# Patient Record
Sex: FEMALE | Race: WHITE | NOT HISPANIC OR LATINO
[De-identification: names, ages, dates, MRNs, and addresses within clinical notes are randomized per-mention and may not be internally consistent; named-entity substitution may affect disease eponyms.]

---

## 2016-04-22 RX ORDER — AMLODIPINE BESYLATE 2.5 MG/1
1 TABLET ORAL
Qty: 0 | Refills: 0 | COMMUNITY
Start: 2016-04-22

## 2017-05-11 ENCOUNTER — APPOINTMENT (OUTPATIENT)
Dept: MAMMOGRAPHY | Facility: IMAGING CENTER | Age: 82
End: 2017-05-11

## 2017-05-11 ENCOUNTER — APPOINTMENT (OUTPATIENT)
Dept: ULTRASOUND IMAGING | Facility: IMAGING CENTER | Age: 82
End: 2017-05-11

## 2017-05-11 ENCOUNTER — OUTPATIENT (OUTPATIENT)
Dept: OUTPATIENT SERVICES | Facility: HOSPITAL | Age: 82
LOS: 1 days | End: 2017-05-11
Payer: MEDICARE

## 2017-05-11 DIAGNOSIS — Z96.7 PRESENCE OF OTHER BONE AND TENDON IMPLANTS: Chronic | ICD-10-CM

## 2017-05-11 DIAGNOSIS — O00.1 TUBAL PREGNANCY: Chronic | ICD-10-CM

## 2017-05-11 DIAGNOSIS — Z00.8 ENCOUNTER FOR OTHER GENERAL EXAMINATION: ICD-10-CM

## 2017-05-11 PROCEDURE — G0279: CPT

## 2017-05-11 PROCEDURE — 77066 DX MAMMO INCL CAD BI: CPT

## 2017-05-11 PROCEDURE — 76641 ULTRASOUND BREAST COMPLETE: CPT

## 2018-02-20 ENCOUNTER — APPOINTMENT (OUTPATIENT)
Dept: GERIATRICS | Facility: ASSISTED LIVING FACILITY | Age: 83
End: 2018-02-20
Payer: MEDICARE

## 2018-02-20 DIAGNOSIS — Z87.81 PERSONAL HISTORY OF (HEALED) TRAUMATIC FRACTURE: ICD-10-CM

## 2018-02-22 VITALS
SYSTOLIC BLOOD PRESSURE: 130 MMHG | OXYGEN SATURATION: 97 % | DIASTOLIC BLOOD PRESSURE: 60 MMHG | HEART RATE: 68 BPM | RESPIRATION RATE: 15 BRPM

## 2018-02-22 PROBLEM — Z87.81 HISTORY OF FRACTURE OF RIGHT HIP: Status: RESOLVED | Noted: 2018-02-22 | Resolved: 2018-02-22

## 2018-03-27 ENCOUNTER — APPOINTMENT (OUTPATIENT)
Dept: GERIATRICS | Facility: ASSISTED LIVING FACILITY | Age: 83
End: 2018-03-27
Payer: MEDICARE

## 2018-04-12 LAB
APPEARANCE: CLEAR
BACTERIA UR CULT: NORMAL
BACTERIA: ABNORMAL
BILIRUBIN URINE: NEGATIVE
BLOOD URINE: ABNORMAL
COLOR: ABNORMAL
GLUCOSE QUALITATIVE U: NEGATIVE MG/DL
HYALINE CASTS: 0 /LPF
KETONES URINE: NEGATIVE
LEUKOCYTE ESTERASE URINE: ABNORMAL
MICROSCOPIC-UA: NORMAL
NITRITE URINE: NEGATIVE
PH URINE: 5
PROTEIN URINE: ABNORMAL MG/DL
RED BLOOD CELLS URINE: 10 /HPF
SPECIFIC GRAVITY URINE: 1.02
SQUAMOUS EPITHELIAL CELLS: 10 /HPF
UROBILINOGEN URINE: NEGATIVE MG/DL
WHITE BLOOD CELLS URINE: 20 /HPF

## 2018-05-20 ENCOUNTER — INPATIENT (INPATIENT)
Facility: HOSPITAL | Age: 83
LOS: 4 days | Discharge: NURSING, REHAB OR LONG-TERM CARE | DRG: 689 | End: 2018-05-25
Attending: INTERNAL MEDICINE | Admitting: INTERNAL MEDICINE
Payer: MEDICARE

## 2018-05-20 VITALS
OXYGEN SATURATION: 95 % | RESPIRATION RATE: 20 BRPM | DIASTOLIC BLOOD PRESSURE: 81 MMHG | WEIGHT: 149.91 LBS | SYSTOLIC BLOOD PRESSURE: 130 MMHG | HEART RATE: 98 BPM | TEMPERATURE: 99 F

## 2018-05-20 DIAGNOSIS — I10 ESSENTIAL (PRIMARY) HYPERTENSION: ICD-10-CM

## 2018-05-20 DIAGNOSIS — R53.1 WEAKNESS: ICD-10-CM

## 2018-05-20 DIAGNOSIS — R35.0 FREQUENCY OF MICTURITION: ICD-10-CM

## 2018-05-20 DIAGNOSIS — O00.1 TUBAL PREGNANCY: Chronic | ICD-10-CM

## 2018-05-20 DIAGNOSIS — M48.00 SPINAL STENOSIS, SITE UNSPECIFIED: ICD-10-CM

## 2018-05-20 DIAGNOSIS — Z96.7 PRESENCE OF OTHER BONE AND TENDON IMPLANTS: Chronic | ICD-10-CM

## 2018-05-20 DIAGNOSIS — N39.0 URINARY TRACT INFECTION, SITE NOT SPECIFIED: ICD-10-CM

## 2018-05-20 DIAGNOSIS — Z98.890 OTHER SPECIFIED POSTPROCEDURAL STATES: ICD-10-CM

## 2018-05-20 LAB
ALBUMIN SERPL ELPH-MCNC: 3.9 G/DL — SIGNIFICANT CHANGE UP (ref 3.3–5)
ALP SERPL-CCNC: 127 U/L — HIGH (ref 40–120)
ALT FLD-CCNC: 58 U/L — HIGH (ref 10–45)
ANION GAP SERPL CALC-SCNC: 12 MMOL/L — SIGNIFICANT CHANGE UP (ref 5–17)
APPEARANCE UR: ABNORMAL
AST SERPL-CCNC: 44 U/L — HIGH (ref 10–40)
BASOPHILS # BLD AUTO: 0 K/UL — SIGNIFICANT CHANGE UP (ref 0–0.2)
BASOPHILS NFR BLD AUTO: 0.3 % — SIGNIFICANT CHANGE UP (ref 0–2)
BILIRUB SERPL-MCNC: 0.5 MG/DL — SIGNIFICANT CHANGE UP (ref 0.2–1.2)
BILIRUB UR-MCNC: NEGATIVE — SIGNIFICANT CHANGE UP
BUN SERPL-MCNC: 23 MG/DL — SIGNIFICANT CHANGE UP (ref 7–23)
CALCIUM SERPL-MCNC: 9 MG/DL — SIGNIFICANT CHANGE UP (ref 8.4–10.5)
CHLORIDE SERPL-SCNC: 99 MMOL/L — SIGNIFICANT CHANGE UP (ref 96–108)
CO2 SERPL-SCNC: 26 MMOL/L — SIGNIFICANT CHANGE UP (ref 22–31)
COLOR SPEC: YELLOW — SIGNIFICANT CHANGE UP
CREAT SERPL-MCNC: 1.29 MG/DL — SIGNIFICANT CHANGE UP (ref 0.5–1.3)
DIFF PNL FLD: ABNORMAL
EOSINOPHIL # BLD AUTO: 0.1 K/UL — SIGNIFICANT CHANGE UP (ref 0–0.5)
EOSINOPHIL NFR BLD AUTO: 0.7 % — SIGNIFICANT CHANGE UP (ref 0–6)
GLUCOSE SERPL-MCNC: 102 MG/DL — HIGH (ref 70–99)
GLUCOSE UR QL: NEGATIVE — SIGNIFICANT CHANGE UP
HCT VFR BLD CALC: 40.5 % — SIGNIFICANT CHANGE UP (ref 34.5–45)
HGB BLD-MCNC: 13.7 G/DL — SIGNIFICANT CHANGE UP (ref 11.5–15.5)
KETONES UR-MCNC: NEGATIVE — SIGNIFICANT CHANGE UP
LEUKOCYTE ESTERASE UR-ACNC: ABNORMAL
LYMPHOCYTES # BLD AUTO: 1.5 K/UL — SIGNIFICANT CHANGE UP (ref 1–3.3)
LYMPHOCYTES # BLD AUTO: 14.4 % — SIGNIFICANT CHANGE UP (ref 13–44)
MAGNESIUM SERPL-MCNC: 2.2 MG/DL — SIGNIFICANT CHANGE UP (ref 1.6–2.6)
MCHC RBC-ENTMCNC: 32.3 PG — SIGNIFICANT CHANGE UP (ref 27–34)
MCHC RBC-ENTMCNC: 33.9 GM/DL — SIGNIFICANT CHANGE UP (ref 32–36)
MCV RBC AUTO: 95.3 FL — SIGNIFICANT CHANGE UP (ref 80–100)
MONOCYTES # BLD AUTO: 1.5 K/UL — HIGH (ref 0–0.9)
MONOCYTES NFR BLD AUTO: 14.5 % — HIGH (ref 2–14)
NEUTROPHILS # BLD AUTO: 7.2 K/UL — SIGNIFICANT CHANGE UP (ref 1.8–7.4)
NEUTROPHILS NFR BLD AUTO: 70.1 % — SIGNIFICANT CHANGE UP (ref 43–77)
NITRITE UR-MCNC: POSITIVE
PH UR: 7 — SIGNIFICANT CHANGE UP (ref 5–8)
PHOSPHATE SERPL-MCNC: 3 MG/DL — SIGNIFICANT CHANGE UP (ref 2.5–4.5)
PLATELET # BLD AUTO: 149 K/UL — LOW (ref 150–400)
POTASSIUM SERPL-MCNC: 5.1 MMOL/L — SIGNIFICANT CHANGE UP (ref 3.5–5.3)
POTASSIUM SERPL-SCNC: 5.1 MMOL/L — SIGNIFICANT CHANGE UP (ref 3.5–5.3)
PROT SERPL-MCNC: 8.1 G/DL — SIGNIFICANT CHANGE UP (ref 6–8.3)
PROT UR-MCNC: 100 MG/DL
RBC # BLD: 4.26 M/UL — SIGNIFICANT CHANGE UP (ref 3.8–5.2)
RBC # FLD: 12.2 % — SIGNIFICANT CHANGE UP (ref 10.3–14.5)
RBC CASTS # UR COMP ASSIST: ABNORMAL /HPF (ref 0–2)
SODIUM SERPL-SCNC: 137 MMOL/L — SIGNIFICANT CHANGE UP (ref 135–145)
SP GR SPEC: 1.01 — SIGNIFICANT CHANGE UP (ref 1.01–1.02)
UROBILINOGEN FLD QL: NEGATIVE — SIGNIFICANT CHANGE UP
WBC # BLD: 10.2 K/UL — SIGNIFICANT CHANGE UP (ref 3.8–10.5)
WBC # FLD AUTO: 10.2 K/UL — SIGNIFICANT CHANGE UP (ref 3.8–10.5)
WBC UR QL: >50 /HPF (ref 0–5)

## 2018-05-20 PROCEDURE — 99285 EMERGENCY DEPT VISIT HI MDM: CPT | Mod: GC

## 2018-05-20 RX ORDER — SODIUM CHLORIDE 9 MG/ML
500 INJECTION INTRAMUSCULAR; INTRAVENOUS; SUBCUTANEOUS ONCE
Qty: 0 | Refills: 0 | Status: COMPLETED | OUTPATIENT
Start: 2018-05-20 | End: 2018-05-20

## 2018-05-20 RX ORDER — AMLODIPINE BESYLATE 2.5 MG/1
5 TABLET ORAL DAILY
Qty: 0 | Refills: 0 | Status: DISCONTINUED | OUTPATIENT
Start: 2018-05-20 | End: 2018-05-25

## 2018-05-20 RX ORDER — CALCIUM CARBONATE 500(1250)
1 TABLET ORAL DAILY
Qty: 0 | Refills: 0 | Status: DISCONTINUED | OUTPATIENT
Start: 2018-05-20 | End: 2018-05-25

## 2018-05-20 RX ORDER — LEVOTHYROXINE SODIUM 125 MCG
88 TABLET ORAL DAILY
Qty: 0 | Refills: 0 | Status: DISCONTINUED | OUTPATIENT
Start: 2018-05-20 | End: 2018-05-25

## 2018-05-20 RX ORDER — CHOLECALCIFEROL (VITAMIN D3) 125 MCG
1000 CAPSULE ORAL
Qty: 0 | Refills: 0 | Status: DISCONTINUED | OUTPATIENT
Start: 2018-05-20 | End: 2018-05-25

## 2018-05-20 RX ORDER — ACETAMINOPHEN 500 MG
500 TABLET ORAL EVERY 6 HOURS
Qty: 0 | Refills: 0 | Status: DISCONTINUED | OUTPATIENT
Start: 2018-05-20 | End: 2018-05-25

## 2018-05-20 RX ORDER — CEFTRIAXONE 500 MG/1
1 INJECTION, POWDER, FOR SOLUTION INTRAMUSCULAR; INTRAVENOUS ONCE
Qty: 0 | Refills: 0 | Status: DISCONTINUED | OUTPATIENT
Start: 2018-05-20 | End: 2018-05-20

## 2018-05-20 RX ORDER — OXYBUTYNIN CHLORIDE 5 MG
5 TABLET ORAL
Qty: 0 | Refills: 0 | Status: DISCONTINUED | OUTPATIENT
Start: 2018-05-20 | End: 2018-05-25

## 2018-05-20 RX ORDER — ATENOLOL 25 MG/1
25 TABLET ORAL DAILY
Qty: 0 | Refills: 0 | Status: DISCONTINUED | OUTPATIENT
Start: 2018-05-20 | End: 2018-05-25

## 2018-05-20 RX ORDER — DULOXETINE HYDROCHLORIDE 30 MG/1
20 CAPSULE, DELAYED RELEASE ORAL DAILY
Qty: 0 | Refills: 0 | Status: DISCONTINUED | OUTPATIENT
Start: 2018-05-20 | End: 2018-05-25

## 2018-05-20 RX ORDER — NYSTATIN CREAM 100000 [USP'U]/G
1 CREAM TOPICAL
Qty: 0 | Refills: 0 | Status: DISCONTINUED | OUTPATIENT
Start: 2018-05-20 | End: 2018-05-25

## 2018-05-20 RX ORDER — POLYETHYLENE GLYCOL 3350 17 G/17G
17 POWDER, FOR SOLUTION ORAL DAILY
Qty: 0 | Refills: 0 | Status: DISCONTINUED | OUTPATIENT
Start: 2018-05-20 | End: 2018-05-25

## 2018-05-20 RX ORDER — SENNA PLUS 8.6 MG/1
2 TABLET ORAL AT BEDTIME
Qty: 0 | Refills: 0 | Status: DISCONTINUED | OUTPATIENT
Start: 2018-05-20 | End: 2018-05-25

## 2018-05-20 RX ORDER — CEFTRIAXONE 500 MG/1
1 INJECTION, POWDER, FOR SOLUTION INTRAMUSCULAR; INTRAVENOUS ONCE
Qty: 0 | Refills: 0 | Status: COMPLETED | OUTPATIENT
Start: 2018-05-20 | End: 2018-05-20

## 2018-05-20 RX ORDER — FERROUS SULFATE 325(65) MG
325 TABLET ORAL THREE TIMES A DAY
Qty: 0 | Refills: 0 | Status: DISCONTINUED | OUTPATIENT
Start: 2018-05-20 | End: 2018-05-25

## 2018-05-20 RX ORDER — AMLODIPINE BESYLATE 2.5 MG/1
10 TABLET ORAL ONCE
Qty: 0 | Refills: 0 | Status: COMPLETED | OUTPATIENT
Start: 2018-05-20 | End: 2018-05-20

## 2018-05-20 RX ORDER — DOCUSATE SODIUM 100 MG
100 CAPSULE ORAL THREE TIMES A DAY
Qty: 0 | Refills: 0 | Status: DISCONTINUED | OUTPATIENT
Start: 2018-05-20 | End: 2018-05-25

## 2018-05-20 RX ORDER — ATENOLOL 25 MG/1
25 TABLET ORAL ONCE
Qty: 0 | Refills: 0 | Status: COMPLETED | OUTPATIENT
Start: 2018-05-20 | End: 2018-05-20

## 2018-05-20 RX ADMIN — SODIUM CHLORIDE 500 MILLILITER(S): 9 INJECTION INTRAMUSCULAR; INTRAVENOUS; SUBCUTANEOUS at 14:51

## 2018-05-20 RX ADMIN — ATENOLOL 25 MILLIGRAM(S): 25 TABLET ORAL at 16:42

## 2018-05-20 RX ADMIN — AMLODIPINE BESYLATE 10 MILLIGRAM(S): 2.5 TABLET ORAL at 21:05

## 2018-05-20 RX ADMIN — CEFTRIAXONE 100 GRAM(S): 500 INJECTION, POWDER, FOR SOLUTION INTRAMUSCULAR; INTRAVENOUS at 16:17

## 2018-05-20 NOTE — H&P ADULT - NSHPREVIEWOFSYSTEMS_GEN_ALL_CORE
REVIEW OF SYSTEM:  CONSTITUTIONAL: No fever, No change in weight, No fatigue  HEAD: No headache, No dizziness, No recent trauma  EYES: No eye pain, No visual disturbances, No discharge  ENT:  No difficulty hearing, No tinnitus, No vertigo, No sinus pain, No throat pain  NECK: No pain, No stiffness  BREASTS: No pain, No masses, No nipple discharge  RESPIRATORY: No cough, No wheezing, No chills, No hemoptysis, No shortness of breath at rest or exertional shortness of breath  CARDIOVASCULAR: No chest pain, No palpitations, No dizziness, No CHF, No arrhythmia, No cardiomegaly, No leg swelling  GASTROINTESTINAL: No abdominal, No epigastric pain. No nausea, No vomiting, No hematemesis, No diarrhea, No constipation. No melena, No hematochezia. No GERD  GENITOURINARY: No dysuria, No frequency, No hematuria, No incontinence, No nocturia, No hesitancy,  SKIN: No itching, No burning, No rashes, No lesions   LYMPH NODES: No history of enlarged glands  ENDOCRINE: No heat or cold intolerance, No hair loss. No osteoporosis, No thyroid disease  MUSCULOSKELETAL: No joint pain or swelling, No muscle, back, or extremity pain  PSYCHIATRIC: No depression, No anxiety, No mood swings, No difficulty sleeping  HEME/LYMPH: No easy bruising, No anticoagulants, No bleeding disorder, No bleeding gums  ALLERGY AND IMMUNOLOGIC: No hives, No eczema  NEUROLOGICAL: No memory loss, No loss of strength, No numbness, No tremors REVIEW OF SYSTEM:  CONSTITUTIONAL: No fever, No change in weight, No fatigue  HEAD: No headache, No dizziness, No recent trauma  EYES: No eye pain, No visual disturbances, No discharge  ENT:  No difficulty hearing, No tinnitus, No vertigo, No sinus pain, No throat pain  NECK: No pain, No stiffness  BREASTS: No pain, No masses, No nipple discharge Left breast surgery  RESPIRATORY: No cough, No wheezing, No chills, No hemoptysis, No shortness of breath at rest or exertional shortness of breath  CARDIOVASCULAR: No chest pain, No palpitations, No dizziness, No CHF, No arrhythmia, No cardiomegaly, No leg swelling  GASTROINTESTINAL: No abdominal, No epigastric pain. No nausea, No vomiting, No hematemesis, No diarrhea, No constipation. No melena, No hematochezia. No GERD  GENITOURINARY: (+) dysuria, (+) frequency, No hematuria, No incontinence, No nocturia, No hesitancy,  SKIN: No itching, No burning, No rashes, No lesions   LYMPH NODES: No history of enlarged glands  ENDOCRINE: No heat or cold intolerance, No hair loss. No osteoporosis, No thyroid disease  MUSCULOSKELETAL: No joint pain or swelling, No muscle, back, or extremity pain  PSYCHIATRIC: No depression, No anxiety, No mood swings, No difficulty sleeping  HEME/LYMPH: No easy bruising, No anticoagulants, No bleeding disorder, No bleeding gums  ALLERGY AND IMMUNOLOGIC: No hives, No eczema  NEUROLOGICAL: No memory loss, No loss of strength, No numbness, No tremors (+) difficulty walking with frequent falls and ataxia

## 2018-05-20 NOTE — ED ADULT NURSE REASSESSMENT NOTE - NS ED NURSE REASSESS COMMENT FT1
Pt. sitting up in stretcher, no acute distress. Breathing unlabored on RA. Pt. states "I feel okay." Pt. admitted to medicine, awaiting bed. Plan of care explained to patient.
Pt. reports weakness with ambulation/standing,  unable to obtain orthostatic blood pressure and heart rate, MD aware.
straight catheterization done under sterile technique with 2 RNs present, pt. tolerated well, moderate amount of yellow cloudy urine drained. UA/UC sent as ordered

## 2018-05-20 NOTE — ED PROVIDER NOTE - CARE PLAN
Principal Discharge DX:	Spinal stenosis Principal Discharge DX:	Urinary tract infection  Secondary Diagnosis:	Weakness

## 2018-05-20 NOTE — ED PROVIDER NOTE - OBJECTIVE STATEMENT
89F Hx htn, spinal stenosis and urinary incontinence c/o falls x 2 two days ago, noted as knee buckling sensation and fell onto her knees, able to ambulate with assistance, no visual disturbance, headache, LOC, AC, or numbness/tingling, cough/fevers/ dysuria. Endorses overall weakness 89F Hx htn, spinal stenosis and urinary incontinence c/o falls x 2 two days ago, noted as knee buckling sensation and fell onto her knees, able to ambulate with assistance, no visual disturbance, headache, LOC, AC, or numbness/tingling, cough/fevers/ dysuria, cp/sob. Endorses overall weakness and instability with ambulation. Notes she must independently go for dinner/lunch and move around in her current nursing facility.

## 2018-05-20 NOTE — ED ADULT NURSE NOTE - PMH
Bilateral Cataracts    HTN (hypertension)    Spinal stenosis    Status Post Breast Lumpectomy    Urinary Frequency

## 2018-05-20 NOTE — ED ADULT NURSE NOTE - OBJECTIVE STATEMENT
89 yo F presents to ED A+Ox3 accompanied by her daughter from triage in a wheelchair c/o burning with urination and two falls this past week. As per daughter, pt. lives in the [facility] assisted living facility, had two slip and falls yesterday. Pt. reports falling once two days ago and again yesterday, denies hitting head, denies LOC. Pt. states she was assisted after falls by staff, ambulates at baseline with a rolling walker. Pt. reports urinary frequency "for a while" that worsens at night, reports having botox for bladder spasms one month ago, states she had no improvement since the botox. Pt. denies any other obvious injuries from the falls. Lungs CTA, breathing unlabored on RA. Skin warm pink and dry. Abdomen soft, nondistended, nontender. Moves all extremities. Denies fever, chills, abdominal pain, N/V. Afebile in ED at this time. Daughter at bedside. Comfort and safety measures in place.

## 2018-05-20 NOTE — H&P ADULT - NSHPPHYSICALEXAM_GEN_ALL_CORE
PHYSICAL EXAM:    GENERAL: NAD, well nourished and conversant  HEAD:  Atraumatic  EYES: EOM, PERRLA, conjunctiva pink and sclera white  ENT: No tonsillar erythema, exudates, or enlargement, moist mucous membranes, good dentition, no lesions  NECK: Supple, No JVD, normal thyroid, carotids with normal upstrokes and no bruits  CHEST/LUNG: Clear to auscultation bilaterally, No rales, rhonchi, wheezing, or rubs  HEART: Regular rate and rhythm, No murmurs, rubs, or gallops  ABDOMEN: Soft, nondistended, no masses, guarding, tenderness or rebound, bowel sounds present  EXTREMITIES:  2+ Peripheral Pulses, No clubbing, cyanosis, or edema. No arthritis of shoulders, elbows, hands, hips, knees, ankles, or feet. No DJD C spine, T spine, or L/S spine  LYMPH: No lymphadenopathy noted  SKIN: No rashes or lesions  NERVOUS SYSTEM:  Alert & Oriented X3, normal cognitive function. Motor Strength 5/5 right upper and right lower.  5/5 left upper and left lower extremities, DTRs 2+ intact and symmetric PHYSICAL EXAM:    GENERAL: NAD, well nourished and conversant  HEAD:  Atraumatic  EYES: EOM, PERRLA, conjunctiva pink and sclera white  ENT: No tonsillar erythema, exudates, or enlargement, moist mucous membranes, good dentition, no lesions  NECK: Supple, No JVD, normal thyroid, carotids with normal upstrokes and no bruits  Surgery left breast  CHEST/LUNG: Clear to auscultation bilaterally, No rales, rhonchi, wheezing, or rubs  HEART: Regular rate and rhythm, No murmurs, rubs, or gallops  ABDOMEN: Soft, nondistended, no masses, guarding, tenderness or rebound, bowel sounds present  EXTREMITIES:  2+ Peripheral Pulses, No clubbing, cyanosis, or edema. No arthritis of shoulders, elbows, hands, hips, knees, ankles, or feet. No DJD C spine, T spine, or L/S spine  LYMPH: No lymphadenopathy noted  SKIN: No rashes or lesions  NERVOUS SYSTEM:  Alert & Oriented X3, normal cognitive function. Motor Strength 5/5 right upper and right lower.  5/5 left upper and left lower extremities, DTRs 2+ intact and symmetric. Ataxia and frequent falls

## 2018-05-20 NOTE — ED PROVIDER NOTE - PROGRESS NOTE DETAILS
Brenden PGY1: spoken to Dr Jose Shah PCP - due to frequent falls and endorsement of weakness, will admit pt for IV abx until more stable for d/c home [place] PGY1: Per major zapien, can admit to sylvia birmingham Brenden PGY1: spoken to Dr Jose Shah PCP - due to frequent falls and endorsement of weakness, will admit pt for IV abx until more stable for d/c home, patient unable to stand from bed to urinate due to feeling of instability, is not in an advanced care facility, needs to get lunch on her own and walk on her own Wilcox PGY1: Called Dr major zapien, can admit to Dr sylvia birmingham. Brenden PGY1: spoken to Dr Jose Shah PCP - due to frequent falls and endorsement of weakness, will admit pt for IV abx until more stable for d/c home, patient unable to stand from bed to urinate due to feeling of instability, is not in an advanced care facility, needs to get lunch on her own and walk on her own, does not feel safe returning Brenden PGY1: Called Dr Shah, can admit to Dr sylvia birmingham, his team notified, spoken with Dr Louis

## 2018-05-20 NOTE — H&P ADULT - NSHPLABSRESULTS_GEN_ALL_CORE
CBC Full  -  ( 20 May 2018 15:09 )  WBC Count : 10.2 K/uL  Hemoglobin : 13.7 g/dL  Hematocrit : 40.5 %  Platelet Count - Automated : 149 K/uL  Mean Cell Volume : 95.3 fl  Mean Cell Hemoglobin : 32.3 pg  Mean Cell Hemoglobin Concentration : 33.9 gm/dL  Auto Neutrophil # : 7.2 K/uL  Auto Lymphocyte # : 1.5 K/uL  Auto Monocyte # : 1.5 K/uL  Auto Eosinophil # : 0.1 K/uL  Auto Basophil # : 0.0 K/uL  Auto Neutrophil % : 70.1 %  Auto Lymphocyte % : 14.4 %  Auto Monocyte % : 14.5 %  Auto Eosinophil % : 0.7 %  Auto Basophil % : 0.3 %        137  |  99  |  23  ----------------------------<  102<H>  5.1   |  26  |  1.29    Ca    9.0      20 May 2018 15:09  Phos  3.0     05-20  Mg     2.2     -20    TPro  8.1  /  Alb  3.9  /  TBili  0.5  /  DBili  x   /  AST  44<H>  /  ALT  58<H>  /  AlkPhos  127<H>  05-20    LIVER FUNCTIONS - ( 20 May 2018 15:09 )  Alb: 3.9 g/dL / Pro: 8.1 g/dL / ALK PHOS: 127 U/L / ALT: 58 U/L / AST: 44 U/L / GGT: x             Urinalysis Basic - ( 20 May 2018 14:46 )    Color: Yellow / Appearance: Turbid / S.010 / pH: x  Gluc: x / Ketone: Negative  / Bili: Negative / Urobili: Negative   Blood: x / Protein: 100 mg/dL / Nitrite: Positive   Leuk Esterase: Large / RBC: 10-25 /HPF / WBC >50 /HPF   Sq Epi: x / Non Sq Epi: x / Bacteria: x

## 2018-05-20 NOTE — H&P ADULT - HISTORY OF PRESENT ILLNESS
89F Hx htn, spinal stenosis and urinary incontinence c/o falls x 2 two days ago, noted as knee buckling sensation and fell onto her knees, able to ambulate with assistance, no visual disturbance, headache, LOC, AC, or numbness/tingling, cough/fevers/ dysuria, cp/sob. Endorses overall weakness and instability with ambulation. Notes she must independently go for dinner/lunch and move around in her current nursing facility

## 2018-05-21 LAB
ALBUMIN SERPL ELPH-MCNC: 3.5 G/DL — SIGNIFICANT CHANGE UP (ref 3.3–5)
ALP SERPL-CCNC: 108 U/L — SIGNIFICANT CHANGE UP (ref 40–120)
ALT FLD-CCNC: 40 U/L — SIGNIFICANT CHANGE UP (ref 10–45)
ANION GAP SERPL CALC-SCNC: 11 MMOL/L — SIGNIFICANT CHANGE UP (ref 5–17)
AST SERPL-CCNC: 25 U/L — SIGNIFICANT CHANGE UP (ref 10–40)
BASOPHILS # BLD AUTO: 0.03 K/UL — SIGNIFICANT CHANGE UP (ref 0–0.2)
BASOPHILS NFR BLD AUTO: 0.4 % — SIGNIFICANT CHANGE UP (ref 0–2)
BILIRUB SERPL-MCNC: 0.2 MG/DL — SIGNIFICANT CHANGE UP (ref 0.2–1.2)
BUN SERPL-MCNC: 25 MG/DL — HIGH (ref 7–23)
CALCIUM SERPL-MCNC: 8.6 MG/DL — SIGNIFICANT CHANGE UP (ref 8.4–10.5)
CHLORIDE SERPL-SCNC: 102 MMOL/L — SIGNIFICANT CHANGE UP (ref 96–108)
CO2 SERPL-SCNC: 28 MMOL/L — SIGNIFICANT CHANGE UP (ref 22–31)
CREAT SERPL-MCNC: 1.34 MG/DL — HIGH (ref 0.5–1.3)
EOSINOPHIL # BLD AUTO: 0.2 K/UL — SIGNIFICANT CHANGE UP (ref 0–0.5)
EOSINOPHIL NFR BLD AUTO: 2.6 % — SIGNIFICANT CHANGE UP (ref 0–6)
GLUCOSE SERPL-MCNC: 115 MG/DL — HIGH (ref 70–99)
HCT VFR BLD CALC: 36.6 % — SIGNIFICANT CHANGE UP (ref 34.5–45)
HGB BLD-MCNC: 12.1 G/DL — SIGNIFICANT CHANGE UP (ref 11.5–15.5)
IMM GRANULOCYTES NFR BLD AUTO: 0.4 % — SIGNIFICANT CHANGE UP (ref 0–1.5)
LYMPHOCYTES # BLD AUTO: 1.51 K/UL — SIGNIFICANT CHANGE UP (ref 1–3.3)
LYMPHOCYTES # BLD AUTO: 19.7 % — SIGNIFICANT CHANGE UP (ref 13–44)
MCHC RBC-ENTMCNC: 30.8 PG — SIGNIFICANT CHANGE UP (ref 27–34)
MCHC RBC-ENTMCNC: 33.1 GM/DL — SIGNIFICANT CHANGE UP (ref 32–36)
MCV RBC AUTO: 93.1 FL — SIGNIFICANT CHANGE UP (ref 80–100)
MONOCYTES # BLD AUTO: 1.39 K/UL — HIGH (ref 0–0.9)
MONOCYTES NFR BLD AUTO: 18.1 % — HIGH (ref 2–14)
NEUTROPHILS # BLD AUTO: 4.52 K/UL — SIGNIFICANT CHANGE UP (ref 1.8–7.4)
NEUTROPHILS NFR BLD AUTO: 58.8 % — SIGNIFICANT CHANGE UP (ref 43–77)
PLATELET # BLD AUTO: 163 K/UL — SIGNIFICANT CHANGE UP (ref 150–400)
POTASSIUM SERPL-MCNC: 3.9 MMOL/L — SIGNIFICANT CHANGE UP (ref 3.5–5.3)
POTASSIUM SERPL-SCNC: 3.9 MMOL/L — SIGNIFICANT CHANGE UP (ref 3.5–5.3)
PROT SERPL-MCNC: 7.2 G/DL — SIGNIFICANT CHANGE UP (ref 6–8.3)
RBC # BLD: 3.93 M/UL — SIGNIFICANT CHANGE UP (ref 3.8–5.2)
RBC # FLD: 14.4 % — SIGNIFICANT CHANGE UP (ref 10.3–14.5)
SODIUM SERPL-SCNC: 141 MMOL/L — SIGNIFICANT CHANGE UP (ref 135–145)
WBC # BLD: 7.68 K/UL — SIGNIFICANT CHANGE UP (ref 3.8–10.5)
WBC # FLD AUTO: 7.68 K/UL — SIGNIFICANT CHANGE UP (ref 3.8–10.5)

## 2018-05-21 RX ORDER — CEFTRIAXONE 500 MG/1
1 INJECTION, POWDER, FOR SOLUTION INTRAMUSCULAR; INTRAVENOUS EVERY 24 HOURS
Qty: 0 | Refills: 0 | Status: DISCONTINUED | OUTPATIENT
Start: 2018-05-21 | End: 2018-05-22

## 2018-05-21 RX ORDER — HEPARIN SODIUM 5000 [USP'U]/ML
5000 INJECTION INTRAVENOUS; SUBCUTANEOUS EVERY 8 HOURS
Qty: 0 | Refills: 0 | Status: DISCONTINUED | OUTPATIENT
Start: 2018-05-21 | End: 2018-05-25

## 2018-05-21 RX ADMIN — Medication 100 MILLIGRAM(S): at 21:04

## 2018-05-21 RX ADMIN — HEPARIN SODIUM 5000 UNIT(S): 5000 INJECTION INTRAVENOUS; SUBCUTANEOUS at 13:46

## 2018-05-21 RX ADMIN — ATENOLOL 25 MILLIGRAM(S): 25 TABLET ORAL at 06:16

## 2018-05-21 RX ADMIN — Medication 325 MILLIGRAM(S): at 21:04

## 2018-05-21 RX ADMIN — Medication 88 MICROGRAM(S): at 06:16

## 2018-05-21 RX ADMIN — Medication 1000 UNIT(S): at 17:40

## 2018-05-21 RX ADMIN — SENNA PLUS 2 TABLET(S): 8.6 TABLET ORAL at 21:04

## 2018-05-21 RX ADMIN — AMLODIPINE BESYLATE 5 MILLIGRAM(S): 2.5 TABLET ORAL at 06:16

## 2018-05-21 RX ADMIN — DULOXETINE HYDROCHLORIDE 20 MILLIGRAM(S): 30 CAPSULE, DELAYED RELEASE ORAL at 13:44

## 2018-05-21 RX ADMIN — Medication 1 TABLET(S): at 13:45

## 2018-05-21 RX ADMIN — Medication 100 MILLIGRAM(S): at 13:45

## 2018-05-21 RX ADMIN — Medication 5 MILLIGRAM(S): at 06:17

## 2018-05-21 RX ADMIN — Medication 325 MILLIGRAM(S): at 13:45

## 2018-05-21 RX ADMIN — Medication 100 MILLIGRAM(S): at 06:16

## 2018-05-21 RX ADMIN — NYSTATIN CREAM 1 APPLICATION(S): 100000 CREAM TOPICAL at 06:26

## 2018-05-21 RX ADMIN — HEPARIN SODIUM 5000 UNIT(S): 5000 INJECTION INTRAVENOUS; SUBCUTANEOUS at 06:16

## 2018-05-21 RX ADMIN — Medication 5 MILLIGRAM(S): at 17:40

## 2018-05-21 RX ADMIN — CEFTRIAXONE 100 GRAM(S): 500 INJECTION, POWDER, FOR SOLUTION INTRAMUSCULAR; INTRAVENOUS at 06:26

## 2018-05-21 RX ADMIN — NYSTATIN CREAM 1 APPLICATION(S): 100000 CREAM TOPICAL at 17:41

## 2018-05-21 RX ADMIN — Medication 325 MILLIGRAM(S): at 06:16

## 2018-05-21 RX ADMIN — Medication 1000 UNIT(S): at 06:16

## 2018-05-21 RX ADMIN — HEPARIN SODIUM 5000 UNIT(S): 5000 INJECTION INTRAVENOUS; SUBCUTANEOUS at 21:04

## 2018-05-21 NOTE — PHYSICAL THERAPY INITIAL EVALUATION ADULT - DISCHARGE DISPOSITION, PT EVAL
home w/ assist/Subacute rehab; pt prefers home. If pt is to be D/C'd home will require assist with all functional morbility and Home PT./home w/ home PT/rehabilitation facility

## 2018-05-21 NOTE — PHYSICAL THERAPY INITIAL EVALUATION ADULT - TRANSFER TRAINING, PT EVAL
GOAL: Pt will perform ALL transfers independently, w/use of appropriate assistive device as needed, in 4 weeks.

## 2018-05-21 NOTE — PHYSICAL THERAPY INITIAL EVALUATION ADULT - PERTINENT HX OF CURRENT PROBLEM, REHAB EVAL
89F Hx htn, spinal stenosis and urinary incontinence c/o falls x 2 noted as knee buckling sensation and fell onto her knees, able to ambulate with assistance. Pt endorses overall weakness and instability with ambulation. Pt found to have UTI, given IV Cefriaxone. Hospital course c/b hypertension, given amlodipine

## 2018-05-21 NOTE — PHYSICAL THERAPY INITIAL EVALUATION ADULT - ADDITIONAL COMMENTS
Pt lives in the [facility] assisted living facility. Ambulates independently with use of rollator. Pt reports she was independent with all ADL's prior.

## 2018-05-21 NOTE — PHYSICAL THERAPY INITIAL EVALUATION ADULT - TRANSFER SAFETY CONCERNS NOTED: SIT/STAND, REHAB EVAL
decreased weight-shifting ability/losing balance/decreased balance during turns/decreased step length

## 2018-05-21 NOTE — PROGRESS NOTE ADULT - SUBJECTIVE AND OBJECTIVE BOX
Patient is a 89y old  Female who presents with a chief complaint of falling , weakness and uti (20 May 2018 22:23)        MEDICATIONS  (STANDING):  amLODIPine   Tablet 5 milliGRAM(s) Oral daily  ATENolol  Tablet 25 milliGRAM(s) Oral daily  calcium carbonate 1250 mG (OsCal) 1 Tablet(s) Oral daily  cefTRIAXone   IVPB 1 Gram(s) IV Intermittent every 24 hours  cholecalciferol 1000 Unit(s) Oral two times a day  docusate sodium 100 milliGRAM(s) Oral three times a day  DULoxetine 20 milliGRAM(s) Oral daily  ferrous    sulfate 325 milliGRAM(s) Oral three times a day  heparin  Injectable 5000 Unit(s) SubCutaneous every 8 hours  levothyroxine 88 MICROGram(s) Oral daily  nystatin Cream 1 Application(s) Topical two times a day  oxybutynin 5 milliGRAM(s) Oral two times a day  senna 2 Tablet(s) Oral at bedtime    MEDICATIONS  (PRN):  acetaminophen   Tablet. 500 milliGRAM(s) Oral every 6 hours PRN Mild Pain  polyethylene glycol 3350 17 Gram(s) Oral daily PRN Constipation          VITALS:   T(C): 36.3 (18 @ 08:01), Max: 37.2 (18 @ 12:39)  HR: 68 (18 @ 08:01) (67 - 98)  BP: 160/66 (18 @ 08:01) (119/60 - 194/94)  RR: 18 (18 @ 08:01) (16 - 20)  SpO2: 96% (18 @ 08:01) (95% - 100%)  Wt(kg): --        LABS:        CBC Full  -  ( 20 May 2018 15:09 )  WBC Count : 10.2 K/uL  Hemoglobin : 13.7 g/dL  Hematocrit : 40.5 %  Platelet Count - Automated : 149 K/uL  Mean Cell Volume : 95.3 fl  Mean Cell Hemoglobin : 32.3 pg  Mean Cell Hemoglobin Concentration : 33.9 gm/dL  Auto Neutrophil # : 7.2 K/uL  Auto Lymphocyte # : 1.5 K/uL  Auto Monocyte # : 1.5 K/uL  Auto Eosinophil # : 0.1 K/uL  Auto Basophil # : 0.0 K/uL  Auto Neutrophil % : 70.1 %  Auto Lymphocyte % : 14.4 %  Auto Monocyte % : 14.5 %  Auto Eosinophil % : 0.7 %  Auto Basophil % : 0.3 %        141  |  102  |  25<H>  ----------------------------<  115<H>  3.9   |  28  |  1.34<H>    Ca    8.6      21 May 2018 08:25  Phos  3.0     05-20  Mg     2.2     05-20    TPro  7.2  /  Alb  3.5  /  TBili  0.2  /  DBili  x   /  AST  25  /  ALT  40  /  AlkPhos  108  -    LIVER FUNCTIONS - ( 21 May 2018 08:25 )  Alb: 3.5 g/dL / Pro: 7.2 g/dL / ALK PHOS: 108 U/L / ALT: 40 U/L / AST: 25 U/L / GGT: x             Urinalysis Basic - ( 20 May 2018 14:46 )    Color: Yellow / Appearance: Turbid / S.010 / pH: x  Gluc: x / Ketone: Negative  / Bili: Negative / Urobili: Negative   Blood: x / Protein: 100 mg/dL / Nitrite: Positive   Leuk Esterase: Large / RBC: 10-25 /HPF / WBC >50 /HPF   Sq Epi: x / Non Sq Epi: x / Bacteria: x      CAPILLARY BLOOD GLUCOSE          RADIOLOGY & ADDITIONAL TESTS:

## 2018-05-21 NOTE — PHYSICAL THERAPY INITIAL EVALUATION ADULT - STRENGTHENING, PT EVAL
GOAL: Pt will improve bilateral LE strength to 4/5, for increased limb stability, to improve gait and facilitate stair negotiation in 4 weeks.

## 2018-05-22 LAB
-  AMIKACIN: SIGNIFICANT CHANGE UP
-  AMOXICILLIN/CLAVULANIC ACID: SIGNIFICANT CHANGE UP
-  AMPICILLIN/SULBACTAM: SIGNIFICANT CHANGE UP
-  AMPICILLIN: SIGNIFICANT CHANGE UP
-  AZTREONAM: SIGNIFICANT CHANGE UP
-  CEFAZOLIN: SIGNIFICANT CHANGE UP
-  CEFEPIME: SIGNIFICANT CHANGE UP
-  CEFOXITIN: SIGNIFICANT CHANGE UP
-  CEFTRIAXONE: SIGNIFICANT CHANGE UP
-  CIPROFLOXACIN: SIGNIFICANT CHANGE UP
-  ERTAPENEM: SIGNIFICANT CHANGE UP
-  GENTAMICIN: SIGNIFICANT CHANGE UP
-  IMIPENEM: SIGNIFICANT CHANGE UP
-  LEVOFLOXACIN: SIGNIFICANT CHANGE UP
-  MEROPENEM: SIGNIFICANT CHANGE UP
-  NITROFURANTOIN: SIGNIFICANT CHANGE UP
-  PIPERACILLIN/TAZOBACTAM: SIGNIFICANT CHANGE UP
-  TIGECYCLINE: SIGNIFICANT CHANGE UP
-  TOBRAMYCIN: SIGNIFICANT CHANGE UP
-  TRIMETHOPRIM/SULFAMETHOXAZOLE: SIGNIFICANT CHANGE UP
ANION GAP SERPL CALC-SCNC: 14 MMOL/L — SIGNIFICANT CHANGE UP (ref 5–17)
BUN SERPL-MCNC: 35 MG/DL — HIGH (ref 7–23)
CALCIUM SERPL-MCNC: 8.7 MG/DL — SIGNIFICANT CHANGE UP (ref 8.4–10.5)
CHLORIDE SERPL-SCNC: 101 MMOL/L — SIGNIFICANT CHANGE UP (ref 96–108)
CO2 SERPL-SCNC: 23 MMOL/L — SIGNIFICANT CHANGE UP (ref 22–31)
CREAT SERPL-MCNC: 1.27 MG/DL — SIGNIFICANT CHANGE UP (ref 0.5–1.3)
CULTURE RESULTS: SIGNIFICANT CHANGE UP
GLUCOSE SERPL-MCNC: 109 MG/DL — HIGH (ref 70–99)
HCT VFR BLD CALC: 34.7 % — SIGNIFICANT CHANGE UP (ref 34.5–45)
HGB BLD-MCNC: 11.8 G/DL — SIGNIFICANT CHANGE UP (ref 11.5–15.5)
MCHC RBC-ENTMCNC: 32.4 PG — SIGNIFICANT CHANGE UP (ref 27–34)
MCHC RBC-ENTMCNC: 34 GM/DL — SIGNIFICANT CHANGE UP (ref 32–36)
MCV RBC AUTO: 95.1 FL — SIGNIFICANT CHANGE UP (ref 80–100)
METHOD TYPE: SIGNIFICANT CHANGE UP
ORGANISM # SPEC MICROSCOPIC CNT: SIGNIFICANT CHANGE UP
ORGANISM # SPEC MICROSCOPIC CNT: SIGNIFICANT CHANGE UP
PLATELET # BLD AUTO: 164 K/UL — SIGNIFICANT CHANGE UP (ref 150–400)
POTASSIUM SERPL-MCNC: 4 MMOL/L — SIGNIFICANT CHANGE UP (ref 3.5–5.3)
POTASSIUM SERPL-SCNC: 4 MMOL/L — SIGNIFICANT CHANGE UP (ref 3.5–5.3)
RBC # BLD: 3.64 M/UL — LOW (ref 3.8–5.2)
RBC # FLD: 11.9 % — SIGNIFICANT CHANGE UP (ref 10.3–14.5)
SODIUM SERPL-SCNC: 138 MMOL/L — SIGNIFICANT CHANGE UP (ref 135–145)
SPECIMEN SOURCE: SIGNIFICANT CHANGE UP
WBC # BLD: 7 K/UL — SIGNIFICANT CHANGE UP (ref 3.8–10.5)
WBC # FLD AUTO: 7 K/UL — SIGNIFICANT CHANGE UP (ref 3.8–10.5)

## 2018-05-22 RX ORDER — ERTAPENEM SODIUM 1 G/1
1000 INJECTION, POWDER, LYOPHILIZED, FOR SOLUTION INTRAMUSCULAR; INTRAVENOUS ONCE
Qty: 0 | Refills: 0 | Status: COMPLETED | OUTPATIENT
Start: 2018-05-22 | End: 2018-05-22

## 2018-05-22 RX ORDER — ERTAPENEM SODIUM 1 G/1
1000 INJECTION, POWDER, LYOPHILIZED, FOR SOLUTION INTRAMUSCULAR; INTRAVENOUS EVERY 24 HOURS
Qty: 0 | Refills: 0 | Status: DISCONTINUED | OUTPATIENT
Start: 2018-05-23 | End: 2018-05-25

## 2018-05-22 RX ORDER — ERTAPENEM SODIUM 1 G/1
INJECTION, POWDER, LYOPHILIZED, FOR SOLUTION INTRAMUSCULAR; INTRAVENOUS
Qty: 0 | Refills: 0 | Status: DISCONTINUED | OUTPATIENT
Start: 2018-05-22 | End: 2018-05-25

## 2018-05-22 RX ADMIN — NYSTATIN CREAM 1 APPLICATION(S): 100000 CREAM TOPICAL at 17:47

## 2018-05-22 RX ADMIN — Medication 88 MICROGRAM(S): at 05:42

## 2018-05-22 RX ADMIN — Medication 5 MILLIGRAM(S): at 05:42

## 2018-05-22 RX ADMIN — HEPARIN SODIUM 5000 UNIT(S): 5000 INJECTION INTRAVENOUS; SUBCUTANEOUS at 13:28

## 2018-05-22 RX ADMIN — Medication 325 MILLIGRAM(S): at 05:42

## 2018-05-22 RX ADMIN — CEFTRIAXONE 100 GRAM(S): 500 INJECTION, POWDER, FOR SOLUTION INTRAMUSCULAR; INTRAVENOUS at 05:40

## 2018-05-22 RX ADMIN — Medication 1000 UNIT(S): at 17:47

## 2018-05-22 RX ADMIN — DULOXETINE HYDROCHLORIDE 20 MILLIGRAM(S): 30 CAPSULE, DELAYED RELEASE ORAL at 13:28

## 2018-05-22 RX ADMIN — Medication 100 MILLIGRAM(S): at 13:28

## 2018-05-22 RX ADMIN — Medication 1000 UNIT(S): at 05:42

## 2018-05-22 RX ADMIN — HEPARIN SODIUM 5000 UNIT(S): 5000 INJECTION INTRAVENOUS; SUBCUTANEOUS at 21:13

## 2018-05-22 RX ADMIN — Medication 100 MILLIGRAM(S): at 05:42

## 2018-05-22 RX ADMIN — ERTAPENEM SODIUM 120 MILLIGRAM(S): 1 INJECTION, POWDER, LYOPHILIZED, FOR SOLUTION INTRAMUSCULAR; INTRAVENOUS at 17:46

## 2018-05-22 RX ADMIN — NYSTATIN CREAM 1 APPLICATION(S): 100000 CREAM TOPICAL at 08:55

## 2018-05-22 RX ADMIN — Medication 325 MILLIGRAM(S): at 21:13

## 2018-05-22 RX ADMIN — Medication 1 TABLET(S): at 13:28

## 2018-05-22 RX ADMIN — HEPARIN SODIUM 5000 UNIT(S): 5000 INJECTION INTRAVENOUS; SUBCUTANEOUS at 05:42

## 2018-05-22 RX ADMIN — ATENOLOL 25 MILLIGRAM(S): 25 TABLET ORAL at 05:42

## 2018-05-22 RX ADMIN — Medication 5 MILLIGRAM(S): at 17:47

## 2018-05-22 RX ADMIN — AMLODIPINE BESYLATE 5 MILLIGRAM(S): 2.5 TABLET ORAL at 05:42

## 2018-05-22 RX ADMIN — Medication 325 MILLIGRAM(S): at 13:28

## 2018-05-22 NOTE — PROGRESS NOTE ADULT - SUBJECTIVE AND OBJECTIVE BOX
Patient is a 89y old  Female who presents with a chief complaint of falling , weakness and uti (20 May 2018 22:23)        MEDICATIONS  (STANDING):  amLODIPine   Tablet 5 milliGRAM(s) Oral daily  ATENolol  Tablet 25 milliGRAM(s) Oral daily  calcium carbonate 1250 mG (OsCal) 1 Tablet(s) Oral daily  cefTRIAXone   IVPB 1 Gram(s) IV Intermittent every 24 hours  cholecalciferol 1000 Unit(s) Oral two times a day  docusate sodium 100 milliGRAM(s) Oral three times a day  DULoxetine 20 milliGRAM(s) Oral daily  ferrous    sulfate 325 milliGRAM(s) Oral three times a day  heparin  Injectable 5000 Unit(s) SubCutaneous every 8 hours  levothyroxine 88 MICROGram(s) Oral daily  nystatin Cream 1 Application(s) Topical two times a day  oxybutynin 5 milliGRAM(s) Oral two times a day  senna 2 Tablet(s) Oral at bedtime    MEDICATIONS  (PRN):  acetaminophen   Tablet. 500 milliGRAM(s) Oral every 6 hours PRN Mild Pain  polyethylene glycol 3350 17 Gram(s) Oral daily PRN Constipation          VITALS:   T(C): 36.3 (18 @ 08:01), Max: 37.2 (18 @ 12:39)  HR: 68 (18 @ 08:01) (67 - 98)  BP: 160/66 (18 @ 08:01) (119/60 - 194/94)  RR: 18 (18 @ 08:01) (16 - 20)  SpO2: 96% (18 @ 08:01) (95% - 100%)  Wt(kg): --        LABS:        CBC Full  -  ( 20 May 2018 15:09 )  WBC Count : 10.2 K/uL  Hemoglobin : 13.7 g/dL  Hematocrit : 40.5 %  Platelet Count - Automated : 149 K/uL  Mean Cell Volume : 95.3 fl  Mean Cell Hemoglobin : 32.3 pg  Mean Cell Hemoglobin Concentration : 33.9 gm/dL  Auto Neutrophil # : 7.2 K/uL  Auto Lymphocyte # : 1.5 K/uL  Auto Monocyte # : 1.5 K/uL  Auto Eosinophil # : 0.1 K/uL  Auto Basophil # : 0.0 K/uL  Auto Neutrophil % : 70.1 %  Auto Lymphocyte % : 14.4 %  Auto Monocyte % : 14.5 %  Auto Eosinophil % : 0.7 %  Auto Basophil % : 0.3 %        141  |  102  |  25<H>  ----------------------------<  115<H>  3.9   |  28  |  1.34<H>    Ca    8.6      21 May 2018 08:25  Phos  3.0     05-20  Mg     2.2     05-20    TPro  7.2  /  Alb  3.5  /  TBili  0.2  /  DBili  x   /  AST  25  /  ALT  40  /  AlkPhos  108  -    LIVER FUNCTIONS - ( 21 May 2018 08:25 )  Alb: 3.5 g/dL / Pro: 7.2 g/dL / ALK PHOS: 108 U/L / ALT: 40 U/L / AST: 25 U/L / GGT: x             Urinalysis Basic - ( 20 May 2018 14:46 )    Color: Yellow / Appearance: Turbid / S.010 / pH: x  Gluc: x / Ketone: Negative  / Bili: Negative / Urobili: Negative   Blood: x / Protein: 100 mg/dL / Nitrite: Positive   Leuk Esterase: Large / RBC: 10-25 /HPF / WBC >50 /HPF   Sq Epi: x / Non Sq Epi: x / Bacteria: x      CAPILLARY BLOOD GLUCOSE          RADIOLOGY & ADDITIONAL TESTS: Patient is a 89y old  Female who presents with a chief complaint of falling , weakness and uti (20 May 2018 22:23)  She has a highly resistant strain of E. Coli and sensitivities have determined that IV Ertapenem is the treatment of choice.  As  a result of treatment, she is more oriented and alert. Leg strength is returning and she is able to stand and take several steps. Much of her leg weakness has resolved. He still has 2/5 left arm and chest strength. metabolic encephlopaty has resolved.    Vital Signs Last 24 Hrs  T(C): 36.2 (23 May 2018 00:10), Max: 37.1 (22 May 2018 08:44)  T(F): 97.2 (23 May 2018 00:10), Max: 98.7 (22 May 2018 08:44)  HR: 58 (23 May 2018 00:10) (58 - 72)  BP: 156/79 (23 May 2018 00:10) (113/65 - 165/70)  BP(mean): --  RR: 18 (23 May 2018 00:10) (18 - 18)  SpO2: 97% (23 May 2018 00:10) (94% - 97%)q           CBC Full  -  ( 22 May 2018 08:36 )  WBC Count : 7.0 K/uL  Hemoglobin : 11.8 g/dL  Hematocrit : 34.7 %  Platelet Count - Automated : 164 K/uL  Mean Cell Volume : 95.1 fl  Mean Cell Hemoglobin : 32.4 pg  Mean Cell Hemoglobin Concentration : 34.0 gm/dL  Auto Neutrophil # : x  Auto Lymphocyte # : x  Auto Monocyte # : x  Auto Eosinophil # : x  Auto Basophil # : x  Auto Neutrophil % : x  Auto Lymphocyte % : x  Auto Monocyte % : x  Auto Eosinophil % : x  Auto Basophil % : x        138  |  101  |  35<H>  ----------------------------<  109<H>  4.0   |  23  |  1.27    Ca    8.7      22 May 2018 08:35    TPro  7.2  /  Alb  3.5  /  TBili  0.2  /  DBili  x   /  AST  25  /  ALT  40  /  AlkPhos  108  05-21    LIVER FUNCTIONS - ( 21 May 2018 08:25 )  Alb: 3.5 g/dL / Pro: 7.2 g/dL / ALK PHOS: 108 U/L / ALT: 40 U/L / AST: 25 U/L / GGT: x                       LABS:        CBC Full  -  ( 20 May 2018 15:09 )  WBC Count : 10.2 K/uL  Hemoglobin : 13.7 g/dL  Hematocrit : 40.5 %  Platelet Count - Automated : 149 K/uL  Mean Cell Volume : 95.3 fl  Mean Cell Hemoglobin : 32.3 pg  Mean Cell Hemoglobin Concentration : 33.9 gm/dL  Auto Neutrophil # : 7.2 K/uL  Auto Lymphocyte # : 1.5 K/uL  Auto Monocyte # : 1.5 K/uL  Auto Eosinophil # : 0.1 K/uL  Auto Basophil # : 0.0 K/uL  Auto Neutrophil % : 70.1 %  Auto Lymphocyte % : 14.4 %  Auto Monocyte % : 14.5 %  Auto Eosinophil % : 0.7 %  Auto Basophil % : 0.3 %        141  |  102  |  25<H>  ----------------------------<  115<H>  3.9   |  28  |  1.34<H>    Ca    8.6      21 May 2018 08:25  Phos  3.0     05-20  Mg     2.2     05-20    TPro  7.2  /  Alb  3.5  /  TBili  0.2  /  DBili  x   /  AST  25  /  ALT  40  /  AlkPhos  108  05-21    LIVER FUNCTIONS - ( 21 May 2018 08:25 )  Alb: 3.5 g/dL / Pro: 7.2 g/dL / ALK PHOS: 108 U/L / ALT: 40 U/L / AST: 25 U/L / GGT: x             Urinalysis Basic - ( 20 May 2018 14:46 )    Color: Yellow / Appearance: Turbid / S.010 / pH: x  Gluc: x / Ketone: Negative  / Bili: Negative / Urobili: Negative   Blood: x / Protein: 100 mg/dL / Nitrite: Positive   Leuk Esterase: Large / RBC: 10-25 /HPF / WBC >50 /HPF   Sq Epi: x / Non Sq Epi: x / Bacteria: x      CAPILLARY BLOOD GLUCOSE          RADIOLOGY & ADDITIONAL TESTS:

## 2018-05-23 ENCOUNTER — TRANSCRIPTION ENCOUNTER (OUTPATIENT)
Age: 83
End: 2018-05-23

## 2018-05-23 LAB
ANION GAP SERPL CALC-SCNC: 13 MMOL/L — SIGNIFICANT CHANGE UP (ref 5–17)
BUN SERPL-MCNC: 36 MG/DL — HIGH (ref 7–23)
CALCIUM SERPL-MCNC: 8.7 MG/DL — SIGNIFICANT CHANGE UP (ref 8.4–10.5)
CHLORIDE SERPL-SCNC: 103 MMOL/L — SIGNIFICANT CHANGE UP (ref 96–108)
CK MB BLD-MCNC: 2.8 % — SIGNIFICANT CHANGE UP (ref 0–3.5)
CK MB CFR SERPL CALC: 3.6 NG/ML — SIGNIFICANT CHANGE UP (ref 0–3.8)
CK SERPL-CCNC: 127 U/L — SIGNIFICANT CHANGE UP (ref 25–170)
CO2 SERPL-SCNC: 25 MMOL/L — SIGNIFICANT CHANGE UP (ref 22–31)
CREAT SERPL-MCNC: 1.23 MG/DL — SIGNIFICANT CHANGE UP (ref 0.5–1.3)
GLUCOSE SERPL-MCNC: 95 MG/DL — SIGNIFICANT CHANGE UP (ref 70–99)
HCT VFR BLD CALC: 35.8 % — SIGNIFICANT CHANGE UP (ref 34.5–45)
HGB BLD-MCNC: 11.5 G/DL — SIGNIFICANT CHANGE UP (ref 11.5–15.5)
MCHC RBC-ENTMCNC: 30.1 PG — SIGNIFICANT CHANGE UP (ref 27–34)
MCHC RBC-ENTMCNC: 32.1 GM/DL — SIGNIFICANT CHANGE UP (ref 32–36)
MCV RBC AUTO: 93.7 FL — SIGNIFICANT CHANGE UP (ref 80–100)
PLATELET # BLD AUTO: 176 K/UL — SIGNIFICANT CHANGE UP (ref 150–400)
POTASSIUM SERPL-MCNC: 4 MMOL/L — SIGNIFICANT CHANGE UP (ref 3.5–5.3)
POTASSIUM SERPL-SCNC: 4 MMOL/L — SIGNIFICANT CHANGE UP (ref 3.5–5.3)
RBC # BLD: 3.82 M/UL — SIGNIFICANT CHANGE UP (ref 3.8–5.2)
RBC # FLD: 13.8 % — SIGNIFICANT CHANGE UP (ref 10.3–14.5)
SODIUM SERPL-SCNC: 141 MMOL/L — SIGNIFICANT CHANGE UP (ref 135–145)
TROPONIN T SERPL-MCNC: <0.01 NG/ML — SIGNIFICANT CHANGE UP (ref 0–0.06)
WBC # BLD: 7.77 K/UL — SIGNIFICANT CHANGE UP (ref 3.8–10.5)
WBC # FLD AUTO: 7.77 K/UL — SIGNIFICANT CHANGE UP (ref 3.8–10.5)

## 2018-05-23 PROCEDURE — 99222 1ST HOSP IP/OBS MODERATE 55: CPT

## 2018-05-23 PROCEDURE — 93010 ELECTROCARDIOGRAM REPORT: CPT

## 2018-05-23 RX ADMIN — HEPARIN SODIUM 5000 UNIT(S): 5000 INJECTION INTRAVENOUS; SUBCUTANEOUS at 06:32

## 2018-05-23 RX ADMIN — Medication 1000 UNIT(S): at 17:17

## 2018-05-23 RX ADMIN — HEPARIN SODIUM 5000 UNIT(S): 5000 INJECTION INTRAVENOUS; SUBCUTANEOUS at 21:12

## 2018-05-23 RX ADMIN — HEPARIN SODIUM 5000 UNIT(S): 5000 INJECTION INTRAVENOUS; SUBCUTANEOUS at 13:27

## 2018-05-23 RX ADMIN — Medication 325 MILLIGRAM(S): at 21:12

## 2018-05-23 RX ADMIN — AMLODIPINE BESYLATE 5 MILLIGRAM(S): 2.5 TABLET ORAL at 06:32

## 2018-05-23 RX ADMIN — DULOXETINE HYDROCHLORIDE 20 MILLIGRAM(S): 30 CAPSULE, DELAYED RELEASE ORAL at 12:28

## 2018-05-23 RX ADMIN — Medication 325 MILLIGRAM(S): at 06:32

## 2018-05-23 RX ADMIN — Medication 100 MILLIGRAM(S): at 06:32

## 2018-05-23 RX ADMIN — Medication 5 MILLIGRAM(S): at 06:33

## 2018-05-23 RX ADMIN — ERTAPENEM SODIUM 120 MILLIGRAM(S): 1 INJECTION, POWDER, LYOPHILIZED, FOR SOLUTION INTRAMUSCULAR; INTRAVENOUS at 17:21

## 2018-05-23 RX ADMIN — Medication 100 MILLIGRAM(S): at 21:12

## 2018-05-23 RX ADMIN — NYSTATIN CREAM 1 APPLICATION(S): 100000 CREAM TOPICAL at 06:33

## 2018-05-23 RX ADMIN — Medication 5 MILLIGRAM(S): at 17:17

## 2018-05-23 RX ADMIN — ATENOLOL 25 MILLIGRAM(S): 25 TABLET ORAL at 06:32

## 2018-05-23 RX ADMIN — Medication 1 TABLET(S): at 12:27

## 2018-05-23 RX ADMIN — Medication 1000 UNIT(S): at 06:32

## 2018-05-23 RX ADMIN — NYSTATIN CREAM 1 APPLICATION(S): 100000 CREAM TOPICAL at 17:17

## 2018-05-23 RX ADMIN — Medication 325 MILLIGRAM(S): at 13:26

## 2018-05-23 RX ADMIN — Medication 88 MICROGRAM(S): at 06:32

## 2018-05-23 NOTE — DISCHARGE NOTE ADULT - PATIENT PORTAL LINK FT
You can access the [facility] Patient Portal, offered by [facility], by registering with the following website: [email or web address]

## 2018-05-23 NOTE — PROGRESS NOTE ADULT - SUBJECTIVE AND OBJECTIVE BOX
Patient is a 89y old  Female who presents with a chief complaint of falling ,  Patient found to have increased weakness due to a Urinary tract infection. Patient seen foay felling better. Patient states she had a shorth instance of chest pain. SKG and 1 set of CE where unremarkable.    MEDICATIONS  (STANDING):  amLODIPine   Tablet 5 milliGRAM(s) Oral daily  ATENolol  Tablet 25 milliGRAM(s) Oral daily  calcium carbonate 1250 mG (OsCal) 1 Tablet(s) Oral daily  cholecalciferol 1000 Unit(s) Oral two times a day  docusate sodium 100 milliGRAM(s) Oral three times a day  DULoxetine 20 milliGRAM(s) Oral daily  ertapenem  IVPB      ertapenem  IVPB 1000 milliGRAM(s) IV Intermittent every 24 hours  ferrous    sulfate 325 milliGRAM(s) Oral three times a day  heparin  Injectable 5000 Unit(s) SubCutaneous every 8 hours  levothyroxine 88 MICROGram(s) Oral daily  nystatin Cream 1 Application(s) Topical two times a day  oxybutynin 5 milliGRAM(s) Oral two times a day  senna 2 Tablet(s) Oral at bedtime    MEDICATIONS  (PRN):  acetaminophen   Tablet. 500 milliGRAM(s) Oral every 6 hours PRN Mild Pain  polyethylene glycol 3350 17 Gram(s) Oral daily PRN Constipation          VITALS:   T(C): 36.8 (05-23-18 @ 15:06), Max: 36.8 (05-23-18 @ 15:06)  HR: 55 (05-23-18 @ 15:06) (51 - 67)  BP: 118/71 (05-23-18 @ 15:06) (116/61 - 156/79)  RR: 18 (05-23-18 @ 15:06) (18 - 18)  SpO2: 97% (05-23-18 @ 15:06) (94% - 97%)  Wt(kg): --        LABS:    CARDIAC MARKERS ( 23 May 2018 12:49 )  x     / <0.01 ng/mL / 127 U/L / x     / 3.6 ng/mL      CBC Full  -  ( 23 May 2018 08:20 )  WBC Count : 7.77 K/uL  Hemoglobin : 11.5 g/dL  Hematocrit : 35.8 %  Platelet Count - Automated : 176 K/uL  Mean Cell Volume : 93.7 fl  Mean Cell Hemoglobin : 30.1 pg  Mean Cell Hemoglobin Concentration : 32.1 gm/dL  Auto Neutrophil # : x  Auto Lymphocyte # : x  Auto Monocyte # : x  Auto Eosinophil # : x  Auto Basophil # : x  Auto Neutrophil % : x  Auto Lymphocyte % : x  Auto Monocyte % : x  Auto Eosinophil % : x  Auto Basophil % : x    05-23    141  |  103  |  36<H>  ----------------------------<  95  4.0   |  25  |  1.23    Ca    8.7      23 May 2018 07:11            CAPILLARY BLOOD GLUCOSE          RADIOLOGY & ADDITIONAL TESTS:

## 2018-05-23 NOTE — DISCHARGE NOTE ADULT - CARE PROVIDERS DIRECT ADDRESSES
[email or web address]

## 2018-05-23 NOTE — DISCHARGE NOTE ADULT - CARE PROVIDER_API CALL
Betty Singh), Internal Medicine  [address] [place] [address]  Phone: [phone]  Fax: [phone] Betty Singh), Internal Medicine  [address] [facility][[address]]  [address]  [place] [address]  Phone: [phone]  Fax: [phone]    Jose Shah), Gastroenterology; Internal Medicine  [address] [facility] [address]  [place] [address]  Phone: [phone]  Fax: [phone]    Brittany Romo), Urology  [address] [place] [id number]  Phone: [phone]  Fax: [phone]

## 2018-05-23 NOTE — CONSULT NOTE ADULT - ASSESSMENT
89 year old female with history of HTN, spinal stenosis, and urinary incontinence presenting with mechanical falls x 2 that occurred two days ago    Prior to admission with dysuria  Urine culture with ESBL E. coli  Initially on ceftriaxone, now on ertapenem  Currently endorsing chest pressure   Afebrile, sitting in chair, nontoxic  WBC WNL    Recommend:  -Continue ertapenem to complete a 3-day course tomorrow 5/24/2018.  -Chest pressure - would check EKG, cardiac enzymes, and CXR.  -If workup negative and for discharge today, can transition to PO fosfomycin 3 grams x 1, otherwise, can complete IV tomorrow.

## 2018-05-23 NOTE — DISCHARGE NOTE ADULT - PLAN OF CARE
Completed abx course - remain free of symptoms HOME CARE INSTRUCTIONS  You have completed antibiotic course in hospital.   Take them exactly as your caregiver instructs you. Finish the medication even if you feel better after you have only taken some of the medication.  Drink enough water and fluids to keep your urine clear or pale yellow.  Avoid caffeine, tea, and carbonated beverages. They tend to irritate your bladder.  Empty your bladder often. Avoid holding urine for long periods of time.  Empty your bladder before and after sexual intercourse.  After a bowel movement, women should cleanse from front to back. Use each tissue only once.  SEEK MEDICAL CARE IF:  You have back pain.  You develop a fever.  Your symptoms do not begin to resolve within 3 days.  SEEK IMMEDIATE MEDICAL CARE IF:  You have severe back pain or lower abdominal pain.  You develop chills.  You have nausea or vomiting.  You have continued burning or discomfort with urination. Continue pain management   Physical therapy at Assisted living Low salt diet  Activity as tolerated.  Take all medication as prescribed.  Follow up with your medical doctor for routine blood pressure monitoring at your next visit.  Notify your doctor if you have any of the following symptoms:   Dizziness, Lightheadedness, Blurry vision, Headache, Chest pain, Shortness of breath fall due to weakness and infection, now treated.  Causes of fall may be due to illness, change in the medicines you take, unsafe or unfamiliar setting and conditions that affect eyesight, hearing, muscle strength, or balance.                                                            Certain medicines used for sleep, anxiety, or depression can cause falls. Adding new medicines, or changing doses of some medicines, can also affect your risk of falling. Your doctor might switch you to a different medicine.                                        Prevent falls by make your home safer – To avoid falling at home, get rid of things that might make you trip or slip. This might include furniture, electrical cords, clutter, and loose rugs. Keep your home well lit to avoid falls or accidents. Avoid storing things in high places so you don't have to reach or climb.                             Wear sturdy shoes that fit well – Wearing shoes with high heels or slippery soles, or shoes that are too loose, can lead to falls.                                                                                                                       Take vitamin D pills which might lower the risk of falls in older people. This is because vitamin D helps make bones and muscles stronger.                                                                                                                   Use a cane, walker, and other safety devices as these devices help you avoid falling, too. These include grab bars or a sturdy seat for the shower, non-slip bath mats, and hand rails or treads for the stairs (to prevent slipping). If you worry that you could fall, there are also alarm buttons that let you call for help if you fall and can't get up.   It is important to tell your doctor about any times you have fallen or almost fallen.  Seek immediate help for pain or injury after a fall.

## 2018-05-23 NOTE — DISCHARGE NOTE ADULT - CARE PLAN
Principal Discharge DX:	Urinary tract infection  Goal:	Completed abx course - remain free of symptoms  Assessment and plan of treatment:	HOME CARE INSTRUCTIONS  You have completed antibiotic course in hospital.   Take them exactly as your caregiver instructs you. Finish the medication even if you feel better after you have only taken some of the medication.  Drink enough water and fluids to keep your urine clear or pale yellow.  Avoid caffeine, tea, and carbonated beverages. They tend to irritate your bladder.  Empty your bladder often. Avoid holding urine for long periods of time.  Empty your bladder before and after sexual intercourse.  After a bowel movement, women should cleanse from front to back. Use each tissue only once.  SEEK MEDICAL CARE IF:  You have back pain.  You develop a fever.  Your symptoms do not begin to resolve within 3 days.  SEEK IMMEDIATE MEDICAL CARE IF:  You have severe back pain or lower abdominal pain.  You develop chills.  You have nausea or vomiting.  You have continued burning or discomfort with urination.  Secondary Diagnosis:	Spinal stenosis  Assessment and plan of treatment:	Continue pain management   Physical therapy at Assisted living  Secondary Diagnosis:	Essential hypertension  Assessment and plan of treatment:	Low salt diet  Activity as tolerated.  Take all medication as prescribed.  Follow up with your medical doctor for routine blood pressure monitoring at your next visit.  Notify your doctor if you have any of the following symptoms:   Dizziness, Lightheadedness, Blurry vision, Headache, Chest pain, Shortness of breath Principal Discharge DX:	Urinary tract infection  Goal:	Completed abx course - remain free of symptoms  Assessment and plan of treatment:	HOME CARE INSTRUCTIONS  You have completed antibiotic course in hospital.   Take them exactly as your caregiver instructs you. Finish the medication even if you feel better after you have only taken some of the medication.  Drink enough water and fluids to keep your urine clear or pale yellow.  Avoid caffeine, tea, and carbonated beverages. They tend to irritate your bladder.  Empty your bladder often. Avoid holding urine for long periods of time.  Empty your bladder before and after sexual intercourse.  After a bowel movement, women should cleanse from front to back. Use each tissue only once.  SEEK MEDICAL CARE IF:  You have back pain.  You develop a fever.  Your symptoms do not begin to resolve within 3 days.  SEEK IMMEDIATE MEDICAL CARE IF:  You have severe back pain or lower abdominal pain.  You develop chills.  You have nausea or vomiting.  You have continued burning or discomfort with urination.  Secondary Diagnosis:	Spinal stenosis  Assessment and plan of treatment:	Continue pain management   Physical therapy at Assisted living  Secondary Diagnosis:	Essential hypertension  Assessment and plan of treatment:	Low salt diet  Activity as tolerated.  Take all medication as prescribed.  Follow up with your medical doctor for routine blood pressure monitoring at your next visit.  Notify your doctor if you have any of the following symptoms:   Dizziness, Lightheadedness, Blurry vision, Headache, Chest pain, Shortness of breath  Secondary Diagnosis:	Fall, sequela  Assessment and plan of treatment:	fall due to weakness and infection, now treated.  Causes of fall may be due to illness, change in the medicines you take, unsafe or unfamiliar setting and conditions that affect eyesight, hearing, muscle strength, or balance.                                                            Certain medicines used for sleep, anxiety, or depression can cause falls. Adding new medicines, or changing doses of some medicines, can also affect your risk of falling. Your doctor might switch you to a different medicine.                                        Prevent falls by make your home safer – To avoid falling at home, get rid of things that might make you trip or slip. This might include furniture, electrical cords, clutter, and loose rugs. Keep your home well lit to avoid falls or accidents. Avoid storing things in high places so you don't have to reach or climb.                             Wear sturdy shoes that fit well – Wearing shoes with high heels or slippery soles, or shoes that are too loose, can lead to falls.                                                                                                                       Take vitamin D pills which might lower the risk of falls in older people. This is because vitamin D helps make bones and muscles stronger.                                                                                                                   Use a cane, walker, and other safety devices as these devices help you avoid falling, too. These include grab bars or a sturdy seat for the shower, non-slip bath mats, and hand rails or treads for the stairs (to prevent slipping). If you worry that you could fall, there are also alarm buttons that let you call for help if you fall and can't get up.   It is important to tell your doctor about any times you have fallen or almost fallen.  Seek immediate help for pain or injury after a fall.

## 2018-05-23 NOTE — DISCHARGE NOTE ADULT - HOSPITAL COURSE
89yr old female with PMH of HTN, spinal stenosis and urinary incontinence c/o falls x 2 two days ago, noted as knee buckling sensation and fell onto her knees, able to ambulate with assistance, general complaints of overall weakness and instability with ambulation.  Admitted for UTI - UC grew ESBL sp Invanz x 3 days. Seen and followed by ID, abx course completed and stable for D/C back to assisted living with PT. To follow up with PMD within 1-2 weeks of discharge. 89yr old female with PMH of HTN, spinal stenosis and urinary incontinence c/o falls x 2 two days ago, noted as knee buckling sensation and fell onto her knees, able to ambulate with assistance, general complaints of overall weakness and instability with ambulation.  Admitted for UTI - UC grew ESBL sp Invanz x 3 days, and dose of fosfomycin prior to leaving.  Seen and followed by ID, abx course completed and stable for D/C back to assisted living with PT. To follow up with PMD within 1-2 weeks of discharge.

## 2018-05-23 NOTE — DISCHARGE NOTE ADULT - MEDICATION SUMMARY - MEDICATIONS TO STOP TAKING
I will STOP taking the medications listed below when I get home from the hospital:    Keflex 250 mg oral capsule  -- 1 cap(s) by mouth 4 times a day x 4 more days

## 2018-05-23 NOTE — CONSULT NOTE ADULT - SUBJECTIVE AND OBJECTIVE BOX
HPI:  89 year old female with history of HTN, spinal stenosis, and urinary incontinence presenting with falls x 2 that occurred two days ago, noted as knee buckling sensation and fell onto her knees, able to ambulate with walker, no visual disturbance, headache, LOC, AC, or numbness/tingling, cough, fevers, CP/SOB. Endorses overall weakness and instability with ambulation. Notes she must independently go for dinner/lunch and move around in her current nursing facility. She recently went for a botox injection into her bladder for urinary incontinence, but states that it did not work and remains with urinary incontinence needing diapers. She stated prior to admission, she was having dysuria. Urine culture with ESBL E. coli. Initially on ceftriaxone, now on ertapenem. Currently endorsing chest pressure that just started during interview.    PAST MEDICAL & SURGICAL HISTORY:  Spinal stenosis  HTN (hypertension)  Bilateral Cataracts  Status Post Breast Lumpectomy  Urinary Frequency  S/P ORIF (open reduction internal fixation) fracture  Ectopic pregnancy, tubal    Allergies    sulfa topicals (Unknown)    Intolerances    morphine (Drowsiness; Faint; Hypotension)      ANTIMICROBIALS:  ertapenem  IVPB    ertapenem  IVPB 1000 every 24 hours      OTHER MEDS:  acetaminophen   Tablet. 500 milliGRAM(s) Oral every 6 hours PRN  amLODIPine   Tablet 5 milliGRAM(s) Oral daily  ATENolol  Tablet 25 milliGRAM(s) Oral daily  calcium carbonate 1250 mG (OsCal) 1 Tablet(s) Oral daily  cholecalciferol 1000 Unit(s) Oral two times a day  docusate sodium 100 milliGRAM(s) Oral three times a day  DULoxetine 20 milliGRAM(s) Oral daily  ferrous    sulfate 325 milliGRAM(s) Oral three times a day  heparin  Injectable 5000 Unit(s) SubCutaneous every 8 hours  levothyroxine 88 MICROGram(s) Oral daily  nystatin Cream 1 Application(s) Topical two times a day  oxybutynin 5 milliGRAM(s) Oral two times a day  polyethylene glycol 3350 17 Gram(s) Oral daily PRN  senna 2 Tablet(s) Oral at bedtime    SOCIAL HISTORY: Smoked as a teenager. From [email or web address].    FAMILY HISTORY:  Family history of acute myocardial infarction (Father and Mother)    Drug Dosing Weight  Height (cm): 165.1 (22 May 2018 08:58)  Weight (kg): 68 (20 May 2018 12:39)  BMI (kg/m2): 24.9 (22 May 2018 08:58)  BSA (m2): 1.75 (22 May 2018 08:58)    PE:    Vital Signs Last 24 Hrs  T(C): 36.6 (23 May 2018 11:58), Max: 36.7 (22 May 2018 16:30)  T(F): 97.8 (23 May 2018 11:58), Max: 98 (22 May 2018 16:30)  HR: 67 (23 May 2018 11:58) (51 - 72)  BP: 138/80 (23 May 2018 11:58) (116/61 - 165/70)  BP(mean): --  RR: 18 (23 May 2018 11:58) (18 - 18)  SpO2: 96% (23 May 2018 11:58) (94% - 97%)    Gen: AOx3, NAD, non-toxic, pleasant  CV: S1+S2 normal, no murmurs, nontachycardic  Resp: Crackles to bases bilaterally  Abd: Soft, nontender, +BS  Ext: No LE edema, no wounds  : No Blum  IV/Skin: No thrombophlebitis  Msk: No low back pain, no arthralgias, no joint swelling  Neuro: No sensory deficits, no motor deficits    LABS:                          11.5   7.77  )-----------( 176      ( 23 May 2018 08:20 )             35.8       05-23    141  |  103  |  36<H>  ----------------------------<  95  4.0   |  25  |  1.23    Ca    8.7      23 May 2018 07:11    MICROBIOLOGY:  v  .Urine Clean Catch (Midstream)  05-20-18   >100,000 CFU/ml Escherichia coli ESBL  --  Escherichia coli ESBL    RADIOLOGY:    No new images.

## 2018-05-23 NOTE — DISCHARGE NOTE ADULT - MEDICATION SUMMARY - MEDICATIONS TO TAKE
I will START or STAY ON the medications listed below when I get home from the hospital:    acetaminophen 325 mg oral tablet  -- 1 tab(s) by mouth every 6 hours, As Needed -Mild Pain  -- Indication: For Spinal stenosis    traMADol 50 mg oral tablet  -- 1 tab(s) by mouth every 4 hours, As Needed -Severe Pain  -- Indication: For Spinal stenosis    calcium carbonate 1250 mg (500 mg elemental calcium) oral tablet  -- 1 tab(s) by mouth once a day  -- Indication: For Spinal stenosis    dabigatran 150 mg oral capsule  -- 1 cap(s) by mouth every 12 hours  -- Indication: For Anticoagulate     DULoxetine  -- 20 milligram(s) by mouth once a day  -- Indication: For Antidepressant     atenolol 25 mg oral tablet  -- 1 tab(s) by mouth once a day  -- Indication: For HTN (hypertension)    amLODIPine 10 mg oral tablet  -- 1 tab(s) by mouth once a day  -- Indication: For HTN (hypertension)    nystatin 100,000 units/g topical cream  -- 1 application on skin 2 times a day right groin  -- Indication: For Rash     ferrous sulfate 325 mg (65 mg elemental iron) oral tablet  -- 1 tab(s) by mouth 3 times a day (with meals)  -- Indication: For Supplement     polyethylene glycol 3350 oral powder for reconstitution  -- 17 gram(s) by mouth once a day, As Needed  -- Indication: For Constipation     Colace 100 mg oral capsule  -- 3 cap(s) by mouth once a day (at bedtime)  -- Indication: For Constipation     senna 8.6 mg oral tablet  -- 2 tab(s) by mouth once a day (at bedtime)  -- Indication: For Constipation     levothyroxine 88 mcg (0.088 mg) oral tablet  -- 1 tab(s) by mouth once a day  -- Indication: For Hypothyroidism     Toviaz 4 mg oral tablet, extended release  -- 1 tab(s) by mouth once a day  -- Indication: For Antispasmodic     cholecalciferol oral tablet  -- 2000 unit(s) by mouth once a day  -- Indication: For Supplement I will START or STAY ON the medications listed below when I get home from the hospital:    Physical therapy   -- Indication: For ambulation    acetaminophen 325 mg oral tablet  -- 1 tab(s) by mouth every 6 hours, As Needed -Mild Pain  -- Indication: For Spinal stenosis    traMADol 50 mg oral tablet  -- 1 tab(s) by mouth every 4 hours, As Needed -Severe Pain  -- Indication: For Spinal stenosis    calcium carbonate 1250 mg (500 mg elemental calcium) oral tablet  -- 1 tab(s) by mouth once a day  -- Indication: For Spinal stenosis    DULoxetine  -- 20 milligram(s) by mouth once a day  -- Indication: For Antidepressant     atenolol 25 mg oral tablet  -- 1 tab(s) by mouth once a day  -- Indication: For HTN (hypertension)    amLODIPine 5 mg oral tablet  -- orally 2 times a day  -- Indication: For Essential hypertension    nystatin 100,000 units/g topical cream  -- 1 application on skin 2 times a day right groin  -- Indication: For Rash     ferrous sulfate 325 mg (65 mg elemental iron) oral tablet  -- 1 tab(s) by mouth 3 times a day (with meals)  -- Indication: For Supplement     polyethylene glycol 3350 oral powder for reconstitution  -- 17 gram(s) by mouth once a day, As Needed  -- Indication: For Constipation     Colace 100 mg oral capsule  -- 3 cap(s) by mouth once a day (at bedtime)  -- Indication: For Constipation     senna 8.6 mg oral tablet  -- 2 tab(s) by mouth once a day (at bedtime)  -- Indication: For Constipation     levothyroxine 88 mcg (0.088 mg) oral tablet  -- 1 tab(s) by mouth once a day  -- Indication: For Hypothyroidism     Toviaz 4 mg oral tablet, extended release  -- 1 tab(s) by mouth once a day  -- Indication: For Antispasmodic     cholecalciferol oral tablet  -- 2000 unit(s) by mouth once a day  -- Indication: For Supplement

## 2018-05-24 PROCEDURE — 99232 SBSQ HOSP IP/OBS MODERATE 35: CPT

## 2018-05-24 RX ADMIN — ERTAPENEM SODIUM 120 MILLIGRAM(S): 1 INJECTION, POWDER, LYOPHILIZED, FOR SOLUTION INTRAMUSCULAR; INTRAVENOUS at 17:06

## 2018-05-24 RX ADMIN — Medication 325 MILLIGRAM(S): at 06:40

## 2018-05-24 RX ADMIN — AMLODIPINE BESYLATE 5 MILLIGRAM(S): 2.5 TABLET ORAL at 06:40

## 2018-05-24 RX ADMIN — Medication 88 MICROGRAM(S): at 06:40

## 2018-05-24 RX ADMIN — NYSTATIN CREAM 1 APPLICATION(S): 100000 CREAM TOPICAL at 06:41

## 2018-05-24 RX ADMIN — Medication 1000 UNIT(S): at 17:06

## 2018-05-24 RX ADMIN — DULOXETINE HYDROCHLORIDE 20 MILLIGRAM(S): 30 CAPSULE, DELAYED RELEASE ORAL at 11:59

## 2018-05-24 RX ADMIN — HEPARIN SODIUM 5000 UNIT(S): 5000 INJECTION INTRAVENOUS; SUBCUTANEOUS at 13:08

## 2018-05-24 RX ADMIN — Medication 100 MILLIGRAM(S): at 06:40

## 2018-05-24 RX ADMIN — ATENOLOL 25 MILLIGRAM(S): 25 TABLET ORAL at 06:40

## 2018-05-24 RX ADMIN — Medication 1 TABLET(S): at 11:59

## 2018-05-24 RX ADMIN — Medication 1000 UNIT(S): at 06:40

## 2018-05-24 RX ADMIN — HEPARIN SODIUM 5000 UNIT(S): 5000 INJECTION INTRAVENOUS; SUBCUTANEOUS at 21:36

## 2018-05-24 RX ADMIN — NYSTATIN CREAM 1 APPLICATION(S): 100000 CREAM TOPICAL at 17:06

## 2018-05-24 RX ADMIN — Medication 5 MILLIGRAM(S): at 06:40

## 2018-05-24 RX ADMIN — Medication 325 MILLIGRAM(S): at 13:08

## 2018-05-24 RX ADMIN — Medication 100 MILLIGRAM(S): at 13:08

## 2018-05-24 RX ADMIN — HEPARIN SODIUM 5000 UNIT(S): 5000 INJECTION INTRAVENOUS; SUBCUTANEOUS at 06:40

## 2018-05-24 RX ADMIN — Medication 325 MILLIGRAM(S): at 21:36

## 2018-05-24 RX ADMIN — Medication 5 MILLIGRAM(S): at 17:06

## 2018-05-24 NOTE — PROGRESS NOTE ADULT - SUBJECTIVE AND OBJECTIVE BOX
CC: Patient is a 89y old  Female who presents with a chief complaint of falling , weakness and uti (23 May 2018 12:58)    Interval History/ROS: Patient states having dysuria this morning. Chest pressure resolved. Denies fever, chills.    Allergies  sulfa topicals (Unknown)    ANTIMICROBIALS:  ertapenem  IVPB    ertapenem  IVPB 1000 every 24 hours    PE:    Vital Signs Last 24 Hrs  T(C): 36.5 (24 May 2018 08:32), Max: 36.8 (23 May 2018 15:06)  T(F): 97.7 (24 May 2018 08:32), Max: 98.2 (23 May 2018 15:06)  HR: 67 (24 May 2018 08:32) (55 - 73)  BP: 149/73 (24 May 2018 08:32) (118/71 - 187/68)  BP(mean): --  RR: 18 (24 May 2018 08:32) (18 - 18)  SpO2: 96% (24 May 2018 08:32) (96% - 97%)    Gen: AOx3, NAD, non-toxic, pleasant  CV: S1+S2 normal, no murmurs, nontachycardic  Resp: Crackles to bases bilaterally  Abd: Soft, nontender, +BS  Ext: No LE edema, no wounds  : No Blum  IV/Skin: No thrombophlebitis  Msk: No low back pain, no arthralgias, no joint swelling  Neuro: No sensory deficits, no motor deficits      LABS:                          11.5   7.77  )-----------( 176      ( 23 May 2018 08:20 )             35.8       05-23    141  |  103  |  36<H>  ----------------------------<  95  4.0   |  25  |  1.23    Ca    8.7      23 May 2018 07:11    MICROBIOLOGY:  v  .Urine Clean Catch (Midstream)  05-20-18   >100,000 CFU/ml Escherichia coli ESBL  --  Escherichia coli ESBL    RADIOLOGY:    No new images.

## 2018-05-24 NOTE — PROGRESS NOTE ADULT - SUBJECTIVE AND OBJECTIVE BOX
Patient is a 89y old  Female who presents with a chief complaint of falling ,  Patient found to have increased weakness due to a Urinary tract infection. Patient seen foay felling better. Patient states she had a shorth instance of chest pain. Patient feeling well today    MEDICATIONS  (STANDING):  amLODIPine   Tablet 5 milliGRAM(s) Oral daily  ATENolol  Tablet 25 milliGRAM(s) Oral daily  calcium carbonate 1250 mG (OsCal) 1 Tablet(s) Oral daily  cholecalciferol 1000 Unit(s) Oral two times a day  docusate sodium 100 milliGRAM(s) Oral three times a day  DULoxetine 20 milliGRAM(s) Oral daily  ertapenem  IVPB      ertapenem  IVPB 1000 milliGRAM(s) IV Intermittent every 24 hours  ferrous    sulfate 325 milliGRAM(s) Oral three times a day  heparin  Injectable 5000 Unit(s) SubCutaneous every 8 hours  levothyroxine 88 MICROGram(s) Oral daily  nystatin Cream 1 Application(s) Topical two times a day  oxybutynin 5 milliGRAM(s) Oral two times a day  senna 2 Tablet(s) Oral at bedtime    MEDICATIONS  (PRN):  acetaminophen   Tablet. 500 milliGRAM(s) Oral every 6 hours PRN Mild Pain  polyethylene glycol 3350 17 Gram(s) Oral daily PRN Constipation          VITALS:   T(C): 36.3 (05-24-18 @ 15:30), Max: 36.7 (05-23-18 @ 23:45)  HR: 52 (05-24-18 @ 15:30) (52 - 73)  BP: 152/82 (05-24-18 @ 15:30) (119/80 - 187/68)  RR: 18 (05-24-18 @ 15:30) (18 - 18)  SpO2: 98% (05-24-18 @ 15:30) (96% - 98%)  Wt(kg): --      Physical exam  General: WN/WD NAD  Neurology: A&Ox3, nonfocal, YBARRA x 4  Eyes: PERRLA/ EOMI, Gross vision intact  ENT/Neck: Neck supple, trachea midline, No JVD, Gross hearing intact  Respiratory: CTA B/L, No wheezing, rales, rhonchi  CV: RRR, S1S2, no murmurs, rubs or gallops  Abdominal: Soft, NT, ND +BS,   Extremities: No edema, + peripheral pulses  Skin: No Rashes, Hematoma, Ecchymosis      LABS:    CARDIAC MARKERS ( 23 May 2018 12:49 )  x     / <0.01 ng/mL / 127 U/L / x     / 3.6 ng/mL      CBC Full  -  ( 23 May 2018 08:20 )  WBC Count : 7.77 K/uL  Hemoglobin : 11.5 g/dL  Hematocrit : 35.8 %  Platelet Count - Automated : 176 K/uL  Mean Cell Volume : 93.7 fl  Mean Cell Hemoglobin : 30.1 pg  Mean Cell Hemoglobin Concentration : 32.1 gm/dL  Auto Neutrophil # : x  Auto Lymphocyte # : x  Auto Monocyte # : x  Auto Eosinophil # : x  Auto Basophil # : x  Auto Neutrophil % : x  Auto Lymphocyte % : x  Auto Monocyte % : x  Auto Eosinophil % : x  Auto Basophil % : x    05-23    141  |  103  |  36<H>  ----------------------------<  95  4.0   |  25  |  1.23    Ca    8.7      23 May 2018 07:11            :

## 2018-05-25 VITALS
RESPIRATION RATE: 18 BRPM | SYSTOLIC BLOOD PRESSURE: 123 MMHG | HEART RATE: 58 BPM | OXYGEN SATURATION: 97 % | TEMPERATURE: 98 F | DIASTOLIC BLOOD PRESSURE: 75 MMHG

## 2018-05-25 LAB
ANION GAP SERPL CALC-SCNC: 14 MMOL/L — SIGNIFICANT CHANGE UP (ref 5–17)
BUN SERPL-MCNC: 33 MG/DL — HIGH (ref 7–23)
CALCIUM SERPL-MCNC: 9.3 MG/DL — SIGNIFICANT CHANGE UP (ref 8.4–10.5)
CHLORIDE SERPL-SCNC: 101 MMOL/L — SIGNIFICANT CHANGE UP (ref 96–108)
CO2 SERPL-SCNC: 26 MMOL/L — SIGNIFICANT CHANGE UP (ref 22–31)
CREAT SERPL-MCNC: 1.2 MG/DL — SIGNIFICANT CHANGE UP (ref 0.5–1.3)
GLUCOSE SERPL-MCNC: 97 MG/DL — SIGNIFICANT CHANGE UP (ref 70–99)
HCT VFR BLD CALC: 39.5 % — SIGNIFICANT CHANGE UP (ref 34.5–45)
HGB BLD-MCNC: 13 G/DL — SIGNIFICANT CHANGE UP (ref 11.5–15.5)
MCHC RBC-ENTMCNC: 30.9 PG — SIGNIFICANT CHANGE UP (ref 27–34)
MCHC RBC-ENTMCNC: 32.9 GM/DL — SIGNIFICANT CHANGE UP (ref 32–36)
MCV RBC AUTO: 93.8 FL — SIGNIFICANT CHANGE UP (ref 80–100)
PLATELET # BLD AUTO: 220 K/UL — SIGNIFICANT CHANGE UP (ref 150–400)
POTASSIUM SERPL-MCNC: 3.9 MMOL/L — SIGNIFICANT CHANGE UP (ref 3.5–5.3)
POTASSIUM SERPL-SCNC: 3.9 MMOL/L — SIGNIFICANT CHANGE UP (ref 3.5–5.3)
RBC # BLD: 4.21 M/UL — SIGNIFICANT CHANGE UP (ref 3.8–5.2)
RBC # FLD: 13.8 % — SIGNIFICANT CHANGE UP (ref 10.3–14.5)
SODIUM SERPL-SCNC: 141 MMOL/L — SIGNIFICANT CHANGE UP (ref 135–145)
WBC # BLD: 9.47 K/UL — SIGNIFICANT CHANGE UP (ref 3.8–10.5)
WBC # FLD AUTO: 9.47 K/UL — SIGNIFICANT CHANGE UP (ref 3.8–10.5)

## 2018-05-25 PROCEDURE — 84100 ASSAY OF PHOSPHORUS: CPT

## 2018-05-25 PROCEDURE — 83735 ASSAY OF MAGNESIUM: CPT

## 2018-05-25 PROCEDURE — 97530 THERAPEUTIC ACTIVITIES: CPT

## 2018-05-25 PROCEDURE — 80048 BASIC METABOLIC PNL TOTAL CA: CPT

## 2018-05-25 PROCEDURE — 99232 SBSQ HOSP IP/OBS MODERATE 35: CPT

## 2018-05-25 PROCEDURE — 81001 URINALYSIS AUTO W/SCOPE: CPT

## 2018-05-25 PROCEDURE — 51701 INSERT BLADDER CATHETER: CPT

## 2018-05-25 PROCEDURE — 84484 ASSAY OF TROPONIN QUANT: CPT

## 2018-05-25 PROCEDURE — 87186 SC STD MICRODIL/AGAR DIL: CPT

## 2018-05-25 PROCEDURE — 82550 ASSAY OF CK (CPK): CPT

## 2018-05-25 PROCEDURE — 97161 PT EVAL LOW COMPLEX 20 MIN: CPT

## 2018-05-25 PROCEDURE — 87086 URINE CULTURE/COLONY COUNT: CPT

## 2018-05-25 PROCEDURE — 99285 EMERGENCY DEPT VISIT HI MDM: CPT | Mod: 25

## 2018-05-25 PROCEDURE — 80053 COMPREHEN METABOLIC PANEL: CPT

## 2018-05-25 PROCEDURE — 93005 ELECTROCARDIOGRAM TRACING: CPT

## 2018-05-25 PROCEDURE — 82553 CREATINE MB FRACTION: CPT

## 2018-05-25 PROCEDURE — 97116 GAIT TRAINING THERAPY: CPT

## 2018-05-25 PROCEDURE — 85027 COMPLETE CBC AUTOMATED: CPT

## 2018-05-25 PROCEDURE — 96374 THER/PROPH/DIAG INJ IV PUSH: CPT | Mod: XU

## 2018-05-25 RX ORDER — FOSFOMYCIN TROMETHAMINE 3 G/1
3 POWDER ORAL ONCE
Qty: 0 | Refills: 0 | Status: COMPLETED | OUTPATIENT
Start: 2018-05-25 | End: 2018-05-25

## 2018-05-25 RX ORDER — AMLODIPINE BESYLATE 2.5 MG/1
5 TABLET ORAL DAILY
Qty: 0 | Refills: 0 | Status: DISCONTINUED | OUTPATIENT
Start: 2018-05-25 | End: 2018-05-25

## 2018-05-25 RX ORDER — ACETAMINOPHEN 500 MG
650 TABLET ORAL ONCE
Qty: 0 | Refills: 0 | Status: COMPLETED | OUTPATIENT
Start: 2018-05-25 | End: 2018-05-25

## 2018-05-25 RX ADMIN — Medication 88 MICROGRAM(S): at 05:37

## 2018-05-25 RX ADMIN — Medication 650 MILLIGRAM(S): at 00:45

## 2018-05-25 RX ADMIN — HEPARIN SODIUM 5000 UNIT(S): 5000 INJECTION INTRAVENOUS; SUBCUTANEOUS at 05:37

## 2018-05-25 RX ADMIN — NYSTATIN CREAM 1 APPLICATION(S): 100000 CREAM TOPICAL at 05:38

## 2018-05-25 RX ADMIN — DULOXETINE HYDROCHLORIDE 20 MILLIGRAM(S): 30 CAPSULE, DELAYED RELEASE ORAL at 12:47

## 2018-05-25 RX ADMIN — AMLODIPINE BESYLATE 5 MILLIGRAM(S): 2.5 TABLET ORAL at 01:29

## 2018-05-25 RX ADMIN — FOSFOMYCIN TROMETHAMINE 3 GRAM(S): 3 POWDER ORAL at 13:16

## 2018-05-25 RX ADMIN — Medication 5 MILLIGRAM(S): at 05:38

## 2018-05-25 RX ADMIN — Medication 1 TABLET(S): at 12:47

## 2018-05-25 RX ADMIN — Medication 1000 UNIT(S): at 05:38

## 2018-05-25 RX ADMIN — ATENOLOL 25 MILLIGRAM(S): 25 TABLET ORAL at 05:37

## 2018-05-25 RX ADMIN — Medication 325 MILLIGRAM(S): at 05:37

## 2018-05-25 RX ADMIN — AMLODIPINE BESYLATE 5 MILLIGRAM(S): 2.5 TABLET ORAL at 12:49

## 2018-05-25 RX ADMIN — HEPARIN SODIUM 5000 UNIT(S): 5000 INJECTION INTRAVENOUS; SUBCUTANEOUS at 13:18

## 2018-05-25 RX ADMIN — Medication 325 MILLIGRAM(S): at 13:18

## 2018-05-25 RX ADMIN — Medication 650 MILLIGRAM(S): at 01:45

## 2018-05-25 NOTE — PROGRESS NOTE ADULT - ASSESSMENT
89 yo woman present with weakness and a fall at home. Has a hx of urinary frequency recently treated with botox injections with minimal improvement in symptoms. Patient found to have a Urinary tract infection which may be the cause of her weakness. seen now OOB to chair resting comfortably. denies and Dysuria
89 year old female with history of HTN, spinal stenosis, and urinary incontinence presenting with mechanical falls x 2 that occurred two days ago    Prior to admission with dysuria  Urine culture with ESBL E. coli  Initially on ceftriaxone, now on ertapenem  Chest pressure resolved  Afebrile, nontoxic.  WBC WNL  Dysuria resolved  Elevated BP overnight    Recommend:  -Continue ertapenem while inpatient.  -If discharged today, can give oral fosfomycin 3 grams PO x 1.  -Patient states she will follow-up with her physician at [facility].
89 year old female with history of HTN, spinal stenosis, and urinary incontinence presenting with mechanical falls x 2 that occurred two days ago    Prior to admission with dysuria  Urine culture with ESBL E. coli  Initially on ceftriaxone, now on ertapenem  Chest pressure resolved  Afebrile, nontoxic.  WBC WNL  Remains with dysuria    Recommend:  -Would continue ertapenem given remains with dysuria.  -if symptoms improve can transition to oral fosfomycin.
89 yo woman present with weakness and a fall at home. Has a hx of urinary frequency recently treated with botox injections with minimal improvement in symptoms. Patient found to have a Urinary tract infection which may be the cause of her weakness. seen now OOB to chair resting comfortably. denies and Dysuria

## 2018-05-25 NOTE — PROGRESS NOTE ADULT - PROBLEM SELECTOR PLAN 4
Added Amlodipine  will continue to follow BP and adjust meds as needed

## 2018-05-25 NOTE — PROGRESS NOTE ADULT - PROBLEM SELECTOR PLAN 1
After culture done start IV Ceftriaxone  oxybutynin restarted on admission  Bt has seen Dr Eldridge in the past for uriology
Patient found to have resistant bacteria on culture and switched to ertapenem.  Patient seen by ID . will continue current abx for another day and start to work on DC planning
Patient found to have resistant bacteria on culture and switched to ertapenem.  Patient seen by ID . will continue current abx for another day and start to work on DC planning for tomorrow back to assisted living
Patient found to have resistant bacteria on culture and switched to ertapenem.  Patient seen by ID . will continue current abx for another day and start to work on DC planning for tomorrow back to assisted living
After culture done start IV Ceftriaxone  oxybutynin restarted on admission  Bt has seen Dr Eldridge in the past for uriology

## 2018-05-25 NOTE — PROGRESS NOTE ADULT - PROBLEM SELECTOR PLAN 2
Physical therapy and balance and weight control
Physical therapy and balance and weight control  Patient ambulating with rolater  to continue physical therapy at home
Physical therapy and balance and weight control  Patient ambulating with rolater  to continue physical therapy at home
Physical therapy and balance and weight control  Patient ambulating with vladimir
Physical therapy and balance and weight control

## 2018-05-25 NOTE — PROGRESS NOTE ADULT - SUBJECTIVE AND OBJECTIVE BOX
Patient is a 89y old  Female who presents with a chief complaint of falling , weakness and uti (23 May 2018 12:58)      HPI:  Patient with elevated blood pressure  Has had multiple falling episodes and has now been admitted with UTI and  difficulty walking.  Patient is now walking with assistant to the bathroom  Her uti has been treated with IV and oral antibioitcs and has completed the treatments  MEDICATIONS  (STANDING):  amLODIPine   Tablet 5 milliGRAM(s) Oral daily  amLODIPine   Tablet 5 milliGRAM(s) Oral daily  ATENolol  Tablet 25 milliGRAM(s) Oral daily  calcium carbonate 1250 mG (OsCal) 1 Tablet(s) Oral daily  cholecalciferol 1000 Unit(s) Oral two times a day  docusate sodium 100 milliGRAM(s) Oral three times a day  DULoxetine 20 milliGRAM(s) Oral daily  ertapenem  IVPB      ertapenem  IVPB 1000 milliGRAM(s) IV Intermittent every 24 hours  ferrous    sulfate 325 milliGRAM(s) Oral three times a day  heparin  Injectable 5000 Unit(s) SubCutaneous every 8 hours  levothyroxine 88 MICROGram(s) Oral daily  nystatin Cream 1 Application(s) Topical two times a day  oxybutynin 5 milliGRAM(s) Oral two times a day  senna 2 Tablet(s) Oral at bedtime    MEDICATIONS  (PRN):  acetaminophen   Tablet. 500 milliGRAM(s) Oral every 6 hours PRN Mild Pain  polyethylene glycol 3350 17 Gram(s) Oral daily PRN Constipation      Allergies    sulfa topicals (Unknown)    Intolerances    morphine (Drowsiness; Faint; Hypotension)        VITALS:   T(C): 36.8 (05-25-18 @ 10:14), Max: 36.8 (05-25-18 @ 10:14)  HR: 56 (05-25-18 @ 10:14) (52 - 56)  BP: 138/66 (05-25-18 @ 10:14) (135/76 - 215/80)  RR: 18 (05-25-18 @ 10:14) (17 - 18)  SpO2: 96% (05-25-18 @ 10:14) (94% - 98%)  Wt(kg): --    05-24 @ 07:01  -  05-25 @ 07:00  --------------------------------------------------------  IN: 410 mL / OUT: 1 mL / NET: 409 mL    05-25 @ 07:01  -  05-25 @ 13:54  --------------------------------------------------------  IN: 400 mL / OUT: 0 mL / NET: 400 mL        PHYSICAL EXAM:  GENERAL: NAD, well nourished and conversant  HEAD:  Atraumatic  EYES: EOM, PERRLA, conjunctiva pink and sclera white  ENT: No tonsillar erythema, exudates, or enlargement, moist mucous membranes, good dentition, no lesions  NECK: Supple, No JVD, normal thyroid, carotids with normal upstrokes and no bruits  CHEST/LUNG: Clear to auscultation bilaterally, No rales, rhonchi, wheezing, or rubs  HEART: Regular rate and rhythm, No murmurs, rubs, or gallops  ABDOMEN: Soft, nondistended, no masses, guarding, tenderness or rebound, bowel sounds present  EXTREMITIES:  2+ Peripheral Pulses, No clubbing, cyanosis, or edema. No arthritis of shoulders, elbows, hands, hips, knees, ankles, or feet. No DJD C spine, T spine, or L/S spine  LYMPH: No lymphadenopathy noted  SKIN: No rashes or lesions  NERVOUS SYSTEM:  Alert & Oriented X3, normal cognitive function. Motor Strength 5/5 right upper and right lower.  5/5 left upper and left lower extremities, DTRs 2+ intact and symmetric    LABS:                          13.0   9.47  )-----------( 220      ( 25 May 2018 07:48 )             39.5     05-25    141  |  101  |  33<H>  ----------------------------<  97  3.9   |  26  |  1.20  05-23    141  |  103  |  36<H>  ----------------------------<  95  4.0   |  25  |  1.23    Ca    9.3      25 May 2018 07:10  Ca    8.7      23 May 2018 07:11      CAPILLARY BLOOD GLUCOSE          RADIOLOGY & ADDITIONAL TESTS:      Consultant(s):    Care Discussed with Consultants/Other Providers [ ] YES  [ ] NO

## 2018-05-25 NOTE — PROGRESS NOTE ADULT - SUBJECTIVE AND OBJECTIVE BOX
CC: Patient is a 89y old  Female who presents with a chief complaint of falling , weakness and uti (23 May 2018 12:58)    ID following for UTI    Interval History/ROS: Overnight, patient with elevated BP. States no longer with dysuria, however, endorses last night remained with a constant burning that was not associated with urination. Denies fever, chills.    Allergies  sulfa topicals (Unknown)    ANTIMICROBIALS:  ertapenem  IVPB    ertapenem  IVPB 1000 every 24 hours    PE:    Vital Signs Last 24 Hrs  T(C): 36.8 (25 May 2018 10:14), Max: 36.8 (25 May 2018 10:14)  T(F): 98.3 (25 May 2018 10:14), Max: 98.3 (25 May 2018 10:14)  HR: 56 (25 May 2018 10:14) (52 - 56)  BP: 138/66 (25 May 2018 10:14) (135/76 - 215/80)  BP(mean): --  RR: 18 (25 May 2018 10:14) (17 - 18)  SpO2: 96% (25 May 2018 10:14) (94% - 98%)    Gen: AOx3, NAD, non-toxic, pleasant  CV: S1+S2 normal, no murmurs  Resp: Crackles to bases bilaterally  Abd: Soft, nontender, +BS  Ext: No LE edema, no wounds  : No Blum  IV/Skin: No thrombophlebitis  Msk: No low back pain, no arthralgias, no joint swelling  Neuro: No sensory deficits, no motor deficits      LABS:                          13.0   9.47  )-----------( 220      ( 25 May 2018 07:48 )             39.5       05-25    141  |  101  |  33<H>  ----------------------------<  97  3.9   |  26  |  1.20    Ca    9.3      25 May 2018 07:10    MICROBIOLOGY:  v  .Urine Clean Catch (Midstream)  05-20-18   >100,000 CFU/ml Escherichia coli ESBL  --  Escherichia coli ESBL    RADIOLOGY:    No new images.

## 2018-05-25 NOTE — CHART NOTE - NSCHARTNOTEFT_GEN_A_CORE
Medicine NP Episodic Note     HPI: 89 y/o F w/ PMHx of HTN, spinal stenosis, and urinary incontinence p/w mechanical falls x 2 that occurred two days ago.  Has a hx of urinary frequency recently treated with Botox injections with minimal improvement in symptoms.  Pt. found to have a UTI which may be the cause of her weakness.     Event Summary:  Called by RN to report B/P 215/80.  Pt. seen and examined at bedside, A+Ox3, in NAD.  Endorses c/o 7/10 burning in perineal area, requesting cream to affected area.  Denies c/o HA, dizziness, lightheadedness.  Repeat manual B/P 190/110.  On chart review, pt. has labile SBP ranging from 110's to 190's.    > Tylenol 650 mg PO x 1  > Good perineal care  > Continue Nystatin cream to affected area  > Repeat B/P 180/110  > Will avoid aggressive B/P medication approach   > Advised nurse to give am dose of Norvasc 5 mg PO; B/P normalized to 135/76 post med   > Monitor vital signs  > Comfort measures    Anne-Marie Majano, P-BC  [phone]
Patient is a 89y old  Female who presents with a chief complaint of falling , weakness and uti. Now complaining of midsternal chest pressure. Patient seen and examined at bedside, patient is sitting out of bed to chart reading and states pain started suddenly. Denies headache, numbness, tingling pain, abdominal discomfort or nausea.     HPI:  89F Hx htn, spinal stenosis and urinary incontinence c/o falls x 2 two days ago, noted as knee buckling sensation and fell onto her knees, able to ambulate with assistance, no visual disturbance, headache, LOC, AC, or numbness/tingling, cough/fevers/ dysuria, cp/sob. Endorses overall weakness and instability with ambulation. Notes she must independently go for dinner/lunch and move around in her current nursing facility (20 May 2018 22:23)    Vital Signs Last 24 Hrs  T(C): 36.6 (23 May 2018 11:58), Max: 36.7 (22 May 2018 16:30)  T(F): 97.8 (23 May 2018 11:58), Max: 98 (22 May 2018 16:30)  HR: 67 (23 May 2018 11:58) (51 - 72)  BP: 138/80 (23 May 2018 11:58) (116/61 - 165/70)  BP(mean): --  RR: 18 (23 May 2018 11:58) (18 - 18)  SpO2: 96% (23 May 2018 11:58) (94% - 97%)                        11.5   7.77  )-----------( 176      ( 23 May 2018 08:20 )             35.8     05-23    141  |  103  |  36<H>  ----------------------------<  95  4.0   |  25  |  1.23    Ca    8.7      23 May 2018 07:11          General: WN/WD NAD  Neurology: A&Ox3, nonfocal, YBARRA x 4  Head:  Normocephalic, atraumatic  Respiratory: CTA B/L  CV: RRR, S1S2, no murmur  Abdominal: Soft, NT, ND no palpable mass  MSK: No edema, + peripheral pulses, FROM all 4 extremity    A/P Patient is a 89y old  Female who presents with a chief complaint of falling , weakness and uti. Now complaining of midsternal chest pressure. Patient seen and examined at bedside, patient is sitting out of bed to chart reading and states pain started suddenly, will r/o cardiac event vs. musculoskeletal discomfort.    - Stat EKG  - Stat CE   - Tylenol 650mg PO x 1   - Will continue to monitor.   - Will discuss with Dr. Spears

## 2018-05-25 NOTE — PROGRESS NOTE ADULT - ATTENDING COMMENTS
Rivera Saenz MD  Pager [phone]  After 5pm/weekends call [phone]
Rivera Saenz MD  Pager [phone]  After 5pm/weekends call [phone]
Beginning to ambulate and doing well. No medical complications post-op to date and to proceed with physical therapy, as tolerated. Continues pulmonary toilet to lessen atelectasis, leg exercises as taught to lessen the risk of DVT and supervised pain medications for post-op pain control. DC planning to assisted living tomorrow
Beginning to ambulate and doing well. No medical complications post-op to date and to proceed with physical therapy, as tolerated. Continues pulmonary toilet to lessen atelectasis, leg exercises as taught to lessen the risk of DVT and supervised pain medications for post-op pain control. DC planning to assisted living tomorrow
Beginning to ambulate and doing well. No medical complications post-op to date and to proceed with physical therapy, as tolerated. Continues pulmonary toilet to lessen atelectasis, leg exercises as taught to lessen the risk of DVT and supervised pain medications for post-op pain control. I anticipate transfer to rehabilitation
Beginning to ambulate and doing well. No medical complications post-op to date and to proceed with physical therapy, as tolerated. Continues pulmonary toilet to lessen atelectasis, leg exercises as taught to lessen the risk of DVT and supervised pain medications for post-op pain control. I anticipate transfer to rehabilitation to follow when cleared by orthopedics.

## 2018-05-25 NOTE — PROGRESS NOTE ADULT - PROBLEM SELECTOR PLAN 6
Patient with a long hx of urgency and frequency  May benefit from scheduled toileting

## 2018-05-25 NOTE — PROGRESS NOTE ADULT - PROBLEM SELECTOR PROBLEM 1
Urinary tract infection

## 2018-05-25 NOTE — PROGRESS NOTE ADULT - PROBLEM SELECTOR PROBLEM 5
Status Post Breast Lumpectomy

## 2018-05-28 VITALS — OXYGEN SATURATION: 96 % | HEART RATE: 55 BPM

## 2018-06-05 ENCOUNTER — APPOINTMENT (OUTPATIENT)
Dept: GERIATRICS | Facility: ASSISTED LIVING FACILITY | Age: 83
End: 2018-06-05

## 2018-07-02 ENCOUNTER — APPOINTMENT (OUTPATIENT)
Dept: ULTRASOUND IMAGING | Facility: IMAGING CENTER | Age: 83
End: 2018-07-02
Payer: MEDICARE

## 2018-07-02 ENCOUNTER — OUTPATIENT (OUTPATIENT)
Dept: OUTPATIENT SERVICES | Facility: HOSPITAL | Age: 83
LOS: 1 days | End: 2018-07-02
Payer: MEDICARE

## 2018-07-02 ENCOUNTER — APPOINTMENT (OUTPATIENT)
Dept: MAMMOGRAPHY | Facility: IMAGING CENTER | Age: 83
End: 2018-07-02
Payer: MEDICARE

## 2018-07-02 DIAGNOSIS — Z96.7 PRESENCE OF OTHER BONE AND TENDON IMPLANTS: Chronic | ICD-10-CM

## 2018-07-02 DIAGNOSIS — O00.1 TUBAL PREGNANCY: Chronic | ICD-10-CM

## 2018-07-02 DIAGNOSIS — Z00.8 ENCOUNTER FOR OTHER GENERAL EXAMINATION: ICD-10-CM

## 2018-07-02 PROCEDURE — 77063 BREAST TOMOSYNTHESIS BI: CPT

## 2018-07-02 PROCEDURE — 77063 BREAST TOMOSYNTHESIS BI: CPT | Mod: 26

## 2018-07-02 PROCEDURE — 76641 ULTRASOUND BREAST COMPLETE: CPT | Mod: 26,50

## 2018-07-02 PROCEDURE — 77067 SCR MAMMO BI INCL CAD: CPT | Mod: 26

## 2018-07-02 PROCEDURE — 77067 SCR MAMMO BI INCL CAD: CPT

## 2018-07-02 PROCEDURE — 76641 ULTRASOUND BREAST COMPLETE: CPT

## 2018-08-07 ENCOUNTER — TRANSCRIPTION ENCOUNTER (OUTPATIENT)
Age: 83
End: 2018-08-07

## 2018-08-07 ENCOUNTER — APPOINTMENT (OUTPATIENT)
Dept: GERIATRICS | Facility: ASSISTED LIVING FACILITY | Age: 83
End: 2018-08-07
Payer: MEDICARE

## 2018-08-07 VITALS
TEMPERATURE: 97.5 F | RESPIRATION RATE: 14 BRPM | SYSTOLIC BLOOD PRESSURE: 150 MMHG | OXYGEN SATURATION: 96 % | HEART RATE: 67 BPM | DIASTOLIC BLOOD PRESSURE: 60 MMHG

## 2018-08-17 LAB
M TB IFN-G BLD-IMP: NEGATIVE
QUANTIFERON TB PLUS MITOGEN MINUS NIL: >10 IU/ML
QUANTIFERON TB PLUS NIL: 0.05 IU/ML
QUANTIFERON TB PLUS TB1 MINUS NIL: -0.01 IU/ML
QUANTIFERON TB PLUS TB2 MINUS NIL: -0.01 IU/ML

## 2018-08-28 ENCOUNTER — APPOINTMENT (OUTPATIENT)
Dept: GERIATRICS | Facility: ASSISTED LIVING FACILITY | Age: 83
End: 2018-08-28
Payer: MEDICARE

## 2018-08-28 VITALS
DIASTOLIC BLOOD PRESSURE: 60 MMHG | RESPIRATION RATE: 14 BRPM | OXYGEN SATURATION: 98 % | HEART RATE: 70 BPM | TEMPERATURE: 98 F | SYSTOLIC BLOOD PRESSURE: 140 MMHG

## 2018-08-28 DIAGNOSIS — D50.8 OTHER IRON DEFICIENCY ANEMIAS: ICD-10-CM

## 2018-08-28 DIAGNOSIS — L97.919 NON-PRESSURE CHRONIC ULCER OF UNSPECIFIED PART OF RIGHT LOWER LEG WITH UNSPECIFIED SEVERITY: ICD-10-CM

## 2018-09-05 ENCOUNTER — LABORATORY RESULT (OUTPATIENT)
Age: 83
End: 2018-09-05

## 2018-09-11 ENCOUNTER — APPOINTMENT (OUTPATIENT)
Dept: GERIATRICS | Facility: ASSISTED LIVING FACILITY | Age: 83
End: 2018-09-11
Payer: MEDICARE

## 2018-09-11 VITALS
SYSTOLIC BLOOD PRESSURE: 140 MMHG | HEART RATE: 72 BPM | TEMPERATURE: 98.2 F | RESPIRATION RATE: 14 BRPM | DIASTOLIC BLOOD PRESSURE: 70 MMHG | OXYGEN SATURATION: 99 %

## 2018-09-11 DIAGNOSIS — Z11.1 ENCOUNTER FOR SCREENING FOR RESPIRATORY TUBERCULOSIS: ICD-10-CM

## 2018-09-11 DIAGNOSIS — S91.001D UNSPECIFIED OPEN WOUND, RIGHT ANKLE, SUBSEQUENT ENCOUNTER: ICD-10-CM

## 2018-09-11 DIAGNOSIS — H01.009 UNSPECIFIED BLEPHARITIS UNSPECIFIED EYE, UNSPECIFIED EYELID: ICD-10-CM

## 2018-09-11 DIAGNOSIS — Z13.1 ENCOUNTER FOR SCREENING FOR DIABETES MELLITUS: ICD-10-CM

## 2018-09-11 DIAGNOSIS — M25.471 EFFUSION, RIGHT ANKLE: ICD-10-CM

## 2018-10-10 ENCOUNTER — RX RENEWAL (OUTPATIENT)
Age: 83
End: 2018-10-10

## 2018-10-24 ENCOUNTER — APPOINTMENT (OUTPATIENT)
Dept: ORTHOPEDIC SURGERY | Facility: CLINIC | Age: 83
End: 2018-10-24
Payer: MEDICARE

## 2018-10-24 VITALS
HEIGHT: 64 IN | HEART RATE: 69 BPM | BODY MASS INDEX: 25.61 KG/M2 | SYSTOLIC BLOOD PRESSURE: 179 MMHG | WEIGHT: 150 LBS | DIASTOLIC BLOOD PRESSURE: 91 MMHG

## 2018-10-24 PROCEDURE — 73522 X-RAY EXAM HIPS BI 3-4 VIEWS: CPT

## 2018-10-24 PROCEDURE — 20610 DRAIN/INJ JOINT/BURSA W/O US: CPT | Mod: 50

## 2018-10-24 PROCEDURE — 99204 OFFICE O/P NEW MOD 45 MIN: CPT | Mod: 25

## 2018-10-24 PROCEDURE — 73564 X-RAY EXAM KNEE 4 OR MORE: CPT | Mod: 50

## 2019-02-11 ENCOUNTER — APPOINTMENT (OUTPATIENT)
Dept: PAIN MANAGEMENT | Facility: CLINIC | Age: 84
End: 2019-02-11
Payer: MEDICARE

## 2019-02-11 VITALS
DIASTOLIC BLOOD PRESSURE: 85 MMHG | BODY MASS INDEX: 27.31 KG/M2 | WEIGHT: 160 LBS | SYSTOLIC BLOOD PRESSURE: 161 MMHG | HEIGHT: 64 IN | HEART RATE: 68 BPM

## 2019-02-11 PROCEDURE — 99204 OFFICE O/P NEW MOD 45 MIN: CPT

## 2019-02-17 NOTE — PHYSICAL EXAM
[General Appearance - Alert] : alert [General Appearance - In No Acute Distress] : in no acute distress [Oriented To Time, Place, And Person] : oriented to person, place, and time [Affect] : the affect was normal [Mood] : the mood was normal [Person] : oriented to person [Place] : oriented to place [Time] : oriented to time [Span Intact] : the attention span was normal [Concentration Intact] : normal concentrating ability [Visual Intact] : visual attention was ~T not ~L decreased [Fluency] : fluency intact [Comprehension] : comprehension intact [Reading] : reading intact [Current Events] : adequate knowledge of current events [Past History] : adequate knowledge of personal past history [Vocabulary] : adequate range of vocabulary [Cranial Nerves Optic (II)] : visual acuity intact bilaterally,  visual fields full to confrontation, pupils equal round and reactive to light [Cranial Nerves Oculomotor (III)] : extraocular motion intact [Cranial Nerves Trigeminal (V)] : facial sensation intact symmetrically [Cranial Nerves Facial (VII)] : face symmetrical [Cranial Nerves Vestibulocochlear (VIII)] : hearing was intact bilaterally [Motor Strength] : muscle strength was normal in all four extremities [Involuntary Movements] : no involuntary movements were seen [Sclera] : the sclera and conjunctiva were normal [PERRL With Normal Accommodation] : pupils were equal in size, round, reactive to light, with normal accommodation [Extraocular Movements] : extraocular movements were intact [Neck Appearance] : the appearance of the neck was normal [] : no rash [Motor Strength Upper Extremities Bilaterally] : strength was normal in both upper extremities [Motor Strength Lower Extremities Bilaterally] : strength was normal in both lower extremities [FreeTextEntry8] : walks with rollator

## 2019-02-17 NOTE — HISTORY OF PRESENT ILLNESS
[FreeTextEntry1] : 89 y/o female patient presents today for an initial evaluation. Today she states that she currently experiences bilateral knee pain. She was seeing Dr. Burton who referred her here today. The pain in her knees started 10 years ago. It causes her to have trouble walking and therefore must use a rollator to ambulate. She also reports fatigue when walking. Additionally she is s/p a right hip replacement that occurred 2 years ago following a fall. She states that she has a history of falls and is now living in a local senior care home. In an attempt to relieve her knee pain she does daily exercises. Epidurals and medications such as Tylenol have not provided any relief. She notes having worn a cardiac monitor at one point to monitor a fibrillation.

## 2019-02-17 NOTE — END OF VISIT
[>50% of Time Spent on Counseling for ____] : Greater than 50% of the encounter time was spent on counseling for [unfilled] [Time Spent: ___ minutes] : I have spent [unfilled] minutes of face to face time with the patient [FreeTextEntry3] : This note was authored by Florentino Yanes working as a medical scribe for Dr. Jamar Aldana. The note was reviewed, edited, and revised by Dr. Jamar Aldana who is in agreement with the exam findings, and treatment plan. (2/11/19)

## 2019-02-17 NOTE — ASSESSMENT
[FreeTextEntry1] : 89 y/o female patient with chronic bilateral knee pain presents today for an initial evaluation. Her examination today is nonfocal. Following her evaluation today it is my opinion that she is a good candidate to begin MM. Due to her cardiac history it is advised that no THC be provided at any time, only CBD can be used. She denies a history of psychosis. \par \par During her visit today: \par 1) A lengthy discussion was held regarding the risks, benefits and use of MM. A letter of recommendation for CBD ONLY will be provided at this time. \par 2) It is advised that the Tylenol she takes daily will not provide any significant relief from the knee pain. \par 3) She will follow up with Viviane after beginning the MM. \par \par She can be contacted at: [email or web address]

## 2019-05-14 ENCOUNTER — APPOINTMENT (OUTPATIENT)
Dept: PAIN MANAGEMENT | Facility: CLINIC | Age: 84
End: 2019-05-14
Payer: MEDICARE

## 2019-05-14 VITALS
DIASTOLIC BLOOD PRESSURE: 84 MMHG | SYSTOLIC BLOOD PRESSURE: 176 MMHG | HEART RATE: 69 BPM | WEIGHT: 120 LBS | HEIGHT: 64 IN | BODY MASS INDEX: 20.49 KG/M2

## 2019-05-14 PROCEDURE — 99214 OFFICE O/P EST MOD 30 MIN: CPT

## 2019-05-24 NOTE — HISTORY OF PRESENT ILLNESS
[FreeTextEntry1] : Patient reports no relief from CBD. No AE's. No new medical problems. Pain interferes with mobility.

## 2019-05-24 NOTE — ASSESSMENT
[FreeTextEntry1] : 89 y/o female patient with chronic bilateral knee pain presents today for an initial evaluation. Her examination today is nonfocal. Following her evaluation today it is my opinion that she is a good candidate to begin MM. Due to her cardiac history it is advised that no THC be provided at any time, only CBD can be used. She denies a history of psychosis. \par \par During her visit today: \par 1) Will initiate Voltaren topical cream; if not better will consider Celebrex 100-200 mgs qd\par 2- If not better by #1 consider hydrocodone.

## 2019-05-31 ENCOUNTER — APPOINTMENT (OUTPATIENT)
Dept: PAIN MANAGEMENT | Facility: CLINIC | Age: 84
End: 2019-05-31
Payer: MEDICARE

## 2019-05-31 VITALS
SYSTOLIC BLOOD PRESSURE: 165 MMHG | HEIGHT: 64 IN | WEIGHT: 120 LBS | DIASTOLIC BLOOD PRESSURE: 74 MMHG | BODY MASS INDEX: 20.49 KG/M2 | HEART RATE: 65 BPM

## 2019-05-31 PROCEDURE — 99214 OFFICE O/P EST MOD 30 MIN: CPT

## 2019-05-31 RX ORDER — DICLOFENAC SODIUM 10 MG/G
1 GEL TOPICAL DAILY
Qty: 1 | Refills: 3 | Status: DISCONTINUED | COMMUNITY
Start: 2019-05-14 | End: 2019-05-31

## 2019-05-31 RX ORDER — DULOXETINE HYDROCHLORIDE 20 MG/1
20 CAPSULE, DELAYED RELEASE PELLETS ORAL
Qty: 30 | Refills: 6 | Status: DISCONTINUED | COMMUNITY
Start: 2018-07-07 | End: 2019-05-31

## 2019-06-03 NOTE — HISTORY OF PRESENT ILLNESS
[FreeTextEntry1] : The patient returned today for a follow up ov.  She is under our care for treatment of her chronic bilateral knee pain (10+ years).  She is also s/p a right hip replacement that occurred 2 years ago following a fall. She has been seen by Dr. Burton who referred her to pain management.  es started 10 years ago. She complains of pain and fatigue when trying to walk and therefore uses a rolling walker.  She states that she has a history of falls.  She lives in a local senior care home. \par \par She tried CBD oil without relief.  Last ov she was prescribed voltaren gel, which also provided now relief.  She is considering trying the CBD oil again to see if there is a formula that will help.  She does daily home exercises. In the past she also had epidurals and various other medications without relief.  She continues to use tylenol as needed with some relief. \par \par No other medical complaints.

## 2019-06-03 NOTE — ASSESSMENT
[FreeTextEntry1] : Reviewed Dr. Aldana's treatment plan from his last ov.  Will trial her on celebrex 100 mgs 1 po qd.  She was advised that she may also continue to use tylenol prn.  \par \par She will retry medical marijuanna.  \par \par She will RTO in 1 months time for a re-evaluation, or sooner if clinically indicated. According to Dr. Aldana's treatment plan we may need to consider hydrocodone if celebrex is ineffective.  \par \par Dr. Jones on site.  Billed incident to the service.

## 2019-06-03 NOTE — PHYSICAL EXAM
[General Appearance - In No Acute Distress] : in no acute distress [Oriented To Time, Place, And Person] : oriented to person, place, and time [Affect] : the affect was normal [General Appearance - Alert] : alert [Mood] : the mood was normal [Time] : oriented to time [Place] : oriented to place [Person] : oriented to person [Concentration Intact] : normal concentrating ability [Visual Intact] : visual attention was ~T not ~L decreased [Span Intact] : the attention span was normal [Reading] : reading intact [Comprehension] : comprehension intact [Fluency] : fluency intact [Past History] : adequate knowledge of personal past history [Current Events] : adequate knowledge of current events [Vocabulary] : adequate range of vocabulary [Cranial Nerves Optic (II)] : visual acuity intact bilaterally,  visual fields full to confrontation, pupils equal round and reactive to light [Cranial Nerves Oculomotor (III)] : extraocular motion intact [Cranial Nerves Trigeminal (V)] : facial sensation intact symmetrically [Cranial Nerves Facial (VII)] : face symmetrical [Cranial Nerves Vestibulocochlear (VIII)] : hearing was intact bilaterally [Involuntary Movements] : no involuntary movements were seen [Motor Strength] : muscle strength was normal in all four extremities [Sclera] : the sclera and conjunctiva were normal [Neck Appearance] : the appearance of the neck was normal [PERRL With Normal Accommodation] : pupils were equal in size, round, reactive to light, with normal accommodation [Extraocular Movements] : extraocular movements were intact [] : no rash [Motor Strength Upper Extremities Bilaterally] : strength was normal in both upper extremities [Motor Strength Lower Extremities Bilaterally] : strength was normal in both lower extremities [FreeTextEntry8] : walks with rollator

## 2019-06-17 ENCOUNTER — INPATIENT (INPATIENT)
Facility: HOSPITAL | Age: 84
LOS: 4 days | Discharge: NURSING, REHAB OR LONG-TERM CARE | DRG: 871 | End: 2019-06-22
Attending: INTERNAL MEDICINE | Admitting: HOSPITALIST
Payer: MEDICARE

## 2019-06-17 VITALS
HEART RATE: 114 BPM | RESPIRATION RATE: 21 BRPM | DIASTOLIC BLOOD PRESSURE: 97 MMHG | SYSTOLIC BLOOD PRESSURE: 196 MMHG | TEMPERATURE: 100 F | OXYGEN SATURATION: 94 %

## 2019-06-17 DIAGNOSIS — N39.0 URINARY TRACT INFECTION, SITE NOT SPECIFIED: ICD-10-CM

## 2019-06-17 DIAGNOSIS — I21.4 NON-ST ELEVATION (NSTEMI) MYOCARDIAL INFARCTION: ICD-10-CM

## 2019-06-17 DIAGNOSIS — E03.9 HYPOTHYROIDISM, UNSPECIFIED: ICD-10-CM

## 2019-06-17 DIAGNOSIS — Z96.7 PRESENCE OF OTHER BONE AND TENDON IMPLANTS: Chronic | ICD-10-CM

## 2019-06-17 DIAGNOSIS — A41.9 SEPSIS, UNSPECIFIED ORGANISM: ICD-10-CM

## 2019-06-17 DIAGNOSIS — I10 ESSENTIAL (PRIMARY) HYPERTENSION: ICD-10-CM

## 2019-06-17 DIAGNOSIS — R55 SYNCOPE AND COLLAPSE: ICD-10-CM

## 2019-06-17 DIAGNOSIS — I47.1 SUPRAVENTRICULAR TACHYCARDIA: ICD-10-CM

## 2019-06-17 DIAGNOSIS — Z29.9 ENCOUNTER FOR PROPHYLACTIC MEASURES, UNSPECIFIED: ICD-10-CM

## 2019-06-17 DIAGNOSIS — M62.82 RHABDOMYOLYSIS: ICD-10-CM

## 2019-06-17 DIAGNOSIS — O00.1 TUBAL PREGNANCY: Chronic | ICD-10-CM

## 2019-06-17 LAB
ALBUMIN SERPL ELPH-MCNC: 4.3 G/DL — SIGNIFICANT CHANGE UP (ref 3.3–5)
ALP SERPL-CCNC: 152 U/L — HIGH (ref 40–120)
ALT FLD-CCNC: 59 U/L — HIGH (ref 10–45)
ANION GAP SERPL CALC-SCNC: 19 MMOL/L — HIGH (ref 5–17)
APPEARANCE UR: ABNORMAL
APTT BLD: 28.7 SEC — SIGNIFICANT CHANGE UP (ref 27.5–36.3)
AST SERPL-CCNC: 50 U/L — HIGH (ref 10–40)
BASOPHILS # BLD AUTO: 0 K/UL — SIGNIFICANT CHANGE UP (ref 0–0.2)
BILIRUB SERPL-MCNC: 1.4 MG/DL — HIGH (ref 0.2–1.2)
BILIRUB UR-MCNC: NEGATIVE — SIGNIFICANT CHANGE UP
BLD GP AB SCN SERPL QL: NEGATIVE — SIGNIFICANT CHANGE UP
BUN SERPL-MCNC: 37 MG/DL — HIGH (ref 7–23)
CALCIUM SERPL-MCNC: 9.6 MG/DL — SIGNIFICANT CHANGE UP (ref 8.4–10.5)
CHLORIDE SERPL-SCNC: 96 MMOL/L — SIGNIFICANT CHANGE UP (ref 96–108)
CK SERPL-CCNC: 1555 U/L — HIGH (ref 25–170)
CK SERPL-CCNC: 2084 U/L — HIGH (ref 25–170)
CO2 SERPL-SCNC: 24 MMOL/L — SIGNIFICANT CHANGE UP (ref 22–31)
COLOR SPEC: ABNORMAL
CREAT SERPL-MCNC: 1.4 MG/DL — HIGH (ref 0.5–1.3)
DIFF PNL FLD: ABNORMAL
EOSINOPHIL # BLD AUTO: 0 K/UL — SIGNIFICANT CHANGE UP (ref 0–0.5)
GAS PNL BLDV: SIGNIFICANT CHANGE UP
GAS PNL BLDV: SIGNIFICANT CHANGE UP
GLUCOSE SERPL-MCNC: 188 MG/DL — HIGH (ref 70–99)
GLUCOSE UR QL: ABNORMAL
GRAM STN FLD: SIGNIFICANT CHANGE UP
HCT VFR BLD CALC: 40.8 % — SIGNIFICANT CHANGE UP (ref 34.5–45)
HGB BLD-MCNC: 13.6 G/DL — SIGNIFICANT CHANGE UP (ref 11.5–15.5)
INR BLD: 1.11 RATIO — SIGNIFICANT CHANGE UP (ref 0.88–1.16)
KETONES UR-MCNC: ABNORMAL
LEUKOCYTE ESTERASE UR-ACNC: ABNORMAL
LYMPHOCYTES # BLD AUTO: 1.2 K/UL — SIGNIFICANT CHANGE UP (ref 1–3.3)
LYMPHOCYTES # BLD AUTO: 5 % — LOW (ref 13–44)
MCHC RBC-ENTMCNC: 31.9 PG — SIGNIFICANT CHANGE UP (ref 27–34)
MCHC RBC-ENTMCNC: 33.3 GM/DL — SIGNIFICANT CHANGE UP (ref 32–36)
MCV RBC AUTO: 95.7 FL — SIGNIFICANT CHANGE UP (ref 80–100)
MONOCYTES # BLD AUTO: 2 K/UL — HIGH (ref 0–0.9)
MONOCYTES NFR BLD AUTO: 9 % — SIGNIFICANT CHANGE UP (ref 2–14)
NEUTROPHILS # BLD AUTO: 23.4 K/UL — HIGH (ref 1.8–7.4)
NEUTROPHILS NFR BLD AUTO: 71 % — SIGNIFICANT CHANGE UP (ref 43–77)
NITRITE UR-MCNC: POSITIVE
PH UR: 6 — SIGNIFICANT CHANGE UP (ref 5–8)
PLATELET # BLD AUTO: 148 K/UL — LOW (ref 150–400)
POTASSIUM SERPL-MCNC: 4.1 MMOL/L — SIGNIFICANT CHANGE UP (ref 3.5–5.3)
POTASSIUM SERPL-SCNC: 4.1 MMOL/L — SIGNIFICANT CHANGE UP (ref 3.5–5.3)
PROT SERPL-MCNC: 8.4 G/DL — HIGH (ref 6–8.3)
PROT UR-MCNC: ABNORMAL
PROTHROM AB SERPL-ACNC: 12.8 SEC — SIGNIFICANT CHANGE UP (ref 10–12.9)
RAPID RVP RESULT: SIGNIFICANT CHANGE UP
RBC # BLD: 4.26 M/UL — SIGNIFICANT CHANGE UP (ref 3.8–5.2)
RBC # FLD: 12.8 % — SIGNIFICANT CHANGE UP (ref 10.3–14.5)
RH IG SCN BLD-IMP: POSITIVE — SIGNIFICANT CHANGE UP
SODIUM SERPL-SCNC: 139 MMOL/L — SIGNIFICANT CHANGE UP (ref 135–145)
SP GR SPEC: 1.01 — SIGNIFICANT CHANGE UP (ref 1.01–1.02)
SPECIMEN SOURCE: SIGNIFICANT CHANGE UP
TROPONIN T, HIGH SENSITIVITY RESULT: 80 NG/L — HIGH (ref 0–51)
TROPONIN T, HIGH SENSITIVITY RESULT: 84 NG/L — HIGH (ref 0–51)
UROBILINOGEN FLD QL: NEGATIVE — SIGNIFICANT CHANGE UP
WBC # BLD: 26.5 K/UL — HIGH (ref 3.8–10.5)
WBC # FLD AUTO: 26.5 K/UL — HIGH (ref 3.8–10.5)

## 2019-06-17 PROCEDURE — 99291 CRITICAL CARE FIRST HOUR: CPT | Mod: GC

## 2019-06-17 PROCEDURE — 73060 X-RAY EXAM OF HUMERUS: CPT | Mod: 26,RT

## 2019-06-17 PROCEDURE — 70486 CT MAXILLOFACIAL W/O DYE: CPT | Mod: 26

## 2019-06-17 PROCEDURE — 72125 CT NECK SPINE W/O DYE: CPT | Mod: 26

## 2019-06-17 PROCEDURE — 99223 1ST HOSP IP/OBS HIGH 75: CPT | Mod: AI,GC

## 2019-06-17 PROCEDURE — 71045 X-RAY EXAM CHEST 1 VIEW: CPT | Mod: 26

## 2019-06-17 PROCEDURE — 72170 X-RAY EXAM OF PELVIS: CPT | Mod: 26

## 2019-06-17 PROCEDURE — 71260 CT THORAX DX C+: CPT | Mod: 26

## 2019-06-17 PROCEDURE — 74177 CT ABD & PELVIS W/CONTRAST: CPT | Mod: 26

## 2019-06-17 PROCEDURE — 70450 CT HEAD/BRAIN W/O DYE: CPT | Mod: 26

## 2019-06-17 PROCEDURE — 73030 X-RAY EXAM OF SHOULDER: CPT | Mod: 26,RT

## 2019-06-17 PROCEDURE — 76377 3D RENDER W/INTRP POSTPROCES: CPT | Mod: 26

## 2019-06-17 RX ORDER — MEROPENEM 1 G/30ML
INJECTION INTRAVENOUS
Refills: 0 | Status: DISCONTINUED | OUTPATIENT
Start: 2019-06-17 | End: 2019-06-19

## 2019-06-17 RX ORDER — MEROPENEM 1 G/30ML
500 INJECTION INTRAVENOUS EVERY 12 HOURS
Refills: 0 | Status: DISCONTINUED | OUTPATIENT
Start: 2019-06-18 | End: 2019-06-19

## 2019-06-17 RX ORDER — FENTANYL CITRATE 50 UG/ML
25 INJECTION INTRAVENOUS ONCE
Refills: 0 | Status: DISCONTINUED | OUTPATIENT
Start: 2019-06-17 | End: 2019-06-17

## 2019-06-17 RX ORDER — MEROPENEM 1 G/30ML
INJECTION INTRAVENOUS
Refills: 0 | Status: DISCONTINUED | OUTPATIENT
Start: 2019-06-17 | End: 2019-06-17

## 2019-06-17 RX ORDER — SODIUM CHLORIDE 9 MG/ML
1000 INJECTION INTRAMUSCULAR; INTRAVENOUS; SUBCUTANEOUS ONCE
Refills: 0 | Status: COMPLETED | OUTPATIENT
Start: 2019-06-17 | End: 2019-06-17

## 2019-06-17 RX ORDER — SODIUM CHLORIDE 9 MG/ML
1000 INJECTION, SOLUTION INTRAVENOUS ONCE
Refills: 0 | Status: COMPLETED | OUTPATIENT
Start: 2019-06-17 | End: 2019-06-17

## 2019-06-17 RX ORDER — LABETALOL HCL 100 MG
5 TABLET ORAL ONCE
Refills: 0 | Status: COMPLETED | OUTPATIENT
Start: 2019-06-17 | End: 2019-06-17

## 2019-06-17 RX ORDER — CEFTRIAXONE 500 MG/1
1 INJECTION, POWDER, FOR SOLUTION INTRAMUSCULAR; INTRAVENOUS ONCE
Refills: 0 | Status: COMPLETED | OUTPATIENT
Start: 2019-06-17 | End: 2019-06-17

## 2019-06-17 RX ORDER — ACETAMINOPHEN 500 MG
975 TABLET ORAL ONCE
Refills: 0 | Status: DISCONTINUED | OUTPATIENT
Start: 2019-06-17 | End: 2019-06-17

## 2019-06-17 RX ORDER — MEROPENEM 1 G/30ML
500 INJECTION INTRAVENOUS ONCE
Refills: 0 | Status: COMPLETED | OUTPATIENT
Start: 2019-06-17 | End: 2019-06-17

## 2019-06-17 RX ORDER — HEPARIN SODIUM 5000 [USP'U]/ML
5000 INJECTION INTRAVENOUS; SUBCUTANEOUS EVERY 8 HOURS
Refills: 0 | Status: DISCONTINUED | OUTPATIENT
Start: 2019-06-17 | End: 2019-06-22

## 2019-06-17 RX ORDER — ACETAMINOPHEN 500 MG
650 TABLET ORAL ONCE
Refills: 0 | Status: COMPLETED | OUTPATIENT
Start: 2019-06-17 | End: 2019-06-17

## 2019-06-17 RX ORDER — LABETALOL HCL 100 MG
10 TABLET ORAL ONCE
Refills: 0 | Status: COMPLETED | OUTPATIENT
Start: 2019-06-17 | End: 2019-06-17

## 2019-06-17 RX ORDER — TETANUS TOXOID, REDUCED DIPHTHERIA TOXOID AND ACELLULAR PERTUSSIS VACCINE, ADSORBED 5; 2.5; 8; 8; 2.5 [IU]/.5ML; [IU]/.5ML; UG/.5ML; UG/.5ML; UG/.5ML
0.5 SUSPENSION INTRAMUSCULAR ONCE
Refills: 0 | Status: COMPLETED | OUTPATIENT
Start: 2019-06-17 | End: 2019-06-17

## 2019-06-17 RX ORDER — SODIUM CHLORIDE 9 MG/ML
1000 INJECTION INTRAMUSCULAR; INTRAVENOUS; SUBCUTANEOUS
Refills: 0 | Status: DISCONTINUED | OUTPATIENT
Start: 2019-06-17 | End: 2019-06-18

## 2019-06-17 RX ORDER — LEVOTHYROXINE SODIUM 125 MCG
112 TABLET ORAL DAILY
Refills: 0 | Status: DISCONTINUED | OUTPATIENT
Start: 2019-06-17 | End: 2019-06-22

## 2019-06-17 RX ADMIN — FENTANYL CITRATE 25 MICROGRAM(S): 50 INJECTION INTRAVENOUS at 13:10

## 2019-06-17 RX ADMIN — Medication 5 MILLIGRAM(S): at 13:15

## 2019-06-17 RX ADMIN — SODIUM CHLORIDE 2000 MILLILITER(S): 9 INJECTION, SOLUTION INTRAVENOUS at 13:12

## 2019-06-17 RX ADMIN — SODIUM CHLORIDE 1000 MILLILITER(S): 9 INJECTION INTRAMUSCULAR; INTRAVENOUS; SUBCUTANEOUS at 17:00

## 2019-06-17 RX ADMIN — Medication 650 MILLIGRAM(S): at 12:00

## 2019-06-17 RX ADMIN — SODIUM CHLORIDE 1000 MILLILITER(S): 9 INJECTION INTRAMUSCULAR; INTRAVENOUS; SUBCUTANEOUS at 08:57

## 2019-06-17 RX ADMIN — SODIUM CHLORIDE 75 MILLILITER(S): 9 INJECTION INTRAMUSCULAR; INTRAVENOUS; SUBCUTANEOUS at 23:06

## 2019-06-17 RX ADMIN — TETANUS TOXOID, REDUCED DIPHTHERIA TOXOID AND ACELLULAR PERTUSSIS VACCINE, ADSORBED 0.5 MILLILITER(S): 5; 2.5; 8; 8; 2.5 SUSPENSION INTRAMUSCULAR at 08:56

## 2019-06-17 RX ADMIN — MEROPENEM 100 MILLIGRAM(S): 1 INJECTION INTRAVENOUS at 23:05

## 2019-06-17 RX ADMIN — FENTANYL CITRATE 25 MICROGRAM(S): 50 INJECTION INTRAVENOUS at 13:30

## 2019-06-17 RX ADMIN — HEPARIN SODIUM 5000 UNIT(S): 5000 INJECTION INTRAVENOUS; SUBCUTANEOUS at 23:06

## 2019-06-17 RX ADMIN — Medication 650 MILLIGRAM(S): at 17:00

## 2019-06-17 RX ADMIN — SODIUM CHLORIDE 1000 MILLILITER(S): 9 INJECTION INTRAMUSCULAR; INTRAVENOUS; SUBCUTANEOUS at 10:00

## 2019-06-17 RX ADMIN — CEFTRIAXONE 100 GRAM(S): 500 INJECTION, POWDER, FOR SOLUTION INTRAMUSCULAR; INTRAVENOUS at 10:58

## 2019-06-17 RX ADMIN — Medication 10 MILLIGRAM(S): at 11:49

## 2019-06-17 RX ADMIN — SODIUM CHLORIDE 1000 MILLILITER(S): 9 INJECTION INTRAMUSCULAR; INTRAVENOUS; SUBCUTANEOUS at 10:59

## 2019-06-17 RX ADMIN — Medication 650 MILLIGRAM(S): at 10:58

## 2019-06-17 NOTE — CONSULT NOTE ADULT - ASSESSMENT
89 y/o F w/ a PMH significant for HTN, spinal stenosis, and urinary incontinence who presented to the ED from her nursing facility s/p unwitnessed fall. Found to be in rhabdo, w/ ANNA, and UA c/f UTI.     #Rhabdo  - would c/w standing IVF to clear rhabdo  - could also be contributing to leukocytosis (reactive) and elevated transaminases  - will also improve ANNA and can trend transaminases     #UTI  - f/u urine cx  - agree w/ ceftriaxone at this point    #GOC  - would clarify GOC w/ family given advanced age, multiple medical comorbidities    Patient does not require MICU level care at this time. Thank you for allowing us to participate in the care of this patient. Please re-consult as needed.    Samantha Mckenzie MD  PGY-2, Internal Medicine  [place]: [phone]  [facility]: [id number] 89 y/o F w/ a PMH significant for HTN, spinal stenosis, and urinary incontinence who presented to the ED from her nursing facility s/p unwitnessed fall. Found to be in rhabdo, w/ ANNA, and UA c/f UTI.     #Rhabdo  - would c/w standing IVF to clear rhabdo  - could also be contributing to leukocytosis (reactive) and elevated transaminases  - will also improve ANNA and can trend transaminases     #UTI  - f/u urine cx  - would broaden to meropenem given h/o ESBL    #GOC  - would clarify GOC w/ family given advanced age, multiple medical comorbidities    Patient does not require MICU level care at this time. Thank you for allowing us to participate in the care of this patient. Please re-consult as needed.    Samantha Mckenzie MD  PGY-2, Internal Medicine  [place]: [phone]  [facility]: [id number]

## 2019-06-17 NOTE — H&P ADULT - HISTORY OF PRESENT ILLNESS
History limited due to patient being poor historian    Pt is a 89yo female with PMH HTN, Hypothyroidism, hx of ESBL UTI (May 2018) presenting from [facility] after being found down for several hours (as long as 8 hours). Patient does not recall falling or events leading up to the fall.     En route to hospital patient noted to have SVT lasting approx 25 seconds. Patient given Labetalol 10mg x1, continued to have additional episode so was given additional 5mg x1.    In ED vitals T 100.7 /88, , O2 98% on 4L NC. Patient received 3L IVF boluses in ED, CTX x1. Patient had CT Head History limited due to patient being poor historian    Pt is a 89yo female with PMH HTN, Hypothyroidism, hx of ESBL UTI (May 2018) presenting from [facility] after being found down for several hours (as long as 8 hours). Patient does not recall falling or events leading up to the fall. Patient denies LOC, dizziness. Patient denies headache, n/v/d. She denies URI symptoms. Patient denies dysuria.     In ED vitals T 100.7 /88, , O2 98% on 4L NC. Patient received 3L IVF boluses in ED, CTX x1. Patient had CT Head negative for acute hemorrhage, mass effect or displaced calvarial fracture. Of note, patient with two episodes of SVT lasting approx 25 seconds. Patient given Labetalol 10mg x1, continued to have additional episode so was given additional 5mg x1.

## 2019-06-17 NOTE — H&P ADULT - ATTENDING COMMENTS
severe sepsis 2/2 uti/pyleo  agree with meropenem, follow up bcx, ucx  IVF  trend lactate  type 2 MI

## 2019-06-17 NOTE — ED PROVIDER NOTE - PROGRESS NOTE DETAILS
Dr Hwang Note: Pt seen by MICU not accepted, Dr Jeffry Shah, PMD called for admission   Pt receiving fluid for rhabdo, Tylenol given for fever, abx already given, pt stable Dr Hwang Note: PMD admits to hospitalist, paging hospitalist  Pt has been stable, did have 20 sec runs of SVT, after 2nd dose labetalol has not had any further episodes

## 2019-06-17 NOTE — H&P ADULT - PROBLEM SELECTOR PLAN 1
- Unknown etiology at this time, however in setting of UTI. Will rule out cardiac and neurological etiologies  - Will need TTE   - Orthostatics once stable   - CTH negative for acute hemorrhage, mass effect or displaced calvarial fracture. Right frontal extracalvarial soft tissue swelling and hematoma. - Unknown etiology at this time, however likely in setting of UTI. Will need to rule out cardiac vs metabolic vs neurological etiologies   - Will need TTE, orthostatic vital signs once stable  - EKG without evidence of ST elevations; Troponins elevated, however peaked; likely 2/2 Type 2 NSTEMI in setting of recent fall and demand  - CTH negative for acute hemorrhage, mass effect or displaced calvarial fracture. Right frontal extracalvarial soft tissue swelling and hematoma.  Maxillofacial bone CT: No acute fracture. Bilateral maxillary sinus air-fluid levels. Correlate clinically for the presence of acute sinusitis.  - Cervical spine CT: No acute fracture traumatic subluxation. Multilevel degenerative changes. -Likely in setting of sepsis 2/2 UTI.   - EKG without evidence of ST elevations; Troponins elevated, however peaked; likely 2/2 Type 2 NSTEMI in setting of recent fall and demand  - CTH negative for acute hemorrhage, mass effect or displaced calvarial fracture. Right frontal extracalvarial soft tissue swelling and hematoma.  Maxillofacial bone CT: No acute fracture. Bilateral maxillary sinus air-fluid levels. Correlate clinically for the presence of acute sinusitis.  - Cervical spine CT: No acute fracture traumatic subluxation. Multilevel degenerative changes.

## 2019-06-17 NOTE — CONSULT NOTE ADULT - SUBJECTIVE AND OBJECTIVE BOX
CHIEF COMPLAINT: fall    HPI: This is a 89 y/o F w/ a PMH significant for HTN, spinal stenosis, and urinary incontinence who presented to the ED from her nursing facility s/p unwitnessed fall. She was reportedly last seen normal yesterday evening prior to bedtime. She was found down on the ground this AM and does not recall falling. She endorses mild right shoulder pain and HA, but otherwise denies any n/v, CP, dyspnea. Otherwise, she says she has been doing well at her nursing home w/o issues.    PAST MEDICAL & SURGICAL HISTORY:  Spinal stenosis  HTN (hypertension)  Bilateral Cataracts  Status Post Breast Lumpectomy  Urinary Frequency  S/P ORIF (open reduction internal fixation) fracture  Ectopic pregnancy, tubal      FAMILY HISTORY:  Family history of acute myocardial infarction (Father)      SOCIAL HISTORY:  Smoking: [ ] Never Smoked [ ] Former Smoker (__ packs x ___ years) [ ] Current Smoker  (__ packs x ___ years)  Substance Use: [ ] Never Used [ ] Used ____  EtOH Use:  Marital Status: [ ] Single [ ]  [ ]  [ ]   Sexual History:   Occupation:  Recent Travel:  Country of Birth:  Advance Directives:    Allergies    sulfa topicals (Unknown)    Intolerances    morphine (Drowsiness; Faint; Hypotension)      HOME MEDICATIONS:  Home Medications:  acetaminophen 325 mg oral tablet: 1 tab(s) orally every 6 hours, As Needed -Mild Pain (2016 10:42)  amLODIPine 5 mg oral tablet: orally 2 times a day (25 May 2018 15:56)  atenolol 25 mg oral tablet: 1 tab(s) orally once a day (2016 10:42)  calcium carbonate 1250 mg (500 mg elemental calcium) oral tablet: 1 tab(s) orally once a day (2016 10:42)  cholecalciferol oral tablet: 2000 unit(s) orally once a day (2016 15:37)  Colace 100 mg oral capsule: 3 cap(s) orally once a day (at bedtime) (2016 10:42)  DULoxetine: 20 milligram(s) orally once a day (2016 10:42)  ferrous sulfate 325 mg (65 mg elemental iron) oral tablet: 1 tab(s) orally 3 times a day (with meals) (2016 15:37)  levothyroxine 88 mcg (0.088 mg) oral tablet: 1 tab(s) orally once a day (2016 15:37)  nystatin 100,000 units/g topical cream: 1 application topically 2 times a day right groin (2016 10:42)  polyethylene glycol 3350 oral powder for reconstitution: 17 gram(s) orally once a day, As Needed (2016 10:42)  senna 8.6 mg oral tablet: 2 tab(s) orally once a day (at bedtime) (2016 10:42)  Toviaz 4 mg oral tablet, extended release: 1 tab(s) orally once a day (2016 10:42)  traMADol 50 mg oral tablet: 1 tab(s) orally every 4 hours, As Needed -Severe Pain (2016 10:42)      REVIEW OF SYSTEMS:  Constitutional: [ ] negative [ ] fevers [ ] chills [ ] weight loss [ ] weight gain  HEENT: [ ] negative [ ] dry eyes [ ] eye irritation [ ] postnasal drip [ ] nasal congestion  CV: [ ] negative  [ ] chest pain [ ] orthopnea [ ] palpitations [ ] murmur  Resp: [ ] negative [ ] cough [ ] shortness of breath [ ] dyspnea [ ] wheezing [ ] sputum [ ] hemoptysis  GI: [ ] negative [ ] nausea [ ] vomiting [ ] diarrhea [ ] constipation [ ] abd pain [ ] dysphagia   : [ ] negative [ ] dysuria [ ] nocturia [ ] hematuria [ ] increased urinary frequency  Musculoskeletal: [ ] negative [ ] back pain [ ] myalgias [ ] arthralgias [ ] fracture  Skin: [ ] negative [ ] rash [ ] itch  Neurological: [ ] negative [ ] headache [ ] dizziness [ ] syncope [ ] weakness [ ] numbness  Psychiatric: [ ] negative [ ] anxiety [ ] depression  Endocrine: [ ] negative [ ] diabetes [ ] thyroid problem  Hematologic/Lymphatic: [ ] negative [ ] anemia [ ] bleeding problem  Allergic/Immunologic: [ ] negative [ ] itchy eyes [ ] nasal discharge [ ] hives [ ] angioedema  [ ] All other systems negative  [ ] Unable to assess ROS because ________    OBJECTIVE:  ICU Vital Signs Last 24 Hrs  T(C): 37.7 (2019 12:21), Max: 37.8 (2019 08:36)  T(F): 99.9 (2019 12:21), Max: 100 (2019 08:36)  HR: 108 (2019 12:21) (108 - 129)  BP: 188/98 (2019 12:21) (188/98 - 226/118)  BP(mean): --  ABP: --  ABP(mean): --  RR: 26 (2019 12:21) (21 - 26)  SpO2: 96% (2019 12:21) (94% - 96%)        CAPILLARY BLOOD GLUCOSE          PHYSICAL EXAM:  General:   HEENT:   Lymph Nodes:  Neck:   Respiratory:   Cardiovascular:   Abdomen:   Extremities:   Skin:   Neurological:  Psychiatry:    LINES:     HOSPITAL MEDICATIONS:  Standing Meds:      PRN Meds:      LABS:                        13.6   26.5  )-----------( 148      ( 2019 08:58 )             40.8     Hgb Trend: 13.6<--      139  |  96  |  37<H>  ----------------------------<  188<H>  4.1   |  24  |  1.40<H>    Ca    9.6      2019 08:58    TPro  8.4<H>  /  Alb  4.3  /  TBili  1.4<H>  /  DBili  x   /  AST  50<H>  /  ALT  59<H>  /  AlkPhos  152<H>      Creatinine Trend: 1.40<--  PT/INR - ( 2019 08:58 )   PT: 12.8 sec;   INR: 1.11 ratio         PTT - ( 2019 08:58 )  PTT:28.7 sec  Urinalysis Basic - ( 2019 08:58 )    Color: Light Orange / Appearance: Turbid / S.015 / pH: x  Gluc: x / Ketone: Small  / Bili: Negative / Urobili: Negative   Blood: x / Protein: 100 mg/dL / Nitrite: Positive   Leuk Esterase: Large / RBC: 16 /hpf / WBC 1489 /HPF   Sq Epi: x / Non Sq Epi: 1 / Bacteria: Many        Venous Blood Gas:   @ 08:58  7.34/49/22/26/30  VBG Lactate: 4.8      MICROBIOLOGY:       RADIOLOGY:  [ ] Reviewed and interpreted by me    EKG: CHIEF COMPLAINT: fall    HPI: This is a 89 y/o F w/ a PMH significant for HTN, spinal stenosis, and urinary incontinence who presented to the ED from her nursing facility s/p unwitnessed fall. She was reportedly last seen normal yesterday evening prior to bedtime. She was found down on the ground this AM and does not recall falling. She endorses mild right shoulder pain and HA, but otherwise denies any n/v, CP, dyspnea. Otherwise, she says she has been doing well at her nursing home w/o issues.    PAST MEDICAL & SURGICAL HISTORY:  Spinal stenosis  HTN (hypertension)  Bilateral Cataracts  Status Post Breast Lumpectomy  Urinary Frequency  S/P ORIF (open reduction internal fixation) fracture  Ectopic pregnancy, tubal      FAMILY HISTORY:  Family history of acute myocardial infarction (Father)      SOCIAL HISTORY:  Smoking: [ ] Never Smoked [ ] Former Smoker (__ packs x ___ years) [ ] Current Smoker  (__ packs x ___ years)  Substance Use: [ ] Never Used [ ] Used ____  EtOH Use:  Marital Status: [ ] Single [ ]  [ ]  [ ]   Sexual History:   Occupation:  Recent Travel:  Country of Birth:  Advance Directives:    Allergies    sulfa topicals (Unknown)    Intolerances    morphine (Drowsiness; Faint; Hypotension)      HOME MEDICATIONS:  Home Medications:  acetaminophen 325 mg oral tablet: 1 tab(s) orally every 6 hours, As Needed -Mild Pain (2016 10:42)  amLODIPine 5 mg oral tablet: orally 2 times a day (25 May 2018 15:56)  atenolol 25 mg oral tablet: 1 tab(s) orally once a day (2016 10:42)  calcium carbonate 1250 mg (500 mg elemental calcium) oral tablet: 1 tab(s) orally once a day (2016 10:42)  cholecalciferol oral tablet: 2000 unit(s) orally once a day (2016 15:37)  Colace 100 mg oral capsule: 3 cap(s) orally once a day (at bedtime) (2016 10:42)  DULoxetine: 20 milligram(s) orally once a day (2016 10:42)  ferrous sulfate 325 mg (65 mg elemental iron) oral tablet: 1 tab(s) orally 3 times a day (with meals) (2016 15:37)  levothyroxine 88 mcg (0.088 mg) oral tablet: 1 tab(s) orally once a day (2016 15:37)  nystatin 100,000 units/g topical cream: 1 application topically 2 times a day right groin (2016 10:42)  polyethylene glycol 3350 oral powder for reconstitution: 17 gram(s) orally once a day, As Needed (2016 10:42)  senna 8.6 mg oral tablet: 2 tab(s) orally once a day (at bedtime) (2016 10:42)  Toviaz 4 mg oral tablet, extended release: 1 tab(s) orally once a day (2016 10:42)  traMADol 50 mg oral tablet: 1 tab(s) orally every 4 hours, As Needed -Severe Pain (2016 10:42)      REVIEW OF SYSTEMS:  Constitutional: [ ] negative [ ] fevers [ ] chills [ ] weight loss [ ] weight gain  HEENT: [ ] negative [ ] dry eyes [ ] eye irritation [ ] postnasal drip [ ] nasal congestion  CV: [ ] negative  [ ] chest pain [ ] orthopnea [ ] palpitations [ ] murmur  Resp: [ ] negative [ ] cough [ ] shortness of breath [ ] dyspnea [ ] wheezing [ ] sputum [ ] hemoptysis  GI: [ ] negative [ ] nausea [ ] vomiting [ ] diarrhea [ ] constipation [ ] abd pain [ ] dysphagia   : [ ] negative [ ] dysuria [ ] nocturia [ ] hematuria [ ] increased urinary frequency  Musculoskeletal: [ ] negative [ ] back pain [ ] myalgias [ ] arthralgias [ ] fracture  Skin: [ ] negative [ ] rash [ ] itch  Neurological: [ ] negative [ ] headache [ ] dizziness [ ] syncope [ ] weakness [ ] numbness  Psychiatric: [ ] negative [ ] anxiety [ ] depression  Endocrine: [ ] negative [ ] diabetes [ ] thyroid problem  Hematologic/Lymphatic: [ ] negative [ ] anemia [ ] bleeding problem  Allergic/Immunologic: [ ] negative [ ] itchy eyes [ ] nasal discharge [ ] hives [ ] angioedema  [ ] All other systems negative  [ ] Unable to assess ROS because ________    OBJECTIVE:  ICU Vital Signs Last 24 Hrs  T(C): 37.7 (2019 12:21), Max: 37.8 (2019 08:36)  T(F): 99.9 (2019 12:21), Max: 100 (2019 08:36)  HR: 108 (2019 12:21) (108 - 129)  BP: 188/98 (2019 12:21) (188/98 - 226/118)  BP(mean): --  ABP: --  ABP(mean): --  RR: 26 (2019 12:21) (21 - 26)  SpO2: 96% (2019 12:21) (94% - 96%)        CAPILLARY BLOOD GLUCOSE          PHYSICAL EXAM:  General: no acute distress, lying in bed comfortably  HEENT: has right orbital ecchymosis w/ dried blood and small hematoma on right scalp  Neck: no LAD  Respiratory: CTA b/l no resp distress  Cardiovascular:   Abdomen:   Extremities:   Skin:   Neurological:  Psychiatry:    LINES:     HOSPITAL MEDICATIONS:  Standing Meds:      PRN Meds:      LABS:                        13.6   26.5  )-----------( 148      ( 2019 08:58 )             40.8     Hgb Trend: 13.6<--      139  |  96  |  37<H>  ----------------------------<  188<H>  4.1   |  24  |  1.40<H>    Ca    9.6      2019 08:58    TPro  8.4<H>  /  Alb  4.3  /  TBili  1.4<H>  /  DBili  x   /  AST  50<H>  /  ALT  59<H>  /  AlkPhos  152<H>      Creatinine Trend: 1.40<--  PT/INR - ( 2019 08:58 )   PT: 12.8 sec;   INR: 1.11 ratio         PTT - ( 2019 08:58 )  PTT:28.7 sec  Urinalysis Basic - ( 2019 08:58 )    Color: Light Orange / Appearance: Turbid / S.015 / pH: x  Gluc: x / Ketone: Small  / Bili: Negative / Urobili: Negative   Blood: x / Protein: 100 mg/dL / Nitrite: Positive   Leuk Esterase: Large / RBC: 16 /hpf / WBC 1489 /HPF   Sq Epi: x / Non Sq Epi: 1 / Bacteria: Many        Venous Blood Gas:   @ 08:58  7.34/49/22/26/30  VBG Lactate: 4.8      MICROBIOLOGY:       RADIOLOGY:  [ ] Reviewed and interpreted by me    EKG: CHIEF COMPLAINT: fall    HPI: This is a 89 y/o F w/ a PMH significant for HTN, spinal stenosis, and urinary incontinence who presented to the ED from her nursing facility s/p unwitnessed fall. She was reportedly last seen normal yesterday evening prior to bedtime. She was found down on the ground this AM and does not recall falling. She endorses mild right shoulder pain and HA, but otherwise denies any n/v, CP, dyspnea. Otherwise, she says she has been doing well at her nursing home w/o issues.    PAST MEDICAL & SURGICAL HISTORY:  Spinal stenosis  HTN (hypertension)  Bilateral Cataracts  Status Post Breast Lumpectomy  Urinary Frequency  S/P ORIF (open reduction internal fixation) fracture  Ectopic pregnancy, tubal      FAMILY HISTORY:  Family history of acute myocardial infarction (Father)      SOCIAL HISTORY:  Smoking: [ ] Never Smoked [ ] Former Smoker (__ packs x ___ years) [ ] Current Smoker  (__ packs x ___ years)  Substance Use: [ ] Never Used [ ] Used ____  EtOH Use:  Marital Status: [ ] Single [ ]  [ ]  [ ]   Sexual History:   Occupation:  Recent Travel:  Country of Birth:  Advance Directives:    Allergies    sulfa topicals (Unknown)    Intolerances    morphine (Drowsiness; Faint; Hypotension)      HOME MEDICATIONS:  Home Medications:  acetaminophen 325 mg oral tablet: 1 tab(s) orally every 6 hours, As Needed -Mild Pain (2016 10:42)  amLODIPine 5 mg oral tablet: orally 2 times a day (25 May 2018 15:56)  atenolol 25 mg oral tablet: 1 tab(s) orally once a day (2016 10:42)  calcium carbonate 1250 mg (500 mg elemental calcium) oral tablet: 1 tab(s) orally once a day (2016 10:42)  cholecalciferol oral tablet: 2000 unit(s) orally once a day (2016 15:37)  Colace 100 mg oral capsule: 3 cap(s) orally once a day (at bedtime) (2016 10:42)  DULoxetine: 20 milligram(s) orally once a day (2016 10:42)  ferrous sulfate 325 mg (65 mg elemental iron) oral tablet: 1 tab(s) orally 3 times a day (with meals) (2016 15:37)  levothyroxine 88 mcg (0.088 mg) oral tablet: 1 tab(s) orally once a day (2016 15:37)  nystatin 100,000 units/g topical cream: 1 application topically 2 times a day right groin (2016 10:42)  polyethylene glycol 3350 oral powder for reconstitution: 17 gram(s) orally once a day, As Needed (2016 10:42)  senna 8.6 mg oral tablet: 2 tab(s) orally once a day (at bedtime) (2016 10:42)  Toviaz 4 mg oral tablet, extended release: 1 tab(s) orally once a day (2016 10:42)  traMADol 50 mg oral tablet: 1 tab(s) orally every 4 hours, As Needed -Severe Pain (2016 10:42)      REVIEW OF SYSTEMS:  Constitutional: [ ] negative [ ] fevers [ ] chills [ ] weight loss [ ] weight gain  HEENT: [ ] negative [ ] dry eyes [ ] eye irritation [ ] postnasal drip [ ] nasal congestion  CV: [ ] negative  [ ] chest pain [ ] orthopnea [ ] palpitations [ ] murmur  Resp: [ ] negative [ ] cough [ ] shortness of breath [ ] dyspnea [ ] wheezing [ ] sputum [ ] hemoptysis  GI: [ ] negative [ ] nausea [ ] vomiting [ ] diarrhea [ ] constipation [ ] abd pain [ ] dysphagia   : [ ] negative [ ] dysuria [ ] nocturia [ ] hematuria [ ] increased urinary frequency  Musculoskeletal: [ ] negative [ ] back pain [ ] myalgias [ ] arthralgias [ ] fracture  Skin: [ ] negative [ ] rash [ ] itch  Neurological: [ ] negative [ ] headache [ ] dizziness [ ] syncope [ ] weakness [ ] numbness  Psychiatric: [ ] negative [ ] anxiety [ ] depression  Endocrine: [ ] negative [ ] diabetes [ ] thyroid problem  Hematologic/Lymphatic: [ ] negative [ ] anemia [ ] bleeding problem  Allergic/Immunologic: [ ] negative [ ] itchy eyes [ ] nasal discharge [ ] hives [ ] angioedema  [ ] All other systems negative  [ ] Unable to assess ROS because ________    OBJECTIVE:  ICU Vital Signs Last 24 Hrs  T(C): 37.7 (2019 12:21), Max: 37.8 (2019 08:36)  T(F): 99.9 (2019 12:21), Max: 100 (2019 08:36)  HR: 108 (2019 12:21) (108 - 129)  BP: 188/98 (2019 12:21) (188/98 - 226/118)  BP(mean): --  ABP: --  ABP(mean): --  RR: 26 (2019 12:21) (21 - 26)  SpO2: 96% (2019 12:21) (94% - 96%)        CAPILLARY BLOOD GLUCOSE          PHYSICAL EXAM:  General: no acute distress, lying in bed comfortably  HEENT: has right orbital ecchymosis w/ dried blood and small hematoma on right scalp  Neck: no LAD  Respiratory: CTA b/l no resp distress  Cardiovascular: tachycardic, but no murmurs, +S1, S2  Abdomen: soft, nontender, nondistended  Extremities: 2+ peripheral pulses, no edema  Skin: ecchymoses as above  Neurological: nonfocal, CN 2-12 intact    LINES:     HOSPITAL MEDICATIONS:  Standing Meds:      PRN Meds:      LABS:                        13.6   26.5  )-----------( 148      ( 2019 08:58 )             40.8     Hgb Trend: 13.6<--      139  |  96  |  37<H>  ----------------------------<  188<H>  4.1   |  24  |  1.40<H>    Ca    9.6      2019 08:58    TPro  8.4<H>  /  Alb  4.3  /  TBili  1.4<H>  /  DBili  x   /  AST  50<H>  /  ALT  59<H>  /  AlkPhos  152<H>      Creatinine Trend: 1.40<--  PT/INR - ( 2019 08:58 )   PT: 12.8 sec;   INR: 1.11 ratio         PTT - ( 2019 08:58 )  PTT:28.7 sec  Urinalysis Basic - ( 2019 08:58 )    Color: Light Orange / Appearance: Turbid / S.015 / pH: x  Gluc: x / Ketone: Small  / Bili: Negative / Urobili: Negative   Blood: x / Protein: 100 mg/dL / Nitrite: Positive   Leuk Esterase: Large / RBC: 16 /hpf / WBC 1489 /HPF   Sq Epi: x / Non Sq Epi: 1 / Bacteria: Many        Venous Blood Gas:   @ 08:58  7.34/49/22/26/30  VBG Lactate: 4.8      MICROBIOLOGY:       RADIOLOGY:  [ ] Reviewed and interpreted by me    EKG:

## 2019-06-17 NOTE — H&P ADULT - NSHPLABSRESULTS_GEN_ALL_CORE
.  LABS:                         13.6   26.5  )-----------( 148      ( 2019 08:58 )             40.8         136  |  104  |  36<H>  ----------------------------<  152<H>  5.2   |  17<L>  |  1.16    Ca    8.5      2019 12:51    TPro  8.4<H>  /  Alb  4.3  /  TBili  1.4<H>  /  DBili  x   /  AST  50<H>  /  ALT  59<H>  /  AlkPhos  152<H>      PT/INR - ( 2019 08:58 )   PT: 12.8 sec;   INR: 1.11 ratio         PTT - ( 2019 08:58 )  PTT:28.7 sec  Urinalysis Basic - ( 2019 08:58 )    Color: Light Orange / Appearance: Turbid / S.015 / pH: x  Gluc: x / Ketone: Small  / Bili: Negative / Urobili: Negative   Blood: x / Protein: 100 mg/dL / Nitrite: Positive   Leuk Esterase: Large / RBC: 16 /hpf / WBC 1489 /HPF   Sq Epi: x / Non Sq Epi: 1 / Bacteria: Many            RADIOLOGY, EKG & ADDITIONAL TESTS: Reviewed. LABS:                         13.6   26.5  )-----------( 148      ( 2019 08:58 )             40.8         136  |  104  |  36<H>  ----------------------------<  152<H>  5.2   |  17<L>  |  1.16    Ca    8.5      2019 12:51    TPro  8.4<H>  /  Alb  4.3  /  TBili  1.4<H>  /  DBili  x   /  AST  50<H>  /  ALT  59<H>  /  AlkPhos  152<H>      PT/INR - ( 2019 08:58 )   PT: 12.8 sec;   INR: 1.11 ratio         PTT - ( 2019 08:58 )  PTT:28.7 sec  Urinalysis Basic - ( 2019 08:58 )    Color: Light Orange / Appearance: Turbid / S.015 / pH: x  Gluc: x / Ketone: Small  / Bili: Negative / Urobili: Negative   Blood: x / Protein: 100 mg/dL / Nitrite: Positive   Leuk Esterase: Large / RBC: 16 /hpf / WBC 1489 /HPF   Sq Epi: x / Non Sq Epi: 1 / Bacteria: Many        RADIOLOGY, EKG & ADDITIONAL TESTS: Reviewed.    FINDINGS:     Brain CT:    No acute hemorrhage, mass effect or hydrocephalus is identified.   Age-appropriate involutional changes and moderate to severe microvascular   ischemic changes are stable in appearance.    No displaced calvarial fracture is identified.    There is mild right frontal extracalvarial soft tissue swelling.    Bilateral maxillary sinus air-fluid levels are present.    The tympanomastoid cavities are free of acute disease.        Maxillofacial CT:    There are bilateral maxillary sinus air-fluid levels with mild associated   mucosal thickening of the left maxillary antrum. Mild bilateral ethmoid   and frontal sinus mucosal thickening is present.    No acute fracture is identified. The orbital rims are intact.    An area of sclerosis is noted along the left sphenoid wing which is   unchanged compared with the prior examination.    Bilateral lens enucleation is noted.    No retro-orbital hematoma is identified.    The bony nasal septum intact.    There is right frontal extracalvarial soft tissue swelling and hematoma.      Cervical spine CT:    The normal cervical lordosis is maintained.    No acute fracture or traumatic subluxation is identified. Multilevel   degenerative changes with mild osteophyte formation, uncovertebral joint   hypertrophy, facet arthropathy and disc space narrowing is unchanged   compared with the prior study.    The prevertebral soft tissues are within normal limits.    Impression:    CT:    No acute hemorrhage, mass effect or displaced calvarial fracture. Right   frontal extracalvarial soft tissue swelling and hematoma.    Maxillofacial bone CT: No acute fracture. Bilateral maxillary sinus   air-fluid levels. Correlate clinically for the presence of acute   sinusitis.    Cervical spine CT: No acute fracture traumatic subluxation. Multilevel   degenerative changes.    .

## 2019-06-17 NOTE — ED PROVIDER NOTE - PHYSICAL EXAMINATION
Resident:   Gen: no acute distress  Head: NC, AT  ENT: PERRL, MMM, no uvular deviation, no tonsilar erythema  Neck: c-colalr in place  Chest: CTAB, no retractions, rate normal, appears to breathe comfortably  Heart: tachy regular S1S2, No peripheral edema, bilateral pulses in arms and legs  Abd: Soft non-tender, no rebound or guarding  Back: No spinal deformity, no midline tenderness to palpation   Ext: Moving all 4 extremities without obvious impairment to ROM, no obvious weakness  Neuro: fluid speech, no focal deficits, normal sensation  Psych: No anxiety, depression or pressured speech noted  Skin: ecchymosis around eyes, dried blood to right face, erythema and swelling right hip

## 2019-06-17 NOTE — ED PROVIDER NOTE - ATTENDING CONTRIBUTION TO CARE
Dr Hwang Note: 89yo F w pmhx HTN on ASA, BIBEMS from [facility], found on ground, unknown time frame, found this morning, last seen last night  Pt can not recall what occurred, awake and alert now, c/o Right shoulder pain     Pt denies any cp, sob, focal weakness, numbness or tingling     Gen: Awake, alert, dried blood on head    HEENT: dried blood on head,   no scleral icterus  EOMI, ecchymosis right orbital region  Neck: no midline tenderness, supple, in cspine collar   Lungs: Air entry good, clear to auscultation and percussion   CVS: +tachy  ABD: +BS in all 4 quadrants, soft, non tender,  non distended, non palpable liver and spleen and no other masses. no hernias.  Back: no midline tenderness   Extremities: No deformities, +erythema right hip, no calf tenderness  Neuro: Awake, alert, no focal deficits     Found on ground  Concern for traumatic injury- CT head, Cspine, CT chest/adbomen/pelvis, cxr, pelvis xray, RUE XR  Unknown downtime and cause of fall- ekg, cm, labs, trop, ck   UA, UCx  Plan  for admission       Pt in ANNA, +UTI, abx, and fluid ordered for rhabdo, pt also w elevated LFTS, leukocytosis, multi organ abnormalcies   MICU consulted

## 2019-06-17 NOTE — H&P ADULT - ASSESSMENT
89y F PMH of HTN, hypothyroidism, presenting from [facility] after being found down for several hours admitted for sepsis and syncope workup 89y F PMH of HTN, hypothyroidism, presenting from [facility] after being found down for several hours admitted for sepsis 2/2 UTI  and syncope workup

## 2019-06-17 NOTE — H&P ADULT - PROBLEM SELECTOR PLAN 5
c/w Levothyroxine Two episodes of SVT  s/p Labetalol 10mg x1 then Labetalol 5mg x1  - Currently HR 80s-90s  - Continue to monitor Resolved  Two episodes of SVT in ED s/p Labetalol 10mg x1 then Labetalol 5mg x1  - Currently HR 80s-90s  - Continue to monitor

## 2019-06-17 NOTE — ED ADULT NURSE NOTE - OBJECTIVE STATEMENT
89 y/o female PMH HTN presents to ED reporting R hip pain via EMS. EMS reports pt is living at the [facility] and was found this AM on the ground, unknown when pt fell, unwitnessed. Pt reports LOC and denies blood thinner usage. On exam, AOx2, reoriented to date/time. Lung sounds CTA, NAD, O2 sat 94% RA. Abdomen soft, non-tender, non-distended, normoactive bowel sounds in all 4 quadrants. Erythema noted to R hip, small skin tear, dried blood on bilateral hands, skin tear to L hand, hematoma and dried blood to R top of forehead. Equal weakness in all 4 extremities, PERRL 3mm. CM in place ST . Pt denies SOB, n/v/d, fever/chills at this time. Seen and evaluated by MD. Awaiting imaging at this time.

## 2019-06-17 NOTE — H&P ADULT - PROBLEM SELECTOR PLAN 4
- Hold home BP Meds in setting of hypotension and sepsis Likely Type 2 NSTEMI   - high sensitivity troponin 84 --> 80  - EKG without ST elevations  - No chest pain at present, unlikely ACS

## 2019-06-17 NOTE — ED ADULT NURSE NOTE - NSIMPLEMENTINTERV_GEN_ALL_ED
Implemented All Fall with Harm Risk Interventions:  [place] to call system. Call bell, personal items and telephone within reach. Instruct patient to call for assistance. Room bathroom lighting operational. Non-slip footwear when patient is off stretcher. Physically safe environment: no spills, clutter or unnecessary equipment. Stretcher in lowest position, wheels locked, appropriate side rails in place. Provide visual cue, wrist band, yellow gown, etc. Monitor gait and stability. Monitor for mental status changes and reorient to person, place, and time. Review medications for side effects contributing to fall risk. Reinforce activity limits and safety measures with patient and family. Provide visual clues: red socks.

## 2019-06-17 NOTE — H&P ADULT - NSICDXPASTSURGICALHX_GEN_ALL_CORE_FT
PAST SURGICAL HISTORY:  Ectopic pregnancy, tubal     S/P ORIF (open reduction internal fixation) fracture

## 2019-06-17 NOTE — H&P ADULT - NSHPPHYSICALEXAM_GEN_ALL_CORE
Vital Signs Last 24 Hrs  T(C): 37.2 (17 Jun 2019 18:04), Max: 38.2 (17 Jun 2019 13:24)  T(F): 98.9 (17 Jun 2019 18:04), Max: 100.7 (17 Jun 2019 13:24)  HR: 88 (17 Jun 2019 18:04) (88 - 129)  BP: 100/50 (17 Jun 2019 18:04) (100/50 - 226/118)  BP(mean): --  RR: 20 (17 Jun 2019 18:04) (17 - 26)  SpO2: 98% (17 Jun 2019 18:04) (94% - 100%)    GENERAL: NAD, elderly woman,   HEAD:  Right parietal scalp with wound, dried blood, no active bleeding; Right orbit +ecchymosis; Normocephalic  EYES: EOMI, PERRLA, conjunctiva and sclera clear  Mouth: MM dry, no lesions  NECK: Supple, no appreciable masses  Lung: normal work of breathing, cta b/l  Chest: S1&S2+, rrr, no m/r/g appreciated  ABDOMEN: bs+, soft, nt, nd, no appreciable masses  : No aviles catheter, no CVA tenderness  EXTREMITIES:  radial pulse present b/l, PT present b/l, 1+ pitting edema present b/l  Neuro: A&Ox2 (knows month, name, and hospital), no focal deficits; moves all four extremities  SKIN: warm and dry, no visible rashes

## 2019-06-17 NOTE — ED PROVIDER NOTE - OBJECTIVE STATEMENT
AV: 89y F PMH BiBEMS from the [facility] after being found down. Per EMS report, patient was last seen yesterday prior to bedtime. Found this morning on the ground. Patient does not recall falling. Complains of right shoulder pain. Tetanus status unknown.

## 2019-06-17 NOTE — H&P ADULT - PROBLEM SELECTOR PLAN 2
- Meeting sepsis criteria on admission; Tachycardic, fever, urinary source Likely 2/2 to UTI s/p 2L IVF boluses in ED  - Hx of ESBL UTI In May 2018  - RVP negative  - f/u Bcx and Ucx  - Cover with Meropenem for now until urine cx with sensitivities results - Meeting sepsis criteria on admission; Tachycardic, fever, leukocytosis, urinary source Likely 2/2 to UTI s/p 3L IVF boluses in ED  - Lactate 4.8 --> 3.8  - Hx of ESBL UTI In May 2018  - RVP negative  - Cover with Meropenem for now until urine cx with sensitivities results  - f/u Bcx and Ucx  - Trend CBC daily  - Trend lactate - Meeting severe sepsis criteria on admission; Tachycardic, fever, leukocytosis, urinary source, elevated troponins and lactate.  Likely 2/2 to UTI s/p 3L IVF boluses in ED  - Lactate 4.8 --> 3.8  - Hx of ESBL UTI In May 2018  - RVP negative  - Cover with Meropenem for now until urine cx with sensitivities results  - f/u Bcx and Ucx  - Trend CBC daily  - Trend lactate q4 until below 2.0

## 2019-06-17 NOTE — H&P ADULT - PROBLEM SELECTOR PLAN 8
- In setting of being found down for several hours (as much as 8 hours)  - CK >2000   - Trend CK   - c/w IVF - In setting of being found down for several hours (as much as 8 hours)  - CK >2000   - Trend CK daily until downtrending  - c/w IVF 75cc NS for 12 hours

## 2019-06-17 NOTE — ED ADULT NURSE REASSESSMENT NOTE - NS ED NURSE REASSESS COMMENT FT1
Pt admitted to medicine, telemetry diagnosis of rhabdomyolysis and UTI, awaiting bed placement at this time. HR NSR HR 89.

## 2019-06-17 NOTE — ED ADULT NURSE REASSESSMENT NOTE - NS ED NURSE REASSESS COMMENT FT1
Pt hypertensive 200s/100s, tachycardic and tachypneic, placed on 2L O2 for comfort and O2 sat on RA 92%. MD Hwang aware.

## 2019-06-17 NOTE — H&P ADULT - NSICDXPASTMEDICALHX_GEN_ALL_CORE_FT
PAST MEDICAL HISTORY:  Bilateral Cataracts     HTN (hypertension)     Spinal stenosis     Status Post Breast Lumpectomy     Urinary Frequency

## 2019-06-17 NOTE — ED ADULT NURSE REASSESSMENT NOTE - NS ED NURSE REASSESS COMMENT FT1
Pt was sustaining SVT, medicated per MD, now in ST . Axillary temp of 100.7, MD Hwang aware, no intervention at this time, continue to monitor.

## 2019-06-17 NOTE — H&P ADULT - NSHPREVIEWOFSYSTEMS_GEN_ALL_CORE
REVIEW OF SYSTEM:    Constitutional: No fever, chills, fatigue  Neuro: No headache, numbness, weakness  Resp: No cough, wheezing, shortness of breath  CVS: No chest pain, palpitations, leg swelling  GI: No abdominal pain, nausea, vomiting, diarrhea   : No dysuria, frequency, incontinence  Skin: No itching, burning, rashes, or lesions   Msk: No joint pain or swelling  Psych: No depression, anxiety, mood swings

## 2019-06-17 NOTE — CHART NOTE - NSCHARTNOTEFT_GEN_A_CORE
Patient seen and evaluated @   Chief Complaint: found down    HPI/Reason for assessment:    Time of Evaluation 17:00  89y/o F w/ pmhx HTN BIBEMS for being found down. Patient was found to be in sepsis 2/2 to presumed UTI, +U/A. Patient s/p 3L IVF w/ improvement in BPs and lactate.    Medications:   acetaminophen  Suppository .. 650 milliGRAM(s) Rectal once  sodium chloride 0.9% Bolus 1000 milliLiter(s) IV Bolus once      Antibiotics:   s/p ceftriaxone    Allergies:  morphine (Drowsiness; Faint; Hypotension)  sulfa topicals (Unknown)        Review of Systems:  denies pain, CP, SOB    PHYSICAL EXAM:   Appearance: weak, has dried blood on her face   Eyes: [X] PERRL [ ] EOMI  HENT: [X] Normal oral [place] [ ]NC/AT  Cardiovascular: [x] S1 [X] S2 [ ] RRR [ ] No m/r/g [ ]No edema [ ] JVP  Procedural Access Site: [ ] No hematoma [ ] Non-tender to palpation [ ] 2+ pulse [ ] No bruit [ ] No Ecchymosis  Respiratory: [X] Clear to auscultation bilaterally  Gastrointestinal: [X] Soft [ ] Non-tender [ ] Non-distended [ ] BS+  Musculoskeletal: [ ] No clubbing [ ] No joint deformity   Neurologic: [X] Non-focal  Lymphatic: [ ] No lymphadenopathy  Psychiatry: [X] AAOx3 [ ] Mood & affect appropriate  Skin: [ ] No rashes [ ] No ecchymoses [ ] No cyanosis    OBJECTIVE EVIDENCE:  T(C): 36.7 (06-17-19 @ 15:46), Max: 38.2 (06-17-19 @ 13:24)  HR: 90 (06-17-19 @ 17:18) (90 - 129)  BP: 122/68 (06-17-19 @ 17:18) (122/68 - 226/118)  RR: 21 (06-17-19 @ 17:18) (17 - 26)  SpO2: 100% (06-17-19 @ 17:18) (94% - 100%)  Wt(kg): --  Drug Dosing Weight  Height (cm): 165.1 (22 May 2018 08:58)  Weight (kg): 68 (20 May 2018 12:39)  BMI (kg/m2): 24.9 (22 May 2018 08:58)  BSA (m2): 1.75 (22 May 2018 08:58)  Daily     Daily     Labs:                        13.6   26.5  )-----------( 148      ( 17 Jun 2019 08:58 )             40.8     06-17    136  |  104  |  36<H>  ----------------------------<  152<H>  5.2   |  17<L>  |  1.16    Ca    8.5      17 Jun 2019 12:51    TPro  8.4<H>  /  Alb  4.3  /  TBili  1.4<H>  /  DBili  x   /  AST  50<H>  /  ALT  59<H>  /  AlkPhos  152<H>  06-17    Blood Gas Venous - Lactate: 3.8 mmoL/L <H> (06-17 @ 12:49)  Blood Gas Venous - Lactate: 4.8 mmoL/L <HH> (06-17 @ 08:58)    PT/INR - ( 17 Jun 2019 08:58 )   PT: 12.8 sec;   INR: 1.11 ratio         PTT - ( 17 Jun 2019 08:58 )  PTT:28.7 sec        Microbiology:    DOES THIS PERSON MEET CRITERIA FOR SEPSIS? [X]YES    [ ] NO                      If YES continue below    WERE BLOOD CULTURES DRAWN BEFORE ANTIBIOTICS?  [X]YES     [ ] NO       TIME ORDERED:    WERE ANTIBIOTICS ORDRED? [x]YES     [ ]NO                                                         TIME ORDERED:    WAS LACTATE ORDERED STAT [X] YES      [ ]NO                                                      TIME ORDERED:        if lactate >2, please order repeat STAT lactate within 4 hours                     TIME ORDERED:    WAS 30CC/KG OF FLUID ADMINISTERED? []YES    []NO                                               if not, please clearly document clinical rationale here: weight not in system    PLAN:   -Please change abx as patient has hx of ESBL UTI.  -obtain repeat lactate in 4 hours    IF PATIENT MEETS FOLLOWING CRITERIA, PLEASE RE-ASSESS WITHIN 4 HOURS in RE-ASSESSMENT NOTE FOR SEVERE SEPSIS/SEPTIC SHOCK  •	New or increased oxygen requirement  •	Creatinine >2mg/dL  •	Bilirubin>2mg/dL  •	Platelet <100,00/mm3  •	INR >1.5, PTT>60  •	Lactate >2

## 2019-06-17 NOTE — H&P ADULT - PROBLEM SELECTOR PLAN 6
- In setting of beign found down for several hours (as much as 8 hours)  - CK >2000   - Trend CK daily  - c/w IVF - Initially hypertensive on presentation, now hypotensive s/p Labetalol for SVT  -  Hold home BP Meds in setting of hypotension and sepsis  - Med rec needed - Initially hypertensive on presentation, now hypotensive s/p Labetalol for SVT  -  Hold home BP Meds in setting of hypotension and sepsis

## 2019-06-17 NOTE — H&P ADULT - PROBLEM SELECTOR PLAN 3
-Plan as above -Findings consistent with cystitis and pyelonephritis on CT ab/pelvis  - Plan as above

## 2019-06-17 NOTE — CONSULT NOTE ADULT - ATTENDING COMMENTS
Pt is a 89 yo WF with h/o HTN, spinal stenosis and urinary incontinence who presented to the ER from her nursing facility s/p unwitnessed fall. Trauma work up negative but pt found to have ANNA with mild rhabdo and UTI    ID: Agree with Ceftriaxone for UTI  CVS: Cont antiHTN med  FEN/Renal: Gentle hydration abd follow BUN/Cr/CK  Social: Would discuss EOL care/code status with family

## 2019-06-18 DIAGNOSIS — N17.9 ACUTE KIDNEY FAILURE, UNSPECIFIED: ICD-10-CM

## 2019-06-18 LAB
ALBUMIN SERPL ELPH-MCNC: 2.9 G/DL — LOW (ref 3.3–5)
ALP SERPL-CCNC: 122 U/L — HIGH (ref 40–120)
ALT FLD-CCNC: 41 U/L — SIGNIFICANT CHANGE UP (ref 10–45)
ANION GAP SERPL CALC-SCNC: 13 MMOL/L — SIGNIFICANT CHANGE UP (ref 5–17)
AST SERPL-CCNC: 42 U/L — HIGH (ref 10–40)
BASOPHILS # BLD AUTO: 0.12 K/UL — SIGNIFICANT CHANGE UP (ref 0–0.2)
BASOPHILS NFR BLD AUTO: 0.7 % — SIGNIFICANT CHANGE UP (ref 0–2)
BILIRUB SERPL-MCNC: 0.6 MG/DL — SIGNIFICANT CHANGE UP (ref 0.2–1.2)
BUN SERPL-MCNC: 42 MG/DL — HIGH (ref 7–23)
CALCIUM SERPL-MCNC: 8.5 MG/DL — SIGNIFICANT CHANGE UP (ref 8.4–10.5)
CHLORIDE SERPL-SCNC: 105 MMOL/L — SIGNIFICANT CHANGE UP (ref 96–108)
CK SERPL-CCNC: 1241 U/L — HIGH (ref 25–170)
CO2 SERPL-SCNC: 21 MMOL/L — LOW (ref 22–31)
CREAT SERPL-MCNC: 1.66 MG/DL — HIGH (ref 0.5–1.3)
E COLI DNA BLD POS QL NAA+NON-PROBE: SIGNIFICANT CHANGE UP
EOSINOPHIL # BLD AUTO: 0.03 K/UL — SIGNIFICANT CHANGE UP (ref 0–0.5)
EOSINOPHIL NFR BLD AUTO: 0.2 % — SIGNIFICANT CHANGE UP (ref 0–6)
GLUCOSE SERPL-MCNC: 111 MG/DL — HIGH (ref 70–99)
GRAM STN FLD: SIGNIFICANT CHANGE UP
HCT VFR BLD CALC: 35.1 % — SIGNIFICANT CHANGE UP (ref 34.5–45)
HGB BLD-MCNC: 11.4 G/DL — LOW (ref 11.5–15.5)
IMM GRANULOCYTES NFR BLD AUTO: 0.9 % — SIGNIFICANT CHANGE UP (ref 0–1.5)
LACTATE SERPL-SCNC: 1.4 MMOL/L — SIGNIFICANT CHANGE UP (ref 0.7–2)
LYMPHOCYTES # BLD AUTO: 1.17 K/UL — SIGNIFICANT CHANGE UP (ref 1–3.3)
LYMPHOCYTES # BLD AUTO: 6.8 % — LOW (ref 13–44)
MAGNESIUM SERPL-MCNC: 2.1 MG/DL — SIGNIFICANT CHANGE UP (ref 1.6–2.6)
MCHC RBC-ENTMCNC: 31.1 PG — SIGNIFICANT CHANGE UP (ref 27–34)
MCHC RBC-ENTMCNC: 32.5 GM/DL — SIGNIFICANT CHANGE UP (ref 32–36)
MCV RBC AUTO: 95.6 FL — SIGNIFICANT CHANGE UP (ref 80–100)
METHOD TYPE: SIGNIFICANT CHANGE UP
MONOCYTES # BLD AUTO: 1.72 K/UL — HIGH (ref 0–0.9)
MONOCYTES NFR BLD AUTO: 10 % — SIGNIFICANT CHANGE UP (ref 2–14)
NEUTROPHILS # BLD AUTO: 14.07 K/UL — HIGH (ref 1.8–7.4)
NEUTROPHILS NFR BLD AUTO: 81.4 % — HIGH (ref 43–77)
PHOSPHATE SERPL-MCNC: 3.6 MG/DL — SIGNIFICANT CHANGE UP (ref 2.5–4.5)
PLATELET # BLD AUTO: 85 K/UL — LOW (ref 150–400)
POTASSIUM SERPL-MCNC: 4.4 MMOL/L — SIGNIFICANT CHANGE UP (ref 3.5–5.3)
POTASSIUM SERPL-SCNC: 4.4 MMOL/L — SIGNIFICANT CHANGE UP (ref 3.5–5.3)
PROT SERPL-MCNC: 6.7 G/DL — SIGNIFICANT CHANGE UP (ref 6–8.3)
RBC # BLD: 3.67 M/UL — LOW (ref 3.8–5.2)
RBC # FLD: 14.7 % — HIGH (ref 10.3–14.5)
SODIUM SERPL-SCNC: 139 MMOL/L — SIGNIFICANT CHANGE UP (ref 135–145)
SPECIMEN SOURCE: SIGNIFICANT CHANGE UP
WBC # BLD: 17.26 K/UL — HIGH (ref 3.8–10.5)
WBC # FLD AUTO: 17.26 K/UL — HIGH (ref 3.8–10.5)

## 2019-06-18 PROCEDURE — 99233 SBSQ HOSP IP/OBS HIGH 50: CPT | Mod: GC

## 2019-06-18 RX ORDER — ATENOLOL 25 MG/1
25 TABLET ORAL DAILY
Refills: 0 | Status: DISCONTINUED | OUTPATIENT
Start: 2019-06-18 | End: 2019-06-19

## 2019-06-18 RX ORDER — ACETAMINOPHEN 500 MG
650 TABLET ORAL EVERY 6 HOURS
Refills: 0 | Status: DISCONTINUED | OUTPATIENT
Start: 2019-06-18 | End: 2019-06-22

## 2019-06-18 RX ORDER — ACETAMINOPHEN 500 MG
1000 TABLET ORAL ONCE
Refills: 0 | Status: COMPLETED | OUTPATIENT
Start: 2019-06-18 | End: 2019-06-18

## 2019-06-18 RX ADMIN — Medication 1000 MILLIGRAM(S): at 13:40

## 2019-06-18 RX ADMIN — HEPARIN SODIUM 5000 UNIT(S): 5000 INJECTION INTRAVENOUS; SUBCUTANEOUS at 21:34

## 2019-06-18 RX ADMIN — Medication 112 MICROGRAM(S): at 06:19

## 2019-06-18 RX ADMIN — Medication 400 MILLIGRAM(S): at 12:41

## 2019-06-18 RX ADMIN — MEROPENEM 100 MILLIGRAM(S): 1 INJECTION INTRAVENOUS at 17:52

## 2019-06-18 RX ADMIN — HEPARIN SODIUM 5000 UNIT(S): 5000 INJECTION INTRAVENOUS; SUBCUTANEOUS at 14:10

## 2019-06-18 RX ADMIN — HEPARIN SODIUM 5000 UNIT(S): 5000 INJECTION INTRAVENOUS; SUBCUTANEOUS at 06:19

## 2019-06-18 RX ADMIN — ATENOLOL 25 MILLIGRAM(S): 25 TABLET ORAL at 14:08

## 2019-06-18 RX ADMIN — MEROPENEM 100 MILLIGRAM(S): 1 INJECTION INTRAVENOUS at 06:18

## 2019-06-18 NOTE — PROVIDER CONTACT NOTE (MEDICATION) - REASON
Patient unable to take Atenolol and Tylenol crushed PO Double O-Z Plasty Text: The defect edges were debeveled with a #15 scalpel blade.  Given the location of the defect, shape of the defect and the proximity to free margins a Double O-Z plasty (double transposition flap) was deemed most appropriate.  Using a sterile surgical marker, the appropriate transposition flaps were drawn incorporating the defect and placing the expected incisions within the relaxed skin tension lines where possible. The area thus outlined was incised deep to adipose tissue with a #15 scalpel blade.  The skin margins were undermined to an appropriate distance in all directions utilizing iris scissors.  Hemostasis was achieved with electrocautery.  The flaps were then transposed into place, one clockwise and the other counterclockwise, and anchored with interrupted buried subcutaneous sutures.

## 2019-06-18 NOTE — PROVIDER CONTACT NOTE (OTHER) - ASSESSMENT
V/S: temp 102.5 oral  /84 RR 18 and SPO2 98% on RA Patient is A&O x 2 lethargic. V/S: temp 102.5 oral  /84 RR 18 and SPO2 98% on RA

## 2019-06-18 NOTE — PROGRESS NOTE ADULT - ASSESSMENT
89y F PMH of HTN, hypothyroidism, presenting from [facility] after being found down for several hours admitted for sepsis 2/2 UTI with gram negative rods in urine cx and gram negative kj bacteremia, on Meropenem (day 2)

## 2019-06-18 NOTE — PROGRESS NOTE ADULT - PROBLEM SELECTOR PLAN 7
- Initially hypertensive on presentation, then hypotensive s/p Labetalol for SVT  -  Hold home BP Meds in setting of hypotension and sepsis  - BP trending up

## 2019-06-18 NOTE — PROGRESS NOTE ADULT - PROBLEM SELECTOR PLAN 9
- In setting of being found down for several hours (as much as 8 hours)  - CK >2000 on presentation, now trending down  - Trend CK daily one additional time  - c/w IVF 75cc NS for 12 hours

## 2019-06-18 NOTE — PROVIDER CONTACT NOTE (CRITICAL VALUE NOTIFICATION) - TEST AND RESULT REPORTED:
Blood culture collected on 6/17/19 850 AM showed positive growth in Aerobic bottle gram negative rods

## 2019-06-18 NOTE — PROVIDER CONTACT NOTE (CRITICAL VALUE NOTIFICATION) - ASSESSMENT
Patient in no distress. VSS. Afebrile. On IV ABT and IVF as ordered.
Patient in no distress. VSS. Afebrile. IV ABT Meropenem in progress

## 2019-06-18 NOTE — PROGRESS NOTE ADULT - PROBLEM SELECTOR PLAN 4
May be secondary to hypoperfusion in setting of sepsis vs. obstructive; Scr 1.6 this AM, up from 1.16  - obtain bladder scan  - urine lytes  - c/w IVF for now May be secondary to hypoperfusion in setting of sepsis vs. obstructive; Scr 1.6 this AM, up from 1.16  - obtain bladder scan  - urine lytes  - stop IVF for now

## 2019-06-18 NOTE — PROGRESS NOTE ADULT - PROBLEM SELECTOR PLAN 2
- No longer meeting severe sepsis criteria; Resolved tachycardia, afebrile, improved leukocytosis; lactate WNL.  Likely 2/2 to UT and bacteremia s/p 3L IVF boluses and NS at 75cc/hr   - Lactate 4.8 --> 3.8 --> 1.4  - Hx of ESBL UTI In May 2018  - RVP negative  - C/w Meropenem for now until urine cx with sensitivities results  - Bcx 6/17 - gram neg rods  - Ucx 6/17 - gram neg rods  - Trend CBC daily - No longer meeting severe sepsis criteria; Resolved tachycardia, afebrile, improved leukocytosis; lactate WNL.  Likely 2/2 to UTI and bacteremia s/p 3L IVF boluses and NS at 75cc/hr   - Lactate 4.8 --> 3.8 --> 1.4  - Hx of ESBL UTI In May 2018  - RVP negative  - C/w Meropenem for now until urine cx with sensitivities results  - Bcx 6/17 - gram neg rods  - Ucx 6/17 - gram neg rods  - Trend CBC daily  - Stop IVF  - f/u bcx and ucx sensitivities

## 2019-06-18 NOTE — PROGRESS NOTE ADULT - PROBLEM SELECTOR PLAN 6
Episode of SVT  lasting approx 1.4 seconds overnight  - Currently holding home atenolol in setting of sepsis  - Currently HR 80s-90s  - Continue to monitor Episode of SVT  lasting approx 1.4 seconds overnight  - restart atenolol 25mg QD  - Currently HR 80s-90s  - Continue to monitor  - cards following

## 2019-06-18 NOTE — PROGRESS NOTE ADULT - PROBLEM SELECTOR PLAN 5
Likely Type 2 NSTEMI   - high sensitivity troponin 84 --> 80  - EKG without ST elevations  - No chest pain at present, unlikely ACS  - continue to monitor

## 2019-06-18 NOTE — CONSULT NOTE ADULT - SUBJECTIVE AND OBJECTIVE BOX
Patient is a 89y old  Female who presents with a chief complaint of I fell (2019 07:43)      HPI:  Pt is a 89yo female with PMH HTN, Hypothyroidism, hx of ESBL UTI (May 2018) presenting from [facility] after being found down for several hours (as long as 8 hours). Patient does not recall falling or events leading up to the fall. Patient denies LOC, dizziness. Patient denies headache, n/v/d. She denies URI symptoms. Patient denies dysuria.     In ED vitals T 100.7 /88, , O2 98% on 4L NC. Patient received 3L IVF boluses in ED, CTX x1. Patient had CT Head negative for acute hemorrhage, mass effect or displaced calvarial fracture. Of note, patient with two episodes of SVT lasting approx 25 seconds. Patient given Labetalol 10mg x1, continued to have additional episode so was given additional 5mg x1.  BP currently 165/  PAST MEDICAL & SURGICAL HISTORY:  Spinal stenosis  HTN (hypertension)  Bilateral Cataracts  Status Post Breast Lumpectomy  Urinary Frequency  S/P ORIF (open reduction internal fixation) fracture  Ectopic pregnancy, tubal      Allergies    sulfa topicals (Unknown)    Intolerances    morphine (Drowsiness; Faint; Hypotension)    Home Medications:   * Patient Currently Takes Medications as of 2018 07:33 documented in Structured Notes  · 	Physical therapy :   · 	levothyroxine 88 mcg (0.088 mg) oral tablet: 1 tab(s) orally once a day  · 	cholecalciferol oral tablet: 2000 unit(s) orally once a day  · 	traMADol 50 mg oral tablet: 1 tab(s) orally every 4 hours, As Needed -Severe Pain  · 	nystatin 100,000 units/g topical cream: 1 application topically 2 times a day right groin  · 	polyethylene glycol 3350 oral powder for reconstitution: 17 gram(s) orally once a day, As Needed  · 	acetaminophen 325 mg oral tablet: 1 tab(s) orally every 6 hours, As Needed -Mild Pain  · 	atenolol 25 mg oral tablet: 1 tab(s) orally once a day  · 	calcium carbonate 1250 mg (500 mg elemental calcium) oral tablet: 1 tab(s) orally once a day  · 	DULoxetine: 20 milligram(s) orally once a day  · 	Colace 100 mg oral capsule: 3 cap(s) orally once a day (at bedtime)  · 	senna 8.6 mg oral tablet: 2 tab(s) orally once a day (at bedtime)  · 	ferrous sulfate 325 mg (65 mg elemental iron) oral tablet: 1 tab(s) orally 3 times a day (with meals)  · 	Toviaz 4 mg oral tablet, extended release: 1 tab(s) orally once a day  · 	amLODIPine 5 mg oral tablet: orally 2 times a day    .    MEDICATIONS  (STANDING):  heparin  Injectable 5000 Unit(s) SubCutaneous every 8 hours  levothyroxine 112 MICROGram(s) Oral daily  meropenem  IVPB      meropenem  IVPB 500 milliGRAM(s) IV Intermittent every 12 hours  sodium chloride 0.9%. 1000 milliLiter(s) (75 mL/Hr) IV Continuous <Continuous>    MEDICATIONS  (PRN):      Social History:  Social History (marital status, living situation, occupation, tobacco use, alcohol and drug use, and sexual history): No alcohol, no cigarettes, no drug use	    Tobacco Screening:  · Core Measure Site	No	  · Has the patient used tobacco in the past 30 days?	No	    FAMILY HISTORY:  Family history of acute myocardial infarction    Review of Systems:  Review of Systems: REVIEW OF SYSTEM:   Constitutional: No fever, chills, fatigue  Neuro: No headache, numbness, weakness  Resp: No cough, wheezing, shortness of breath  CVS: No chest pain, palpitations, leg swelling  GI: No abdominal pain, nausea, vomiting, diarrhea   : No dysuria, frequency, incontinence  Skin: No itching, burning, rashes, or lesions   Msk: No joint pain or swelling Psych: No depression, anxiety, mood swings	          Vital Signs Last 24 Hrs  T(C): 36.7 (2019 08:53), Max: 38.2 (2019 13:24)  T(F): 98 (2019 08:53), Max: 100.7 (2019 13:24)  HR: 102 (2019 08:53) (88 - 129)  BP: 165/83 (2019 08:53) (100/50 - 226/118)  BP(mean): --  RR: 18 (2019 08:53) (17 - 26)  SpO2: 99% (2019 08:53) (94% - 100%)  Daily Height in cm: 165.1 (2019 08:53)    Daily   I&O's Detail    2019 07:01  -  2019 07:00  --------------------------------------------------------  IN:  Total IN: 0 mL    OUT:    Voided: 200 mL  Total OUT: 200 mL    Total NET: -200 mL          Physical Exam  Head R periorbital hematoma  Neck without JVD  Lungs clear  cor s1s2 without m,r,g  Abd soft  ext 1+ edema warm    LABS  PT/INR - ( 2019 08:58 )   PT: 12.8 sec;   INR: 1.11 ratio         PTT - ( 2019 08:58 )  PTT:28.7 sec  Urinalysis Basic - ( 2019 08:58 )    Color: Light Orange / Appearance: Turbid / S.015 / pH: x  Gluc: x / Ketone: Small  / Bili: Negative / Urobili: Negative   Blood: x / Protein: 100 mg/dL / Nitrite: Positive   Leuk Esterase: Large / RBC: 16 /hpf / WBC 1489 /HPF   Sq Epi: x / Non Sq Epi: 1 / Bacteria: Many      CARDIAC MARKERS ( 2019 06:04 )  x     / x     / 1241 U/L / x     / x      CARDIAC MARKERS ( 2019 12:51 )  x     / x     / 2084 U/L / x     / x      CARDIAC MARKERS ( 2019 08:58 )  x     / x     / 1555 U/L / x     / x          CBC Full  -  ( 2019 08:58 )  WBC Count : 26.5 K/uL  RBC Count : 4.26 M/uL  Hemoglobin : 13.6 g/dL  Hematocrit : 40.8 %  Platelet Count - Automated : 148 K/uL  Mean Cell Volume : 95.7 fl  Mean Cell Hemoglobin : 31.9 pg  Mean Cell Hemoglobin Concentration : 33.3 gm/dL  Auto Neutrophil # : 23.4 K/uL  Auto Lymphocyte # : 1.2 K/uL  Auto Monocyte # : 2.0 K/uL  Auto Eosinophil # : 0.0 K/uL  Auto Basophil # : 0.0 K/uL  Auto Neutrophil % : 71.0 %  Auto Lymphocyte % : 5.0 %  Auto Monocyte % : 9.0 %  Auto Eosinophil % : x  Auto Basophil % : x    06-18    139  |  105  |  42<H>  ----------------------------<  111<H>  4.4   |  21<L>  |  1.66<H>    Ca    8.5      2019 06:04  Phos  3.6       Mg     2.1         TPro  6.7  /  Alb  2.9<L>  /  TBili  0.6  /  DBili  x   /  AST  42<H>  /  ALT  41  /  AlkPhos  122<H>        ECG:  < from: 12 Lead ECG (19 @ 12:42) >  Ventricular Rate 119 BPM    Atrial Rate 119 BPM    P-R Interval 148 ms    QRS Duration 108 ms    Q-T Interval 350 ms    QTC Calculation(Bezet) 492 ms    P Axis 107 degrees    R Axis -38 degrees    T Axis 73 degrees    Diagnosis Line SINUS TACHYCARDIA  LEFT AXIS DEVIATION  LEFT VENTRICULAR HYPERTROPHY WITH REPOLARIZATION ABNORMALITY          Assessment and Plan  Pt is a 89yo female with PMH HTN, Hypothyroidism, hx of ESBL UTI (May 2018) presenting from [facility] after being found down for several hours (as long as 8 hours). Patient does not recall falling or events leading up to the fall. Patient denies LOC, dizziness cp or sob.  Unclear if syncope or mechanical fall. SVT in ED rx'd with labetelol leading to hypotension.  All antihypertensives held.  Troponins c/w 	Type 2 infarct (supply demand mismatch)  As patient now hypertensive recommend restarting atenolol 25mg daily  TTE

## 2019-06-18 NOTE — PROVIDER CONTACT NOTE (MEDICATION) - BACKGROUND
Patient is A&O x 2; lethargic. States she is having difficulty swallowing Patient admitted on 6/17/19 for rhabdomyolysis, sepsis and UTI.

## 2019-06-18 NOTE — PROGRESS NOTE ADULT - PROBLEM SELECTOR PLAN 1
-Likely in setting of sepsis 2/2 UTI and bacteremia  - EKG without evidence of ST elevations; Troponins elevated, however peaked; likely 2/2 Type 2 NSTEMI in setting of recent fall and demand  - CTH negative for acute hemorrhage, mass effect or displaced calvarial fracture. Right frontal extracalvarial soft tissue swelling and hematoma.  Maxillofacial bone CT: No acute fracture. Bilateral maxillary sinus air-fluid levels.  - Cervical spine CT: No acute fracture traumatic subluxation. Multilevel degenerative changes.

## 2019-06-19 ENCOUNTER — TRANSCRIPTION ENCOUNTER (OUTPATIENT)
Age: 84
End: 2019-06-19

## 2019-06-19 LAB
-  AMIKACIN: SIGNIFICANT CHANGE UP
-  AMIKACIN: SIGNIFICANT CHANGE UP
-  AMPICILLIN/SULBACTAM: SIGNIFICANT CHANGE UP
-  AMPICILLIN/SULBACTAM: SIGNIFICANT CHANGE UP
-  AMPICILLIN: SIGNIFICANT CHANGE UP
-  AMPICILLIN: SIGNIFICANT CHANGE UP
-  AZTREONAM: SIGNIFICANT CHANGE UP
-  AZTREONAM: SIGNIFICANT CHANGE UP
-  CEFAZOLIN: SIGNIFICANT CHANGE UP
-  CEFEPIME: SIGNIFICANT CHANGE UP
-  CEFEPIME: SIGNIFICANT CHANGE UP
-  CEFOXITIN: SIGNIFICANT CHANGE UP
-  CEFOXITIN: SIGNIFICANT CHANGE UP
-  CEFTRIAXONE: SIGNIFICANT CHANGE UP
-  CEFTRIAXONE: SIGNIFICANT CHANGE UP
-  CIPROFLOXACIN: SIGNIFICANT CHANGE UP
-  CIPROFLOXACIN: SIGNIFICANT CHANGE UP
-  ERTAPENEM: SIGNIFICANT CHANGE UP
-  ERTAPENEM: SIGNIFICANT CHANGE UP
-  GENTAMICIN: SIGNIFICANT CHANGE UP
-  GENTAMICIN: SIGNIFICANT CHANGE UP
-  IMIPENEM: SIGNIFICANT CHANGE UP
-  IMIPENEM: SIGNIFICANT CHANGE UP
-  LEVOFLOXACIN: SIGNIFICANT CHANGE UP
-  LEVOFLOXACIN: SIGNIFICANT CHANGE UP
-  MEROPENEM: SIGNIFICANT CHANGE UP
-  MEROPENEM: SIGNIFICANT CHANGE UP
-  NITROFURANTOIN: SIGNIFICANT CHANGE UP
-  PIPERACILLIN/TAZOBACTAM: SIGNIFICANT CHANGE UP
-  PIPERACILLIN/TAZOBACTAM: SIGNIFICANT CHANGE UP
-  TIGECYCLINE: SIGNIFICANT CHANGE UP
-  TOBRAMYCIN: SIGNIFICANT CHANGE UP
-  TOBRAMYCIN: SIGNIFICANT CHANGE UP
-  TRIMETHOPRIM/SULFAMETHOXAZOLE: SIGNIFICANT CHANGE UP
-  TRIMETHOPRIM/SULFAMETHOXAZOLE: SIGNIFICANT CHANGE UP
ALBUMIN SERPL ELPH-MCNC: 2.8 G/DL — LOW (ref 3.3–5)
ALP SERPL-CCNC: 127 U/L — HIGH (ref 40–120)
ALT FLD-CCNC: 34 U/L — SIGNIFICANT CHANGE UP (ref 10–45)
ANION GAP SERPL CALC-SCNC: 15 MMOL/L — SIGNIFICANT CHANGE UP (ref 5–17)
AST SERPL-CCNC: 24 U/L — SIGNIFICANT CHANGE UP (ref 10–40)
BILIRUB SERPL-MCNC: 0.4 MG/DL — SIGNIFICANT CHANGE UP (ref 0.2–1.2)
BUN SERPL-MCNC: 50 MG/DL — HIGH (ref 7–23)
CALCIUM SERPL-MCNC: 8.6 MG/DL — SIGNIFICANT CHANGE UP (ref 8.4–10.5)
CHLORIDE SERPL-SCNC: 106 MMOL/L — SIGNIFICANT CHANGE UP (ref 96–108)
CO2 SERPL-SCNC: 20 MMOL/L — LOW (ref 22–31)
CREAT SERPL-MCNC: 1.62 MG/DL — HIGH (ref 0.5–1.3)
CULTURE RESULTS: SIGNIFICANT CHANGE UP
GLUCOSE SERPL-MCNC: 114 MG/DL — HIGH (ref 70–99)
HCT VFR BLD CALC: 31.1 % — LOW (ref 34.5–45)
HGB BLD-MCNC: 10.1 G/DL — LOW (ref 11.5–15.5)
MAGNESIUM SERPL-MCNC: 2.3 MG/DL — SIGNIFICANT CHANGE UP (ref 1.6–2.6)
MCHC RBC-ENTMCNC: 30.6 PG — SIGNIFICANT CHANGE UP (ref 27–34)
MCHC RBC-ENTMCNC: 32.5 GM/DL — SIGNIFICANT CHANGE UP (ref 32–36)
MCV RBC AUTO: 94.2 FL — SIGNIFICANT CHANGE UP (ref 80–100)
METHOD TYPE: SIGNIFICANT CHANGE UP
METHOD TYPE: SIGNIFICANT CHANGE UP
ORGANISM # SPEC MICROSCOPIC CNT: SIGNIFICANT CHANGE UP
PHOSPHATE SERPL-MCNC: 3.2 MG/DL — SIGNIFICANT CHANGE UP (ref 2.5–4.5)
PLATELET # BLD AUTO: 89 K/UL — LOW (ref 150–400)
POTASSIUM SERPL-MCNC: 3.6 MMOL/L — SIGNIFICANT CHANGE UP (ref 3.5–5.3)
POTASSIUM SERPL-SCNC: 3.6 MMOL/L — SIGNIFICANT CHANGE UP (ref 3.5–5.3)
PROT SERPL-MCNC: 6.5 G/DL — SIGNIFICANT CHANGE UP (ref 6–8.3)
RBC # BLD: 3.3 M/UL — LOW (ref 3.8–5.2)
RBC # FLD: 15 % — HIGH (ref 10.3–14.5)
SODIUM SERPL-SCNC: 141 MMOL/L — SIGNIFICANT CHANGE UP (ref 135–145)
SPECIMEN SOURCE: SIGNIFICANT CHANGE UP
WBC # BLD: 13.24 K/UL — HIGH (ref 3.8–10.5)
WBC # FLD AUTO: 13.24 K/UL — HIGH (ref 3.8–10.5)

## 2019-06-19 PROCEDURE — 99233 SBSQ HOSP IP/OBS HIGH 50: CPT | Mod: GC

## 2019-06-19 PROCEDURE — 99222 1ST HOSP IP/OBS MODERATE 55: CPT

## 2019-06-19 RX ORDER — CEFTRIAXONE 500 MG/1
1000 INJECTION, POWDER, FOR SOLUTION INTRAMUSCULAR; INTRAVENOUS ONCE
Refills: 0 | Status: COMPLETED | OUTPATIENT
Start: 2019-06-19 | End: 2019-06-19

## 2019-06-19 RX ORDER — ATENOLOL 25 MG/1
25 TABLET ORAL ONCE
Refills: 0 | Status: COMPLETED | OUTPATIENT
Start: 2019-06-19 | End: 2019-06-19

## 2019-06-19 RX ORDER — CEFTRIAXONE 500 MG/1
INJECTION, POWDER, FOR SOLUTION INTRAMUSCULAR; INTRAVENOUS
Refills: 0 | Status: COMPLETED | OUTPATIENT
Start: 2019-06-19 | End: 2019-06-21

## 2019-06-19 RX ORDER — ATENOLOL 25 MG/1
50 TABLET ORAL DAILY
Refills: 0 | Status: DISCONTINUED | OUTPATIENT
Start: 2019-06-20 | End: 2019-06-22

## 2019-06-19 RX ORDER — LABETALOL HCL 100 MG
10 TABLET ORAL ONCE
Refills: 0 | Status: COMPLETED | OUTPATIENT
Start: 2019-06-19 | End: 2019-06-19

## 2019-06-19 RX ORDER — CEFTRIAXONE 500 MG/1
1000 INJECTION, POWDER, FOR SOLUTION INTRAMUSCULAR; INTRAVENOUS EVERY 24 HOURS
Refills: 0 | Status: COMPLETED | OUTPATIENT
Start: 2019-06-20 | End: 2019-06-21

## 2019-06-19 RX ADMIN — HEPARIN SODIUM 5000 UNIT(S): 5000 INJECTION INTRAVENOUS; SUBCUTANEOUS at 05:46

## 2019-06-19 RX ADMIN — MEROPENEM 100 MILLIGRAM(S): 1 INJECTION INTRAVENOUS at 05:46

## 2019-06-19 RX ADMIN — HEPARIN SODIUM 5000 UNIT(S): 5000 INJECTION INTRAVENOUS; SUBCUTANEOUS at 21:51

## 2019-06-19 RX ADMIN — Medication 112 MICROGRAM(S): at 05:46

## 2019-06-19 RX ADMIN — ATENOLOL 25 MILLIGRAM(S): 25 TABLET ORAL at 05:46

## 2019-06-19 RX ADMIN — CEFTRIAXONE 100 MILLIGRAM(S): 500 INJECTION, POWDER, FOR SOLUTION INTRAMUSCULAR; INTRAVENOUS at 14:57

## 2019-06-19 RX ADMIN — HEPARIN SODIUM 5000 UNIT(S): 5000 INJECTION INTRAVENOUS; SUBCUTANEOUS at 15:01

## 2019-06-19 RX ADMIN — Medication 10 MILLIGRAM(S): at 03:03

## 2019-06-19 RX ADMIN — ATENOLOL 25 MILLIGRAM(S): 25 TABLET ORAL at 15:01

## 2019-06-19 NOTE — PROGRESS NOTE ADULT - PROBLEM SELECTOR PLAN 7
- Initially hypertensive on presentation, then hypotensive s/p Labetalol 10mg overnight for SBP >180  -  resumed atenolol 25mg qd  - BP trending up

## 2019-06-19 NOTE — PHYSICAL THERAPY INITIAL EVALUATION ADULT - GENERAL OBSERVATIONS, REHAB EVAL
Pt cirilo 45 min eval fair. Pt tired but agreed to session. Pt rec'd in bed, on room air, tele monitoring, primafit, NAD. Spoke with MD Gonzalez regarding multiple compression fx, MD to speak with attending, WBAT at this time, activity orders updated.

## 2019-06-19 NOTE — PROGRESS NOTE ADULT - PROBLEM SELECTOR PLAN 6
Episode of SVT  24 hours ago lasting approx 1.4 seconds overnight  PATs HR 140s overnight lasting 4.1 seconds  - c/w atenolol 25mg QD  - Currently HR 80s-90s  - Continue to monitor on tele  - cards following

## 2019-06-19 NOTE — PROVIDER CONTACT NOTE (OTHER) - REASON
Burst of PSVT  lasted 1.4 seconds
Patient had PAT for 3.2 seconds
Patient had PSVT 200 for 24 seconds.
Pt blood pressure was 184/85
Pt had PAT up to the 140s for 4.1 secs
Patient has temp 102.5 (oral); /84; PSVT 190 for 44 seconds; MEWS 7

## 2019-06-19 NOTE — PROGRESS NOTE ADULT - PROBLEM SELECTOR PLAN 9
- In setting of being found down for several hours (as much as 8 hours)  - CK >2000 on presentation, now trending down  - Trend CK daily one additional time

## 2019-06-19 NOTE — PROVIDER CONTACT NOTE (OTHER) - RECOMMENDATIONS
MD contacted and aware.
PT notified provider and will continue to monitor
MD contacted and aware.
Provider notified and will continue to monitor

## 2019-06-19 NOTE — PHYSICAL THERAPY INITIAL EVALUATION ADULT - MANUAL MUSCLE TESTING RESULTS, REHAB EVAL
grossly assessed due to/R shoulder 3-/5, R knee flexion 3-/5, b/l hips 3-/5, rest of BUE and BLE grossly 3/5

## 2019-06-19 NOTE — PHYSICAL THERAPY INITIAL EVALUATION ADULT - ADDITIONAL COMMENTS
As per care coordinator note and confirmed with pt, pt lives in [facility] with elevator access. Pt was ambulatory with a rollator and was independent with ADLs and functional mobility prior to admission.

## 2019-06-19 NOTE — PHYSICAL THERAPY INITIAL EVALUATION ADULT - PERTINENT HX OF CURRENT PROBLEM, REHAB EVAL
89 y/o F presenting from [facility] s/p fall after being found down. Pt a/w sepsis 2/2 UTI and bacteremia. Xray R shoulder: no fx. CT Head: R frontal extracalvarial soft tissue swelling and hematoma. CT A/P: Findings c/w cystitis and pyelonephritis. 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 Dextroconvex curvature of the lumbar spine. Mild compression fractures of T5, T6 and T8 and moderate compression fractures of T7 and L2 with T7 worsened and L2 new since 12/5/2009.

## 2019-06-19 NOTE — DISCHARGE NOTE PROVIDER - NSDCCPCAREPLAN_GEN_ALL_CORE_FT
PRINCIPAL DISCHARGE DIAGNOSIS  Diagnosis: E coli bacteremia  Assessment and Plan of Treatment: You were treated in the hospital for an infection in your blood. You were treated with IV antibiotics and then changed to oral antibiotics which you will continue to take. Please follow up with your primary care doctor in 1-2 weeks.      SECONDARY DISCHARGE DIAGNOSES  Diagnosis: UTI (urinary tract infection)  Assessment and Plan of Treatment: You were treated in the hospital for an infection in your urine. You were treated with IV antibiotics and then changed to oral antibiotics which you will continue to take. Please follow up with your primary care doctor in 1-2 weeks.    Diagnosis: SVT (supraventricular tachycardia)  Assessment and Plan of Treatment: During your hospitalization you had an abnormally fast heart rate. It was decided to increase you Atenolol medication in order to help with blood pressure and your fast heart rate. Please follow up with your primay care doctor in 1-2 weeks. PRINCIPAL DISCHARGE DIAGNOSIS  Diagnosis: E coli bacteremia  Assessment and Plan of Treatment: You were treated in the hospital for an infection in your blood. You were treated with IV antibiotics and then changed to oral antibiotics which you will continue to take until July 2, 2019. Please follow up with your primary care doctor in 1-2 weeks.      SECONDARY DISCHARGE DIAGNOSES  Diagnosis: UTI (urinary tract infection)  Assessment and Plan of Treatment: You were treated in the hospital for an infection in your urine. You were treated with IV antibiotics and then changed to oral antibiotics which you will continue to take until July 2, 2019. Please follow up with your primary care doctor in 1-2 weeks.    Diagnosis: SVT (supraventricular tachycardia)  Assessment and Plan of Treatment: During your hospitalization you had an abnormally fast heart rate. It was decided to increase your Atenolol medication in order to help with blood pressure and your fast heart rate. Please continue to take your new dose of Atenolol as prescribed. Please follow up with your prim care doctor in 1-2 weeks. PRINCIPAL DISCHARGE DIAGNOSIS  Diagnosis: E coli bacteremia  Assessment and Plan of Treatment: You were treated in the hospital for an infection in your blood. You were treated with IV antibiotics and your symptoms improved. You were switched oral antibiotics (Ciprofloxacin) prior to discharge which you will continue to take until July 2, 2019. Please take this antibiotic as prescribed. Please follow up with your primary care doctor in 1-2 weeks.      SECONDARY DISCHARGE DIAGNOSES  Diagnosis: Sepsis  Assessment and Plan of Treatment: You were found to have sepsis when you presented to the hospital which is a potentially life-threatening condition causes by the body's response to an infection. This infection was likely due to pyelonephritis which is a type of urinary tract infection that travels to your kidney and this resulted in bacterial infection (E. coli) in your blood. You were given IV fluids and started on IV antibiotics and your sepsis resolved. You were switched oral antibiotics prior to discharge which you will continue to take until July 2, 2019. Please follow up with your primary care doctor in 1-2 weeks.    Diagnosis: Pyelonephritis  Assessment and Plan of Treatment: During your hospitalization, you were found to have pyelonephritis which is a type of urinary tract infection that travels to your kidney. You were treated with IV antibiotics and your symptoms improved. You were switched oral antibiotics prior to discharge which you will continue to take until July 2, 2019. Please follow up with your primary care doctor in 1-2 weeks.    Diagnosis: SVT (supraventricular tachycardia)  Assessment and Plan of Treatment: During your hospitalization you had an abnormally fast heart rate. It was decided to increase your Atenolol medication in order to help with blood pressure and your fast heart rate. Please continue to take your new dose of Atenolol as prescribed. Please follow up with your primay care doctor in 1-2 weeks.    Diagnosis: UTI (urinary tract infection)  Assessment and Plan of Treatment: You were treated in the hospital for an infection in your urine. You were treated with IV antibiotics and then changed to oral antibiotics which you will continue to take until July 2, 2019. Please follow up with your primary care doctor in 1-2 weeks.

## 2019-06-19 NOTE — PROGRESS NOTE ADULT - ASSESSMENT
89y F PMH of HTN, hypothyroidism, presenting from [facility] after being found down for several hours admitted for sepsis 2/2 UTI with E. Coli in urine cx and blood, on Meropenem (day 3)

## 2019-06-19 NOTE — PROVIDER CONTACT NOTE (OTHER) - BACKGROUND
PT admitted with Rhabdomyolysis   Hx - of HTN, hypothyroidism and had fell for several hours before being found
Patient admitted on 6/17/19 for rhabdomyolysis, sepsis and UTI.
Pt admitted from fall at home   DX: Rhabdomyosis   Gram Negative Rods found preliminary
Patient admitted on 6/7/18 for rhabdomyolysis, sepsis. UTI. S/p fall at [facility].

## 2019-06-19 NOTE — PROVIDER CONTACT NOTE (OTHER) - NAME OF MD/NP/PA/DO NOTIFIED:
Dr. Rodriguez (team 1)
Grace Monroy MD
Grace Monroy, Provider
Team 1 Grace Monroy
Team 1 Grace Monroy
Dr. Rodriguez

## 2019-06-19 NOTE — PROGRESS NOTE ADULT - PROBLEM SELECTOR PLAN 4
May be secondary to hypoperfusion in setting of sepsis vs. obstructive; Scr stable at 1.6 up from 1.16 two days prior  - urine lytes

## 2019-06-19 NOTE — DISCHARGE NOTE PROVIDER - HOSPITAL COURSE
Pt is a 89yo female with PMH HTN, Hypothyroidism, hx of ESBL UTI (May 2018) presenting from [facility] after being found down for several hours (as long as 8 hours). Patient does not recall falling or events leading up to the fall. Patient denies LOC, dizziness. Patient denies headache, n/v/d. She denies URI symptoms. Patient denies dysuria.         In ED vitals T 100.7 /88, , O2 98% on 4L NC. Patient received 3L IVF boluses in ED, CTX x1. Patient had CT Head negative for acute hemorrhage, mass effect or displaced calvarial fracture. Of note, patient with two episodes of SVT lasting approx 25 seconds. Patient given Labetalol 10mg x1, continued to have additional episode so was given additional 5mg x1.         Patient admitted to medicine for severe sepsis (tachycardic, fever, hypotensive, urinary source) likely 2/2 UTI and ANNA likely 2/2 hypoperfusion in setting of sepsis. Patient was started on IVF given hypotension and severe sepsis. Given patient with hx of ESBL, patient was started on Meropenem (6/17). Ucx grew gram negative rods which later speciated E. Coli. Bcx cultures grew gram negative rods which were also E. Coli. Meropenem was continued until sensitivities resulted. Patient BP stabilized the following day and became hypertensive in addition to more episodes of SVT lasting up to 40 seconds. Decision was made to restart patient on home Atenolol 25mg qd. The following day, patient had fever on 102.5,  given tylenol and bcx were resent. Patient continued to be hypertensive SBP >180, given one time dose of Labetolol 10mg which improved BP, however continued to have PATs HR 140s lasting several seconds. Decision was made to increase Atenolol to twice daily, Bcx sensitivities revealed pansensitive e. coli, patient was changed to __________________ for _________ days.         Repeat blood cultures from 6/19 showed ________________.         ANNA eventually resolved.         Upon discharge patient medically stable for discharge to ________________ with outpatient followup with ________________. Pt is a 89yo female with PMH HTN, Hypothyroidism, hx of ESBL UTI (May 2018) presenting from [facility] after being found down for several hours (as long as 8 hours). Patient does not recall falling or events leading up to the fall. Patient denies LOC, dizziness. Patient denies headache, n/v/d. She denies URI symptoms. Patient denies dysuria.         In ED vitals T 100.7 /88, , O2 98% on 4L NC. Patient received 3L IVF boluses in ED, CTX x1. Patient had CT Head negative for acute hemorrhage, mass effect or displaced calvarial fracture. Of note, patient with two episodes of SVT lasting approx 25 seconds. Patient given Labetalol 10mg x1, continued to have additional episode so was given additional 5mg x1.         Patient admitted to medicine for severe sepsis (tachycardic, fever, hypotensive, urinary source) likely 2/2 UTI and ANNA likely 2/2 hypoperfusion in setting of sepsis. Patient was started on IVF given hypotension and severe sepsis. Given patient with hx of ESBL, patient was started on Meropenem (6/17). Ucx grew gram negative rods which later speciated E. Coli. Bcx cultures grew gram negative rods which were also E. Coli. Meropenem was continued until sensitivities resulted. Patient BP stabilized the following day and became hypertensive in addition to more episodes of SVT lasting up to 40 seconds. Decision was made to restart patient on home Atenolol 25mg qd. The following day, patient had fever on 102.5,  given tylenol and bcx were resent. Patient continued to be hypertensive SBP >180, given one time dose of Labetolol 10mg which improved BP, however continued to have PATs HR 140s lasting several seconds. Decision was made to increase Atenolol to 50mg QD, Bcx sensitivities revealed pansensitive e. coli, patient was changed to ceftriaxone with plan to transition to Cipro for 14 days total.         Repeat blood cultures from 6/19 were negative. ANNA improved.         Upon discharge patient medically stable for discharge to ________________ with outpatient followup with ________________. Pt is a 89yo female with PMH HTN, Hypothyroidism, hx of ESBL UTI (May 2018) presenting from [facility] after being found down for several hours (as long as 8 hours). Patient does not recall falling or events leading up to the fall. Patient denies LOC, dizziness. Patient denies headache, n/v/d. She denies URI symptoms. Patient denies dysuria.         In ED vitals T 100.7 /88, , O2 98% on 4L NC. Patient received 3L IVF boluses in ED, CTX x1. Patient had CT Head negative for acute hemorrhage, mass effect or displaced calvarial fracture. Of note, patient with two episodes of SVT lasting approx 25 seconds. Patient given Labetalol 10mg x1, continued to have additional episode so was given additional 5mg x1.         Patient admitted to medicine for severe sepsis (tachycardic, fever, hypotensive, urinary source) likely 2/2 UTI and ANNA likely 2/2 hypoperfusion in setting of sepsis. Patient was started on IVF given hypotension and severe sepsis. Given patient with hx of ESBL, patient was started on Meropenem (6/17). Ucx grew gram negative rods which later speciated E. Coli. Bcx cultures grew gram negative rods which were also E. Coli. Meropenem was continued until sensitivities resulted. Patient BP stabilized the following day and became hypertensive in addition to more episodes of SVT lasting up to 40 seconds. Decision was made to restart patient on home Atenolol 25mg qd. The following day, patient had fever on 102.5,  given tylenol and bcx were resent. Patient continued to be hypertensive SBP >180, given one time dose of Labetolol 10mg which improved BP, however continued to have PATs HR 140s lasting several seconds. Decision was made to increase Atenolol to 50mg QD, Bcx sensitivities revealed pansensitive e. coli, patient was changed to ceftriaxone with plan to transition to Cipro for 14 days total to end on July 2, 2019 (14 days from last negative cx) to treat for pyelonephritis.        Repeat blood cultures from 6/19 were negative. ANNA resolved. PT saw patient who recommended subacute rehab.         Upon discharge patient medically stable for discharge to [facility]. Pt is a 89yo female with PMH HTN, Hypothyroidism, hx of ESBL UTI (May 2018) presenting from [facility] after being found down for several hours (as long as 8 hours). Patient does not recall falling or events leading up to the fall. Patient denies LOC, dizziness. Patient denies headache, n/v/d. She denies URI symptoms. Patient denies dysuria.         In ED vitals T 100.7 /88, , O2 98% on 4L NC. Patient received 3L IVF boluses in ED, CTX x1. Patient had CT Head negative for acute hemorrhage, mass effect or displaced calvarial fracture. Of note, patient with two episodes of SVT lasting approx 25 seconds. Patient given Labetalol 10mg x1, continued to have additional episode so was given additional 5mg x1.         Patient admitted to medicine for severe sepsis (tachycardic, fever, hypotensive, urinary source) likely 2/2 UTI and ANNA likely 2/2 hypoperfusion in setting of sepsis. Patient was started on IVF given hypotension and severe sepsis. Given patient with hx of ESBL, patient was started on Meropenem (6/17). Ucx grew gram negative rods which later speciated E. Coli. Bcx cultures grew gram negative rods which were also E. Coli. Meropenem was continued until sensitivities resulted. Patient BP stabilized the following day and became hypertensive in addition to more episodes of SVT lasting up to 40 seconds. Decision was made to restart patient on home Atenolol 25mg qd. The following day, patient had fever on 102.5,  given tylenol and bcx were resent. Patient continued to be hypertensive SBP >180, given one time dose of Labetolol 10mg which improved BP, however continued to have PATs HR 140s lasting several seconds. Decision was made to increase Atenolol to 50mg QD. SVT and PATs resolved. Bcx sensitivities revealed pansensitive e. coli, patient was changed to ceftriaxone with plan to transition to Cipro for 14 days total to end on July 2, 2019 (14 days from last negative cx) to treat for pyelonephritis. Repeat bcx from 6/19 were negative for growth.        Repeat blood cultures from 6/19 were negative. ANNA resolved. PT saw patient who recommended subacute rehab.         Upon discharge patient medically stable for discharge to [facility]. Pt is a 89yo female with PMH HTN, Hypothyroidism, hx of ESBL UTI (May 2018) presenting from [facility] after being found down for several hours (as long as 8 hours). Patient does not recall falling or events leading up to the fall. Patient denies LOC, dizziness. Patient denies headache, n/v/d. She denies URI symptoms. Patient denies dysuria.         In ED vitals T 100.7 /88, , O2 98% on 4L NC. Patient received 3L IVF boluses in ED, CTX x1. Patient had CT Head negative for acute hemorrhage, mass effect or displaced calvarial fracture. Of note, patient with two episodes of SVT lasting approx 25 seconds. Patient given Labetalol 10mg x1, continued to have additional episode so was given additional 5mg x1.         Patient admitted to medicine for severe sepsis (tachycardic, fever, hypotensive, urinary source) likely 2/2 UTI and ANNA likely 2/2 hypoperfusion in setting of sepsis. Patient was started on IVF given hypotension and severe sepsis. Given patient with hx of ESBL, patient was started on Meropenem (6/17). Ucx grew gram negative rods which later speciated E. Coli. Bcx cultures grew gram negative rods which were also E. Coli. Meropenem was continued until sensitivities resulted. Patient BP stabilized the following day and became hypertensive in addition to more episodes of SVT lasting up to 40 seconds. Decision was made to restart patient on home Atenolol 25mg qd. The following day, patient had fever of 102.5F and so she was given Tylenol and blood cultures were resent. Patient continued to be hypertensive SBP >180, given one time dose of Labetolol 10mg which improved BP, however continued to have PATs HR 140s lasting several seconds. Tachycardia suspected to be sepsis-induced in the setting of initially holding patient's Atenolol on presentation. Decision was made to increase Atenolol to 50mg QD. SVT and PATs resolved. Bcx sensitivities revealed pansensitive E. coli, patient was changed to Ceftriaxone with plan to transition to Cipro for 14 days total to end on July 2, 2019 (14 days from last negative cx) to treat for pyelonephritis. Repeat bcx from 6/19 were negative for growth. Patient's ANNA resolved during her hospitalization w/ IVF and treatment of her underlying infection. Patient was evaluated by PT who recommended [facility]. Overall, patient is improved and is medically stable for discharge to [facility]. Pt is a 89yo female with PMH HTN, Hypothyroidism, hx of ESBL UTI (May 2018) presenting from [facility] after being found down for several hours (as long as 8 hours). Patient does not recall falling or events leading up to the fall. Patient denies LOC, dizziness. Patient denies headache, n/v/d. She denies URI symptoms. Patient denies dysuria.         In ED vitals T 100.7 /88, , O2 98% on 4L NC. Patient received 3L IVF boluses in ED, CTX x1. Patient had CT Head negative for acute hemorrhage, mass effect or displaced calvarial fracture. Of note, patient with two episodes of SVT lasting approx 25 seconds. Patient given Labetalol 10mg x1, continued to have additional episode so was given additional 5mg x1.         Patient admitted to medicine for severe sepsis (tachycardic, fever, hypotensive, urinary source) likely 2/2 UTI and ANNA likely 2/2 hypoperfusion in setting of sepsis. Patient was started on IVF given hypotension and severe sepsis. Given patient with hx of ESBL, patient was started on Meropenem (6/17). Ucx grew gram negative rods which later speciated E. Coli. Bcx cultures grew gram negative rods which were also E. Coli. Meropenem was continued until sensitivities resulted. Patient BP stabilized the following day and became hypertensive in addition to more episodes of SVT lasting up to 40 seconds. Decision was made to restart patient on home Atenolol 25mg qd. The following day, patient had fever of 102.5F and so she was given Tylenol and blood cultures were resent. Patient continued to be hypertensive SBP >180, given one time dose of Labetolol 10mg which improved BP, however continued to have PATs HR 140s lasting several seconds. Tachycardia suspected to be sepsis-induced in the setting of initially holding patient's Atenolol on presentation. Decision was made to increase Atenolol to 50mg QD. SVT and PATs resolved. Bcx sensitivities revealed pansensitive E. coli, patient was changed to Ceftriaxone with plan to transition to Cipro for 14 days total to end on July 2, 2019 (14 days from last negative cx) to treat for pyelonephritis. Repeat bcx from 6/19 were negative for growth. Patient's ANNA resolved during her hospitalization w/ IVF and treatment of her underlying infection. Patient was evaluated by PT who recommended [facility]. Overall, patient is improved and is medically stable for discharge to [facility]. Pt is a 89yo female with PMH HTN, Hypothyroidism, hx of ESBL UTI (May 2018) presenting from [facility] after being found down for several hours (as long as 8 hours). Patient does not recall falling or events leading up to the fall. Patient denies LOC, dizziness. Patient denies headache, n/v/d. She denies URI symptoms. Patient denies dysuria.         In ED vitals T 100.7 /88, , O2 98% on 4L NC. Patient received 3L IVF boluses in ED, CTX x1. Patient had CT Head negative for acute hemorrhage, mass effect or displaced calvarial fracture. Of note, patient with two episodes of SVT lasting approx 25 seconds. Patient given Labetalol 10mg x1, continued to have additional episode so was given additional 5mg x1.         Patient admitted to medicine for severe sepsis (tachycardic, fever, hypotensive, urinary source) 2/2 E. coli bacteremia likely 2/2 pyelonephritis (noted on CT A+P) and ANNA likely 2/2 hypoperfusion in setting of sepsis. Patient was started on IVF given hypotension and severe sepsis. Given patient with hx of ESBL, patient was started on Meropenem (6/17). Ucx grew gram negative rods which later speciated E. Coli. Bcx cultures grew gram negative rods which were also E. Coli. Meropenem was continued until sensitivities resulted. Patient BP stabilized the following day and became hypertensive in addition to more episodes of SVT lasting up to 40 seconds. Decision was made to restart patient on home Atenolol 25mg qd. The following day, patient had fever of 102.5F and so she was given Tylenol and blood cultures were resent. Patient continued to be hypertensive SBP >180, given one time dose of Labetolol 10mg which improved BP, however continued to have PATs HR 140s lasting several seconds. Tachycardia suspected to be sepsis-induced in the setting of initially holding patient's Atenolol on presentation. Decision was made to increase Atenolol to 50mg QD. SVT and PATs resolved. Bcx sensitivities revealed pansensitive E. coli, patient was changed to Ceftriaxone with plan to transition to Cipro for 14 days total to end on July 2, 2019 (14 days from last negative cx) to treat for pyelonephritis. Repeat bcx from 6/19 were negative for growth. Patient's ANNA resolved during her hospitalization w/ IVF and treatment of her underlying infection. Patient was evaluated by PT who recommended [facility]. Overall, patient is improved and is medically stable for discharge to [facility].

## 2019-06-19 NOTE — PHYSICAL THERAPY INITIAL EVALUATION ADULT - ACTIVE RANGE OF MOTION EXAMINATION, REHAB EVAL
L shoulder scaption approximately 80 degrees, R shoulder scaption approximately 30 degrees, R knee 0 to approximately 20 degrees, increased to approximately 30 degrees after PROM

## 2019-06-19 NOTE — DISCHARGE NOTE PROVIDER - CARE PROVIDER_API CALL
Charito Crawley)  Medicine  Geriatric Medicine  [address] [place] [address], [facility] [address]  [place] [address]  Phone: [phone]  Fax: [phone]  Follow Up Time:

## 2019-06-19 NOTE — PROGRESS NOTE ADULT - PROBLEM SELECTOR PLAN 2
- No longer meeting severe sepsis criteria this AM; Resolved tachycardia, afebrile, improved leukocytosis; lactate WNL.  However had fever 102.5 yesterday afternoon - given tylenol. Recultured this AM. Likely 2/2 to UTI and bacteremia s/p 3L IVF boluses and NS at 75cc/hr   - Lactate 4.8 --> 3.8 --> 1.4  - Hx of ESBL UTI In May 2018  - RVP negative  - C/w Meropenem (6/17-) for now until urine cx with sensitivities results  - Bcx 6/17 - E. coli  - Ucx 6/17 - E. coli  - Trend CBC daily  - f/u bcx and ucx sensitivities

## 2019-06-19 NOTE — PROVIDER CONTACT NOTE (OTHER) - SITUATION
Pt had PAT up to the 140s for 4.1 secs
Patient had PSVT 200 for 24 seconds.
Pt blood pressure was 184/85
Patient has temp 102.5 (oral); /84 for 44 seconds, MEWS score of 7

## 2019-06-19 NOTE — PROVIDER CONTACT NOTE (OTHER) - ACTION/TREATMENT ORDERED:
MD contacted and aware.
No  new orders. Tele monitoring continues.
No new orders. Tele monitoring continues.
NP stated to continue to monitor
MD contacted and aware. No new orders. Will continue to monitor.
Provider order 10mg of labatolol IVP and continue to monitor

## 2019-06-19 NOTE — PROGRESS NOTE ADULT - PROBLEM SELECTOR PLAN 1
-Likely in setting of sepsis 2/2 UTI and bacteremia  - CTH negative for acute hemorrhage, mass effect or displaced calvarial fracture. Right frontal extracalvarial soft tissue swelling and hematoma. Maxillofacial bone CT: No acute fracture. Bilateral maxillary sinus air-fluid levels. Cervical spine CT: No acute fracture traumatic subluxation. Multilevel degenerative changes.

## 2019-06-20 LAB
ANION GAP SERPL CALC-SCNC: 13 MMOL/L — SIGNIFICANT CHANGE UP (ref 5–17)
BUN SERPL-MCNC: 48 MG/DL — HIGH (ref 7–23)
CALCIUM SERPL-MCNC: 8.3 MG/DL — LOW (ref 8.4–10.5)
CHLORIDE SERPL-SCNC: 105 MMOL/L — SIGNIFICANT CHANGE UP (ref 96–108)
CO2 SERPL-SCNC: 21 MMOL/L — LOW (ref 22–31)
CREAT SERPL-MCNC: 1.37 MG/DL — HIGH (ref 0.5–1.3)
GLUCOSE SERPL-MCNC: 115 MG/DL — HIGH (ref 70–99)
HCT VFR BLD CALC: 32.1 % — LOW (ref 34.5–45)
HGB BLD-MCNC: 10.5 G/DL — LOW (ref 11.5–15.5)
MAGNESIUM SERPL-MCNC: 2.1 MG/DL — SIGNIFICANT CHANGE UP (ref 1.6–2.6)
MCHC RBC-ENTMCNC: 30.5 PG — SIGNIFICANT CHANGE UP (ref 27–34)
MCHC RBC-ENTMCNC: 32.7 GM/DL — SIGNIFICANT CHANGE UP (ref 32–36)
MCV RBC AUTO: 93.3 FL — SIGNIFICANT CHANGE UP (ref 80–100)
PHOSPHATE SERPL-MCNC: 2.7 MG/DL — SIGNIFICANT CHANGE UP (ref 2.5–4.5)
PLATELET # BLD AUTO: 96 K/UL — LOW (ref 150–400)
POTASSIUM SERPL-MCNC: 3.4 MMOL/L — LOW (ref 3.5–5.3)
POTASSIUM SERPL-SCNC: 3.4 MMOL/L — LOW (ref 3.5–5.3)
RBC # BLD: 3.44 M/UL — LOW (ref 3.8–5.2)
RBC # FLD: 14.8 % — HIGH (ref 10.3–14.5)
SODIUM SERPL-SCNC: 139 MMOL/L — SIGNIFICANT CHANGE UP (ref 135–145)
WBC # BLD: 13.47 K/UL — HIGH (ref 3.8–10.5)
WBC # FLD AUTO: 13.47 K/UL — HIGH (ref 3.8–10.5)

## 2019-06-20 PROCEDURE — 99233 SBSQ HOSP IP/OBS HIGH 50: CPT | Mod: GC

## 2019-06-20 RX ORDER — POTASSIUM CHLORIDE 20 MEQ
20 PACKET (EA) ORAL EVERY 4 HOURS
Refills: 0 | Status: COMPLETED | OUTPATIENT
Start: 2019-06-20 | End: 2019-06-20

## 2019-06-20 RX ADMIN — Medication 112 MICROGRAM(S): at 06:34

## 2019-06-20 RX ADMIN — HEPARIN SODIUM 5000 UNIT(S): 5000 INJECTION INTRAVENOUS; SUBCUTANEOUS at 06:34

## 2019-06-20 RX ADMIN — HEPARIN SODIUM 5000 UNIT(S): 5000 INJECTION INTRAVENOUS; SUBCUTANEOUS at 15:43

## 2019-06-20 RX ADMIN — Medication 20 MILLIEQUIVALENT(S): at 15:43

## 2019-06-20 RX ADMIN — CEFTRIAXONE 100 MILLIGRAM(S): 500 INJECTION, POWDER, FOR SOLUTION INTRAMUSCULAR; INTRAVENOUS at 15:43

## 2019-06-20 RX ADMIN — ATENOLOL 50 MILLIGRAM(S): 25 TABLET ORAL at 06:34

## 2019-06-20 RX ADMIN — Medication 20 MILLIEQUIVALENT(S): at 11:42

## 2019-06-20 RX ADMIN — HEPARIN SODIUM 5000 UNIT(S): 5000 INJECTION INTRAVENOUS; SUBCUTANEOUS at 22:07

## 2019-06-20 NOTE — PROGRESS NOTE ADULT - PROBLEM SELECTOR PLAN 6
Episode of SVT  48 hours ago lasting approx 1.4 seconds   PATs HR 140s yesterday lasting 4.1 seconds  - increased Atenolol to 50mg QD yesterday, will continue  - Continue to monitor on tele  - cards following

## 2019-06-20 NOTE — PROGRESS NOTE ADULT - PROBLEM SELECTOR PLAN 2
- No longer meeting severe sepsis criteria; Afebrile >48hrs, resolved tachycardia  Repeat Bcx 6/19 - pending   - Bcx 6/17 - pansensitive E. coli  - Ucx 6/17 - pansensitive E. coli  - s/p Meropenem (6/17-6/19)  - C/w CTX (6/19-) and can change to Cipro on dc  - Trend CBC daily -Findings consistent with cystitis and pyelonephritis on CT ab/pelvis  - Plan as above    Thrombocytopenia  - Platelets 90s this AM, stable  - likely in setting of sepsis  - continue to monitor  - CBC daily

## 2019-06-20 NOTE — PROGRESS NOTE ADULT - ASSESSMENT
89y F PMH of HTN, hypothyroidism, presenting from [facility] after being found down for several hours admitted for sepsis 2/2 UTI with E. Coli in urine cx and blood, on Meropenem (day 4); clinically improving

## 2019-06-20 NOTE — PROGRESS NOTE ADULT - PROBLEM SELECTOR PLAN 3
-Findings consistent with cystitis and pyelonephritis on CT ab/pelvis  - Plan as above Likely 2/2 hypoperfusion in setting of sepsis   Scr downtrending

## 2019-06-20 NOTE — PROGRESS NOTE ADULT - PROBLEM SELECTOR PLAN 1
-Resolved; in setting of sepsis 2/2 UTI and bacteremia  - CTH negative for acute hemorrhage, mass effect; CT face negative for fractures; CT neck negative - No longer meeting severe sepsis criteria; Afebrile >48hrs, resolved tachycardia  Repeat Bcx 6/19 - NGTD at 24 hours  - Bcx 6/17 - pansensitive E. coli  - Ucx 6/17 - pansensitive E. coli  - s/p Meropenem (6/17-6/19)  - C/w CTX (6/19-) and can change to Cipro on dc  - Trend CBC daily

## 2019-06-20 NOTE — PROGRESS NOTE ADULT - PROBLEM SELECTOR PLAN 10
DVT: Hep SC q8  Diet: Mechanical soft with thin liquids DVT: Hep SC q8  Diet: Mechanical soft with thin liquids  Dispo: OG

## 2019-06-20 NOTE — PROGRESS NOTE ADULT - PROBLEM SELECTOR PLAN 4
Likely 2/2 hypoperfusion in setting of sepsis   Scr downtrending -Resolved; in setting of sepsis 2/2 UTI and bacteremia  - CTH negative for acute hemorrhage, mass effect; CT face negative for fractures; CT neck negative

## 2019-06-21 LAB
ANION GAP SERPL CALC-SCNC: 13 MMOL/L — SIGNIFICANT CHANGE UP (ref 5–17)
BUN SERPL-MCNC: 42 MG/DL — HIGH (ref 7–23)
CALCIUM SERPL-MCNC: 8.4 MG/DL — SIGNIFICANT CHANGE UP (ref 8.4–10.5)
CHLORIDE SERPL-SCNC: 103 MMOL/L — SIGNIFICANT CHANGE UP (ref 96–108)
CO2 SERPL-SCNC: 22 MMOL/L — SIGNIFICANT CHANGE UP (ref 22–31)
CREAT SERPL-MCNC: 1.24 MG/DL — SIGNIFICANT CHANGE UP (ref 0.5–1.3)
GLUCOSE SERPL-MCNC: 104 MG/DL — HIGH (ref 70–99)
HCT VFR BLD CALC: 32.8 % — LOW (ref 34.5–45)
HGB BLD-MCNC: 10.7 G/DL — LOW (ref 11.5–15.5)
MAGNESIUM SERPL-MCNC: 2.1 MG/DL — SIGNIFICANT CHANGE UP (ref 1.6–2.6)
MCHC RBC-ENTMCNC: 30.1 PG — SIGNIFICANT CHANGE UP (ref 27–34)
MCHC RBC-ENTMCNC: 32.6 GM/DL — SIGNIFICANT CHANGE UP (ref 32–36)
MCV RBC AUTO: 92.1 FL — SIGNIFICANT CHANGE UP (ref 80–100)
PHOSPHATE SERPL-MCNC: 2.6 MG/DL — SIGNIFICANT CHANGE UP (ref 2.5–4.5)
PLATELET # BLD AUTO: 114 K/UL — LOW (ref 150–400)
POTASSIUM SERPL-MCNC: 4.2 MMOL/L — SIGNIFICANT CHANGE UP (ref 3.5–5.3)
POTASSIUM SERPL-SCNC: 4.2 MMOL/L — SIGNIFICANT CHANGE UP (ref 3.5–5.3)
RBC # BLD: 3.56 M/UL — LOW (ref 3.8–5.2)
RBC # FLD: 14.6 % — HIGH (ref 10.3–14.5)
SODIUM SERPL-SCNC: 138 MMOL/L — SIGNIFICANT CHANGE UP (ref 135–145)
WBC # BLD: 11.79 K/UL — HIGH (ref 3.8–10.5)
WBC # FLD AUTO: 11.79 K/UL — HIGH (ref 3.8–10.5)

## 2019-06-21 PROCEDURE — 99233 SBSQ HOSP IP/OBS HIGH 50: CPT | Mod: GC

## 2019-06-21 RX ORDER — CIPROFLOXACIN LACTATE 400MG/40ML
1 VIAL (ML) INTRAVENOUS
Qty: 0 | Refills: 0 | DISCHARGE
Start: 2019-06-21 | End: 2019-07-02

## 2019-06-21 RX ORDER — ATENOLOL 25 MG/1
1 TABLET ORAL
Qty: 0 | Refills: 0 | DISCHARGE
Start: 2019-06-21

## 2019-06-21 RX ORDER — CIPROFLOXACIN LACTATE 400MG/40ML
250 VIAL (ML) INTRAVENOUS
Refills: 0 | Status: DISCONTINUED | OUTPATIENT
Start: 2019-06-22 | End: 2019-06-22

## 2019-06-21 RX ORDER — CIPROFLOXACIN LACTATE 400MG/40ML
1 VIAL (ML) INTRAVENOUS
Qty: 0 | Refills: 0 | DISCHARGE
Start: 2019-06-21

## 2019-06-21 RX ORDER — ATENOLOL 25 MG/1
1 TABLET ORAL
Qty: 0 | Refills: 0 | DISCHARGE

## 2019-06-21 RX ADMIN — ATENOLOL 50 MILLIGRAM(S): 25 TABLET ORAL at 05:12

## 2019-06-21 RX ADMIN — HEPARIN SODIUM 5000 UNIT(S): 5000 INJECTION INTRAVENOUS; SUBCUTANEOUS at 21:04

## 2019-06-21 RX ADMIN — Medication 112 MICROGRAM(S): at 05:11

## 2019-06-21 RX ADMIN — HEPARIN SODIUM 5000 UNIT(S): 5000 INJECTION INTRAVENOUS; SUBCUTANEOUS at 05:11

## 2019-06-21 RX ADMIN — HEPARIN SODIUM 5000 UNIT(S): 5000 INJECTION INTRAVENOUS; SUBCUTANEOUS at 14:19

## 2019-06-21 RX ADMIN — CEFTRIAXONE 100 MILLIGRAM(S): 500 INJECTION, POWDER, FOR SOLUTION INTRAMUSCULAR; INTRAVENOUS at 14:19

## 2019-06-21 NOTE — PROGRESS NOTE ADULT - PROBLEM SELECTOR PLAN 2
-Findings consistent with cystitis and pyelonephritis on CT ab/pelvis  - Plan as above    Thrombocytopenia  - stable  - likely in setting of sepsis  - continue to monitor  - CBC daily

## 2019-06-21 NOTE — PROGRESS NOTE ADULT - PROBLEM SELECTOR PLAN 4
-Resolved; in setting of sepsis 2/2 UTI and bacteremia  - CTH negative for acute hemorrhage, mass effect; CT face negative for fractures; CT neck negative

## 2019-06-21 NOTE — PROGRESS NOTE ADULT - PROBLEM SELECTOR PLAN 1
- Resolved  Repeat Bcx 6/19 - NGTD   - Bcx 6/17 - pansensitive E. coli  - Ucx 6/17 - pansensitive E. coli  - s/p Meropenem (6/17-6/19)  - C/w CTX (6/19-) and can change to Cipro on dc (14 days total from last negative cx)  - Trend CBC daily

## 2019-06-21 NOTE — PROGRESS NOTE ADULT - ASSESSMENT
89y F PMH of HTN, hypothyroidism, presenting from [facility] after being found down for several hours admitted for sepsis 2/2 UTI with E. Coli in urine cx and blood, on CTX (day 3); clinically improving 89y F PMH of HTN, hypothyroidism, presenting from [facility] after being found down for several hours admitted for sepsis 2/2 UTI with E. Coli bacteremia

## 2019-06-22 ENCOUNTER — TRANSCRIPTION ENCOUNTER (OUTPATIENT)
Age: 84
End: 2019-06-22

## 2019-06-22 VITALS — SYSTOLIC BLOOD PRESSURE: 154 MMHG | DIASTOLIC BLOOD PRESSURE: 91 MMHG

## 2019-06-22 PROCEDURE — 87486 CHLMYD PNEUM DNA AMP PROBE: CPT

## 2019-06-22 PROCEDURE — 85027 COMPLETE CBC AUTOMATED: CPT

## 2019-06-22 PROCEDURE — 71260 CT THORAX DX C+: CPT

## 2019-06-22 PROCEDURE — 99239 HOSP IP/OBS DSCHRG MGMT >30: CPT

## 2019-06-22 PROCEDURE — 96361 HYDRATE IV INFUSION ADD-ON: CPT

## 2019-06-22 PROCEDURE — 86850 RBC ANTIBODY SCREEN: CPT

## 2019-06-22 PROCEDURE — 70450 CT HEAD/BRAIN W/O DYE: CPT

## 2019-06-22 PROCEDURE — 82550 ASSAY OF CK (CPK): CPT

## 2019-06-22 PROCEDURE — 74177 CT ABD & PELVIS W/CONTRAST: CPT

## 2019-06-22 PROCEDURE — 84132 ASSAY OF SERUM POTASSIUM: CPT

## 2019-06-22 PROCEDURE — 83735 ASSAY OF MAGNESIUM: CPT

## 2019-06-22 PROCEDURE — 76377 3D RENDER W/INTRP POSTPROCES: CPT

## 2019-06-22 PROCEDURE — 87040 BLOOD CULTURE FOR BACTERIA: CPT

## 2019-06-22 PROCEDURE — 90471 IMMUNIZATION ADMIN: CPT

## 2019-06-22 PROCEDURE — 85610 PROTHROMBIN TIME: CPT

## 2019-06-22 PROCEDURE — 72125 CT NECK SPINE W/O DYE: CPT

## 2019-06-22 PROCEDURE — 86900 BLOOD TYPING SEROLOGIC ABO: CPT

## 2019-06-22 PROCEDURE — 87798 DETECT AGENT NOS DNA AMP: CPT

## 2019-06-22 PROCEDURE — 90715 TDAP VACCINE 7 YRS/> IM: CPT

## 2019-06-22 PROCEDURE — 87186 SC STD MICRODIL/AGAR DIL: CPT

## 2019-06-22 PROCEDURE — 87581 M.PNEUMON DNA AMP PROBE: CPT

## 2019-06-22 PROCEDURE — 82962 GLUCOSE BLOOD TEST: CPT

## 2019-06-22 PROCEDURE — 85014 HEMATOCRIT: CPT

## 2019-06-22 PROCEDURE — 73060 X-RAY EXAM OF HUMERUS: CPT

## 2019-06-22 PROCEDURE — 81001 URINALYSIS AUTO W/SCOPE: CPT

## 2019-06-22 PROCEDURE — 83605 ASSAY OF LACTIC ACID: CPT

## 2019-06-22 PROCEDURE — 84100 ASSAY OF PHOSPHORUS: CPT

## 2019-06-22 PROCEDURE — 87150 DNA/RNA AMPLIFIED PROBE: CPT

## 2019-06-22 PROCEDURE — 84295 ASSAY OF SERUM SODIUM: CPT

## 2019-06-22 PROCEDURE — 96375 TX/PRO/DX INJ NEW DRUG ADDON: CPT

## 2019-06-22 PROCEDURE — 82330 ASSAY OF CALCIUM: CPT

## 2019-06-22 PROCEDURE — 99291 CRITICAL CARE FIRST HOUR: CPT | Mod: 25

## 2019-06-22 PROCEDURE — 73030 X-RAY EXAM OF SHOULDER: CPT

## 2019-06-22 PROCEDURE — 84484 ASSAY OF TROPONIN QUANT: CPT

## 2019-06-22 PROCEDURE — 87086 URINE CULTURE/COLONY COUNT: CPT

## 2019-06-22 PROCEDURE — 86901 BLOOD TYPING SEROLOGIC RH(D): CPT

## 2019-06-22 PROCEDURE — 80053 COMPREHEN METABOLIC PANEL: CPT

## 2019-06-22 PROCEDURE — 82803 BLOOD GASES ANY COMBINATION: CPT

## 2019-06-22 PROCEDURE — 85730 THROMBOPLASTIN TIME PARTIAL: CPT

## 2019-06-22 PROCEDURE — 93005 ELECTROCARDIOGRAM TRACING: CPT

## 2019-06-22 PROCEDURE — 72170 X-RAY EXAM OF PELVIS: CPT

## 2019-06-22 PROCEDURE — 71045 X-RAY EXAM CHEST 1 VIEW: CPT

## 2019-06-22 PROCEDURE — 80048 BASIC METABOLIC PNL TOTAL CA: CPT

## 2019-06-22 PROCEDURE — 82435 ASSAY OF BLOOD CHLORIDE: CPT

## 2019-06-22 PROCEDURE — 96374 THER/PROPH/DIAG INJ IV PUSH: CPT | Mod: XU

## 2019-06-22 PROCEDURE — 96376 TX/PRO/DX INJ SAME DRUG ADON: CPT

## 2019-06-22 PROCEDURE — 70486 CT MAXILLOFACIAL W/O DYE: CPT

## 2019-06-22 PROCEDURE — 87633 RESP VIRUS 12-25 TARGETS: CPT

## 2019-06-22 PROCEDURE — 82947 ASSAY GLUCOSE BLOOD QUANT: CPT

## 2019-06-22 PROCEDURE — 97162 PT EVAL MOD COMPLEX 30 MIN: CPT

## 2019-06-22 RX ADMIN — Medication 250 MILLIGRAM(S): at 05:03

## 2019-06-22 RX ADMIN — ATENOLOL 50 MILLIGRAM(S): 25 TABLET ORAL at 05:03

## 2019-06-22 RX ADMIN — HEPARIN SODIUM 5000 UNIT(S): 5000 INJECTION INTRAVENOUS; SUBCUTANEOUS at 05:03

## 2019-06-22 RX ADMIN — Medication 112 MICROGRAM(S): at 05:03

## 2019-06-22 NOTE — PROGRESS NOTE ADULT - SUBJECTIVE AND OBJECTIVE BOX
Patient is a 89y old  Female who presents with a chief complaint of I fell (19 Jun 2019 11:21)      SUBJECTIVE / OVERNIGHT EVENTS:  Patient seen and examined at bedside. No acute events overnight. Patient reports feeling better. -pt denies fevers, chills, palpitations, chest pain, SOB, abd pain, n/v/d/c     Other Review of Systems Negative.    MEDICATIONS  (STANDING):  ATENolol  Tablet 50 milliGRAM(s) Oral daily  cefTRIAXone   IVPB 1000 milliGRAM(s) IV Intermittent every 24 hours  cefTRIAXone   IVPB      heparin  Injectable 5000 Unit(s) SubCutaneous every 8 hours  levothyroxine 112 MICROGram(s) Oral daily    MEDICATIONS  (PRN):  acetaminophen   Tablet .. 650 milliGRAM(s) Oral every 6 hours PRN Temp greater or equal to 38C (100.4F)      OBJECTIVE:    Vital Signs Last 24 Hrs  T(C): 36.7 (20 Jun 2019 02:37), Max: 36.8 (19 Jun 2019 20:39)  T(F): 98.1 (20 Jun 2019 02:37), Max: 98.2 (19 Jun 2019 20:39)  HR: 75 (20 Jun 2019 06:32) (75 - 80)  BP: 158/68 (20 Jun 2019 06:32) (154/85 - 160/79)  BP(mean): 100 (19 Jun 2019 15:11) (100 - 100)  RR: 18 (20 Jun 2019 06:32) (17 - 18)  SpO2: 94% (20 Jun 2019 06:32) (92% - 94%)    CAPILLARY BLOOD GLUCOSE      POCT Blood Glucose.: 136 mg/dL (19 Jun 2019 11:49)    I&O's Summary    19 Jun 2019 07:01  -  20 Jun 2019 07:00  --------------------------------------------------------  IN: 1140 mL / OUT: 800 mL / NET: 340 mL        PHYSICAL EXAM:  	GENERAL: NAD, elderly woman,   	HEAD:  Right parietal scalp with wound, c/d/i Right orbit +ecchymosis; Normocephalic  	EYES: EOMI, conjunctiva and sclera clear  	Mouth: MM dry, no lesions  	NECK: Supple, no appreciable masses  	Lung: normal work of breathing, cta b/l  	Chest: S1&S2+, rrr, no m/r/g appreciated  	ABDOMEN: bs+, soft, nt, nd, no appreciable masses  	: No aviles catheter  	EXTREMITIES:  radial pulse present b/l, PT present b/l, 1+ pitting edema present b/l  	Neuro: A&Ox2 (knows month, name, and hospital), no focal deficits; moves all four extremities    SKIN: warm and dry, no visible rashes  LABS:                        10.1   13.24 )-----------( 89       ( 19 Jun 2019 09:48 )             31.1     Auto Eosinophil # x     / Auto Eosinophil % x     / Auto Neutrophil # x     / Auto Neutrophil % x     / BANDS % x                            11.4   17.26 )-----------( 85       ( 18 Jun 2019 10:23 )             35.1     Auto Eosinophil # 0.03  / Auto Eosinophil % 0.2   / Auto Neutrophil # 14.07 / Auto Neutrophil % 81.4  / BANDS % x        06-20    139  |  105  |  48<H>  ----------------------------<  115<H>  3.4<L>   |  21<L>  |  1.37<H>  06-19    141  |  106  |  50<H>  ----------------------------<  114<H>  3.6   |  20<L>  |  1.62<H>    Ca    8.3<L>      20 Jun 2019 06:13  Mg     2.1     06-20  Phos  2.7     06-20  TPro  6.5  /  Alb  2.8<L>  /  TBili  0.4  /  DBili  x   /  AST  24  /  ALT  34  /  AlkPhos  127<H>  06-19            Lactate, Blood: 1.4 mmol/L (06-18 @ 01:13)      Care Discussed with Consultants/Other Providers: Cardio    RADIOLOGY & ADDITIONAL TESTS:  (Imaging Personally Reviewed)
Patient is a 89y old  Female who presents with a chief complaint of I fell (20 Jun 2019 07:33)      SUBJECTIVE / OVERNIGHT EVENTS:  Patient seen and examined at bedside. No acute events  overnight. -pt denies fevers, chills, palpitations, chest pain, SOB, abd pain, n/v/d/c     Other Review of Systems Negative.    MEDICATIONS  (STANDING):  ATENolol  Tablet 50 milliGRAM(s) Oral daily  cefTRIAXone   IVPB 1000 milliGRAM(s) IV Intermittent every 24 hours  cefTRIAXone   IVPB      heparin  Injectable 5000 Unit(s) SubCutaneous every 8 hours  levothyroxine 112 MICROGram(s) Oral daily    MEDICATIONS  (PRN):  acetaminophen   Tablet .. 650 milliGRAM(s) Oral every 6 hours PRN Temp greater or equal to 38C (100.4F)      OBJECTIVE:    Vital Signs Last 24 Hrs  T(C): 37.1 (21 Jun 2019 04:03), Max: 37.6 (21 Jun 2019 00:05)  T(F): 98.7 (21 Jun 2019 04:03), Max: 99.6 (21 Jun 2019 00:05)  HR: 76 (21 Jun 2019 04:03) (73 - 79)  BP: 168/74 (21 Jun 2019 04:15) (150/84 - 177/91)  BP(mean): --  RR: 18 (21 Jun 2019 04:03) (17 - 18)  SpO2: 94% (21 Jun 2019 04:03) (94% - 95%)    CAPILLARY BLOOD GLUCOSE        I&O's Summary    20 Jun 2019 07:01  -  21 Jun 2019 07:00  --------------------------------------------------------  IN: 1290 mL / OUT: 1250 mL / NET: 40 mL        PHYSICAL EXAM:  	GENERAL: NAD, elderly woman,   	HEAD:  Right parietal scalp with wound, dried blood, no active bleeding; Right orbit +ecchymosis; Normocephalic  	EYES: conjunctiva and sclera clear  	Mouth: MMM, no lesions  	NECK: Supple, no appreciable masses  	Lung: normal work of breathing, cta b/l  	Chest: S1&S2+, rrr, no m/r/g appreciated  	ABDOMEN: bs+, soft, nt, nd, no appreciable masses  	: No aviles catheter  	Neuro: Answers questions appropriately; moves all four extremities    SKIN: warm and dry, no visible rashes  LABS:                        10.5   13.47 )-----------( 96       ( 20 Jun 2019 09:17 )             32.1     Auto Eosinophil # x     / Auto Eosinophil % x     / Auto Neutrophil # x     / Auto Neutrophil % x     / BANDS % x                            10.1   13.24 )-----------( 89       ( 19 Jun 2019 09:48 )             31.1     Auto Eosinophil # x     / Auto Eosinophil % x     / Auto Neutrophil # x     / Auto Neutrophil % x     / BANDS % x        06-21    138  |  103  |  42<H>  ----------------------------<  104<H>  4.2   |  22  |  1.24  06-20    139  |  105  |  48<H>  ----------------------------<  115<H>  3.4<L>   |  21<L>  |  1.37<H>    Ca    8.4      21 Jun 2019 06:40  Mg     2.1     06-21  Phos  2.6     06-21            Lactate, Blood: 1.4 mmol/L (06-18 @ 01:13)        ABG:     VBG:       Care Discussed with Consultants/Other Providers:    RADIOLOGY & ADDITIONAL TESTS:  (Imaging Personally Reviewed)
Patient is a 89y old  Female who presents with a chief complaint of I fell (2019 10:07)      SUBJECTIVE / OVERNIGHT EVENTS:  Patient seen and examined at bedside. Overnight patient hypertensive SBP 180s, given Labetalol 10mg x1. Patient reports feeling better this AM. PAtient with PATS HR 140s lasting 4.1 seconds.     Other Review of Systems Negative.    MEDICATIONS  (STANDING):  ATENolol  Tablet 25 milliGRAM(s) Oral daily  heparin  Injectable 5000 Unit(s) SubCutaneous every 8 hours  levothyroxine 112 MICROGram(s) Oral daily  meropenem  IVPB      meropenem  IVPB 500 milliGRAM(s) IV Intermittent every 12 hours    MEDICATIONS  (PRN):  acetaminophen   Tablet .. 650 milliGRAM(s) Oral every 6 hours PRN Temp greater or equal to 38C (100.4F)      OBJECTIVE:    Vital Signs Last 24 Hrs  T(C): 37.4 (2019 04:00), Max: 39.2 (2019 11:34)  T(F): 99.4 (2019 04:00), Max: 102.5 (2019 11:34)  HR: 81 (2019 05:42) (79 - 106)  BP: 156/78 (2019 05:42) (130/82 - 192/84)  BP(mean): 101 (2019 20:00) (101 - 101)  RR: 18 (2019 04:00) (18 - 18)  SpO2: 98% (2019 04:00) (97% - 100%)    CAPILLARY BLOOD GLUCOSE        I&O's Summary    2019 07:01  -  2019 07:00  --------------------------------------------------------  IN: 510 mL / OUT: 300 mL / NET: 210 mL        PHYSICAL EXAM:  	GENERAL: NAD, elderly woman,   	HEAD:  Right parietal scalp with wound, dried blood, no active bleeding; Right orbit +ecchymosis; Normocephalic  	EYES: EOMI, conjunctiva and sclera clear  	Mouth: MM dry, no lesions  	NECK: Supple, no appreciable masses  	Lung: normal work of breathing, cta b/l  	Chest: S1&S2+, rrr, no m/r/g appreciated  	ABDOMEN: bs+, soft, nt, nd, no appreciable masses  	: No aviles catheter  	EXTREMITIES:  radial pulse present b/l, PT present b/l, 1+ pitting edema present b/l  	Neuro: A&Ox2 (knows month, name, and hospital), no focal deficits; moves all four extremities    SKIN: warm and dry, no visible rashes    LABS:                        11.4   17.26 )-----------( 85       ( 2019 10:23 )             35.1     Auto Eosinophil # 0.03  / Auto Eosinophil % 0.2   / Auto Neutrophil # 14.07 / Auto Neutrophil % 81.4  / BANDS % x                            13.6   26.5  )-----------( 148      ( 2019 08:58 )             40.8     Auto Eosinophil # 0.0   / Auto Eosinophil % x     / Auto Neutrophil # 23.4  / Auto Neutrophil % 71.0  / BANDS % 15           141  |  106  |  50<H>  ----------------------------<  114<H>  3.6   |  20<L>  |  1.62<H>  18    139  |  105  |  42<H>  ----------------------------<  111<H>  4.4   |  21<L>  |  1.66<H>  06    136  |  104  |  36<H>  ----------------------------<  152<H>  5.2   |  17<L>  |  1.16    Ca    8.6      2019 06:36  Mg     2.3       Phos  3.2       TPro  6.5  /  Alb  2.8<L>  /  TBili  0.4  /  DBili  x   /  AST  24  /  ALT  34  /  AlkPhos  127<H>    TPro  6.7  /  Alb  2.9<L>  /  TBili  0.6  /  DBili  x   /  AST  42<H>  /  ALT  41  /  AlkPhos  122<H>  06-18  TPro  8.4<H>  /  Alb  4.3  /  TBili  1.4<H>  /  DBili  x   /  AST  50<H>  /  ALT  59<H>  /  AlkPhos  152<H>  06-17    PT/INR - ( 2019 08:58 )   PT: 12.8 sec;   INR: 1.11 ratio         PTT - ( 2019 08:58 )  PTT:28.7 sec  CARDIAC MARKERS ( 2019 06:04 )  x     / x     / 1241 U/L / x     / x      CARDIAC MARKERS ( 2019 12:51 )  x     / x     / 2084 U/L / x     / x      CARDIAC MARKERS ( 2019 08:58 )  x     / x     / 1555 U/L / x     / x          Urinalysis Basic - ( 2019 08:58 )    Color: Light Orange / Appearance: Turbid / S.015 / pH: x  Gluc: x / Ketone: Small  / Bili: Negative / Urobili: Negative   Blood: x / Protein: 100 mg/dL / Nitrite: Positive   Leuk Esterase: Large / RBC: 16 /hpf / WBC 1489 /HPF   Sq Epi: x / Non Sq Epi: 1 / Bacteria: Many      Lactate, Blood: 1.4 mmol/L ( @ 01:13)          RADIOLOGY & ADDITIONAL TESTS:  (Imaging Personally Reviewed)
Patient is a 89y old  Female who presents with a chief complaint of I fell (2019 17:24)      SUBJECTIVE / OVERNIGHT EVENTS:  Patient seen and examined at bedside.    Other Review of Systems Negative. Patient with burst of PSVT  lasted 1.4 seconds and PAT for 3.2 seconds overnight. -pt denies fevers, chills, palpitations, chest pain, SOB, abd pain, n/v/d/c     MEDICATIONS  (STANDING):  heparin  Injectable 5000 Unit(s) SubCutaneous every 8 hours  levothyroxine 112 MICROGram(s) Oral daily  meropenem  IVPB      meropenem  IVPB 500 milliGRAM(s) IV Intermittent every 12 hours  sodium chloride 0.9%. 1000 milliLiter(s) (75 mL/Hr) IV Continuous <Continuous>    MEDICATIONS  (PRN):      OBJECTIVE:    Vital Signs Last 24 Hrs  T(C): 37.2 (2019 04:23), Max: 38.2 (2019 13:24)  T(F): 98.9 (2019 04:23), Max: 100.7 (2019 13:24)  HR: 92 (2019 04:23) (88 - 129)  BP: 139/81 (2019 04:23) (100/50 - 226/118)  BP(mean): --  RR: 18 (2019 04:23) (17 - 26)  SpO2: 98% (2019 04:23) (94% - 100%)    CAPILLARY BLOOD GLUCOSE        I&O's Summary    2019 07:01  -  2019 07:00  --------------------------------------------------------  IN: 0 mL / OUT: 200 mL / NET: -200 mL        PHYSICAL EXAM:  	GENERAL: NAD, elderly woman,   	HEAD:  Right parietal scalp with wound, dried blood, no active bleeding; Right orbit +ecchymosis; Normocephalic  	EYES: EOMI, PERRLA, conjunctiva and sclera clear  	Mouth: MM dry, no lesions  	NECK: Supple, no appreciable masses  	Lung: normal work of breathing, cta b/l  	Chest: S1&S2+, rrr, no m/r/g appreciated  	ABDOMEN: bs+, soft, nt, nd, no appreciable masses  	: No aviles catheter, no CVA tenderness  	EXTREMITIES:  radial pulse present b/l, PT present b/l, 1+ pitting edema present b/l  	Neuro: A&Ox2 (knows month, name, and hospital), no focal deficits; moves all four extremities    SKIN: warm and dry, no visible rashes    LABS:                        13.6   26.5  )-----------( 148      ( 2019 08:58 )             40.8     Auto Eosinophil # 0.0   / Auto Eosinophil % x     / Auto Neutrophil # 23.4  / Auto Neutrophil % 71.0  / BANDS % 15           139  |  105  |  42<H>  ----------------------------<  111<H>  4.4   |  21<L>  |  1.66<H>      136  |  104  |  36<H>  ----------------------------<  152<H>  5.2   |  17<L>  |  1.16      139  |  96  |  37<H>  ----------------------------<  188<H>  4.1   |  24  |  1.40<H>    Ca    8.5      2019 06:04  Mg     2.1       Phos  3.6       TPro  6.7  /  Alb  2.9<L>  /  TBili  0.6  /  DBili  x   /  AST  42<H>  /  ALT  41  /  AlkPhos  122<H>    TPro  8.4<H>  /  Alb  4.3  /  TBili  1.4<H>  /  DBili  x   /  AST  50<H>  /  ALT  59<H>  /  AlkPhos  152<H>      PT/INR - ( 2019 08:58 )   PT: 12.8 sec;   INR: 1.11 ratio         PTT - ( 2019 08:58 )  PTT:28.7 sec  CARDIAC MARKERS ( 2019 06:04 )  x     / x     / 1241 U/L / x     / x      CARDIAC MARKERS ( 2019 12:51 )  x     / x     / 2084 U/L / x     / x      CARDIAC MARKERS ( 2019 08:58 )  x     / x     / 1555 U/L / x     / x          Urinalysis Basic - ( 2019 08:58 )    Color: Light Orange / Appearance: Turbid / S.015 / pH: x  Gluc: x / Ketone: Small  / Bili: Negative / Urobili: Negative   Blood: x / Protein: 100 mg/dL / Nitrite: Positive   Leuk Esterase: Large / RBC: 16 /hpf / WBC 1489 /HPF   Sq Epi: x / Non Sq Epi: 1 / Bacteria: Many      Lactate, Blood: 1.4 mmol/L ( @ 01:13)        ABG:     VBG: ( 12:49 ) - VBG - pH: 7.35  | pCO2: 44    | pO2: 30    | Lactate: 3.8    ( 08:58 ) - VBG - pH: 7.34  | pCO2: 49    | pO2: 22    | Lactate: 4.8          Care Discussed with Consultants/Other Providers:    RADIOLOGY & ADDITIONAL TESTS:  (Imaging Personally Reviewed)
Patient is a 89y old  Female who presents with a chief complaint of I fell (21 Jun 2019 07:33)      SUBJECTIVE / OVERNIGHT EVENTS:  Patient seen and examined at bedside. No acute events overnight. Patient feels better, ready to go to rehab then [facility].     Other Review of Systems Negative.    MEDICATIONS  (STANDING):  ATENolol  Tablet 50 milliGRAM(s) Oral daily  ciprofloxacin     Tablet 250 milliGRAM(s) Oral two times a day  heparin  Injectable 5000 Unit(s) SubCutaneous every 8 hours  levothyroxine 112 MICROGram(s) Oral daily    MEDICATIONS  (PRN):  acetaminophen   Tablet .. 650 milliGRAM(s) Oral every 6 hours PRN Temp greater or equal to 38C (100.4F)      OBJECTIVE:    Vital Signs Last 24 Hrs  T(C): 37.5 (22 Jun 2019 04:00), Max: 37.6 (21 Jun 2019 20:11)  T(F): 99.5 (22 Jun 2019 04:00), Max: 99.6 (21 Jun 2019 20:11)  HR: 77 (22 Jun 2019 04:00) (72 - 77)  BP: 154/91 (22 Jun 2019 04:20) (117/76 - 182/84)  BP(mean): --  RR: 18 (22 Jun 2019 04:00) (17 - 18)  SpO2: 95% (22 Jun 2019 04:00) (93% - 96%)    CAPILLARY BLOOD GLUCOSE        I&O's Summary    21 Jun 2019 07:01  -  22 Jun 2019 07:00  --------------------------------------------------------  IN: 1030 mL / OUT: 1100 mL / NET: -70 mL        PHYSICAL EXAM:  	GENERAL: NAD, elderly woman,   	HEAD:  Right parietal scalp with wound healing, c/d/i Right orbit +ecchymosis improved; Normocephalic  	EYES: EOMI, conjunctiva and sclera clear  	Mouth: MM dry, no lesions  	NECK: Supple, no appreciable masses  	Lung: normal work of breathing, cta b/l  	Chest: S1&S2+, rrr, no m/r/g appreciated  	ABDOMEN: bs+, soft, nt, nd, no appreciable masses  	:female external catheter  	EXTREMITIES:  radial pulse present b/l, PT present b/l, 1+ pitting edema present b/l  	Neuro: A&Ox2 (knows month, name, and hospital), no focal deficits; moves all four extremities    SKIN: warm and dry, no visible rashes    LABS:                        10.7   11.79 )-----------( 114      ( 21 Jun 2019 09:08 )             32.8     Auto Eosinophil # x     / Auto Eosinophil % x     / Auto Neutrophil # x     / Auto Neutrophil % x     / BANDS % x                            10.5   13.47 )-----------( 96       ( 20 Jun 2019 09:17 )             32.1     Auto Eosinophil # x     / Auto Eosinophil % x     / Auto Neutrophil # x     / Auto Neutrophil % x     / BANDS % x        06-21    138  |  103  |  42<H>  ----------------------------<  104<H>  4.2   |  22  |  1.24    Ca    8.4      21 Jun 2019 06:40  Mg     2.1     06-21  Phos  2.6     06-21            Lactate, Blood: 1.4 mmol/L (06-18 @ 01:13)          RADIOLOGY & ADDITIONAL TESTS:  (Imaging Personally Reviewed)

## 2019-06-22 NOTE — PROGRESS NOTE ADULT - PROBLEM SELECTOR PLAN 1
- Resolved  Repeat Bcx 6/19 - NGTD   - Bcx 6/17 - pansensitive E. coli  - Ucx 6/17 - pansensitive E. coli  - s/p Meropenem (6/17-6/19)  - s/p CTX (6/19-6/22) and will change to Cipro today to be completed on 7/2/2019

## 2019-06-22 NOTE — PROGRESS NOTE ADULT - ATTENDING COMMENTS
Seen, examined the patient, spoke to house staff the plan of care  - resting in bed, afebrile, not septic now, no acute SOB, chest pain. VSS    reviewed labs, imaging  - d/c to Rehab on oral Cipro for UTI/left pyelo and E. Coli bacteremia for 2 weeks total  - c/w all other meds  - DNR, family is aware  ** Discharge time- 42 min
pansensitive e coli  switch to ceftriaxone IV. oral on d/c  pt eval
severe sepsis 2/2 uti with e coli bacteremia  sepsis resolved  can d.c on oral abx for 2 week course from date of last neg bcx  d/c planning to [facility]
e coli bacteremia 2/2 to UTI/pyelonephritis c/w meropenem  repeat BCX in am  acute hypoxemic respiratory failure likely due to atelectasis/ 88% RA cxr no infiltrate  restart home meds  sepsis resolving  advance care planning, as per pt wishes full code
d/c planning  repeat bcx ngtd

## 2019-06-22 NOTE — PROGRESS NOTE ADULT - PROBLEM SELECTOR PLAN 2
-Findings consistent with cystitis and pyelonephritis on CT ab/pelvis  - Plan as above    Thrombocytopenia  - stable  - likely in setting of sepsis

## 2019-06-22 NOTE — PROGRESS NOTE ADULT - ASSESSMENT
89y F PMH of HTN, hypothyroidism, presenting from [facility] after being found down for several hours admitted for sepsis 2/2 UTI with E. Coli bacteremia, repeat Bcx negative s/p CTX, clinically improved ready to discharge to rehab today

## 2019-06-24 LAB
CULTURE RESULTS: SIGNIFICANT CHANGE UP
CULTURE RESULTS: SIGNIFICANT CHANGE UP
SPECIMEN SOURCE: SIGNIFICANT CHANGE UP
SPECIMEN SOURCE: SIGNIFICANT CHANGE UP

## 2019-07-15 ENCOUNTER — APPOINTMENT (OUTPATIENT)
Dept: PAIN MANAGEMENT | Facility: CLINIC | Age: 84
End: 2019-07-15

## 2019-07-29 ENCOUNTER — INPATIENT (INPATIENT)
Facility: HOSPITAL | Age: 84
LOS: 3 days | Discharge: HOME | DRG: 872 | End: 2019-08-02
Attending: HOSPITALIST | Admitting: STUDENT IN AN ORGANIZED HEALTH CARE EDUCATION/TRAINING PROGRAM
Payer: MEDICARE

## 2019-07-29 VITALS
SYSTOLIC BLOOD PRESSURE: 204 MMHG | WEIGHT: 160.06 LBS | DIASTOLIC BLOOD PRESSURE: 102 MMHG | RESPIRATION RATE: 16 BRPM | OXYGEN SATURATION: 97 % | HEIGHT: 64 IN | TEMPERATURE: 99 F | HEART RATE: 84 BPM

## 2019-07-29 DIAGNOSIS — Z96.7 PRESENCE OF OTHER BONE AND TENDON IMPLANTS: Chronic | ICD-10-CM

## 2019-07-29 DIAGNOSIS — Z29.9 ENCOUNTER FOR PROPHYLACTIC MEASURES, UNSPECIFIED: ICD-10-CM

## 2019-07-29 DIAGNOSIS — I16.1 HYPERTENSIVE EMERGENCY: ICD-10-CM

## 2019-07-29 DIAGNOSIS — A41.9 SEPSIS, UNSPECIFIED ORGANISM: ICD-10-CM

## 2019-07-29 DIAGNOSIS — N39.0 URINARY TRACT INFECTION, SITE NOT SPECIFIED: ICD-10-CM

## 2019-07-29 DIAGNOSIS — O00.1 TUBAL PREGNANCY: Chronic | ICD-10-CM

## 2019-07-29 DIAGNOSIS — E03.9 HYPOTHYROIDISM, UNSPECIFIED: ICD-10-CM

## 2019-07-29 DIAGNOSIS — I10 ESSENTIAL (PRIMARY) HYPERTENSION: ICD-10-CM

## 2019-07-29 DIAGNOSIS — R55 SYNCOPE AND COLLAPSE: ICD-10-CM

## 2019-07-29 LAB
ALBUMIN SERPL ELPH-MCNC: 3.9 G/DL — SIGNIFICANT CHANGE UP (ref 3.3–5)
ALP SERPL-CCNC: 315 U/L — HIGH (ref 40–120)
ALT FLD-CCNC: 99 U/L — HIGH (ref 10–45)
ANION GAP SERPL CALC-SCNC: 17 MMOL/L — SIGNIFICANT CHANGE UP (ref 5–17)
APPEARANCE UR: CLEAR — SIGNIFICANT CHANGE UP
APTT BLD: 30 SEC — SIGNIFICANT CHANGE UP (ref 27.5–36.3)
AST SERPL-CCNC: 96 U/L — HIGH (ref 10–40)
BACTERIA # UR AUTO: NEGATIVE — SIGNIFICANT CHANGE UP
BASE EXCESS BLDV CALC-SCNC: 0.1 MMOL/L — SIGNIFICANT CHANGE UP (ref -2–2)
BASOPHILS # BLD AUTO: 0 K/UL — SIGNIFICANT CHANGE UP (ref 0–0.2)
BASOPHILS NFR BLD AUTO: 0.2 % — SIGNIFICANT CHANGE UP (ref 0–2)
BILIRUB SERPL-MCNC: 0.5 MG/DL — SIGNIFICANT CHANGE UP (ref 0.2–1.2)
BILIRUB UR-MCNC: NEGATIVE — SIGNIFICANT CHANGE UP
BUN SERPL-MCNC: 37 MG/DL — HIGH (ref 7–23)
CA-I SERPL-SCNC: 1.1 MMOL/L — LOW (ref 1.12–1.3)
CALCIUM SERPL-MCNC: 9.6 MG/DL — SIGNIFICANT CHANGE UP (ref 8.4–10.5)
CHLORIDE BLDV-SCNC: 104 MMOL/L — SIGNIFICANT CHANGE UP (ref 96–108)
CHLORIDE SERPL-SCNC: 101 MMOL/L — SIGNIFICANT CHANGE UP (ref 96–108)
CO2 BLDV-SCNC: 28 MMOL/L — SIGNIFICANT CHANGE UP (ref 22–30)
CO2 SERPL-SCNC: 22 MMOL/L — SIGNIFICANT CHANGE UP (ref 22–31)
COLOR SPEC: SIGNIFICANT CHANGE UP
CREAT SERPL-MCNC: 0.95 MG/DL — SIGNIFICANT CHANGE UP (ref 0.5–1.3)
DIFF PNL FLD: ABNORMAL
EOSINOPHIL # BLD AUTO: 0.1 K/UL — SIGNIFICANT CHANGE UP (ref 0–0.5)
EOSINOPHIL NFR BLD AUTO: 0.7 % — SIGNIFICANT CHANGE UP (ref 0–6)
EPI CELLS # UR: 4 /HPF — SIGNIFICANT CHANGE UP
GAS PNL BLDV: 131 MMOL/L — LOW (ref 135–145)
GAS PNL BLDV: SIGNIFICANT CHANGE UP
GAS PNL BLDV: SIGNIFICANT CHANGE UP
GLUCOSE BLDV-MCNC: 127 MG/DL — HIGH (ref 70–99)
GLUCOSE SERPL-MCNC: 129 MG/DL — HIGH (ref 70–99)
GLUCOSE UR QL: NEGATIVE — SIGNIFICANT CHANGE UP
HCO3 BLDV-SCNC: 27 MMOL/L — SIGNIFICANT CHANGE UP (ref 21–29)
HCT VFR BLD CALC: 36.7 % — SIGNIFICANT CHANGE UP (ref 34.5–45)
HCT VFR BLDA CALC: 36 % — LOW (ref 39–50)
HGB BLD CALC-MCNC: 11.6 G/DL — SIGNIFICANT CHANGE UP (ref 11.5–15.5)
HGB BLD-MCNC: 11.8 G/DL — SIGNIFICANT CHANGE UP (ref 11.5–15.5)
HYALINE CASTS # UR AUTO: 1 /LPF — SIGNIFICANT CHANGE UP (ref 0–2)
INR BLD: 1.08 RATIO — SIGNIFICANT CHANGE UP (ref 0.88–1.16)
KETONES UR-MCNC: NEGATIVE — SIGNIFICANT CHANGE UP
LACTATE BLDV-MCNC: 2.1 MMOL/L — HIGH (ref 0.7–2)
LEUKOCYTE ESTERASE UR-ACNC: ABNORMAL
LYMPHOCYTES # BLD AUTO: 1.4 K/UL — SIGNIFICANT CHANGE UP (ref 1–3.3)
LYMPHOCYTES # BLD AUTO: 9.9 % — LOW (ref 13–44)
MCHC RBC-ENTMCNC: 30.7 PG — SIGNIFICANT CHANGE UP (ref 27–34)
MCHC RBC-ENTMCNC: 32 GM/DL — SIGNIFICANT CHANGE UP (ref 32–36)
MCV RBC AUTO: 95.8 FL — SIGNIFICANT CHANGE UP (ref 80–100)
MONOCYTES # BLD AUTO: 1.5 K/UL — HIGH (ref 0–0.9)
MONOCYTES NFR BLD AUTO: 10.5 % — SIGNIFICANT CHANGE UP (ref 2–14)
NEUTROPHILS # BLD AUTO: 11.2 K/UL — HIGH (ref 1.8–7.4)
NEUTROPHILS NFR BLD AUTO: 78.7 % — HIGH (ref 43–77)
NITRITE UR-MCNC: NEGATIVE — SIGNIFICANT CHANGE UP
PCO2 BLDV: 55 MMHG — HIGH (ref 35–50)
PH BLDV: 7.31 — LOW (ref 7.35–7.45)
PH UR: 6 — SIGNIFICANT CHANGE UP (ref 5–8)
PLATELET # BLD AUTO: 198 K/UL — SIGNIFICANT CHANGE UP (ref 150–400)
PO2 BLDV: 30 MMHG — SIGNIFICANT CHANGE UP (ref 25–45)
POTASSIUM BLDV-SCNC: 9.1 MMOL/L — CRITICAL HIGH (ref 3.5–5.3)
POTASSIUM SERPL-MCNC: 4.5 MMOL/L — SIGNIFICANT CHANGE UP (ref 3.5–5.3)
POTASSIUM SERPL-SCNC: 4.5 MMOL/L — SIGNIFICANT CHANGE UP (ref 3.5–5.3)
PROT SERPL-MCNC: 8.1 G/DL — SIGNIFICANT CHANGE UP (ref 6–8.3)
PROT UR-MCNC: ABNORMAL
PROTHROM AB SERPL-ACNC: 12.3 SEC — SIGNIFICANT CHANGE UP (ref 10–12.9)
RBC # BLD: 3.83 M/UL — SIGNIFICANT CHANGE UP (ref 3.8–5.2)
RBC # FLD: 13.3 % — SIGNIFICANT CHANGE UP (ref 10.3–14.5)
RBC CASTS # UR COMP ASSIST: 2 /HPF — SIGNIFICANT CHANGE UP (ref 0–4)
SAO2 % BLDV: 49 % — LOW (ref 67–88)
SODIUM SERPL-SCNC: 140 MMOL/L — SIGNIFICANT CHANGE UP (ref 135–145)
SP GR SPEC: 1.01 — SIGNIFICANT CHANGE UP (ref 1.01–1.02)
UROBILINOGEN FLD QL: NEGATIVE — SIGNIFICANT CHANGE UP
WBC # BLD: 14.2 K/UL — HIGH (ref 3.8–10.5)
WBC # FLD AUTO: 14.2 K/UL — HIGH (ref 3.8–10.5)
WBC UR QL: 38 /HPF — HIGH (ref 0–5)

## 2019-07-29 PROCEDURE — 99285 EMERGENCY DEPT VISIT HI MDM: CPT

## 2019-07-29 PROCEDURE — 71045 X-RAY EXAM CHEST 1 VIEW: CPT | Mod: 26

## 2019-07-29 PROCEDURE — 70450 CT HEAD/BRAIN W/O DYE: CPT | Mod: 26

## 2019-07-29 RX ORDER — ACETAMINOPHEN 500 MG
650 TABLET ORAL ONCE
Refills: 0 | Status: COMPLETED | OUTPATIENT
Start: 2019-07-29 | End: 2019-07-29

## 2019-07-29 RX ORDER — CEFTRIAXONE 500 MG/1
1000 INJECTION, POWDER, FOR SOLUTION INTRAMUSCULAR; INTRAVENOUS ONCE
Refills: 0 | Status: COMPLETED | OUTPATIENT
Start: 2019-07-29 | End: 2019-07-29

## 2019-07-29 RX ORDER — SODIUM CHLORIDE 9 MG/ML
1000 INJECTION, SOLUTION INTRAVENOUS ONCE
Refills: 0 | Status: COMPLETED | OUTPATIENT
Start: 2019-07-29 | End: 2019-07-29

## 2019-07-29 RX ORDER — ERTAPENEM SODIUM 1 G/1
1000 INJECTION, POWDER, LYOPHILIZED, FOR SOLUTION INTRAMUSCULAR; INTRAVENOUS EVERY 24 HOURS
Refills: 0 | Status: COMPLETED | OUTPATIENT
Start: 2019-07-29 | End: 2019-07-29

## 2019-07-29 RX ORDER — METOPROLOL TARTRATE 50 MG
5 TABLET ORAL ONCE
Refills: 0 | Status: DISCONTINUED | OUTPATIENT
Start: 2019-07-29 | End: 2019-07-29

## 2019-07-29 RX ADMIN — SODIUM CHLORIDE 1000 MILLILITER(S): 9 INJECTION, SOLUTION INTRAVENOUS at 20:37

## 2019-07-29 RX ADMIN — CEFTRIAXONE 100 MILLIGRAM(S): 500 INJECTION, POWDER, FOR SOLUTION INTRAMUSCULAR; INTRAVENOUS at 15:35

## 2019-07-29 RX ADMIN — Medication 650 MILLIGRAM(S): at 15:36

## 2019-07-29 RX ADMIN — ERTAPENEM SODIUM 120 MILLIGRAM(S): 1 INJECTION, POWDER, LYOPHILIZED, FOR SOLUTION INTRAMUSCULAR; INTRAVENOUS at 20:38

## 2019-07-29 RX ADMIN — CEFTRIAXONE 1000 MILLIGRAM(S): 500 INJECTION, POWDER, FOR SOLUTION INTRAMUSCULAR; INTRAVENOUS at 16:05

## 2019-07-29 RX ADMIN — Medication 650 MILLIGRAM(S): at 21:32

## 2019-07-29 NOTE — ED PROVIDER NOTE - NS ED ROS FT
Constitutional: No fever or chills  CV: No chest pain or lower extremity edema  Resp: No SOB no cough  GI: No abd pain. No nausea or vomiting. No diarrhea. No constipation.   : No dysuria, hematuria.   MSK: No musculoskeletal pain  Skin: No rash  Neuro: No headache. No numbness or tingling. + weakness.  +syncope

## 2019-07-29 NOTE — H&P ADULT - HISTORY OF PRESENT ILLNESS
89 y/o female with PMH HTN, hypothyroidism presenting with a complaint of passing out. The pt reports that she passed out today, but does not recall how she fell or if she hit her head. The pt had a prior episode of syncope about a month ago in the setting of ESBL sepsis, during which time she was treated with ertapenem, then transitioned to ceftriaxone and d/c on cipro. The pt reports an episode on non-bloody prior to presentation with associated abdominal pain that has since resolved. The pt reports that she has had decreased appetite recently and has eaten less foot. She reports being thirsty. The pt has had increased urinary frequency for years and wears a diaper, but denies dysuria/fevers/chills/SOB/CP/palpitations/dizziness/lightheadedness/diarrhea/constipation/blurry vision/headaches or other issues. 89 y/o female with PMH HTN, hypothyroidism presenting with a complaint of passing out. The pt reports that she passed out today, but does not recall how she fell or if she hit her head. The pt had a prior episode of syncope about a month ago in the setting of ESBL sepsis, during which time she was treated with ertapenem, then transitioned to ceftriaxone and d/c on cipro. The pt reports an episode on non-bloody emesis prior to presentation with associated abdominal pain that has since resolved. The pt reports that she has had decreased appetite recently and has eaten less food. She reports being thirsty recently. The pt has had increased urinary frequency for years and wears a diaper, but denies dysuria/fevers/chills/SOB/CP/palpitations/dizziness/lightheadedness/diarrhea/constipation/blurry vision/headaches or other issues.

## 2019-07-29 NOTE — ED ADULT NURSE NOTE - CHPI ED NUR SYMPTOMS NEG
no diaphoresis/no nausea/no fever/no shortness of breath/no vomiting/no back pain/no chest pain/no chills/no dizziness/no congestion

## 2019-07-29 NOTE — H&P ADULT - PROBLEM SELECTOR PLAN 2
Pt w/ syncope and fall  Likely in the setting of sepsis/dehydration  CT head negative for hemorrhage  Check orthostatics Pt w/ syncope and fall  Likely in the setting of sepsis/dehydration  CT head negative for hemorrhage Pt w/ syncope and fall  Likely in the setting of sepsis/dehydration; will start on genete fluids  CT head negative for hemorrhage Pt w/ syncope and fall  Likely in the setting of sepsis/dehydration; will start on genete fluids  CT head negative for hemorrhage  check orthostatics Pt w/ syncope and fall  Likely in the setting of sepsis/dehydration; will start on gentle fluids  CT head negative for hemorrhage  check orthostatics

## 2019-07-29 NOTE — H&P ADULT - PROBLEM SELECTOR PLAN 5
DVT ppx: lovenox subq  Diet: DASH/TLC  PT consult    Cain Fernandez  PGY 2  Pager [phone]/ [facility] [phone]

## 2019-07-29 NOTE — ED PROVIDER NOTE - PHYSICAL EXAMINATION
GEN: Tired Appearing, patient would be responsive when directed but appeared tried, dry membranes   HEENT: NC/AT, Symm Facies. EOMI  CV:  +S1S2, RRR w/o m/g/r  RESP: CTAB w/o w/r/r  ABD: Soft, nt/nd, +BS.   EXT/MSK: No lower extremity edema or calf tenderness.   Neuro: Grossly intact, AOX2 with normal speech, Sensation intact. Patient able to move all extremities, UE>LE

## 2019-07-29 NOTE — H&P ADULT - NSHPSOCIALHISTORY_GEN_ALL_CORE
No drinking, smoking or illicit drug use; previously tried cannabis pills for pain that was not resolved. Pt lives in a nursing home and walks with a walker or uses a wheelchair to ambulate.

## 2019-07-29 NOTE — H&P ADULT - NSHPLABSRESULTS_GEN_ALL_CORE
11.8   14.2  )-----------( 198      ( 2019 13:47 )             36.7           140  |  101  |  37<H>  ----------------------------<  129<H>  4.5   |  22  |  0.95    Ca    9.6      2019 13:47    TPro  8.1  /  Alb  3.9  /  TBili  0.5  /  DBili  x   /  AST  96<H>  /  ALT  99<H>  /  AlkPhos  315<H>                Urinalysis Basic - ( 2019 14:23 )    Color: Light Yellow / Appearance: Clear / S.014 / pH: x  Gluc: x / Ketone: Negative  / Bili: Negative / Urobili: Negative   Blood: x / Protein: Trace / Nitrite: Negative   Leuk Esterase: Large / RBC: 2 /hpf / WBC 38 /HPF   Sq Epi: x / Non Sq Epi: 4 /hpf / Bacteria: Negative        PT/INR - ( 2019 13:47 )   PT: 12.3 sec;   INR: 1.08 ratio         PTT - ( 2019 13:47 )  PTT:30.0 sec    Lactate Trend            CAPILLARY BLOOD GLUCOSE      POCT Blood Glucose.: 136 mg/dL (2019 13:32)        Culture Results:   10,000 - 49,000 CFU/mL Escherichia coli  <10,000 CFU/ml Normal Urogenital kiara present ( @ 18:06)  Culture Results:   >=3 organisms. Probable collection contamination. ( @ 01:36)  Culture Results:   >=3 organisms. Probable collection contamination. ( @ 02:58) 11.8   14.2  )-----------( 198      ( 2019 13:47 )             36.7           140  |  101  |  37<H>  ----------------------------<  129<H>  4.5   |  22  |  0.95    Ca    9.6      2019 13:47    TPro  8.1  /  Alb  3.9  /  TBili  0.5  /  DBili  x   /  AST  96<H>  /  ALT  99<H>  /  AlkPhos  315<H>                Urinalysis Basic - ( 2019 14:23 )    Color: Light Yellow / Appearance: Clear / S.014 / pH: x  Gluc: x / Ketone: Negative  / Bili: Negative / Urobili: Negative   Blood: x / Protein: Trace / Nitrite: Negative   Leuk Esterase: Large / RBC: 2 /hpf / WBC 38 /HPF   Sq Epi: x / Non Sq Epi: 4 /hpf / Bacteria: Negative        PT/INR - ( 2019 13:47 )   PT: 12.3 sec;   INR: 1.08 ratio         PTT - ( 2019 13:47 )  PTT:30.0 sec    Lactate Trend            CAPILLARY BLOOD GLUCOSE      POCT Blood Glucose.: 136 mg/dL (2019 13:32)        Culture Results:   10,000 - 49,000 CFU/mL Escherichia coli  <10,000 CFU/ml Normal Urogenital kiara present ( @ 18:06)  Culture Results:   >=3 organisms. Probable collection contamination. ( @ 01:36)  Culture Results:   >=3 organisms. Probable collection contamination. ( @ 02:58)    < from: CT Head No Cont (19 @ 14:15) >    No acute hemorrhage, mass effect or extra-axial collections.    < end of copied text >

## 2019-07-29 NOTE — CHART NOTE - NSCHARTNOTEFT_GEN_A_CORE
Mitali Talbert PGY-3  MAR  Dept. Internal Medicine    TO BE COMPLETED WITHIN 6 HOURS OF INITIAL ASSESSMENT:    For use in patients that have 2 sepsis criteria and new organ dysfunction   •	New or increased oxygen requirement  •	Creatinine >2mg/dL  •	Bilirubin>2mg/dL  •	Platelet <100,00/mm3  •	INR >1.5, PTT>60  •	Lactate >2    If patient persistent hypotension (SBP<90) or any lactate >4 then provider evaluation (Physician/PA/NP) within 30 minutes of bolus completion is required.    Vital Signs Last 24 Hrs  T(C): 37 (29 Jul 2019 15:27), Max: 38.2 (29 Jul 2019 14:36)  T(F): 98.6 (29 Jul 2019 15:27), Max: 100.7 (29 Jul 2019 14:36)  HR: 78 (29 Jul 2019 15:27) (78 - 84)  BP: 155/85 (29 Jul 2019 15:27) (155/85 - 204/102)  BP(mean): --  RR: 16 (29 Jul 2019 15:27) (16 - 18)  SpO2: 100% (29 Jul 2019 15:27) (97% - 100%)  		  LUNGS:  [  ]Clear bilaterally [  ] Wheeze [  ] Rhonchi [  ] Rales [  ] Crackles; Other:  HEART: [  ]RRR [  ] No murmur[  ]  Normal S1S2[  ] Tachycardia;  Other:  CAPILLARY REFULL:  	Fingers: [  ] less than 2 seconds [  ] more than 2 seconds                                           Toes: [  ]  less than 2 seconds [  ] more than 2 seconds   PERIPHERAL PULSES:  Radial: [  ] Palpable  [  ]  non-palpable                                         Dorsalis Pedis: [  ] Palpable  [  ] non-palpable                                         Posterior Tibial: [  ] Palpable  [  ] non-palpable                                          Other:  SKIN:   [  ]  Diaphoretic  [  ]  mottling  [  ]  Cold extremities  [  ]  Warm [  ]  Dry                      Other:    BEDSIDE ULTRASOUND FINDINGS (IF APPLICABLE):    Labs:  29 Jul 2019 13:47    140    |  101    |  37     ----------------------------<  129    4.5     |  22     |  0.95     Ca    9.6        29 Jul 2019 13:47    TPro  8.1    /  Alb  3.9    /  TBili  0.5    /  DBili  x      /  AST  96     /  ALT  99     /  AlkPhos  315    29 Jul 2019 13:47                          11.8   14.2  )-----------( 198      ( 29 Jul 2019 13:47 )             36.7     PT/INR - ( 29 Jul 2019 13:47 )   PT: 12.3 sec;   INR: 1.08 ratio         PTT - ( 29 Jul 2019 13:47 )  PTT:30.0 sec  Lactate:    Plan (orders must be placed in EMR):     [  ]  Check Repeat Lactate   [  ]  No change in current plan  [  ]  Start Vasopressors:  [  ]  Repeat Fluid Bolus:  [  ] other:    Care Discussed with Consultants/Other Providers [ ] YES  [ ] NO Mitali Talbert PGY-3  MAR  Dept. Internal Medicine    TO BE COMPLETED WITHIN 6 HOURS OF INITIAL ASSESSMENT:    For use in patients that have 2 sepsis criteria and new organ dysfunction   •	New or increased oxygen requirement  •	Creatinine >2mg/dL  •	Bilirubin>2mg/dL  •	Platelet <100,00/mm3  •	INR >1.5, PTT>60  •	Lactate >2    If patient persistent hypotension (SBP<90) or any lactate >4 then provider evaluation (Physician/PA/NP) within 30 minutes of bolus completion is required.    Vital Signs Last 24 Hrs  T(C): 37 (29 Jul 2019 15:27), Max: 38.2 (29 Jul 2019 14:36)  T(F): 98.6 (29 Jul 2019 15:27), Max: 100.7 (29 Jul 2019 14:36)  HR: 78 (29 Jul 2019 15:27) (78 - 84)  BP: 155/85 (29 Jul 2019 15:27) (155/85 - 204/102)  BP(mean): --  RR: 16 (29 Jul 2019 15:27) (16 - 18)  SpO2: 100% (29 Jul 2019 15:27) (97% - 100%)  		  LUNGS:  [  ]Clear bilaterally [  ] Wheeze [  ] Rhonchi [  ] Rales [  ] Crackles; Other:  HEART: [  ]RRR [  ] No murmur[  ]  Normal S1S2[  ] Tachycardia;  Other:  CAPILLARY REFULL:  	Fingers: [  ] less than 2 seconds [  ] more than 2 seconds                                           Toes: [  ]  less than 2 seconds [  ] more than 2 seconds   PERIPHERAL PULSES:  Radial: [  ] Palpable  [  ]  non-palpable                                         Dorsalis Pedis: [  ] Palpable  [  ] non-palpable                                         Posterior Tibial: [  ] Palpable  [  ] non-palpable                                          Other:  SKIN:   [  ]  Diaphoretic  [  ]  mottling  [  ]  Cold extremities  [  ]  Warm [  ]  Dry                      Other:    BEDSIDE ULTRASOUND FINDINGS (IF APPLICABLE):    Labs:  29 Jul 2019 13:47    140    |  101    |  37     ----------------------------<  129    4.5     |  22     |  0.95     Ca    9.6        29 Jul 2019 13:47    TPro  8.1    /  Alb  3.9    /  TBili  0.5    /  DBili  x      /  AST  96     /  ALT  99     /  AlkPhos  315    29 Jul 2019 13:47                          11.8   14.2  )-----------( 198      ( 29 Jul 2019 13:47 )             36.7     PT/INR - ( 29 Jul 2019 13:47 )   PT: 12.3 sec;   INR: 1.08 ratio         PTT - ( 29 Jul 2019 13:47 )  PTT:30.0 sec  Lactate: 2.1    Plan (orders must be placed in EMR):     [ x ]  Check Repeat Lactate   [  ]  No change in current plan  [  ]  Start Vasopressors:  [  ]  Repeat Fluid Bolus:  [ x ] other: Dose Ertapenem as pt has hx ESBL E coli UTI (5/2018). Will give 1L LR for sepsis, will not give full 30cc/kg due to mildly elevated lactate 2.1, VSS, and HTN    Care Discussed with Consultants/Other Providers [ ] YES  [ ] NO Mitali Talbert PGY-3  MAR  Dept. Internal Medicine    TO BE COMPLETED WITHIN 6 HOURS OF INITIAL ASSESSMENT:    For use in patients that have 2 sepsis criteria and new organ dysfunction   •	New or increased oxygen requirement  •	Creatinine >2mg/dL  •	Bilirubin>2mg/dL  •	Platelet <100,00/mm3  •	INR >1.5, PTT>60  •	Lactate >2    If patient persistent hypotension (SBP<90) or any lactate >4 then provider evaluation (Physician/PA/NP) within 30 minutes of bolus completion is required.    Vital Signs Last 24 Hrs  T(C): 37 (29 Jul 2019 15:27), Max: 38.2 (29 Jul 2019 14:36)  T(F): 98.6 (29 Jul 2019 15:27), Max: 100.7 (29 Jul 2019 14:36)  HR: 78 (29 Jul 2019 15:27) (78 - 84)  BP: 155/85 (29 Jul 2019 15:27) (155/85 - 204/102)  BP(mean): --  RR: 16 (29 Jul 2019 15:27) (16 - 18)  SpO2: 100% (29 Jul 2019 15:27) (97% - 100%)  		  LUNGS:  [ x ]Clear bilaterally [  ] Wheeze [  ] Rhonchi [  ] Rales [  ] Crackles; Other: on NC  HEART: [x  ]RRR [  ] No murmur[  ]  Normal S1S2[  ] Tachycardia;  Other:  CAPILLARY REFILL:  	Fingers: [ x ] less than 2 seconds [  ] more than 2 seconds                                           Toes: [ x ]  less than 2 seconds [  ] more than 2 seconds   PERIPHERAL PULSES:  Radial: [  x] Palpable  [  ]  non-palpable                                         Dorsalis Pedis: [x  ] Palpable  [  ] non-palpable                                         Posterior Tibial: [  ] Palpable  [  ] non-palpable                                          Other:  SKIN:   [  ]  Diaphoretic  [  ]  mottling  [  ]  Cold extremities  [x  ]  Warm [ x ]  Dry                      Other:    BEDSIDE ULTRASOUND FINDINGS (IF APPLICABLE):    Labs:  29 Jul 2019 13:47    140    |  101    |  37     ----------------------------<  129    4.5     |  22     |  0.95     Ca    9.6        29 Jul 2019 13:47    TPro  8.1    /  Alb  3.9    /  TBili  0.5    /  DBili  x      /  AST  96     /  ALT  99     /  AlkPhos  315    29 Jul 2019 13:47                          11.8   14.2  )-----------( 198      ( 29 Jul 2019 13:47 )             36.7     PT/INR - ( 29 Jul 2019 13:47 )   PT: 12.3 sec;   INR: 1.08 ratio         PTT - ( 29 Jul 2019 13:47 )  PTT:30.0 sec  Lactate: 2.1    Plan (orders must be placed in EMR):     [ x ]  Check Repeat Lactate   [  ]  No change in current planr  [  ]  Start Vasopressors:  [  ]  Repeat Fluid Bolus:  [ x ] other: Dose Ertapenem as pt has hx ESBL E coli UTI (5/2018). Will give 1L LR for sepsis, will not give full 30cc/kg due to mildly elevated lactate 2.1, VSS, and HTN    Care Discussed with Consultants/Other Providers [ ] YES  [ ] NO

## 2019-07-29 NOTE — H&P ADULT - ATTENDING COMMENTS
Patient seen and examined at bedside with resident. Below are my findings and assessment:     89F with PMHx of HTN, hypothyroidism, prior UTI (c/b bacteremia) just one month prior, and age-related macular degeneration presents from assisted living facility for syncope. Patient states that she lost consciousness for 10 seconds. She denies any prodromal symptoms: no nausea, vomiting, lightheadedness, palpitations or SOB. She regained consciousness quickly and denies confusion, or bowel/bladder incontinence. No seizure like activity mentioned nor any transient focal deficits to make me concerned for TIA. Patient does endorse constipation for several days and generalized unwellness and fatigue. She is not able to localize any infectious symptoms.    VS: 100.7, 98.2, 136/65, 72, 17, 100%  Exam documented in resident note    Labs reviewed: has neutrophilic pre-dominant leukocytosis, BUN/Cr ratio >20  LA 2.1 --> 1    UA: 38 WBC, no bacteria  LFTs: 96/99, AP: 315    EKG: reviewed by me, no heart block, NSR  CXR reviewed by me, no consolidation    Assessment/Plan:  89F with PMHx of HTN, hypothyroidism, prior UTI (c/b bacteremia) just one month prior, and age-related macular degeneration presents from assisted living facility for syncope. No prodromal symptoms, palpitations, post-syncope confusion, or neurological deficits mentioned. She does endorse decrease PO intake and looks hypovolemic on exam. DDx: seizure, stroke/TIA, relative orthostatic hypotension, cardiac arrythmia, or infection. At this point infection causing orthostatic hypotension is likely the leading candidate. UA has pyuria, but no bacteria.    #Syncope  -Monitor on telemetry for 24-48 hrs, but highly doubt it will catch an arrythmia  -Do not think it is cardiac related, defer work up such as TTE  -Perform Orthostasis  -Gentle hydration with isotonic fluid at 1 cc/kg/hr    #Leukocytosis  -Provide Ceftriaxone for possible UTI (UA sterile, patient has polyuria, but no dysuria)  -Has a history of ESBL one year prior, recent ECOLI is pan-sensitive  -Follow up BCx and UCx    #Hypothyroidism  -Continue with Synthroid  -TSH    #Transaminitis  -Trend LFTs, defer GGT and RUQ sono Patient seen and examined at bedside with resident. Below are my findings and assessment:     89F with PMHx of HTN, hypothyroidism, prior UTI (c/b bacteremia) just one month prior, and age-related macular degeneration presents from assisted living facility for syncope. Patient states that she lost consciousness for 10 seconds. She denies any prodromal symptoms: no nausea, vomiting, lightheadedness, palpitations or SOB. She regained consciousness quickly and denies confusion, or bowel/bladder incontinence. No seizure like activity mentioned nor any transient focal deficits to make me concerned for TIA. Patient does endorse constipation for several days and generalized unwellness and fatigue. She is not able to localize any infectious symptoms.    VS: 100.7, 98.2, 136/65, 72, 17, 100%  Exam documented in resident note    Labs reviewed: has neutrophilic pre-dominant leukocytosis, BUN/Cr ratio >20  LA 2.1 --> 1    UA: 38 WBC, no bacteria  LFTs: 96/99, AP: 315    EKG: reviewed by me, no heart block, NSR  CXR reviewed by me, no consolidation    Assessment/Plan:  89F with PMHx of HTN, hypothyroidism, prior UTI (c/b bacteremia) just one month prior, and age-related macular degeneration presents from assisted living facility for syncope. No prodromal symptoms, palpitations, post-syncope confusion, or neurological deficits mentioned. She does endorse decrease PO intake and looks hypovolemic on exam. DDx: seizure, stroke/TIA, relative orthostatic hypotension, cardiac arrythmia, or infection. At this point infection causing orthostatic hypotension is likely the leading candidate. UA has pyuria, but no bacteria.    #Syncope  -Monitor on telemetry for 24-48 hrs, but highly doubt it will catch an arrythmia  -Do not think it is cardiac related, defer work up such as TTE  -Perform Orthostasis  -Gentle hydration with isotonic fluid at 1 cc/kg/hr    #Leukocytosis  -Provide Ceftriaxone for possible UTI (UA sterile, patient has polyuria, but no dysuria)  -Has a history of ESBL one year prior, recent ECOLI is pan-sensitive  -Follow up BCx and UCx    #Hypothyroidism  -Continue with Synthroid  -TSH    #Transaminitis  -Trend LFTs, defer GGT and RUQ sono    #HTN - continue home medications, patient hypertensive    #Macular Degeneration - continue with home eye drops

## 2019-07-29 NOTE — ED ADULT NURSE NOTE - NSIMPLEMENTINTERV_GEN_ALL_ED
Implemented All Fall with Harm Risk Interventions:  [place] to call system. Call bell, personal items and telephone within reach. Instruct patient to call for assistance. Room bathroom lighting operational. Non-slip footwear when patient is off stretcher. Physically safe environment: no spills, clutter or unnecessary equipment. Stretcher in lowest position, wheels locked, appropriate side rails in place. Provide visual cue, wrist band, yellow gown, etc. Monitor gait and stability. Monitor for mental status changes and reorient to person, place, and time. Review medications for side effects contributing to fall risk. Reinforce activity limits and safety measures with patient and family. Provide visual clues: red socks.

## 2019-07-29 NOTE — ED ADULT NURSE NOTE - OBJECTIVE STATEMENT
Pt. is a 89 yo F who [facility]. Pt in [facility] for rehab s/p fall. Per EMS, pt was being transferred from a w/c back to her bed when she syncopized.  Pt did not fall, transfer to bed was completed, pt found to be hypertensive, 's/100's. Pt was given her AM dose of Atenolol 50mg po, 2nd dose given post syncopal episode. On arrival in ED, pt A/o to person, place denies pain, no CP/palpitations/sob, no dizziness/lightheadedness, no abd pain n/v/d. Pt recalls incident, states she just "passed out". Pt is DNR, daughter Ariana at bedside.

## 2019-07-29 NOTE — ED PROVIDER NOTE - CLINICAL SUMMARY MEDICAL DECISION MAKING FREE TEXT BOX
Anu: 89 year old female with urinary frequency bib ems s/p syncope this am. moving from bed  to chair nd passed out for a few seconds on stretcher. patient felt weaker this am. will get labs, cxr, ekg, cardiac enzymes, ua, r/o infection, admit.

## 2019-07-29 NOTE — ED PROVIDER NOTE - OBJECTIVE STATEMENT
89 y/o female with PMH HTN, [facility] episode this morning. Per EMS patient was being move from bed to chair and passed out, unresponsive. Patient was able to be aroused a few seconds later, per EMS. Per patient, she has not been feeling herself and felt weaker this morning.   Denies chest pain, SOB, headache, abdominal pain, leg pain, headache. 89 y/o female with PMH HTN, urinary [facility] this morning. Per EMS patient was being move from bed to chair and passed out, unresponsive. Per patient, did not remember the incident. Patient was able to be aroused a few seconds later, per EMS. Per patient, she has not been feeling herself and felt weaker this morning.   Denies chest pain, SOB, headache, abdominal pain, leg pain, headache.

## 2019-07-29 NOTE — H&P ADULT - PROBLEM SELECTOR PLAN 4
Pt unsure of what medications she takes, but conveys that she takes medications fo her thyroid; call pharmacy in AM Pt unsure of what medications she takes, but conveys that she takes medications for her thyroid; call pharmacy in AM Pt unsure of what medications she takes, but conveys that she takes medications for her thyroid; call pharmacy in AM  f/u TSH

## 2019-07-29 NOTE — H&P ADULT - NSICDXPASTMEDICALHX_GEN_ALL_CORE_FT
PAST MEDICAL HISTORY:  Bilateral Cataracts     HTN (hypertension)     Hypothyroidism     Non-ST elevation MI (NSTEMI)     Spinal stenosis     Status Post Breast Lumpectomy     Urinary Frequency

## 2019-07-29 NOTE — ED ADULT NURSE REASSESSMENT NOTE - NS ED NURSE REASSESS COMMENT FT1
IV fluid infusing very slowly as iv is positional. Antibiotics complete at this time. LR infusion placed on IV pump to insure completion of infusion. Will draw/send repeat lactate after the LR is finished (per MD order).

## 2019-07-29 NOTE — ED PROVIDER NOTE - PMH
Bilateral Cataracts    HTN (hypertension)    Hypothyroidism    Non-ST elevation MI (NSTEMI)    Spinal stenosis    Status Post Breast Lumpectomy    Urinary Frequency

## 2019-07-29 NOTE — H&P ADULT - PROBLEM SELECTOR PLAN 1
Pt w/ leukcoytosis/fevers w/ eveidence of end organ damage(transaminitis) Pt w/ leukocytosis/fevers w/ evidence of end organ damage(transaminitis) in the setting of positive UA meeting criteria for severe sepsis  Pt w/ recent hx of ESBL UTI in June treated with ertapenem, CTX and cipro on d/c  c/w ertapenem pending UCX/BCX/sensitivities  trend lactate Pt w/ leukocytosis/fevers w/ evidence of end organ damage(transaminitis) in the setting of positive UA meeting criteria for severe sepsis  Pt w/ recent hx of urosepsis in June treated with ertapenem, CTX and cipro on d/c  c/w ceftriaxone pending UCX/BCX/sensitivities  trend lactate

## 2019-07-29 NOTE — H&P ADULT - ASSESSMENT
89 y/o female with PMH HTN admitted for urosepsis complicated by syncope 89 y/o female with PMH HTN, hypothyroidism admitted for urosepsis complicated by syncope 89 y/o female with PMH HTN, hypothyroidism admitted for severe sepsis complicated by syncope

## 2019-07-29 NOTE — H&P ADULT - PROBLEM SELECTOR PLAN 3
Pt w/ syncope and fall  Likely in the setting of sepsis/dehydration  CT head negative for hemorrhage  Check orthostatics Pt w/ hypertensive urgency in the ED w/ /102 s/p 5mg IVP lopressor  restart home dose of atenolol 50mg daily, titrate BP regimen as needed

## 2019-07-29 NOTE — H&P ADULT - NSHPREVIEWOFSYSTEMS_GEN_ALL_CORE
REVIEW OF SYSTEMS:    CONSTITUTIONAL: No weakness, fevers or chills  EYES/ENT: No visual changes;  No vertigo or throat pain   NECK: No pain or stiffness  RESPIRATORY: No cough, wheezing, hemoptysis; No shortness of breath  CARDIOVASCULAR: No chest pain or palpitations  GASTROINTESTINAL: No abdominal or epigastric pain. No nausea, vomiting, or hematemesis; No diarrhea or constipation. No melena or hematochezia.  GENITOURINARY: No dysuria, frequency or hematuria  NEUROLOGICAL: No numbness or weakness  SKIN: No itching, burning, rashes, or lesions   All other review of systems is negative unless indicated above. REVIEW OF SYSTEMS:    CONSTITUTIONAL: No weakness, fevers or chills  EYES/ENT: No visual changes;  No vertigo or throat pain   NECK: No pain or stiffness  RESPIRATORY: No cough, wheezing, hemoptysis; No shortness of breath  CARDIOVASCULAR: No chest pain or palpitations  GASTROINTESTINAL: No abdominal or epigastric pain. No nausea, vomiting, or hematemesis; No diarrhea or constipation. No melena or hematochezia.  GENITOURINARY: No dysuria, frequency or hematuria  NEUROLOGICAL: No numbness or weakness  SKIN: No itching, burning, rashes, or lesions   Psych: denies depression or suicide ideation  MSK: no arthralgias or joint swelling     All other review of systems is negative unless indicated above.

## 2019-07-30 DIAGNOSIS — I16.0 HYPERTENSIVE URGENCY: ICD-10-CM

## 2019-07-30 LAB
-  COAGULASE NEGATIVE STAPHYLOCOCCUS: SIGNIFICANT CHANGE UP
ALBUMIN SERPL ELPH-MCNC: 3.3 G/DL — SIGNIFICANT CHANGE UP (ref 3.3–5)
ALP SERPL-CCNC: 277 U/L — HIGH (ref 40–120)
ALT FLD-CCNC: 98 U/L — HIGH (ref 10–45)
ANION GAP SERPL CALC-SCNC: 15 MMOL/L — SIGNIFICANT CHANGE UP (ref 5–17)
AST SERPL-CCNC: 69 U/L — HIGH (ref 10–40)
BILIRUB SERPL-MCNC: 0.6 MG/DL — SIGNIFICANT CHANGE UP (ref 0.2–1.2)
BUN SERPL-MCNC: 27 MG/DL — HIGH (ref 7–23)
CALCIUM SERPL-MCNC: 9 MG/DL — SIGNIFICANT CHANGE UP (ref 8.4–10.5)
CHLORIDE SERPL-SCNC: 98 MMOL/L — SIGNIFICANT CHANGE UP (ref 96–108)
CO2 SERPL-SCNC: 25 MMOL/L — SIGNIFICANT CHANGE UP (ref 22–31)
CREAT SERPL-MCNC: 0.94 MG/DL — SIGNIFICANT CHANGE UP (ref 0.5–1.3)
CULTURE RESULTS: NO GROWTH — SIGNIFICANT CHANGE UP
GGT SERPL-CCNC: 162 U/L — HIGH (ref 8–40)
GLUCOSE SERPL-MCNC: 113 MG/DL — HIGH (ref 70–99)
GRAM STN FLD: SIGNIFICANT CHANGE UP
HCT VFR BLD CALC: 31.4 % — LOW (ref 34.5–45)
HGB BLD-MCNC: 10.3 G/DL — LOW (ref 11.5–15.5)
LACTATE SERPL-SCNC: 1 MMOL/L — SIGNIFICANT CHANGE UP (ref 0.7–2)
MCHC RBC-ENTMCNC: 31.6 PG — SIGNIFICANT CHANGE UP (ref 27–34)
MCHC RBC-ENTMCNC: 32.9 GM/DL — SIGNIFICANT CHANGE UP (ref 32–36)
MCV RBC AUTO: 96.2 FL — SIGNIFICANT CHANGE UP (ref 80–100)
METHOD TYPE: SIGNIFICANT CHANGE UP
PLATELET # BLD AUTO: 177 K/UL — SIGNIFICANT CHANGE UP (ref 150–400)
POTASSIUM SERPL-MCNC: 4.1 MMOL/L — SIGNIFICANT CHANGE UP (ref 3.5–5.3)
POTASSIUM SERPL-SCNC: 4.1 MMOL/L — SIGNIFICANT CHANGE UP (ref 3.5–5.3)
PROT SERPL-MCNC: 7.2 G/DL — SIGNIFICANT CHANGE UP (ref 6–8.3)
RBC # BLD: 3.27 M/UL — LOW (ref 3.8–5.2)
RBC # FLD: 13.1 % — SIGNIFICANT CHANGE UP (ref 10.3–14.5)
SODIUM SERPL-SCNC: 138 MMOL/L — SIGNIFICANT CHANGE UP (ref 135–145)
SPECIMEN SOURCE: SIGNIFICANT CHANGE UP
SPECIMEN SOURCE: SIGNIFICANT CHANGE UP
TSH SERPL-MCNC: 0.38 UIU/ML — SIGNIFICANT CHANGE UP (ref 0.27–4.2)
WBC # BLD: 11.2 K/UL — HIGH (ref 3.8–10.5)
WBC # FLD AUTO: 11.2 K/UL — HIGH (ref 3.8–10.5)

## 2019-07-30 PROCEDURE — 12345: CPT | Mod: NC

## 2019-07-30 PROCEDURE — 99223 1ST HOSP IP/OBS HIGH 75: CPT | Mod: GC

## 2019-07-30 RX ORDER — ATENOLOL 25 MG/1
50 TABLET ORAL DAILY
Refills: 0 | Status: DISCONTINUED | OUTPATIENT
Start: 2019-07-30 | End: 2019-08-02

## 2019-07-30 RX ORDER — SODIUM CHLORIDE 9 MG/ML
1000 INJECTION INTRAMUSCULAR; INTRAVENOUS; SUBCUTANEOUS
Refills: 0 | Status: DISCONTINUED | OUTPATIENT
Start: 2019-07-30 | End: 2019-07-31

## 2019-07-30 RX ORDER — SENNA PLUS 8.6 MG/1
1 TABLET ORAL DAILY
Refills: 0 | Status: DISCONTINUED | OUTPATIENT
Start: 2019-07-30 | End: 2019-08-02

## 2019-07-30 RX ORDER — SODIUM CHLORIDE 9 MG/ML
1000 INJECTION INTRAMUSCULAR; INTRAVENOUS; SUBCUTANEOUS
Refills: 0 | Status: DISCONTINUED | OUTPATIENT
Start: 2019-07-30 | End: 2019-07-30

## 2019-07-30 RX ORDER — LEVOTHYROXINE SODIUM 125 MCG
112 TABLET ORAL DAILY
Refills: 0 | Status: DISCONTINUED | OUTPATIENT
Start: 2019-07-30 | End: 2019-08-02

## 2019-07-30 RX ORDER — ENOXAPARIN SODIUM 100 MG/ML
40 INJECTION SUBCUTANEOUS DAILY
Refills: 0 | Status: DISCONTINUED | OUTPATIENT
Start: 2019-07-30 | End: 2019-08-02

## 2019-07-30 RX ORDER — DOCUSATE SODIUM 100 MG
100 CAPSULE ORAL DAILY
Refills: 0 | Status: DISCONTINUED | OUTPATIENT
Start: 2019-07-30 | End: 2019-08-02

## 2019-07-30 RX ORDER — CEFTRIAXONE 500 MG/1
1000 INJECTION, POWDER, FOR SOLUTION INTRAMUSCULAR; INTRAVENOUS EVERY 24 HOURS
Refills: 0 | Status: DISCONTINUED | OUTPATIENT
Start: 2019-07-30 | End: 2019-08-02

## 2019-07-30 RX ORDER — VANCOMYCIN HCL 1 G
1000 VIAL (EA) INTRAVENOUS ONCE
Refills: 0 | Status: COMPLETED | OUTPATIENT
Start: 2019-07-30 | End: 2019-07-30

## 2019-07-30 RX ADMIN — Medication 250 MILLIGRAM(S): at 22:34

## 2019-07-30 RX ADMIN — ATENOLOL 50 MILLIGRAM(S): 25 TABLET ORAL at 11:15

## 2019-07-30 RX ADMIN — Medication 100 MILLIGRAM(S): at 11:15

## 2019-07-30 RX ADMIN — ENOXAPARIN SODIUM 40 MILLIGRAM(S): 100 INJECTION SUBCUTANEOUS at 11:15

## 2019-07-30 RX ADMIN — CEFTRIAXONE 100 MILLIGRAM(S): 500 INJECTION, POWDER, FOR SOLUTION INTRAMUSCULAR; INTRAVENOUS at 14:49

## 2019-07-30 RX ADMIN — SENNA PLUS 1 TABLET(S): 8.6 TABLET ORAL at 11:15

## 2019-07-30 NOTE — PROGRESS NOTE ADULT - SUBJECTIVE AND OBJECTIVE BOX
Stevie Vázquez MD  Division of Hospital Medicine  Pager [phone]  If no response or off hours page: [phone]  ---------------------------------------------------------    SOLIS TAYLOR  89y  Female      Patient is a 89y old  Female who presents with a chief complaint of Syncope (2019 22:46)      INTERVAL HPI/OVERNIGHT EVENTS:        REVIEW OF SYSTEMS: 14 point ROS negative unless listed above    T(C): 36.6 (19 @ 08:21), Max: 38.2 (19 @ 14:36)  HR: 90 (19 @ 11:13) (72 - 90)  BP: 103/62 (19 @ 11:13) (103/62 - 195/91)  RR: 18 (19 @ 08:21) (16 - 18)  SpO2: 96% (19 @ 08:21) (94% - 100%)  Wt(kg): --Vital Signs Last 24 Hrs  T(C): 36.6 (2019 08:21), Max: 38.2 (2019 14:36)  T(F): 97.8 (2019 08:21), Max: 100.7 (2019 14:36)  HR: 90 (2019 11:13) (72 - 90)  BP: 103/62 (2019 11:13) (103/62 - 195/91)  BP(mean): --  RR: 18 (2019 08:21) (16 - 18)  SpO2: 96% (2019 08:21) (94% - 100%)    PHYSICAL EXAM:  GENERAL: NAD, well-groomed, well-developed  HEAD:  Atraumatic, Normocephalic  EYES: EOMI, PERRLA, conjunctiva and sclera clear  ENMT: No tonsillar erythema, exudates; Moist mucous membranes. No lesions  NECK: Supple, No JVD  CHEST/LUNG: Clear to auscultation bilaterally; No rales, rhonchi, wheezing, or rubs  HEART: Regular rate and rhythm; No murmurs, rubs, or gallops  ABDOMEN: Soft, Nontender, Nondistended; Bowel sounds present.    EXTREMITIES:  2+ Peripheral Pulses, No clubbing, cyanosis, or edema  LYMPH: No lymphadenopathy noted  SKIN: No rashes or lesions  PSYCH: Alert & Oriented x3  NERVOUS SYSTEM: Motor Strength 5/5 B/L upper and lower extremities.  Sensory intact    Consultant(s) Notes Reviewed:  [x ] YES  [ ] NO  Care Discussed with Consultants/Other Providers [ x] YES  [ ] NO    LABS:                        10.3   11.2  )-----------( 177      ( 2019 05:36 )             31.4     07    138  |  98  |  27<H>  ----------------------------<  113<H>  4.1   |  25  |  0.94    Ca    9.0      2019 05:36    TPro  7.2  /  Alb  3.3  /  TBili  0.6  /  DBili  x   /  AST  69<H>  /  ALT  98<H>  /  AlkPhos  277<H>  07    PT/INR - ( 2019 13:47 )   PT: 12.3 sec;   INR: 1.08 ratio         PTT - ( 2019 13:47 )  PTT:30.0 sec  Urinalysis Basic - ( 2019 14:23 )    Color: Light Yellow / Appearance: Clear / S.014 / pH: x  Gluc: x / Ketone: Negative  / Bili: Negative / Urobili: Negative   Blood: x / Protein: Trace / Nitrite: Negative   Leuk Esterase: Large / RBC: 2 /hpf / WBC 38 /HPF   Sq Epi: x / Non Sq Epi: 4 /hpf / Bacteria: Negative      CAPILLARY BLOOD GLUCOSE      POCT Blood Glucose.: 136 mg/dL (2019 13:32)        Urinalysis Basic - ( 2019 14:23 )    Color: Light Yellow / Appearance: Clear / S.014 / pH: x  Gluc: x / Ketone: Negative  / Bili: Negative / Urobili: Negative   Blood: x / Protein: Trace / Nitrite: Negative   Leuk Esterase: Large / RBC: 2 /hpf / WBC 38 /HPF   Sq Epi: x / Non Sq Epi: 4 /hpf / Bacteria: Negative        RADIOLOGY & ADDITIONAL TESTS:    Imaging Personally Reviewed:  [ ] YES  [ ] NO Stevie Vázquez MD  Division of Hospital Medicine  Pager [phone]  If no response or off hours page: [phone]  ---------------------------------------------------------    SOLIS TAYLOR  89y  Female      Patient is a 89y old  Female who presents with a chief complaint of Syncope (2019 22:46)      INTERVAL HPI/OVERNIGHT EVENTS:  Seen at bedside. Complaining of some stomach discomfort. Feels as though she needs to have a bowel movement but was unable to do so this am. No palpitations, chest pain, SOB      REVIEW OF SYSTEMS: 14 point ROS negative unless listed above    T(C): 36.6 (19 @ 08:21), Max: 38.2 (19 @ 14:36)  HR: 90 (19 @ 11:13) (72 - 90)  BP: 103/62 (19 @ 11:13) (103/62 - 195/91)  RR: 18 (19 @ 08:21) (16 - 18)  SpO2: 96% (19 @ 08:21) (94% - 100%)  Wt(kg): --Vital Signs Last 24 Hrs  T(C): 36.6 (2019 08:21), Max: 38.2 (2019 14:36)  T(F): 97.8 (2019 08:21), Max: 100.7 (2019 14:36)  HR: 90 (2019 11:13) (72 - 90)  BP: 103/62 (2019 11:13) (103/62 - 195/91)  BP(mean): --  RR: 18 (2019 08:21) (16 - 18)  SpO2: 96% (2019 08:21) (94% - 100%)    PHYSICAL EXAM:  GENERAL: NAD, well-groomed, well-developed  HEAD:  Atraumatic, Normocephalic  NECK: Supple, No JVD  CHEST/LUNG: Clear to auscultation bilaterally; No rales, rhonchi, wheezing, or rubs  HEART: Regular rate and rhythm; No murmurs, rubs, or gallops  ABDOMEN: Soft, Nontender, Nondistended; Bowel sounds present.    EXTREMITIES:  2+ Peripheral Pulses, No clubbing, cyanosis, or edema  PSYCH: Alert & Oriented x3    Consultant(s) Notes Reviewed:  [x ] YES  [ ] NO  Care Discussed with Consultants/Other Providers [ x] YES  [ ] NO    LABS:                        10.3   11.2  )-----------( 177      ( 2019 05:36 )             31.4     07    138  |  98  |  27<H>  ----------------------------<  113<H>  4.1   |  25  |  0.94    Ca    9.0      2019 05:36    TPro  7.2  /  Alb  3.3  /  TBili  0.6  /  DBili  x   /  AST  69<H>  /  ALT  98<H>  /  AlkPhos  277<H>  30    PT/INR - ( 2019 13:47 )   PT: 12.3 sec;   INR: 1.08 ratio         PTT - ( 2019 13:47 )  PTT:30.0 sec  Urinalysis Basic - ( 2019 14:23 )    Color: Light Yellow / Appearance: Clear / S.014 / pH: x  Gluc: x / Ketone: Negative  / Bili: Negative / Urobili: Negative   Blood: x / Protein: Trace / Nitrite: Negative   Leuk Esterase: Large / RBC: 2 /hpf / WBC 38 /HPF   Sq Epi: x / Non Sq Epi: 4 /hpf / Bacteria: Negative      CAPILLARY BLOOD GLUCOSE      POCT Blood Glucose.: 136 mg/dL (2019 13:32)        Urinalysis Basic - ( 2019 14:23 )    Color: Light Yellow / Appearance: Clear / S.014 / pH: x  Gluc: x / Ketone: Negative  / Bili: Negative / Urobili: Negative   Blood: x / Protein: Trace / Nitrite: Negative   Leuk Esterase: Large / RBC: 2 /hpf / WBC 38 /HPF   Sq Epi: x / Non Sq Epi: 4 /hpf / Bacteria: Negative        RADIOLOGY & ADDITIONAL TESTS:    Imaging Personally Reviewed:  [x ] YES  [ ] NO

## 2019-07-31 DIAGNOSIS — R74.0 NONSPECIFIC ELEVATION OF LEVELS OF TRANSAMINASE AND LACTIC ACID DEHYDROGENASE [LDH]: ICD-10-CM

## 2019-07-31 DIAGNOSIS — N39.0 URINARY TRACT INFECTION, SITE NOT SPECIFIED: ICD-10-CM

## 2019-07-31 LAB
ANION GAP SERPL CALC-SCNC: 12 MMOL/L — SIGNIFICANT CHANGE UP (ref 5–17)
BUN SERPL-MCNC: 26 MG/DL — HIGH (ref 7–23)
CALCIUM SERPL-MCNC: 8.9 MG/DL — SIGNIFICANT CHANGE UP (ref 8.4–10.5)
CHLORIDE SERPL-SCNC: 99 MMOL/L — SIGNIFICANT CHANGE UP (ref 96–108)
CO2 SERPL-SCNC: 24 MMOL/L — SIGNIFICANT CHANGE UP (ref 22–31)
CREAT SERPL-MCNC: 0.99 MG/DL — SIGNIFICANT CHANGE UP (ref 0.5–1.3)
CULTURE RESULTS: SIGNIFICANT CHANGE UP
CULTURE RESULTS: SIGNIFICANT CHANGE UP
GLUCOSE SERPL-MCNC: 103 MG/DL — HIGH (ref 70–99)
GRAM STN FLD: SIGNIFICANT CHANGE UP
HCT VFR BLD CALC: 29.4 % — LOW (ref 34.5–45)
HGB BLD-MCNC: 9.3 G/DL — LOW (ref 11.5–15.5)
MCHC RBC-ENTMCNC: 30.3 PG — SIGNIFICANT CHANGE UP (ref 27–34)
MCHC RBC-ENTMCNC: 31.6 GM/DL — LOW (ref 32–36)
MCV RBC AUTO: 95.8 FL — SIGNIFICANT CHANGE UP (ref 80–100)
ORGANISM # SPEC MICROSCOPIC CNT: SIGNIFICANT CHANGE UP
PLATELET # BLD AUTO: 189 K/UL — SIGNIFICANT CHANGE UP (ref 150–400)
POTASSIUM SERPL-MCNC: 3.6 MMOL/L — SIGNIFICANT CHANGE UP (ref 3.5–5.3)
POTASSIUM SERPL-SCNC: 3.6 MMOL/L — SIGNIFICANT CHANGE UP (ref 3.5–5.3)
RBC # BLD: 3.07 M/UL — LOW (ref 3.8–5.2)
RBC # FLD: 14.3 % — SIGNIFICANT CHANGE UP (ref 10.3–14.5)
SODIUM SERPL-SCNC: 135 MMOL/L — SIGNIFICANT CHANGE UP (ref 135–145)
SPECIMEN SOURCE: SIGNIFICANT CHANGE UP
WBC # BLD: 8.67 K/UL — SIGNIFICANT CHANGE UP (ref 3.8–10.5)
WBC # FLD AUTO: 8.67 K/UL — SIGNIFICANT CHANGE UP (ref 3.8–10.5)

## 2019-07-31 PROCEDURE — 99233 SBSQ HOSP IP/OBS HIGH 50: CPT

## 2019-07-31 RX ADMIN — Medication 112 MICROGRAM(S): at 05:30

## 2019-07-31 RX ADMIN — Medication 100 MILLIGRAM(S): at 11:14

## 2019-07-31 RX ADMIN — CEFTRIAXONE 100 MILLIGRAM(S): 500 INJECTION, POWDER, FOR SOLUTION INTRAMUSCULAR; INTRAVENOUS at 14:13

## 2019-07-31 RX ADMIN — ENOXAPARIN SODIUM 40 MILLIGRAM(S): 100 INJECTION SUBCUTANEOUS at 11:14

## 2019-07-31 RX ADMIN — SENNA PLUS 1 TABLET(S): 8.6 TABLET ORAL at 11:14

## 2019-07-31 RX ADMIN — ATENOLOL 50 MILLIGRAM(S): 25 TABLET ORAL at 05:30

## 2019-07-31 NOTE — CONSULT NOTE ADULT - NEGATIVE GASTROINTESTINAL SYMPTOMS
no constipation/no change in bowel habits/no melena/no jaundice/no diarrhea/no hematochezia/no steatorrhea

## 2019-07-31 NOTE — PROGRESS NOTE ADULT - SUBJECTIVE AND OBJECTIVE BOX
Patient is a 89y old  Female who presents with a chief complaint of Syncope (2019 07:12)      SUBJECTIVE / OVERNIGHT EVENTS:  no acute events overnight, vss, afebrile  pt reports that she feels better this morning  she is concerned about recurrent UTI    ROS:  14 point ROS negative in detail except stated as above    MEDICATIONS  (STANDING):  ATENolol  Tablet 50 milliGRAM(s) Oral daily  cefTRIAXone   IVPB 1000 milliGRAM(s) IV Intermittent every 24 hours  docusate sodium 100 milliGRAM(s) Oral daily  enoxaparin Injectable 40 milliGRAM(s) SubCutaneous daily  levothyroxine 112 MICROGram(s) Oral daily  senna 1 Tablet(s) Oral daily  sodium chloride 0.9%. 1000 milliLiter(s) (75 mL/Hr) IV Continuous <Continuous>    MEDICATIONS  (PRN):      CAPILLARY BLOOD GLUCOSE        I&O's Summary    2019 07:  -  2019 07:00  --------------------------------------------------------  IN: 0 mL / OUT: 750 mL / NET: -750 mL    2019 07:01  -  2019 12:09  --------------------------------------------------------  IN: 240 mL / OUT: 300 mL / NET: -60 mL        PHYSICAL EXAM:  Vital Signs Last 24 Hrs  T(C): 37.1 (2019 11:29), Max: 37.2 (2019 20:21)  T(F): 98.8 (2019 11:29), Max: 99 (2019 20:21)  HR: 74 (2019 11:29) (74 - 84)  BP: 170/94 (2019 11:29) (119/62 - 170/94)  BP(mean): --  RR: 18 (2019 11:29) (18 - 18)  SpO2: 97% (2019 11:29) (93% - 97%)    GENERAL: NAD, well-developed  HEAD:  Atraumatic, Normocephalic  EYES: EOMI, PERRLA, conjunctiva and sclera clear  NECK: Supple, No JVD  CHEST/LUNG: Clear to auscultation bilaterally; No wheeze  HEART: Regular rate and rhythm; No murmurs, rubs, or gallops  ABDOMEN: Soft, Nontender, Nondistended; Bowel sounds present  EXTREMITIES:  2+ Peripheral Pulses, No clubbing, cyanosis, or edema  NEUROLOGY: AAOx3; non-focal  SKIN: No rashes or lesions    LABS:  personally reviewed                        9.3    8.67  )-----------( 189      ( 2019 08:03 )             29.4         135  |  99  |  26<H>  ----------------------------<  103<H>  3.6   |  24  |  0.99    Ca    8.9      2019 06:37    TPro  6.9  /  Alb  3.2<L>  /  TBili  0.2  /  DBili  <0.1  /  AST  37  /  ALT  70<H>  /  AlkPhos  242<H>      PT/INR - ( 2019 13:47 )   PT: 12.3 sec;   INR: 1.08 ratio         PTT - ( 2019 13:47 )  PTT:30.0 sec      Urinalysis Basic - ( 2019 14:23 )    Color: Light Yellow / Appearance: Clear / S.014 / pH: x  Gluc: x / Ketone: Negative  / Bili: Negative / Urobili: Negative   Blood: x / Protein: Trace / Nitrite: Negative   Leuk Esterase: Large / RBC: 2 /hpf / WBC 38 /HPF   Sq Epi: x / Non Sq Epi: 4 /hpf / Bacteria: Negative        RADIOLOGY & ADDITIONAL TESTS:    Imaging Personally Reviewed:  -tele: NSR 60-80s, brief episode of pAT to 130s x 2 secs    Consultant(s) Notes Reviewed:  GI    Care Discussed with Consultants/Other Providers: Dr. Shah

## 2019-07-31 NOTE — PHYSICAL THERAPY INITIAL EVALUATION ADULT - ADDITIONAL COMMENTS
contd from above: then transitioned to ceftriaxone and d/c on cipro. The pt reports an episode on non-bloody emesis prior to presentation with associated abdominal pain that has since resolved. The pt reports that she has had decreased appetite recently and has eaten less food. She reports being thirsty recently. CT head: (-)    social history: pt lives in [facility], ambulates with rollator,independent with amb and ADLs prior to rehab; pt previously at [facility]

## 2019-07-31 NOTE — PHYSICAL THERAPY INITIAL EVALUATION ADULT - CRITERIA FOR SKILLED THERAPEUTIC INTERVENTIONS
functional limitations in following categories/therapy frequency/anticipated equipment needs at discharge/predicted duration of therapy intervention/impairments found/anticipated discharge recommendation

## 2019-07-31 NOTE — PROGRESS NOTE ADULT - PROBLEM SELECTOR PLAN 4
Pt w/ leukocytosis/fevers w/ evidence of end organ damage(transaminitis) in the setting of positive UA meeting criteria for severe sepsis. Leukocytosis improving  H/O ESBL in past, most recently with pan sensitive E coli  c/w ceftriaxone pending UCX/BCX/sensitivities

## 2019-07-31 NOTE — CHART NOTE - NSCHARTNOTEFT_GEN_A_CORE
89 y/o female with PMH HTN, hypothyroidism admitted for Syncope and Urosepsis now with elevated BPs to 180s. Patient received Atenolol 50mg this AM which is her home dose. Patient asymptomatic denies pain, HA, blurred vision or anxiety, sitting comfortably in the chair.     Vital Signs Last 24 Hrs  T(C): 37.1 (31 Jul 2019 11:29), Max: 37.2 (30 Jul 2019 20:21)  T(F): 98.8 (31 Jul 2019 11:29), Max: 99 (30 Jul 2019 20:21)  HR: 74 (31 Jul 2019 17:00) (74 - 81)  BP: 189/84 (31 Jul 2019 17:00) (168/84 - 189/84)  RR: 18 (31 Jul 2019 11:29) (18 - 18)  SpO2: 97% (31 Jul 2019 11:29) (94% - 97%)                          9.3    8.67  )-----------( 189      ( 31 Jul 2019 08:03 )             29.4   07-31    135  |  99  |  26<H>  ----------------------------<  103<H>  3.6   |  24  |  0.99    Ca    8.9      31 Jul 2019 06:37    TPro  6.9  /  Alb  3.2<L>  /  TBili  0.2  /  DBili  <0.1  /  AST  37  /  ALT  70<H>  /  AlkPhos  242<H>  07-31    GENERAL: NAD, well-developed  HEAD:  Atraumatic, Normocephalic  EYES: EOMI, PERRLA, conjunctiva and sclera clear  NECK: Supple, No JVD  CHEST/LUNG: Clear to auscultation bilaterally; No wheeze  HEART: Regular rate and rhythm; No murmurs, rubs, or gallops  ABDOMEN: Soft, Nontender, Nondistended; Bowel sounds present  EXTREMITIES:  2+ Peripheral Pulses, No clubbing, cyanosis, or edema  NEUROLOGY: AAOx3; non-focal  SKIN: No rashes or lesions    89 y/o female with PMH HTN, hypothyroidism admitted for Syncope and Urosepsis now with elevated BPs to 180s.  - Continue Atenolol 50mg daily   - Continue to monitor and observe vitals   - Discussed with Dr. Zamudio as patient is asymtomatic wants to hold off on further medications and re-elevate whether to change regimen in AM

## 2019-07-31 NOTE — PHYSICAL THERAPY INITIAL EVALUATION ADULT - ACTIVE RANGE OF MOTION EXAMINATION, REHAB EVAL
right shoulder flex 100 degrees/bilateral upper extremity Active ROM was WFL (within functional limits)/bilateral  lower extremity Active ROM was WFL (within functional limits)

## 2019-07-31 NOTE — PROVIDER CONTACT NOTE (CRITICAL VALUE NOTIFICATION) - SITUATION
Bcx from 7/19 pre-licea ammended result for growth in aerobic bottle coag negative staphylococcus single set isolate possible contaminant. Contact microbiology if indicated.

## 2019-07-31 NOTE — PROVIDER CONTACT NOTE (CRITICAL VALUE NOTIFICATION) - ACTION/TREATMENT ORDERED:
PA notified- Vancomycin to be ordered as well as repeat Blood cultures for AM. Will continue to monitor.
Provider aware and acknowledged, will review ID recc and order repeat bcx for AM draw. Will continue to monitor.

## 2019-07-31 NOTE — PHYSICAL THERAPY INITIAL EVALUATION ADULT - PERTINENT HX OF CURRENT PROBLEM, REHAB EVAL
89 y/o female with PMH HTN, hypothyroidism presenting with a complaint of passing out. The pt reports that she passed out today, but does not recall how she fell or if she hit her head. The pt had a prior episode of syncope about a month ago in the setting of ESBL sepsis, during which time she was treated with ertapenem, contd below:

## 2019-08-01 LAB
ANION GAP SERPL CALC-SCNC: 13 MMOL/L — SIGNIFICANT CHANGE UP (ref 5–17)
BUN SERPL-MCNC: 29 MG/DL — HIGH (ref 7–23)
CALCIUM SERPL-MCNC: 8.7 MG/DL — SIGNIFICANT CHANGE UP (ref 8.4–10.5)
CHLORIDE SERPL-SCNC: 102 MMOL/L — SIGNIFICANT CHANGE UP (ref 96–108)
CO2 SERPL-SCNC: 24 MMOL/L — SIGNIFICANT CHANGE UP (ref 22–31)
CREAT SERPL-MCNC: 0.99 MG/DL — SIGNIFICANT CHANGE UP (ref 0.5–1.3)
CULTURE RESULTS: SIGNIFICANT CHANGE UP
GLUCOSE SERPL-MCNC: 95 MG/DL — SIGNIFICANT CHANGE UP (ref 70–99)
HCT VFR BLD CALC: 31.8 % — LOW (ref 34.5–45)
HGB BLD-MCNC: 10.1 G/DL — LOW (ref 11.5–15.5)
MCHC RBC-ENTMCNC: 30.7 PG — SIGNIFICANT CHANGE UP (ref 27–34)
MCHC RBC-ENTMCNC: 31.8 GM/DL — LOW (ref 32–36)
MCV RBC AUTO: 96.7 FL — SIGNIFICANT CHANGE UP (ref 80–100)
PLATELET # BLD AUTO: 208 K/UL — SIGNIFICANT CHANGE UP (ref 150–400)
POTASSIUM SERPL-MCNC: 3.8 MMOL/L — SIGNIFICANT CHANGE UP (ref 3.5–5.3)
POTASSIUM SERPL-SCNC: 3.8 MMOL/L — SIGNIFICANT CHANGE UP (ref 3.5–5.3)
RBC # BLD: 3.29 M/UL — LOW (ref 3.8–5.2)
RBC # FLD: 14.2 % — SIGNIFICANT CHANGE UP (ref 10.3–14.5)
SODIUM SERPL-SCNC: 139 MMOL/L — SIGNIFICANT CHANGE UP (ref 135–145)
WBC # BLD: 8.62 K/UL — SIGNIFICANT CHANGE UP (ref 3.8–10.5)
WBC # FLD AUTO: 8.62 K/UL — SIGNIFICANT CHANGE UP (ref 3.8–10.5)

## 2019-08-01 PROCEDURE — 99233 SBSQ HOSP IP/OBS HIGH 50: CPT

## 2019-08-01 RX ORDER — POLYETHYLENE GLYCOL 3350 17 G/17G
17 POWDER, FOR SOLUTION ORAL ONCE
Refills: 0 | Status: COMPLETED | OUTPATIENT
Start: 2019-08-01 | End: 2019-08-01

## 2019-08-01 RX ORDER — AMLODIPINE BESYLATE 2.5 MG/1
5 TABLET ORAL DAILY
Refills: 0 | Status: DISCONTINUED | OUTPATIENT
Start: 2019-08-01 | End: 2019-08-02

## 2019-08-01 RX ADMIN — Medication 100 MILLIGRAM(S): at 11:20

## 2019-08-01 RX ADMIN — ENOXAPARIN SODIUM 40 MILLIGRAM(S): 100 INJECTION SUBCUTANEOUS at 11:20

## 2019-08-01 RX ADMIN — Medication 112 MICROGRAM(S): at 06:16

## 2019-08-01 RX ADMIN — CEFTRIAXONE 100 MILLIGRAM(S): 500 INJECTION, POWDER, FOR SOLUTION INTRAMUSCULAR; INTRAVENOUS at 13:58

## 2019-08-01 RX ADMIN — POLYETHYLENE GLYCOL 3350 17 GRAM(S): 17 POWDER, FOR SOLUTION ORAL at 16:42

## 2019-08-01 RX ADMIN — ATENOLOL 50 MILLIGRAM(S): 25 TABLET ORAL at 06:16

## 2019-08-01 RX ADMIN — AMLODIPINE BESYLATE 5 MILLIGRAM(S): 2.5 TABLET ORAL at 10:00

## 2019-08-01 NOTE — PROGRESS NOTE ADULT - SUBJECTIVE AND OBJECTIVE BOX
INTERVAL HPI/OVERNIGHT EVENTS:  no nausea, vomiting or abdominal pain  tolerating po  negative bowel movement on senna and colace  lft's improving      Vital Signs Last 24 Hrs  T(C): 36.6 (01 Aug 2019 04:14), Max: 37.1 (31 Jul 2019 11:29)  T(F): 97.9 (01 Aug 2019 04:14), Max: 98.8 (31 Jul 2019 11:29)  HR: 74 (01 Aug 2019 06:09) (71 - 79)  BP: 188/78 (01 Aug 2019 06:09) (152/72 - 189/84)  BP(mean): --  RR: 18 (01 Aug 2019 04:14) (18 - 18)  SpO2: 96% (01 Aug 2019 04:14) (95% - 97%)        LABS:                        9.3    8.67  )-----------( 189      ( 31 Jul 2019 08:03 )             29.4     08-01    139  |  102  |  29<H>  ----------------------------<  95  3.8   |  24  |  0.99    Ca    8.7      01 Aug 2019 05:29    TPro  6.9  /  Alb  3.2<L>  /  TBili  0.2  /  DBili  <0.1  /  AST  37  /  ALT  70<H>  /  AlkPhos  242<H>  07-31        I&O's Summary    31 Jul 2019 07:01  -  01 Aug 2019 07:00  --------------------------------------------------------  IN: 630 mL / OUT: 1450 mL / NET: -820 mL        MEDICATIONS  (STANDING):  ATENolol  Tablet 50 milliGRAM(s) Oral daily  cefTRIAXone   IVPB 1000 milliGRAM(s) IV Intermittent every 24 hours  docusate sodium 100 milliGRAM(s) Oral daily  enoxaparin Injectable 40 milliGRAM(s) SubCutaneous daily  levothyroxine 112 MICROGram(s) Oral daily  senna 1 Tablet(s) Oral daily    MEDICATIONS  (PRN):        RADIOLOGY & ADDITIONAL TESTS:                [facility]

## 2019-08-01 NOTE — PROGRESS NOTE ADULT - ATTENDING COMMENTS
Deb Zamudio MD  Division of Hospital Medicine  Pager: [phone]  Office: [phone]
Deb Zamudio MD  Division of Hospital Medicine  Pager: [phone]  Office: [phone]

## 2019-08-01 NOTE — PROGRESS NOTE ADULT - SUBJECTIVE AND OBJECTIVE BOX
Patient is a 89y old  Female who presents with a chief complaint of Syncope (01 Aug 2019 10:59)      SUBJECTIVE / OVERNIGHT EVENTS:  no acute events overnight  BPs higher overnight and this morning  pt has no complaints - denies chest pain, headache, blurry vision, sob    ROS:  14 point ROS negative in detail except stated as above    MEDICATIONS  (STANDING):  amLODIPine   Tablet 5 milliGRAM(s) Oral daily  ATENolol  Tablet 50 milliGRAM(s) Oral daily  cefTRIAXone   IVPB 1000 milliGRAM(s) IV Intermittent every 24 hours  docusate sodium 100 milliGRAM(s) Oral daily  enoxaparin Injectable 40 milliGRAM(s) SubCutaneous daily  levothyroxine 112 MICROGram(s) Oral daily  senna 1 Tablet(s) Oral daily    MEDICATIONS  (PRN):      CAPILLARY BLOOD GLUCOSE        I&O's Summary    31 Jul 2019 07:01  -  01 Aug 2019 07:00  --------------------------------------------------------  IN: 630 mL / OUT: 1450 mL / NET: -820 mL        PHYSICAL EXAM:  Vital Signs Last 24 Hrs  T(C): 36.6 (01 Aug 2019 04:14), Max: 36.9 (31 Jul 2019 20:14)  T(F): 97.9 (01 Aug 2019 04:14), Max: 98.4 (31 Jul 2019 20:14)  HR: 64 (01 Aug 2019 09:14) (64 - 79)  BP: 185/93 (01 Aug 2019 09:14) (152/72 - 189/84)  BP(mean): --  RR: 18 (01 Aug 2019 04:14) (18 - 18)  SpO2: 96% (01 Aug 2019 04:14) (95% - 96%)    GENERAL: NAD, well-developed  HEAD:  Atraumatic, Normocephalic  EYES: EOMI, PERRLA, conjunctiva and sclera clear  NECK: Supple, No JVD  CHEST/LUNG: Clear to auscultation bilaterally; No wheeze  HEART: Regular rate and rhythm; No murmurs, rubs, or gallops  ABDOMEN: Soft, Nontender, Nondistended; Bowel sounds present  EXTREMITIES:  2+ Peripheral Pulses, No clubbing, cyanosis, or edema  NEUROLOGY: AAOx3; non-focal  SKIN: No rashes or lesions    LABS:  personally reviewed                        10.1   8.62  )-----------( 208      ( 01 Aug 2019 08:37 )             31.8     08-01    139  |  102  |  29<H>  ----------------------------<  95  3.8   |  24  |  0.99    Ca    8.7      01 Aug 2019 05:29    TPro  6.9  /  Alb  3.2<L>  /  TBili  0.2  /  DBili  <0.1  /  AST  37  /  ALT  70<H>  /  AlkPhos  242<H>  07-31              RADIOLOGY & ADDITIONAL TESTS:    Imaging Personally Reviewed:  -tele: NSR 60-80s    Consultant(s) Notes Reviewed:  GI, urology    Care Discussed with Consultants/Other Providers: Dr. Shah, Dr. Goldberg

## 2019-08-02 ENCOUNTER — TRANSCRIPTION ENCOUNTER (OUTPATIENT)
Age: 84
End: 2019-08-02

## 2019-08-02 VITALS
DIASTOLIC BLOOD PRESSURE: 82 MMHG | OXYGEN SATURATION: 97 % | HEART RATE: 73 BPM | SYSTOLIC BLOOD PRESSURE: 149 MMHG | TEMPERATURE: 98 F | RESPIRATION RATE: 17 BRPM

## 2019-08-02 DIAGNOSIS — K59.00 CONSTIPATION, UNSPECIFIED: ICD-10-CM

## 2019-08-02 PROBLEM — E03.9 HYPOTHYROIDISM, UNSPECIFIED: Chronic | Status: ACTIVE | Noted: 2019-07-29

## 2019-08-02 PROBLEM — I21.4 NON-ST ELEVATION (NSTEMI) MYOCARDIAL INFARCTION: Chronic | Status: ACTIVE | Noted: 2019-07-29

## 2019-08-02 LAB
ANION GAP SERPL CALC-SCNC: 18 MMOL/L — HIGH (ref 5–17)
BUN SERPL-MCNC: 30 MG/DL — HIGH (ref 7–23)
CALCIUM SERPL-MCNC: 8.7 MG/DL — SIGNIFICANT CHANGE UP (ref 8.4–10.5)
CHLORIDE SERPL-SCNC: 102 MMOL/L — SIGNIFICANT CHANGE UP (ref 96–108)
CO2 SERPL-SCNC: 16 MMOL/L — LOW (ref 22–31)
CREAT SERPL-MCNC: 0.94 MG/DL — SIGNIFICANT CHANGE UP (ref 0.5–1.3)
GLUCOSE SERPL-MCNC: 98 MG/DL — SIGNIFICANT CHANGE UP (ref 70–99)
MAGNESIUM SERPL-MCNC: 2 MG/DL — SIGNIFICANT CHANGE UP (ref 1.6–2.6)
PHOSPHATE SERPL-MCNC: 3.7 MG/DL — SIGNIFICANT CHANGE UP (ref 2.5–4.5)
POTASSIUM SERPL-MCNC: 4 MMOL/L — SIGNIFICANT CHANGE UP (ref 3.5–5.3)
POTASSIUM SERPL-SCNC: 4 MMOL/L — SIGNIFICANT CHANGE UP (ref 3.5–5.3)
SODIUM SERPL-SCNC: 136 MMOL/L — SIGNIFICANT CHANGE UP (ref 135–145)

## 2019-08-02 PROCEDURE — 82977 ASSAY OF GGT: CPT

## 2019-08-02 PROCEDURE — 93975 VASCULAR STUDY: CPT | Mod: 26

## 2019-08-02 PROCEDURE — 85027 COMPLETE CBC AUTOMATED: CPT

## 2019-08-02 PROCEDURE — 82435 ASSAY OF BLOOD CHLORIDE: CPT

## 2019-08-02 PROCEDURE — 83735 ASSAY OF MAGNESIUM: CPT

## 2019-08-02 PROCEDURE — 85014 HEMATOCRIT: CPT

## 2019-08-02 PROCEDURE — 82947 ASSAY GLUCOSE BLOOD QUANT: CPT

## 2019-08-02 PROCEDURE — 96365 THER/PROPH/DIAG IV INF INIT: CPT

## 2019-08-02 PROCEDURE — 84443 ASSAY THYROID STIM HORMONE: CPT

## 2019-08-02 PROCEDURE — 99239 HOSP IP/OBS DSCHRG MGMT >30: CPT

## 2019-08-02 PROCEDURE — 82330 ASSAY OF CALCIUM: CPT

## 2019-08-02 PROCEDURE — 85730 THROMBOPLASTIN TIME PARTIAL: CPT

## 2019-08-02 PROCEDURE — 84132 ASSAY OF SERUM POTASSIUM: CPT

## 2019-08-02 PROCEDURE — 97116 GAIT TRAINING THERAPY: CPT

## 2019-08-02 PROCEDURE — 87150 DNA/RNA AMPLIFIED PROBE: CPT

## 2019-08-02 PROCEDURE — 71045 X-RAY EXAM CHEST 1 VIEW: CPT

## 2019-08-02 PROCEDURE — 82962 GLUCOSE BLOOD TEST: CPT

## 2019-08-02 PROCEDURE — 85610 PROTHROMBIN TIME: CPT

## 2019-08-02 PROCEDURE — 93975 VASCULAR STUDY: CPT

## 2019-08-02 PROCEDURE — 93005 ELECTROCARDIOGRAM TRACING: CPT

## 2019-08-02 PROCEDURE — 99285 EMERGENCY DEPT VISIT HI MDM: CPT | Mod: 25

## 2019-08-02 PROCEDURE — 82803 BLOOD GASES ANY COMBINATION: CPT

## 2019-08-02 PROCEDURE — 84100 ASSAY OF PHOSPHORUS: CPT

## 2019-08-02 PROCEDURE — 81001 URINALYSIS AUTO W/SCOPE: CPT

## 2019-08-02 PROCEDURE — 83605 ASSAY OF LACTIC ACID: CPT

## 2019-08-02 PROCEDURE — 70450 CT HEAD/BRAIN W/O DYE: CPT

## 2019-08-02 PROCEDURE — 80076 HEPATIC FUNCTION PANEL: CPT

## 2019-08-02 PROCEDURE — 87086 URINE CULTURE/COLONY COUNT: CPT

## 2019-08-02 PROCEDURE — 80053 COMPREHEN METABOLIC PANEL: CPT

## 2019-08-02 PROCEDURE — 99222 1ST HOSP IP/OBS MODERATE 55: CPT

## 2019-08-02 PROCEDURE — 97110 THERAPEUTIC EXERCISES: CPT

## 2019-08-02 PROCEDURE — 87040 BLOOD CULTURE FOR BACTERIA: CPT

## 2019-08-02 PROCEDURE — 97161 PT EVAL LOW COMPLEX 20 MIN: CPT

## 2019-08-02 PROCEDURE — 84295 ASSAY OF SERUM SODIUM: CPT

## 2019-08-02 PROCEDURE — 80048 BASIC METABOLIC PNL TOTAL CA: CPT

## 2019-08-02 RX ORDER — CEFPODOXIME PROXETIL 100 MG
1 TABLET ORAL
Qty: 0 | Refills: 0 | DISCHARGE

## 2019-08-02 RX ORDER — CEFPODOXIME PROXETIL 100 MG
200 TABLET ORAL EVERY 12 HOURS
Refills: 0 | Status: DISCONTINUED | OUTPATIENT
Start: 2019-08-02 | End: 2019-08-02

## 2019-08-02 RX ORDER — ACETAMINOPHEN 500 MG
2 TABLET ORAL
Qty: 0 | Refills: 0 | DISCHARGE

## 2019-08-02 RX ORDER — CEFPODOXIME PROXETIL 100 MG
2 TABLET ORAL
Qty: 0 | Refills: 0 | DISCHARGE

## 2019-08-02 RX ORDER — POLYETHYLENE GLYCOL 3350 17 G/17G
17 POWDER, FOR SOLUTION ORAL DAILY
Refills: 0 | Status: DISCONTINUED | OUTPATIENT
Start: 2019-08-02 | End: 2019-08-02

## 2019-08-02 RX ORDER — LACTOBACILLUS ACIDOPHILUS 100MM CELL
1 CAPSULE ORAL
Qty: 60 | Refills: 0
Start: 2019-08-02 | End: 2019-08-31

## 2019-08-02 RX ORDER — AMLODIPINE BESYLATE 2.5 MG/1
2 TABLET ORAL
Qty: 0 | Refills: 0 | DISCHARGE
Start: 2019-08-02

## 2019-08-02 RX ORDER — ASPIRIN/CALCIUM CARB/MAGNESIUM 324 MG
1 TABLET ORAL
Qty: 0 | Refills: 0 | DISCHARGE

## 2019-08-02 RX ORDER — DOCUSATE SODIUM 100 MG
1 CAPSULE ORAL
Qty: 0 | Refills: 0 | DISCHARGE
Start: 2019-08-02

## 2019-08-02 RX ORDER — DOCUSATE SODIUM 100 MG
100 CAPSULE ORAL THREE TIMES A DAY
Refills: 0 | Status: DISCONTINUED | OUTPATIENT
Start: 2019-08-02 | End: 2019-08-02

## 2019-08-02 RX ORDER — POLYETHYLENE GLYCOL 3350 17 G/17G
17 POWDER, FOR SOLUTION ORAL
Qty: 0 | Refills: 0 | DISCHARGE
Start: 2019-08-02

## 2019-08-02 RX ORDER — AMLODIPINE BESYLATE 2.5 MG/1
1 TABLET ORAL
Qty: 0 | Refills: 0 | DISCHARGE
Start: 2019-08-02

## 2019-08-02 RX ORDER — SENNA PLUS 8.6 MG/1
1 TABLET ORAL
Qty: 0 | Refills: 0 | DISCHARGE
Start: 2019-08-02

## 2019-08-02 RX ADMIN — AMLODIPINE BESYLATE 5 MILLIGRAM(S): 2.5 TABLET ORAL at 05:11

## 2019-08-02 RX ADMIN — ENOXAPARIN SODIUM 40 MILLIGRAM(S): 100 INJECTION SUBCUTANEOUS at 12:08

## 2019-08-02 RX ADMIN — Medication 112 MICROGRAM(S): at 05:11

## 2019-08-02 RX ADMIN — Medication 100 MILLIGRAM(S): at 12:08

## 2019-08-02 RX ADMIN — ATENOLOL 50 MILLIGRAM(S): 25 TABLET ORAL at 05:11

## 2019-08-02 NOTE — PROVIDER CONTACT NOTE (OTHER) - ACTION/TREATMENT ORDERED:
Cont to tele monitor
will look into medications and order if needed
Provider aware and acknowledged. Administered AM medication as ordered, no further interventions needed at this time. Will continue to monitor.
Provider aware and acknowledged. Will order labs to be drawn, no further interventions. Will continue to monitor.

## 2019-08-02 NOTE — PROVIDER CONTACT NOTE (OTHER) - ASSESSMENT
Patient in chair chatting with daughter, denies any pain, discomfort or SOB
Patient is A&Ox4, asymptomatic. No complaints at this time. NSR 60-70s on telemetry.
Patient noted to be sleeping at time of event. She is A&Ox4, VSS. No complaints.

## 2019-08-02 NOTE — PROGRESS NOTE ADULT - SUBJECTIVE AND OBJECTIVE BOX
Patient is a 89y old  Female who presents with a chief complaint of Syncope (02 Aug 2019 12:53)      SUBJECTIVE / OVERNIGHT EVENTS: No overnight events. Feels well, denies hcp, sob, lightheadedness, headache. No dysuria, urgency, back pain, abdominal pain. Reports constipation     MEDICATIONS  (STANDING):  amLODIPine   Tablet 5 milliGRAM(s) Oral daily  ATENolol  Tablet 50 milliGRAM(s) Oral daily  cefpodoxime 200 milliGRAM(s) Oral every 12 hours  docusate sodium 100 milliGRAM(s) Oral three times a day  enoxaparin Injectable 40 milliGRAM(s) SubCutaneous daily  levothyroxine 112 MICROGram(s) Oral daily  polyethylene glycol 3350 17 Gram(s) Oral daily  senna 1 Tablet(s) Oral daily    MEDICATIONS  (PRN):      Vital Signs Last 24 Hrs  T(C): 36.5 (02 Aug 2019 12:15), Max: 36.8 (02 Aug 2019 04:25)  T(F): 97.7 (02 Aug 2019 12:15), Max: 98.2 (02 Aug 2019 04:25)  HR: 73 (02 Aug 2019 12:15) (72 - 78)  BP: 149/82 (02 Aug 2019 12:15) (122/72 - 174/92)  BP(mean): --  RR: 17 (02 Aug 2019 12:15) (16 - 18)  SpO2: 97% (02 Aug 2019 12:15) (95% - 98%)  CAPILLARY BLOOD GLUCOSE        I&O's Summary    01 Aug 2019 07:01  -  02 Aug 2019 07:00  --------------------------------------------------------  IN: 0 mL / OUT: 1100 mL / NET: -1100 mL        PHYSICAL EXAM:  GENERAL: NAD, well-developed sitting in chair  HEAD:  Atraumatic, Normocephalic  NECK: Supple, No JVD  CHEST/LUNG: Clear to auscultation bilaterally; No wheeze  HEART: Regular rate and rhythm;   ABDOMEN: Soft, Nontender, Nondistended; Bowel sounds present  EXTREMITIES:  2+ Peripheral Pulses, No clubbing, cyanosis, or edema  PSYCH: AAOx3  NEUROLOGY: non-focal      LABS:                        10.1   8.62  )-----------( 208      ( 01 Aug 2019 08:37 )             31.8     08-02    136  |  102  |  30<H>  ----------------------------<  98  4.0   |  16<L>  |  0.94    Ca    8.7      02 Aug 2019 06:25  Phos  3.7     08-02  Mg     2.0     08-02                Culture - Blood (collected 01 Aug 2019 10:11)  Source: .Blood  Preliminary Report (02 Aug 2019 11:01):    No growth to date.    Culture - Blood (collected 01 Aug 2019 10:11)  Source: .Blood  Preliminary Report (02 Aug 2019 11:01):    No growth to date.    Culture - Blood (collected 31 Jul 2019 09:50)  Source: .Blood  Preliminary Report (01 Aug 2019 10:01):    No growth to date.    Culture - Blood (collected 31 Jul 2019 09:50)  Source: .Blood  Preliminary Report (01 Aug 2019 10:01):    No growth to date.          RADIOLOGY & ADDITIONAL TESTS:    Imaging Personally Reviewed:    Consultant(s) Notes Reviewed:  all    Care Discussed with Consultants/Other Providers: Patient is a 89y old  Female who presents with a chief complaint of Syncope (02 Aug 2019 12:53)      SUBJECTIVE / OVERNIGHT EVENTS: No overnight events. Feels well, denies hcp, sob, lightheadedness, headache. No dysuria, urgency, back pain, abdominal pain. Reports constipation    tele reviewed: sinus 60-80     MEDICATIONS  (STANDING):  amLODIPine   Tablet 5 milliGRAM(s) Oral daily  ATENolol  Tablet 50 milliGRAM(s) Oral daily  cefpodoxime 200 milliGRAM(s) Oral every 12 hours  docusate sodium 100 milliGRAM(s) Oral three times a day  enoxaparin Injectable 40 milliGRAM(s) SubCutaneous daily  levothyroxine 112 MICROGram(s) Oral daily  polyethylene glycol 3350 17 Gram(s) Oral daily  senna 1 Tablet(s) Oral daily    MEDICATIONS  (PRN):      Vital Signs Last 24 Hrs  T(C): 36.5 (02 Aug 2019 12:15), Max: 36.8 (02 Aug 2019 04:25)  T(F): 97.7 (02 Aug 2019 12:15), Max: 98.2 (02 Aug 2019 04:25)  HR: 73 (02 Aug 2019 12:15) (72 - 78)  BP: 149/82 (02 Aug 2019 12:15) (122/72 - 174/92)  BP(mean): --  RR: 17 (02 Aug 2019 12:15) (16 - 18)  SpO2: 97% (02 Aug 2019 12:15) (95% - 98%)  CAPILLARY BLOOD GLUCOSE        I&O's Summary    01 Aug 2019 07:01  -  02 Aug 2019 07:00  --------------------------------------------------------  IN: 0 mL / OUT: 1100 mL / NET: -1100 mL        PHYSICAL EXAM:  GENERAL: NAD, well-developed sitting in chair  HEAD:  Atraumatic, Normocephalic  NECK: Supple, No JVD  CHEST/LUNG: Clear to auscultation bilaterally; No wheeze  HEART: Regular rate and rhythm;   ABDOMEN: Soft, Nontender, Nondistended; Bowel sounds present  EXTREMITIES:  2+ Peripheral Pulses, No clubbing, cyanosis, or edema  PSYCH: AAOx3  NEUROLOGY: non-focal      LABS:                        10.1   8.62  )-----------( 208      ( 01 Aug 2019 08:37 )             31.8     08-02    136  |  102  |  30<H>  ----------------------------<  98  4.0   |  16<L>  |  0.94    Ca    8.7      02 Aug 2019 06:25  Phos  3.7     08-02  Mg     2.0     08-02                Culture - Blood (collected 01 Aug 2019 10:11)  Source: .Blood  Preliminary Report (02 Aug 2019 11:01):    No growth to date.    Culture - Blood (collected 01 Aug 2019 10:11)  Source: .Blood  Preliminary Report (02 Aug 2019 11:01):    No growth to date.    Culture - Blood (collected 31 Jul 2019 09:50)  Source: .Blood  Preliminary Report (01 Aug 2019 10:01):    No growth to date.    Culture - Blood (collected 31 Jul 2019 09:50)  Source: .Blood  Preliminary Report (01 Aug 2019 10:01):    No growth to date.          RADIOLOGY & ADDITIONAL TESTS:    Imaging Personally Reviewed:    Consultant(s) Notes Reviewed:  all    Care Discussed with Consultants/Other Providers:

## 2019-08-02 NOTE — PROGRESS NOTE ADULT - PROBLEM SELECTOR PLAN 2
Mild transaminitis on admission, GGT mildly elevated as well.   -now trending down. likely acute transaminitis in setting of active infection  -hold off on RUQ sono
resolved due to UTI, afebrile, no leukocytosis  -treatment as above
Pt w/ leukocytosis/fevers w/ evidence of end organ damage(transaminitis) in the setting of positive UA meeting criteria for severe sepsis. Leukocytosis improving  H/O ESBL in past, most recently with pan sensitive E coli  c/w ceftriaxone pending UCX/BCX/sensitivities
Mild transaminitis on admission, GGT mildly elevated as well.   -now trending down. likely acute transaminitis in setting of active infection  -hold off on RUQ sono

## 2019-08-02 NOTE — PROGRESS NOTE ADULT - PROBLEM SELECTOR PLAN 1
Recurrent UTIs with hx of ESBL.   -care discussed with Dr. Shah. Given the multiple episodes, recommend urology eval
Recurrent UTIs with hx of ESBL.   -current urine cx with ecoli sensitive to cephalosporins  -s/p ertapenem 7/29, c/w ceftriaxone Day 5 of antibiotics  -discussed with ID, Dr Ernandez, switch to cefpodoxime 200mg bid x 3 more days to complete 7 day course, no role for suppressive antibiotics especially given ESBL in past, start probiotic instead  -renal US negative  -appreciate urology recs
Pt w/ syncope and fall  Likely in the setting of sepsis/dehydration; c/w gentle IVF hydration  CT head negative for hemorrhage  orthostatics pending  Will defer echo
Recurrent UTIs with hx of ESBL.   -continue ceftriaxone day 2/3  -care discussed with Dr. Shah. Given the multiple episodes, recommend urology eval  -appreciate urology recs. Recommends repeat renal US and suppressive abx therapy. Outpatient follow up

## 2019-08-02 NOTE — PROGRESS NOTE ADULT - ASSESSMENT
89 y/o female with PMH HTN, hypothyroidism admitted for severe sepsis due to UTI complicated by syncope
89 y/o female with PMH HTN, hypothyroidism admitted for severe sepsis complicated by syncope
serial lft's  sono abdomen- may be done as outpatient  consider trial of miralax for constipation  abx as per med
89 y/o female with PMH HTN, hypothyroidism admitted for severe sepsis complicated by syncope
89 y/o female with PMH HTN, hypothyroidism admitted for severe sepsis complicated by syncope
serial cbc and cmp  iron studies  stool for FIT  sono abdomen- may be done as outpatient  miralax for constipation  abx as per med/urology

## 2019-08-02 NOTE — DISCHARGE NOTE NURSING/CASE MANAGEMENT/SOCIAL WORK - NSDCDPATPORTLINK_GEN_ALL_CORE
You can access the [facility], offered by [facility], by registering with the following website: [email or web address]

## 2019-08-02 NOTE — PROGRESS NOTE ADULT - PROBLEM SELECTOR PLAN 8
DVT ppx: lovenox subq  Diet: DASH/TLC  PT rec [facility]  Dispo: discharge to [facility] today  spent 40 min on discharge process time

## 2019-08-02 NOTE — PROGRESS NOTE ADULT - PROBLEM SELECTOR PLAN 7
DVT ppx: lovenox subq  Diet: DASH/TLC  PT consult
DVT ppx: lovenox subq  Diet: DASH/TLC  PT consult
bowel regimen  GI recs appreciated

## 2019-08-02 NOTE — DISCHARGE NOTE PROVIDER - PROVIDER TOKENS
PROVIDER:[TOKEN:[[id number]]],PROVIDER:[TOKEN:[[id number]]] PROVIDER:[TOKEN:[[id number]]],PROVIDER:[TOKEN:[[id number]]],PROVIDER:[TOKEN:[[id number]]]

## 2019-08-02 NOTE — CONSULT NOTE ADULT - ASSESSMENT
89 y/o female with PMH HTN, hypothyroidism admitted 7/29/19 with syncope. She has chronic urinary frequency and several urinary tract infections with +ESBL Ecoli 5/18; bacteremic pyelo with susceptible Ecoli 6/19 and currently with susceptible E coli isolate  She is symptomatically improved    Antibiotics  Ertapenem 7/29  Vanco 7/30  Ceftriaxone 7/29 -->    suggest  Discontinue Ceftriaxone  Complete 7 day course antibiotics with po Cefpodoxime 200 mg po twice daily through 8/4    due to previous resistant bacteria, I do not favor chronic suppressive antibiotics, some patients do well with probiotic/prebiotic approach: Kefir 1 cup twice daily suggested - patient given my contact info  please reconsult if needed    discussed with primary MD
left pyelonephritis 1 month ago now with positive ua but negative urine culture.  will continue with observation and outpatient gu fu and management as outpatient     repeat renal us  suppression abx therapy for 1 month  ? ampicillin/ kefex
serial cmp  consider sono abdomen  ? elevated related to current infection  doubt ceftriaxone etiology

## 2019-08-02 NOTE — PROGRESS NOTE ADULT - PROBLEM SELECTOR PLAN 3
Pt w/ syncope and fall, likely in the setting of sepsis/dehydration;   -c/w gentle IVF hydration  -CT head negative for hemorrhage  -orthostatics positive, continue IVF as above  -Will defer echo
Mild transaminitis on admission, GGT mildly elevated as well.   -trended down, likely due to acute infection/sepsis  -hold off on RUQ sono, no abd pain
Pt w/ hypertensive urgency in the ED w/ /102 s/p 5mg IVP lopressor  BP improved.   c/w atenolol 50mg daily, titrate BP regimen as needed
Pt w/ syncope and fall, likely in the setting of sepsis/dehydration;   -c/w gentle IVF hydration  -CT head negative for hemorrhage  -orthostatics positive, continue IVF as above  -Will defer echo

## 2019-08-02 NOTE — PROGRESS NOTE ADULT - PROBLEM SELECTOR PLAN 4
syncope and fall, likely in the setting of sepsis/dehydration and orthostatic hypotension  -orthostatic resolved  -CT head negative for hemorrhage  -given recurrent falls/syncope, age, will d/c ASA given R>B  -Will defer echo syncope and fall, likely in the setting of sepsis/dehydration and orthostatic hypotension  -orthostatic resolved  -CT head negative for hemorrhage  -given recurrent falls/syncope, age, will d/c ASA given R>B  -Will defer echo as no significant murmur on exam or major cardiac history  -no tele events

## 2019-08-02 NOTE — PROVIDER CONTACT NOTE (OTHER) - BACKGROUND
Admit Dx: UTI. Hx: HTN, spinal stenosis, NSTEMI. Atenolol 50mg and Norvasc 5mg PO administered as ordered in the AM.

## 2019-08-02 NOTE — CONSULT NOTE ADULT - SUBJECTIVE AND OBJECTIVE BOX
HPI  Patient is a pleasant 89 yo woman recently seen by Dr Riley for monitoring of irritative vodiing symptoms last seen 1 month ago with no signs of infection.  two days later she was admitted with sepsis an change in mental status.  She now returns with recurrent irritative voiding complaints and dysuria with recurrent ut.  She has a PMH HTN, hypothyroidism presenting with a complaint of passing out. The pt reports that she passed out on day o fadmittion, but does not recall how she fell or if she hit her head. The pt had a prior episode of syncope about a month ago in the setting of ESBL sepsis, during which time she was treated with ertapenem, then transitioned to ceftriaxone and d/c on cipro. The pt reports an episode on non-bloody emesis prior to presentation with associated abdominal pain that has since resolved. The pt reports that she has had decreased appetite recently and has eaten less food. She reports being thirsty recently. The pt has had increased urinary frequency for years and wears a diaper, but denies dysuria/fevers/chills/SOB/CP/palpitations/dizziness/lightheadedness/diarrhea/constipation/blurry vision/headaches or other issues.       PAST MEDICAL & SURGICAL HISTORY:  Non-ST elevation MI (NSTEMI)  Hypothyroidism  Spinal stenosis  HTN (hypertension)  Bilateral Cataracts  Status Post Breast Lumpectomy  Urinary Frequency  S/P ORIF (open reduction internal fixation) fracture  Ectopic pregnancy, tubal      MEDICATIONS  (STANDING):  amLODIPine   Tablet 5 milliGRAM(s) Oral daily  ATENolol  Tablet 50 milliGRAM(s) Oral daily  cefTRIAXone   IVPB 1000 milliGRAM(s) IV Intermittent every 24 hours  docusate sodium 100 milliGRAM(s) Oral daily  enoxaparin Injectable 40 milliGRAM(s) SubCutaneous daily  levothyroxine 112 MICROGram(s) Oral daily  senna 1 Tablet(s) Oral daily    FAMILY HISTORY:  Family history of acute myocardial infarction    Allergies    sulfa topicals (Unknown)    Intolerances    morphine (Drowsiness; Faint; Hypotension)      SOCIAL HISTORY:  [facility] resident  no tobacco etoh    REVIEW OF SYSTEMS: hx per pt  heent neg neck neg skin neg  resp neg cv syncope gi neg  gu per hpi msk neg neuro neg  all neg endo neg psych neg    Physical Exam  Vital signs  T(C): 36.6 (08-01-19 @ 04:14), Max: 37.1 (07-31-19 @ 11:29)  HR: 64 (08-01-19 @ 09:14)  BP: 185/93 (08-01-19 @ 09:14)  SpO2: 96% (08-01-19 @ 04:14)  Wt(kg): --    Output  Gen:  elderly woman in nad heent ncat  neck symm supple  cor reg chest clear abd soft nd nt no cvat  gu no mass dc ext no c/c/e  neuor non focal psych a and o x3     LABS:      08-01 @ 08:37    WBC 8.62  / Hct 31.8  / SCr --       08-01 @ 05:29    WBC --    / Hct --    / SCr 0.99     08-01    139  |  102  |  29<H>  ----------------------------<  95  3.8   |  24  |  0.99    Ca    8.7      01 Aug 2019 05:29    TPro  6.9  /  Alb  3.2<L>  /  TBili  0.2  /  DBili  <0.1  /  AST  37  /  ALT  70<H>  /  AlkPhos  242<H>  07-31          Urine Cx:  ng  Blood Cx: ng    RADIOLOGY:  1 month ago  with left pyelo
Patient is a 89y old  Female who presents with a chief complaint of Syncope (02 Aug 2019 09:38)    HPI:  89 y/o female with PMH HTN, hypothyroidism admitted 7/29/19 with a complaint of passing out. The pt reports that she passed out today, but does not recall how she fell or if she hit her head. The pt had a prior episode of syncope about a month ago in the setting of ESBL sepsis, during which time she was treated with ertapenem, then transitioned to ceftriaxone and d/c on cipro. The pt reports an episode on non-bloody emesis prior to presentation with associated abdominal pain that has since resolved. The pt reports that she has had decreased appetite recently and has eaten less food. She reports being thirsty recently. The pt has had increased urinary frequency for years and wears a diaper, but denies dysuria/fevers/chills/SOB/CP/palpitations/dizziness/lightheadedness/diarrhea/constipation/blurry vision/headaches or other issues. (29 Jul 2019 22:46)    At present, patient feels much improved.  She has long standing urinary complaints - mainly urinary frequency - and states she has had two urinary tract infections.  She has found the external urinary device comfortable.  She is walking better than earlier this hospitalization and anticipates a short stay in rehab      PAST MEDICAL & SURGICAL HISTORY:  Non-ST elevation MI (NSTEMI)  Hypothyroidism  Spinal stenosis  HTN (hypertension)  Bilateral Cataracts  Status Post Breast Lumpectomy  Urinary Frequency  S/P ORIF (open reduction internal fixation) fracture  Ectopic pregnancy, tubal      Social history: , independent in senior living center    FAMILY HISTORY:  Family history of acute myocardial infarction      REVIEW OF SYSTEMS:  CONSTITUTIONAL: No weakness, fevers or chills  EYES/ENT: No visual changes;  No vertigo or throat pain   NECK: No pain or stiffness  RESPIRATORY: No cough, wheezing, hemoptysis; No shortness of breath  CARDIOVASCULAR: No chest pain or palpitations  GASTROINTESTINAL: No abdominal or epigastric pain. No nausea, vomiting, or hematemesis; No diarrhea or constipation. No melena or hematochezia.  GENITOURINARY: No dysuria,  hematuria  - chronic frequency  NEUROLOGICAL: No numbness or weakness  SKIN: No itching, burning, rashes, or lesions   All other review of systems is negative unless indicated above    Allergies  sulfa topicals (Unknown)    Intolerances  morphine (Drowsiness; Faint; Hypotension)      Antimicrobials:  cefTRIAXone   IVPB 1000 milliGRAM(s) IV Intermittent every 24 hours    Vital Signs Last 24 Hrs  T(C): 36.7 (02 Aug 2019 05:52), Max: 36.8 (02 Aug 2019 04:25)  T(F): 98.1 (02 Aug 2019 05:52), Max: 98.2 (02 Aug 2019 04:25)  HR: 72 (02 Aug 2019 05:52) (72 - 78)  BP: 174/92 (02 Aug 2019 05:52) (122/72 - 174/92)  BP(mean): --  RR: 18 (02 Aug 2019 05:52) (16 - 18)  SpO2: 98% (02 Aug 2019 05:52) (95% - 98%)    PHYSICAL EXAM:  General: WN/WD NAD, Non-toxic  Neurology: A&Ox3, nonfocal  Respiratory: Clear to auscultation bilaterally  dorsal kyphosis  CV: RRR, S1S2, no murmurs, rubs or gallops  Abdominal: Soft, Non-tender, non-distended, no flank tenderness  Extremities: No edema, + peripheral pulses  Line Sites: Clear  Skin: No rash                        10.1   8.62  )-----------( 208      ( 01 Aug 2019 08:37 )             31.8   WBC Count: 8.62 (08-01 @ 08:37)  WBC Count: 8.67 (07-31 @ 08:03)  WBC Count: 11.2 (07-30 @ 05:36)  WBC Count: 14.2 (07-29 @ 13:47)      08-02    136  |  102  |  30<H>  ----------------------------<  98  4.0   |  16<L>  |  0.94    Ca    8.7      02 Aug 2019 06:25  Phos  3.7     08-02  Mg     2.0     08-02      MICROBIOLOGY:  .Blood  08-01-19   No growth to date.  --  --      .Blood  07-31-19   No growth to date.  --  --      .Blood  07-29-19   Growth in aerobic and anaerobic bottles: Coag Negative Staphylococcus  Single set isolate, possible contaminant.     .Urine  07-29-19   No growth  --  --      .Urine CATHETERIZED  07-24-19   10,000 - 49,000 CFU/mL Escherichia coli  <10,000 CFU/ml Normal Urogenital kiara present  --  Escherichia coli  Culture - Urine (07.24.19 @ 18:06)    -  Amikacin: S <=8    -  Tobramycin: I 8    -  Trimethoprim/Sulfamethoxazole: R >2/38    -  Ertapenem: S <=0.5    -  Gentamicin: R >8    -  Imipenem: S <=1    -  Levofloxacin: R >4    -  Meropenem: S <=1    -  Nitrofurantoin: S <=32 Should not be used to treat pyelonephritis    -  Piperacillin/Tazobactam: S <=8    -  Tigecycline: S <=1    -  Cefepime: S <=2    -  Cefoxitin: S <=4    -  Ceftriaxone: S <=1 Enterobacter, Citrobacter, and Serratia may develop resistance during prolonged therapy    -  Ciprofloxacin: R >2    -  Cefazolin: I 4 (MIC_CL_COM_ENTERIC_CEFAZU) For uncomplicated UTI with K. pneumoniae, E. coli, or P. mirablis: CARY <=16 is sensitive and CARY >=32 is resistant. This also predicts results for oral agents cefaclor, cefdinir, cefpodoxime, cefprozil, cefuroxime axetil, cephalexin and locarbef for uncomplicated UTI. Note that some isolates may be susceptible to these agents while testing resistant to cefazolin.    -  Aztreonam: S <=4    -  Ampicillin: R >16 These ampicillin results predict results for amoxicillin    -  Ampicillin/Sulbactam: I 16/8 Enterobacter, Citrobacter, and Serratia may develop resistance during prolonged therapy (3-4 days)    -  Amoxicillin/Clavulanic Acid: S <=8/4      .Urine CL CATCH MDSTRM  07-20-19   >=3 organisms. Probable collection contamination.  --  --      .Urine CL CATCH MDSTRM  07-17-19   >=3 organisms. Probable collection contamination.  --  --    Culture - Blood (06.17.19 @ 15:17)    -  Trimethoprim/Sulfamethoxazole: S <=0.5/9.5    -  Tobramycin: S <=2    -  Escherichia coli: Detec    -  Amikacin: S <=8    Gram Stain:   Growth in aerobic bottle: Gram Negative Rods  Growth in anaerobic bottle: Gram Negative Rods    -  Ampicillin: S <=2 These ampicillin results predict results for amoxicillin    -  Aztreonam: S <=4    -  Cefazolin: S <=2    -  Ciprofloxacin: S <=0.5    -  Ceftriaxone: S <=1 Enterobacter, Citrobacter, and Serratia may develop resistance during prolonged therapy    -  Cefoxitin: S <=4    -  Cefepime: S <=2    -  Piperacillin/Tazobactam: S <=8    -  Meropenem: S <=1    -  Levofloxacin: S <=1    -  Imipenem: S <=1    -  Gentamicin: S <=1    -  Ampicillin/Sulbactam: S <=4/2    -  Ertapenem: S <=0.5    Culture - Urine (05.20.18 @ 17:13)    -  Trimethoprim/Sulfamethoxazole: R >2/38    -  Tobramycin: R >8    -  Levofloxacin: I 4    -  Ampicillin/Sulbactam: R 8/4    -  Piperacillin/Tazobactam: R <=8    -  Nitrofurantoin: S <=32 Should not be used to treat pyelonephritis    -  Meropenem: S <=1    -  Gentamicin: S <=1    -  Imipenem: S <=1    -  Ertapenem: S <=0.5    -  Ciprofloxacin: R >2    -  Ceftriaxone: R >32 Enterobacter, Citrobacter, and Serratia may develop resistance during prolonged therapy    -  Cefoxitin: S <=4    -  Cefepime: R >16    -  Cefazolin: R >16 This predicts results for oral agents cefaclor, cefdinir, cefpodoxime, cefprozil, cefuroxime axetil, cephalexin and locarbef for uncomplicated UTI. Note that some isolates may be susceptible to these agents while testing resistant to cefazolin.    -  Aztreonam: R >16    -  Ampicillin: R >16 These ampicillin results predict results for amoxicillin    -  Amoxicillin/Clavulanic Acid: S <=8/4    -  Amikacin: S <=8    -  Tigecycline: S <=1    Specimen Source: .Urine Clean Catch (Midstream)    Culture Results:   >100,000 CFU/ml Escherichia coli ESBL    Radiology:  < from: US Duplex Kidneys (08.02.19 @ 10:05) >  Right kidney:  10.8 cm. No renal mass, hydronephrosis or calculi.     Left kidney:  9.2 cm. No renal mass, hydronephrosis or calculi. Mild   central fullness.    Urinary bladder: Within normal limits.    Color and spectral Doppler reveals normal, symmetric blood flow   throughout both kidneys.    Peak aortic velocity is 59 cm/sec.      Renal Veins: Patent.    RIGHT  Renal Artery:   Peak systolic velocity is 136 cm/sec origin, 127 cm/sec proximal, 156   cm/sec mid, 70 cm/sec distal and 55 cm/sec hilum.   Upper Segmental Artery:  RI = 0.74  Middle Segmental Artery: RI = 0.80  Lower Segmental Artery: RI = 0.78  Prompt upstroke.     LEFT  Renal Artery:  Peak systolic velocity is 69 cm/sec origin, 92 cm/sec proximal, 66 cm/sec   mid, 66 cm/sec distal and 76 cm/sec hilum.   Upper Segmental Artery:  RI = 0.77  Middle Segmental Artery: RI = 0.81  Lower Segmental Artery: RI = 0.87  Prompt upstroke.    Miscellaneous: Left pleural effusion.    IMPRESSION:     No evidence of a significant renal artery stenosis.    Left pleural effusion.    < end of copied text >        Kavon Ernandez MD; Division of Infectious Disease; Pager: [phone]; nights and weekends: [phone]
Patient is a 89y old  Female who presents with a chief complaint of Syncope       HPI:  89 y/o female, well known to our practice, with PMH HTN, hypothyroidism presenting with a complaint of passing out. The pt reports that she passed out today, but does not recall how she fell or if she hit her head. The pt had a prior episode of syncope about a month ago in the setting of ESBL sepsis, during which time she was treated with ertapenem, then transitioned to ceftriaxone and d/c on cipro. The pt reports an episode on non-bloody emesis prior to presentation with associated abdominal pain that has since resolved. The pt reports that she has had decreased appetite recently and has eaten less food. She reports being thirsty recently. The pt has had increased urinary frequency for years, recurrent UTI's anfd followed by dr. Elrdidge and received botox injection but denies dysuria/fevers/chills/SOB/CP/palpitations/dizziness/lightheadedness/diarrhea/constipation/blurry vision/headaches or other issues. hospital course with clinical response to abx and with new ly documented elevated LFT's.      PAST MEDICAL HISTORY:  Non-ST elevation MI (NSTEMI)  Hypothyroidism  Spinal stenosis  HTN (hypertension)  Bilateral Cataracts    PAST SURGICAL HX:  Status Post Breast Lumpectomy  s/p cataract repair  S/P ORIF (open reduction internal fixation) fracture  Ectopic pregnancy, tubal      Allergies  morphine (Drowsiness; Faint; Hypotension)  sulfa topicals (Unknown)      MEDICATIONS  (STANDING):  ATENolol  Tablet 50 milliGRAM(s) Oral daily  cefTRIAXone   IVPB 1000 milliGRAM(s) IV Intermittent every 24 hours  docusate sodium 100 milliGRAM(s) Oral daily  enoxaparin Injectable 40 milliGRAM(s) SubCutaneous daily  levothyroxine 112 MICROGram(s) Oral daily  senna 1 Tablet(s) Oral daily  sodium chloride 0.9%. 1000 milliLiter(s) (75 mL/Hr) IV Continuous <Continuous>    MEDICATIONS  (PRN):      social history   nonsmoker  alcohol- without    FAMILY HISTORY:  Family history of acute myocardial infarction              Vital Signs Last 24 Hrs  T(C): 36.7 (2019 04:22), Max: 37.2 (2019 20:21)  T(F): 98 (2019 04:22), Max: 99 (2019 20:21)  HR: 77 (2019 04:22) (77 - 90)  BP: 168/84 (2019 04:22) (103/62 - 169/83)  BP(mean): --  RR: 18 (2019 04:22) (18 - 18)  SpO2: 96% (2019 04:22) (93% - 96%)        LABS:                        10.3   11.2  )-----------( 177      ( 2019 05:36 )             31.4     07    135  |  99  |  26<H>  ----------------------------<  103<H>  3.6   |  24  |  0.99    Ca    8.9      2019 06:37    TPro  7.2  /  Alb  3.3  /  TBili  0.6  /  DBili  x   /  AST  69<H>  /  ALT  98<H>  /  AlkPhos  277<H>  0730    PT/INR - ( 2019 13:47 )   PT: 12.3 sec;   INR: 1.08 ratio         PTT - ( 2019 13:47 )  PTT:30.0 sec  Urinalysis Basic - ( 2019 14:23 )    Color: Light Yellow / Appearance: Clear / S.014 / pH: x  Gluc: x / Ketone: Negative  / Bili: Negative / Urobili: Negative   Blood: x / Protein: Trace / Nitrite: Negative   Leuk Esterase: Large / RBC: 2 /hpf / WBC 38 /HPF   Sq Epi: x / Non Sq Epi: 4 /hpf / Bacteria: Negative      I&O's Summary    2019 07:01  -  2019 07:00  --------------------------------------------------------  IN: 0 mL / OUT: 750 mL / NET: -750 mL      RADIOLOGY & ADDITIONAL STUDIES:        gareth

## 2019-08-02 NOTE — DISCHARGE NOTE PROVIDER - HOSPITAL COURSE
89 y/o female with PMH HTN, hypothyroidism admitted for severe sepsis complicated by syncope          Problem/Plan - 1:    ·  Problem: Recurrent UTI.  Plan: Recurrent UTIs with hx of ESBL.     -continue ceftriaxone day 2/3    -care discussed with Dr. Shah. Given the multiple episodes, recommend urology eval    -appreciate urology recs. Recommends repeat renal US and suppressive abx therapy. Outpatient follow up.          Problem/Plan - 2:    ·  Problem: Transaminitis.  Plan: Mild transaminitis on admission, GGT mildly elevated as well.     -now trending down. likely acute transaminitis in setting of active infection    -hold off on RUQ sono.          Problem/Plan - 3:    ·  Problem: Syncope.  Plan: Pt w/ syncope and fall, likely in the setting of sepsis/dehydration;     -c/w gentle IVF hydration    -CT head negative for hemorrhage    -orthostatics positive, continue IVF as above    -Will defer echo.          Problem/Plan - 4:    ·  Problem: Severe sepsis.  Plan: Pt w/ leukocytosis/fevers w/ evidence of end organ damage(transaminitis) in the setting of positive UA meeting criteria for severe sepsis. Leukocytosis improving    H/O ESBL in past, most recently with pan sensitive E coli    c/w ceftriaxone pending UCX/BCX/sensitivities.          Problem/Plan - 5:    ·  Problem: Hypertensive urgency.  Plan: Pt w/ hypertensive urgency in the ED w/ /102 s/p 5mg IVP lopressor    BP improved.     -c/w atenolol 50mg daily    -add amlodipine for BP control.          Problem/Plan - 6:    Problem: Hypothyroidism. Plan: c/w synthroid 112mcg.         Problem/Plan - 7:    ·  Problem: Need for prophylactic measure.  Plan: DVT ppx: lovenox subq    Diet: DASH/TLC    PT consult. 89 y/o female with PMH HTN, hypothyroidism admitted for severe sepsis complicated by syncope          Problem/Plan - 1:    ·  Problem: Recurrent UTI.  Plan: Recurrent UTIs with hx of ESBL.     -continue ceftriaxone day 2/3    -care discussed with Dr. Shah. Given the multiple episodes, recommend urology eval    -appreciate urology recs. Recommends repeat renal US and suppressive abx therapy. Outpatient follow up.          Problem/Plan - 2:    ·  Problem: Transaminitis.  Plan: Mild transaminitis on admission, GGT mildly elevated as well.     -now trending down. likely acute transaminitis in setting of active infection    -hold off on RUQ sono.          Problem/Plan - 3:    ·  Problem: Syncope.  Plan: Pt w/ syncope and fall, likely in the setting of sepsis/dehydration;     -c/w gentle IVF hydration    -CT head negative for hemorrhage    -orthostatics positive, continue IVF as above    -Will defer echo.          Problem/Plan - 4:    ·  Problem: Severe sepsis.  Plan: Pt w/ leukocytosis/fevers w/ evidence of end organ damage(transaminitis) in the setting of positive UA meeting criteria for severe sepsis. Leukocytosis improving    H/O ESBL in past, most recently with pan sensitive E coli    c/w ceftriaxone pending UCX/BCX/sensitivities. Seen by ID who recommended cefpodoxime 100mg BID x 2 more days.          Problem/Plan - 5:    ·  Problem: Hypertensive urgency.  Plan: Pt w/ hypertensive urgency in the ED w/ /102 s/p 5mg IVP lopressor    BP improved.     -c/w atenolol 50mg daily    -add amlodipine for BP control.          Problem/Plan - 6:    Problem: Hypothyroidism. Plan: c/w synthroid 112mcg.         Problem/Plan - 7:    ·  Problem: Need for prophylactic measure.  Plan: DVT ppx: lovenox subq    Diet: DASH/TLC    Pt discharged to rehab in stable condition ·  Problem: Recurrent UTI.  Plan: Recurrent UTIs with hx of ESBL.     -current urine cx with ecoli sensitive to cephalosporins    -s/p ertapenem 7/29, c/w ceftriaxone Day 5 of antibiotics    -discussed with ID, Dr Ernandez, switch to cefpodoxime 200mg bid x 3 more days to complete 7 day course, no role for suppressive antibiotics especially given ESBL in past, start probiotic instead    -renal US negative    -appreciate urology recs.          Problem/Plan - 2:    ·  Problem: Severe sepsis.  Plan: resolved due to UTI, afebrile, no leukocytosis    -treatment as above.          Problem/Plan - 3:    ·  Problem: Transaminitis.  Plan: Mild transaminitis on admission, GGT mildly elevated as well.     -trended down, likely due to acute infection/sepsis    -hold off on RUQ sono, no abd pain.          Problem/Plan - 4:    ·  Problem: Syncope.  Plan: syncope and fall, likely in the setting of sepsis/dehydration and orthostatic hypotension    -orthostatic resolved    -CT head negative for hemorrhage    -given recurrent falls/syncope, age, will d/c ASA given R>B    -Will defer echo.          Problem/Plan - 5:    ·  Problem: Hypertensive urgency.  Plan: labile but acceptable ranges given age    -c/w atenolol 50mg daily    -c/w amlodipine 5mg daily.          Problem/Plan - 6:    Problem: Hypothyroidism. Plan: c/w synthroid 112mcg.         Problem/Plan - 7:    ·  Problem: Constipation.  Plan: bowel regimen    GI recs appreciated.          Problem/Plan - 8:    ·  Problem: Need for prophylactic measure.  Plan: DVT ppx: lovenox subq    Diet: DASH/TLC    PT rec [facility]    Dispo: discharge to [facility] today    spent 40 min on discharge process time.

## 2019-08-02 NOTE — PROGRESS NOTE ADULT - SUBJECTIVE AND OBJECTIVE BOX
INTERVAL HPI/OVERNIGHT EVENTS:  no nausea, vomiting or abdominal pain  negative bowel movement x ? 4 days   tolerating po      Vital Signs Last 24 Hrs  T(C): 36.7 (02 Aug 2019 05:52), Max: 36.8 (02 Aug 2019 04:25)  T(F): 98.1 (02 Aug 2019 05:52), Max: 98.2 (02 Aug 2019 04:25)  HR: 72 (02 Aug 2019 05:52) (64 - 78)  BP: 174/92 (02 Aug 2019 05:52) (122/72 - 185/93)  BP(mean): 113 (01 Aug 2019 12:01) (113 - 113)  RR: 18 (02 Aug 2019 05:52) (16 - 18)  SpO2: 98% (02 Aug 2019 05:52) (95% - 98%)        LABS:                        10.1   8.62  )-----------( 208      ( 01 Aug 2019 08:37 )             31.8     08-02    136  |  102  |  30<H>  ----------------------------<  98  4.0   |  16<L>  |  0.94    Ca    8.7      02 Aug 2019 06:25  Phos  3.7     08-02  Mg     2.0     08-02          I&O's Summary    01 Aug 2019 07:01  -  02 Aug 2019 07:00  --------------------------------------------------------  IN: 0 mL / OUT: 1100 mL / NET: -1100 mL        MEDICATIONS  (STANDING):  amLODIPine   Tablet 5 milliGRAM(s) Oral daily  ATENolol  Tablet 50 milliGRAM(s) Oral daily  cefTRIAXone   IVPB 1000 milliGRAM(s) IV Intermittent every 24 hours  docusate sodium 100 milliGRAM(s) Oral daily  enoxaparin Injectable 40 milliGRAM(s) SubCutaneous daily  levothyroxine 112 MICROGram(s) Oral daily  senna 1 Tablet(s) Oral daily    MEDICATIONS  (PRN):        RADIOLOGY & ADDITIONAL TESTS:                [facility]

## 2019-08-02 NOTE — PROGRESS NOTE ADULT - GASTROINTESTINAL DETAILS
no distention/soft/no organomegaly/nontender/no rebound tenderness/no rigidity/no guarding/bowel sounds normal
no distention/no masses palpable/bowel sounds normal/nontender/soft/no rebound tenderness/no rigidity/no guarding/no organomegaly

## 2019-08-02 NOTE — DISCHARGE NOTE PROVIDER - CARE PROVIDERS DIRECT ADDRESSES
,DirectAddress_Unknown,[email or web address] ,DirectAddress_Unknown,[email or web address]

## 2019-08-02 NOTE — PROGRESS NOTE ADULT - PROBLEM SELECTOR PLAN 5
Pt w/ hypertensive urgency in the ED w/ /102 s/p 5mg IVP lopressor  BP improved.   -c/w atenolol 50mg daily  -add amlodipine for BP control
Pt w/ hypertensive urgency in the ED w/ /102 s/p 5mg IVP lopressor  BP improved.   c/w atenolol 50mg daily, titrate BP regimen as needed
labile but acceptable ranges given age  -c/w atenolol 50mg daily  -c/w amlodipine 5mg daily
DVT ppx: lovenox subq  Diet: DASH/TLC  PT consult

## 2019-08-02 NOTE — DISCHARGE NOTE PROVIDER - NSDCCPCAREPLAN_GEN_ALL_CORE_FT
PRINCIPAL DISCHARGE DIAGNOSIS  Diagnosis: Syncope  Assessment and Plan of Treatment: HOME CARE INSTRUCTIONS  Have someone stay with you until you feel stable.  Do not drive, operate machinery, or play sports until your caregiver says it is okay.  Keep all follow-up appointments as directed by your caregiver.   Lie down right away if you start feeling like you might faint. Breathe deeply and steadily. Wait until all the symptoms have passed.Drink enough fluids to keep your urine clear or pale yellow.  If you are taking blood pressure or heart medicine, get up slowly, taking several minutes to sit and then stand. This can reduce dizziness.  SEEK IMMEDIATE MEDICAL CARE IF:  You have a severe headache.  You have unusual pain in the chest, abdomen, or back.  You are bleeding from the mouth or rectum, or you have black or tarry stool.  You have an irregular or very fast heartbeat.  You have pain with breathing.  You have repeated fainting or seizure-like jerking during an episode.  You faint when sitting or lying down.  You have confusion.  You have difficulty walking.  You have severe weakness.  You have vision problems.        SECONDARY DISCHARGE DIAGNOSES  Diagnosis: Orthostatic hypotension  Assessment and Plan of Treatment: Resolved. Follow up with PMD for management    Diagnosis: Constipation  Assessment and Plan of Treatment: Continue miralax, senna and colace. Follow up with Dr. Shah.    Diagnosis: Recurrent UTI  Assessment and Plan of Treatment: HOME CARE INSTRUCTIONS  f you were prescribed antibiotics, take them exactly as your caregiver instructs you. Finish the medication even if you feel better after you have only taken some of the medication.  Drink enough water and fluids to keep your urine clear or pale yellow.  Avoid caffeine, tea, and carbonated beverages. They tend to irritate your bladder.  Empty your bladder often. Avoid holding urine for long periods of time.  Empty your bladder before and after sexual intercourse.  After a bowel movement, women should cleanse from front to back. Use each tissue only once.  SEEK MEDICAL CARE IF:  You have back pain.  You develop a fever.  Your symptoms do not begin to resolve within 3 days.  SEEK IMMEDIATE MEDICAL CARE IF:  You have severe back pain or lower abdominal pain.  You develop chills.  You have nausea or vomiting.  You have continued burning or discomfort with urination.      Diagnosis: Hypertensive urgency  Assessment and Plan of Treatment: Follow up with your medical doctor to establish long term blood pressure treatment goals.      Diagnosis: Hypothyroidism  Assessment and Plan of Treatment: Follow up with PMD for management    Diagnosis: Transaminitis  Assessment and Plan of Treatment: Follow up with PMD for management and repeat liver labs PRINCIPAL DISCHARGE DIAGNOSIS  Diagnosis: Syncope  Assessment and Plan of Treatment: HOME CARE INSTRUCTIONS  Have someone stay with you until you feel stable.  Do not drive, operate machinery, or play sports until your caregiver says it is okay.  Keep all follow-up appointments as directed by your caregiver.   Lie down right away if you start feeling like you might faint. Breathe deeply and steadily. Wait until all the symptoms have passed.Drink enough fluids to keep your urine clear or pale yellow.  If you are taking blood pressure or heart medicine, get up slowly, taking several minutes to sit and then stand. This can reduce dizziness.  SEEK IMMEDIATE MEDICAL CARE IF:  You have a severe headache.  You have unusual pain in the chest, abdomen, or back.  You are bleeding from the mouth or rectum, or you have black or tarry stool.  You have an irregular or very fast heartbeat.  You have pain with breathing.  You have repeated fainting or seizure-like jerking during an episode.  You faint when sitting or lying down.  You have confusion.  You have difficulty walking.  You have severe weakness.  You have vision problems.        SECONDARY DISCHARGE DIAGNOSES  Diagnosis: Orthostatic hypotension  Assessment and Plan of Treatment: Resolved. Follow up with PMD for management    Diagnosis: Constipation  Assessment and Plan of Treatment: Continue miralax, senna and colace. Follow up with Dr. Shah.    Diagnosis: Recurrent UTI  Assessment and Plan of Treatment: HOME CARE INSTRUCTIONS  f you were prescribed antibiotics, take them exactly as your caregiver instructs you. Finish the medication even if you feel better after you have only taken some of the medication.  Drink enough water and fluids to keep your urine clear or pale yellow.  Avoid caffeine, tea, and carbonated beverages. They tend to irritate your bladder.  Empty your bladder often. Avoid holding urine for long periods of time.  Empty your bladder before and after sexual intercourse.  After a bowel movement, women should cleanse from front to back. Use each tissue only once.  SEEK MEDICAL CARE IF:  You have back pain.  You develop a fever.  Your symptoms do not begin to resolve within 3 days.  SEEK IMMEDIATE MEDICAL CARE IF:  You have severe back pain or lower abdominal pain.  You develop chills.  You have nausea or vomiting.  You have continued burning or discomfort with urination.  Continue cefopdixime 100mg for 2 more days       Diagnosis: Hypertensive urgency  Assessment and Plan of Treatment: Follow up with your medical doctor to establish long term blood pressure treatment goals.      Diagnosis: Hypothyroidism  Assessment and Plan of Treatment: Follow up with PMD for management    Diagnosis: Transaminitis  Assessment and Plan of Treatment: Follow up with PMD for management and repeat liver labs PRINCIPAL DISCHARGE DIAGNOSIS  Diagnosis: Syncope  Assessment and Plan of Treatment: HOME CARE INSTRUCTIONS  Have someone stay with you until you feel stable.  Do not drive, operate machinery, or play sports until your caregiver says it is okay.  Keep all follow-up appointments as directed by your caregiver.   Lie down right away if you start feeling like you might faint. Breathe deeply and steadily. Wait until all the symptoms have passed.Drink enough fluids to keep your urine clear or pale yellow.  If you are taking blood pressure or heart medicine, get up slowly, taking several minutes to sit and then stand. This can reduce dizziness.  SEEK IMMEDIATE MEDICAL CARE IF:  You have a severe headache.  You have unusual pain in the chest, abdomen, or back.  You are bleeding from the mouth or rectum, or you have black or tarry stool.  You have an irregular or very fast heartbeat.  You have pain with breathing.  You have repeated fainting or seizure-like jerking during an episode.  You faint when sitting or lying down.  You have confusion.  You have difficulty walking.  You have severe weakness.  You have vision problems.  Continue off aspirin as high risk for falling.         SECONDARY DISCHARGE DIAGNOSES  Diagnosis: Orthostatic hypotension  Assessment and Plan of Treatment: Resolved. Follow up with PMD for management    Diagnosis: Constipation  Assessment and Plan of Treatment: Continue miralax, senna and colace. Follow up with Dr. Shah.    Diagnosis: Recurrent UTI  Assessment and Plan of Treatment: HOME CARE INSTRUCTIONS  f you were prescribed antibiotics, take them exactly as your caregiver instructs you. Finish the medication even if you feel better after you have only taken some of the medication.  Drink enough water and fluids to keep your urine clear or pale yellow.  Avoid caffeine, tea, and carbonated beverages. They tend to irritate your bladder.  Empty your bladder often. Avoid holding urine for long periods of time.  Empty your bladder before and after sexual intercourse.  After a bowel movement, women should cleanse from front to back. Use each tissue only once.  SEEK MEDICAL CARE IF:  You have back pain.  You develop a fever.  Your symptoms do not begin to resolve within 3 days.  SEEK IMMEDIATE MEDICAL CARE IF:  You have severe back pain or lower abdominal pain.  You develop chills.  You have nausea or vomiting.  You have continued burning or discomfort with urination.  Continue cefopdixime 200mg for 2 more days       Diagnosis: Hypertensive urgency  Assessment and Plan of Treatment: Follow up with your medical doctor to establish long term blood pressure treatment goals.      Diagnosis: Hypothyroidism  Assessment and Plan of Treatment: Follow up with PMD for management    Diagnosis: Transaminitis  Assessment and Plan of Treatment: Follow up with PMD for management and repeat liver labs PRINCIPAL DISCHARGE DIAGNOSIS  Diagnosis: Syncope  Assessment and Plan of Treatment: HOME CARE INSTRUCTIONS  Have someone stay with you until you feel stable.  Do not drive, operate machinery, or play sports until your caregiver says it is okay.  Keep all follow-up appointments as directed by your caregiver.   Lie down right away if you start feeling like you might faint. Breathe deeply and steadily. Wait until all the symptoms have passed.Drink enough fluids to keep your urine clear or pale yellow.  If you are taking blood pressure or heart medicine, get up slowly, taking several minutes to sit and then stand. This can reduce dizziness.  SEEK IMMEDIATE MEDICAL CARE IF:  You have a severe headache.  You have unusual pain in the chest, abdomen, or back.  You are bleeding from the mouth or rectum, or you have black or tarry stool.  You have an irregular or very fast heartbeat.  You have pain with breathing.  You have repeated fainting or seizure-like jerking during an episode.  You faint when sitting or lying down.  You have confusion.  You have difficulty walking.  You have severe weakness.  You have vision problems.  Continue off aspirin as high risk for falling.         SECONDARY DISCHARGE DIAGNOSES  Diagnosis: Orthostatic hypotension  Assessment and Plan of Treatment: Resolved. Follow up with PMD for management    Diagnosis: Constipation  Assessment and Plan of Treatment: Continue miralax, senna and colace. Follow up with Dr. Shah.    Diagnosis: Hypothyroidism  Assessment and Plan of Treatment: Follow up with PMD for management    Diagnosis: Hypertensive urgency  Assessment and Plan of Treatment: Follow up with your medical doctor to establish long term blood pressure treatment goals.      Diagnosis: Recurrent UTI  Assessment and Plan of Treatment: HOME CARE INSTRUCTIONS  f you were prescribed antibiotics, take them exactly as your caregiver instructs you. Finish the medication even if you feel better after you have only taken some of the medication.  Drink enough water and fluids to keep your urine clear or pale yellow.  Avoid caffeine, tea, and carbonated beverages. They tend to irritate your bladder.  Empty your bladder often. Avoid holding urine for long periods of time.  Empty your bladder before and after sexual intercourse.  After a bowel movement, women should cleanse from front to back. Use each tissue only once.  SEEK MEDICAL CARE IF:  You have back pain.  You develop a fever.  Your symptoms do not begin to resolve within 3 days.  SEEK IMMEDIATE MEDICAL CARE IF:  You have severe back pain or lower abdominal pain.  You develop chills.  You have nausea or vomiting.  You have continued burning or discomfort with urination.  Continue cefopdixime 200mg for 3 more days through 8/4/19      Diagnosis: Transaminitis  Assessment and Plan of Treatment: Follow up with PMD for management and repeat liver labs

## 2019-08-02 NOTE — DISCHARGE NOTE PROVIDER - CARE PROVIDER_API CALL
Jose Shah)  Gastroenterology; Internal Medicine  [address] [facility], [address]  [place] [address]  Phone: [phone]  Fax: [phone]  Follow Up Time:     Goldberg, Gary D (MD)  Urology  [address] [place] [address] [address]  [place] [address]  Phone: [phone]  Fax: [phone]  Follow Up Time: Jose Shah)  Gastroenterology; Internal Medicine  [address] [facility], [address]  [place] [address]  Phone: [phone]  Fax: [phone]  Follow Up Time:     Goldberg, Gary D (MD)  Urology  [address] [facility], [place] [address]  Phone: [phone]  Fax: [phone]  Follow Up Time:     Farzad Miller)  Cardiovascular Disease; Internal Medicine  [address] [facility], [address] [place] [address]  Phone: [phone]  Fax: [phone]  Follow Up Time:

## 2019-08-03 LAB
CULTURE RESULTS: SIGNIFICANT CHANGE UP
SPECIMEN SOURCE: SIGNIFICANT CHANGE UP

## 2019-08-27 ENCOUNTER — FORM ENCOUNTER (OUTPATIENT)
Age: 84
End: 2019-08-27

## 2019-08-27 ENCOUNTER — OTHER (OUTPATIENT)
Age: 84
End: 2019-08-27

## 2019-08-27 ENCOUNTER — APPOINTMENT (OUTPATIENT)
Dept: GERIATRICS | Facility: ASSISTED LIVING FACILITY | Age: 84
End: 2019-08-27
Payer: MEDICARE

## 2019-08-27 ENCOUNTER — APPOINTMENT (OUTPATIENT)
Dept: GERIATRICS | Facility: ASSISTED LIVING FACILITY | Age: 84
End: 2019-08-27

## 2019-08-27 VITALS — DIASTOLIC BLOOD PRESSURE: 80 MMHG | SYSTOLIC BLOOD PRESSURE: 130 MMHG

## 2019-08-28 ENCOUNTER — OUTPATIENT (OUTPATIENT)
Dept: OUTPATIENT SERVICES | Facility: HOSPITAL | Age: 84
LOS: 1 days | End: 2019-08-28

## 2019-08-28 ENCOUNTER — APPOINTMENT (OUTPATIENT)
Dept: RADIOLOGY | Facility: CLINIC | Age: 84
End: 2019-08-28
Payer: MEDICARE

## 2019-08-28 DIAGNOSIS — J90 PLEURAL EFFUSION, NOT ELSEWHERE CLASSIFIED: ICD-10-CM

## 2019-08-28 DIAGNOSIS — Z96.7 PRESENCE OF OTHER BONE AND TENDON IMPLANTS: Chronic | ICD-10-CM

## 2019-08-28 DIAGNOSIS — O00.1 TUBAL PREGNANCY: Chronic | ICD-10-CM

## 2019-08-28 PROCEDURE — 71046 X-RAY EXAM CHEST 2 VIEWS: CPT | Mod: 26

## 2019-08-29 ENCOUNTER — INPATIENT (INPATIENT)
Facility: HOSPITAL | Age: 84
LOS: 9 days | Discharge: HOME | DRG: 291 | End: 2019-09-08
Attending: STUDENT IN AN ORGANIZED HEALTH CARE EDUCATION/TRAINING PROGRAM | Admitting: HOSPITALIST
Payer: MEDICARE

## 2019-08-29 VITALS
RESPIRATION RATE: 20 BRPM | WEIGHT: 160.06 LBS | HEART RATE: 84 BPM | HEIGHT: 64 IN | TEMPERATURE: 98 F | DIASTOLIC BLOOD PRESSURE: 75 MMHG | OXYGEN SATURATION: 100 % | SYSTOLIC BLOOD PRESSURE: 171 MMHG

## 2019-08-29 DIAGNOSIS — I50.9 HEART FAILURE, UNSPECIFIED: ICD-10-CM

## 2019-08-29 DIAGNOSIS — Z96.7 PRESENCE OF OTHER BONE AND TENDON IMPLANTS: Chronic | ICD-10-CM

## 2019-08-29 DIAGNOSIS — O00.1 TUBAL PREGNANCY: Chronic | ICD-10-CM

## 2019-08-29 LAB
ALBUMIN SERPL ELPH-MCNC: 4.2 G/DL — SIGNIFICANT CHANGE UP (ref 3.3–5)
ALP SERPL-CCNC: 243 U/L — HIGH (ref 40–120)
ALT FLD-CCNC: 32 U/L — SIGNIFICANT CHANGE UP (ref 10–45)
ANION GAP SERPL CALC-SCNC: 15 MMOL/L — SIGNIFICANT CHANGE UP (ref 5–17)
AST SERPL-CCNC: 19 U/L — SIGNIFICANT CHANGE UP (ref 10–40)
BILIRUB SERPL-MCNC: 0.2 MG/DL — SIGNIFICANT CHANGE UP (ref 0.2–1.2)
BUN SERPL-MCNC: 30 MG/DL — HIGH (ref 7–23)
CALCIUM SERPL-MCNC: 9.7 MG/DL — SIGNIFICANT CHANGE UP (ref 8.4–10.5)
CHLORIDE SERPL-SCNC: 95 MMOL/L — LOW (ref 96–108)
CO2 SERPL-SCNC: 25 MMOL/L — SIGNIFICANT CHANGE UP (ref 22–31)
CREAT SERPL-MCNC: 1.15 MG/DL — SIGNIFICANT CHANGE UP (ref 0.5–1.3)
GLUCOSE SERPL-MCNC: 119 MG/DL — HIGH (ref 70–99)
HCT VFR BLD CALC: 32.5 % — LOW (ref 34.5–45)
HGB BLD-MCNC: 10.9 G/DL — LOW (ref 11.5–15.5)
MCHC RBC-ENTMCNC: 32.3 PG — SIGNIFICANT CHANGE UP (ref 27–34)
MCHC RBC-ENTMCNC: 33.4 GM/DL — SIGNIFICANT CHANGE UP (ref 32–36)
MCV RBC AUTO: 96.8 FL — SIGNIFICANT CHANGE UP (ref 80–100)
NT-PROBNP SERPL-SCNC: 3052 PG/ML — HIGH (ref 0–300)
PLATELET # BLD AUTO: 369 K/UL — SIGNIFICANT CHANGE UP (ref 150–400)
POTASSIUM SERPL-MCNC: 4.3 MMOL/L — SIGNIFICANT CHANGE UP (ref 3.5–5.3)
POTASSIUM SERPL-SCNC: 4.3 MMOL/L — SIGNIFICANT CHANGE UP (ref 3.5–5.3)
PROT SERPL-MCNC: 8.5 G/DL — HIGH (ref 6–8.3)
RBC # BLD: 3.36 M/UL — LOW (ref 3.8–5.2)
RBC # FLD: 13.2 % — SIGNIFICANT CHANGE UP (ref 10.3–14.5)
SODIUM SERPL-SCNC: 135 MMOL/L — SIGNIFICANT CHANGE UP (ref 135–145)
TROPONIN T, HIGH SENSITIVITY RESULT: 23 NG/L — SIGNIFICANT CHANGE UP (ref 0–51)
TROPONIN T, HIGH SENSITIVITY RESULT: 28 NG/L — SIGNIFICANT CHANGE UP (ref 0–51)
WBC # BLD: 11.2 K/UL — HIGH (ref 3.8–10.5)
WBC # FLD AUTO: 11.2 K/UL — HIGH (ref 3.8–10.5)

## 2019-08-29 PROCEDURE — 71275 CT ANGIOGRAPHY CHEST: CPT | Mod: 26

## 2019-08-29 PROCEDURE — 93010 ELECTROCARDIOGRAM REPORT: CPT

## 2019-08-29 PROCEDURE — 99284 EMERGENCY DEPT VISIT MOD MDM: CPT

## 2019-08-29 RX ORDER — ONDANSETRON 8 MG/1
4 TABLET, FILM COATED ORAL ONCE
Refills: 0 | Status: COMPLETED | OUTPATIENT
Start: 2019-08-29 | End: 2019-08-29

## 2019-08-29 RX ORDER — FUROSEMIDE 40 MG
40 TABLET ORAL ONCE
Refills: 0 | Status: COMPLETED | OUTPATIENT
Start: 2019-08-29 | End: 2019-08-29

## 2019-08-29 RX ORDER — NITROGLYCERIN 6.5 MG
0.4 CAPSULE, EXTENDED RELEASE ORAL ONCE
Refills: 0 | Status: COMPLETED | OUTPATIENT
Start: 2019-08-29 | End: 2019-08-29

## 2019-08-29 RX ADMIN — Medication 0.4 MILLIGRAM(S): at 20:19

## 2019-08-29 RX ADMIN — ONDANSETRON 4 MILLIGRAM(S): 8 TABLET, FILM COATED ORAL at 19:54

## 2019-08-29 RX ADMIN — Medication 40 MILLIGRAM(S): at 22:08

## 2019-08-29 NOTE — ED PROVIDER NOTE - OBJECTIVE STATEMENT
Pt is a 89yo female with PMH HTN, Hypothyroidism, hx of ESBL UTI (May 2018) presenting from [facility] complaining of nonproductive cough, orthopnea shortness of breath x2 days. Patient states she had multiple admissions since July due to UTI. PMD Amish Barba at the [facility] was concerned and obtained an xray which showed fluid on the left side. Patient and patients daughter have noticed that the patient has been retaining fluid has pedal edema and hasn't been urinating as she normally does. Was started on furosemide 20mg yesterday. Pt is a 89yo female with PMH HTN, Hypothyroidism, hx of ESBL UTI (May 2018) presenting from [facility] complaining of nonproductive cough, orthopnea shortness of breath x2 days. Patient states she had multiple admissions since July due to UTI. PMD Amish Barba at the [facility] was concerned and obtained an xray which showed fluid on the left side. Patient and patients daughter have noticed that the patient has been retaining fluid has pedal edema and hasn't been urinating as she normally does. Was started on furosemide 20mg yesterday. Denies fever, chills, Chest pain, abdominal pain.

## 2019-08-29 NOTE — ED ADULT NURSE REASSESSMENT NOTE - NS ED NURSE REASSESS COMMENT FT1
Pt's HR noted to go into 30's and 40's on the monitor after coughing excessively for a few minutes. MD Haji at bedside, HR back into 60's at this time, other vitals stable. awaiting plan of care from MD. Pt in no apparent distress.

## 2019-08-29 NOTE — ED ADULT NURSE REASSESSMENT NOTE - NS ED NURSE REASSESS COMMENT FT1
Report received from HAYLEE Hatfield. Pt a&oX4 resting comfortably in bed in no apparent distress. Pt on 2L NC with SpO2 of 100%. Non-productive cough noted with wheezing to bilateral lung fields. Pt reporting SOB and nausea at this time, PA Arache aware and patient medicated for nausea per PA orders. Pt placed on cardiac monitor. awaiting CT angio. Pt aware of need for urine sample. safety maintained

## 2019-08-29 NOTE — ED ADULT NURSE NOTE - OBJECTIVE STATEMENT
89 yr old female to ED via wheelchair c/o sob x few days. Pt recently had admission for UTI D/C then began to have SOB with nonprod cough. Pedal +2 edema. SOB increased with exertion And talking. Feels better when sitting up than lying down Denies chest pain . H/O HTN, Hypothyroidism Spinal stenosis 89 yr old female to ED via wheelchair c/o sob x few days. Pt recently had admission for UTI D/C then began to have SOB with nonprod cough. Pedal +2 edema. SOB increased with exertion And talking. Feels better when sitting up than lying down Denies chest pain . H/O HTN, Hypothyroidism Spinal stenosis Denies fever or chills had CXR showed "fluid on the lung." "Not pneumonia" per pt.

## 2019-08-29 NOTE — ED ADULT NURSE NOTE - PRO INTERPRETER NEED 2
Health Maintenance Due   Topic Date Due   • Shingles Vaccine (1 of 2) 11/20/1984   • DTaP/Tdap/Td Vaccine (1 - Tdap) 09/23/2005       Patient is due for topics as listed above but declines Immunization(s) Dtap/Tdap/Td and Shingles at this time due to insurance coverage.           English

## 2019-08-29 NOTE — ED PROVIDER NOTE - CLINICAL SUMMARY MEDICAL DECISION MAKING FREE TEXT BOX
chest pain/ sob/ tachypnea - differential includes PE, new CHF, pneumonia. Plan for ekg, labs including trop and BNP, CTA to r/o PE. Work up revealed proBNP > 3000, patient given SL nitro with improvement in symptoms, lasix 40 mg IV given. Patient has bilateral pleural effusions, no PE. Admit for likely CHF.

## 2019-08-29 NOTE — ED PROVIDER NOTE - PROGRESS NOTE DETAILS
Riassa PATTON: Patient had episode of bradycardia to 30's after coughing fit. 's. HR in 80's. Patient appears tachypneic. BNP is > 3000. SL nitro, lasix ordered. Raissa PATTON: CT reviewed and there are bilateral pleural effusions. Patient reassessed and states she feels much better after SL nitro. Pending CT report.

## 2019-08-29 NOTE — ED ADULT NURSE NOTE - NSIMPLEMENTINTERV_GEN_ALL_ED
Implemented All Fall with Harm Risk Interventions:  [place] to call system. Call bell, personal items and telephone within reach. Instruct patient to call for assistance. Room bathroom lighting operational. Non-slip footwear when patient is off stretcher. Physically safe environment: no spills, clutter or unnecessary equipment. Stretcher in lowest position, wheels locked, appropriate side rails in place. Provide visual cue, wrist band, yellow gown, etc. Monitor gait and stability. Monitor for mental status changes and reorient to person, place, and time. Review medications for side effects contributing to fall risk. Reinforce activity limits and safety measures with patient and family. Provide visual clues: red socks.

## 2019-08-29 NOTE — ED PROVIDER NOTE - ATTENDING CONTRIBUTION TO CARE
Patient is a 89 yo F with history of HTN, hypothyroidism, hx of ESBL UTI here from [facility] for evaluation of progressive shortness of breath x 1 week. Patient reports increased lower extremity swelling and shortness of breath initially with exertion but now at rest. Patient saw her pcp on 8/27, had a CXR which showed a left sided pleural effusion. Denies a prior history of heart failure. Her doctor, Dr. Singh started her on lasix 20 mg daily. Patient denies fevers, chills, sore throat. No nausea, vomiting or diarrhea. Denies urinary symptoms. Patient reports 2 hospitalizations this past summer for ESBL UTI's followed by rehab each time. She states she does have some pain with deep breathing on the left side.     VS noted  Gen. tachypneic, winded when speaking  HEENT: EOMI, mmm  Lungs: crackles at bases  CVS: RRR   Abd; Soft non tender, non distended   Ext: bilateral lower extremity pitting edema  Skin: no rash  Neuro AAOx3 non focal clear speech  a/p: chest pain/ sob/ tachypnea - differential includes PE, new CHF, pneumonia. Plan for ekg, labs including trop and BNP, CTA to r/o PE.   - Raissa PATTON

## 2019-08-30 DIAGNOSIS — I10 ESSENTIAL (PRIMARY) HYPERTENSION: ICD-10-CM

## 2019-08-30 DIAGNOSIS — N18.3 CHRONIC KIDNEY DISEASE, STAGE 3 (MODERATE): ICD-10-CM

## 2019-08-30 DIAGNOSIS — Z98.49 CATARACT EXTRACTION STATUS, UNSPECIFIED EYE: Chronic | ICD-10-CM

## 2019-08-30 DIAGNOSIS — Z98.890 OTHER SPECIFIED POSTPROCEDURAL STATES: Chronic | ICD-10-CM

## 2019-08-30 DIAGNOSIS — N39.0 URINARY TRACT INFECTION, SITE NOT SPECIFIED: ICD-10-CM

## 2019-08-30 DIAGNOSIS — E03.9 HYPOTHYROIDISM, UNSPECIFIED: ICD-10-CM

## 2019-08-30 DIAGNOSIS — R06.00 DYSPNEA, UNSPECIFIED: ICD-10-CM

## 2019-08-30 DIAGNOSIS — Z29.9 ENCOUNTER FOR PROPHYLACTIC MEASURES, UNSPECIFIED: ICD-10-CM

## 2019-08-30 DIAGNOSIS — J90 PLEURAL EFFUSION, NOT ELSEWHERE CLASSIFIED: ICD-10-CM

## 2019-08-30 DIAGNOSIS — I50.9 HEART FAILURE, UNSPECIFIED: ICD-10-CM

## 2019-08-30 LAB
ALBUMIN SERPL ELPH-MCNC: 3.5 G/DL — SIGNIFICANT CHANGE UP (ref 3.3–5)
ALP SERPL-CCNC: 203 U/L — HIGH (ref 40–120)
ALT FLD-CCNC: 25 U/L — SIGNIFICANT CHANGE UP (ref 10–45)
ANION GAP SERPL CALC-SCNC: 14 MMOL/L — SIGNIFICANT CHANGE UP (ref 5–17)
APPEARANCE UR: CLEAR — SIGNIFICANT CHANGE UP
AST SERPL-CCNC: 14 U/L — SIGNIFICANT CHANGE UP (ref 10–40)
BACTERIA # UR AUTO: NEGATIVE — SIGNIFICANT CHANGE UP
BILIRUB SERPL-MCNC: 0.2 MG/DL — SIGNIFICANT CHANGE UP (ref 0.2–1.2)
BILIRUB UR-MCNC: NEGATIVE — SIGNIFICANT CHANGE UP
BUN SERPL-MCNC: 28 MG/DL — HIGH (ref 7–23)
CALCIUM SERPL-MCNC: 9.1 MG/DL — SIGNIFICANT CHANGE UP (ref 8.4–10.5)
CHLORIDE SERPL-SCNC: 98 MMOL/L — SIGNIFICANT CHANGE UP (ref 96–108)
CO2 SERPL-SCNC: 27 MMOL/L — SIGNIFICANT CHANGE UP (ref 22–31)
COLOR SPEC: COLORLESS — SIGNIFICANT CHANGE UP
CREAT SERPL-MCNC: 1.24 MG/DL — SIGNIFICANT CHANGE UP (ref 0.5–1.3)
DIFF PNL FLD: NEGATIVE — SIGNIFICANT CHANGE UP
EPI CELLS # UR: 7 /HPF — HIGH
GLUCOSE SERPL-MCNC: 103 MG/DL — HIGH (ref 70–99)
GLUCOSE UR QL: NEGATIVE — SIGNIFICANT CHANGE UP
HCT VFR BLD CALC: 30.3 % — LOW (ref 34.5–45)
HGB BLD-MCNC: 9.4 G/DL — LOW (ref 11.5–15.5)
HYALINE CASTS # UR AUTO: 0 /LPF — SIGNIFICANT CHANGE UP (ref 0–2)
KETONES UR-MCNC: NEGATIVE — SIGNIFICANT CHANGE UP
LEUKOCYTE ESTERASE UR-ACNC: ABNORMAL
MAGNESIUM SERPL-MCNC: 2 MG/DL — SIGNIFICANT CHANGE UP (ref 1.6–2.6)
MCHC RBC-ENTMCNC: 30.1 PG — SIGNIFICANT CHANGE UP (ref 27–34)
MCHC RBC-ENTMCNC: 31 GM/DL — LOW (ref 32–36)
MCV RBC AUTO: 97.1 FL — SIGNIFICANT CHANGE UP (ref 80–100)
NITRITE UR-MCNC: NEGATIVE — SIGNIFICANT CHANGE UP
PH UR: 6.5 — SIGNIFICANT CHANGE UP (ref 5–8)
PHOSPHATE SERPL-MCNC: 3.9 MG/DL — SIGNIFICANT CHANGE UP (ref 2.5–4.5)
PLATELET # BLD AUTO: 336 K/UL — SIGNIFICANT CHANGE UP (ref 150–400)
POTASSIUM SERPL-MCNC: 4.1 MMOL/L — SIGNIFICANT CHANGE UP (ref 3.5–5.3)
POTASSIUM SERPL-SCNC: 4.1 MMOL/L — SIGNIFICANT CHANGE UP (ref 3.5–5.3)
PROT SERPL-MCNC: 7.4 G/DL — SIGNIFICANT CHANGE UP (ref 6–8.3)
PROT UR-MCNC: NEGATIVE — SIGNIFICANT CHANGE UP
RAPID RVP RESULT: SIGNIFICANT CHANGE UP
RBC # BLD: 3.12 M/UL — LOW (ref 3.8–5.2)
RBC # FLD: 14.6 % — HIGH (ref 10.3–14.5)
RBC CASTS # UR COMP ASSIST: 5 /HPF — HIGH (ref 0–4)
SODIUM SERPL-SCNC: 139 MMOL/L — SIGNIFICANT CHANGE UP (ref 135–145)
SP GR SPEC: 1.02 — SIGNIFICANT CHANGE UP (ref 1.01–1.02)
TSH SERPL-MCNC: 3.24 UIU/ML — SIGNIFICANT CHANGE UP (ref 0.27–4.2)
UROBILINOGEN FLD QL: NEGATIVE — SIGNIFICANT CHANGE UP
WBC # BLD: 8.06 K/UL — SIGNIFICANT CHANGE UP (ref 3.8–10.5)
WBC # FLD AUTO: 8.06 K/UL — SIGNIFICANT CHANGE UP (ref 3.8–10.5)
WBC UR QL: 2 /HPF — SIGNIFICANT CHANGE UP (ref 0–5)

## 2019-08-30 PROCEDURE — 99223 1ST HOSP IP/OBS HIGH 75: CPT

## 2019-08-30 PROCEDURE — 93010 ELECTROCARDIOGRAM REPORT: CPT

## 2019-08-30 PROCEDURE — 99223 1ST HOSP IP/OBS HIGH 75: CPT | Mod: GC

## 2019-08-30 PROCEDURE — 93306 TTE W/DOPPLER COMPLETE: CPT | Mod: 26

## 2019-08-30 RX ORDER — MULTIVIT-MIN/FERROUS GLUCONATE 9 MG/15 ML
1 LIQUID (ML) ORAL DAILY
Refills: 0 | Status: DISCONTINUED | OUTPATIENT
Start: 2019-08-30 | End: 2019-09-08

## 2019-08-30 RX ORDER — AMLODIPINE BESYLATE 2.5 MG/1
10 TABLET ORAL AT BEDTIME
Refills: 0 | Status: DISCONTINUED | OUTPATIENT
Start: 2019-08-30 | End: 2019-08-31

## 2019-08-30 RX ORDER — DICLOFENAC SODIUM 30 MG/G
1 GEL TOPICAL
Qty: 0 | Refills: 0 | DISCHARGE

## 2019-08-30 RX ORDER — HEPARIN SODIUM 5000 [USP'U]/ML
5000 INJECTION INTRAVENOUS; SUBCUTANEOUS EVERY 8 HOURS
Refills: 0 | Status: DISCONTINUED | OUTPATIENT
Start: 2019-08-30 | End: 2019-09-02

## 2019-08-30 RX ORDER — FUROSEMIDE 40 MG
40 TABLET ORAL DAILY
Refills: 0 | Status: DISCONTINUED | OUTPATIENT
Start: 2019-08-30 | End: 2019-09-01

## 2019-08-30 RX ORDER — CEFPODOXIME PROXETIL 100 MG
2 TABLET ORAL
Qty: 0 | Refills: 0 | DISCHARGE

## 2019-08-30 RX ORDER — LEVOTHYROXINE SODIUM 125 MCG
112 TABLET ORAL DAILY
Refills: 0 | Status: DISCONTINUED | OUTPATIENT
Start: 2019-08-30 | End: 2019-09-08

## 2019-08-30 RX ORDER — SENNA PLUS 8.6 MG/1
1 TABLET ORAL DAILY
Refills: 0 | Status: DISCONTINUED | OUTPATIENT
Start: 2019-08-30 | End: 2019-09-08

## 2019-08-30 RX ORDER — AMLODIPINE BESYLATE 2.5 MG/1
10 TABLET ORAL DAILY
Refills: 0 | Status: DISCONTINUED | OUTPATIENT
Start: 2019-08-30 | End: 2019-08-30

## 2019-08-30 RX ORDER — POLYETHYLENE GLYCOL 3350 17 G/17G
17 POWDER, FOR SOLUTION ORAL DAILY
Refills: 0 | Status: DISCONTINUED | OUTPATIENT
Start: 2019-08-30 | End: 2019-09-08

## 2019-08-30 RX ADMIN — POLYETHYLENE GLYCOL 3350 17 GRAM(S): 17 POWDER, FOR SOLUTION ORAL at 16:10

## 2019-08-30 RX ADMIN — AMLODIPINE BESYLATE 10 MILLIGRAM(S): 2.5 TABLET ORAL at 22:49

## 2019-08-30 RX ADMIN — Medication 40 MILLIGRAM(S): at 06:12

## 2019-08-30 RX ADMIN — HEPARIN SODIUM 5000 UNIT(S): 5000 INJECTION INTRAVENOUS; SUBCUTANEOUS at 06:12

## 2019-08-30 RX ADMIN — HEPARIN SODIUM 5000 UNIT(S): 5000 INJECTION INTRAVENOUS; SUBCUTANEOUS at 22:49

## 2019-08-30 RX ADMIN — Medication 112 MICROGRAM(S): at 06:12

## 2019-08-30 RX ADMIN — HEPARIN SODIUM 5000 UNIT(S): 5000 INJECTION INTRAVENOUS; SUBCUTANEOUS at 16:10

## 2019-08-30 RX ADMIN — SENNA PLUS 1 TABLET(S): 8.6 TABLET ORAL at 16:11

## 2019-08-30 NOTE — H&P ADULT - PROBLEM SELECTOR PLAN 3
Hx of recurrent ESBL UTIs. Currently not endorsing dysuria, UA w/ mod LE. No systemic signs of infection  - Will monitor off antibiotics for now as patient currently has no symptoms

## 2019-08-30 NOTE — H&P ADULT - PROBLEM SELECTOR PLAN 2
CT showing moderate left and small right pleural effusion with associated   compressive atelectasis. Likely transudative effusions 2/2 CHF. Currently on 2L NC and satting well  - c/w diuresis  - incentive spirometry CT showing moderate left and small right pleural effusion with associated   compressive atelectasis. CXR Likely transudative effusions 2/2 fluid overload. Currently on 2L NC and satting well.   - will need therapeutic/diagnostic thoracentesis given size of L pleural effusion. Can consult Pulm or IR in AM. Unlikely to improve with diuresis alone  - incentive spirometry

## 2019-08-30 NOTE — PHYSICAL THERAPY INITIAL EVALUATION ADULT - GENERAL OBSERVATIONS, REHAB EVAL
Pt cirilo 40 min eval well. Pt motivated to work with PT. Pt rec'd in chair, 2L NC, tele/pulse ox monitor, NAD. SpO2 monitored by tele tech, difficulty getting reading on portable monitor.

## 2019-08-30 NOTE — PROGRESS NOTE ADULT - PROBLEM SELECTOR PLAN 2
CT showing moderate left and small right pleural effusion with associated   compressive atelectasis. CXR Likely transudative effusions 2/2 fluid overload. Currently on 2L NC and satting well.   - will need therapeutic/diagnostic thoracentesis given size of L pleural effusion. Can consult Pulm or IR in AM. Unlikely to improve with diuresis alone  - incentive spirometry

## 2019-08-30 NOTE — CONSULT NOTE ADULT - ASSESSMENT
89 yo F with h/o HTN, hypothyroidism, and multiple ESBL UTIs, presents with progressive shortness of breath x 1 week. Pt was started on Lasix 20mg daily 2 days prior to admission by her PMD and reported initially feeling better after taking it, but symptoms recurred. +orthopnea and LE edema. +nonproductive cough.   Denies any chest pain or palpitations. Has been having a dry cough over last week, but no fever, chills, runny nose, sore throat, or N/V/diarrhea.   Patient was recently admitted for ESBL UTI, went to [facility], and d/c'd back to [facility] about 2 weeks ago.  On presentation, mild hypoxemia to low 90s, improved with 2 LNC.   Pro-BNP 3052  CTPA - No pulmonary embolus in the main pulmonary arteries or lobar branches, moderate left and small right pleural effusion with associated   compressive atelectasis  Has since received Lasix 40mg IVP- diuresing; symptoms and O2 requirements improved. Currently on RA.    TTE findings with dilated LA with moderately elevated pressures, moderate pulm HTN.     A/P: Bliateral pleural effusions, likely cardiac in nature  No indication for thoracentesis at this time  Recommend continued diuresis, daily weights - patient is incontinent of urine, therefore unable to ascertain output  Supplemental O2, goal sats 92-96%  Would check O2 sats on RA at rest and with ambulation  Incentive spirometer, OOB to chair  BP control, cardiac optimization  DVT ppx    THank you for the consult. Will follow up

## 2019-08-30 NOTE — PATIENT PROFILE ADULT - FUNCTIONAL SCREEN CURRENT LEVEL: COMMUNICATION, MLM
I have sent a referral request of Dr. Shahzad Peterson M.D. dermatology at [facility] for unresolved rash in the left lower extremity.  
0 = understands/communicates without difficulty

## 2019-08-30 NOTE — PROGRESS NOTE ADULT - SUBJECTIVE AND OBJECTIVE BOX
PATIENT:  SOLIS TAYLOR  [id number]    CHIEF COMPLAINT:  Patient is a 89y old  Female who presents with a chief complaint of shortness of breath (30 Aug 2019 03:06)      INTERVAL HISTORYOVERNIGHT EVENTS:      REVIEW OF SYSTEMS:    Constitutional:     [ ] negative [ ] fevers [ ] chills [ ] weight loss [ ] weight gain  HEENT:                  [ ] negative [ ] dry eyes [ ] eye irritation [ ] postnasal drip [ ] nasal congestion  CV:                         [ ] negative  [ ] chest pain [ ] orthopnea [ ] palpitations [ ] murmur  Resp:                     [ ] negative [ ] cough [ ] shortness of breath [ ] dyspnea [ ] wheezing [ ] sputum [ ] hemoptysis  GI:                          [ ] negative [ ] nausea [ ] vomiting [ ] diarrhea [ ] constipation [ ] abd pain [ ] dysphagia   :                        [ ] negative [ ] dysuria [ ] nocturia [ ] hematuria [ ] increased urinary frequency  Musculoskeletal: [ ] negative [ ] back pain [ ] myalgias [ ] arthralgias [ ] fracture  Skin:                       [ ] negative [ ] rash [ ] itch  Neurological:        [ ] negative [ ] headache [ ] dizziness [ ] syncope [ ] weakness [ ] numbness  Psychiatric:           [ ] negative [ ] anxiety [ ] depression  Endocrine:            [ ] negative [ ] diabetes [ ] thyroid problem  Heme/Lymph:      [ ] negative [ ] anemia [ ] bleeding problem  Allergic/Immune: [ ] negative [ ] itchy eyes [ ] nasal discharge [ ] hives [ ] angioedema    [ ] All other systems negative  [ ] Unable to assess ROS because ________.    MEDICATIONS:  MEDICATIONS  (STANDING):  amLODIPine   Tablet 10 milliGRAM(s) Oral at bedtime  furosemide   Injectable 40 milliGRAM(s) IV Push daily  heparin  Injectable 5000 Unit(s) SubCutaneous every 8 hours  levothyroxine 112 MICROGram(s) Oral daily  multivitamin/minerals 1 Tablet(s) Oral daily  polyethylene glycol 3350 17 Gram(s) Oral daily  senna 1 Tablet(s) Oral daily    MEDICATIONS  (PRN):      ALLERGIES:  Allergies    sulfa topicals (Unknown)    Intolerances    morphine (Drowsiness; Faint; Hypotension)      OBJECTIVE:  ICU Vital Signs Last 24 Hrs  T(C): 36.7 (30 Aug 2019 04:51), Max: 36.9 (29 Aug 2019 19:37)  T(F): 98 (30 Aug 2019 04:51), Max: 98.5 (29 Aug 2019 19:37)  HR: 78 (30 Aug 2019 04:51) (37 - 84)  BP: 159/82 (30 Aug 2019 04:51) (142/70 - 171/75)  BP(mean): --  ABP: --  ABP(mean): --  RR: 18 (30 Aug 2019 04:51) (18 - 20)  SpO2: 95% (30 Aug 2019 04:51) (92% - 100%)      Adult Advanced Hemodynamics Last 24 Hrs  CVP(mm Hg): --  CVP(cm H2O): --  CO: --  CI: --  PA: --  PA(mean): --  PCWP: --  SVR: --  SVRI: --  PVR: --  PVRI: --  CAPILLARY BLOOD GLUCOSE        CAPILLARY BLOOD GLUCOSE        I&O's Summary    29 Aug 2019 07:01  -  30 Aug 2019 07:00  --------------------------------------------------------  IN: 100 mL / OUT: 0 mL / NET: 100 mL      Daily Height in cm: 162.56 (29 Aug 2019 16:15)    Daily Weight in k.8 (30 Aug 2019 07:30)    PHYSICAL EXAMINATION:  General: WN/WD NAD  HEENT: PERRLA, EOMI, moist mucous membranes  Neurology: A&Ox3, nonfocal, YBARRA x 4  Respiratory: CTA B/L, normal respiratory effort, no wheezes, crackles, rales  CV: RRR, S1S2, no murmurs, rubs or gallops  Abdominal: Soft, NT, ND +BS, Last BM  Extremities: No edema, + peripheral pulses  Incisions:   Tubes:    LABS:                          9.4    8.06  )-----------( 336      ( 30 Aug 2019 08:43 )             30.3     08-30    139  |  98  |  28<H>  ----------------------------<  103<H>  4.1   |  27  |  1.24    Ca    9.1      30 Aug 2019 06:56  Phos  3.9     08-30  Mg     2.0     08-30    TPro  7.4  /  Alb  3.5  /  TBili  0.2  /  DBili  x   /  AST  14  /  ALT  25  /  AlkPhos  203<H>  08-30    LIVER FUNCTIONS - ( 30 Aug 2019 06:56 )  Alb: 3.5 g/dL / Pro: 7.4 g/dL / ALK PHOS: 203 U/L / ALT: 25 U/L / AST: 14 U/L / GGT: x             Urinalysis Basic - ( 29 Aug 2019 23:10 )    Color: Colorless / Appearance: Clear / S.024 / pH: x  Gluc: x / Ketone: Negative  / Bili: Negative / Urobili: Negative   Blood: x / Protein: Negative / Nitrite: Negative   Leuk Esterase: Moderate / RBC: 5 /hpf / WBC 2 /HPF   Sq Epi: x / Non Sq Epi: 7 /hpf / Bacteria: Negative        TELEMETRY:     EKG:     IMAGING:

## 2019-08-30 NOTE — H&P ADULT - PROBLEM SELECTOR PLAN 4
BP elevated on admission, now improved after diuresis in ED  - continue amlodipine 10mg daily  - hold home atenolol for now

## 2019-08-30 NOTE — H&P ADULT - NSHPLABSRESULTS_GEN_ALL_CORE
135  |  95<L>  |  30<H>  ----------------------------<  119<H>  4.3   |  25  |  1.15    Ca    9.7      29 Aug 2019 19:04    TPro  8.5<H>  /  Alb  4.2  /  TBili  0.2  /  DBili  x   /  AST  19  /  ALT  32  /  AlkPhos  243<H>                      Urinalysis Basic - ( 29 Aug 2019 23:10 )    Color: Colorless / Appearance: Clear / S.024 / pH: x  Gluc: x / Ketone: Negative  / Bili: Negative / Urobili: Negative   Blood: x / Protein: Negative / Nitrite: Negative   Leuk Esterase: Moderate / RBC: 5 /hpf / WBC 2 /HPF   Sq Epi: x / Non Sq Epi: 7 /hpf / Bacteria: Negative                              10.9   11.2  )-----------( 369      ( 29 Aug 2019 19:04 )             32.5     CAPILLARY BLOOD GLUCOSE      RADIOLOGY:  < from: CT Angio Chest w/ IV Cont (19 @ 21:08) >    EXAM:  CT ANGIO CHEST (W)AW IC                        PROCEDURE DATE:  2019    INTERPRETATION:  CLINICAL INFORMATION: Shortness of breath, by for   pulmonary motion.    COMPARISON: CT chest from 2019. CT chest from 2016.    PROCEDURE:   CT Angiography of the Chest.  90 ml of Omnipaque 350 was injected intravenously. 10 ml were discarded.  Sagittal and coronal reformats were performed as well as 3D (MIP)   reconstructions.    FINDINGS:    LUNGS AND AIRWAYS AND PLEURA: Moderate left pleural effusion with   associated lower lobar and lingular partial compressive atelectasis.   Small right pleural effusion with associated subsegmental compressive   atelectasis.    MEDIASTINUM AND SKYLAR: No lymphadenopathy.    VESSELS: Good opacification of the pulmonary arteries, however,   respiratory motion limits evaluation of the segmental and subsegmental   arteries. No pulmonary embolism in the main pulmonary arteries or lobar   branches. Pulmonary artery and thoracic aorta are of normal caliber.   Atherosclerotic calcifications.    HEART: Heart size is mildly enlarged.. Small pericardial effusion.   Coronary artery atherosclerotic calcifications.    CHEST WALL AND LOWER NECK: Postlumpectomy changes of the left breast.   Dense right breast parenchyma.    VISUALIZED UPPER ABDOMEN: Unchanged nodular thickening of the left   adrenal gland.    BONES: Chronic mild compression fractures of the T5, T6, T7, and T8   vertebral bodies.   Chronic nondisplaced mild fracture deformities of the left posterior   9-11th ribs. Mild degenerative changes of the spine.    IMPRESSION:   Adequate pulmonary arterial opacification, however, respiratory motion   limits evaluation of the segmental and subsegmental arteries. No   pulmonary embolus in the main pulmonary arteries or lobar branches.    Moderate left and small right pleural effusion with associated   compressive atelectasis.      < end of copied text > Labs reviewed       135  |  95<L>  |  30<H>  ----------------------------<  119<H>  4.3   |  25  |  1.15    Ca    9.7      29 Aug 2019 19:04    TPro  8.5<H>  /  Alb  4.2  /  TBili  0.2  /  DBili  x   /  AST  19  /  ALT  32  /  AlkPhos  243<H>                      Urinalysis Basic - ( 29 Aug 2019 23:10 )    Color: Colorless / Appearance: Clear / S.024 / pH: x  Gluc: x / Ketone: Negative  / Bili: Negative / Urobili: Negative   Blood: x / Protein: Negative / Nitrite: Negative   Leuk Esterase: Moderate / RBC: 5 /hpf / WBC 2 /HPF   Sq Epi: x / Non Sq Epi: 7 /hpf / Bacteria: Negative                              10.9   11.2  )-----------( 369      ( 29 Aug 2019 19:04 )             32.5     CAPILLARY BLOOD GLUCOSE    Imaging reviewed    < from: CT Angio Chest w/ IV Cont (19 @ 21:08) >    EXAM:  CT ANGIO CHEST (W)AW IC                        PROCEDURE DATE:  2019    INTERPRETATION:  CLINICAL INFORMATION: Shortness of breath, by for   pulmonary motion.    COMPARISON: CT chest from 2019. CT chest from 2016.    PROCEDURE:   CT Angiography of the Chest.  90 ml of Omnipaque 350 was injected intravenously. 10 ml were discarded.  Sagittal and coronal reformats were performed as well as 3D (MIP)   reconstructions.    FINDINGS:    LUNGS AND AIRWAYS AND PLEURA: Moderate left pleural effusion with   associated lower lobar and lingular partial compressive atelectasis.   Small right pleural effusion with associated subsegmental compressive   atelectasis.    MEDIASTINUM AND SKYLAR: No lymphadenopathy.    VESSELS: Good opacification of the pulmonary arteries, however,   respiratory motion limits evaluation of the segmental and subsegmental   arteries. No pulmonary embolism in the main pulmonary arteries or lobar   branches. Pulmonary artery and thoracic aorta are of normal caliber.   Atherosclerotic calcifications.    HEART: Heart size is mildly enlarged.. Small pericardial effusion.   Coronary artery atherosclerotic calcifications.    CHEST WALL AND LOWER NECK: Postlumpectomy changes of the left breast.   Dense right breast parenchyma.    VISUALIZED UPPER ABDOMEN: Unchanged nodular thickening of the left   adrenal gland.    BONES: Chronic mild compression fractures of the T5, T6, T7, and T8   vertebral bodies.   Chronic nondisplaced mild fracture deformities of the left posterior   9-11th ribs. Mild degenerative changes of the spine.    IMPRESSION:   Adequate pulmonary arterial opacification, however, respiratory motion   limits evaluation of the segmental and subsegmental arteries. No   pulmonary embolus in the main pulmonary arteries or lobar branches.    Moderate left and small right pleural effusion with associated   compressive atelectasis.    EKG reviewed: NSR, TWI V5-V6

## 2019-08-30 NOTE — PROGRESS NOTE ADULT - PROBLEM SELECTOR PLAN 3
Hx of recurrent ESBL UTIs. Currently not endorsing dysuria, UA w/ mod LE. No systemic signs of infection  - Will monitor off antibiotics for now as patient currently has no symptoms. Hx of recurrent ESBL UTIs. Currently not endorsing dysuria, UA w/ mod LE. No systemic signs of infection  -UCx  - Will monitor off antibiotics for now as patient currently has no symptoms.

## 2019-08-30 NOTE — H&P ADULT - NSICDXPASTSURGICALHX_GEN_ALL_CORE_FT
PAST SURGICAL HISTORY:  Ectopic pregnancy, tubal     S/P ORIF (open reduction internal fixation) fracture PAST SURGICAL HISTORY:  Ectopic pregnancy, tubal     S/P breast lumpectomy     S/P cataract surgery b/l    S/P ORIF (open reduction internal fixation) fracture R hip

## 2019-08-30 NOTE — PROGRESS NOTE ADULT - PROBLEM SELECTOR PLAN 1
Presenting with progressive dyspnea on exertion and at rest, with exam significant for bilateral LE edema, labs showing elevated BNP, and b/l pleural effusions on CT. Most likely new CHF. Differential also includes PE, infection, and COPD. CTPA neg for PE, but limited by motion so can't r/o segmental PE. No consolidations seen to suggest PNA.   Unclear underlying cause of CHF exacerbation. Low suspicion for ACS given no chest pain and trops neg, although noted new TWI V5-V6 but nonspecific, and no ST segment changes.   - f/u TTE  - check TSH  - diurese with Lasix 40mg IV daily  - Strict I/Os, daily weights  - will hold home atenolol for now, if TTE showing CHF then consider switching to carvedilol/metoprolol

## 2019-08-30 NOTE — H&P ADULT - NSHPPHYSICALEXAM_GEN_ALL_CORE
Vital Signs (24 Hrs):  T(C): 36.5 (08-30-19 @ 00:10), Max: 36.9 (08-29-19 @ 19:37)  HR: 82 (08-30-19 @ 00:10) (37 - 84)  BP: 162/76 (08-30-19 @ 00:10) (142/70 - 171/75)  RR: 18 (08-30-19 @ 00:30) (18 - 20)  SpO2: 95% (08-30-19 @ 00:30) (92% - 100%)  Wt(kg): --  Daily Height in cm: 162.56 (29 Aug 2019 16:15)    Daily     I&O's Summary    29 Aug 2019 07:01  -  30 Aug 2019 03:16  --------------------------------------------------------  IN: 50 mL / OUT: 0 mL / NET: 50 mL Vital Signs (24 Hrs):  T(C): 36.5 (08-30-19 @ 00:10), Max: 36.9 (08-29-19 @ 19:37)  HR: 82 (08-30-19 @ 00:10) (37 - 84)  BP: 162/76 (08-30-19 @ 00:10) (142/70 - 171/75)  RR: 18 (08-30-19 @ 00:30) (18 - 20)  SpO2: 95% (08-30-19 @ 00:30) (92% - 100%)  Wt(kg): --  Daily Height in cm: 162.56 (29 Aug 2019 16:15)    Daily     I&O's Summary    29 Aug 2019 07:01  -  30 Aug 2019 03:16  --------------------------------------------------------  IN: 50 mL / OUT: 0 mL / NET: 50 mL    PHYSICAL EXAM:  GENERAL: NAD, lying comfortably w/o resp distress  HEAD:  Atraumatic, normocephalic  EYES: EOMI, PERRL, conjunctiva and sclera clear  ENMT: No tonsillar erythema, exudates, or enlargement; moist mucous membranes  NECK: Supple, no JVD  CHEST/LUNG: diminished breath sounds at the L base; No rales, rhonchi, or wheezing  HEART: RRR; No murmurs, rubs, or gallops  ABDOMEN: Soft, nontender, nondistended; +BS in all 4 quadrants  EXTREMITIES:  1+ pitting edema to midshin  SKIN: No venous stasis changes, no ulcers  NERVOUS SYSTEM:  Alert & Oriented X4, strength 5/5 B/L upper and lower extremities, no focal deficits

## 2019-08-30 NOTE — H&P ADULT - ASSESSMENT
89 y/o F PMHx HTN, hypothyroidism, and ESBL UTIs, presents from [facility] with progressive shortness of breath x 1 week, found to have new pleural effusions, suspicion for new CHF.

## 2019-08-30 NOTE — H&P ADULT - NSHPREVIEWOFSYSTEMS_GEN_ALL_CORE
REVIEW OF SYSTEMS:  CONSTITUTIONAL: No fever or fatigue  EYES: No eye pain, visual disturbances, or discharge  ENMT:  No sinus or throat pain  NECK: No pain or stiffness  RESPIRATORY: +dry cough, no wheezing, chills or hemoptysis  CARDIOVASCULAR: No chest pain, palpitations, or dizziness  GASTROINTESTINAL: No abdominal or epigastric pain. No nausea, vomiting, or hematemesis; No diarrhea or constipation. No melena or hematochezia.  GENITOURINARY: No current dysuria, hematuria, or incontinence  NEUROLOGICAL: No headaches or tremors  SKIN: No itching, burning, rashes, or lesions   ENDOCRINE: No heat or cold intolerance  MUSCULOSKELETAL: No joint pain or swelling; No extremity pain  HEME/LYMPH: No easy bruising or bleeding gums

## 2019-08-30 NOTE — CONSULT NOTE ADULT - SUBJECTIVE AND OBJECTIVE BOX
[facility] - Division of Pulmonary, Critical Care and Sleep Medicine   Please call [phone] between 8am-pm weekdays, [phone] after hours and weekends      CHIEF COMPLAINT: Progressive SOB    HPI: 89 yo F with h/o HTN, hypothyroidism, and multiple ESBL UTIs, presents with progressive shortness of breath x 1 week. Pt was started on Lasix 20mg daily 2 days prior to admission by her PMD and reported initially feeling better after taking it, but symptoms recurred. +orthopnea and LE edema. +nonproductive cough.   Denies any chest pain or palpitations. Has been having a dry cough over last week, but no fever, chills, runny nose, sore throat, or N/V/diarrhea.   Patient was recently admitted for ESBL UTI, went to [facility], and d/c'd back to [facility] about 2 weeks ago.    In the ED:  Vital:  afebrile, HR 70s-80s, /75->142/70, spO2 % on 2L NC  Labs: significant for WBC 11.2 hsTrop 28->23, Pro-BNP 3052  Imaging: CTPA - No pulmonary embolus in the main pulmonary arteries or lobar branches, moderate left and small right pleural effusion with associated   compressive atelectasis  Given: Lasix 40mg IVP x1, Zofran, and nitroglycerin      PAST MEDICAL & SURGICAL HISTORY:  Macular degeneration  Non-ST elevation MI (NSTEMI)  Hypothyroidism  Spinal stenosis  HTN (hypertension)  Bilateral Cataracts  Urinary Frequency  S/P cataract surgery: b/l  S/P breast lumpectomy  S/P ORIF (open reduction internal fixation) fracture: R hip  Ectopic pregnancy, tubal      FAMILY HISTORY:  Family history of acute myocardial infarction      SOCIAL HISTORY:  Smoking: [ ] Never Smoked [ ] Former Smoker (__ packs x ___ years) [ ] Current Smoker  (__ packs x ___ years)  Substance Use: [ ] Never Used [ ] Used ____  EtOH Use:  Marital Status: [ ] Single [ ]  [ ]  [ ]   Occupation:  Recent Travel:  Country of Birth:  Advance Directives:    Allergies    sulfa topicals (Unknown)    Intolerances    morphine (Drowsiness; Faint; Hypotension)      HOME MEDICATIONS:    REVIEW OF SYSTEMS:  Constitutional: [ ] fevers [ ] chills [ ] weight loss [ ] weight gain  HEENT: [ ] dry eyes [ ] eye irritation [ ] postnasal drip [ ] nasal congestion  CV: [ ] chest pain [ ] orthopnea [ ] palpitations [ ] murmur  Resp: [ ] cough [ ] shortness of breath [ ] wheezing [ ] sputum [ ] hemoptysis  GI: [ ] nausea [ ] vomiting [ ] diarrhea [ ] constipation [ ] abd pain [ ] dysphagia   : [ ] dysuria [ ] nocturia [ ] hematuria [ ] increased urinary frequency  Musculoskeletal: [ ] myalgias [ ] arthralgias   Skin: [ ] rash [ ] itch  Neurological: [ ] headache [ ] dizziness [ ] syncope [ ] weakness [ ] numbness  Psychiatric: [ ] anxiety [ ] depression  Endocrine: [ ] diabetes [ ] thyroid problem  Hematologic/Lymphatic: [ ] anemia [ ] bleeding problem  Allergic/Immunologic: [ ] itchy eyes [ ] nasal discharge [ ] hives [ ] angioedema  [ ] All other systems negative  [ ] Unable to assess ROS because ________    OBJECTIVE:  ICU Vital Signs Last 24 Hrs  T(C): 36.6 (30 Aug 2019 11:39), Max: 36.9 (29 Aug 2019 19:37)  T(F): 97.9 (30 Aug 2019 11:39), Max: 98.5 (29 Aug 2019 19:37)  HR: 86 (30 Aug 2019 11:39) (37 - 86)  BP: 105/71 (30 Aug 2019 11:39) (105/71 - 171/75)  BP(mean): --  ABP: --  ABP(mean): --  RR: 18 (30 Aug 2019 11:39) (18 - 20)  SpO2: 96% (30 Aug 2019 11:39) (92% - 100%)         @ 07: @ 07:00  --------------------------------------------------------  IN: 100 mL / OUT: 0 mL / NET: 100 mL     @ 07: @ 14:55  --------------------------------------------------------  IN: 320 mL / OUT: 0 mL / NET: 320 mL      CAPILLARY BLOOD GLUCOSE          PHYSICAL EXAM:  General:  NAD  HEENT:  NC/AT  Lymph Nodes: No cervical or supraclavicular lymphadenopathy   Neck: Supple  Respiratory:  CTA B/L, no wheezes, crackles or rhonchi  Cardiovascular:  RRR, no m/r/g  Abdomen: soft, NT/ND, +BS  Extremities: no clubbing, cyanosis or edema, warm  Skin: no rash  Neurological: AAOx3, no focal deficits  Psychiatry: not anxious, normal affect    HOSPITAL MEDICATIONS:  MEDICATIONS  (STANDING):  amLODIPine   Tablet 10 milliGRAM(s) Oral at bedtime  furosemide   Injectable 40 milliGRAM(s) IV Push daily  heparin  Injectable 5000 Unit(s) SubCutaneous every 8 hours  levothyroxine 112 MICROGram(s) Oral daily  multivitamin/minerals 1 Tablet(s) Oral daily  polyethylene glycol 3350 17 Gram(s) Oral daily  senna 1 Tablet(s) Oral daily    MEDICATIONS  (PRN):      LABS:                        9.4    8.06  )-----------( 336      ( 30 Aug 2019 08:43 )             30.3     Hgb Trend: 9.4<--, 10.9<--  0830    139  |  98  |  28<H>  ----------------------------<  103<H>  4.1   |  27  |  1.24    Ca    9.1      30 Aug 2019 06:56  Phos  3.9       Mg     2.0         Serum Pro-Brain Natriuretic Peptide (19 @ 19:04)    Serum Pro-Brain Natriuretic Peptide: 3052 pg/mL    TPro  7.4  /  Alb  3.5  /  TBili  0.2  /  DBili  x   /  AST  14  /  ALT  25  /  AlkPhos  203<H>      Creatinine Trend: 1.24<--, 1.15<--, 1.19<--, 1.18<--, 1.10<--, 0.98<--    Urinalysis Basic - ( 29 Aug 2019 23:10 )    Color: Colorless / Appearance: Clear / S.024 / pH: x  Gluc: x / Ketone: Negative  / Bili: Negative / Urobili: Negative   Blood: x / Protein: Negative / Nitrite: Negative   Leuk Esterase: Moderate / RBC: 5 /hpf / WBC 2 /HPF   Sq Epi: x / Non Sq Epi: 7 /hpf / Bacteria: Negative      MICROBIOLOGY:   Culture - Urine (19 @ 01:58)    -  Gentamicin: S <=4    -  Imipenem: S <=1    -  Levofloxacin: R >4    -  Meropenem: S <=1    -  Nitrofurantoin: S <=32 Should not be used to treat pyelonephritis    -  Piperacillin/Tazobactam: R <=16    -  Tigecycline: S <=2    -  Tobramycin: R >8    -  Trimethoprim/Sulfamethoxazole: R >2/38    -  Amikacin: S <=16    -  Ampicillin: R >16 These ampicillin results predict results for amoxicillin    -  Ampicillin/Sulbactam: R 16/8 Enterobacter, Citrobacter, and Serratia may develop resistance during prolonged therapy (3-4 days)    -  Aztreonam: R >16    -  Cefazolin: R >16 (MIC_CL_COM_[id number]) For uncomplicated UTI with K. pneumoniae, E. coli, or P. mirablis: CARY <=16 is sensitive and CARY >=32 is resistant. This also predicts results for oral agents cefaclor, cefdinir, cefpodoxime, cefprozil, cefuroxime axetil, cephalexin and locarbef for uncomplicated UTI. Note that some isolates may be susceptible to these agents while testing resistant to cefazolin.    -  Cefepime: R >16    -  Cefoxitin: S <=8    -  Ceftriaxone: R >32 Enterobacter, Citrobacter, and Serratia may develop resistance during prolonged therapy    -  Ciprofloxacin: R >2    -  Ertapenem: S <=1    Specimen Source: .Urine CL CATCH [facility]    Culture Results:   >100,000 CFU/ml Escherichia coli ESBL    Organism Identification: Escherichia coli ESBL    Organism: Escherichia coli ESBL    Method Type: CARY    RADIOLOGY:  [x ] Reviewed and interpreted by me  < from: CT Angio Chest w/ IV Cont (19 @ 21:08) >  EXAM:  CT ANGIO CHEST (W)AW IC                            PROCEDURE DATE:  2019      INTERPRETATION:  CLINICAL INFORMATION: Shortness of breath, by for   pulmonary motion.    COMPARISON: CT chest from 2019. CT chest from 2016.    PROCEDURE:   CT Angiography of the Chest.  90 ml of Omnipaque 350 was injected intravenously. 10 ml were discarded.  Sagittal and coronal reformats were performed as well as 3D (MIP)   reconstructions.    FINDINGS:    LUNGS AND AIRWAYS AND PLEURA: Moderate left pleural effusion with   associated lower lobar and lingular partial compressive atelectasis.   Small right pleural effusion with associated subsegmental compressive   atelectasis.    MEDIASTINUM AND SKYLAR: No lymphadenopathy.    VESSELS: Good opacification of the pulmonary arteries, however,   respiratory motion limits evaluation of the segmental and subsegmental   arteries. No pulmonary embolism in the main pulmonary arteries or lobar   branches. Pulmonary artery and thoracic aorta are of normal caliber.   Atherosclerotic calcifications.    HEART: Heart size is mildly enlarged.. Small pericardial effusion.   Coronary artery atherosclerotic calcifications.    CHEST WALL AND LOWER NECK: Postlumpectomy changes of the left breast.   Dense right breast parenchyma.    VISUALIZED UPPER ABDOMEN: Unchanged nodular thickening of the left   adrenal gland.    BONES: Chronic mild compression fractures of the T5, T6, T7, and T8   vertebral bodies.   Chronic nondisplaced mild fracture deformities of the left posterior   9-11th ribs. Mild degenerative changes of the spine.    IMPRESSION:   Adequate pulmonary arterial opacification, however, respiratory motion   limits evaluation of the segmental and subsegmental arteries. No   pulmonary embolus in the main pulmonary arteries or lobar branches.    Moderate left and small right pleural effusion with associated   compressive atelectasis.    AMIE TYLER M.D., RADIOLOGY RESIDENT  This document has been electronically signed.  BETHANY HERNANDEZ D.O. ATTENDING RADIOLOGIST  This document has been electronically signed. Aug 29 2019 10:18PM    < end of copied text >    PULMONARY FUNCTION TESTS:    EKG:    < from: Transthoracic Echocardiogram (19 @ 09:25) >  PROCEDURE: Transthoracic echocardiogram with 2-D, M-Mode  and complete spectral and color flow Doppler.  INDICATION: Heart failure, unspecified (I50.9)  ------------------------------------------------------------------------  Dimensions:    Normal Values:  LA:     2.5    2.0 - 4.0 cm  Ao:     3.1    2.0 - 3.8 cm  SEPTUM: 0.9    0.6 - 1.2 cm  PWT:    0.9    0.6 - 1.1 cm  LVIDd:  4.3    3.0 - 5.6 cm  LVIDs:  2.5    1.8 - 4.0 cm  Derived variables:  LVMI: 69 g/m2  RWT: 0.41  ------------------------------------------------------------------------  Observations:  Mitral Valve: Mitral annular calcification. Normal mitral  valve.  Aortic Valve/Aorta: Thickened aortic valve.  Normal aortic root size.  Left Atrium: Moderately dilated left atrium.  LA volume  index = 43 cc/m2.  Left Ventricle: Normal left ventricular internal dimensions  and wall thickness.  Hyperdynamic left ventricular systolic function.  Left atrial pressure is moderately increased.  Right Heart: Normal right atrium. Normal right ventricular  size and function. Normal tricuspid valve. Minimal  tricuspid regurgitation. Normal pulmonic valve.  Pericardium/Pleura: Normal pericardium with no pericardial  effusion.  Bilateral pleural effusions.  Hemodynamic: Estimated right atrial pressure is elevated  (15-20 mmHg).  Mild-moderate pulmonary hypertension. Estimated PASP 50  mmHg.  ------------------------------------------------------------------------  Conclusions:  Hyperdynamic left ventricular systolic function.  Left atrial pressure is moderately increased.  Mild-moderate pulmonary hypertension. Estimated PASP 50  mmHg.  ------------------------------------------------------------------------  Confirmed on  2019 - 13:48:33 by Herbert Esposito M.D.    < end of copied text > [facility] - Division of Pulmonary, Critical Care and Sleep Medicine   Please call [phone] between 8am-pm weekdays, [phone] after hours and weekends      CHIEF COMPLAINT: Progressive SOB    HPI: 89 yo F with h/o HTN, hypothyroidism, and multiple ESBL UTIs, presents with progressive shortness of breath x 1 week. Pt was started on Lasix 20mg daily 2 days prior to admission by her PMD and reported initially feeling better after taking it, but symptoms recurred. +orthopnea and LE edema. +nonproductive cough.   Denies any chest pain or palpitations. Has been having a dry cough over last week, but no fever, chills, runny nose, sore throat, or N/V/diarrhea.   Patient was recently admitted for ESBL UTI, went to [facility], and d/c'd back to [facility] about 2 weeks ago.  On presentation, mild hypoxemia to low 90s, improved with 2 LNC.   Pro-BNP 3052  CTPA - No pulmonary embolus in the main pulmonary arteries or lobar branches, moderate left and small right pleural effusion with associated   compressive atelectasis  Has since received Lasix 40mg IVP- diuresing; symptoms and O2 requirements improved. Currently on RA.      PAST MEDICAL & SURGICAL HISTORY:  Macular degeneration  Non-ST elevation MI (NSTEMI)  Hypothyroidism  Spinal stenosis  HTN (hypertension)  Bilateral Cataracts  Urinary Frequency  S/P cataract surgery: b/l  S/P breast lumpectomy  S/P ORIF (open reduction internal fixation) fracture: R hip  Ectopic pregnancy, tubal      FAMILY HISTORY:  Family history of acute myocardial infarction      SOCIAL HISTORY:  Smoking: [ ] Never Smoked [ ] Former Smoker (__ packs x ___ years) [ ] Current Smoker  (__ packs x ___ years)  Substance Use: [ ] Never Used [ ] Used ____  EtOH Use:  Marital Status: [ ] Single [ ]  [ ]  [ ]   Occupation:  Recent Travel:  Country of Birth:  Advance Directives:    Allergies    sulfa topicals (Unknown)    Intolerances    morphine (Drowsiness; Faint; Hypotension)      HOME MEDICATIONS:    REVIEW OF SYSTEMS:  Constitutional: [ ] fevers [ ] chills [ ] weight loss [ ] weight gain  HEENT: [ ] dry eyes [ ] eye irritation [ ] postnasal drip [ ] nasal congestion  CV: [ ] chest pain [ ] orthopnea [ ] palpitations [ ] murmur  Resp: [ ] cough [ ] shortness of breath [ ] wheezing [ ] sputum [ ] hemoptysis  GI: [ ] nausea [ ] vomiting [ ] diarrhea [ ] constipation [ ] abd pain [ ] dysphagia   : [ ] dysuria [ ] nocturia [ ] hematuria [ ] increased urinary frequency  Musculoskeletal: [ ] myalgias [ ] arthralgias   Skin: [ ] rash [ ] itch  Neurological: [ ] headache [ ] dizziness [ ] syncope [ ] weakness [ ] numbness  Psychiatric: [ ] anxiety [ ] depression  Endocrine: [ ] diabetes [ ] thyroid problem  Hematologic/Lymphatic: [ ] anemia [ ] bleeding problem  Allergic/Immunologic: [ ] itchy eyes [ ] nasal discharge [ ] hives [ ] angioedema  [ ] All other systems negative  [ ] Unable to assess ROS because ________    OBJECTIVE:  ICU Vital Signs Last 24 Hrs  T(C): 36.6 (30 Aug 2019 11:39), Max: 36.9 (29 Aug 2019 19:37)  T(F): 97.9 (30 Aug 2019 11:39), Max: 98.5 (29 Aug 2019 19:37)  HR: 86 (30 Aug 2019 11:39) (37 - 86)  BP: 105/71 (30 Aug 2019 11:39) (105/71 - 171/75)  BP(mean): --  ABP: --  ABP(mean): --  RR: 18 (30 Aug 2019 11:39) (18 - 20)  SpO2: 96% (30 Aug 2019 11:39) (92% - 100%)         @ :  -   @ 07:00  --------------------------------------------------------  IN: 100 mL / OUT: 0 mL / NET: 100 mL     @ :  -   @ 14:55  --------------------------------------------------------  IN: 320 mL / OUT: 0 mL / NET: 320 mL      CAPILLARY BLOOD GLUCOSE          PHYSICAL EXAM:  General:  NAD  HEENT:  NC/AT  Lymph Nodes: No cervical or supraclavicular lymphadenopathy   Neck: Supple  Respiratory:  CTA B/L, no wheezes, crackles or rhonchi  Cardiovascular:  RRR, no m/r/g  Abdomen: soft, NT/ND, +BS  Extremities: no clubbing, cyanosis or edema, warm  Skin: no rash  Neurological: AAOx3, no focal deficits  Psychiatry: not anxious, normal affect    HOSPITAL MEDICATIONS:  MEDICATIONS  (STANDING):  amLODIPine   Tablet 10 milliGRAM(s) Oral at bedtime  furosemide   Injectable 40 milliGRAM(s) IV Push daily  heparin  Injectable 5000 Unit(s) SubCutaneous every 8 hours  levothyroxine 112 MICROGram(s) Oral daily  multivitamin/minerals 1 Tablet(s) Oral daily  polyethylene glycol 3350 17 Gram(s) Oral daily  senna 1 Tablet(s) Oral daily    MEDICATIONS  (PRN):      LABS:                        9.4    8.06  )-----------( 336      ( 30 Aug 2019 08:43 )             30.3     Hgb Trend: 9.4<--, 10.9<--  08-30    139  |  98  |  28<H>  ----------------------------<  103<H>  4.1   |  27  |  1.24    Ca    9.1      30 Aug 2019 06:56  Phos  3.9     08-30  Mg     2.0     08-30    Serum Pro-Brain Natriuretic Peptide (19 @ 19:04)    Serum Pro-Brain Natriuretic Peptide: 3052 pg/mL    TPro  7.4  /  Alb  3.5  /  TBili  0.2  /  DBili  x   /  AST  14  /  ALT  25  /  AlkPhos  203<H>  08-30    Creatinine Trend: 1.24<--, 1.15<--, 1.19<--, 1.18<--, 1.10<--, 0.98<--    Urinalysis Basic - ( 29 Aug 2019 23:10 )    Color: Colorless / Appearance: Clear / S.024 / pH: x  Gluc: x / Ketone: Negative  / Bili: Negative / Urobili: Negative   Blood: x / Protein: Negative / Nitrite: Negative   Leuk Esterase: Moderate / RBC: 5 /hpf / WBC 2 /HPF   Sq Epi: x / Non Sq Epi: 7 /hpf / Bacteria: Negative      MICROBIOLOGY:   Culture - Urine (19 @ 01:58)    -  Gentamicin: S <=4    -  Imipenem: S <=1    -  Levofloxacin: R >4    -  Meropenem: S <=1    -  Nitrofurantoin: S <=32 Should not be used to treat pyelonephritis    -  Piperacillin/Tazobactam: R <=16    -  Tigecycline: S <=2    -  Tobramycin: R >8    -  Trimethoprim/Sulfamethoxazole: R >2/38    -  Amikacin: S <=16    -  Ampicillin: R >16 These ampicillin results predict results for amoxicillin    -  Ampicillin/Sulbactam: R 16/8 Enterobacter, Citrobacter, and Serratia may develop resistance during prolonged therapy (3-4 days)    -  Aztreonam: R >16    -  Cefazolin: R >16 (MIC_CL_COM_ENTERIC_CEFAZU) For uncomplicated UTI with K. pneumoniae, E. coli, or P. mirablis: CARY <=16 is sensitive and CARY >=32 is resistant. This also predicts results for oral agents cefaclor, cefdinir, cefpodoxime, cefprozil, cefuroxime axetil, cephalexin and locarbef for uncomplicated UTI. Note that some isolates may be susceptible to these agents while testing resistant to cefazolin.    -  Cefepime: R >16    -  Cefoxitin: S <=8    -  Ceftriaxone: R >32 Enterobacter, Citrobacter, and Serratia may develop resistance during prolonged therapy    -  Ciprofloxacin: R >2    -  Ertapenem: S <=1    Specimen Source: .Urine CL CATCH [place]    Culture Results:   >100,000 CFU/ml Escherichia coli ESBL    Organism Identification: Escherichia coli ESBL    Organism: Escherichia coli ESBL    Method Type: CARY    RADIOLOGY:  [x ] Reviewed and interpreted by me  < from: CT Angio Chest w/ IV Cont (19 @ 21:08) >  EXAM:  CT ANGIO CHEST (W)AW IC                            PROCEDURE DATE:  2019      INTERPRETATION:  CLINICAL INFORMATION: Shortness of breath, by for   pulmonary motion.    COMPARISON: CT chest from 2019. CT chest from 2016.    PROCEDURE:   CT Angiography of the Chest.  90 ml of Omnipaque 350 was injected intravenously. 10 ml were discarded.  Sagittal and coronal reformats were performed as well as 3D (MIP)   reconstructions.    FINDINGS:    LUNGS AND AIRWAYS AND PLEURA: Moderate left pleural effusion with   associated lower lobar and lingular partial compressive atelectasis.   Small right pleural effusion with associated subsegmental compressive   atelectasis.    MEDIASTINUM AND SKYLAR: No lymphadenopathy.    VESSELS: Good opacification of the pulmonary arteries, however,   respiratory motion limits evaluation of the segmental and subsegmental   arteries. No pulmonary embolism in the main pulmonary arteries or lobar   branches. Pulmonary artery and thoracic aorta are of normal caliber.   Atherosclerotic calcifications.    HEART: Heart size is mildly enlarged.. Small pericardial effusion.   Coronary artery atherosclerotic calcifications.    CHEST WALL AND LOWER NECK: Postlumpectomy changes of the left breast.   Dense right breast parenchyma.    VISUALIZED UPPER ABDOMEN: Unchanged nodular thickening of the left   adrenal gland.    BONES: Chronic mild compression fractures of the T5, T6, T7, and T8   vertebral bodies.   Chronic nondisplaced mild fracture deformities of the left posterior   9-11th ribs. Mild degenerative changes of the spine.    IMPRESSION:   Adequate pulmonary arterial opacification, however, respiratory motion   limits evaluation of the segmental and subsegmental arteries. No   pulmonary embolus in the main pulmonary arteries or lobar branches.    Moderate left and small right pleural effusion with associated   compressive atelectasis.    AMIE TYLER M.D., RADIOLOGY RESIDENT  This document has been electronically signed.  BETHANY HERNANDEZ D.O. ATTENDING RADIOLOGIST  This document has been electronically signed. Aug 29 2019 10:18PM    < end of copied text >    PULMONARY FUNCTION TESTS:    EKG:    < from: Transthoracic Echocardiogram (19 @ 09:25) >  PROCEDURE: Transthoracic echocardiogram with 2-D, M-Mode  and complete spectral and color flow Doppler.  INDICATION: Heart failure, unspecified (I50.9)  ------------------------------------------------------------------------  Dimensions:    Normal Values:  LA:     2.5    2.0 - 4.0 cm  Ao:     3.1    2.0 - 3.8 cm  SEPTUM: 0.9    0.6 - 1.2 cm  PWT:    0.9    0.6 - 1.1 cm  LVIDd:  4.3    3.0 - 5.6 cm  LVIDs:  2.5    1.8 - 4.0 cm  Derived variables:  LVMI: 69 g/m2  RWT: 0.41  ------------------------------------------------------------------------  Observations:  Mitral Valve: Mitral annular calcification. Normal mitral  valve.  Aortic Valve/Aorta: Thickened aortic valve.  Normal aortic root size.  Left Atrium: Moderately dilated left atrium.  LA volume  index = 43 cc/m2.  Left Ventricle: Normal left ventricular internal dimensions  and wall thickness.  Hyperdynamic left ventricular systolic function.  Left atrial pressure is moderately increased.  Right Heart: Normal right atrium. Normal right ventricular  size and function. Normal tricuspid valve. Minimal  tricuspid regurgitation. Normal pulmonic valve.  Pericardium/Pleura: Normal pericardium with no pericardial  effusion.  Bilateral pleural effusions.  Hemodynamic: Estimated right atrial pressure is elevated  (15-20 mmHg).  Mild-moderate pulmonary hypertension. Estimated PASP 50  mmHg.  ------------------------------------------------------------------------  Conclusions:  Hyperdynamic left ventricular systolic function.  Left atrial pressure is moderately increased.  Mild-moderate pulmonary hypertension. Estimated PASP 50  mmHg.  ------------------------------------------------------------------------  Confirmed on  2019 - 13:48:33 by Herbert Esposito M.D.    < end of copied text > [facility] - Division of Pulmonary, Critical Care and Sleep Medicine   Please call [phone] between 8am-pm weekdays, [phone] after hours and weekends      CHIEF COMPLAINT: Progressive SOB    HPI: 89 yo F with h/o HTN, hypothyroidism, and multiple ESBL UTIs, presents with progressive shortness of breath x 1 week. Pt was started on Lasix 20mg daily 2 days prior to admission by her PMD and reported initially feeling better after taking it, but symptoms recurred. +orthopnea and LE edema. +nonproductive cough.   Denies any chest pain or palpitations. Has been having a dry cough over last week, but no fever, chills, runny nose, sore throat, or N/V/diarrhea.   Patient was recently admitted for ESBL UTI, went to [facility], and d/c'd back to [facility] about 2 weeks ago.  On presentation, mild hypoxemia to low 90s, improved with 2 LNC.   Pro-BNP 3052  CTPA - No pulmonary embolus in the main pulmonary arteries or lobar branches, moderate left and small right pleural effusion with associated   compressive atelectasis  Has since received Lasix 40mg IVP- diuresing; symptoms and O2 requirements improved. Currently on RA.      PAST MEDICAL & SURGICAL HISTORY:  Macular degeneration  Non-ST elevation MI (NSTEMI)  Hypothyroidism  Spinal stenosis  HTN (hypertension)  Bilateral Cataracts  Urinary Frequency  S/P cataract surgery: b/l  S/P breast lumpectomy  S/P ORIF (open reduction internal fixation) fracture: R hip  Ectopic pregnancy, tubal      FAMILY HISTORY:  Family history of acute myocardial infarction      SOCIAL HISTORY:  Smoking: [ ] Never Smoked [ ] Former Smoker (__ packs x ___ years) [ ] Current Smoker  (__ packs x ___ years)  Substance Use: [ ] Never Used [ ] Used ____  EtOH Use:  Marital Status: [ ] Single [ ]  [ ]  [ ]   Occupation:  Recent Travel:  Country of Birth:  Advance Directives:    Allergies    sulfa topicals (Unknown)    Intolerances    morphine (Drowsiness; Faint; Hypotension)      HOME MEDICATIONS:    REVIEW OF SYSTEMS:  Constitutional: [ ] fevers [ ] chills [ ] weight loss [ ] weight gain  HEENT: [ ] dry eyes [ ] eye irritation [ ] postnasal drip [ ] nasal congestion  CV: [ ] chest pain [ ] orthopnea [ ] palpitations [ ] murmur  Resp: [ ] cough [ ] shortness of breath [ ] wheezing [ ] sputum [ ] hemoptysis  GI: [ ] nausea [ ] vomiting [ ] diarrhea [ ] constipation [ ] abd pain [ ] dysphagia   : [ ] dysuria [ ] nocturia [ ] hematuria [ ] increased urinary frequency  Musculoskeletal: [ ] myalgias [ ] arthralgias   Skin: [ ] rash [ ] itch  Neurological: [ ] headache [ ] dizziness [ ] syncope [ ] weakness [ ] numbness  Psychiatric: [ ] anxiety [ ] depression  Endocrine: [ ] diabetes [ ] thyroid problem  Hematologic/Lymphatic: [ ] anemia [ ] bleeding problem  Allergic/Immunologic: [ ] itchy eyes [ ] nasal discharge [ ] hives [ ] angioedema  [ ] All other systems negative  [ ] Unable to assess ROS because ________    OBJECTIVE:  ICU Vital Signs Last 24 Hrs  T(C): 36.6 (30 Aug 2019 11:39), Max: 36.9 (29 Aug 2019 19:37)  T(F): 97.9 (30 Aug 2019 11:39), Max: 98.5 (29 Aug 2019 19:37)  HR: 86 (30 Aug 2019 11:39) (37 - 86)  BP: 105/71 (30 Aug 2019 11:39) (105/71 - 171/75)  BP(mean): --  ABP: --  ABP(mean): --  RR: 18 (30 Aug 2019 11:39) (18 - 20)  SpO2: 96% (30 Aug 2019 11:39) (92% - 100%)         @ :  -   @ 07:00  --------------------------------------------------------  IN: 100 mL / OUT: 0 mL / NET: 100 mL     @ :  -   @ 14:55  --------------------------------------------------------  IN: 320 mL / OUT: 0 mL / NET: 320 mL      CAPILLARY BLOOD GLUCOSE          PHYSICAL EXAM:  General:  NAD  HEENT:  NC/AT  Lymph Nodes: No cervical or supraclavicular lymphadenopathy   Neck: Supple  Respiratory:  decreased bibasilar bs, no wheezes, crackles or rhonchi  Cardiovascular:  RRR, no m/r/g  Abdomen: soft, NT/ND, +BS  Extremities: no clubbing, cyanosis or edema, warm  Skin: no rash  Neurological: AAOx3, no focal deficits  Psychiatry: not anxious, normal affect    HOSPITAL MEDICATIONS:  MEDICATIONS  (STANDING):  amLODIPine   Tablet 10 milliGRAM(s) Oral at bedtime  furosemide   Injectable 40 milliGRAM(s) IV Push daily  heparin  Injectable 5000 Unit(s) SubCutaneous every 8 hours  levothyroxine 112 MICROGram(s) Oral daily  multivitamin/minerals 1 Tablet(s) Oral daily  polyethylene glycol 3350 17 Gram(s) Oral daily  senna 1 Tablet(s) Oral daily    MEDICATIONS  (PRN):      LABS:                        9.4    8.06  )-----------( 336      ( 30 Aug 2019 08:43 )             30.3     Hgb Trend: 9.4<--, 10.9<--  08-30    139  |  98  |  28<H>  ----------------------------<  103<H>  4.1   |  27  |  1.24    Ca    9.1      30 Aug 2019 06:56  Phos  3.9     08-30  Mg     2.0     08-30    Serum Pro-Brain Natriuretic Peptide (19 @ 19:04)    Serum Pro-Brain Natriuretic Peptide: 3052 pg/mL    TPro  7.4  /  Alb  3.5  /  TBili  0.2  /  DBili  x   /  AST  14  /  ALT  25  /  AlkPhos  203<H>  08-30    Creatinine Trend: 1.24<--, 1.15<--, 1.19<--, 1.18<--, 1.10<--, 0.98<--    Urinalysis Basic - ( 29 Aug 2019 23:10 )    Color: Colorless / Appearance: Clear / S.024 / pH: x  Gluc: x / Ketone: Negative  / Bili: Negative / Urobili: Negative   Blood: x / Protein: Negative / Nitrite: Negative   Leuk Esterase: Moderate / RBC: 5 /hpf / WBC 2 /HPF   Sq Epi: x / Non Sq Epi: 7 /hpf / Bacteria: Negative      MICROBIOLOGY:   Culture - Urine (19 @ 01:58)    -  Gentamicin: S <=4    -  Imipenem: S <=1    -  Levofloxacin: R >4    -  Meropenem: S <=1    -  Nitrofurantoin: S <=32 Should not be used to treat pyelonephritis    -  Piperacillin/Tazobactam: R <=16    -  Tigecycline: S <=2    -  Tobramycin: R >8    -  Trimethoprim/Sulfamethoxazole: R >2/38    -  Amikacin: S <=16    -  Ampicillin: R >16 These ampicillin results predict results for amoxicillin    -  Ampicillin/Sulbactam: R 16/8 Enterobacter, Citrobacter, and Serratia may develop resistance during prolonged therapy (3-4 days)    -  Aztreonam: R >16    -  Cefazolin: R >16 (MIC_CL_[id number]) For uncomplicated UTI with K. pneumoniae, E. coli, or P. mirablis: CARY <=16 is sensitive and CARY >=32 is resistant. This also predicts results for oral agents cefaclor, cefdinir, cefpodoxime, cefprozil, cefuroxime axetil, cephalexin and locarbef for uncomplicated UTI. Note that some isolates may be susceptible to these agents while testing resistant to cefazolin.    -  Cefepime: R >16    -  Cefoxitin: S <=8    -  Ceftriaxone: R >32 Enterobacter, Citrobacter, and Serratia may develop resistance during prolonged therapy    -  Ciprofloxacin: R >2    -  Ertapenem: S <=1    Specimen Source: .Urine CL CATCH MDSTR    Culture Results:   >100,000 CFU/ml Escherichia coli ESBL    Organism Identification: Escherichia coli ESBL    Organism: Escherichia coli ESBL    Method Type: CARY    RADIOLOGY:  [x ] Reviewed and interpreted by me  < from: CT Angio Chest w/ IV Cont (19 @ 21:08) >  EXAM:  CT ANGIO CHEST (W)AW IC                            PROCEDURE DATE:  2019      INTERPRETATION:  CLINICAL INFORMATION: Shortness of breath, by for   pulmonary motion.    COMPARISON: CT chest from 2019. CT chest from 2016.    PROCEDURE:   CT Angiography of the Chest.  90 ml of Omnipaque 350 was injected intravenously. 10 ml were discarded.  Sagittal and coronal reformats were performed as well as 3D (MIP)   reconstructions.    FINDINGS:    LUNGS AND AIRWAYS AND PLEURA: Moderate left pleural effusion with   associated lower lobar and lingular partial compressive atelectasis.   Small right pleural effusion with associated subsegmental compressive   atelectasis.    MEDIASTINUM AND SKYLAR: No lymphadenopathy.    VESSELS: Good opacification of the pulmonary arteries, however,   respiratory motion limits evaluation of the segmental and subsegmental   arteries. No pulmonary embolism in the main pulmonary arteries or lobar   branches. Pulmonary artery and thoracic aorta are of normal caliber.   Atherosclerotic calcifications.    HEART: Heart size is mildly enlarged.. Small pericardial effusion.   Coronary artery atherosclerotic calcifications.    CHEST WALL AND LOWER NECK: Postlumpectomy changes of the left breast.   Dense right breast parenchyma.    VISUALIZED UPPER ABDOMEN: Unchanged nodular thickening of the left   adrenal gland.    BONES: Chronic mild compression fractures of the T5, T6, T7, and T8   vertebral bodies.   Chronic nondisplaced mild fracture deformities of the left posterior   9-11th ribs. Mild degenerative changes of the spine.    IMPRESSION:   Adequate pulmonary arterial opacification, however, respiratory motion   limits evaluation of the segmental and subsegmental arteries. No   pulmonary embolus in the main pulmonary arteries or lobar branches.    Moderate left and small right pleural effusion with associated   compressive atelectasis.    AMIE TYLER M.D., RADIOLOGY RESIDENT  This document has been electronically signed.  BETHANY HERNANDEZ D.O. ATTENDING RADIOLOGIST  This document has been electronically signed. Aug 29 2019 10:18PM    < end of copied text >    PULMONARY FUNCTION TESTS:    EKG:    < from: Transthoracic Echocardiogram (19 @ 09:25) >  PROCEDURE: Transthoracic echocardiogram with 2-D, M-Mode  and complete spectral and color flow Doppler.  INDICATION: Heart failure, unspecified (I50.9)  ------------------------------------------------------------------------  Dimensions:    Normal Values:  LA:     2.5    2.0 - 4.0 cm  Ao:     3.1    2.0 - 3.8 cm  SEPTUM: 0.9    0.6 - 1.2 cm  PWT:    0.9    0.6 - 1.1 cm  LVIDd:  4.3    3.0 - 5.6 cm  LVIDs:  2.5    1.8 - 4.0 cm  Derived variables:  LVMI: 69 g/m2  RWT: 0.41  ------------------------------------------------------------------------  Observations:  Mitral Valve: Mitral annular calcification. Normal mitral  valve.  Aortic Valve/Aorta: Thickened aortic valve.  Normal aortic root size.  Left Atrium: Moderately dilated left atrium.  LA volume  index = 43 cc/m2.  Left Ventricle: Normal left ventricular internal dimensions  and wall thickness.  Hyperdynamic left ventricular systolic function.  Left atrial pressure is moderately increased.  Right Heart: Normal right atrium. Normal right ventricular  size and function. Normal tricuspid valve. Minimal  tricuspid regurgitation. Normal pulmonic valve.  Pericardium/Pleura: Normal pericardium with no pericardial  effusion.  Bilateral pleural effusions.  Hemodynamic: Estimated right atrial pressure is elevated  (15-20 mmHg).  Mild-moderate pulmonary hypertension. Estimated PASP 50  mmHg.  ------------------------------------------------------------------------  Conclusions:  Hyperdynamic left ventricular systolic function.  Left atrial pressure is moderately increased.  Mild-moderate pulmonary hypertension. Estimated PASP 50  mmHg.  ------------------------------------------------------------------------  Confirmed on  2019 - 13:48:33 by Herbert Esposito M.D.    < end of copied text >

## 2019-08-30 NOTE — PROGRESS NOTE ADULT - PROBLEM SELECTOR PLAN 2
CT showing moderate left and small right pleural effusion with associated   compressive atelectasis. CXR Likely transudative effusions 2/2 fluid overload.   -No longer requiring O2 and lung sounds clear on physical exam.  - consult IR about  therapeutic/diagnostic thoracentesis  - incentive spirometry CT showing moderate left and small right pleural effusion with associated   compressive atelectasis. CXR Likely transudative effusions 2/2 fluid overload.   -No longer requiring O2 and lung sounds clear on physical exam.  - consult pulm about  therapeutic/diagnostic thoracentesis  - incentive spirometry

## 2019-08-30 NOTE — H&P ADULT - HISTORY OF PRESENT ILLNESS
89 y/o F PMHx HTN, hypothyroidism, and ESBL UTIs, presents from [facility] with progressive shortness of breath x 1 week. Patient reports she has been having increasing SOB over last week, now present on minimal exertion and intermittently at rest. Was seen by her PCP Dr. Singh on 8/27, who ordered CXR, which showed small-mod L pleural effusion. Patient was started on Lasix 20mg daily 2 days ago, and reported initially feeling better after taking it, but symptoms recurred. Sometimes feels SOB with lying down, also noticed her legs have been swelling up. Denies any chest pain or palpitations. Has been having a dry cough over last week, but no fever, chills, runny nose, sore throat, or N/V/diarrhea. Reports she has had decreased PO intake over last few days due to lack of appetite, and denies any increase in salt or fluid intake. Lives in [facility] and is independent in ADLs, uses walker to ambulate and manages her meds on her own. 89 y/o F PMHx HTN, hypothyroidism, and ESBL UTIs, presents from [facility] with progressive shortness of breath x 1 week. Patient reports she has been having increasing SOB over last week, now present on minimal exertion and intermittently at rest. Was seen by her PCP Dr. Singh on 8/27, who ordered CXR, which showed small-mod L pleural effusion. Patient was started on Lasix 20mg daily 2 days ago, and reported initially feeling better after taking it, but symptoms recurred. Sometimes feels SOB with lying down, also noticed her legs have been swelling up. Denies any chest pain or palpitations. Has been having a dry cough over last week, but no fever, chills, runny nose, sore throat, or N/V/diarrhea. Reports she has had decreased PO intake over last few days due to lack of appetite, and denies any increase in salt or fluid intake. Lives in [facility] and is independent in ADLs, uses walker to ambulate and manages her meds on her own. Patient was recently admitted for ESBL UTI, went to [facility], and d/c'd back to [facility] about 2 weeks ago.    In the ED:  Vital:  afebrile, HR 70s-80s, /75->142/70, spO2 % on 2L NC  Labs: significant for WBC 11.2 hsTrop 28->23, Pro-BNP 3052  Imaging: CTPA - No pulmonary embolus in the main pulmonary arteries or lobar branches, moderate left and small right pleural effusion with associated   compressive atelectasis  Given: Lasix 40mg IVP x1, Zofran, and nitroglycerin

## 2019-08-30 NOTE — PHYSICAL THERAPY INITIAL EVALUATION ADULT - ADDITIONAL COMMENTS
[place] with ADLs and functional mobility. Pt states she was ambulatory with a RW prior to admission.

## 2019-08-30 NOTE — PHYSICAL THERAPY INITIAL EVALUATION ADULT - PRECAUTIONS/LIMITATIONS, REHAB EVAL
HISTORY AND RESULTS CONTINUED: Pt recently admitted for ESBL UTI, went to [facility], and discharged back to [facility] about 2 weeks. Pt a/w likely CHF exacerbation. CT Chest: No pulmonary embolus in the main pulmonary arteries or lobar branches. Moderate left and small right pleural effusion with associated compressive atelectasis./fall precautions/oxygen therapy device and L/min

## 2019-08-30 NOTE — PROGRESS NOTE ADULT - ASSESSMENT
89F w/ PMH HTN, recurrent ESBL UTIs, and hypothyroidism p/w SOBx1 week found to have pleural effusions most likely 2/2 CHF who is now off O2 and breathing comfortably.

## 2019-08-30 NOTE — H&P ADULT - PROBLEM SELECTOR PLAN 7
DVT Prophylaxis: Lovenox  Diet: DASH  Dispo: pending PT DVT Prophylaxis: heparin subq  Diet: DASH  Dispo: pending PT DVT Prophylaxis: heparin subq, can hold in AM for thoracentesis  Diet: DASH  Dispo: pending PT

## 2019-08-30 NOTE — PROGRESS NOTE ADULT - PROBLEM SELECTOR PLAN 1
Likely 2/2 CHF c/b b/l pleural effusions. No consolidations seen to suggest PNA. Low suspicion for ACS given no chest pain and trops neg, although noted new TWI V5-V6 but nonspecific, and no ST segment changes.   -b/l pleural effusions seen on CT  -CTA neg for PE but limited by motion so cannot r/o.  -f/u TTE  -lasix 40mg IV  -On tele  -strict I&Os and daily weights  - will hold home atenolol for now, if TTE showing CHF then consider switching to carvedilol/metoprolol.

## 2019-08-30 NOTE — PHYSICAL THERAPY INITIAL EVALUATION ADULT - PERTINENT HX OF CURRENT PROBLEM, REHAB EVAL
89 y/o F with PMH of HTN, hypothyroidism, and ESBL UTIs, presents from [facility] with progressive SOB x 1 week. Pt reports she has been having increasing SOB over last week, now present on minimal exertion and intermittently at rest. Was seen by her PCP Dr. Singh on 8/27, who ordered CXR, which showed small-mod L pleural effusion. Pt was started on Lasix 20mg daily 2 days ago, and reported initially feeling better after taking it, but symptoms recurred.

## 2019-08-30 NOTE — H&P ADULT - PROBLEM SELECTOR PLAN 1
Presenting Presenting with progressive dyspnea on exertion and at rest, with exam significant for bilateral LE edema, labs showing elevated BNP, and b/l pleural effusions on CT. Most likely new CHF. Differential also includes PE, infection, and COPD. CTPA neg for PE, but limited by motion so can't r/o segmental PE. No consolidations seen to suggest PNA. CTPA neg for PE, but limited by motion so can't r/o segmental PE. No consolidations seen to suggest PNA.   Unclear underlying cause of CHF exacerbation. Low suspicion for ACS given no chest pain and trops neg, although noted new TWI V5-V6 but nonspecific, and no ST segment changes.   - f/u TTE  - check TSH  - diurese with Lasix 40mg IV daily  - Strict I/Os, daily weights  - will hold home atenolol for now, if TTE showing CHF then consider switching to carvedilol/metoprolol Presenting with progressive dyspnea on exertion and at rest, with exam significant for bilateral LE edema, labs showing elevated BNP, and b/l pleural effusions on CT. Most likely new CHF. Differential also includes PE, infection, and COPD. CTPA neg for PE, but limited by motion so can't r/o segmental PE. No consolidations seen to suggest PNA.   Unclear underlying cause of CHF exacerbation. Low suspicion for ACS given no chest pain and trops neg, although noted new TWI V5-V6 but nonspecific, and no ST segment changes.   - f/u TTE  - check TSH  - diurese with Lasix 40mg IV daily  - Strict I/Os, daily weights  - will hold home atenolol for now, if TTE showing CHF then consider switching to carvedilol/metoprolol

## 2019-08-31 ENCOUNTER — TRANSCRIPTION ENCOUNTER (OUTPATIENT)
Age: 84
End: 2019-08-31

## 2019-08-31 LAB
ANION GAP SERPL CALC-SCNC: 17 MMOL/L — SIGNIFICANT CHANGE UP (ref 5–17)
BUN SERPL-MCNC: 33 MG/DL — HIGH (ref 7–23)
CALCIUM SERPL-MCNC: 9.2 MG/DL — SIGNIFICANT CHANGE UP (ref 8.4–10.5)
CHLORIDE SERPL-SCNC: 96 MMOL/L — SIGNIFICANT CHANGE UP (ref 96–108)
CO2 SERPL-SCNC: 26 MMOL/L — SIGNIFICANT CHANGE UP (ref 22–31)
CREAT SERPL-MCNC: 1.6 MG/DL — HIGH (ref 0.5–1.3)
GLUCOSE SERPL-MCNC: 146 MG/DL — HIGH (ref 70–99)
HCT VFR BLD CALC: 29.3 % — LOW (ref 34.5–45)
HGB BLD-MCNC: 9.2 G/DL — LOW (ref 11.5–15.5)
MAGNESIUM SERPL-MCNC: 2 MG/DL — SIGNIFICANT CHANGE UP (ref 1.6–2.6)
MCHC RBC-ENTMCNC: 30.2 PG — SIGNIFICANT CHANGE UP (ref 27–34)
MCHC RBC-ENTMCNC: 31.4 GM/DL — LOW (ref 32–36)
MCV RBC AUTO: 96.1 FL — SIGNIFICANT CHANGE UP (ref 80–100)
PHOSPHATE SERPL-MCNC: 4.4 MG/DL — SIGNIFICANT CHANGE UP (ref 2.5–4.5)
PLATELET # BLD AUTO: 356 K/UL — SIGNIFICANT CHANGE UP (ref 150–400)
POTASSIUM SERPL-MCNC: 4 MMOL/L — SIGNIFICANT CHANGE UP (ref 3.5–5.3)
POTASSIUM SERPL-SCNC: 4 MMOL/L — SIGNIFICANT CHANGE UP (ref 3.5–5.3)
RBC # BLD: 3.05 M/UL — LOW (ref 3.8–5.2)
RBC # FLD: 14.5 % — SIGNIFICANT CHANGE UP (ref 10.3–14.5)
SODIUM SERPL-SCNC: 139 MMOL/L — SIGNIFICANT CHANGE UP (ref 135–145)
WBC # BLD: 7.6 K/UL — SIGNIFICANT CHANGE UP (ref 3.8–10.5)
WBC # FLD AUTO: 7.6 K/UL — SIGNIFICANT CHANGE UP (ref 3.8–10.5)

## 2019-08-31 PROCEDURE — 99233 SBSQ HOSP IP/OBS HIGH 50: CPT | Mod: GC

## 2019-08-31 PROCEDURE — 99233 SBSQ HOSP IP/OBS HIGH 50: CPT

## 2019-08-31 RX ORDER — NYSTATIN CREAM 100000 [USP'U]/G
1 CREAM TOPICAL THREE TIMES A DAY
Refills: 0 | Status: DISCONTINUED | OUTPATIENT
Start: 2019-08-31 | End: 2019-09-02

## 2019-08-31 RX ORDER — CARVEDILOL PHOSPHATE 80 MG/1
12.5 CAPSULE, EXTENDED RELEASE ORAL EVERY 12 HOURS
Refills: 0 | Status: DISCONTINUED | OUTPATIENT
Start: 2019-08-31 | End: 2019-09-01

## 2019-08-31 RX ORDER — LISINOPRIL 2.5 MG/1
20 TABLET ORAL DAILY
Refills: 0 | Status: DISCONTINUED | OUTPATIENT
Start: 2019-08-31 | End: 2019-08-31

## 2019-08-31 RX ADMIN — CARVEDILOL PHOSPHATE 12.5 MILLIGRAM(S): 80 CAPSULE, EXTENDED RELEASE ORAL at 18:03

## 2019-08-31 RX ADMIN — POLYETHYLENE GLYCOL 3350 17 GRAM(S): 17 POWDER, FOR SOLUTION ORAL at 13:11

## 2019-08-31 RX ADMIN — Medication 1 TABLET(S): at 13:11

## 2019-08-31 RX ADMIN — Medication 112 MICROGRAM(S): at 05:50

## 2019-08-31 RX ADMIN — NYSTATIN CREAM 1 APPLICATION(S): 100000 CREAM TOPICAL at 16:31

## 2019-08-31 RX ADMIN — LISINOPRIL 20 MILLIGRAM(S): 2.5 TABLET ORAL at 18:03

## 2019-08-31 RX ADMIN — SENNA PLUS 1 TABLET(S): 8.6 TABLET ORAL at 13:11

## 2019-08-31 RX ADMIN — Medication 40 MILLIGRAM(S): at 05:50

## 2019-08-31 RX ADMIN — HEPARIN SODIUM 5000 UNIT(S): 5000 INJECTION INTRAVENOUS; SUBCUTANEOUS at 05:50

## 2019-08-31 RX ADMIN — NYSTATIN CREAM 1 APPLICATION(S): 100000 CREAM TOPICAL at 21:26

## 2019-08-31 RX ADMIN — HEPARIN SODIUM 5000 UNIT(S): 5000 INJECTION INTRAVENOUS; SUBCUTANEOUS at 21:27

## 2019-08-31 RX ADMIN — HEPARIN SODIUM 5000 UNIT(S): 5000 INJECTION INTRAVENOUS; SUBCUTANEOUS at 13:11

## 2019-08-31 NOTE — CONSULT NOTE ADULT - SUBJECTIVE AND OBJECTIVE BOX
Patient is a 89y old  Female who presents with a chief complaint of shortness of breath (31 Aug 2019 07:01)      HPI: 89 y/o F PMHx HTN, hypothyroidism, and ESBL UTIs, presents from [facility] with progressive shortness of breath x 1 week. Patient reports she has been having increasing SOB over last week, now SOB is present on minimal exertion and intermittently at rest. Was seen by her PCP Dr. Singh on , who ordered CXR, which showed small-mod L pleural effusion. Patient was started on Lasix 20mg daily 2 days ago, and reported initially feeling better after taking it, but symptoms recurred. Sometimes feels SOB when lying down, also noticed her legs have been swelling up. Denies any chest pain or palpitations. Has been having a dry cough over last week, but no fever, chills, runny nose, sore throat, or N/V/diarrhea. Reports she has had decreased PO intake over last few days due to lack of appetite, and denies any increase in salt or fluid intake. Lives in [facility] and is independent in ADLs, uses walker to ambulate and manages her meds on her own. Patient was recently admitted for ESBL UTI, went to [facility], and d/c'd back to [facility] about 2 weeks ago.  In the ED: Vital:  afebrile, HR 70s-80s, /75->142/70, spO2 % on 2L NC  Labs: significant for WBC 11.2 hsTrop 28->23, Pro-BNP 3052  Imaging: CTPA - No pulmonary embolus in the main pulmonary arteries or lobar branches, moderate left and small right pleural effusion with associated   compressive atelectasis  Given: Lasix 40mg IVP x1, Zofran, and nitroglycerin (30 Aug 2019 03:06)      PAST MEDICAL & SURGICAL HISTORY:  Macular degeneration  Non-ST elevation MI (NSTEMI)  Hypothyroidism  Spinal stenosis  HTN (hypertension)  Bilateral Cataracts  Urinary Frequency  S/P cataract surgery: b/l  S/P breast lumpectomy  S/P ORIF (open reduction internal fixation) fracture: R hip  Ectopic pregnancy, tubal      MEDICATIONS  (STANDING):  amLODIPine   Tablet 10 milliGRAM(s) Oral at bedtime  furosemide   Injectable 40 milliGRAM(s) IV Push daily  heparin  Injectable 5000 Unit(s) SubCutaneous every 8 hours  levothyroxine 112 MICROGram(s) Oral daily  multivitamin/minerals 1 Tablet(s) Oral daily  polyethylene glycol 3350 17 Gram(s) Oral daily  senna 1 Tablet(s) Oral daily    MEDICATIONS  (PRN):      FAMILY HISTORY:  Family history of acute myocardial infarction      SOCIAL HISTORY: ; retired    CIGARETTES: nonsmoker    REVIEW OF SYSTEMS  General: +fatigue  Respiratory and Thorax: See HPI	  Cardiovascular:	See HPI  Gastrointestinal:	 Negative  Genitourinary: See HPI  Neurological: Negative  Psychiatric: Negative	  	  Vital Signs Last 24 Hrs  T(C): 36.7 (31 Aug 2019 05:00), Max: 36.7 (31 Aug 2019 05:00)  T(F): 98 (31 Aug 2019 05:00), Max: 98 (31 Aug 2019 05:00)  HR: 87 (31 Aug 2019 05:00) (86 - 87)  BP: 140/68 (31 Aug 2019 05:00) (105/71 - 144/72)  BP(mean): --  RR: 18 (31 Aug 2019 05:00) (18 - 18)  SpO2: 99% (31 Aug 2019 05:00) (87% - 99%)  PHYSICAL EXAM:      Constitutional: Thin F in NAD  Eyes: anicteric sclera  Neck: no JVD  Respiratory: Decreased BS 1/2 up on L, R clear  Cardiovascular: RRR, no murmurs  Gastrointestinal: BS present, NT, ND, no masses  Extremities: Trace ramiro pitting edema of ankles  Neurological: grossly nonfocal  Psychiatric: nl affect    TELEMETRY: RSR    ECG:< from: 12 Lead ECG (19 @ 18:40) >  Ventricular Rate 78 BPM    Atrial Rate 78 BPM    P-R Interval 160 ms    QRS Duration 92 ms    Q-T Interval 350 ms    QTC Calculation(Bezet) 399 ms    R Axis -14 degrees    T Axis 201 degrees    Diagnosis Line SINUS RHYTHM WITH SINUS ARRHYTHMIA WITHOCCASIONAL PREMATURE VENTRICULAR COMPLEXES  ST & T WAVE ABNORMALITY, CONSIDER LATERAL ISCHEMIA  ABNORMAL ECG    Confirmed by ATTENDING, ED ([id number]),  TEJ RIVER ([id number]) on 2019 8:31:53 AM    < end of copied text >        LABS:                        9.4    8.06  )-----------( 336      ( 30 Aug 2019 08:43 )             30.3     08-30    139  |  98  |  28<H>  ----------------------------<  103<H>  4.1   |  27  |  1.24    Ca    9.1      30 Aug 2019 06:56  Phos  3.9       Mg     2.0         TPro  7.4  /  Alb  3.5  /  TBili  0.2  /  DBili  x   /  AST  14  /  ALT  25  /  AlkPhos  203<H>      TSH- 3.24      Urinalysis Basic - ( 29 Aug 2019 23:10 )    Color: Colorless / Appearance: Clear / S.024 / pH: x  Gluc: x / Ketone: Negative  / Bili: Negative / Urobili: Negative   Blood: x / Protein: Negative / Nitrite: Negative   Leuk Esterase: Moderate / RBC: 5 /hpf / WBC 2 /HPF   Sq Epi: x / Non Sq Epi: 7 /hpf / Bacteria: Negative      I&O's Summary    30 Aug 2019 07:01  -  31 Aug 2019 07:00  --------------------------------------------------------  IN: 420 mL / OUT: 0 mL / NET: 420 mL      BNP  RADIOLOGY & ADDITIONAL STUDIES:  < from: Xray Chest 2 Views PA/Lat (19 @ 12:21) >  EXAM:  XR CHEST PA LAT 2V        PROCEDURE DATE:  2019           INTERPRETATION:  Clinical information: Shortness of breath.    PA and lateral radiographs of the chest were obtained and compared to   previous study of 2019.    Heart sizecannot be adequately assessed on this exam. Right lung is   clear. Small to moderate size left pleural effusion is noted. Visualized   osseous structures are within normal limits.    Impression: Small to moderate size left pleural effusion.                   ENEDINA ANAND M.D., ATTENDING RADIOLOGIST   This document has been electronically signed. Aug 28 2019 12:24PM    < end of copied text >  < from: CT Angio Chest w/ IV Cont (19 @ 21:08) >  EXAM:  CT ANGIO CHEST (W)AW IC                            PROCEDURE DATE:  2019            INTERPRETATION:  CLINICAL INFORMATION: Shortness of breath, by for   pulmonary motion.    COMPARISON: CT chest from 2019. CT chest from 2016.    PROCEDURE:   CT Angiography of the Chest.  90 ml of Omnipaque 350 was injected intravenously. 10 ml were discarded.  Sagittal and coronal reformats were performed as well as 3D (MIP)   reconstructions.    FINDINGS:    LUNGS AND AIRWAYS AND PLEURA: Moderate left pleural effusion with   associated lower lobar and lingular partial compressive atelectasis.   Small right pleural effusion with associated subsegmental compressive   atelectasis.    MEDIASTINUM AND SKYLAR: No lymphadenopathy.    VESSELS: Good opacification of the pulmonary arteries, however,   respiratory motion limits evaluation of the segmental and subsegmental   arteries. No pulmonary embolism in the main pulmonary arteries or lobar   branches. Pulmonary artery and thoracic aorta are of normal caliber.   Atherosclerotic calcifications.    HEART: Heart size is mildly enlarged.. Small pericardial effusion.   Coronary artery atherosclerotic calcifications.    CHEST WALL AND LOWER NECK: Postlumpectomy changes of the left breast.   Dense right breast parenchyma.    VISUALIZED UPPER ABDOMEN: Unchanged nodular thickening of the left   adrenal gland.    BONES: Chronic mild compression fractures of the T5, T6, T7, and T8   vertebral bodies.   Chronic nondisplaced mild fracture deformities of the left posterior   9-11th ribs. Mild degenerative changes of the spine.    IMPRESSION:   Adequate pulmonary arterial opacification, however, respiratory motion   limits evaluation of the segmental and subsegmental arteries. No   pulmonary embolus in the main pulmonary arteries or lobar branches.    Moderate left and small right pleural effusion with associated   compressive atelectasis.                AMIE TYLER M.D., RADIOLOGY RESIDENT  This document has been electronically signed.  BETHANY HERNANDEZ D.O. ATTENDING RADIOLOGIST  This document has been electronically signed. Aug 29 2019 10:18PM          < end of copied text >  < from: Transthoracic Echocardiogram (19 @ 09:25) >    Patient name: SOLIS TAYLOR  YOB: 1922   Age: 89 (F)   MR#: [id number]  Study Date: 2019  Location: [address] [place], [place] [id number]: [facility]  Study quality: Technically good  Referring Physician: Ren Martinez MD  Blood Pressure: 159/82 mmHg  Height: 163 cm  Weight: 73 kg  BSA: 1.8 m2  ------------------------------------------------------------------------  PROCEDURE: Transthoracic echocardiogram with 2-D, M-Mode  and complete spectral and color flow Doppler.  INDICATION: Heart failure, unspecified (I50.9)  ------------------------------------------------------------------------  Dimensions:    Normal Values:  LA:     2.5    2.0 - 4.0 cm  Ao:     3.1    2.0 - 3.8 cm  SEPTUM: 0.9    0.6 - 1.2 cm  PWT:    0.9    0.6 - 1.1 cm  LVIDd:  4.3    3.0 - 5.6 cm  LVIDs:  2.5    1.8 - 4.0 cm  Derived variables:  LVMI: 69 g/m2  RWT: 0.41  ------------------------------------------------------------------------  Observations:  Mitral Valve: Mitral annular calcification. Normal mitral  valve.  Aortic Valve/Aorta: Thickened aortic valve.  Normal aortic root size.  Left Atrium: Moderately dilated left atrium.  LA volume  index = 43 cc/m2.  Left Ventricle: Normal left ventricular internal dimensions  and wall thickness.  Hyperdynamic left ventricular systolic function.  Left atrial pressure is moderately increased.  Right Heart: Normal right atrium. Normal right ventricular  size and function. Normal tricuspid valve. Minimal  tricuspid regurgitation. Normal pulmonic valve.  Pericardium/Pleura: Normal pericardium with no pericardial  effusion.  Bilateral pleural effusions.  Hemodynamic: Estimated right atrial pressure is elevated  (15-20 mmHg).  Mild-moderate pulmonary hypertension. Estimated PASP 50  mmHg.  ------------------------------------------------------------------------  Conclusions:  Hyperdynamic left ventricular systolic function.  Left atrial pressure is moderately increased.  Mild-moderate pulmonary hypertension. Estimated PASP 50  mmHg.  ------------------------------------------------------------------------  Confirmed on  2019 - 13:48:33 by Herbert Esposito M.D.  ------------------------------------------------------------------------    < end of copied text >    IMPRESSION:  SOB with moderate L sided pleural effusion- possible HFpEF  HTN  Hypothyroidism    RECOMMENDATIONS:  Continue current meds- IV Lasix for now  f/u CBC and BMP  Repeat CXR to see if improved- may require thoracentesis if not better  Pulm f/u  Would change antihypertensives to Carvedilol 12.5 mg BID and Ramipril 5 mg daily

## 2019-08-31 NOTE — PROGRESS NOTE ADULT - PROBLEM SELECTOR PLAN 4
BP elevated on admission, now improved after diuresis in ED  - continue amlodipine 10mg daily  - hold home atenolol for now cards recs appreciated. Ramipril and coreg.

## 2019-08-31 NOTE — DISCHARGE NOTE PROVIDER - NSDCCPCAREPLAN_GEN_ALL_CORE_FT
PRINCIPAL DISCHARGE DIAGNOSIS  Diagnosis: Pleural effusion, bilateral  Assessment and Plan of Treatment: Pleural effusion, bilateral  When you came into the hospital you were found to have pleural effusions on both sides of your lung.      SECONDARY DISCHARGE DIAGNOSES  Diagnosis: Hypertension  Assessment and Plan of Treatment: Hypertension PRINCIPAL DISCHARGE DIAGNOSIS  Diagnosis: Pleural effusion, bilateral  Assessment and Plan of Treatment: This means you had fluid around your lungs. We gave you Lasix and spironolactone to help get the fluid out of your body. We also put you on oxygen and gave you breathing treatments as needed. You did not need to have the fluid removed by a needle since you responded well to the medications. Please continue taking _____.      SECONDARY DISCHARGE DIAGNOSES  Diagnosis: CHF (congestive heart failure)  Assessment and Plan of Treatment: This means your heart isn't pumping blood as well as it should. This is the reason why you had fluid around your lungs.    Diagnosis: Fungal dermatitis  Assessment and Plan of Treatment: This was the rash that you had on your groins, under your breasts, and under your armpits. We gave you nystatin powder 3 times per day and one dose of fluconazole.    Diagnosis: Hypertension  Assessment and Plan of Treatment: This means your blood pressure was elevated. We switched your blood pressure medications (atenolol and amlodipine) to Coreg. Please take your Coreg 12.5mg twice per day.    Diagnosis: Hypothyroidism  Assessment and Plan of Treatment: Please continue taking your 112mcg synthroid daily. PRINCIPAL DISCHARGE DIAGNOSIS  Diagnosis: Pleural effusion, bilateral  Assessment and Plan of Treatment: This means you had fluid around your lungs. We gave you Lasix and spironolactone to help get the fluid out of your body. We also put you on oxygen and gave you breathing treatments as needed. You did not need to have the fluid removed by a needle since you responded well to the medications. Please continue taking _____.      SECONDARY DISCHARGE DIAGNOSES  Diagnosis: CHF (congestive heart failure)  Assessment and Plan of Treatment: This means your heart isn't pumping blood as well as it should. This is the reason why you had fluid around your lungs.    Diagnosis: Fungal dermatitis  Assessment and Plan of Treatment: This was the rash that you had on your groins, under your breasts, and under your armpits. We gave you nystatin powder 3 times per day and one dose of fluconazole.    Diagnosis: Hypertension  Assessment and Plan of Treatment: This means your blood pressure was elevated. We switched your blood pressure medications (atenolol and amlodipine) to Coreg. Please take your Coreg 3.125mg twice per day.    Diagnosis: Hypothyroidism  Assessment and Plan of Treatment: Please continue taking your 112mcg synthroid daily. PRINCIPAL DISCHARGE DIAGNOSIS  Diagnosis: Pleural effusion, bilateral  Assessment and Plan of Treatment: This means you had fluid around your lungs. We gave you Lasix and spironolactone to help get the fluid out of your body. We also put you on oxygen and gave you breathing treatments as needed. You did not need to have the fluid removed by a needle since you responded well to the medications. Please continue taking lasix 20mg daily.      SECONDARY DISCHARGE DIAGNOSES  Diagnosis: CHF (congestive heart failure)  Assessment and Plan of Treatment: This means your heart isn't pumping blood as well as it should. This is the reason why you had fluid around your lungs.    Diagnosis: Fungal dermatitis  Assessment and Plan of Treatment: This was the rash that you had on your groins, under your breasts, and under your armpits. We gave you nystatin powder 3 times per day and one dose of fluconazole.    Diagnosis: Hypertension  Assessment and Plan of Treatment: This means your blood pressure was elevated. We switched your blood pressure medications (atenolol and amlodipine) to Coreg. Please take your Coreg 3.125mg twice per day.    Diagnosis: Hypothyroidism  Assessment and Plan of Treatment: Please continue taking your 112mcg synthroid daily. PRINCIPAL DISCHARGE DIAGNOSIS  Diagnosis: Pleural effusion, bilateral  Assessment and Plan of Treatment: you came to the hospital with shortness of breath, we found fluid around your lungs. We gave you Lasix and spironolactone to help get the fluid out of your body. We also put you on oxygen and gave you breathing treatments as needed. You ultimately had the fluid removed by the pulmonologist since the fluid didn't decrease much with medications alone. Your oxygen saturation improved with the fluid removal. Please continue taking lasix 20mg daily, coreg 3.125 twice daily and spironolactone 25 daily per your cardiologist      SECONDARY DISCHARGE DIAGNOSES  Diagnosis: Fungal dermatitis  Assessment and Plan of Treatment: This was the rash that you had on your groins, under your breasts, and under your armpits. We gave you nystatin powder 3 times per day and one dose of fluconazole, your symptoms improved with the medications. Please follow up with your primary care doctor next week to discuss further management of your fungal infection    Diagnosis: CHF (congestive heart failure)  Assessment and Plan of Treatment: You had an ultrasound of the heart which showed heart failure. You were seen and evaluated by cardiologist and medically optimized. please take the coreg, lasix and spironolactone as prescribed, and follow up with a cardiologist in a couple weeks. You might need a referral from your primary care doctor.    Diagnosis: Hypertension  Assessment and Plan of Treatment: This means your blood pressure was elevated. We switched your blood pressure medications (atenolol and amlodipine) to Coreg, lasix and spironolactone, please STOP atenolol and amlodipine, and START taking Coreg, lasix and spironolactone as directed. please see your primary care for further management

## 2019-08-31 NOTE — DISCHARGE NOTE PROVIDER - HOSPITAL COURSE
89 y/o F PMHx HTN, hypothyroidism, and ESBL UTIs, presents from [place] with progressive shortness of breath x 1 week. Patient reports she has been having increasing SOB over last week, now present on minimal exertion and intermittently at rest. Was seen by her PCP Dr. Singh on 8/27, who ordered CXR, which showed small-mod L pleural effusion. Patient was started on Lasix 20mg daily 2 days ago, and reported initially feeling better after taking it, but symptoms recurred. Sometimes feels SOB with lying down, also noticed her legs have been swelling up. Denies any chest pain or palpitations. Has been having a dry cough over last week, but no fever, chills, runny nose, sore throat, or N/V/diarrhea. Reports she has had decreased PO intake over last few days due to lack of appetite, and denies any increase in salt or fluid intake. Lives in [place] and is independent in ADLs, uses walker to ambulate and manages her meds on her own. Patient was recently admitted for ESBL UTI, went to [facility], and d/c'd back to [place] about 2 weeks ago.        In the ED the patient was afebrile, HR 70s-80s, /75->142/70, spO2 % on 2L NC    Labs were significant for WBC 11.2 hsTrop 28->23, Pro-BNP 3052 and CT chest noted b/l pleural effusions. Lasix 40mg IVP x1, Zofran, and nitroglycerin This is a 89F w/ PMH HTN, recurrent ESBL UTIs, and hypothyroidism who present to [facility] ED on 8/29 w/ SOBx1. CTA on admission showed a moderate L and mild R pleural effusions. Pulm was consulted for possible thoracocentesis, but wanted trial of diuresis first. She was admitted to [facility] on 8/30. ECHO on 8/30 showed hyperdynamic LVSF, increased LA pressure, and mild-moderate pulmonary HTN. Cards was consulted and stated ECHO was suggestive of diastolic HF. She was diuresed w/ Lasix 40mg IV on 8/30 and 8/31. Per cards, switched from home amlodipine 10mg to Coreg 12.5mg q12 and lisinopril 20mg qd started on 8/31. UOP low on 8/31 w/ bladder scan showing 30cc and Cr increased to 1.60. On 9/1 she was hypotensive w/ SBP in the 60s which improved w/ 500cc bolus. Lasix, coreg, and lisinopril were held. CXR that day showed worsening effusions. Renal function began improving on 9/2 and continued to improve throughout stay so Lasix 40mg IV was given as needed to diurese from then on.  CXR on 9/3 was stable. Spironolactone 12.5 qd was started on 9/3. Coreg restarted on 9/4. Patient clinically improved with diuresis and tap was not needed. This is a 89F w/ PMH HTN, recurrent ESBL UTIs, and hypothyroidism who presented from the [facility] ED on 8/29 w/ SOBx1. CTA on admission showed a moderate L and mild R pleural effusions. Pulm was consulted for possible thoracocentesis, but wanted trial of diuresis first. She was admitted to [facility] on 8/30. ECHO on 8/30 showed hyperdynamic LVSF, increased LA pressure, and mild-moderate pulmonary HTN. Cards was consulted and stated ECHO was suggestive of diastolic HF. She was diuresed w/ Lasix 40mg IV on 8/30 and 8/31. Per cards, switched from home amlodipine 10mg to Coreg 12.5mg q12 and lisinopril 20mg qd started on 8/31. UOP low on 8/31 w/ bladder scan showing 30cc and Cr increased to 1.60. On 9/1 she was hypotensive w/ SBP in the 60s which improved w/ 500cc bolus. Lasix, coreg, and lisinopril were held. CXR that day showed worsening effusions. Renal function began improving on 9/2 and continued to improve throughout stay so Lasix 40mg IV was given as needed to diurese from then on.  CXR on 9/3 was stable. Spironolactone 12.5 qd was started on 9/3. Coreg restarted on 9/4. Patient clinically improved with diuresis and tap was not needed. This is a 89F w/ PMH HTN, recurrent ESBL UTIs, and hypothyroidism who presented from the [place] [facility] ED on 8/29 w/ SOBx1. CTA on admission showed a moderate L and mild R pleural effusions. Pulm was consulted for possible thoracocentesis, but wanted trial of diuresis first given her old age. She was admitted to [facility] on 8/30. ECHO on 8/30 showed hyperdynamic LVSF, increased LA pressure, and mild-moderate pulmonary HTN. Cards was consulted and stated ECHO was suggestive of diastolic HF. She was diuresed w/ Lasix 40mg IV 8/30-9/5 with improvement in respiratory status, however patient still feels symptomatic with exertion. Pulm performed thoracentesis 9/6 without complication, cxr showed improved pleural effusion and no pneumothorax. Cardiology was cslt'ed for acute diastolic HF, with rec for medication optimization. amlodipine 10mg was switched to Coreg 12.5mg q12 and lisinopril 20mg qd started on 8/31, but patient became hypotensive to SBP 60s and Cr increased to 16. BP improved w/ 500cc bolus. all bp meds were held. CXR that day showed worsening effusions. Renal function began improving on 9/2 and sbp stable, restarted Lasix 40mg IV, Coreg at a lower dose, and added Spironolactone 12.5 qd. once patient HDS, IV lasix switched to PO 20 daily (home dose) per card rec.     Patient has fungal infection in the b/l axilla and under the pannus, nyastatin     Patient clinically improved, she is currently HDS, and sob resolved, she is ready for discharge. This is a 89F w/ PMH HTN, recurrent ESBL UTIs, and hypothyroidism who presented from the [facility] ED on 8/29 w/ SOBx1. CTA on admission showed a moderate L and mild R pleural effusions. Pulm was consulted for possible thoracocentesis, but wanted trial of diuresis first given her old age. She was admitted to [facility] on 8/30. ECHO on 8/30 showed hyperdynamic LVSF, increased LA pressure, and mild-moderate pulmonary HTN. Cards was consulted and stated ECHO was suggestive of diastolic HF. She was diuresed w/ Lasix 40mg IV 8/30-9/5 with improvement in respiratory status, however patient still feels symptomatic with exertion. Pulm performed thoracentesis 9/6 without complication, cxr showed improved pleural effusion and no pneumothorax, patient's o2 saturation 95-96% on RA and 91 with ambulation.     Cardiology was cslt'ed for acute diastolic HF, with rec for medication optimization. Initially amlodipine 10mg was switched to Coreg 12.5mg q12 and lisinopril 20mg qd started on 8/31, but patient became hypotensive to SBP 60s and Cr increased to 1.86. patient improved w/ 500cc bolus and all bp meds were held. once Renal function improved and sbp became stable, we restarted Lasix 40mg IV, Coreg 3.125, and added Spironolactone 25 qd. Per Cardiology rec: c/w home lasix 20 PO daily, coreg 3.125 q12 and spironolactone 25 daily.    Patient has fungal infection in the b/l axilla and under the pannus, nyastatin wasn't effective, patient received fluconazole x1 with improvement in symptoms      On rpt pt eval patient walked 125ft with o2 sat of 95 on RA. plan home with rehab at [facility] assisted living     9/7 burning sensation, will f/u UA given hx of ESBL UTI in the past         Patient clinically improved, she is currently HDS, and sob resolved, she is ready for discharge. This is a 89F w/ PMH HTN, recurrent ESBL UTIs, and hypothyroidism who presented from [facility] ED on 8/29 w/ SOBx1. CTA on admission showed a moderate L and mild R pleural effusions. Pulm was consulted for possible thoracocentesis, but wanted trial of diuresis first given her old age. She was admitted to [facility] on 8/30. ECHO on 8/30 showed hyperdynamic LVSF, increased LA pressure, and mild-moderate pulmonary HTN. Cards was consulted and stated ECHO was suggestive of diastolic HF. She was diuresed w/ Lasix 40mg IV 8/30-9/5 with improvement in respiratory status, however patient still feels symptomatic with exertion. Pulm performed thoracentesis 9/6 without complication, cxr showed improved pleural effusion and no pneumothorax, patient's o2 saturation 95-96% on RA and 91 with ambulation.     Cardiology was cslt'ed for acute diastolic HF, with rec for medication optimization. Initially amlodipine 10mg was switched to Coreg 12.5mg q12 and lisinopril 20mg qd started on 8/31, but patient became hypotensive to SBP 60s and Cr increased to 1.86. patient improved w/ 500cc bolus and all bp meds were held. once Renal function improved and sbp became stable, we restarted Lasix 40mg IV, Coreg 3.125, and added Spironolactone 25 qd. Per Cardiology rec: c/w home lasix 20 PO daily, coreg 3.125 q12 and spironolactone 25 daily.    Patient has fungal infection in the b/l axilla and under the pannus, nyastatin wasn't effective, patient received fluconazole x1 with improvement in symptoms      On rpt pt eval patient walked 125ft with o2 sat of 95 on RA. plan home with rehab at [facility] assisted living     9/7 burning sensation, UA given hx of ESBL UTI in the past, Negative result.         Patient clinically improved, she is currently HDS, and sob resolved, she is ready for discharge for home with PT services This is a 89F w/ PMH HTN, recurrent ESBL UTIs, and hypothyroidism who presented from the [facility] to [facility] ED on 8/29 w/ SOBx1. CTA on admission showed a moderate L and mild R pleural effusions. Pulm was consulted for possible thoracocentesis, but wanted trial of diuresis first given her old age. She was admitted to [facility] on 8/30. ECHO on 8/30 showed hyperdynamic LVSF, increased LA pressure, and mild-moderate pulmonary HTN. Cards was consulted and stated ECHO was suggestive of diastolic HF. She was diuresed w/ Lasix 40mg IV 8/30-9/5 with improvement in respiratory status, however patient still feels symptomatic with exertion. Pulm performed thoracentesis 9/6 without complication, cxr showed improved pleural effusion and no pneumothorax, patient's o2 saturation 95-96% on RA and 91 with ambulation.     Cardiology was cslt'ed for acute diastolic HF, with rec for medication optimization. Initially amlodipine 10mg was switched to Coreg 12.5mg q12 and lisinopril 20mg qd started on 8/31, but patient became hypotensive to SBP 60s and Cr increased to 1.86. patient improved w/ 500cc bolus and all bp meds were held. once Renal function improved and sbp became stable, we restarted Lasix 40mg IV, Coreg 3.125, and added Spironolactone 25 qd. Per Cardiology rec: c/w home lasix 20 PO daily, coreg 3.125 q12 and spironolactone 25 daily.    Patient has fungal infection in the b/l axilla and under the pannus, nyastatin wasn't effective, patient received fluconazole x1 with improvement in symptoms      On rpt pt eval patient walked 125ft with o2 sat of 95 on RA. plan home with rehab at [facility] assisted living     9/7 burning sensation, UA given hx of ESBL UTI in the past, Negative result.         Patient clinically improved, she is currently HDS, and sob resolved, she is ready for discharge back to [facility] with PT services.

## 2019-08-31 NOTE — DISCHARGE NOTE PROVIDER - NSDCFUADDAPPT_GEN_ALL_CORE_FT
Dr. Rodriguez - Tuesday at the [facility]  Dr. Shah - Wednesday at 9:30am    Please also follow up with Dr. Buckley Dr. Rodriguez - Tuesday at the [facility]  Dr. Shah - Wednesday at 9:30am    Please also follow up with your outpatient cardiologist Dr. Kam Buckley

## 2019-08-31 NOTE — DISCHARGE NOTE PROVIDER - NSFTFHOMEHTHYNRD_GEN_ALL_CORE
Xenograft Text: The defect edges were debeveled with a #15 scalpel blade.  Given the location of the defect, shape of the defect and the proximity to free margins a xenograft was deemed most appropriate.  The graft was then trimmed to fit the size of the defect.  The graft was then placed in the primary defect and oriented appropriately. Yes

## 2019-08-31 NOTE — PROGRESS NOTE ADULT - PROBLEM SELECTOR PLAN 1
Presenting with progressive dyspnea on exertion and at rest, with exam significant for bilateral LE edema, labs showing elevated BNP, and b/l pleural effusions on CT. Most likely new CHF. Differential also includes PE, infection, and COPD. CTPA neg for PE, but limited by motion so can't r/o segmental PE. No consolidations seen to suggest PNA.   Unclear underlying cause of CHF exacerbation. Low suspicion for ACS given no chest pain and trops neg, although noted new TWI V5-V6 but nonspecific, and no ST segment changes.   - f/u TTE  - check TSH  - diurese with Lasix 40mg IV daily  - Strict I/Os, daily weights  - will hold home atenolol for now, if TTE showing CHF then consider switching to carvedilol/metoprolol Presenting with progressive dyspnea on exertion and at rest, with exam significant for bilateral LE edema, labs showing elevated BNP, and b/l pleural effusions on CT. Most likely new CHF. Differential also includes PE, infection, and COPD. CTPA neg for PE, but limited by motion so can't r/o segmental PE. No consolidations seen to suggest PNA.   Unclear underlying cause of CHF exacerbation. Low suspicion for ACS given no chest pain and trops neg, although noted new TWI V5-V6 but nonspecific, and no ST segment changes.   - TTE done.   - TSH wnl.  - diurese with Lasix 40mg IV daily  - Strict I/Os, daily weights  - Cards input appreciated.

## 2019-08-31 NOTE — DISCHARGE NOTE PROVIDER - CARE PROVIDER_API CALL
Kam Buckley)  Cardiology; Internal Medicine  [address] [place] [address], [address]  [place] [address]  Phone: [phone]  Fax: [phone]  Follow Up Time:     Michael   Phone: (   )    -  Fax: (   )    -  Follow Up Time:

## 2019-08-31 NOTE — PROGRESS NOTE ADULT - ATTENDING COMMENTS
Dr. Nav Dunlap, DO  Division of Hospital Medicine, [facility]  Pager:  [phone] Additional problem:  #. impetigo -- of groin.   -Nystatin powder  -refused primafit     Dr. Nav Dunlap, DO  Division of Hospital Medicine, [facility]  Pager:  [phone]

## 2019-08-31 NOTE — PROGRESS NOTE ADULT - PROBLEM SELECTOR PLAN 2
CT showing moderate left and small right pleural effusion with associated   compressive atelectasis. CXR Likely transudative effusions 2/2 fluid overload. Currently on 2L NC and satting well.   - will need therapeutic/diagnostic thoracentesis given size of L pleural effusion. Can consult Pulm or IR in AM. Unlikely to improve with diuresis alone  - incentive spirometry CT showing moderate left and small right pleural effusion with associated   compressive atelectasis. CXR Likely transudative effusions 2/2 fluid overload. Currently on 2L NC and satting well.   - incentive spirometry  - pulm/cards input appreciated  - plan per problem 1.

## 2019-08-31 NOTE — PROGRESS NOTE ADULT - PROBLEM SELECTOR PLAN 7
DVT Prophylaxis: heparin subq, can hold in AM for thoracentesis  Diet: DASH  Dispo: pending PT DVT Prophylaxis: heparin subq   Diet: DASH  Dispo: pending PT

## 2019-08-31 NOTE — PROGRESS NOTE ADULT - SUBJECTIVE AND OBJECTIVE BOX
[facility] - Division of Pulmonary, Critical Care and Sleep Medicine   Please call [phone] between 8am-pm weekdays, [phone] after hours and weekends    Interval Events: No events overnight    REVIEW OF SYSTEMS:  CV: [ ] chest pain   Resp: [ ] cough [ ] shortness of breath   [ ] All other systems negative  [ ] Unable to assess ROS because ________    OBJECTIVE:  ICU Vital Signs Last 24 Hrs  T(C): 36.7 (31 Aug 2019 05:00), Max: 36.7 (31 Aug 2019 05:00)  T(F): 98 (31 Aug 2019 05:00), Max: 98 (31 Aug 2019 05:00)  HR: 87 (31 Aug 2019 05:00) (86 - 87)  BP: 140/68 (31 Aug 2019 05:00) (105/71 - 144/72)  BP(mean): --  ABP: --  ABP(mean): --  RR: 18 (31 Aug 2019 05:00) (18 - 18)  SpO2: 99% (31 Aug 2019 05:00) (87% - 99%)         @ 07:01  -   @ 07:00  --------------------------------------------------------  IN: 420 mL / OUT: 0 mL / NET: 420 mL      CAPILLARY BLOOD GLUCOSE          PHYSICAL EXAM:  General: NAD  HEENT: NC/AT  Lymph Nodes: no cervical or supraclavicular lymphadenopathy  Neck: supple  Respiratory:  CTA b/l, no wheezes, crackles or rhonchi  Cardiovascular:  RRR, no m/r/g  Abdomen: soft, NT/ND, +BS  Extremities: no clubbing, cyanosis or edema, warm  Skin: no rash  Neurological: AAOx3, non focal exam  Psychiatry: not anxious appearing, normal affect and mood    HOSPITAL MEDICATIONS:  MEDICATIONS  (STANDING):  amLODIPine   Tablet 10 milliGRAM(s) Oral at bedtime  furosemide   Injectable 40 milliGRAM(s) IV Push daily  heparin  Injectable 5000 Unit(s) SubCutaneous every 8 hours  levothyroxine 112 MICROGram(s) Oral daily  multivitamin/minerals 1 Tablet(s) Oral daily  polyethylene glycol 3350 17 Gram(s) Oral daily  senna 1 Tablet(s) Oral daily    MEDICATIONS  (PRN):      LABS:                        9.4    8.06  )-----------( 336      ( 30 Aug 2019 08:43 )             30.3     Hgb Trend: 9.4<--, 10.9<--      139  |  98  |  28<H>  ----------------------------<  103<H>  4.1   |  27  |  1.24    Ca    9.1      30 Aug 2019 06:56  Phos  3.9       Mg     2.0         TPro  7.4  /  Alb  3.5  /  TBili  0.2  /  DBili  x   /  AST  14  /  ALT  25  /  AlkPhos  203<H>      Creatinine Trend: 1.24<--, 1.15<--, 1.19<--, 1.18<--, 1.10<--, 0.98<--    Urinalysis Basic - ( 29 Aug 2019 23:10 )    Color: Colorless / Appearance: Clear / S.024 / pH: x  Gluc: x / Ketone: Negative  / Bili: Negative / Urobili: Negative   Blood: x / Protein: Negative / Nitrite: Negative   Leuk Esterase: Moderate / RBC: 5 /hpf / WBC 2 /HPF   Sq Epi: x / Non Sq Epi: 7 /hpf / Bacteria: Negative            MICROBIOLOGY:     RADIOLOGY:  [x ] Reviewed and interpreted by me  < from: Transthoracic Echocardiogram (19 @ 09:25) >  PROCEDURE: Transthoracic echocardiogram with 2-D, M-Mode  and complete spectral and color flow Doppler.  INDICATION: Heart failure, unspecified (I50.9)  ------------------------------------------------------------------------  Dimensions:    Normal Values:  LA:     2.5    2.0 - 4.0 cm  Ao:     3.1    2.0 - 3.8 cm  SEPTUM: 0.9    0.6 - 1.2 cm  PWT:    0.9    0.6 - 1.1 cm  LVIDd:  4.3    3.0 - 5.6 cm  LVIDs:  2.5    1.8 - 4.0 cm  Derived variables:  LVMI: 69 g/m2  RWT: 0.41  ------------------------------------------------------------------------  Observations:  Mitral Valve: Mitral annular calcification. Normal mitral  valve.  Aortic Valve/Aorta: Thickened aortic valve.  Normal aortic root size.  Left Atrium: Moderately dilated left atrium.  LA volume  index = 43 cc/m2.  Left Ventricle: Normal left ventricular internal dimensions  and wall thickness.  Hyperdynamic left ventricular systolic function.  Left atrial pressure is moderately increased.  Right Heart: Normal right atrium. Normal right ventricular  size and function. Normal tricuspid valve. Minimal  tricuspid regurgitation. Normal pulmonic valve.  Pericardium/Pleura: Normal pericardium with no pericardial  effusion.  Bilateral pleural effusions.  Hemodynamic: Estimated right atrial pressure is elevated  (15-20 mmHg).  Mild-moderate pulmonary hypertension. Estimated PASP 50  mmHg.  ------------------------------------------------------------------------  Conclusions:  Hyperdynamic left ventricular systolic function.  Left atrial pressure is moderately increased.  Mild-moderate pulmonary hypertension. Estimated PASP 50  mmHg.  ------------------------------------------------------------------------  Confirmed on  2019 - 13:48:33 by Herbert Esposito M.D.  ------------------------------------------------------------------------    < end of copied text > [facility] - Division of Pulmonary, Critical Care and Sleep Medicine   Please call [phone] between 8am-pm weekdays, [phone] after hours and weekends    Interval Events: No events overnight- states breathing SOB is slightly improved - able to complete sentences without becoming winded. Has not ambulated much - waiting for someone to assist her.    REVIEW OF SYSTEMS:  CV: [- ] chest pain   Resp: [- ] cough [+ ] shortness of breath   [x ] All other systems negative  [ ] Unable to assess ROS because ________    OBJECTIVE:  ICU Vital Signs Last 24 Hrs  T(C): 36.7 (31 Aug 2019 05:00), Max: 36.7 (31 Aug 2019 05:00)  T(F): 98 (31 Aug 2019 05:00), Max: 98 (31 Aug 2019 05:00)  HR: 87 (31 Aug 2019 05:00) (86 - 87)  BP: 140/68 (31 Aug 2019 05:00) (105/71 - 144/72)  BP(mean): --  ABP: --  ABP(mean): --  RR: 18 (31 Aug 2019 05:00) (18 - 18)  SpO2: 99% (31 Aug 2019 05:00) (87% - 99%)        08-30 @ 07:01  -  - @ 07:00  --------------------------------------------------------  IN: 420 mL / OUT: 0 mL / NET: 420 mL      CAPILLARY BLOOD GLUCOSE          PHYSICAL EXAM:  General: NAD  HEENT: NC/AT  Neck: supple  Respiratory:  decreased bibasilar bs, no wheezes, crackles or rhonchi  Cardiovascular:  RRR, no m/r/g  Abdomen: soft, NT/ND, +BS  Extremities: no clubbing, no cyanosis, trace bipedal edema, warm  Skin: no rash  Neurological: AAOx3, non focal exam  Psychiatry: not anxious appearing, normal affect and mood    HOSPITAL MEDICATIONS:  MEDICATIONS  (STANDING):  amLODIPine   Tablet 10 milliGRAM(s) Oral at bedtime  furosemide   Injectable 40 milliGRAM(s) IV Push daily  heparin  Injectable 5000 Unit(s) SubCutaneous every 8 hours  levothyroxine 112 MICROGram(s) Oral daily  multivitamin/minerals 1 Tablet(s) Oral daily  polyethylene glycol 3350 17 Gram(s) Oral daily  senna 1 Tablet(s) Oral daily    MEDICATIONS  (PRN):      LABS:                        9.4    8.06  )-----------( 336      ( 30 Aug 2019 08:43 )             30.3     Hgb Trend: 9.4<--, 10.9<--      139  |  98  |  28<H>  ----------------------------<  103<H>  4.1   |  27  |  1.24    Ca    9.1      30 Aug 2019 06:56  Phos  3.9       Mg     2.0         TPro  7.4  /  Alb  3.5  /  TBili  0.2  /  DBili  x   /  AST  14  /  ALT  25  /  AlkPhos  203<H>      Creatinine Trend: 1.24<--, 1.15<--, 1.19<--, 1.18<--, 1.10<--, 0.98<--    Urinalysis Basic - ( 29 Aug 2019 23:10 )    Color: Colorless / Appearance: Clear / S.024 / pH: x  Gluc: x / Ketone: Negative  / Bili: Negative / Urobili: Negative   Blood: x / Protein: Negative / Nitrite: Negative   Leuk Esterase: Moderate / RBC: 5 /hpf / WBC 2 /HPF   Sq Epi: x / Non Sq Epi: 7 /hpf / Bacteria: Negative            MICROBIOLOGY:     RADIOLOGY:  [x ] Reviewed and interpreted by me  < from: Transthoracic Echocardiogram (19 @ 09:25) >  PROCEDURE: Transthoracic echocardiogram with 2-D, M-Mode  and complete spectral and color flow Doppler.  INDICATION: Heart failure, unspecified (I50.9)  ------------------------------------------------------------------------  Dimensions:    Normal Values:  LA:     2.5    2.0 - 4.0 cm  Ao:     3.1    2.0 - 3.8 cm  SEPTUM: 0.9    0.6 - 1.2 cm  PWT:    0.9    0.6 - 1.1 cm  LVIDd:  4.3    3.0 - 5.6 cm  LVIDs:  2.5    1.8 - 4.0 cm  Derived variables:  LVMI: 69 g/m2  RWT: 0.41  ------------------------------------------------------------------------  Observations:  Mitral Valve: Mitral annular calcification. Normal mitral  valve.  Aortic Valve/Aorta: Thickened aortic valve.  Normal aortic root size.  Left Atrium: Moderately dilated left atrium.  LA volume  index = 43 cc/m2.  Left Ventricle: Normal left ventricular internal dimensions  and wall thickness.  Hyperdynamic left ventricular systolic function.  Left atrial pressure is moderately increased.  Right Heart: Normal right atrium. Normal right ventricular  size and function. Normal tricuspid valve. Minimal  tricuspid regurgitation. Normal pulmonic valve.  Pericardium/Pleura: Normal pericardium with no pericardial  effusion.  Bilateral pleural effusions.  Hemodynamic: Estimated right atrial pressure is elevated  (15-20 mmHg).  Mild-moderate pulmonary hypertension. Estimated PASP 50  mmHg.  ------------------------------------------------------------------------  Conclusions:  Hyperdynamic left ventricular systolic function.  Left atrial pressure is moderately increased.  Mild-moderate pulmonary hypertension. Estimated PASP 50  mmHg.  ------------------------------------------------------------------------  Confirmed on  2019 - 13:48:33 by Herbert Esposito M.D.  ------------------------------------------------------------------------    < end of copied text >

## 2019-08-31 NOTE — PROGRESS NOTE ADULT - ASSESSMENT
89 yo F with h/o HTN, hypothyroidism, and multiple ESBL UTIs, presents with progressive shortness of breath x 1 week. Pt was started on Lasix 20mg daily 2 days prior to admission by her PMD and reported initially feeling better after taking it, but symptoms recurred. +orthopnea and LE edema. +nonproductive cough.   Denies any chest pain or palpitations. Has been having a dry cough over last week, but no fever, chills, runny nose, sore throat, or N/V/diarrhea.   Patient was recently admitted for ESBL UTI, went to [facility], and d/c'd back to [facility] about 2 weeks ago.  On presentation, mild hypoxemia to low 90s, improved with 2 LNC.   Pro-BNP 3052  CTPA - No pulmonary embolus in the main pulmonary arteries or lobar branches, moderate left and small right pleural effusion with associated   compressive atelectasis  Has since received Lasix 40mg IVP- diuresing; symptoms and O2 requirements improved. Currently on RA.    TTE findings with dilated LA with moderately elevated pressures, moderate pulm HTN.     A/P: Bilateral pleural effusions, likely cardiac in nature  No indication for thoracentesis at this time  Recommend continued diuresis, daily weights - patient is incontinent of urine, therefore unable to ascertain output. Also, given her history of recurrent UTIs, I am concerned she is having urinary retention - would check bladder scans Q6 hours to eval  Supplemental O2, goal sats 92-96%  Would check O2 sats on RA at rest and with ambulation  Incentive spirometer, OOB to chair  BP control, cardiac optimization  DVT ppx 89 yo F with h/o HTN, hypothyroidism, and multiple ESBL UTIs, presents with progressive shortness of breath x 1 week. Pt was started on Lasix 20mg daily 2 days prior to admission by her PMD and reported initially feeling better after taking it, but symptoms recurred. +orthopnea and LE edema. +nonproductive cough.   Denies any chest pain or palpitations. Has been having a dry cough over last week, but no fever, chills, runny nose, sore throat, or N/V/diarrhea.   Patient was recently admitted for ESBL UTI, went to [facility], and d/c'd back to [facility] about 2 weeks ago.  On presentation, mild hypoxemia to low 90s, improved with 2 LNC.   Pro-BNP 3052  CTPA - No pulmonary embolus in the main pulmonary arteries or lobar branches, moderate left and small right pleural effusion with associated   compressive atelectasis  Has since received Lasix 40mg IVP- diuresing; symptoms and O2 requirements improved. Currently on RA.    TTE findings with dilated LA with moderately elevated pressures, moderate pulm HTN.     A/P: Bilateral pleural effusions, likely cardiac in nature  No indication for thoracentesis at this time  Recommend continued diuresis, daily weights - patient is incontinent of urine, therefore unable to ascertain output. Also, given her history of recurrent UTIs, I am concerned she is having urinary retention - would check bladder scans Q6 hours to eval  Supplemental O2, goal sats 92-96%  Would check O2 sats on RA at rest and with ambulation  Incentive spirometer, OOB to chair and ambulate at tolerated  BP control, cardiac optimization  DVT ppx    Will follow up

## 2019-08-31 NOTE — PROGRESS NOTE ADULT - SUBJECTIVE AND OBJECTIVE BOX
PATIENT:  SOLIS TAYLOR  [id number]    CHIEF COMPLAINT:  Patient is a 89y old  Female who presents with a chief complaint of shortness of breath (30 Aug 2019 03:06)      INTERVAL HISTORYOVERNIGHT EVENTS:      REVIEW OF SYSTEMS:    Constitutional:     [ ] negative [ ] fevers [ ] chills [ ] weight loss [ ] weight gain  HEENT:                  [ ] negative [ ] dry eyes [ ] eye irritation [ ] postnasal drip [ ] nasal congestion  CV:                         [ ] negative  [ ] chest pain [ ] orthopnea [ ] palpitations [ ] murmur  Resp:                     [ ] negative [ ] cough [ ] shortness of breath [ ] dyspnea [ ] wheezing [ ] sputum [ ] hemoptysis  GI:                          [ ] negative [ ] nausea [ ] vomiting [ ] diarrhea [ ] constipation [ ] abd pain [ ] dysphagia   :                        [ ] negative [ ] dysuria [ ] nocturia [ ] hematuria [ ] increased urinary frequency  Musculoskeletal: [ ] negative [ ] back pain [ ] myalgias [ ] arthralgias [ ] fracture  Skin:                       [ ] negative [ ] rash [ ] itch  Neurological:        [ ] negative [ ] headache [ ] dizziness [ ] syncope [ ] weakness [ ] numbness  Psychiatric:           [ ] negative [ ] anxiety [ ] depression  Endocrine:            [ ] negative [ ] diabetes [ ] thyroid problem  Heme/Lymph:      [ ] negative [ ] anemia [ ] bleeding problem  Allergic/Immune: [ ] negative [ ] itchy eyes [ ] nasal discharge [ ] hives [ ] angioedema    [ ] All other systems negative  [ ] Unable to assess ROS because ________.    MEDICATIONS:  MEDICATIONS  (STANDING):  amLODIPine   Tablet 10 milliGRAM(s) Oral at bedtime  furosemide   Injectable 40 milliGRAM(s) IV Push daily  heparin  Injectable 5000 Unit(s) SubCutaneous every 8 hours  levothyroxine 112 MICROGram(s) Oral daily  multivitamin/minerals 1 Tablet(s) Oral daily  polyethylene glycol 3350 17 Gram(s) Oral daily  senna 1 Tablet(s) Oral daily    MEDICATIONS  (PRN):      ALLERGIES:  Allergies    sulfa topicals (Unknown)    Intolerances    morphine (Drowsiness; Faint; Hypotension)      OBJECTIVE:  ICU Vital Signs Last 24 Hrs  T(C): 36.7 (30 Aug 2019 04:51), Max: 36.9 (29 Aug 2019 19:37)  T(F): 98 (30 Aug 2019 04:51), Max: 98.5 (29 Aug 2019 19:37)  HR: 78 (30 Aug 2019 04:51) (37 - 84)  BP: 159/82 (30 Aug 2019 04:51) (142/70 - 171/75)  BP(mean): --  ABP: --  ABP(mean): --  RR: 18 (30 Aug 2019 04:51) (18 - 20)  SpO2: 95% (30 Aug 2019 04:51) (92% - 100%)      Adult Advanced Hemodynamics Last 24 Hrs  CVP(mm Hg): --  CVP(cm H2O): --  CO: --  CI: --  PA: --  PA(mean): --  PCWP: --  SVR: --  SVRI: --  PVR: --  PVRI: --  CAPILLARY BLOOD GLUCOSE        CAPILLARY BLOOD GLUCOSE        I&O's Summary    29 Aug 2019 07:01  -  30 Aug 2019 07:00  --------------------------------------------------------  IN: 100 mL / OUT: 0 mL / NET: 100 mL      Daily Height in cm: 162.56 (29 Aug 2019 16:15)    Daily Weight in k.8 (30 Aug 2019 07:30)    PHYSICAL EXAMINATION:  General: WN/WD NAD  HEENT: PERRLA, EOMI, moist mucous membranes  Neurology: A&Ox3, nonfocal, YBARRA x 4  Respiratory: CTA B/L, normal respiratory effort, no wheezes, crackles, rales  CV: RRR, S1S2, no murmurs, rubs or gallops  Abdominal: Soft, NT, ND +BS, Last BM  Extremities: No edema, + peripheral pulses  Incisions:   Tubes:    LABS:                          9.4    8.06  )-----------( 336      ( 30 Aug 2019 08:43 )             30.3     08-30    139  |  98  |  28<H>  ----------------------------<  103<H>  4.1   |  27  |  1.24    Ca    9.1      30 Aug 2019 06:56  Phos  3.9     08-30  Mg     2.0     08-30    TPro  7.4  /  Alb  3.5  /  TBili  0.2  /  DBili  x   /  AST  14  /  ALT  25  /  AlkPhos  203<H>  08-30    LIVER FUNCTIONS - ( 30 Aug 2019 06:56 )  Alb: 3.5 g/dL / Pro: 7.4 g/dL / ALK PHOS: 203 U/L / ALT: 25 U/L / AST: 14 U/L / GGT: x             Urinalysis Basic - ( 29 Aug 2019 23:10 )    Color: Colorless / Appearance: Clear / S.024 / pH: x  Gluc: x / Ketone: Negative  / Bili: Negative / Urobili: Negative   Blood: x / Protein: Negative / Nitrite: Negative   Leuk Esterase: Moderate / RBC: 5 /hpf / WBC 2 /HPF   Sq Epi: x / Non Sq Epi: 7 /hpf / Bacteria: Negative        TELEMETRY:     EKG:     IMAGING: PATIENT:  SOLIS TAYLOR  [id number]    CHIEF COMPLAINT:  Patient is a 89y old  Female who presents with a chief complaint of shortness of breath (30 Aug 2019 03:06)    INTERVAL HISTORYOVERNIGHT EVENTS:  No acute complaints.     REVIEW OF SYSTEMS:  CONSTITUTIONAL: No fevers or chills  EYES/ENT: No visual changes. No discharge from eyes. No vertigo. No throat pain. No dysphagia.  NECK: No pain or stiffness or rigidity.  RESPIRATORY: No cough, wheezing, or hemoptysis. No shortness of breath.  CARDIOVASCULAR: No chest pain or palpitations.   GASTROINTESTINAL: No abdominal pain. No nausea, vomiting, or hematemesis; No diarrhea or constipation. No melena or hematochezia.  GENITOURINARY: No dysuria, hesitancy, frequency or hematuria.  NEUROLOGICAL: No numbness or weakness. No change in speech. No fecal or urinary incontinence.   MSK: No joint swelling or erythema. No back pain.  SKIN: No itching or rashes.   PSYCH: Normal affect. Normal mood.    Review of systems negative except for items noted above.    MEDICATIONS:  MEDICATIONS  (STANDING):  amLODIPine   Tablet 10 milliGRAM(s) Oral at bedtime  furosemide   Injectable 40 milliGRAM(s) IV Push daily  heparin  Injectable 5000 Unit(s) SubCutaneous every 8 hours  levothyroxine 112 MICROGram(s) Oral daily  multivitamin/minerals 1 Tablet(s) Oral daily  polyethylene glycol 3350 17 Gram(s) Oral daily  senna 1 Tablet(s) Oral daily    MEDICATIONS  (PRN):      ALLERGIES:  Allergies    sulfa topicals (Unknown)    Intolerances    morphine (Drowsiness; Faint; Hypotension)      OBJECTIVE:  ICU Vital Signs Last 24 Hrs  T(C): 36.7 (30 Aug 2019 04:51), Max: 36.9 (29 Aug 2019 19:37)  T(F): 98 (30 Aug 2019 04:51), Max: 98.5 (29 Aug 2019 19:37)  HR: 78 (30 Aug 2019 04:51) (37 - 84)  BP: 159/82 (30 Aug 2019 04:51) (142/70 - 171/75)  BP(mean): --  ABP: --  ABP(mean): --  RR: 18 (30 Aug 2019 04:51) (18 - 20)  SpO2: 95% (30 Aug 2019 04:51) (92% - 100%)    CAPILLARY BLOOD GLUCOSE        I&O's Summary    29 Aug 2019 07:01  -  30 Aug 2019 07:00  --------------------------------------------------------  IN: 100 mL / OUT: 0 mL / NET: 100 mL      Daily Height in cm: 162.56 (29 Aug 2019 16:15)    Daily Weight in k.8 (30 Aug 2019 07:30)    PHYSICAL EXAMINATION:  General: WN/WD NAD  HEENT: PERRLA, EOMI, moist mucous membranes  Neurology: A&Ox3, nonfocal, YBARRA x 4  Respiratory: Decreased breath sounds B/L, Normal respiratory effort, no wheezes, crackles, rales  CV: RRR, S1S2, no murmurs, rubs or gallops  Abdominal: Soft, NT, ND +BS, Last BM  Extremities: Trace LE edema B/L, 2+ peripheral pulses  Psych: normal affect. normal mood.     LABS:                          9.4    8.06  )-----------( 336      ( 30 Aug 2019 08:43 )             30.3     08-30    139  |  98  |  28<H>  ----------------------------<  103<H>  4.1   |  27  |  1.24    Ca    9.1      30 Aug 2019 06:56  Phos  3.9     08-30  Mg     2.0     08-30    TPro  7.4  /  Alb  3.5  /  TBili  0.2  /  DBili  x   /  AST  14  /  ALT  25  /  AlkPhos  203<H>  08-30    LIVER FUNCTIONS - ( 30 Aug 2019 06:56 )  Alb: 3.5 g/dL / Pro: 7.4 g/dL / ALK PHOS: 203 U/L / ALT: 25 U/L / AST: 14 U/L / GGT: x             Urinalysis Basic - ( 29 Aug 2019 23:10 )    Color: Colorless / Appearance: Clear / S.024 / pH: x  Gluc: x / Ketone: Negative  / Bili: Negative / Urobili: Negative   Blood: x / Protein: Negative / Nitrite: Negative   Leuk Esterase: Moderate / RBC: 5 /hpf / WBC 2 /HPF   Sq Epi: x / Non Sq Epi: 7 /hpf / Bacteria: Negative

## 2019-09-01 LAB
ANION GAP SERPL CALC-SCNC: 16 MMOL/L — SIGNIFICANT CHANGE UP (ref 5–17)
BUN SERPL-MCNC: 41 MG/DL — HIGH (ref 7–23)
CALCIUM SERPL-MCNC: 8.7 MG/DL — SIGNIFICANT CHANGE UP (ref 8.4–10.5)
CHLORIDE SERPL-SCNC: 96 MMOL/L — SIGNIFICANT CHANGE UP (ref 96–108)
CO2 SERPL-SCNC: 22 MMOL/L — SIGNIFICANT CHANGE UP (ref 22–31)
CREAT SERPL-MCNC: 1.86 MG/DL — HIGH (ref 0.5–1.3)
GLUCOSE SERPL-MCNC: 107 MG/DL — HIGH (ref 70–99)
HCT VFR BLD CALC: 28.9 % — LOW (ref 34.5–45)
HGB BLD-MCNC: 9.1 G/DL — LOW (ref 11.5–15.5)
MAGNESIUM SERPL-MCNC: 1.9 MG/DL — SIGNIFICANT CHANGE UP (ref 1.6–2.6)
MCHC RBC-ENTMCNC: 31.1 PG — SIGNIFICANT CHANGE UP (ref 27–34)
MCHC RBC-ENTMCNC: 31.5 GM/DL — LOW (ref 32–36)
MCV RBC AUTO: 98.6 FL — SIGNIFICANT CHANGE UP (ref 80–100)
PHOSPHATE SERPL-MCNC: 4.7 MG/DL — HIGH (ref 2.5–4.5)
PLATELET # BLD AUTO: 296 K/UL — SIGNIFICANT CHANGE UP (ref 150–400)
POTASSIUM SERPL-MCNC: 4 MMOL/L — SIGNIFICANT CHANGE UP (ref 3.5–5.3)
POTASSIUM SERPL-SCNC: 4 MMOL/L — SIGNIFICANT CHANGE UP (ref 3.5–5.3)
RBC # BLD: 2.93 M/UL — LOW (ref 3.8–5.2)
RBC # FLD: 14.2 % — SIGNIFICANT CHANGE UP (ref 10.3–14.5)
SODIUM SERPL-SCNC: 134 MMOL/L — LOW (ref 135–145)
WBC # BLD: 7.01 K/UL — SIGNIFICANT CHANGE UP (ref 3.8–10.5)
WBC # FLD AUTO: 7.01 K/UL — SIGNIFICANT CHANGE UP (ref 3.8–10.5)

## 2019-09-01 PROCEDURE — 99233 SBSQ HOSP IP/OBS HIGH 50: CPT | Mod: GC

## 2019-09-01 PROCEDURE — 99233 SBSQ HOSP IP/OBS HIGH 50: CPT

## 2019-09-01 PROCEDURE — 71045 X-RAY EXAM CHEST 1 VIEW: CPT | Mod: 26

## 2019-09-01 RX ORDER — SODIUM CHLORIDE 9 MG/ML
500 INJECTION INTRAMUSCULAR; INTRAVENOUS; SUBCUTANEOUS ONCE
Refills: 0 | Status: COMPLETED | OUTPATIENT
Start: 2019-09-01 | End: 2019-09-01

## 2019-09-01 RX ORDER — NYSTATIN CREAM 100000 [USP'U]/G
1 CREAM TOPICAL
Refills: 0 | Status: DISCONTINUED | OUTPATIENT
Start: 2019-09-01 | End: 2019-09-01

## 2019-09-01 RX ADMIN — SODIUM CHLORIDE 500 MILLILITER(S): 9 INJECTION INTRAMUSCULAR; INTRAVENOUS; SUBCUTANEOUS at 09:28

## 2019-09-01 RX ADMIN — Medication 1 TABLET(S): at 12:07

## 2019-09-01 RX ADMIN — HEPARIN SODIUM 5000 UNIT(S): 5000 INJECTION INTRAVENOUS; SUBCUTANEOUS at 05:29

## 2019-09-01 RX ADMIN — Medication 112 MICROGRAM(S): at 05:29

## 2019-09-01 RX ADMIN — HEPARIN SODIUM 5000 UNIT(S): 5000 INJECTION INTRAVENOUS; SUBCUTANEOUS at 22:49

## 2019-09-01 RX ADMIN — NYSTATIN CREAM 1 APPLICATION(S): 100000 CREAM TOPICAL at 22:49

## 2019-09-01 RX ADMIN — HEPARIN SODIUM 5000 UNIT(S): 5000 INJECTION INTRAVENOUS; SUBCUTANEOUS at 13:47

## 2019-09-01 RX ADMIN — CARVEDILOL PHOSPHATE 12.5 MILLIGRAM(S): 80 CAPSULE, EXTENDED RELEASE ORAL at 05:29

## 2019-09-01 RX ADMIN — Medication 40 MILLIGRAM(S): at 05:29

## 2019-09-01 RX ADMIN — NYSTATIN CREAM 1 APPLICATION(S): 100000 CREAM TOPICAL at 05:29

## 2019-09-01 RX ADMIN — NYSTATIN CREAM 1 APPLICATION(S): 100000 CREAM TOPICAL at 13:47

## 2019-09-01 RX ADMIN — Medication 1 TABLET(S): at 05:29

## 2019-09-01 NOTE — PROVIDER CONTACT NOTE (OTHER) - ASSESSMENT
Pt BP was 76/48, HR was 80 pulseox 97% on supplemental oxygen, RR 18 temp 97.7. PT was asymptomatic. Denied any headache or dizziness, SOB, or chest pain

## 2019-09-01 NOTE — CHART NOTE - NSCHARTNOTEFT_GEN_A_CORE
Patient seen and examined at bedside at 21:00 on 08/31 with primary team for low mechanical and manual BP readings with SBP to to 70's. Patient reported no lightheadedness, vertigo, vision changes, SOB, CP, or acute focal/generalized weakness, although she did note feeling tired during the day starting around lunch. Of note, patient received BP medication at 18:00. Vitals as measured by RN: T 97.7, BP 76/48, HR 80, SO2 at 97% on supplemental oxygen with a RR of 18 breaths/min. Physical exam significant for cold hand and feet b/l, Moist mucous membranes. Orthostatics performed as patient were as follows:  Lying flat: 118/78  Standing for 1 minute: 110/72  Given asymptomatic and negative orthostatics, no acute intervention taken. Will continue to monitor    Dr. Ashok Guerrero, PGY1  Internal Medicine   [facility] Pager: [phone]  [facility] Pager: [phone]

## 2019-09-01 NOTE — PROGRESS NOTE ADULT - SUBJECTIVE AND OBJECTIVE BOX
Grace Monroy PGY2  [phone]    SOLIS TAYLOR  89y  Female    Complaints:  Subjective:                 T(C): 36.5 (08-31-19 @ 20:57), Max: 36.5 (08-31-19 @ 20:57)  HR: 80 (08-31-19 @ 20:57) (80 - 92)  BP: 84/51 (08-31-19 @ 20:57) (84/51 - 129/69)  RR: 18 (08-31-19 @ 20:57) (18 - 18)  SpO2: 97% (08-31-19 @ 20:57) (97% - 97%)  Wt(kg): --Vital Signs Last 24 Hrs  T(C): 36.5 (31 Aug 2019 20:57), Max: 36.5 (31 Aug 2019 20:57)  T(F): 97.7 (31 Aug 2019 20:57), Max: 97.7 (31 Aug 2019 20:57)  HR: 80 (31 Aug 2019 20:57) (80 - 92)  BP: 84/51 (31 Aug 2019 20:57) (84/51 - 129/69)  BP(mean): --  RR: 18 (31 Aug 2019 20:57) (18 - 18)  SpO2: 97% (31 Aug 2019 20:57) (97% - 97%)    PHYSICAL EXAM:  GENERAL: NAD, well-groomed, well-developed  HEAD:  Atraumatic, Normocephalic  EYES: EOMI, PERRLA, conjunctiva and sclera clear  ENMT: No tonsillar erythema, exudates, or enlargement; Moist mucous membranes, Good dentition, No lesions  NECK: Supple, No JVD, Normal thyroid  NERVOUS SYSTEM:  Alert & Oriented X3, Good concentration; Motor Strength 5/5 B/L upper and lower extremities; DTRs 2+ intact and symmetric  CHEST/LUNG: Clear to percussion bilaterally; No rales, rhonchi, wheezing, or rubs  HEART: Regular rate and rhythm; No murmurs, rubs, or gallops  ABDOMEN: Soft, Nontender, Nondistended; Bowel sounds present  EXTREMITIES:  2+ Peripheral Pulses, No clubbing, cyanosis, or edema  LYMPH: No lymphadenopathy noted  SKIN: No rashes or lesions    Consultant(s) Notes Reviewed:  [x ] YES  [ ] NO  Care Discussed with Consultants/Other Providers [ x] YES  [ ] NO    LABS:          RADIOLOGY & ADDITIONAL TESTS:    Imaging Personally Reviewed:  [ ] YES  [ ] NO  MedsMEDICATIONS  (STANDING):  carvedilol 12.5 milliGRAM(s) Oral every 12 hours  furosemide   Injectable 40 milliGRAM(s) IV Push daily  heparin  Injectable 5000 Unit(s) SubCutaneous every 8 hours  levothyroxine 112 MICROGram(s) Oral daily  multivitamin/minerals 1 Tablet(s) Oral daily  nystatin Powder 1 Application(s) Topical three times a day  polyethylene glycol 3350 17 Gram(s) Oral daily  senna 1 Tablet(s) Oral daily    MEDICATIONS  (PRN):      HEALTH ISSUES - PROBLEM Dx:  Dyspnea: Dyspnea  Prophylactic measure: Prophylactic measure  CKD (chronic kidney disease), stage III: CKD (chronic kidney disease), stage III  Hypothyroidism: Hypothyroidism  Hypertension: Hypertension  Recurrent UTI (urinary tract infection): Recurrent UTI (urinary tract infection)  Pleural effusion, bilateral: Pleural effusion, bilateral  CHF (congestive heart failure): CHF (congestive heart failure)          PLAN:      DISPOSITION: Grace Monroy PGY2  [phone]    SOLIS TAYLOR  89y  Female    Subjective: Pt hypotensive to 64/42 this am, received 500 cc bolus. Holding lasix and coreg. Pt endorsing mild R knee pain. Pt on 3L NC o/n. Also endorses irritation of inguinal region.      T(C): 36.5 (08-31-19 @ 20:57), Max: 36.5 (08-31-19 @ 20:57)  HR: 80 (08-31-19 @ 20:57) (80 - 92)  BP: 84/51 (08-31-19 @ 20:57) (84/51 - 129/69)  RR: 18 (08-31-19 @ 20:57) (18 - 18)  SpO2: 97% (08-31-19 @ 20:57) (97% - 97%)  Wt(kg): --Vital Signs Last 24 Hrs  T(C): 36.5 (31 Aug 2019 20:57), Max: 36.5 (31 Aug 2019 20:57)  T(F): 97.7 (31 Aug 2019 20:57), Max: 97.7 (31 Aug 2019 20:57)  HR: 80 (31 Aug 2019 20:57) (80 - 92)  BP: 84/51 (31 Aug 2019 20:57) (84/51 - 129/69)  BP(mean): --  RR: 18 (31 Aug 2019 20:57) (18 - 18)  SpO2: 97% (31 Aug 2019 20:57) (97% - 97%)    PHYSICAL EXAM:  GENERAL: NAD, well-groomed, well-developed  HEAD:  Atraumatic, Normocephalic  EYES: EOMI, PERRLA, conjunctiva and sclera clear  ENMT: MMM  NECK: Supple, No JVD   NERVOUS SYSTEM:  Alert & Oriented X3   CHEST/LUNG: no breath sounds heard in lower half of L lung field, clear to auscultation on R  HEART: Regular rate and rhythm; No murmurs, rubs, or gallops  ABDOMEN: Soft, Nontender, Nondistended; Bowel sounds present  EXTREMITIES:  2+ Peripheral Pulses, No clubbing, cyanosis, or edema. small ecchymosis over L knee  LYMPH: No lymphadenopathy noted  SKIN: No rashes or lesions    Consultant(s) Notes Reviewed:  [x ] YES  [ ] NO  Care Discussed with Consultants/Other Providers [ x] YES  [ ] NO    LABS:                        9.1    7.01  )-----------( 296      ( 01 Sep 2019 09:31 )             28.9     09-01    134<L>  |  96  |  41<H>  ----------------------------<  107<H>  4.0   |  22  |  1.86<H>    Ca    8.7      01 Sep 2019 06:15  Phos  4.7     09-01  Mg     1.9     09-01        RADIOLOGY, EKG & ADDITIONAL TESTS: Reviewed.     RADIOLOGY & ADDITIONAL TESTS:    Imaging Personally Reviewed:  [ ] YES  [ ] NO  MedsMEDICATIONS  (STANDING):  carvedilol 12.5 milliGRAM(s) Oral every 12 hours  furosemide   Injectable 40 milliGRAM(s) IV Push daily  heparin  Injectable 5000 Unit(s) SubCutaneous every 8 hours  levothyroxine 112 MICROGram(s) Oral daily  multivitamin/minerals 1 Tablet(s) Oral daily  nystatin Powder 1 Application(s) Topical three times a day  polyethylene glycol 3350 17 Gram(s) Oral daily  senna 1 Tablet(s) Oral daily    MEDICATIONS  (PRN): Grace Monroy PGY2  [phone]    SOLIS TAYLOR  89y  Female    Subjective: Pt hypotensive to 64/42 this am, received 500 cc bolus. Holding lasix and coreg. Pt endorsing mild R knee pain. Pt on 3L NC o/n. Also endorses irritation of inguinal region.      T(C): 36.5 (08-31-19 @ 20:57), Max: 36.5 (08-31-19 @ 20:57)  HR: 80 (08-31-19 @ 20:57) (80 - 92)  BP: 84/51 (08-31-19 @ 20:57) (84/51 - 129/69)  RR: 18 (08-31-19 @ 20:57) (18 - 18)  SpO2: 97% (08-31-19 @ 20:57) (97% - 97%)  Wt(kg): --Vital Signs Last 24 Hrs  T(C): 36.5 (31 Aug 2019 20:57), Max: 36.5 (31 Aug 2019 20:57)  T(F): 97.7 (31 Aug 2019 20:57), Max: 97.7 (31 Aug 2019 20:57)  HR: 80 (31 Aug 2019 20:57) (80 - 92)  BP: 84/51 (31 Aug 2019 20:57) (84/51 - 129/69)  BP(mean): --  RR: 18 (31 Aug 2019 20:57) (18 - 18)  SpO2: 97% (31 Aug 2019 20:57) (97% - 97%)    PHYSICAL EXAM:  GENERAL: NAD, well-groomed, well-developed  HEAD:  Atraumatic, Normocephalic  EYES: EOMI, PERRLA, conjunctiva and sclera clear  ENMT: MMM  NECK: Supple, No JVD   NERVOUS SYSTEM:  Alert & Oriented X3   CHEST/LUNG: decreased breath sounds heard in lower half of both lung fields. No rales or rhonchi or wheezing. 1+ LE edema B/L.   HEART: Regular rate and rhythm; No murmurs, rubs, or gallops  ABDOMEN: Soft, Nontender, Nondistended; Bowel sounds present  EXTREMITIES:  2+ Peripheral Pulses, No clubbing or cyanosis. small ecchymosis over L knee  LYMPH: No lymphadenopathy noted  SKIN: No rashes or lesions    Consultant(s) Notes Reviewed:  [x ] YES  [ ] NO  Care Discussed with Consultants/Other Providers [ x] YES  [ ] NO    LABS:                        9.1    7.01  )-----------( 296      ( 01 Sep 2019 09:31 )             28.9     09-01    134<L>  |  96  |  41<H>  ----------------------------<  107<H>  4.0   |  22  |  1.86<H>    Ca    8.7      01 Sep 2019 06:15  Phos  4.7     09-01  Mg     1.9     09-01        RADIOLOGY, EKG & ADDITIONAL TESTS: Reviewed.     RADIOLOGY & ADDITIONAL TESTS:    Imaging Personally Reviewed:  [ ] YES  [ ] NO  MedsMEDICATIONS  (STANDING):  carvedilol 12.5 milliGRAM(s) Oral every 12 hours  furosemide   Injectable 40 milliGRAM(s) IV Push daily  heparin  Injectable 5000 Unit(s) SubCutaneous every 8 hours  levothyroxine 112 MICROGram(s) Oral daily  multivitamin/minerals 1 Tablet(s) Oral daily  nystatin Powder 1 Application(s) Topical three times a day  polyethylene glycol 3350 17 Gram(s) Oral daily  senna 1 Tablet(s) Oral daily    MEDICATIONS  (PRN):

## 2019-09-01 NOTE — PROGRESS NOTE ADULT - ATTENDING COMMENTS
symptomatic hypotension. Patient did not tolerate coreg and lasix this AM.  hold all anti-htn meds for now  reassess tolerance for lower dose bb and additional diuresis tomorrow.   PA/lat cxr if can stand today. otherwise ap ok.   500 cc NS bolus; leg elevation; ace bandage  serial vitals  renal us to workup sol.   pt eval  rest of plan as above    Dr. Nav Dunlap, DO  Division of Hospital Medicine, [facility]  Pager:  [phone]

## 2019-09-01 NOTE — PROGRESS NOTE ADULT - ASSESSMENT
89 y/o F PMHx HTN, hypothyroidism, and ESBL UTIs, presents from [facility] with progressive shortness of breath x 1 week, found to have new pleural effusions, suspicion for new CHF.

## 2019-09-01 NOTE — PROGRESS NOTE ADULT - PROBLEM SELECTOR PROBLEM 1
Patient:   MARK SALAS            MRN: [id number]            FIN: [id number]              Age:   89 years     Sex:  FEMALE     :  29   Associated Diagnoses:   None   Author:   PALLAVI WALTON     History of Present Illness             The patient presents with 89-year-old lady with a previous history of pulmonary hypertension history of permanent atrial fibrillation on Eliquis she was here in the hospital November and she had workup including stress test which was negative and she had an echocardiogram shows normal EF with enlarged right ventricle and pulmonary hypertension and she came here because she missed her oxygen shows feeling weak and tired and she was  found to have atrial fibrillation with rapid ventricular response cardiology was asked to see her she was placed on Cardizem 5 mg.       Review of Systems   Constitutional:  No fever, No chills, No sweats.    Cardiovascular:  No palpitations, No bradycardia.    Respiratory:  No shortness of breath, No cough.    Gastrointestinal:  No nausea, No vomiting, No diarrhea.    Genitourinary:  No dysuria, No hematuria.    Musculoskeletal:  No neck pain, No joint pain.    Integumentary:  No rash.    Neurologic:  Alert and oriented X4.    Psychiatric:  No anxiety, No depression.    All other systems ROS reviewed as documented in chart.     Histories   Past Med History: Past Medical History   Abnormal ECG  Abnormal gait  Aortic valve disorder  Asthenia  Bladder incontinence  Colon cancer  GERD - Gastro-esophageal reflux disease  H/O: blood transfusion  Heart murmur  Sarcoidosis history  Skin cancer  Visual impairment    Family History:    Entire family history is negative.   Procedure History:    colon resection.  Hip replacement ([id number]).   Social History       Alcohol  Details: Use: Past.  Substance Abuse  Details: Use: None.  Tobacco  Details: Used in Last 12 Months: No.  Use: Former smoker.  Type: Cigarettes.; Comment(s): hx. of <1ppd.- 8  Dyspnea yrs.,  quit25 yrs. ago  Cultural/Mosque Practices  Details: Mosque or Cultural Practices: [employer].  Mosque or Cultural Practices While in Hospital: Yes.  .       Health Status   Allergies:    Allergic Reactions (All)  Severity Not Documented  Amoxicillin- Rash.   Current medications:  (Selected)   Inpatient Medications  Ordered  Eliquis oral 2.5 mg tablet: 2.5 mg = 1 tab, Oral, BID, Indication: afib, Routine, Order Start: 01/23/19 8:00:00, Tab  Toprol-XL (succinate) oral 50 mg tablet: 50 mg = 1 tab, Oral, Daily, Hold for HR less than 50, and/or SBP less than 90, Routine, Order Start: 01/23/19 9:00:00, Tab XL  Tylenol Regular Strength oral 325 mg tablet: 650 mg = 2 tab, Oral, Q6H, PRN pain mild, Routine, Order Start: 01/22/19 18:01:00, Tab  albuterol-ipratropium inhalation 2.5-0.5 mg/3 mL solution (DuoNeb).: 3 mL, Inhaled, QID, Routine, Order Start: 01/22/19 19:00:00, Inhalation  aspirin oral 81 mg chewable tablet: 81 mg = 1 tab, Chewed, Daily, Routine, Order Start: 01/22/19 18:12:00, Tab Chew  azithromycin  IVPB (Zithromax): 500 mg, IVPB, Daily, Rationale: Therapeutic (documented infection), Indication: Pneumonia, RATE: 250 mL/hr, Infuse over 1 hr, mL TOTAL Volume 250, Routine, Order Start: 01/23/19 14:00:00, x 3 days, Order Stop: 01/25/19 14:00:00  cefTRIAXone  IV injection (Rocephin): 1,000 mg = 10 mL, Slow IV Push, Daily, Rationale: Therapeutic (documented infection), Indication: Pneumonia, RATE: 300 mL/hr, Infuse over 2 minutes, mL TOTAL Volume 10, Routine, Order Start: 01/23/19 14:00:00, x 7 days, Order Stop: 01/29/19 14:00:00,...  dilTIAZem 125 mg [5 mg/hr] + Sodium Chloride 0.9% 125 mL: 125 mL, IV, RATE: 5 mL/hr, Infuse over 25 hr, 125 mL TOTAL Volume, Routine, Order Start: 01/23/19 0:44:00, Must enter mg/hr rate. Not for titration., Weight: 46.6 kg Clinical Weight  dilTIAZem oral 240 mg CD capsule (Cardizem CD): 240 mg = 1 cap, Oral, Daily, Routine, Order Start: 01/23/19 9:00:00, Cap CD   fluticasone nasal 50 mcg/spray: 50 mcg = 1 spray, IntraNasal, Daily, Routine, Order Start: 01/23/19 9:00:00, Nasal Spray  furosemide injection 10 mg/mL (Lasix): 20 mg = 2 mL, Slow IV Push, BID, Routine, Order Start: 01/23/19 9:00:00, Injection  methylPREDNISolone sodium succinate injection (SOLU-Medrol): 40 mg = 1 mL, Slow IV Push, Q8H, Routine, Order Start: 01/23/19 14:00:00, Injection  multivitamin oral tablet: 1 tab, Oral, Daily, Routine, Order Start: 01/23/19 9:00:00, Tab  pantoprazole oral 40 mg DR tablet: 40 mg = 1 tab, Oral, Daily [before breakfast], Routine, Order Start: 01/23/19 7:00:00, Tab DR  senna oral 8.6 mg tablet: 8.6 mg = 1 tab, Oral, Daily, PRN constipation, Routine, Order Start: 01/22/19 18:05:00, Tab  spironolactone oral 25 mg tablet: 25 mg = 1 tab, Oral, Daily, Routine, Order Start: 01/23/19 9:00:00, Tab  Documented Medications  Documented  DilTIAZem Hydrochloride  mg/24 hours oral capsule, extended release: 240 mg = 1 cap, Oral, Daily, Maintenance  Eliquis oral 2.5 mg tablet: 2.5 mg = 1 tab, Oral, BID, Maintenance  Metoprolol Succinate ER 50 mg oral tablet, extended release: 50 mg = 1 tab, Oral, Daily, Maintenance  Senna 8.6 mg oral tablet: 8.6 mg = 1 tab, Oral, Daily, PRN as needed for constipation, Maintenance  Tylenol Regular Strength oral 325 mg tablet: 650 mg = 2 tab, Oral, Q6H, PRN pain mild, Tab, Maintenance  albuterol-ipratropium inhalation 2.5-0.5 mg/3 mL solution (DuoNeb).: = 3 mL, Inhaled, QID, Solution, Maintenance  aspirin oral 81 mg chewable tablet: 81 mg = 1 tab, Chewed, Daily, Tab Chew, Maintenance  atorvastatin oral 10 mg tablet: 10 mg = 1 tab, Oral, Q Evening, Tab, Maintenance  dilTIAZem 240 mg/24 hours oral tablet, extended release: 240 mg = 1 tab, Oral, Daily, Tab ER, Maintenance  fluticasone nasal 50 mcg/spray: = 1 spray, IntraNasal, Daily, Maintenance  furosemide oral 40 mg tablet: 20 mg = 0.5 tab, Oral, QAM & HS, Tab, Maintenance  multivitamin oral tablet: = 1 tab,  Oral, Daily, Tab, Maintenance  pantoprazole oral 40 mg DR tablet: 40 mg = 1 tab, Oral, Daily, Maintenance  spironolactone oral 25 mg tablet: 25 mg = 1 tab, Oral, Daily, Maintenance,   Medications (16) Active  Scheduled: (13)  Albuterol-ipratropium 2.5-0.5 mg/3 mL nebulizer soln  3 mL, Inhaled, QID  ApixaBAN 2.5 mg tab  2.5 mg 1 tab, Oral, BID  Aspirin 81 mg chew tab  81 mg 1 tab, Chewed, Daily  azithromycin  500 mg, IVPB, Daily  cefTRIAXone  1,000 mg 10 mL, Slow IV Push, Daily  DilTIAZem 240 mg CD cap  240 mg 1 cap, Oral, Daily  Fluticasone 50 mcg nasal spray 16 gm  50 mcg 1 spray, IntraNasal, Daily  Furosemide 20 mg/2 mL inj  20 mg 2 mL, Slow IV Push, BID  MethylPREDNISolone Na succ PF 40 mg/1 mL inj SDV  40 mg 1 mL, Slow IV Push, Q8H  Metoprolol succinate 50 mg XL tab  50 mg 1 tab, Oral, Daily  Multivitamin tab  1 tab, Oral, Daily  Pantoprazole 40 mg DR tab  40 mg 1 tab, Oral, Daily [before breakfast]  Spironolactone 25 mg tab  25 mg 1 tab, Oral, Daily  Continuous: (1)  dilTIAZem 125 mg [5 mg/hr] + Sodium Chloride 0.9% 125 mL  125 mL, IV, 5 mL/hr  PRN: (2)  Acetaminophen 325 mg tab  650 mg 2 tab, Oral, Q6H  Senna 8.6 mg tab  8.6 mg 1 tab, Oral, Daily      Physical Examination   VS/Measurements       Vitals between:   22-JAN-2019 23:09:18   TO   23-JAN-2019 23:09:18                   LAST RESULT MINIMUM MAXIMUM  Temperature 36.3 36.2 36.9  Heart Rate 89 76 136  Respiratory Rate 22 16 26  NISBP           116 104 125  NIDBP           57 57 81  SpO2                    97 93 98  , Measurements from flowsheet : Height and Weight   01/22/19 17:06 CLINICALWEIGHT 46.6 kg    Weight Method Measured    MEDDOSEWT 46.6 kg    CLINICALHEIGHT 162.56 cm    Height Method Stated    Weight Scale Bed    BSA - Medical History 1.47    BMI-Medical History 17.6 kg/m2   01/22/19 08:50 CLINICALWEIGHT 48.2 kg    Weight Method Measured    MEDDOSEWT 48.2 kg     ,   I & O between:  22-JAN-2019 23:09 TO 23-JAN-2019 23:09  Med Dosing Weight:  46.6   kg   22-JAN-2019  24 Hour Intake:   665.03  ( 14.27 mL/kg )  24 Hour Output:   550.00           24 Hour Urine/Stool Output:   0.0  24 Hour Balance:   115.03           24 Hour Urine Output:   550.00  ( 0.49 mL/kg/hr )                   Urine Count:  3.00    Stool Count:  3.00         Eye:  Pupils are equal, round and reactive to light.    HENT:  Normocephalic.    Neck:  Non-tender.    Respiratory:  Lungs are clear to auscultation.    Cardiovascular:  Irregularly irregular rhythm.    Gastrointestinal:  Soft.    Integumentary:  Warm.    Neurologic:  Alert.      Review / Management   Laboratory results:       Labs between:  22-JAN-2019 23:09 to 23-JAN-2019 23:09    CBC:                 WBC  HgB  Hct  Plt  MCV  RDW   23-JAN-2019 (H) 11.5  (L) 11.6  38.2  188  92.0  14.6     DIFF:                 Seg  Neutroph//ABS  Lymph//ABS  Mono//ABS  EOS/ABS  23-JAN-2019 NOT APPLICABLE  94 // (H) 10.9  2 // (L) 0.2  3 // 0.3 0 // (L) 0.0     BMP:                 Na  Cl  BUN  Glu   23-JAN-2019 137  (L) 97  (H) 29  (H) 219                              K  CO2  Cr  Ca                              (H) 5.3  (H) 39  0.80  8.4     CMP:                 AST  ALT  AlkPhos  Bili  Albumin   23-JAN-2019 (H) 75  (H) 95  (H) 145  0.6  (L) 2.8                  .    Radiology results                  , Echocardiogram Results    STUDY CONCLUSIONSSUMMARY:1. Left ventricle: The cavity size is normal. Wall thickness is normal.   Systolic function is normal. The estimated ejection fraction is 55-65%.2. Right ventricle: The cavity size is moderately dilated.3. Right atrium: The atrium is mildly to moderately dilated.     Lines and Tubes:    LINES  Peripheral Intravenous Antecubital Right   Gauge: 20   Charted: 01/23/19 22:20  Inserted: 01/22/19   Days Since Insertion: 1 days  Indication of Use: Drip  Peripheral Intravenous Upper Arm Right   Gauge: 20   Charted: 01/23/19 22:20  Inserted: 01/22/19   Days Since Insertion: 1 days  Indication of Use: IV Meds    .      Impression and Plan   Dx and Plan:  Diagnosis      Assessment/Plan:     COPD exacerbation      _     Dyspnea      _     Fall      _     History of atrial fibrillation      _     Hypoxia      _     Pneumonia      _      Atrial fibrillation rapid ventricular response continue Eliquis and continue Cardizem for now and patient on Toprol-XL  Likely will discontinue  Cardizem in the morning  Pneumonia and hypoxemia and dyspnea as well as COPD exacerbation  On home oxygen nebulizer and antibiotic therapy    Overall patient is feeling a little better  Continue supportive care  We will follow the patient with.     .

## 2019-09-01 NOTE — PROGRESS NOTE ADULT - PROBLEM SELECTOR PLAN 2
CT showing moderate left and small right pleural effusion with associated   compressive atelectasis. CXR Likely transudative effusions 2/2 fluid overload. Currently on 2L NC and satting well.   - incentive spirometry  - pulm/cards input appreciated  - plan per problem 1. CT showing moderate left and small right pleural effusion with associated   compressive atelectasis. CXR Likely transudative effusions 2/2 fluid overload. Currently on 2L NC and satting well.   - will rpt CXR today to evaluate change in pleff  - incentive spirometry  - pulm/cards input appreciated  - diurese w/ lasix once BPs can tolerate CT showing moderate left and small right pleural effusion with associated   compressive atelectasis. CXR Likely transudative effusions 2/2 fluid overload. Currently on 2L NC and satting well.   - will rpt CXR today to evaluate change in pl eff  - incentive spirometry  - pulm/cards input appreciated  - diurese w/ lasix once BPs can tolerate

## 2019-09-01 NOTE — PROGRESS NOTE ADULT - PROBLEM SELECTOR PLAN 6
Cr at baseline  - avoid nephrotoxins  - monitor Cr ANNA on CKD  - renal us  - avoid nephrotoxins  - monitor Cr

## 2019-09-01 NOTE — PROGRESS NOTE ADULT - PROBLEM SELECTOR PLAN 4
cards recs appreciated. Ramipril and coreg. cards recs appreciated. Ramipril and coreg but hold for now given hypotension.

## 2019-09-01 NOTE — PROGRESS NOTE ADULT - PROBLEM SELECTOR PLAN 1
Presenting with progressive dyspnea on exertion and at rest, with exam significant for bilateral LE edema, labs showing elevated BNP, and b/l pleural effusions on CT. Most likely new CHF. Differential also includes PE, infection, and COPD. CTPA neg for PE, but limited by motion so can't r/o segmental PE. No consolidations seen to suggest PNA.   Unclear underlying cause of CHF exacerbation. Low suspicion for ACS given no chest pain and trops neg, although noted new TWI V5-V6 but nonspecific, and no ST segment changes.   - TTE done.   - TSH wnl.  - diurese with Lasix 40mg IV daily  - Strict I/Os, daily weights  - Cards input appreciated. Presenting with progressive dyspnea on exertion and at rest, with exam significant for bilateral LE edema, labs showing elevated BNP, and b/l pleural effusions on CT. Most likely new CHF. CTPA neg for PE, but limited by motion so can't r/o segmental PE. No consolidations seen to suggest PNA.   - Unclear underlying cause of CHF exacerbation. Low suspicion for ACS given no chest pain and trops neg, although noted new TWI V5-V6 but nonspecific, and no ST segment changes.   - TTE shows hyperdynamic LVSF, moderately increased LAP, mild-moderate pulmonary HTN  - TSH wnl.  - holding lasix for hypotension  - Strict I/Os, daily weights  - Cards input appreciated.

## 2019-09-01 NOTE — PROGRESS NOTE ADULT - ASSESSMENT
89 yo F with h/o HTN, hypothyroidism, and multiple ESBL UTIs, presents with progressive shortness of breath x 1 week. Pt was started on Lasix 20mg daily 2 days prior to admission by her PMD and reported initially feeling better after taking it, but symptoms recurred. +orthopnea and LE edema. +nonproductive cough.   Denies any chest pain or palpitations. Has been having a dry cough over last week, but no fever, chills, runny nose, sore throat, or N/V/diarrhea.   Patient was recently admitted for ESBL UTI, went to [facility], and d/c'd back to [facility] about 2 weeks ago.  On presentation, mild hypoxemia to low 90s, improved with 2 LNC.   Pro-BNP 3052  CTPA - No pulmonary embolus in the main pulmonary arteries or lobar branches, moderate left and small right pleural effusion with associated   compressive atelectasis  Has since received Lasix 40mg IVP- diuresing; symptoms and O2 requirements improved. Currently on RA.    TTE findings with dilated LA with moderately elevated pressures, moderate pulm HTN.     A/P: Bilateral pleural effusions, likely cardiac in nature  No indication for thoracentesis at this time  ANNA - would hold diuretics and monitor Cr.  Follow daily weights - patient is incontinent of urine, therefore unable to ascertain output. Also, given her history of recurrent UTIs, I am concerned she is having urinary retention - would check bladder scans Q6 hours to eval  Supplemental O2, goal sats 92-96%  Check O2 sats on RA at rest and with ambulation  Incentive spirometer, OOB to chair and ambulate at tolerated  BP control, cardiac optimization  DVT ppx    Will follow up    Attending Attestation:   I was physically present for the key portions of the evaluation and management (E/M) service provided.  I agree with the above history, physical, and plan which I have reviewed and edited where appropriate. 89 yo F with h/o HTN, hypothyroidism, and multiple ESBL UTIs, presents with progressive shortness of breath x 1 week. Pt was started on Lasix 20mg daily 2 days prior to admission by her PMD and reported initially feeling better after taking it, but symptoms recurred. +orthopnea and LE edema. +nonproductive cough.   Denies any chest pain or palpitations. Has been having a dry cough over last week, but no fever, chills, runny nose, sore throat, or N/V/diarrhea.   Patient was recently admitted for ESBL UTI, went to [facility], and d/c'd back to [facility] about 2 weeks ago.  On presentation, mild hypoxemia to low 90s, improved with 2 LNC.   Pro-BNP 3052  CTPA - No pulmonary embolus in the main pulmonary arteries or lobar branches, moderate left and small right pleural effusion with associated   compressive atelectasis  Has since received Lasix 40mg IVP- diuresing; symptoms and O2 requirements improved. Currently on RA.    TTE findings with dilated LA with moderately elevated pressures, moderate pulm HTN.     A/P: Bilateral pleural effusions, likely cardiac in nature  No indication for thoracentesis at this time  ANNA - would hold diuretics and monitor Cr.  Follow daily weights - patient is incontinent of urine, therefore unable to ascertain output. Bladder scans Q6 hours   Supplemental O2, goal sats 92-96%- please humidify  Check O2 sats on RA at rest and with ambulation  Incentive spirometer, OOB to chair and ambulate at tolerated, PT eval  C/o Right knee pain after fall prior to admission - tylenol and consider lidocaine patch  BP control, cardiac optimization  DVT ppx    Will follow up    Attending Attestation:   I was physically present for the key portions of the evaluation and management (E/M) service provided.  I agree with the above history, physical, and plan which I have reviewed and edited where appropriate.

## 2019-09-01 NOTE — PROGRESS NOTE ADULT - SUBJECTIVE AND OBJECTIVE BOX
[facility] - Division of Pulmonary, Critical Care and Sleep Medicine   Please call [phone] between 8am-pm weekdays, [phone] after hours and weekends    Interval Events: Episode of asymptomatic hypotension last night.     REVIEW OF SYSTEMS:  CV: [ ] chest pain   Resp: [ ] cough [ ] shortness of breath   [ ] All other systems negative  [ ] Unable to assess ROS because ________    OBJECTIVE:  ICU Vital Signs Last 24 Hrs  T(C): 36.5 (31 Aug 2019 20:57), Max: 36.5 (31 Aug 2019 20:57)  T(F): 97.7 (31 Aug 2019 20:57), Max: 97.7 (31 Aug 2019 20:57)  HR: 80 (31 Aug 2019 20:57) (80 - 92)  BP: 84/51 (31 Aug 2019 20:57) (84/51 - 129/69)  BP(mean): --  ABP: --  ABP(mean): --  RR: 18 (31 Aug 2019 20:57) (18 - 18)  SpO2: 97% (31 Aug 2019 20:57) (97% - 97%)        08-31 @ 07:01  -  09-01 @ 07:00  --------------------------------------------------------  IN: 280 mL / OUT: 500 mL / NET: -220 mL      CAPILLARY BLOOD GLUCOSE          PHYSICAL EXAM:  General: NAD  HEENT: NC/AT  Lymph Nodes: no cervical or supraclavicular lymphadenopathy  Neck: supple  Respiratory:  CTA b/l, no wheezes, crackles or rhonchi  Cardiovascular:  RRR, no m/r/g  Abdomen: soft, NT/ND, +BS  Extremities: no clubbing, cyanosis or edema, warm  Skin: no rash  Neurological: AAOx3, non focal exam  Psychiatry: not anxious appearing, normal affect and mood    HOSPITAL MEDICATIONS:  MEDICATIONS  (STANDING):  carvedilol 12.5 milliGRAM(s) Oral every 12 hours  furosemide   Injectable 40 milliGRAM(s) IV Push daily  heparin  Injectable 5000 Unit(s) SubCutaneous every 8 hours  levothyroxine 112 MICROGram(s) Oral daily  multivitamin/minerals 1 Tablet(s) Oral daily  nystatin Powder 1 Application(s) Topical three times a day  polyethylene glycol 3350 17 Gram(s) Oral daily  senna 1 Tablet(s) Oral daily    MEDICATIONS  (PRN):      LABS:                        9.2    7.60  )-----------( 356      ( 31 Aug 2019 11:43 )             29.3     Hgb Trend: 9.2<--, 9.4<--, 10.9<--  09-01    134<L>  |  96  |  41<H>  ----------------------------<  107<H>  4.0   |  22  |  1.86<H>    Ca    8.7      01 Sep 2019 06:15  Phos  4.7     09-01  Mg     1.9     09-01      Creatinine Trend: 1.86<--, 1.60<--, 1.24<--, 1.15<--, 1.19<--, 1.18<--            MICROBIOLOGY:     RADIOLOGY:  [ x] Reviewed and interpreted by me [facility] - Division of Pulmonary, Critical Care and Sleep Medicine   Please call [phone] between 8am-pm weekdays, [phone] after hours and weekends    Interval Events: Episode of asymptomatic hypotension last night. Denies SOB at rest- has not ambulated much. c/o constipation and right knee pain    REVIEW OF SYSTEMS:  CV: [- ] chest pain   Resp: [- ] cough [- ] shortness of breath   [x ] All other systems negative  [ ] Unable to assess ROS because ________    OBJECTIVE:  ICU Vital Signs Last 24 Hrs  T(C): 36.5 (31 Aug 2019 20:57), Max: 36.5 (31 Aug 2019 20:57)  T(F): 97.7 (31 Aug 2019 20:57), Max: 97.7 (31 Aug 2019 20:57)  HR: 80 (31 Aug 2019 20:57) (80 - 92)  BP: 84/51 (31 Aug 2019 20:57) (84/51 - 129/69)  BP(mean): --  ABP: --  ABP(mean): --  RR: 18 (31 Aug 2019 20:57) (18 - 18)  SpO2: 97% (31 Aug 2019 20:57) (97% - 97%)        08-31 @ 07:01  -  09-01 @ 07:00  --------------------------------------------------------  IN: 280 mL / OUT: 500 mL / NET: -220 mL      CAPILLARY BLOOD GLUCOSE          PHYSICAL EXAM:  General: NAD  HEENT: NC/AT  Lymph Nodes: no cervical or supraclavicular lymphadenopathy  Neck: supple  Respiratory:  decreased bibasilar bs, L>R  Cardiovascular:  RRR, no m/r/g  Abdomen: soft, NT/ND, +BS  Extremities: trace pedal edema  Skin: no rash  Neurological: AAOx3, non focal exam  Psychiatry: not anxious appearing, normal affect and mood    HOSPITAL MEDICATIONS:  MEDICATIONS  (STANDING):  carvedilol 12.5 milliGRAM(s) Oral every 12 hours  furosemide   Injectable 40 milliGRAM(s) IV Push daily  heparin  Injectable 5000 Unit(s) SubCutaneous every 8 hours  levothyroxine 112 MICROGram(s) Oral daily  multivitamin/minerals 1 Tablet(s) Oral daily  nystatin Powder 1 Application(s) Topical three times a day  polyethylene glycol 3350 17 Gram(s) Oral daily  senna 1 Tablet(s) Oral daily    MEDICATIONS  (PRN):      LABS:                        9.2    7.60  )-----------( 356      ( 31 Aug 2019 11:43 )             29.3     Hgb Trend: 9.2<--, 9.4<--, 10.9<--  09-01    134<L>  |  96  |  41<H>  ----------------------------<  107<H>  4.0   |  22  |  1.86<H>    Ca    8.7      01 Sep 2019 06:15  Phos  4.7     09-01  Mg     1.9     09-01      Creatinine Trend: 1.86<--, 1.60<--, 1.24<--, 1.15<--, 1.19<--, 1.18<--            MICROBIOLOGY:     RADIOLOGY:  [ x] Reviewed and interpreted by me

## 2019-09-02 DIAGNOSIS — B36.9 SUPERFICIAL MYCOSIS, UNSPECIFIED: ICD-10-CM

## 2019-09-02 LAB
ANION GAP SERPL CALC-SCNC: 13 MMOL/L — SIGNIFICANT CHANGE UP (ref 5–17)
BUN SERPL-MCNC: 41 MG/DL — HIGH (ref 7–23)
CALCIUM SERPL-MCNC: 9.1 MG/DL — SIGNIFICANT CHANGE UP (ref 8.4–10.5)
CHLORIDE SERPL-SCNC: 97 MMOL/L — SIGNIFICANT CHANGE UP (ref 96–108)
CO2 SERPL-SCNC: 26 MMOL/L — SIGNIFICANT CHANGE UP (ref 22–31)
CREAT SERPL-MCNC: 1.78 MG/DL — HIGH (ref 0.5–1.3)
GLUCOSE SERPL-MCNC: 101 MG/DL — HIGH (ref 70–99)
HCT VFR BLD CALC: 29.3 % — LOW (ref 34.5–45)
HGB BLD-MCNC: 9.3 G/DL — LOW (ref 11.5–15.5)
MAGNESIUM SERPL-MCNC: 2.2 MG/DL — SIGNIFICANT CHANGE UP (ref 1.6–2.6)
MCHC RBC-ENTMCNC: 31.1 PG — SIGNIFICANT CHANGE UP (ref 27–34)
MCHC RBC-ENTMCNC: 31.7 GM/DL — LOW (ref 32–36)
MCV RBC AUTO: 98 FL — SIGNIFICANT CHANGE UP (ref 80–100)
PHOSPHATE SERPL-MCNC: 4.5 MG/DL — SIGNIFICANT CHANGE UP (ref 2.5–4.5)
PLATELET # BLD AUTO: 325 K/UL — SIGNIFICANT CHANGE UP (ref 150–400)
POTASSIUM SERPL-MCNC: 4.2 MMOL/L — SIGNIFICANT CHANGE UP (ref 3.5–5.3)
POTASSIUM SERPL-SCNC: 4.2 MMOL/L — SIGNIFICANT CHANGE UP (ref 3.5–5.3)
RBC # BLD: 2.99 M/UL — LOW (ref 3.8–5.2)
RBC # FLD: 14.5 % — SIGNIFICANT CHANGE UP (ref 10.3–14.5)
SODIUM SERPL-SCNC: 136 MMOL/L — SIGNIFICANT CHANGE UP (ref 135–145)
WBC # BLD: 8.12 K/UL — SIGNIFICANT CHANGE UP (ref 3.8–10.5)
WBC # FLD AUTO: 8.12 K/UL — SIGNIFICANT CHANGE UP (ref 3.8–10.5)

## 2019-09-02 PROCEDURE — 99233 SBSQ HOSP IP/OBS HIGH 50: CPT | Mod: GC

## 2019-09-02 PROCEDURE — 99233 SBSQ HOSP IP/OBS HIGH 50: CPT

## 2019-09-02 RX ORDER — ACETAMINOPHEN 500 MG
500 TABLET ORAL ONCE
Refills: 0 | Status: COMPLETED | OUTPATIENT
Start: 2019-09-02 | End: 2019-09-02

## 2019-09-02 RX ORDER — NYSTATIN CREAM 100000 [USP'U]/G
1 CREAM TOPICAL THREE TIMES A DAY
Refills: 0 | Status: DISCONTINUED | OUTPATIENT
Start: 2019-09-02 | End: 2019-09-08

## 2019-09-02 RX ORDER — FUROSEMIDE 40 MG
40 TABLET ORAL ONCE
Refills: 0 | Status: COMPLETED | OUTPATIENT
Start: 2019-09-02 | End: 2019-09-02

## 2019-09-02 RX ORDER — TIOTROPIUM BROMIDE 18 UG/1
1 CAPSULE ORAL; RESPIRATORY (INHALATION) DAILY
Refills: 0 | Status: DISCONTINUED | OUTPATIENT
Start: 2019-09-02 | End: 2019-09-07

## 2019-09-02 RX ORDER — FLUCONAZOLE 150 MG/1
150 TABLET ORAL ONCE
Refills: 0 | Status: COMPLETED | OUTPATIENT
Start: 2019-09-02 | End: 2019-09-02

## 2019-09-02 RX ORDER — IPRATROPIUM/ALBUTEROL SULFATE 18-103MCG
3 AEROSOL WITH ADAPTER (GRAM) INHALATION EVERY 6 HOURS
Refills: 0 | Status: DISCONTINUED | OUTPATIENT
Start: 2019-09-02 | End: 2019-09-07

## 2019-09-02 RX ORDER — ALBUTEROL 90 UG/1
1 AEROSOL, METERED ORAL EVERY 4 HOURS
Refills: 0 | Status: DISCONTINUED | OUTPATIENT
Start: 2019-09-02 | End: 2019-09-07

## 2019-09-02 RX ADMIN — Medication 112 MICROGRAM(S): at 05:27

## 2019-09-02 RX ADMIN — Medication 3 MILLILITER(S): at 17:47

## 2019-09-02 RX ADMIN — Medication 500 MILLIGRAM(S): at 23:32

## 2019-09-02 RX ADMIN — Medication 40 MILLIGRAM(S): at 11:04

## 2019-09-02 RX ADMIN — Medication 1 TABLET(S): at 12:48

## 2019-09-02 RX ADMIN — NYSTATIN CREAM 1 APPLICATION(S): 100000 CREAM TOPICAL at 05:28

## 2019-09-02 RX ADMIN — NYSTATIN CREAM 1 APPLICATION(S): 100000 CREAM TOPICAL at 22:24

## 2019-09-02 RX ADMIN — Medication 100 MILLIGRAM(S): at 20:01

## 2019-09-02 RX ADMIN — NYSTATIN CREAM 1 APPLICATION(S): 100000 CREAM TOPICAL at 15:19

## 2019-09-02 RX ADMIN — HEPARIN SODIUM 5000 UNIT(S): 5000 INJECTION INTRAVENOUS; SUBCUTANEOUS at 05:27

## 2019-09-02 RX ADMIN — FLUCONAZOLE 150 MILLIGRAM(S): 150 TABLET ORAL at 12:50

## 2019-09-02 RX ADMIN — HEPARIN SODIUM 5000 UNIT(S): 5000 INJECTION INTRAVENOUS; SUBCUTANEOUS at 15:15

## 2019-09-02 RX ADMIN — Medication 3 MILLILITER(S): at 12:48

## 2019-09-02 RX ADMIN — Medication 3 MILLILITER(S): at 23:32

## 2019-09-02 NOTE — PROGRESS NOTE ADULT - PROBLEM SELECTOR PLAN 6
ANNA on CKD   - renal us ordered  - avoid nephrotoxins- ACEi, Lasix, NSAIDs   - monitor Cr, down trending - c/w levothyroxine 112mcg daily

## 2019-09-02 NOTE — PROGRESS NOTE ADULT - PROBLEM SELECTOR PLAN 1
Presenting with progressive dyspnea on exertion and at rest, with exam significant for bilateral LE edema, labs showing elevated BNP, and b/l pleural effusions on CT. Most likely new CHF. CTPA neg for PE, but limited by motion so can't r/o segmental PE. No consolidations seen to suggest PNA.   - Unclear underlying cause of CHF exacerbation. Low suspicion for ACS given no chest pain and trops neg, although noted new TWI V5-V6 but nonspecific, and no ST segment changes.   - TTE shows hyperdynamic LVSF, moderately increased LAP, mild-moderate pulmonary HTN  - TSH wnl.  - resume lasix today vs holding in the setting of ANNA   - Strict I/Os, daily weights  - Cards input appreciated: Repeat CXR to see if improved- may require thoracentesis if not better. change antihypertensives to Carvedilol 12.5 mg BID and Ramipril 5 mg daily Presenting with progressive dyspnea on exertion and at rest, with exam significant for bilateral LE edema, labs showing elevated BNP, and b/l pleural effusions on CT. Most likely new CHF. CTPA neg for PE, but limited by motion so can't r/o segmental PE. No consolidations seen to suggest PNA.   - Unclear underlying cause of CHF exacerbation. Low suspicion for ACS given no chest pain and trops neg, although noted new TWI V5-V6 but nonspecific, and no ST segment changes.   - TTE shows hyperdynamic LVSF, moderately increased LAP, mild-moderate pulmonary HTN  - TSH wnl.  - Worsening CXR 9/1, will give IV lasix 40 today, monitor bp, may give additional 40 this afternoon.   - Strict I/Os, start Estrellita-fit for incontinence   - Cards input appreciated: change antihypertensives to Carvedilol 12.5 mg BID and Ramipril 5 mg daily, hold for now given active diuresis   -Duoneb for diffuse wheezing  -repeat cxr tomorrow morning Presenting with progressive dyspnea on exertion and at rest, with exam significant for bilateral LE edema, labs showing elevated BNP, and b/l pleural effusions on CT. Most likely new CHF. CTPA neg for PE, but limited by motion so can't r/o segmental PE. No consolidations seen to suggest PNA.   - Unclear underlying cause of CHF exacerbation. Low suspicion for ACS given no chest pain and trops neg, although noted new TWI V5-V6 but nonspecific, and no ST segment changes.   - TTE shows hyperdynamic LVSF, moderately increased LAP, mild-moderate pulmonary HTN  - TSH wnl.  - Worsening CXR 9/1, will give IV lasix 40 today, monitor bp, may give additional 40 this afternoon.   - Strict I/Os, start Estrellita-fit for incontinence and monitoring uop.   - Cards input appreciated: change antihypertensives to Carvedilol 12.5 mg BID and Ramipril 5 mg daily, hold for now given active diuresis   -Duoneb for diffuse wheezing  -repeat cxr tomorrow morning

## 2019-09-02 NOTE — PROGRESS NOTE ADULT - PROBLEM SELECTOR PLAN 2
CT showing moderate left and small right pleural effusion with associated   compressive atelectasis, likely cardiac in nature. Currently on 2L NC and satting well.   - Appreciate Pulmonary cslt: No indication for thoracentesis at this time. Supplemental O2 with humidifier, goal sats 92-96%. Check O2 sats on RA at rest and with ambulation. Incentive spirometer, OOB to chair and ambulate at tolerated  - BP control, cardiac optimization  - diurese w/ lasix when BPs can tolerate CT showing moderate left and small right pleural effusion with associated   compressive atelectasis, likely cardiac in nature. Currently on 2L NC and satting well.   - Appreciate Pulmonary cslt: No indication for thoracentesis at this time. Supplemental O2 with humidifier, goal sats 92-96%. Check O2 sats on RA at rest and with ambulation. Incentive spirometer, OOB to chair and ambulate at tolerated  - BP control, cardiac optimization  - diurese w/ lasix when BPs can tolerate  - worsening pleural effusion on cxr 9/1 despite diuresis, may need diagnostic tap.

## 2019-09-02 NOTE — PROGRESS NOTE ADULT - ATTENDING COMMENTS
Lasix 40 mg ivp x1. Monitor uop with primafit. Will consider re-dosing lasix later today.   Repeat cxr tomorrow am.   Hold other anti-htn medications for now. TTE without systolic dysfunction.   Cards and pulm input appreciated.   May need thoracentesis if respiratory status does not improve.   O2 sat on ra pending. PT working with patient.  Incentive spirometer and OOBTC with assistance daily.  D/c renal US as unlikely to . ANNA likely from ATN vs prerenal. However improving today despite yesterday's hypotension and diuretic dose.   Continue nystatin powder. Dose fluconazole po x1.   Dispo pending PT recs and clinical improvement   Rest of plan as above    Dr. Nav Dunlap DO  Division of Hospital Medicine, [facility]  Pager:  [phone]

## 2019-09-02 NOTE — PROGRESS NOTE ADULT - PROBLEM SELECTOR PLAN 7
DVT Prophylaxis: heparin subq   Diet: DASH  Dispo: pending PT ANNA on CKD   - renal us ordered, then patient started urinating spontaneously, d/c'ed US   - avoid nephrotoxins- ACEi, Lasix, NSAIDs   - monitor Cr, down trending

## 2019-09-02 NOTE — PROGRESS NOTE ADULT - ASSESSMENT
89 yo F with h/o HTN, hypothyroidism, recent admission for ESBL UTIs d/c'ed 2 weeks ago, presented with progressive shortness of breath x 1 week. Pt was started on Lasix 20mg daily 2 days prior to admission by her PMD and reported initially feeling better after taking it, but symptoms recurred. +orthopnea and LE edema. +nonproductive cough.   On presentation, mild hypoxemia to low 90s, improved with 2 LNC. Pro-BNP 3052.   CTPA - No pulmonary embolus in the main pulmonary arteries or lobar branches, moderate left and small right pleural effusion with associated   compressive atelectasis  TTE findings with dilated LA with moderately elevated pressures, moderate pulm HTN. Estimated PASP 50     Got Lisinopril 20 (verapamil 5mg equivalent) carvedilol, lasix became hypotensive to sbp 60s 9/1 morning, currently holding all bp med 89 yo F with h/o HTN, hypothyroidism, recent admission for ESBL UTIs d/c'ed 2 weeks ago, presented with progressive shortness of breath x 1 week here with b/l pleural effusions.

## 2019-09-02 NOTE — PROGRESS NOTE ADULT - PROBLEM SELECTOR PLAN 5
- c/w levothyroxine 112mcg daily chronic fungal dermatitis, no response to nystatin power alone   -c/w nystatin power TID   -start Fluconazole 150 PO once

## 2019-09-02 NOTE — PROGRESS NOTE ADULT - PROBLEM SELECTOR PLAN 4
Patient hypotensive to sbp 60s morning of 9/1 after given meds. BP recovered after holding hypertensive meds and 500cc bolus  - cards recs appreciated: Carvedilol 12.5 mg BID and Ramipril 5 mg daily. Patient hypotensive to sbp 60s morning of 9/1 after given meds. BP recovered after holding hypertensive meds and 500cc bolus  - cards recs appreciated: Carvedilol 12.5 mg BID and Ramipril 5 mg daily. hold for now

## 2019-09-02 NOTE — PROGRESS NOTE ADULT - PROBLEM SELECTOR PLAN 3
Hx of recurrent ESBL UTIs. Currently not endorsing dysuria, UA w/ mod LE. No systemic signs of infection  - Will monitor off antibiotics for now

## 2019-09-02 NOTE — PROGRESS NOTE ADULT - SUBJECTIVE AND OBJECTIVE BOX
Subjective: Pt with dry cough today. Off diuretics due to elevated creatinine.    MEDICATIONS  (STANDING):  ALBUTerol    90 MICROgram(s) HFA Inhaler 1 Puff(s) Inhalation every 4 hours  ALBUTerol/ipratropium for Nebulization 3 milliLiter(s) Nebulizer every 6 hours  levothyroxine 112 MICROGram(s) Oral daily  multivitamin/minerals 1 Tablet(s) Oral daily  nystatin Powder 1 Application(s) Topical three times a day  polyethylene glycol 3350 17 Gram(s) Oral daily  senna 1 Tablet(s) Oral daily  tiotropium 18 MICROgram(s) Capsule 1 Capsule(s) Inhalation daily    MEDICATIONS  (PRN):  benzonatate 100 milliGRAM(s) Oral three times a day PRN Cough      Vital Signs Last 24 Hrs  T(C): 36.7 (02 Sep 2019 13:58), Max: 36.7 (02 Sep 2019 13:58)  T(F): 98 (02 Sep 2019 13:58), Max: 98 (02 Sep 2019 13:58)  HR: 90 (02 Sep 2019 13:58) (80 - 90)  BP: 132/73 (02 Sep 2019 13:58) (119/65 - 146/76)  BP(mean): --  RR: 18 (02 Sep 2019 13:58) (18 - 18)  SpO2: 92% (02 Sep 2019 13:58) (92% - 96%)  PHYSICAL EXAM:      Constitutional: WD, WN  Neck: no JVD  Respiratory: decreased BS 1/2 up on L  Cardiovascular: RRR, 1/6 NORRIS  Extremities: no edema  Neurological: nonfocal    TELEMETRY: RSR    ECG:      LABS:                        9.3    8.12  )-----------( 325      ( 02 Sep 2019 08:51 )             29.3     09-02    136  |  97  |  41<H>  ----------------------------<  101<H>  4.2   |  26  |  1.78<H>    Ca    9.1      02 Sep 2019 06:29  Phos  4.5     09-02  Mg     2.2     09-02              I&O's Summary    01 Sep 2019 07:01  -  02 Sep 2019 07:00  --------------------------------------------------------  IN: 297 mL / OUT: 0 mL / NET: 297 mL    02 Sep 2019 07:01  -  02 Sep 2019 20:09  --------------------------------------------------------  IN: 200 mL / OUT: 1200 mL / NET: -1000 mL      BNP  RADIOLOGY & ADDITIONAL STUDIES:    IMPRESSION:  Moderate L pleural effusion- doubt HF  HTN  Hypothyroidism  NANA due to diuretics    RECOMMENDATIONS:  Continue current meds  Cough suppression  Pulm f/u for likely thoracentesis  f/u BMP

## 2019-09-02 NOTE — PROGRESS NOTE ADULT - SUBJECTIVE AND OBJECTIVE BOX
[facility] - Division of Pulmonary, Critical Care and Sleep Medicine   Please call [phone] between 8am-pm weekdays, [phone] after hours and weekends    Interval Events:    REVIEW OF SYSTEMS:  CV: [ ] chest pain   Resp: [ ] cough [ ] shortness of breath   [ ] All other systems negative  [ ] Unable to assess ROS because ________    OBJECTIVE:  ICU Vital Signs Last 24 Hrs  T(C): 36.4 (02 Sep 2019 04:44), Max: 36.4 (02 Sep 2019 04:44)  T(F): 97.5 (02 Sep 2019 04:44), Max: 97.5 (02 Sep 2019 04:44)  HR: 80 (02 Sep 2019 04:44) (70 - 80)  BP: 146/76 (02 Sep 2019 04:44) (64/42 - 146/76)  BP(mean): --  ABP: --  ABP(mean): --  RR: 18 (02 Sep 2019 04:44) (16 - 18)  SpO2: 96% (02 Sep 2019 04:44) (95% - 97%)        09-01 @ 07:01  -  09-02 @ 07:00  --------------------------------------------------------  IN: 297 mL / OUT: 0 mL / NET: 297 mL      CAPILLARY BLOOD GLUCOSE          PHYSICAL EXAM:  General: NAD  HEENT: NC/AT  Lymph Nodes: no cervical or supraclavicular lymphadenopathy  Neck: supple  Respiratory:  CTA b/l, no wheezes, crackles or rhonchi  Cardiovascular:  RRR, no m/r/g  Abdomen: soft, NT/ND, +BS  Extremities: no clubbing, cyanosis or edema, warm  Skin: no rash  Neurological: AAOx3, non focal exam  Psychiatry: not anxious appearing, normal affect and mood    HOSPITAL MEDICATIONS:  MEDICATIONS  (STANDING):  heparin  Injectable 5000 Unit(s) SubCutaneous every 8 hours  levothyroxine 112 MICROGram(s) Oral daily  multivitamin/minerals 1 Tablet(s) Oral daily  nystatin Powder 1 Application(s) Topical three times a day  polyethylene glycol 3350 17 Gram(s) Oral daily  senna 1 Tablet(s) Oral daily    MEDICATIONS  (PRN):  benzonatate 100 milliGRAM(s) Oral three times a day PRN Cough      LABS:                        9.1    7.01  )-----------( 296      ( 01 Sep 2019 09:31 )             28.9     Hgb Trend: 9.1<--, 9.2<--, 9.4<--, 10.9<--  09-02    136  |  97  |  41<H>  ----------------------------<  101<H>  4.2   |  26  |  1.78<H>    Ca    9.1      02 Sep 2019 06:29  Phos  4.5     09-02  Mg     2.2     09-02      Creatinine Trend: 1.78<--, 1.86<--, 1.60<--, 1.24<--, 1.15<--, 1.19<--            MICROBIOLOGY:     RADIOLOGY:  [x ] Reviewed and interpreted by me  CXR 9/1- moderate left sided pleural effusion [facility] - Division of Pulmonary, Critical Care and Sleep Medicine   Please call [phone] between 8am-pm weekdays, [phone] after hours and weekends    Interval Events: No events overnight, c/o new cough and some HAYES. States she is now urinating more frequently.     REVIEW OF SYSTEMS:  CV: [- ] chest pain   Resp: [+ ] cough [- ] shortness of breath   [x ] All other systems negative  [ ] Unable to assess ROS because ________    OBJECTIVE:  ICU Vital Signs Last 24 Hrs  T(C): 36.4 (02 Sep 2019 04:44), Max: 36.4 (02 Sep 2019 04:44)  T(F): 97.5 (02 Sep 2019 04:44), Max: 97.5 (02 Sep 2019 04:44)  HR: 80 (02 Sep 2019 04:44) (70 - 80)  BP: 146/76 (02 Sep 2019 04:44) (64/42 - 146/76)  BP(mean): --  ABP: --  ABP(mean): --  RR: 18 (02 Sep 2019 04:44) (16 - 18)  SpO2: 96% (02 Sep 2019 04:44) (95% - 97%)        09-01 @ 07:01  -  09-02 @ 07:00  --------------------------------------------------------  IN: 297 mL / OUT: 0 mL / NET: 297 mL      CAPILLARY BLOOD GLUCOSE          PHYSICAL EXAM:  General: NAD  HEENT: NC/AT  Lymph Nodes: no cervical or supraclavicular lymphadenopathy  Neck: supple  Respiratory:  decreased basilar bs, L>R  Cardiovascular:  RRR, no m/r/g  Abdomen: soft, NT/ND, +BS  Extremities: no clubbing, cyanosis or edema, warm  Skin: no rash  Neurological: AAOx3, non focal exam  Psychiatry: not anxious appearing, normal affect and mood    HOSPITAL MEDICATIONS:  MEDICATIONS  (STANDING):  heparin  Injectable 5000 Unit(s) SubCutaneous every 8 hours  levothyroxine 112 MICROGram(s) Oral daily  multivitamin/minerals 1 Tablet(s) Oral daily  nystatin Powder 1 Application(s) Topical three times a day  polyethylene glycol 3350 17 Gram(s) Oral daily  senna 1 Tablet(s) Oral daily    MEDICATIONS  (PRN):  benzonatate 100 milliGRAM(s) Oral three times a day PRN Cough      LABS:                        9.1    7.01  )-----------( 296      ( 01 Sep 2019 09:31 )             28.9     Hgb Trend: 9.1<--, 9.2<--, 9.4<--, 10.9<--  09-02    136  |  97  |  41<H>  ----------------------------<  101<H>  4.2   |  26  |  1.78<H>    Ca    9.1      02 Sep 2019 06:29  Phos  4.5     09-02  Mg     2.2     09-02      Creatinine Trend: 1.78<--, 1.86<--, 1.60<--, 1.24<--, 1.15<--, 1.19<--            MICROBIOLOGY:     RADIOLOGY:  [x ] Reviewed and interpreted by me  CXR 9/1- moderate left sided pleural effusion

## 2019-09-02 NOTE — PROGRESS NOTE ADULT - SUBJECTIVE AND OBJECTIVE BOX
Subjective:   89 F hx htn, recent admission for esbl uti d/c'ed 2wks ago, presented from [facility] with progressive sob x1wk, LE swelling, recently started on Lasix 20 daily 2 days prior to admission with initially improvement in symptoms, now with recurrent symptoms.    no o/n events    Medications:  benzonatate 100 milliGRAM(s) Oral three times a day PRN  heparin  Injectable 5000 Unit(s) SubCutaneous every 8 hours  levothyroxine 112 MICROGram(s) Oral daily  multivitamin/minerals 1 Tablet(s) Oral daily  nystatin Powder 1 Application(s) Topical three times a day  polyethylene glycol 3350 17 Gram(s) Oral daily  senna 1 Tablet(s) Oral daily      PHYSICAL EXAM:  Vital Signs Last 24 Hrs  T(C): 36.4 (02 Sep 2019 04:44), Max: 36.4 (02 Sep 2019 04:44)  T(F): 97.5 (02 Sep 2019 04:44), Max: 97.5 (02 Sep 2019 04:44)  HR: 80 (02 Sep 2019 04:44) (70 - 80)  BP: 146/76 (02 Sep 2019 04:44) (64/42 - 146/76)  BP(mean): --  RR: 18 (02 Sep 2019 04:44) (16 - 18)  SpO2: 96% (02 Sep 2019 04:44) (95% - 97%)  Daily     Daily   I&O's Detail    01 Sep 2019 07:01  -  02 Sep 2019 07:00  --------------------------------------------------------  IN:    Oral Fluid: 297 mL  Total IN: 297 mL    OUT:  Total OUT: 0 mL    Total NET: 297 mL          General: A/ox 3, No acute Distress  Neck: Supple, NO JVD  Cardiac: S1 S2, No M/R/G  Pulmonary: CTAB, Breathing unlabored, No Rhonchi/Rales/Wheezing  Abdomen: Soft, Non -tender, +BS   Extremities: No Rashes, No edema  Neuro: A/o x 3, No focal deficits  Psch: normal mood , normal affect    LABS:  cret                        9.1    7.01  )-----------( 296      ( 01 Sep 2019 09:31 )             28.9     09-02    136  |  97  |  41<H>  ----------------------------<  101<H>  4.2   |  26  |  1.78<H>    Ca    9.1      02 Sep 2019 06:29  Phos  4.5     09-02  Mg     2.2     09-02 Subjective:   89 F hx htn, recent admission for esbl uti d/c'ed 2wks ago, presented from [facility] with progressive sob x1wk, LE swelling, recently started on Lasix 20 daily 2 days prior to admission with initially improvement in symptoms, now with recurrent symptoms.    no acute o/n events. patient endorsed worsening cough with no sputum production, yeast infection not getting any better, itchy and smells. She urinated spontaneously last night with good urine output, per patient, she was unable to pee spontaneous for the previous couple days     Medications:  benzonatate 100 milliGRAM(s) Oral three times a day PRN  heparin  Injectable 5000 Unit(s) SubCutaneous every 8 hours  levothyroxine 112 MICROGram(s) Oral daily  multivitamin/minerals 1 Tablet(s) Oral daily  nystatin Powder 1 Application(s) Topical three times a day  polyethylene glycol 3350 17 Gram(s) Oral daily  senna 1 Tablet(s) Oral daily      PHYSICAL EXAM:  Vital Signs Last 24 Hrs  T(C): 36.4 (02 Sep 2019 04:44), Max: 36.4 (02 Sep 2019 04:44)  T(F): 97.5 (02 Sep 2019 04:44), Max: 97.5 (02 Sep 2019 04:44)  HR: 80 (02 Sep 2019 04:44) (70 - 80)  BP: 146/76 (02 Sep 2019 04:44) (64/42 - 146/76)  BP(mean): --  RR: 18 (02 Sep 2019 04:44) (16 - 18)  SpO2: 96% (02 Sep 2019 04:44) (95% - 97%)  Daily     Daily   I&O's Detail    01 Sep 2019 07:01  -  02 Sep 2019 07:00  --------------------------------------------------------  IN:    Oral Fluid: 297 mL  Total IN: 297 mL    OUT:  Total OUT: 0 mL    Total NET: 297 mL          General: A/ox 3, No acute Distress  Neck: Supple, NO JVD  Cardiac: S1 S2, No M/R/G  Pulmonary: Breathing unlabored, diffused wheezing in all lung fields, decrease LLL breath sound   Abdomen: Soft, Non -tender, +BS   Extremities: No Rashes, No edema  Skin: inflamed rashes in the gluteal fold and underneath the belly   Neuro: A/o x 3, No focal deficits  Psch: normal mood , normal affect    LABS:  cret                        9.1    7.01  )-----------( 296      ( 01 Sep 2019 09:31 )             28.9     09-02    136  |  97  |  41<H>  ----------------------------<  101<H>  4.2   |  26  |  1.78<H>    Ca    9.1      02 Sep 2019 06:29  Phos  4.5     09-02  Mg     2.2     09-02 Subjective:   89 F hx htn, recent admission for esbl uti d/c'ed 2wks ago, presented from [facility] with progressive sob x1wk, LE swelling, recently started on Lasix 20 daily 2 days prior to admission with initially improvement in symptoms, now with recurrent symptoms.    no acute o/n events. patient endorsed worsening cough with no sputum production, yeast infection not getting any better, itchy and smells. She urinated spontaneously last night with good urine output, per patient, she was unable to pee spontaneous for the previous couple days     Medications:  benzonatate 100 milliGRAM(s) Oral three times a day PRN  heparin  Injectable 5000 Unit(s) SubCutaneous every 8 hours  levothyroxine 112 MICROGram(s) Oral daily  multivitamin/minerals 1 Tablet(s) Oral daily  nystatin Powder 1 Application(s) Topical three times a day  polyethylene glycol 3350 17 Gram(s) Oral daily  senna 1 Tablet(s) Oral daily      PHYSICAL EXAM:  Vital Signs Last 24 Hrs  T(C): 36.4 (02 Sep 2019 04:44), Max: 36.4 (02 Sep 2019 04:44)  T(F): 97.5 (02 Sep 2019 04:44), Max: 97.5 (02 Sep 2019 04:44)  HR: 80 (02 Sep 2019 04:44) (70 - 80)  BP: 146/76 (02 Sep 2019 04:44) (64/42 - 146/76)  BP(mean): --  RR: 18 (02 Sep 2019 04:44) (16 - 18)  SpO2: 96% (02 Sep 2019 04:44) (95% - 97%)  Daily     Daily   I&O's Detail    01 Sep 2019 07:01  -  02 Sep 2019 07:00  --------------------------------------------------------  IN:    Oral Fluid: 297 mL  Total IN: 297 mL    OUT:  Total OUT: 0 mL    Total NET: 297 mL          General: A/ox 3, No acute Distress  Neck: Supple, NO JVD  Cardiac: S1 S2, No M/R/G  Pulmonary: Breathing unlabored, intermittent wheezing in all lung fields, decrease LLL breath sound   Abdomen: Soft, Non -tender, +BS   Extremities: No Rashes, No edema  Skin: inflamed rashes in the gluteal fold and underneath the belly consistent with intertrigo.   Neuro: A/o x 3, No focal deficits  Psych normal mood , normal affect    LABS:  cret                        9.1    7.01  )-----------( 296      ( 01 Sep 2019 09:31 )             28.9     09-02    136  |  97  |  41<H>  ----------------------------<  101<H>  4.2   |  26  |  1.78<H>    Ca    9.1      02 Sep 2019 06:29  Phos  4.5     09-02  Mg     2.2     09-02

## 2019-09-02 NOTE — PROGRESS NOTE ADULT - ASSESSMENT
89 yo F with h/o HTN, hypothyroidism, and multiple ESBL UTIs, presents with progressive shortness of breath x 1 week. Pt was started on Lasix 20mg daily 2 days prior to admission by her PMD and reported initially feeling better after taking it, but symptoms recurred. +orthopnea and LE edema. +nonproductive cough.   Denies any chest pain or palpitations. Has been having a dry cough over last week, but no fever, chills, runny nose, sore throat, or N/V/diarrhea.   Patient was recently admitted for ESBL UTI, went to [facility], and d/c'd back to [facility] about 2 weeks ago.  On presentation, mild hypoxemia to low 90s, improved with 2 LNC.   Pro-BNP 3052  CTPA - No pulmonary embolus in the main pulmonary arteries or lobar branches, moderate left and small right pleural effusion with associated   compressive atelectasis  Has since received Lasix 40mg IVP- diuresing; symptoms and O2 requirements improved. Currently on RA.    TTE findings with dilated LA with moderately elevated pressures, moderate pulm HTN.     A/P: Bilateral pleural effusions, likely cardiac in nature  No indication for thoracentesis at this time  ANNA on CKD- would cont hold diuretics and monitor Cr, renal ultrasound  Follow daily weights - patient is incontinent of urine, therefore unable to ascertain output. Bladder scans Q6 hours   Supplemental O2, goal sats 92-96%- please humidify  Check O2 sats on RA at rest and with ambulation  Incentive spirometer, OOB to chair and ambulate at tolerated, PT   C/o Right knee pain after fall prior to admission - tylenol and consider lidocaine patch  BP control, cardiac optimization  DVT ppx    Will follow up    Attending Attestation:   I was physically present for the key portions of the evaluation and management (E/M) service provided.  I agree with the above history, physical, and plan which I have reviewed and edited where appropriate. 89 yo F with h/o HTN, hypothyroidism, and multiple ESBL UTIs, presents with progressive shortness of breath x 1 week. Pt was started on Lasix 20mg daily 2 days prior to admission by her PMD and reported initially feeling better after taking it, but symptoms recurred. +orthopnea and LE edema. +nonproductive cough.   Denies any chest pain or palpitations. Has been having a dry cough over last week, but no fever, chills, runny nose, sore throat, or N/V/diarrhea.   Patient was recently admitted for ESBL UTI, went to [facility], and d/c'd back to [facility] about 2 weeks ago.  On presentation, mild hypoxemia to low 90s, improved with 2 LNC.   Pro-BNP 3052  CTPA - No pulmonary embolus in the main pulmonary arteries or lobar branches, moderate left and small right pleural effusion with associated   compressive atelectasis  Has since received Lasix 40mg IVP- diuresing; symptoms and O2 requirements improved. Currently on RA.    TTE findings with dilated LA with moderately elevated pressures, moderate pulm HTN.     A/P: Bilateral pleural effusions, likely cardiac in nature  ANNA on CKD- would cont hold diuretics and monitor Cr, renal ultrasound  Follow daily weights - patient is incontinent of urine, therefore unable to ascertain output. Bladder scans Q6 hours   Supplemental O2, goal sats 92-96%- please humidify  Check O2 sats on RA at rest and with ambulation  Incentive spirometer, OOB to chair and ambulate at tolerated, PT   C/o Right knee pain after fall prior to admission - tylenol and consider lidocaine patch  BP control, cardiac optimization  DVT ppx    I discussed the lack in improvement of her symptoms with diuretic with the patient - if continued, will consider bedside thoracentesis tomorrow.      Will follow up    Attending Attestation:   I was physically present for the key portions of the evaluation and management (E/M) service provided.  I agree with the above history, physical, and plan which I have reviewed and edited where appropriate.

## 2019-09-03 ENCOUNTER — APPOINTMENT (OUTPATIENT)
Dept: GERIATRICS | Facility: ASSISTED LIVING FACILITY | Age: 84
End: 2019-09-03

## 2019-09-03 LAB
ALBUMIN SERPL ELPH-MCNC: 3.3 G/DL — SIGNIFICANT CHANGE UP (ref 3.3–5)
ALP SERPL-CCNC: 149 U/L — HIGH (ref 40–120)
ALT FLD-CCNC: 16 U/L — SIGNIFICANT CHANGE UP (ref 10–45)
ANION GAP SERPL CALC-SCNC: 13 MMOL/L — SIGNIFICANT CHANGE UP (ref 5–17)
APTT BLD: 31.2 SEC — SIGNIFICANT CHANGE UP (ref 27.5–36.3)
AST SERPL-CCNC: 12 U/L — SIGNIFICANT CHANGE UP (ref 10–40)
BILIRUB SERPL-MCNC: 0.2 MG/DL — SIGNIFICANT CHANGE UP (ref 0.2–1.2)
BLD GP AB SCN SERPL QL: NEGATIVE — SIGNIFICANT CHANGE UP
BUN SERPL-MCNC: 35 MG/DL — HIGH (ref 7–23)
CALCIUM SERPL-MCNC: 9 MG/DL — SIGNIFICANT CHANGE UP (ref 8.4–10.5)
CHLORIDE SERPL-SCNC: 102 MMOL/L — SIGNIFICANT CHANGE UP (ref 96–108)
CO2 SERPL-SCNC: 26 MMOL/L — SIGNIFICANT CHANGE UP (ref 22–31)
CREAT SERPL-MCNC: 1.45 MG/DL — HIGH (ref 0.5–1.3)
GLUCOSE SERPL-MCNC: 101 MG/DL — HIGH (ref 70–99)
HCT VFR BLD CALC: 30.4 % — LOW (ref 34.5–45)
HGB BLD-MCNC: 9.6 G/DL — LOW (ref 11.5–15.5)
INR BLD: 0.97 RATIO — SIGNIFICANT CHANGE UP (ref 0.88–1.16)
LDH SERPL L TO P-CCNC: 167 U/L — SIGNIFICANT CHANGE UP (ref 50–242)
MAGNESIUM SERPL-MCNC: 2.1 MG/DL — SIGNIFICANT CHANGE UP (ref 1.6–2.6)
MCHC RBC-ENTMCNC: 30.4 PG — SIGNIFICANT CHANGE UP (ref 27–34)
MCHC RBC-ENTMCNC: 31.6 GM/DL — LOW (ref 32–36)
MCV RBC AUTO: 96.2 FL — SIGNIFICANT CHANGE UP (ref 80–100)
PLATELET # BLD AUTO: 327 K/UL — SIGNIFICANT CHANGE UP (ref 150–400)
POTASSIUM SERPL-MCNC: 3.9 MMOL/L — SIGNIFICANT CHANGE UP (ref 3.5–5.3)
POTASSIUM SERPL-SCNC: 3.9 MMOL/L — SIGNIFICANT CHANGE UP (ref 3.5–5.3)
PROT SERPL-MCNC: 7 G/DL — SIGNIFICANT CHANGE UP (ref 6–8.3)
PROTHROM AB SERPL-ACNC: 11.2 SEC — SIGNIFICANT CHANGE UP (ref 10–13.1)
RBC # BLD: 3.16 M/UL — LOW (ref 3.8–5.2)
RBC # FLD: 14.6 % — HIGH (ref 10.3–14.5)
RH IG SCN BLD-IMP: POSITIVE — SIGNIFICANT CHANGE UP
SODIUM SERPL-SCNC: 141 MMOL/L — SIGNIFICANT CHANGE UP (ref 135–145)
WBC # BLD: 7.26 K/UL — SIGNIFICANT CHANGE UP (ref 3.8–10.5)
WBC # FLD AUTO: 7.26 K/UL — SIGNIFICANT CHANGE UP (ref 3.8–10.5)

## 2019-09-03 PROCEDURE — 71045 X-RAY EXAM CHEST 1 VIEW: CPT | Mod: 26

## 2019-09-03 PROCEDURE — 99233 SBSQ HOSP IP/OBS HIGH 50: CPT

## 2019-09-03 PROCEDURE — 76604 US EXAM CHEST: CPT | Mod: 26,GC

## 2019-09-03 PROCEDURE — 99233 SBSQ HOSP IP/OBS HIGH 50: CPT | Mod: GC,25

## 2019-09-03 RX ORDER — POTASSIUM CHLORIDE 20 MEQ
20 PACKET (EA) ORAL ONCE
Refills: 0 | Status: DISCONTINUED | OUTPATIENT
Start: 2019-09-03 | End: 2019-09-03

## 2019-09-03 RX ORDER — FUROSEMIDE 40 MG
40 TABLET ORAL ONCE
Refills: 0 | Status: DISCONTINUED | OUTPATIENT
Start: 2019-09-03 | End: 2019-09-03

## 2019-09-03 RX ORDER — SPIRONOLACTONE 25 MG/1
12.5 TABLET, FILM COATED ORAL DAILY
Refills: 0 | Status: DISCONTINUED | OUTPATIENT
Start: 2019-09-03 | End: 2019-09-06

## 2019-09-03 RX ADMIN — NYSTATIN CREAM 1 APPLICATION(S): 100000 CREAM TOPICAL at 05:45

## 2019-09-03 RX ADMIN — NYSTATIN CREAM 1 APPLICATION(S): 100000 CREAM TOPICAL at 21:37

## 2019-09-03 RX ADMIN — Medication 100 MILLIGRAM(S): at 05:45

## 2019-09-03 RX ADMIN — Medication 3 MILLILITER(S): at 05:45

## 2019-09-03 RX ADMIN — Medication 3 MILLILITER(S): at 22:59

## 2019-09-03 RX ADMIN — Medication 20 MILLIEQUIVALENT(S): at 15:14

## 2019-09-03 RX ADMIN — NYSTATIN CREAM 1 APPLICATION(S): 100000 CREAM TOPICAL at 15:09

## 2019-09-03 RX ADMIN — Medication 112 MICROGRAM(S): at 05:45

## 2019-09-03 RX ADMIN — Medication 3 MILLILITER(S): at 18:03

## 2019-09-03 RX ADMIN — Medication 3 MILLILITER(S): at 12:59

## 2019-09-03 RX ADMIN — Medication 500 MILLIGRAM(S): at 00:02

## 2019-09-03 RX ADMIN — Medication 1 TABLET(S): at 12:59

## 2019-09-03 RX ADMIN — Medication 100 MILLIGRAM(S): at 15:07

## 2019-09-03 RX ADMIN — SPIRONOLACTONE 12.5 MILLIGRAM(S): 25 TABLET, FILM COATED ORAL at 15:15

## 2019-09-03 NOTE — PROGRESS NOTE ADULT - PROBLEM SELECTOR PLAN 1
Presenting with progressive dyspnea on exertion and at rest, with exam significant for bilateral LE edema, labs showing elevated BNP, and b/l pleural effusions on CT. Most likely new CHF. CTPA neg for PE, but limited by motion so can't r/o segmental PE. No consolidations seen to suggest PNA.   - Unclear underlying cause of CHF exacerbation. Low suspicion for ACS given no chest pain and trops neg, although noted new TWI V5-V6 but nonspecific, and no ST segment changes.   - TTE shows hyperdynamic LVSF, moderately increased LAP, mild-moderate pulmonary HTN  - TSH wnl.  - Worsening CXR 9/1.  - Strict I/Os, c/w Estrellita-fit for incontinence and monitoring uop.   - Cards input appreciated: change antihypertensives to Carvedilol 12.5 mg BID and Ramipril 5 mg daily, hold for now given active diuresis   -Duoneb for diffuse wheezing  -repeat cxr today

## 2019-09-03 NOTE — PROGRESS NOTE ADULT - SUBJECTIVE AND OBJECTIVE BOX
Subjective: tylenol knee pain, hep sq stopped after midnight for possible thoracentesis today     Medications:  ALBUTerol    90 MICROgram(s) HFA Inhaler 1 Puff(s) Inhalation every 4 hours  ALBUTerol/ipratropium for Nebulization 3 milliLiter(s) Nebulizer every 6 hours  benzonatate 100 milliGRAM(s) Oral three times a day PRN  levothyroxine 112 MICROGram(s) Oral daily  multivitamin/minerals 1 Tablet(s) Oral daily  nystatin Powder 1 Application(s) Topical three times a day  polyethylene glycol 3350 17 Gram(s) Oral daily  senna 1 Tablet(s) Oral daily  tiotropium 18 MICROgram(s) Capsule 1 Capsule(s) Inhalation daily      PHYSICAL EXAM:  Vital Signs Last 24 Hrs  T(C): 36.4 (03 Sep 2019 03:59), Max: 36.7 (02 Sep 2019 13:58)  T(F): 97.6 (03 Sep 2019 03:59), Max: 98 (02 Sep 2019 13:58)  HR: 86 (03 Sep 2019 03:59) (86 - 97)  BP: 148/75 (03 Sep 2019 03:59) (132/73 - 148/75)  BP(mean): --  RR: 18 (03 Sep 2019 03:59) (18 - 18)  SpO2: 94% (03 Sep 2019 03:59) (92% - 94%)  Daily     Daily Weight in k.5 (03 Sep 2019 08:13)  I&O's Detail    02 Sep 2019 07:01  -  03 Sep 2019 07:00  --------------------------------------------------------  IN:    Oral Fluid: 200 mL  Total IN: 200 mL    OUT:    Voided: 2100 mL  Total OUT: 2100 mL    Total NET: -1900 mL          General: A/ox 3, No acute Distress  Neck: Supple, NO JVD  Cardiac: S1 S2, No M/R/G  Pulmonary: CTAB, Breathing unlabored, No Rhonchi/Rales/Wheezing  Abdomen: Soft, Non -tender, +BS   Extremities: No Rashes, No edema  Neuro: A/o x 3, No focal deficits  Psch: normal mood , normal affect    LABS:  cret                        9.3    8.12  )-----------( 325      ( 02 Sep 2019 08:51 )             29.3         141  |  102  |  35<H>  ----------------------------<  101<H>  3.9   |  26  |  1.45<H>    Ca    9.0      03 Sep 2019 06:15  Phos  4.5       Mg     2.1         TPro  7.0  /  Alb  3.3  /  TBili  0.2  /  DBili  x   /  AST  12  /  ALT  16  /  AlkPhos  149<H>   Subjective: tylenol knee pain, hep sq stopped after midnight for possible thoracentesis today however was deferred.   Starting diuretics again per pulm/cards.    Medications:  ALBUTerol    90 MICROgram(s) HFA Inhaler 1 Puff(s) Inhalation every 4 hours  ALBUTerol/ipratropium for Nebulization 3 milliLiter(s) Nebulizer every 6 hours  benzonatate 100 milliGRAM(s) Oral three times a day PRN  levothyroxine 112 MICROGram(s) Oral daily  multivitamin/minerals 1 Tablet(s) Oral daily  nystatin Powder 1 Application(s) Topical three times a day  polyethylene glycol 3350 17 Gram(s) Oral daily  senna 1 Tablet(s) Oral daily  tiotropium 18 MICROgram(s) Capsule 1 Capsule(s) Inhalation daily      PHYSICAL EXAM:  Vital Signs Last 24 Hrs  T(C): 36.4 (03 Sep 2019 03:59), Max: 36.7 (02 Sep 2019 13:58)  T(F): 97.6 (03 Sep 2019 03:59), Max: 98 (02 Sep 2019 13:58)  HR: 86 (03 Sep 2019 03:59) (86 - 97)  BP: 148/75 (03 Sep 2019 03:59) (132/73 - 148/75)  BP(mean): --  RR: 18 (03 Sep 2019 03:59) (18 - 18)  SpO2: 94% (03 Sep 2019 03:59) (92% - 94%)  Daily     Daily Weight in k.5 (03 Sep 2019 08:13)  I&O's Detail    02 Sep 2019 07:01  -  03 Sep 2019 07:00  --------------------------------------------------------  IN:    Oral Fluid: 200 mL  Total IN: 200 mL    OUT:    Voided: 2100 mL  Total OUT: 2100 mL    Total NET: -1900 mL          General:   No acute Distress  Neck: Supple, NO JVD  Cardiac: S1 S2, No M/R/G  Pulmonary: CTAB, Breathing unlabored, decreased breath sounds at the lung bases (L>R); no rales, wheeze, or rhonchi.   Abdomen: Soft, Non -tender, +BS   Extremities: No Rashes, No edema  Neuro: A/o x 3, No focal deficits  Psych: normal mood , normal affect  Skin: intertrigo noted in the groin B/L.     LABS:  cret                        9.3    8.12  )-----------( 325      ( 02 Sep 2019 08:51 )             29.3         141  |  102  |  35<H>  ----------------------------<  101<H>  3.9   |  26  |  1.45<H>    Ca    9.0      03 Sep 2019 06:15  Phos  4.5       Mg     2.1         TPro  7.0  /  Alb  3.3  /  TBili  0.2  /  DBili  x   /  AST  12  /  ALT  16  /  AlkPhos  149<H>      CXR PERSONALLY REVIEWED  -Stable left sided pleural effusion moderate in size.

## 2019-09-03 NOTE — PROGRESS NOTE ADULT - PROBLEM SELECTOR PLAN 1
Presenting with progressive dyspnea on exertion and at rest, with exam significant for bilateral LE edema, labs showing elevated BNP, and b/l pleural effusions on CT. Most likely new CHF. CTPA neg for PE, but limited by motion so can't r/o segmental PE. No consolidations seen to suggest PNA.   - Unclear underlying cause of CHF exacerbation. Low suspicion for ACS given no chest pain and trops neg, although noted new TWI V5-V6 but nonspecific, and no ST segment changes.   - TTE shows hyperdynamic LVSF, moderately increased LAP, mild-moderate pulmonary HTN  - TSH wnl.  - Worsening CXR 9/1, will give IV lasix 40 today, monitor bp, may give additional 40 this afternoon.   - Strict I/Os, start Estrellita-fit for incontinence and monitoring uop.   - Cards input appreciated: change antihypertensives to Carvedilol 12.5 mg BID and Ramipril 5 mg daily, hold for now given active diuresis   -Duoneb for diffuse wheezing  -repeat cxr tomorrow morning Presenting with progressive dyspnea on exertion and at rest, with exam significant for bilateral LE edema, elevated BNP, and b/l pleural effusions on CT. Clinical/lab and echo c/w acute diastolic heart failure. TTE shows hyperdynamic LVSF, moderately increased LAP, mild-moderate pulmonary HTN  -appreciate card cslt: start spironolactone 12.5mg daily and monitor K and renal function.  If tolerated increase to 25mg daily.  Will also likely require  rechallenge with loop diuretic in future  -CTPA neg for PE, but limited by motion so can't r/o segmental PE. No consolidations seen to suggest PNA.   - Low suspicion for ACS given no chest pain and trops neg, although noted new TWI V5-V6 but nonspecific, and no ST segment changes.   - TSH wnl.  - Worsening CXR 9/1, will give IV lasix 40 today, monitor bp, may give additional 40 this afternoon.   - Strict I/Os, start Estrellita-fit for incontinence and monitoring uop.   - Cards input appreciated: change antihypertensives to Carvedilol 12.5 mg BID and Ramipril 5 mg daily, hold for now given active diuresis   -Duoneb for diffuse wheezing  -repeat cxr tomorrow morning Presenting with progressive dyspnea on exertion and at rest, with exam significant for bilateral LE edema, elevated BNP, and b/l pleural effusions on CT. Clinical/lab and echo c/w acute diastolic heart failure. TTE shows hyperdynamic LVSF, moderately increased LAP, mild-moderate pulmonary HTN  -appreciate card cslt: start spironolactone 12.5mg daily and monitor K and renal function.  If tolerated increase to 25mg daily.  Will also likely require  rechallenge with loop diuretic in future  -CTPA neg for PE, but limited by motion so can't r/o segmental PE. No consolidations seen to suggest PNA.   - Low suspicion for ACS given no chest pain and trops neg, although noted new TWI V5-V6 but nonspecific, and no ST segment changes.   - TSH wnl.  - Strict I/Os, start Estrellita-fit for incontinence and monitoring uop.   - Cards input appreciated: change antihypertensives to Carvedilol 12.5 mg BID and Ramipril 5 mg daily, hold for now given active diuresis   -Duoneb for diffuse wheezing  -repeat cxr tomorrow morning

## 2019-09-03 NOTE — PROGRESS NOTE ADULT - SUBJECTIVE AND OBJECTIVE BOX
89F w/ PMH HTN, recurrent ESBL UTIs, and hypothyroidism p/w SOBx1 week found to have pleural effusions most likely 2/2 CHF.    SUBJECTIVE: Patients reports continuous dry cough that is not being improved with her current medications.  No c/o SOB except after long coughing spells on 1L O2 NC. States she believes her rash is improving. No dizziness, CP, palp, or abd pain. Possible thoracocentesis today.    OBJECTIVE:    Vital Signs Last 24 Hrs  T(C): 36.4 (03 Sep 2019 03:59), Max: 36.7 (02 Sep 2019 13:58)  T(F): 97.6 (03 Sep 2019 03:59), Max: 98 (02 Sep 2019 13:58)  HR: 86 (03 Sep 2019 03:59) (86 - 97)  BP: 148/75 (03 Sep 2019 03:59) (132/73 - 148/75)  BP(mean): --  RR: 18 (03 Sep 2019 03:59) (18 - 18)  SpO2: 94% (03 Sep 2019 03:59) (92% - 94%)    I&O's Detail    02 Sep 2019 07:01  -  03 Sep 2019 07:00  --------------------------------------------------------  IN:    Oral Fluid: 200 mL  Total IN: 200 mL    OUT:    Voided: 2100 mL  Total OUT: 2100 mL    Total NET: -1900 mL    Physical Exam:  General: NAD, resting comfortably in the chair  Neuro: A&Ox4  HEENT: NC/AT, EOMI, normal hearing, oral mucosa moist, no oral lesions noted  Neck: supple, no LAD  Resp: Breathing comfortably on RA, CTAB  CV: Normal sinus rhythm, S1 and S2, no r/m/g  Abd: soft, non-distended, non-tender. Suprapubic rash improving but still erythematous and flaking.  : Perma-cath in place  Ext: +trace edema, ROMIx4, pedal pulses palpable  Skin: Brown circular discoloration w/ around 4in diameter on R lateral ankle. Warm and dry  Psych: Appropriate affect    Medications:  MEDICATIONS  (STANDING):  ALBUTerol    90 MICROgram(s) HFA Inhaler 1 Puff(s) Inhalation every 4 hours  ALBUTerol/ipratropium for Nebulization 3 milliLiter(s) Nebulizer every 6 hours  levothyroxine 112 MICROGram(s) Oral daily  multivitamin/minerals 1 Tablet(s) Oral daily  nystatin Powder 1 Application(s) Topical three times a day  polyethylene glycol 3350 17 Gram(s) Oral daily  senna 1 Tablet(s) Oral daily  tiotropium 18 MICROgram(s) Capsule 1 Capsule(s) Inhalation daily    MEDICATIONS  (PRN):  benzonatate 100 milliGRAM(s) Oral three times a day PRN Cough      Labs:                        9.3    8.12  )-----------( 325      ( 02 Sep 2019 08:51 )             29.3     09-03    141  |  102  |  35<H>  ----------------------------<  101<H>  3.9   |  26  |  1.45<H>    Ca    9.0      03 Sep 2019 06:15  Phos  4.5     09-02  Mg     2.1     09-03    TPro  7.0  /  Alb  3.3  /  TBili  0.2  /  DBili  x   /  AST  12  /  ALT  16  /  AlkPhos  149<H>  09-03    < from: Xray Chest 1 View AP/PA (09.01.19 @ 15:26) >  IMPRESSION: The heart size is enlarged. There is a moderate sized left   pleural effusion which has slightly changed in distribution since the   prior study. There is a small right pleural effusion as on the prior   study. There is no pneumothorax.    < end of copied text >

## 2019-09-03 NOTE — PROGRESS NOTE ADULT - PROBLEM SELECTOR PLAN 4
Patient hypotensive to sbp 60s morning of 9/1 after given meds. BP recovered after holding hypertensive meds and 500cc bolus  - cards recs appreciated: Carvedilol 12.5 mg BID and Ramipril 5 mg daily. hold for now

## 2019-09-03 NOTE — PROGRESS NOTE ADULT - PROBLEM SELECTOR PLAN 5
chronic fungal dermatitis, no response to nystatin power alone   -c/w nystatin power TID   -start Fluconazole 150 PO once Improving.   -c/w nystatin power TID   -s/p Fluconazole 150 PO once

## 2019-09-03 NOTE — ASSESSMENT
[FreeTextEntry1] : cough/decreased breath sounds/ increased lower ext edema:  check CXR portable --pleural effusion, suspect heart failure with clinical findings of increased edema.\par rash: likely fungal: did not improve on nystatin, \par Urinary tract infection: s/p antibiotic treatment during hospitalization\par hypothyroidisM: c/w 112mcg\par htn: c/w atenolol and norvasc\par constipation: no medication currently\par ?flushing:  monitor.  hx of hypertensive urgency during hospitalization.\par vitamin D def: 50,000 iU once a week for a long time, f/u last vit D level\par currently getting assistance with showers and dressing and escorts to meals.\par has not yet started PT\par Dr. Shah:\par : Dr. Eldridge\par

## 2019-09-03 NOTE — CHART NOTE - NSCHARTNOTEFT_GEN_A_CORE
: Neelam Velasco    INDICATION: SOB    PROCEDURE:  [ ] LIMITED ECHO  [x] LIMITED CHEST  [ ] LIMITED RETROPERITONEAL  [ ] LIMITED ABDOMINAL  [ ] LIMITED DVT  [ ] NEEDLE GUIDANCE VASCULAR  [ ] NEEDLE GUIDANCE THORACENTESIS  [ ] NEEDLE GUIDANCE PARACENTESIS  [ ] NEEDLE GUIDANCE PERICARDIOCENTESIS  [ ] OTHER    FINDINGS:    CHEST: Bilateral A line pattern anteriorly, moderate sized left pleural effusion, trace right pleural effusion.     INTERPRETATION:  moderate sized left pleural effusion : Neelam Velasco    INDICATION: SOB    PROCEDURE:  [ ] LIMITED ECHO  [x] LIMITED CHEST  [ ] LIMITED RETROPERITONEAL  [ ] LIMITED ABDOMINAL  [ ] LIMITED DVT  [ ] NEEDLE GUIDANCE VASCULAR  [ ] NEEDLE GUIDANCE THORACENTESIS  [ ] NEEDLE GUIDANCE PARACENTESIS  [ ] NEEDLE GUIDANCE PERICARDIOCENTESIS  [ ] OTHER    FINDINGS:    CHEST: Bilateral A line pattern anteriorly, moderate sized left pleural effusion, trace right pleural effusion.     INTERPRETATION:  moderate sized left pleural effusion    Pulmonary attending    I was present throughout the procedure and agree with the findings and interpretation as stated above.

## 2019-09-03 NOTE — PROGRESS NOTE ADULT - PROBLEM SELECTOR PLAN 5
chronic fungal dermatitis, no response to nystatin power alone   -c/w nystatin power TID   -s/p 1 dose Fluconazole 150 PO yesterday

## 2019-09-03 NOTE — PROGRESS NOTE ADULT - ATTENDING COMMENTS
Patient seen and examined, data and imaging personally reviewed by me.  Ultrasound performed at bedside.  Patient is improving with diuresis, still with moderate effusion on left by ultrasound.  As renal function is improving, spironolactone added today and patient clinically improving will monitor effusion for now.  If it does not improve will perform thoracentesis.

## 2019-09-03 NOTE — REVIEW OF SYSTEMS
[Feeling Poorly] : feeling poorly [Feeling Tired] : feeling tired [As Noted in HPI] : as noted in HPI [Negative] : ENT [FreeTextEntry9] : unsteady gait, fall, using walker

## 2019-09-03 NOTE — PROGRESS NOTE ADULT - PROBLEM SELECTOR PLAN 7
ANNA on CKD   - renal us ordered, then patient started urinating spontaneously, d/c'ed US   - avoid nephrotoxins- ACEi, Lasix, NSAIDs   - monitor Cr, down trending

## 2019-09-03 NOTE — PROGRESS NOTE ADULT - SUBJECTIVE AND OBJECTIVE BOX
Pulmonary Progress Note     CC: SOB    Interval Events:  - Afebrile, net negative 1.9L    SUBJECTIVE:  - Has a cough and some HAYES. No SOB. Has been urinating frequently.   - no fever, chills, runny nose, sore throat, or N/V/diarrhea.     REVIEW OF SYSTEMS:  CV: [- ] chest pain   Resp: [+] cough [-] shortness of breath   [x] All other systems negative  [ ] Unable to assess ROS because ________    OBJECTIVE:  ICU Vital Signs Last 24 Hrs  T(C): 36.4 (03 Sep 2019 03:59), Max: 36.7 (02 Sep 2019 13:58)  T(F): 97.6 (03 Sep 2019 03:59), Max: 98 (02 Sep 2019 13:58)  HR: 86 (03 Sep 2019 03:59) (86 - 97)  BP: 148/75 (03 Sep 2019 03:59) (132/73 - 148/75)  RR: 18 (03 Sep 2019 03:59) (18 - 18)  SpO2: 94% (03 Sep 2019 03:59) (92% - 94%)    09-01 @ 07:01  -  09-02 @ 07:00  --------------------------------------------------------  IN: 297 mL / OUT: 0 mL / NET: 297 mL    09-02 @ 07:01 - 09-03 @ 06:53  --------------------------------------------------------  IN: 200 mL / OUT: 2100 mL / NET: -1900 mL    PHYSICAL EXAM:  General: NAD  HEENT: NC/AT  Lymph Nodes: no cervical or supraclavicular lymphadenopathy  Neck: supple  Respiratory:  decreased basilar bs, L>R  Cardiovascular:  RRR, no m/r/g  Abdomen: soft, NT/ND, +BS  Extremities: no clubbing, cyanosis or edema, warm  Skin: no rash  Neurological: AAOx3, non focal exam  Psychiatry: not anxious appearing, normal affect and mood    HOSPITAL MEDICATIONS:  MEDICATIONS  (STANDING):  ALBUTerol    90 MICROgram(s) HFA Inhaler 1 Puff(s) Inhalation every 4 hours  ALBUTerol/ipratropium for Nebulization 3 milliLiter(s) Nebulizer every 6 hours  levothyroxine 112 MICROGram(s) Oral daily  multivitamin/minerals 1 Tablet(s) Oral daily  nystatin Powder 1 Application(s) Topical three times a day  polyethylene glycol 3350 17 Gram(s) Oral daily  senna 1 Tablet(s) Oral daily  tiotropium 18 MICROgram(s) Capsule 1 Capsule(s) Inhalation daily    MEDICATIONS  (PRN):  benzonatate 100 milliGRAM(s) Oral three times a day PRN Cough    LABS:                        9.3    8.12  )-----------( 325      ( 02 Sep 2019 08:51 )             29.3     Hgb Trend: 9.3<--, 9.1<--, 9.2<--, 9.4<--, 10.9<--  09-03    141  |  102  |  35<H>  ----------------------------<  101<H>  3.9   |  26  |  1.45<H>    Ca    9.0      03 Sep 2019 06:15  Phos  4.5     09-02  Mg     2.1     09-03    TPro  7.0  /  Alb  3.3  /  TBili  0.2  /  DBili  x   /  AST  12  /  ALT  16  /  AlkPhos  149<H>  09-03    Creatinine Trend: 1.45<--, 1.78<--, 1.86<--, 1.60<--, 1.24<--, 1.15<--    MICROBIOLOGY:     RADIOLOGY:  [x] Reviewed and interpreted by me: CXR 9/1- moderate left sided pleural effusion Pulmonary Progress Note     CC: SOB    Interval Events:  - Afebrile, net negative 1.9L    SUBJECTIVE:  - Has a cough and some HAYES.   - Cough is most bothersome symptoms  - No SOB. Has been urinating frequently.   - no fever, chills, runny nose, sore throat, or N/V/diarrhea.     REVIEW OF SYSTEMS:  CV: [- ] chest pain   Resp: [+] cough [-] shortness of breath   [x] All other systems negative  [ ] Unable to assess ROS because ________    OBJECTIVE:  ICU Vital Signs Last 24 Hrs  T(C): 36.4 (03 Sep 2019 03:59), Max: 36.7 (02 Sep 2019 13:58)  T(F): 97.6 (03 Sep 2019 03:59), Max: 98 (02 Sep 2019 13:58)  HR: 86 (03 Sep 2019 03:59) (86 - 97)  BP: 148/75 (03 Sep 2019 03:59) (132/73 - 148/75)  RR: 18 (03 Sep 2019 03:59) (18 - 18)  SpO2: 94% (03 Sep 2019 03:59) (92% - 94%)    09-01 @ 07:01  -  09-02 @ 07:00  --------------------------------------------------------  IN: 297 mL / OUT: 0 mL / NET: 297 mL    09-02 @ 07:01  -  09-03 @ 06:53  --------------------------------------------------------  IN: 200 mL / OUT: 2100 mL / NET: -1900 mL    PHYSICAL EXAM:  General: NAD  HEENT: NC/AT  Lymph Nodes: no cervical or supraclavicular lymphadenopathy  Neck: supple  Respiratory:  decreased basilar bs, L>R  Cardiovascular:  RRR, no m/r/g  Abdomen: soft, NT/ND, +BS  Extremities: no clubbing, cyanosis or edema, warm  Skin: no rash  Neurological: AAOx3, non focal exam  Psychiatry: not anxious appearing, normal affect and mood    HOSPITAL MEDICATIONS:  MEDICATIONS  (STANDING):  ALBUTerol    90 MICROgram(s) HFA Inhaler 1 Puff(s) Inhalation every 4 hours  ALBUTerol/ipratropium for Nebulization 3 milliLiter(s) Nebulizer every 6 hours  levothyroxine 112 MICROGram(s) Oral daily  multivitamin/minerals 1 Tablet(s) Oral daily  nystatin Powder 1 Application(s) Topical three times a day  polyethylene glycol 3350 17 Gram(s) Oral daily  senna 1 Tablet(s) Oral daily  tiotropium 18 MICROgram(s) Capsule 1 Capsule(s) Inhalation daily    MEDICATIONS  (PRN):  benzonatate 100 milliGRAM(s) Oral three times a day PRN Cough    LABS:                        9.3    8.12  )-----------( 325      ( 02 Sep 2019 08:51 )             29.3     Hgb Trend: 9.3<--, 9.1<--, 9.2<--, 9.4<--, 10.9<--  09-03    141  |  102  |  35<H>  ----------------------------<  101<H>  3.9   |  26  |  1.45<H>    Ca    9.0      03 Sep 2019 06:15  Phos  4.5     09-02  Mg     2.1     09-03    TPro  7.0  /  Alb  3.3  /  TBili  0.2  /  DBili  x   /  AST  12  /  ALT  16  /  AlkPhos  149<H>  09-03    Creatinine Trend: 1.45<--, 1.78<--, 1.86<--, 1.60<--, 1.24<--, 1.15<--    MICROBIOLOGY:     RADIOLOGY:  [x] Reviewed and interpreted by me: CXR 9/1- moderate left sided pleural effusion

## 2019-09-03 NOTE — PROGRESS NOTE ADULT - PROBLEM SELECTOR PLAN 2
CT showing moderate left and small right pleural effusion with associated   compressive atelectasis, likely cardiac in nature. Currently on 2L NC and satting well.   - Appreciate Pulmonary cslt: US and possible thoracentesis today. SubQ hep held this morning.   -Supplemental O2 with humidifier, goal sats 92-96%. Check O2 sats on RA at rest and with ambulation. Incentive spirometer, OOB to chair and ambulate at tolerated  - BP control, cardiac optimization  - diurese w/ lasix when BPs can tolerate  - worsening pleural effusion on cxr 9/1 despite diuresis, may need diagnostic tap. CT showing moderate left and small right pleural effusion with associated   compressive atelectasis, likely cardiac in nature. Currently on 2L NC and satting well.   - Appreciate Pulmonary cslt: US and possible thoracentesis today. SubQ hep held this morning.   -Supplemental O2 with humidifier, goal sats 92-96%. Check O2 sats on RA at rest and with ambulation. Incentive spirometer, OOB to chair and ambulate at tolerated  - BP control, cardiac optimization  - diurese w/ lasix when BPs can tolerate  - worsening pleural effusion on cxr 9/1 despite diuresis, pulm cslt for diagnostic tap. CT showing moderate left and small right pleural effusion with associated   compressive atelectasis, likely cardiac in nature. Currently on 2L NC and satting well.   - Appreciate Pulmonary cslt: US and possible thoracentesis today. SubQ hep held this morning.   -Supplemental O2 with humidifier, goal sats 92-96%. Check O2 sats on RA at rest and with ambulation. Incentive spirometer, OOB to chair and ambulate at tolerated  - BP control, cardiac optimization  - diurese w/ lasix when BPs can tolerate

## 2019-09-03 NOTE — PROGRESS NOTE ADULT - PROBLEM SELECTOR PLAN 2
CT showing moderate left and small right pleural effusion with associated   compressive atelectasis, likely cardiac in nature. Currently on 1L NC and satting well.  - worsening pleural effusion on cxr 9/1 despite diuresis   - Appreciate Pulmonary cslt: Possible thoracocentesis today. Supplemental O2 with humidifier, goal sats 92-96%. Check O2 sats on RA at rest and with ambulation. Incentive spirometer, OOB to chair and ambulate at tolerated  - BP control, cardiac optimization  - diurese w/ lasix when BPs can tolerate

## 2019-09-03 NOTE — PROGRESS NOTE ADULT - ASSESSMENT
89 yo F with h/o HTN, hypothyroidism, and multiple ESBL UTIs, who presented from [facility] with progressive shortness of breath, orthopnea, non-productive cough and LE edema x 1 week. Started on Lasix 20mg PO daily 2 days prior to admission by her PMD and reported initially feeling better after taking it, but symptoms recurred. Pro-BNP 3052. CTPA - No pulmonary embolus in the main pulmonary arteries or lobar branches, moderate left and small right pleural effusion with associated compressive atelectasis. Primary team started her on IV Lasix 40mg with good UOP, improvement in symptoms and resolution of O2 requirement. Currently on RA. TTE findings with dilated LA with moderately elevated pressures, moderate pulm HTN.     A/P:   Bilateral pleural effusions are likely cardiac in nature  - ANNA on CKD improving today  - Cont to hold diuretics and monitor Cr,   - F/u renal ultrasound  - Will US lungs to evaluate for Thoracentesis.   - Follow daily weights (incontinent of urine, unable to ascertain output)  - Bladder scans Q6 hours   - Supplemental O2, goal sats 92-96%- please humidify  - Ambulation trial, check O2 sats on RA at rest and with ambulation  - Incentive spirometer, OOB to chair and ambulate at tolerated,   - PT   - DVT ppx    Neelam Velasco MD  Pulmonary & Critical Care Medicine Fellow | PGY-5  Pager: [phone]  Spectra: [phone] ([facility]) and [phone] ([facility]) 89 yo F with h/o HTN, hypothyroidism, and multiple ESBL UTIs, who presented from [facility] with progressive shortness of breath, orthopnea, non-productive cough and LE edema x 1 week. Started on Lasix 20mg PO daily 2 days prior to admission by her PMD and reported initially feeling better after taking it, but symptoms recurred. Pro-BNP 3052. CTPA - No pulmonary embolus in the main pulmonary arteries or lobar branches, moderate left and small right pleural effusion with associated compressive atelectasis. Primary team started her on IV Lasix 40mg with good UOP, improvement in symptoms and resolution of O2 requirement. Currently on RA. TTE findings with dilated LA with moderately elevated pressures, moderate pulm HTN.     A/P:   Bilateral pleural effusions are likely cardiac in nature, 2/2 HFpEF. No SOB currently, most bothersome symptom is her cough. Ideally would benefit from optimized diuresis for her pleural effusions.   - Diuresis for effusions and monitor Cr,   - F/u renal ultrasound  - US lungs with moderate sized left sided pleural effusion,                   - hold off on thoracentesis at this time   - Follow daily weights (incontinent of urine, unable to ascertain output)  - Bladder scans Q6 hours   - Supplemental O2, goal sats 92-96%- please humidify  - Ambulation trial, check O2 sats on RA at rest and with ambulation  - Incentive spirometer, OOB to chair and ambulate at tolerated,   - PT   - DVT ppx    Neelam Velasco MD  Pulmonary & Critical Care Medicine Fellow | PGY-5  Pager: [phone]  Spectra: [phone] ([facility]) and [phone] ([facility])

## 2019-09-03 NOTE — PHYSICAL EXAM
[General Appearance - Alert] : alert [General Appearance - In No Acute Distress] : in no acute distress [Sclera] : the sclera and conjunctiva were normal [Extraocular Movements] : extraocular movements were intact [No Oral Pallor] : no oral pallor [Outer Ear] : the ears and nose were normal in appearance [] : no respiratory distress [Exaggerated Use Of Accessory Muscles For Inspiration] : no accessory muscle use [Heart Rate And Rhythm] : heart rate was normal and rhythm regular [Heart Sounds] : normal S1 and S2 [Bowel Sounds] : normal bowel sounds [Abdomen Soft] : soft [Abdomen Tenderness] : non-tender [Nail Clubbing] : no clubbing  or cyanosis of the fingernails [Involuntary Movements] : no involuntary movements were seen [No Focal Deficits] : no focal deficits [Impaired Insight] : insight and judgment were intact [FreeTextEntry1] : erythematous rash within abominal folds/inguinal area

## 2019-09-03 NOTE — PROGRESS NOTE ADULT - ASSESSMENT
89F w/ PMH HTN, recurrent ESBL UTIs, and hypothyroidism p/w SOBx1 week found to have pleural effusions most likely 2/2 CHF who has been having episodes of hypotension which have since resolved and whose effusions are worsening based on repeat CXR. Possible thoracocentesis today.

## 2019-09-03 NOTE — HISTORY OF PRESENT ILLNESS
[FreeTextEntry1] : 89yoF seen after hospitalization for uti sepsis after fall out of bed.\par \par with episodes of hypertensive urgency:\par \par medication review:\par norvasc 10mg \par atenolol 50mg daily\par \par walker/rollator: \par not taking celebrex, used to take tramadol for OA and hip pain.\par \par now walker slower\par was using volertan gel within rehab.\par \par currently taking tylenol prn\par sob with exertion\par \par sometimes she feels flush in the face.\par no associated vision changes or headaches.\par \par new cough: with congestion: non productive.\par \par notes rash under breast and within the groin:  \par \par

## 2019-09-03 NOTE — PROGRESS NOTE ADULT - ATTENDING COMMENTS
No thoracentesis today per pulmonary.   Spironolactone 12.5 mg po daily per cards. Trial of lasix 40 mg ivp as well. Continue to hold other anti-htn meds.   Monitor uop with primafit.   Repeat cxr again tomorrow am.   Resume HSQ and d/c in AM.   Cards and pulm input appreciated.   O2 sat on ra pending.    Incentive spirometer and OOBTC with assistance daily.  Dispo pending PT recs and clinical improvement   Rest of plan as above    Dr. Nav Dunlap, DO  Division of Hospital Medicine, [facility]  Pager:  [phone] . #Acute decompensated diastolic heart failure    No thoracentesis today per pulmonary.   Spironolactone 12.5 mg po daily per cards. Trial of lasix 40 mg ivp as well. Continue to hold other anti-htn meds.   Monitor uop with primafit.   Repeat cxr again tomorrow am.   Resume HSQ and d/c in AM.   Cards and pulm input appreciated.   O2 sat on ra pending.    Incentive spirometer and OOBTC with assistance daily.  Dispo pending PT recs and clinical improvement   Rest of plan as above    Dr. Nav Dunlap, DO  Division of Hospital Medicine, [facility]  Pager:  [phone] . #Acute decompensated diastolic heart failure    No thoracentesis today per pulmonary.   Spironolactone 12.5 mg po daily per cards. Continue to hold other anti-htn meds.   Monitor uop with primafit.   Repeat cxr again tomorrow am.   Resume HSQ and d/c in AM.   Cards and pulm input appreciated.   O2 sat on ra pending.    Incentive spirometer and OOBTC with assistance daily.  Dispo pending PT recs and clinical improvement   Rest of plan as above    Dr. Nav Dunlap, DO  Division of Hospital Medicine, [facility]  Pager:  [phone] .

## 2019-09-03 NOTE — PROGRESS NOTE ADULT - SUBJECTIVE AND OBJECTIVE BOX
Pt without complaint this AM of sob    PAST MEDICAL & SURGICAL HISTORY:  Macular degeneration  Non-ST elevation MI (NSTEMI)  Hypothyroidism  Spinal stenosis  HTN (hypertension)  Bilateral Cataracts  Urinary Frequency  S/P cataract surgery: b/l  S/P breast lumpectomy  S/P ORIF (open reduction internal fixation) fracture: R hip  Ectopic pregnancy, tubal      Allergies    sulfa topicals (Unknown)    Intolerances    morphine (Drowsiness; Faint; Hypotension)        MEDICATIONS  (STANDING):  ALBUTerol    90 MICROgram(s) HFA Inhaler 1 Puff(s) Inhalation every 4 hours  ALBUTerol/ipratropium for Nebulization 3 milliLiter(s) Nebulizer every 6 hours  levothyroxine 112 MICROGram(s) Oral daily  multivitamin/minerals 1 Tablet(s) Oral daily  nystatin Powder 1 Application(s) Topical three times a day  polyethylene glycol 3350 17 Gram(s) Oral daily  senna 1 Tablet(s) Oral daily  tiotropium 18 MICROgram(s) Capsule 1 Capsule(s) Inhalation daily    MEDICATIONS  (PRN):  benzonatate 100 milliGRAM(s) Oral three times a day PRN Cough            Vital Signs Last 24 Hrs  T(C): 36.4 (03 Sep 2019 03:59), Max: 36.7 (02 Sep 2019 13:58)  T(F): 97.6 (03 Sep 2019 03:59), Max: 98 (02 Sep 2019 13:58)  HR: 86 (03 Sep 2019 03:59) (86 - 97)  BP: 148/75 (03 Sep 2019 03:59) (132/73 - 148/75)  BP(mean): --  RR: 18 (03 Sep 2019 03:59) (18 - 18)  SpO2: 94% (03 Sep 2019 03:59) (92% - 94%)  Daily     Daily Weight in k.5 (03 Sep 2019 08:13)  I&O's Detail    02 Sep 2019 07:01  -  03 Sep 2019 07:00  --------------------------------------------------------  IN:    Oral Fluid: 200 mL  Total IN: 200 mL    OUT:    Voided: 2100 mL  Total OUT: 2100 mL    Total NET: -1900 mL          Physical Exam  NAD on   Neck without JVD at 90  Lungs dec BS 1/4 L, base R  Cor s1s2 2/6 witout m,r,g  Ext without edema warm  Abd soft  LABS          CBC Full  -  ( 03 Sep 2019 09:08 )  WBC Count : 7.26 K/uL  RBC Count : 3.16 M/uL  Hemoglobin : 9.6 g/dL  Hematocrit : 30.4 %  Platelet Count - Automated : 327 K/uL  Mean Cell Volume : 96.2 fl  Mean Cell Hemoglobin : 30.4 pg  Mean Cell Hemoglobin Concentration : 31.6 gm/dL  Auto Neutrophil # : x  Auto Lymphocyte # : x  Auto Monocyte # : x  Auto Eosinophil # : x  Auto Basophil # : x  Auto Neutrophil % : x  Auto Lymphocyte % : x  Auto Monocyte % : x  Auto Eosinophil % : x  Auto Basophil % : x        141  |  102  |  35<H>  ----------------------------<  101<H>  3.9   |  26  |  1.45<H>    Ca    9.0      03 Sep 2019 06:15  Phos  4.5       Mg     2.1         TPro  7.0  /  Alb  3.3  /  TBili  0.2  /  DBili  x   /  AST  12  /  ALT  16  /  AlkPhos  149<H>      < from: CT Angio Chest w/ IV Cont (08.29.19 @ 21:08) >  INTERPRETATION:  CLINICAL INFORMATION: Shortness of breath, by for   pulmonary motion.    COMPARISON: CT chest from 2019. CT chest from 2016.    PROCEDURE:   CT Angiography of the Chest.  90 ml of Omnipaque 350 was injected intravenously. 10 ml were discarded.  Sagittal and coronal reformats were performed as well as 3D (MIP)   reconstructions.    FINDINGS:    LUNGS AND AIRWAYS AND PLEURA: Moderate left pleural effusion with   associated lower lobar and lingular partial compressive atelectasis.   Small right pleural effusion with associated subsegmental compressive   atelectasis.    MEDIASTINUM AND SKYLAR: No lymphadenopathy.    VESSELS: Good opacification of the pulmonary arteries, however,   respiratory motion limits evaluation of the segmental and subsegmental   arteries. No pulmonary embolism in the main pulmonary arteries or lobar   branches. Pulmonary artery and thoracic aorta are of normal caliber.   Atherosclerotic calcifications.    HEART: Heart size is mildly enlarged.. Small pericardial effusion.   Coronary artery atherosclerotic calcifications.    CHEST WALL AND LOWER NECK: Postlumpectomy changes of the left breast.   Dense right breast parenchyma.    VISUALIZED UPPER ABDOMEN: Unchanged nodular thickening of the left   adrenal gland.    BONES: Chronic mild compression fractures of the T5, T6, T7, and T8   vertebral bodies.   Chronic nondisplaced mild fracture deformities of the left posterior   9-11th ribs. Mild degenerative changes of the spine.    IMPRESSION:   Adequate pulmonary arterial opacification, however, respiratory motion   limits evaluation of the segmental and subsegmental arteries. No   pulmonary embolus in the main pulmonary arteries or lobar branches.    Moderate left and small right pleural effusion with associated   compressive atelectasis.  < from: Transthoracic Echocardiogram (19 @ 09:25) >  Patient name: SOLIS TAYLOR  YOB: 1922   Age: 89 (F)   MR#: [id number]  Study Date: 2019  Location: [address] [place], [place] [id number]: Nella Calles MANGO  Study quality: Technically good  Referring Physician: Ren Martinez MD  Blood Pressure: 159/82 mmHg  Height: 163 cm  Weight: 73 kg  BSA: 1.8 m2  ------------------------------------------------------------------------  PROCEDURE: Transthoracic echocardiogram with 2-D, M-Mode  and complete spectral and color flow Doppler.  INDICATION: Heart failure, unspecified (I50.9)  ------------------------------------------------------------------------  Dimensions:    Normal Values:  LA:     2.5    2.0 - 4.0 cm  Ao:     3.1    2.0 - 3.8 cm  SEPTUM: 0.9    0.6 - 1.2 cm  PWT:    0.9    0.6 - 1.1 cm  LVIDd:  4.3    3.0 - 5.6 cm  LVIDs:  2.5    1.8 - 4.0 cm  Derived variables:  LVMI: 69 g/m2  RWT: 0.41  ------------------------------------------------------------------------  Observations:  Mitral Valve: Mitral annular calcification. Normal mitral  valve.  Aortic Valve/Aorta: Thickened aortic valve.  Normal aortic root size.  Left Atrium: Moderately dilated left atrium.  LA volume  index = 43 cc/m2.  Left Ventricle: Normal left ventricular internal dimensions  and wall thickness.  Hyperdynamic left ventricular systolic function.  Left atrial pressure is moderately increased.  Right Heart: Normal right atrium. Normal right ventricular  size and function. Normal tricuspid valve. Minimal  tricuspid regurgitation. Normal pulmonic valve.  Pericardium/Pleura: Normal pericardium with no pericardial  effusion.  Bilateral pleural effusions.  Hemodynamic: Estimated right atrial pressure is elevated  (15-20 mmHg).  Mild-moderate pulmonary hypertension. Estimated PASP 50  mmHg.  ------------------------------------------------------------------------  Conclusions:  Hyperdynamic left ventricular systolic function.  Left atrial pressure is moderately increased.  Mild-moderate pulmonary hypertension. Estimated PASP 50  mmHg.  ------------------------------------------------------------------------  Confirmed on  2019 - 13:48:33 by Herbert Esposito M.D.  ------------------------------------------------------------------------    < end of copied text >        Assessment and Plan:  89 yo female HTN, admitted with 1 week prog sob, cxr bilateral effusions, pBNP 3052.  Rx'd with IV lasix with improvement of symptoms  TTE reveals hyperdynamic LV, mod dilated LA, elevated TR velocities c/w mod pul HTN all finding c/w diastolic dysfunction (No E/A or tissue  doppler finding reported so unable to quantify but likely at least moderate diastolic dysfunction).  Pt with worsening renal function on diuretics  which have been held. Clinical/lab and echo criteria all consistent with acute diastolic heart failure  Recommend start spironolactone 12.5mg daily and monitor K and renal function.  If tolerated increase to 25mg daily.  Will also likely require  rechallenge with loop diuretic in future  For possible thoracentesis today.

## 2019-09-03 NOTE — PROGRESS NOTE ADULT - ASSESSMENT
89 yo F with h/o HTN, hypothyroidism, recent admission for ESBL UTIs d/c'ed 2 weeks ago, presented with progressive shortness of breath x 1 week here with b/l pleural effusions. 89 yo F with h/o HTN, hypothyroidism, recent admission for ESBL UTIs d/c'ed 2 weeks ago, presented with progressive shortness of breath x 1 week here with b/l pleural effusions, found to have acute diastolic HF.

## 2019-09-04 LAB
ALBUMIN SERPL ELPH-MCNC: 3.7 G/DL — SIGNIFICANT CHANGE UP (ref 3.3–5)
ALP SERPL-CCNC: 154 U/L — HIGH (ref 40–120)
ALT FLD-CCNC: 17 U/L — SIGNIFICANT CHANGE UP (ref 10–45)
ANION GAP SERPL CALC-SCNC: 15 MMOL/L — SIGNIFICANT CHANGE UP (ref 5–17)
AST SERPL-CCNC: 17 U/L — SIGNIFICANT CHANGE UP (ref 10–40)
BILIRUB SERPL-MCNC: 0.2 MG/DL — SIGNIFICANT CHANGE UP (ref 0.2–1.2)
BUN SERPL-MCNC: 27 MG/DL — HIGH (ref 7–23)
CALCIUM SERPL-MCNC: 9.8 MG/DL — SIGNIFICANT CHANGE UP (ref 8.4–10.5)
CHLORIDE SERPL-SCNC: 100 MMOL/L — SIGNIFICANT CHANGE UP (ref 96–108)
CO2 SERPL-SCNC: 27 MMOL/L — SIGNIFICANT CHANGE UP (ref 22–31)
CREAT SERPL-MCNC: 1.23 MG/DL — SIGNIFICANT CHANGE UP (ref 0.5–1.3)
GLUCOSE SERPL-MCNC: 106 MG/DL — HIGH (ref 70–99)
HCT VFR BLD CALC: 33.8 % — LOW (ref 34.5–45)
HGB BLD-MCNC: 10.3 G/DL — LOW (ref 11.5–15.5)
LDH SERPL L TO P-CCNC: 193 U/L — SIGNIFICANT CHANGE UP (ref 50–242)
MAGNESIUM SERPL-MCNC: 2.2 MG/DL — SIGNIFICANT CHANGE UP (ref 1.6–2.6)
MCHC RBC-ENTMCNC: 29.9 PG — SIGNIFICANT CHANGE UP (ref 27–34)
MCHC RBC-ENTMCNC: 30.5 GM/DL — LOW (ref 32–36)
MCV RBC AUTO: 98.3 FL — SIGNIFICANT CHANGE UP (ref 80–100)
NT-PROBNP SERPL-SCNC: 1248 PG/ML — HIGH (ref 0–300)
PHOSPHATE SERPL-MCNC: 3 MG/DL — SIGNIFICANT CHANGE UP (ref 2.5–4.5)
PLATELET # BLD AUTO: 305 K/UL — SIGNIFICANT CHANGE UP (ref 150–400)
POTASSIUM SERPL-MCNC: 4 MMOL/L — SIGNIFICANT CHANGE UP (ref 3.5–5.3)
POTASSIUM SERPL-SCNC: 4 MMOL/L — SIGNIFICANT CHANGE UP (ref 3.5–5.3)
PROT SERPL-MCNC: 7.6 G/DL — SIGNIFICANT CHANGE UP (ref 6–8.3)
RBC # BLD: 3.44 M/UL — LOW (ref 3.8–5.2)
RBC # FLD: 14.9 % — HIGH (ref 10.3–14.5)
SODIUM SERPL-SCNC: 142 MMOL/L — SIGNIFICANT CHANGE UP (ref 135–145)
WBC # BLD: 7.37 K/UL — SIGNIFICANT CHANGE UP (ref 3.8–10.5)
WBC # FLD AUTO: 7.37 K/UL — SIGNIFICANT CHANGE UP (ref 3.8–10.5)

## 2019-09-04 PROCEDURE — 99233 SBSQ HOSP IP/OBS HIGH 50: CPT | Mod: GC

## 2019-09-04 RX ORDER — LISINOPRIL 2.5 MG/1
20 TABLET ORAL DAILY
Refills: 0 | Status: DISCONTINUED | OUTPATIENT
Start: 2019-09-04 | End: 2019-09-04

## 2019-09-04 RX ORDER — CARVEDILOL PHOSPHATE 80 MG/1
3.12 CAPSULE, EXTENDED RELEASE ORAL EVERY 12 HOURS
Refills: 0 | Status: DISCONTINUED | OUTPATIENT
Start: 2019-09-04 | End: 2019-09-08

## 2019-09-04 RX ORDER — FUROSEMIDE 40 MG
40 TABLET ORAL ONCE
Refills: 0 | Status: COMPLETED | OUTPATIENT
Start: 2019-09-04 | End: 2019-09-04

## 2019-09-04 RX ORDER — CARVEDILOL PHOSPHATE 80 MG/1
12.5 CAPSULE, EXTENDED RELEASE ORAL EVERY 12 HOURS
Refills: 0 | Status: DISCONTINUED | OUTPATIENT
Start: 2019-09-04 | End: 2019-09-04

## 2019-09-04 RX ADMIN — NYSTATIN CREAM 1 APPLICATION(S): 100000 CREAM TOPICAL at 21:14

## 2019-09-04 RX ADMIN — NYSTATIN CREAM 1 APPLICATION(S): 100000 CREAM TOPICAL at 05:42

## 2019-09-04 RX ADMIN — Medication 112 MICROGRAM(S): at 05:16

## 2019-09-04 RX ADMIN — Medication 1 TABLET(S): at 17:58

## 2019-09-04 RX ADMIN — NYSTATIN CREAM 1 APPLICATION(S): 100000 CREAM TOPICAL at 14:37

## 2019-09-04 RX ADMIN — Medication 100 MILLIGRAM(S): at 05:16

## 2019-09-04 RX ADMIN — Medication 100 MILLIGRAM(S): at 21:15

## 2019-09-04 RX ADMIN — Medication 40 MILLIGRAM(S): at 12:33

## 2019-09-04 RX ADMIN — SPIRONOLACTONE 12.5 MILLIGRAM(S): 25 TABLET, FILM COATED ORAL at 05:16

## 2019-09-04 RX ADMIN — Medication 100 MILLIGRAM(S): at 00:19

## 2019-09-04 RX ADMIN — CARVEDILOL PHOSPHATE 3.12 MILLIGRAM(S): 80 CAPSULE, EXTENDED RELEASE ORAL at 17:58

## 2019-09-04 NOTE — PROGRESS NOTE ADULT - ASSESSMENT
89F w/ PMH HTN, recurrent ESBL UTIs, and hypothyroidism p/w SOBx1 week found to have pleural effusions most likely 2/2 CHF who has been having episodes of hypotension which have since resolved and whose effusions are worsening based on repeat CXR. Possible thoracocentesis today. 89 y/o F PMHx HTN, hypothyroidism, and ESBL UTIs, presents from [facility] with progressive shortness of breath x 1 week, found to have pleural effusions 2/2 acute diastolic hf. Clinical/lab and echo criteria all consistent with acute diastolic heart failure    dispo: subacute rehab 89 y/o F PMHx HTN, hypothyroidism, and ESBL UTIs, presents from [facility] with progressive shortness of breath x 1 week, found to have pleural effusions 2/2 acute diastolic hf. Clinical/lab and echo criteria all consistent with acute diastolic heart failure

## 2019-09-04 NOTE — PROGRESS NOTE ADULT - ATTENDING COMMENTS
Patient seen and examined, data and imaging personally reviewed by me.  She is improving clinically with diuretics.  Still appears to have left pleural effusion.  Will re evaluate tomorrow with ultrasound and consider thoracentesis if effusion is not significantly improved.

## 2019-09-04 NOTE — PROGRESS NOTE ADULT - PROBLEM SELECTOR PLAN 3
Hx of recurrent ESBL UTIs. Currently not endorsing dysuria, UA w/ mod LE. No systemic signs of infection  - Will monitor off antibiotics for now CT showing moderate left and small right pleural effusion with associated   compressive atelectasis, likely cardiac in nature. Currently on 1L NC and satting well.  - manage as above

## 2019-09-04 NOTE — PROGRESS NOTE ADULT - PROBLEM SELECTOR PLAN 7
ANNA on CKD   - renal us ordered, then patient started urinating spontaneously, d/c'ed US   - Cr down trending - c/w levothyroxine 112mcg daily

## 2019-09-04 NOTE — PROGRESS NOTE ADULT - PROBLEM SELECTOR PLAN 5
chronic fungal dermatitis, no response to nystatin power alone   -c/w nystatin power TID   -s/p 1 dose Fluconazole 150 PO yesterday chronic fungal dermatitis, no response to nystatin power alone   -c/w nystatin power TID   -s/p 1 dose Fluconazole 150 PO 9/2 Patient sensitive to meds, became hypotensive to sbp 60s morning of 9/1 after given meds  - cards recs appreciated

## 2019-09-04 NOTE — PROGRESS NOTE ADULT - SUBJECTIVE AND OBJECTIVE BOX
Pulmonary Progress Note     Interval Events:  - No acute events overnight  - pro-BNP 1200's    SUBJECTIVE:    OBJECTIVE:  ICU Vital Signs Last 24 Hrs  T(C): 36.5 (04 Sep 2019 04:36), Max: 36.8 (03 Sep 2019 21:04)  T(F): 97.7 (04 Sep 2019 04:36), Max: 98.3 (03 Sep 2019 21:04)  HR: 104 (04 Sep 2019 04:36) (72 - 104)  BP: 164/80 (04 Sep 2019 04:42) (111/68 - 176/93)  RR: 18 (04 Sep 2019 04:36) (18 - 18)  SpO2: 95% (04 Sep 2019 04:36) (95% - 99%)    09-03 @ 07:01  -  09-04 @ 07:00  --------------------------------------------------------  IN: 0 mL / OUT: 500 mL / NET: -500 mL    PHYSICAL EXAM:  GENERAL: NAD, well-groomed, well-developed  HEAD:  Atraumatic, Normocephalic  EYES: EOMI, conjunctiva and sclera clear  NERVOUS SYSTEM:  Alert & Oriented X3, Good concentration; Moves all extremities  CHEST/LUNG: decreased basilar bs, L>R  HEART: Regular rate and rhythm; No murmurs, rubs, or gallops  ABDOMEN: Soft, Nontender, Nondistended;   EXTREMITIES:  2+ Peripheral Pulses, No LE edema  SKIN: No rashes or lesions    HOSPITAL MEDICATIONS:  MEDICATIONS  (STANDING):  ALBUTerol    90 MICROgram(s) HFA Inhaler 1 Puff(s) Inhalation every 4 hours  ALBUTerol/ipratropium for Nebulization 3 milliLiter(s) Nebulizer every 6 hours  levothyroxine 112 MICROGram(s) Oral daily  multivitamin/minerals 1 Tablet(s) Oral daily  nystatin Powder 1 Application(s) Topical three times a day  polyethylene glycol 3350 17 Gram(s) Oral daily  senna 1 Tablet(s) Oral daily  spironolactone 12.5 milliGRAM(s) Oral daily  tiotropium 18 MICROgram(s) Capsule 1 Capsule(s) Inhalation daily    MEDICATIONS  (PRN):  benzonatate 100 milliGRAM(s) Oral three times a day PRN Cough    LABS:                        9.6    7.26  )-----------( 327      ( 03 Sep 2019 09:08 )             30.4     Hgb Trend: 9.6<--, 9.3<--, 9.1<--, 9.2<--, 9.4<--  09-04    142  |  100  |  27<H>  ----------------------------<  106<H>  4.0   |  27  |  1.23    Ca    9.8      04 Sep 2019 07:02  Phos  3.0     09-04  Mg     2.2     09-04    TPro  7.6  /  Alb  3.7  /  TBili  0.2  /  DBili  x   /  AST  17  /  ALT  17  /  AlkPhos  154<H>  09-04  Creatinine Trend: 1.23<--, 1.45<--, 1.78<--, 1.86<--, 1.60<--, 1.24<--  PT/INR - ( 03 Sep 2019 09:36 )   PT: 11.2 sec;   INR: 0.97 ratio    PTT - ( 03 Sep 2019 09:36 )  PTT:31.2 sec    MICROBIOLOGY:     RADIOLOGY:  [ ] Reviewed and interpreted by me Pulmonary Progress Note     Interval Events:  - No acute events overnight  - pro-BNP 1200's    SUBJECTIVE:  - Feeling better    OBJECTIVE:  ICU Vital Signs Last 24 Hrs  T(C): 36.5 (04 Sep 2019 04:36), Max: 36.8 (03 Sep 2019 21:04)  T(F): 97.7 (04 Sep 2019 04:36), Max: 98.3 (03 Sep 2019 21:04)  HR: 104 (04 Sep 2019 04:36) (72 - 104)  BP: 164/80 (04 Sep 2019 04:42) (111/68 - 176/93)  RR: 18 (04 Sep 2019 04:36) (18 - 18)  SpO2: 95% (04 Sep 2019 04:36) (95% - 99%)    09-03 @ 07:01  -  09-04 @ 07:00  --------------------------------------------------------  IN: 0 mL / OUT: 500 mL / NET: -500 mL    PHYSICAL EXAM:  GENERAL: NAD, well-groomed, well-developed  HEAD:  Atraumatic, Normocephalic  EYES: EOMI, conjunctiva and sclera clear  NERVOUS SYSTEM:  Alert & Oriented X3, Good concentration; Moves all extremities  CHEST/LUNG: decreased basilar bs, L>R  HEART: Regular rate and rhythm; No murmurs, rubs, or gallops  ABDOMEN: Soft, Nontender, Nondistended;   EXTREMITIES:  2+ Peripheral Pulses, No LE edema  SKIN: No rashes or lesions    HOSPITAL MEDICATIONS:  MEDICATIONS  (STANDING):  ALBUTerol    90 MICROgram(s) HFA Inhaler 1 Puff(s) Inhalation every 4 hours  ALBUTerol/ipratropium for Nebulization 3 milliLiter(s) Nebulizer every 6 hours  levothyroxine 112 MICROGram(s) Oral daily  multivitamin/minerals 1 Tablet(s) Oral daily  nystatin Powder 1 Application(s) Topical three times a day  polyethylene glycol 3350 17 Gram(s) Oral daily  senna 1 Tablet(s) Oral daily  spironolactone 12.5 milliGRAM(s) Oral daily  tiotropium 18 MICROgram(s) Capsule 1 Capsule(s) Inhalation daily    MEDICATIONS  (PRN):  benzonatate 100 milliGRAM(s) Oral three times a day PRN Cough    LABS:                        9.6    7.26  )-----------( 327      ( 03 Sep 2019 09:08 )             30.4     Hgb Trend: 9.6<--, 9.3<--, 9.1<--, 9.2<--, 9.4<--  09-04    142  |  100  |  27<H>  ----------------------------<  106<H>  4.0   |  27  |  1.23    Ca    9.8      04 Sep 2019 07:02  Phos  3.0     09-04  Mg     2.2     09-04    TPro  7.6  /  Alb  3.7  /  TBili  0.2  /  DBili  x   /  AST  17  /  ALT  17  /  AlkPhos  154<H>  09-04  Creatinine Trend: 1.23<--, 1.45<--, 1.78<--, 1.86<--, 1.60<--, 1.24<--  PT/INR - ( 03 Sep 2019 09:36 )   PT: 11.2 sec;   INR: 0.97 ratio    PTT - ( 03 Sep 2019 09:36 )  PTT:31.2 sec    MICROBIOLOGY:     RADIOLOGY:  [ ] Reviewed and interpreted by me

## 2019-09-04 NOTE — PROGRESS NOTE ADULT - PROBLEM SELECTOR PLAN 2
CT showing moderate left and small right pleural effusion with associated   compressive atelectasis, likely cardiac in nature. Currently on 1L NC and satting well.  - worsening pleural effusion on cxr 9/1 despite diuresis   - Appreciate Pulmonary cslt: Possible thoracocentesis today. Supplemental O2 with humidifier, goal sats 92-96%. Check O2 sats on RA at rest and with ambulation. Incentive spirometer, OOB to chair and ambulate at tolerated  - BP control, cardiac optimization  - diurese w/ lasix when BPs can tolerate CT showing moderate left and small right pleural effusion with associated   compressive atelectasis, likely cardiac in nature. Currently on 1L NC and satting well.  - manage as above Physical exam/lab/echo consistent with acute diastolic heart failure. CTPA neg for PE, No consolidations seen to suggest PNA.   - Unclear underlying cause of CHF exacerbation. Low suspicion for ACS given no chest pain and trops neg, although noted new TWI V5-V6 but nonspecific, and no ST segment changes.   - TTE shows hyperdynamic LVSF, moderately increased LAP, mild-moderate pulmonary HTN  - TSH wnl.  - Strict I/Os, c/w Estrellita-fit for incontinence and monitoring uop.   - Cards input appreciated: change antihypertensives to Carvedilol 12.5 mg BID and Ramipril 5 mg daily, hold for now given active diuresis  - appreciate pulm: US of lungs today with decreased pleural effusion. c/w Diuresis  - Bladder scans Q6 hours   - Supplemental O2, goal sats 92-96%- please humidify  - Ambulation trial, check O2 sats on RA at rest and with ambulation  - Incentive spirometer, OOB to chair and ambulate at tolerated

## 2019-09-04 NOTE — PROGRESS NOTE ADULT - SUBJECTIVE AND OBJECTIVE BOX
Subjective:    Medications:  ALBUTerol    90 MICROgram(s) HFA Inhaler 1 Puff(s) Inhalation every 4 hours  ALBUTerol/ipratropium for Nebulization 3 milliLiter(s) Nebulizer every 6 hours  benzonatate 100 milliGRAM(s) Oral three times a day PRN  levothyroxine 112 MICROGram(s) Oral daily  multivitamin/minerals 1 Tablet(s) Oral daily  nystatin Powder 1 Application(s) Topical three times a day  polyethylene glycol 3350 17 Gram(s) Oral daily  senna 1 Tablet(s) Oral daily  spironolactone 12.5 milliGRAM(s) Oral daily  tiotropium 18 MICROgram(s) Capsule 1 Capsule(s) Inhalation daily      PHYSICAL EXAM:  Vital Signs Last 24 Hrs  T(C): 36.5 (04 Sep 2019 04:36), Max: 36.8 (03 Sep 2019 21:04)  T(F): 97.7 (04 Sep 2019 04:36), Max: 98.3 (03 Sep 2019 21:04)  HR: 104 (04 Sep 2019 04:36) (72 - 104)  BP: 164/80 (04 Sep 2019 04:42) (111/68 - 176/93)  BP(mean): --  RR: 18 (04 Sep 2019 04:36) (18 - 18)  SpO2: 95% (04 Sep 2019 04:36) (95% - 99%)  Daily     Daily Weight in k.5 (03 Sep 2019 08:13)  I&O's Detail    03 Sep 2019 07:01  -  04 Sep 2019 07:00  --------------------------------------------------------  IN:  Total IN: 0 mL    OUT:    Voided: 500 mL  Total OUT: 500 mL    Total NET: -500 mL          General: A/ox 3, No acute Distress  Neck: Supple, NO JVD  Cardiac: S1 S2, No M/R/G  Pulmonary: Breathing unlabored, intermittent wheezing in all lung fields, decrease LLL breath sound   Abdomen: Soft, Non -tender, +BS   Extremities: No Rashes, No edema  Skin: inflamed rashes in the gluteal fold and underneath the belly consistent with intertrigo.   Neuro: A/o x 3, No focal deficits  Psych normal mood , normal affect    LABS:  cret                        9.6    7.26  )-----------( 327      ( 03 Sep 2019 09:08 )             30.4         141  |  102  |  35<H>  ----------------------------<  101<H>  3.9   |  26  |  1.45<H>    Ca    9.0      03 Sep 2019 06:15  Mg     2.1         TPro  7.0  /  Alb  3.3  /  TBili  0.2  /  DBili  x   /  AST  12  /  ALT  16  /  AlkPhos  149<H>      PT/INR - ( 03 Sep 2019 09:36 )   PT: 11.2 sec;   INR: 0.97 ratio         PTT - ( 03 Sep 2019 09:36 )  PTT:31.2 sec Subjective:     Medications:  ALBUTerol    90 MICROgram(s) HFA Inhaler 1 Puff(s) Inhalation every 4 hours  ALBUTerol/ipratropium for Nebulization 3 milliLiter(s) Nebulizer every 6 hours  benzonatate 100 milliGRAM(s) Oral three times a day PRN  carvedilol 3.125 milliGRAM(s) Oral every 12 hours  levothyroxine 112 MICROGram(s) Oral daily  multivitamin/minerals 1 Tablet(s) Oral daily  nystatin Powder 1 Application(s) Topical three times a day  polyethylene glycol 3350 17 Gram(s) Oral daily  senna 1 Tablet(s) Oral daily  spironolactone 12.5 milliGRAM(s) Oral daily  tiotropium 18 MICROgram(s) Capsule 1 Capsule(s) Inhalation daily      PHYSICAL EXAM:  Vital Signs Last 24 Hrs  T(C): 36.3 (04 Sep 2019 12:30), Max: 36.8 (03 Sep 2019 21:04)  T(F): 97.4 (04 Sep 2019 12:30), Max: 98.3 (03 Sep 2019 21:04)  HR: 102 (04 Sep 2019 12:30) (100 - 104)  BP: 171/89 (04 Sep 2019 12:30) (144/71 - 176/93)  BP(mean): --  RR: 16 (04 Sep 2019 12:30) (16 - 18)  SpO2: 94% (04 Sep 2019 12:30) (94% - 96%)  Daily     Daily Weight in k.3 (04 Sep 2019 12:30)  I&O's Detail    03 Sep 2019 07:01  -  04 Sep 2019 07:00  --------------------------------------------------------  IN:  Total IN: 0 mL    OUT:    Voided: 500 mL  Total OUT: 500 mL    Total NET: -500 mL      04 Sep 2019 07:01  -  04 Sep 2019 16:19  --------------------------------------------------------  IN:    Oral Fluid: 600 mL  Total IN: 600 mL    OUT:  Total OUT: 0 mL    Total NET: 600 mL      PHYSICAL EXAM:  GENERAL: NAD, well-groomed, well-developed  HEAD:  Atraumatic, Normocephalic  EYES: EOMI, conjunctiva and sclera clear  NERVOUS SYSTEM:  Alert & Oriented X3, Good concentration; Moves all extremities  CHEST/LUNG: decreased basilar bs, L>R  HEART: Regular rate and rhythm; No murmurs, rubs, or gallops  ABDOMEN: Soft, Nontender, Nondistended;   EXTREMITIES: No LE edema  SKIN: No rashes or lesions    LABS:  cret                        10.3   7.37  )-----------( 305      ( 04 Sep 2019 09:33 )             33.8         142  |  100  |  27<H>  ----------------------------<  106<H>  4.0   |  27  |  1.23    Ca    9.8      04 Sep 2019 07:02  Phos  3.0       Mg     2.2         TPro  7.6  /  Alb  3.7  /  TBili  0.2  /  DBili  x   /  AST  17  /  ALT  17  /  AlkPhos  154<H>      PT/INR - ( 03 Sep 2019 09:36 )   PT: 11.2 sec;   INR: 0.97 ratio         PTT - ( 03 Sep 2019 09:36 )  PTT:31.2 sec Subjective:   Has about the same cough. Denies dyspnea with exertion.     CONSTITUTIONAL: No fevers or chills  EYES/ENT: No visual changes. No discharge from eyes. No vertigo. No throat pain. No dysphagia.  NECK: No pain or stiffness or rigidity.  RESPIRATORY: +cough. No wheezing, or hemoptysis. No shortness of breath.  CARDIOVASCULAR: No chest pain or palpitations.   GASTROINTESTINAL: No abdominal pain. No nausea, vomiting, or hematemesis; No diarrhea or constipation. No melena or hematochezia.  GENITOURINARY: No dysuria, hesitancy, frequency or hematuria.  NEUROLOGICAL: No numbness or weakness. No change in speech. No fecal or urinary incontinence.   MSK: No joint swelling or erythema. No back pain.  SKIN: No itching or rashes.   PSYCH: Normal affect. Normal mood.    Review of systems negative except for items noted above.      Medications:  ALBUTerol    90 MICROgram(s) HFA Inhaler 1 Puff(s) Inhalation every 4 hours  ALBUTerol/ipratropium for Nebulization 3 milliLiter(s) Nebulizer every 6 hours  benzonatate 100 milliGRAM(s) Oral three times a day PRN  carvedilol 3.125 milliGRAM(s) Oral every 12 hours  levothyroxine 112 MICROGram(s) Oral daily  multivitamin/minerals 1 Tablet(s) Oral daily  nystatin Powder 1 Application(s) Topical three times a day  polyethylene glycol 3350 17 Gram(s) Oral daily  senna 1 Tablet(s) Oral daily  spironolactone 12.5 milliGRAM(s) Oral daily  tiotropium 18 MICROgram(s) Capsule 1 Capsule(s) Inhalation daily      PHYSICAL EXAM:  Vital Signs Last 24 Hrs  T(C): 36.3 (04 Sep 2019 12:30), Max: 36.8 (03 Sep 2019 21:04)  T(F): 97.4 (04 Sep 2019 12:30), Max: 98.3 (03 Sep 2019 21:04)  HR: 102 (04 Sep 2019 12:30) (100 - 104)  BP: 171/89 (04 Sep 2019 12:30) (144/71 - 176/93)  BP(mean): --  RR: 16 (04 Sep 2019 12:30) (16 - 18)  SpO2: 94% (04 Sep 2019 12:30) (94% - 96%)  Daily     Daily Weight in k.3 (04 Sep 2019 12:30)  I&O's Detail    03 Sep 2019 07:01  -  04 Sep 2019 07:00  --------------------------------------------------------  IN:  Total IN: 0 mL    OUT:    Voided: 500 mL  Total OUT: 500 mL    Total NET: -500 mL      04 Sep 2019 07:01  -  04 Sep 2019 16:19  --------------------------------------------------------  IN:    Oral Fluid: 600 mL  Total IN: 600 mL    OUT:  Total OUT: 0 mL    Total NET: 600 mL      PHYSICAL EXAM:  GENERAL: NAD, well-groomed, well-developed  HEAD:  Atraumatic, Normocephalic  EYES: EOMI, conjunctiva and sclera clear  NERVOUS SYSTEM:  Alert & Oriented X3, Good concentration; Moves all extremities  CHEST/LUNG: decreased basilar bs, L>R  HEART: Regular rate and rhythm; No murmurs, rubs, or gallops  ABDOMEN: Soft, Nontender, Nondistended;   EXTREMITIES: No LE edema  SKIN:  Inguinal rash consistent with intertrigo.     LABS:  cret                        10.3   7.37  )-----------( 305      ( 04 Sep 2019 09:33 )             33.8         142  |  100  |  27<H>  ----------------------------<  106<H>  4.0   |  27  |  1.23    Ca    9.8      04 Sep 2019 07:02  Phos  3.0       Mg     2.2         TPro  7.6  /  Alb  3.7  /  TBili  0.2  /  DBili  x   /  AST  17  /  ALT  17  /  AlkPhos  154<H>  0904    PT/INR - ( 03 Sep 2019 09:36 )   PT: 11.2 sec;   INR: 0.97 ratio         PTT - ( 03 Sep 2019 09:36 )  PTT:31.2 sec

## 2019-09-04 NOTE — PROGRESS NOTE ADULT - PROBLEM SELECTOR PLAN 8
DVT Prophylaxis: heparin subq   Diet: DASH  Dispo: pending PT [facility] on CKD   - renal us ordered, then patient started urinating spontaneously, d/c'ed US   - Cr down trending

## 2019-09-04 NOTE — PROGRESS NOTE ADULT - SUBJECTIVE AND OBJECTIVE BOX
89F w/ PMH HTN, recurrent ESBL UTIs, and hypothyroidism p/w SOBx1 week found to have pleural effusions most likely 2/2 CHF.    SUBJECTIVE: Patient examined at bedside. No acute events overnight. Tolerated spironolactone. Continues to have dry cough with occasional feeling of sputum in her chest but no being coughed up. Tessalon is helping. Notices she coughs more when she speaks a lot. She also noted that she experiences SOB on exertion. No SOB at rest on 1L NC. States she believes her rash is improving. No dizziness, CP, palp, or abd pain. Tele reported sinus 80s-90s.    OBJECTIVE:  Vital Signs Last 24 Hrs  T(C): 36.5 (04 Sep 2019 04:36), Max: 36.8 (03 Sep 2019 21:04)  T(F): 97.7 (04 Sep 2019 04:36), Max: 98.3 (03 Sep 2019 21:04)  HR: 104 (04 Sep 2019 04:36) (72 - 104)  BP: 164/80 (04 Sep 2019 04:42) (111/68 - 176/93)  BP(mean): --  RR: 18 (04 Sep 2019 04:36) (18 - 18)  SpO2: 95% (04 Sep 2019 04:36) (95% - 99%)    I&O's Detail    03 Sep 2019 07:01  -  04 Sep 2019 07:00  --------------------------------------------------------  IN:  Total IN: 0 mL    OUT:    Voided: 500 mL  Total OUT: 500 mL    Total NET: -500 mL    Physical Exam:  General: NAD, resting comfortably in bed  Neuro: A&Ox4  HEENT: NC/AT, EOMI, normal hearing, oral mucosa moist  Resp: Breathing comfortably on 1L NC, L sided breath sounds decreased, mild end expiratory wheeze in R upper and middle lobes  CV: Normal sinus rhythm, S1 and S2, no r/m/g  Abd: soft, non-distended, non-tender. Suprapubic/groin rash less erythematous still flaking.  : Perma-cath in place  Back: kyphoscoliosis evident  Ext: ROMIx4, no edema, pedal pulses palpable  Skin: Brown circular discoloration w/ around 4in diameter on R lateral ankle. Warm and dry  Psych: Appropriate affect    Medications:  MEDICATIONS  (STANDING):  ALBUTerol    90 MICROgram(s) HFA Inhaler 1 Puff(s) Inhalation every 4 hours  ALBUTerol/ipratropium for Nebulization 3 milliLiter(s) Nebulizer every 6 hours  levothyroxine 112 MICROGram(s) Oral daily  multivitamin/minerals 1 Tablet(s) Oral daily  nystatin Powder 1 Application(s) Topical three times a day  polyethylene glycol 3350 17 Gram(s) Oral daily  senna 1 Tablet(s) Oral daily  spironolactone 12.5 milliGRAM(s) Oral daily  tiotropium 18 MICROgram(s) Capsule 1 Capsule(s) Inhalation daily    MEDICATIONS  (PRN):  benzonatate 100 milliGRAM(s) Oral three times a day PRN Cough    Labs:                               9.6    7.26  )-----------( 327      ( 03 Sep 2019 09:08 )             30.4     09-04    142  |  100  |  27<H>  ----------------------------<  106<H>  4.0   |  27  |  1.23    Ca    9.8      04 Sep 2019 07:02  Phos  3.0     09-04  Mg     2.2     09-04    TPro  7.6  /  Alb  3.7  /  TBili  0.2  /  DBili  x   /  AST  17  /  ALT  17  /  AlkPhos  154<H>  09-04    Radiology:  < from: Xray Chest 1 View- PORTABLE-Routine (09.03.19 @ 09:05) >  FINDINGS:   Redemonstrated in moderate left pleural effusion, unchanged from prior   radiograph dated 9/1/2019. Bilateral lung atelectasis. No pneumothorax.   Heart size cannot be accurately assessed on this projection.    IMPRESSION:  Stable moderate left pleural effusion and bibasilar atelectasis    < end of copied text > 89F w/ PMH HTN, recurrent ESBL UTIs, and hypothyroidism p/w SOBx1 week found to have pleural effusions most likely 2/2 CHF.    SUBJECTIVE: Patient examined at bedside. No acute events overnight. Tolerated spironolactone. Continues to have dry cough with occasional feeling of sputum in her chest but no being coughed up. Tessalon is helping. Notices she coughs more when she speaks a lot. She also noted that she experiences SOB on exertion. No SOB at rest on 1L NC. States she believes her rash is improving. No dizziness, CP, palp, or abd pain. Tele reported sinus 80s-90s.    OBJECTIVE:  Vital Signs Last 24 Hrs  T(C): 36.5 (04 Sep 2019 04:36), Max: 36.8 (03 Sep 2019 21:04)  T(F): 97.7 (04 Sep 2019 04:36), Max: 98.3 (03 Sep 2019 21:04)  HR: 104 (04 Sep 2019 04:36) (72 - 104)  BP: 164/80 (04 Sep 2019 04:42) (111/68 - 176/93)  BP(mean): --  RR: 18 (04 Sep 2019 04:36) (18 - 18)  SpO2: 95% (04 Sep 2019 04:36) (95% - 99%)    I&O's Detail    03 Sep 2019 07:01  -  04 Sep 2019 07:00  --------------------------------------------------------  IN:  Total IN: 0 mL    OUT:    Voided: 500 mL  Total OUT: 500 mL    Total NET: -500 mL    Physical Exam:  General: NAD, resting comfortably in bed  Neuro: A&Ox4  HEENT: NC/AT, EOMI, normal hearing, oral mucosa moist  Resp: Breathing comfortably on 1L NC, L sided breath sounds decreased, mild end expiratory wheeze in R upper and middle lobes  CV: Normal sinus rhythm, S1 and S2, no r/m/g  Abd: soft, non-distended, non-tender. Suprapubic/groin rash less erythematous still flaking.  : Perma-cath in place  Back: kyphoscoliosis evident  Ext: ROMIx4, no edema, pedal pulses palpable  Skin: Brown circular discoloration w/ around 4in diameter on R lateral ankle. Warm and dry  Psych: Appropriate affect    Medications:  MEDICATIONS  (STANDING):  ALBUTerol    90 MICROgram(s) HFA Inhaler 1 Puff(s) Inhalation every 4 hours  ALBUTerol/ipratropium for Nebulization 3 milliLiter(s) Nebulizer every 6 hours  levothyroxine 112 MICROGram(s) Oral daily  multivitamin/minerals 1 Tablet(s) Oral daily  nystatin Powder 1 Application(s) Topical three times a day  polyethylene glycol 3350 17 Gram(s) Oral daily  senna 1 Tablet(s) Oral daily  spironolactone 12.5 milliGRAM(s) Oral daily  tiotropium 18 MICROgram(s) Capsule 1 Capsule(s) Inhalation daily    MEDICATIONS  (PRN):  benzonatate 100 milliGRAM(s) Oral three times a day PRN Cough    Labs:                               9.6    7.26  )-----------( 327      ( 03 Sep 2019 09:08 )             30.4     09-04    142  |  100  |  27<H>  ----------------------------<  106<H>  4.0   |  27  |  1.23    Ca    9.8      04 Sep 2019 07:02  Phos  3.0     09-04  Mg     2.2     09-04    TPro  7.6  /  Alb  3.7  /  TBili  0.2  /  DBili  x   /  AST  17  /  ALT  17  /  AlkPhos  154<H>  09-04    Serum Pro-Brain Natriuretic Peptide (09.04.19 @ 07:02)    Serum Pro-Brain Natriuretic Peptide: 1248 pg/mL    Radiology:  < from: Xray Chest 1 View- PORTABLE-Routine (09.03.19 @ 09:05) >  FINDINGS:   Redemonstrated in moderate left pleural effusion, unchanged from prior   radiograph dated 9/1/2019. Bilateral lung atelectasis. No pneumothorax.   Heart size cannot be accurately assessed on this projection.    IMPRESSION:  Stable moderate left pleural effusion and bibasilar atelectasis    < end of copied text > 89F w/ PMH HTN, recurrent ESBL UTIs, and hypothyroidism p/w SOBx1 week found to have pleural effusions most likely 2/2 CHF.    SUBJECTIVE: Patient examined at bedside. No acute events overnight. Tolerated spironolactone. Continues to have dry cough with occasional feeling of sputum in her chest but no being coughed up. Tessalon is helping. Notices she coughs more when she speaks a lot. She also noted that she experiences SOB on exertion. No SOB at rest on 1L NC. States she believes her rash is improving. No dizziness, CP, palp, or abd pain. Tele reported sinus 80s-90s.    ROS:  General: No increased fatigue  Resp: + Dry cough and HAYES. No SOB at rest  CV: No CP, palpitaions  Abd: No abd pain, n/v/d  : Perma-cath is a nuisance. No dysuria.  Ext: edema improving  Skin: Rash on groin improving    OBJECTIVE:  Vital Signs Last 24 Hrs  T(C): 36.5 (04 Sep 2019 04:36), Max: 36.8 (03 Sep 2019 21:04)  T(F): 97.7 (04 Sep 2019 04:36), Max: 98.3 (03 Sep 2019 21:04)  HR: 104 (04 Sep 2019 04:36) (72 - 104)  BP: 164/80 (04 Sep 2019 04:42) (111/68 - 176/93)  BP(mean): --  RR: 18 (04 Sep 2019 04:36) (18 - 18)  SpO2: 95% (04 Sep 2019 04:36) (95% - 99%)    I&O's Detail    03 Sep 2019 07:01  -  04 Sep 2019 07:00  --------------------------------------------------------  IN:  Total IN: 0 mL    OUT:    Voided: 500 mL  Total OUT: 500 mL    Total NET: -500 mL    Physical Exam:  General: NAD, resting comfortably in bed  Neuro: A&Ox4  HEENT: NC/AT, EOMI, normal hearing, oral mucosa moist  Resp: Breathing comfortably on 1L NC, L sided breath sounds decreased, mild end expiratory wheeze in R upper and middle lobes  CV: Normal sinus rhythm, S1 and S2, no r/m/g. Heptojugular reflex negative.  Abd: soft, non-distended, non-tender.   : Perma-cath in place  Back: kyphoscoliosis evident  Ext: ROMIx4, no edema, pedal pulses palpable  Skin: Suprapubic/groin rash less erythematous still flaking. Brown circular discoloration w/ around 4in diameter on R lateral ankle. Warm and dry  Psych: Appropriate affect    Medications:  MEDICATIONS  (STANDING):  ALBUTerol    90 MICROgram(s) HFA Inhaler 1 Puff(s) Inhalation every 4 hours  ALBUTerol/ipratropium for Nebulization 3 milliLiter(s) Nebulizer every 6 hours  levothyroxine 112 MICROGram(s) Oral daily  multivitamin/minerals 1 Tablet(s) Oral daily  nystatin Powder 1 Application(s) Topical three times a day  polyethylene glycol 3350 17 Gram(s) Oral daily  senna 1 Tablet(s) Oral daily  spironolactone 12.5 milliGRAM(s) Oral daily  tiotropium 18 MICROgram(s) Capsule 1 Capsule(s) Inhalation daily    MEDICATIONS  (PRN):  benzonatate 100 milliGRAM(s) Oral three times a day PRN Cough    Labs:                               9.6    7.26  )-----------( 327      ( 03 Sep 2019 09:08 )             30.4     09-04    142  |  100  |  27<H>  ----------------------------<  106<H>  4.0   |  27  |  1.23    Ca    9.8      04 Sep 2019 07:02  Phos  3.0     09-04  Mg     2.2     09-04    TPro  7.6  /  Alb  3.7  /  TBili  0.2  /  DBili  x   /  AST  17  /  ALT  17  /  AlkPhos  154<H>  09-04    Serum Pro-Brain Natriuretic Peptide (09.04.19 @ 07:02)    Serum Pro-Brain Natriuretic Peptide: 1248 pg/mL    Radiology:  < from: Xray Chest 1 View- PORTABLE-Routine (09.03.19 @ 09:05) >  FINDINGS:   Redemonstrated in moderate left pleural effusion, unchanged from prior   radiograph dated 9/1/2019. Bilateral lung atelectasis. No pneumothorax.   Heart size cannot be accurately assessed on this projection.    IMPRESSION:  Stable moderate left pleural effusion and bibasilar atelectasis    < end of copied text >

## 2019-09-04 NOTE — PROGRESS NOTE ADULT - ASSESSMENT
89 yo F with h/o HTN, hypothyroidism, and multiple ESBL UTIs, who presented from [facility] with progressive shortness of breath, orthopnea, non-productive cough and LE edema x 1 week. Started on Lasix 20mg PO daily 2 days prior to admission by her PMD and reported initially feeling better after taking it, but symptoms recurred. Pro-BNP 3052. CTPA - No pulmonary embolus in the main pulmonary arteries or lobar branches, moderate left and small right pleural effusion with associated compressive atelectasis. Primary team started her on IV Lasix 40mg with good UOP, improvement in symptoms and resolution of O2 requirement. Currently on RA. TTE findings with dilated LA with moderately elevated pressures, moderate pulm HTN.     A/P:   Bilateral pleural effusions are likely cardiac in nature, 2/2 HFpEF. No SOB currently, most bothersome symptom is her cough. Ideally would benefit from optimized diuresis for her pleural effusions.   - US of lungs today with:   - Diuresis for effusions and monitor Cr,   - F/u renal ultrasound  - Follow daily weights (incontinent of urine, unable to ascertain output)  - Bladder scans Q6 hours   - Supplemental O2, goal sats 92-96%- please humidify  - Ambulation trial, check O2 sats on RA at rest and with ambulation  - Incentive spirometer, OOB to chair and ambulate at tolerated,   - PT   - DVT ppx    Neelam Velasco MD  Pulmonary & Critical Care Medicine Fellow | PGY-5  Pager: [phone]  Spectra: [phone] ([facility]) and [phone] ([facility]) 89 yo F with h/o HTN, hypothyroidism, and multiple ESBL UTIs, who presented from [facility] with progressive shortness of breath, orthopnea, non-productive cough and LE edema x 1 week. Started on Lasix 20mg PO daily 2 days prior to admission by her PMD and reported initially feeling better after taking it, but symptoms recurred. Pro-BNP 3052. CTPA - No pulmonary embolus in the main pulmonary arteries or lobar branches, moderate left and small right pleural effusion with associated compressive atelectasis. Primary team started her on IV Lasix 40mg with good UOP, improvement in symptoms and resolution of O2 requirement. Currently on RA. TTE findings with dilated LA with moderately elevated pressures, moderate pulm HTN.     A/P:   Bilateral pleural effusions are likely cardiac in nature, 2/2 HFpEF. No SOB currently, most bothersome symptom is her cough. Ideally would benefit from optimized diuresis for her pleural effusions.   - US of lungs today with decreased pleural effusion  - Diuresis for effusions and monitor Cr,   - F/u renal ultrasound  - Follow daily weights (incontinent of urine, unable to ascertain output)  - Bladder scans Q6 hours   - Supplemental O2, goal sats 92-96%- please humidify  - Ambulation trial, check O2 sats on RA at rest and with ambulation  - Incentive spirometer, OOB to chair and ambulate at tolerated,   - PT   - DVT ppx    Neelam Velasco MD  Pulmonary & Critical Care Medicine Fellow | PGY-5  Pager: [phone]  Spectra: [phone] ([facility]) and [phone] ([facility]) 89 yo F with h/o HTN, hypothyroidism, and multiple ESBL UTIs, who presented from [facility] with progressive shortness of breath, orthopnea, non-productive cough and LE edema x 1 week. Started on Lasix 20mg PO daily 2 days prior to admission by her PMD and reported initially feeling better after taking it, but symptoms recurred. Pro-BNP 3052. CTPA - No pulmonary embolus in the main pulmonary arteries or lobar branches, moderate left and small right pleural effusion with associated compressive atelectasis. Primary team started her on IV Lasix 40mg with good UOP, improvement in symptoms and resolution of O2 requirement. Currently on RA. TTE findings with dilated LA with moderately elevated pressures, moderate pulm HTN.     A/P:   Bilateral pleural effusions are likely cardiac in nature, 2/2 HFpEF. No SOB currently, most bothersome symptom is her cough. Ideally would benefit from optimized diuresis for her pleural effusions.   - US of lungs today with decreased pleural effusion, we will evaluate again tomorrow  - Diuresis for effusions and monitor Cr,   - Follow daily weights (incontinent of urine, unable to ascertain output)  - Bladder scans Q6 hours   - Supplemental O2, goal sats 92-96%- please humidify  - Ambulation trial, check O2 sats on RA at rest and with ambulation  - Incentive spirometer, OOB to chair and ambulate at tolerated,   - PT   - DVT ppx    Neelam Velasco MD  Pulmonary & Critical Care Medicine Fellow | PGY-5  Pager: [phone]  Spectra: [phone] ([facility]) and [phone] ([facility])

## 2019-09-04 NOTE — PROGRESS NOTE ADULT - PROBLEM SELECTOR PLAN 1
Presenting with progressive dyspnea on exertion and at rest, with exam significant for bilateral LE edema, labs showing elevated BNP, and b/l pleural effusions on CT. Most likely new CHF. CTPA neg for PE, but limited by motion so can't r/o segmental PE. No consolidations seen to suggest PNA.   - Unclear underlying cause of CHF exacerbation. Low suspicion for ACS given no chest pain and trops neg, although noted new TWI V5-V6 but nonspecific, and no ST segment changes.   - TTE shows hyperdynamic LVSF, moderately increased LAP, mild-moderate pulmonary HTN  - TSH wnl.  - Worsening CXR 9/1.  - Strict I/Os, c/w Estrellita-fit for incontinence and monitoring uop.   - Cards input appreciated: change antihypertensives to Carvedilol 12.5 mg BID and Ramipril 5 mg daily, hold for now given active diuresis   -Duoneb for diffuse wheezing  -repeat cxr today Physical exam/lab/echo consistent with acute diastolic heart failure. CTPA neg for PE, No consolidations seen to suggest PNA.   - Unclear underlying cause of CHF exacerbation. Low suspicion for ACS given no chest pain and trops neg, although noted new TWI V5-V6 but nonspecific, and no ST segment changes.   - TTE shows hyperdynamic LVSF, moderately increased LAP, mild-moderate pulmonary HTN  - TSH wnl.  - Strict I/Os, c/w Estrellita-fit for incontinence and monitoring uop.   - Cards input appreciated: change antihypertensives to Carvedilol 12.5 mg BID and Ramipril 5 mg daily, hold for now given active diuresis  - appreciate pulm: US of lungs today with decreased pleural effusion. c/w Diuresis  - Bladder scans Q6 hours   - Supplemental O2, goal sats 92-96%- please humidify  - Ambulation trial, check O2 sats on RA at rest and with ambulation  - Incentive spirometer, OOB to chair and ambulate at tolerated Acute decompensated diastolic heart failure  -rest of plan as below.

## 2019-09-04 NOTE — PROGRESS NOTE ADULT - PROBLEM SELECTOR PLAN 6
- c/w levothyroxine 112mcg daily chronic fungal dermatitis, no response to nystatin power alone   -c/w nystatin power TID   -s/p 1 dose Fluconazole 150 PO 9/2

## 2019-09-04 NOTE — PROGRESS NOTE ADULT - PROBLEM SELECTOR PLAN 4
Patient hypotensive to sbp 60s morning of 9/1 after given meds. BP recovered after holding hypertensive meds and 500cc bolus  - cards recs appreciated: Carvedilol 12.5 mg BID and Ramipril 5 mg daily. hold for now Patient sensitive to meds, became hypotensive to sbp 60s morning of 9/1 after given meds  - cards recs appreciated: Carvedilol 12.5 mg BID and Ramipril 5 mg daily. hold for now as we restart active diuresis, will reassess and maybe start with adding carvedilol 6.25mg Hx of recurrent ESBL UTIs. Currently not endorsing dysuria, UA w/ mod LE. No systemic signs of infection  - Will monitor off antibiotics for now

## 2019-09-04 NOTE — PROGRESS NOTE ADULT - PROBLEM SELECTOR PLAN 5
chronic fungal dermatitis, no response to nystatin power alone   -c/w nystatin power TID   -s/p 1 dose Fluconazole 150 PO on 9/3 chronic fungal dermatitis, no response to nystatin power alone   -c/w nystatin power TID   -s/p 1 dose Fluconazole 150 PO on 9/2

## 2019-09-04 NOTE — PROGRESS NOTE ADULT - ASSESSMENT
89F w/ PMH HTN, recurrent ESBL UTIs, and hypothyroidism p/w SOBx1 week found to have pleural effusions most likely 2/2 CHF and a suprapubic/groin rash. VS significant for HTN (highest 176/93). Physical exam notable for decreased L sided breath sounds and R sided wheezing, resolved edema, and improving rash. Labs are significant for BNP of 1248. CXR from yesterday shows stable pleural effusions.

## 2019-09-04 NOTE — PROGRESS NOTE ADULT - PROBLEM SELECTOR PLAN 2
CT showing moderate left and small right pleural effusion with associated   compressive atelectasis, likely cardiac in nature. Currently on 1L NC and satting well.  - Worsening CXR 9/1. Stable CXR 9/3.  - Appreciate Pulmonary cslt: Possible thoracocentesis though unlikely due to patient's age and high chance of recurrence given cardiac origin. Supplemental O2 with humidifier, goal sats 92-96%. Check O2 sats on RA at rest and with ambulation. Incentive spirometer, OOB to chair and ambulate at tolerated  - BP control, cardiac optimization  - Per pulm rec, added spironolactone 12.5mg yesterday CT showing moderate left and small right pleural effusion with associated   compressive atelectasis, likely cardiac in nature. Currently on 1L NC and satting well.  - Worsening CXR 9/1. Stable CXR 9/3.  -u/s on 9/3 confirmed presence of effusions  - Appreciate Pulmonary cslt: Possible thoracocentesis though unlikely due to patient's age and high chance of recurrence given cardiac origin. Supplemental O2 with humidifier, goal sats 92-96%. Check O2 sats on RA at rest and with ambulation. Incentive spirometer, OOB to chair and ambulate at tolerated  - BP control, cardiac optimization  - Per pulm rec, added spironolactone 12.5mg yesterday CT showing moderate left and small right pleural effusion with associated   compressive atelectasis, likely cardiac in nature. Currently on 1L NC and satting well.  - Worsening CXR 9/1. Stable CXR 9/3.  -u/s on 9/3 confirmed presence of effusions  - Appreciate Pulmonary cslt: Possible thoracocentesis though unlikely due to patient's age and high chance of recurrence given cardiac origin. Supplemental O2 with humidifier, goal sats 92-96%. Check O2 sats on RA at rest and with ambulation. Incentive spirometer, OOB to chair and ambulate at tolerated  - BP control, cardiac optimization  - Per cards rec, added spironolactone 12.5mg yesterday

## 2019-09-04 NOTE — PROGRESS NOTE ADULT - PROBLEM SELECTOR PLAN 4
Was holding anti-HTN meds because hypotensive episodes. Now hypertensive to 176/93.  - cards recs appreciated: Carvedilol 12.5 mg BID and Ramipril 5 mg daily. hold for now

## 2019-09-04 NOTE — PROGRESS NOTE ADULT - ATTENDING COMMENTS
Patient seen and examined with the resident team today. I agree with Dr. Rahman's findings, assessment, and plan with the following additions and exceptions:     Lasix 40 mg ivp this AM  Started coreg 3.125 mg po BID for improved diastolic filling.   Spironolactone 12.5 mg po daily. May increase to 25 tomorrow.   Resume HSQ if no thoracentesis. Will follow up with pulm.  Cards and pulm input appreciated.   O2 sat on ra pending.    OOBTC with assistance daily. Being accomplished. Thank you.  Dispo: pending OG and clinical improvement.  Rest of plan as above    Dr. Nav Dunlap, DO  Division of Hospital Medicine, [facility]  Pager:  [phone]

## 2019-09-04 NOTE — PROGRESS NOTE ADULT - PROBLEM SELECTOR PLAN 1
Presenting with progressive dyspnea on exertion and at rest, with exam significant for bilateral LE edema, labs showing elevated BNP, and b/l pleural effusions on CT. Most likely new CHF. CTPA neg for PE, but limited by motion so can't r/o segmental PE. No consolidations seen to suggest PNA.   - Unclear underlying cause of CHF exacerbation. Low suspicion for ACS given no chest pain and trops neg, although noted new TWI V5-V6 but nonspecific, and no ST segment changes.   - TTE shows hyperdynamic LVSF, moderately increased LAP, mild-moderate pulmonary HTN. Per cards, suggestive of diastolic dysfunction  - TSH wnl.  - Worsening CXR 9/1. Stable CXR 9/3.  - Strict I/Os, c/w Estrellita-fit for incontinence and monitoring uop.  -Duoneb for diffuse wheezing and tessalon for cough

## 2019-09-05 LAB
ANION GAP SERPL CALC-SCNC: 13 MMOL/L — SIGNIFICANT CHANGE UP (ref 5–17)
BASOPHILS # BLD AUTO: 0.05 K/UL — SIGNIFICANT CHANGE UP (ref 0–0.2)
BASOPHILS NFR BLD AUTO: 0.6 % — SIGNIFICANT CHANGE UP (ref 0–2)
BUN SERPL-MCNC: 24 MG/DL — HIGH (ref 7–23)
CALCIUM SERPL-MCNC: 9.6 MG/DL — SIGNIFICANT CHANGE UP (ref 8.4–10.5)
CHLORIDE SERPL-SCNC: 98 MMOL/L — SIGNIFICANT CHANGE UP (ref 96–108)
CO2 SERPL-SCNC: 26 MMOL/L — SIGNIFICANT CHANGE UP (ref 22–31)
CREAT SERPL-MCNC: 1.28 MG/DL — SIGNIFICANT CHANGE UP (ref 0.5–1.3)
EOSINOPHIL # BLD AUTO: 0.5 K/UL — SIGNIFICANT CHANGE UP (ref 0–0.5)
EOSINOPHIL NFR BLD AUTO: 5.8 % — SIGNIFICANT CHANGE UP (ref 0–6)
GLUCOSE SERPL-MCNC: 103 MG/DL — HIGH (ref 70–99)
HCT VFR BLD CALC: 33.9 % — LOW (ref 34.5–45)
HGB BLD-MCNC: 10.9 G/DL — LOW (ref 11.5–15.5)
IMM GRANULOCYTES NFR BLD AUTO: 0.7 % — SIGNIFICANT CHANGE UP (ref 0–1.5)
LYMPHOCYTES # BLD AUTO: 1.91 K/UL — SIGNIFICANT CHANGE UP (ref 1–3.3)
LYMPHOCYTES # BLD AUTO: 22 % — SIGNIFICANT CHANGE UP (ref 13–44)
MAGNESIUM SERPL-MCNC: 1.9 MG/DL — SIGNIFICANT CHANGE UP (ref 1.6–2.6)
MCHC RBC-ENTMCNC: 31 PG — SIGNIFICANT CHANGE UP (ref 27–34)
MCHC RBC-ENTMCNC: 32.2 GM/DL — SIGNIFICANT CHANGE UP (ref 32–36)
MCV RBC AUTO: 96.3 FL — SIGNIFICANT CHANGE UP (ref 80–100)
MONOCYTES # BLD AUTO: 1.13 K/UL — HIGH (ref 0–0.9)
MONOCYTES NFR BLD AUTO: 13 % — SIGNIFICANT CHANGE UP (ref 2–14)
NEUTROPHILS # BLD AUTO: 5.02 K/UL — SIGNIFICANT CHANGE UP (ref 1.8–7.4)
NEUTROPHILS NFR BLD AUTO: 57.9 % — SIGNIFICANT CHANGE UP (ref 43–77)
PHOSPHATE SERPL-MCNC: 3 MG/DL — SIGNIFICANT CHANGE UP (ref 2.5–4.5)
PLATELET # BLD AUTO: 272 K/UL — SIGNIFICANT CHANGE UP (ref 150–400)
POTASSIUM SERPL-MCNC: 3.8 MMOL/L — SIGNIFICANT CHANGE UP (ref 3.5–5.3)
POTASSIUM SERPL-SCNC: 3.8 MMOL/L — SIGNIFICANT CHANGE UP (ref 3.5–5.3)
RBC # BLD: 3.52 M/UL — LOW (ref 3.8–5.2)
RBC # FLD: 14.6 % — HIGH (ref 10.3–14.5)
SODIUM SERPL-SCNC: 137 MMOL/L — SIGNIFICANT CHANGE UP (ref 135–145)
WBC # BLD: 8.67 K/UL — SIGNIFICANT CHANGE UP (ref 3.8–10.5)
WBC # FLD AUTO: 8.67 K/UL — SIGNIFICANT CHANGE UP (ref 3.8–10.5)

## 2019-09-05 PROCEDURE — 99233 SBSQ HOSP IP/OBS HIGH 50: CPT | Mod: GC

## 2019-09-05 PROCEDURE — 99232 SBSQ HOSP IP/OBS MODERATE 35: CPT | Mod: GC

## 2019-09-05 RX ORDER — FUROSEMIDE 40 MG
40 TABLET ORAL ONCE
Refills: 0 | Status: COMPLETED | OUTPATIENT
Start: 2019-09-05 | End: 2019-09-05

## 2019-09-05 RX ORDER — PETROLATUM,WHITE
1 JELLY (GRAM) TOPICAL
Refills: 0 | Status: DISCONTINUED | OUTPATIENT
Start: 2019-09-05 | End: 2019-09-08

## 2019-09-05 RX ORDER — POTASSIUM CHLORIDE 20 MEQ
40 PACKET (EA) ORAL ONCE
Refills: 0 | Status: COMPLETED | OUTPATIENT
Start: 2019-09-05 | End: 2019-09-05

## 2019-09-05 RX ADMIN — Medication 1 APPLICATION(S): at 18:00

## 2019-09-05 RX ADMIN — CARVEDILOL PHOSPHATE 3.12 MILLIGRAM(S): 80 CAPSULE, EXTENDED RELEASE ORAL at 06:11

## 2019-09-05 RX ADMIN — NYSTATIN CREAM 1 APPLICATION(S): 100000 CREAM TOPICAL at 12:50

## 2019-09-05 RX ADMIN — Medication 112 MICROGRAM(S): at 06:10

## 2019-09-05 RX ADMIN — Medication 100 MILLIGRAM(S): at 17:59

## 2019-09-05 RX ADMIN — NYSTATIN CREAM 1 APPLICATION(S): 100000 CREAM TOPICAL at 22:00

## 2019-09-05 RX ADMIN — NYSTATIN CREAM 1 APPLICATION(S): 100000 CREAM TOPICAL at 06:11

## 2019-09-05 RX ADMIN — CARVEDILOL PHOSPHATE 3.12 MILLIGRAM(S): 80 CAPSULE, EXTENDED RELEASE ORAL at 17:59

## 2019-09-05 RX ADMIN — SPIRONOLACTONE 12.5 MILLIGRAM(S): 25 TABLET, FILM COATED ORAL at 06:10

## 2019-09-05 RX ADMIN — Medication 1 TABLET(S): at 11:00

## 2019-09-05 RX ADMIN — Medication 40 MILLIEQUIVALENT(S): at 11:00

## 2019-09-05 RX ADMIN — Medication 40 MILLIGRAM(S): at 12:49

## 2019-09-05 RX ADMIN — Medication 100 MILLIGRAM(S): at 06:10

## 2019-09-05 NOTE — CONSULT NOTE ADULT - SUBJECTIVE AND OBJECTIVE BOX
HISTORY OF PRESENT ILLNESS: HPI:  89 y/o F PMHx HTN, hypothyroidism, and ESBL UTIs, presents from [facility] with progressive shortness of breath x 1 week. Patient reports she has been having increasing SOB over last week, now present on minimal exertion and intermittently at rest. Was seen by her PCP Dr. Singh on 8/27, who ordered CXR, which showed small-mod L pleural effusion. Patient was started on Lasix 20mg daily 2 days ago, and reported initially feeling better after taking it, but symptoms recurred. Sometimes feels SOB with lying down, also noticed her legs have been swelling up. Denies any chest pain or palpitations. Has been having a dry cough over last week, but no fever, chills, runny nose, sore throat, or N/V/diarrhea. Reports she has had decreased PO intake over last few days due to lack of appetite, and denies any increase in salt or fluid intake. Lives in [facility] and is independent in ADLs, uses walker to ambulate and manages her meds on her own. Patient was recently admitted for ESBL UTI, went to [facility], and d/c'd back to [facility] about 2 weeks ago.    In the ED:  Vital:  afebrile, HR 70s-80s, /75->142/70, spO2 % on 2L NC  Labs: significant for WBC 11.2 hsTrop 28->23, Pro-BNP 3052  Imaging: CTPA - No pulmonary embolus in the main pulmonary arteries or lobar branches, moderate left and small right pleural effusion with associated   compressive atelectasis  Given: Lasix 40mg IVP x1, Zofran, and nitroglycerin (30 Aug 2019 03:06)      PAST MEDICAL & SURGICAL HISTORY:  Macular degeneration  Non-ST elevation MI (NSTEMI)  Hypothyroidism  Spinal stenosis  HTN (hypertension)  Bilateral Cataracts  Urinary Frequency  S/P cataract surgery: b/l  S/P breast lumpectomy  S/P ORIF (open reduction internal fixation) fracture: R hip  Ectopic pregnancy, tubal          MEDICATIONS:  MEDICATIONS  (STANDING):  ALBUTerol    90 MICROgram(s) HFA Inhaler 1 Puff(s) Inhalation every 4 hours  ALBUTerol/ipratropium for Nebulization 3 milliLiter(s) Nebulizer every 6 hours  AQUAPHOR (petrolatum Ointment) 1 Application(s) Topical two times a day  carvedilol 3.125 milliGRAM(s) Oral every 12 hours  levothyroxine 112 MICROGram(s) Oral daily  multivitamin/minerals 1 Tablet(s) Oral daily  nystatin Powder 1 Application(s) Topical three times a day  polyethylene glycol 3350 17 Gram(s) Oral daily  senna 1 Tablet(s) Oral daily  spironolactone 12.5 milliGRAM(s) Oral daily  tiotropium 18 MICROgram(s) Capsule 1 Capsule(s) Inhalation daily      Allergies    sulfa topicals (Unknown)    Intolerances    morphine (Drowsiness; Faint; Hypotension)      FAMILY HISTORY:  Family history of acute myocardial infarction    Non-contributary for premature coronary disease or sudden cardiac death    SOCIAL HISTORY:    [ X] Non-smoker  [ ] Smoker  [ ] Alcohol      REVIEW OF SYSTEMS:  [ ]chest pain  [ X ]shortness of breath  [  ]palpitations  [  ]syncope  [ ]near syncope [ ]upper extremity weakness   [ ] lower extremity weakness  [  ]diplopia  [  ]altered mental status   [  ]fevers  [ ]chills [ ]nausea  [ ]vomitting  [  ]dysphagia    [ ]abdominal pain  [ ]melena  [ ]BRBPR    [  ]epistaxis  [  ]rash    [ ]lower extremity edema        [X ] All others negative	  [ ] Unable to obtain      LABS:	 	    CARDIAC MARKERS:                              10.9   8.67  )-----------( 272      ( 05 Sep 2019 08:35 )             33.9     Hb Trend: 10.9<--    09-05    137  |  98  |  24<H>  ----------------------------<  103<H>  3.8   |  26  |  1.28    Ca    9.6      05 Sep 2019 06:14  Phos  3.0     09-05  Mg     1.9     09-05    TPro  7.6  /  Alb  3.7  /  TBili  0.2  /  DBili  x   /  AST  17  /  ALT  17  /  AlkPhos  154<H>  09-04    Creatinine Trend: 1.28<--, 1.23<--, 1.45<--, 1.78<--, 1.86<--, 1.60<--    PHYSICAL EXAM:  T(C): 36.9 (09-05-19 @ 14:12), Max: 36.9 (09-05-19 @ 04:51)  HR: 93 (09-05-19 @ 14:12) (89 - 108)  BP: 132/82 (09-05-19 @ 14:12) (113/73 - 161/75)  RR: 17 (09-05-19 @ 14:12) (16 - 18)  SpO2: 94% (09-05-19 @ 14:12) (94% - 97%)  Wt(kg): --  I&O's Summary    04 Sep 2019 07:01  -  05 Sep 2019 07:00  --------------------------------------------------------  IN: 840 mL / OUT: 1150 mL / NET: -310 mL    05 Sep 2019 07:01  -  05 Sep 2019 17:36  --------------------------------------------------------  IN: 297 mL / OUT: 600 mL / NET: -303 mL        Gen: Appears well in NAD  HEENT:  (-)icterus (-)pallor  CV: N S1 S2 1/6 NORRIS (+)2 Pulses B/l  Resp:  Decreased BS  B/L L>R, normal effort  GI: (+) BS Soft, NT, ND  Lymph:  (-)Edema, (-)obvious lymphadenopathy  Skin: Warm to touch, Normal turgor  Psych: Appropriate mood and affect    TELEMETRY: 	Sinus      ECG:  	Sinus 78 BPM, nonspecific T waves abnormality    RADIOLOGY:         CXR:  Left pleural     ASSESSMENT/PLAN: 	89y Female  HTN, hypothyroidism, and ESBL UTIs, presents from [facility] with progressive shortness of breath x 1 week. Patient reports she has been having increasing SOB over last week, now present on minimal exertion and intermittently at rest acute on chronic diastolic heart failure.    - Restart Lasix now that renal fx improving  - Pleural effusion appears to be improving  - Pulmonary f/u      Malcolm Valdez MD, [facility]  BEEPER [phone]

## 2019-09-05 NOTE — PROGRESS NOTE ADULT - PROBLEM SELECTOR PLAN 8
DVT Prophylaxis: heparin subq   Diet: DASH/TLC  Dispo: PT recommending OG DVT Prophylaxis: heparin subq   Diet: DASH/TLC  Dispo: PT recommended OG, however, patient's home facility capable of providing sufficient PT.

## 2019-09-05 NOTE — PROGRESS NOTE ADULT - PROBLEM SELECTOR PLAN 1
Presenting with progressive dyspnea on exertion and at rest, with exam significant for bilateral LE edema, labs showing elevated BNP, and b/l pleural effusions on CT. Most likely new CHF. CTPA neg for PE, but limited by motion so can't r/o segmental PE. No consolidations seen to suggest PNA.   - Unclear underlying cause of CHF exacerbation. Low suspicion for ACS given no chest pain and trops neg, although noted new TWI V5-V6 but nonspecific, and no ST segment changes.   - TTE shows hyperdynamic LVSF, moderately increased LAP, mild-moderate pulmonary HTN. Per cards, suggestive of diastolic dysfunction  - TSH wnl.  - Worsening CXR 9/1. Stable CXR 9/3.  - Strict I/Os, c/w Estrellita-fit for incontinence and monitoring uop.  - Duoneb for diffuse wheezing and tessalon for cough

## 2019-09-05 NOTE — PROGRESS NOTE ADULT - PROBLEM SELECTOR PLAN 4
Was holding anti-HTN meds because hypotensive episodes. Now hypertensive to 161/75.  - lasix 40mg IV yesterday was well-tolerated  - started coreg 3.125mg q12 yesterday  - cards recs appreciated: Carvedilol 12.5 mg BID and Ramipril 5 mg daily. hold for now Was holding anti-HTN meds because hypotensive episodes. Now hypertensive to 161/75.  - started coreg 3.125mg q12 yesterday  - cards recs appreciated: Carvedilol 12.5 mg BID and Ramipril 5 mg daily. hold for now Was holding anti-HTN meds because hypotensive episodes. Now hypertensive to 161/75.  - started coreg 3.125mg q12 yesterday

## 2019-09-05 NOTE — PROGRESS NOTE ADULT - SUBJECTIVE AND OBJECTIVE BOX
Subjective: no acute o/n events.   ROS rash is better, cough better, breathing is better, no f/c/n/v/d/cp/dysuria     Medications:  ALBUTerol    90 MICROgram(s) HFA Inhaler 1 Puff(s) Inhalation every 4 hours  ALBUTerol/ipratropium for Nebulization 3 milliLiter(s) Nebulizer every 6 hours  AQUAPHOR (petrolatum Ointment) 1 Application(s) Topical two times a day  benzonatate 100 milliGRAM(s) Oral three times a day PRN  carvedilol 3.125 milliGRAM(s) Oral every 12 hours  furosemide   Injectable 40 milliGRAM(s) IV Push once  levothyroxine 112 MICROGram(s) Oral daily  multivitamin/minerals 1 Tablet(s) Oral daily  nystatin Powder 1 Application(s) Topical three times a day  polyethylene glycol 3350 17 Gram(s) Oral daily  potassium chloride    Tablet ER 40 milliEquivalent(s) Oral once  senna 1 Tablet(s) Oral daily  spironolactone 12.5 milliGRAM(s) Oral daily  tiotropium 18 MICROgram(s) Capsule 1 Capsule(s) Inhalation daily      PHYSICAL EXAM:  Vital Signs Last 24 Hrs  T(C): 36.9 (05 Sep 2019 04:51), Max: 36.9 (05 Sep 2019 04:51)  T(F): 98.4 (05 Sep 2019 04:51), Max: 98.4 (05 Sep 2019 04:51)  HR: 99 (05 Sep 2019 04:51) (98 - 108)  BP: 161/75 (05 Sep 2019 04:51) (126/72 - 171/89)  BP(mean): --  RR: 18 (05 Sep 2019 04:51) (16 - 18)  SpO2: 97% (05 Sep 2019 04:51) (90% - 97%)  Daily     Daily Weight in k (05 Sep 2019 08:19)  I&O's Detail    04 Sep 2019 07:01  -  05 Sep 2019 07:00  --------------------------------------------------------  IN:    Oral Fluid: 840 mL  Total IN: 840 mL    OUT:    Voided: 1150 mL  Total OUT: 1150 mL    Total NET: -310 mL        GENERAL: NAD, well-groomed, well-developed  NECK: no JVD   EYES: EOMI, conjunctiva and sclera clear  CHEST/LUNG: decreased basilar bs, L>R  HEART: Regular rate and rhythm; No murmurs, rubs, or gallops  ABDOMEN: Soft, Nontender, Nondistended;   EXTREMITIES: No LE edema  SKIN: erythema under the pannus stable, less inflamed   NERVOUS SYSTEM:  Alert & Oriented X3, Moves all extremities, no focal deficit  PSYCH: normal mood and affect       LABS:  cret                        10.9   8.67  )-----------( 272      ( 05 Sep 2019 08:35 )             33.9         137  |  98  |  24<H>  ----------------------------<  103<H>  3.8   |  26  |  1.28    Ca    9.6      05 Sep 2019 06:14  Phos  3.0       Mg     1.9         TPro  7.6  /  Alb  3.7  /  TBili  0.2  /  DBili  x   /  AST  17  /  ALT  17  /  AlkPhos  154<H>   Subjective: no acute o/n events.   Has improvement in symptoms.      CONSTITUTIONAL: No fevers or chills  EYES/ENT: No visual changes. No discharge from eyes. No vertigo. No throat pain. No dysphagia.  NECK: No pain or stiffness or rigidity.  RESPIRATORY: +improved cough. No wheezing, or hemoptysis. No shortness of breath.  CARDIOVASCULAR: No chest pain or palpitations.   GASTROINTESTINAL: No abdominal pain. No nausea, vomiting, or hematemesis; No diarrhea or constipation. No melena or hematochezia.  GENITOURINARY: No dysuria, hesitancy, frequency or hematuria.  NEUROLOGICAL: No numbness or weakness. No change in speech. No fecal or urinary incontinence.   MSK: No joint swelling or erythema. No back pain.  SKIN: +fungal rash.   PSYCH: Normal affect. Normal mood.    Review of systems negative except for items noted above.      Medications:  ALBUTerol    90 MICROgram(s) HFA Inhaler 1 Puff(s) Inhalation every 4 hours  ALBUTerol/ipratropium for Nebulization 3 milliLiter(s) Nebulizer every 6 hours  AQUAPHOR (petrolatum Ointment) 1 Application(s) Topical two times a day  benzonatate 100 milliGRAM(s) Oral three times a day PRN  carvedilol 3.125 milliGRAM(s) Oral every 12 hours  furosemide   Injectable 40 milliGRAM(s) IV Push once  levothyroxine 112 MICROGram(s) Oral daily  multivitamin/minerals 1 Tablet(s) Oral daily  nystatin Powder 1 Application(s) Topical three times a day  polyethylene glycol 3350 17 Gram(s) Oral daily  potassium chloride    Tablet ER 40 milliEquivalent(s) Oral once  senna 1 Tablet(s) Oral daily  spironolactone 12.5 milliGRAM(s) Oral daily  tiotropium 18 MICROgram(s) Capsule 1 Capsule(s) Inhalation daily      PHYSICAL EXAM:  Vital Signs Last 24 Hrs  T(C): 36.9 (05 Sep 2019 04:51), Max: 36.9 (05 Sep 2019 04:51)  T(F): 98.4 (05 Sep 2019 04:51), Max: 98.4 (05 Sep 2019 04:51)  HR: 99 (05 Sep 2019 04:51) (98 - 108)  BP: 161/75 (05 Sep 2019 04:51) (126/72 - 171/89)  BP(mean): --  RR: 18 (05 Sep 2019 04:51) (16 - 18)  SpO2: 97% (05 Sep 2019 04:51) (90% - 97%)  Daily     Daily Weight in k (05 Sep 2019 08:19)  I&O's Detail    04 Sep 2019 07:01  -  05 Sep 2019 07:00  --------------------------------------------------------  IN:    Oral Fluid: 840 mL  Total IN: 840 mL    OUT:    Voided: 1150 mL  Total OUT: 1150 mL    Total NET: -310 mL    GENERAL: NAD, well-groomed, well-developed  NECK: no JVD   EYES: EOMI, conjunctiva and sclera clear  CHEST/LUNG: decreased basilar bs, L>R  HEART: Regular rate and rhythm; No murmurs, rubs, or gallops  ABDOMEN: Soft, Nontender, Nondistended;   EXTREMITIES: No LE edema  SKIN: erythema under the pannus stable, less inflamed. Also in the axilla b/l patient with pruritic rash.   NERVOUS SYSTEM:  Alert & Oriented X3, Moves all extremities, no focal deficit  PSYCH: normal mood and affect       LABS:  cret                        10.9   8.67  )-----------( 272      ( 05 Sep 2019 08:35 )             33.9     -    137  |  98  |  24<H>  ----------------------------<  103<H>  3.8   |  26  |  1.28    Ca    9.6      05 Sep 2019 06:14  Phos  3.0     09-  Mg     1.9     -    TPro  7.6  /  Alb  3.7  /  TBili  0.2  /  DBili  x   /  AST  17  /  ALT  17  /  AlkPhos  154<H>  -

## 2019-09-05 NOTE — PROGRESS NOTE ADULT - PROBLEM SELECTOR PLAN 5
Patient sensitive to meds, became hypotensive to sbp 60s morning of 9/1 after given meds  - cards recs appreciated: change antihypertensives to Carvedilol 12.5 mg BID and Ramipril 5 mg daily, hold for now given active diuresis  - spironolactone 12.5, may increase to 25 if able to tolerate

## 2019-09-05 NOTE — PROGRESS NOTE ADULT - ASSESSMENT
89 y/o F PMHx HTN, hypothyroidism, and ESBL UTIs, presents from [facility] with progressive shortness of breath x 1 week, found to have pleural effusions 2/2 acute diastolic hf. Clinical/lab and echo criteria all consistent with acute diastolic heart failure

## 2019-09-05 NOTE — PROGRESS NOTE ADULT - PROBLEM SELECTOR PLAN 6
chronic fungal dermatitis, no response to nystatin power alone   -c/w nystatin power TID   -s/p 1 dose Fluconazole 150 PO 9/2

## 2019-09-05 NOTE — PROGRESS NOTE ADULT - ASSESSMENT
89 yo F with h/o HTN, hypothyroidism, and multiple ESBL UTIs, who presented from [facility] with progressive shortness of breath, orthopnea, non-productive cough and LE edema x 1 week. Started on Lasix 20mg PO daily 2 days prior to admission by her PMD and reported initially feeling better after taking it, but symptoms recurred. Pro-BNP 3052. CTPA - No pulmonary embolus in the main pulmonary arteries or lobar branches, moderate left and small right pleural effusion with associated compressive atelectasis. Primary team started her on IV Lasix 40mg with good UOP, improvement in symptoms and resolution of O2 requirement. Currently on RA. TTE findings with dilated LA with moderately elevated pressures, consistent with diastolic heart failure and moderate pulm HTN.     A/P:   Bilateral pleural effusions are likely cardiac in nature, 2/2 HFpEF given ECHO findings. No SOB currently, most bothersome symptom is her cough. Ideally would benefit from optimized diuresis for her pleural effusions. Diuresing well, now on room air. O2 95% on room air, 90% with ambulation.   - US of lungs today with decreased pleural effusion,  - Diuresis for effusions and monitor Cr,   - Follow daily weights (incontinent of urine, difficult to ascertain output)  - Supplemental O2, goal sats 92-96%- please humidify  - Incentive spirometer, OOB to chair and ambulate at tolerated,   - PT   - DVT ppx    Neelam Velasco MD  Pulmonary & Critical Care Medicine Fellow | PGY-5  Pager: [phone]  Spectra: [phone] ([facility]) and [phone] ([facility]) 89 yo F with h/o HTN, hypothyroidism, and multiple ESBL UTIs, who presented from [facility] with progressive shortness of breath, orthopnea, non-productive cough and LE edema x 1 week. Started on Lasix 20mg PO daily 2 days prior to admission by her PMD and reported initially feeling better after taking it, but symptoms recurred. Pro-BNP 3052. CTPA - No pulmonary embolus in the main pulmonary arteries or lobar branches, moderate left and small right pleural effusion with associated compressive atelectasis. Primary team started her on IV Lasix 40mg with good UOP, improvement in symptoms and resolution of O2 requirement. Currently on RA. TTE findings with dilated LA with moderately elevated pressures, consistent with diastolic heart failure and moderate pulm HTN.     A/P:   Bilateral pleural effusions are likely cardiac in nature, 2/2 HFpEF given ECHO findings. No SOB currently, most bothersome symptom is her cough. Ideally would benefit from optimized diuresis for her pleural effusions. Diuresing well, now on room air. O2 95% on room air, 90% with ambulation. No plan for thoracentesis at this time.   - Diuresis for effusions and monitor Cr,   - Follow daily weights (incontinent of urine, difficult to ascertain output)  - Supplemental O2, goal sats 92-96%- please humidify  - Incentive spirometer, OOB to chair and ambulate at tolerated,   - PT   - DVT ppx    Neelam Velasco MD  Pulmonary & Critical Care Medicine Fellow | PGY-5  Pager: [phone]  Spectra: [phone] ([facility]) and [phone] ([facility])

## 2019-09-05 NOTE — PROGRESS NOTE ADULT - PROBLEM SELECTOR PLAN 3
CT showing moderate left and small right pleural effusion with associated   compressive atelectasis, likely cardiac in nature.  - manage as above

## 2019-09-05 NOTE — PROGRESS NOTE ADULT - PROBLEM SELECTOR PLAN 7
ANNA on CKD. Cr now wnl (1.23)  - avoid nephrotoxins- ACEi, Lasix, NSAIDs   - monitor Cr ANNA on CKD. Cr now wnl  - monitor Cr

## 2019-09-05 NOTE — PROGRESS NOTE ADULT - PROBLEM SELECTOR PLAN 8
ANNA on CKD   - renal us ordered, then patient started urinating spontaneously, d/c'ed US   - Cr down trending, stable at 1.2

## 2019-09-05 NOTE — PROGRESS NOTE ADULT - PROBLEM SELECTOR PLAN 1
Acute decompensated diastolic heart failure. Unclear underlying cause of CHF exacerbation. Low suspicion for ACS given no chest pain and trops neg, although noted new TWI V5-V6 but nonspecific, and no ST segment changes.   - TTE shows hyperdynamic LVSF, moderately increased LAP, mild-moderate pulmonary HTN  - TSH wnl.  - c/w IV lasix 40 today, replete lytes   - Strict I/Os, c/w Estrellita-fit for incontinence and monitoring uop.   - Cards input appreciated: change antihypertensives to Carvedilol 12.5 mg BID and Ramipril 5 mg daily, hold for now given active diuresis  - spironolactone 12.5, may increase to 25 if able to tolerate Acute decompensated diastolic heart failure. Unclear underlying cause of CHF exacerbation. Low suspicion for ACS given no chest pain and trops neg, although noted new TWI V5-V6 but nonspecific, and no ST segment changes.   - TTE shows hyperdynamic LVSF, moderately increased LAP, mild-moderate pulmonary HTN  - TSH wnl.  - c/w IV lasix 40 today, replete lytes   - Strict I/Os, c/w Estrellita-fit for incontinence and monitoring uop.   - Cards input appreciated: change antihypertensives to Carvedilol 12.5 mg BID and Ramipril 5 mg daily, hold for now given active diuresis  - spironolactone 12.5, may increase to 25 if able to tolerate  - Lasix this AM.

## 2019-09-05 NOTE — PROGRESS NOTE ADULT - PROBLEM SELECTOR PLAN 2
CT showing moderate left and small right pleural effusion with associated   compressive atelectasis, likely cardiac in nature. Currently on 1L NC and satting well.  - Worsening CXR 9/1. Stable CXR 9/3.  -u/s on 9/3 confirmed presence of effusions  - Appreciate Pulmonary cslt: Possible thoracocentesis though unlikely due to patient's age and high chance of recurrence given cardiac origin. Supplemental O2 with humidifier, goal sats 92-96%. Check O2 sats on RA at rest and with ambulation. Incentive spirometer, OOB to chair and ambulate at tolerated  - BP control, cardiac optimization  - Per cards rec, added spironolactone 12.5mg yesterday CT showing moderate left and small right pleural effusion with associated   compressive atelectasis, likely cardiac in nature. Currently on 1L NC and satting well.  - Worsening CXR 9/1. Stable CXR 9/3.  -u/s on 9/3 confirmed presence of effusions  - Appreciate Pulmonary cslt: Possible thoracocentesis though unlikely due to patient's age and high chance of recurrence given cardiac origin. Supplemental O2 with humidifier, goal sats 92-96%. Check O2 sats on RA at rest and with ambulation. Incentive spirometer, OOB to chair and ambulate at tolerated  - BP control, cardiac optimization  - Per cards rec, added spironolactone 12.5mg yesterday  - 40mg IV lasix in AM --> check BP --> repeat in afternoon if BP stable CT showing moderate left and small right pleural effusion with associated   compressive atelectasis, likely cardiac in nature. Currently on 1L NC and satting well.  - Worsening CXR 9/1. Stable CXR 9/3.  -u/s on 9/3 confirmed presence of effusions  - Appreciate Pulmonary cslt: Possible thoracocentesis though unlikely due to patient's age and high chance of recurrence given cardiac origin. Supplemental O2 with humidifier, goal sats 92-96%. Check O2 sats on RA at rest and with ambulation. Incentive spirometer, OOB to chair and ambulate at tolerated  - BP control, cardiac optimization  - Per cards rec, added spironolactone 12.5mg on 9/3  - 40mg IV lasix in AM --> check BP --> repeat in afternoon if BP stable

## 2019-09-05 NOTE — PROGRESS NOTE ADULT - SUBJECTIVE AND OBJECTIVE BOX
89F w/ PMH HTN, recurrent ESBL UTIs, and hypothyroidism p/w SOBx1 week found to have pleural effusions most likely 2/2 CHF.    SUBJECTIVE: Patient examined at bedside. No acute events overnight. Tolerated lasix 40 IV yesterday. Ambulated w/o O2 and satted at 90 by then end so O2 returned. Continues to have dry cough with occasional feeling of sputum in her chest but no being coughed up. No SOB at rest on 2L NC. States she believes her rash is improving. No dizziness, CP, palp, or abd pain. Tele reported sinus 80s-90s.    ROS:  General: No increased fatigue  Resp: + Dry cough. No SOB at rest  CV: No CP, palpitaions  Abd: No abd pain, n/v/d  :  No dysuria.  Ext: No edema  Skin: Rash on groin improving    OBJECTIVE:  Vital Signs Last 24 Hrs  T(C): 36.9 (05 Sep 2019 04:51), Max: 36.9 (05 Sep 2019 04:51)  T(F): 98.4 (05 Sep 2019 04:51), Max: 98.4 (05 Sep 2019 04:51)  HR: 99 (05 Sep 2019 04:51) (98 - 108)  BP: 161/75 (05 Sep 2019 04:51) (126/72 - 171/89)  BP(mean): --  RR: 18 (05 Sep 2019 04:51) (16 - 18)  SpO2: 97% (05 Sep 2019 04:51) (90% - 97%)    I&O's Detail    04 Sep 2019 07:01  -  05 Sep 2019 07:00  --------------------------------------------------------  IN:    Oral Fluid: 840 mL  Total IN: 840 mL    OUT:    Voided: 1150 mL  Total OUT: 1150 mL    Total NET: -310 mL      Physical Exam:  General: NAD, resting comfortably in bed  Neuro: A&Ox4  HEENT: NC/AT, EOMI, normal hearing, oral mucosa moist  Resp: Breathing comfortably on 2L NC, L sided breath sounds less decreased than yesterday, R side clear.  CV: Normal sinus rhythm, S1 and S2, no r/m/g.  Abd: soft, non-distended, non-tender.   : Perma-cath in place  Back: kyphoscoliosis evident  Ext: ROMIx4, no edema, pedal pulses palpable  Skin: Suprapubic/groin rash less more pink than erythematous and not flaking. Brown circular discoloration w/ around 4in diameter on R lateral ankle. Warm and dry.  Psych: Appropriate affect    Medications:  MEDICATIONS  (STANDING):  ALBUTerol    90 MICROgram(s) HFA Inhaler 1 Puff(s) Inhalation every 4 hours  ALBUTerol/ipratropium for Nebulization 3 milliLiter(s) Nebulizer every 6 hours  carvedilol 3.125 milliGRAM(s) Oral every 12 hours  levothyroxine 112 MICROGram(s) Oral daily  multivitamin/minerals 1 Tablet(s) Oral daily  nystatin Powder 1 Application(s) Topical three times a day  polyethylene glycol 3350 17 Gram(s) Oral daily  senna 1 Tablet(s) Oral daily  spironolactone 12.5 milliGRAM(s) Oral daily  tiotropium 18 MICROgram(s) Capsule 1 Capsule(s) Inhalation daily    MEDICATIONS  (PRN):  benzonatate 100 milliGRAM(s) Oral three times a day PRN Cough    Labs:                        10.9   8.67  )-----------( 272      ( 05 Sep 2019 08:35 )             33.9   09-05    137  |  98  |  24<H>  ----------------------------<  103<H>  3.8   |  26  |  1.28    Ca    9.6      05 Sep 2019 06:14  Phos  3.0     09-05  Mg     1.9     09-05    TPro  7.6  /  Alb  3.7  /  TBili  0.2  /  DBili  x   /  AST  17  /  ALT  17  /  AlkPhos  154<H>  09-04      Serum Pro-Brain Natriuretic Peptide (09.04.19 @ 07:02)    Serum Pro-Brain Natriuretic Peptide: 1248 pg/mL    Radiology:  < from: Xray Chest 1 View- PORTABLE-Routine (09.03.19 @ 09:05) >  FINDINGS:   Redemonstrated in moderate left pleural effusion, unchanged from prior   radiograph dated 9/1/2019. Bilateral lung atelectasis. No pneumothorax.   Heart size cannot be accurately assessed on this projection.    IMPRESSION:  Stable moderate left pleural effusion and bibasilar atelectasis    < end of copied text > 89F w/ PMH HTN, recurrent ESBL UTIs, and hypothyroidism p/w SOBx1 week found to have pleural effusions most likely 2/2 CHF.    SUBJECTIVE: Patient examined at bedside. No acute events overnight. Tolerated lasix 40 IV yesterday. Ambulated w/o O2 and satted at 90 by then end so O2 returned. Continues to have dry cough with occasional feeling of sputum in her chest but no being coughed up. No SOB at rest on 2L NC. States she believes her rash is improving. Also noted there has been a similar rash in her axillas and under her breasts which she feels is improving as well. No dizziness, CP, palp, or abd pain. Tele reported sinus 80s-90s.    ROS:  General: No increased fatigue  Resp: + Dry cough. No SOB at rest  CV: No CP, palpitaions  Abd: No abd pain, n/v/d  :  No dysuria.  Ext: No edema  Skin: Rash on groin improving    OBJECTIVE:  Vital Signs Last 24 Hrs  T(C): 36.9 (05 Sep 2019 04:51), Max: 36.9 (05 Sep 2019 04:51)  T(F): 98.4 (05 Sep 2019 04:51), Max: 98.4 (05 Sep 2019 04:51)  HR: 99 (05 Sep 2019 04:51) (98 - 108)  BP: 161/75 (05 Sep 2019 04:51) (126/72 - 171/89)  BP(mean): --  RR: 18 (05 Sep 2019 04:51) (16 - 18)  SpO2: 97% (05 Sep 2019 04:51) (90% - 97%)    I&O's Detail    04 Sep 2019 07:01  -  05 Sep 2019 07:00  --------------------------------------------------------  IN:    Oral Fluid: 840 mL  Total IN: 840 mL    OUT:    Voided: 1150 mL  Total OUT: 1150 mL    Total NET: -310 mL      Physical Exam:  General: NAD, resting comfortably in bed  Neuro: A&Ox4  HEENT: NC/AT, EOMI, normal hearing, oral mucosa moist  Resp: Breathing comfortably on 2L NC, L sided breath sounds less decreased than yesterday, R side clear.  CV: Normal sinus rhythm, S1 and S2, no r/m/g.  Abd: soft, non-distended, non-tender.   : Perma-cath in place  Back: kyphoscoliosis evident  Ext: ROMIx4, no edema, pedal pulses palpable  Skin: Suprapubic/groin rash less more pink than erythematous and not flaking. Rash in axillas and under the breasts of similar but lighter color. Brown circular discoloration w/ around 4in diameter on R lateral ankle. Multiple aging spots. Warm and dry.  Psych: Appropriate affect    Medications:  MEDICATIONS  (STANDING):  ALBUTerol    90 MICROgram(s) HFA Inhaler 1 Puff(s) Inhalation every 4 hours  ALBUTerol/ipratropium for Nebulization 3 milliLiter(s) Nebulizer every 6 hours  carvedilol 3.125 milliGRAM(s) Oral every 12 hours  levothyroxine 112 MICROGram(s) Oral daily  multivitamin/minerals 1 Tablet(s) Oral daily  nystatin Powder 1 Application(s) Topical three times a day  polyethylene glycol 3350 17 Gram(s) Oral daily  senna 1 Tablet(s) Oral daily  spironolactone 12.5 milliGRAM(s) Oral daily  tiotropium 18 MICROgram(s) Capsule 1 Capsule(s) Inhalation daily    MEDICATIONS  (PRN):  benzonatate 100 milliGRAM(s) Oral three times a day PRN Cough    Labs:                        10.9   8.67  )-----------( 272      ( 05 Sep 2019 08:35 )             33.9   09-05    137  |  98  |  24<H>  ----------------------------<  103<H>  3.8   |  26  |  1.28    Ca    9.6      05 Sep 2019 06:14  Phos  3.0     09-05  Mg     1.9     09-05    TPro  7.6  /  Alb  3.7  /  TBili  0.2  /  DBili  x   /  AST  17  /  ALT  17  /  AlkPhos  154<H>  09-04      Serum Pro-Brain Natriuretic Peptide (09.04.19 @ 07:02)    Serum Pro-Brain Natriuretic Peptide: 1248 pg/mL    Radiology:  < from: Xray Chest 1 View- PORTABLE-Routine (09.03.19 @ 09:05) >  FINDINGS:   Redemonstrated in moderate left pleural effusion, unchanged from prior   radiograph dated 9/1/2019. Bilateral lung atelectasis. No pneumothorax.   Heart size cannot be accurately assessed on this projection.    IMPRESSION:  Stable moderate left pleural effusion and bibasilar atelectasis    < end of copied text >

## 2019-09-05 NOTE — PROGRESS NOTE ADULT - PROBLEM SELECTOR PLAN 2
Physical exam/lab/echo consistent with acute diastolic heart failure. CTPA neg for PE, No consolidations seen to suggest PNA.   - appreciate pulm: US of lungs 9/4 with decreased pleural effusion. c/w Diuresis  - Ambulation trial: 96% on RA, 90% at the end of ambulation. will wean off O2 as tolerated, NC prn if desats <90  - Incentive spirometer, OOB to chair and ambulate at tolerated

## 2019-09-05 NOTE — PROGRESS NOTE ADULT - SUBJECTIVE AND OBJECTIVE BOX
Pulmonary Progress Note     CC: SOB    Interval Events:  - FEeling better      SUBJECTIVE:    OBJECTIVE:  ICU Vital Signs Last 24 Hrs  T(C): 36.9 (05 Sep 2019 04:51), Max: 36.9 (05 Sep 2019 04:51)  T(F): 98.4 (05 Sep 2019 04:51), Max: 98.4 (05 Sep 2019 04:51)  HR: 99 (05 Sep 2019 04:51) (98 - 108)  BP: 161/75 (05 Sep 2019 04:51) (126/72 - 171/89)  BP(mean): --  ABP: --  ABP(mean): --  RR: 18 (05 Sep 2019 04:51) (16 - 18)  SpO2: 97% (05 Sep 2019 04:51) (90% - 97%)        09-04 @ 07:01  -  09-05 @ 07:00  --------------------------------------------------------  IN: 840 mL / OUT: 1150 mL / NET: -310 mL      CAPILLARY BLOOD GLUCOSE          PHYSICAL EXAM:  GENERAL: NAD, well-groomed, well-developed  HEAD:  Atraumatic, Normocephalic  EYES: EOMI, conjunctiva and sclera clear  NERVOUS SYSTEM:  Alert & Oriented X3, Good concentration; Moves all extremities  CHEST/LUNG: Clear to percussion bilaterally; No rales, rhonchi, wheezing, or rubs  HEART: Regular rate and rhythm; No murmurs, rubs, or gallops  ABDOMEN: Soft, Nontender, Nondistended;   EXTREMITIES:  2+ Peripheral Pulses, No LE edema  SKIN: No rashes or lesions    HOSPITAL MEDICATIONS:  MEDICATIONS  (STANDING):  ALBUTerol    90 MICROgram(s) HFA Inhaler 1 Puff(s) Inhalation every 4 hours  ALBUTerol/ipratropium for Nebulization 3 milliLiter(s) Nebulizer every 6 hours  AQUAPHOR (petrolatum Ointment) 1 Application(s) Topical two times a day  carvedilol 3.125 milliGRAM(s) Oral every 12 hours  furosemide   Injectable 40 milliGRAM(s) IV Push once  levothyroxine 112 MICROGram(s) Oral daily  multivitamin/minerals 1 Tablet(s) Oral daily  nystatin Powder 1 Application(s) Topical three times a day  polyethylene glycol 3350 17 Gram(s) Oral daily  senna 1 Tablet(s) Oral daily  spironolactone 12.5 milliGRAM(s) Oral daily  tiotropium 18 MICROgram(s) Capsule 1 Capsule(s) Inhalation daily    MEDICATIONS  (PRN):  benzonatate 100 milliGRAM(s) Oral three times a day PRN Cough      LABS:                        10.9   8.67  )-----------( 272      ( 05 Sep 2019 08:35 )             33.9     Hgb Trend: 10.9<--, 10.3<--, 9.6<--, 9.3<--, 9.1<--  09-05    137  |  98  |  24<H>  ----------------------------<  103<H>  3.8   |  26  |  1.28    Ca    9.6      05 Sep 2019 06:14  Phos  3.0     09-05  Mg     1.9     09-05    TPro  7.6  /  Alb  3.7  /  TBili  0.2  /  DBili  x   /  AST  17  /  ALT  17  /  AlkPhos  154<H>  09-04    Creatinine Trend: 1.28<--, 1.23<--, 1.45<--, 1.78<--, 1.86<--, 1.60<--            MICROBIOLOGY:       RADIOLOGY:  [ ] Reviewed and interpreted by me Pulmonary Progress Note     CC: SOB    Interval Events:  - Feeling better, breathing comfortably on room air  - O2 sat at rest: 95%, on ambulation 90%    SUBJECTIVE:  - Feeling better, less SOB with exertion    OBJECTIVE:  ICU Vital Signs Last 24 Hrs  T(C): 36.9 (05 Sep 2019 04:51), Max: 36.9 (05 Sep 2019 04:51)  T(F): 98.4 (05 Sep 2019 04:51), Max: 98.4 (05 Sep 2019 04:51)  HR: 99 (05 Sep 2019 04:51) (98 - 108)  BP: 161/75 (05 Sep 2019 04:51) (126/72 - 171/89)  RR: 18 (05 Sep 2019 04:51) (16 - 18)  SpO2: 97% (05 Sep 2019 04:51) (90% - 97%)    09-04 @ 07:01  -  09-05 @ 07:00  --------------------------------------------------------  IN: 840 mL / OUT: 1150 mL / NET: -310 mL    PHYSICAL EXAM:  GENERAL: NAD, well-groomed, well-developed  HEAD:  Atraumatic, Normocephalic  EYES: EOMI, conjunctiva and sclera clear  NERVOUS SYSTEM:  Alert & Oriented X3, Good concentration; Moves all extremities  CHEST/LUNG: decreased basilar bs, L>R  HEART: Regular rate and rhythm; No murmurs, rubs, or gallops  ABDOMEN: Soft, Nontender, Nondistended;   EXTREMITIES:  2+ Peripheral Pulses, No LE edema  SKIN: No rashes or lesions    HOSPITAL MEDICATIONS:  MEDICATIONS  (STANDING):  ALBUTerol    90 MICROgram(s) HFA Inhaler 1 Puff(s) Inhalation every 4 hours  ALBUTerol/ipratropium for Nebulization 3 milliLiter(s) Nebulizer every 6 hours  AQUAPHOR (petrolatum Ointment) 1 Application(s) Topical two times a day  carvedilol 3.125 milliGRAM(s) Oral every 12 hours  furosemide   Injectable 40 milliGRAM(s) IV Push once  levothyroxine 112 MICROGram(s) Oral daily  multivitamin/minerals 1 Tablet(s) Oral daily  nystatin Powder 1 Application(s) Topical three times a day  polyethylene glycol 3350 17 Gram(s) Oral daily  senna 1 Tablet(s) Oral daily  spironolactone 12.5 milliGRAM(s) Oral daily  tiotropium 18 MICROgram(s) Capsule 1 Capsule(s) Inhalation daily    MEDICATIONS  (PRN):  benzonatate 100 milliGRAM(s) Oral three times a day PRN Cough      LABS:                        10.9   8.67  )-----------( 272      ( 05 Sep 2019 08:35 )             33.9     Hgb Trend: 10.9<--, 10.3<--, 9.6<--, 9.3<--, 9.1<--  09-05    137  |  98  |  24<H>  ----------------------------<  103<H>  3.8   |  26  |  1.28    Ca    9.6      05 Sep 2019 06:14  Phos  3.0     09-05  Mg     1.9     09-05    TPro  7.6  /  Alb  3.7  /  TBili  0.2  /  DBili  x   /  AST  17  /  ALT  17  /  AlkPhos  154<H>  09-04  Creatinine Trend: 1.28<--, 1.23<--, 1.45<--, 1.78<--, 1.86<--, 1.60<--    RADIOLOGY:  [ ] Reviewed and interpreted by me

## 2019-09-05 NOTE — PROGRESS NOTE ADULT - ASSESSMENT
89F w/ PMH HTN, recurrent ESBL UTIs, and hypothyroidism p/w SOBx1 week found to have pleural effusions most likely 2/2 CHF and a suprapubic/groin rash. VS significant for HTN (highest 176/93). Physical exam notable for decreased L sided breath sounds and R sided wheezing, resolved edema, and improving rash. Labs are significant for BNP of 1248. CXR from 9/3 stable pleural effusions.

## 2019-09-05 NOTE — PROGRESS NOTE ADULT - ATTENDING COMMENTS
Patient seen and examined with the resident team today. I agree with Dr. Rahman's findings, assessment, and plan with the following additions and exceptions:     Lasix 40 mg ivp this AM. May give additional dose of lasix this PM.   Continue coreg 3.125 mg po BID for improved diastolic filling.   Spironolactone 12.5 mg po daily. May increase to 25 today if bp can tolerate.   Resume HSQ as no thoracentesis.  Cards and pulm input appreciated.   O2 sat of 94% on RA at rest and 90% with ambulation.   OOBTC with assistance daily. Being accomplished. Thank you.  Please start Triamcinolone Ointment for the pruritic rash in the patients axillary region b/l.   Dispo: pending OG and clinical improvement. Has outpatient doctor, Dr. Rodriguez her long term care facility who can see her on Tuesday. Also has Dr. Kam Buckley (cardio) and Dr. Shah (pcp) who she can follow up with.   Rest of plan as above    Dr. Nav Dunlap DO  Division of Hospital Medicine, [facility]  Pager:  [phone] .

## 2019-09-05 NOTE — PROGRESS NOTE ADULT - PROBLEM SELECTOR PLAN 5
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

## 2019-09-06 ENCOUNTER — RESULT REVIEW (OUTPATIENT)
Age: 84
End: 2019-09-06

## 2019-09-06 LAB
ALBUMIN FLD-MCNC: 2.8 G/DL — SIGNIFICANT CHANGE UP
ANION GAP SERPL CALC-SCNC: 16 MMOL/L — SIGNIFICANT CHANGE UP (ref 5–17)
B PERT IGG+IGM PNL SER: ABNORMAL
BASOPHILS # BLD AUTO: 0.05 K/UL — SIGNIFICANT CHANGE UP (ref 0–0.2)
BASOPHILS NFR BLD AUTO: 0.5 % — SIGNIFICANT CHANGE UP (ref 0–2)
BUN SERPL-MCNC: 28 MG/DL — HIGH (ref 7–23)
CALCIUM SERPL-MCNC: 9.3 MG/DL — SIGNIFICANT CHANGE UP (ref 8.4–10.5)
CHLORIDE SERPL-SCNC: 98 MMOL/L — SIGNIFICANT CHANGE UP (ref 96–108)
CO2 SERPL-SCNC: 21 MMOL/L — LOW (ref 22–31)
COLOR FLD: SIGNIFICANT CHANGE UP
CREAT SERPL-MCNC: 1.44 MG/DL — HIGH (ref 0.5–1.3)
EOSINOPHIL # BLD AUTO: 0.43 K/UL — SIGNIFICANT CHANGE UP (ref 0–0.5)
EOSINOPHIL NFR BLD AUTO: 4.6 % — SIGNIFICANT CHANGE UP (ref 0–6)
FLUID INTAKE SUBSTANCE CLASS: SIGNIFICANT CHANGE UP
FLUID SEGMENTED GRANULOCYTES: 57 % — SIGNIFICANT CHANGE UP
GLUCOSE FLD-MCNC: 123 MG/DL — SIGNIFICANT CHANGE UP
GLUCOSE SERPL-MCNC: 100 MG/DL — HIGH (ref 70–99)
HCT VFR BLD CALC: 36.3 % — SIGNIFICANT CHANGE UP (ref 34.5–45)
HGB BLD-MCNC: 11.4 G/DL — LOW (ref 11.5–15.5)
IMM GRANULOCYTES NFR BLD AUTO: 0.8 % — SIGNIFICANT CHANGE UP (ref 0–1.5)
LDH SERPL L TO P-CCNC: 181 U/L — SIGNIFICANT CHANGE UP
LYMPHOCYTES # BLD AUTO: 2.07 K/UL — SIGNIFICANT CHANGE UP (ref 1–3.3)
LYMPHOCYTES # BLD AUTO: 22.3 % — SIGNIFICANT CHANGE UP (ref 13–44)
LYMPHOCYTES # FLD: 20 % — SIGNIFICANT CHANGE UP
MAGNESIUM SERPL-MCNC: 2 MG/DL — SIGNIFICANT CHANGE UP (ref 1.6–2.6)
MCHC RBC-ENTMCNC: 30.8 PG — SIGNIFICANT CHANGE UP (ref 27–34)
MCHC RBC-ENTMCNC: 31.4 GM/DL — LOW (ref 32–36)
MCV RBC AUTO: 98.1 FL — SIGNIFICANT CHANGE UP (ref 80–100)
MESOTHL CELL # FLD: 8 % — SIGNIFICANT CHANGE UP
MONOCYTES # BLD AUTO: 1.19 K/UL — HIGH (ref 0–0.9)
MONOCYTES NFR BLD AUTO: 12.8 % — SIGNIFICANT CHANGE UP (ref 2–14)
MONOS+MACROS # FLD: 13 % — SIGNIFICANT CHANGE UP
NEUTROPHILS # BLD AUTO: 5.49 K/UL — SIGNIFICANT CHANGE UP (ref 1.8–7.4)
NEUTROPHILS NFR BLD AUTO: 59 % — SIGNIFICANT CHANGE UP (ref 43–77)
OTHER CELLS FLD MANUAL: 2 % — SIGNIFICANT CHANGE UP
PH FLD: 7.45 — SIGNIFICANT CHANGE UP
PHOSPHATE SERPL-MCNC: 3.2 MG/DL — SIGNIFICANT CHANGE UP (ref 2.5–4.5)
PLATELET # BLD AUTO: 300 K/UL — SIGNIFICANT CHANGE UP (ref 150–400)
POTASSIUM SERPL-MCNC: 4.4 MMOL/L — SIGNIFICANT CHANGE UP (ref 3.5–5.3)
POTASSIUM SERPL-SCNC: 4.4 MMOL/L — SIGNIFICANT CHANGE UP (ref 3.5–5.3)
PROT FLD-MCNC: 4.9 G/DL — SIGNIFICANT CHANGE UP
RBC # BLD: 3.7 M/UL — LOW (ref 3.8–5.2)
RBC # FLD: 14.6 % — HIGH (ref 10.3–14.5)
RCV VOL RI: 7250 /UL — HIGH (ref 0–5)
SODIUM SERPL-SCNC: 135 MMOL/L — SIGNIFICANT CHANGE UP (ref 135–145)
TOTAL NUCLEATED CELL COUNT, BODY FLUID: 1230 /UL — HIGH (ref 0–5)
TUBE TYPE: SIGNIFICANT CHANGE UP
WBC # BLD: 9.3 K/UL — SIGNIFICANT CHANGE UP (ref 3.8–10.5)
WBC # FLD AUTO: 9.3 K/UL — SIGNIFICANT CHANGE UP (ref 3.8–10.5)

## 2019-09-06 PROCEDURE — 99233 SBSQ HOSP IP/OBS HIGH 50: CPT | Mod: GC

## 2019-09-06 PROCEDURE — 88112 CYTOPATH CELL ENHANCE TECH: CPT | Mod: 26

## 2019-09-06 PROCEDURE — 32557 INSERT CATH PLEURA W/ IMAGE: CPT | Mod: GC

## 2019-09-06 PROCEDURE — 88108 CYTOPATH CONCENTRATE TECH: CPT | Mod: 26

## 2019-09-06 PROCEDURE — 71045 X-RAY EXAM CHEST 1 VIEW: CPT | Mod: 26

## 2019-09-06 PROCEDURE — 71045 X-RAY EXAM CHEST 1 VIEW: CPT | Mod: 26,77

## 2019-09-06 PROCEDURE — 88305 TISSUE EXAM BY PATHOLOGIST: CPT | Mod: 26

## 2019-09-06 PROCEDURE — 99233 SBSQ HOSP IP/OBS HIGH 50: CPT | Mod: GC,25

## 2019-09-06 RX ORDER — FUROSEMIDE 40 MG
20 TABLET ORAL DAILY
Refills: 0 | Status: DISCONTINUED | OUTPATIENT
Start: 2019-09-07 | End: 2019-09-08

## 2019-09-06 RX ORDER — SPIRONOLACTONE 25 MG/1
25 TABLET, FILM COATED ORAL DAILY
Refills: 0 | Status: DISCONTINUED | OUTPATIENT
Start: 2019-09-06 | End: 2019-09-08

## 2019-09-06 RX ORDER — HYDROCORTISONE 1 %
1 OINTMENT (GRAM) TOPICAL DAILY
Refills: 0 | Status: DISCONTINUED | OUTPATIENT
Start: 2019-09-06 | End: 2019-09-08

## 2019-09-06 RX ADMIN — Medication 100 MILLIGRAM(S): at 05:56

## 2019-09-06 RX ADMIN — NYSTATIN CREAM 1 APPLICATION(S): 100000 CREAM TOPICAL at 05:55

## 2019-09-06 RX ADMIN — Medication 1 APPLICATION(S): at 11:53

## 2019-09-06 RX ADMIN — SPIRONOLACTONE 12.5 MILLIGRAM(S): 25 TABLET, FILM COATED ORAL at 05:54

## 2019-09-06 RX ADMIN — CARVEDILOL PHOSPHATE 3.12 MILLIGRAM(S): 80 CAPSULE, EXTENDED RELEASE ORAL at 05:55

## 2019-09-06 RX ADMIN — Medication 1 APPLICATION(S): at 05:54

## 2019-09-06 RX ADMIN — NYSTATIN CREAM 1 APPLICATION(S): 100000 CREAM TOPICAL at 17:50

## 2019-09-06 RX ADMIN — Medication 112 MICROGRAM(S): at 05:55

## 2019-09-06 RX ADMIN — Medication 100 MILLIGRAM(S): at 21:43

## 2019-09-06 RX ADMIN — CARVEDILOL PHOSPHATE 3.12 MILLIGRAM(S): 80 CAPSULE, EXTENDED RELEASE ORAL at 17:50

## 2019-09-06 RX ADMIN — Medication 1 TABLET(S): at 11:53

## 2019-09-06 RX ADMIN — Medication 1 APPLICATION(S): at 17:51

## 2019-09-06 RX ADMIN — NYSTATIN CREAM 1 APPLICATION(S): 100000 CREAM TOPICAL at 21:43

## 2019-09-06 NOTE — PROGRESS NOTE ADULT - SUBJECTIVE AND OBJECTIVE BOX
CARDIOLOGY ATTENDING    no palpitations, no syncope, no angina    ALBUTerol    90 MICROgram(s) HFA Inhaler 1 Puff(s) Inhalation every 4 hours  ALBUTerol/ipratropium for Nebulization 3 milliLiter(s) Nebulizer every 6 hours  AQUAPHOR (petrolatum Ointment) 1 Application(s) Topical two times a day  benzonatate 100 milliGRAM(s) Oral three times a day PRN  carvedilol 3.125 milliGRAM(s) Oral every 12 hours  hydrocortisone 1% Ointment 1 Application(s) Topical daily  levothyroxine 112 MICROGram(s) Oral daily  multivitamin/minerals 1 Tablet(s) Oral daily  nystatin Powder 1 Application(s) Topical three times a day  polyethylene glycol 3350 17 Gram(s) Oral daily  senna 1 Tablet(s) Oral daily  spironolactone 25 milliGRAM(s) Oral daily  tiotropium 18 MICROgram(s) Capsule 1 Capsule(s) Inhalation daily                            11.4   9.30  )-----------( 300      ( 06 Sep 2019 08:43 )             36.3       09-06    135  |  98  |  28<H>  ----------------------------<  100<H>  4.4   |  21<L>  |  1.44<H>    Ca    9.3      06 Sep 2019 06:38  Phos  3.2     09-06  Mg     2.0     09-06              T(C): 36.4 (09-06-19 @ 05:05), Max: 36.9 (09-05-19 @ 20:33)  HR: 88 (09-06-19 @ 05:05) (88 - 105)  BP: 156/88 (09-06-19 @ 05:05) (111/72 - 156/88)  RR: 18 (09-06-19 @ 05:05) (18 - 18)  SpO2: 95% (09-06-19 @ 05:05) (94% - 95%)  Wt(kg): --    JVP 5  RRR, no murmurs  CTAB  soft nt/nd  no c/c/e    echo: normal LVEF      A/P) 89 y/o female PMH HTN, hypothyroidism, admitted with dyspnea and a left pleural effusion. Appears completely euvolemic on exam today (JVP is 5)    -f/u pulmonary regarding left pleural effusion  -restart lasix 20mg po daily  -continue coreg and aldactone at current doses  -no further inpatient cardiac workup needed  -f/u with her cardiologist Dr. Buckley after discharge

## 2019-09-06 NOTE — PROGRESS NOTE ADULT - PROBLEM SELECTOR PLAN 8
DVT Prophylaxis: heparin subq   Diet: DASH/TLC  Dispo: PT recommended OG, however, patient's home facility capable of providing sufficient PT.

## 2019-09-06 NOTE — PROGRESS NOTE ADULT - PROBLEM SELECTOR PLAN 2
CT showing moderate left and small right pleural effusion with associated   compressive atelectasis, likely cardiac in nature.  - Strict I/Os, c/w Estrellita-fit for incontinence and monitoring uop.  - Worsening CXR 9/1. Stable CXR 9/3. Waiting read on most recent CXR.  - Appreciate Pulmonary cslt: Thoracentesis unlikely since risks outweigh benefits. Diurese. Incentive spirometer, OOB to chair and ambulate at tolerated.  - BP control, cardiac optimization  - Appreciate cards cnst: added spironolactone 12.5mg on 9/3. Diurese as tolerated.

## 2019-09-06 NOTE — PROGRESS NOTE ADULT - ASSESSMENT
89 yo F with h/o HTN, hypothyroidism, and multiple ESBL UTIs, who presented from [facility] with progressive shortness of breath, orthopnea, non-productive cough and LE edema x 1 week. Started on Lasix 20mg PO daily 2 days prior to admission by her PMD and reported initially feeling better after taking it, but symptoms recurred. Pro-BNP 3052. CTPA - No pulmonary embolus in the main pulmonary arteries or lobar branches, moderate left and small right pleural effusion with associated compressive atelectasis. Primary team started her on IV Lasix 40mg with good UOP, improvement in symptoms and resolution of O2 requirement. Currently on RA. TTE findings with dilated LA with moderately elevated pressures, consistent with diastolic heart failure and moderate pulm HTN.     A/P:   Bilateral pleural effusions are likely cardiac in nature, 2/2 HFpEF given ECHO findings. No SOB currently, most bothersome symptom is her cough. Ideally would benefit from optimized diuresis for her pleural effusions. Diuresing well, now on room air. O2 95% on room air, 90% with ambulation. No plan for thoracentesis at this time.   - Diuresis for effusions and monitor Cr,   - Follow daily weights (incontinent of urine, difficult to ascertain output)  - Supplemental O2, goal sats 92-96%- please humidify  - Incentive spirometer, OOB to chair and ambulate at tolerated,   - PT   - DVT ppx    Neelam Velasco MD  Pulmonary & Critical Care Medicine Fellow | PGY-5  Pager: [phone]  Spectra: [phone] ([facility]) and [phone] ([facility]) 89 yo F with h/o HTN, hypothyroidism, and multiple ESBL UTIs, who presented from [facility] with progressive shortness of breath, orthopnea, non-productive cough and LE edema x 1 week. Started on Lasix 20mg PO daily 2 days prior to admission by her PMD and reported initially feeling better after taking it, but symptoms recurred. Pro-BNP 3052. CTPA - No pulmonary embolus in the main pulmonary arteries or lobar branches, moderate left and small right pleural effusion with associated compressive atelectasis. Primary team started her on IV Lasix 40mg with good UOP, improvement in symptoms and resolution of O2 requirement. Currently on RA. TTE findings with dilated LA with moderately elevated pressures, consistent with diastolic heart failure and moderate pulm HTN.     A/P:   Bilateral pleural effusions are likely cardiac in nature, 2/2 HFpEF given ECHO findings. No SOB currently, most bothersome symptom is her cough. Ideally would benefit from optimized diuresis for her pleural effusions. Diuresing well, now on room air. O2 95% on room air, 90% with ambulation. No plan for thoracentesis at this time. Should continue with diuretics as an outpatient with daily weight and close follow up with PCP. Discussed with amadort at length at bedside.   - Diuresis for effusions and monitor Cr,   - Follow daily weights at home  - Supplemental O2, goal sats 92-96%- please humidify  - Incentive spirometer, OOB to chair and ambulate at tolerated,   - PT   - DVT ppx    Neelam Velasco MD  Pulmonary & Critical Care Medicine Fellow | PGY-5  Pager: [phone]  Spectra: [phone] ([facility]) and [phone] ([facility])

## 2019-09-06 NOTE — PROCEDURE NOTE - NSPROCDETAILS_GEN_ALL_CORE
catheter inserted over needle/connection to syringe/Seldinger technique/location identified, draped/prepped, sterile technique used, needle inserted/introduced

## 2019-09-06 NOTE — PROGRESS NOTE ADULT - PROBLEM SELECTOR PLAN 6
chronic fungal dermatitis, no response to nystatin power alone   -c/w nystatin power TID   -s/p 1 dose Fluconazole 150 PO 9/2    #pruritic rash in the patients axillary region b/l.  - start Triamcinolone Ointment

## 2019-09-06 NOTE — PROGRESS NOTE ADULT - SUBJECTIVE AND OBJECTIVE BOX
SOLIS TAYLOR  89y Female  [id number]    Patient is a 89y old  Female who presents with a chief complaint of shortness of breath (06 Sep 2019 06:02)      INTERVAL HPI/OVERNIGHT EVENTS: no o/n events   Patient states she still feels sob during exertion, not at baseline. peeing a lot.    CONSTITUTIONAL: No fevers or chills  EYES/ENT: No visual changes. No discharge from eyes. No vertigo. No throat pain. No dysphagia.  NECK: No pain or stiffness or rigidity.  RESPIRATORY: +improved cough. No wheezing, or hemoptysis. No shortness of breath.  CARDIOVASCULAR: No chest pain or palpitations.   GASTROINTESTINAL: No abdominal pain. No nausea, vomiting, or hematemesis; No diarrhea or constipation. No melena or hematochezia.  GENITOURINARY: No dysuria, hesitancy, frequency or hematuria.  NEUROLOGICAL: No numbness or weakness. No change in speech. No fecal or urinary incontinence.   MSK: No joint swelling or erythema. No back pain.  SKIN: +fungal rash.   PSYCH: Normal affect. Normal mood.    Review of systems negative except for items noted above.    ALLERY AND IMMUNOLOGIC: No hives or eczema    ALBUTerol    90 MICROgram(s) HFA Inhaler 1 Puff(s) Inhalation every 4 hours  ALBUTerol/ipratropium for Nebulization 3 milliLiter(s) Nebulizer every 6 hours  AQUAPHOR (petrolatum Ointment) 1 Application(s) Topical two times a day  benzonatate 100 milliGRAM(s) Oral three times a day PRN  carvedilol 3.125 milliGRAM(s) Oral every 12 hours  levothyroxine 112 MICROGram(s) Oral daily  multivitamin/minerals 1 Tablet(s) Oral daily  nystatin Powder 1 Application(s) Topical three times a day  polyethylene glycol 3350 17 Gram(s) Oral daily  senna 1 Tablet(s) Oral daily  spironolactone 25 milliGRAM(s) Oral daily  tiotropium 18 MICROgram(s) Capsule 1 Capsule(s) Inhalation daily      Allergies:  morphine (Drowsiness; Faint; Hypotension)  sulfa topicals (Unknown)      T(F): 97.6 (09-06-19 @ 05:05), Max: 98.5 (09-05-19 @ 14:12)  HR: 88 (09-06-19 @ 05:05) (88 - 105)  BP: 156/88 (09-06-19 @ 05:05) (111/72 - 156/88)  RR: 18 (09-06-19 @ 05:05) (16 - 18)  SpO2: 95% (09-06-19 @ 05:05) (94% - 96%)    09-05-19 @ 07:01  -  09-06-19 @ 07:00  --------------------------------------------------------  IN: 537 mL / OUT: 1800 mL / NET: -1263 mL          GENERAL: NAD, well-groomed, well-developed  NECK: no JVD   EYES: EOMI, conjunctiva and sclera clear  CHEST/LUNG: decreased basilar bs, L>R  HEART: Regular rate and rhythm; No murmurs, rubs, or gallops  ABDOMEN: Soft, Nontender, Nondistended;   EXTREMITIES: No LE edema  SKIN: erythema under the pannus stable, less inflamed. Also in the axilla b/l patient with pruritic rash.   NERVOUS SYSTEM:  Alert & Oriented X3, Moves all extremities, no focal deficit  PSYCH: normal mood and affect     LABS:                        10.9   8.67  )-----------( 272      ( 05 Sep 2019 08:35 )             33.9     Hgb Trend: 10.9<--, 10.3<--, 9.6<--, 9.3<--, 9.1<--  09-06    135  |  98  |  28<H>  ----------------------------<  100<H>  4.4   |  21<L>  |  1.44<H>    Ca    9.3      06 Sep 2019 06:38  Phos  3.2     09-06  Mg     2.0     09-06      Creatinine Trend: 1.44<--, 1.28<--, 1.23<--, 1.45<--, 1.78<--, 1.86<--        RADIOLOGY & ADDITIONAL TESTS:

## 2019-09-06 NOTE — PROGRESS NOTE ADULT - PROBLEM SELECTOR PLAN 1
Acute decompensated diastolic heart failure. Unclear underlying cause of CHF exacerbation. Low suspicion for ACS given no chest pain and trops neg, although noted new TWI V5-V6 but nonspecific, and no ST segment changes.   - TTE shows hyperdynamic LVSF, moderately increased LAP, mild-moderate pulmonary HTN, c/w acute diastolic HF   - TSH wnl.  - c/w IV lasix 40 today, replete lytes   - Strict I/Os, c/w Estrellita-fit for incontinence and monitoring uop.   - Cards input appreciated: change antihypertensives to Carvedilol 12.5 mg BID and Ramipril 5 mg daily, start with carvedilol 3.125 SBP 110s  - spironolactone 25  - Lasix IV 40 Acute decompensated diastolic heart failure. Unclear underlying cause of CHF exacerbation. Low suspicion for ACS given no chest pain and trops neg, although noted new TWI V5-V6 but nonspecific, and no ST segment changes.   - TTE shows hyperdynamic LVSF, moderately increased LAP, mild-moderate pulmonary HTN, c/w acute diastolic HF   - TSH wnl.  - c/w IV lasix 40 today, replete lytes   - Strict I/Os, c/w Estrellita-fit for incontinence and monitoring uop.   - Cards input appreciated: change antihypertensives to Carvedilol 12.5 mg BID and Ramipril 5 mg daily, start with carvedilol 3.125 SBP 110s  - spironolactone 25  - Lasix IV 40 last given yesterday. hold pending cardio recs.

## 2019-09-06 NOTE — PROGRESS NOTE ADULT - PROBLEM SELECTOR PLAN 2
Physical exam/lab/echo consistent with acute diastolic heart failure. CTPA neg for PE, No consolidations seen to suggest PNA.   - 96% on RA, 90% at the end of ambulation  - appreciate pulm: US of lungs 9/4 with decreased pleural effusion. hold Diuresis given worsening kidney function   - Mod pleural effusion stable, unchanged on cxr. thora?   - Incentive spirometer, OOB to chair and ambulate at tolerated

## 2019-09-06 NOTE — PROGRESS NOTE ADULT - ASSESSMENT
89F w/ PMH HTN, recurrent ESBL UTIs, and hypothyroidism p/w SOBx1 week found to have pleural effusions most likely 2/2 CHF and a suprapubic/groin rash. VS significant for HTN (highest 176/93). Physical exam notable for decreased L sided breath sounds, resolved edema, and improving rash. Labs are significant for HCO3 of 21 and Cr 1.44. Awaiting read of most recent CXR.

## 2019-09-06 NOTE — PROGRESS NOTE ADULT - PROBLEM SELECTOR PLAN 4
Was holding anti-HTN meds because hypotensive episodes. Now hypertensive to 161/75.  - started coreg 3.125mg q12 on 9/5

## 2019-09-06 NOTE — PROGRESS NOTE ADULT - ASSESSMENT
89 y/o F PMHx HTN, hypothyroidism, and ESBL UTIs, presents from [facility] with progressive shortness of breath x 1 week, found to have pleural effusions 2/2 acute diastolic hf. Clinical/lab and echo criteria all consistent with acute diastolic heart failure. Pleural effusion stable, still SOB during exertion, diuresis, now with bump in cr.

## 2019-09-06 NOTE — PROGRESS NOTE ADULT - PROBLEM SELECTOR PLAN 9
DVT Prophylaxis: heparin subq   Diet: DASH  Dispo: pending OG and clinical improvement. Has outpatient doctor, Dr. Rodriguez her long term care facility who can see her on Tuesdays. Also has Dr. Kam Buckley (cardio) and Dr. Shah (pcp) who she can follow up with.

## 2019-09-06 NOTE — PROGRESS NOTE ADULT - SUBJECTIVE AND OBJECTIVE BOX
Pulmonary Progress Note     Interval Events:  No acute events overnight  Diuresing well  On room aire    SUBJECTIVE:    OBJECTIVE:  ICU Vital Signs Last 24 Hrs  T(C): 36.4 (06 Sep 2019 05:05), Max: 36.9 (05 Sep 2019 14:12)  T(F): 97.6 (06 Sep 2019 05:05), Max: 98.5 (05 Sep 2019 14:12)  HR: 88 (06 Sep 2019 05:05) (88 - 105)  BP: 156/88 (06 Sep 2019 05:05) (111/72 - 156/88)  RR: 18 (06 Sep 2019 05:05) (16 - 18)  SpO2: 95% (06 Sep 2019 05:05) (94% - 96%)        09-04 @ 07:01  -  09-05 @ 07:00  --------------------------------------------------------  IN: 840 mL / OUT: 1150 mL / NET: -310 mL    09-05 @ 07:01  -  09-06 @ 06:03  --------------------------------------------------------  IN: 537 mL / OUT: 1400 mL / NET: -863 mL      CAPILLARY BLOOD GLUCOSE          PHYSICAL EXAM:  GENERAL: NAD, well-groomed, well-developed  HEAD:  Atraumatic, Normocephalic  EYES: EOMI, conjunctiva and sclera clear  NERVOUS SYSTEM:  Alert & Oriented X3, Good concentration; Moves all extremities  CHEST/LUNG: decreased basilar bs, L>R  HEART: Regular rate and rhythm; No murmurs, rubs, or gallops  ABDOMEN: Soft, Nontender, Nondistended;   EXTREMITIES:  2+ Peripheral Pulses, No LE edema  SKIN: No rashes or lesions    HOSPITAL MEDICATIONS:  MEDICATIONS  (STANDING):  ALBUTerol    90 MICROgram(s) HFA Inhaler 1 Puff(s) Inhalation every 4 hours  ALBUTerol/ipratropium for Nebulization 3 milliLiter(s) Nebulizer every 6 hours  AQUAPHOR (petrolatum Ointment) 1 Application(s) Topical two times a day  carvedilol 3.125 milliGRAM(s) Oral every 12 hours  levothyroxine 112 MICROGram(s) Oral daily  multivitamin/minerals 1 Tablet(s) Oral daily  nystatin Powder 1 Application(s) Topical three times a day  polyethylene glycol 3350 17 Gram(s) Oral daily  senna 1 Tablet(s) Oral daily  spironolactone 12.5 milliGRAM(s) Oral daily  tiotropium 18 MICROgram(s) Capsule 1 Capsule(s) Inhalation daily    MEDICATIONS  (PRN):  benzonatate 100 milliGRAM(s) Oral three times a day PRN Cough      LABS:                        10.9   8.67  )-----------( 272      ( 05 Sep 2019 08:35 )             33.9     Hgb Trend: 10.9<--, 10.3<--, 9.6<--, 9.3<--, 9.1<--  09-05    137  |  98  |  24<H>  ----------------------------<  103<H>  3.8   |  26  |  1.28    Ca    9.6      05 Sep 2019 06:14  Phos  3.0     09-05  Mg     1.9     09-05    TPro  7.6  /  Alb  3.7  /  TBili  0.2  /  DBili  x   /  AST  17  /  ALT  17  /  AlkPhos  154<H>  09-04    Creatinine Trend: 1.28<--, 1.23<--, 1.45<--, 1.78<--, 1.86<--, 1.60<--            MICROBIOLOGY:       RADIOLOGY:  [ ] Reviewed and interpreted by me Pulmonary Progress Note     Interval Events:  No acute events overnight  Diuresing well  On room air  CXR this am with less effusion    SUBJECTIVE:  Feeling better, ambulating on room air with no discomfort  SOB resolved    OBJECTIVE:  ICU Vital Signs Last 24 Hrs  T(C): 36.4 (06 Sep 2019 05:05), Max: 36.9 (05 Sep 2019 14:12)  T(F): 97.6 (06 Sep 2019 05:05), Max: 98.5 (05 Sep 2019 14:12)  HR: 88 (06 Sep 2019 05:05) (88 - 105)  BP: 156/88 (06 Sep 2019 05:05) (111/72 - 156/88)  RR: 18 (06 Sep 2019 05:05) (16 - 18)  SpO2: 95% (06 Sep 2019 05:05) (94% - 96%)    09-04 @ 07:01  -  09-05 @ 07:00  --------------------------------------------------------  IN: 840 mL / OUT: 1150 mL / NET: -310 mL    09-05 @ 07:01  -  09-06 @ 06:03  --------------------------------------------------------  IN: 537 mL / OUT: 1400 mL / NET: -863 mL    PHYSICAL EXAM:  GENERAL: NAD, well-groomed, well-developed  HEAD:  Atraumatic, Normocephalic  EYES: EOMI, conjunctiva and sclera clear  NERVOUS SYSTEM:  Alert & Oriented X3, Good concentration; Moves all extremities  CHEST/LUNG: decreased basilar bs, L>R  HEART: Regular rate and rhythm; No murmurs, rubs, or gallops  ABDOMEN: Soft, Nontender, Nondistended;   EXTREMITIES:  2+ Peripheral Pulses, No LE edema  SKIN: No rashes or lesions    HOSPITAL MEDICATIONS:  MEDICATIONS  (STANDING):  ALBUTerol    90 MICROgram(s) HFA Inhaler 1 Puff(s) Inhalation every 4 hours  ALBUTerol/ipratropium for Nebulization 3 milliLiter(s) Nebulizer every 6 hours  AQUAPHOR (petrolatum Ointment) 1 Application(s) Topical two times a day  carvedilol 3.125 milliGRAM(s) Oral every 12 hours  levothyroxine 112 MICROGram(s) Oral daily  multivitamin/minerals 1 Tablet(s) Oral daily  nystatin Powder 1 Application(s) Topical three times a day  polyethylene glycol 3350 17 Gram(s) Oral daily  senna 1 Tablet(s) Oral daily  spironolactone 12.5 milliGRAM(s) Oral daily  tiotropium 18 MICROgram(s) Capsule 1 Capsule(s) Inhalation daily    MEDICATIONS  (PRN):  benzonatate 100 milliGRAM(s) Oral three times a day PRN Cough      LABS:                        10.9   8.67  )-----------( 272      ( 05 Sep 2019 08:35 )             33.9     Hgb Trend: 10.9<--, 10.3<--, 9.6<--, 9.3<--, 9.1<--  09-05    137  |  98  |  24<H>  ----------------------------<  103<H>  3.8   |  26  |  1.28    Ca    9.6      05 Sep 2019 06:14  Phos  3.0     09-05  Mg     1.9     09-05    TPro  7.6  /  Alb  3.7  /  TBili  0.2  /  DBili  x   /  AST  17  /  ALT  17  /  AlkPhos  154<H>  09-04    Creatinine Trend: 1.28<--, 1.23<--, 1.45<--, 1.78<--, 1.86<--, 1.60<--    MICROBIOLOGY:       RADIOLOGY:  [ ] Reviewed and interpreted by me

## 2019-09-06 NOTE — PROGRESS NOTE ADULT - SUBJECTIVE AND OBJECTIVE BOX
89F w/ PMH HTN, recurrent ESBL UTIs, and hypothyroidism p/w SOBx1 week found to have pleural effusions most likely 2/2 CHF.    SUBJECTIVE: Patient examined at bedside. No acute events overnight. Off of O2 this AM. Tolerated lasix 40 IV yesterday. No SOB at rest but still having HAYES. States she believes her rash is improving and she is coughing less. She is concerned about going home without a Perma-cath or something similar since she feels she is urinating a lot and already had chronic urinary frequency before she was diuresed.She is requesting that she f/u with the Geriatric physician (Dr. Rodriguez) at the [facility] instead fo her previous PCP and other physicians.     ROS:  General: No increased fatigue  Resp: + Dry cough. No SOB at rest. HAYES.  CV: No CP, palpitaions  Abd: + small BMs less freq than when at home (every 2-3 days now). No abd pain, n/v/d.  :  + Increased urination  Ext: No edema  Skin: Rash on groin/axillas/under breasts improving    OBJECTIVE:  Vital Signs Last 24 Hrs  T(C): 36.4 (06 Sep 2019 05:05), Max: 36.9 (05 Sep 2019 14:12)  T(F): 97.6 (06 Sep 2019 05:05), Max: 98.5 (05 Sep 2019 14:12)  HR: 88 (06 Sep 2019 05:05) (88 - 105)  BP: 156/88 (06 Sep 2019 05:05) (111/72 - 156/88)  BP(mean): --  RR: 18 (06 Sep 2019 05:05) (16 - 18)  SpO2: 95% (06 Sep 2019 05:05) (94% - 96%)    I&O's Detail    05 Sep 2019 07:01  -  06 Sep 2019 07:00  --------------------------------------------------------  IN:    Oral Fluid: 537 mL  Total IN: 537 mL    OUT:    Voided: 1800 mL  Total OUT: 1800 mL    Total NET: -1263 mL    Physical Exam:  General: NAD, resting comfortably in bed  Neuro: A&Ox4  HEENT: NC/AT, EOMI, normal hearing, oral mucosa moist  Resp: Breathing comfortably on RA, L sided breath sounds more decreased than yesterday, R side clear.  CV: Normal sinus rhythm, S1 and S2, no r/m/g.  Abd: soft, non-distended, non-tender.   : Perma-cath in place  Back: kyphoscoliosis evident  Ext: ROMIx4, no edema, pedal pulses palpable  Skin: Suprapubic/groin rash less more pink/brown today. Rash in axillas and under the breasts of similar but lighter color. Brown circular discoloration w/ around 4in diameter on R lateral ankle. Multiple aging spots. Warm and dry.  Psych: Appropriate affect    MEDICATIONS  (STANDING):  ALBUTerol    90 MICROgram(s) HFA Inhaler 1 Puff(s) Inhalation every 4 hours  ALBUTerol/ipratropium for Nebulization 3 milliLiter(s) Nebulizer every 6 hours  AQUAPHOR (petrolatum Ointment) 1 Application(s) Topical two times a day  carvedilol 3.125 milliGRAM(s) Oral every 12 hours  levothyroxine 112 MICROGram(s) Oral daily  multivitamin/minerals 1 Tablet(s) Oral daily  nystatin Powder 1 Application(s) Topical three times a day  polyethylene glycol 3350 17 Gram(s) Oral daily  senna 1 Tablet(s) Oral daily  spironolactone 25 milliGRAM(s) Oral daily  tiotropium 18 MICROgram(s) Capsule 1 Capsule(s) Inhalation daily    MEDICATIONS  (PRN):  benzonatate 100 milliGRAM(s) Oral three times a day PRN Cough    Labs:                        10.9   8.67  )-----------( 272      ( 05 Sep 2019 08:35 )             33.9   09-06    135  |  98  |  28<H>  ----------------------------<  100<H>  4.4   |  21<L>  |  1.44<H>    Ca    9.3      06 Sep 2019 06:38  Phos  3.2     09-06  Mg     2.0     09-06    Radiology:  < from: Xray Chest 1 View- PORTABLE-Routine (09.03.19 @ 09:05) >  FINDINGS:   Redemonstrated in moderate left pleural effusion, unchanged from prior   radiograph dated 9/1/2019. Bilateral lung atelectasis. No pneumothorax.   Heart size cannot be accurately assessed on this projection.    IMPRESSION:  Stable moderate left pleural effusion and bibasilar atelectasis    < end of copied text > 89F w/ PMH HTN, recurrent ESBL UTIs, and hypothyroidism p/w SOBx1 week found to have pleural effusions most likely 2/2 CHF.    SUBJECTIVE: Patient examined at bedside. No acute events overnight. Off of O2 this AM. Tolerated lasix 40 IV yesterday. No SOB at rest but still having HAYES. Not needing nebs. States she believes her rash is improving and she is coughing less w/ the tessalon. She still is not having regular BMs but does not want to take miralax and senna and reports decreased appetite. She is concerned about going home without a Perma-cath or something similar since she feels she is urinating a lot and already had chronic urinary frequency before she was diuresed. She is requesting that she f/u with the Geriatric physician (Dr. Rodriguez) at the [facility] instead fo her previous PCP and other physicians.     ROS:  General: +Decreased appetite. No fatigue  HEENT: No change in vision/hearing.  Resp: + Dry cough. No SOB at rest. HAYES.  CV: No CP, palpitations  Abd: + small BMs less freq than when at home (every 2-3 days now). No abd pain, n/v/d.  :  + Increased urination  Ext: No edema  Skin: Rash on groin/axillas/under breasts improving    OBJECTIVE:  Vital Signs Last 24 Hrs  T(C): 36.4 (06 Sep 2019 05:05), Max: 36.9 (05 Sep 2019 14:12)  T(F): 97.6 (06 Sep 2019 05:05), Max: 98.5 (05 Sep 2019 14:12)  HR: 88 (06 Sep 2019 05:05) (88 - 105)  BP: 156/88 (06 Sep 2019 05:05) (111/72 - 156/88)  BP(mean): --  RR: 18 (06 Sep 2019 05:05) (16 - 18)  SpO2: 95% (06 Sep 2019 05:05) (94% - 96%)    I&O's Detail    05 Sep 2019 07:01  -  06 Sep 2019 07:00  --------------------------------------------------------  IN:    Oral Fluid: 537 mL  Total IN: 537 mL    OUT:    Voided: 1800 mL  Total OUT: 1800 mL    Total NET: -1263 mL    Physical Exam:  General: NAD, resting comfortably in bed  Neuro: A&Ox4  HEENT: NC/AT, EOMI, normal hearing, oral mucosa moist  Resp: Breathing comfortably on RA, L sided breath sounds more decreased than yesterday, R side clear.  CV: Normal sinus rhythm, S1 and S2, no r/m/g.  Abd: soft, non-distended, non-tender.   : Perma-cath in place  Back: kyphoscoliosis evident  Ext: ROMIx4, no edema, pedal pulses palpable  Skin: Suprapubic/groin rash less more pink/brown today. Rash in axillas and under the breasts of similar but lighter color. Brown circular discoloration w/ around 4in diameter on R lateral ankle. Multiple aging spots. Warm and dry.  Psych: Appropriate affect    MEDICATIONS  (STANDING):  ALBUTerol    90 MICROgram(s) HFA Inhaler 1 Puff(s) Inhalation every 4 hours  ALBUTerol/ipratropium for Nebulization 3 milliLiter(s) Nebulizer every 6 hours  AQUAPHOR (petrolatum Ointment) 1 Application(s) Topical two times a day  carvedilol 3.125 milliGRAM(s) Oral every 12 hours  levothyroxine 112 MICROGram(s) Oral daily  multivitamin/minerals 1 Tablet(s) Oral daily  nystatin Powder 1 Application(s) Topical three times a day  polyethylene glycol 3350 17 Gram(s) Oral daily  senna 1 Tablet(s) Oral daily  spironolactone 25 milliGRAM(s) Oral daily  tiotropium 18 MICROgram(s) Capsule 1 Capsule(s) Inhalation daily    MEDICATIONS  (PRN):  benzonatate 100 milliGRAM(s) Oral three times a day PRN Cough    Labs:                        10.9   8.67  )-----------( 272      ( 05 Sep 2019 08:35 )             33.9   09-06    135  |  98  |  28<H>  ----------------------------<  100<H>  4.4   |  21<L>  |  1.44<H>    Ca    9.3      06 Sep 2019 06:38  Phos  3.2     09-06  Mg     2.0     09-06    Radiology:  < from: Xray Chest 1 View- PORTABLE-Routine (09.03.19 @ 09:05) >  FINDINGS:   Redemonstrated in moderate left pleural effusion, unchanged from prior   radiograph dated 9/1/2019. Bilateral lung atelectasis. No pneumothorax.   Heart size cannot be accurately assessed on this projection.    IMPRESSION:  Stable moderate left pleural effusion and bibasilar atelectasis    < end of copied text >

## 2019-09-06 NOTE — PROGRESS NOTE ADULT - ATTENDING COMMENTS
Patient seen and examined.  REcalled by time after patient ambulated and noted dyspnea.  Ultrasound at bedside shows moderate left pleural effusion in spite of diuresis with creatinine now rising.  Spoke with patient and her daughter and risks explained.  Patient agreed to undergo thoracentesis.  Will perform large volume thoracentesis today.

## 2019-09-06 NOTE — PROGRESS NOTE ADULT - PROBLEM SELECTOR PLAN 1
Presenting with progressive dyspnea on exertion and at rest, with exam significant for bilateral LE edema, labs showing elevated BNP, and b/l pleural effusions on CT. Most likely new CHF.   - TTE shows hyperdynamic LVSF, moderately increased LAP, mild-moderate pulmonary HTN. Per cards, suggestive of diastolic dysfunction.  - TSH wnl.  - Duoneb for diffuse wheezing and tessalon for cough Presenting with progressive dyspnea on exertion and at rest, with exam significant for bilateral LE edema, labs showing elevated BNP, and b/l pleural effusions on CT. Most likely new CHF.   HAYES but not at rest on RA today  - TTE shows hyperdynamic LVSF, moderately increased LAP, mild-moderate pulmonary HTN. Per cards, suggestive of diastolic dysfunction.  - TSH wnl.  - Duoneb for diffuse wheezing and tessalon for cough  - attempt ambulation off O2 today.

## 2019-09-06 NOTE — PROCEDURE NOTE - SUPERVISORY STATEMENT
I was present throughout the entire procedure and agree with findings as noted above.  The patient tolerated the procedure well.  700 cc serosanguinous fluid was removed and sent for analysis.  Ultrasound showed minimal residual fluid and no evidence of pneumothorax.  CXR requested.

## 2019-09-06 NOTE — PROGRESS NOTE ADULT - ATTENDING COMMENTS
Patient seen and examined with the resident team today. I agree with Dr. Rahman's findings, assessment, and plan with the following additions and exceptions:     Lasix 40 mg ivp last given yesterday.   Continue coreg 3.125 mg po BID for improved diastolic filling.   Spironolactone 25 mg po daily.   Holding diuretics pending final cardiology recommendations given ANNA.   Cards and pulm recommendations appreciated.   O2 sat of 94% on RA at rest and 90% with ambulation. Recheck ambulatory o2.   OOBTC with assistance daily. Being accomplished. Thank you.  Dispo: pending OG and clinical improvement. Has appointment with Dr. Rodriguez (nursing home doctor) on Tuesday and with Dr. Olivares on Wednesday (her PCP) for optimization of her diuretics and blood work. She has a cardiologist, Dr. Buckley who she will be referred to by her PCP.   Rest of plan as above    Dr. Nav Dunlap,   Division of Hospital Medicine, [facility]  Pager:  [phone] .

## 2019-09-07 LAB
ANION GAP SERPL CALC-SCNC: 14 MMOL/L — SIGNIFICANT CHANGE UP (ref 5–17)
APPEARANCE UR: CLEAR — SIGNIFICANT CHANGE UP
BASOPHILS # BLD AUTO: 0.05 K/UL — SIGNIFICANT CHANGE UP (ref 0–0.2)
BASOPHILS NFR BLD AUTO: 0.5 % — SIGNIFICANT CHANGE UP (ref 0–2)
BILIRUB UR-MCNC: NEGATIVE — SIGNIFICANT CHANGE UP
BUN SERPL-MCNC: 29 MG/DL — HIGH (ref 7–23)
CALCIUM SERPL-MCNC: 9.1 MG/DL — SIGNIFICANT CHANGE UP (ref 8.4–10.5)
CHLORIDE SERPL-SCNC: 99 MMOL/L — SIGNIFICANT CHANGE UP (ref 96–108)
CO2 SERPL-SCNC: 25 MMOL/L — SIGNIFICANT CHANGE UP (ref 22–31)
COLOR SPEC: SIGNIFICANT CHANGE UP
CREAT SERPL-MCNC: 1.2 MG/DL — SIGNIFICANT CHANGE UP (ref 0.5–1.3)
DIFF PNL FLD: NEGATIVE — SIGNIFICANT CHANGE UP
EOSINOPHIL # BLD AUTO: 0.37 K/UL — SIGNIFICANT CHANGE UP (ref 0–0.5)
EOSINOPHIL NFR BLD AUTO: 3.8 % — SIGNIFICANT CHANGE UP (ref 0–6)
GLUCOSE SERPL-MCNC: 100 MG/DL — HIGH (ref 70–99)
GLUCOSE UR QL: NEGATIVE — SIGNIFICANT CHANGE UP
GRAM STN FLD: SIGNIFICANT CHANGE UP
HCT VFR BLD CALC: 32 % — LOW (ref 34.5–45)
HGB BLD-MCNC: 10 G/DL — LOW (ref 11.5–15.5)
IMM GRANULOCYTES NFR BLD AUTO: 0.7 % — SIGNIFICANT CHANGE UP (ref 0–1.5)
KETONES UR-MCNC: NEGATIVE — SIGNIFICANT CHANGE UP
LEUKOCYTE ESTERASE UR-ACNC: NEGATIVE — SIGNIFICANT CHANGE UP
LYMPHOCYTES # BLD AUTO: 1.85 K/UL — SIGNIFICANT CHANGE UP (ref 1–3.3)
LYMPHOCYTES # BLD AUTO: 18.9 % — SIGNIFICANT CHANGE UP (ref 13–44)
MCHC RBC-ENTMCNC: 30.1 PG — SIGNIFICANT CHANGE UP (ref 27–34)
MCHC RBC-ENTMCNC: 31.3 GM/DL — LOW (ref 32–36)
MCV RBC AUTO: 96.4 FL — SIGNIFICANT CHANGE UP (ref 80–100)
MONOCYTES # BLD AUTO: 1.13 K/UL — HIGH (ref 0–0.9)
MONOCYTES NFR BLD AUTO: 11.5 % — SIGNIFICANT CHANGE UP (ref 2–14)
NEUTROPHILS # BLD AUTO: 6.32 K/UL — SIGNIFICANT CHANGE UP (ref 1.8–7.4)
NEUTROPHILS NFR BLD AUTO: 64.6 % — SIGNIFICANT CHANGE UP (ref 43–77)
NITRITE UR-MCNC: NEGATIVE — SIGNIFICANT CHANGE UP
PH UR: 7.5 — SIGNIFICANT CHANGE UP (ref 5–8)
PLATELET # BLD AUTO: 288 K/UL — SIGNIFICANT CHANGE UP (ref 150–400)
POTASSIUM SERPL-MCNC: 4.1 MMOL/L — SIGNIFICANT CHANGE UP (ref 3.5–5.3)
POTASSIUM SERPL-SCNC: 4.1 MMOL/L — SIGNIFICANT CHANGE UP (ref 3.5–5.3)
PROT UR-MCNC: NEGATIVE — SIGNIFICANT CHANGE UP
RBC # BLD: 3.32 M/UL — LOW (ref 3.8–5.2)
RBC # FLD: 14.6 % — HIGH (ref 10.3–14.5)
SODIUM SERPL-SCNC: 138 MMOL/L — SIGNIFICANT CHANGE UP (ref 135–145)
SP GR SPEC: 1.01 — SIGNIFICANT CHANGE UP (ref 1.01–1.02)
SPECIMEN SOURCE: SIGNIFICANT CHANGE UP
UROBILINOGEN FLD QL: NEGATIVE — SIGNIFICANT CHANGE UP
WBC # BLD: 9.79 K/UL — SIGNIFICANT CHANGE UP (ref 3.8–10.5)
WBC # FLD AUTO: 9.79 K/UL — SIGNIFICANT CHANGE UP (ref 3.8–10.5)

## 2019-09-07 PROCEDURE — 99232 SBSQ HOSP IP/OBS MODERATE 35: CPT | Mod: GC

## 2019-09-07 RX ORDER — CARVEDILOL PHOSPHATE 80 MG/1
1 CAPSULE, EXTENDED RELEASE ORAL
Qty: 60 | Refills: 0
Start: 2019-09-07 | End: 2019-10-06

## 2019-09-07 RX ORDER — ACETAMINOPHEN 500 MG
650 TABLET ORAL EVERY 6 HOURS
Refills: 0 | Status: DISCONTINUED | OUTPATIENT
Start: 2019-09-07 | End: 2019-09-08

## 2019-09-07 RX ORDER — SPIRONOLACTONE 25 MG/1
1 TABLET, FILM COATED ORAL
Qty: 30 | Refills: 0
Start: 2019-09-07 | End: 2019-10-06

## 2019-09-07 RX ORDER — PETROLATUM,WHITE
1 JELLY (GRAM) TOPICAL
Qty: 400 | Refills: 0
Start: 2019-09-07 | End: 2019-10-06

## 2019-09-07 RX ORDER — NYSTATIN CREAM 100000 [USP'U]/G
1 CREAM TOPICAL
Qty: 1000 | Refills: 0
Start: 2019-09-07 | End: 2019-10-06

## 2019-09-07 RX ORDER — HYDROCORTISONE 1 %
1 OINTMENT (GRAM) TOPICAL
Qty: 1000 | Refills: 0
Start: 2019-09-07 | End: 2019-10-06

## 2019-09-07 RX ADMIN — NYSTATIN CREAM 1 APPLICATION(S): 100000 CREAM TOPICAL at 21:53

## 2019-09-07 RX ADMIN — Medication 1 TABLET(S): at 12:56

## 2019-09-07 RX ADMIN — CARVEDILOL PHOSPHATE 3.12 MILLIGRAM(S): 80 CAPSULE, EXTENDED RELEASE ORAL at 05:00

## 2019-09-07 RX ADMIN — CARVEDILOL PHOSPHATE 3.12 MILLIGRAM(S): 80 CAPSULE, EXTENDED RELEASE ORAL at 16:55

## 2019-09-07 RX ADMIN — Medication 1 APPLICATION(S): at 12:55

## 2019-09-07 RX ADMIN — Medication 112 MICROGRAM(S): at 04:57

## 2019-09-07 RX ADMIN — Medication 1 APPLICATION(S): at 05:01

## 2019-09-07 RX ADMIN — NYSTATIN CREAM 1 APPLICATION(S): 100000 CREAM TOPICAL at 14:01

## 2019-09-07 RX ADMIN — Medication 3 MILLILITER(S): at 12:56

## 2019-09-07 RX ADMIN — Medication 1 APPLICATION(S): at 16:56

## 2019-09-07 RX ADMIN — Medication 20 MILLIGRAM(S): at 04:57

## 2019-09-07 RX ADMIN — SPIRONOLACTONE 25 MILLIGRAM(S): 25 TABLET, FILM COATED ORAL at 04:58

## 2019-09-07 RX ADMIN — NYSTATIN CREAM 1 APPLICATION(S): 100000 CREAM TOPICAL at 05:00

## 2019-09-07 RX ADMIN — Medication 100 MILLIGRAM(S): at 04:57

## 2019-09-07 NOTE — PROGRESS NOTE ADULT - SUBJECTIVE AND OBJECTIVE BOX
SOLIS TAYLOR  89y Female  [id number]    Patient is a 89y old  Female who presents with a chief complaint of shortness of breath (07 Sep 2019 08:22)      INTERVAL HPI/OVERNIGHT EVENTS: wet herself o/n with prima-fit on. prima fit was changed.       CONSTITUTIONAL: No fevers or chills  EYES/ENT: No visual changes. No discharge from eyes. No vertigo. No throat pain. No dysphagia.  NECK: No pain or stiffness or rigidity.  RESPIRATORY: +improved cough. No wheezing, or hemoptysis. No shortness of breath. endorsed some mild pain at the site of thora yesterday with position change   CARDIOVASCULAR: No chest pain or palpitations.   GASTROINTESTINAL: No abdominal pain. No nausea, vomiting, or hematemesis; No diarrhea or constipation. No melena or hematochezia.  GENITOURINARY: +dysuria, no hesitancy, frequency or hematuria.  NEUROLOGICAL: No numbness or weakness. No change in speech. No fecal or urinary incontinence.   MSK: No joint swelling or erythema. No back pain.  SKIN: +fungal rash.   PSYCH: Normal affect. Normal mood.    Review of systems negative except for items noted above.  ALBUTerol    90 MICROgram(s) HFA Inhaler 1 Puff(s) Inhalation every 4 hours  ALBUTerol/ipratropium for Nebulization 3 milliLiter(s) Nebulizer every 6 hours  AQUAPHOR (petrolatum Ointment) 1 Application(s) Topical two times a day  benzonatate 100 milliGRAM(s) Oral three times a day PRN  carvedilol 3.125 milliGRAM(s) Oral every 12 hours  furosemide    Tablet 20 milliGRAM(s) Oral daily  hydrocortisone 1% Ointment 1 Application(s) Topical daily  levothyroxine 112 MICROGram(s) Oral daily  multivitamin/minerals 1 Tablet(s) Oral daily  nystatin Powder 1 Application(s) Topical three times a day  polyethylene glycol 3350 17 Gram(s) Oral daily  senna 1 Tablet(s) Oral daily  spironolactone 25 milliGRAM(s) Oral daily  tiotropium 18 MICROgram(s) Capsule 1 Capsule(s) Inhalation daily      Allergies:  morphine (Drowsiness; Faint; Hypotension)  sulfa topicals (Unknown)      T(F): 97.5 (09-07-19 @ 04:53), Max: 98 (09-06-19 @ 17:48)  HR: 97 (09-07-19 @ 04:53) (92 - 98)  BP: 136/79 (09-07-19 @ 04:53) (136/79 - 138/76)  RR: 17 (09-07-19 @ 04:53) (17 - 18)  SpO2: 97% (09-07-19 @ 04:53) (94% - 97%)    09-06-19 @ 07:01  -  09-07-19 @ 07:00  --------------------------------------------------------  IN: 120 mL / OUT: 1200 mL / NET: -1080 mL      GENERAL: NAD, well-groomed, well-developed  NECK: no JVD   EYES: EOMI, conjunctiva and sclera clear  CHEST/LUNG: decreased basilar bs, L>R  HEART: Regular rate and rhythm; No murmurs, rubs, or gallops  ABDOMEN: Soft, Nontender, Nondistended;   EXTREMITIES: No LE edema  SKIN: erythema under the pannus stable, less inflamed. Also in the axilla b/l patient with pruritic rash.   NERVOUS SYSTEM:  Alert & Oriented X3, Moves all extremities, no focal deficit  PSYCH: normal mood and affect     LABS:                        11.4   9.30  )-----------( 300      ( 06 Sep 2019 08:43 )             36.3     Hgb Trend: 11.4<--, 10.9<--, 10.3<--, 9.6<--, 9.3<--  09-07    138  |  99  |  29<H>  ----------------------------<  100<H>  4.1   |  25  |  1.20    Ca    9.1      07 Sep 2019 07:02  Phos  3.2     09-06  Mg     2.0     09-06      Creatinine Trend: 1.20<--, 1.44<--, 1.28<--, 1.23<--, 1.45<--, 1.78<--        RADIOLOGY & ADDITIONAL TESTS:

## 2019-09-07 NOTE — PROGRESS NOTE ADULT - PROBLEM SELECTOR PLAN 2
Physical exam/lab/echo consistent with acute diastolic heart failure. CTPA neg for PE, No consolidations seen to suggest PNA.   - 96% on RA, 90% at the end of ambulation  - s/p thoracentesis 9/6 with no complications 700cc pleural fluid removal   -rpt cxr Trace left pleural effusion which is improved from prior exam. No   pneumothorax.   - Incentive spirometer, OOB to chair and ambulate at tolerated Physical exam/lab/echo consistent with acute diastolic heart failure. CTPA neg for PE, No consolidations seen to suggest PNA.   - 96% on RA, 90% at the end of ambulation  - s/p thoracentesis 9/6 with no complications 700cc pleural fluid removal   -rpt cxr Trace left pleural effusion which is improved from prior exam. No   pneumothorax.   - Incentive spirometer, OOB to chair and ambulate at tolerated  -Pulm recs appreciated. Symptomatically much improved.

## 2019-09-07 NOTE — PROGRESS NOTE ADULT - ATTENDING COMMENTS
-Patient seen/examined on 9/7/19. Case/plan discussed with the intern and resident as reviewed/edited by me above and in any comments below. -Patient seen/examined on 9/7/19. Case/plan discussed with the intern and resident as reviewed/edited by me above and in any comments below.  -Updated patient and family at bedside.  -DCP soon.

## 2019-09-07 NOTE — PROGRESS NOTE ADULT - PROBLEM SELECTOR PLAN 6
chronic fungal dermatitis, no response to nystatin power alone   -c/w nystatin power TID   -s/p 1 dose Fluconazole 150 PO 9/2    #pruritic rash in the patients axillary region b/l.  - start Triamcinolone Ointment chronic fungal dermatitis, no response to nystatin power alone   -c/w nystatin power TID   -s/p 1 dose Fluconazole 150 PO 9/2    #pruritic rash in the patients axillary region b/l.  - C/w hydrocortisone Ointment

## 2019-09-07 NOTE — PROGRESS NOTE ADULT - PROBLEM SELECTOR PLAN 4
Hx of recurrent ESBL UTIs. Currently not endorsing dysuria, UA w/ mod LE. No systemic signs of infection  - Will monitor off antibiotics for now  -c/o burning sensation 9/7, will check UA Hx of recurrent ESBL UTIs. Currently not endorsing dysuria, UA w/ mod LE. No systemic signs of infection  - Will monitor off antibiotics for now  -c/o burning sensation 9/7, will check UA - negative.

## 2019-09-07 NOTE — PROGRESS NOTE ADULT - ASSESSMENT
89 yo F with h/o HTN, hypothyroidism, and multiple ESBL UTIs, who presented from [facility] with progressive shortness of breath, orthopnea, non-productive cough and LE edema x 1 week. Admitted for decompensated diastolic heart failure. Pro-BNP 3052. CT chest angiogram negative for pulmonary embolism. Echo with dilated LA and findings consistent with diastolic heart failure and moderate pulmonary hypertension. Started on diureses Pulmonary consulted for thoracentesis of left sided pleural effusion. Pleural effusions are likely cardiac in nature, secondary to heart failure preserved EF. Thoracentesis done yesterday with drainage of 700 ml. X ray with improvement of left sided effusion and no pneumothorax postprocedure. Feels better overall. Diuresing well. Oxygen saturation 97% on RA.     Recommendations:  - Continue diuresis for management of heart failure      --------------------------------------------------------  Gage Null, PGY-4  Pulmonary/Critical Care Fellow  Pager: [phone] ([place]), [phone] (NS)

## 2019-09-07 NOTE — PROGRESS NOTE ADULT - PROBLEM SELECTOR PLAN 9
DVT Prophylaxis: heparin subq   Diet: DASH  Dispo: pending OG and clinical improvement. Has outpatient doctor, Dr. Rodriguez her long term care facility who can see her on Tuesdays. Also has Dr. Kam Buckley (cardio) and Dr. Shah (pcp) who she can follow up with. DVT Prophylaxis: SCD's.   Diet: DASH fluid restricted.   Dispo: pending OG and clinical improvement. Has outpatient doctor, Dr. Rodriguez her long term care facility who can see her on Tuesdays. Also has Dr. Kam Buckley (cardio) and Dr. Shah (pcp) who she can follow up with.  Code status: DNR.

## 2019-09-07 NOTE — PROGRESS NOTE ADULT - ATTENDING COMMENTS
Patient seen and examined, agree with above assessment and plan as transcribed above.    - Euvolemic on exam  - s/p acute siastolic CHF  - Mainstay of treatment will be BB and control of central volume      Malcolm Valdez MD, [facility]  BEEPER [phone]

## 2019-09-07 NOTE — PROGRESS NOTE ADULT - PROBLEM SELECTOR PLAN 5
Patient sensitive to meds, became hypotensive to sbp 60s morning of 9/1 after given meds  - cards recs appreciated:  c/w carvedilol 3.125, spironolactone 25. restart home dose lasix 20 po daily Patient sensitive to meds, became hypotensive to sbp 60s morning of 9/1 after given meds  - cards recs appreciated:  c/w carvedilol 3.125mg BID, spironolactone 25mg daily. restarted home dose lasix 20mg po daily

## 2019-09-07 NOTE — PROGRESS NOTE ADULT - SUBJECTIVE AND OBJECTIVE BOX
Interval Events:  Thoracentesis done yesterday without complications - 700 ml of fluid drained  No acute events overnight  Diuresing well  On room air  CXR this am with less effusion    SUBJECTIVE:  Feels better, ambulating on room air with no discomfort     OBJECTIVE:    Vital Signs Last 24 Hrs  T(C): 36.5 (07 Sep 2019 14:12), Max: 36.5 (07 Sep 2019 14:12)  T(F): 97.7 (07 Sep 2019 14:12), Max: 97.7 (07 Sep 2019 14:12)  HR: 95 (07 Sep 2019 14:12) (92 - 97)  BP: 107/68 (07 Sep 2019 14:12) (107/68 - 138/76)  BP(mean): --  RR: 18 (07 Sep 2019 14:12) (17 - 18)  SpO2: 94% (07 Sep 2019 14:12) (94% - 97%)    PHYSICAL EXAM:  GENERAL: no acute distress  HEAD:  normocephalic  EYES: extraocular movements intact, conjunctiva and sclera clear  NERVOUS SYSTEM:  Alert & Oriented X3, Good concentration; no focal deficits  CHEST/LUNG: decreased basilar bs, L>R  HEART: Regular rate and rhythm; No murmurs, rubs, or gallops  ABDOMEN: Soft, Nontender, Nondistended;   EXTREMITIES:  2+ Peripheral Pulses, No LE edema  SKIN: No rashes or lesions    LABS:                        10.0   9.79  )-----------( 288      ( 07 Sep 2019 10:59 )             32.0     09-07    138  |  99  |  29<H>  ----------------------------<  100<H>  4.1   |  25  |  1.20    Ca    9.1      07 Sep 2019 07:02  Phos  3.2     09-06  Mg     2.0     09-06    MICROBIOLOGY: Gram stain from pleural fluid negative    HOSPITAL MEDICATIONS:    MEDICATIONS  (STANDING):  AQUAPHOR (petrolatum Ointment) 1 Application(s) Topical two times a day  carvedilol 3.125 milliGRAM(s) Oral every 12 hours  furosemide    Tablet 20 milliGRAM(s) Oral daily  hydrocortisone 1% Ointment 1 Application(s) Topical daily  levothyroxine 112 MICROGram(s) Oral daily  multivitamin/minerals 1 Tablet(s) Oral daily  nystatin Powder 1 Application(s) Topical three times a day  polyethylene glycol 3350 17 Gram(s) Oral daily  senna 1 Tablet(s) Oral daily  spironolactone 25 milliGRAM(s) Oral daily      RADIOLOGY: Improved left sided pleural effusion. No pneumothorax  [x ] Reviewed and interpreted by me Interval Events:  Thoracentesis done yesterday without complications - 700 ml of fluid drained  No acute events overnight  Diuresing well  On room air  CXR this am with less effusion    SUBJECTIVE:  Feels better, ambulating on room air with no discomfort     REVIEW OF SYSTEMS:  General: feels well  Lungs: Breathing improved. Intermittent cough. No hemoptysis  Cards: Denies chest pain  [x] Remainder of 14 point review of systems negative    OBJECTIVE:    Vital Signs Last 24 Hrs  T(C): 36.5 (07 Sep 2019 14:12), Max: 36.5 (07 Sep 2019 14:12)  T(F): 97.7 (07 Sep 2019 14:12), Max: 97.7 (07 Sep 2019 14:12)  HR: 95 (07 Sep 2019 14:12) (92 - 97)  BP: 107/68 (07 Sep 2019 14:12) (107/68 - 138/76)  BP(mean): --  RR: 18 (07 Sep 2019 14:12) (17 - 18)  SpO2: 94% (07 Sep 2019 14:12) (94% - 97%)    PHYSICAL EXAM:  GENERAL: no acute distress  HEAD:  normocephalic  EYES: extraocular movements intact, conjunctiva and sclera clear  NERVOUS SYSTEM:  Alert & Oriented X3, Good concentration; no focal deficits  CHEST/LUNG: decreased basilar bs, L>R  HEART: Regular rate and rhythm; No murmurs, rubs, or gallops  ABDOMEN: Soft, Nontender, Nondistended;   EXTREMITIES:  2+ Peripheral Pulses, No LE edema  SKIN: No rashes or lesions    LABS:                        10.0   9.79  )-----------( 288      ( 07 Sep 2019 10:59 )             32.0     09-07    138  |  99  |  29<H>  ----------------------------<  100<H>  4.1   |  25  |  1.20    Ca    9.1      07 Sep 2019 07:02  Phos  3.2     09-06  Mg     2.0     09-06    MICROBIOLOGY: Gram stain from pleural fluid negative    HOSPITAL MEDICATIONS:    MEDICATIONS  (STANDING):  AQUAPHOR (petrolatum Ointment) 1 Application(s) Topical two times a day  carvedilol 3.125 milliGRAM(s) Oral every 12 hours  furosemide    Tablet 20 milliGRAM(s) Oral daily  hydrocortisone 1% Ointment 1 Application(s) Topical daily  levothyroxine 112 MICROGram(s) Oral daily  multivitamin/minerals 1 Tablet(s) Oral daily  nystatin Powder 1 Application(s) Topical three times a day  polyethylene glycol 3350 17 Gram(s) Oral daily  senna 1 Tablet(s) Oral daily  spironolactone 25 milliGRAM(s) Oral daily      RADIOLOGY: Improved left sided pleural effusion. No pneumothorax  [x ] Reviewed and interpreted by me

## 2019-09-07 NOTE — PROGRESS NOTE ADULT - ATTENDING COMMENTS
Patient seen and examined.  Patient underwent diagnostic and therapeutic thoracentesis with 700 cc of exudative fluid removed yesterday. Patient with symptomatic improvement and today able to ambulate > 120 feet without significant dyspnea. She is saturating well and in no respiratory distress post procedure. She is being planned for discharge as per the medical team. She should be continued on diuresis.    - She will need to ensure she has followup with either PMD or our office to review results of cytology which are still pending.

## 2019-09-07 NOTE — PROGRESS NOTE ADULT - SUBJECTIVE AND OBJECTIVE BOX
pt seen and examined, no complaints, ROS - .          ALBUTerol    90 MICROgram(s) HFA Inhaler 1 Puff(s) Inhalation every 4 hours  ALBUTerol/ipratropium for Nebulization 3 milliLiter(s) Nebulizer every 6 hours  AQUAPHOR (petrolatum Ointment) 1 Application(s) Topical two times a day  benzonatate 100 milliGRAM(s) Oral three times a day PRN  carvedilol 3.125 milliGRAM(s) Oral every 12 hours  furosemide    Tablet 20 milliGRAM(s) Oral daily  hydrocortisone 1% Ointment 1 Application(s) Topical daily  levothyroxine 112 MICROGram(s) Oral daily  multivitamin/minerals 1 Tablet(s) Oral daily  nystatin Powder 1 Application(s) Topical three times a day  polyethylene glycol 3350 17 Gram(s) Oral daily  senna 1 Tablet(s) Oral daily  spironolactone 25 milliGRAM(s) Oral daily  tiotropium 18 MICROgram(s) Capsule 1 Capsule(s) Inhalation daily                            11.4   9.30  )-----------( 300      ( 06 Sep 2019 08:43 )             36.3       Hemoglobin: 11.4 g/dL (09-06 @ 08:43)  Hemoglobin: 10.9 g/dL (09-05 @ 08:35)  Hemoglobin: 10.3 g/dL (09-04 @ 09:33)  Hemoglobin: 9.6 g/dL (09-03 @ 09:08)  Hemoglobin: 9.3 g/dL (09-02 @ 08:51)      09-07    138  |  99  |  29<H>  ----------------------------<  100<H>  4.1   |  25  |  1.20    Ca    9.1      07 Sep 2019 07:02  Phos  3.2     09-06  Mg     2.0     09-06      Creatinine Trend: 1.20<--, 1.44<--, 1.28<--, 1.23<--, 1.45<--, 1.78<--    COAGS:           T(C): 36.4 (09-07-19 @ 04:53), Max: 36.7 (09-06-19 @ 17:48)  HR: 97 (09-07-19 @ 04:53) (92 - 98)  BP: 136/79 (09-07-19 @ 04:53) (136/79 - 138/76)  RR: 17 (09-07-19 @ 04:53) (17 - 18)  SpO2: 97% (09-07-19 @ 04:53) (94% - 97%)  Wt(kg): --    I&O's Summary    06 Sep 2019 07:01  -  07 Sep 2019 07:00  --------------------------------------------------------  IN: 120 mL / OUT: 1200 mL / NET: -1080 mL      GENERAL: NAD, well-groomed, well-developed  NECK: no JVD   EYES: EOMI, conjunctiva and sclera clear  CHEST/LUNG: decreased basilar bs, L>R  HEART: Regular rate and rhythm; No murmurs, rubs, or gallops  ABDOMEN: Soft, Nontender, Nondistended;   EXTREMITIES: No LE edema  SKIN: erythema under the pannus stable, less inflamed. Also in the axilla b/l patient with pruritic rash.   NERVOUS SYSTEM:  Alert & Oriented X3, Moves all extremities, no focal deficit    echo: normal LVEF      A/P) 89 y/o female PMH HTN, hypothyroidism, admitted with dyspnea and a left pleural effusion. s/p thoracentesis.    - tolerating daily lasix / aldactone   - HR stable on coreg   -  GI / DVT prophylaxis,  keep K>4, mag >2.0   -no further inpatient cardiac workup needed  -f/u with her cardiologist Dr. Buckley after discharge  D/W Dr Valdez

## 2019-09-07 NOTE — PROGRESS NOTE ADULT - NSHPATTENDINGPLANDISCUSS_GEN_ALL_CORE
medicine housestaff
patient
patient and Dr. Monroy
Medical team
team
Dr. Mcgill
Team 2
Dr. Monroy
Team 2

## 2019-09-07 NOTE — PROGRESS NOTE ADULT - PROBLEM SELECTOR PLAN 1
Acute decompensated diastolic heart failure. Unclear underlying cause of CHF exacerbation. Low suspicion for ACS given no chest pain and trops neg, although noted new TWI V5-V6 but nonspecific, and no ST segment changes.   - TTE shows hyperdynamic LVSF, moderately increased LAP, mild-moderate pulmonary HTN, c/w acute diastolic HF   - TSH wnl  - Strict I/Os, c/w Estrellita-fit for incontinence and monitoring uop.   - Cards input appreciated: c/w carvedilol 3.125, spironolactone 25. restart home dose lasix 20 po daily Acute decompensated diastolic heart failure. Unclear underlying cause of CHF exacerbation. Low suspicion for ACS given no chest pain and trops neg, although noted new TWI V5-V6 but nonspecific, and no ST segment changes.   - TTE shows hyperdynamic LVSF, moderately increased LAP, mild-moderate pulmonary HTN, c/w acute diastolic HF   - TSH wnl  - Strict I/Os, c/w Estrellita-fit for incontinence and monitoring uop.   - Cards input appreciated: c/w carvedilol 3.125mg BID, spironolactone 25mg daily. restarted home dose lasix 20mg po daily

## 2019-09-08 ENCOUNTER — TRANSCRIPTION ENCOUNTER (OUTPATIENT)
Age: 84
End: 2019-09-08

## 2019-09-08 VITALS
TEMPERATURE: 98 F | OXYGEN SATURATION: 93 % | HEART RATE: 93 BPM | RESPIRATION RATE: 18 BRPM | SYSTOLIC BLOOD PRESSURE: 146 MMHG | DIASTOLIC BLOOD PRESSURE: 84 MMHG

## 2019-09-08 PROCEDURE — 99239 HOSP IP/OBS DSCHRG MGMT >30: CPT

## 2019-09-08 RX ORDER — SPIRONOLACTONE 25 MG/1
1 TABLET, FILM COATED ORAL
Qty: 30 | Refills: 0
Start: 2019-09-08

## 2019-09-08 RX ORDER — CARVEDILOL PHOSPHATE 80 MG/1
1 CAPSULE, EXTENDED RELEASE ORAL
Qty: 60 | Refills: 0
Start: 2019-09-08 | End: 2019-10-07

## 2019-09-08 RX ORDER — HYDROCORTISONE 1 %
1 OINTMENT (GRAM) TOPICAL
Qty: 1000 | Refills: 0
Start: 2019-09-08 | End: 2019-10-07

## 2019-09-08 RX ORDER — NYSTATIN CREAM 100000 [USP'U]/G
1 CREAM TOPICAL
Qty: 1000 | Refills: 0
Start: 2019-09-08 | End: 2019-10-07

## 2019-09-08 RX ORDER — PETROLATUM,WHITE
1 JELLY (GRAM) TOPICAL
Qty: 400 | Refills: 0
Start: 2019-09-08 | End: 2019-10-07

## 2019-09-08 RX ADMIN — SPIRONOLACTONE 25 MILLIGRAM(S): 25 TABLET, FILM COATED ORAL at 06:27

## 2019-09-08 RX ADMIN — Medication 20 MILLIGRAM(S): at 06:28

## 2019-09-08 RX ADMIN — NYSTATIN CREAM 1 APPLICATION(S): 100000 CREAM TOPICAL at 06:27

## 2019-09-08 RX ADMIN — Medication 100 MILLIGRAM(S): at 01:20

## 2019-09-08 RX ADMIN — Medication 112 MICROGRAM(S): at 06:28

## 2019-09-08 RX ADMIN — Medication 1 APPLICATION(S): at 06:27

## 2019-09-08 RX ADMIN — CARVEDILOL PHOSPHATE 3.12 MILLIGRAM(S): 80 CAPSULE, EXTENDED RELEASE ORAL at 06:28

## 2019-09-08 RX ADMIN — Medication 1 APPLICATION(S): at 11:21

## 2019-09-08 RX ADMIN — Medication 1 TABLET(S): at 11:21

## 2019-09-08 NOTE — PROGRESS NOTE ADULT - PROVIDER SPECIALTY LIST ADULT
Cardiology
Internal Medicine
Pulmonology
Internal Medicine

## 2019-09-08 NOTE — PROGRESS NOTE ADULT - ASSESSMENT
89 y/o F PMHx HTN, hypothyroidism, and ESBL UTIs, presents from [facility] with progressive shortness of breath x 1 week, found to have pleural effusions 2/2 acute diastolic hf. Clinical/lab and echo criteria all consistent with acute diastolic heart failure, diuresed, pending dispo back to [facility].

## 2019-09-08 NOTE — DISCHARGE NOTE NURSING/CASE MANAGEMENT/SOCIAL WORK - PATIENT PORTAL LINK FT
You can access the FollowMyHealth Patient Portal offered by [facility] by registering at the following website: [email or web address]. By joining [facility]’s FollowMyHealth portal, you will also be able to view your health information using other applications (apps) compatible with our system.

## 2019-09-08 NOTE — PROGRESS NOTE ADULT - PROBLEM SELECTOR PLAN 6
chronic fungal dermatitis, no response to nystatin power alone   -c/w nystatin power TID   -s/p 1 dose Fluconazole 150 PO 9/2    #pruritic rash in the patients axillary region b/l.  - C/w hydrocortisone Ointment

## 2019-09-08 NOTE — PROGRESS NOTE ADULT - ATTENDING COMMENTS
-Patient seen/examined on 9/8/19. Case/plan discussed with the resident as reviewed/edited by me above and in any comments below.  -Patient stable for DC with outpatient follow up. -Patient seen/examined on 9/8/19. Case/plan discussed with the resident as reviewed/edited by me above and in any comments below.  -Patient stable for DC with outpatient follow up. -Form for [facility] completed/signed.   -34 minutes spent on the discharge process.

## 2019-09-08 NOTE — PROGRESS NOTE ADULT - ATTENDING COMMENTS
Agree with above assessment and plan as outlined above.    - Cont PO lasix  - D/C planning    Malcolm Valdez MD, [facility]  BEEPER [phone]

## 2019-09-08 NOTE — PROGRESS NOTE ADULT - SUBJECTIVE AND OBJECTIVE BOX
pt seen and examined, no complaints, ROS - .          acetaminophen   Tablet .. 650 milliGRAM(s) Oral every 6 hours PRN  AQUAPHOR (petrolatum Ointment) 1 Application(s) Topical two times a day  benzonatate 100 milliGRAM(s) Oral three times a day PRN  carvedilol 3.125 milliGRAM(s) Oral every 12 hours  furosemide    Tablet 20 milliGRAM(s) Oral daily  hydrocortisone 1% Ointment 1 Application(s) Topical daily  levothyroxine 112 MICROGram(s) Oral daily  multivitamin/minerals 1 Tablet(s) Oral daily  nystatin Powder 1 Application(s) Topical three times a day  polyethylene glycol 3350 17 Gram(s) Oral daily  senna 1 Tablet(s) Oral daily  spironolactone 25 milliGRAM(s) Oral daily                            10.0   9.79  )-----------( 288      ( 07 Sep 2019 10:59 )             32.0       Hemoglobin: 10.0 g/dL (09-07 @ 10:59)  Hemoglobin: 11.4 g/dL (09-06 @ 08:43)  Hemoglobin: 10.9 g/dL (09-05 @ 08:35)  Hemoglobin: 10.3 g/dL (09-04 @ 09:33)  Hemoglobin: 9.6 g/dL (09-03 @ 09:08)      09-07    138  |  99  |  29<H>  ----------------------------<  100<H>  4.1   |  25  |  1.20    Ca    9.1      07 Sep 2019 07:02      Creatinine Trend: 1.20<--, 1.44<--, 1.28<--, 1.23<--, 1.45<--, 1.78<--    COAGS:           T(C): 36.4 (09-08-19 @ 04:57), Max: 36.7 (09-07-19 @ 21:00)  HR: 98 (09-08-19 @ 04:57) (95 - 105)  BP: 159/80 (09-08-19 @ 04:57) (107/68 - 159/80)  RR: 18 (09-08-19 @ 04:57) (18 - 18)  SpO2: 97% (09-08-19 @ 04:57) (94% - 97%)  Wt(kg): --    I&O's Summary    07 Sep 2019 07:01  -  08 Sep 2019 07:00  --------------------------------------------------------  IN: 410 mL / OUT: 1050 mL / NET: -640 mL          GENERAL: NAD, well-groomed, well-developed  NECK: no JVD   EYES: EOMI, conjunctiva and sclera clear  CHEST/LUNG: decreased basilar bs, L>R  HEART: Regular rate and rhythm; No murmurs, rubs, or gallops  ABDOMEN: Soft, Nontender, Nondistended;   EXTREMITIES: No LE edema  SKIN: erythema under the pannus stable, less inflamed. Also in the axilla b/l patient with pruritic rash.   NERVOUS SYSTEM:  Alert & Oriented X3, Moves all extremities, no focal deficit    echo: normal LVEF      A/P) 89 y/o female PMH HTN, hypothyroidism, admitted with dyspnea and a left pleural effusion. s/p thoracentesis.    - tolerating daily lasix / aldactone   - HR stable on coreg   - GI / DVT prophylaxis,  keep K>4, mag >2.0   -f/u with her cardiologist Dr. Buckley after discharge  D/W Dr Valdez

## 2019-09-08 NOTE — PROGRESS NOTE ADULT - PROBLEM SELECTOR PLAN 2
Physical exam/lab/echo consistent with acute diastolic heart failure. CTPA neg for PE, No consolidations seen to suggest PNA.   - 96% on RA, 90% at the end of ambulation  - s/p thoracentesis 9/6 with no complications 700cc pleural fluid removal   -rpt cxr Trace left pleural effusion which is improved from prior exam. No   pneumothorax.   - Incentive spirometer, OOB to chair and ambulate at tolerated  -Pulm recs appreciated. Symptomatically much improved.

## 2019-09-08 NOTE — PROGRESS NOTE ADULT - PROBLEM SELECTOR PLAN 5
Patient sensitive to meds, became hypotensive to sbp 60s morning of 9/1 after given meds  - cards recs appreciated:  c/w carvedilol 3.125mg BID, spironolactone 25mg daily.

## 2019-09-08 NOTE — DISCHARGE NOTE NURSING/CASE MANAGEMENT/SOCIAL WORK - NSDCFUADDAPPT_GEN_ALL_CORE_FT
Dr. Rodriguez - Tuesday at the [facility]  Dr. Shah - Wednesday at 9:30am    Please also follow up with your outpatient cardiologist Dr. Kam Buckley

## 2019-09-08 NOTE — PROGRESS NOTE ADULT - SUBJECTIVE AND OBJECTIVE BOX
Internal Medicine Team Progress Note  Emily Vasquez, PGY 2  [phone]    CLAUDIASOLIS  Patient is a 89y old  Female who presents with a chief complaint of shortness of breath (08 Sep 2019 08:11)      INTERVAL HPI / SUBJECTIVE:    No acute events overnight. Patient is eager to go home.  Denies any fevers, chills, night sweats, SOB, chest pain, abdominal pain. Complaining of cough improved with tessalon pearle.         MEDICATIONS:   MEDICATIONS  (STANDING):  AQUAPHOR (petrolatum Ointment) 1 Application(s) Topical two times a day  carvedilol 3.125 milliGRAM(s) Oral every 12 hours  furosemide    Tablet 20 milliGRAM(s) Oral daily  hydrocortisone 1% Ointment 1 Application(s) Topical daily  levothyroxine 112 MICROGram(s) Oral daily  multivitamin/minerals 1 Tablet(s) Oral daily  nystatin Powder 1 Application(s) Topical three times a day  polyethylene glycol 3350 17 Gram(s) Oral daily  senna 1 Tablet(s) Oral daily  spironolactone 25 milliGRAM(s) Oral daily    MEDICATIONS  (PRN):  acetaminophen   Tablet .. 650 milliGRAM(s) Oral every 6 hours PRN Mild Pain (1 - 3), Moderate Pain (4 - 6)  benzonatate 100 milliGRAM(s) Oral three times a day PRN Cough      ALLERGIES:   Allergies    sulfa topicals (Unknown)    Intolerances    morphine (Drowsiness; Faint; Hypotension)      OBJECTIVE:  Vital Signs Last 24 Hrs  T(C): 36.4 (08 Sep 2019 04:57), Max: 36.7 (07 Sep 2019 21:00)  T(F): 97.6 (08 Sep 2019 04:57), Max: 98.1 (07 Sep 2019 21:00)  HR: 98 (08 Sep 2019 04:57) (95 - 105)  BP: 159/80 (08 Sep 2019 04:57) (107/68 - 159/80)  BP(mean): --  RR: 18 (08 Sep 2019 04:57) (18 - 18)  SpO2: 97% (08 Sep 2019 04:57) (94% - 97%)    I&O's Summary    07 Sep 2019 07:01  -  08 Sep 2019 07:00  --------------------------------------------------------  IN: 460 mL / OUT: 1050 mL / NET: -590 mL        PHYSICAL EXAM:  General: Well-appearing, NAD  HEENT:  EOMI, conjunctiva and sclera clear, normal oropharynx  Neck:  Supple,   Chest/Lungs: CTA B/L, no rales, rhonchi, rub or wheezing  Heart: RRR, normal S1, S2, no murmurs or gallops  Abdomen: Soft, nondistended, NTTP, normoactive bowel sounds  Extremities: Peripheral pulses 2+ B/L, no edema, cyanosis or clubbing  Skin: Warm, well-perfused, no rashes or lesions  Neurological: A&Ox3, no focal deficits      LABS: NO AM labs       IMAGING: No new imaging       Labs and imaging personally reviewed and interpreted [x  ]  Consult notes reviewed [ x ]  Case discussed with consultants [x  ]

## 2019-09-08 NOTE — PROGRESS NOTE ADULT - PROBLEM SELECTOR PLAN 4
Hx of recurrent ESBL UTIs. Currently not endorsing dysuria, UA w/ mod LE. No systemic signs of infection  - Will monitor off antibiotics for now  -c/o burning sensation 9/7, will check UA - negative.

## 2019-09-08 NOTE — PROGRESS NOTE ADULT - PROBLEM SELECTOR PLAN 9
DVT Prophylaxis: SCD's.   Diet: DASH fluid restricted.   Dispo: pending dispo back to [facility] Has outpatient doctor, Dr. Rodriguez her long term care facility who can see her on Tuesdays. Also has Dr. Kam Buckley (cardio) and Dr. Shah (pcp) who she can follow up with.  Code status: DNR.

## 2019-09-08 NOTE — PROGRESS NOTE ADULT - PROBLEM SELECTOR PLAN 1
Acute decompensated diastolic heart failure. Unclear underlying cause of CHF exacerbation. Low suspicion for ACS given no chest pain and trops neg, although noted new TWI V5-V6 but nonspecific, and no ST segment changes.   - TTE shows hyperdynamic LVSF, moderately increased LAP, mild-moderate pulmonary HTN, c/w acute diastolic HF   - TSH wnl  - Strict I/Os, c/w Estrellita-fit for incontinence and monitoring uop.   - Cards input appreciated: c/w carvedilol 3.125mg BID, spironolactone 25mg daily, lasix 20mg PO daily

## 2019-09-10 ENCOUNTER — OTHER (OUTPATIENT)
Age: 84
End: 2019-09-10

## 2019-09-10 ENCOUNTER — APPOINTMENT (OUTPATIENT)
Dept: GERIATRICS | Facility: ASSISTED LIVING FACILITY | Age: 84
End: 2019-09-10
Payer: MEDICARE

## 2019-09-10 DIAGNOSIS — Z87.81 PERSONAL HISTORY OF (HEALED) TRAUMATIC FRACTURE: ICD-10-CM

## 2019-09-10 PROBLEM — H35.30 UNSPECIFIED MACULAR DEGENERATION: Chronic | Status: ACTIVE | Noted: 2019-08-30

## 2019-09-10 LAB
-  AMIKACIN: SIGNIFICANT CHANGE UP
-  AMPICILLIN/SULBACTAM: SIGNIFICANT CHANGE UP
-  AMPICILLIN: SIGNIFICANT CHANGE UP
-  AZTREONAM: SIGNIFICANT CHANGE UP
-  CEFAZOLIN: SIGNIFICANT CHANGE UP
-  CEFEPIME: SIGNIFICANT CHANGE UP
-  CEFOXITIN: SIGNIFICANT CHANGE UP
-  CEFTRIAXONE: SIGNIFICANT CHANGE UP
-  CIPROFLOXACIN: SIGNIFICANT CHANGE UP
-  ERTAPENEM: SIGNIFICANT CHANGE UP
-  GENTAMICIN: SIGNIFICANT CHANGE UP
-  IMIPENEM: SIGNIFICANT CHANGE UP
-  LEVOFLOXACIN: SIGNIFICANT CHANGE UP
-  MEROPENEM: SIGNIFICANT CHANGE UP
-  NITROFURANTOIN: SIGNIFICANT CHANGE UP
-  PIPERACILLIN/TAZOBACTAM: SIGNIFICANT CHANGE UP
-  TIGECYCLINE: SIGNIFICANT CHANGE UP
-  TOBRAMYCIN: SIGNIFICANT CHANGE UP
-  TRIMETHOPRIM/SULFAMETHOXAZOLE: SIGNIFICANT CHANGE UP
COMMENT - FLUIDS: SIGNIFICANT CHANGE UP
CULTURE RESULTS: SIGNIFICANT CHANGE UP
METHOD TYPE: SIGNIFICANT CHANGE UP
NON-GYNECOLOGICAL CYTOLOGY STUDY: SIGNIFICANT CHANGE UP
ORGANISM # SPEC MICROSCOPIC CNT: SIGNIFICANT CHANGE UP
ORGANISM # SPEC MICROSCOPIC CNT: SIGNIFICANT CHANGE UP
SPECIMEN SOURCE: SIGNIFICANT CHANGE UP

## 2019-09-11 LAB
CULTURE RESULTS: SIGNIFICANT CHANGE UP
SPECIMEN SOURCE: SIGNIFICANT CHANGE UP

## 2019-09-13 ENCOUNTER — APPOINTMENT (OUTPATIENT)
Dept: CARE COORDINATION | Facility: HOME HEALTH | Age: 84
End: 2019-09-13

## 2019-09-16 ENCOUNTER — APPOINTMENT (OUTPATIENT)
Dept: CARE COORDINATION | Facility: HOME HEALTH | Age: 84
End: 2019-09-16
Payer: MEDICARE

## 2019-09-16 VITALS
OXYGEN SATURATION: 97 % | SYSTOLIC BLOOD PRESSURE: 118 MMHG | DIASTOLIC BLOOD PRESSURE: 72 MMHG | TEMPERATURE: 97.1 F | HEART RATE: 82 BPM | RESPIRATION RATE: 16 BRPM

## 2019-09-16 PROCEDURE — 99496 TRANSJ CARE MGMT HIGH F2F 7D: CPT

## 2019-09-17 RX ORDER — CHOLECALCIFEROL (VITAMIN D3) 50 MCG
50 MCG TABLET ORAL
Refills: 0 | Status: DISCONTINUED | COMMUNITY
Start: 2018-07-07 | End: 2019-09-17

## 2019-09-17 RX ORDER — FERROUS SULFATE 325(65) MG
325 (65 FE) TABLET ORAL 3 TIMES DAILY
Refills: 0 | Status: DISCONTINUED | COMMUNITY
Start: 2018-07-07 | End: 2019-09-17

## 2019-09-17 RX ORDER — AMLODIPINE BESYLATE 10 MG/1
10 TABLET ORAL
Qty: 30 | Refills: 3 | Status: DISCONTINUED | COMMUNITY
Start: 2018-07-07 | End: 2019-09-17

## 2019-09-17 RX ORDER — ATENOLOL 50 MG/1
50 TABLET ORAL DAILY
Qty: 90 | Refills: 3 | Status: DISCONTINUED | COMMUNITY
Start: 2018-07-07 | End: 2019-09-17

## 2019-09-17 RX ORDER — LEVOTHYROXINE SODIUM 0.09 MG/1
88 TABLET ORAL DAILY
Qty: 30 | Refills: 4 | Status: DISCONTINUED | COMMUNITY
Start: 2018-07-07 | End: 2019-09-17

## 2019-09-17 RX ORDER — BETAMETHASONE DIPROPIONATE 0.5 MG/G
0.05 CREAM, AUGMENTED TOPICAL TWICE DAILY
Qty: 1 | Refills: 1 | Status: DISCONTINUED | COMMUNITY
Start: 2018-09-11 | End: 2019-09-17

## 2019-09-17 RX ORDER — CELECOXIB 100 MG/1
100 CAPSULE ORAL
Qty: 30 | Refills: 2 | Status: DISCONTINUED | COMMUNITY
Start: 2019-05-31 | End: 2019-09-17

## 2019-09-17 RX ORDER — B-COMPLEX WITH VITAMIN C
1250 (500 CA) TABLET ORAL DAILY
Qty: 30 | Refills: 0 | Status: DISCONTINUED | COMMUNITY
Start: 2018-07-07 | End: 2019-09-17

## 2019-09-17 RX ORDER — ATENOLOL 50 MG/1
TABLET ORAL
Refills: 0 | Status: DISCONTINUED | COMMUNITY
End: 2019-09-17

## 2019-09-17 RX ORDER — FESOTERODINE FUMARATE 4 MG/1
4 TABLET, FILM COATED, EXTENDED RELEASE ORAL
Qty: 30 | Refills: 3 | Status: DISCONTINUED | COMMUNITY
Start: 2018-07-07 | End: 2019-09-17

## 2019-09-19 PROCEDURE — 87633 RESP VIRUS 12-25 TARGETS: CPT

## 2019-09-19 PROCEDURE — 87102 FUNGUS ISOLATION CULTURE: CPT

## 2019-09-19 PROCEDURE — 87075 CULTR BACTERIA EXCEPT BLOOD: CPT

## 2019-09-19 PROCEDURE — 87086 URINE CULTURE/COLONY COUNT: CPT

## 2019-09-19 PROCEDURE — 87486 CHLMYD PNEUM DNA AMP PROBE: CPT

## 2019-09-19 PROCEDURE — 97116 GAIT TRAINING THERAPY: CPT

## 2019-09-19 PROCEDURE — 71045 X-RAY EXAM CHEST 1 VIEW: CPT

## 2019-09-19 PROCEDURE — 80048 BASIC METABOLIC PNL TOTAL CA: CPT

## 2019-09-19 PROCEDURE — 81003 URINALYSIS AUTO W/O SCOPE: CPT

## 2019-09-19 PROCEDURE — 99285 EMERGENCY DEPT VISIT HI MDM: CPT | Mod: 25

## 2019-09-19 PROCEDURE — 71046 X-RAY EXAM CHEST 2 VIEWS: CPT

## 2019-09-19 PROCEDURE — 86900 BLOOD TYPING SEROLOGIC ABO: CPT

## 2019-09-19 PROCEDURE — 83615 LACTATE (LD) (LDH) ENZYME: CPT

## 2019-09-19 PROCEDURE — 97110 THERAPEUTIC EXERCISES: CPT

## 2019-09-19 PROCEDURE — 84157 ASSAY OF PROTEIN OTHER: CPT

## 2019-09-19 PROCEDURE — 84443 ASSAY THYROID STIM HORMONE: CPT

## 2019-09-19 PROCEDURE — 71275 CT ANGIOGRAPHY CHEST: CPT

## 2019-09-19 PROCEDURE — 87186 SC STD MICRODIL/AGAR DIL: CPT

## 2019-09-19 PROCEDURE — 86901 BLOOD TYPING SEROLOGIC RH(D): CPT

## 2019-09-19 PROCEDURE — 85610 PROTHROMBIN TIME: CPT

## 2019-09-19 PROCEDURE — 87581 M.PNEUMON DNA AMP PROBE: CPT

## 2019-09-19 PROCEDURE — 87798 DETECT AGENT NOS DNA AMP: CPT

## 2019-09-19 PROCEDURE — 87205 SMEAR GRAM STAIN: CPT

## 2019-09-19 PROCEDURE — 83986 ASSAY PH BODY FLUID NOS: CPT

## 2019-09-19 PROCEDURE — 96374 THER/PROPH/DIAG INJ IV PUSH: CPT | Mod: XU

## 2019-09-19 PROCEDURE — 94640 AIRWAY INHALATION TREATMENT: CPT

## 2019-09-19 PROCEDURE — 97530 THERAPEUTIC ACTIVITIES: CPT

## 2019-09-19 PROCEDURE — 93005 ELECTROCARDIOGRAM TRACING: CPT

## 2019-09-19 PROCEDURE — 81001 URINALYSIS AUTO W/SCOPE: CPT

## 2019-09-19 PROCEDURE — 87070 CULTURE OTHR SPECIMN AEROBIC: CPT

## 2019-09-19 PROCEDURE — 83735 ASSAY OF MAGNESIUM: CPT

## 2019-09-19 PROCEDURE — 84100 ASSAY OF PHOSPHORUS: CPT

## 2019-09-19 PROCEDURE — 82042 OTHER SOURCE ALBUMIN QUAN EA: CPT

## 2019-09-19 PROCEDURE — 80053 COMPREHEN METABOLIC PANEL: CPT

## 2019-09-19 PROCEDURE — 93306 TTE W/DOPPLER COMPLETE: CPT

## 2019-09-19 PROCEDURE — 85027 COMPLETE CBC AUTOMATED: CPT

## 2019-09-19 PROCEDURE — 84484 ASSAY OF TROPONIN QUANT: CPT

## 2019-09-19 PROCEDURE — 82945 GLUCOSE OTHER FLUID: CPT

## 2019-09-19 PROCEDURE — 85730 THROMBOPLASTIN TIME PARTIAL: CPT

## 2019-09-19 PROCEDURE — 97161 PT EVAL LOW COMPLEX 20 MIN: CPT

## 2019-09-19 PROCEDURE — 86850 RBC ANTIBODY SCREEN: CPT

## 2019-09-19 PROCEDURE — 83880 ASSAY OF NATRIURETIC PEPTIDE: CPT

## 2019-09-19 PROCEDURE — 88305 TISSUE EXAM BY PATHOLOGIST: CPT

## 2019-09-19 PROCEDURE — 88112 CYTOPATH CELL ENHANCE TECH: CPT

## 2019-09-19 PROCEDURE — 96375 TX/PRO/DX INJ NEW DRUG ADDON: CPT | Mod: XU

## 2019-09-19 PROCEDURE — 89051 BODY FLUID CELL COUNT: CPT

## 2019-09-20 ENCOUNTER — APPOINTMENT (OUTPATIENT)
Dept: RADIOLOGY | Facility: CLINIC | Age: 84
End: 2019-09-20
Payer: MEDICARE

## 2019-09-20 ENCOUNTER — OUTPATIENT (OUTPATIENT)
Dept: OUTPATIENT SERVICES | Facility: HOSPITAL | Age: 84
LOS: 1 days | End: 2019-09-20
Payer: MEDICARE

## 2019-09-20 DIAGNOSIS — R05 COUGH: ICD-10-CM

## 2019-09-20 DIAGNOSIS — Z98.49 CATARACT EXTRACTION STATUS, UNSPECIFIED EYE: Chronic | ICD-10-CM

## 2019-09-20 DIAGNOSIS — O00.1 TUBAL PREGNANCY: Chronic | ICD-10-CM

## 2019-09-20 DIAGNOSIS — Z96.7 PRESENCE OF OTHER BONE AND TENDON IMPLANTS: Chronic | ICD-10-CM

## 2019-09-20 DIAGNOSIS — Z98.890 OTHER SPECIFIED POSTPROCEDURAL STATES: Chronic | ICD-10-CM

## 2019-09-20 PROCEDURE — 71046 X-RAY EXAM CHEST 2 VIEWS: CPT | Mod: 26

## 2019-09-20 PROCEDURE — 71046 X-RAY EXAM CHEST 2 VIEWS: CPT

## 2019-09-24 ENCOUNTER — APPOINTMENT (OUTPATIENT)
Dept: GERIATRICS | Facility: ASSISTED LIVING FACILITY | Age: 84
End: 2019-09-24
Payer: MEDICARE

## 2019-10-01 ENCOUNTER — APPOINTMENT (OUTPATIENT)
Dept: GERIATRICS | Facility: ASSISTED LIVING FACILITY | Age: 84
End: 2019-10-01
Payer: MEDICARE

## 2019-10-01 ENCOUNTER — OTHER (OUTPATIENT)
Age: 84
End: 2019-10-01

## 2019-10-02 ENCOUNTER — RX RENEWAL (OUTPATIENT)
Age: 84
End: 2019-10-02

## 2019-10-02 NOTE — PHYSICAL EXAM
[] : no respiratory distress [Respiration, Rhythm And Depth] : normal respiratory rhythm and effort [Exaggerated Use Of Accessory Muscles For Inspiration] : no accessory muscle use [Nail Clubbing] : no clubbing  or cyanosis of the fingernails [Involuntary Movements] : no involuntary movements were seen [FreeTextEntry1] : erythematous papules and macules with satillite lesions over upper chest, base of neck, b/l axilla, and under breasts and on left arm

## 2019-10-02 NOTE — HISTORY OF PRESENT ILLNESS
[FreeTextEntry1] : 89yoF seen for acute visit for complaint of new rash around neck, left arm, under breasts, and within axilla. \par she has been using ketoconazole cream with little relief.\par notes increased moisture.\par notes same rash within her groin that she is using ketoconazole on with improvement\par \par \par also notes last night had increased urinary urgency associated with suprapubic discomfort x 4 times.\par today she denies ongoing urinary urgency, dysuria, or pain/discomfort.\par \par denies fever chills.

## 2019-10-04 LAB
APPEARANCE: CLEAR
BILIRUBIN URINE: NEGATIVE
BLOOD URINE: NEGATIVE
COLOR: NORMAL
GLUCOSE QUALITATIVE U: NEGATIVE
KETONES URINE: NEGATIVE
LEUKOCYTE ESTERASE URINE: NEGATIVE
NITRITE URINE: NEGATIVE
PH URINE: 7
PROTEIN URINE: NORMAL
SPECIFIC GRAVITY URINE: 1.02
UROBILINOGEN URINE: NORMAL

## 2019-10-05 LAB
CULTURE RESULTS: SIGNIFICANT CHANGE UP
SPECIMEN SOURCE: SIGNIFICANT CHANGE UP

## 2019-10-08 NOTE — PHYSICAL EXAM
[No Acute Distress] : no acute distress [Well Nourished] : well nourished [Neck Supple] : was supple [No Tracheal Deviation] : the trachea was midline [Normal Rhythm/Effort] : normal respiratory rhythm and effort [Dry Cough] : a dry cough was heard [Rales / Crackles Bilateral] : no rales or crackles were heard [Wheezing Unilaterally On The Left At The Midlung Field] : wheezing was heard over the left midlung field [Rhonchi Bilateral] : no rhonchi were heard [Decreased Breath Sounds Unilaterally Left Lung Base] : breath sounds were diminished over the left base [Regular Rhythm] : with a regular rhythm [Normal S1, S2] : normal S1 and S2 [Pedal Pulses Present] : the pedal pulses are present [No Extremity Clubbing/Cyanosis] : no extremity clubbing/cyanosis [Soft] : abdomen soft [Non Tender] : non-tender [No Masses] : no abdominal mass palpated [Normal Bowel Sounds] : normal bowel sounds [Normal] : affect was normal and insight and judgment were intact [Acc Muscles Use] : no accessory muscle use [de-identified] : trace b/l lower extremity edema [Air Hunger] : no air hunger [de-identified] : left breast and b/l groin with resolving rash/redness [de-identified] : deferred

## 2019-10-08 NOTE — ASSESSMENT
[FreeTextEntry1] : 89 y/o female with pmhx of heart failure, htn,fungal dermatitis with recent admission at [facility] for heart failure/thoracentesis. Patient endorses that she does have mild intermittent HAYES with long walks but resolves quickly at rest. Denies chest pain, sob at rest, fever/chills, poor appetite, dysuria, hematuria, melena, constipation. Patient ambulates with rollator and resides at assisted living. Patient is a&o x 3 and behavior is appropriate for situation. Patient handles her medications independently. Overall, patient is stable.

## 2019-10-08 NOTE — REVIEW OF SYSTEMS
[Nasal Discharge] : nasal discharge [Postnasal Drip] : postnasal drip [Lower Ext Edema] : lower extremity edema [Cough] : cough [Wheezing] : wheezing [Dyspnea on Exertion] : dyspnea on exertion [Skin Rash] : skin rash [Negative] : Heme/Lymph [Earache] : no earache [Nosebleed] : no nosebleeds [Hoarseness] : no hoarseness [Sore Throat] : no sore throat

## 2019-10-08 NOTE — PLAN
[FreeTextEntry1] : Patient to follow up with Dr. Buckley on 9/17/19; patient will like to discuss plan with cards before adding anything for cough. \par \par Health [employer] TCM STARS teaching: Enforced with patient need for daily weights. Advised to call for an increase of greater than 2 lbs. in a day or 5 pounds in a week. Adhere to low salt diet and educated patient on foods that should be avoided such as processed or fast food. Continue medications as ordered.  Follow up with Cardiologist. Contact information given, patient advised to call with any questions/concerns.

## 2019-10-08 NOTE — HISTORY OF PRESENT ILLNESS
[FreeTextEntry1] : Follow up for hospitalization: Heart Failure [de-identified] : As per hospital course authored by Emily Vasquez MD on 9/8/19: \par "This is a 89F w/ PMH HTN, recurrent ESBL UTIs, and hypothyroidism who presented from the [facility] to [facility] ED on 8/29 w/ SOBx1. CTA on admission showed a moderate L and mild R pleural effusions. Pulm was consulted for possible thoracocentesis, but wanted trial of diuresis first given her old age. She was admitted to [facility] on 8/30. ECHO on 8/30 showed hyperdynamic LVSF, increased LA pressure, and mild-moderate pulmonary HTN. Cards was consulted and stated ECHO was suggestive of diastolic HF. She was diuresed w/ Lasix 40mg IV 8/30-9/5 with improvement in respiratory status, however patient still feels symptomatic with exertion. Pulm performed thoracentesis 9/6 without complication, cxr showed improved pleural effusion and no pneumothorax, patient's o2 saturation 95-96% on RA and 91 with ambulation. \par Cardiology was cslt'ed for acute diastolic HF, with rec for medication optimization. Initially amlodipine 10mg was switched to Coreg 12.5mg q12 and lisinopril 20mg qd started on 8/31, but patient became hypotensive to SBP 60s and Cr increased to 1.86. patient improved w/ 500cc bolus and all bp meds were held. once Renal function improved and sbp became stable, we restarted Lasix 40mg IV, Coreg 3.125, and added Spironolactone 25 qd. Per Cardiology rec: c/w home lasix 20 PO daily, coreg 3.125 q12 and spironolactone 25 daily.\par Patient has fungal infection in the b/l axilla and under the pannus, nyastatin wasn't effective, patient received fluconazole x1 with improvement in symptoms  \par On rpt pt eval patient walked 125ft with o2 sat of 95 on RA. plan home with rehab at [facility] assisted living \par 9/7 burning sensation, UA given hx of ESBL UTI in the past, Negative result. \par Patient clinically improved, she is currently HDS, and sob resolved, she is ready for discharge back to [facility] with PT services."\par \par SOLIS TAYLOR is being seen after discharge home from [facility] hospital. She was admitted on 8/29/19 for SOB/HF and discharged home on 09/8/2019. Discharge medications were reviewed and reconciled with the current medication list and medications in home. \par \par \par \par

## 2019-10-13 NOTE — HISTORY OF PRESENT ILLNESS
[FreeTextEntry1] : 89yoF seen for follow up:\par \par Plueral effusion and CHF:\par \par denies worsening sob:  \par weight has been stable.\par \par ambulating with walker without any recent falls\par \par rash is improving under breasts and groin: saw dermatology using ketoconazole.\par \par notes after her dtr came to visit, left sunday:  she is feeling down, alone.\par denies SI.  sleeping okay,  eating okay.  large functional change since last hospitalization.

## 2019-10-13 NOTE — REVIEW OF SYSTEMS
[Feeling Tired] : feeling tired [Eyesight Problems] : eyesight problems [As Noted in HPI] : as noted in HPI [Incontinence] : incontinence [Negative] : Psychiatric [Dysuria] : no dysuria

## 2019-10-13 NOTE — PHYSICAL EXAM
[General Appearance - Alert] : alert [General Appearance - In No Acute Distress] : in no acute distress [Sclera] : the sclera and conjunctiva were normal [Extraocular Movements] : extraocular movements were intact [No Oral Pallor] : no oral pallor [Neck Appearance] : the appearance of the neck was normal [] : no respiratory distress [Respiration, Rhythm And Depth] : normal respiratory rhythm and effort [Exaggerated Use Of Accessory Muscles For Inspiration] : no accessory muscle use [Heart Rate And Rhythm] : heart rate was normal and rhythm regular [Heart Sounds] : normal S1 and S2 [Bowel Sounds] : normal bowel sounds [Abdomen Soft] : soft [Abdomen Tenderness] : non-tender [No Spinal Tenderness] : no spinal tenderness [Nail Clubbing] : no clubbing  or cyanosis of the fingernails [Involuntary Movements] : no involuntary movements were seen [No Focal Deficits] : no focal deficits [Affect] : the affect was normal [Mood] : the mood was normal [FreeTextEntry1] : erythematous rash within abominal folds/inguinal area

## 2019-10-13 NOTE — PHYSICAL EXAM
[General Appearance - Alert] : alert [General Appearance - In No Acute Distress] : in no acute distress [Sclera] : the sclera and conjunctiva were normal [Extraocular Movements] : extraocular movements were intact [No Oral Pallor] : no oral pallor [Neck Appearance] : the appearance of the neck was normal [] : no respiratory distress [Respiration, Rhythm And Depth] : normal respiratory rhythm and effort [Exaggerated Use Of Accessory Muscles For Inspiration] : no accessory muscle use [Heart Rate And Rhythm] : heart rate was normal and rhythm regular [Heart Sounds] : normal S1 and S2 [Bowel Sounds] : normal bowel sounds [Abdomen Soft] : soft [Abdomen Tenderness] : non-tender [No Spinal Tenderness] : no spinal tenderness [Nail Clubbing] : no clubbing  or cyanosis of the fingernails [Involuntary Movements] : no involuntary movements were seen [No Focal Deficits] : no focal deficits [Affect] : the affect was normal [Mood] : the mood was normal [FreeTextEntry1] : erythematous rash within abdominal folds

## 2019-10-13 NOTE — ASSESSMENT
[FreeTextEntry1] : diastolic heart failure:\par -euvolemic today\par -cw carvedilol 3.125 bid\par spironolactone 25mg daily\par lasix 20mg daily\par -daily weights and keep log.\par - Cardiology Dr. Buckley\par \par \par Pleural effusion:\par -no sob\par -monitor symptoms, weights\par \par depression:  \par -discussed psychotherapy by visiting therapist\par -she will let me know if she wants to proceed\par -no medication at this time\par \par rash: improving with ketoconazole\par recurrent Urinary tract infection:urinary incontinence.  asymptomatic. monitor\par hypothyroidisM: c/w 112mcg daily.\par \par \par \par starting home care\par \par dermatology referral\par \par Cr 1.19\par repeat bloodwork\par \par

## 2019-10-13 NOTE — ASSESSMENT
[FreeTextEntry1] : diastolic heart failure:\par -stopped atenolol and norvac during hospitalization\par -cw carvedilol 3.125 bid\par spironolactone 25mg daily\par lasix 20mg daily\par -daily weights and keep log.\par -f/u with Cardiology Dr. Buckley next week.\par \par \par Pleural effusion:\par -no sob\par -monitor symtpoms, weights\par -prn tessalon perles\par \par no constipation or diarrhea: not currently taking miralax or senna\par \par rash: likely fungal: did not improve on nystatin\par Urinary tract infection: from previous hospitalization.  urinary incontinence.  asymptomatic. monitor\par hypothyroidisM: c/w 112mcg daily.\par \par follow up in 2 weeks for pleural effusion, weights\par \par starting home care\par \par dermatology referral\par \par Cr 1.19\par repeat bloodwork\par \par

## 2019-10-13 NOTE — HISTORY OF PRESENT ILLNESS
[FreeTextEntry1] : 89yoF seen for follow up after hospitalization with sob with bilateral pleural effusions.\par \par patient reports she did not take any of her medications since coming home.\par she is unclear on what medications to take.\par \par she denies any dysuria, diarrhea, or worsening sob.\par still has some coughing, but improved.\par \par home therapy has not yet started.\par \par hospital records reviewed: Acute diastolic heart failure, with b/l pleural effusions, diuresed, and had thoracentesis without complications on 9/5.\par also developed hypotension: and ANNA: improved with fluids and adjustment in medications.\par CXR post thoracentesis without PTX\par \par \par 135/78\par \par 93% ox 2\par HR of 85\par

## 2019-10-24 ENCOUNTER — APPOINTMENT (OUTPATIENT)
Dept: PULMONOLOGY | Facility: CLINIC | Age: 84
End: 2019-10-24
Payer: MEDICARE

## 2019-10-24 VITALS
DIASTOLIC BLOOD PRESSURE: 65 MMHG | OXYGEN SATURATION: 99 % | TEMPERATURE: 98 F | HEART RATE: 72 BPM | SYSTOLIC BLOOD PRESSURE: 99 MMHG | RESPIRATION RATE: 12 BRPM

## 2019-10-24 DIAGNOSIS — R05 COUGH: ICD-10-CM

## 2019-10-24 PROCEDURE — 71046 X-RAY EXAM CHEST 2 VIEWS: CPT

## 2019-10-24 PROCEDURE — 99204 OFFICE O/P NEW MOD 45 MIN: CPT | Mod: 25

## 2019-10-25 NOTE — PHYSICAL EXAM
[General Appearance - Well Developed] : well developed [Well Groomed] : well groomed [Normal Appearance] : normal appearance [General Appearance - Well Nourished] : well nourished [No Deformities] : no deformities [Normal Oropharynx] : normal oropharynx [General Appearance - In No Acute Distress] : no acute distress [Heart Rate And Rhythm] : heart rate and rhythm were normal [Heart Sounds] : normal S1 and S2 [Murmurs] : no murmurs present [Respiration, Rhythm And Depth] : normal respiratory rhythm and effort [Exaggerated Use Of Accessory Muscles For Inspiration] : no accessory muscle use [Abdomen Soft] : soft [Auscultation Breath Sounds / Voice Sounds] : lungs were clear to auscultation bilaterally [] : no hepato-splenomegaly [Abdomen Tenderness] : non-tender [Abdomen Mass (___ Cm)] : no abdominal mass palpated [Gait - Sufficient For Exercise Testing] : the gait was sufficient for exercise testing [Abnormal Walk] : normal gait

## 2019-10-25 NOTE — PROCEDURE
[FreeTextEntry1] : Chest x-ray PA and lateral views performed in my office today showed  blunted left costophrenic angle, consistent with a small left pleural effusion. Right lung is clear\par \par Pulmonary function test was attempted in the office today, but Zari is unable to perform it.

## 2019-10-25 NOTE — ASSESSMENT
[FreeTextEntry1] : I advised her to continue to use Lasix 20 mg QD\par I recommend periodic chest x-ray to followup on her history of CHF.\par Zari's left pleural effusion is of a very small size, invasive interventions such as thoracentesis is not warranted.\par I advised her to closely follow with you clinically

## 2019-10-25 NOTE — CONSULT LETTER
[Dear  ___] : Dear  [unfilled], [Courtesy Letter:] : I had the pleasure of seeing your patient, [unfilled], in my office today. [Please see my note below.] : Please see my note below. [Consult Closing:] : Thank you very much for allowing me to participate in the care of this patient.  If you have any questions, please do not hesitate to contact me. [Sincerely,] : Sincerely, [FreeTextEntry3] : Dr. Mayuri Chase [DrSean  ___] : Dr. SARMIENTO

## 2019-10-25 NOTE — HISTORY OF PRESENT ILLNESS
[FreeTextEntry1] : Zari is a pleasant 89-year-old female, who was recently admitted to [facility] for CHF exacerbation, she reportedly underwent right-sided thoracentesis for right pleural effusion while there. She came in for pulmonary evaluation today. She currently appears comfortable, only complains of mild exertional dyspnea, but no cough, chest pain, fever or chills

## 2019-10-28 ENCOUNTER — OTHER (OUTPATIENT)
Age: 84
End: 2019-10-28

## 2019-11-05 ENCOUNTER — OTHER (OUTPATIENT)
Age: 84
End: 2019-11-05

## 2019-11-05 ENCOUNTER — APPOINTMENT (OUTPATIENT)
Dept: GERIATRICS | Facility: ASSISTED LIVING FACILITY | Age: 84
End: 2019-11-05
Payer: MEDICARE

## 2019-11-06 NOTE — REVIEW OF SYSTEMS
[Eyesight Problems] : eyesight problems [Loss Of Hearing] : hearing loss [As Noted in HPI] : as noted in HPI [Negative] : Gastrointestinal

## 2019-11-06 NOTE — PHYSICAL EXAM
[General Appearance - Alert] : alert [General Appearance - In No Acute Distress] : in no acute distress [Sclera] : the sclera and conjunctiva were normal [Extraocular Movements] : extraocular movements were intact [No Oral Pallor] : no oral pallor [Neck Appearance] : the appearance of the neck was normal [] : no respiratory distress [Respiration, Rhythm And Depth] : normal respiratory rhythm and effort [Exaggerated Use Of Accessory Muscles For Inspiration] : no accessory muscle use [Auscultation Breath Sounds / Voice Sounds] : lungs were clear to auscultation bilaterally [Heart Rate And Rhythm] : heart rate was normal and rhythm regular [Heart Sounds] : normal S1 and S2 [Bowel Sounds] : normal bowel sounds [Abdomen Soft] : soft [Abdomen Tenderness] : non-tender [Nail Clubbing] : no clubbing  or cyanosis of the fingernails [Involuntary Movements] : no involuntary movements were seen [No Focal Deficits] : no focal deficits [Affect] : the affect was normal [Mood] : the mood was normal [FreeTextEntry1] : dry skin with excoriations to low back and upper chest

## 2019-11-06 NOTE — ASSESSMENT
[FreeTextEntry1] : OA of knees:  \par -incease tylenol to 1000mg tid prn\par -celebrex daily prn\par -continue with physical activity as tolerated\par -discussed possible tramadol if uncontrolled\par -referral to ortho if remains uncontrolled for possible local treatment with injections\par \par UI:\par -timed bathroom visits\par -hx of recurrent infections, but no symptoms c/w infection currently\par -f/u with urology\par \par dry skin:\par -moisturizer daily\par \par diastolic heart failure:\par -euvolemic today\par -cw carvedilol 3.125 bid\par spironolactone 25mg daily\par lasix 20mg daily\par -daily weights and keep log.\par - Cardiology Dr. Buckley\par \par Pleural effusion:\par -no sob\par -monitor symptoms, weights

## 2019-11-06 NOTE — HISTORY OF PRESENT ILLNESS
[FreeTextEntry1] : \par OA of knees:  worse pain of both legs.  last saw ortho 1 year ago with injections to the knees.  she only takes tylenol daily\par \par chf:  daily weights have lately been stable 155lbs  on lasix 20mg daily.\par saw Dr. Buckley, with no changes in meds.\par saw pulm for pleural effusion that has been stable.  denies worsening sob or cough.\par \par \par rash : pruritic over back and upper chest.  fungal infection on groin and breasts has significantly improved with ketoconazole.  \par \par \par urinary urgency:  following with Dr. Eldridge.  with will accidents, more in the morning.  denies any new dysuria or bleeding.  \par \par also notes difficulty sleeping at night: to q 2 hrs.  possible related to itching of her back.\par

## 2019-11-19 ENCOUNTER — APPOINTMENT (OUTPATIENT)
Dept: GERIATRICS | Facility: ASSISTED LIVING FACILITY | Age: 84
End: 2019-11-19
Payer: MEDICARE

## 2019-11-19 ENCOUNTER — OTHER (OUTPATIENT)
Age: 84
End: 2019-11-19

## 2019-11-19 DIAGNOSIS — J06.9 ACUTE UPPER RESPIRATORY INFECTION, UNSPECIFIED: ICD-10-CM

## 2019-11-19 DIAGNOSIS — J90 PLEURAL EFFUSION, NOT ELSEWHERE CLASSIFIED: ICD-10-CM

## 2019-11-19 NOTE — ASSESSMENT
[FreeTextEntry1] : suspect viral URI:\par -supportive care\par -mucinex\par -notify office if symptoms not improving\par \par OA of knees:  \par -c/w tylenol to 1000mg tid prn\par -celebrex daily prn\par -continue with physical activity as tolerated\par -discussed possible tramadol if uncontrolled\par -referral to ortho if remains uncontrolled for possible local treatment with injections\par \par UI:\par -timed bathroom visits\par -hx of recurrent infections, but no symptoms c/w infection currently\par -f/u with urology\par \par dry skin:\par -moisturizer daily\par \par diastolic heart failure:\par -euvolemic today\par -cw carvedilol 3.125 bid\par spironolactone 25mg daily\par lasix 20mg daily\par -daily weights and keep log.\par - Cardiology Dr. Buckley\par \par Pleural effusion:\par -no sob\par -monitor symptoms, weights

## 2019-11-19 NOTE — HISTORY OF PRESENT ILLNESS
[FreeTextEntry1] : \par 89yof seen for acute visit for worsening cough:  notes 2 days of coughing associated with sore throat, rhinorrhea, and horse voice.  \par denies fever, chills, sob, or diarrhea/nausea, or myalgias.  +sick contacts \par eating and drinking at baseline\par \par chf:  daily weights have lately been stable -  on lasix 20mg daily.\par saw Dr. Buckley, with no changes in meds.\par \par known pleural effusion has been stable/improved.\par \par \par rash : pruritic over back and upper chest.  fungal infection on groin and breasts has significantly improved with ketoconazole.  \par \par \par urinary urgency:  following with Dr. Eldridge.  with will accidents, more in the morning.  denies any new dysuria or bleeding.  \par \par also notes difficulty sleeping at night: to q 2 hrs.  possible related to itching of her back.\par  no

## 2019-11-21 ENCOUNTER — CLINICAL ADVICE (OUTPATIENT)
Age: 84
End: 2019-11-21

## 2019-11-22 ENCOUNTER — CLINICAL ADVICE (OUTPATIENT)
Age: 84
End: 2019-11-22

## 2019-11-26 ENCOUNTER — APPOINTMENT (OUTPATIENT)
Dept: GERIATRICS | Facility: ASSISTED LIVING FACILITY | Age: 84
End: 2019-11-26
Payer: MEDICARE

## 2019-11-26 DIAGNOSIS — J18.9 PNEUMONIA, UNSPECIFIED ORGANISM: ICD-10-CM

## 2019-12-01 VITALS
HEART RATE: 83 BPM | OXYGEN SATURATION: 96 % | RESPIRATION RATE: 14 BRPM | DIASTOLIC BLOOD PRESSURE: 80 MMHG | SYSTOLIC BLOOD PRESSURE: 140 MMHG

## 2019-12-01 PROBLEM — J18.9 PNA (PNEUMONIA): Status: ACTIVE | Noted: 2019-11-22

## 2019-12-01 NOTE — PHYSICAL EXAM
[General Appearance - Alert] : alert [General Appearance - In No Acute Distress] : in no acute distress [Sclera] : the sclera and conjunctiva were normal [Extraocular Movements] : extraocular movements were intact [No Oral Pallor] : no oral pallor [Neck Appearance] : the appearance of the neck was normal [] : no respiratory distress [Respiration, Rhythm And Depth] : normal respiratory rhythm and effort [Exaggerated Use Of Accessory Muscles For Inspiration] : no accessory muscle use [Auscultation Breath Sounds / Voice Sounds] : lungs were clear to auscultation bilaterally [Heart Rate And Rhythm] : heart rate was normal and rhythm regular [Heart Sounds] : normal S1 and S2 [Edema] : there was no peripheral edema [Bowel Sounds] : normal bowel sounds [Abdomen Soft] : soft [Abdomen Tenderness] : non-tender [Nail Clubbing] : no clubbing  or cyanosis of the fingernails [Involuntary Movements] : no involuntary movements were seen [No Focal Deficits] : no focal deficits [Impaired Insight] : insight and judgment were intact [Affect] : the affect was normal [Mood] : the mood was normal [FreeTextEntry1] : d

## 2019-12-01 NOTE — REVIEW OF SYSTEMS
[Eyesight Problems] : eyesight problems [Loss Of Hearing] : hearing loss [As Noted in HPI] : as noted in HPI [Joint Pain] : joint pain [Itching] : itching [Negative] : Gastrointestinal

## 2019-12-01 NOTE — ASSESSMENT
[FreeTextEntry1] : PNA:  CXR with left lower PNA: 96% o2 sat\par -completed course of levaquin.\par -c/w mucinex prn\par -improving, continue to monitor clinically\par \par UI:  worsened stress UI due to coughing\par -timed bathroom visits\par -hx of recurrent infections, but no symptoms c/w infection currently\par -f/u with urology\par \par diastolic heart failure:\par -euvolemic today\par -cw carvedilol 3.125 bid\par spironolactone 25mg daily\par lasix 20mg daily\par -daily weights and keep log.\par - Cardiology Dr. Buckley\par \par Pleural effusion:\par -no sob\par -monitor symptoms, weights

## 2019-12-01 NOTE — HISTORY OF PRESENT ILLNESS
[FreeTextEntry1] : \par 89yof seen for follow up for cough, CXR showing left lower PNA, started on Levaquin, now completed and seen for follow up visit.  Notes still with coughing, but denies SOB, fever, chills or diarrhea/nausea.\par eating and drinking without difficulty.\par has not been weighing herself, but denies increased lower ext edema.  remains on same lasix daily.\par \par also notes increase stress incontinence with coughing.  but no dysuria, hematuria, or ab pain.\par \par

## 2019-12-22 ENCOUNTER — INPATIENT (INPATIENT)
Facility: HOSPITAL | Age: 84
LOS: 3 days | Discharge: HOME HEALTH | DRG: 690 | End: 2019-12-26
Attending: INTERNAL MEDICINE | Admitting: HOSPITALIST
Payer: MEDICARE

## 2019-12-22 VITALS
HEART RATE: 83 BPM | TEMPERATURE: 98 F | WEIGHT: 110.01 LBS | SYSTOLIC BLOOD PRESSURE: 122 MMHG | DIASTOLIC BLOOD PRESSURE: 74 MMHG | OXYGEN SATURATION: 96 % | RESPIRATION RATE: 16 BRPM

## 2019-12-22 DIAGNOSIS — N39.0 URINARY TRACT INFECTION, SITE NOT SPECIFIED: ICD-10-CM

## 2019-12-22 DIAGNOSIS — R55 SYNCOPE AND COLLAPSE: ICD-10-CM

## 2019-12-22 DIAGNOSIS — R35.0 FREQUENCY OF MICTURITION: ICD-10-CM

## 2019-12-22 DIAGNOSIS — E03.9 HYPOTHYROIDISM, UNSPECIFIED: ICD-10-CM

## 2019-12-22 DIAGNOSIS — Z98.49 CATARACT EXTRACTION STATUS, UNSPECIFIED EYE: Chronic | ICD-10-CM

## 2019-12-22 DIAGNOSIS — N30.00 ACUTE CYSTITIS WITHOUT HEMATURIA: ICD-10-CM

## 2019-12-22 DIAGNOSIS — Z98.890 OTHER SPECIFIED POSTPROCEDURAL STATES: Chronic | ICD-10-CM

## 2019-12-22 DIAGNOSIS — I10 ESSENTIAL (PRIMARY) HYPERTENSION: ICD-10-CM

## 2019-12-22 DIAGNOSIS — Z96.7 PRESENCE OF OTHER BONE AND TENDON IMPLANTS: Chronic | ICD-10-CM

## 2019-12-22 DIAGNOSIS — N17.9 ACUTE KIDNEY FAILURE, UNSPECIFIED: ICD-10-CM

## 2019-12-22 DIAGNOSIS — O00.1 TUBAL PREGNANCY: Chronic | ICD-10-CM

## 2019-12-22 LAB
ALBUMIN SERPL ELPH-MCNC: 4.1 G/DL — SIGNIFICANT CHANGE UP (ref 3.3–5)
ALP SERPL-CCNC: 154 U/L — HIGH (ref 40–120)
ALT FLD-CCNC: 36 U/L — SIGNIFICANT CHANGE UP (ref 10–45)
ANION GAP SERPL CALC-SCNC: 11 MMOL/L — SIGNIFICANT CHANGE UP (ref 5–17)
APPEARANCE UR: CLEAR — SIGNIFICANT CHANGE UP
AST SERPL-CCNC: 29 U/L — SIGNIFICANT CHANGE UP (ref 10–40)
BACTERIA # UR AUTO: ABNORMAL
BASOPHILS # BLD AUTO: 0.03 K/UL — SIGNIFICANT CHANGE UP (ref 0–0.2)
BASOPHILS NFR BLD AUTO: 0.5 % — SIGNIFICANT CHANGE UP (ref 0–2)
BILIRUB SERPL-MCNC: 0.3 MG/DL — SIGNIFICANT CHANGE UP (ref 0.2–1.2)
BILIRUB UR-MCNC: NEGATIVE — SIGNIFICANT CHANGE UP
BUN SERPL-MCNC: 52 MG/DL — HIGH (ref 7–23)
CALCIUM SERPL-MCNC: 9.6 MG/DL — SIGNIFICANT CHANGE UP (ref 8.4–10.5)
CHLORIDE SERPL-SCNC: 101 MMOL/L — SIGNIFICANT CHANGE UP (ref 96–108)
CO2 SERPL-SCNC: 25 MMOL/L — SIGNIFICANT CHANGE UP (ref 22–31)
COLOR SPEC: COLORLESS — SIGNIFICANT CHANGE UP
CREAT SERPL-MCNC: 1.63 MG/DL — HIGH (ref 0.5–1.3)
DIFF PNL FLD: NEGATIVE — SIGNIFICANT CHANGE UP
EOSINOPHIL # BLD AUTO: 0.16 K/UL — SIGNIFICANT CHANGE UP (ref 0–0.5)
EOSINOPHIL NFR BLD AUTO: 2.7 % — SIGNIFICANT CHANGE UP (ref 0–6)
EPI CELLS # UR: 0 /HPF — SIGNIFICANT CHANGE UP
FLU A RESULT: SIGNIFICANT CHANGE UP
FLU A RESULT: SIGNIFICANT CHANGE UP
FLUAV AG NPH QL: SIGNIFICANT CHANGE UP
FLUBV AG NPH QL: SIGNIFICANT CHANGE UP
GLUCOSE SERPL-MCNC: 136 MG/DL — HIGH (ref 70–99)
GLUCOSE UR QL: NEGATIVE — SIGNIFICANT CHANGE UP
HCT VFR BLD CALC: 37.3 % — SIGNIFICANT CHANGE UP (ref 34.5–45)
HGB BLD-MCNC: 11.7 G/DL — SIGNIFICANT CHANGE UP (ref 11.5–15.5)
HYALINE CASTS # UR AUTO: 1 /LPF — SIGNIFICANT CHANGE UP (ref 0–2)
IMM GRANULOCYTES NFR BLD AUTO: 0.3 % — SIGNIFICANT CHANGE UP (ref 0–1.5)
KETONES UR-MCNC: NEGATIVE — SIGNIFICANT CHANGE UP
LEUKOCYTE ESTERASE UR-ACNC: ABNORMAL
LYMPHOCYTES # BLD AUTO: 1.4 K/UL — SIGNIFICANT CHANGE UP (ref 1–3.3)
LYMPHOCYTES # BLD AUTO: 24 % — SIGNIFICANT CHANGE UP (ref 13–44)
MCHC RBC-ENTMCNC: 29.9 PG — SIGNIFICANT CHANGE UP (ref 27–34)
MCHC RBC-ENTMCNC: 31.4 GM/DL — LOW (ref 32–36)
MCV RBC AUTO: 95.4 FL — SIGNIFICANT CHANGE UP (ref 80–100)
MONOCYTES # BLD AUTO: 0.77 K/UL — SIGNIFICANT CHANGE UP (ref 0–0.9)
MONOCYTES NFR BLD AUTO: 13.2 % — SIGNIFICANT CHANGE UP (ref 2–14)
NEUTROPHILS # BLD AUTO: 3.45 K/UL — SIGNIFICANT CHANGE UP (ref 1.8–7.4)
NEUTROPHILS NFR BLD AUTO: 59.3 % — SIGNIFICANT CHANGE UP (ref 43–77)
NITRITE UR-MCNC: NEGATIVE — SIGNIFICANT CHANGE UP
NRBC # BLD: 0 /100 WBCS — SIGNIFICANT CHANGE UP (ref 0–0)
PH UR: 6.5 — SIGNIFICANT CHANGE UP (ref 5–8)
PLATELET # BLD AUTO: 163 K/UL — SIGNIFICANT CHANGE UP (ref 150–400)
POTASSIUM SERPL-MCNC: 5.4 MMOL/L — HIGH (ref 3.5–5.3)
POTASSIUM SERPL-SCNC: 5.4 MMOL/L — HIGH (ref 3.5–5.3)
PROT SERPL-MCNC: 7.9 G/DL — SIGNIFICANT CHANGE UP (ref 6–8.3)
PROT UR-MCNC: NEGATIVE — SIGNIFICANT CHANGE UP
RBC # BLD: 3.91 M/UL — SIGNIFICANT CHANGE UP (ref 3.8–5.2)
RBC # FLD: 15.6 % — HIGH (ref 10.3–14.5)
RBC CASTS # UR COMP ASSIST: 2 /HPF — SIGNIFICANT CHANGE UP (ref 0–4)
RSV RESULT: SIGNIFICANT CHANGE UP
RSV RNA RESP QL NAA+PROBE: SIGNIFICANT CHANGE UP
SODIUM SERPL-SCNC: 137 MMOL/L — SIGNIFICANT CHANGE UP (ref 135–145)
SP GR SPEC: 1.01 — SIGNIFICANT CHANGE UP (ref 1.01–1.02)
UROBILINOGEN FLD QL: NEGATIVE — SIGNIFICANT CHANGE UP
WBC # BLD: 5.83 K/UL — SIGNIFICANT CHANGE UP (ref 3.8–10.5)
WBC # FLD AUTO: 5.83 K/UL — SIGNIFICANT CHANGE UP (ref 3.8–10.5)
WBC UR QL: 49 /HPF — HIGH (ref 0–5)

## 2019-12-22 PROCEDURE — 71045 X-RAY EXAM CHEST 1 VIEW: CPT | Mod: 26

## 2019-12-22 PROCEDURE — 99223 1ST HOSP IP/OBS HIGH 75: CPT

## 2019-12-22 PROCEDURE — 93010 ELECTROCARDIOGRAM REPORT: CPT

## 2019-12-22 PROCEDURE — 99285 EMERGENCY DEPT VISIT HI MDM: CPT

## 2019-12-22 RX ORDER — LACTOBACILLUS ACIDOPHILUS 100MM CELL
1 CAPSULE ORAL DAILY
Refills: 0 | Status: DISCONTINUED | OUTPATIENT
Start: 2019-12-22 | End: 2019-12-26

## 2019-12-22 RX ORDER — AMLODIPINE BESYLATE 2.5 MG/1
0 TABLET ORAL
Qty: 0 | Refills: 0 | DISCHARGE

## 2019-12-22 RX ORDER — MULTIVIT-MIN/FERROUS GLUCONATE 9 MG/15 ML
1 LIQUID (ML) ORAL DAILY
Refills: 0 | Status: DISCONTINUED | OUTPATIENT
Start: 2019-12-22 | End: 2019-12-26

## 2019-12-22 RX ORDER — HEPARIN SODIUM 5000 [USP'U]/ML
5000 INJECTION INTRAVENOUS; SUBCUTANEOUS
Refills: 0 | Status: DISCONTINUED | OUTPATIENT
Start: 2019-12-22 | End: 2019-12-26

## 2019-12-22 RX ORDER — POLYETHYLENE GLYCOL 3350 17 G/17G
17 POWDER, FOR SOLUTION ORAL DAILY
Refills: 0 | Status: DISCONTINUED | OUTPATIENT
Start: 2019-12-22 | End: 2019-12-26

## 2019-12-22 RX ORDER — MEROPENEM 1 G/30ML
1000 INJECTION INTRAVENOUS ONCE
Refills: 0 | Status: COMPLETED | OUTPATIENT
Start: 2019-12-22 | End: 2019-12-22

## 2019-12-22 RX ORDER — SODIUM CHLORIDE 9 MG/ML
500 INJECTION INTRAMUSCULAR; INTRAVENOUS; SUBCUTANEOUS ONCE
Refills: 0 | Status: COMPLETED | OUTPATIENT
Start: 2019-12-22 | End: 2019-12-22

## 2019-12-22 RX ORDER — ERTAPENEM SODIUM 1 G/1
500 INJECTION, POWDER, LYOPHILIZED, FOR SOLUTION INTRAMUSCULAR; INTRAVENOUS EVERY 24 HOURS
Refills: 0 | Status: DISCONTINUED | OUTPATIENT
Start: 2019-12-23 | End: 2019-12-26

## 2019-12-22 RX ORDER — CARVEDILOL PHOSPHATE 80 MG/1
3.12 CAPSULE, EXTENDED RELEASE ORAL EVERY 12 HOURS
Refills: 0 | Status: DISCONTINUED | OUTPATIENT
Start: 2019-12-22 | End: 2019-12-25

## 2019-12-22 RX ORDER — ACETAMINOPHEN 500 MG
1 TABLET ORAL
Qty: 0 | Refills: 0 | DISCHARGE

## 2019-12-22 RX ORDER — LEVOTHYROXINE SODIUM 125 MCG
112 TABLET ORAL DAILY
Refills: 0 | Status: DISCONTINUED | OUTPATIENT
Start: 2019-12-22 | End: 2019-12-26

## 2019-12-22 RX ORDER — ACETAMINOPHEN 500 MG
650 TABLET ORAL EVERY 4 HOURS
Refills: 0 | Status: DISCONTINUED | OUTPATIENT
Start: 2019-12-22 | End: 2019-12-26

## 2019-12-22 RX ADMIN — SODIUM CHLORIDE 500 MILLILITER(S): 9 INJECTION INTRAMUSCULAR; INTRAVENOUS; SUBCUTANEOUS at 18:11

## 2019-12-22 RX ADMIN — CARVEDILOL PHOSPHATE 3.12 MILLIGRAM(S): 80 CAPSULE, EXTENDED RELEASE ORAL at 23:23

## 2019-12-22 RX ADMIN — MEROPENEM 100 MILLIGRAM(S): 1 INJECTION INTRAVENOUS at 18:11

## 2019-12-22 NOTE — H&P ADULT - ASSESSMENT
89 y/o F PMHx HTN, hypothyroidism, and ESBL UTIs, urinary incontinence, diastolic CHF with admission here in 9/2019 for CHF exacerbation, now presenting with syncopal episode at [facility].

## 2019-12-22 NOTE — H&P ADULT - PROBLEM SELECTOR PLAN 3
Concern for ESBL UTI given prior UCx.  Change to renally dosed ertapenem at next dosing interval.  F/U cultures

## 2019-12-22 NOTE — ED ADULT NURSE NOTE - CHPI ED NUR SYMPTOMS NEG
no chills/no chest pain/no diaphoresis/no shortness of breath/no vomiting/no back pain/no congestion/no dizziness/no fever/no nausea

## 2019-12-22 NOTE — H&P ADULT - NSICDXPASTSURGICALHX_GEN_ALL_CORE_FT
PAST SURGICAL HISTORY:  Ectopic pregnancy, tubal     S/P breast lumpectomy     S/P cataract surgery b/l    S/P ORIF (open reduction internal fixation) fracture R hip

## 2019-12-22 NOTE — ED PROVIDER NOTE - PMH
Bilateral Cataracts    HTN (hypertension)    Hypothyroidism    Macular degeneration    Non-ST elevation MI (NSTEMI)    Spinal stenosis    Urinary Frequency

## 2019-12-22 NOTE — H&P ADULT - PROBLEM SELECTOR PLAN 6
Avoid anticholinergics despite symptoms as above given significant negative side effect profiles.  Patient is considering possible botox injection treatment with her urogynecologist.

## 2019-12-22 NOTE — H&P ADULT - NSHPLABSRESULTS_GEN_ALL_CORE
Labs personally reviewed:                        11.7   5.83  )-----------( 163      ( 22 Dec 2019 15:18 )             37.3           137  |  101  |  52<H>  ----------------------------<  136<H>  5.4<H>   |  25  |  1.63<H>    Ca    9.6      22 Dec 2019 15:18    TPro  7.9  /  Alb  4.1  /  TBili  0.3  /  DBili  x   /  AST  29  /  ALT  36  /  AlkPhos  154<H>              LIVER FUNCTIONS - ( 22 Dec 2019 15:18 )  Alb: 4.1 g/dL / Pro: 7.9 g/dL / ALK PHOS: 154 U/L / ALT: 36 U/L / AST: 29 U/L / GGT: x                 Urinalysis Basic - ( 22 Dec 2019 17:16 )    Color: Colorless / Appearance: Clear / S.010 / pH: x  Gluc: x / Ketone: Negative  / Bili: Negative / Urobili: Negative   Blood: x / Protein: Negative / Nitrite: Negative   Leuk Esterase: Large / RBC: 2 /hpf / WBC 49 /HPF   Sq Epi: x / Non Sq Epi: 0 /hpf / Bacteria: Moderate    CXR personally reviewed-grossly clear lungs    EKG personally reviewed-NSR

## 2019-12-22 NOTE — ED ADULT NURSE NOTE - OBJECTIVE STATEMENT
89y old female BIB EMS c/o syncope. A&Ox3. PMH CHF, HTN, Urinary frequency. Patient states she has been weak and fatigued for a couple of weeks and today while at lunch she was sitting down eating and felt tired, as per patient worker at assisted living and another witness said patient loss consciousness. Denies head injury, patient does not remember passing out and had no chest pain, dizziness or palpations prior to incident. Patient denies current CP, SOB, dizziness, numbness/tingling, burning/pain with urination, visual changes or blurred vision and any pain or discomfort. Patient is lying in bed, well appearing, 18G inserted in right ac, cardiac monitor in place, HR 87 NSR present.

## 2019-12-22 NOTE — ED PROVIDER NOTE - ATTENDING CONTRIBUTION TO CARE
89F, pmh CHF, HTN, recurrent ESBL UTIs, presents s/p episode of unprovoked syncope while at rest eating lunch. No cp, sob, abd pain, n/v/d. Does endorse increased urinary frequency.    PE: Elderly female in NAD, NCAT, MMM, Trachea midline, Normal conjunctiva, lungs CTAB, S1/S2 RRR, Normal perfusion, 2+ radial pulses bilat, Abdomen Soft, NTND, No rebound/guarding, No LE edema, No deformity of extremities, No rashes,  No focal motor or sensory deficits.     Obtain labs, CXR. Plan to admit. - Jimmy Booker MD

## 2019-12-22 NOTE — H&P ADULT - NSICDXPASTMEDICALHX_GEN_ALL_CORE_FT
PAST MEDICAL HISTORY:  Bilateral Cataracts     HTN (hypertension)     Hypothyroidism     Macular degeneration     Non-ST elevation MI (NSTEMI)     Spinal stenosis     Urinary Frequency

## 2019-12-22 NOTE — H&P ADULT - PROBLEM SELECTOR PLAN 2
Likely prerenal etiology 2/2 infections and diuretic use.  Holding diuretics as above.  Control BPs.

## 2019-12-22 NOTE — H&P ADULT - HISTORY OF PRESENT ILLNESS
89 y/o F PMHx HTN, hypothyroidism, and ESBL UTIs, urinary incontinence, diastolic CHF with admission here in 9/2019 for CHF exacerbation, now presenting with syncopal episode at [facility]. 89 y/o F PMHx HTN, hypothyroidism, and ESBL UTIs, urinary incontinence, diastolic CHF with admission here in 9/2019 for CHF exacerbation, now presenting with syncopal episode at [facility]. Patient reports that she had awoken this morning with "cold like" symptoms including nasal congestion/stuffy nose and a dry cough that felt to her like prior colds. She also reports that she had tried topical oxybutinin (brand name Gelnique) for the first time over the last day for her urinary incontinence. Patient reports in addition that over the last day she had been having much worse urinary frequency with mild dysuria. Patient reports that because of these symptoms she initially did not feel so well but around noon time decided that she felt well enough to go eat lunch in the cafeteria. She recalls getting a plate of quiche to eat but then the next thing she knew people were crowded around her telling her to wake up. Patient reports that she was not on the floor on awakening and was seated and then helped into a gurney/stretcher. Patient is unsure of the exact amount of time that had passed but believes that it was several minutes before she came to. No loss of bowel continence. Chronic urinary incontinence as above. No fevers/chills/chest pain/sob. No prodromal symptoms/lightheadedness/dizziness prior to syncopal episode.

## 2019-12-22 NOTE — H&P ADULT - PROBLEM SELECTOR PLAN 1
Suspect multifactorial syncopal episode in setting of likely URI (cold, flu/rsv swab negative here) and concurrent likely ESBL UTI as well as use of new anticholinergic urinary incontinence topical medication (raises risk for syncope and falls).  Discontinue use of Gelnique (oxybutynin gel), should not be placed on any other anticholinergic anti-incontinence medications given multiple ESBL UTIs and now syncope with use.  Treat UTI  Supportive care for URI  Would also hold diuretics temporarily due to likely concurrent dehydration and ANNA. Would closely monitor volume status, likely can resume after approximately 1 day.

## 2019-12-22 NOTE — ED PROVIDER NOTE - PHYSICAL EXAMINATION
Gen: AAO x 3, NAD  Skin: No rashes or lesions  HEENT: NC/AT, PERRLA, EOMI, MMM  Resp: unlabored CTAB  Cardiac: rrr s1s2, no murmurs, rubs or gallops  GI: ND, +BS, Soft, NT  Ext: trace pedal edema BL, FROM in all extremities  Neuro: no focal deficits.  Strength 5/5 BUE and BLE.  Sensation intact.  No pronator drift.

## 2019-12-22 NOTE — ED ADULT NURSE REASSESSMENT NOTE - NS ED NURSE REASSESS COMMENT FT1
straight cath performed as per MD order, 2 RN at bedside, sterile technique maintained. 400CC of clear yellow urine present.

## 2019-12-22 NOTE — H&P ADULT - PROBLEM SELECTOR PLAN 5
Somewhat volatile blood pressures here, on most recent recheck by myself 169/104.  Give stat carvedilol at home dose.  Off of diuretics temporarily here, closely monitor BPs. Can consider use of LOW dose oral hydralazine if blood pressures remain poorly controlled given ANNA and dehydration. Avoid overtreatment as patient's BPs historically can be volatile.

## 2019-12-22 NOTE — ED PROVIDER NOTE - OBJECTIVE STATEMENT
89 yo female with PMHx of HFpEF, HTN, recurrent ESBL UTIs, and hypothyroidism p/w syncope.  Patient reports that she was in her usual state of health this afternoon, eating lunch when she lost consciousness.  Patient reports that she does not recall what happened, she was told by her aid that she passed out for a few seconds.  Patient only remembers being woken up.  Denies head trauma, fevers/chills, CP, SOB, abd pain, NVD, dysuria.  Patient does endorse urinating more frequently last night.  Patient was recently admitted to [facility]  Sept 2019 and had work up for SOB with TTE that showed diastolic dysfunction.

## 2019-12-23 DIAGNOSIS — Z02.9 ENCOUNTER FOR ADMINISTRATIVE EXAMINATIONS, UNSPECIFIED: ICD-10-CM

## 2019-12-23 LAB
ANION GAP SERPL CALC-SCNC: 13 MMOL/L — SIGNIFICANT CHANGE UP (ref 5–17)
BASOPHILS # BLD AUTO: 0.05 K/UL — SIGNIFICANT CHANGE UP (ref 0–0.2)
BASOPHILS NFR BLD AUTO: 0.8 % — SIGNIFICANT CHANGE UP (ref 0–2)
BUN SERPL-MCNC: 45 MG/DL — HIGH (ref 7–23)
CALCIUM SERPL-MCNC: 9.3 MG/DL — SIGNIFICANT CHANGE UP (ref 8.4–10.5)
CHLORIDE SERPL-SCNC: 102 MMOL/L — SIGNIFICANT CHANGE UP (ref 96–108)
CO2 SERPL-SCNC: 23 MMOL/L — SIGNIFICANT CHANGE UP (ref 22–31)
CREAT SERPL-MCNC: 1.62 MG/DL — HIGH (ref 0.5–1.3)
EOSINOPHIL # BLD AUTO: 0.27 K/UL — SIGNIFICANT CHANGE UP (ref 0–0.5)
EOSINOPHIL NFR BLD AUTO: 4.4 % — SIGNIFICANT CHANGE UP (ref 0–6)
GLUCOSE SERPL-MCNC: 100 MG/DL — HIGH (ref 70–99)
HCT VFR BLD CALC: 39.5 % — SIGNIFICANT CHANGE UP (ref 34.5–45)
HGB BLD-MCNC: 12.4 G/DL — SIGNIFICANT CHANGE UP (ref 11.5–15.5)
IMM GRANULOCYTES NFR BLD AUTO: 0.5 % — SIGNIFICANT CHANGE UP (ref 0–1.5)
LYMPHOCYTES # BLD AUTO: 1.76 K/UL — SIGNIFICANT CHANGE UP (ref 1–3.3)
LYMPHOCYTES # BLD AUTO: 28.8 % — SIGNIFICANT CHANGE UP (ref 13–44)
MAGNESIUM SERPL-MCNC: 2.5 MG/DL — SIGNIFICANT CHANGE UP (ref 1.6–2.6)
MCHC RBC-ENTMCNC: 30 PG — SIGNIFICANT CHANGE UP (ref 27–34)
MCHC RBC-ENTMCNC: 31.4 GM/DL — LOW (ref 32–36)
MCV RBC AUTO: 95.4 FL — SIGNIFICANT CHANGE UP (ref 80–100)
MONOCYTES # BLD AUTO: 0.98 K/UL — HIGH (ref 0–0.9)
MONOCYTES NFR BLD AUTO: 16 % — HIGH (ref 2–14)
NEUTROPHILS # BLD AUTO: 3.02 K/UL — SIGNIFICANT CHANGE UP (ref 1.8–7.4)
NEUTROPHILS NFR BLD AUTO: 49.5 % — SIGNIFICANT CHANGE UP (ref 43–77)
PHOSPHATE SERPL-MCNC: 3.9 MG/DL — SIGNIFICANT CHANGE UP (ref 2.5–4.5)
PLATELET # BLD AUTO: 157 K/UL — SIGNIFICANT CHANGE UP (ref 150–400)
POTASSIUM SERPL-MCNC: 4.8 MMOL/L — SIGNIFICANT CHANGE UP (ref 3.5–5.3)
POTASSIUM SERPL-SCNC: 4.8 MMOL/L — SIGNIFICANT CHANGE UP (ref 3.5–5.3)
RBC # BLD: 4.14 M/UL — SIGNIFICANT CHANGE UP (ref 3.8–5.2)
RBC # FLD: 15.6 % — HIGH (ref 10.3–14.5)
SODIUM SERPL-SCNC: 138 MMOL/L — SIGNIFICANT CHANGE UP (ref 135–145)
WBC # BLD: 6.11 K/UL — SIGNIFICANT CHANGE UP (ref 3.8–10.5)
WBC # FLD AUTO: 6.11 K/UL — SIGNIFICANT CHANGE UP (ref 3.8–10.5)

## 2019-12-23 PROCEDURE — 99233 SBSQ HOSP IP/OBS HIGH 50: CPT

## 2019-12-23 RX ADMIN — ERTAPENEM SODIUM 100 MILLIGRAM(S): 1 INJECTION, POWDER, LYOPHILIZED, FOR SOLUTION INTRAMUSCULAR; INTRAVENOUS at 06:28

## 2019-12-23 RX ADMIN — HEPARIN SODIUM 5000 UNIT(S): 5000 INJECTION INTRAVENOUS; SUBCUTANEOUS at 06:28

## 2019-12-23 RX ADMIN — CARVEDILOL PHOSPHATE 3.12 MILLIGRAM(S): 80 CAPSULE, EXTENDED RELEASE ORAL at 12:29

## 2019-12-23 RX ADMIN — HEPARIN SODIUM 5000 UNIT(S): 5000 INJECTION INTRAVENOUS; SUBCUTANEOUS at 19:11

## 2019-12-23 RX ADMIN — Medication 1 TABLET(S): at 12:29

## 2019-12-23 RX ADMIN — Medication 112 MICROGRAM(S): at 06:28

## 2019-12-23 RX ADMIN — Medication 1 TABLET(S): at 19:11

## 2019-12-23 RX ADMIN — CARVEDILOL PHOSPHATE 3.12 MILLIGRAM(S): 80 CAPSULE, EXTENDED RELEASE ORAL at 23:03

## 2019-12-23 NOTE — PROGRESS NOTE ADULT - ASSESSMENT
89 y/o F PMHx HTN, hypothyroidism, and ESBL UTIs, urinary incontinence, diastolic CHF with admission here in 9/2019 for CHF exacerbation pw syncopal episode at [facility].

## 2019-12-23 NOTE — PROGRESS NOTE ADULT - SUBJECTIVE AND OBJECTIVE BOX
Patient is a 89y old  Female who presents with a chief complaint of Syncope/likely ESBL UTI (22 Dec 2019 22:10)    HPI: Pt in wheelchair, feels well. Daughter by side.     Vital Signs Last 24 Hrs  T(C): 36.5 (23 Dec 2019 11:56), Max: 36.8 (23 Dec 2019 06:38)  T(F): 97.7 (23 Dec 2019 11:56), Max: 98.2 (23 Dec 2019 06:38)  HR: 70 (23 Dec 2019 11:56) (70 - 83)  BP: 136/79 (23 Dec 2019 11:56) (122/74 - 202/110)  BP(mean): --  RR: 18 (23 Dec 2019 11:56) (16 - 19)  SpO2: 96% (23 Dec 2019 11:56) (94% - 100%)                        11.7   5.83  )-----------( 163      ( 22 Dec 2019 15:18 )             37.3     12-22    137  |  101  |  52<H>  ----------------------------<  136<H>  5.4<H>   |  25  |  1.63<H>    Ca    9.6      22 Dec 2019 15:18    TPro  7.9  /  Alb  4.1  /  TBili  0.3  /  DBili  x   /  AST  29  /  ALT  36  /  AlkPhos  154<H>  12-22    MEDICATIONS  (STANDING):  carvedilol 3.125 milliGRAM(s) Oral every 12 hours  ertapenem  IVPB 500 milliGRAM(s) IV Intermittent every 24 hours  heparin  Injectable 5000 Unit(s) SubCutaneous two times a day  lactobacillus acidophilus 1 Tablet(s) Oral daily  levothyroxine 112 MICROGram(s) Oral daily  multivitamin/minerals 1 Tablet(s) Oral daily    MEDICATIONS  (PRN):  acetaminophen   Tablet .. 650 milliGRAM(s) Oral every 4 hours PRN Mild Pain (1 - 3)  polyethylene glycol 3350 17 Gram(s) Oral daily PRN Constipation

## 2019-12-23 NOTE — PROGRESS NOTE ADULT - PROBLEM SELECTOR PLAN 1
Suspect multifactorial syncopal episode in setting of likely URI (cold, flu/rsv swab negative here) and concurrent likely ESBL UTI as well as use of new anticholinergic urinary incontinence topical medication (raises risk for syncope and falls).  Discontinue use of Gelnique (oxybutynin gel), should not be placed on any other anticholinergic anti-incontinence medications given multiple ESBL UTIs and now syncope with use.  Treat UTI  Supportive care for URI  Would also hold diuretics temporarily due to likely concurrent dehydration and ANNA. No events on tele. Suspect multifactorial syncopal episode in setting of likely URI (cold, flu/rsv swab negative here) and concurrent likely ESBL UTI as well as use of new anticholinergic urinary incontinence topical medication (raises risk for syncope and falls).    Discontinue use of Gelnique (oxybutynin gel), should not be placed on any other anticholinergic anti-incontinence medications given multiple ESBL UTIs and now syncope with use.    Treat UTI. Supportive care for URI    Would also hold diuretics temporarily due to likely concurrent dehydration and ANNA.   No events on tele.

## 2019-12-24 PROCEDURE — 99232 SBSQ HOSP IP/OBS MODERATE 35: CPT

## 2019-12-24 RX ORDER — PETROLATUM,WHITE
1 JELLY (GRAM) TOPICAL
Refills: 0 | Status: DISCONTINUED | OUTPATIENT
Start: 2019-12-24 | End: 2019-12-26

## 2019-12-24 RX ADMIN — ERTAPENEM SODIUM 100 MILLIGRAM(S): 1 INJECTION, POWDER, LYOPHILIZED, FOR SOLUTION INTRAMUSCULAR; INTRAVENOUS at 05:29

## 2019-12-24 RX ADMIN — CARVEDILOL PHOSPHATE 3.12 MILLIGRAM(S): 80 CAPSULE, EXTENDED RELEASE ORAL at 22:06

## 2019-12-24 RX ADMIN — CARVEDILOL PHOSPHATE 3.12 MILLIGRAM(S): 80 CAPSULE, EXTENDED RELEASE ORAL at 12:14

## 2019-12-24 RX ADMIN — HEPARIN SODIUM 5000 UNIT(S): 5000 INJECTION INTRAVENOUS; SUBCUTANEOUS at 18:07

## 2019-12-24 RX ADMIN — HEPARIN SODIUM 5000 UNIT(S): 5000 INJECTION INTRAVENOUS; SUBCUTANEOUS at 05:29

## 2019-12-24 RX ADMIN — Medication 1 TABLET(S): at 12:15

## 2019-12-24 RX ADMIN — Medication 112 MICROGRAM(S): at 05:29

## 2019-12-24 RX ADMIN — Medication 1 APPLICATION(S): at 23:15

## 2019-12-24 NOTE — PHYSICAL THERAPY INITIAL EVALUATION ADULT - DISCHARGE DISPOSITION, PT EVAL
home w/ home PT/Return home to [facility] with assist for OOB mobility and home PT for progressive strengthening, Gait training, and balance. Pt owns Rollator./home/home w/ assist

## 2019-12-24 NOTE — PHYSICAL THERAPY INITIAL EVALUATION ADULT - STRENGTHENING, PT EVAL
GOAL: Pt will improve b/l LE strength by at least 1/2 MMT grade  to assist with performing functional mobility and ADLs within 4 weeks.

## 2019-12-24 NOTE — PHYSICAL THERAPY INITIAL EVALUATION ADULT - ADDITIONAL COMMENTS
Pt lives in [facility], indep amb with rollator, indep with most ADLs, recieves assistance to shower 2x a week.

## 2019-12-24 NOTE — PHYSICAL THERAPY INITIAL EVALUATION ADULT - PERTINENT HX OF CURRENT PROBLEM, REHAB EVAL
89 y/o F PMHx HTN, hypothyroidism, and ESBL UTIs, urinary incontinence, diastolic CHF with admission here in 9/2019 for CHF exacerbation pw syncopal episode at [facility]. Pt s/p +UTI., CXR: clear lungs

## 2019-12-24 NOTE — PROGRESS NOTE ADULT - PROBLEM SELECTOR PLAN 1
Suspect multifactorial syncopal episode in setting of likely URI (cold, flu/rsv swab negative here) and concurrent likely ESBL UTI as well as use of new anticholinergic urinary incontinence topical medication (raises risk for syncope and falls).    Discontinued use of Gelnique (oxybutynin gel), should not be placed on any other anticholinergic anti-incontinence medications given multiple ESBL UTIs and now syncope with use.    Treat UTI. Supportive care for URI    Holding diuretics temporarily due to likely concurrent dehydration and ANNA.   No events on tele, tele d/veda.

## 2019-12-24 NOTE — PROGRESS NOTE ADULT - SUBJECTIVE AND OBJECTIVE BOX
Patient is a 89y old  Female who presents with a chief complaint of Syncope/likely ESBL UTI (23 Dec 2019 13:19)    HPI: Pt up in chair, feels well. Denies dysuria. Walked w daughter today.     Vital Signs Last 24 Hrs  T(C): 36.6 (24 Dec 2019 13:30), Max: 37.5 (24 Dec 2019 05:09)  T(F): 97.9 (24 Dec 2019 13:30), Max: 99.5 (24 Dec 2019 05:09)  HR: 76 (24 Dec 2019 13:30) (70 - 81)  BP: 132/77 (24 Dec 2019 13:30) (132/77 - 165/85)  BP(mean): --  RR: 18 (24 Dec 2019 13:30) (18 - 18)  SpO2: 95% (24 Dec 2019 13:30) (95% - 96%)                          12.4   6.11  )-----------( 157      ( 23 Dec 2019 22:39 )             39.5     12-23    138  |  102  |  45<H>  ----------------------------<  100<H>  4.8   |  23  |  1.62<H>    Ca    9.3      23 Dec 2019 18:29  Phos  3.9     12-23  Mg     2.5     12-23    TPro  7.9  /  Alb  4.1  /  TBili  0.3  /  DBili  x   /  AST  29  /  ALT  36  /  AlkPhos  154<H>  12-22    MEDICATIONS  (STANDING):  carvedilol 3.125 milliGRAM(s) Oral every 12 hours  ertapenem  IVPB 500 milliGRAM(s) IV Intermittent every 24 hours  heparin  Injectable 5000 Unit(s) SubCutaneous two times a day  lactobacillus acidophilus 1 Tablet(s) Oral daily  levothyroxine 112 MICROGram(s) Oral daily  multivitamin/minerals 1 Tablet(s) Oral daily    MEDICATIONS  (PRN):  acetaminophen   Tablet .. 650 milliGRAM(s) Oral every 4 hours PRN Mild Pain (1 - 3)  polyethylene glycol 3350 17 Gram(s) Oral daily PRN Constipation

## 2019-12-24 NOTE — PROGRESS NOTE ADULT - ASSESSMENT
89 y/o F PMHx HTN, hypothyroidism, and ESBL UTIs, urinary incontinence, diastolic CHF with admission here in 9/2019 for CHF exacerbation pw syncopal episode at [facility].

## 2019-12-25 LAB
ANION GAP SERPL CALC-SCNC: 13 MMOL/L — SIGNIFICANT CHANGE UP (ref 5–17)
BUN SERPL-MCNC: 40 MG/DL — HIGH (ref 7–23)
CALCIUM SERPL-MCNC: 9.5 MG/DL — SIGNIFICANT CHANGE UP (ref 8.4–10.5)
CHLORIDE SERPL-SCNC: 105 MMOL/L — SIGNIFICANT CHANGE UP (ref 96–108)
CO2 SERPL-SCNC: 25 MMOL/L — SIGNIFICANT CHANGE UP (ref 22–31)
CREAT SERPL-MCNC: 1.19 MG/DL — SIGNIFICANT CHANGE UP (ref 0.5–1.3)
GLUCOSE SERPL-MCNC: 90 MG/DL — SIGNIFICANT CHANGE UP (ref 70–99)
HCT VFR BLD CALC: 41.1 % — SIGNIFICANT CHANGE UP (ref 34.5–45)
HGB BLD-MCNC: 12.7 G/DL — SIGNIFICANT CHANGE UP (ref 11.5–15.5)
MCHC RBC-ENTMCNC: 29.6 PG — SIGNIFICANT CHANGE UP (ref 27–34)
MCHC RBC-ENTMCNC: 30.9 GM/DL — LOW (ref 32–36)
MCV RBC AUTO: 95.8 FL — SIGNIFICANT CHANGE UP (ref 80–100)
NRBC # BLD: 0 /100 WBCS — SIGNIFICANT CHANGE UP (ref 0–0)
PLATELET # BLD AUTO: 165 K/UL — SIGNIFICANT CHANGE UP (ref 150–400)
POTASSIUM SERPL-MCNC: 4.6 MMOL/L — SIGNIFICANT CHANGE UP (ref 3.5–5.3)
POTASSIUM SERPL-SCNC: 4.6 MMOL/L — SIGNIFICANT CHANGE UP (ref 3.5–5.3)
RBC # BLD: 4.29 M/UL — SIGNIFICANT CHANGE UP (ref 3.8–5.2)
RBC # FLD: 15.4 % — HIGH (ref 10.3–14.5)
SODIUM SERPL-SCNC: 143 MMOL/L — SIGNIFICANT CHANGE UP (ref 135–145)
WBC # BLD: 5.5 K/UL — SIGNIFICANT CHANGE UP (ref 3.8–10.5)
WBC # FLD AUTO: 5.5 K/UL — SIGNIFICANT CHANGE UP (ref 3.8–10.5)

## 2019-12-25 PROCEDURE — 99233 SBSQ HOSP IP/OBS HIGH 50: CPT

## 2019-12-25 RX ORDER — CARVEDILOL PHOSPHATE 80 MG/1
3.12 CAPSULE, EXTENDED RELEASE ORAL ONCE
Refills: 0 | Status: COMPLETED | OUTPATIENT
Start: 2019-12-25 | End: 2019-12-25

## 2019-12-25 RX ORDER — CARVEDILOL PHOSPHATE 80 MG/1
6.25 CAPSULE, EXTENDED RELEASE ORAL EVERY 12 HOURS
Refills: 0 | Status: DISCONTINUED | OUTPATIENT
Start: 2019-12-25 | End: 2019-12-26

## 2019-12-25 RX ADMIN — Medication 1 TABLET(S): at 11:29

## 2019-12-25 RX ADMIN — Medication 112 MICROGRAM(S): at 05:09

## 2019-12-25 RX ADMIN — Medication 1 APPLICATION(S): at 17:31

## 2019-12-25 RX ADMIN — CARVEDILOL PHOSPHATE 3.12 MILLIGRAM(S): 80 CAPSULE, EXTENDED RELEASE ORAL at 05:09

## 2019-12-25 RX ADMIN — CARVEDILOL PHOSPHATE 3.12 MILLIGRAM(S): 80 CAPSULE, EXTENDED RELEASE ORAL at 10:35

## 2019-12-25 RX ADMIN — ERTAPENEM SODIUM 100 MILLIGRAM(S): 1 INJECTION, POWDER, LYOPHILIZED, FOR SOLUTION INTRAMUSCULAR; INTRAVENOUS at 06:15

## 2019-12-25 RX ADMIN — HEPARIN SODIUM 5000 UNIT(S): 5000 INJECTION INTRAVENOUS; SUBCUTANEOUS at 17:31

## 2019-12-25 RX ADMIN — CARVEDILOL PHOSPHATE 6.25 MILLIGRAM(S): 80 CAPSULE, EXTENDED RELEASE ORAL at 17:31

## 2019-12-25 RX ADMIN — HEPARIN SODIUM 5000 UNIT(S): 5000 INJECTION INTRAVENOUS; SUBCUTANEOUS at 05:09

## 2019-12-25 RX ADMIN — Medication 1 APPLICATION(S): at 05:09

## 2019-12-25 NOTE — PROGRESS NOTE ADULT - SUBJECTIVE AND OBJECTIVE BOX
Patient is a 89y old  Female who presents with a chief complaint of Syncope/likely ESBL UTI (24 Dec 2019 15:13)    HPI:     Vital Signs Last 24 Hrs  T(C): 37 (25 Dec 2019 04:33), Max: 37.1 (24 Dec 2019 21:22)  T(F): 98.6 (25 Dec 2019 04:33), Max: 98.8 (24 Dec 2019 21:22)  HR: 71 (25 Dec 2019 04:51) (70 - 76)  BP: 186/84 (25 Dec 2019 04:51) (132/77 - 186/84)  BP(mean): --  RR: 18 (25 Dec 2019 04:33) (18 - 18)  SpO2: 96% (25 Dec 2019 04:33) (95% - 96%)                          12.4   6.11  )-----------( 157      ( 23 Dec 2019 22:39 )             39.5     12-23    138  |  102  |  45<H>  ----------------------------<  100<H>  4.8   |  23  |  1.62<H>    Ca    9.3      23 Dec 2019 18:29  Phos  3.9     12-23  Mg     2.5     12-23      MEDICATIONS  (STANDING):  AQUAPHOR (petrolatum Ointment) 1 Application(s) Topical two times a day  carvedilol 3.125 milliGRAM(s) Oral every 12 hours  ertapenem  IVPB 500 milliGRAM(s) IV Intermittent every 24 hours  heparin  Injectable 5000 Unit(s) SubCutaneous two times a day  lactobacillus acidophilus 1 Tablet(s) Oral daily  levothyroxine 112 MICROGram(s) Oral daily  multivitamin/minerals 1 Tablet(s) Oral daily    MEDICATIONS  (PRN):  acetaminophen   Tablet .. 650 milliGRAM(s) Oral every 4 hours PRN Mild Pain (1 - 3)  polyethylene glycol 3350 17 Gram(s) Oral daily PRN Constipation Patient is a 89y old  Female who presents with a chief complaint of Syncope/likely ESBL UTI (24 Dec 2019 15:13)    HPI: BP uncontrolled overnight. Pt currently in bed, feels well.     Vital Signs Last 24 Hrs  T(C): 37 (25 Dec 2019 04:33), Max: 37.1 (24 Dec 2019 21:22)  T(F): 98.6 (25 Dec 2019 04:33), Max: 98.8 (24 Dec 2019 21:22)  HR: 71 (25 Dec 2019 04:51) (70 - 76)  BP: 186/84 (25 Dec 2019 04:51) (132/77 - 186/84)  BP(mean): --  RR: 18 (25 Dec 2019 04:33) (18 - 18)  SpO2: 96% (25 Dec 2019 04:33) (95% - 96%)                          12.4   6.11  )-----------( 157      ( 23 Dec 2019 22:39 )             39.5     12-23    138  |  102  |  45<H>  ----------------------------<  100<H>  4.8   |  23  |  1.62<H>    Ca    9.3      23 Dec 2019 18:29  Phos  3.9     12-23  Mg     2.5     12-23      MEDICATIONS  (STANDING):  AQUAPHOR (petrolatum Ointment) 1 Application(s) Topical two times a day  carvedilol 3.125 milliGRAM(s) Oral every 12 hours  ertapenem  IVPB 500 milliGRAM(s) IV Intermittent every 24 hours  heparin  Injectable 5000 Unit(s) SubCutaneous two times a day  lactobacillus acidophilus 1 Tablet(s) Oral daily  levothyroxine 112 MICROGram(s) Oral daily  multivitamin/minerals 1 Tablet(s) Oral daily    MEDICATIONS  (PRN):  acetaminophen   Tablet .. 650 milliGRAM(s) Oral every 4 hours PRN Mild Pain (1 - 3)  polyethylene glycol 3350 17 Gram(s) Oral daily PRN Constipation

## 2019-12-25 NOTE — PROVIDER CONTACT NOTE (OTHER) - ASSESSMENT
Pt's BP was 186/84 manually on the right upper arm with a HR of 71. Pt is asymptomatic and denies any CP, HA, changes in vision or nausea

## 2019-12-25 NOTE — PROGRESS NOTE ADULT - ASSESSMENT
89 y/o F PMHx HTN, hypothyroidism, and ESBL UTIs, urinary incontinence, diastolic CHF with admission here in 9/2019 for CHF exacerbation pw syncopal episode at [facility]. 89 y/o F PMHx HTN, hypothyroidism, and ESBL UTIs, urinary incontinence, diastolic CHF with admission here in 9/2019 for CHF exacerbation pw syncopal episode at [facility]. Found to have a UTI, dehydration. Uncontrolled BP overnight.

## 2019-12-26 ENCOUNTER — TRANSCRIPTION ENCOUNTER (OUTPATIENT)
Age: 84
End: 2019-12-26

## 2019-12-26 VITALS — HEART RATE: 81 BPM | SYSTOLIC BLOOD PRESSURE: 150 MMHG | DIASTOLIC BLOOD PRESSURE: 97 MMHG

## 2019-12-26 DIAGNOSIS — R21 RASH AND OTHER NONSPECIFIC SKIN ERUPTION: ICD-10-CM

## 2019-12-26 PROCEDURE — 71045 X-RAY EXAM CHEST 1 VIEW: CPT

## 2019-12-26 PROCEDURE — 87631 RESP VIRUS 3-5 TARGETS: CPT

## 2019-12-26 PROCEDURE — 81001 URINALYSIS AUTO W/SCOPE: CPT

## 2019-12-26 PROCEDURE — 80048 BASIC METABOLIC PNL TOTAL CA: CPT

## 2019-12-26 PROCEDURE — 96374 THER/PROPH/DIAG INJ IV PUSH: CPT | Mod: XU

## 2019-12-26 PROCEDURE — 83735 ASSAY OF MAGNESIUM: CPT

## 2019-12-26 PROCEDURE — 99285 EMERGENCY DEPT VISIT HI MDM: CPT | Mod: 25

## 2019-12-26 PROCEDURE — 93005 ELECTROCARDIOGRAM TRACING: CPT | Mod: XU

## 2019-12-26 PROCEDURE — 51701 INSERT BLADDER CATHETER: CPT

## 2019-12-26 PROCEDURE — 87086 URINE CULTURE/COLONY COUNT: CPT

## 2019-12-26 PROCEDURE — 97161 PT EVAL LOW COMPLEX 20 MIN: CPT

## 2019-12-26 PROCEDURE — 84100 ASSAY OF PHOSPHORUS: CPT

## 2019-12-26 PROCEDURE — 80053 COMPREHEN METABOLIC PANEL: CPT

## 2019-12-26 PROCEDURE — 82962 GLUCOSE BLOOD TEST: CPT

## 2019-12-26 PROCEDURE — 87186 SC STD MICRODIL/AGAR DIL: CPT

## 2019-12-26 PROCEDURE — 85027 COMPLETE CBC AUTOMATED: CPT

## 2019-12-26 PROCEDURE — 84484 ASSAY OF TROPONIN QUANT: CPT

## 2019-12-26 PROCEDURE — 99239 HOSP IP/OBS DSCHRG MGMT >30: CPT

## 2019-12-26 RX ORDER — SPIRONOLACTONE 25 MG/1
25 TABLET, FILM COATED ORAL DAILY
Refills: 0 | Status: DISCONTINUED | OUTPATIENT
Start: 2019-12-26 | End: 2019-12-26

## 2019-12-26 RX ORDER — OXYBUTYNIN CHLORIDE 5 MG
1 TABLET ORAL
Qty: 0 | Refills: 0 | DISCHARGE

## 2019-12-26 RX ORDER — HYDRALAZINE HCL 50 MG
10 TABLET ORAL ONCE
Refills: 0 | Status: COMPLETED | OUTPATIENT
Start: 2019-12-26 | End: 2019-12-26

## 2019-12-26 RX ORDER — CARVEDILOL PHOSPHATE 80 MG/1
1 CAPSULE, EXTENDED RELEASE ORAL
Qty: 60 | Refills: 0
Start: 2019-12-26 | End: 2020-01-24

## 2019-12-26 RX ORDER — PETROLATUM,WHITE
1 JELLY (GRAM) TOPICAL
Qty: 1 | Refills: 0
Start: 2019-12-26

## 2019-12-26 RX ORDER — FUROSEMIDE 40 MG
20 TABLET ORAL DAILY
Refills: 0 | Status: DISCONTINUED | OUTPATIENT
Start: 2019-12-26 | End: 2019-12-26

## 2019-12-26 RX ADMIN — Medication 1 TABLET(S): at 12:13

## 2019-12-26 RX ADMIN — Medication 1 APPLICATION(S): at 06:10

## 2019-12-26 RX ADMIN — SPIRONOLACTONE 25 MILLIGRAM(S): 25 TABLET, FILM COATED ORAL at 13:04

## 2019-12-26 RX ADMIN — Medication 112 MICROGRAM(S): at 06:09

## 2019-12-26 RX ADMIN — Medication 10 MILLIGRAM(S): at 09:38

## 2019-12-26 RX ADMIN — HEPARIN SODIUM 5000 UNIT(S): 5000 INJECTION INTRAVENOUS; SUBCUTANEOUS at 06:09

## 2019-12-26 RX ADMIN — Medication 20 MILLIGRAM(S): at 13:04

## 2019-12-26 RX ADMIN — CARVEDILOL PHOSPHATE 6.25 MILLIGRAM(S): 80 CAPSULE, EXTENDED RELEASE ORAL at 06:09

## 2019-12-26 RX ADMIN — ERTAPENEM SODIUM 100 MILLIGRAM(S): 1 INJECTION, POWDER, LYOPHILIZED, FOR SOLUTION INTRAMUSCULAR; INTRAVENOUS at 06:09

## 2019-12-26 NOTE — PROGRESS NOTE ADULT - PROBLEM SELECTOR PROBLEM 1
Syncope, unspecified syncope type

## 2019-12-26 NOTE — DISCHARGE NOTE PROVIDER - NSDCCPCAREPLAN_GEN_ALL_CORE_FT
PRINCIPAL DISCHARGE DIAGNOSIS  Diagnosis: UTI (urinary tract infection)  Assessment and Plan of Treatment: PLease do not take patch for retention because of syncope and multi UTIs, completed iv abx for 5 days. please follow up with PMD  HOME CARE INSTRUCTIONS  f you were prescribed antibiotics, take them exactly as your caregiver instructs you. Finish the medication even if you feel better after you have only taken some of the medication.  Drink enough water and fluids to keep your urine clear or pale yellow.  Avoid caffeine, tea, and carbonated beverages. They tend to irritate your bladder.  Empty your bladder often. Avoid holding urine for long periods of time.  Empty your bladder before and after sexual intercourse.  After a bowel movement, women should cleanse from front to back. Use each tissue only once.  SEEK MEDICAL CARE IF:  You have back pain.  You develop a fever.  Your symptoms do not begin to resolve within 3 days.  SEEK IMMEDIATE MEDICAL CARE IF:  You have severe back pain or lower abdominal pain.  You develop chills.  You have nausea or vomiting.  You have continued burning or discomfort with urination.        SECONDARY DISCHARGE DIAGNOSES  Diagnosis: ANNA (acute kidney injury)  Assessment and Plan of Treatment: IMPROVED with hydration , please monitor creatine at PMD office    Diagnosis: Essential hypertension  Assessment and Plan of Treatment: resume aldactone and lasix, MUST FOLLOW UP WITH PMD FOR BLOOD PRESSURE MONITORING.    Diagnosis: Syncope  Assessment and Plan of Treatment: stopped gelnique patch because risk of syncope in elderly. PRINCIPAL DISCHARGE DIAGNOSIS  Diagnosis: UTI (urinary tract infection)  Assessment and Plan of Treatment: PLease do not take patch for retention because of syncope and multi UTIs, completed iv abx for 5 days. please follow up with PMD  HOME CARE INSTRUCTIONS  f you were prescribed antibiotics, take them exactly as your caregiver instructs you. Finish the medication even if you feel better after you have only taken some of the medication.  Drink enough water and fluids to keep your urine clear or pale yellow.  Avoid caffeine, tea, and carbonated beverages. They tend to irritate your bladder.  Empty your bladder often. Avoid holding urine for long periods of time.  Empty your bladder before and after sexual intercourse.  After a bowel movement, women should cleanse from front to back. Use each tissue only once.  SEEK MEDICAL CARE IF:  You have back pain.  You develop a fever.  Your symptoms do not begin to resolve within 3 days.  SEEK IMMEDIATE MEDICAL CARE IF:  You have severe back pain or lower abdominal pain.  You develop chills.  You have nausea or vomiting.  You have continued burning or discomfort with urination.        SECONDARY DISCHARGE DIAGNOSES  Diagnosis: ANNA (acute kidney injury)  Assessment and Plan of Treatment: IMPROVED with hydration , please monitor creatine at PMD office    Diagnosis: Essential hypertension  Assessment and Plan of Treatment: resume aldactone and lasix, MUST FOLLOW UP WITH PMD FOR BLOOD PRESSURE MONITORING. COREQ INCREASED TO 6.25 MGS Q12 HOURS    Diagnosis: Syncope  Assessment and Plan of Treatment: stopped gelnique patch because risk of syncope in elderly.

## 2019-12-26 NOTE — PROGRESS NOTE ADULT - ASSESSMENT
89 y/o F PMHx HTN, hypothyroidism, and ESBL UTIs, urinary incontinence, diastolic CHF with admission here in 9/2019 for CHF exacerbation pw syncopal episode at [facility]. Found to have a UTI, dehydration. Uncontrolled BP overnight.

## 2019-12-26 NOTE — DISCHARGE NOTE PROVIDER - NSDCMRMEDTOKEN_GEN_ALL_CORE_FT
Acidophilus oral tablet: 1 cap(s) orally once a day  carvedilol 3.125 mg oral tablet: 1 tab(s) orally every 12 hours  Lasix 20 mg oral tablet: 1 tab(s) orally once a day  levothyroxine 112 mcg (0.112 mg) oral tablet: 1 tab(s) orally once a day  petrolatum topical ointment: 1 application topically 2 times a day  polyethylene glycol 3350 oral powder for reconstitution: 17 gram(s) orally once a day, As Needed  PreserVision AREDS 2 oral capsule: 1 cap(s) orally 2 times a day  spironolactone 25 mg oral tablet: 1 tab(s) orally once a day  Tylenol: 1 dose(s) orally 2 times a day, As Needed  Vitamin D2 50,000 intl units (1.25 mg) oral capsule: 1 cap(s) orally once a week (mondays) Acidophilus oral tablet: 1 cap(s) orally once a day  Coreg 6.25 mg oral tablet: 1 tab(s) orally 2 times a day   Lasix 20 mg oral tablet: 1 tab(s) orally once a day  levothyroxine 112 mcg (0.112 mg) oral tablet: 1 tab(s) orally once a day  petrolatum topical ointment: 1 application topically 2 times a day  polyethylene glycol 3350 oral powder for reconstitution: 17 gram(s) orally once a day, As Needed  PreserVision AREDS 2 oral capsule: 1 cap(s) orally 2 times a day  spironolactone 25 mg oral tablet: 1 tab(s) orally once a day  Tylenol: 1 dose(s) orally 2 times a day, As Needed  Vitamin D2 50,000 intl units (1.25 mg) oral capsule: 1 cap(s) orally once a week (mondays)

## 2019-12-26 NOTE — CHART NOTE - NSCHARTNOTEFT_GEN_A_CORE
S= blood pressure systolic 190  HPI : admitted for syncope found to have UTI on invanz . PMH of HTN and as per H&P patient was on aldactone and lasix but stopped due to ANNA  PE : No complaints and non toxic looking  Vital Signs Last 24 Hrs  T(C): 36.5 (26 Dec 2019 05:03), Max: 36.8 (25 Dec 2019 12:17)  T(F): 97.7 (26 Dec 2019 05:03), Max: 98.2 (25 Dec 2019 12:17)  HR: 66 (26 Dec 2019 05:03) (66 - 80)  BP: 193/95 (26 Dec 2019 08:29) (162/80 - 193/95)  BP(mean): --  RR: 18 (26 Dec 2019 05:03) (18 - 18)  SpO2: 94% (26 Dec 2019 05:03) (94% - 98%)  MEDICATIONS  (STANDING):  AQUAPHOR (petrolatum Ointment) 1 Application(s) Topical two times a day  carvedilol 6.25 milliGRAM(s) Oral every 12 hours  ertapenem  IVPB 500 milliGRAM(s) IV Intermittent every 24 hours  heparin  Injectable 5000 Unit(s) SubCutaneous two times a day  hydrALAZINE Injectable 10 milliGRAM(s) IV Push once  lactobacillus acidophilus 1 Tablet(s) Oral daily  levothyroxine 112 MICROGram(s) Oral daily  multivitamin/minerals 1 Tablet(s) Oral daily  Basic Metabolic Panel - STAT (12.25.19 @ 12:31)    Sodium, Serum: 143 mmol/L    Potassium, Serum: 4.6 mmol/L    Chloride, Serum: 105 mmol/L    Carbon Dioxide, Serum: 25 mmol/L    Anion Gap, Serum: 13 mmol/L    Blood Urea Nitrogen, Serum: 40 mg/dL    Creatinine, Serum: 1.19 mg/dL    Glucose, Serum: 90 mg/dL    Calcium, Total Serum: 9.5 mg/dL         A/P 89 yrs old female admitted for syncope found to have uti now with elevated blood pressure and not optimized with coreg  Give hydralazine x1, will speak with PMD regarding resuming aldactone or alternative, as of recent bmp her creatinine has improved.

## 2019-12-26 NOTE — PROGRESS NOTE ADULT - PROBLEM SELECTOR PLAN 6
Avoid anticholinergics despite symptoms as above given significant negative side effect profiles. Patient is considering possible botox injection treatment with her urogynecologist.
levothyroxine 112mcg po daily
Avoid anticholinergics despite symptoms as above given significant negative side effect profiles. Patient is considering possible botox injection treatment with her urogynecologist.
Avoid anticholinergics despite symptoms as above given significant negative side effect profiles. Patient is considering possible botox injection treatment with her urogynecologist.

## 2019-12-26 NOTE — PROGRESS NOTE ADULT - SUBJECTIVE AND OBJECTIVE BOX
Patient is a 89y old  Female who presents with a chief complaint of Syncope/likely ESBL UTI (25 Dec 2019 10:21)    HPI: Pt up in chair. BP high overnight, given IV Hydralazine. Pt denies complaints.     Vital Signs Last 24 Hrs  T(C): 36.5 (26 Dec 2019 05:03), Max: 36.8 (25 Dec 2019 12:17)  T(F): 97.7 (26 Dec 2019 05:03), Max: 98.2 (25 Dec 2019 12:17)  HR: 66 (26 Dec 2019 09:50) (66 - 80)  BP: 154/76 (26 Dec 2019 10:09) (117/74 - 193/95)  BP(mean): --  RR: 18 (26 Dec 2019 05:03) (18 - 18)  SpO2: 94% (26 Dec 2019 05:03) (94% - 98%)                          12.7   5.50  )-----------( 165      ( 25 Dec 2019 12:31 )             41.1     12-25    143  |  105  |  40<H>  ----------------------------<  90  4.6   |  25  |  1.19    Ca    9.5      25 Dec 2019 12:31      MEDICATIONS  (STANDING):  AQUAPHOR (petrolatum Ointment) 1 Application(s) Topical two times a day  carvedilol 6.25 milliGRAM(s) Oral every 12 hours  ertapenem  IVPB 500 milliGRAM(s) IV Intermittent every 24 hours  heparin  Injectable 5000 Unit(s) SubCutaneous two times a day  lactobacillus acidophilus 1 Tablet(s) Oral daily  levothyroxine 112 MICROGram(s) Oral daily  multivitamin/minerals 1 Tablet(s) Oral daily    MEDICATIONS  (PRN):  acetaminophen   Tablet .. 650 milliGRAM(s) Oral every 4 hours PRN Mild Pain (1 - 3)  polyethylene glycol 3350 17 Gram(s) Oral daily PRN Constipation

## 2019-12-26 NOTE — PROGRESS NOTE ADULT - PROBLEM SELECTOR PLAN 4
E coli, sensitive to Ceftriaxone. Received 3 dose of Ertapenem- completed abx.
levothyroxine 112mcg po daily

## 2019-12-26 NOTE — PROGRESS NOTE ADULT - PROBLEM SELECTOR PROBLEM 4
Acute cystitis without hematuria
Hypothyroidism, unspecified type

## 2019-12-26 NOTE — PROGRESS NOTE ADULT - PROBLEM SELECTOR PLAN 8
Transitions of Care Status:  1.  Name of PCP: Betty Singh  2.  PCP Contacted on Admission: [ x] Y    [ ] N    3.  PCP contacted at Discharge: [ ] Y    [ ] N    [ ] N/A  4.  Post-Discharge Appointment Date and Location:  5.  Summary of Handoff given to PCP: Email sent on 12/23 to Dr Singh about admission.

## 2019-12-26 NOTE — PROGRESS NOTE ADULT - PROBLEM SELECTOR PLAN 1
Suspect multifactorial syncopal episode in setting of likely URI (cold, flu/rsv swab negative here) and concurrent likely ESBL UTI as well as use of new anticholinergic urinary incontinence topical medication (raises risk for syncope and falls).    Discontinued use of Gelnique (oxybutynin gel), should not be placed on any other anticholinergic anti-incontinence medications given multiple ESBL UTIs and now syncope with use.    Treated UTI. Supportive care for URI.

## 2019-12-26 NOTE — PROGRESS NOTE ADULT - PROBLEM SELECTOR PLAN 3
E coli, sensitive to Ceftriaxone. Received 3 dose of Ertapenem- completed abx.
Likely prerenal etiology 2/2 infections and diuretic use. Now resolved.
Concern for ESBL UTI given prior UCx. On Ertapenem.  F/U cultures
Concern for ESBL UTI given prior UCx. On Ertapenem.  F/U cultures- g neg rods.

## 2019-12-26 NOTE — DISCHARGE NOTE PROVIDER - NSDCFUSCHEDAPPT_GEN_ALL_CORE_FT
SOLIS TAYLOR ; 01/16/2020 ; [facility] [address] [facility] ; 01/16/2020 ; [facility] [address] SOLIS TAYLOR ; 01/16/2020 ; [facility] [address] SOL[facility] T[facility] ; 01/16/2020 ; [facility] [address]

## 2019-12-26 NOTE — PROGRESS NOTE ADULT - PROBLEM SELECTOR PROBLEM 3
ANNA (acute kidney injury)
Acute cystitis without hematuria

## 2019-12-26 NOTE — PROGRESS NOTE ADULT - PROBLEM SELECTOR PLAN 7
Avoid anticholinergics despite symptoms as above given significant negative side effect profiles. Patient is considering possible botox injection treatment with her urogynecologist.
Transitions of Care Status:  1.  Name of PCP: Betty Singh  2.  PCP Contacted on Admission: [ x] Y    [ ] N    3.  PCP contacted at Discharge: [ ] Y    [ ] N    [ ] N/A  4.  Post-Discharge Appointment Date and Location:  5.  Summary of Handoff given to PCP: Email sent on 12/23 to Dr Singh about admission.
Transitions of Care Status:  1.  Name of PCP: Betty Singh  2.  PCP Contacted on Admission: [ x] Y    [ ] N    3.  PCP contacted at Discharge: [ ] Y    [ ] N    [ ] N/A  4.  Post-Discharge Appointment Date and Location:  5.  Summary of Handoff given to PCP: Email sent to Dr Singh about admission.
Transitions of Care Status:  1.  Name of PCP: Betty Singh  2.  PCP Contacted on Admission: [ x] Y    [ ] N    3.  PCP contacted at Discharge: [ ] Y    [ ] N    [ ] N/A  4.  Post-Discharge Appointment Date and Location:  5.  Summary of Handoff given to PCP: Email sent on 12/23 to Dr Singh about admission.

## 2019-12-26 NOTE — PROGRESS NOTE ADULT - PROBLEM SELECTOR PLAN 2
Likely prerenal etiology 2/2 infections and diuretic use.  Holding diuretics as above. Monitor. Underlying CKD 3.
Uncontrolled- 190s systolic overnight, given 10mg of IV Hydralazine w improvement. Resume home Lasix and Aldactone. Coreg was increased yesterday- continue higher dose.
Likely prerenal etiology 2/2 infections and diuretic use.  Holding diuretics as above. Monitor. Underlying CKD 3.
Likely prerenal etiology 2/2 infections and diuretic use.  Holding diuretics as above. Monitor. Underlying CKD 3.

## 2019-12-26 NOTE — DISCHARGE NOTE PROVIDER - HOSPITAL COURSE
· Assessment        89 y/o F PMHx HTN, hypothyroidism, and ESBL UTIs, urinary incontinence, diastolic CHF with admission here in 9/2019 for CHF exacerbation pw syncopal episode at [facility]placed by [facility]. Found to have a UTI, dehydration. Uncontrolled BP overnight.          Problem/Plan - 1:    ·  Problem: Syncope, unspecified syncope type.  Plan: Suspect multifactorial syncopal episode in setting of likely URI (cold, flu/rsv swab negative here) and concurrent likely ESBL UTI as well as use of new anticholinergic urinary incontinence topical medication (raises risk for syncope and falls).        Discontinued use of Gelnique (oxybutynin gel), should not be placed on any other anticholinergic anti-incontinence medications given multiple ESBL UTIs and now syncope with use.        Treated UTI. Supportive care for URI.          Problem/Plan - 2:    ·  Problem: Essential hypertension.  Plan: Uncontrolled- 190s systolic overnight, given 10mg of IV Hydralazine w improvement. Resume home Lasix and Aldactone. Coreg was increased yesterday- continue higher dose. 89 y/o F PMHx HTN, hypothyroidism, and ESBL UTIs, urinary incontinence, diastolic CHF with admission here [facility]2019 [facility] Found to have a UTI, dehydration.         Started on iv abx for UTI, given iv fluids. Syncope felt to be multifactorial, in setting of likely URI (cold, flu/rsv swab negative here) and concurrent UTI as well as use of new anticholinergic urinary incontinence topical medication (raises risk for syncope and falls).        Discontinued use of Gelnique (oxybutynin gel), should not be placed on any other anticholinergic anti-incontinence medications given multiple ESBL UTIs and now syncope with use.        Grew pansensitive E coli on culture- completed 3 days of abx. ANNA on admission, improved w hydration.         Uncontrolled- 190s systolic overnight, given 10mg of IV Hydralazine w improvement. Resumed home Lasix and Aldactone. Coreg was increased.        Symptoms imrpoved. Ambulated w PT. Discharged to assisted living w f/u.        Diagnoses: Syncope; E coli UTI; Uncontrolled htn; ANNA; CKD 3; Hyperkalemia; Hypothyroidism 89 y/o F PMHx HTN, hypothyroidism, and ESBL UTIs, urinary incontinence, diastolic CHF with [facility] Found to have a UTI, dehydration.         Started on iv abx for UTI, given iv fluids. Syncope felt to be multifactorial, in setting of likely URI (cold, flu/rsv swab negative here) and concurrent UTI as well as use of new anticholinergic urinary incontinence topical medication (raises risk for syncope and falls).        Discontinued use of Gelnique (oxybutynin gel), should not be placed on any other anticholinergic anti-incontinence medications given multiple ESBL UTIs and now syncope with use.        Grew pansensitive E coli on culture- completed 3 days of abx. ANNA on admission, improved w hydration.         Uncontrolled- 190s systolic overnight, given 10mg of IV Hydralazine w improvement. Resumed home Lasix and Aldactone. Coreg was increased.        Noted to have upper extremity rash from Gelnique, given local treatment.     Symptoms imrpoved. Ambulated w PT. Discharged to assisted living w f/u.        Diagnoses: Syncope; E coli UTI; Uncontrolled htn; ANNA; CKD 3; Hyperkalemia; Hypothyroidism

## 2019-12-26 NOTE — PROVIDER CONTACT NOTE (OTHER) - ACTION/TREATMENT ORDERED:
Hydralazine administered as ordered. Monitoring continued. See flow sheet for repeated vitals post hydralazine administration.

## 2019-12-26 NOTE — PROGRESS NOTE ADULT - PROBLEM SELECTOR PROBLEM 2
ANNA (acute kidney injury)
Essential hypertension
ANNA (acute kidney injury)
ANNA (acute kidney injury)

## 2019-12-26 NOTE — DISCHARGE NOTE NURSING/CASE MANAGEMENT/SOCIAL WORK - PATIENT PORTAL LINK FT
You can access the FollowMyHealth Patient Portal offered by [facility] by registering at the following website: [email or web address]. By joining [employer]’s FollowMyHealth portal, you will also be able to view your health information using other applications (apps) compatible with our system.

## 2019-12-26 NOTE — PROGRESS NOTE ADULT - PROBLEM SELECTOR PLAN 5
Arm rash due to anticholinergic ointment. Continue local treatment w Vaseline. Avoid Gelnique ointment.
Uncontrolled- 180s systolic overnight. Coreg dose increased. Monitor.
Controlled
Controlled

## 2019-12-27 ENCOUNTER — APPOINTMENT (OUTPATIENT)
Dept: ORTHOPEDIC SURGERY | Facility: CLINIC | Age: 84
End: 2019-12-27

## 2019-12-31 ENCOUNTER — RX RENEWAL (OUTPATIENT)
Age: 84
End: 2019-12-31

## 2019-12-31 ENCOUNTER — APPOINTMENT (OUTPATIENT)
Dept: GERIATRICS | Facility: ASSISTED LIVING FACILITY | Age: 84
End: 2019-12-31
Payer: MEDICARE

## 2019-12-31 VITALS
HEART RATE: 69 BPM | RESPIRATION RATE: 14 BRPM | DIASTOLIC BLOOD PRESSURE: 65 MMHG | OXYGEN SATURATION: 95 % | SYSTOLIC BLOOD PRESSURE: 118 MMHG

## 2019-12-31 RX ORDER — GUAIFENESIN 600 MG/1
600 TABLET, EXTENDED RELEASE ORAL
Qty: 14 | Refills: 0 | Status: DISCONTINUED | COMMUNITY
Start: 2019-11-19 | End: 2019-12-31

## 2019-12-31 RX ORDER — BENZONATATE 100 MG/1
100 CAPSULE ORAL EVERY 8 HOURS
Refills: 0 | Status: DISCONTINUED | COMMUNITY
End: 2019-12-31

## 2019-12-31 NOTE — HISTORY OF PRESENT ILLNESS
[FreeTextEntry1] : 89yoF seen for follow up after recent hospitalization after syncope/fall at home found to have UTI and skin reaction to new topical oxybutynin.\par \par rash: slowing improving after d/c.  using vaseline, but notes still with some itching.\par b/l arms.  is plannig to f/u with derm.\par \par denies dysuria.  had repeat UA with cardiologist yesterday. will obtain records.\par \par she is without acute complaints.\par \par walking with walker- planning to start PT.\par getting assistance with showers.\par \par notes taking carvedilol 6.25mg bid (not 3.125mg as noted).\par \par has f/u visit with [facility] next week.

## 2019-12-31 NOTE — PHYSICAL EXAM
[General Appearance - Alert] : alert [Sclera] : the sclera and conjunctiva were normal [General Appearance - In No Acute Distress] : in no acute distress [No Oral Pallor] : no oral pallor [Neck Appearance] : the appearance of the neck was normal [Extraocular Movements] : extraocular movements were intact [Auscultation Breath Sounds / Voice Sounds] : lungs were clear to auscultation bilaterally [Respiration, Rhythm And Depth] : normal respiratory rhythm and effort [Exaggerated Use Of Accessory Muscles For Inspiration] : no accessory muscle use [] : no respiratory distress [Heart Rate And Rhythm] : heart rate was normal and rhythm regular [Heart Sounds] : normal S1 and S2 [Edema] : there was no peripheral edema [Bowel Sounds] : normal bowel sounds [Abdomen Tenderness] : non-tender [Abdomen Soft] : soft [Involuntary Movements] : no involuntary movements were seen [No Spinal Tenderness] : no spinal tenderness [Nail Clubbing] : no clubbing  or cyanosis of the fingernails [No Focal Deficits] : no focal deficits [Affect] : the affect was normal [Mood] : the mood was normal [FreeTextEntry1] : deep purple macules over b/l arms, some excoriations

## 2019-12-31 NOTE — ASSESSMENT
[FreeTextEntry1] : uti: treated with antibiotics during hospitalization\par -f/u repeat ua that was obtained at [facility]\par \par \par diastolic heart failure:  euvolemic today\par -cw carvedilol 6.25mg  bid\par spironolactone 25mg daily\par lasix 20mg daily\par -daily weights and keep log.\par - Cardiology Dr. Buckley\par \par OA of knees: \par -tylenol to 1000mg tid prn\par -celebrex daily prn\par -continue with physical activity as tolerated\par -discussed possible tramadol if uncontrolled\par -referral to ortho -  had appt but was hospitalized.\par \par UI:\par -timed bathroom visits\par -hx of recurrent infections\par \par hypothyroidism:\par controlled c/w current medications

## 2019-12-31 NOTE — REVIEW OF SYSTEMS
[Eyesight Problems] : eyesight problems [As Noted in HPI] : as noted in HPI [Joint Pain] : joint pain [Negative] : Genitourinary [Chest Pain] : no chest pain [Palpitations] : no palpitations [Lower Ext Edema] : no lower extremity edema [FreeTextEntry9] : b/l knees

## 2020-01-14 ENCOUNTER — INPATIENT (INPATIENT)
Facility: HOSPITAL | Age: 85
LOS: 2 days | Discharge: NURSING, REHAB OR LONG-TERM CARE | DRG: 312 | End: 2020-01-17
Attending: INTERNAL MEDICINE | Admitting: INTERNAL MEDICINE
Payer: MEDICARE

## 2020-01-14 VITALS
OXYGEN SATURATION: 98 % | WEIGHT: 149.91 LBS | SYSTOLIC BLOOD PRESSURE: 147 MMHG | TEMPERATURE: 98 F | HEART RATE: 66 BPM | DIASTOLIC BLOOD PRESSURE: 79 MMHG | RESPIRATION RATE: 16 BRPM | HEIGHT: 64 IN

## 2020-01-14 DIAGNOSIS — I50.30 UNSPECIFIED DIASTOLIC (CONGESTIVE) HEART FAILURE: ICD-10-CM

## 2020-01-14 DIAGNOSIS — I10 ESSENTIAL (PRIMARY) HYPERTENSION: ICD-10-CM

## 2020-01-14 DIAGNOSIS — R55 SYNCOPE AND COLLAPSE: ICD-10-CM

## 2020-01-14 DIAGNOSIS — Z02.9 ENCOUNTER FOR ADMINISTRATIVE EXAMINATIONS, UNSPECIFIED: ICD-10-CM

## 2020-01-14 DIAGNOSIS — Z98.890 OTHER SPECIFIED POSTPROCEDURAL STATES: Chronic | ICD-10-CM

## 2020-01-14 DIAGNOSIS — I21.4 NON-ST ELEVATION (NSTEMI) MYOCARDIAL INFARCTION: ICD-10-CM

## 2020-01-14 DIAGNOSIS — Z98.49 CATARACT EXTRACTION STATUS, UNSPECIFIED EYE: Chronic | ICD-10-CM

## 2020-01-14 DIAGNOSIS — N18.3 CHRONIC KIDNEY DISEASE, STAGE 3 (MODERATE): ICD-10-CM

## 2020-01-14 DIAGNOSIS — E03.9 HYPOTHYROIDISM, UNSPECIFIED: ICD-10-CM

## 2020-01-14 DIAGNOSIS — D64.9 ANEMIA, UNSPECIFIED: ICD-10-CM

## 2020-01-14 DIAGNOSIS — O00.1 TUBAL PREGNANCY: Chronic | ICD-10-CM

## 2020-01-14 DIAGNOSIS — Z29.9 ENCOUNTER FOR PROPHYLACTIC MEASURES, UNSPECIFIED: ICD-10-CM

## 2020-01-14 DIAGNOSIS — J98.11 ATELECTASIS: ICD-10-CM

## 2020-01-14 DIAGNOSIS — Z96.7 PRESENCE OF OTHER BONE AND TENDON IMPLANTS: Chronic | ICD-10-CM

## 2020-01-14 LAB
ALBUMIN SERPL ELPH-MCNC: 4.1 G/DL — SIGNIFICANT CHANGE UP (ref 3.3–5)
ALP SERPL-CCNC: 141 U/L — HIGH (ref 40–120)
ALT FLD-CCNC: 29 U/L — SIGNIFICANT CHANGE UP (ref 10–45)
ANION GAP SERPL CALC-SCNC: 12 MMOL/L — SIGNIFICANT CHANGE UP (ref 5–17)
APPEARANCE UR: CLEAR — SIGNIFICANT CHANGE UP
AST SERPL-CCNC: 16 U/L — SIGNIFICANT CHANGE UP (ref 10–40)
BASE EXCESS BLDV CALC-SCNC: 1.2 MMOL/L — SIGNIFICANT CHANGE UP (ref -2–2)
BILIRUB SERPL-MCNC: 0.3 MG/DL — SIGNIFICANT CHANGE UP (ref 0.2–1.2)
BILIRUB UR-MCNC: NEGATIVE — SIGNIFICANT CHANGE UP
BUN SERPL-MCNC: 47 MG/DL — HIGH (ref 7–23)
CA-I SERPL-SCNC: 1.24 MMOL/L — SIGNIFICANT CHANGE UP (ref 1.12–1.3)
CALCIUM SERPL-MCNC: 9.4 MG/DL — SIGNIFICANT CHANGE UP (ref 8.4–10.5)
CHLORIDE BLDV-SCNC: 105 MMOL/L — SIGNIFICANT CHANGE UP (ref 96–108)
CHLORIDE SERPL-SCNC: 102 MMOL/L — SIGNIFICANT CHANGE UP (ref 96–108)
CO2 BLDV-SCNC: 29 MMOL/L — SIGNIFICANT CHANGE UP (ref 22–30)
CO2 SERPL-SCNC: 26 MMOL/L — SIGNIFICANT CHANGE UP (ref 22–31)
COLOR SPEC: COLORLESS — SIGNIFICANT CHANGE UP
CREAT SERPL-MCNC: 1.36 MG/DL — HIGH (ref 0.5–1.3)
DIFF PNL FLD: NEGATIVE — SIGNIFICANT CHANGE UP
GAS PNL BLDV: 141 MMOL/L — SIGNIFICANT CHANGE UP (ref 135–145)
GAS PNL BLDV: SIGNIFICANT CHANGE UP
GAS PNL BLDV: SIGNIFICANT CHANGE UP
GLUCOSE BLDV-MCNC: 101 MG/DL — HIGH (ref 70–99)
GLUCOSE SERPL-MCNC: 105 MG/DL — HIGH (ref 70–99)
GLUCOSE UR QL: NEGATIVE — SIGNIFICANT CHANGE UP
HCO3 BLDV-SCNC: 27 MMOL/L — SIGNIFICANT CHANGE UP (ref 21–29)
HCT VFR BLD CALC: 35.7 % — SIGNIFICANT CHANGE UP (ref 34.5–45)
HCT VFR BLDA CALC: 35 % — LOW (ref 39–50)
HGB BLD CALC-MCNC: 11.3 G/DL — LOW (ref 11.5–15.5)
HGB BLD-MCNC: 11.1 G/DL — LOW (ref 11.5–15.5)
KETONES UR-MCNC: NEGATIVE — SIGNIFICANT CHANGE UP
LACTATE BLDV-MCNC: 1.6 MMOL/L — SIGNIFICANT CHANGE UP (ref 0.7–2)
LEUKOCYTE ESTERASE UR-ACNC: NEGATIVE — SIGNIFICANT CHANGE UP
MAGNESIUM SERPL-MCNC: 2.5 MG/DL — SIGNIFICANT CHANGE UP (ref 1.6–2.6)
MCHC RBC-ENTMCNC: 30.1 PG — SIGNIFICANT CHANGE UP (ref 27–34)
MCHC RBC-ENTMCNC: 31.1 GM/DL — LOW (ref 32–36)
MCV RBC AUTO: 96.7 FL — SIGNIFICANT CHANGE UP (ref 80–100)
NITRITE UR-MCNC: NEGATIVE — SIGNIFICANT CHANGE UP
NRBC # BLD: 0 /100 WBCS — SIGNIFICANT CHANGE UP (ref 0–0)
NT-PROBNP SERPL-SCNC: 1228 PG/ML — HIGH (ref 0–300)
OTHER CELLS CSF MANUAL: 6 ML/DL — LOW (ref 18–22)
PCO2 BLDV: 51 MMHG — HIGH (ref 35–50)
PH BLDV: 7.34 — LOW (ref 7.35–7.45)
PH UR: 7.5 — SIGNIFICANT CHANGE UP (ref 5–8)
PLATELET # BLD AUTO: 196 K/UL — SIGNIFICANT CHANGE UP (ref 150–400)
PO2 BLDV: 26 MMHG — SIGNIFICANT CHANGE UP (ref 25–45)
POTASSIUM BLDV-SCNC: 5.1 MMOL/L — SIGNIFICANT CHANGE UP (ref 3.5–5.3)
POTASSIUM SERPL-MCNC: 5.3 MMOL/L — SIGNIFICANT CHANGE UP (ref 3.5–5.3)
POTASSIUM SERPL-SCNC: 5.3 MMOL/L — SIGNIFICANT CHANGE UP (ref 3.5–5.3)
PROT SERPL-MCNC: 7.7 G/DL — SIGNIFICANT CHANGE UP (ref 6–8.3)
PROT UR-MCNC: NEGATIVE — SIGNIFICANT CHANGE UP
RBC # BLD: 3.69 M/UL — LOW (ref 3.8–5.2)
RBC # FLD: 14.8 % — HIGH (ref 10.3–14.5)
SAO2 % BLDV: 38 % — LOW (ref 67–88)
SODIUM SERPL-SCNC: 140 MMOL/L — SIGNIFICANT CHANGE UP (ref 135–145)
SP GR SPEC: 1.01 — LOW (ref 1.01–1.02)
TROPONIN T, HIGH SENSITIVITY RESULT: 26 NG/L — SIGNIFICANT CHANGE UP (ref 0–51)
TROPONIN T, HIGH SENSITIVITY RESULT: 34 NG/L — SIGNIFICANT CHANGE UP (ref 0–51)
UROBILINOGEN FLD QL: NEGATIVE — SIGNIFICANT CHANGE UP
WBC # BLD: 7.23 K/UL — SIGNIFICANT CHANGE UP (ref 3.8–10.5)
WBC # FLD AUTO: 7.23 K/UL — SIGNIFICANT CHANGE UP (ref 3.8–10.5)

## 2020-01-14 PROCEDURE — 93010 ELECTROCARDIOGRAM REPORT: CPT

## 2020-01-14 PROCEDURE — 99284 EMERGENCY DEPT VISIT MOD MDM: CPT

## 2020-01-14 PROCEDURE — 99223 1ST HOSP IP/OBS HIGH 75: CPT

## 2020-01-14 PROCEDURE — 71045 X-RAY EXAM CHEST 1 VIEW: CPT | Mod: 26

## 2020-01-14 RX ORDER — LEVOTHYROXINE SODIUM 125 MCG
112 TABLET ORAL DAILY
Refills: 0 | Status: DISCONTINUED | OUTPATIENT
Start: 2020-01-14 | End: 2020-01-17

## 2020-01-14 RX ORDER — ACETAMINOPHEN 500 MG
1 TABLET ORAL
Qty: 0 | Refills: 0 | DISCHARGE

## 2020-01-14 RX ORDER — FUROSEMIDE 40 MG
20 TABLET ORAL DAILY
Refills: 0 | Status: DISCONTINUED | OUTPATIENT
Start: 2020-01-15 | End: 2020-01-16

## 2020-01-14 RX ORDER — ERGOCALCIFEROL 1.25 MG/1
1 CAPSULE ORAL
Qty: 0 | Refills: 0 | DISCHARGE

## 2020-01-14 RX ORDER — MULTIVIT-MIN/FERROUS GLUCONATE 9 MG/15 ML
1 LIQUID (ML) ORAL
Qty: 0 | Refills: 0 | DISCHARGE

## 2020-01-14 RX ORDER — CARVEDILOL PHOSPHATE 80 MG/1
6.25 CAPSULE, EXTENDED RELEASE ORAL EVERY 12 HOURS
Refills: 0 | Status: DISCONTINUED | OUTPATIENT
Start: 2020-01-14 | End: 2020-01-16

## 2020-01-14 RX ORDER — LACTOBACILLUS ACIDOPHILUS 100MM CELL
1 CAPSULE ORAL
Qty: 0 | Refills: 0 | DISCHARGE

## 2020-01-14 RX ORDER — HEPARIN SODIUM 5000 [USP'U]/ML
5000 INJECTION INTRAVENOUS; SUBCUTANEOUS EVERY 12 HOURS
Refills: 0 | Status: DISCONTINUED | OUTPATIENT
Start: 2020-01-14 | End: 2020-01-17

## 2020-01-14 RX ORDER — SPIRONOLACTONE 25 MG/1
25 TABLET, FILM COATED ORAL DAILY
Refills: 0 | Status: DISCONTINUED | OUTPATIENT
Start: 2020-01-15 | End: 2020-01-17

## 2020-01-14 RX ORDER — POLYETHYLENE GLYCOL 3350 17 G/17G
17 POWDER, FOR SOLUTION ORAL DAILY
Refills: 0 | Status: DISCONTINUED | OUTPATIENT
Start: 2020-01-14 | End: 2020-01-17

## 2020-01-14 RX ADMIN — CARVEDILOL PHOSPHATE 6.25 MILLIGRAM(S): 80 CAPSULE, EXTENDED RELEASE ORAL at 19:13

## 2020-01-14 RX ADMIN — HEPARIN SODIUM 5000 UNIT(S): 5000 INJECTION INTRAVENOUS; SUBCUTANEOUS at 22:24

## 2020-01-14 NOTE — ED PROVIDER NOTE - OBJECTIVE STATEMENT
89y f PMHx NSTEMI (July 2019), CHF on lasix, HTN p/w syncope. Pt was doing sitting during an exercise class at her living facility ([facility]) where she felt tired and "blacked out." Witnessed by staff pt fell out of her chair, did not hit her head or other extremities. Estimated LOC ~5minutes as per EMS. Pt currently feeling well in ED without symptoms. 89y f PMHx NSTEMI (July 2019), CHF on lasix, HTN p/w syncope. Pt was doing sitting during an exercise class at her living facility ([facility]) where she felt tired and "blacked out." Pt recalls exercising in the chair and waking up on ground. Witnessed by staff pt fell out of her chair, did not hit her head or injure other extremities. Estimated LOC ~5minutes as per EMS. Pt currently feeling well in ED without symptoms. She admits to feeling tired while ambulating down the [place] to the exercise class. Also admits to urinary frequency last night and previous UTIs. Denies CP, SOB, palpitations, abd pain, n/v/d/c, fever, chills. Pt had + DNR/DNI in place.

## 2020-01-14 NOTE — ED PROVIDER NOTE - CLINICAL SUMMARY MEDICAL DECISION MAKING FREE TEXT BOX
radha lopez chf, nstemi last year, with general malaise weakness on exertion this am -- no cp no black or bloody stool - with recent urinary freq and palp - recent holter - turned in yesterday - asymptomatic now no ekg change from baseline - telel moniitor check lytes and urine r/o infection -- r/o acs with trop and reach out to [facility] re last echo amdmision v obs

## 2020-01-14 NOTE — H&P ADULT - PROBLEM SELECTOR PLAN 1
Cardiology assistance appreciated Unclear etiology at this point in time.   Cardiology assistance appreciated Unclear etiology at this point in time. However patient was exercising when this occurred, thus will consider cardiogenic etiology. Orthostatics and vasovagal definitely on ddx. No seizure like activity reported.   -TTE  -Orthostatics  -TSH pending.   -Patient without any clear UMN signs on exam to necessitate advance imaging.   -Awake and alert.   -Cardiology assistance appreciated  -Monitor on telemetry.   -UA negative for infectious process.  -No blood in stool.

## 2020-01-14 NOTE — H&P ADULT - PROBLEM SELECTOR PLAN 4
Cardiology assistance appreciated Cardiology assistance appreciated  Euvolemic on exam.   Continue home diuretics.   Monitor on telemetry. Cardiology assistance appreciated  Euvolemic on exam.   Continue home diuretics.   Monitor on telemetry.  Checking Mg.

## 2020-01-14 NOTE — H&P ADULT - NSHPPHYSICALEXAM_GEN_ALL_CORE
T(C): 36.4 (01-14-20 @ 11:20), Max: 36.4 (01-14-20 @ 11:20)  T(F): 97.6 (01-14-20 @ 11:20), Max: 97.6 (01-14-20 @ 11:20)  HR: 65 (01-14-20 @ 11:45) (65 - 66)  BP: 153/74 (01-14-20 @ 11:45) (147/79 - 153/74)  RR: 14 (01-14-20 @ 11:45) (14 - 16)  SpO2: 100% (01-14-20 @ 11:45) (98% - 100%)    CONSTITUTIONAL: No acute distress.   HEENT:  Conjunctiva clear B/L. Nasal mucosa normal. Moist oral mucosa. No posterior pharyngeal lesions noted.  Cardiovascular: RRR with no murmurs. No JVD noted. No lower extremity edema B/L. Extremities are warm and well perfused. Radial pulses 2+ B/L. Dorsalis pedis pulses 2+ B/L. Capillary refill <2s  Respiratory: Lungs CTAB. No accessory muscle use.   Gastrointestinal:  Soft, nontender. Non-distended. Non-rigid. No CVA tenderness B/L.  MSK:  No joint swelling. No joint erythema B/L. No midline spinal tenderness.  Neurologic:  Alert and awake. Oriented x3. Moving all extremities. Following commands. Making eye contact. Strength 5/5 b/l ue and le. No numbness to light touch. Babinski downgoing. PERRL. EOMI. No nystagmus.   Skin:  No rashes noted. No skin erythema noted.   Psych:  Normal affect. Normal Mood.

## 2020-01-14 NOTE — H&P ADULT - PROBLEM SELECTOR PLAN 10
Transitions of Care Status:  1.  Name of PCP: no pcp  2.  PCP Contacted on Admission: [ ] Y    [ ] N  -- n/a  3.  PCP contacted at Discharge: [ ] Y    [ ] N    [ ] N/A  4.  Post-Discharge Appointment Date and Location:  5.  Summary of Handoff given to PCP:

## 2020-01-14 NOTE — H&P ADULT - ASSESSMENT
This is a pleasant 89 y/o F PMHx of NSTEMI (7/2019), HFpEF, HTN, and Hypothyroidism who presents for syncope.

## 2020-01-14 NOTE — H&P ADULT - PROBLEM SELECTOR PLAN 3
Cardiology assistance appreciated Cardiology assistance appreciated  Prior history. No anginal equivalents on exam.  Tele monitoring. Cardiology assistance appreciated  Prior history. No anginal equivalents on exam.  Tele monitoring.  Not on statin at home. Will need collateral.

## 2020-01-14 NOTE — H&P ADULT - NSHPREVIEWOFSYSTEMS_GEN_ALL_CORE
REVIEW OF SYSTEMS  CONSTITUTIONAL: No fevers. No chills  EYES/ENT: No visual changes. No discharge from eyes. No vertigo. No throat pain. No dysphagia.  NECK: No pain or stiffness or rigidity.  RESPIRATORY: No cough. No wheezing. No hemoptysis. No shortness of breath.  CARDIOVASCULAR: No chest pain. No palpitations.   GASTROINTESTINAL: No abdominal pain. No nausea. No vomiting. No hematemesis. No diarrhea. No constipation. No melena, No hematochezia.  GENITOURINARY: No dysuria. No hesitancy. +frequency but on diuretics. No hematuria.  NEUROLOGICAL: No numbness. No weakness. No change in speech. No fecal or urinary incontinence.   MSK: No joint swelling or erythema. No back pain.  SKIN: No itching or rashes.   PSYCH: Normal affect. Normal mood. No si. No hi.    Review of systems negative except for items noted above.

## 2020-01-14 NOTE — H&P ADULT - NSHPLABSRESULTS_GEN_ALL_CORE
LABS:                        11.1   7.23  )-----------( 196      ( 2020 12:00 )             35.7     -    140  |  102  |  47<H>  ----------------------------<  105<H>  5.3   |  26  |  1.36<H>    Ca    9.4      2020 12:00    TPro  7.7  /  Alb  4.1  /  TBili  0.3  /  DBili  x   /  AST  16  /  ALT  29  /  AlkPhos  141<H>        Urinalysis Basic - ( 2020 16:56 )    Color: Colorless / Appearance: Clear / S.008 / pH: x  Gluc: x / Ketone: Negative  / Bili: Negative / Urobili: Negative   Blood: x / Protein: Negative / Nitrite: Negative   Leuk Esterase: Negative / RBC: x / WBC x   Sq Epi: x / Non Sq Epi: x / Bacteria: x      ADDITIONAL STUDIES PERSONALLY REVIEWED BY ME:  EKG: NSR; pac; nonspecific st-t changes.  CXR: RLL atelectasis.     PRIOR RECORDS OR OUTPATIENT RECORDS REVIEWED:  Yes [ x] No [ ]  -- prior admission.

## 2020-01-14 NOTE — ED PROVIDER NOTE - CARE PLAN
Principal Discharge DX:	Syncope and collapse Principal Discharge DX:	Syncope and collapse  Secondary Diagnosis:	HTN (hypertension)

## 2020-01-14 NOTE — ED PROVIDER NOTE - PSH
Ectopic pregnancy, tubal    S/P breast lumpectomy    S/P cataract surgery  b/l  S/P ORIF (open reduction internal fixation) fracture  R hip

## 2020-01-14 NOTE — ED ADULT NURSE NOTE - OBJECTIVE STATEMENT
89y.o female PMH NSTEMI in July 2019, CHF currently on lasix and HTN presenting to ED by EMS from [facility] assisted living c/o witnessed syncope, witnessed, lasting a few seconds while pt was at PT prior to ED arrival. as per EMS staff at [facility] state that pt was at PT witting in her walker doing leg exercises when she "passed out" and "slid" off of walker, no head injury or any other injuries sustained. lasted a few seconds and then pt returned back to her baseline mental status with no N/V, dizziness, cp, sob, numbness, tingling, weakness present afterwards or upon ED arrival. VS stable. pt denies feeling anything abnormal prior to loc, states that upon waking up and prior to going to PT she wasn't feeling well and describes herself as feeling tired and "rushed". denies any complaints of pain or discomfort or concerns upon assessment. EKG completed, labs obtained,. pt remains on continuous cardiac monitor. results pending. safety and fall precautions maintained, call bell at the bedside and within reach.

## 2020-01-14 NOTE — CONSULT NOTE ADULT - SUBJECTIVE AND OBJECTIVE BOX
Third hospitalization  for witnessed syncope;   08/19, 12/22/19 and today. Each episode was spontaneous, each lasted minutes and when she regains conscious, she had no localizing abnormality.  Most likely this caused by a cardiac arrythmia and if telemetry fails to diagnose, would suggest EPS cariology consult and placement of a loop recorder. Baseline carotid Doppler, Echocardiogram, Brain MRI to be obtained. Comprehensive consultation performed and dictation # [id number] Third hospitalization  for witnessed syncope;   08/19, 12/22/19 and today. Each episode was spontaneous, each lasted minutes and when she regains conscious, she had no localizing abnormality.  Most likely this caused by a cardiac arrythmia and if telemetry fails to diagnose, would suggest EPS cariology consult and placement of a loop recorder. Baseline carotid Doppler, Echocardiogram, Brain MRI to be obtained. Comprehensive consultation performed and dictation # [id number]      CONSULTING SERVICE:  Geriatrics.    HISTORY OF PRESENT ILLNESS:  The patient is a 89-year-old female who lives at the [facility].  She was admitted on 12/22/2019 to [facility] after a syncopal event.  She had taken multiple upper respiratory medications at that time as well as oxybutynin and developed lightheadedness, dizziness and loss of consciousness.  She was admitted for 72 hours and no etiology was established for her syncopal event and she was returned to the [facility].  She was in her usual state of good health and today went to an exercise class and began to exercise lightly and suddenly had a witnessed episode of syncope.  She became unresponsive for approximately 5 minutes.  EMS was called and the patient spontaneously awoke with no chest pain or localizing neurologic dysfunction.  She had no seizures or hemiparesis.  She also had no chest pain or shortness of breath upon waking.  The patient was then transferred to [place] for the purpose of evaluation of her syncopal event.    MEDICATIONS:  Include, spironolactone 25 mg a day, Coreg 6.25 mg twice a day, Lasix 20 mg once a day, vitamin D2 50,000 units weekly, levothyroxine 0.112 mg daily MiraLax daily and acidophilus diet daily.    ALLERGIES:  THE PATIENT HAS ALLERGIES TO SULFA AND MORPHINE.    PAST MEDICAL HISTORY:  Includes, myocardial infarction, non-ST T-wave in 07/2019.  She has hypertension, hypothyroidism, macular degeneration, spinal stenosis and urinary frequency.    PAST SURGICAL HISTORY:  Includes, bilateral cataracts, ORIF of the right hip and a breast lumpectomy presumed secondary to malignancy.    SOCIAL HISTORY:  She is  and lives in the [facility].      DIET:  Her diet is regular with no significant change in weight at approximately 130 pounds.    FAMILY HISTORY:  Noncontributory.    REVIEW OF SYSTEMS:  The patient is 89 years old.  Review of systems was obtained.  She has no headaches or dizziness.  No changes in hearing or vision.  No sinusitis or dental disease.  No difficulty swallowing.  She has no shortness of breath, cough, congestion or wheezing.  She has no cardiac symptoms of chest pain, palpitations or arrhythmia.  This is the second syncopal episode she has had in the past 4 weeks.  She has no abdominal pain, change in bowel habits or GI bleeding.  She has no dysuria, frequency and does have urinary incontinence.  She has no rashes or skin disease.  She has no diabetes or thyroid disease.  Her thyroid disease is corrected with replacement of Synthroid.  She has no neurological localizing dysfunction.    PHYSICAL EXAMINATION:  At this time, VITAL SIGNS:  Blood pressure is 110/70, pulse of 60, respirations 16.  She is afebrile.  HEAD AND NECK:  Examination are normal.  She has 3+ carotids without bruits.  She has good carotid upstrokes.  CHEST:  Clear with good breath sounds.  CARDIAC:  Examination with a 2/6 systolic ejection murmur at the right upper sternal border radiating to theneck.  ABDOMEN:  Within normal limits.  EXTREMITIES:  Without clubbing, cyanosis or edema.    RADIOLOGY DATA:  EKG shows normal sinus rhythm with normal ST-T waves at the present time.  She is on telemetry and she remains in normal sinus rhythm with no documented arrhythmia to date.  Chest x-ray is clear.    LABORATORY DATA:  Laboratories were obtained.  CBC, hemoglobin is 11.1, hematocrit 35.7, white count 7.23, platelet count is 196,000.  Sodium is 140, potassium is 5.3, chloride is 102, CO2 is 26, BUN is 47, creatinine is 1.36.  GFR is 33 mL/minute.  BNP is 1228.    IMPRESSION AND PLAN:  The impression at this time is that the patient has recurrent syncopal events most likely secondary to cardiac arrhythmia.  The patient is to be placed on telemetry at all times should she not change her rhythm while under observation, EPS Cardiology consultation should be obtained.  The patient will also be counseled on by her regular cardiologist, Dr. Kam Buckley.  The patient should have carotid Dopplers for screening and an echocardiogram for baseline.  Cardiac enzymes are progress and are negative to date.  The episodes of recurrent syncope require diagnosis and treatment at the present time to prevent further events with likely increasings of variant and trauma.  The examination time was 70 minutes of which greater than 50% was necessary for bedside discussion and counseling.  The patient will continue to be followed with medical management throughout the course of her hospitalization.        _______________________    DICT:	RAQUEL ERVIN MD ([id number]) 01/15/2020 05:09 AM  TRANS:	[email or web address] 01/15/2020 05:16 AM  JOB:	[id number]     Electronically Signed by:  RAQUEL ERVIN 01/16/2020 03:26:37 AM

## 2020-01-14 NOTE — H&P ADULT - HISTORY OF PRESENT ILLNESS
Dr. Nav Dunlap, DO  Attending Physician  Division of Hospital Medicine  [facility]  Pager:  [phone]    This is a pleasant 89 y/o F PMHx of NSTEMI (7/2019), HFpEF, HTN, and Hypothyroidism who presents for syncope. Dr. Nav Dunlap DO  Attending Physician  Division of Hospital Medicine  [facility]  Pager:  [phone]    This is a pleasant 89 y/o F PMHx of NSTEMI (7/2019), HFpEF, HTN, and Hypothyroidism who presents for syncope. Patient was working out within an exercise class and felt tired where she loss consciousness. She reportedly fell out of her chair. No head trauma. Per EMS she had 5 min of LOC. No associated chest pain, palpitations, dizziness, nausea, vomiting, diarrhea, headache, or blurry vision. No shaking, tongue biting, or urinary incontinence noted. Patient reports increased urinary frequency but she on is on diuretics. No dysuria. No source of pain. No other acute complaints.

## 2020-01-14 NOTE — ED PROVIDER NOTE - CARDIAC, MLM
Normal rate, regular rhythm.  Heart sounds S1, S2.  No murmurs, rubs or gallops. No peripheral edema. No JVD.

## 2020-01-14 NOTE — H&P ADULT - PROBLEM SELECTOR PLAN 6
Continue home diuretics. Patient with history of HF as above.   Monitor renal function and electrolytes closely. Continue home diuretics. Patient with history of HF as above.   Monitor renal function and electrolytes closely.  Coreg on. First dose now. Can increase dose if BP not adequately controlled if HR tolerates. Hydralazine is an alternative option.   Reduce MAP by no more than 25% by tomorrow.

## 2020-01-14 NOTE — ED PROVIDER NOTE - PROGRESS NOTE DETAILS
D/w pts cardiolgist pt had recent TTE last week with mild LV hypertrophy, mild aortic insufficiency and mild mitral regurgitation. Admits to Dr Spears [phone]. Pts /116 double checked on both arms with similar levels - pt resting comfortably, appropriate-sized cuff D/w pts cardiolgist Dr Buckley pt had recent TTE last week with mild LV hypertrophy, mild aortic insufficiency and mild mitral regurgitation. Dr Buckley advised admission to Dr Spears's service [phone]. Pts /116 double checked on both arms with similar levels - pt resting comfortably, appropriate-sized cuff. Took her home BP meds this am. Denies CP, SOB, palpitations, leg swelling, dizziness, vision changes, or any other symptoms. Will continue to monitor. D/w pts PMD Dr Shah pt had recent TTE last week with mild LV hypertrophy, mild aortic insufficiency and mild mitral regurgitation. Dr Buckley advised admission to Dr Spears's service [phone].

## 2020-01-15 LAB
ALBUMIN SERPL ELPH-MCNC: 3.6 G/DL — SIGNIFICANT CHANGE UP (ref 3.3–5)
ALP SERPL-CCNC: 129 U/L — HIGH (ref 40–120)
ALT FLD-CCNC: 25 U/L — SIGNIFICANT CHANGE UP (ref 10–45)
ANION GAP SERPL CALC-SCNC: 14 MMOL/L — SIGNIFICANT CHANGE UP (ref 5–17)
AST SERPL-CCNC: 13 U/L — SIGNIFICANT CHANGE UP (ref 10–40)
BASOPHILS # BLD AUTO: 0.04 K/UL — SIGNIFICANT CHANGE UP (ref 0–0.2)
BASOPHILS NFR BLD AUTO: 0.7 % — SIGNIFICANT CHANGE UP (ref 0–2)
BILIRUB SERPL-MCNC: 0.2 MG/DL — SIGNIFICANT CHANGE UP (ref 0.2–1.2)
BUN SERPL-MCNC: 40 MG/DL — HIGH (ref 7–23)
CALCIUM SERPL-MCNC: 8.8 MG/DL — SIGNIFICANT CHANGE UP (ref 8.4–10.5)
CHLORIDE SERPL-SCNC: 103 MMOL/L — SIGNIFICANT CHANGE UP (ref 96–108)
CHOLEST SERPL-MCNC: 201 MG/DL — HIGH (ref 10–199)
CO2 SERPL-SCNC: 24 MMOL/L — SIGNIFICANT CHANGE UP (ref 22–31)
CREAT SERPL-MCNC: 1.28 MG/DL — SIGNIFICANT CHANGE UP (ref 0.5–1.3)
CULTURE RESULTS: SIGNIFICANT CHANGE UP
EOSINOPHIL # BLD AUTO: 0.35 K/UL — SIGNIFICANT CHANGE UP (ref 0–0.5)
EOSINOPHIL NFR BLD AUTO: 6.1 % — HIGH (ref 0–6)
GLUCOSE SERPL-MCNC: 98 MG/DL — SIGNIFICANT CHANGE UP (ref 70–99)
HBA1C BLD-MCNC: 5.6 % — SIGNIFICANT CHANGE UP (ref 4–5.6)
HCT VFR BLD CALC: 34.5 % — SIGNIFICANT CHANGE UP (ref 34.5–45)
HDLC SERPL-MCNC: 58 MG/DL — SIGNIFICANT CHANGE UP
HGB BLD-MCNC: 10.9 G/DL — LOW (ref 11.5–15.5)
IMM GRANULOCYTES NFR BLD AUTO: 0.3 % — SIGNIFICANT CHANGE UP (ref 0–1.5)
LIPID PNL WITH DIRECT LDL SERPL: 120 MG/DL — HIGH
LYMPHOCYTES # BLD AUTO: 1.58 K/UL — SIGNIFICANT CHANGE UP (ref 1–3.3)
LYMPHOCYTES # BLD AUTO: 27.4 % — SIGNIFICANT CHANGE UP (ref 13–44)
MCHC RBC-ENTMCNC: 30 PG — SIGNIFICANT CHANGE UP (ref 27–34)
MCHC RBC-ENTMCNC: 31.6 GM/DL — LOW (ref 32–36)
MCV RBC AUTO: 95 FL — SIGNIFICANT CHANGE UP (ref 80–100)
MONOCYTES # BLD AUTO: 0.73 K/UL — SIGNIFICANT CHANGE UP (ref 0–0.9)
MONOCYTES NFR BLD AUTO: 12.7 % — SIGNIFICANT CHANGE UP (ref 2–14)
NEUTROPHILS # BLD AUTO: 3.04 K/UL — SIGNIFICANT CHANGE UP (ref 1.8–7.4)
NEUTROPHILS NFR BLD AUTO: 52.8 % — SIGNIFICANT CHANGE UP (ref 43–77)
NRBC # BLD: 0 /100 WBCS — SIGNIFICANT CHANGE UP (ref 0–0)
PHOSPHATE SERPL-MCNC: 3.9 MG/DL — SIGNIFICANT CHANGE UP (ref 2.5–4.5)
PLATELET # BLD AUTO: 183 K/UL — SIGNIFICANT CHANGE UP (ref 150–400)
POTASSIUM SERPL-MCNC: 4.5 MMOL/L — SIGNIFICANT CHANGE UP (ref 3.5–5.3)
POTASSIUM SERPL-SCNC: 4.5 MMOL/L — SIGNIFICANT CHANGE UP (ref 3.5–5.3)
PROT SERPL-MCNC: 6.9 G/DL — SIGNIFICANT CHANGE UP (ref 6–8.3)
RBC # BLD: 3.63 M/UL — LOW (ref 3.8–5.2)
RBC # BLD: 3.63 M/UL — LOW (ref 3.8–5.2)
RBC # FLD: 14.6 % — HIGH (ref 10.3–14.5)
RETICS #: 45.4 K/UL — SIGNIFICANT CHANGE UP (ref 25–125)
RETICS/RBC NFR: 1.3 % — SIGNIFICANT CHANGE UP (ref 0.5–2.5)
SODIUM SERPL-SCNC: 141 MMOL/L — SIGNIFICANT CHANGE UP (ref 135–145)
SPECIMEN SOURCE: SIGNIFICANT CHANGE UP
TOTAL CHOLESTEROL/HDL RATIO MEASUREMENT: 3.5 RATIO — SIGNIFICANT CHANGE UP (ref 3.3–7.1)
TRIGL SERPL-MCNC: 117 MG/DL — SIGNIFICANT CHANGE UP (ref 10–149)
TSH SERPL-MCNC: 0.29 UIU/ML — SIGNIFICANT CHANGE UP (ref 0.27–4.2)
WBC # BLD: 5.76 K/UL — SIGNIFICANT CHANGE UP (ref 3.8–10.5)
WBC # FLD AUTO: 5.76 K/UL — SIGNIFICANT CHANGE UP (ref 3.8–10.5)

## 2020-01-15 PROCEDURE — 93306 TTE W/DOPPLER COMPLETE: CPT | Mod: 26

## 2020-01-15 RX ORDER — NYSTATIN CREAM 100000 [USP'U]/G
1 CREAM TOPICAL
Refills: 0 | Status: DISCONTINUED | OUTPATIENT
Start: 2020-01-15 | End: 2020-01-17

## 2020-01-15 RX ADMIN — HEPARIN SODIUM 5000 UNIT(S): 5000 INJECTION INTRAVENOUS; SUBCUTANEOUS at 18:22

## 2020-01-15 RX ADMIN — CARVEDILOL PHOSPHATE 6.25 MILLIGRAM(S): 80 CAPSULE, EXTENDED RELEASE ORAL at 18:22

## 2020-01-15 RX ADMIN — HEPARIN SODIUM 5000 UNIT(S): 5000 INJECTION INTRAVENOUS; SUBCUTANEOUS at 05:46

## 2020-01-15 RX ADMIN — CARVEDILOL PHOSPHATE 6.25 MILLIGRAM(S): 80 CAPSULE, EXTENDED RELEASE ORAL at 05:44

## 2020-01-15 RX ADMIN — SPIRONOLACTONE 25 MILLIGRAM(S): 25 TABLET, FILM COATED ORAL at 05:44

## 2020-01-15 RX ADMIN — Medication 112 MICROGRAM(S): at 05:44

## 2020-01-15 RX ADMIN — Medication 20 MILLIGRAM(S): at 05:44

## 2020-01-15 RX ADMIN — NYSTATIN CREAM 1 APPLICATION(S): 100000 CREAM TOPICAL at 18:50

## 2020-01-15 NOTE — PROGRESS NOTE ADULT - ASSESSMENT
This is a pleasant 89 y/o F PMHx of NSTEMI (7/2019), HFpEF, HTN, and Hypothyroidism who presents for syncope.

## 2020-01-15 NOTE — PROGRESS NOTE ADULT - PROBLEM SELECTOR PLAN 2
Cardiology assistance appreciated  Euvolemic on exam.   Continue home diuretics.   Monitor on telemetry.  Checking Mg.

## 2020-01-15 NOTE — PROGRESS NOTE ADULT - PROBLEM SELECTOR PLAN 4
Continue home diuretics. Patient with history of HF as above.   Monitor renal function and electrolytes closely.  Coreg on. First dose now. Can increase dose if BP not adequately controlled if HR tolerates. Hydralazine is an alternative option.   Reduce MAP by no more than 25% by tomorrow.

## 2020-01-15 NOTE — CONSULT NOTE ADULT - ASSESSMENT
89 y/o F PMHx of NSTEMI (7/2019), HFpEF, HTN, and Hypothyroidism who presents for syncope.     #Syncope  - patient has no focal neurologic deficits  - she states she was very tired and not feeling well prior to the episode  - unlikely primary neurologic cause of syncope  - would pursue cardiologic w/u per primary team    Samantha Mckenzie MD  PGY-3, Neurology Consults  [place]: [phone]  [facility]: [phone] 89 y/o F PMHx of NSTEMI (7/2019), HFpEF, HTN, and Hypothyroidism who presents for syncope.     #Syncope  - patient has no focal neurologic deficits  - she states she was very tired and not feeling well prior to the episode  - can obtain carotid doppler U/S  - check orthostatics  - unlikely primary neurologic cause of syncope  - would pursue cardiologic w/u per primary team    Samantha Mckenzie MD  PGY-3, Neurology Consults  [place]: [phone]  [facility]: [phone]

## 2020-01-15 NOTE — CONSULT NOTE ADULT - SUBJECTIVE AND OBJECTIVE BOX
Admitting Diagnosis:  Syncope and collapse (R55): SYNCOPE AND COLLAPSE      HPI:  This is a 89y year old Female with the below past medical history who presents with the chief complaint of    ****CHART HPI:  HPI:  Dr. Nav Dunlap DO  Attending Physician  Division of Hospital Medicine  [facility]  Pager:  [phone]    This is a pleasant 89 y/o F PMHx of NSTEMI (2019), HFpEF, HTN, and Hypothyroidism who presents for syncope. Patient was working out within an exercise class and felt tired where she loss consciousness. She reportedly fell out of her chair. No head trauma. Per EMS she had 5 min of LOC. No associated chest pain, palpitations, dizziness, nausea, vomiting, diarrhea, headache, or blurry vision. No shaking, tongue biting, or urinary incontinence noted. Patient reports increased urinary frequency but she on is on diuretics. No dysuria. No source of pain. No other acute complaints. (2020 17:01)  ******    On my exam, she states that she does not recall the episode of syncope. Though she does not recall the events just prior to the syncope, she does state that she had a busy morning, was doing chores, and felt tired/fatigued prior to going to the gym. She has had this happen before a few months ago, but at that time she was a/w a UTI, so the syncopal episode was attributed to her infection. Tele overnight shows no significant events, except for 2 seconds of PAT. She denies any dizziness/lightheadedness, confusion, or any focal neurologic deficits.     Past Medical History:  Macular degeneration (H35.30)  Non-ST elevation MI (NSTEMI) (I21.4)  Hypothyroidism (E03.9)  Spinal stenosis (724.00)  HTN (hypertension) (401.9)  Bilateral Cataracts (366.9)  Status Post Breast Lumpectomy (V45.89)  Urinary Frequency (788.41)      Past Surgical History:  S/P cataract surgery (Z98.49): b/l  S/P breast lumpectomy (Z98.890)  S/P ORIF (open reduction internal fixation) fracture (Z96.7): R hip  Ectopic pregnancy, tubal (O00.1)      Social History:  No toxic habits    Family History:  FAMILY HISTORY:  Family history of acute myocardial infarction      Allergies:  morphine (Drowsiness; Faint; Hypotension)  sulfa topicals (Unknown)      ROS:  Constitutional: Patient offers no complaints of fevers or significant weight loss  Ears, Nose, Mouth and Throat: The patient presents with no abnormalities of the head, ears, eyes, nose or throat  Skin: Patient offers no concerns of new rashes or lesions  Respiratory: The patient presents with no abnormalities of the respiratory tract  Cardiovascular: The patient presents with no cardiac abnormalities  Gastrointestinal: The patient presents with no abnormalities of the GI system  Genitourinary: The patient presents with no dysuria, hematuria or frequent urination  Neurological: See HPI  Endocrine: Patient offers no complaints of excessive thirst, urination, or heat/cold intolerance    Advanced care planning reviewed and noted in the chart.    Medications:  carvedilol 6.25 milliGRAM(s) Oral every 12 hours  furosemide    Tablet 20 milliGRAM(s) Oral daily  heparin  Injectable 5000 Unit(s) SubCutaneous every 12 hours  levothyroxine 112 MICROGram(s) Oral daily  nystatin Cream 1 Application(s) Topical two times a day  polyethylene glycol 3350 17 Gram(s) Oral daily PRN  spironolactone 25 milliGRAM(s) Oral daily      Labs:  CBC Full  -  ( 15 Gideon 2020 06:24 )  WBC Count : 5.76 K/uL  RBC Count : 3.63 M/uL  Hemoglobin : 10.9 g/dL  Hematocrit : 34.5 %  Platelet Count - Automated : 183 K/uL  Mean Cell Volume : 95.0 fl  Mean Cell Hemoglobin : 30.0 pg  Mean Cell Hemoglobin Concentration : 31.6 gm/dL  Auto Neutrophil # : 3.04 K/uL  Auto Lymphocyte # : 1.58 K/uL  Auto Monocyte # : 0.73 K/uL  Auto Eosinophil # : 0.35 K/uL  Auto Basophil # : 0.04 K/uL  Auto Neutrophil % : 52.8 %  Auto Lymphocyte % : 27.4 %  Auto Monocyte % : 12.7 %  Auto Eosinophil % : 6.1 %  Auto Basophil % : 0.7 %    -15    141  |  103  |  40<H>  ----------------------------<  98  4.5   |  24  |  1.28    Ca    8.8      15 Gideon 2020 06:20  Phos  3.9     01-15  Mg     2.5     -14    TPro  6.9  /  Alb  3.6  /  TBili  0.2  /  DBili  x   /  AST  13  /  ALT  25  /  AlkPhos  129<H>  15    CAPILLARY BLOOD GLUCOSE      POCT Blood Glucose.: 107 mg/dL (2020 11:41)    LIVER FUNCTIONS - ( 15 Gideno 2020 06:20 )  Alb: 3.6 g/dL / Pro: 6.9 g/dL / ALK PHOS: 129 U/L / ALT: 25 U/L / AST: 13 U/L / GGT: x             Urinalysis Basic - ( 2020 16:56 )    Color: Colorless / Appearance: Clear / S.008 / pH: x  Gluc: x / Ketone: Negative  / Bili: Negative / Urobili: Negative   Blood: x / Protein: Negative / Nitrite: Negative   Leuk Esterase: Negative / RBC: x / WBC x   Sq Epi: x / Non Sq Epi: x / Bacteria: x      Female    Vitals:  Vital Signs Last 24 Hrs  T(C): 36.2 (15 Gideon 2020 04:58), Max: 37.3 (15 Gideon 2020 00:06)  T(F): 97.2 (15 Gideon 2020 04:58), Max: 99.2 (15 Gideon 2020 00:06)  HR: 67 (15 Gideon 2020 04:58) (61 - 83)  BP: 120/64 (15 Gideon 2020 04:58) (120/64 - 190/89)  BP(mean): --  RR: 18 (15 Gideon 2020 04:58) (14 - 18)  SpO2: 96% (15 Gideon 2020 04:58) (96% - 100%)    NEUROLOGICAL EXAM:    Mental status: Awake, alert, and in no apparent distress. Oriented to person, place and time. Language function is normal. Recent memory, digit span and concentration were normal.     Cranial Nerves: Pupils were equal, round, reactive to light. Extraocular movements were intact. Visual field were full. Fundoscopic exam was deferred. Facial sensation was intact to light touch. There was no facial asymmetry. The palate was upgoing symmetrically and tongue was midline. Hearing acuity was intact to finger rub AU. Shoulder shrug was full bilaterally    Motor exam: Bulk and tone were normal. Strength was 5/5 in all four extremities. Fine finger movements were symmetric and normal. There was no pronator drift    Reflexes: 2+ in the bilateral upper extremities. 2+ in the bilateral lower extremities. Toes were downgoing bilaterally.     Sensation: Intact to light touch, temperature, vibration and proprioception.     Coordination: Finger-nose-finger and heel-to-shin was without dysmetria.     Gait: Narrow base and steady. Romberg was negative.

## 2020-01-15 NOTE — PROGRESS NOTE ADULT - SUBJECTIVE AND OBJECTIVE BOX
This is a pleasant 89 y/o F PMHx of NSTEMI (2019), HFpEF, HTN, and Hypothyroidism who presents for syncope. Patient was working out within an exercise class and felt tired where she loss consciousness. She reportedly fell out of her chair. No head trauma. Per EMS she had 5 min of LOC. No associated chest pain, palpitations, dizziness, nausea, vomiting, diarrhea, headache, or blurry vision. No shaking, tongue biting, or urinary incontinence noted. Patient reports increased urinary frequency but she on is on diuretics. No dysuria. No source of pain. No other acute complaints. Patient states she has been tired But no new witnessed syncopal episodes    MEDICATIONS  (STANDING):  carvedilol 6.25 milliGRAM(s) Oral every 12 hours  furosemide    Tablet 20 milliGRAM(s) Oral daily  heparin  Injectable 5000 Unit(s) SubCutaneous every 12 hours  levothyroxine 112 MICROGram(s) Oral daily  nystatin Cream 1 Application(s) Topical two times a day  spironolactone 25 milliGRAM(s) Oral daily    MEDICATIONS  (PRN):  polyethylene glycol 3350 17 Gram(s) Oral daily PRN Constipation          VITALS:   T(C): 36.7 (01-15-20 @ 20:52), Max: 37.3 (01-15-20 @ 00:06)  HR: 79 (01-15-20 @ 20:52) (65 - 83)  BP: 146/77 (01-15-20 @ 20:52) (120/64 - 168/84)  RR: 18 (01-15-20 @ 20:52) (18 - 19)  SpO2: 96% (01-15-20 @ 20:52) (96% - 98%)  Wt(kg): --    PHYSICAL EXAM:  GENERAL: NAD, well nourished and conversant  HEAD:  Atraumatic  EYES: EOM, PERRLA, conjunctiva pink and sclera white  ENT: No tonsillar erythema, exudates, or enlargement, moist mucous membranes, good dentition, no lesions  NECK: Supple, No JVD, normal thyroid, carotids with normal upstrokes and no bruits  CHEST/LUNG: Clear to auscultation bilaterally, No rales, rhonchi, wheezing, or rubs  HEART: Regular rate and rhythm, No murmurs, rubs, or gallops  ABDOMEN: Soft, nondistended, no masses, guarding, tenderness or rebound, bowel sounds present  EXTREMITIES:  2+ Peripheral Pulses, No clubbing, cyanosis, or edema.   LYMPH: No lymphadenopathy noted  SKIN: No rashes or lesions  NERVOUS SYSTEM:  Alert & Oriented X3, normal cognitive function. Motor Strength 5/5 right upper and right lower.  5/5 left upper and left lower extremities, DTRs 2+ intact and symmetric    LABS:        CBC Full  -  ( 15 Gideon 2020 06:24 )  WBC Count : 5.76 K/uL  RBC Count : 3.63 M/uL  Hemoglobin : 10.9 g/dL  Hematocrit : 34.5 %  Platelet Count - Automated : 183 K/uL  Mean Cell Volume : 95.0 fl  Mean Cell Hemoglobin : 30.0 pg  Mean Cell Hemoglobin Concentration : 31.6 gm/dL  Auto Neutrophil # : 3.04 K/uL  Auto Lymphocyte # : 1.58 K/uL  Auto Monocyte # : 0.73 K/uL  Auto Eosinophil # : 0.35 K/uL  Auto Basophil # : 0.04 K/uL  Auto Neutrophil % : 52.8 %  Auto Lymphocyte % : 27.4 %  Auto Monocyte % : 12.7 %  Auto Eosinophil % : 6.1 %  Auto Basophil % : 0.7 %    -15    141  |  103  |  40<H>  ----------------------------<  98  4.5   |  24  |  1.28    Ca    8.8      15 Gideon 2020 06:20  Phos  3.9     01-15  Mg     2.5     -14    TPro  6.9  /  Alb  3.6  /  TBili  0.2  /  DBili  x   /  AST  13  /  ALT  25  /  AlkPhos  129<H>  01-15    LIVER FUNCTIONS - ( 15 Gideon 2020 06:20 )  Alb: 3.6 g/dL / Pro: 6.9 g/dL / ALK PHOS: 129 U/L / ALT: 25 U/L / AST: 13 U/L / GGT: x             Urinalysis Basic - ( 2020 16:56 )    Color: Colorless / Appearance: Clear / S.008 / pH: x  Gluc: x / Ketone: Negative  / Bili: Negative / Urobili: Negative   Blood: x / Protein: Negative / Nitrite: Negative   Leuk Esterase: Negative / RBC: x / WBC x   Sq Epi: x / Non Sq Epi: x / Bacteria: x      CAPILLARY BLOOD GLUCOSE          RADIOLOGY & ADDITIONAL TESTS:

## 2020-01-16 ENCOUNTER — TRANSCRIPTION ENCOUNTER (OUTPATIENT)
Age: 85
End: 2020-01-16

## 2020-01-16 ENCOUNTER — APPOINTMENT (OUTPATIENT)
Dept: PULMONOLOGY | Facility: CLINIC | Age: 85
End: 2020-01-16

## 2020-01-16 LAB
ALP SERPL-CCNC: 138 U/L — HIGH (ref 40–120)
ANION GAP SERPL CALC-SCNC: 14 MMOL/L — SIGNIFICANT CHANGE UP (ref 5–17)
BUN SERPL-MCNC: 51 MG/DL — HIGH (ref 7–23)
CALCIUM SERPL-MCNC: 9.5 MG/DL — SIGNIFICANT CHANGE UP (ref 8.4–10.5)
CHLORIDE SERPL-SCNC: 102 MMOL/L — SIGNIFICANT CHANGE UP (ref 96–108)
CO2 SERPL-SCNC: 24 MMOL/L — SIGNIFICANT CHANGE UP (ref 22–31)
COLLECT DURATION TIME UR: 24 HR — SIGNIFICANT CHANGE UP
CREAT SERPL-MCNC: 1.34 MG/DL — HIGH (ref 0.5–1.3)
GLUCOSE SERPL-MCNC: 150 MG/DL — HIGH (ref 70–99)
MICROALBUMIN 24H UR-MRATE: 15.2 MG/24HR — SIGNIFICANT CHANGE UP (ref 0–29.9)
MICROALBUMIN ?TM UR-MRATE: 10.6 UG/MIN — SIGNIFICANT CHANGE UP (ref 0–19.9)
MICROALBUMIN UR-MCNC: 2.9 MG/DL — SIGNIFICANT CHANGE UP
POTASSIUM SERPL-MCNC: 4.2 MMOL/L — SIGNIFICANT CHANGE UP (ref 3.5–5.3)
POTASSIUM SERPL-SCNC: 4.2 MMOL/L — SIGNIFICANT CHANGE UP (ref 3.5–5.3)
SODIUM SERPL-SCNC: 140 MMOL/L — SIGNIFICANT CHANGE UP (ref 135–145)
TOTAL VOLUME - 24 HOUR: 525 ML — SIGNIFICANT CHANGE UP
URINE CREATININE CALCULATION: 0.5 G/24 H — LOW (ref 0.8–1.8)

## 2020-01-16 RX ORDER — CARVEDILOL PHOSPHATE 80 MG/1
12.5 CAPSULE, EXTENDED RELEASE ORAL EVERY 12 HOURS
Refills: 0 | Status: DISCONTINUED | OUTPATIENT
Start: 2020-01-16 | End: 2020-01-17

## 2020-01-16 RX ORDER — FUROSEMIDE 40 MG
1 TABLET ORAL
Qty: 0 | Refills: 0 | DISCHARGE

## 2020-01-16 RX ORDER — FUROSEMIDE 40 MG
20 TABLET ORAL
Refills: 0 | Status: DISCONTINUED | OUTPATIENT
Start: 2020-01-18 | End: 2020-01-17

## 2020-01-16 RX ORDER — CARVEDILOL PHOSPHATE 80 MG/1
1 CAPSULE, EXTENDED RELEASE ORAL
Qty: 60 | Refills: 0
Start: 2020-01-16 | End: 2020-02-14

## 2020-01-16 RX ADMIN — NYSTATIN CREAM 1 APPLICATION(S): 100000 CREAM TOPICAL at 05:43

## 2020-01-16 RX ADMIN — SPIRONOLACTONE 25 MILLIGRAM(S): 25 TABLET, FILM COATED ORAL at 05:43

## 2020-01-16 RX ADMIN — CARVEDILOL PHOSPHATE 6.25 MILLIGRAM(S): 80 CAPSULE, EXTENDED RELEASE ORAL at 05:43

## 2020-01-16 RX ADMIN — CARVEDILOL PHOSPHATE 12.5 MILLIGRAM(S): 80 CAPSULE, EXTENDED RELEASE ORAL at 17:18

## 2020-01-16 RX ADMIN — HEPARIN SODIUM 5000 UNIT(S): 5000 INJECTION INTRAVENOUS; SUBCUTANEOUS at 05:43

## 2020-01-16 RX ADMIN — NYSTATIN CREAM 1 APPLICATION(S): 100000 CREAM TOPICAL at 17:19

## 2020-01-16 RX ADMIN — Medication 20 MILLIGRAM(S): at 05:43

## 2020-01-16 RX ADMIN — Medication 112 MICROGRAM(S): at 05:43

## 2020-01-16 RX ADMIN — HEPARIN SODIUM 5000 UNIT(S): 5000 INJECTION INTRAVENOUS; SUBCUTANEOUS at 17:18

## 2020-01-16 NOTE — CONSULT NOTE ADULT - ASSESSMENT
This is a pleasant 89 y/o F PMHx of NSTEMI (7/2019), HFpEF, HTN, and Hypothyroidism who presents for syncope. Patient was working out within an exercise class and felt tired where she loss consciousness. She reportedly fell out of her chair. Pt says was tired after doing chores and may be starting such a class.  No head trauma. Per EMS she had 5 min of LOC. No associated chest pain, palpitations, dizziness, nausea, vomiting, diarrhea, headache, or blurry vision. No shaking, tongue biting, or urinary incontinence noted. Patient reports increased urinary frequency but she on is on diuretics. Pt denies shaking, post ictal confusion, tongue biting, changes in speech, swallow, vision, bowel or bladder control.  No weakness or numbness.     exam nonfocal so not consistent iwth focal neurological issue such as stroke  therefore even if had carotid stenosis would be considered asx and no intervention would be done  thus will cancel dopplers  no stigmata of sz no need for eeg  out of bed to chair  ? loop as per cards  d/w pt and RN  PT to see

## 2020-01-16 NOTE — DISCHARGE NOTE NURSING/CASE MANAGEMENT/SOCIAL WORK - PATIENT PORTAL LINK FT
You can access the FollowMyHealth Patient Portal offered by [facility] by registering at the following website: [email or web address]. By joining [employer]’s FollowMyHealth portal, you will also be able to view your health information using other applications (apps) compatible with our system.

## 2020-01-16 NOTE — PROGRESS NOTE ADULT - SUBJECTIVE AND OBJECTIVE BOX
This is a pleasant 89 y/o F PMHx of NSTEMI (7/2019), HFpEF, HTN, and Hypothyroidism who presents for syncope. Patient was working out within an exercise class and felt tired where she loss consciousness. She reportedly fell out of her chair. No head trauma. Per EMS she had 5 min of LOC. No associated chest pain, palpitations, dizziness, nausea, vomiting, diarrhea, headache, or blurry vision. No shaking, tongue biting, or urinary incontinence noted. Patient reports increased urinary frequency but she on is on diuretics. No dysuria. No source of pain. No other acute complaints. Patient states she has been tired But no new witnessed syncopal episodes    MEDICATIONS  (STANDING):  carvedilol 12.5 milliGRAM(s) Oral every 12 hours  heparin  Injectable 5000 Unit(s) SubCutaneous every 12 hours  levothyroxine 112 MICROGram(s) Oral daily  nystatin Cream 1 Application(s) Topical two times a day  spironolactone 25 milliGRAM(s) Oral daily    MEDICATIONS  (PRN):  polyethylene glycol 3350 17 Gram(s) Oral daily PRN Constipation          VITALS:   T(C): 36.6 (01-16-20 @ 21:27), Max: 36.9 (01-16-20 @ 04:55)  HR: 82 (01-16-20 @ 21:27) (78 - 85)  BP: 135/82 (01-16-20 @ 21:27) (125/78 - 178/84)  RR: 19 (01-16-20 @ 21:27) (18 - 19)  SpO2: 96% (01-16-20 @ 21:27) (96% - 97%)  Wt(kg): --    PHYSICAL EXAM:  GENERAL: NAD, well nourished and conversant  HEAD:  Atraumatic  EYES: EOM, PERRLA, conjunctiva pink and sclera white  ENT: No tonsillar erythema, exudates, or enlargement, moist mucous membranes, good dentition, no lesions  NECK: Supple, No JVD, normal thyroid, carotids with normal upstrokes and no bruits  CHEST/LUNG: Clear to auscultation bilaterally, No rales, rhonchi, wheezing, or rubs  HEART: Regular rate and rhythm, No murmurs, rubs, or gallops  ABDOMEN: Soft, nondistended, no masses, guarding, tenderness or rebound, bowel sounds present  EXTREMITIES:  2+ Peripheral Pulses, No clubbing, cyanosis, or edema.   LYMPH: No lymphadenopathy noted  SKIN: No rashes or lesions  NERVOUS SYSTEM:  Alert & Oriented X3, normal cognitive function. Motor Strength 5/5 right upper and right lower.  5/5 left upper and left lower extremities, DTRs 2+ intact and symmetric    LABS:        CBC Full  -  ( 15 Gideon 2020 06:24 )  WBC Count : 5.76 K/uL  RBC Count : 3.63 M/uL  Hemoglobin : 10.9 g/dL  Hematocrit : 34.5 %  Platelet Count - Automated : 183 K/uL  Mean Cell Volume : 95.0 fl  Mean Cell Hemoglobin : 30.0 pg  Mean Cell Hemoglobin Concentration : 31.6 gm/dL  Auto Neutrophil # : 3.04 K/uL  Auto Lymphocyte # : 1.58 K/uL  Auto Monocyte # : 0.73 K/uL  Auto Eosinophil # : 0.35 K/uL  Auto Basophil # : 0.04 K/uL  Auto Neutrophil % : 52.8 %  Auto Lymphocyte % : 27.4 %  Auto Monocyte % : 12.7 %  Auto Eosinophil % : 6.1 %  Auto Basophil % : 0.7 %    01-16    140  |  102  |  51<H>  ----------------------------<  150<H>  4.2   |  24  |  1.34<H>    Ca    9.5      16 Jan 2020 10:32  Phos  3.9     01-15    TPro  x   /  Alb  x   /  TBili  x   /  DBili  x   /  AST  x   /  ALT  x   /  AlkPhos  138<H>  01-16    LIVER FUNCTIONS - ( 16 Jan 2020 10:32 )  Alb: x     / Pro: x     / ALK PHOS: 138 U/L / ALT: x     / AST: x     / GGT: x               CAPILLARY BLOOD GLUCOSE          RADIOLOGY & ADDITIONAL TESTS:

## 2020-01-16 NOTE — DISCHARGE NOTE PROVIDER - CARE PROVIDER_API CALL
Kam Buckley)  Cardiology; Internal Medicine  [address] [facility], [address]  [place] [address]  Phone: [phone]  Fax: [phone]  Established Patient  Follow Up Time: 1 week    Jose Shah)  Gastroenterology; Internal Medicine  [address] [facility], [address]  [place] [address]  Phone: [phone]  Fax: [phone]  Established Patient  Follow Up Time: 2 weeks

## 2020-01-16 NOTE — PHYSICAL THERAPY INITIAL EVALUATION ADULT - ADDITIONAL COMMENTS
CXR: The heart is enlarged. Late like atelectasis right lower lobe otherwise the lungs appear to be clear. Calcified aortic knob. No pleural effusion. No pneumothorax. No acute pathology is seen in the visualized bones.

## 2020-01-16 NOTE — PHYSICAL THERAPY INITIAL EVALUATION ADULT - BALANCE TRAINING, PT EVAL
GOAL: Pt's static/dynamic sitting/standing balance will improve to fair+ to increase stability, and decrease risk for falls when ambulating or performing ADL's

## 2020-01-16 NOTE — DISCHARGE NOTE PROVIDER - HOSPITAL COURSE
89 year-old male with PMH of NSTEMI (7/2019), HFpEF, HTN, and hypothyroidism, brought to the ED by EMS s/p syncopal episode - reports that she passed out while doing exercises, reportedly falling out of her chair; no head trauma.  Orthostatics were negative.  No significant events on telemetry.  Patient was evaluated by Neurology and Cardiology.  Echocardiogram with EF of 65% and no WMA.  Coreg was uptitrated for HTN and Lasix was changed to every other day.  No signs of infection.  Patient is being discharged back to assisted living with Physical Therapy and plan for ILR with her Outpatient Cardiologist within the next 5-10 days. 89 year-old female with PMH of NSTEMI (7/2019), HFpEF, HTN, and hypothyroidism, brought to the ED by EMS s/p syncopal episode - reports that she passed out while doing exercises, reportedly falling out of her chair; no head trauma.  Orthostatics were negative.  No significant events on telemetry.  Patient was evaluated by Neurology and Cardiology.  Echocardiogram with EF of 65% and no WMA.  Coreg was uptitrated for HTN and Lasix was changed to every other day.  No signs of infection.  Patient is being discharged back to assisted living with Physical Therapy and plan for ILR with her Outpatient Cardiologist within the next 5-10 days. 89 year-old female with PMH of NSTEMI (7/2019), HFpEF, HTN, and hypothyroidism, brought to the ED by EMS s/p syncopal episode - reports that she passed out while doing exercises, reportedly falling out of her chair; no head trauma.  Orthostatics were negative.  No significant events on telemetry.  Patient was evaluated by Neurology and Cardiology.  Echocardiogram with EF of 65% and no WMA.  Coreg was uptitrated for HTN and Lasix was changed to every other day.  No signs of infection.  Patient is being discharged back to assisted living with Physical Therapy and plan for ILR with her Outpatient Cardiologist within the next 5-10 days.    DCP and medication reconciliation was discussed with Dr Spears and in agreement. Patient is hemodynamically stabe and cleared for discharge. 89 year-old female with PMH of NSTEMI (7/2019), HFpEF, HTN, and hypothyroidism, brought to the ED by EMS s/p syncopal episode - reports that she passed out while doing exercises, reportedly falling out of her chair; no head trauma.  Orthostatics were negative.  No significant events on telemetry.  Patient was evaluated by Neurology and Cardiology.  Echocardiogram with EF of 65% and no WMA.  Coreg was uptitrated for HTN and Lasix was changed to every other day.  No signs of infection.  Patient is being discharged back to assisted living with Physical Therapy and plan for ILR with her Outpatient Cardiologist within the next 5-10 days.    DCP and medication reconciliation was discussed with Dr SHERITA Louis and Dr Buckley and both in agreement. Patient is hemodynamically stabe and cleared for discharge.

## 2020-01-16 NOTE — DISCHARGE NOTE PROVIDER - PROVIDER TOKENS
PROVIDER:[TOKEN:[[id number]],FOLLOWUP:[1 week],ESTABLISHEDPATIENT:[T]],PROVIDER:[TOKEN:[[id number]],FOLLOWUP:[2 weeks],ESTABLISHEDPATIENT:[T]]

## 2020-01-16 NOTE — DISCHARGE NOTE PROVIDER - NSDCCPCAREPLAN_GEN_ALL_CORE_FT
PRINCIPAL DISCHARGE DIAGNOSIS  Diagnosis: Syncope and collapse  Assessment and Plan of Treatment: Fainting usually is caused by fear, stress, pain, standing too long, over tired, overheated, going to bathroom, or coughing  Blood pressure can drop if you do not drink enough, blood pressure medication, alcohol, bleeding,  Consult with your doctor about driving  Avoid activity or condition that causes your syncope  Lay down with your feet up when you feel like you might faint        SECONDARY DISCHARGE DIAGNOSES  Diagnosis: CKD (chronic kidney disease), stage III  Assessment and Plan of Treatment: CKD (chronic kidney disease), stage III  Avoid taking (NSAIDs) - (ex: Ibuprofen, Advil, Celebrex, Naprosyn)  Avoid taking any nephrotoxic agents (can harm kidneys) - Intravenous contrast for diagnostic testing, combination cold medications.  Have all medications adjusted for your renal function by your Health Care Provider.  Blood pressure control is important.  Take all medication as prescribed.      Diagnosis: Anemia  Assessment and Plan of Treatment: Follow-up with your outpatient provider for further care/recommendations. Monitor for signs/symptoms indicating worsening of disease, such as, easy bleeding/bruising, pale skin, fatigue, dizziness, increased heart rate, or chest pain.  Symptoms to report, bleeding, palpitations, fatigue, pale skin, cold skin, dizziness. Take medications as ordered by Please follow up with your primary care physician after discharge for continued monitoring and management.      Diagnosis: Diastolic heart failure  Assessment and Plan of Treatment: Diastolic heart failure  Weigh yourself daily.  If you gain 3lbs in 3 days, or 5lbs in a week call your Health Care Provider.  Do not eat or drink foods containing more than 2000mg of salt (sodium) in your diet every day.  Call your Health Care Provider if you have any swelling or increased swelling in your feet, ankles, and/or stomach.  Take all of your medication as directed.  If you become dizzy call your Health Care Provider.      Diagnosis: HTN (hypertension)  Assessment and Plan of Treatment: Low salt diet  Activity as tolerated.  Take all medication as prescribed.  Follow up with your medical doctor for routine blood pressure monitoring at your next visit.  Notify your doctor if you have any of the following symptoms:   Dizziness, Lightheadedness, Blurry vision, Headache, Chest pain, Shortness of breath

## 2020-01-16 NOTE — DISCHARGE NOTE PROVIDER - NSDCMRMEDTOKEN_GEN_ALL_CORE_FT
Coreg 12.5 mg oral tablet: 1 tab(s) orally 2 times a day    NOTE: As per patient, she didnt start taking the 6.25mg 1 tab twice a day that was prescribed on Dec.26  docusate sodium 100 mg oral capsule: 2 cap(s) orally once a day (at bedtime), As Needed  Lasix 20 mg oral tablet: 1 tab(s) orally once a day  levothyroxine 112 mcg (0.112 mg) oral tablet: 1 tab(s) orally once a day  petrolatum topical ointment: 1 application topically 2 times a day  Physical Therapy Evaluation and Treatment: Duration:  2-3 times weekly  Frequency:  2-3 weeks  Dx:  HFpEF and generalized weakness  ICD10:  I50.3 and M62.81    polyethylene glycol 3350 oral powder for reconstitution: 17 gram(s) orally once a day, As Needed  PreserVision AREDS 2 oral capsule: 1 cap(s) orally once a day  spironolactone 25 mg oral tablet: 1 tab(s) orally once a day  Tylenol Extra Strength 500 mg oral tablet: 2 tab(s) orally , As Needed Coreg 12.5 mg oral tablet: 1 tab(s) orally 2 times a day    NOTE: As per patient, she didnt start taking the 6.25mg 1 tab twice a day that was prescribed on Dec.26  docusate sodium 100 mg oral capsule: 2 cap(s) orally once a day (at bedtime), As Needed  Lasix 20 mg oral tablet: 1 tab(s) orally every other day  levothyroxine 112 mcg (0.112 mg) oral tablet: 1 tab(s) orally once a day  petrolatum topical ointment: 1 application topically 2 times a day  Physical Therapy Evaluation and Treatment: Duration:  2-3 times weekly  Frequency:  2-3 weeks  Dx:  HFpEF and generalized weakness  ICD10:  I50.3 and M62.81    polyethylene glycol 3350 oral powder for reconstitution: 17 gram(s) orally once a day, As Needed  PreserVision AREDS 2 oral capsule: 1 cap(s) orally once a day  spironolactone 25 mg oral tablet: 1 tab(s) orally once a day  Tylenol Extra Strength 500 mg oral tablet: 2 tab(s) orally , As Needed docusate sodium 100 mg oral capsule: 2 cap(s) orally once a day (at bedtime), As Needed  Lasix 20 mg oral tablet: 1 tab(s) orally every other day  levothyroxine 112 mcg (0.112 mg) oral tablet: 1 tab(s) orally once a day  petrolatum topical ointment: 1 application topically 2 times a day  Physical Therapy Evaluation and Treatment: Duration:  2-3 times weekly  Frequency:  2-3 weeks  Dx:  HFpEF and generalized weakness  ICD10:  I50.3 and M62.81    polyethylene glycol 3350 oral powder for reconstitution: 17 gram(s) orally once a day, As Needed  PreserVision AREDS 2 oral capsule: 1 cap(s) orally once a day  spironolactone 25 mg oral tablet: 1 tab(s) orally once a day  Tylenol Extra Strength 500 mg oral tablet: 2 tab(s) orally , As Needed

## 2020-01-16 NOTE — CONSULT NOTE ADULT - SUBJECTIVE AND OBJECTIVE BOX
Patient is a 89y old  Female who presents with a chief complaint of passing out (2020 08:50)    HPI: This is a pleasant 89 y/o F PMHx of HFpEF, HTN, and hypothyroidism who presents with a syncopal episode. Patient was working out in an exercise class and felt tired. She was sitting in a chair when she lost consciousness. She reportedly fell out of her chair. No head trauma. Per EMS she had 5 min of LOC. No associated chest pain, palpitations, dizziness, nausea, vomiting, diarrhea, headache, or blurry vision. No shaking, tongue biting, or urinary incontinence noted. Patient reports increased urinary frequency but she on is on diuretics. No dysuria. No source of pain. No other acute complaints. Prior to her syncopal episode she had no prodromal sx's. In August, she had a sig R pleural effusion that was a transudate and was thought to be related to HF.    PAST MEDICAL & SURGICAL HISTORY:  Macular degeneration  Hypothyroidism  Spinal stenosis  HTN (hypertension)  Bilateral Cataracts  Urinary Frequency  S/P cataract surgery: b/l  S/P breast lumpectomy  S/P ORIF (open reduction internal fixation) fracture: R hip  Ectopic pregnancy, tubal      MEDICATIONS  (STANDING):  carvedilol 6.25 milliGRAM(s) Oral every 12 hours  furosemide    Tablet 20 milliGRAM(s) Oral daily  heparin  Injectable 5000 Unit(s) SubCutaneous every 12 hours  levothyroxine 112 MICROGram(s) Oral daily  nystatin Cream 1 Application(s) Topical two times a day  spironolactone 25 milliGRAM(s) Oral daily    MEDICATIONS  (PRN):  polyethylene glycol 3350 17 Gram(s) Oral daily PRN Constipation      FAMILY HISTORY:  Family history of acute myocardial infarction      SOCIAL HISTORY: lives in a facility, no alcohol use    CIGARETTES: nonsmoker    REVIEW OF SYSTEMS  General: negative  Respiratory and Thorax: Negative	  Cardiovascular: See HPI  Gastrointestinal:	 Negative  Genitourinary: urinary frequency  Neurological: Negative  	  Vital Signs Last 24 Hrs  T(C): 36.9 (2020 04:55), Max: 36.9 (2020 04:55)  T(F): 98.4 (2020 04:55), Max: 98.4 (2020 04:55)  HR: 78 (2020 04:55) (65 - 79)  BP: 178/84 (2020 04:55) (144/76 - 178/84)  BP(mean): --  RR: 18 (2020 04:55) (18 - 19)  SpO2: 97% (2020 04:55) (96% - 98%)  PHYSICAL EXAM:      Constitutional: WD, WN F in NAD  Eyes: anicteric sclera  Neck: no JVD  Respiratory: clear lungs  Cardiovascular: RRR, no R/G/M  Gastrointestinal: BS present, NT, ND, no masses  Extremities: trace ramiro edema  Vascular: intact pulses  Neurological: grossly nonfocal, alert and oriented  Skin: resolving skin rash on arms  Psychiatric: nl affect	    TELEMETRY: RSR    ECG: RSR with PAC    LABS:                        10.9   5.76  )-----------( 183      ( 15 Gideon 2020 06:24 )             34.5     01-15    141  |  103  |  40<H>  ----------------------------<  98  4.5   |  24  |  1.28    Ca    8.8      15 Gideon 2020 06:20  Phos  3.9     -15  Mg     2.5     14    TPro  6.9  /  Alb  3.6  /  TBili  0.2  /  DBili  x   /  AST  13  /  ALT  25  /  AlkPhos  129<H>  -15      Urinalysis Basic - ( 2020 16:56 )    Color: Colorless / Appearance: Clear / S.008 / pH: x  Gluc: x / Ketone: Negative  / Bili: Negative / Urobili: Negative   Blood: x / Protein: Negative / Nitrite: Negative   Leuk Esterase: Negative / RBC: x / WBC x   Sq Epi: x / Non Sq Epi: x / Bacteria: x      I&O's Summary    15 Gideon 2020 07:01  -  2020 07:00  --------------------------------------------------------  IN: 360 mL / OUT: 400 mL / NET: -40 mL    2020 07:01  -  2020 10:04  --------------------------------------------------------  IN: 0 mL / OUT: 150 mL / NET: -150 mL      BNP  RADIOLOGY & ADDITIONAL STUDIES:  < from: Xray Chest 1 View AP/PA (20 @ 13:18) >  EXAM:  XR CHEST AP OR PA 1V                            PROCEDURE DATE:  2020            INTERPRETATION:  A single chest x-ray was obtained on 2020      Indication:    Impression:    The heart is enlarged. Late like atelectasis right lower lobe otherwise the lungs appear to be clear. Calcified aortic knob. No pleural effusion. No pneumothorax. No acute pathology is seen in the visualized bones.                    DANDRE MCGILL M.D., ATTENDING RADIOLOGIST  This document has been electronically signed. 2020  1:56PM    < end of copied text >  < from: Transthoracic Echocardiogram (01.15.20 @ 07:35) >  Patient name: SOLIS TAYLOR  YOB: 1922   Age: 89 (F)   MR#: [id number]  Study Date: 1/15/2020  [facility]  2nd Sonographer: Bertin Gifford M.D.  Study quality: Technically good  Referring Physician: Jamar Spears MD  Blood Pressure: 120/64 mmHg  Height: 163 cm  Weight: 68 kg  BSA: 1.7 m2  ------------------------------------------------------------------------  PROCEDURE: Transthoracic echocardiogram with 2-D, M-Mode  and complete spectral and color flow Doppler.  INDICATION: Syncope and collapse (R55)  ------------------------------------------------------------------------  Dimensions:    Normal Values:  LA:     3.7    2.0 - 4.0 cm  Ao:     3.2    2.0 - 3.8 cm  SEPTUM: 0.9    0.6 -1.2 cm  PWT:    0.9    0.6 - 1.1 cm  LVIDd:  4.5    3.0 - 5.6 cm  LVIDs:  2.9    1.8 - 4.0 cm  Derived variables:  LVMI: 77 g/m2  RWT: 0.40  EF (Visual Estimate): 65 %  Doppler Peak Velocity (m/sec): AoV=1.2 TV=2.5  ------------------------------------------------------------------------  Observations:  Mitral Valve: Normal mitral valve. Mitral annular and  chordal calcification. Minimal mitral regurgitation.  Aortic Valve/Aorta: Calcified aortic valve with normal  opening.  Normal aortic root.  Left Atrium: Normal left atrium.  Left Ventricle: Normal left ventricular internal dimensions  and wall thicknesses.  Normal left ventricular systolic function. No segmental  wall motion abnormalities.  Impaired LV-relaxation with normal filling pressure.  Right Heart: Normal right atrium. Normal right ventricular  size and function. Normal tricuspid valve. Mild tricuspid  regurgitation. Normal pulmonic valve.  Pericardium/Pleura: Normal pericardium with no pericardial  effusion.  Hemodynamic: Estimated right atrial pressure is normal.  No evidence of pulmonary hypertension.  No PFO seen with color Doppler.  ------------------------------------------------------------------------  Conclusions:  Normal left ventricular systolic function. No segmental  wall motion abnormalities.  ------------------------------------------------------------------------  Confirmed on  1/15/2020 - 10:57:40 by Herbert Esposito MD,  ARIELLA  ------------------------------------------------------------------------    < end of copied text >    IMPRESSION:  Syncope- no evidence for MI or CVA- maybe an arrhythmic cause or a vagal episode  HTN  HFpEF  Hypothyroidism    RECOMMENDATIONS:  Would decrease furosemide to QOD and increase Carvedilol to 12.5 mg BID for BP control  Neurology f/u  14 day monitor as an outpt  Appropriate hydration

## 2020-01-16 NOTE — PHYSICAL THERAPY INITIAL EVALUATION ADULT - PERTINENT HX OF CURRENT PROBLEM, REHAB EVAL
presents for syncope. Patient was working out within an exercise class and felt tired where she loss consciousness. She reportedly fell out of her chair. No head trauma. Per EMS she had 5 min of LOC. Non-ST elevation MI (NSTEMI) presents for syncope. Patient was working out within an exercise class and felt tired where she loss consciousness. She reportedly fell out of her chair. No head trauma. Per EMS she had 5 min of LOC.

## 2020-01-16 NOTE — DISCHARGE NOTE PROVIDER - NSDCFUADDAPPT_GEN_ALL_CORE_FT
Please follow up cardiologist Dr Buckley 1 week after discharge.  Please follow up with your primary care physician Dr Shah 1-2 weeks after discharge.

## 2020-01-16 NOTE — DISCHARGE NOTE PROVIDER - NSDCFUSCHEDAPPT_GEN_ALL_CORE_FT
SOLIS TAYLOR ; 02/04/2020 ; NP OrthoSur [address] [facility] SOLIS TAYLOR ; 02/04/2020 ; NP OrthoSur [address] [facility] SOLIS TAYLOR ; 02/04/2020 ; NP OrthoSur [facility] SOLIS TAYLOR ; 01/21/2020 ; NPP Geriatrics [facility]  SOLIS TAYLOR ; 02/04/2020 ; NPP OrthoSurg [address] [facility] SOLIS TAYLOR ; 01/21/2020 ; NPP Geriatrics [facility]  SOLIS TAYLOR ; 02/04/2020 ; NPP OrthoSurg [address] [facility] SOLIS TAYLOR ; 01/21/2020 ; NPP Geriatrics [facility]  SOLIS TAYLOR ; 02/04/2020 ; NPP OrthoSurg [address] [facility]

## 2020-01-16 NOTE — PHYSICAL THERAPY INITIAL EVALUATION ADULT - GAIT DEVIATIONS NOTED, PT EVAL
decreased step length/unsteady, mild SOB with ambulation- required standing rest break and cues for deep breathing/decreased weight-shifting ability/decreased jhonatan/decreased stride length

## 2020-01-16 NOTE — PHYSICAL THERAPY INITIAL EVALUATION ADULT - DISCHARGE DISPOSITION, PT EVAL
Subacute Rehab - if home, then home PT and increased assist with functional activities at home as needed/rehabilitation facility

## 2020-01-16 NOTE — PHYSICAL THERAPY INITIAL EVALUATION ADULT - LIVES WITH, PROFILE
Lives in a senior living facility with an elevator. Pt was ambulatory with a rolling walker PTA. States she attends [employer] exercise class in her building. Has an aide come in 2 days a week to assist with bathing but otherwise able to perform her ADL's I./alone

## 2020-01-16 NOTE — CONSULT NOTE ADULT - SUBJECTIVE AND OBJECTIVE BOX
Admitting Diagnosis:  Syncope and collapse (R55): SYNCOPE AND COLLAPSE      HPI:      This is a pleasant 89 y/o F PMHx of NSTEMI (2019), HFpEF, HTN, and Hypothyroidism who presents for syncope. Patient was working out within an exercise class and felt tired where she loss consciousness. She reportedly fell out of her chair. Pt says was tired after doing chores and may be starting such a class.  No head trauma. Per EMS she had 5 min of LOC. No associated chest pain, palpitations, dizziness, nausea, vomiting, diarrhea, headache, or blurry vision. No shaking, tongue biting, or urinary incontinence noted. Patient reports increased urinary frequency but she on is on diuretics. Pt denies shaking, post ictal confusion, tongue biting, changes in speech, swallow, vision, bowel or bladder control.  No weakness or numbness.   ******    Past Medical History:  Macular degeneration (H35.30)  Non-ST elevation MI (NSTEMI) (I21.4)  Hypothyroidism (E03.9)  Spinal stenosis (724.00)  HTN (hypertension) (401.9)  Bilateral Cataracts (366.9)  Status Post Breast Lumpectomy (V45.89)  Urinary Frequency (788.41)      Past Surgical History:  S/P cataract surgery (Z98.49): b/l  S/P breast lumpectomy (Z98.890)  S/P ORIF (open reduction internal fixation) fracture (Z96.7): R hip  Ectopic pregnancy, tubal (O00.1)      Social History:  No toxic habits    Family History:  FAMILY HISTORY:  Family history of acute myocardial infarction      Allergies:  morphine (Drowsiness; Faint; Hypotension)  sulfa topicals (Unknown)      ROS:  Constitutional: Patient offers no complaints of fevers or significant weight loss  Ears, Nose, Mouth and Throat: The patient presents with no abnormalities of the head, ears, eyes, nose or throat  Skin: Patient offers no concerns of new rashes or lesions  Respiratory: The patient presents with no abnormalities of the respiratory tract  Cardiovascular: The patient presents with no cardiac abnormalities  Gastrointestinal: The patient presents with no abnormalities of the GI system  Genitourinary: The patient presents with no dysuria, hematuria or frequent urination  Neurological: See HPI  Endocrine: Patient offers no complaints of excessive thirst, urination, or heat/cold intolerance    Advanced care planning reviewed and noted in the chart.    Medications:  carvedilol 6.25 milliGRAM(s) Oral every 12 hours  furosemide    Tablet 20 milliGRAM(s) Oral daily  heparin  Injectable 5000 Unit(s) SubCutaneous every 12 hours  levothyroxine 112 MICROGram(s) Oral daily  nystatin Cream 1 Application(s) Topical two times a day  polyethylene glycol 3350 17 Gram(s) Oral daily PRN  spironolactone 25 milliGRAM(s) Oral daily      Labs:  CBC Full  -  ( 15 Gideon 2020 06:24 )  WBC Count : 5.76 K/uL  RBC Count : 3.63 M/uL  Hemoglobin : 10.9 g/dL  Hematocrit : 34.5 %  Platelet Count - Automated : 183 K/uL  Mean Cell Volume : 95.0 fl  Mean Cell Hemoglobin : 30.0 pg  Mean Cell Hemoglobin Concentration : 31.6 gm/dL  Auto Neutrophil # : 3.04 K/uL  Auto Lymphocyte # : 1.58 K/uL  Auto Monocyte # : 0.73 K/uL  Auto Eosinophil # : 0.35 K/uL  Auto Basophil # : 0.04 K/uL  Auto Neutrophil % : 52.8 %  Auto Lymphocyte % : 27.4 %  Auto Monocyte % : 12.7 %  Auto Eosinophil % : 6.1 %  Auto Basophil % : 0.7 %    01-15    141  |  103  |  40<H>  ----------------------------<  98  4.5   |  24  |  1.28    Ca    8.8      15 Gideon 2020 06:20  Phos  3.9     -15  Mg     2.5     -    TPro  6.9  /  Alb  3.6  /  TBili  0.2  /  DBili  x   /  AST  13  /  ALT  25  /  AlkPhos  129<H>  -15    CAPILLARY BLOOD GLUCOSE        LIVER FUNCTIONS - ( 15 Gideon 2020 06:20 )  Alb: 3.6 g/dL / Pro: 6.9 g/dL / ALK PHOS: 129 U/L / ALT: 25 U/L / AST: 13 U/L / GGT: x             Urinalysis Basic - ( 2020 16:56 )    Color: Colorless / Appearance: Clear / S.008 / pH: x  Gluc: x / Ketone: Negative  / Bili: Negative / Urobili: Negative   Blood: x / Protein: Negative / Nitrite: Negative   Leuk Esterase: Negative / RBC: x / WBC x   Sq Epi: x / Non Sq Epi: x / Bacteria: x      Female    Vitals:  Vital Signs Last 24 Hrs  T(C): 36.9 (2020 04:55), Max: 36.9 (2020 04:55)  T(F): 98.4 (2020 04:55), Max: 98.4 (2020 04:55)  HR: 78 (2020 04:55) (65 - 79)  BP: 178/84 (2020 04:55) (144/76 - 178/84)  BP(mean): --  RR: 18 (2020 04:55) (18 - 19)  SpO2: 97% (2020 04:55) (96% - 98%)    NEUROLOGICAL EXAM:    Mental status: Awake, alert, and in no apparent distress. Oriented to person, place and time. Language function is normal. Recent memory, digit span and concentration were normal.     Cranial Nerves: Pupils were equal, round, reactive to light. Extraocular movements were intact. Visual field were full. Fundoscopic exam was deferred. Facial sensation was intact to light touch. There was no facial asymmetry. The palate was upgoing symmetrically and tongue was midline. Hearing acuity was intact to finger rub AU. Shoulder shrug was full bilaterally    Motor exam: Bulk and tone were normal. Strength was 5/5 in all four extremities. Fine finger movements were symmetric and normal. There was no pronator drift    Reflexes: 2+ in the bilateral upper extremities. 2+ in the bilateral lower extremities. Toes were downgoing bilaterally.     Sensation: Intact to light touch, temperature, vibration and proprioception.     Coordination: Finger-nose-finger and heel-to-shin was without dysmetria.     Gait: defer

## 2020-01-17 VITALS
TEMPERATURE: 98 F | RESPIRATION RATE: 18 BRPM | HEART RATE: 77 BPM | SYSTOLIC BLOOD PRESSURE: 109 MMHG | OXYGEN SATURATION: 96 % | DIASTOLIC BLOOD PRESSURE: 66 MMHG

## 2020-01-17 LAB
ANION GAP SERPL CALC-SCNC: 13 MMOL/L — SIGNIFICANT CHANGE UP (ref 5–17)
BUN SERPL-MCNC: 66 MG/DL — HIGH (ref 7–23)
CALCIUM SERPL-MCNC: 8.8 MG/DL — SIGNIFICANT CHANGE UP (ref 8.4–10.5)
CHLORIDE SERPL-SCNC: 104 MMOL/L — SIGNIFICANT CHANGE UP (ref 96–108)
CO2 SERPL-SCNC: 20 MMOL/L — LOW (ref 22–31)
CREAT SERPL-MCNC: 1.57 MG/DL — HIGH (ref 0.5–1.3)
GLUCOSE SERPL-MCNC: 98 MG/DL — SIGNIFICANT CHANGE UP (ref 70–99)
POTASSIUM SERPL-MCNC: 4.7 MMOL/L — SIGNIFICANT CHANGE UP (ref 3.5–5.3)
POTASSIUM SERPL-SCNC: 4.7 MMOL/L — SIGNIFICANT CHANGE UP (ref 3.5–5.3)
SODIUM SERPL-SCNC: 137 MMOL/L — SIGNIFICANT CHANGE UP (ref 135–145)

## 2020-01-17 PROCEDURE — 85045 AUTOMATED RETICULOCYTE COUNT: CPT

## 2020-01-17 PROCEDURE — 82330 ASSAY OF CALCIUM: CPT

## 2020-01-17 PROCEDURE — 83880 ASSAY OF NATRIURETIC PEPTIDE: CPT

## 2020-01-17 PROCEDURE — 85027 COMPLETE CBC AUTOMATED: CPT

## 2020-01-17 PROCEDURE — 82435 ASSAY OF BLOOD CHLORIDE: CPT

## 2020-01-17 PROCEDURE — 84132 ASSAY OF SERUM POTASSIUM: CPT

## 2020-01-17 PROCEDURE — 83036 HEMOGLOBIN GLYCOSYLATED A1C: CPT

## 2020-01-17 PROCEDURE — 83735 ASSAY OF MAGNESIUM: CPT

## 2020-01-17 PROCEDURE — 71045 X-RAY EXAM CHEST 1 VIEW: CPT

## 2020-01-17 PROCEDURE — 84484 ASSAY OF TROPONIN QUANT: CPT

## 2020-01-17 PROCEDURE — 93306 TTE W/DOPPLER COMPLETE: CPT

## 2020-01-17 PROCEDURE — 82043 UR ALBUMIN QUANTITATIVE: CPT

## 2020-01-17 PROCEDURE — 84100 ASSAY OF PHOSPHORUS: CPT

## 2020-01-17 PROCEDURE — 82803 BLOOD GASES ANY COMBINATION: CPT

## 2020-01-17 PROCEDURE — 97161 PT EVAL LOW COMPLEX 20 MIN: CPT

## 2020-01-17 PROCEDURE — 80061 LIPID PANEL: CPT

## 2020-01-17 PROCEDURE — 99285 EMERGENCY DEPT VISIT HI MDM: CPT | Mod: 25

## 2020-01-17 PROCEDURE — 84075 ASSAY ALKALINE PHOSPHATASE: CPT

## 2020-01-17 PROCEDURE — 83605 ASSAY OF LACTIC ACID: CPT

## 2020-01-17 PROCEDURE — 84443 ASSAY THYROID STIM HORMONE: CPT

## 2020-01-17 PROCEDURE — 87086 URINE CULTURE/COLONY COUNT: CPT

## 2020-01-17 PROCEDURE — 84295 ASSAY OF SERUM SODIUM: CPT

## 2020-01-17 PROCEDURE — 36415 COLL VENOUS BLD VENIPUNCTURE: CPT

## 2020-01-17 PROCEDURE — 85014 HEMATOCRIT: CPT

## 2020-01-17 PROCEDURE — 99238 HOSP IP/OBS DSCHRG MGMT 30/<: CPT

## 2020-01-17 PROCEDURE — 80053 COMPREHEN METABOLIC PANEL: CPT

## 2020-01-17 PROCEDURE — 80048 BASIC METABOLIC PNL TOTAL CA: CPT

## 2020-01-17 PROCEDURE — 81003 URINALYSIS AUTO W/O SCOPE: CPT

## 2020-01-17 PROCEDURE — 93005 ELECTROCARDIOGRAM TRACING: CPT

## 2020-01-17 PROCEDURE — 82947 ASSAY GLUCOSE BLOOD QUANT: CPT

## 2020-01-17 PROCEDURE — 82962 GLUCOSE BLOOD TEST: CPT

## 2020-01-17 RX ORDER — SPIRONOLACTONE 25 MG/1
25 TABLET, FILM COATED ORAL
Refills: 0 | Status: DISCONTINUED | OUTPATIENT
Start: 2020-01-17 | End: 2020-01-17

## 2020-01-17 RX ORDER — CARVEDILOL PHOSPHATE 80 MG/1
1 CAPSULE, EXTENDED RELEASE ORAL
Qty: 60 | Refills: 0
Start: 2020-01-17 | End: 2020-02-15

## 2020-01-17 RX ORDER — SPIRONOLACTONE 25 MG/1
1 TABLET, FILM COATED ORAL
Qty: 30 | Refills: 0
Start: 2020-01-17 | End: 2020-02-15

## 2020-01-17 RX ORDER — CARVEDILOL PHOSPHATE 80 MG/1
1 CAPSULE, EXTENDED RELEASE ORAL
Qty: 0 | Refills: 0 | DISCHARGE

## 2020-01-17 RX ADMIN — Medication 112 MICROGRAM(S): at 05:39

## 2020-01-17 RX ADMIN — CARVEDILOL PHOSPHATE 12.5 MILLIGRAM(S): 80 CAPSULE, EXTENDED RELEASE ORAL at 05:40

## 2020-01-17 RX ADMIN — HEPARIN SODIUM 5000 UNIT(S): 5000 INJECTION INTRAVENOUS; SUBCUTANEOUS at 05:39

## 2020-01-17 RX ADMIN — SPIRONOLACTONE 25 MILLIGRAM(S): 25 TABLET, FILM COATED ORAL at 05:40

## 2020-01-17 RX ADMIN — NYSTATIN CREAM 1 APPLICATION(S): 100000 CREAM TOPICAL at 05:40

## 2020-01-17 NOTE — PROGRESS NOTE ADULT - SUBJECTIVE AND OBJECTIVE BOX
Subjective: Pt. without any complaints. No CP or SOB.  Appreciate neurology f/u.    MEDICATIONS  (STANDING):  carvedilol 12.5 milliGRAM(s) Oral every 12 hours  heparin  Injectable 5000 Unit(s) SubCutaneous every 12 hours  levothyroxine 112 MICROGram(s) Oral daily  nystatin Cream 1 Application(s) Topical two times a day  spironolactone 25 milliGRAM(s) Oral daily    MEDICATIONS  (PRN):  polyethylene glycol 3350 17 Gram(s) Oral daily PRN Constipation      Vital Signs Last 24 Hrs  T(C): 36.6 (17 Jan 2020 04:35), Max: 36.6 (16 Jan 2020 21:27)  T(F): 97.8 (17 Jan 2020 04:35), Max: 97.8 (16 Jan 2020 21:27)  HR: 78 (17 Jan 2020 04:35) (78 - 85)  BP: 136/71 (17 Jan 2020 04:35) (125/78 - 136/71)  BP(mean): --  RR: 18 (17 Jan 2020 04:35) (18 - 19)  SpO2: 94% (17 Jan 2020 04:35) (94% - 97%)  PHYSICAL EXAM:      Constitutional: WD, WN in NAD  Neck: no JVD  Respiratory: clear lungs  Cardiovascular: RRR, no murmurs noted  Extremities: no edema noted  Neurological: grossly nonfocal  Psychiatric: nl affect    TELEMETRY: RSR    ECG:      LABS:    01-17    137  |  104  |  66<H>  ----------------------------<  98  4.7   |  20<L>  |  1.57<H>    Ca    8.8      17 Jan 2020 06:29    TPro  x   /  Alb  x   /  TBili  x   /  DBili  x   /  AST  x   /  ALT  x   /  AlkPhos  138<H>  01-16        I&O's Summary    16 Jan 2020 07:01  -  17 Jan 2020 07:00  --------------------------------------------------------  IN: 100 mL / OUT: 800 mL / NET: -700 mL      BNP  RADIOLOGY & ADDITIONAL STUDIES:    IMPRESSION:  Syncope- ? cause- maybe vagal or arrhythmic  HTN  HFpEF  Hypothyroidism    RECOMMENDATIONS:  Continue current meds  Oupt loop monitor  F/u CBC and BMP  Would also reduce spironolactone to QOD dosing  May discharge from cardiac standpoint

## 2020-01-17 NOTE — PROGRESS NOTE ADULT - SUBJECTIVE AND OBJECTIVE BOX
Admitting Diagnosis:  Syncope and collapse (R55): SYNCOPE AND COLLAPSE      HPI:      This is a pleasant 89 y/o F PMHx of NSTEMI (7/2019), HFpEF, HTN, and Hypothyroidism who presents for syncope. Patient was working out within an exercise class and felt tired where she loss consciousness. She reportedly fell out of her chair. Pt says was tired after doing chores and may be starting such a class.  No head trauma. Per EMS she had 5 min of LOC. No associated chest pain, palpitations, dizziness, nausea, vomiting, diarrhea, headache, or blurry vision. No shaking, tongue biting, or urinary incontinence noted. Patient reports increased urinary frequency but she on is on diuretics. Pt denies shaking, post ictal confusion, tongue biting, changes in speech, swallow, vision, bowel or bladder control.  No weakness or numbness.       no new issues    Past Medical History:  Macular degeneration (H35.30)  Non-ST elevation MI (NSTEMI) (I21.4)  Hypothyroidism (E03.9)  Spinal stenosis (724.00)  HTN (hypertension) (401.9)  Bilateral Cataracts (366.9)  Status Post Breast Lumpectomy (V45.89)  Urinary Frequency (788.41)      Past Surgical History:  S/P cataract surgery (Z98.49): b/l  S/P breast lumpectomy (Z98.890)  S/P ORIF (open reduction internal fixation) fracture (Z96.7): R hip  Ectopic pregnancy, tubal (O00.1)      Social History:  No toxic habits    Family History:  FAMILY HISTORY:  Family history of acute myocardial infarction      Allergies:  morphine (Drowsiness; Faint; Hypotension)  sulfa topicals (Unknown)      ROS:  Constitutional: Patient offers no complaints of fevers or significant weight loss  Ears, Nose, Mouth and Throat: The patient presents with no abnormalities of the head, ears, eyes, nose or throat  Skin: Patient offers no concerns of new rashes or lesions  Respiratory: The patient presents with no abnormalities of the respiratory tract  Cardiovascular: The patient presents with no cardiac abnormalities  Gastrointestinal: The patient presents with no abnormalities of the GI system  Genitourinary: The patient presents with no dysuria, hematuria or frequent urination  Neurological: See HPI  Endocrine: Patient offers no complaints of excessive thirst, urination, or heat/cold intolerance    Advanced care planning reviewed and noted in the chart.    Medications:  carvedilol 12.5 milliGRAM(s) Oral every 12 hours  heparin  Injectable 5000 Unit(s) SubCutaneous every 12 hours  levothyroxine 112 MICROGram(s) Oral daily  nystatin Cream 1 Application(s) Topical two times a day  polyethylene glycol 3350 17 Gram(s) Oral daily PRN  spironolactone 25 milliGRAM(s) Oral daily      Labs:    01-17    137  |  104  |  66<H>  ----------------------------<  98  4.7   |  20<L>  |  1.57<H>    Ca    8.8      17 Jan 2020 06:29    TPro  x   /  Alb  x   /  TBili  x   /  DBili  x   /  AST  x   /  ALT  x   /  AlkPhos  138<H>  01-16    CAPILLARY BLOOD GLUCOSE        LIVER FUNCTIONS - ( 16 Jan 2020 10:32 )  Alb: x     / Pro: x     / ALK PHOS: 138 U/L / ALT: x     / AST: x     / GGT: x                   Vitals:  Vital Signs Last 24 Hrs  T(C): 36.6 (17 Jan 2020 04:35), Max: 36.6 (16 Jan 2020 21:27)  T(F): 97.8 (17 Jan 2020 04:35), Max: 97.8 (16 Jan 2020 21:27)  HR: 78 (17 Jan 2020 04:35) (78 - 85)  BP: 136/71 (17 Jan 2020 04:35) (125/78 - 136/71)  BP(mean): --  RR: 18 (17 Jan 2020 04:35) (18 - 19)  SpO2: 94% (17 Jan 2020 04:35) (94% - 97%)    NEUROLOGICAL EXAM:    Mental status: Awake, alert, and in no apparent distress. Oriented to person, place and time. Language function is normal. Recent memory, digit span and concentration were normal.     Cranial Nerves: Pupils were equal, round, reactive to light. Extraocular movements were intact. Visual field were full.  Facial sensation was intact to light touch. There was no facial asymmetry. The palate was upgoing symmetrically and tongue was midline. Hearing acuity was intact to finger rub AU. Shoulder shrug was full bilaterally    Motor exam: Bulk and tone were normal. Strength was 5/5 in all four extremities. Fine finger movements were symmetric and normal. There was no pronator drift    Reflexes: 2+ in the bilateral upper extremities. 2+ in the bilateral lower extremities. Toes were downgoing bilaterally.     Sensation: Intact to light touch, temperature, vibration and proprioception.     Coordination: Finger-nose-finger and heel-to-shin was without dysmetria.     Gait: defer

## 2020-01-17 NOTE — PROGRESS NOTE ADULT - SUBJECTIVE AND OBJECTIVE BOX
This is a pleasant 89 y/o F PMHx of NSTEMI (7/2019), HFpEF, HTN, and Hypothyroidism who presents for syncope. Patient was working out within an exercise class and felt tired where she loss consciousness. She reportedly fell out of her chair. No head trauma. Per EMS she had 5 min of LOC. No associated chest pain, palpitations, dizziness, nausea, vomiting, diarrhea, headache, or blurry vision. No shaking, tongue biting, or urinary incontinence noted. Patient reports increased urinary frequency but she on is on diuretics. No dysuria. No source of pain. No other acute complaints. Patient states she has been tired But no new witnessed syncopal episodes  Discussed with Cardiology who  wishes to proceed with outpatient placement of loop recorder  Stable to discharge to home.    MEDICATIONS  (STANDING):  carvedilol 12.5 milliGRAM(s) Oral every 12 hours  heparin  Injectable 5000 Unit(s) SubCutaneous every 12 hours  levothyroxine 112 MICROGram(s) Oral daily  nystatin Cream 1 Application(s) Topical two times a day  spironolactone 25 milliGRAM(s) Oral daily    MEDICATIONS  (PRN):  polyethylene glycol 3350 17 Gram(s) Oral daily PRN Constipation        Vital Signs Last 24 Hrs  T(C): 36.4 (17 Jan 2020 13:09), Max: 36.6 (16 Jan 2020 21:27)  T(F): 97.5 (17 Jan 2020 13:09), Max: 97.8 (16 Jan 2020 21:27)  HR: 77 (17 Jan 2020 13:09) (77 - 82)  BP: 109/66 (17 Jan 2020 13:09) (109/66 - 136/71)  BP(mean): --  RR: 18 (17 Jan 2020 13:09) (18 - 19)  SpO2: 96% (17 Jan 2020 13:09) (94% - 96%)    PHYSICAL EXAM:  GENERAL: NAD, well nourished and conversant  HEAD:  Atraumatic  EYES: EOM, PERRLA, conjunctiva pink and sclera white  ENT: No tonsillar erythema, exudates, or enlargement, moist mucous membranes, good dentition, no lesions  NECK: Supple, No JVD, normal thyroid, carotids with normal upstrokes and no bruits  CHEST/LUNG: Clear to auscultation bilaterally, No rales, rhonchi, wheezing, or rubs  HEART: Regular rate and rhythm, No murmurs, rubs, or gallops  ABDOMEN: Soft, nondistended, no masses, guarding, tenderness or rebound, bowel sounds present  EXTREMITIES:  2+ Peripheral Pulses, No clubbing, cyanosis, or edema.   LYMPH: No lymphadenopathy noted  SKIN: No rashes or lesions  NERVOUS SYSTEM:  Alert & Oriented X3, normal cognitive function. Motor Strength 5/5 right upper and right lower.  5/5 left upper and left lower extremities, DTRs 2+ intact and symmetric    LABS:            01-17    137  |  104  |  66<H>  ----------------------------<  98  4.7   |  20<L>  |  1.57<H>    Ca    8.8      17 Jan 2020 06:29    TPro  x   /  Alb  x   /  TBili  x   /  DBili  x   /  AST  x   /  ALT  x   /  AlkPhos  138<H>  01-16    LIVER FUNCTIONS - ( 16 Jan 2020 10:32 )  Alb: x     / Pro: x     / ALK PHOS: 138 U/L / ALT: x     / AST: x     / GGT: x                 CBC Full  -  ( 15 Gideon 2020 06:24 )  WBC Count : 5.76 K/uL  RBC Count : 3.63 M/uL  Hemoglobin : 10.9 g/dL  Hematocrit : 34.5 %  Platelet Count - Automated : 183 K/uL  Mean Cell Volume : 95.0 fl  Mean Cell Hemoglobin : 30.0 pg  Mean Cell Hemoglobin Concentration : 31.6 gm/dL  Auto Neutrophil # : 3.04 K/uL  Auto Lymphocyte # : 1.58 K/uL  Auto Monocyte # : 0.73 K/uL  Auto Eosinophil # : 0.35 K/uL  Auto Basophil # : 0.04 K/uL  Auto Neutrophil % : 52.8 %  Auto Lymphocyte % : 27.4 %  Auto Monocyte % : 12.7 %  Auto Eosinophil % : 6.1 %  Auto Basophil % : 0.7 %    01-16    140  |  102  |  51<H>  ----------------------------<  150<H>  4.2   |  24  |  1.34<H>    Ca    9.5      16 Jan 2020 10:32  Phos  3.9     01-15    TPro  x   /  Alb  x   /  TBili  x   /  DBili  x   /  AST  x   /  ALT  x   /  AlkPhos  138<H>  01-16    LIVER FUNCTIONS - ( 16 Jan 2020 10:32 )  Alb: x     / Pro: x     / ALK PHOS: 138 U/L / ALT: x     / AST: x     / GGT: x               CAPILLARY BLOOD GLUCOSE          RADIOLOGY & ADDITIONAL TESTS:

## 2020-01-17 NOTE — PROGRESS NOTE ADULT - ASSESSMENT
This is a pleasant 89 y/o F PMHx of NSTEMI (7/2019), HFpEF, HTN, and Hypothyroidism who presents for syncope. Patient was working out within an exercise class and felt tired where she loss consciousness. She reportedly fell out of her chair. Pt says was tired after doing chores and may be starting such a class.  No head trauma. Per EMS she had 5 min of LOC. No associated chest pain, palpitations, dizziness, nausea, vomiting, diarrhea, headache, or blurry vision. No shaking, tongue biting, or urinary incontinence noted. Patient reports increased urinary frequency but she on is on diuretics. Pt denies shaking, post ictal confusion, tongue biting, changes in speech, swallow, vision, bowel or bladder control.  No weakness or numbness.     exam nonfocal so not consistent iwth focal neurological issue such as stroke  therefore even if had carotid stenosis would be considered asx and no intervention would be done  thus will cancel dopplers  no stigmata of sz no need for eeg  out of bed to chair  no neurological c/i to d/c  d/w pt and dr espinal  reconsult prn

## 2020-01-17 NOTE — PROGRESS NOTE ADULT - ASSESSMENT
This is a pleasant 89 y/o F PMHx of NSTEMI (7/2019), HFpEF, HTN, and Hypothyroidism who presents for syncope. Patiej work up thus far negative will need Loop recorder placedd.   Cardiology to arrange  for outpatient procedure

## 2020-01-28 ENCOUNTER — APPOINTMENT (OUTPATIENT)
Dept: GERIATRICS | Facility: ASSISTED LIVING FACILITY | Age: 85
End: 2020-01-28

## 2020-02-04 ENCOUNTER — APPOINTMENT (OUTPATIENT)
Dept: ORTHOPEDIC SURGERY | Facility: CLINIC | Age: 85
End: 2020-02-04
Payer: MEDICARE

## 2020-02-04 PROCEDURE — 20610 DRAIN/INJ JOINT/BURSA W/O US: CPT | Mod: 50

## 2020-02-04 PROCEDURE — 99213 OFFICE O/P EST LOW 20 MIN: CPT | Mod: 25

## 2020-02-04 NOTE — PROCEDURE
[de-identified] : Procedure Note:\par \par Anatomic Location:  Right  Knee\par \par Diagnosis:  Arthritis\par \par Procedure:  Injection of 2cc  ofMarcaine 0.5% plain and Celestone 1cc, 6mg\par \par Local Spray: Ethyl Chloride.\par \par \par Patient has consented for the procedure.\par \par Injection  through a lateral parapatella approach.\par \par Patient tolerated the procedure well.\par \par Patient instructed to call the office if any reaction, fever, chills, increased erythema or swelling.   [phone].\par \par Procedure Note:\par \par Anatomic Location:  Left Knee\par \par Diagnosis:  Arthritis\par \par Procedure:  Injection of 2cc  ofMarcaine 0.5% plain and Celestone 1cc, 6mg\par \par Local Spray: Ethyl Chloride.\par \par \par Patient has consented for the procedure.\par \par Injection  through a lateral parapatella approach.\par \par Patient tolerated the procedure well.\par \par Patient instructed to call the office if any reaction, fever, chills, increased erythema or swelling.   [phone].

## 2020-02-04 NOTE — HISTORY OF PRESENT ILLNESS
[de-identified] : Patient returns to office c/o bilateral knee pain.\par She was last in office in October 2018 and received cortisone injections to both knees.\par These injections helped for over 1 year.\par She presents to office today for re-evaluation.\par Patient also mentions being hospitalized last year for a UTI, which has since resolved.

## 2020-02-04 NOTE — DISCUSSION/SUMMARY
[de-identified] : She has had a nailing of her hip for an intertrochanteric renal fracture in the past and is doing well with that she has bilateral osteoarthritis but managed well with local injection of Celestone return visit in 3 months.  \par \par

## 2020-02-04 NOTE — PHYSICAL EXAM
[FreeTextEntry2] : \par KNEE EXAMINATION\par \par Motion patient is now walking with a walker.  Her range of motion in her knees in each knee goes from 0 to approximately 120 degrees.  She has good medial lateral and anterior posterior stability patellofemoral crepitus is present she has pain over the medial and lateral joint lines but does get some relief with local injections of Celestone.\par \par \par  [de-identified] : Views of the right and the left knee show compartment narrowing bilaterally.  Has some patellofemoral arthritis bilaterally probably more severe on the right than the left.\par \par X-rays taken of her hips at this time show femoral heads are round joint spaces are maintained in both hips.  Her right hip and an intertrochanteric fracture which is now well fixed with a hip nail.

## 2020-02-15 NOTE — PROGRESS NOTE ADULT - PROBLEM SELECTOR PLAN 3
Came in with aphasia. PMH CVA. Hx of recurrent ESBL UTIs. Currently not endorsing dysuria, UA w/ mod LE. No systemic signs of infection  - Will monitor off antibiotics for now as patient currently has no symptoms Hx of recurrent ESBL UTIs. Currently not endorsing dysuria, UA w/ mod LE. No systemic signs of infection  - Will monitor off antibiotics for now

## 2020-02-18 ENCOUNTER — APPOINTMENT (OUTPATIENT)
Dept: GERIATRICS | Facility: ASSISTED LIVING FACILITY | Age: 85
End: 2020-02-18
Payer: MEDICARE

## 2020-02-18 RX ORDER — IODOQUINOL, HYDROCORTISONE ACETATE AND ALOE VERA LEAF 10; 20; 10 MG/G; MG/G; MG/G
1-2-1 GEL TOPICAL
Qty: 48 | Refills: 0 | Status: DISCONTINUED | COMMUNITY
Start: 2018-08-14 | End: 2020-02-18

## 2020-02-18 RX ORDER — DEXTROMETHORPHAN POLISTIREX 30 MG/5ML
30 SUSPENSION, EXTENDED RELEASE ORAL
Qty: 1 | Refills: 0 | Status: DISCONTINUED | COMMUNITY
Start: 2019-11-22 | End: 2020-02-18

## 2020-02-18 RX ORDER — ERGOCALCIFEROL 1.25 MG/1
1.25 MG CAPSULE, LIQUID FILLED ORAL
Refills: 0 | Status: DISCONTINUED | COMMUNITY
End: 2020-02-18

## 2020-02-18 RX ORDER — NYSTATIN 100000 1/G
100000 POWDER TOPICAL
Refills: 0 | Status: DISCONTINUED | COMMUNITY
End: 2020-02-18

## 2020-02-18 NOTE — HISTORY OF PRESENT ILLNESS
[FreeTextEntry1] : 89yoF seen for follow up for UI, CHF, and unsteady gait.\par recent implant of loop recorder by cardiology.  denies complaints aside from some irritation at the implantation site.  denies bleeding or discharge.  \par \par notes her weight has been increasing, but denies increase in sob, lower ext edema or orthopnea.\par notes she is eating brownies and milk daily with her meals.  she is now trying to cut back on her calorie intake.\par \par denies dysuria.  but remains with frequency.  takes lasix every other day.\par \par walking with walker \par getting assistance with showers still.\par no recent falls.\par OA of knees: pain with difficulty ambulating, has seen ortho with recent injections, with some relief.\par takes tylenol daily.  asking if she can take celebrex\par \par has carvedilol 12.5mg tablets as well as 6.25mg tablets.  she clearly verifies that she is taking 6.25mg bid, as instructed.  denies lightheadeness, or dizziness.\par \par

## 2020-02-18 NOTE — REVIEW OF SYSTEMS
[Chest Pain] : no chest pain [Palpitations] : no palpitations [Lower Ext Edema] : no lower extremity edema [FreeTextEntry3] : macular degeneration [FreeTextEntry9] : b/l knees

## 2020-03-02 ENCOUNTER — EMERGENCY (EMERGENCY)
Facility: HOSPITAL | Age: 85
LOS: 1 days | Discharge: HOME | End: 2020-03-02
Attending: EMERGENCY MEDICINE
Payer: MEDICARE

## 2020-03-02 VITALS
TEMPERATURE: 98 F | OXYGEN SATURATION: 97 % | HEART RATE: 89 BPM | DIASTOLIC BLOOD PRESSURE: 98 MMHG | RESPIRATION RATE: 18 BRPM | SYSTOLIC BLOOD PRESSURE: 189 MMHG

## 2020-03-02 VITALS
WEIGHT: 110.01 LBS | TEMPERATURE: 97 F | HEIGHT: 64 IN | OXYGEN SATURATION: 96 % | SYSTOLIC BLOOD PRESSURE: 127 MMHG | DIASTOLIC BLOOD PRESSURE: 77 MMHG | HEART RATE: 70 BPM | RESPIRATION RATE: 16 BRPM

## 2020-03-02 DIAGNOSIS — Z98.890 OTHER SPECIFIED POSTPROCEDURAL STATES: Chronic | ICD-10-CM

## 2020-03-02 DIAGNOSIS — Z96.7 PRESENCE OF OTHER BONE AND TENDON IMPLANTS: Chronic | ICD-10-CM

## 2020-03-02 DIAGNOSIS — O00.1 TUBAL PREGNANCY: Chronic | ICD-10-CM

## 2020-03-02 DIAGNOSIS — Z98.49 CATARACT EXTRACTION STATUS, UNSPECIFIED EYE: Chronic | ICD-10-CM

## 2020-03-02 LAB
ALBUMIN SERPL ELPH-MCNC: 4.1 G/DL — SIGNIFICANT CHANGE UP (ref 3.3–5)
ALP SERPL-CCNC: 137 U/L — HIGH (ref 40–120)
ALT FLD-CCNC: 51 U/L — HIGH (ref 10–45)
ANION GAP SERPL CALC-SCNC: 13 MMOL/L — SIGNIFICANT CHANGE UP (ref 5–17)
APPEARANCE UR: ABNORMAL
AST SERPL-CCNC: 31 U/L — SIGNIFICANT CHANGE UP (ref 10–40)
BACTERIA # UR AUTO: NEGATIVE — SIGNIFICANT CHANGE UP
BASOPHILS # BLD AUTO: 0.05 K/UL — SIGNIFICANT CHANGE UP (ref 0–0.2)
BASOPHILS NFR BLD AUTO: 0.7 % — SIGNIFICANT CHANGE UP (ref 0–2)
BILIRUB SERPL-MCNC: 0.3 MG/DL — SIGNIFICANT CHANGE UP (ref 0.2–1.2)
BILIRUB UR-MCNC: NEGATIVE — SIGNIFICANT CHANGE UP
BUN SERPL-MCNC: 52 MG/DL — HIGH (ref 7–23)
CALCIUM SERPL-MCNC: 9.6 MG/DL — SIGNIFICANT CHANGE UP (ref 8.4–10.5)
CHLORIDE SERPL-SCNC: 104 MMOL/L — SIGNIFICANT CHANGE UP (ref 96–108)
CO2 SERPL-SCNC: 23 MMOL/L — SIGNIFICANT CHANGE UP (ref 22–31)
COLOR SPEC: SIGNIFICANT CHANGE UP
CREAT SERPL-MCNC: 1.52 MG/DL — HIGH (ref 0.5–1.3)
DIFF PNL FLD: NEGATIVE — SIGNIFICANT CHANGE UP
EOSINOPHIL # BLD AUTO: 0.21 K/UL — SIGNIFICANT CHANGE UP (ref 0–0.5)
EOSINOPHIL NFR BLD AUTO: 2.9 % — SIGNIFICANT CHANGE UP (ref 0–6)
EPI CELLS # UR: 1 /HPF — SIGNIFICANT CHANGE UP
GLUCOSE SERPL-MCNC: 105 MG/DL — HIGH (ref 70–99)
GLUCOSE UR QL: NEGATIVE — SIGNIFICANT CHANGE UP
HCT VFR BLD CALC: 35.2 % — SIGNIFICANT CHANGE UP (ref 34.5–45)
HGB BLD-MCNC: 11.1 G/DL — LOW (ref 11.5–15.5)
HYALINE CASTS # UR AUTO: 4 /LPF — HIGH (ref 0–2)
IMM GRANULOCYTES NFR BLD AUTO: 0.4 % — SIGNIFICANT CHANGE UP (ref 0–1.5)
KETONES UR-MCNC: NEGATIVE — SIGNIFICANT CHANGE UP
LEUKOCYTE ESTERASE UR-ACNC: ABNORMAL
LYMPHOCYTES # BLD AUTO: 1.14 K/UL — SIGNIFICANT CHANGE UP (ref 1–3.3)
LYMPHOCYTES # BLD AUTO: 15.8 % — SIGNIFICANT CHANGE UP (ref 13–44)
MCHC RBC-ENTMCNC: 30.9 PG — SIGNIFICANT CHANGE UP (ref 27–34)
MCHC RBC-ENTMCNC: 31.5 GM/DL — LOW (ref 32–36)
MCV RBC AUTO: 98.1 FL — SIGNIFICANT CHANGE UP (ref 80–100)
MONOCYTES # BLD AUTO: 0.71 K/UL — SIGNIFICANT CHANGE UP (ref 0–0.9)
MONOCYTES NFR BLD AUTO: 9.8 % — SIGNIFICANT CHANGE UP (ref 2–14)
NEUTROPHILS # BLD AUTO: 5.08 K/UL — SIGNIFICANT CHANGE UP (ref 1.8–7.4)
NEUTROPHILS NFR BLD AUTO: 70.4 % — SIGNIFICANT CHANGE UP (ref 43–77)
NITRITE UR-MCNC: NEGATIVE — SIGNIFICANT CHANGE UP
NRBC # BLD: 0 /100 WBCS — SIGNIFICANT CHANGE UP (ref 0–0)
PH UR: 7 — SIGNIFICANT CHANGE UP (ref 5–8)
PLATELET # BLD AUTO: 162 K/UL — SIGNIFICANT CHANGE UP (ref 150–400)
POTASSIUM SERPL-MCNC: 4.9 MMOL/L — SIGNIFICANT CHANGE UP (ref 3.5–5.3)
POTASSIUM SERPL-SCNC: 4.9 MMOL/L — SIGNIFICANT CHANGE UP (ref 3.5–5.3)
PROT SERPL-MCNC: 7.6 G/DL — SIGNIFICANT CHANGE UP (ref 6–8.3)
PROT UR-MCNC: NEGATIVE — SIGNIFICANT CHANGE UP
RBC # BLD: 3.59 M/UL — LOW (ref 3.8–5.2)
RBC # FLD: 14.6 % — HIGH (ref 10.3–14.5)
RBC CASTS # UR COMP ASSIST: 4 /HPF — SIGNIFICANT CHANGE UP (ref 0–4)
SODIUM SERPL-SCNC: 140 MMOL/L — SIGNIFICANT CHANGE UP (ref 135–145)
SP GR SPEC: 1.01 — SIGNIFICANT CHANGE UP (ref 1.01–1.02)
TROPONIN T, HIGH SENSITIVITY RESULT: 36 NG/L — SIGNIFICANT CHANGE UP (ref 0–51)
UROBILINOGEN FLD QL: NEGATIVE — SIGNIFICANT CHANGE UP
WBC # BLD: 7.22 K/UL — SIGNIFICANT CHANGE UP (ref 3.8–10.5)
WBC # FLD AUTO: 7.22 K/UL — SIGNIFICANT CHANGE UP (ref 3.8–10.5)
WBC UR QL: 133 /HPF — HIGH (ref 0–5)

## 2020-03-02 PROCEDURE — 93005 ELECTROCARDIOGRAM TRACING: CPT

## 2020-03-02 PROCEDURE — 81001 URINALYSIS AUTO W/SCOPE: CPT

## 2020-03-02 PROCEDURE — 87086 URINE CULTURE/COLONY COUNT: CPT

## 2020-03-02 PROCEDURE — 99284 EMERGENCY DEPT VISIT MOD MDM: CPT | Mod: 25

## 2020-03-02 PROCEDURE — 96372 THER/PROPH/DIAG INJ SC/IM: CPT | Mod: XU

## 2020-03-02 PROCEDURE — 93010 ELECTROCARDIOGRAM REPORT: CPT

## 2020-03-02 PROCEDURE — 85027 COMPLETE CBC AUTOMATED: CPT

## 2020-03-02 PROCEDURE — 96374 THER/PROPH/DIAG INJ IV PUSH: CPT

## 2020-03-02 PROCEDURE — 80053 COMPREHEN METABOLIC PANEL: CPT

## 2020-03-02 PROCEDURE — 84484 ASSAY OF TROPONIN QUANT: CPT

## 2020-03-02 PROCEDURE — 99285 EMERGENCY DEPT VISIT HI MDM: CPT | Mod: GC

## 2020-03-02 RX ORDER — SODIUM CHLORIDE 9 MG/ML
500 INJECTION INTRAMUSCULAR; INTRAVENOUS; SUBCUTANEOUS ONCE
Refills: 0 | Status: COMPLETED | OUTPATIENT
Start: 2020-03-02 | End: 2020-03-02

## 2020-03-02 RX ORDER — CEFTRIAXONE 500 MG/1
1000 INJECTION, POWDER, FOR SOLUTION INTRAMUSCULAR; INTRAVENOUS ONCE
Refills: 0 | Status: COMPLETED | OUTPATIENT
Start: 2020-03-02 | End: 2020-03-02

## 2020-03-02 RX ORDER — CEPHALEXIN 500 MG
1 CAPSULE ORAL
Qty: 14 | Refills: 0
Start: 2020-03-02 | End: 2020-03-08

## 2020-03-02 RX ADMIN — CEFTRIAXONE 1000 MILLIGRAM(S): 500 INJECTION, POWDER, FOR SOLUTION INTRAMUSCULAR; INTRAVENOUS at 16:29

## 2020-03-02 RX ADMIN — CEFTRIAXONE 100 MILLIGRAM(S): 500 INJECTION, POWDER, FOR SOLUTION INTRAMUSCULAR; INTRAVENOUS at 14:54

## 2020-03-02 NOTE — ED ADULT TRIAGE NOTE - CHIEF COMPLAINT QUOTE
Patient sent from [facility] for "episode of unresponsiveness"

## 2020-03-02 NOTE — ED PROVIDER NOTE - ENMT, MLM
Airway patent, Nasal mucosa clear. Mouth with normal mucosa. Throat has no vesicles, no oropharyngeal exudates and uvula is midline. no evidence of trauma

## 2020-03-02 NOTE — ED PROVIDER NOTE - NSFOLLOWUPINSTRUCTIONS_ED_ALL_ED_FT
You have a urinary tract infection. Please take keflex 500mg twice a day for 7 days for treatment.     Urinary Tract Infection    A urinary tract infection (UTI) is an infection of any part of the urinary tract, which includes the kidneys, ureters, bladder, and urethra. Risk factors include ignoring your need to urinate, wiping back to front if female, being an uncircumcised male, and having diabetes or a weak immune system. Symptoms include frequent urination, pain or burning with urination, foul smelling urine, cloudy urine, pain in the lower abdomen, blood in the urine, and fever. If you were prescribed an antibiotic medicine, take it as told by your health care provider. Do not stop taking the antibiotic even if you start to feel better.    SEEK IMMEDIATE MEDICAL CARE IF YOU HAVE ANY OF THE FOLLOWING SYMPTOMS: severe back or abdominal pain, fever, inability to keep fluids or medicine down, dizziness/lightheadedness, or a change in mental status.

## 2020-03-02 NOTE — ED PROVIDER NOTE - CLINICAL SUMMARY MEDICAL DECISION MAKING FREE TEXT BOX
patient is well appearing and VSS. patient denies symptoms at this time. unclear if patient sycnoped vs was just sleeping. no evidence of trauma. possibly 2/2 uti given similar presentations in the past. Will get collateral from [facility]. patient already has loop recorder. will repeat ekg/syncope workup and reassess. patient has excellent outpatient f/u and already being evaluated by cards.

## 2020-03-02 NOTE — ED PROVIDER NOTE - OBJECTIVE STATEMENT
89F hx chf, p/w episode of unconsciosness. Patient states she was at an exercise class earlier today and was told that she passed out. Patient does not recall what happened, but knows that she woke up laying on the floor. Patient had a similar episode one month ago and had a loop recorder placed (cardiologist is Dr. Kam Buckley). Denies cp, sob, headache, vision changes, vomiting, has been feeling well recently.

## 2020-03-02 NOTE — ED PROVIDER NOTE - PROGRESS NOTE DETAILS
Lalit Pgy3: per ayad from [facility], patient was noted to be slumped forward in her chair. was taken out of the chair and lowered to the ground by staff. Looked like she was falling asleep. no evidence of seizure like activity. Last time patient passed out one month ago, patient has turned blue and pale but that did not happen this time. patient was not exercising when the event happened. emelia espinal. I advised him the circumstances of the pt in er. notable that pt did have neg holter and essentially benign echo recently. findings of poss uti. will prob dw on abx and he can f/u. AG pyg3: Patient feels improved. Well appearing and VSS. Pt will be d/c'd with strict return precautions and close f/u with md. Pt verbalized understanding and states they want to leave, ready for d/c. will d/c with abx for uti.

## 2020-03-02 NOTE — ED ADULT NURSE NOTE - OBJECTIVE STATEMENT
Pt is a 89 yo F who was brought tot he ED via EMS c/o syncope. Pt states she finished exercise class and approximately 10 minutes later while meditating, she passed out. Denies cp/sob/palpitations, no fever/chills. Pt A/O x3, states she has frequent UTI's and is concerned she may have another. No pain/burning on urination, no back pain/cva tenderness,

## 2020-03-02 NOTE — ED PROVIDER NOTE - PATIENT PORTAL LINK FT
You can access the FollowMyHealth Patient Portal offered by [facility] by registering at the following website: [email or web address]. By joining [employer]’s FollowMyHealth portal, you will also be able to view your health information using other applications (apps) compatible with our system.

## 2020-03-02 NOTE — ED ADULT NURSE REASSESSMENT NOTE - NS ED NURSE REASSESS COMMENT FT1
D/c via w/c to car. BP noted. Dr. Lorenzo aware, no intervention at this time. HAYLEE Edwards at [facility] notified of pt's treatment. Pt to be transported back to [facility] by her daughter Ariana.

## 2020-03-03 LAB
CULTURE RESULTS: SIGNIFICANT CHANGE UP
SPECIMEN SOURCE: SIGNIFICANT CHANGE UP

## 2020-03-04 NOTE — ED POST DISCHARGE NOTE - DETAILS
spoke with HAYLEE mahoney, recommened recheck, at her request faxes ucx results to the [facility] - Kimberli Garcia PA-C

## 2020-03-11 ENCOUNTER — APPOINTMENT (OUTPATIENT)
Dept: UROLOGY | Facility: CLINIC | Age: 85
End: 2020-03-11
Payer: MEDICARE

## 2020-03-11 VITALS
RESPIRATION RATE: 16 BRPM | SYSTOLIC BLOOD PRESSURE: 156 MMHG | HEART RATE: 92 BPM | WEIGHT: 158 LBS | TEMPERATURE: 97.8 F | DIASTOLIC BLOOD PRESSURE: 82 MMHG | HEIGHT: 64 IN | BODY MASS INDEX: 26.98 KG/M2

## 2020-03-11 PROCEDURE — 99203 OFFICE O/P NEW LOW 30 MIN: CPT

## 2020-03-11 NOTE — REVIEW OF SYSTEMS
[Negative] : Heme/Lymph [Feeling Tired] : feeling tired [Eyesight Problems] : eyesight problems [Urine Infection (bladder/kidney)] : bladder/kidney infection [Leakage of urine with urgency] : leakage of urine with urgency [Leakage of urine with straining, coughing, laughing] : leakage of urine with straining, coughing, laughing [Difficulty Walking] : difficulty walking [FreeTextEntry6] : frequent urination [FreeTextEntry3] : dribbling of urine

## 2020-03-11 NOTE — HISTORY OF PRESENT ILLNESS
[FreeTextEntry1] : 89yo woman with PMH CHF, breast ca s/p lumpectomy, spinal stenosis, osteoporosis and hip fracture s/p hip replacement presents for evaluation of bothersome urinary issues. She has seen a urologist, Dr. Romo, for frequency, urgency, mixed incontinence, and UTIs. She has tried oxybutynin and botox injections which have not helped. She has been hospitalized 2/2 UTIs in 2020 and 2019. When she has a UTI she has dysuria and syncope. She takes furosemide, which makes her urinary symptoms worse. Currently denies experiencing any urologic symptoms including hematuria or dysuria. Denies fevers, chills, flank pain, nausea, vomiting, and unintentional weight loss.\par

## 2020-03-11 NOTE — ASSESSMENT
[FreeTextEntry1] : 89yo woman with history of UTIs requiring hospitalization and urinary urgency/incontinence refractory to multiple interventions. Has had imaging last year that shows no pathology contributing to infection risk (stones, obstruction, etc). Discussed ppx with estrace cream. \par \par Pt also with OAB and incontinence refractory to meds and botox. Suspect this is multifactorial with heart disase contributing. [facility]. \par \par Plan\par --Estrogen cream for UTI ppx\par --COnsider referral to Dr Raza\par --RTC 6 mo

## 2020-03-11 NOTE — PHYSICAL EXAM
[General Appearance - Well Developed] : well developed [General Appearance - Well Nourished] : well nourished [Normal Appearance] : normal appearance [Well Groomed] : well groomed [General Appearance - In No Acute Distress] : no acute distress [Respiration, Rhythm And Depth] : normal respiratory rhythm and effort [Exaggerated Use Of Accessory Muscles For Inspiration] : no accessory muscle use [Abdomen Soft] : soft [Abdomen Tenderness] : non-tender [Costovertebral Angle Tenderness] : no ~M costovertebral angle tenderness [Urinary Bladder Findings] : the bladder was normal on palpation [] : no rash [No Focal Deficits] : no focal deficits [Oriented To Time, Place, And Person] : oriented to person, place, and time [Affect] : the affect was normal [Mood] : the mood was normal [Not Anxious] : not anxious [FreeTextEntry1] : uses walker [Cervical Lymph Nodes Enlarged Anterior Bilaterally] : anterior cervical [Supraclavicular Lymph Nodes Enlarged Bilaterally] : supraclavicular

## 2020-03-17 NOTE — ED ADULT NURSE NOTE - ALCOHOL PRE SCREEN (AUDIT - C)
Subjective:       Patient ID: Rocio Murlilo is a 73 y.o. female.    Chief Complaint: Sinusitis; Otalgia (left ear); and Medication Refill    HPI  patient is here today for refill medication and also complained of running nose ear ache on the left side for several day taking over-the-counter medication without relief she deny fever short of breath or chest pain she does have coughing with why and greenish phlegm patient also states done on January 20 years his she was seen in the and receive injection for coughing she denies any other physical symptom she still very active physically taking care of grandchildren  Review of Systems    Objective:      Physical Exam    Assessment:       1. Acute non-recurrent sinusitis, unspecified location    2. Peripheral edema    3. Non-seasonal allergic rhinitis, unspecified trigger    4. Hyperlipidemia, unspecified hyperlipidemia type    5. Encounter for screening mammogram for breast cancer    6. Encounter for screening colonoscopy    7. Encounter for vaccination        Plan:       Acute non-recurrent sinusitis, unspecified location  -     Discontinue: azithromycin (Z-QUEENIE) 250 MG tablet; 2 tabs by mouth day 1, then 1 tab by mouth daily x 4 days  Dispense: 6 tablet; Refill: 0  -     azithromycin (Z-QUEENIE) 250 MG tablet; 2 tabs by mouth day 1, then 1 tab by mouth daily x 4 days  Dispense: 6 tablet; Refill: 0    Peripheral edema  -     Discontinue: furosemide (LASIX) 40 MG tablet; Take 1 tablet (40 mg total) by mouth once daily.  Dispense: 90 tablet; Refill: 3  -     furosemide (LASIX) 40 MG tablet; Take 1 tablet (40 mg total) by mouth once daily.  Dispense: 90 tablet; Refill: 3    Non-seasonal allergic rhinitis, unspecified trigger  -     Discontinue: levocetirizine (XYZAL) 5 MG tablet; Take 1 tablet (5 mg total) by mouth every evening.  Dispense: 30 tablet; Refill: 11  -     levocetirizine (XYZAL) 5 MG tablet; Take 1 tablet (5 mg total) by mouth every evening.  Dispense: 30 tablet;  Refill: 11    Hyperlipidemia, unspecified hyperlipidemia type  -     Discontinue: atorvastatin (LIPITOR) 20 MG tablet; Take 1 tablet (20 mg total) by mouth once daily.  Dispense: 90 tablet; Refill: 3  -     atorvastatin (LIPITOR) 20 MG tablet; Take 1 tablet (20 mg total) by mouth once daily.  Dispense: 90 tablet; Refill: 3    Encounter for screening mammogram for breast cancer  -     Mammo Digital Screening Bilat without CA; Future; Expected date: 04/01/2020    Encounter for screening colonoscopy  -     Fecal Immunochemical Test (iFOBT); Future; Expected date: 03/18/2020    Encounter for vaccination  -     varicella-zoster [id number], PF, (SHINGRIX, PF,) 50 mcg/0.5 mL injection; Inject 0.5 mLs into the muscle once. for 1 dose  Dispense: 0.5 mL; Refill: 0         Statement Selected

## 2020-03-27 RX ORDER — NYSTATIN 100000 [USP'U]/G
100000 CREAM TOPICAL
Qty: 30 | Refills: 0 | Status: DISCONTINUED | COMMUNITY
Start: 2018-08-07 | End: 2020-03-27

## 2020-03-27 RX ORDER — CLOBETASOL PROPIONATE 0.5 MG/G
0.05 CREAM TOPICAL 3 TIMES DAILY
Refills: 0 | Status: DISCONTINUED | COMMUNITY
Start: 2018-09-11 | End: 2020-03-27

## 2020-03-27 RX ORDER — HYDROCORTISONE 10 MG/G
1 CREAM TOPICAL
Refills: 0 | Status: DISCONTINUED | COMMUNITY
End: 2020-03-27

## 2020-05-01 LAB
APPEARANCE: ABNORMAL
BACTERIA: ABNORMAL
BILIRUBIN URINE: NEGATIVE
BLOOD URINE: NORMAL
COLOR: NORMAL
GLUCOSE QUALITATIVE U: NEGATIVE
HYALINE CASTS: 0 /LPF
KETONES URINE: NEGATIVE
LEUKOCYTE ESTERASE URINE: ABNORMAL
MICROSCOPIC-UA: NORMAL
NITRITE URINE: POSITIVE
PH URINE: 6.5
PROTEIN URINE: NORMAL
RED BLOOD CELLS URINE: 8 /HPF
SPECIFIC GRAVITY URINE: 1.02
SQUAMOUS EPITHELIAL CELLS: 1 /HPF
UROBILINOGEN URINE: NORMAL
WHITE BLOOD CELLS URINE: 407 /HPF

## 2020-05-04 ENCOUNTER — APPOINTMENT (OUTPATIENT)
Dept: ORTHOPEDIC SURGERY | Facility: CLINIC | Age: 85
End: 2020-05-04

## 2020-05-04 LAB — BACTERIA UR CULT: ABNORMAL

## 2020-05-20 ENCOUNTER — APPOINTMENT (OUTPATIENT)
Dept: GERIATRICS | Facility: CLINIC | Age: 85
End: 2020-05-20
Payer: MEDICARE

## 2020-05-20 PROCEDURE — 99214 OFFICE O/P EST MOD 30 MIN: CPT | Mod: 95

## 2020-05-20 RX ORDER — CEPHALEXIN 500 MG/1
500 CAPSULE ORAL TWICE DAILY
Qty: 14 | Refills: 0 | Status: DISCONTINUED | COMMUNITY
Start: 2020-05-04 | End: 2020-05-20

## 2020-05-20 RX ORDER — NITROFURANTOIN (MONOHYDRATE/MACROCRYSTALS) 25; 75 MG/1; MG/1
100 CAPSULE ORAL
Qty: 14 | Refills: 0 | Status: DISCONTINUED | COMMUNITY
Start: 2020-04-02 | End: 2020-05-20

## 2020-05-20 NOTE — PHYSICAL EXAM
[General Appearance - Alert] : alert [General Appearance - In No Acute Distress] : in no acute distress [Respiration, Rhythm And Depth] : normal respiratory rhythm and effort [Exaggerated Use Of Accessory Muscles For Inspiration] : no accessory muscle use [] : no respiratory distress [Musculoskeletal - Swelling] : no joint swelling seen [Nail Clubbing] : no clubbing  or cyanosis of the fingernails [FreeTextEntry1] : maculopapular rash to back, more sparse on thighs, no blisters

## 2020-05-20 NOTE — HISTORY OF PRESENT ILLNESS
[Medical Office: ([facility])___] : at the medical office located in  [Home] : at home, [unfilled] , at the time of the visit. [Verbal consent obtained from patient] : the patient, [unfilled] [Other:____] : [unfilled] [FreeTextEntry1] : 89yoF seen for acute visit for two cc:  new rash and worsening pain in b/l knees.\par \par OA of knees:  walks with walker, much more sedentary lately in setting of covid pandemic, residents are isolated in their apartments.\par currently not taking anything for pain.  hx of having injections with ortho, celebrex, tramadol, and tylenol.  but currently she isn't taking anything for the pain.  \par \par \par rash: notes rash on bilateral legs and back that is itchy for about 2 weeks.\par saw dermatology yesterday via [facility], who recommended triamcinolone cream tid to rash.\par also prescribed oral medication, but she doesn't know name.\par \par \par

## 2020-05-20 NOTE — ASSESSMENT
[FreeTextEntry1] : likely drug related in setting of recent antibiotic use.\par cephalexin added to allergies with rash.\par as rash is improving with triamcinolone cream alone, advised patient to avoid oral antihistamine for concern for adverse side effects in this elderly woman who has had a hx of severe adverse reactions to medications.\par  OA of knees:  discussed pain control with Voltaren gel and daily Tylenol.  if not effective, will re-discuss tramadol.

## 2020-06-02 ENCOUNTER — APPOINTMENT (OUTPATIENT)
Dept: GERIATRICS | Facility: ASSISTED LIVING FACILITY | Age: 85
End: 2020-06-02

## 2020-06-02 NOTE — PHYSICAL EXAM
[General Appearance - Alert] : alert [General Appearance - In No Acute Distress] : in no acute distress [Sclera] : the sclera and conjunctiva were normal [Extraocular Movements] : extraocular movements were intact [] : no respiratory distress [Respiration, Rhythm And Depth] : normal respiratory rhythm and effort [Exaggerated Use Of Accessory Muscles For Inspiration] : no accessory muscle use [Edema] : there was no peripheral edema [FreeTextEntry1] : erythema and warmth to right lower leg

## 2020-06-02 NOTE — HISTORY OF PRESENT ILLNESS
[Skin Redness] : skin redness [Skin Warmth] : skin warmth [Skin Ulceration] : skin ulceration [Localized Edema] : localized edema [Home] : at home, [unfilled] , at the time of the visit. [Other Location: e.g. Home (Enter Location, City,State)___] : at [unfilled] [Verbal consent obtained from patient] : the patient, [unfilled] [Fever] : no fever [Chills] : no chills [Skin Pain] : no skin pain [Serosanguineous Drainage] : no serosanguineous drainage [Purulent Drainage] : no purulent drainage [FreeTextEntry1] : 89yoF seen for visit for cc of new rash of right lower leg.\par \par via TH with [facility] nurse

## 2020-06-02 NOTE — ASSESSMENT
[FreeTextEntry1] : cellulitis: right lower leg: \par allergy to cephalosporins \par start doxycycline\par advised of signs symptoms of progression to monitor\par \par chronic diastolic heart failure:  \par c/w furosemide qod\par \par

## 2020-06-09 ENCOUNTER — LABORATORY RESULT (OUTPATIENT)
Age: 85
End: 2020-06-09

## 2020-06-09 ENCOUNTER — APPOINTMENT (OUTPATIENT)
Dept: GERIATRICS | Facility: ASSISTED LIVING FACILITY | Age: 85
End: 2020-06-09
Payer: MEDICARE

## 2020-06-09 PROCEDURE — 99213 OFFICE O/P EST LOW 20 MIN: CPT | Mod: 95

## 2020-06-10 NOTE — HISTORY OF PRESENT ILLNESS
[Home] : at home, [unfilled] , at the time of the visit. [Other Location: e.g. Home (Enter Location, City,State)___] : at [unfilled] [Verbal consent obtained from patient] : the patient, [unfilled] [FreeTextEntry1] : 89yof seen for acute visit for reports of increased swelling of right lower leg.\par \par via TH with [facility] nurse.\par \par seen last week for cellulitis:  started on doxycycline with near resolution\par notes the right leg remains more swollen than the left with some increase discomfort.\par denies fever chills.\par continues to have residual itching and some macules from the previous rash? vs ongoing dermatitis.  she plans to see her dermatologist in a few weeks.

## 2020-06-10 NOTE — PHYSICAL EXAM
[General Appearance - Alert] : alert [Extraocular Movements] : extraocular movements were intact [Sclera] : the sclera and conjunctiva were normal [Neck Appearance] : the appearance of the neck was normal [FreeTextEntry1] : resolved cellulitis

## 2020-06-10 NOTE — ASSESSMENT
[FreeTextEntry1] : edema of right lower ext:  ddx resolving cellulitis vs dvt\par -venous doppler to r/o dvt (home [employer])\par -increase lasix to qd x 5 days then resume qod\par \par cellulitis:\par resolving, complete course of abx\par \par dermatitis:\par -advised daily moisturizer\par -has f/u with derm

## 2020-06-16 ENCOUNTER — APPOINTMENT (OUTPATIENT)
Dept: GERIATRICS | Facility: ASSISTED LIVING FACILITY | Age: 85
End: 2020-06-16
Payer: MEDICARE

## 2020-06-16 DIAGNOSIS — L03.119 CELLULITIS OF UNSPECIFIED PART OF LIMB: ICD-10-CM

## 2020-06-16 DIAGNOSIS — R60.0 LOCALIZED EDEMA: ICD-10-CM

## 2020-06-16 PROCEDURE — 99213 OFFICE O/P EST LOW 20 MIN: CPT | Mod: 95

## 2020-06-16 NOTE — PHYSICAL EXAM
[General Appearance - Alert] : alert [General Appearance - In No Acute Distress] : in no acute distress [Neck Appearance] : the appearance of the neck was normal [] : no respiratory distress [Respiration, Rhythm And Depth] : normal respiratory rhythm and effort [Exaggerated Use Of Accessory Muscles For Inspiration] : no accessory muscle use [FreeTextEntry1] : no erythema or wounds;  dry skin on feet and ankles,  some chronic venous stasis changes

## 2020-06-16 NOTE — HISTORY OF PRESENT ILLNESS
[Home] : at home, [unfilled] , at the time of the visit. [Other Location: e.g. Home (Enter Location, City,State)___] : at [unfilled] [Verbal consent obtained from patient] : the patient, [unfilled] [FreeTextEntry1] : 89yoF with CHF, seen for fu visit for cc of edema of legs.\par \par seen last week for follow up on cellulitis, which is now resolved, but continues with increased R lower ext edema compared to left.  portable doppler of right leg was negative for DVT.\par she has been taking lasix qd from qod.\par she remains sedentary throughout day.  residents remain quarantined in rooms.\par \par she saw dermatolgist for her itching and dermatitis, who recommended baby oil.\par she complains of sun sensitivity when she was allowed to sit out side during timed visit over the weekend.

## 2020-06-16 NOTE — ASSESSMENT
[FreeTextEntry1] : edema of right lower leg >left \par -venous doppler negative for DVT\par -c/w lasix daily\par -leg elevation \par -compression stockings\par \par cellulitis: resolved\par \par dermatitis:\par reemphasized daily moisturization\par discussed covering skin when in sun for increased risk of sunburn.\par

## 2020-07-27 ENCOUNTER — APPOINTMENT (OUTPATIENT)
Dept: GERIATRICS | Facility: CLINIC | Age: 85
End: 2020-07-27

## 2020-07-27 ENCOUNTER — APPOINTMENT (OUTPATIENT)
Dept: GERIATRICS | Facility: CLINIC | Age: 85
End: 2020-07-27
Payer: MEDICARE

## 2020-07-27 PROCEDURE — 99214 OFFICE O/P EST MOD 30 MIN: CPT | Mod: 95

## 2020-07-27 RX ORDER — DOXYCYCLINE HYCLATE 100 MG/1
100 TABLET ORAL
Qty: 20 | Refills: 0 | Status: DISCONTINUED | COMMUNITY
Start: 2020-06-02 | End: 2020-07-27

## 2020-07-27 NOTE — SOCIAL HISTORY
[Walker] : walker [Any fall with injury in past year] : Patient reported fall with injury in the past year

## 2020-07-27 NOTE — HISTORY OF PRESENT ILLNESS
[Home] : at home, [unfilled] , at the time of the visit. [FreeTextEntry1] : 89yoF with HFpEF, OA of knees, recurrent UTI's and UI, HTN, hypothryoidism, unsteady gait who is seen for follow up visit with cc of dysuria.\par \par patient reports 1 week of urinary frequency assocaited with now dysuria.\par hx of recurrent uti.  denies hematuria or ab pain or fever or chills.\par \par weight has been overall up around 170lbs, but notes this has been stable for the last few weeks.  adherent with lasix.  denies sob or orthopnea.  elevating legs in day.\par \par oa of knees:  with severe pain, limiting her ability to walk, even short distances.\par uses walker, denies recent falls.\par taking tylenol but without managing her pain. also uses gel.\par no associated new rash or redness or warmth.\par \par \par also with new skin bruise of the left cheek: without pain or swelling. resolving [Medical Office: ([facility])___] : at the medical office located in  [Verbal consent obtained from patient] : the patient, [unfilled]

## 2020-07-27 NOTE — PHYSICAL EXAM
[General Appearance - Alert] : alert [Extraocular Movements] : extraocular movements were intact [General Appearance - In No Acute Distress] : in no acute distress [Sclera] : the sclera and conjunctiva were normal [No Oral Pallor] : no oral pallor [] : no respiratory distress [Outer Ear] : the ears and nose were normal in appearance [Neck Appearance] : the appearance of the neck was normal [Respiration, Rhythm And Depth] : normal respiratory rhythm and effort [Exaggerated Use Of Accessory Muscles For Inspiration] : no accessory muscle use [Impaired Insight] : insight and judgment were intact [Musculoskeletal - Swelling] : no joint swelling seen [Affect] : the affect was normal [Mood] : the mood was normal [FreeTextEntry1] : trace edema b/l lower ext

## 2020-07-27 NOTE — REVIEW OF SYSTEMS
[Lower Ext Edema] : lower extremity edema [As Noted in HPI] : as noted in HPI [Negative] : Respiratory

## 2020-07-30 ENCOUNTER — LABORATORY RESULT (OUTPATIENT)
Age: 85
End: 2020-07-30

## 2020-07-31 LAB
APPEARANCE: ABNORMAL
BILIRUBIN URINE: NEGATIVE
BLOOD URINE: ABNORMAL
COLOR: YELLOW
GLUCOSE QUALITATIVE U: NEGATIVE
KETONES URINE: NEGATIVE
LEUKOCYTE ESTERASE URINE: ABNORMAL
NITRITE URINE: NEGATIVE
PH URINE: 7
PROTEIN URINE: NORMAL
SPECIFIC GRAVITY URINE: 1.01
UROBILINOGEN URINE: NORMAL

## 2020-08-03 RX ORDER — CIPROFLOXACIN HYDROCHLORIDE 250 MG/1
250 TABLET, FILM COATED ORAL
Qty: 6 | Refills: 0 | Status: DISCONTINUED | COMMUNITY
Start: 2020-07-27 | End: 2020-08-03

## 2020-09-09 ENCOUNTER — LABORATORY RESULT (OUTPATIENT)
Age: 85
End: 2020-09-09

## 2020-09-10 LAB
APPEARANCE: ABNORMAL
BILIRUBIN URINE: NEGATIVE
BLOOD URINE: NORMAL
COLOR: NORMAL
GLUCOSE QUALITATIVE U: NEGATIVE
KETONES URINE: NEGATIVE
LEUKOCYTE ESTERASE URINE: ABNORMAL
NITRITE URINE: POSITIVE
PH URINE: 6.5
PROTEIN URINE: NORMAL
SPECIFIC GRAVITY URINE: 1.01
UROBILINOGEN URINE: NORMAL

## 2020-09-15 ENCOUNTER — LABORATORY RESULT (OUTPATIENT)
Age: 85
End: 2020-09-15

## 2020-09-16 ENCOUNTER — LABORATORY RESULT (OUTPATIENT)
Age: 85
End: 2020-09-16

## 2020-09-17 ENCOUNTER — RX RENEWAL (OUTPATIENT)
Age: 85
End: 2020-09-17

## 2020-09-17 LAB
APPEARANCE: ABNORMAL
BILIRUBIN URINE: NEGATIVE
BLOOD URINE: NORMAL
COLOR: NORMAL
GLUCOSE QUALITATIVE U: NEGATIVE
KETONES URINE: NEGATIVE
LEUKOCYTE ESTERASE URINE: ABNORMAL
NITRITE URINE: POSITIVE
PH URINE: 6
PROTEIN URINE: NORMAL
SPECIFIC GRAVITY URINE: 1.01
UROBILINOGEN URINE: NORMAL

## 2020-09-17 RX ORDER — FOSFOMYCIN TROMETHAMINE 3 G/1
3 POWDER ORAL
Qty: 1 | Refills: 0 | Status: DISCONTINUED | COMMUNITY
Start: 2020-09-10 | End: 2020-09-17

## 2020-09-23 ENCOUNTER — APPOINTMENT (OUTPATIENT)
Dept: UROLOGY | Facility: CLINIC | Age: 85
End: 2020-09-23

## 2020-10-06 ENCOUNTER — APPOINTMENT (OUTPATIENT)
Dept: GERIATRICS | Facility: CLINIC | Age: 85
End: 2020-10-06
Payer: MEDICARE

## 2020-10-06 DIAGNOSIS — Z23 ENCOUNTER FOR IMMUNIZATION: ICD-10-CM

## 2020-10-06 PROCEDURE — G0008: CPT

## 2020-10-06 PROCEDURE — 90662 IIV NO PRSV INCREASED AG IM: CPT

## 2020-10-08 ENCOUNTER — MED ADMIN CHARGE (OUTPATIENT)
Age: 85
End: 2020-10-08

## 2020-10-12 PROBLEM — Z23 NEED FOR INFLUENZA VACCINATION: Status: ACTIVE | Noted: 2020-10-07

## 2020-10-13 ENCOUNTER — APPOINTMENT (OUTPATIENT)
Dept: GERIATRICS | Facility: CLINIC | Age: 85
End: 2020-10-13

## 2020-11-10 ENCOUNTER — APPOINTMENT (OUTPATIENT)
Dept: GERIATRICS | Facility: CLINIC | Age: 85
End: 2020-11-10
Payer: MEDICARE

## 2020-11-16 NOTE — ASSESSMENT
[Remove loose items] : remove loose rugs or any other loose items that could lead to tripping and falling [Adequate lighting] : provide adequate lighting and use a night light [Medication Review] : reviewed medications that increase the risk of falls [Minimize falling] : use grab bars, anti- slip rugs, and proper shoes to minimize falling

## 2020-11-16 NOTE — REVIEW OF SYSTEMS
[As Noted in HPI] : as noted in HPI [Arthralgias] : arthralgias [Fever] : no fever [Chills] : no chills [Feeling Poorly] : not feeling poorly [Feeling Tired] : not feeling tired [Eyesight Problems] : no eyesight problems [Discharge From Eyes] : no purulent discharge from the eyes [Dry Eyes] : no dryness of the eyes [Eyes Itch] : no itching of the eyes [Chest Pain] : no chest pain [Palpitations] : no palpitations [Shortness Of Breath] : no shortness of breath [Cough] : no cough [SOB on Exertion] : no shortness of breath during exertion [Constipation] : no constipation [Diarrhea] : no diarrhea [Dizziness] : no dizziness [Fainting] : no fainting [Anxiety] : no anxiety [Depression] : no depression [Muscle Weakness] : no muscle weakness [Deepening Of The Voice] : no deepening of the voice

## 2020-11-16 NOTE — HISTORY OF PRESENT ILLNESS
[0] : 1) Little interest or pleasure doing things: Not at all [FreeTextEntry1] : Ms. SOLIS TAYLOR is a 89 yo F with PMHx og HFpEF, OA, UI, recurrent UTI's, hypothyroidism, being seen for an acute visit for c/o red eyes. \par \par Pt. stated, she just wants to make sure it is not "pink eye." Pt. stated the red eyes is mainly on the left eye. Pt. c/o of mild discomfort, has applied warm compress once or twice. Pt. denies taking OTC medications for the pain or eyes. Also, pt. stated she would like a referral to a dermatologists for a full skin exam. Pt. stated she has a rash on her abdomen, reports discomfort to the area. Pt. stated she has been applying talcum powder to the site. Pt. denies pain, denies CP, denies dizziness, denies fever,denies malaise. Reports incontinence of urine. Report daily, regular BMs. \par

## 2020-11-16 NOTE — PHYSICAL EXAM
[General Appearance - Alert] : alert [General Appearance - In No Acute Distress] : in no acute distress [General Appearance - Well Nourished] : well nourished [General Appearance - Well Developed] : well developed [Sclera] : the sclera and conjunctiva were normal [PERRL With Normal Accommodation] : pupils were equal in size, round, and reactive to light [Strabismus] : no strabismus was seen [Outer Ear] : the ears and nose were normal in appearance [Hearing Threshold Finger Rub Not [place]] : hearing was normal [Examination Of The Oral Cavity] : the lips and gums were normal [Oropharynx] : the oropharynx was normal [Neck Cervical Mass (___cm)] : no neck mass was observed [Jugular Venous Distention Increased] : there was no jugular-venous distention [] : no respiratory distress [Exaggerated Use Of Accessory Muscles For Inspiration] : no accessory muscle use [Edema] : there was no peripheral edema [Musculoskeletal - Swelling] : no joint swelling seen [Abdomen Soft] : soft [Abdomen Tenderness] : non-tender [No Spinal Tenderness] : no spinal tenderness [No Focal Deficits] : no focal deficits [FreeTextEntry1] : erythematous macular rash noted under pannus

## 2020-11-17 ENCOUNTER — APPOINTMENT (OUTPATIENT)
Dept: GERIATRICS | Facility: CLINIC | Age: 85
End: 2020-11-17
Payer: MEDICARE

## 2020-11-17 DIAGNOSIS — Z12.83 ENCOUNTER FOR SCREENING FOR MALIGNANT NEOPLASM OF SKIN: ICD-10-CM

## 2020-11-23 PROBLEM — Z12.83 SCREENING EXAM FOR SKIN CANCER: Status: ACTIVE | Noted: 2020-11-16

## 2020-11-23 NOTE — HISTORY OF PRESENT ILLNESS
[FreeTextEntry1] : Ms. SOLIS TAYLOR is a 89 yo F with PMHx of HFpEF, OA, UI, recurrent UTI's, hypothyroidism, being seen for an follow visit for c/o red eyes, and rash. \par \par Pt. stated, eye redness has improved significantly with warm compresses. Pt. reports using Anna warm compress (recommended last visit), once daily.  Pt. stated the redness remains but  is just slightly on the left eye.  Pt. denies discomfort. Pt. denies taking OTC medications for the pain or eyes.  Pt. stated the rash on her abdomen is improving,discomfort improved as well with Nystatin powder. Pt. reports using the product once a day.   Pt. denies pain, denies CP, denies dizziness, denies fever,denies malaise. Reports incontinence of urine. Report daily, regular BMs. Pt. reports eating and drinking well. \par

## 2020-11-23 NOTE — PHYSICAL EXAM
[General Appearance - Alert] : alert [General Appearance - In No Acute Distress] : in no acute distress [General Appearance - Well Nourished] : well nourished [General Appearance - Well Developed] : well developed [Sclera] : the sclera and conjunctiva were normal [PERRL With Normal Accommodation] : pupils were equal in size, round, and reactive to light [Strabismus] : no strabismus was seen [Normal Oral Mucosa] : normal oral mucosa [No Oral Pallor] : no oral pallor [No Oral Cyanosis] : no oral cyanosis [Hearing Threshold Finger Rub Not [place]] : hearing was normal [Examination Of The Oral Cavity] : the lips and gums were normal [Respiration, Rhythm And Depth] : normal respiratory rhythm and effort [Exaggerated Use Of Accessory Muscles For Inspiration] : no accessory muscle use [Auscultation Breath Sounds / Voice Sounds] : lungs were clear to auscultation bilaterally [Apical Impulse] : the apical impulse was normal [Heart Rate And Rhythm] : heart rate was normal and rhythm regular [Heart Sounds] : normal S1 and S2 [Murmurs] : no murmurs [Edema] : there was no peripheral edema [Bowel Sounds] : normal bowel sounds [Abdomen Soft] : soft [Abdomen Tenderness] : non-tender [] : no hepato-splenomegaly [Cervical Lymph Nodes Enlarged Posterior Bilaterally] : posterior cervical [Cervical Lymph Nodes Enlarged Anterior Bilaterally] : anterior cervical [Supraclavicular Lymph Nodes Enlarged Bilaterally] : supraclavicular [Axillary Lymph Nodes Enlarged Bilaterally] : axillary [No CVA Tenderness] : no ~M costovertebral angle tenderness [No Spinal Tenderness] : no spinal tenderness [No Focal Deficits] : no focal deficits [Affect] : the affect was normal [Mood] : the mood was normal [FreeTextEntry1] : smiling.

## 2020-11-23 NOTE — REVIEW OF SYSTEMS
[As Noted in HPI] : as noted in HPI [Fever] : no fever [Chills] : no chills [Feeling Poorly] : not feeling poorly [Feeling Tired] : not feeling tired [Discharge From Eyes] : no purulent discharge from the eyes [Dry Eyes] : no dryness of the eyes [Eyes Itch] : no itching of the eyes [Loss Of Hearing] : no hearing loss [Nosebleeds] : no nosebleeds [Nasal Discharge] : no nasal discharge [Sore Throat] : no sore throat [Chest Pain] : no chest pain [Palpitations] : no palpitations [Abdominal Pain] : no abdominal pain [Constipation] : no constipation [Diarrhea] : no diarrhea [Incontinence] : no incontinence [Confused] : no confusion [Dizziness] : no dizziness [Fainting] : no fainting [Anxiety] : no anxiety [Depression] : no depression [Swollen Glands] : no swollen glands [Swollen Glands In The Neck] : no swollen glands in the neck [FreeTextEntry3] : reports wearing glasses.

## 2020-11-23 NOTE — ASSESSMENT
[FreeTextEntry1] : Health Care Maintenance\par 1. Available for urgent calls/ visits. \par 2. Follow up visit in 2 weeks. \par \par SANDIE Morales-BC

## 2020-11-24 ENCOUNTER — APPOINTMENT (OUTPATIENT)
Dept: GERIATRICS | Facility: CLINIC | Age: 85
End: 2020-11-24
Payer: MEDICARE

## 2020-11-24 VITALS
SYSTOLIC BLOOD PRESSURE: 90 MMHG | RESPIRATION RATE: 18 BRPM | DIASTOLIC BLOOD PRESSURE: 60 MMHG | HEART RATE: 68 BPM | OXYGEN SATURATION: 93 %

## 2020-11-24 DIAGNOSIS — H00.019 HORDEOLUM EXTERNUM UNSPECIFIED EYE, UNSPECIFIED EYELID: ICD-10-CM

## 2020-11-30 PROBLEM — H00.019 HORDEOLUM EXTERNUM (STYE): Status: ACTIVE | Noted: 2020-11-16

## 2020-11-30 LAB
ALBUMIN SERPL ELPH-MCNC: 4.3 G/DL
ALP BLD-CCNC: 146 U/L
ALT SERPL-CCNC: 17 U/L
ANION GAP SERPL CALC-SCNC: 14 MMOL/L
APPEARANCE: CLEAR
AST SERPL-CCNC: 17 U/L
BASOPHILS # BLD AUTO: 0.05 K/UL
BASOPHILS NFR BLD AUTO: 0.6 %
BILIRUB SERPL-MCNC: 0.4 MG/DL
BILIRUBIN URINE: NEGATIVE
BLOOD URINE: NEGATIVE
BUN SERPL-MCNC: 35 MG/DL
CALCIUM SERPL-MCNC: 9.2 MG/DL
CHLORIDE SERPL-SCNC: 107 MMOL/L
CO2 SERPL-SCNC: 22 MMOL/L
COLOR: NORMAL
CREAT SERPL-MCNC: 1.35 MG/DL
EOSINOPHIL # BLD AUTO: 0.31 K/UL
EOSINOPHIL NFR BLD AUTO: 3.4 %
GLUCOSE QUALITATIVE U: NEGATIVE
GLUCOSE SERPL-MCNC: 105 MG/DL
HCT VFR BLD CALC: 36.6 %
HGB BLD-MCNC: 12.1 G/DL
IMM GRANULOCYTES NFR BLD AUTO: 0.4 %
KETONES URINE: NEGATIVE
LEUKOCYTE ESTERASE URINE: NEGATIVE
LYMPHOCYTES # BLD AUTO: 1.6 K/UL
LYMPHOCYTES NFR BLD AUTO: 17.8 %
MAN DIFF?: NORMAL
MCHC RBC-ENTMCNC: 30.6 PG
MCHC RBC-ENTMCNC: 33.1 GM/DL
MCV RBC AUTO: 92.4 FL
MONOCYTES # BLD AUTO: 0.91 K/UL
MONOCYTES NFR BLD AUTO: 10.1 %
NEUTROPHILS # BLD AUTO: 6.08 K/UL
NEUTROPHILS NFR BLD AUTO: 67.7 %
NITRITE URINE: NEGATIVE
PH URINE: 6.5
PLATELET # BLD AUTO: 198 K/UL
POTASSIUM SERPL-SCNC: 5.1 MMOL/L
PROT SERPL-MCNC: 7.3 G/DL
PROTEIN URINE: NEGATIVE
RBC # BLD: 3.96 M/UL
RBC # FLD: 14.3 %
SODIUM SERPL-SCNC: 143 MMOL/L
SPECIFIC GRAVITY URINE: 1.01
UROBILINOGEN URINE: NORMAL
WBC # FLD AUTO: 8.99 K/UL

## 2020-11-30 NOTE — REVIEW OF SYSTEMS
[As Noted in HPI] : as noted in HPI [Fever] : no fever [Chills] : no chills [Feeling Poorly] : not feeling poorly [Nosebleeds] : no nosebleeds [Nasal Discharge] : no nasal discharge [Sore Throat] : no sore throat [Hoarseness] : no hoarseness [Chest Pain] : no chest pain [Palpitations] : no palpitations [Lower Ext Edema] : no lower extremity edema [Cough] : no cough [SOB on Exertion] : no shortness of breath during exertion [Abdominal Pain] : no abdominal pain [Constipation] : no constipation [Diarrhea] : no diarrhea [Pelvic Pain] : no pelvic pain [Arthralgias] : no arthralgias [Joint Pain] : no joint pain [Confused] : no confusion [Dizziness] : no dizziness [Fainting] : no fainting [Anxiety] : no anxiety [Depression] : no depression [Muscle Weakness] : no muscle weakness [Deepening Of The Voice] : no deepening of the voice [Swollen Glands] : no swollen glands [Swollen Glands In The Neck] : no swollen glands in the neck

## 2020-11-30 NOTE — SOCIAL HISTORY
[No falls in past year] : Patient reported no falls in the past year [Fully functional (bathing, dressing, toileting, transferring, walking, feeding)] : Fully functional (bathing, dressing, toileting, transferring, walking, feeding) [Independent] : managing medications [Some assistance needed] : doing laundry [Full assistance needed] : using transportation [Walker] : walker [Safety elements used in home] : safety elements used in home [Grab Bars] : grab bars [Shower Chair] : shower chair [Night Light] : night light [Anti-Slip Measures] : anti-slip measures [FreeTextEntry1] : Ariana [FreeTextEntry2] : good [FreeTextEntry4] : good

## 2020-11-30 NOTE — ASSESSMENT
[Regular activities] : regular physical, social and mental activities [Daily physical exercise] : daily physical exercise [Remove loose items] : remove loose rugs or any other loose items that could lead to tripping and falling [Medication Review] : reviewed medications that increase the risk of falls [Use recommended devices] : use recommended assistive devices [FreeTextEntry1] : Health Care Maintenance\par 1. Available for urgent calls/ visits. \par 2. Follow up visit in 1 weeks. \par 3. Labs ordered,prending results. \par \par SANDIE Morales-BC

## 2020-11-30 NOTE — PHYSICAL EXAM
[General Appearance - Alert] : alert [General Appearance - In No Acute Distress] : in no acute distress [General Appearance - Well Nourished] : well nourished [General Appearance - Well Developed] : well developed [General Appearance - Well-Appearing] : healthy appearing [Sclera] : the sclera and conjunctiva were normal [PERRL With Normal Accommodation] : pupils were equal in size, round, and reactive to light [Strabismus] : no strabismus was seen [Outer Ear] : the ears and nose were normal in appearance [Hearing Threshold Finger Rub Not [place]] : hearing was normal [Examination Of The Oral Cavity] : the lips and gums were normal [Nasal Cavity] : the nasal mucosa and septum were normal [Oropharynx] : the oropharynx was normal [Neck Appearance] : the appearance of the neck was normal [Neck Cervical Mass (___cm)] : no neck mass was observed [Jugular Venous Distention Increased] : there was no jugular-venous distention [Respiration, Rhythm And Depth] : normal respiratory rhythm and effort [Exaggerated Use Of Accessory Muscles For Inspiration] : no accessory muscle use [Auscultation Breath Sounds / Voice Sounds] : lungs were clear to auscultation bilaterally [Apical Impulse] : the apical impulse was normal [Heart Rate And Rhythm] : heart rate was normal and rhythm regular [Heart Sounds] : normal S1 and S2 [Murmurs] : no murmurs [Edema] : there was no peripheral edema [Bowel Sounds] : normal bowel sounds [Abdomen Soft] : soft [Abdomen Tenderness] : non-tender [] : no hepato-splenomegaly [Cervical Lymph Nodes Enlarged Posterior Bilaterally] : posterior cervical [Cervical Lymph Nodes Enlarged Anterior Bilaterally] : anterior cervical [Supraclavicular Lymph Nodes Enlarged Bilaterally] : supraclavicular [Axillary Lymph Nodes Enlarged Bilaterally] : axillary [No CVA Tenderness] : no ~M costovertebral angle tenderness [No Spinal Tenderness] : no spinal tenderness [No Focal Deficits] : no focal deficits [Affect] : the affect was normal [Mood] : the mood was normal [FreeTextEntry1] : erythematous macular rash  in healing stages noted under pannus. Erythematous macular rash under b/l breasts.

## 2020-11-30 NOTE — HISTORY OF PRESENT ILLNESS
[0] : 2) Feeling down, depressed, or hopeless: Not at all [FreeTextEntry1] : Ms. SOLIS TAYLOR is a 89 yo F with PMHx of HFpEF, OA, UI, recurrent UTI's, hypothyroidism, being seen for an follow visit for c/o eye discomfort, rash under breasts, and dysuria.  \par \par Pt. stated, eye redness has improved significantly with warm compresses. Pt. reports using Anna warm compress (recommended last visit), twice daily.  Pt. stated the redness improved but still has eye discomfort on the left eye.  Pt. denies taking OTC medications for the pain or eyes.  Pt. stated the rash on her abdomen is improving,discomfort improved as well with Nystatin powder. Pt. reports using the product twice a day. Pt. now reports rash under breasts as well bilaterally, states the rash does not cause her pain, reports some itchiness. Pt. reports mild pain during urination, denies foul smelling urine, denies discharge.   Pt. denies pain, denies CP, denies dizziness, denies fever,denies malaise. Reports incontinence of urine. Report daily, regular BMs. Pt. reports eating well, reports not drinking enough water. Stated, I drink 2-3 cups of water a day.  \par

## 2020-11-30 NOTE — CURRENT MEDS
[Reports Changes In Medication (including OTC)] : Patient reports changes in medication [Provider aware of all medications taken (including OTC)] : Patient stated provider is aware of all medications ~he/she~ is taking including OTC  [Medication Reconciliation Completed] : Medication reconciliation completed [Delivered by local pharmacy] : delivered by local Pharmacy

## 2020-12-03 ENCOUNTER — APPOINTMENT (OUTPATIENT)
Dept: DERMATOLOGY | Facility: CLINIC | Age: 85
End: 2020-12-03
Payer: MEDICARE

## 2020-12-03 DIAGNOSIS — L30.4 ERYTHEMA INTERTRIGO: ICD-10-CM

## 2020-12-03 DIAGNOSIS — Z87.2 PERSONAL HISTORY OF DISEASES OF THE SKIN AND SUBCUTANEOUS TISSUE: ICD-10-CM

## 2020-12-03 DIAGNOSIS — D69.2 OTHER NONTHROMBOCYTOPENIC PURPURA: ICD-10-CM

## 2020-12-03 DIAGNOSIS — Z84.0 FAMILY HISTORY OF DISEASES OF THE SKIN AND SUBCUTANEOUS TISSUE: ICD-10-CM

## 2020-12-03 DIAGNOSIS — L82.1 OTHER SEBORRHEIC KERATOSIS: ICD-10-CM

## 2020-12-03 DIAGNOSIS — L30.8 OTHER SPECIFIED DERMATITIS: ICD-10-CM

## 2020-12-03 PROCEDURE — 99203 OFFICE O/P NEW LOW 30 MIN: CPT

## 2020-12-10 RX ORDER — DICLOFENAC SODIUM 10 MG/G
1 GEL TOPICAL
Qty: 1 | Refills: 3 | Status: DISCONTINUED | COMMUNITY
Start: 2020-05-20 | End: 2020-12-10

## 2020-12-15 ENCOUNTER — APPOINTMENT (OUTPATIENT)
Dept: GERIATRICS | Facility: CLINIC | Age: 85
End: 2020-12-15
Payer: MEDICARE

## 2020-12-15 NOTE — PHYSICAL EXAM
[General Appearance - Alert] : alert [General Appearance - In No Acute Distress] : in no acute distress [Sclera] : the sclera and conjunctiva were normal [Extraocular Movements] : extraocular movements were intact [Respiration, Rhythm And Depth] : normal respiratory rhythm and effort [Exaggerated Use Of Accessory Muscles For Inspiration] : no accessory muscle use [FreeTextEntry1] : faint erythema of skin folds within groin.  no rash under breast folds

## 2020-12-15 NOTE — HISTORY OF PRESENT ILLNESS
[FreeTextEntry1] : 89yo F seen today for acute complaint for urinary frequency.\par \par denies dysuria or hematuria.  no ab pain or fever.\par last UA without infection.\par \par MAD wearing pads and using vaseline.\par \par states her rash within groin is improving. asking if she should powder instead.\par \par unsteady gait:  walking with walker, no recent falls.

## 2020-12-21 ENCOUNTER — RX RENEWAL (OUTPATIENT)
Age: 85
End: 2020-12-21

## 2020-12-21 PROBLEM — J06.9 VIRAL UPPER RESPIRATORY TRACT INFECTION: Status: RESOLVED | Noted: 2018-03-31 | Resolved: 2020-12-21

## 2021-01-05 ENCOUNTER — APPOINTMENT (OUTPATIENT)
Dept: GERIATRICS | Facility: CLINIC | Age: 86
End: 2021-01-05
Payer: MEDICARE

## 2021-01-05 ENCOUNTER — NON-APPOINTMENT (OUTPATIENT)
Age: 86
End: 2021-01-05

## 2021-01-06 NOTE — PHYSICAL EXAM
[General Appearance - Alert] : alert [General Appearance - In No Acute Distress] : in no acute distress [Sclera] : the sclera and conjunctiva were normal [Extraocular Movements] : extraocular movements were intact [] : no respiratory distress [Respiration, Rhythm And Depth] : normal respiratory rhythm and effort [Exaggerated Use Of Accessory Muscles For Inspiration] : no accessory muscle use

## 2021-01-06 NOTE — HISTORY OF PRESENT ILLNESS
[FreeTextEntry1] : 89yo F seen today for acute complaint for urinary frequency.\par \par reports associated dysuria, but no hematuria, ab pain or fever.\par \par OAB with UI:  wearing pads and using vaseline.\par \par states her rash within groin is improving.she alternates between powder and cream.\par \par unsteady gait: walking with walker, no recent falls. \par

## 2021-01-07 LAB
APPEARANCE: CLEAR
BILIRUBIN URINE: NEGATIVE
BLOOD URINE: NEGATIVE
COLOR: COLORLESS
GLUCOSE QUALITATIVE U: NEGATIVE
KETONES URINE: NEGATIVE
LEUKOCYTE ESTERASE URINE: NEGATIVE
NITRITE URINE: NEGATIVE
PH URINE: 6.5
PROTEIN URINE: NEGATIVE
SPECIFIC GRAVITY URINE: 1.01
UROBILINOGEN URINE: NORMAL

## 2021-01-12 ENCOUNTER — APPOINTMENT (OUTPATIENT)
Dept: GERIATRICS | Facility: CLINIC | Age: 86
End: 2021-01-12
Payer: MEDICARE

## 2021-01-12 DIAGNOSIS — M81.0 AGE-RELATED OSTEOPOROSIS W/OUT CURRENT PATHOLOGICAL FRACTURE: ICD-10-CM

## 2021-01-12 PROCEDURE — 99442: CPT | Mod: 95

## 2021-01-19 ENCOUNTER — APPOINTMENT (OUTPATIENT)
Dept: GERIATRICS | Facility: CLINIC | Age: 86
End: 2021-01-19
Payer: MEDICARE

## 2021-01-19 VITALS
DIASTOLIC BLOOD PRESSURE: 68 MMHG | RESPIRATION RATE: 20 BRPM | HEART RATE: 62 BPM | OXYGEN SATURATION: 97 % | SYSTOLIC BLOOD PRESSURE: 110 MMHG

## 2021-01-19 PROCEDURE — 99497 ADVNCD CARE PLAN 30 MIN: CPT

## 2021-01-19 RX ORDER — CIPROFLOXACIN HYDROCHLORIDE 250 MG/1
250 TABLET, FILM COATED ORAL
Qty: 10 | Refills: 0 | Status: COMPLETED | COMMUNITY
Start: 2020-09-17 | End: 2020-09-22

## 2021-01-19 NOTE — CURRENT MEDS
[Reports Changes In Medication (including OTC)] : Patient reports changes in medication [Provider aware of all medications taken (including OTC)] : Patient stated provider is aware of all medications ~he/she~ is taking including OTC  [Medication Reconciliation Completed] : Medication reconciliation completed [High Risk Medications Reviewed] : High risk medications reviewed [Patient/caregiver able to verbalize understanding of medications, including indications and side effects] : Patient/caregiver able to verbalize understanding of medications, including indications and side effects [Delivered by local pharmacy] : delivered by local Pharmacy

## 2021-01-19 NOTE — HISTORY OF PRESENT ILLNESS
[0] : 2) Feeling down, depressed, or hopeless: Not at all [FreeTextEntry1] : Ms. SOLIS TAYLOR is a 89 yo F with PMHx of HFpEF, OA, UI, recurrent UTI's, hypothyroidism, being seen for an follow visit for chronic knee pain, urinary incontinence, dry skin.\par \par Pt. reports night time urinary burning has improved and now resolved since following the recommendations from last phone call. Pt. stated she drinks 3-5 cups of water daily, and is doing her best as she stated she was advised 5 cups of water a day.  Pt. today reports chronic knee pain in 7/10 when ambulating, no pain at rest. Pt. reports she continues to take tramadol 25 mg in the am, and is taking Tylenol 500mg x 1 tablet TID with improvement. As per pt. "not tremendous pain."Also, pt. reports occasional dry itchy skin generalized, mainly on her back and arms. Pt. reports using cerave lotion daily. Pt. denies dysuria, denies CP, denies dizziness, denies fever,denies malaise. Reports incontinence of urine. Report daily, regular BMs. Pt. reports eating well. \par

## 2021-01-19 NOTE — REVIEW OF SYSTEMS
[Incontinence] : incontinence [As Noted in HPI] : as noted in HPI [Fever] : no fever [Chills] : no chills [Feeling Poorly] : not feeling poorly [Feeling Tired] : not feeling tired [Eye Pain] : no eye pain [Red Eyes] : eyes not red [Eyesight Problems] : no eyesight problems [Discharge From Eyes] : no purulent discharge from the eyes [Earache] : no earache [Nosebleeds] : no nosebleeds [Nasal Discharge] : no nasal discharge [Chest Pain] : no chest pain [Palpitations] : no palpitations [Cough] : no cough [SOB on Exertion] : no shortness of breath during exertion [Constipation] : no constipation [Diarrhea] : no diarrhea [Dysuria] : no dysuria [Dizziness] : no dizziness [Fainting] : no fainting [Anxiety] : no anxiety [Depression] : no depression

## 2021-01-19 NOTE — SOCIAL HISTORY
[No falls in past year] : Patient reported no falls in the past year [Fully functional (bathing, dressing, toileting, transferring, walking, feeding)] : Fully functional (bathing, dressing, toileting, transferring, walking, feeding) [Some assistance needed] : shopping [Independent] : managing medications [Full assistance needed] : using transportation [Walker] : walker [Smoke Detector] : smoke detector [Safety elements used in home] : safety elements used in home [Grab Bars] : grab bars [Shower Chair] : shower chair [Night Light] : night light [Anti-Slip Measures] : anti-slip measures [FreeTextEntry1] : Ariana Gilliland Dtmelissa Cell:  [phone] [FreeTextEntry2] : good [FreeTextEntry4] : good

## 2021-01-19 NOTE — PHYSICAL EXAM
[General Appearance - Alert] : alert [General Appearance - In No Acute Distress] : in no acute distress [General Appearance - Well Nourished] : well nourished [General Appearance - Well Developed] : well developed [Sclera] : the sclera and conjunctiva were normal [PERRL With Normal Accommodation] : pupils were equal in size, round, and reactive to light [Strabismus] : no strabismus was seen [Normal Oral Mucosa] : normal oral mucosa [No Oral Pallor] : no oral pallor [No Oral Cyanosis] : no oral cyanosis [Outer Ear] : the ears and nose were normal in appearance [Hearing Threshold Finger Rub Not [place]] : hearing was normal [Examination Of The Oral Cavity] : the lips and gums were normal [Oropharynx] : The oropharynx was normal [Neck Appearance] : the appearance of the neck was normal [Neck Cervical Mass (___cm)] : no neck mass was observed [Jugular Venous Distention Increased] : there was no jugular-venous distention [Respiration, Rhythm And Depth] : normal respiratory rhythm and effort [Exaggerated Use Of Accessory Muscles For Inspiration] : no accessory muscle use [Auscultation Breath Sounds / Voice Sounds] : lungs were clear to auscultation bilaterally [Apical Impulse] : the apical impulse was normal [Heart Rate And Rhythm] : heart rate was normal and rhythm regular [Heart Sounds] : normal S1 and S2 [Heart Sounds Gallop] : no gallops [Murmurs] : no murmurs [Heart Sounds Pericardial Friction Rub] : no pericardial rub [Edema] : there was no peripheral edema [___ +] : bilateral [unfilled]+ pitting edema to the ankles [Bowel Sounds] : normal bowel sounds [Abdomen Soft] : soft [Abdomen Tenderness] : non-tender [] : no hepato-splenomegaly [Abdomen Mass (___ Cm)] : no abdominal mass palpated [Abdomen Hernia] : no hernia was discovered [Cervical Lymph Nodes Enlarged Posterior Bilaterally] : posterior cervical [Cervical Lymph Nodes Enlarged Anterior Bilaterally] : anterior cervical [Supraclavicular Lymph Nodes Enlarged Bilaterally] : supraclavicular [Axillary Lymph Nodes Enlarged Bilaterally] : axillary [No CVA Tenderness] : no ~M costovertebral angle tenderness [No Spinal Tenderness] : no spinal tenderness [Skin Color & Pigmentation] : normal skin color and pigmentation [Skin Turgor] : normal skin turgor [Skin Lesions] : no skin lesions [No Focal Deficits] : no focal deficits [Affect] : the affect was normal [Mood] : the mood was normal [FreeTextEntry1] : unsteady gait, uses rollator for ambulation at all times

## 2021-01-19 NOTE — ASSESSMENT
[Regular activities] : regular physical, social and mental activities [Daily physical exercise] : daily physical exercise [Medication Review] : reviewed medications that increase the risk of falls [Use recommended devices] : use recommended assistive devices [Physical Therapy referral] : Physical Therapy referral [Minimize falling] : use grab bars, anti- slip rugs, and proper shoes to minimize falling [Daily physical exercise as tolerated] : Daily physical exercise as tolerated [Pain Management] : pain management [Discussed at today's visit] : Advance Directives Discussed at today's visit [No changes since last discussed ___] : No changes since last discussed  [unfilled] [DNR] : DNR [DNI] : DNI [FreeTextEntry1] : Health Care Maintenance\par 1. Available for urgent calls/ visits. \par 2. Follow up visit in 3 months, as needed. \par 3. Time spent: 11am-11:50am. \par \par SANDIE Morales-BC  [FreeTextEntry4] : -ACP <16min.\par -MOLST form reviewed and updated. Pt. is DNR/DNI. \par -faxed to office today from Concepcion ARAGON \par -"what matters most discussed" with pt.\par -pt. wishes to focus on making sure she is comfortable. \par

## 2021-01-26 ENCOUNTER — APPOINTMENT (OUTPATIENT)
Dept: GERIATRICS | Facility: CLINIC | Age: 86
End: 2021-01-26

## 2021-02-04 ENCOUNTER — APPOINTMENT (OUTPATIENT)
Dept: DERMATOLOGY | Facility: CLINIC | Age: 86
End: 2021-02-04
Payer: MEDICARE

## 2021-02-04 PROCEDURE — 99212 OFFICE O/P EST SF 10 MIN: CPT | Mod: 25

## 2021-02-04 PROCEDURE — 17110 DESTRUCTION B9 LES UP TO 14: CPT

## 2021-02-12 ENCOUNTER — APPOINTMENT (OUTPATIENT)
Dept: GERIATRICS | Facility: CLINIC | Age: 86
End: 2021-02-12
Payer: MEDICARE

## 2021-02-12 ENCOUNTER — NON-APPOINTMENT (OUTPATIENT)
Age: 86
End: 2021-02-12

## 2021-02-12 VITALS
SYSTOLIC BLOOD PRESSURE: 135 MMHG | DIASTOLIC BLOOD PRESSURE: 79 MMHG | RESPIRATION RATE: 20 BRPM | HEART RATE: 68 BPM | OXYGEN SATURATION: 97 % | TEMPERATURE: 98 F

## 2021-02-12 DIAGNOSIS — Z60.2 PROBLEMS RELATED TO LIVING ALONE: ICD-10-CM

## 2021-02-12 SDOH — SOCIAL STABILITY - SOCIAL INSECURITY: PROBLEMS RELATED TO LIVING ALONE: Z60.2

## 2021-02-12 NOTE — ASSESSMENT
[FreeTextEntry1] : Health Care Maintenance\par 1. Available for urgent calls/ visits. \par 2. Follow up visit in 3 months, as needed. \par 3. Time spent: 3:15pm-4pm. \par \par SANDIE Morales-BC  [FreeTextEntry4] : MOLST form in chart

## 2021-02-12 NOTE — SOCIAL HISTORY
[FreeTextEntry1] : Ariana Gilliland Dtmelissa Cell:  [phone] [FreeTextEntry2] : good [FreeTextEntry4] : good

## 2021-02-12 NOTE — REVIEW OF SYSTEMS
[Fever] : no fever [Chills] : no chills [Feeling Tired] : not feeling tired [Red Eyes] : eyes not red [Discharge From Eyes] : no purulent discharge from the eyes [Earache] : no earache [Nosebleeds] : no nosebleeds [Nasal Discharge] : no nasal discharge [Chest Pain] : no chest pain [Palpitations] : no palpitations [Cough] : no cough [SOB on Exertion] : no shortness of breath during exertion [Constipation] : no constipation [Diarrhea] : no diarrhea [Dysuria] : no dysuria [Dizziness] : no dizziness [Fainting] : no fainting

## 2021-02-12 NOTE — PHYSICAL EXAM
[FreeTextEntry1] : fragile thin skin, well moisturized, generalized senile purpura. seborrheic keratoses noted on back.

## 2021-02-12 NOTE — HISTORY OF PRESENT ILLNESS
[FreeTextEntry1] : Ms. SOLIS TAYLOR is a 89 yo F with PMHx of HFpEF, OA, UI, recurrent UTI's, hypothyroidism, being seen for an acute visit for chronic knee pain.\par \par   Pt. today reports chronic knee pain in 9/10 when ambulating, no pain at rest. Pt. reports she ran out of Tramadol medication, so she has not been taking that. Instead took Tylenol ES x2 tablets with mild relief. Reports massage from PT with Diclofenac gel alleviates the pain. Pt. stated she drinks 3-5 cups of water daily, and is doing her best as she stated she was advised 5 cups of water a day. Also, pt. reports she got the 2nd dose of COVID-19 vaccine 2/9 and had been feeling mildly sick. Reports "stomach upset, feeling queazy, feeling tired.". Pt. reports using cerave lotion daily. Pt. denies dysuria, denies CP, denies dizziness, denies fever,denies malaise. Reports incontinence of urine. Report daily, regular BMs. Pt. reports eating well. \par

## 2021-03-02 ENCOUNTER — APPOINTMENT (OUTPATIENT)
Dept: GERIATRICS | Facility: CLINIC | Age: 86
End: 2021-03-02
Payer: MEDICARE

## 2021-03-04 LAB
APPEARANCE: CLEAR
BILIRUBIN URINE: NEGATIVE
BLOOD URINE: NEGATIVE
COLOR: COLORLESS
GLUCOSE QUALITATIVE U: NEGATIVE
KETONES URINE: NEGATIVE
LEUKOCYTE ESTERASE URINE: NEGATIVE
NITRITE URINE: NEGATIVE
PH URINE: 6
PROTEIN URINE: NEGATIVE
SPECIFIC GRAVITY URINE: 1.01
UROBILINOGEN URINE: NORMAL

## 2021-03-05 NOTE — HISTORY OF PRESENT ILLNESS
[Dysuria] : dysuria [Urinary Urgency] : urinary urgency [Urinary Frequency] : urinary frequency [Hematuria] : no hematuria [Suprapubic Pain] : no suprapubic pain [Abdominal Pain] : no abdominal pain [Back Pain] : no back pain [Currently Experiencing] : The patient is currently experiencing symptoms. [Incontinence] : incontinence [Nocturia] : nocturia [Vaginal Discharge] : no vaginal discharge [Fever] : no fever [Nausea] : no nausea [Vomiting] : no vomiting [FreeTextEntry1] : 89yof seen for acute visit for cc of urinary frequency and dysuria.  she is concerned she has a UTI.\par

## 2021-03-05 NOTE — PHYSICAL EXAM
[General Appearance - Alert] : alert [General Appearance - In No Acute Distress] : in no acute distress [Sclera] : the sclera and conjunctiva were normal [Extraocular Movements] : extraocular movements were intact [Neck Appearance] : the appearance of the neck was normal [] : no respiratory distress [Respiration, Rhythm And Depth] : normal respiratory rhythm and effort [Exaggerated Use Of Accessory Muscles For Inspiration] : no accessory muscle use [Impaired Insight] : insight and judgment were intact [Affect] : the affect was normal [Mood] : the mood was normal

## 2021-03-05 NOTE — ASSESSMENT
[FreeTextEntry1] : check UA--\par normal and negative for infection\par \par \par -left message for patient \par symptoms likely 2/2 OAB with urinary frequency.\par advised timed bathroom visits and supportive care\par advised f/u with urology

## 2021-03-08 ENCOUNTER — APPOINTMENT (OUTPATIENT)
Dept: UROLOGY | Facility: CLINIC | Age: 86
End: 2021-03-08
Payer: MEDICARE

## 2021-03-08 PROCEDURE — 99213 OFFICE O/P EST LOW 20 MIN: CPT

## 2021-03-08 NOTE — PATIENT PROFILE ADULT - FUNCTIONAL SCREEN CURRENT LEVEL: EATING, MLM
MEDICARE WELLNESS VISIT NOTE      HISTORY OF PRESENT ILLNESS:   Sandro Mills Jr. presents for his Subsequent Annual Medicare Wellness Visit.   He has no current complaints or concerns.      Patient Care Team:  Thad Huerta MD as PCP - General (Family Practice)        Patient Active Problem List   Diagnosis   • Essential hypertension   • Hyperlipidemia   • Alopecia   • Benign prostatic hyperplasia without lower urinary tract symptoms   • Status post right knee replacement         Past Medical History:   Diagnosis Date   • Alopecia    • Hyperlipidemia    • Hypertension    • Osteoarthritis of right knee 11/16/2020         Past Surgical History:   Procedure Laterality Date   • Elbow surgery Right    • Knee surgery Right     Knee arthroscopy x 2   • Nasal endoscopy w/ ballon sinuplasty     • Vasectomy           Social History     Tobacco Use   • Smoking status: Never Smoker   • Smokeless tobacco: Never Used   Substance Use Topics   • Alcohol use: Yes     Alcohol/week: 4.0 standard drinks     Types: 2 Glasses of wine, 2 Cans of beer per week     Frequency: 2-3 times a week     Comment: occasionaly    • Drug use: Not on file     Drug use:    Drug Use:    Not Asked       Family History - Reviewed    Current Outpatient Medications   Medication Sig Dispense Refill   • atorvastatin (LIPITOR) 10 MG tablet Take 1 tablet by mouth daily. 90 tablet 3   • finasteride (PROPECIA) 1 MG tablet Take 1 mg by mouth daily.       No current facility-administered medications for this visit.         The following items on the Medicare Health Risk Assessment were found to be positive      Vision and Hearing screens: not performed    Advance Directive:   The patient has the following documents:  No Advance Directives on file. Patient offered documents.    Cognitive Assessment: no evidence of cognitive dysfunction by direct observation    Recent PHQ 2/9 Score    PHQ 2:  Date Adult PHQ 2 Score Adult PHQ 2 Interpretation   3/8/2021 0 No  further screening needed       PHQ 9:       DEPRESSION ASSESSMENT/PLAN:  Depression screening is negative no further plan needed.     Body mass index is 32.21 kg/m².    BMI ASSESSMENT/PLAN:  Patient is obese.    30 minutes of physical activity a day        Needed Screening/Treatment:   Cardiovascular screening - Lipids-on statin; ordered,   Cardiovascular screening - Abdominal aortic US-N/A,   Diabetes screening-ordered ,   Colorectal cancer screening-due now ,   Bone density-N/A,   Glaucoma screen / eye exam-see eye MD , Hepatitis C-done/neg,   Lung Cancer Screening-N/A,   and Immunizations reviewed and patient needs: Herpes Zoster.  Will go to pharmacy to have if   Needed follow up:  None    See orders.   See Patient Instructions section.   Return in about 1 year (around 3/8/2022) for Medicare Wellness Visit.     Medicare annual wellness visit, subsequent  (primary encounter diagnosis)  Comment: see above  Plan: routine    Screen for colon cancer  Comment: joy  Plan: OPEN ACCESS COLONOSCOPY          Hyperlipidemia, unspecified hyperlipidemia type  Comment: recheck  Plan: CBC WITH DIFFERENTIAL, COMPREHENSIVE METABOLIC         PANEL, LIPID PANEL WITH REFLEX        On statin - cont    Hypertension, unspecified type  Comment: contorlled  Plan: CBC WITH DIFFERENTIAL, COMPREHENSIVE METABOLIC         PANEL, LIPID PANEL WITH REFLEX        No changes      0 = independent

## 2021-03-10 LAB
APPEARANCE: CLEAR
BACTERIA UR CULT: NORMAL
BACTERIA: NEGATIVE
BILIRUBIN URINE: NEGATIVE
BLOOD URINE: NEGATIVE
COLOR: NORMAL
GLUCOSE QUALITATIVE U: NEGATIVE
HYALINE CASTS: 1 /LPF
KETONES URINE: NEGATIVE
LEUKOCYTE ESTERASE URINE: NEGATIVE
MICROSCOPIC-UA: NORMAL
NITRITE URINE: NEGATIVE
PH URINE: 7
PROTEIN URINE: NEGATIVE
RED BLOOD CELLS URINE: 1 /HPF
SPECIFIC GRAVITY URINE: 1.01
SQUAMOUS EPITHELIAL CELLS: 2 /HPF
UROBILINOGEN URINE: NORMAL
WHITE BLOOD CELLS URINE: 3 /HPF

## 2021-03-11 NOTE — PHYSICAL EXAM
[General Appearance - Well Developed] : well developed [General Appearance - Well Nourished] : well nourished [Normal Appearance] : normal appearance [Well Groomed] : well groomed [General Appearance - In No Acute Distress] : no acute distress [Abdomen Soft] : soft [Abdomen Tenderness] : non-tender [Costovertebral Angle Tenderness] : no ~M costovertebral angle tenderness [] : no respiratory distress [Respiration, Rhythm And Depth] : normal respiratory rhythm and effort [Exaggerated Use Of Accessory Muscles For Inspiration] : no accessory muscle use [Oriented To Time, Place, And Person] : oriented to person, place, and time [Affect] : the affect was normal [Mood] : the mood was normal [Not Anxious] : not anxious [No Focal Deficits] : no focal deficits [FreeTextEntry1] : uses walker

## 2021-03-11 NOTE — ASSESSMENT
[FreeTextEntry1] : Sx overall improved. \par --UA, UCx. will call pt with results\par --Curb fluid prior to bed (2h)\par --Elevate LE prior to bed

## 2021-03-11 NOTE — HISTORY OF PRESENT ILLNESS
[FreeTextEntry1] : 89yo woman with PMH CHF, breast ca s/p lumpectomy, spinal stenosis, osteoporosis and hip fracture s/p hip replacement presents for evaluation of bothersome urinary issues. She has seen a urologist, Dr. Romo, for frequency, urgency, mixed incontinence, and UTIs. She has tried oxybutynin and botox injections which have not helped. She has been hospitalized 2/2 UTIs in 2020 and 2019. When she has a UTI she has dysuria and syncope. She takes furosemide, which makes her urinary symptoms worse. Currently denies experiencing any urologic symptoms including hematuria or dysuria. Denies fevers, chills, flank pain, nausea, vomiting, and unintentional weight loss.\par \par She was seen last year and started on estrace. She returns today for follow-up. Following her prior visit, she had many complaints of dysuria. She was treated for a UTI during early covid. She reports this has gotten much better. Recently saw cards and had urine that was negative per report. She has some nocturia and can leak prior to making it to the restroom. She did not really use the estrace. She had difficulty with the applicator and then didn't like using her fingers. She had her HTN meds changed and is not on daily diuretic. She reports some weight gain during covid being isolated in her room.

## 2021-03-11 NOTE — REVIEW OF SYSTEMS
[Feeling Tired] : feeling tired [Eyesight Problems] : eyesight problems [Urine Infection (bladder/kidney)] : bladder/kidney infection [Leakage of urine with urgency] : leakage of urine with urgency [Leakage of urine with straining, coughing, laughing] : leakage of urine with straining, coughing, laughing [Difficulty Walking] : difficulty walking [Negative] : Heme/Lymph [FreeTextEntry6] : frequent urination [FreeTextEntry3] : dribbling of urine

## 2021-03-24 ENCOUNTER — NON-APPOINTMENT (OUTPATIENT)
Age: 86
End: 2021-03-24

## 2021-03-29 LAB
APPEARANCE: CLEAR
BILIRUBIN URINE: NEGATIVE
BLOOD URINE: NEGATIVE
COLOR: NORMAL
GLUCOSE QUALITATIVE U: NEGATIVE
KETONES URINE: NEGATIVE
LEUKOCYTE ESTERASE URINE: NEGATIVE
NITRITE URINE: NEGATIVE
PH URINE: 6
PROTEIN URINE: NEGATIVE
SPECIFIC GRAVITY URINE: 1.01
UROBILINOGEN URINE: NORMAL

## 2021-03-30 ENCOUNTER — APPOINTMENT (OUTPATIENT)
Dept: GERIATRICS | Facility: CLINIC | Age: 86
End: 2021-03-30
Payer: MEDICARE

## 2021-03-30 NOTE — HISTORY OF PRESENT ILLNESS
[FreeTextEntry1] : 89 yoF seen for acute visit for UI and vaginal discomfort.\par \par hx of recurrent UTI's\par notes vaginal discomfort\par denies dysuria, hematuria\par chronic urinary incontinence, but states it is improved.\par wears 3 layers of protection: wears pad, depends, and incontinence underwear-  but denies any accidents\par denies associated vaginal itching or bleeding or discharge.\par denies fever, chills, coughing, diarrhea/constipation.\par \par OA of b/l knees chronic:  taking prn tramadol which helps

## 2021-04-28 ENCOUNTER — APPOINTMENT (OUTPATIENT)
Dept: UROLOGY | Facility: CLINIC | Age: 86
End: 2021-04-28
Payer: MEDICARE

## 2021-04-28 VITALS — DIASTOLIC BLOOD PRESSURE: 86 MMHG | HEART RATE: 73 BPM | SYSTOLIC BLOOD PRESSURE: 159 MMHG

## 2021-04-28 PROCEDURE — 99213 OFFICE O/P EST LOW 20 MIN: CPT

## 2021-04-30 LAB
APPEARANCE: CLEAR
BACTERIA: NEGATIVE
BILIRUBIN URINE: NEGATIVE
BLOOD URINE: NEGATIVE
COLOR: NORMAL
GLUCOSE QUALITATIVE U: NEGATIVE
HYALINE CASTS: 2 /LPF
KETONES URINE: NEGATIVE
LEUKOCYTE ESTERASE URINE: NEGATIVE
MICROSCOPIC-UA: NORMAL
NITRITE URINE: NEGATIVE
PH URINE: 7.5
PROTEIN URINE: NORMAL
RED BLOOD CELLS URINE: 2 /HPF
SPECIFIC GRAVITY URINE: 1.01
SQUAMOUS EPITHELIAL CELLS: 2 /HPF
UROBILINOGEN URINE: NORMAL
WHITE BLOOD CELLS URINE: 3 /HPF

## 2021-05-04 ENCOUNTER — APPOINTMENT (OUTPATIENT)
Dept: DERMATOLOGY | Facility: CLINIC | Age: 86
End: 2021-05-04
Payer: MEDICARE

## 2021-05-04 ENCOUNTER — LABORATORY RESULT (OUTPATIENT)
Age: 86
End: 2021-05-04

## 2021-05-04 DIAGNOSIS — D48.5 NEOPLASM OF UNCERTAIN BEHAVIOR OF SKIN: ICD-10-CM

## 2021-05-04 DIAGNOSIS — L82.0 INFLAMED SEBORRHEIC KERATOSIS: ICD-10-CM

## 2021-05-04 PROCEDURE — 11102 TANGNTL BX SKIN SINGLE LES: CPT | Mod: 59

## 2021-05-04 PROCEDURE — 17110 DESTRUCTION B9 LES UP TO 14: CPT

## 2021-05-04 PROCEDURE — 17000 DESTRUCT PREMALG LESION: CPT | Mod: 59

## 2021-05-04 PROCEDURE — 99214 OFFICE O/P EST MOD 30 MIN: CPT | Mod: 25

## 2021-05-04 NOTE — HISTORY OF PRESENT ILLNESS
[FreeTextEntry1] : 89yo woman with PMH CHF, breast ca s/p lumpectomy, spinal stenosis, osteoporosis and hip fracture s/p hip replacement presents for evaluation of bothersome urinary issues. She has seen a urologist, Dr. Romo, for frequency, urgency, mixed incontinence, and UTIs. She has tried oxybutynin and botox injections which have not helped. She has been hospitalized 2/2 UTIs in 2020 and 2019. When she has a UTI she has dysuria and syncope. She takes furosemide, which makes her urinary symptoms worse. Currently denies experiencing any urologic symptoms including hematuria or dysuria. Denies fevers, chills, flank pain, nausea, vomiting, and unintentional weight loss.\par \par She was seen last year and started on estrace. She had seen cards earlier this year and had diuretics changed with some benefit. She returns today for follow-up. She has some nocturia and can leak prior to making it to the restroom. She reports that her primary bother is a vaginal burning sensation and increased urinary frequency. She did not really use the estrace. She was seen by Dr Singh last month and was told to restart it. SHe has not been taking it this week per report. She has had a couple recent urines that were negative.

## 2021-05-04 NOTE — ASSESSMENT
[FreeTextEntry1] : \par --UA, UCx. will call pt with results\par --Curb fluid prior to bed (2h)\par --Elevate LE prior to bed\par --Restart estrace (internally)!!!\par --Barrier cream externally

## 2021-05-05 LAB
APPEARANCE: CLEAR
BILIRUBIN URINE: NEGATIVE
BLOOD URINE: NEGATIVE
COLOR: COLORLESS
GLUCOSE QUALITATIVE U: NEGATIVE
KETONES URINE: NEGATIVE
LEUKOCYTE ESTERASE URINE: NEGATIVE
NITRITE URINE: NEGATIVE
PH URINE: 7
PROTEIN URINE: NEGATIVE
SPECIFIC GRAVITY URINE: 1.01
UROBILINOGEN URINE: NORMAL

## 2021-05-07 LAB
M TB IFN-G BLD-IMP: NEGATIVE
QUANTIFERON TB PLUS MITOGEN MINUS NIL: 4.22 IU/ML
QUANTIFERON TB PLUS NIL: 0.02 IU/ML
QUANTIFERON TB PLUS TB1 MINUS NIL: -0.01 IU/ML
QUANTIFERON TB PLUS TB2 MINUS NIL: -0.01 IU/ML

## 2021-05-11 ENCOUNTER — APPOINTMENT (OUTPATIENT)
Dept: GERIATRICS | Facility: CLINIC | Age: 86
End: 2021-05-11
Payer: MEDICARE

## 2021-05-11 VITALS
OXYGEN SATURATION: 94 % | HEART RATE: 76 BPM | DIASTOLIC BLOOD PRESSURE: 65 MMHG | SYSTOLIC BLOOD PRESSURE: 115 MMHG | BODY MASS INDEX: 28.15 KG/M2 | WEIGHT: 164 LBS | RESPIRATION RATE: 15 BRPM

## 2021-05-11 NOTE — PHYSICAL EXAM
[General Appearance - Alert] : alert [General Appearance - In No Acute Distress] : in no acute distress [Sclera] : the sclera and conjunctiva were normal [Extraocular Movements] : extraocular movements were intact [No Oral Pallor] : no oral pallor [Neck Appearance] : the appearance of the neck was normal [] : no respiratory distress [Respiration, Rhythm And Depth] : normal respiratory rhythm and effort [Exaggerated Use Of Accessory Muscles For Inspiration] : no accessory muscle use [Auscultation Breath Sounds / Voice Sounds] : lungs were clear to auscultation bilaterally [Heart Rate And Rhythm] : heart rate was normal and rhythm regular [Heart Sounds] : normal S1 and S2 [Edema] : there was no peripheral edema [Bowel Sounds] : normal bowel sounds [Abdomen Soft] : soft [Abdomen Tenderness] : non-tender [No Spinal Tenderness] : no spinal tenderness [Nail Clubbing] : no clubbing  or cyanosis of the fingernails [Involuntary Movements] : no involuntary movements were seen [No Focal Deficits] : no focal deficits [Affect] : the affect was normal [Mood] : the mood was normal [FreeTextEntry1] : mild redness under left breast

## 2021-05-11 NOTE — HISTORY OF PRESENT ILLNESS
[DNR] : DNR [DNI] : DNI [FreeTextEntry1] : 89yoF seen for follow up for UI, CHF, and unsteady gait and 3122 completion.\par \par notes her weight has been increasing, but she is trying to change diet.\par , but denies increase in sob, lower ext edema or orthopnea.\par notes she is limiting ice cream.\par \par denies dysuria.  but remains with frequency.  takes lasix every other day.\par hx of recurrent UTI's with chronic urinary incontinence\par \par walking with walker \par \par no recent falls.\par OA of knees: pain with difficulty ambulating, \par takes tylenol daily.  needing refill on tramadol\par \par \par \par  [Walker] : walker

## 2021-05-11 NOTE — REVIEW OF SYSTEMS
[Eyesight Problems] : eyesight problems [Joint Pain] : joint pain [As Noted in HPI] : as noted in HPI [Negative] : Genitourinary [Chest Pain] : no chest pain [Palpitations] : no palpitations [Lower Ext Edema] : no lower extremity edema [FreeTextEntry3] : macular degeneration [FreeTextEntry9] : b/l knees

## 2021-05-25 ENCOUNTER — APPOINTMENT (OUTPATIENT)
Dept: GERIATRICS | Facility: CLINIC | Age: 86
End: 2021-05-25
Payer: MEDICARE

## 2021-05-25 DIAGNOSIS — L98.9 DISORDER OF THE SKIN AND SUBCUTANEOUS TISSUE, UNSPECIFIED: ICD-10-CM

## 2021-06-10 PROBLEM — L98.9 SKIN LESION: Status: ACTIVE | Noted: 2021-06-10

## 2021-06-10 NOTE — ASSESSMENT
[FreeTextEntry1] : advised to use daily moisturizer\par will monitor right lower leg lesion: likely inflammed SK

## 2021-06-10 NOTE — HISTORY OF PRESENT ILLNESS
[FreeTextEntry1] : 89yoF seen for follow up for UI, CHF, and unsteady gait seen for acute visit for cc of leg lesion.\par \par denies bleeding, pain or ulcer\par chronic lesion that she has been using steroid cream on\par denies itching

## 2021-07-06 ENCOUNTER — LABORATORY RESULT (OUTPATIENT)
Age: 86
End: 2021-07-06

## 2021-07-07 ENCOUNTER — LABORATORY RESULT (OUTPATIENT)
Age: 86
End: 2021-07-07

## 2021-07-08 LAB
APPEARANCE: CLEAR
BILIRUBIN URINE: NEGATIVE
BLOOD URINE: NEGATIVE
COLOR: NORMAL
GLUCOSE QUALITATIVE U: NEGATIVE
KETONES URINE: NEGATIVE
LEUKOCYTE ESTERASE URINE: ABNORMAL
NITRITE URINE: NEGATIVE
PH URINE: 6.5
PROTEIN URINE: NEGATIVE
SPECIFIC GRAVITY URINE: 1.01
UROBILINOGEN URINE: NORMAL

## 2021-07-13 ENCOUNTER — APPOINTMENT (OUTPATIENT)
Dept: GERIATRICS | Facility: CLINIC | Age: 86
End: 2021-07-13
Payer: MEDICARE

## 2021-07-13 DIAGNOSIS — B37.2 CANDIDIASIS OF SKIN AND NAIL: ICD-10-CM

## 2021-07-20 ENCOUNTER — APPOINTMENT (OUTPATIENT)
Dept: GERIATRICS | Facility: CLINIC | Age: 86
End: 2021-07-20
Payer: MEDICARE

## 2021-07-20 DIAGNOSIS — R21 RASH AND OTHER NONSPECIFIC SKIN ERUPTION: ICD-10-CM

## 2021-07-20 DIAGNOSIS — L85.3 XEROSIS CUTIS: ICD-10-CM

## 2021-07-20 RX ORDER — TRIAMCINOLONE ACETONIDE 1 MG/G
0.1 CREAM TOPICAL
Qty: 1 | Refills: 0 | Status: DISCONTINUED | COMMUNITY
Start: 2020-05-20 | End: 2021-07-20

## 2021-07-20 RX ORDER — HYDROCORTISONE 25 MG/G
2.5 CREAM TOPICAL
Qty: 1 | Refills: 2 | Status: DISCONTINUED | COMMUNITY
Start: 2020-12-03 | End: 2021-07-20

## 2021-07-20 RX ORDER — ESTRADIOL 0.1 MG/G
0.1 CREAM VAGINAL
Qty: 1 | Refills: 5 | Status: DISCONTINUED | COMMUNITY
Start: 2020-03-11 | End: 2021-07-20

## 2021-07-20 NOTE — ASSESSMENT
[FreeTextEntry1] : rash: discussed antifungal cream use\par periorbital xerosis:  threw out  lotions with patients, advised to only use plain daily moisturizer\par UI: c/w supportive care\par \par

## 2021-07-20 NOTE — PHYSICAL EXAM
[General Appearance - Alert] : alert [General Appearance - In No Acute Distress] : in no acute distress [] : no respiratory distress [Respiration, Rhythm And Depth] : normal respiratory rhythm and effort [Exaggerated Use Of Accessory Muscles For Inspiration] : no accessory muscle use [FreeTextEntry1] : erythema to bilateral skin under breasts and up to axilla

## 2021-07-20 NOTE — HISTORY OF PRESENT ILLNESS
[FreeTextEntry1] : 89yoF seen for acute visit for eye irritation, and rash under breasts.\par \par periorbital skin irritation:  she put cream on her eyes, she does not know what kind (has boxes of multiple old  creams upon review)  improving over this week.  she has not been putting any specific cream on.\par \par rash under breasts and into axilla:  she hasn't been using any cream.\par no bleeding\par no fever or chills\par \par UI:  continues with UI, wears protective undergarments, denies dysuria

## 2021-07-23 VITALS
OXYGEN SATURATION: 95 % | DIASTOLIC BLOOD PRESSURE: 75 MMHG | HEART RATE: 63 BPM | RESPIRATION RATE: 15 BRPM | SYSTOLIC BLOOD PRESSURE: 138 MMHG

## 2021-07-23 VITALS — BODY MASS INDEX: 27.64 KG/M2 | WEIGHT: 161 LBS

## 2021-07-23 PROBLEM — B37.2 CUTANEOUS CANDIDIASIS: Status: ACTIVE | Noted: 2018-08-07

## 2021-07-23 NOTE — PHYSICAL EXAM
[General Appearance - Alert] : alert [General Appearance - In No Acute Distress] : in no acute distress [Respiration, Rhythm And Depth] : normal respiratory rhythm and effort [] : no respiratory distress [Exaggerated Use Of Accessory Muscles For Inspiration] : no accessory muscle use [FreeTextEntry1] : erythema to bilateral skin under breasts and up to axilla

## 2021-07-23 NOTE — HISTORY OF PRESENT ILLNESS
[FreeTextEntry1] : 89yoF seen for acute visit for rash and UI.\par \par rash under breasts and into axilla:  she hasn't been using any cream.\par no bleeding\par no fever or chills\par \par UI:  continues with UI, wears protective undergarments, denies dysuria\par UA chck 7/7 that was negative for infection.\par she is no longer using estradiol vaginal cream\par she wears pads

## 2021-08-10 ENCOUNTER — APPOINTMENT (OUTPATIENT)
Dept: DERMATOLOGY | Facility: CLINIC | Age: 86
End: 2021-08-10
Payer: MEDICARE

## 2021-08-10 ENCOUNTER — APPOINTMENT (OUTPATIENT)
Dept: GERIATRICS | Facility: CLINIC | Age: 86
End: 2021-08-10
Payer: MEDICARE

## 2021-08-10 VITALS — WEIGHT: 160 LBS | BODY MASS INDEX: 27.31 KG/M2 | HEIGHT: 64 IN

## 2021-08-10 PROCEDURE — 99214 OFFICE O/P EST MOD 30 MIN: CPT

## 2021-08-11 NOTE — HISTORY OF PRESENT ILLNESS
[FreeTextEntry1] : 89yoFseen for acute visit after fall in her room over the weeknd, found by [employer] and was assisted up without ongoing pain.\par she denies ongoing pain today. walking with walker\par also notes continues with skin rash, planning on seeing derm later today. has been using ketoconazole, but with incomplete relief.\par patient does her own medication management. [One fall no injury in past year] : Patient reported one fall in the past year without injury

## 2021-08-11 NOTE — PHYSICAL EXAM
[Alert] : alert [No Acute Distress] : in no acute distress [Sclera] : the sclera and conjunctiva were normal [EOMI] : extraocular movements were intact [No Respiratory Distress] : no respiratory distress [No Acc Muscle Use] : no accessory muscle use [Respiration, Rhythm And Depth] : normal respiratory rhythm and effort [Involuntary Movements] : no involuntary movements were seen [de-identified] : unsteady gait, slow needing arms to rise from seated position

## 2021-08-24 RX ORDER — LIDOCAINE 4 G/100G
4 PATCH TOPICAL
Qty: 10 | Refills: 1 | Status: DISCONTINUED | COMMUNITY
Start: 2021-01-12 | End: 2021-08-24

## 2021-09-15 ENCOUNTER — APPOINTMENT (OUTPATIENT)
Dept: DERMATOLOGY | Facility: CLINIC | Age: 86
End: 2021-09-15
Payer: MEDICARE

## 2021-09-15 PROCEDURE — 17110 DESTRUCTION B9 LES UP TO 14: CPT

## 2021-09-15 PROCEDURE — 99214 OFFICE O/P EST MOD 30 MIN: CPT | Mod: 25

## 2021-10-18 ENCOUNTER — APPOINTMENT (OUTPATIENT)
Dept: GERIATRICS | Facility: CLINIC | Age: 86
End: 2021-10-18
Payer: MEDICARE

## 2021-10-18 VITALS — SYSTOLIC BLOOD PRESSURE: 140 MMHG | HEART RATE: 68 BPM | DIASTOLIC BLOOD PRESSURE: 79 MMHG

## 2021-10-18 NOTE — PHYSICAL EXAM
[Alert] : alert [No Acute Distress] : in no acute distress [EOMI] : extraocular movements were intact [Normal Outer Ear/Nose] : the ears and nose were normal in appearance [Normal S1, S2] : normal S1 and S2 [Edema] : edema was not present [Normal Color / Pigmentation] : normal skin color and pigmentation [Normal] : no focal deficits

## 2021-10-18 NOTE — REVIEW OF SYSTEMS
[Negative] : Psychiatric [Fever] : no fever [Chills] : no chills [Heart Rate Is Slow] : the heart rate was not slow [Heart Rate Is Fast] : the heart rate was not fast [Chest Pain] : no chest pain [Palpitations] : no palpitations

## 2021-10-18 NOTE — HISTORY OF PRESENT ILLNESS
[FreeTextEntry1] : Asked to evaluate this 89 yo female because "she doesn’t look good" The pt was seen in her home at the [facility]. The pt states she currently feels fine. She denies chest pain, sob, palp, abd pain. Apparently she ate a hamburgere for lunch and co weakbess. No dizziness or syncope. The pt is completely aler and looks well. No fever or systemic symptoms. The pt does co mild fatigue.

## 2021-10-18 NOTE — ASSESSMENT
[FreeTextEntry1] : Asked to evaluate for complaints of "not feeling well"   earlier  today. At present she feels fine. No cardiac sx.. Will monitor. No acute interventions required.

## 2021-10-22 ENCOUNTER — TRANSCRIPTION ENCOUNTER (OUTPATIENT)
Age: 86
End: 2021-10-22

## 2021-10-24 ENCOUNTER — INPATIENT (INPATIENT)
Facility: HOSPITAL | Age: 86
LOS: 3 days | Discharge: NURSING, REHAB OR LONG-TERM CARE | DRG: 312 | End: 2021-10-28
Attending: HOSPITALIST | Admitting: STUDENT IN AN ORGANIZED HEALTH CARE EDUCATION/TRAINING PROGRAM
Payer: MEDICARE

## 2021-10-24 VITALS
TEMPERATURE: 97 F | HEART RATE: 64 BPM | SYSTOLIC BLOOD PRESSURE: 116 MMHG | OXYGEN SATURATION: 94 % | HEIGHT: 64 IN | RESPIRATION RATE: 16 BRPM | DIASTOLIC BLOOD PRESSURE: 70 MMHG | WEIGHT: 158.07 LBS

## 2021-10-24 DIAGNOSIS — R53.83 OTHER FATIGUE: ICD-10-CM

## 2021-10-24 DIAGNOSIS — R55 SYNCOPE AND COLLAPSE: ICD-10-CM

## 2021-10-24 DIAGNOSIS — O00.1 TUBAL PREGNANCY: Chronic | ICD-10-CM

## 2021-10-24 DIAGNOSIS — M19.90 UNSPECIFIED OSTEOARTHRITIS, UNSPECIFIED SITE: ICD-10-CM

## 2021-10-24 DIAGNOSIS — I10 ESSENTIAL (PRIMARY) HYPERTENSION: ICD-10-CM

## 2021-10-24 DIAGNOSIS — Z98.890 OTHER SPECIFIED POSTPROCEDURAL STATES: Chronic | ICD-10-CM

## 2021-10-24 DIAGNOSIS — Z98.49 CATARACT EXTRACTION STATUS, UNSPECIFIED EYE: Chronic | ICD-10-CM

## 2021-10-24 DIAGNOSIS — Z96.7 PRESENCE OF OTHER BONE AND TENDON IMPLANTS: Chronic | ICD-10-CM

## 2021-10-24 LAB
ALBUMIN SERPL ELPH-MCNC: 3.7 G/DL — SIGNIFICANT CHANGE UP (ref 3.3–5)
ALP SERPL-CCNC: 122 U/L — HIGH (ref 40–120)
ALT FLD-CCNC: 17 U/L — SIGNIFICANT CHANGE UP (ref 10–45)
ANION GAP SERPL CALC-SCNC: 13 MMOL/L — SIGNIFICANT CHANGE UP (ref 5–17)
APPEARANCE UR: CLEAR — SIGNIFICANT CHANGE UP
AST SERPL-CCNC: 17 U/L — SIGNIFICANT CHANGE UP (ref 10–40)
BACTERIA # UR AUTO: NEGATIVE — SIGNIFICANT CHANGE UP
BASE EXCESS BLDV CALC-SCNC: 1 MMOL/L — SIGNIFICANT CHANGE UP (ref -2–2)
BASOPHILS # BLD AUTO: 0.04 K/UL — SIGNIFICANT CHANGE UP (ref 0–0.2)
BASOPHILS NFR BLD AUTO: 0.5 % — SIGNIFICANT CHANGE UP (ref 0–2)
BILIRUB SERPL-MCNC: 0.4 MG/DL — SIGNIFICANT CHANGE UP (ref 0.2–1.2)
BILIRUB UR-MCNC: NEGATIVE — SIGNIFICANT CHANGE UP
BUN SERPL-MCNC: 31 MG/DL — HIGH (ref 7–23)
CA-I SERPL-SCNC: 1.15 MMOL/L — SIGNIFICANT CHANGE UP (ref 1.15–1.33)
CALCIUM SERPL-MCNC: 8.9 MG/DL — SIGNIFICANT CHANGE UP (ref 8.4–10.5)
CHLORIDE BLDV-SCNC: 101 MMOL/L — SIGNIFICANT CHANGE UP (ref 96–108)
CHLORIDE SERPL-SCNC: 101 MMOL/L — SIGNIFICANT CHANGE UP (ref 96–108)
CO2 BLDV-SCNC: 29 MMOL/L — HIGH (ref 22–26)
CO2 SERPL-SCNC: 22 MMOL/L — SIGNIFICANT CHANGE UP (ref 22–31)
COLOR SPEC: COLORLESS — SIGNIFICANT CHANGE UP
CREAT SERPL-MCNC: 1.39 MG/DL — HIGH (ref 0.5–1.3)
DIFF PNL FLD: NEGATIVE — SIGNIFICANT CHANGE UP
EOSINOPHIL # BLD AUTO: 0.23 K/UL — SIGNIFICANT CHANGE UP (ref 0–0.5)
EOSINOPHIL NFR BLD AUTO: 3 % — SIGNIFICANT CHANGE UP (ref 0–6)
EPI CELLS # UR: 3 /HPF — SIGNIFICANT CHANGE UP
GAS PNL BLDV: 136 MMOL/L — SIGNIFICANT CHANGE UP (ref 136–145)
GAS PNL BLDV: SIGNIFICANT CHANGE UP
GAS PNL BLDV: SIGNIFICANT CHANGE UP
GLUCOSE BLDV-MCNC: 169 MG/DL — HIGH (ref 70–99)
GLUCOSE SERPL-MCNC: 175 MG/DL — HIGH (ref 70–99)
GLUCOSE UR QL: NEGATIVE — SIGNIFICANT CHANGE UP
HCO3 BLDV-SCNC: 28 MMOL/L — SIGNIFICANT CHANGE UP (ref 22–29)
HCT VFR BLD CALC: 34.6 % — SIGNIFICANT CHANGE UP (ref 34.5–45)
HCT VFR BLDA CALC: 35 % — SIGNIFICANT CHANGE UP (ref 34.5–46.5)
HGB BLD CALC-MCNC: 11.6 G/DL — LOW (ref 11.7–16.1)
HGB BLD-MCNC: 11.2 G/DL — LOW (ref 11.5–15.5)
HYALINE CASTS # UR AUTO: 1 /LPF — SIGNIFICANT CHANGE UP (ref 0–2)
IMM GRANULOCYTES NFR BLD AUTO: 0.6 % — SIGNIFICANT CHANGE UP (ref 0–1.5)
KETONES UR-MCNC: NEGATIVE — SIGNIFICANT CHANGE UP
LACTATE BLDV-MCNC: 1.3 MMOL/L — SIGNIFICANT CHANGE UP (ref 0.7–2)
LEUKOCYTE ESTERASE UR-ACNC: NEGATIVE — SIGNIFICANT CHANGE UP
LYMPHOCYTES # BLD AUTO: 1.08 K/UL — SIGNIFICANT CHANGE UP (ref 1–3.3)
LYMPHOCYTES # BLD AUTO: 14 % — SIGNIFICANT CHANGE UP (ref 13–44)
MCHC RBC-ENTMCNC: 30.4 PG — SIGNIFICANT CHANGE UP (ref 27–34)
MCHC RBC-ENTMCNC: 32.4 GM/DL — SIGNIFICANT CHANGE UP (ref 32–36)
MCV RBC AUTO: 93.8 FL — SIGNIFICANT CHANGE UP (ref 80–100)
MONOCYTES # BLD AUTO: 0.75 K/UL — SIGNIFICANT CHANGE UP (ref 0–0.9)
MONOCYTES NFR BLD AUTO: 9.7 % — SIGNIFICANT CHANGE UP (ref 2–14)
NEUTROPHILS # BLD AUTO: 5.58 K/UL — SIGNIFICANT CHANGE UP (ref 1.8–7.4)
NEUTROPHILS NFR BLD AUTO: 72.2 % — SIGNIFICANT CHANGE UP (ref 43–77)
NITRITE UR-MCNC: NEGATIVE — SIGNIFICANT CHANGE UP
NRBC # BLD: 0 /100 WBCS — SIGNIFICANT CHANGE UP (ref 0–0)
PCO2 BLDV: 51 MMHG — HIGH (ref 39–42)
PH BLDV: 7.34 — SIGNIFICANT CHANGE UP (ref 7.32–7.43)
PH UR: 7 — SIGNIFICANT CHANGE UP (ref 5–8)
PLATELET # BLD AUTO: 165 K/UL — SIGNIFICANT CHANGE UP (ref 150–400)
PO2 BLDV: 22 MMHG — LOW (ref 25–45)
POTASSIUM BLDV-SCNC: 5 MMOL/L — SIGNIFICANT CHANGE UP (ref 3.5–5.1)
POTASSIUM SERPL-MCNC: 4.4 MMOL/L — SIGNIFICANT CHANGE UP (ref 3.5–5.3)
POTASSIUM SERPL-SCNC: 4.4 MMOL/L — SIGNIFICANT CHANGE UP (ref 3.5–5.3)
PROT SERPL-MCNC: 6.8 G/DL — SIGNIFICANT CHANGE UP (ref 6–8.3)
PROT UR-MCNC: NEGATIVE — SIGNIFICANT CHANGE UP
RBC # BLD: 3.69 M/UL — LOW (ref 3.8–5.2)
RBC # FLD: 14.6 % — HIGH (ref 10.3–14.5)
RBC CASTS # UR COMP ASSIST: 1 /HPF — SIGNIFICANT CHANGE UP (ref 0–4)
SAO2 % BLDV: 30.9 % — LOW (ref 67–88)
SARS-COV-2 RNA SPEC QL NAA+PROBE: SIGNIFICANT CHANGE UP
SODIUM SERPL-SCNC: 136 MMOL/L — SIGNIFICANT CHANGE UP (ref 135–145)
SP GR SPEC: 1.01 — LOW (ref 1.01–1.02)
UROBILINOGEN FLD QL: NEGATIVE — SIGNIFICANT CHANGE UP
WBC # BLD: 7.73 K/UL — SIGNIFICANT CHANGE UP (ref 3.8–10.5)
WBC # FLD AUTO: 7.73 K/UL — SIGNIFICANT CHANGE UP (ref 3.8–10.5)
WBC UR QL: 2 /HPF — SIGNIFICANT CHANGE UP (ref 0–5)

## 2021-10-24 PROCEDURE — 71045 X-RAY EXAM CHEST 1 VIEW: CPT | Mod: 26

## 2021-10-24 PROCEDURE — 99285 EMERGENCY DEPT VISIT HI MDM: CPT | Mod: GC

## 2021-10-24 PROCEDURE — 93010 ELECTROCARDIOGRAM REPORT: CPT

## 2021-10-24 PROCEDURE — 99223 1ST HOSP IP/OBS HIGH 75: CPT

## 2021-10-24 RX ORDER — ACETAMINOPHEN 500 MG
650 TABLET ORAL EVERY 6 HOURS
Refills: 0 | Status: DISCONTINUED | OUTPATIENT
Start: 2021-10-24 | End: 2021-10-28

## 2021-10-24 RX ORDER — MULTIVIT-MIN/FERROUS GLUCONATE 9 MG/15 ML
1 LIQUID (ML) ORAL
Qty: 0 | Refills: 0 | DISCHARGE

## 2021-10-24 RX ORDER — ACETAMINOPHEN 500 MG
2 TABLET ORAL
Qty: 0 | Refills: 0 | DISCHARGE

## 2021-10-24 RX ORDER — LEVOTHYROXINE SODIUM 125 MCG
112 TABLET ORAL DAILY
Refills: 0 | Status: DISCONTINUED | OUTPATIENT
Start: 2021-10-24 | End: 2021-10-28

## 2021-10-24 RX ORDER — DOCUSATE SODIUM 100 MG
2 CAPSULE ORAL
Qty: 0 | Refills: 0 | DISCHARGE

## 2021-10-24 RX ORDER — TRAMADOL HYDROCHLORIDE 50 MG/1
50 TABLET ORAL
Refills: 0 | Status: DISCONTINUED | OUTPATIENT
Start: 2021-10-24 | End: 2021-10-28

## 2021-10-24 NOTE — ED ADULT NURSE REASSESSMENT NOTE - NS ED NURSE REASSESS COMMENT FT1
received report from HAYLEE cleaning. Patient resting in the stretcher. Safety measures maintained. Bed in the lowest position. No acute distress noted or further complaints at this time.

## 2021-10-24 NOTE — H&P ADULT - NSHPPHYSICALEXAM_GEN_ALL_CORE
PHYSICAL EXAM:   GENERAL: Alert. Not confused. No acute distress. Not thin. Not cachectic. Not obese.  HEAD:  Atraumatic. Normocephalic.  EYES: EOMI. PERRLA. Normal conjunctiva/sclera.  ENT: Neck supple. No JVD. Moist oral mucosa. Not edentulous. No thrush.  LYMPH: Normal supraclavicular/cervical lymph nodes.   CARDIAC: Not tachy, Not carolyn. Regular rhythm. Not irregularly irregular. S1. S2.   LUNG/CHEST: CTAB. BS equal bilaterally. No wheezes. No rales. No rhonchi.  ABDOMEN: Soft. No tenderness. No distension. No fluid wave. Normal bowel sounds.  BACK: No midline/vertebral tenderness. No flank tenderness.  VASCULAR: +2 b/l radial or ulnar pulses. Palpable DP pulses.  EXTREMITIES:  No clubbing. No cyanosis. No edema. Moving all 4.  NEUROLOGY: A&Ox3. Non-focal exam. Cranial nerves intact. Normal speech. Sensation intact.  PSYCH: Normal behavior. Normal affect.  SKIN: No jaundice. No erythema. No rash/lesion.  Vascular Access:     ICU Vital Signs Last 24 Hrs  T(C): 36.8 (24 Oct 2021 19:18), Max: 36.8 (24 Oct 2021 19:18)  T(F): 98.2 (24 Oct 2021 19:18), Max: 98.2 (24 Oct 2021 19:18)  HR: 66 (24 Oct 2021 19:18) (64 - 66)  BP: 181/97 (24 Oct 2021 19:18) (116/70 - 181/97)  BP(mean): --  ABP: --  ABP(mean): --  RR: 18 (24 Oct 2021 19:18) (16 - 19)  SpO2: 97% (24 Oct 2021 19:18) (94% - 97%)      I&O's Summary

## 2021-10-24 NOTE — ED PROVIDER NOTE - NS ED ROS FT
Constitution: (+) Lethargy; No Fever or chills, No Weight Loss,   Eyes: No visual changes  HEENT: No cough, No Discharge, No Rhinorrhea, No URI symptoms  Cardio: No Chest pain, No Palpitations, No Dyspnea  Resp: No SOB, No Wheezing  GI: No abdominal pain, No Nausea, No Vomiting, No Constipation, No Diarrhea  : (+) urinary frequency; No burning upon urination, trouble urinating, no foul odor from urine  MSK: No Back pain, No Numbness, No Tingling, No Weakness  Neuro: No Headache, Normal Gait  Skin: No rashes, No Bruising, No Swelling

## 2021-10-24 NOTE — ED PROVIDER NOTE - ATTENDING CONTRIBUTION TO CARE
Patient is a 89 yo F with history of HTN, hypothyroidism, CAD, arthritis here for evaluation of syncope and weakness. Patient states she has been feeling very weak this past week. She states she has been canceling her doctor's appointments because she feels too weak to leave the home. Today, while at lunch, patient had episode of syncope. Denies preceding symptoms. Denies head trauma. No chest pain, shortness of breath or abdominal pain. Denies fevers, chills, nausea, vomiting. She reports chronic urinary frequency but no dysuria. She reports mild cough and congestion. No focal weakness. No headache.    VS noted  Gen. no acute distress, Non toxic, elderly  HEENT: PERRL, EOMI, mmm   Lungs: CTAB/L no C/ W /R   CVS: RRR   Abd; Soft non tender, non distended   Ext: no edema  Skin: no rash  Neuro AAOx3 non focal clear speech  a/p: weakness and syncope - plan for ekg, labs, u/a. TBA.   - Raissa PATTON

## 2021-10-24 NOTE — ED ADULT NURSE NOTE - ED STAT RN HANDOFF DETAILS 2
Hand off received from night nurse Shaista Munoz RN. Pt awaiting tele bed Ketoconazole Pregnancy And Lactation Text: This medication is Pregnancy Category C and it isn't know if it is safe during pregnancy. It is also excreted in breast milk and breast feeding isn't recommended.

## 2021-10-24 NOTE — H&P ADULT - NSHPREVIEWOFSYSTEMS_GEN_ALL_CORE
REVIEW OF SYSTEMS:  CONSTITUTIONAL: +weakness. No fevers. No chills. No rigors. No weight loss. No night sweats. No poor appetite.  EYES: No blurry or double vision. No eye pain.  ENT: No hearing difficulty. No vertigo. No dysphagia. No sore throat. No Sinusitis/rhinorrhea.   NECK: No pain. No stiffness/rigidity.  CARDIAC: No chest pain. No palpitations. No lightheadedness. +syncope.  RESPIRATORY: +cough. No SOB. No hemoptysis.  GASTROINTESTINAL: No abdominal pain. No nausea. No vomiting. No hematemesis. No diarrhea. +constipation. No melena. No hematochezia.  GENITOURINARY: No dysuria. +frequency. No hesitancy. No hematuria. No oliguria.  NEUROLOGICAL: No numbness/tingling. No focal weakness. No urinary or fecal incontinence. No headache. No unsteady gait.  BACK: +chronic back pain. No flank pain.  EXTREMITIES: No lower extremity edema. Full ROM. +chronic joint pain.  SKIN: No rashes. No itching. No other lesions.  PSYCHIATRIC: No depression. No anxiety. No SI/HI.  ALLERGIC: No lip swelling. No hives.  All other review of systems is negative unless indicated above.  Unless indicated above, unable to assess ROS 2/2

## 2021-10-24 NOTE — H&P ADULT - HISTORY OF PRESENT ILLNESS
89F c hx HTN, CAD, loop recorder, hypothyroidism, AVN of left femoral head, arthritis of knees, chronic urinary frequency, pw 1 week of weakness, and syncopal episode today.    Pt states she has been feeling increasing tiredness, weakness intermittently over the past 1 week. Otherwise no localizing symptoms. Pt was eating lunch today, when she had a witnessed episode of LOC. Pt thinks she was unconscious for a short period of time, but doesn't remember what happened. Denies any pre-syncopal symptoms. Pt states she regained consciousness without confusion. Pt states she's had syncope multiple times in her life, but the last time was about 5 yrs ago 2/2 uti. Pt reports a cough/congestion since arriving at the hospital, but not prior to arrival. Pt denies any relieving or exacerbating factors. At baseline walks with a walker without assistance. No dysuria, fevers, chills.    vs; tm 98.2, p 64, bp 116/70, r19, 94% ra    IST[facility] Reference #: [id number]  Rx Written	Rx Dispensed	Drug	Quantity	Days Supply	Prescriber Name	Prescriber [place] #	Payment Method	Dispenser  08/24/2021	08/25/2021	tramadol hcl 50 mg tablet	60	30	Betty Singh)	[id number]	Medicare	[facility] [id number]

## 2021-10-24 NOTE — ED ADULT NURSE NOTE - OBJECTIVE STATEMENT
Pt is a 89 Y A&O x3 F with hx of HTN, CHF, hypothyroidism, recurrent UTI presenting to ED with via EMS from [facility] with c/o witnessed syncopal episode s/p eating lunch today. Pt endorses feeling tired and fatigued. Pt denies chest pain, dizziness, headache, N+V, abd pain. Pt arrives to ED breathing unlabored on RA. Pt speaking in complete sentences. Skin is warm and dry. No diaphoresis noted. + edema noted to LE b/l. IV established. Safety and comfort maintained.

## 2021-10-24 NOTE — ED PROVIDER NOTE - ST/T WAVE
no st elevations, no st depressions, twi in lead 3 no st elevations, no st depressions, twi in lead 3, AVF, V6

## 2021-10-24 NOTE — ED PROVIDER NOTE - CLINICAL SUMMARY MEDICAL DECISION MAKING FREE TEXT BOX
89 female, Hx: Hypothyroidism, CAD, HTN, MD - presents with cc: syncope, more lethargic, increasing urinary frequency. Exam, presentation, and history concerning for syncope - consider metabolic, infectious, cardiogenic. Eval is NOT consistent with seizure/CVA (CN2-12, PERRL, EOMI, 5/5 Strength in B/L UE and LE, no tongue lac/urinary incontinence). Plan: CBC, CMP, EKG, CXR, Trop, UA, UCx, Orthostatics, COVID. Consider admission for TTE/Structural Eval. VSS, non-toxic.

## 2021-10-24 NOTE — H&P ADULT - NSHPLABSRESULTS_GEN_ALL_CORE
Personally reviewed old records.  Personally reviewed labs.  Personally reviewed imaging.  Personally reviewed EKG. NSR rate 63, . Q in III/V1. TWI in III/AVF/V6.                          11.2   7.73  )-----------( 165      ( 24 Oct 2021 15:18 )             34.6       10-24    136  |  101  |  31<H>  ----------------------------<  175<H>  4.4   |  22  |  1.39<H>    Ca    8.9      24 Oct 2021 15:18    TPro  6.8  /  Alb  3.7  /  TBili  0.4  /  DBili  x   /  AST  17  /  ALT  17  /  AlkPhos  122<H>  10-24            LIVER FUNCTIONS - ( 24 Oct 2021 15:18 )  Alb: 3.7 g/dL / Pro: 6.8 g/dL / ALK PHOS: 122 U/L / ALT: 17 U/L / AST: 17 U/L / GGT: x                 Urinalysis Basic - ( 24 Oct 2021 16:26 )    Color: Colorless / Appearance: Clear / S.009 / pH: x  Gluc: x / Ketone: Negative  / Bili: Negative / Urobili: Negative   Blood: x / Protein: Negative / Nitrite: Negative   Leuk Esterase: Negative / RBC: 1 /hpf / WBC 2 /HPF   Sq Epi: x / Non Sq Epi: 3 /hpf / Bacteria: Negative

## 2021-10-24 NOTE — ED PROVIDER NOTE - PHYSICAL EXAMINATION
GEN - NAD; non-toxic; A+Ox3, speaking full sentences, steady gait   HENT - NC/AT, No visible Ecchymosis, No Abrasions, No Lac/Tears, MMM, no discharge  EYES - EOMI, no conjunctival pallor, no scleral icterus  PULM - CTA B/L,  symmetric breath sounds  CV -  RRR, S1 S2, no murmurs 2+ Pulses B/L UE  GI - (-) Guillermo's, (-) Rovsings, (-) McBurneys; NT/ND, soft, no guarding, no rebound, no masses    MSK/EXT- (+) 1 + symmetric pitting edema, no gross deformity, warm and well perfused, no calf tenderness/swelling/erythema   SKIN - no rash or bruising  NEUROLOGIC - alert, CN2-12 intact, sensation intact, moving all 4 ext with 5/5 Strength

## 2021-10-24 NOTE — ED PROVIDER NOTE - PROGRESS NOTE DETAILS
Resident Keiko: preliminary evaluation non-actionable, however pt would benefit from structural heart eval with TTE. Case endorsed to Hospitalist (DO Osbaldo) on Tele. Spoke with Daughter Lexie and informed her of mother's clinical ED course.

## 2021-10-24 NOTE — H&P ADULT - PROBLEM SELECTOR PLAN 1
- tele  - trend CE  - TTE  - need to call for loop recorder interrogation in AM  - orthostats  - hold coreg and lasix for now  - check d-dimer. if positive will need workup for thromboembolism

## 2021-10-24 NOTE — H&P ADULT - ASSESSMENT
89F c hx HTN, CAD, loop recorder, hypothyroidism, AVN of left femoral head, arthritis of knees, chronic urinary frequency, pw 1 week of weakness, and syncopal of unclear etiol

## 2021-10-24 NOTE — ED ADULT NURSE REASSESSMENT NOTE - NS ED NURSE REASSESS COMMENT FT1
Called admission team at [phone] to notify them of BP of 181/97, waiting for orders. Also requested a diet order, patient able to eat a regular diet.

## 2021-10-24 NOTE — ED ADULT NURSE NOTE - NSIMPLEMENTINTERV_GEN_ALL_ED
Implemented All Universal Safety Interventions:  [place] to call system. Call bell, personal items and telephone within reach. Instruct patient to call for assistance. Room bathroom lighting operational. Non-slip footwear when patient is off stretcher. Physically safe environment: no spills, clutter or unnecessary equipment. Stretcher in lowest position, wheels locked, appropriate side rails in place.
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
## 2021-10-25 ENCOUNTER — NON-APPOINTMENT (OUTPATIENT)
Age: 86
End: 2021-10-25

## 2021-10-25 LAB
A1C WITH ESTIMATED AVERAGE GLUCOSE RESULT: 5.9 % — HIGH (ref 4–5.6)
ALBUMIN SERPL ELPH-MCNC: 4.3 G/DL — SIGNIFICANT CHANGE UP (ref 3.3–5)
ALP SERPL-CCNC: 140 U/L — HIGH (ref 40–120)
ALT FLD-CCNC: 27 U/L — SIGNIFICANT CHANGE UP (ref 10–45)
ANION GAP SERPL CALC-SCNC: 18 MMOL/L — HIGH (ref 5–17)
AST SERPL-CCNC: 22 U/L — SIGNIFICANT CHANGE UP (ref 10–40)
BASOPHILS # BLD AUTO: 0.05 K/UL — SIGNIFICANT CHANGE UP (ref 0–0.2)
BASOPHILS NFR BLD AUTO: 0.6 % — SIGNIFICANT CHANGE UP (ref 0–2)
BILIRUB SERPL-MCNC: 0.6 MG/DL — SIGNIFICANT CHANGE UP (ref 0.2–1.2)
BUN SERPL-MCNC: 26 MG/DL — HIGH (ref 7–23)
CALCIUM SERPL-MCNC: 9.5 MG/DL — SIGNIFICANT CHANGE UP (ref 8.4–10.5)
CHLORIDE SERPL-SCNC: 100 MMOL/L — SIGNIFICANT CHANGE UP (ref 96–108)
CO2 SERPL-SCNC: 23 MMOL/L — SIGNIFICANT CHANGE UP (ref 22–31)
CREAT SERPL-MCNC: 1.09 MG/DL — SIGNIFICANT CHANGE UP (ref 0.5–1.3)
D DIMER BLD IA.RAPID-MCNC: 295 NG/ML DDU — HIGH
EOSINOPHIL # BLD AUTO: 0.17 K/UL — SIGNIFICANT CHANGE UP (ref 0–0.5)
EOSINOPHIL NFR BLD AUTO: 1.9 % — SIGNIFICANT CHANGE UP (ref 0–6)
ESTIMATED AVERAGE GLUCOSE: 123 MG/DL — HIGH (ref 68–114)
GLUCOSE SERPL-MCNC: 100 MG/DL — HIGH (ref 70–99)
HCT VFR BLD CALC: 39.9 % — SIGNIFICANT CHANGE UP (ref 34.5–45)
HGB BLD-MCNC: 12.6 G/DL — SIGNIFICANT CHANGE UP (ref 11.5–15.5)
IMM GRANULOCYTES NFR BLD AUTO: 0.4 % — SIGNIFICANT CHANGE UP (ref 0–1.5)
LYMPHOCYTES # BLD AUTO: 1.09 K/UL — SIGNIFICANT CHANGE UP (ref 1–3.3)
LYMPHOCYTES # BLD AUTO: 12.2 % — LOW (ref 13–44)
MAGNESIUM SERPL-MCNC: 2 MG/DL — SIGNIFICANT CHANGE UP (ref 1.6–2.6)
MCHC RBC-ENTMCNC: 29.3 PG — SIGNIFICANT CHANGE UP (ref 27–34)
MCHC RBC-ENTMCNC: 31.6 GM/DL — LOW (ref 32–36)
MCV RBC AUTO: 92.8 FL — SIGNIFICANT CHANGE UP (ref 80–100)
MONOCYTES # BLD AUTO: 0.84 K/UL — SIGNIFICANT CHANGE UP (ref 0–0.9)
MONOCYTES NFR BLD AUTO: 9.4 % — SIGNIFICANT CHANGE UP (ref 2–14)
NEUTROPHILS # BLD AUTO: 6.71 K/UL — SIGNIFICANT CHANGE UP (ref 1.8–7.4)
NEUTROPHILS NFR BLD AUTO: 75.5 % — SIGNIFICANT CHANGE UP (ref 43–77)
NRBC # BLD: 0 /100 WBCS — SIGNIFICANT CHANGE UP (ref 0–0)
PHOSPHATE SERPL-MCNC: 3 MG/DL — SIGNIFICANT CHANGE UP (ref 2.5–4.5)
PLATELET # BLD AUTO: 200 K/UL — SIGNIFICANT CHANGE UP (ref 150–400)
POTASSIUM SERPL-MCNC: 4.1 MMOL/L — SIGNIFICANT CHANGE UP (ref 3.5–5.3)
POTASSIUM SERPL-SCNC: 4.1 MMOL/L — SIGNIFICANT CHANGE UP (ref 3.5–5.3)
PROT SERPL-MCNC: 7.5 G/DL — SIGNIFICANT CHANGE UP (ref 6–8.3)
RBC # BLD: 4.3 M/UL — SIGNIFICANT CHANGE UP (ref 3.8–5.2)
RBC # FLD: 14.6 % — HIGH (ref 10.3–14.5)
SODIUM SERPL-SCNC: 141 MMOL/L — SIGNIFICANT CHANGE UP (ref 135–145)
TROPONIN T, HIGH SENSITIVITY RESULT: 29 NG/L — SIGNIFICANT CHANGE UP (ref 0–51)
WBC # BLD: 8.9 K/UL — SIGNIFICANT CHANGE UP (ref 3.8–10.5)
WBC # FLD AUTO: 8.9 K/UL — SIGNIFICANT CHANGE UP (ref 3.8–10.5)

## 2021-10-25 PROCEDURE — 93306 TTE W/DOPPLER COMPLETE: CPT | Mod: 26

## 2021-10-25 PROCEDURE — 99233 SBSQ HOSP IP/OBS HIGH 50: CPT

## 2021-10-25 RX ORDER — SENNA PLUS 8.6 MG/1
2 TABLET ORAL AT BEDTIME
Refills: 0 | Status: DISCONTINUED | OUTPATIENT
Start: 2021-10-25 | End: 2021-10-28

## 2021-10-25 RX ORDER — HYDRALAZINE HCL 50 MG
5 TABLET ORAL ONCE
Refills: 0 | Status: COMPLETED | OUTPATIENT
Start: 2021-10-25 | End: 2021-10-25

## 2021-10-25 RX ORDER — ENOXAPARIN SODIUM 100 MG/ML
40 INJECTION SUBCUTANEOUS EVERY 24 HOURS
Refills: 0 | Status: DISCONTINUED | OUTPATIENT
Start: 2021-10-25 | End: 2021-10-28

## 2021-10-25 RX ORDER — POLYETHYLENE GLYCOL 3350 17 G/17G
17 POWDER, FOR SOLUTION ORAL
Refills: 0 | Status: DISCONTINUED | OUTPATIENT
Start: 2021-10-25 | End: 2021-10-28

## 2021-10-25 RX ORDER — CARVEDILOL PHOSPHATE 80 MG/1
12.5 CAPSULE, EXTENDED RELEASE ORAL EVERY 12 HOURS
Refills: 0 | Status: DISCONTINUED | OUTPATIENT
Start: 2021-10-25 | End: 2021-10-28

## 2021-10-25 RX ADMIN — Medication 112 MICROGRAM(S): at 05:38

## 2021-10-25 RX ADMIN — ENOXAPARIN SODIUM 40 MILLIGRAM(S): 100 INJECTION SUBCUTANEOUS at 17:06

## 2021-10-25 RX ADMIN — Medication 5 MILLIGRAM(S): at 17:06

## 2021-10-25 RX ADMIN — POLYETHYLENE GLYCOL 3350 17 GRAM(S): 17 POWDER, FOR SOLUTION ORAL at 17:06

## 2021-10-25 RX ADMIN — Medication 5 MILLIGRAM(S): at 00:47

## 2021-10-25 RX ADMIN — SENNA PLUS 2 TABLET(S): 8.6 TABLET ORAL at 21:50

## 2021-10-25 RX ADMIN — CARVEDILOL PHOSPHATE 12.5 MILLIGRAM(S): 80 CAPSULE, EXTENDED RELEASE ORAL at 17:06

## 2021-10-25 NOTE — PROCEDURE NOTE - ADDITIONAL PROCEDURE DETAILS
Indication for Interrogation: Syncope  Presenting Rhythm: V-Sensed 80s  Measured data: Good R-Wave sensing, normal device function  Events since last interrogation (2/16/2020): five episodes recorded as pause from June 2021 to October 21st, 2021, available EGMs reviewed, consistent with ventricular undersensing   Changes made: N/A  Discussed with primary team, recorded events do not correlate with patient symptoms.       BIBI Galvez PA-C  [id number]

## 2021-10-25 NOTE — PATIENT PROFILE ADULT - BILL PAYMENT
56 Y M with increasing MORALES and now orthopnea last night. DDx: ACS, CHF, COPD. WIll get labs including troponin, CXR, ekg with some new lateral T wave inversions but no STEPHAN, pt will likely need admission and echo
no

## 2021-10-25 NOTE — PHYSICAL THERAPY INITIAL EVALUATION ADULT - ADDITIONAL COMMENTS
Ambulates with walker at baseline. Pt lives in [facility] facility, ambulates independently with rollator walker at baseline. Showers independently.

## 2021-10-25 NOTE — PHYSICAL THERAPY INITIAL EVALUATION ADULT - IMPAIRMENTS CONTRIBUTING TO GAIT DEVIATIONS, PT EVAL
[facility] Occupational Therapy  Initial Hand Evaluation     Date: 6/5/2019  Name: Royal HALLEY Celaya III  Clinic Number: [id number]    Medical Diagnosis: L  index finger swelling   Therapy Diagnosis:   Encounter Diagnoses   Name Primary?    Finger pain, left     Swelling of finger, left     Decreased pinch strength     Finger joint swelling, left      Physician: Barbi Duff, *    Physician Orders: Left index edema/pain   2x/wk x 6 weeks   Modalities prn    Surgical Procedure and Date: n/a   Evaluation Date: 6/5/2019    Plan of Care Certification Period: 8/3/2019   Date of Return to MD: 6/20/2019     Visit # / Visits authorized: 1 / 20  Insurance Authorization Period Expiration: 12/31/2019  NO FOTO     Time In 330 pm   Time Out: 415 pm   Total Billable Time: 45  minutes    Precautions:  Standard    Subjective     Involved Side: L index   Dominant Side: Right  Date of Onset: 1 month   Mechanism of Injury: unknown, possible tenosynovitis from playing Bass  History of Current Condition: seen in clinic for finger swelling, injection provided on 5/30/2019   Imaging:  N/a   Previous Therapy: none     Past Medical History/Physical Systems Review:   Royal HALLEY Celaya III  has a past medical history of ADHD, Autism, Constipation - functional, Multiple nevi, and Social anxiety disorder.    Royal HALLEY Celaya III  has a past surgical history that includes Testicle surgery (2001).     has a current medication list which includes the following prescription(s): buspirone, fluoxetine, lorazepam, and propranolol.    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     Current FunctionalStatus   Home/Living environment : lives with their family, mother present during evaluation       Limitation of Functional Status as follows:   ADLs/IADLs:     - Feeding: IND     - Bathing/ Hygiene: IND     - Dressing/Grooming: IND; increased time for buttons, zippers when swollen     - Driving: n/a     - writing / typing limited use for  typing     - /pinch limited use for pinchIng     - work activities: limited use for playing [employer], [employer]      Leisure: playing music, piano, Bass         Objective     Observation/Appearance:       Sensation:   Intact     Wound/Scar:   None     Edema. Measured in centimeters.   Index, LEFT    PP 7.6 cm   PIP 6.5 CM   MP 5.8 CM   DIP 5.0 CM   DP 4.7 CM     Hand ROM. Measured in degrees.   INDEX, LEFT   MP 0/62   PIP 5/90  DIP 0/60          Manual Muscle Testing   FDP, FDS 3/5   EI/EDC 3-/5     Special Tests:  NT     Pinch Strength (Pinch Gauge)-- TBA   Measured in pounds and psi. Average of three trials.      Treatment     Treatment Time In:350 P   Treatment Time Out: 415 PM   Total Treatment time separate from Evaluation time:25 min     Leanna received the following supervised modalities after being cleared for contradictions for 10 minutes:   -Patient received paraffin bath to LEFT  hand(s) for 10 minutes to increase blood flow, circulation, pain management and for tissue elasticity prior to therex.     Leanna received therapeutic exercises for 15 minutes including:  -blocking FDP, FDS, isolated FDS, hook, wave, flat and full fist, ab/ad and digit extension with "place and hold" x 10 reps each.     Reviewed edema control techniques and provided 2 edema sleeves for night use. Instructed to monitor for color change, numbness, coldness, if compression too tight to " remove.     Home Exercise Program/Education:  Issued HEP (see patient instructions in EMR) and educated on  use of hot/cold modality use for pain, stiffness and edema management . Exercises were reviewed and Leanna was able to demonstrate them prior to the end of the session.   Pt received a written copy of exercises to perform at home. Leanna demonstrated good  understanding of the education provided.  Pt was advised to perform these exercises with minimal pain/discomfort of 3-4 out of 10 at worse, discontinue if pain worsens.    Patient/Family Education: role of OT, goals for OT, scheduling/cancellations - pt verbalized understanding. Discussed insurance limitations with patient.    Additional Education provided: per above    Assessment     Royal HALLEY Celaya III is a 21 y.o. year old referred to outpatient occupational therapy and presents with a medical diagnosis of Left index swelling , resulting in Decreased ROM, Decreased pinch strength, Decreased muscle strength, Decreased functional hand use, Increased pain, Edema and Joint Stiffness and demonstrates limitations as described in the chart below.   Following medical record review it is determined that pt will benefit from occupational therapy services in order to maximize pain free and/or functional use of left hand/UE   The following goals were discussed with the patient and patient is in agreement with them as to be addressed in the treatment plan. The patient's rehab potential is Good.     Anticipated barriers to occupational therapy: Autism  Pt has no cultural, educational or language barriers to learning provided.    Profile and History Assessment of Occupational Performance Level of Clinical Decision Making Complexity Score   Occupational Profile:   Royal HALLEY Celaya III is a 21 y.o. male who lives with their family and is currently Student at [employer] in [facility]. Royal HALLEY Celaya III has difficulty with  dressing  phone/computer use and playing  instruments: piano and Bass  affecting his/her daily functional abilities. His/her main goal for therapy is decrease swelling and pain so he can return to playing instruments .     Comorbidities:   ADHD, Autism, Constipation - functional, Multiple nevi, and Social anxiety disorder.    Medical and Therapy History Review:   Brief               Performance Deficits    Physical:  Joint Mobility  Muscle Power/Strength  Muscle Endurance  Edema  Pinch Strength  Fine Motor Coordination  Pain    Cognitive:  No Deficits    Psychosocial:    Habits  Routines     Clinical Decision Making:  low    Assessment Process:  Problem-Focused Assessments    Modification/Need for Assistance:  Not Necessary    Intervention Selection:  Limited Treatment Options       Low   Based on PMHX, co morbidities , data from assessments and functional level of assistance required with task and clinical presentation directly impacting function.       The following goals were discussed with the patient and patient is in agreement with them as to be addressed in the treatment plan.       Goals:   Long/ Short Term Goals (6 weeks)   1)  Patient to be IND with HEP and modalities for pain management  2)  Increase ROM 3-5 degrees to increase functional hand use for ADLs/work/leisure activities  3)   Decrease edema .2-.3 mm to increase joint mobility /flexibility for functional hand use.   4)  Assess pinch and set goals accordingly       Plan   Recommend continued Outpatient Occupataional Therapy  1x week for 6 weeks to include the following interventions Paraffin, Manual therapy/joint mobilizations, Modalities for pain management, US 3 mhz, Therapeutic exercises/activities., Strengthening, Edema Control, Scar Management, Wound Care and Electrical Modalities.    Within the Certification Period/Plan of care expiration: 6/5/2019 to 8/5/2019      Keily Elliott OT, CHT                impaired balance/decreased strength

## 2021-10-25 NOTE — PHYSICAL THERAPY INITIAL EVALUATION ADULT - PERTINENT HX OF CURRENT PROBLEM, REHAB EVAL
89 y/oF admitted 10/24 p/w 1 week of weakness/fatigue and a syncopal episode on day of admission. On day of admission, pt was eating lunch, had a witnessed episode of LOC. Pt thinks she was unconscious for a short period of time, but doesn't remember what happened. Denies any pre-syncopal symptoms. Pt states she regained consciousness without confusion. Pt states she's had syncope multiple times in her life, but the last time was about 5 yrs ago 2/2 uti.

## 2021-10-25 NOTE — CHART NOTE - NSCHARTNOTEFT_GEN_A_CORE
This report was requested by: Desmond Sahu | Reference #: [id number]    Others' Prescriptions  Patient Name: Zari Abdul Date: 04/28/1922  Address: [address] [place], [place] [address]Sex: Female  Rx Written	Rx Dispensed	Drug	Quantity	Days Supply	Prescriber Name	Prescriber Lima #	Payment Method	Dispenser  08/24/2021	08/25/2021	tramadol hcl 50 mg tablet	60	30	ClarBetty)	[id number]	Medicare	[facility] [id number]  05/11/2021	05/12/2021	tramadol hcl 50 mg tablet	60	30	ClarBetty)	[id number]	Medicare	[facility] [id number]  03/23/2021	03/24/2021	tramadol hcl 50 mg tablet	60	30	ClarBetty)	[id number]	Medicare	[facility] [id number]  02/11/2021	03/04/2021	tramadol hcl 50 mg tablet	15	7	ClarBetty)	[id number]	Medicare	[facility] [id number]  02/11/2021	02/12/2021	tramadol hcl 50 mg tablet	30	30	ClarBetty)	[id number]	Medicare	[facility] [id number]  12/21/2020	12/22/2020	tramadol hcl 50 mg tablet	45	22	ClarBetty)	[id number]	Medicare	[facility] [id number]  11/10/2020	12/09/2020	tramadol hcl 50 mg tablet	15	7	Shonna Carter	[id number]	Medicare	[facility] [id number]

## 2021-10-25 NOTE — PROGRESS NOTE ADULT - PROBLEM SELECTOR PLAN 4
- cont tramadol prn    5. ANNA: -Cr better now. Continue to hold diuretics for now.     6. Prophylactic measure: -Will start Lovenox for DVT PPx.  -Discussed plan with PHIL Aguirre and RN. - cont tramadol prn    5. ANNA: -Cr better now. Continue to hold diuretics for now.     6. Prophylactic measure: -Will start Lovenox for DVT PPx.  -Discussed plan with PHIL Aguirre and RN.  -Spoke to patient's daughter Lexie over the phone and updated her (#[phone]) per patient request.  -Tentative plan for DC back to shelter soon if no further inpatient work up/monitoring needed.

## 2021-10-25 NOTE — CONSULT NOTE ADULT - SUBJECTIVE AND OBJECTIVE BOX
C A R D I O L O G Y  *********************    DATE OF SERVICE: 10-25-21    HISTORY OF PRESENT ILLNESS: HPI:  Patient is a 89 y/o Female with PMH of HTN, CAD, prior syncope s/p loop recorder, hypothyroidism, AVN of left femoral head, arthritis of knees, and chronic urinary frequency who is admitted s/p syncope. Cardiology consulted for further evaluation. Patient reports feeling more tired over the past week. She was eating lunch and then had a witnessed episode of LOC. She cannot recall the events. Reports feeling back to baseline when regained consciousness. Reports syncopal episode in past in setting of UTI. Ambulates with walker at baseline with no complaints. Denies chest pain, SOB, palpitations, or dizziness.    PAST MEDICAL & SURGICAL HISTORY:  Urinary Frequency    Bilateral Cataracts    HTN (hypertension)    Spinal stenosis    Hypothyroidism    Non-ST elevation MI (NSTEMI)    Macular degeneration    Ectopic pregnancy, tubal    S/P ORIF (open reduction internal fixation) fracture  R hip    S/P breast lumpectomy    S/P cataract surgery  b/l      MEDICATIONS:  MEDICATIONS  (STANDING):  levothyroxine 112 MICROGram(s) Oral daily  polyethylene glycol 3350 17 Gram(s) Oral two times a day  senna 2 Tablet(s) Oral at bedtime      Allergies    sulfa topicals (Unknown)    Intolerances    morphine (Drowsiness; Faint; Hypotension)      FAMILY HISTORY:  Family history of acute myocardial infarction      Non-contributary for premature coronary disease or sudden cardiac death    SOCIAL HISTORY:    [ x] Non-smoker  [ ] Smoker  [ ] Alcohol    FLU VACCINE THIS YEAR STARTS IN AUGUST:  [ ] Yes    [ ] No    IF OVER 65 HAVE YOU EVER HAD A PNA VACCINE:  [ ] Yes    [ ] No       [ ] N/A      REVIEW OF SYSTEMS:  [ ]chest pain  [  ]shortness of breath  [  ]palpitations  [ x ]syncope  [ ]near syncope [ ]upper extremity weakness   [ ] lower extremity weakness  [  ]diplopia  [  ]altered mental status   [  ]fevers  [ ]chills [ ]nausea  [ ]vomitting  [  ]dysphagia    [ ]abdominal pain  [ ]melena  [ ]BRBPR    [  ]epistaxis  [  ]rash    [ ]lower extremity edema        [X] All others negative	  [ ] Unable to obtain      LABS:	 	    CARDIAC MARKERS:                              12.6   8.90  )-----------( 200      ( 25 Oct 2021 08:45 )             39.9     Hb Trend: 12.6<--, 11.2<--    10-25    141  |  100  |  26<H>  ----------------------------<  100<H>  4.1   |  23  |  1.09    Ca    9.5      25 Oct 2021 08:45  Phos  3.0     10-25  Mg     2.0     10-25    TPro  7.5  /  Alb  4.3  /  TBili  0.6  /  DBili  x   /  AST  22  /  ALT  27  /  AlkPhos  140<H>  10-25    Creatinine Trend: 1.09<--, 1.39<--    Coags:      proBNP:   Lipid Profile:   HgA1c:   TSH:         PHYSICAL EXAM:  T(C): 36.5 (10-25-21 @ 08:18), Max: 36.8 (10-24-21 @ 19:18)  HR: 81 (10-25-21 @ 10:04) (64 - 81)  BP: 188/92 (10-25-21 @ 10:04) (116/70 - 192/120)  RR: 20 (10-25-21 @ 08:18) (15 - 20)  SpO2: 95% (10-25-21 @ 10:04) (94% - 97%)  Wt(kg): --   BMI (kg/m2): 27.1 (10-24-21 @ 14:28)  I&O's Summary      Gen: Appears well in NAD  HEENT:  (-)icterus (-)pallor  CV: N S1 S2 1/6 NORRIS (+)2 Pulses B/l  Resp:  Clear to auscultation B/L, normal effort  GI: (+) BS Soft, NT, ND  Lymph:  (-)Edema, (-)obvious lymphadenopathy  Skin: Warm to touch, Normal turgor  Psych: Appropriate mood and affect      TELEMETRY: 	      ECG: NSR, narrow QRS, LVH  	    RADIOLOGY:         CXR: < from: Xray Chest 1 View- PORTABLE-Urgent (Xray Chest 1 View- PORTABLE-Urgent .) (10.24.21 @ 16:18) >    IMPRESSION:  Clear lungs.  Cardiomegaly.    < end of copied text >      ASSESSMENT/PLAN: Patient is a 89 y/o Female with PMH of HTN, CAD, prior syncope s/p loop recorder, hypothyroidism, AVN of left femoral head, arthritis of knees, and chronic urinary frequency who is admitted s/p syncope. Cardiology consulted for further evaluation.    - No events on ILR interrogation per EP - official report pending  - Check orthostatics, pending results will need to resume BP meds to some extent given HTN  - TTE pending to r/o structural abnormalities  - Patient to f/u with her cardiologist Dr. Kam Buckley after d/c    Clemente Corbett PA-C  Pager: [phone]   C A R D I O L O G Y  *********************    DATE OF SERVICE: 10-25-21    HISTORY OF PRESENT ILLNESS: HPI:  Patient is a 89 y/o Female with PMH of HTN, CAD, prior syncope s/p loop recorder, hypothyroidism, AVN of left femoral head, arthritis of knees, and chronic urinary frequency who is admitted s/p syncope. EP consulted for further evaluation. Patient reports feeling more tired over the past week. She was eating lunch and then had a witnessed episode of LOC. She cannot recall the events. Reports feeling back to baseline when regained consciousness. Reports syncopal episode in past in setting of UTI. Ambulates with walker at baseline with no complaints. Denies chest pain, SOB, palpitations, or dizziness.    PAST MEDICAL & SURGICAL HISTORY:  Urinary Frequency    Bilateral Cataracts    HTN (hypertension)    Spinal stenosis    Hypothyroidism    Non-ST elevation MI (NSTEMI)    Macular degeneration    Ectopic pregnancy, tubal    S/P ORIF (open reduction internal fixation) fracture  R hip    S/P breast lumpectomy    S/P cataract surgery  b/l      MEDICATIONS:  MEDICATIONS  (STANDING):  levothyroxine 112 MICROGram(s) Oral daily  polyethylene glycol 3350 17 Gram(s) Oral two times a day  senna 2 Tablet(s) Oral at bedtime      Allergies    sulfa topicals (Unknown)    Intolerances    morphine (Drowsiness; Faint; Hypotension)      FAMILY HISTORY:  Family history of acute myocardial infarction      Non-contributary for premature coronary disease or sudden cardiac death    SOCIAL HISTORY:    [ x] Non-smoker  [ ] Smoker  [ ] Alcohol    FLU VACCINE THIS YEAR STARTS IN AUGUST:  [ ] Yes    [ ] No    IF OVER 65 HAVE YOU EVER HAD A PNA VACCINE:  [ ] Yes    [ ] No       [ ] N/A      REVIEW OF SYSTEMS:  [ ]chest pain  [  ]shortness of breath  [  ]palpitations  [ x ]syncope  [ ]near syncope [ ]upper extremity weakness   [ ] lower extremity weakness  [  ]diplopia  [  ]altered mental status   [  ]fevers  [ ]chills [ ]nausea  [ ]vomitting  [  ]dysphagia    [ ]abdominal pain  [ ]melena  [ ]BRBPR    [  ]epistaxis  [  ]rash    [ ]lower extremity edema        [X] All others negative	  [ ] Unable to obtain      LABS:	 	    CARDIAC MARKERS:                              12.6   8.90  )-----------( 200      ( 25 Oct 2021 08:45 )             39.9     Hb Trend: 12.6<--, 11.2<--    10-25    141  |  100  |  26<H>  ----------------------------<  100<H>  4.1   |  23  |  1.09    Ca    9.5      25 Oct 2021 08:45  Phos  3.0     10-25  Mg     2.0     10-25    TPro  7.5  /  Alb  4.3  /  TBili  0.6  /  DBili  x   /  AST  22  /  ALT  27  /  AlkPhos  140<H>  10-25    Creatinine Trend: 1.09<--, 1.39<--    Coags:      proBNP:   Lipid Profile:   HgA1c:   TSH:         PHYSICAL EXAM:  T(C): 36.5 (10-25-21 @ 08:18), Max: 36.8 (10-24-21 @ 19:18)  HR: 81 (10-25-21 @ 10:04) (64 - 81)  BP: 188/92 (10-25-21 @ 10:04) (116/70 - 192/120)  RR: 20 (10-25-21 @ 08:18) (15 - 20)  SpO2: 95% (10-25-21 @ 10:04) (94% - 97%)  Wt(kg): --   BMI (kg/m2): 27.1 (10-24-21 @ 14:28)  I&O's Summary      Gen: Appears well in NAD  HEENT:  (-)icterus (-)pallor  CV: N S1 S2 1/6 NORRIS (+)2 Pulses B/l  Resp:  Clear to auscultation B/L, normal effort  GI: (+) BS Soft, NT, ND  Lymph:  (-)Edema, (-)obvious lymphadenopathy  Skin: Warm to touch, Normal turgor  Psych: Appropriate mood and affect      TELEMETRY: 	      ECG: NSR, narrow QRS, LVH  	    RADIOLOGY:         CXR: < from: Xray Chest 1 View- PORTABLE-Urgent (Xray Chest 1 View- PORTABLE-Urgent .) (10.24.21 @ 16:18) >    IMPRESSION:  Clear lungs.  Cardiomegaly.    < end of copied text >      ASSESSMENT/PLAN: Patient is a 89 y/o Female with PMH of HTN, CAD, prior syncope s/p loop recorder, hypothyroidism, AVN of left femoral head, arthritis of knees, and chronic urinary frequency who is admitted s/p syncope. EP consulted for further evaluation.    - No events on ILR interrogation per house EP team - official report pending  - Check orthostatics, pending results will need to resume BP meds to some extent given HTN  - TTE pending to r/o structural abnormalities  - Patient to f/u with her cardiologist Dr. Kam Buckley after d/c    Clemente Corbett PA-C  Pager: [phone]   DATE OF SERVICE: 10-25-21    HISTORY OF PRESENT ILLNESS: HPI:  Patient is a 89 y/o Female with PMH of HTN, CAD, prior syncope s/p loop recorder, hypothyroidism, AVN of left femoral head, arthritis of knees, and chronic urinary frequency who is admitted s/p syncope. EP consulted for further evaluation. Patient reports feeling more tired over the past week. She was eating lunch and then had a witnessed episode of LOC. She cannot recall the events. Reports feeling back to baseline when regained consciousness. Reports syncopal episode in past in setting of UTI. Ambulates with walker at baseline with no complaints. Denies chest pain, SOB, palpitations, or dizziness.    PAST MEDICAL & SURGICAL HISTORY:  Urinary Frequency    Bilateral Cataracts    HTN (hypertension)    Spinal stenosis    Hypothyroidism    Non-ST elevation MI (NSTEMI)    Macular degeneration    Ectopic pregnancy, tubal    S/P ORIF (open reduction internal fixation) fracture  R hip    S/P breast lumpectomy    S/P cataract surgery  b/l      MEDICATIONS:  MEDICATIONS  (STANDING):  levothyroxine 112 MICROGram(s) Oral daily  polyethylene glycol 3350 17 Gram(s) Oral two times a day  senna 2 Tablet(s) Oral at bedtime      Allergies    sulfa topicals (Unknown)    Intolerances    morphine (Drowsiness; Faint; Hypotension)      FAMILY HISTORY:  Family history of acute myocardial infarction      Non-contributary for premature coronary disease or sudden cardiac death    SOCIAL HISTORY:    [ x] Non-smoker  [ ] Smoker  [ ] Alcohol    FLU VACCINE THIS YEAR STARTS IN AUGUST:  [ ] Yes    [ ] No    IF OVER 65 HAVE YOU EVER HAD A PNA VACCINE:  [ ] Yes    [ ] No       [ ] N/A      REVIEW OF SYSTEMS:  [ ]chest pain  [  ]shortness of breath  [  ]palpitations  [ x ]syncope  [ ]near syncope [ ]upper extremity weakness   [ ] lower extremity weakness  [  ]diplopia  [  ]altered mental status   [  ]fevers  [ ]chills [ ]nausea  [ ]vomitting  [  ]dysphagia    [ ]abdominal pain  [ ]melena  [ ]BRBPR    [  ]epistaxis  [  ]rash    [ ]lower extremity edema        [X] All others negative	  [ ] Unable to obtain      LABS:	 	    CARDIAC MARKERS:                              12.6   8.90  )-----------( 200      ( 25 Oct 2021 08:45 )             39.9     Hb Trend: 12.6<--, 11.2<--    10-25    141  |  100  |  26<H>  ----------------------------<  100<H>  4.1   |  23  |  1.09    Ca    9.5      25 Oct 2021 08:45  Phos  3.0     10-25  Mg     2.0     10-25    TPro  7.5  /  Alb  4.3  /  TBili  0.6  /  DBili  x   /  AST  22  /  ALT  27  /  AlkPhos  140<H>  10-25    Creatinine Trend: 1.09<--, 1.39<--    Coags:      proBNP:   Lipid Profile:   HgA1c:   TSH:         PHYSICAL EXAM:  T(C): 36.5 (10-25-21 @ 08:18), Max: 36.8 (10-24-21 @ 19:18)  HR: 81 (10-25-21 @ 10:04) (64 - 81)  BP: 188/92 (10-25-21 @ 10:04) (116/70 - 192/120)  RR: 20 (10-25-21 @ 08:18) (15 - 20)  SpO2: 95% (10-25-21 @ 10:04) (94% - 97%)  Wt(kg): --   BMI (kg/m2): 27.1 (10-24-21 @ 14:28)  I&O's Summary      Gen: Appears well in NAD  HEENT:  (-)icterus (-)pallor  CV: N S1 S2 1/6 NORRIS (+)2 Pulses B/l  Resp:  Clear to auscultation B/L, normal effort  GI: (+) BS Soft, NT, ND  Lymph:  (-)Edema, (-)obvious lymphadenopathy  Skin: Warm to touch, Normal turgor  Psych: Appropriate mood and affect      TELEMETRY: 	      ECG: NSR, narrow QRS, LVH  	    RADIOLOGY:         CXR: < from: Xray Chest 1 View- PORTABLE-Urgent (Xray Chest 1 View- PORTABLE-Urgent .) (10.24.21 @ 16:18) >    IMPRESSION:  Clear lungs.  Cardiomegaly.    < end of copied text >      ASSESSMENT/PLAN: Patient is a 89 y/o Female with PMH of HTN, CAD, prior syncope s/p loop recorder, hypothyroidism, AVN of left femoral head, arthritis of knees, and chronic urinary frequency who is admitted s/p syncope. EP consulted for further evaluation.    - No events on ILR interrogation per house EP team - official report pending  - Check orthostatics, pending results will need to resume BP meds to some extent given HTN  - TTE pending to r/o structural abnormalities  - Patient to f/u with her cardiologist Dr. Kam Buckley after d/c    Clemente Corbett PA-C  Pager: [phone]

## 2021-10-25 NOTE — ED ADULT NURSE REASSESSMENT NOTE - NS ED NURSE REASSESS COMMENT FT1
0720Pt A&Ox4. No c/o chest pain, palp, SOB, dizziness. Fall risk precautions maintained. IVL intact without sx of infilt. Prima fit intact draining clear yellow urine to LWS

## 2021-10-25 NOTE — PHYSICAL THERAPY INITIAL EVALUATION ADULT - PRECAUTIONS/LIMITATIONS, REHAB EVAL
PMH  HTN, CAD, loop recorder, hypothyroidism, AVN of left femoral head, arthritis of knees, chronic urinary frequency PMH  HTN, CAD, loop recorder, hypothyroidism, AVN of left femoral head, arthritis of knees, chronic urinary frequency/fall precautions

## 2021-10-25 NOTE — PROGRESS NOTE ADULT - SUBJECTIVE AND OBJECTIVE BOX
Patient is a 89y old  Female who presents with a chief complaint of syncope (25 Oct 2021 15:25)        SUBJECTIVE / OVERNIGHT EVENTS: patient says she feels better today. She denies any cp or sob or n/v or HA.       MEDICATIONS  (STANDING):  carvedilol 12.5 milliGRAM(s) Oral every 12 hours  levothyroxine 112 MICROGram(s) Oral daily  polyethylene glycol 3350 17 Gram(s) Oral two times a day  senna 2 Tablet(s) Oral at bedtime    MEDICATIONS  (PRN):  acetaminophen     Tablet .. 650 milliGRAM(s) Oral every 6 hours PRN Temp greater or equal to 38C (100.4F), Mild Pain (1 - 3)  traMADol 50 milliGRAM(s) Oral two times a day PRN Moderate Pain (4 - 6)      Vital Signs Last 24 Hrs  T(C): 36.5 (25 Oct 2021 08:18), Max: 36.8 (24 Oct 2021 19:18)  T(F): 97.7 (25 Oct 2021 08:18), Max: 98.2 (24 Oct 2021 19:18)  HR: 81 (25 Oct 2021 10:04) (64 - 81)  BP: 188/92 (25 Oct 2021 10:04) (157/79 - 192/120)  BP(mean): --  RR: 20 (25 Oct 2021 08:18) (15 - 20)  SpO2: 95% (25 Oct 2021 10:04) (94% - 97%)  CAPILLARY BLOOD GLUCOSE        I&O's Summary        PHYSICAL EXAM:   GENERAL: NAD, well-developed  HEAD:  Atraumatic, Normocephalic  EYES:  conjunctiva and sclera clear  NECK: Supple,    CHEST/LUNG: Clear to auscultation bilaterally; No wheeze  HEART: S1S2 normal. Regular rate and rhythm; No murmurs, rubs, or gallops  ABDOMEN: Soft, Nontender, Nondistended; Bowel sounds present  EXTREMITIES:  trace le edema  PSYCH/Neuro: AAOx3. Non-focal.   SKIN: No rashes or lesions      LABS:                        12.6   8.90  )-----------( 200      ( 25 Oct 2021 08:45 )             39.9     10-25    141  |  100  |  26<H>  ----------------------------<  100<H>  4.1   |  23  |  1.09    Ca    9.5      25 Oct 2021 08:45  Phos  3.0     10-  Mg     2.0     10-25    TPro  7.5  /  Alb  4.3  /  TBili  0.6  /  DBili  x   /  AST  22  /  ALT  27  /  AlkPhos  140<H>  10-25          Urinalysis Basic - ( 24 Oct 2021 16:26 )    Color: Colorless / Appearance: Clear / S.009 / pH: x  Gluc: x / Ketone: Negative  / Bili: Negative / Urobili: Negative   Blood: x / Protein: Negative / Nitrite: Negative   Leuk Esterase: Negative / RBC: 1 /hpf / WBC 2 /HPF   Sq Epi: x / Non Sq Epi: 3 /hpf / Bacteria: Negative      < from: TTE with Doppler (w/Cont) (10.25.21 @ 10:11) >  Conclusions:  1. Mitral annular and chordal calcifiation. Mild mitral  regurgitation.  2. Calcified trileaflet aortic valve with reduced opening.  Peak transaortic valve gradient equals 8 mm Hg, mean  transaortic valve gradient equals 4 mm Hg, estimated aortic  valve area equals 1.8 sqcm (by continuity equation), aortic  valve velocity time integral equals 28 cm, consistent with  mild aortic stenosis. No aortic valve regurgitation seen.  3. Mild concentric left ventricular hypertrophy.  4. Endocardial visualization enhanced with intravenous  injection of Ultrasonic Enhancing Agent (Definity). Overall  preserved left ventricular systolic function. No left  ventricular thrombus. The apical inferior wall, and the  apical septum are akinetic.  *** Compared with echocardiogram of 1/15/2020, the apical  akinesis is new.    < end of copied text >        Telemetry reviewed: Sinus 70's with PAC's.    RADIOLOGY & ADDITIONAL TESTS:    Imaging Personally Reviewed: echo report  Consultant(s) Notes Reviewed:  cards  Care Discussed with Consultants/Other Providers: cards. Dr. Munoz

## 2021-10-26 LAB
ANION GAP SERPL CALC-SCNC: 18 MMOL/L — HIGH (ref 5–17)
BUN SERPL-MCNC: 27 MG/DL — HIGH (ref 7–23)
CALCIUM SERPL-MCNC: 9.5 MG/DL — SIGNIFICANT CHANGE UP (ref 8.4–10.5)
CHLORIDE SERPL-SCNC: 101 MMOL/L — SIGNIFICANT CHANGE UP (ref 96–108)
CO2 SERPL-SCNC: 20 MMOL/L — LOW (ref 22–31)
COVID-19 SPIKE DOMAIN AB INTERP: POSITIVE
COVID-19 SPIKE DOMAIN ANTIBODY RESULT: >250 U/ML — HIGH
CREAT SERPL-MCNC: 1.13 MG/DL — SIGNIFICANT CHANGE UP (ref 0.5–1.3)
CULTURE RESULTS: SIGNIFICANT CHANGE UP
GLUCOSE SERPL-MCNC: 91 MG/DL — SIGNIFICANT CHANGE UP (ref 70–99)
HCT VFR BLD CALC: 39.1 % — SIGNIFICANT CHANGE UP (ref 34.5–45)
HGB BLD-MCNC: 12.6 G/DL — SIGNIFICANT CHANGE UP (ref 11.5–15.5)
MCHC RBC-ENTMCNC: 29.9 PG — SIGNIFICANT CHANGE UP (ref 27–34)
MCHC RBC-ENTMCNC: 32.2 GM/DL — SIGNIFICANT CHANGE UP (ref 32–36)
MCV RBC AUTO: 92.9 FL — SIGNIFICANT CHANGE UP (ref 80–100)
NRBC # BLD: 0 /100 WBCS — SIGNIFICANT CHANGE UP (ref 0–0)
NT-PROBNP SERPL-SCNC: 5224 PG/ML — HIGH (ref 0–300)
PLATELET # BLD AUTO: 201 K/UL — SIGNIFICANT CHANGE UP (ref 150–400)
POTASSIUM SERPL-MCNC: 4.1 MMOL/L — SIGNIFICANT CHANGE UP (ref 3.5–5.3)
POTASSIUM SERPL-SCNC: 4.1 MMOL/L — SIGNIFICANT CHANGE UP (ref 3.5–5.3)
RBC # BLD: 4.21 M/UL — SIGNIFICANT CHANGE UP (ref 3.8–5.2)
RBC # FLD: 14.6 % — HIGH (ref 10.3–14.5)
SARS-COV-2 IGG+IGM SERPL QL IA: >250 U/ML — HIGH
SARS-COV-2 IGG+IGM SERPL QL IA: POSITIVE
SODIUM SERPL-SCNC: 139 MMOL/L — SIGNIFICANT CHANGE UP (ref 135–145)
SPECIMEN SOURCE: SIGNIFICANT CHANGE UP
TSH SERPL-MCNC: 1.55 UIU/ML — SIGNIFICANT CHANGE UP (ref 0.27–4.2)
TSH SERPL-MCNC: 1.59 UIU/ML — SIGNIFICANT CHANGE UP (ref 0.27–4.2)
VIT B12 SERPL-MCNC: 479 PG/ML — SIGNIFICANT CHANGE UP (ref 232–1245)
VIT B12 SERPL-MCNC: 572 PG/ML — SIGNIFICANT CHANGE UP (ref 232–1245)
WBC # BLD: 7.56 K/UL — SIGNIFICANT CHANGE UP (ref 3.8–10.5)
WBC # FLD AUTO: 7.56 K/UL — SIGNIFICANT CHANGE UP (ref 3.8–10.5)

## 2021-10-26 PROCEDURE — 99232 SBSQ HOSP IP/OBS MODERATE 35: CPT

## 2021-10-26 RX ORDER — FUROSEMIDE 40 MG
20 TABLET ORAL
Refills: 0 | Status: DISCONTINUED | OUTPATIENT
Start: 2021-10-26 | End: 2021-10-28

## 2021-10-26 RX ORDER — ATORVASTATIN CALCIUM 80 MG/1
20 TABLET, FILM COATED ORAL AT BEDTIME
Refills: 0 | Status: DISCONTINUED | OUTPATIENT
Start: 2021-10-26 | End: 2021-10-28

## 2021-10-26 RX ORDER — ASPIRIN/CALCIUM CARB/MAGNESIUM 324 MG
81 TABLET ORAL DAILY
Refills: 0 | Status: DISCONTINUED | OUTPATIENT
Start: 2021-10-26 | End: 2021-10-28

## 2021-10-26 RX ADMIN — ENOXAPARIN SODIUM 40 MILLIGRAM(S): 100 INJECTION SUBCUTANEOUS at 17:37

## 2021-10-26 RX ADMIN — POLYETHYLENE GLYCOL 3350 17 GRAM(S): 17 POWDER, FOR SOLUTION ORAL at 05:13

## 2021-10-26 RX ADMIN — Medication 81 MILLIGRAM(S): at 17:37

## 2021-10-26 RX ADMIN — TRAMADOL HYDROCHLORIDE 50 MILLIGRAM(S): 50 TABLET ORAL at 14:08

## 2021-10-26 RX ADMIN — CARVEDILOL PHOSPHATE 12.5 MILLIGRAM(S): 80 CAPSULE, EXTENDED RELEASE ORAL at 05:13

## 2021-10-26 RX ADMIN — Medication 20 MILLIGRAM(S): at 21:44

## 2021-10-26 RX ADMIN — Medication 112 MICROGRAM(S): at 05:13

## 2021-10-26 RX ADMIN — ATORVASTATIN CALCIUM 20 MILLIGRAM(S): 80 TABLET, FILM COATED ORAL at 21:53

## 2021-10-26 NOTE — PROGRESS NOTE ADULT - SUBJECTIVE AND OBJECTIVE BOX
DATE OF SERVICE: 10-26-21    HISTORY OF PRESENT ILLNESS: HPI:  Patient is a 89 y/o Female with PMH of HTN, CAD, prior syncope s/p loop recorder, hypothyroidism, AVN of left femoral head, arthritis of knees, and chronic urinary frequency who is admitted s/p syncope. EP consulted for further evaluation. Patient reports feeling more tired over the past week. She was eating lunch and then had a witnessed episode of LOC. She cannot recall the events. Reports feeling back to baseline when regained consciousness. Reports syncopal episode in past in setting of UTI. Ambulates with walker at baseline with no complaints. Denies chest pain, SOB, palpitations, or dizziness.    No new complaints today.  Feels well.  Anxious because she wants to go home. No angina, no palpitations, no further lightheadedness.  Loop recorder findings reviewed w/ patient ,that device is working fairly well (missing some heartbeats - calling these episodes "pauses" when her heart rhythm is intact).  No indication that she needs a pacemaker at this time.    acetaminophen     Tablet .. 650 milliGRAM(s) Oral every 6 hours PRN  carvedilol 12.5 milliGRAM(s) Oral every 12 hours  enoxaparin Injectable 40 milliGRAM(s) SubCutaneous every 24 hours  levothyroxine 112 MICROGram(s) Oral daily  polyethylene glycol 3350 17 Gram(s) Oral two times a day  senna 2 Tablet(s) Oral at bedtime  traMADol 50 milliGRAM(s) Oral two times a day PRN                            12.6   7.56  )-----------( 201      ( 26 Oct 2021 07:23 )             39.1       10-26    139  |  101  |  27<H>  ----------------------------<  91  4.1   |  20<L>  |  1.13    Ca    9.5      26 Oct 2021 07:17  Phos  3.0     10-25  Mg     2.0     10-25    TPro  7.5  /  Alb  4.3  /  TBili  0.6  /  DBili  x   /  AST  22  /  ALT  27  /  AlkPhos  140<H>  10-25    T(C): 36.3 (10-26-21 @ 05:09), Max: 36.8 (10-25-21 @ 15:00)  HR: 79 (10-26-21 @ 05:09) (64 - 81)  BP: 136/74 (10-26-21 @ 05:09) (136/74 - 192/91)  RR: 18 (10-26-21 @ 05:09) (17 - 19)  SpO2: 97% (10-26-21 @ 05:09) (95% - 97%)  Wt(kg): --    I&O's Summary    Gen: Appears well in NAD  HEENT:  (-)icterus (-)pallor  CV: N S1 S2 1/6 NORRIS (+)2 Pulses B/l  Resp:  Clear to auscultation B/L, normal effort  GI: (+) BS Soft, NT, ND  Lymph:  (-)Edema, (-)obvious lymphadenopathy  Skin: Warm to touch, Normal turgor  Psych: Appropriate mood and affect      TELEMETRY:  NSR	      ECG: NSR, narrow QRS, LVH  	    RADIOLOGY:         CXR: < from: Xray Chest 1 View- PORTABLE-Urgent (Xray Chest 1 View- PORTABLE-Urgent .) (10.24.21 @ 16:18) >    IMPRESSION:  Clear lungs.  Cardiomegaly.    ILR check:  Under-sensing QRS complexes being called "pauses" (false-positive).  no arrhythmia correlating with patient's syncope episodes.      ASSESSMENT/PLAN: Patient is a 89 y/o Female with PMH of HTN, CAD, prior syncope s/p loop recorder, hypothyroidism, AVN of left femoral head, arthritis of knees, and chronic urinary frequency who is admitted s/p syncope. EP consulted for further evaluation.    - No events on ILR interrogation.  - Check orthostatics, pending results will need to resume BP meds to some extent given HTN  - Physical therapy.  - TTE pending to r/o structural abnormalities  - Patient to f/u with her cardiologist Dr. Kam Buckley after d/c    Clemente Corbett PA-C  Pager: [phone]   DATE OF SERVICE: 10-26-21    HISTORY OF PRESENT ILLNESS: HPI:  Patient is a 89 y/o Female with PMH of HTN, CAD, prior syncope s/p loop recorder, hypothyroidism, AVN of left femoral head, arthritis of knees, and chronic urinary frequency who is admitted s/p syncope. EP consulted for further evaluation. Patient reports feeling more tired over the past week. She was eating lunch and then had a witnessed episode of LOC. She cannot recall the events. Reports feeling back to baseline when regained consciousness. Reports syncopal episode in past in setting of UTI. Ambulates with walker at baseline with no complaints. Denies chest pain, SOB, palpitations, or dizziness.    No new complaints today.  Feels well.  Anxious because she wants to go home. No angina, no palpitations, no further lightheadedness.  Loop recorder findings reviewed w/ patient ,that device is working fairly well (missing some heartbeats - calling these episodes "pauses" when her heart rhythm is intact).  No indication that she needs a pacemaker at this time.    acetaminophen     Tablet .. 650 milliGRAM(s) Oral every 6 hours PRN  carvedilol 12.5 milliGRAM(s) Oral every 12 hours  enoxaparin Injectable 40 milliGRAM(s) SubCutaneous every 24 hours  levothyroxine 112 MICROGram(s) Oral daily  polyethylene glycol 3350 17 Gram(s) Oral two times a day  senna 2 Tablet(s) Oral at bedtime  traMADol 50 milliGRAM(s) Oral two times a day PRN                            12.6   7.56  )-----------( 201      ( 26 Oct 2021 07:23 )             39.1       10-26    139  |  101  |  27<H>  ----------------------------<  91  4.1   |  20<L>  |  1.13    Ca    9.5      26 Oct 2021 07:17  Phos  3.0     10-25  Mg     2.0     10-25    TPro  7.5  /  Alb  4.3  /  TBili  0.6  /  DBili  x   /  AST  22  /  ALT  27  /  AlkPhos  140<H>  10-25    T(C): 36.3 (10-26-21 @ 05:09), Max: 36.8 (10-25-21 @ 15:00)  HR: 79 (10-26-21 @ 05:09) (64 - 81)  BP: 136/74 (10-26-21 @ 05:09) (136/74 - 192/91)  RR: 18 (10-26-21 @ 05:09) (17 - 19)  SpO2: 97% (10-26-21 @ 05:09) (95% - 97%)  Wt(kg): --    I&O's Summary    Gen: Appears well in NAD  HEENT:  (-)icterus (-)pallor  CV: N S1 S2 1/6 NORRIS (+)2 Pulses B/l  Resp:  Clear to auscultation B/L, normal effort  GI: (+) BS Soft, NT, ND  Lymph:  (-)Edema, (-)obvious lymphadenopathy  Skin: Warm to touch, Normal turgor  Psych: Appropriate mood and affect      TELEMETRY:  NSR	      ECG: NSR, narrow QRS, LVH  	    RADIOLOGY:         CXR: < from: Xray Chest 1 View- PORTABLE-Urgent (Xray Chest 1 View- PORTABLE-Urgent .) (10.24.21 @ 16:18) >    IMPRESSION:  Clear lungs.  Cardiomegaly.    ILR check:  Under-sensing QRS complexes being called "pauses" (false-positive).  no arrhythmia correlating with patient's syncope episodes.      ASSESSMENT/PLAN: Patient is a 89 y/o Female with PMH of HTN, CAD, prior syncope s/p loop recorder, hypothyroidism, AVN of left femoral head, arthritis of knees, and chronic urinary frequency who is admitted s/p syncope. EP consulted for further evaluation.    - No events on ILR interrogation.  - Check orthostatics, pending results will need to resume BP meds to some extent given HTN  - Physical therapy.  - TTE pending to r/o structural abnormalities  - Patient to f/u with her cardiologist Dr. Kam Buckley after d/c    Jony Montoya M.D.  Cardiac Electrophysiology  [phone]

## 2021-10-26 NOTE — CONSULT NOTE ADULT - SUBJECTIVE AND OBJECTIVE BOX
C A R D I O [facility] O [facility]  *********************    DATE OF SERVICE: 10-26-21    HISTORY OF PRESENT ILLNESS: HPI:  Patient is a 89 y/o Female with PMH of HTN, CAD, prior syncope s/p loop recorder, hypothyroidism, AVN of left femoral head, arthritis of knees, and chronic urinary frequency who is admitted s/p syncope found to have an abnormal echo. Cardiology consulted for further evaluation. Patient reports feeling more tired over the past week. She was eating lunch and then had a witnessed episode of LOC. She cannot recall the events. Reports feeling back to baseline when regained consciousness. Reports syncopal episode in past in setting of UTI. Ambulates with walker at baseline with no complaints. Denies chest pain, SOB, palpitations, or dizziness.    PAST MEDICAL & SURGICAL HISTORY:  Urinary Frequency    Bilateral Cataracts    HTN (hypertension)    Spinal stenosis    Hypothyroidism    Non-ST elevation MI (NSTEMI)    Macular degeneration    Ectopic pregnancy, tubal    S/P ORIF (open reduction internal fixation) fracture  R hip    S/P breast lumpectomy    S/P cataract surgery  b/l            MEDICATIONS:  MEDICATIONS  (STANDING):  carvedilol 12.5 milliGRAM(s) Oral every 12 hours  enoxaparin Injectable 40 milliGRAM(s) SubCutaneous every 24 hours  levothyroxine 112 MICROGram(s) Oral daily  polyethylene glycol 3350 17 Gram(s) Oral two times a day  senna 2 Tablet(s) Oral at bedtime      Allergies    sulfa topicals (Unknown)    Intolerances    morphine (Drowsiness; Faint; Hypotension)      FAMILY HISTORY:  Family history of acute myocardial infarction      Non-contributary for premature coronary disease or sudden cardiac death    SOCIAL HISTORY:    [x ] Non-smoker  [ ] Smoker  [ ] Alcohol    FLU VACCINE THIS YEAR STARTS IN AUGUST:  [ ] Yes    [ ] No    IF OVER 65 HAVE YOU EVER HAD A PNA VACCINE:  [ ] Yes    [ ] No       [ ] N/A      REVIEW OF SYSTEMS:  [ ]chest pain  [  ]shortness of breath  [  ]palpitations  [ x ]syncope  [ ]near syncope [ ]upper extremity weakness   [ ] lower extremity weakness  [  ]diplopia  [  ]altered mental status   [  ]fevers  [ ]chills [ ]nausea  [ ]vomitting  [  ]dysphagia    [ ]abdominal pain  [ ]melena  [ ]BRBPR    [  ]epistaxis  [  ]rash    [ ]lower extremity edema        [X] All others negative	  [ ] Unable to obtain      LABS:	 	    CARDIAC MARKERS:                              12.6   7.56  )-----------( 201      ( 26 Oct 2021 07:23 )             39.1     Hb Trend: 12.6<--    10-26    139  |  101  |  27<H>  ----------------------------<  91  4.1   |  20<L>  |  1.13    Ca    9.5      26 Oct 2021 07:17  Phos  3.0     10-25  Mg     2.0     10-25    TPro  7.5  /  Alb  4.3  /  TBili  0.6  /  DBili  x   /  AST  22  /  ALT  27  /  AlkPhos  140<H>  10-25    Creatinine Trend: 1.13<--, 1.09<--, 1.39<--    Coags:      proBNP: Serum Pro-Brain Natriuretic Peptide: 5224 pg/mL (10-26 @ 07:17)    Lipid Profile:   HgA1c:   TSH: Thyroid Stimulating Hormone, Serum: 1.59 uIU/mL (10-26 @ 10:58)  Thyroid Stimulating Hormone, Serum: 1.55 uIU/mL (10-25 @ 22:52)          PHYSICAL EXAM:  T(C): 36.6 (10-26-21 @ 11:35), Max: 36.8 (10-25-21 @ 15:00)  HR: 63 (10-26-21 @ 11:35) (63 - 79)  BP: 170/90 (10-26-21 @ 11:35) (136/74 - 170/90)  RR: 18 (10-26-21 @ 11:35) (17 - 19)  SpO2: 98% (10-26-21 @ 11:35) (95% - 98%)  Wt(kg): --   BMI (kg/m2): 27.1 (10-24-21 @ 14:28)  I&O's Summary    26 Oct 2021 07:01  -  26 Oct 2021 13:43  --------------------------------------------------------  IN: 300 mL / OUT: 0 mL / NET: 300 mL        Gen: Appears well in NAD  HEENT:  (-)icterus (-)pallor  CV: N S1 S2 1/6 NORRIS (+)2 Pulses B/l  Resp:  Clear to auscultation B/L, normal effort  GI: (+) BS Soft, NT, ND  Lymph:  (-)Edema, (-)obvious lymphadenopathy  Skin: Warm to touch, Normal turgor  Psych: Appropriate mood and affect      TELEMETRY: SR 70-80	      ECG: NSR, narrow QRS, LVH	    < from: TTE with Doppler (w/Cont) (10.25.21 @ 10:11) >  Conclusions:  1. Mitral annular and chordal calcifiation. Mild mitral  regurgitation.  2. Calcified trileaflet aortic valve with reduced opening.  Peak transaortic valve gradient equals 8 mm Hg, mean  transaortic valve gradient equals 4 mm Hg, estimated aortic  valve area equals 1.8 sqcm (by continuity equation), aortic  valve velocity time integral equals 28 cm, consistent with  mild aortic stenosis. No aortic valve regurgitation seen.  3. Mild concentric left ventricular hypertrophy.  4. Endocardial visualization enhanced with intravenous  injection of Ultrasonic Enhancing Agent (Definity). Overall  preserved left ventricular systolic function. No left  ventricular thrombus. The apical inferior wall, and the  apical septum are akinetic.  *** Compared with echocardiogram of 1/15/2020, the apical  akinesis is new.    < end of copied text >      RADIOLOGY:         CXR: < from: Xray Chest 1 View- PORTABLE-Urgent (Xray Chest 1 View- PORTABLE-Urgent .) (10.24.21 @ 16:18) >  IMPRESSION:  Clear lungs.  Cardiomegaly.    < end of copied text >      ASSESSMENT/PLAN: Patient is a 89 y/o Female with PMH of HTN, CAD, prior syncope s/p loop recorder, hypothyroidism, AVN of left femoral head, arthritis of knees, and chronic urinary frequency who is admitted s/p syncope found to have an abnormal echo. Cardiology consulted for further evaluation.    - No evidence of clinical HF or anginal symptoms  - EP eval appreciated - no events on ILR to explain syncope  - Orthostatics negative, Continue BP control with Coreg  - TTE noted with no sig valvular abnormalities but ?new apical akinesis  - Awaiting to hear back from Dr. Buckley office to compare last office echo  - Patient to f/u with her cardiologist Dr. Kam Buckley after d/c    Clemente Corbett PA-C  Pager: [phone]   C A R D I O L O G Y  *********************    DATE OF SERVICE: 10-26-21    HISTORY OF PRESENT ILLNESS: HPI:  Patient is a 89 y/o Female with PMH of HTN, CAD, prior syncope s/p loop recorder, hypothyroidism, AVN of left femoral head, arthritis of knees, and chronic urinary frequency who is admitted s/p syncope found to have an abnormal echo. Cardiology consulted for further evaluation. Patient reports feeling more tired over the past week. She was eating lunch and then had a witnessed episode of LOC. She cannot recall the events. Reports feeling back to baseline when regained consciousness. Reports syncopal episode in past in setting of UTI. Ambulates with walker at baseline with no complaints. Denies chest pain, SOB, palpitations, or dizziness.    PAST MEDICAL & SURGICAL HISTORY:  Urinary Frequency    Bilateral Cataracts    HTN (hypertension)    Spinal stenosis    Hypothyroidism    Non-ST elevation MI (NSTEMI)    Macular degeneration    Ectopic pregnancy, tubal    S/P ORIF (open reduction internal fixation) fracture  R hip    S/P breast lumpectomy    S/P cataract surgery  b/l            MEDICATIONS:  MEDICATIONS  (STANDING):  carvedilol 12.5 milliGRAM(s) Oral every 12 hours  enoxaparin Injectable 40 milliGRAM(s) SubCutaneous every 24 hours  levothyroxine 112 MICROGram(s) Oral daily  polyethylene glycol 3350 17 Gram(s) Oral two times a day  senna 2 Tablet(s) Oral at bedtime      Allergies    sulfa topicals (Unknown)    Intolerances    morphine (Drowsiness; Faint; Hypotension)      FAMILY HISTORY:  Family history of acute myocardial infarction      Non-contributary for premature coronary disease or sudden cardiac death    SOCIAL HISTORY:    [x ] Non-smoker  [ ] Smoker  [ ] Alcohol    FLU VACCINE THIS YEAR STARTS IN AUGUST:  [ ] Yes    [ ] No    IF OVER 65 HAVE YOU EVER HAD A PNA VACCINE:  [ ] Yes    [ ] No       [ ] N/A      REVIEW OF SYSTEMS:  [ ]chest pain  [  ]shortness of breath  [  ]palpitations  [ x ]syncope  [ ]near syncope [ ]upper extremity weakness   [ ] lower extremity weakness  [  ]diplopia  [  ]altered mental status   [  ]fevers  [ ]chills [ ]nausea  [ ]vomitting  [  ]dysphagia    [ ]abdominal pain  [ ]melena  [ ]BRBPR    [  ]epistaxis  [  ]rash    [ ]lower extremity edema        [X] All others negative	  [ ] Unable to obtain      LABS:	 	    CARDIAC MARKERS:                              12.6   7.56  )-----------( 201      ( 26 Oct 2021 07:23 )             39.1     Hb Trend: 12.6<--    10-26    139  |  101  |  27<H>  ----------------------------<  91  4.1   |  20<L>  |  1.13    Ca    9.5      26 Oct 2021 07:17  Phos  3.0     10-25  Mg     2.0     10-25    TPro  7.5  /  Alb  4.3  /  TBili  0.6  /  DBili  x   /  AST  22  /  ALT  27  /  AlkPhos  140<H>  10-25    Creatinine Trend: 1.13<--, 1.09<--, 1.39<--    Coags:      proBNP: Serum Pro-Brain Natriuretic Peptide: 5224 pg/mL (10-26 @ 07:17)    Lipid Profile:   HgA1c:   TSH: Thyroid Stimulating Hormone, Serum: 1.59 uIU/mL (10-26 @ 10:58)  Thyroid Stimulating Hormone, Serum: 1.55 uIU/mL (10-25 @ 22:52)          PHYSICAL EXAM:  T(C): 36.6 (10-26-21 @ 11:35), Max: 36.8 (10-25-21 @ 15:00)  HR: 63 (10-26-21 @ 11:35) (63 - 79)  BP: 170/90 (10-26-21 @ 11:35) (136/74 - 170/90)  RR: 18 (10-26-21 @ 11:35) (17 - 19)  SpO2: 98% (10-26-21 @ 11:35) (95% - 98%)  Wt(kg): --   BMI (kg/m2): 27.1 (10-24-21 @ 14:28)  I&O's Summary    26 Oct 2021 07:01  -  26 Oct 2021 13:43  --------------------------------------------------------  IN: 300 mL / OUT: 0 mL / NET: 300 mL        Gen: Appears well in NAD  HEENT:  (-)icterus (-)pallor  CV: N S1 S2 1/6 NORRIS (+)2 Pulses B/l  Resp:  Clear to auscultation B/L, normal effort  GI: (+) BS Soft, NT, ND  Lymph:  (-)Edema, (-)obvious lymphadenopathy  Skin: Warm to touch, Normal turgor  Psych: Appropriate mood and affect      TELEMETRY: SR 70-80	      ECG: NSR, narrow QRS, LVH	    < from: TTE with Doppler (w/Cont) (10.25.21 @ 10:11) >  Conclusions:  1. Mitral annular and chordal calcifiation. Mild mitral  regurgitation.  2. Calcified trileaflet aortic valve with reduced opening.  Peak transaortic valve gradient equals 8 mm Hg, mean  transaortic valve gradient equals 4 mm Hg, estimated aortic  valve area equals 1.8 sqcm (by continuity equation), aortic  valve velocity time integral equals 28 cm, consistent with  mild aortic stenosis. No aortic valve regurgitation seen.  3. Mild concentric left ventricular hypertrophy.  4. Endocardial visualization enhanced with intravenous  injection of Ultrasonic Enhancing Agent (Definity). Overall  preserved left ventricular systolic function. No left  ventricular thrombus. The apical inferior wall, and the  apical septum are akinetic.  *** Compared with echocardiogram of 1/15/2020, the apical  akinesis is new.    < end of copied text >      RADIOLOGY:         CXR: < from: Xray Chest 1 View- PORTABLE-Urgent (Xray Chest 1 View- PORTABLE-Urgent .) (10.24.21 @ 16:18) >  IMPRESSION:  Clear lungs.  Cardiomegaly.    < end of copied text >      ASSESSMENT/PLAN: Patient is a 89 y/o Female with PMH of HTN, CAD, prior syncope s/p loop recorder, hypothyroidism, AVN of left femoral head, arthritis of knees, and chronic urinary frequency who is admitted s/p syncope found to have an abnormal echo. Cardiology consulted for further evaluation.    - No evidence of clinical HF or anginal symptoms  - EP eval appreciated - no events on ILR to explain syncope  - Orthostatics negative, Continue BP control with Coreg  - TTE noted with no sig valvular abnormalities but ?new apical akinesis  - Outpatient echo reviewed - appears to be new apical akinesis however no chest pain or anginal symptoms  - Recommend medical management of presumed CAD with ASA/Statin if no contraindications  - No further inpatient cardiac w/u planned  - Patient to f/u with her cardiologist Dr. Kam Buckley after d/c for further management    MAYTE PowellC  Pager: [phone]

## 2021-10-26 NOTE — CONSULT NOTE ADULT - ASSESSMENT
Patient seen and examined, agree with above assessment and plan as transcribed above.    - Echo noted, mild regional wall motion abnormality noted.  She has no angina  - Given age and lack of symptoms will medically manage    Malcolm Valdez MD, [facility]  BEEPER [phone]  
EP ATTENDING    Agree with above. A/w syncope - ekg/echo/ilr interrogation unremarkable. No further inpatient EP workup expected. F/U official ILR interrogation

## 2021-10-26 NOTE — PROGRESS NOTE ADULT - SUBJECTIVE AND OBJECTIVE BOX
Patient is a 89y old  Female who presents with a chief complaint of syncope (26 Oct 2021 13:42)        SUBJECTIVE / OVERNIGHT EVENTS: patient got to the chair today. denies any cp or sob. mild ha. otherwise feels ok.       MEDICATIONS  (STANDING):  aspirin enteric coated 81 milliGRAM(s) Oral daily  atorvastatin 20 milliGRAM(s) Oral at bedtime  carvedilol 12.5 milliGRAM(s) Oral every 12 hours  enoxaparin Injectable 40 milliGRAM(s) SubCutaneous every 24 hours  furosemide    Tablet 20 milliGRAM(s) Oral <User Schedule>  levothyroxine 112 MICROGram(s) Oral daily  polyethylene glycol 3350 17 Gram(s) Oral two times a day  senna 2 Tablet(s) Oral at bedtime    MEDICATIONS  (PRN):  acetaminophen     Tablet .. 650 milliGRAM(s) Oral every 6 hours PRN Temp greater or equal to 38C (100.4F), Mild Pain (1 - 3)  traMADol 50 milliGRAM(s) Oral two times a day PRN Moderate Pain (4 - 6)      Vital Signs Last 24 Hrs  T(C): 36.7 (26 Oct 2021 17:21), Max: 36.7 (26 Oct 2021 17:21)  T(F): 98.1 (26 Oct 2021 17:21), Max: 98.1 (26 Oct 2021 17:21)  HR: 71 (26 Oct 2021 17:21) (63 - 79)  BP: 93/55 (26 Oct 2021 17:21) (93/55 - 170/90)  BP(mean): --  RR: 18 (26 Oct 2021 17:21) (17 - 18)  SpO2: 98% (26 Oct 2021 17:21) (96% - 98%)  CAPILLARY BLOOD GLUCOSE        I&O's Summary    26 Oct 2021 07:01  -  26 Oct 2021 18:16  --------------------------------------------------------  IN: 590 mL / OUT: 600 mL / NET: -10 mL          PHYSICAL EXAM:   GENERAL: NAD, well-developed  HEAD:  Atraumatic, Normocephalic  EYES:   conjunctiva and sclera clear  NECK: Supple,    CHEST/LUNG: Clear to auscultation bilaterally; No wheeze  HEART: S1S2 normal. Regular rate and rhythm; No murmurs, rubs, or gallops  ABDOMEN: Soft, Nontender, Nondistended; Bowel sounds present  EXTREMITIES:   No clubbing, cyanosis, or edema  PSYCH/Neuro: AAOx3. Non-focal.   SKIN: No rashes or lesions      LABS:                        12.6   7.56  )-----------( 201      ( 26 Oct 2021 07:23 )             39.1     10-26    139  |  101  |  27<H>  ----------------------------<  91  4.1   |  20<L>  |  1.13    Ca    9.5      26 Oct 2021 07:17  Phos  3.0     10-25  Mg     2.0     10-25    TPro  7.5  /  Alb  4.3  /  TBili  0.6  /  DBili  x   /  AST  22  /  ALT  27  /  AlkPhos  140<H>  10-25        Telemetry reviewed: Sinus 55-90 with pac and pvc.     < from: TTE with Doppler (w/Cont) (10.25.21 @ 10:11) >  Conclusions:  1. Mitral annular and chordal calcifiation. Mild mitral  regurgitation.  2. Calcified trileaflet aortic valve with reduced opening.  Peak transaortic valve gradient equals 8 mm Hg, mean  transaortic valve gradient equals 4 mm Hg, estimated aortic  valve area equals 1.8 sqcm (by continuity equation), aortic  valve velocity time integral equals 28 cm, consistent with  mild aortic stenosis. No aortic valve regurgitation seen.  3. Mild concentric left ventricular hypertrophy.  4. Endocardial visualization enhanced with intravenous  injection of Ultrasonic Enhancing Agent (Definity). Overall  preserved left ventricular systolic function. No left  ventricular thrombus. The apical inferior wall, and the  apical septum are akinetic.  *** Compared with echocardiogram of 1/15/2020, the apical  akinesis is new.    < end of copied text >      RADIOLOGY & ADDITIONAL TESTS:    Imaging Personally Reviewed: echo report  Consultant(s) Notes Reviewed:  amanda, nehal  Care Discussed with Consultants/Other Providers: nehal

## 2021-10-26 NOTE — PROGRESS NOTE ADULT - PROBLEM SELECTOR PLAN 4
- cont tramadol prn (istop in chart).    5. ANNA: -Cr better now. plan for diuretics as above.     6. Prophylactic measure: -Will c/w Lovenox for DVT PPx.  -Discussed plan with PHIL Springer and RN.  -Spoke to patient's daughter Lexie over the phone and updated her (#[phone]) per patient request yesterday.  -Tentative plan for DC back to [facility] tomorrow since no further inpatient work up/monitoring needed at this time.

## 2021-10-27 ENCOUNTER — TRANSCRIPTION ENCOUNTER (OUTPATIENT)
Age: 86
End: 2021-10-27

## 2021-10-27 LAB
CHOLEST SERPL-MCNC: 228 MG/DL — HIGH
HDLC SERPL-MCNC: 52 MG/DL — SIGNIFICANT CHANGE UP
LIPID PNL WITH DIRECT LDL SERPL: 151 MG/DL — HIGH
NON HDL CHOLESTEROL: 176 MG/DL — HIGH
TRIGL SERPL-MCNC: 128 MG/DL — SIGNIFICANT CHANGE UP

## 2021-10-27 PROCEDURE — 70450 CT HEAD/BRAIN W/O DYE: CPT | Mod: 26

## 2021-10-27 PROCEDURE — 93010 ELECTROCARDIOGRAM REPORT: CPT

## 2021-10-27 PROCEDURE — 99232 SBSQ HOSP IP/OBS MODERATE 35: CPT

## 2021-10-27 RX ORDER — POLYETHYLENE GLYCOL 3350 17 G/17G
17 POWDER, FOR SOLUTION ORAL
Qty: 0 | Refills: 0 | DISCHARGE
Start: 2021-10-27

## 2021-10-27 RX ORDER — ASPIRIN/CALCIUM CARB/MAGNESIUM 324 MG
1 TABLET ORAL
Qty: 30 | Refills: 0
Start: 2021-10-27 | End: 2021-11-25

## 2021-10-27 RX ORDER — SPIRONOLACTONE 25 MG/1
1 TABLET, FILM COATED ORAL
Qty: 0 | Refills: 0 | DISCHARGE

## 2021-10-27 RX ORDER — ATORVASTATIN CALCIUM 80 MG/1
1 TABLET, FILM COATED ORAL
Qty: 30 | Refills: 0
Start: 2021-10-27 | End: 2021-11-25

## 2021-10-27 RX ORDER — SENNA PLUS 8.6 MG/1
2 TABLET ORAL
Qty: 0 | Refills: 0 | DISCHARGE
Start: 2021-10-27

## 2021-10-27 RX ADMIN — CARVEDILOL PHOSPHATE 12.5 MILLIGRAM(S): 80 CAPSULE, EXTENDED RELEASE ORAL at 06:04

## 2021-10-27 RX ADMIN — ENOXAPARIN SODIUM 40 MILLIGRAM(S): 100 INJECTION SUBCUTANEOUS at 18:25

## 2021-10-27 RX ADMIN — Medication 112 MICROGRAM(S): at 06:04

## 2021-10-27 RX ADMIN — Medication 81 MILLIGRAM(S): at 12:28

## 2021-10-27 RX ADMIN — ATORVASTATIN CALCIUM 20 MILLIGRAM(S): 80 TABLET, FILM COATED ORAL at 22:08

## 2021-10-27 RX ADMIN — CARVEDILOL PHOSPHATE 12.5 MILLIGRAM(S): 80 CAPSULE, EXTENDED RELEASE ORAL at 18:25

## 2021-10-27 RX ADMIN — POLYETHYLENE GLYCOL 3350 17 GRAM(S): 17 POWDER, FOR SOLUTION ORAL at 18:26

## 2021-10-27 NOTE — DISCHARGE NOTE PROVIDER - NSDCMRMEDTOKEN_GEN_ALL_CORE_FT
aspirin 81 mg oral delayed release tablet: 1 tab(s) orally once a day  atorvastatin 20 mg oral tablet: 1 tab(s) orally once a day (at bedtime)  carvedilol 12.5 mg oral tablet: 1 tab(s) orally every 12 hours  Lasix 20 mg oral tablet: 1 tab(s) orally every other day  levothyroxine 112 mcg (0.112 mg) oral tablet: 1 tab(s) orally once a day  polyethylene glycol 3350 oral powder for reconstitution: 17 gram(s) orally 2 times a day  senna oral tablet: 2 tab(s) orally once a day (at bedtime)  traMADol 50 mg oral tablet: 1 tab(s) orally 2 times a day, As Needed

## 2021-10-27 NOTE — DISCHARGE NOTE NURSING/CASE MANAGEMENT/SOCIAL WORK - PATIENT PORTAL LINK FT
You can access the FollowMyHealth Patient Portal offered by [facility] by registering at the following website: [email or web address]. By joining [facility]’s FollowMyHealth portal, you will also be able to view your health information using other applications (apps) compatible with our system.

## 2021-10-27 NOTE — PROGRESS NOTE ADULT - PROBLEM SELECTOR PLAN 4
- cont tramadol prn (istop in chart).    5. ANNA: -Cr better now/stable. plan for diuretics as above.     6. Prophylactic measure: -Will c/w Lovenox for DVT PPx.  -Discussed plan with NP Madai.  -Spoke to patient's daughter Lexie over the phone and updated her (#[phone]) per patient request.  -Plan for DC back to [facility] tomorrow since no further inpatient work up/monitoring needed at this time. - cont tramadol prn (istop in chart).    5. ANNA: -Cr better now/stable. plan for diuretics as above.     6. Prophylactic measure: -Will c/w Lovenox for DVT PPx.  -Discussed plan with NP Madai.  -Spoke to patient's daughter Lexie over the phone and updated her (#[phone]) per patient request again today.  -Plan for DC back to [facility] tomorrow since no further inpatient work up/monitoring needed at this time.

## 2021-10-27 NOTE — DISCHARGE NOTE PROVIDER - HOSPITAL COURSE
89F c hx HTN, CAD, loop recorder, hypothyroidism, AVN of left femoral head, arthritis of knees, chronic urinary frequency, pw 1 week of weakness, and syncopal of unclear etiol.       Problem/Plan - 1:  ·  Problem: Syncope.   ·  Plan: -Resolved. Unclear cause. Happened while eating/after eating. ?vasovagal.   - tele - no events so far  - trend CE - neg. -F/u CE's and D-dimer - not concerning.   - TTE -overall preserved EF, mild AS, but new apical akinesis since January 2020.  - Discussed ILR results with Dr. Munoz's office and no events seen on it.   -F/u with cards (Dr. Valdez's group) regarding echo findings. ?need for further work up.  -per cards, this apical akinesis is new compared to outpatient echo, however they suggest only medical management with ASA and statin at this time with outpatient cards follow up.   - orthostatics checking - negative.   -Will c/w TEDs.   - hold home  spironolactone (25mg daily) for now  -will resume lasix 20mg qod today given elevated bnp.   -will c/w home carvedilol at 12.5mg bid given elevated BP.     Problem/Plan - 2:  ·  Problem: Lethargy.   ·  Plan: - unclear etiol  - no obvious source of infection. Afebrile no leukocytosis. UA negative.   - monitor off abx  -plan for home diuretics as above. ?component of dehydration. encouraged fluid intake.   - PT eval pending  -TSH within normal.     Problem/Plan - 3:  ·  Problem: HTN (hypertension).   ·  Plan: - plan for home diuretics as above.   - c/w carvedilol at 12.5mg bid.     Problem/Plan - 4:  ·  Problem: Chronic arthritis or osteoarthritis.   ·  Plan: - cont tramadol prn (istop in chart).    5. ANNA: -Cr better now. plan for diuretics as above.     6. Prophylactic measure: -Will c/w Lovenox for DVT PPx.      -Tentative plan for DC back to [facility] tomorrow since no further inpatient work up/monitoring needed at this time.       89F c hx HTN, CAD, loop recorder, hypothyroidism, AVN of left femoral head, arthritis of knees, chronic urinary frequency, pw 1 week of weakness, and syncopal of unclear etiol.       Problem/Plan - 1:  ·  Problem: Syncope.   ·  Plan: -Resolved. Unclear cause. Happened while eating/after eating. ?vasovagal.   - tele - no events so far  - trend CE - neg. -F/u CE's and D-dimer - not concerning.   - TTE -overall preserved EF, mild AS, but new apical akinesis since January 2020.  - Discussed ILR results with Dr. Munoz's office and no events seen on it.   -F/u with cards (Dr. Valdez's group) regarding echo findings. ?need for further work up.  -per cards, this apical akinesis is new compared to outpatient echo, however they suggest only medical management with ASA and statin at this time with outpatient cards follow up.   - orthostatics checking - negative.   -Will c/w TEDs.   - hold home  spironolactone (25mg daily) for now  -will resume lasix 20mg qod today given elevated bnp.   -will c/w home carvedilol at 12.5mg bid given elevated BP.     Problem/Plan - 2:  ·  Problem: Lethargy.   ·  Plan: - unclear etiol  - no obvious source of infection. Afebrile no leukocytosis. UA negative.   - monitor off abx  -plan for home diuretics as above. ?component of dehydration. encouraged fluid intake.   - PT eval pending  -TSH within normal.  CT head normal     Problem/Plan - 3:  ·  Problem: HTN (hypertension).   ·  Plan: - plan for home diuretics as above.   - c/w carvedilol at 12.5mg bid.     Problem/Plan - 4:  ·  Problem: Chronic arthritis or osteoarthritis.   ·  Plan: - cont tramadol prn (istop in chart).    5. ANNA: -Cr better now. plan for diuretics as above.       -Tentative plan for DC back to [facility] further inpatient work up/monitoring needed at this time.

## 2021-10-27 NOTE — DISCHARGE NOTE PROVIDER - NSDCFUADDAPPT_GEN_ALL_CORE_FT
APPTS ARE READY TO BE MADE: [x ] YES    Best Family or Patient Contact (if needed):    Additional Information about above appointments (if needed):    1: Kam Buckley**  2:   3:     Other comments or requests:    APPTS ARE READY TO BE MADE: [x ] YES    Best Family or Patient Contact (if needed):    Additional Information about above appointments (if needed):    1: Kam Buckley**  2:   3:     Other comments or requests:     Prior Appointment (When we schedule the appointment on the patients behalf): Patient was scheduled with Dr. Juan Tan on 11/11/2021 10:40am at [address] [place] [address] through the provider's office. Patient advised of appointment details.    Prior Appointment Scheduled: Patient was previously scheduled with Dr. Kam Buckley on 11/3/2021 11am at [address] [place] [address], [address] [place] [address].

## 2021-10-27 NOTE — DISCHARGE NOTE PROVIDER - PROVIDER TOKENS
PROVIDER:[TOKEN:[[id number]]] PROVIDER:[TOKEN:[[id number]]],PROVIDER:[TOKEN:[[id number]]]

## 2021-10-27 NOTE — DISCHARGE NOTE PROVIDER - CARE PROVIDER_API CALL
Kam Buckley  CARDIOLOGY  [address] [place] [address], [address]  [place] [address]  Phone: [phone]  Fax: [phone]  Follow Up Time:    Kam Buckley  CARDIOLOGY  [address] [facility], [address]  [place] [address]  Phone: [phone]  Fax: [phone]  Follow Up Time:     Juan Tan  CARDIAC ELECTROPHYSIOLOGY  [address] [facility] [address]  [place] [address]  Phone: [phone]  Fax: [phone]  Follow Up Time:

## 2021-10-27 NOTE — DISCHARGE NOTE NURSING/CASE MANAGEMENT/SOCIAL WORK - NSDCVIVACCINE_GEN_ALL_CORE_FT
Tdap; 17-Jun-2019 08:56; Priyanka Easley (RN); [employer]; [id number] (Exp. Date: 04-Apr-2021); IntraMuscular; Deltoid Left.; 0.5 milliLiter(s); VIS (VIS Published: 09-May-2013, VIS Presented: 17-Jun-2019);

## 2021-10-27 NOTE — PROGRESS NOTE ADULT - SUBJECTIVE AND OBJECTIVE BOX
C A R D I O L O G Y  **********************************     DATE OF SERVICE: 10-27-21    Patient denies chest pain, palpitations, or shortness of breath.   Review of systems otherwise (-)  	  MEDICATIONS:  MEDICATIONS  (STANDING):  aspirin enteric coated 81 milliGRAM(s) Oral daily  atorvastatin 20 milliGRAM(s) Oral at bedtime  carvedilol 12.5 milliGRAM(s) Oral every 12 hours  enoxaparin Injectable 40 milliGRAM(s) SubCutaneous every 24 hours  furosemide    Tablet 20 milliGRAM(s) Oral <User Schedule>  levothyroxine 112 MICROGram(s) Oral daily  polyethylene glycol 3350 17 Gram(s) Oral two times a day  senna 2 Tablet(s) Oral at bedtime      LABS:	 	    CARDIAC MARKERS:                                12.6   7.56  )-----------( 201      ( 26 Oct 2021 07:23 )             39.1     Hemoglobin: 12.6 g/dL (10-26 @ 07:23)  Hemoglobin: 12.6 g/dL (10-25 @ 08:45)  Hemoglobin: 11.2 g/dL (10-24 @ 15:18)      10-26    139  |  101  |  27<H>  ----------------------------<  91  4.1   |  20<L>  |  1.13    Ca    9.5      26 Oct 2021 07:17      Creatinine Trend: 1.13<--, 1.09<--, 1.39<--    COAGS:       proBNP:   Lipid Profile:   HgA1c:   TSH:       PHYSICAL EXAM:  T(C): 36.4 (10-27-21 @ 11:24), Max: 36.7 (10-26-21 @ 17:21)  HR: 63 (10-27-21 @ 11:24) (63 - 71)  BP: 134/78 (10-27-21 @ 11:24) (93/55 - 170/90)  RR: 17 (10-27-21 @ 11:24) (17 - 18)  SpO2: 97% (10-27-21 @ 11:24) (95% - 99%)  Wt(kg): --  I&O's Summary    26 Oct 2021 07:01  -  27 Oct 2021 07:00  --------------------------------------------------------  IN: 790 mL / OUT: 1000 mL / NET: -210 mL      Gen: Appears well in NAD  HEENT:  (-)icterus (-)pallor  CV: N S1 S2 1/6 NORRIS (+)2 Pulses B/l  Resp:  Clear to auscultation B/L, normal effort  GI: (+) BS Soft, NT, ND  Lymph:  (-)Edema, (-)obvious lymphadenopathy  Skin: Warm to touch, Normal turgor  Psych: Appropriate mood and affect      TELEMETRY: SR 60-70s, brief PAF	      ECG: NSR, narrow QRS, LVH	    < from: TTE with Doppler (w/Cont) (10.25.21 @ 10:11) >  Conclusions:  1. Mitral annular and chordal calcifiation. Mild mitral  regurgitation.  2. Calcified trileaflet aortic valve with reduced opening.  Peak transaortic valve gradient equals 8 mm Hg, mean  transaortic valve gradient equals 4 mm Hg, estimated aortic  valve area equals 1.8 sqcm (by continuity equation), aortic  valve velocity time integral equals 28 cm, consistent with  mild aortic stenosis. No aortic valve regurgitation seen.  3. Mild concentric left ventricular hypertrophy.  4. Endocardial visualization enhanced with intravenous  injection of Ultrasonic Enhancing Agent (Definity). Overall  preserved left ventricular systolic function. No left  ventricular thrombus. The apical inferior wall, and the  apical septum are akinetic.  *** Compared with echocardiogram of 1/15/2020, the apical  akinesis is new.    < end of copied text >      RADIOLOGY:         CXR: < from: Xray Chest 1 View- PORTABLE-Urgent (Xray Chest 1 View- PORTABLE-Urgent .) (10.24.21 @ 16:18) >  IMPRESSION:  Clear lungs.  Cardiomegaly.    < end of copied text >      ASSESSMENT/PLAN: Patient is a 89 y/o Female with PMH of HTN, CAD, prior syncope s/p loop recorder, hypothyroidism, AVN of left femoral head, arthritis of knees, and chronic urinary frequency who is admitted s/p syncope found to have an abnormal echo. Cardiology consulted for further evaluation.    - No evidence of clinical HF or anginal symptoms  - EP eval appreciated - no events on ILR to explain syncope however PAF noted, AC if within GOC  - Orthostatics negative, Continue BP control with Coreg  - TTE noted with no sig valvular abnormalities but ?new apical akinesis  - Outpatient echo reviewed - appears to be new apical akinesis however no chest pain or anginal symptoms  - Recommend medical management of presumed CAD with ASA/Statin if no contraindications  - No further inpatient cardiac w/u planned  - Patient to f/u with her cardiologist Dr. Kam Buckley after d/c for further management    Clemente Corbett PA-C  Pager: [phone]

## 2021-10-27 NOTE — CHART NOTE - NSCHARTNOTEFT_GEN_A_CORE
Medicine NP Episodic Note    Notified by RN, pt. w/ episode of paroxymal AFib/PATs.  Pt. asymptomatic.  VSS.  EKG obtained - SR w/ occasional PVCs/PATs.  No acute ST/T-wave changes from prior.  [id number] 4.  Given pt's age (89 y/o) and admitting diagnosis of Syncope will defer A/C to Cardiology and family.  Will endorsed to primary team in am.  Attending to follow.    Anne-Marie Majano, [facility]-BC  [phone] Medicine NP Episodic Note    Notified by RN, pt. w/ episode of paroxymal AFib/PATs.  Pt. asymptomatic.  VSS.  EKG obtained - SR w/ premature atrial complexes.  No acute ST/T-wave changes from prior.  [id number] 4.  Given pt's age (89 y/o) and admitting diagnosis of Syncope will defer A/C to Cardiology and family.  Will endorsed to primary team in am.  Attending to follow.    Anne-Marie Majano, [facility]-BC  [phone]

## 2021-10-27 NOTE — CHART NOTE - NSCHARTNOTEFT_GEN_A_CORE
Medically cleared for discharge by Dr. Sahu. CTH negative for Stroke, hemorrhage. Meds reviewed with Dr. Sahu, ASA and Statin added. No spirolactone for now . noted PAF patient would like to defer anticoagulation for now. Discharge back to [facility].

## 2021-10-27 NOTE — PROGRESS NOTE ADULT - SUBJECTIVE AND OBJECTIVE BOX
DATE OF SERVICE: 10-27-21    HISTORY OF PRESENT ILLNESS: HPI:  Patient is a 89 y/o Female with PMH of HTN, CAD, prior syncope s/p loop recorder, hypothyroidism, AVN of left femoral head, arthritis of knees, and chronic urinary frequency who is admitted s/p syncope. EP consulted for further evaluation. Patient reports feeling more tired over the past week. She was eating lunch and then had a witnessed episode of LOC. She cannot recall the events. Reports feeling back to baseline when regained consciousness. Reports syncopal episode in past in setting of UTI. Ambulates with walker at baseline with no complaints. Denies chest pain, SOB, palpitations, or dizziness.    No new complaints today.  Resting comfortably.    ILR check earlier on hospital stay with no actionable findings.  Has brief episodes of AFib on telemetry.      acetaminophen     Tablet .. 650 milliGRAM(s) Oral every 6 hours PRN  aspirin enteric coated 81 milliGRAM(s) Oral daily  atorvastatin 20 milliGRAM(s) Oral at bedtime  carvedilol 12.5 milliGRAM(s) Oral every 12 hours  enoxaparin Injectable 40 milliGRAM(s) SubCutaneous every 24 hours  furosemide    Tablet 20 milliGRAM(s) Oral <User Schedule>  levothyroxine 112 MICROGram(s) Oral daily  polyethylene glycol 3350 17 Gram(s) Oral two times a day  senna 2 Tablet(s) Oral at bedtime  traMADol 50 milliGRAM(s) Oral two times a day PRN                            12.6   7.56  )-----------( 201      ( 26 Oct 2021 07:23 )             39.1       10-26    139  |  101  |  27<H>  ----------------------------<  91  4.1   |  20<L>  |  1.13    Ca    9.5      26 Oct 2021 07:17      T(C): 36.4 (10-27-21 @ 04:00), Max: 36.7 (10-26-21 @ 17:21)  HR: 63 (10-27-21 @ 04:00) (63 - 71)  BP: 168/93 (10-27-21 @ 04:38) (93/55 - 170/90)  RR: 17 (10-27-21 @ 04:00) (17 - 18)  SpO2: 95% (10-27-21 @ 04:00) (95% - 99%)  Wt(kg): --    I&O's Summary    26 Oct 2021 07:01  -  27 Oct 2021 07:00  --------------------------------------------------------  IN: 790 mL / OUT: 1000 mL / NET: -210 mL        Gen: Appears well in NAD  HEENT:  (-)icterus (-)pallor  CV: N S1 S2 1/6 NORRIS (+)2 Pulses B/l  Resp:  Clear to auscultation B/L, normal effort  GI: (+) BS Soft, NT, ND  Lymph:  (-)Edema, (-)obvious lymphadenopathy  Skin: Warm to touch, Normal turgor  Psych: Appropriate mood and affect      TELEMETRY:  NSR, brief episodes of AFib.  No offset pauses.	      ECG: NSR, narrow QRS, LVH  	    RADIOLOGY:         CXR: < from: Xray Chest 1 View- PORTABLE-Urgent (Xray Chest 1 View- PORTABLE-Urgent .) (10.24.21 @ 16:18) >    IMPRESSION:  Clear lungs.  Cardiomegaly.    ILR check:  Under-sensing QRS complexes being called "pauses" (false-positive).  no arrhythmia correlating with patient's syncope episodes.      ASSESSMENT/PLAN: Patient is a 89 y/o Female with PMH of HTN, CAD, prior syncope s/p loop recorder, hypothyroidism, AVN of left femoral head, arthritis of knees, and chronic urinary frequency who is admitted s/p syncope. EP consulted for further evaluation.    - No events on ILR interrogation.  - Has AFib on telemetry.  Consider anticoagulation with Apixaban 5mg BID if within her goals of care.  - Patient to f/u with her cardiologist Dr. Kam Buckley after d/c    Jony Montoya M.D.  Cardiac Electrophysiology  [phone]

## 2021-10-27 NOTE — DISCHARGE NOTE NURSING/CASE MANAGEMENT/SOCIAL WORK - NSDCFUADDAPPT_GEN_ALL_CORE_FT
APPTS ARE READY TO BE MADE: [x ] YES    Best Family or Patient Contact (if needed):    Additional Information about above appointments (if needed):    1: Kam Buckley**  2:   3:     Other comments or requests:

## 2021-10-27 NOTE — DISCHARGE NOTE PROVIDER - NSDCCPCAREPLAN_GEN_ALL_CORE_FT
PRINCIPAL DISCHARGE DIAGNOSIS  Diagnosis: Syncope  Assessment and Plan of Treatment: HOME CARE INSTRUCTIONS  Have someone stay with you until you feel stable.  Do not drive, operate machinery, or play sports until your caregiver says it is okay.  Keep all follow-up appointments as directed by your caregiver.   Lie down right away if you start feeling like you might faint. Breathe deeply and steadily. Wait until all the symptoms have passed.Drink enough fluids to keep your urine clear or pale yellow.  If you are taking blood pressure or heart medicine, get up slowly, taking several minutes to sit and then stand. This can reduce dizziness.  SEEK IMMEDIATE MEDICAL CARE IF:  You have a severe headache.  You have unusual pain in the chest, abdomen, or back.  You are bleeding from the mouth or rectum, or you have black or tarry stool.  You have an irregular or very fast heartbeat.  You have pain with breathing.  You have repeated fainting or seizure-like jerking during an episode.  You faint when sitting or lying down.  You have confusion.  You have difficulty walking.  You have severe weakness.  You have vision problems.        SECONDARY DISCHARGE DIAGNOSES  Diagnosis: Lethargy  Assessment and Plan of Treatment: Resolved at baseline    Diagnosis: Paroxysmal atrial fibrillation  Assessment and Plan of Treatment: Atrial fibrillation is the most common heart rhythm problem.  The condition puts you at risk for has stroke and heart attack  It helps if you control your blood pressure, not drink more than 1-2 alcohol drinks per day, cut down on caffeine, getting treatment for over active thyroid gland, and get regular exercise  Call your doctor if you feel your heart racing or beating unusually, chest tightness or pain, lightheaded, faint, shortness of breath especially with exercise  It is important to take your heart medication as prescribed  You may be on anticoagulation which is very important to take as directed - you may need blood work to monitor drug levels      Diagnosis: ANNA (acute kidney injury)  Assessment and Plan of Treatment: Avoid taking (NSAIDs) - (ex: Ibuprofen, Advil, Celebrex, Naprosyn)  Avoid taking any nephrotoxic agents (can harm kidneys) - Intravenous contrast for diagnostic testing, combination cold medications.  Have all medications adjusted for your renal function by your Health Care Provider.  Blood pressure control is important.  Take all medication as prescribed.       PRINCIPAL DISCHARGE DIAGNOSIS  Diagnosis: Syncope  Assessment and Plan of Treatment: HOME CARE INSTRUCTIONS  Have someone stay with you until you feel stable.  Do not drive, operate machinery, or play sports until your caregiver says it is okay.  Keep all follow-up appointments as directed by your caregiver.   Lie down right away if you start feeling like you might faint. Breathe deeply and steadily. Wait until all the symptoms have passed.Drink enough fluids to keep your urine clear or pale yellow.  If you are taking blood pressure or heart medicine, get up slowly, taking several minutes to sit and then stand. This can reduce dizziness.  SEEK IMMEDIATE MEDICAL CARE IF:  You have a severe headache.  You have unusual pain in the chest, abdomen, or back.  You are bleeding from the mouth or rectum, or you have black or tarry stool.  You have an irregular or very fast heartbeat.  You have pain with breathing.  You have repeated fainting or seizure-like jerking during an episode.  You faint when sitting or lying down.  You have confusion.  You have difficulty walking.  You have severe weakness.  You have vision problems.  Spironlactone stopped in the hospital. Thought to have contributed to dehydration/syncope. Follow up with Dr. Buckley         SECONDARY DISCHARGE DIAGNOSES  Diagnosis: Lethargy  Assessment and Plan of Treatment: Resolved at baseline    Diagnosis: Paroxysmal atrial fibrillation  Assessment and Plan of Treatment: Atrial fibrillation is the most common heart rhythm problem.  The condition puts you at risk for has stroke and heart attack  It helps if you control your blood pressure, not drink more than 1-2 alcohol drinks per day, cut down on caffeine, getting treatment for over active thyroid gland, and get regular exercise  Call your doctor if you feel your heart racing or beating unusually, chest tightness or pain, lightheaded, faint, shortness of breath especially with exercise  It is important to take your heart medication as prescribed  You may be on anticoagulation which is very important to take as directed - you may need blood work to monitor drug levels  Continue aspirin for now. Follow up with Dr. Buckley       Diagnosis: ANNA (acute kidney injury)  Assessment and Plan of Treatment: Avoid taking (NSAIDs) - (ex: Ibuprofen, Advil, Celebrex, Naprosyn)  Avoid taking any nephrotoxic agents (can harm kidneys) - Intravenous contrast for diagnostic testing, combination cold medications.  Have all medications adjusted for your renal function by your Health Care Provider.  Blood pressure control is important.  Take all medication as prescribed.

## 2021-10-27 NOTE — PROGRESS NOTE ADULT - SUBJECTIVE AND OBJECTIVE BOX
Patient is a 89y old  Female who presents with a chief complaint of syncope (27 Oct 2021 13:07)        SUBJECTIVE / OVERNIGHT EVENTS: patient reports feeling a little tired today after working with PT. no headaches or cp or sob or vision changes or weakness. says she ambulated well with PT and didn't have any lightheadedness.       MEDICATIONS  (STANDING):  aspirin enteric coated 81 milliGRAM(s) Oral daily  atorvastatin 20 milliGRAM(s) Oral at bedtime  carvedilol 12.5 milliGRAM(s) Oral every 12 hours  enoxaparin Injectable 40 milliGRAM(s) SubCutaneous every 24 hours  furosemide    Tablet 20 milliGRAM(s) Oral <User Schedule>  levothyroxine 112 MICROGram(s) Oral daily  polyethylene glycol 3350 17 Gram(s) Oral two times a day  senna 2 Tablet(s) Oral at bedtime    MEDICATIONS  (PRN):  acetaminophen     Tablet .. 650 milliGRAM(s) Oral every 6 hours PRN Temp greater or equal to 38C (100.4F), Mild Pain (1 - 3)  traMADol 50 milliGRAM(s) Oral two times a day PRN Moderate Pain (4 - 6)      Vital Signs Last 24 Hrs  T(C): 36.4 (27 Oct 2021 11:24), Max: 36.4 (27 Oct 2021 04:00)  T(F): 97.5 (27 Oct 2021 11:24), Max: 97.5 (27 Oct 2021 04:00)  HR: 63 (27 Oct 2021 11:24) (63 - 65)  BP: 134/78 (27 Oct 2021 11:24) (103/64 - 168/93)  BP(mean): --  RR: 17 (27 Oct 2021 11:24) (17 - 18)  SpO2: 97% (27 Oct 2021 11:24) (95% - 99%)  CAPILLARY BLOOD GLUCOSE        I&O's Summary    26 Oct 2021 07:01  -  27 Oct 2021 07:00  --------------------------------------------------------  IN: 790 mL / OUT: 1000 mL / NET: -210 mL    27 Oct 2021 07:01  -  27 Oct 2021 17:56  --------------------------------------------------------  IN: 300 mL / OUT: 500 mL / NET: -200 mL          PHYSICAL EXAM:   GENERAL: NAD, well-developed  HEAD:  Atraumatic, Normocephalic  EYES:   conjunctiva and sclera clear  NECK: Supple,    CHEST/LUNG: Clear to auscultation bilaterally; No wheeze  HEART: S1S2 normal. Regular rate and rhythm; No murmurs, rubs, or gallops  ABDOMEN: Soft, Nontender, Nondistended; Bowel sounds present  EXTREMITIES:  trace le edema  PSYCH/Neuro: AAOx3. Non-focal.   SKIN: No rashes or lesions      LABS:                        12.6   7.56  )-----------( 201      ( 26 Oct 2021 07:23 )             39.1     10-26    139  |  101  |  27<H>  ----------------------------<  91  4.1   |  20<L>  |  1.13    Ca    9.5      26 Oct 2021 07:17        < from: CT Head No Cont (10.27.21 @ 13:39) >  IMPRESSION:    -No acute transcortical infarct or intracranial hemorrhage.    --- End of Report ---    < end of copied text >        Telemetry reviewed: Sinus 50-70's. PAT. PAC. PVC.     RADIOLOGY & ADDITIONAL TESTS:    Imaging Personally Reviewed: ct head  Consultant(s) Notes Reviewed:  cards  Care Discussed with Consultants/Other Providers:

## 2021-10-28 VITALS
DIASTOLIC BLOOD PRESSURE: 96 MMHG | TEMPERATURE: 97 F | OXYGEN SATURATION: 96 % | HEART RATE: 77 BPM | RESPIRATION RATE: 18 BRPM | SYSTOLIC BLOOD PRESSURE: 165 MMHG

## 2021-10-28 LAB — SARS-COV-2 RNA SPEC QL NAA+PROBE: SIGNIFICANT CHANGE UP

## 2021-10-28 PROCEDURE — 84484 ASSAY OF TROPONIN QUANT: CPT

## 2021-10-28 PROCEDURE — 36415 COLL VENOUS BLD VENIPUNCTURE: CPT

## 2021-10-28 PROCEDURE — 99239 HOSP IP/OBS DSCHRG MGMT >30: CPT

## 2021-10-28 PROCEDURE — 82947 ASSAY GLUCOSE BLOOD QUANT: CPT

## 2021-10-28 PROCEDURE — 70450 CT HEAD/BRAIN W/O DYE: CPT

## 2021-10-28 PROCEDURE — 82565 ASSAY OF CREATININE: CPT

## 2021-10-28 PROCEDURE — 93005 ELECTROCARDIOGRAM TRACING: CPT

## 2021-10-28 PROCEDURE — 82435 ASSAY OF BLOOD CHLORIDE: CPT

## 2021-10-28 PROCEDURE — 83036 HEMOGLOBIN GLYCOSYLATED A1C: CPT

## 2021-10-28 PROCEDURE — 87086 URINE CULTURE/COLONY COUNT: CPT

## 2021-10-28 PROCEDURE — 86769 SARS-COV-2 COVID-19 ANTIBODY: CPT

## 2021-10-28 PROCEDURE — 71045 X-RAY EXAM CHEST 1 VIEW: CPT

## 2021-10-28 PROCEDURE — 85018 HEMOGLOBIN: CPT

## 2021-10-28 PROCEDURE — 85027 COMPLETE CBC AUTOMATED: CPT

## 2021-10-28 PROCEDURE — 99285 EMERGENCY DEPT VISIT HI MDM: CPT

## 2021-10-28 PROCEDURE — 82803 BLOOD GASES ANY COMBINATION: CPT

## 2021-10-28 PROCEDURE — 97530 THERAPEUTIC ACTIVITIES: CPT

## 2021-10-28 PROCEDURE — 84132 ASSAY OF SERUM POTASSIUM: CPT

## 2021-10-28 PROCEDURE — 85025 COMPLETE CBC W/AUTO DIFF WBC: CPT

## 2021-10-28 PROCEDURE — C8929: CPT

## 2021-10-28 PROCEDURE — 80061 LIPID PANEL: CPT

## 2021-10-28 PROCEDURE — 84295 ASSAY OF SERUM SODIUM: CPT

## 2021-10-28 PROCEDURE — 80053 COMPREHEN METABOLIC PANEL: CPT

## 2021-10-28 PROCEDURE — 83735 ASSAY OF MAGNESIUM: CPT

## 2021-10-28 PROCEDURE — 84443 ASSAY THYROID STIM HORMONE: CPT

## 2021-10-28 PROCEDURE — 97161 PT EVAL LOW COMPLEX 20 MIN: CPT

## 2021-10-28 PROCEDURE — 83605 ASSAY OF LACTIC ACID: CPT

## 2021-10-28 PROCEDURE — U0003: CPT

## 2021-10-28 PROCEDURE — 81001 URINALYSIS AUTO W/SCOPE: CPT

## 2021-10-28 PROCEDURE — 80048 BASIC METABOLIC PNL TOTAL CA: CPT

## 2021-10-28 PROCEDURE — 82607 VITAMIN B-12: CPT

## 2021-10-28 PROCEDURE — 82330 ASSAY OF CALCIUM: CPT

## 2021-10-28 PROCEDURE — U0005: CPT

## 2021-10-28 PROCEDURE — 83880 ASSAY OF NATRIURETIC PEPTIDE: CPT

## 2021-10-28 PROCEDURE — 97116 GAIT TRAINING THERAPY: CPT

## 2021-10-28 PROCEDURE — 85379 FIBRIN DEGRADATION QUANT: CPT

## 2021-10-28 PROCEDURE — 84100 ASSAY OF PHOSPHORUS: CPT

## 2021-10-28 PROCEDURE — 85014 HEMATOCRIT: CPT

## 2021-10-28 RX ADMIN — CARVEDILOL PHOSPHATE 12.5 MILLIGRAM(S): 80 CAPSULE, EXTENDED RELEASE ORAL at 05:56

## 2021-10-28 RX ADMIN — Medication 112 MICROGRAM(S): at 05:57

## 2021-10-28 RX ADMIN — Medication 81 MILLIGRAM(S): at 11:13

## 2021-10-28 RX ADMIN — Medication 20 MILLIGRAM(S): at 05:57

## 2021-10-28 NOTE — PROGRESS NOTE ADULT - PROVIDER SPECIALTY LIST ADULT
Cardiology
Electrophysiology
Electrophysiology
Hospitalist
Cardiology

## 2021-10-28 NOTE — PROGRESS NOTE ADULT - ASSESSMENT
89F c hx HTN, CAD, loop recorder, hypothyroidism, AVN of left femoral head, arthritis of knees, chronic urinary frequency, pw 1 week of weakness, and syncopal of unclear etiol.
Agree with above assessment and plan as outlined above.    - f/u with Dr. Marcio Valdez MD, [facility]  BEEPER [phone]  
89F c hx HTN, CAD, loop recorder, hypothyroidism, AVN of left femoral head, arthritis of knees, chronic urinary frequency, pw 1 week of weakness, and syncopal of unclear etiol.

## 2021-10-28 NOTE — PROGRESS NOTE ADULT - PROBLEM SELECTOR PLAN 3
- holding home diuretics for now.   - restart carvedilol at 12.5mg bid.
- plan for home diuretics as above.   - c/w carvedilol at 12.5mg bid.

## 2021-10-28 NOTE — PROGRESS NOTE ADULT - SUBJECTIVE AND OBJECTIVE BOX
Patient is a 89y old  Female who presents with a chief complaint of syncope (28 Oct 2021 13:21)        SUBJECTIVE / OVERNIGHT EVENTS: patient says she feels ok. ready to go back to [facility]. one loose bm she thinks food related.       MEDICATIONS  (STANDING):    MEDICATIONS  (PRN):      Vital Signs Last 24 Hrs  T(C): 36.3 (28 Oct 2021 12:00), Max: 36.3 (28 Oct 2021 12:00)  T(F): 97.4 (28 Oct 2021 12:00), Max: 97.4 (28 Oct 2021 12:00)  HR: 77 (28 Oct 2021 12:00) (77 - 77)  BP: 165/96 (28 Oct 2021 12:00) (165/96 - 165/96)  BP(mean): --  RR: 18 (28 Oct 2021 12:00) (18 - 18)  SpO2: 96% (28 Oct 2021 12:00) (96% - 96%)  CAPILLARY BLOOD GLUCOSE        I&O's Summary    28 Oct 2021 07:01  -  29 Oct 2021 07:00  --------------------------------------------------------  IN: 400 mL / OUT: 900 mL / NET: -500 mL          PHYSICAL EXAM:   GENERAL: NAD, well-developed  HEAD:  Atraumatic, Normocephalic  EYES: EOMI, PERRLA, conjunctiva and sclera clear  NECK: Supple,   CHEST/LUNG: Clear to auscultation bilaterally; No wheeze  HEART: S1S2 normal. Regular rate and rhythm; No murmurs, rubs, or gallops  ABDOMEN: Soft, Nontender, Nondistended; Bowel sounds present  EXTREMITIES:    No clubbing, cyanosis, or edema  PSYCH/Neuro: AAOx3. Non-focal.   SKIN: No rashes or lesions      LABS:          Telemetry reviewed: Sinus 70-80's. pvc's.     RADIOLOGY & ADDITIONAL TESTS:    Imaging Personally Reviewed:  Consultant(s) Notes Reviewed:  cards  Care Discussed with Consultants/Other Providers:

## 2021-10-28 NOTE — PROGRESS NOTE ADULT - PROBLEM SELECTOR PLAN 4
- cont tramadol prn (istop in chart).    5. ANNA: -Cr better now/stable. plan for diuretics as above.     6. Prophylactic measure: -Will c/w Lovenox for DVT PPx.  -Discussed plan with TANA Clemons and CM.   -Spoke to patient's daughter Lexie over the phone and updated her (#[phone]) per patient request again yesterday.  -Plan for DC back to [facility] today since no further inpatient work up/monitoring needed at this time. -Stable/improved for DC with outpatient cards/pmd follow up. -secure emailed Dr. Buckley. -37 minutes spent on the discharge process.

## 2021-10-28 NOTE — PROGRESS NOTE ADULT - PROBLEM SELECTOR PLAN 2
- unclear etiol  - no obvious source of infection. Afebrile no leukocytosis. UA negative.   - monitor off abx  -plan for home diuretics as above. ?component of dehydration. encouraged fluid intake.   - PT eval completed and recs outpatient PT.  -TSH within normal.
- unclear etiol  - no obvious source of infection. Afebrile no leukocytosis. UA negative.   - monitor off abx  -plan for home diuretics as above. ?component of dehydration. encouraged fluid intake.   - PT eval pending  -TSH within normal.
- unclear etiol  - no obvious source of infection. Afebrile no leukocytosis. UA negative.   - monitor off abx  -plan for home diuretics as above. ?component of dehydration. encouraged fluid intake.   - PT eval completed and recs outpatient PT.  -TSH within normal.
- unclear etiol  - no obvious source of infection. Afebrile no leukocytosis. UA negative.   - monitor off abx  -hold home diuretics for now in case ?component of dehydration.   - PT eval pending  -TSH.

## 2021-10-28 NOTE — PROGRESS NOTE ADULT - PROBLEM SELECTOR PLAN 1
-Resolved. Unclear cause. Happened while eating/after eating. ?vasovagal.   - tele - no events so far  - trend CE - neg. -F/u CE's and D-dimer - not concerning.   - TTE -overall preserved EF, mild AS, but new apical akinesis since January 2020.  - Discussed ILR results with Dr. Munoz's office and no events seen on it.   -F/u with cards (Dr. Valdez's group) regarding echo findings. ?need for further work up.  -per cards, this apical akinesis is new compared to outpatient echo, however they suggest only medical management with ASA and statin at this time with outpatient cards follow up.   - orthostatics checking - negative.   -Will c/w TEDs.   - hold home  spironolactone (25mg daily) for now  -will resume lasix 20mg qod today given elevated bnp.   -will c/w home carvedilol at 12.5mg bid given elevated BP.
-Resolved. Unclear cause. Happened while eating/after eating. ?vasovagal.   - tele - earlier yesterday morning had short episode of PAT/?Afib. -none seen since.   -EP recs appreciated; could start AC. -Discussed patient's risk of stroke based on chads vasc is about 4 which is 4.8% per year. -Patient understands risks/benefits of full AC and prefers to not initiate full AC at this time. She will follow up with Dr. Buckley and discuss with him after discharge as well. Daughter Lexie also aware.   - trend CE - neg. -F/u CE's and D-dimer - not concerning.   - TTE -overall preserved EF, mild AS, but new apical akinesis since January 2020.  - Discussed ILR results with Dr. Munoz's office and no events seen on it.   -F/u with cards (Dr. Valdez's group) regarding echo findings. ?need for further work up.  -per cards, this apical akinesis is new compared to outpatient echo, however they suggest only medical management with ASA and statin at this time with outpatient cards follow up.   - orthostatics checking - negative.   -Will c/w TEDs.   - hold home  spironolactone (25mg daily) for now and on DC  -will c/w lasix 20mg qod given elevated bnp.   -will c/w home carvedilol at 12.5mg bid given elevated BP.  -CT negative for any acute processes.
-Resolved. Unclear cause. Happened while eating/after eating. ?vasovagal.   - tele - no events so far  - trend CE - neg. -F/u CE's and D-dimer.   - TTE -overall preserved EF, mild AS, but new apical akinesis since January 2020.  - Discussed ILR results with Dr. Munoz's office and no events seen on it.   -F/u with cards (Dr. Valdez's group) regarding echo findings. ?need for further work up.  - orthostatics checking.   -Will apply TEDs.   - hold home lasix (20mg qod) and spironolactone (25mg daily) for now  -will resume home carvedilol at 12.5mg bid given elevated BP.   -consider VTE work up if no other causes seen.
-Resolved. Unclear cause. Happened while eating/after eating. ?vasovagal.   - tele - earlier this morning had short episode of PAT/?Afib.   -EP recs appreciated; could start AC. -Discussed patient's risk of stroke based on chads vasc is about 4 which is 4.8% per year. -Patient understands risks/benefits of full AC and prefers to not initiate full AC at this time. She will follow up with Dr. Buckley and discuss with him after discharge as well.   - trend CE - neg. -F/u CE's and D-dimer - not concerning.   - TTE -overall preserved EF, mild AS, but new apical akinesis since January 2020.  - Discussed ILR results with Dr. Munoz's office and no events seen on it.   -F/u with cards (Dr. Valdez's group) regarding echo findings. ?need for further work up.  -per cards, this apical akinesis is new compared to outpatient echo, however they suggest only medical management with ASA and statin at this time with outpatient cards follow up.   - orthostatics checking - negative.   -Will c/w TEDs.   - hold home  spironolactone (25mg daily) for now and on DC  -will c/w lasix 20mg qod given elevated bnp.   -will c/w home carvedilol at 12.5mg bid given elevated BP.  -CT negative for any acute processes.

## 2021-10-28 NOTE — PROGRESS NOTE ADULT - SUBJECTIVE AND OBJECTIVE BOX
C A R D I O L O G Y  **********************************     DATE OF SERVICE: 10-28-21    Denies chest pain, palpitations, or shortness of breath.   Review of systems otherwise (-)  	    MEDICATIONS  (STANDING):  aspirin enteric coated 81 milliGRAM(s) Oral daily  atorvastatin 20 milliGRAM(s) Oral at bedtime  carvedilol 12.5 milliGRAM(s) Oral every 12 hours  enoxaparin Injectable 40 milliGRAM(s) SubCutaneous every 24 hours  furosemide    Tablet 20 milliGRAM(s) Oral <User Schedule>  levothyroxine 112 MICROGram(s) Oral daily  polyethylene glycol 3350 17 Gram(s) Oral two times a day  senna 2 Tablet(s) Oral at bedtime    MEDICATIONS  (PRN):  acetaminophen     Tablet .. 650 milliGRAM(s) Oral every 6 hours PRN Temp greater or equal to 38C (100.4F), Mild Pain (1 - 3)  traMADol 50 milliGRAM(s) Oral two times a day PRN Moderate Pain (4 - 6)      LABS:      Hemoglobin: 12.6 g/dL (10-26 @ 07:23)  Hemoglobin: 12.6 g/dL (10-25 @ 08:45)  Hemoglobin: 11.2 g/dL (10-24 @ 15:18)          Creatinine Trend: 1.13<--, 1.09<--, 1.39<--             10-27-21 @ 07:01  -  10-28-21 @ 07:00  --------------------------------------------------------  IN: 300 mL / OUT: 800 mL / NET: -500 mL    10-28-21 @ 07:01  -  10-28-21 @ 15:07  --------------------------------------------------------  IN: 400 mL / OUT: 900 mL / NET: -500 mL        PHYSICAL EXAM  Vital Signs Last 24 Hrs  T(C): 36.3 (28 Oct 2021 12:00), Max: 36.7 (27 Oct 2021 21:50)  T(F): 97.4 (28 Oct 2021 12:00), Max: 98.1 (27 Oct 2021 21:50)  HR: 77 (28 Oct 2021 12:00) (69 - 86)  BP: 165/96 (28 Oct 2021 12:00) (147/87 - 171/87)  BP(mean): --  RR: 18 (28 Oct 2021 12:00) (17 - 18)  SpO2: 96% (28 Oct 2021 12:00) (96% - 97%)        Gen: Appears well in NAD  HEENT:  (-)icterus (-)pallor  CV: N S1 S2 1/6 NORRIS (+)2 Pulses B/l  Resp:  Clear to auscultation B/L, normal effort  GI: (+) BS Soft, NT, ND  Lymph:  (-)Edema, (-)obvious lymphadenopathy  Skin: Warm to touch, Normal turgor  Psych: Appropriate mood and affect      TELEMETRY: SR 60-70s      ECG: NSR, narrow QRS, LVH	    < from: TTE with Doppler (w/Cont) (10.25.21 @ 10:11) >  Conclusions:  1. Mitral annular and chordal calcifiation. Mild mitral  regurgitation.  2. Calcified trileaflet aortic valve with reduced opening.  Peak transaortic valve gradient equals 8 mm Hg, mean  transaortic valve gradient equals 4 mm Hg, estimated aortic  valve area equals 1.8 sqcm (by continuity equation), aortic  valve velocity time integral equals 28 cm, consistent with  mild aortic stenosis. No aortic valve regurgitation seen.  3. Mild concentric left ventricular hypertrophy.  4. Endocardial visualization enhanced with intravenous  injection of Ultrasonic Enhancing Agent (Definity). Overall  preserved left ventricular systolic function. No left  ventricular thrombus. The apical inferior wall, and the  apical septum are akinetic.  *** Compared with echocardiogram of 1/15/2020, the apical  akinesis is new.    < end of copied text >      RADIOLOGY:         CXR: < from: Xray Chest 1 View- PORTABLE-Urgent (Xray Chest 1 View- PORTABLE-Urgent .) (10.24.21 @ 16:18) >  IMPRESSION:  Clear lungs.  Cardiomegaly.    < end of copied text >      ASSESSMENT/PLAN: Patient is a 89 y/o Female with PMH of HTN, CAD, prior syncope s/p loop recorder, hypothyroidism, AVN of left femoral head, arthritis of knees, and chronic urinary frequency who is admitted s/p syncope found to have an abnormal echo. Cardiology consulted for further evaluation.    - No evidence of clinical HF or anginal symptoms  - EP eval appreciated - no events on ILR to explain syncope however PAF noted, AC if within GOC -deferred per primary team  - Orthostatics negative, Continue BP control with Coreg  - TTE noted with no sig valvular abnormalities but ?new apical akinesis  - Outpatient echo reviewed - appears to be new apical akinesis however no chest pain or anginal symptoms  - Recommend medical management of presumed CAD with ASA/Statin if no contraindications  - No further inpatient cardiac w/u planned  - Patient to f/u with her cardiologist Dr. Kam Buckley after d/c for further management    Clemente Corbett PA-C  Pager: [phone]

## 2021-10-28 NOTE — PROGRESS NOTE ADULT - PROBLEM SELECTOR PROBLEM 4
Chronic arthritis or osteoarthritis

## 2021-10-29 ENCOUNTER — APPOINTMENT (OUTPATIENT)
Dept: GERIATRICS | Facility: CLINIC | Age: 86
End: 2021-10-29
Payer: MEDICARE

## 2021-10-29 VITALS
TEMPERATURE: 97.8 F | HEART RATE: 87 BPM | OXYGEN SATURATION: 98 % | SYSTOLIC BLOOD PRESSURE: 131 MMHG | RESPIRATION RATE: 20 BRPM | DIASTOLIC BLOOD PRESSURE: 64 MMHG

## 2021-10-29 DIAGNOSIS — R53.83 OTHER FATIGUE: ICD-10-CM

## 2021-10-29 PROCEDURE — 99497 ADVNCD CARE PLAN 30 MIN: CPT

## 2021-10-29 PROCEDURE — 99498 ADVNCD CARE PLAN ADDL 30 MIN: CPT

## 2021-11-01 PROBLEM — R53.83 FATIGUE, UNSPECIFIED TYPE: Status: ACTIVE | Noted: 2021-10-18

## 2021-11-01 NOTE — HISTORY OF PRESENT ILLNESS
[Reviewed no changes] : Reviewed, no changes [No falls in past year] : Patient reported no falls in the past year [Independent] : transferring/mobility [] : Patient is continent. [Designated Healthcare Proxy] : Designated healthcare proxy [Name: ___] : Health Care Proxy's Name: [unfilled]  [Relationship: ___] : Relationship: [unfilled] [I will adhere to the patient's wishes.] : I will adhere to the patient's wishes. [Time Spent: ___ minutes] : Time Spent: [unfilled] minutes [FreeTextEntry1] : Ms. SOLIS TAYLOR is a 89 yo F with PMHx of HF,HLD, OA, recurrent UTI's, hypothyroidism, [facility] hospitalization at [facility] for syncope(10/24/21-10/28/21. Pt. accompanied by daughters, Macrina (haroon Owen) lives in [place], [place] and Rosa Maria lives in [place]. Daughters visiting, third daughter Ariana lives in [place]. \par \par Pt. and Lexie report pt. collapsed while eating her dinner in the dining room of [facility], once paramedics arrived, pt. became conscious .  Pt. reports feeling better, residual weakness. Rosa Maria reports  pt. continues to have weakness, fatigued with walking poor appetite,not eating well. Reports pt.'s energy is low. Otherwise, denies pain. Denies falls. Denies HA/dizziness, SOB/CP, abdominal pain, dysuria, reports loose BMs improved. \par \par Dr. evelin Buckley-cardiology no f/u scheduled, attempted to schedule during visit today, office closed\par Juan Dunlap cardilogy  electrophysiology 11/30 \par \par +2 edema\par \par 97.8\par 87 hr \par 98%\par 131/64\par 83 HR [AdvancecareDate] : 10/29/2021 [FreeTextEntry4] : MOLST form reviewed, no changed, scanned in chart. Completed HCP today.\par -DNR/DNI\par Introduced hospice to pt. and daughters. Rosa Maria and Lexie stated they feel it would be best approach for the patient, patient not ready yet.

## 2021-11-01 NOTE — ASSESSMENT
[FreeTextEntry1] : follow up in 1 month, PRN. \par -direct pt. time 3:45pm-5:10pm\par SANDIE Morales-BC

## 2021-11-01 NOTE — REVIEW OF SYSTEMS
[Sore Throat] : no sore throat [Chest Pain] : no chest pain [Palpitations] : no palpitations [Cough] : no cough [As Noted in HPI] : as noted in HPI [Incontinence] : no incontinence

## 2021-11-01 NOTE — PHYSICAL EXAM
[Alert] : alert [Sclera] : the sclera and conjunctiva were normal [PERRL] : pupils were equal in size, round, and reactive to light [Normal Oral Mucosa] : normal oral mucosa [No Oral Pallor] : no oral pallor [No Oral Cyanosis] : no oral cyanosis [Normal Appearance] : the appearance of the neck was normal [No Neck Mass] : no neck mass was observed [Supple] : the neck was supple [No Respiratory Distress] : no respiratory distress [No Acc Muscle Use] : no accessory muscle use [Respiration, Rhythm And Depth] : normal respiratory rhythm and effort [Normal PMI] : the apical impulse was abnormal [Auscultation Breath Sounds / Voice Sounds] : lungs were clear to auscultation bilaterally [Normal S1, S2] : normal S1 and S2 [Heart Rate And Rhythm] : heart rate was normal and rhythm regular [___ +] : bilateral [unfilled]+ pitting edema to the ankles [Bowel Sounds] : normal bowel sounds [Abdomen Soft] : soft [Cervical Lymph Nodes Enlarged Posterior Bilaterally] : posterior cervical [Supraclavicular Lymph Nodes Enlarged Bilaterally] : supraclavicular [Cervical Lymph Nodes Enlarged Anterior Bilaterally] : anterior cervical, supraclavicular [Axillary Lymph Nodes Enlarged Bilaterally] : axillary [No CVA Tenderness] : no CVA  tenderness [No Spinal Tenderness] : no spinal tenderness [No Focal Deficits] : no focal deficits [Normal Affect] : the affect was normal [Normal Mood] : the mood was normal [de-identified] : elderly frail woman [de-identified] : rounded [de-identified] : ataxic gait, using rollator [de-identified] : chronic seborrheic keratosis generalized on back

## 2021-11-02 ENCOUNTER — APPOINTMENT (OUTPATIENT)
Dept: GERIATRICS | Facility: ASSISTED LIVING FACILITY | Age: 86
End: 2021-11-02
Payer: MEDICARE

## 2021-11-02 DIAGNOSIS — S41.119A LACERATION W/OUT FOREIGN BODY OF UNSPECIFIED UPPER ARM, INITIAL ENCOUNTER: ICD-10-CM

## 2021-11-02 NOTE — CHART NOTE - NSCHARTNOTEFT_GEN_A_CORE
Patient was advised of follow up requests and asked for scheduling assistance. Will await a call back from [facility] office to confirm appointment details.   Patient was scheduled with Juan Dunlap on (11/11/2021) (10:40am) at ([facility]) through the provider's office.  Left (1) message(11/02) for patient in regards to follow up care with callback information.

## 2021-11-05 PROBLEM — S41.119A SKIN TEAR OF UPPER EXTREMITY: Status: ACTIVE | Noted: 2021-11-05

## 2021-11-05 NOTE — ASSESSMENT
[FreeTextEntry1] : wound dressed today\par advised [facility] nurse on wound dressing with bacitracin\par monitor for any signs of infection

## 2021-11-05 NOTE — HISTORY OF PRESENT ILLNESS
[FreeTextEntry1] : skin tear to right elbow \par denies bleeding or discharge\par [facility] nurse has dressed wound\par

## 2021-12-01 RX ORDER — SPIRONOLACTONE 25 MG/1
25 TABLET ORAL
Refills: 0 | Status: DISCONTINUED | COMMUNITY
End: 2021-12-01

## 2021-12-03 ENCOUNTER — APPOINTMENT (OUTPATIENT)
Dept: GERIATRICS | Facility: ASSISTED LIVING FACILITY | Age: 86
End: 2021-12-03

## 2021-12-03 LAB
ALBUMIN SERPL ELPH-MCNC: 4 G/DL
ALP BLD-CCNC: 140 U/L
ALT SERPL-CCNC: 27 U/L
ANION GAP SERPL CALC-SCNC: 16 MMOL/L
APPEARANCE: CLEAR
AST SERPL-CCNC: 20 U/L
BASOPHILS # BLD AUTO: 0.06 K/UL
BASOPHILS NFR BLD AUTO: 0.8 %
BILIRUB SERPL-MCNC: 0.4 MG/DL
BILIRUBIN URINE: NEGATIVE
BLOOD URINE: NEGATIVE
BUN SERPL-MCNC: 31 MG/DL
CALCIUM SERPL-MCNC: 8.7 MG/DL
CHLORIDE SERPL-SCNC: 105 MMOL/L
CO2 SERPL-SCNC: 19 MMOL/L
COLOR: NORMAL
CREAT SERPL-MCNC: 1.23 MG/DL
EOSINOPHIL # BLD AUTO: 0.24 K/UL
EOSINOPHIL NFR BLD AUTO: 3.3 %
GLUCOSE QUALITATIVE U: NEGATIVE
GLUCOSE SERPL-MCNC: 89 MG/DL
HCT VFR BLD CALC: 32 %
HGB BLD-MCNC: 10.6 G/DL
IMM GRANULOCYTES NFR BLD AUTO: 0.5 %
KETONES URINE: NEGATIVE
LEUKOCYTE ESTERASE URINE: NEGATIVE
LYMPHOCYTES # BLD AUTO: 1.38 K/UL
LYMPHOCYTES NFR BLD AUTO: 18.8 %
MAN DIFF?: NORMAL
MCHC RBC-ENTMCNC: 30.8 PG
MCHC RBC-ENTMCNC: 33.1 GM/DL
MCV RBC AUTO: 93 FL
MONOCYTES # BLD AUTO: 0.83 K/UL
MONOCYTES NFR BLD AUTO: 11.3 %
NEUTROPHILS # BLD AUTO: 4.78 K/UL
NEUTROPHILS NFR BLD AUTO: 65.3 %
NITRITE URINE: NEGATIVE
PH URINE: 6.5
PLATELET # BLD AUTO: 174 K/UL
POTASSIUM SERPL-SCNC: 4.7 MMOL/L
PROT SERPL-MCNC: 6.6 G/DL
PROTEIN URINE: NORMAL
RBC # BLD: 3.44 M/UL
RBC # FLD: 14.6 %
SODIUM SERPL-SCNC: 140 MMOL/L
SPECIFIC GRAVITY URINE: 1.02
UROBILINOGEN URINE: NORMAL
WBC # FLD AUTO: 7.33 K/UL

## 2021-12-07 ENCOUNTER — APPOINTMENT (OUTPATIENT)
Dept: DERMATOLOGY | Facility: CLINIC | Age: 86
End: 2021-12-07
Payer: MEDICARE

## 2021-12-07 PROCEDURE — 99214 OFFICE O/P EST MOD 30 MIN: CPT

## 2022-01-07 ENCOUNTER — APPOINTMENT (OUTPATIENT)
Dept: GERIATRICS | Facility: ASSISTED LIVING FACILITY | Age: 87
End: 2022-01-07
Payer: MEDICARE

## 2022-01-07 VITALS
SYSTOLIC BLOOD PRESSURE: 136 MMHG | DIASTOLIC BLOOD PRESSURE: 88 MMHG | HEART RATE: 74 BPM | RESPIRATION RATE: 20 BRPM | TEMPERATURE: 98.06 F | OXYGEN SATURATION: 96 %
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
## 2022-01-07 NOTE — PHYSICAL EXAM
[Alert] : alert [Sclera] : the sclera and conjunctiva were normal [PERRL] : pupils were equal in size, round, and reactive to light [Normal Oral Mucosa] : normal oral mucosa [No Oral Pallor] : no oral pallor [No Oral Cyanosis] : no oral cyanosis [Normal Appearance] : the appearance of the neck was normal [No Neck Mass] : no neck mass was observed [Supple] : the neck was supple [No Respiratory Distress] : no respiratory distress [No Acc Muscle Use] : no accessory muscle use [Auscultation Breath Sounds / Voice Sounds] : lungs were clear to auscultation bilaterally [Respiration, Rhythm And Depth] : normal respiratory rhythm and effort [Normal PMI] : the apical impulse was abnormal [Normal S1, S2] : normal S1 and S2 [Heart Rate And Rhythm] : heart rate was normal and rhythm regular [___ +] : bilateral [unfilled]+ pitting edema to the ankles [Bowel Sounds] : normal bowel sounds [Abdomen Soft] : soft [Cervical Lymph Nodes Enlarged Posterior Bilaterally] : posterior cervical [Supraclavicular Lymph Nodes Enlarged Bilaterally] : supraclavicular [Cervical Lymph Nodes Enlarged Anterior Bilaterally] : anterior cervical, supraclavicular [Axillary Lymph Nodes Enlarged Bilaterally] : axillary [No CVA Tenderness] : no CVA  tenderness [No Spinal Tenderness] : no spinal tenderness [No Focal Deficits] : no focal deficits [Normal Affect] : the affect was normal [Normal Mood] : the mood was normal [de-identified] : elderly frail woman [de-identified] : rounded [de-identified] : ataxic gait, using rollator [de-identified] : chronic seborrheic keratosis generalized on back, right knee bruising purple and yellow, healing bruise

## 2022-01-07 NOTE — DISCHARGE NOTE PROVIDER - CARE PROVIDER_API CALL
Cardiology Progress Note    Admit Date: 12/31/2021  Attending Cardiologist: Dr. Araseli Hsieh:     1. Acute on chronic systolic CHF with bilateral pleural effusions-compensated at present   2. Pneumonia due to COVID-19 virus (patient vaccintaed x 2)- on steroids and   2. Acute hypoxic respiratory failure- better on O2 by NC  3. EDWIGE on CKD- in the setting diuretic use vs COV-19 infection- improved   4. Transaminitis - improving- statin on hold   5. CAD s/p CABG x 3 8/5/2021 -LIMA to the mid LAD, SVG to RPLV, and SVG to OM   6. ECHO done on 8/9/21 and it was noted that his EF had improved to 45% when compared to the echo done on 6/24/21 (EF at that time was 35%). recent (ECHO 1/2/22) LEV 10-15%  7. Elevated D-Dimer, CTA negative for PE  8. CT with evidence of mild free fluid in upper quadrants of the abdomen   9. Coagulopathy - INR 2.9 - not on anticoagulants  10. Thrombocytopenia -improving   11. Hypertension- stable   12. Acute metabolic encephalopathy - in thesetting of above  13. Hypernatremia-in the setting poor po intake. worsening   14. Hypomagnesemia- Mag 1/6/22 1.3     Primary cardiologist Dr. Alfonso Dominguez      Echo 1/2/22    Left Ventricle: Left ventricle size is normal. Mildly increased wall thickness. Findings consistent with mild concentric hypertrophy. Severe global hypokinesis present. Severely reduced left ventricular systolic function with a visually estimated EF of 10 -15%. Grade III diastolic dysfunction with increased LAP.   Pericardium: Trivial pericardial effusion present. No indication of cardiac tamponade.   PAP of 42 mmHg.       Plan:     Patient was transferred to ICU for hemorrhagic shock. PRBC has been given. Patient has significant LV dysfunction and plan to continue aggressive medical management    patient was noted to be transition to comfort care by family member in the setting of multiple comorbidity and COVID-19 pneumonia and hemorrhagic shock  We will be available as needed. Please call with concerns    Subjective:     Confused disoriented unable to answer any questions     Objective:      Patient Vitals for the past 8 hrs:   Temp Pulse Resp BP SpO2   01/07/22 1315 (!) 95.4 °F (35.2 °C)  (!) 31 95/84 (!) 88 %   01/07/22 1300  98 19 117/85    01/07/22 1259 (!) 95.4 °F (35.2 °C) (!) 105 (!) 31 (!) 53/42 (!) 88 %   01/07/22 1230  (!) 101 (!) 31 (!) 66/42    01/07/22 1200  (!) 103 26 (!) 115/98 (!) 78 %   01/07/22 1130  99 22 103/75    01/07/22 1123     90 %   01/07/22 1115  (!) 101 22 (!) 120/49    01/07/22 1100  99 23 125/83    01/07/22 1045 (!) 95.4 °F (35.2 °C) 99 19 (!) 109/90 91 %   01/07/22 1030  97 21 (!) 49/38    01/07/22 1023 (!) 95.7 °F (35.4 °C)  25 (!) 90/50 (!) 89 %   01/07/22 1015    (!) 99/34    01/07/22 1000 (!) 95.7 °F (35.4 °C) 97 28 (!) 137/108 (!) 89 %   01/07/22 0943 (!) 96.1 °F (35.6 °C) (!) 111 29 (!) 137/111 (!) 88 %   01/07/22 0930  (!) 101 27 (!) 118/100    01/07/22 0900  100 22 (!) 149/130    01/07/22 0706  97  96/68    01/07/22 0552    (!) 70/43    01/07/22 0545    97/72    01/07/22 0540    (!) 60/44          Patient Vitals for the past 96 hrs:   Weight   01/04/22 2330 72.7 kg (160 lb 4.8 oz)   01/04/22 0344 73.6 kg (162 lb 3.2 oz)   01/04/22 0034 147.6 kg (325 lb 8 oz)       TELE: normal sinus rhythm               Current Facility-Administered Medications   Medication Dose Route Frequency Last Admin    ondansetron (ZOFRAN) injection 4 mg  4 mg IntraVENous Q4H PRN      LORazepam (ATIVAN) injection 1 mg  1 mg IntraVENous Q15MIN PRN      scopolamine (TRANSDERM-SCOP) 1 mg over 3 days 1 Patch  1 Patch TransDERmal Q72H PRN      morphine injection 2 mg  2 mg IntraVENous Q15MIN PRN      haloperidol (HALDOL) 2 mg/mL oral solution 2 mg  2 mg SubLINGual Q6H PRN      Or    haloperidol lactate (HALDOL) injection 2 mg  2 mg IntraVENous Q6H PRN           Intake/Output Summary (Last 24 hours) at 1/7/2022 1338  Last data filed at 1/7/2022 1100  Gross per 24 hour   Intake 2847 ml   Output 350 ml   Net 2497 ml       Physical Exam:  Limited in setting of covid illness. Breathing stable on 3-4 liters. BP stable. Tele sinus. No edema noted BLE      Visit Vitals  BP 95/84   Pulse 98   Temp (!) 95.4 °F (35.2 °C)   Resp (!) 31   Ht 5' 8\" (1.727 m)   Wt 72.7 kg (160 lb 4.8 oz)   SpO2 (!) 88%   BMI 24.37 kg/m²       Data Review:     Labs: Results:       Chemistry Recent Labs     01/07/22  1200 01/07/22  0050 01/06/22  1645 01/06/22  0450 01/06/22  0450   GLU 68* 213* 146*   < > 170*   * 158* 157*   < > 151*   K 6.2* 5.4 4.5   < > 5.1   * 125* 124*   < > 121*   CO2 20* 27 26   < > 12*   BUN 87* 71* 73*   < > 77*   CREA 1.69* 1.24 1.29   < > 1.32*   CA 8.1* 8.1* 8.9   < > 8.7   MG  --   --   --   --  1.3*   PHOS  --   --   --   --  3.3   AGAP 10 6 7   < > 18   BUCR 51* 57* 57*   < > 58*   * 289*  --   --  319*   TP 4.5* 5.1*  --   --  6.6   ALB 2.3* 2.4*  --   --  2.8*   GLOB 2.2 2.7  --   --  3.8   AGRAT 1.0 0.9  --   --  0.7*    < > = values in this interval not displayed.       CBC w/Diff Recent Labs     01/07/22  1200 01/07/22  0600 01/07/22  0050   WBC 19.8* 21.5* 18.2*   RBC 4.52 2.97* 3.81*   HGB 14.3 10.3* 12.6*   HCT 45.3 32.9* 40.3   PLT 81* 103* 105*   GRANS 92* 96* 95*   LYMPH 2* 1* 2*   EOS 1 0 0      Cardiac Enzymes No results found for: CPK, CK, CKMMB, CKMB, RCK3, CKMBT, CKNDX, CKND1, DEON, TROPT, TROIQ, KATHY, TROPT, TNIPOC, BNP, BNPP   Coagulation Recent Labs     01/07/22  1200 01/07/22  0600   PTP 28.1* 26.0*   INR 2.7* 2.4*   APTT 38.7*  --        Lipid Panel Lab Results   Component Value Date/Time    Cholesterol, total 215 (H) 06/19/2020 04:34 PM    HDL Cholesterol 43 06/19/2020 04:34 PM    LDL, calculated 121.8 (H) 06/19/2020 04:34 PM    VLDL, calculated 50.2 06/19/2020 04:34 PM    Triglyceride 251 (H) 06/19/2020 04:34 PM    CHOL/HDL Ratio 5.0 06/19/2020 04:34 PM      BNP No results found for: BNP, BNPP, XBNPT Liver Enzymes Recent Labs     01/07/22  1200   TP 4.5*   ALB 2.3*   *      Thyroid Studies Lab Results   Component Value Date/Time    TSH 3.53 12/25/2021 10:05 PM          Signed By: CHARLEY AguilaC     January 7, 2022 Kam Buckley)  Cardiology; Internal Medicine  [address] [place] [address], [address]  [place] [address]  Phone: [phone]  Fax: [phone]  Established Patient  Follow Up Time: 1-3 days

## 2022-01-07 NOTE — ASSESSMENT
[FreeTextEntry1] : -available for urgent calls/visits\par -follow up 3/22,task sent to Jean Marie\par -direct patient time 3:30p-4:20pm\par SANDIE Morales-BC

## 2022-01-07 NOTE — HISTORY OF PRESENT ILLNESS
[No falls in past year] : Patient reported no falls in the past year [Independent] : transferring/mobility [] : Patient is continent. [Designated Healthcare Proxy] : Designated healthcare proxy [Name: ___] : Health Care Proxy's Name: [unfilled]  [Relationship: ___] : Relationship: [unfilled] [Time Spent: ___ minutes] : Time Spent: [unfilled] minutes [FreeTextEntry1] : Ms. SOLIS TAYLOR is a 89 yo F with PMHx of HF,HLD, OA, recurrent UTI's, hypothyroidism, [facility] for [facility] visit s/p fall 1/5/21.  Ariana lives in [place], summarize visit telephonically today to her. \par \par Pt. reported fall occurred 1/5/21, while going to the laundry area to dry her clothing, carrying the layndry while using rollator. Reported the fall occurred from standing to floor, fell o b/l knees. Denies LOC, or hear injury, denies any other injury. Reports mild pain to b/l knees, reported she has chronic knee pain b/l. Otherwise, denies pain. Denies any other falls/acute visits. Denies HA/dizziness, SOB/CP, abdominal pain, dysuria, regular BMs.\par \par

## 2022-01-07 NOTE — REVIEW OF SYSTEMS
[As Noted in HPI] : as noted in HPI [Fever] : no fever [Chills] : no chills [Feeling Poorly] : not feeling poorly [Feeling Tired] : not feeling tired [Sore Throat] : no sore throat [Chest Pain] : no chest pain [Palpitations] : no palpitations [Cough] : no cough [Incontinence] : no incontinence

## 2022-01-11 RX ORDER — ATORVASTATIN CALCIUM 20 MG/1
20 TABLET, FILM COATED ORAL
Qty: 90 | Refills: 3 | Status: ACTIVE | COMMUNITY
Start: 2021-10-27 | End: 1900-01-01

## 2022-01-14 ENCOUNTER — NON-APPOINTMENT (OUTPATIENT)
Age: 87
End: 2022-01-14

## 2022-01-14 DIAGNOSIS — Z79.899 OTHER LONG TERM (CURRENT) DRUG THERAPY: ICD-10-CM

## 2022-01-14 RX ORDER — NYSTATIN 100000 1/G
100000 POWDER TOPICAL TWICE DAILY
Qty: 1 | Refills: 2 | Status: DISCONTINUED | COMMUNITY
Start: 2020-11-10 | End: 2022-01-14

## 2022-01-14 RX ORDER — NYSTATIN 100000 [USP'U]/G
100000 CREAM TOPICAL
Qty: 1 | Refills: 1 | Status: DISCONTINUED | COMMUNITY
Start: 2021-07-20 | End: 2022-01-14

## 2022-01-14 RX ORDER — DEXTRAN 70/HYPROMELLOSE 0.1%-0.3%
0.1-0.3 DROPS OPHTHALMIC (EYE)
Qty: 1 | Refills: 2 | Status: DISCONTINUED | COMMUNITY
Start: 2018-08-07 | End: 2022-01-14

## 2022-01-14 RX ORDER — DOCUSATE SODIUM 100 MG/1
100 CAPSULE ORAL
Qty: 1 | Refills: 6 | Status: DISCONTINUED | COMMUNITY
Start: 2019-12-03 | End: 2022-01-14

## 2022-01-14 RX ORDER — LORATADINE 5 MG
17 TABLET,CHEWABLE ORAL
Qty: 1 | Refills: 0 | Status: DISCONTINUED | COMMUNITY
Start: 2018-07-07 | End: 2022-01-14

## 2022-01-14 RX ORDER — FUROSEMIDE 20 MG/1
20 TABLET ORAL
Qty: 90 | Refills: 2 | Status: DISCONTINUED | COMMUNITY
Start: 2019-08-28 | End: 2022-01-14

## 2022-01-14 RX ORDER — PSYLLIUM HUSK 0.4 G
1000-800 CAPSULE ORAL
Qty: 90 | Refills: 8 | Status: DISCONTINUED | COMMUNITY
Start: 2020-02-18 | End: 2022-01-14

## 2022-01-14 RX ORDER — KETOCONAZOLE 20 MG/G
2 CREAM TOPICAL
Qty: 1 | Refills: 2 | Status: DISCONTINUED | COMMUNITY
Start: 2020-12-03 | End: 2022-01-14

## 2022-01-14 RX ORDER — ACETAMINOPHEN/DIPHENHYDRAMINE 500MG-25MG
81 TABLET ORAL
Qty: 30 | Refills: 0 | Status: DISCONTINUED | COMMUNITY
Start: 2021-10-27 | End: 2022-01-14

## 2022-01-14 RX ORDER — TRIAMCINOLONE ACETONIDE 1 MG/G
0.1 OINTMENT TOPICAL
Qty: 1 | Refills: 0 | Status: DISCONTINUED | COMMUNITY
Start: 2020-12-03 | End: 2022-01-14

## 2022-01-14 RX ORDER — ACETAMINOPHEN 500 MG/1
500 TABLET ORAL
Refills: 0 | Status: DISCONTINUED | COMMUNITY
End: 2022-01-14

## 2022-01-14 NOTE — PHYSICAL EXAM
[Alert] : alert [No Acute Distress] : in no acute distress [de-identified] : well appearing, conversational older adult

## 2022-01-14 NOTE — HISTORY OF PRESENT ILLNESS
[FreeTextEntry1] : SOLIS CLAUDIA is a 89 yo femaile who is being seen today for medication reconciliation.\par \par

## 2022-01-19 NOTE — ED PROVIDER NOTE - RATE
61 s/p VATS with pleurex placement - drained 2750cc, diffuse disease in pleura appears metastatic, pleural bx taken.  PleurX catheter placed in posterior thorax.   cxr today improved   will continue to monitor   CT surgery f/u regarding drainage and discharge plan s/p VATS with pleurex placement - drained 2750cc, diffuse disease in pleura appears metastatic, pleural bx taken.  PleurX catheter placed in posterior thorax.   cxr today with slightly increased R effusion   will continue to monitor   CT surgery cleared for discharge  pulmonary cleared for discharge - agree with LE dopplers r/o DVT prior to discharge

## 2022-01-26 ENCOUNTER — APPOINTMENT (OUTPATIENT)
Dept: UROLOGY | Facility: CLINIC | Age: 87
End: 2022-01-26

## 2022-02-07 ENCOUNTER — APPOINTMENT (OUTPATIENT)
Dept: UROLOGY | Facility: CLINIC | Age: 87
End: 2022-02-07

## 2022-02-18 ENCOUNTER — APPOINTMENT (OUTPATIENT)
Dept: UROLOGY | Facility: CLINIC | Age: 87
End: 2022-02-18
Payer: MEDICARE

## 2022-02-18 VITALS — DIASTOLIC BLOOD PRESSURE: 98 MMHG | TEMPERATURE: 208.4 F | SYSTOLIC BLOOD PRESSURE: 174 MMHG | HEART RATE: 71 BPM

## 2022-02-18 VITALS — HEART RATE: 79 BPM | SYSTOLIC BLOOD PRESSURE: 180 MMHG | DIASTOLIC BLOOD PRESSURE: 112 MMHG

## 2022-02-18 PROCEDURE — 99213 OFFICE O/P EST LOW 20 MIN: CPT

## 2022-02-18 NOTE — ASSESSMENT
[FreeTextEntry1] : Stable urinary sx. Has baseline urinary incontinence that she manages with pads/pullups\par --Incontinence supplies\par --Barrier cream externally\par

## 2022-02-18 NOTE — HISTORY OF PRESENT ILLNESS
[FreeTextEntry1] : 89yo woman with PMH CHF, breast ca s/p lumpectomy, spinal stenosis, osteoporosis and hip fracture s/p hip replacement presents for evaluation of bothersome urinary issues. She has seen a urologist, Dr. Romo, for frequency, urgency, mixed incontinence, and UTIs. She has tried oxybutynin and botox injections which have not helped. She has been hospitalized 2/2 UTIs in 2020 and 2019. When she has a UTI she has dysuria and syncope. She takes furosemide, which makes her urinary symptoms worse. Currently denies experiencing any urologic symptoms including hematuria or dysuria. Denies fevers, chills, flank pain, nausea, vomiting, and unintentional weight loss. Most bother is in the evening. During the day she is able to hold and does not have frequency. She gets up once per night to void but leaks sometimes between this. \par \par She was on estrace in the past. Has since stopped without complaint. She has no skin issues but is using pads, pullups and bed protector pads.

## 2022-02-18 NOTE — PHYSICAL EXAM
[General Appearance - Well Developed] : well developed [General Appearance - Well Nourished] : well nourished [Normal Appearance] : normal appearance [Well Groomed] : well groomed [General Appearance - In No Acute Distress] : no acute distress [Abdomen Soft] : soft [Abdomen Tenderness] : non-tender [Costovertebral Angle Tenderness] : no ~M costovertebral angle tenderness [] : no respiratory distress [Respiration, Rhythm And Depth] : normal respiratory rhythm and effort [Exaggerated Use Of Accessory Muscles For Inspiration] : no accessory muscle use [Oriented To Time, Place, And Person] : oriented to person, place, and time [Affect] : the affect was normal [Mood] : the mood was normal [Not Anxious] : not anxious [No Focal Deficits] : no focal deficits [FreeTextEntry1] : uses walker/wheelchair

## 2022-02-18 NOTE — ADDENDUM
[FreeTextEntry1] : Patient will change pull-ups four times a day and use two bed protectors /chux every night

## 2022-02-19 NOTE — PROGRESS NOTE ADULT - PROBLEM SELECTOR PROBLEM 1
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 Recent hospitalization (12/20/2021- 1/28/22) notable for cardiac arrest, aspiration pna, gastric ulcer s/p clipping and ventilator dependent respiratory failure s/p Trach/ PEG discharged to NH. BIBEMS from NH (2/13/22) for bloody stools and blood in trach noted at the facility Adm to  for hypovolemic shock 2/2 acute anemia 2/2 GIB . s/p 2 untis PRBCS. Pt  sent to ICU for hypotension requiring pressors, however was dowgraded to  for HD stable not requiring pressor. 2/16 s/p EGD and colonoscopy no active bleeding noted.     ICU Course:  Patient with HGB 6.7, received second pRBC. GI Gross consulted. Patient was prepped overnight and received EGD+ colonoscopy which showed no active bleeding. Tube feeds started. Patient did not require pressors. Patient had WBC of 11k and fever to 100.4 prior to downgrade     2/16. downgrade to   2/18- Febrile overnight- T-max 101.5, lactate 2.2.  CXR w/ increased infiltrates.  Pan cx including sputum, IV hydration, Vanc and Cefepime for PNA.

## 2022-02-25 ENCOUNTER — APPOINTMENT (OUTPATIENT)
Dept: GERIATRICS | Facility: ASSISTED LIVING FACILITY | Age: 87
End: 2022-02-25
Payer: MEDICARE

## 2022-02-25 VITALS — RESPIRATION RATE: 20 BRPM | DIASTOLIC BLOOD PRESSURE: 69 MMHG | SYSTOLIC BLOOD PRESSURE: 133 MMHG | HEART RATE: 67 BPM

## 2022-02-25 DIAGNOSIS — S93.401A SPRAIN OF UNSPECIFIED LIGAMENT OF RIGHT ANKLE, INITIAL ENCOUNTER: ICD-10-CM

## 2022-02-25 NOTE — REVIEW OF SYSTEMS
H/O nasal septoplasty  2010  Status post surgery  wisdom teeth extracted (2016) [As Noted in HPI] : as noted in HPI [Fever] : no fever [Chills] : no chills [Feeling Poorly] : not feeling poorly [Feeling Tired] : not feeling tired [Sore Throat] : no sore throat [Chest Pain] : no chest pain [Palpitations] : no palpitations [Cough] : no cough [Incontinence] : no incontinence

## 2022-02-25 NOTE — ASSESSMENT
[FreeTextEntry1] : -available for urgent calls/visits\par -follow up 3/22,task sent to Ejan Marie\par -direct patient time 3pm-3:40p\par SANDIE Morales-BC

## 2022-02-25 NOTE — HISTORY OF PRESENT ILLNESS
[No falls in past year] : Patient reported no falls in the past year [Independent] : transferring/mobility [] : Patient is continent. [Designated Healthcare Proxy] : Designated healthcare proxy [Name: ___] : Health Care Proxy's Name: [unfilled]  [Relationship: ___] : Relationship: [unfilled] [FreeTextEntry1] : Ms. SOLIS TAYLOR is a 89 yo F with PMHx of HF,HLD, OA, recurrent UTI's, hypothyroidism, being seen at [facility] for Acute visit for reports of elevated BP. Pt. accomapnied by daughter Rosa Maria.\par \par Pt. reported incident occurred when she was seeing urogyn , and was advised to f/u with PCP.  Pt. denies any accompanied symptoms. Reports eating and drinking well. Pt. reports mild right ankle discomfor after having a weight applied to right ankle during a PT session. Reports is been able to walk, with mild discomfort. \par \par  Reports mild pain to b/l knees, reported she has chronic knee pain b/l. Otherwise, denies pain. Denies any other falls/acute visits. Denies HA/dizziness, SOB/CP, abdominal pain, dysuria, regular BMs, UI. \par \par

## 2022-02-25 NOTE — PHYSICAL EXAM
[Alert] : alert [PERRL] : pupils were equal in size, round, and reactive to light [Sclera] : the sclera and conjunctiva were normal [Normal Oral Mucosa] : normal oral mucosa [No Oral Pallor] : no oral pallor [No Oral Cyanosis] : no oral cyanosis [Normal Appearance] : the appearance of the neck was normal [No Neck Mass] : no neck mass was observed [Supple] : the neck was supple [No Respiratory Distress] : no respiratory distress [No Acc Muscle Use] : no accessory muscle use [Respiration, Rhythm And Depth] : normal respiratory rhythm and effort [Auscultation Breath Sounds / Voice Sounds] : lungs were clear to auscultation bilaterally [Normal PMI] : the apical impulse was abnormal [Normal S1, S2] : normal S1 and S2 [Heart Rate And Rhythm] : heart rate was normal and rhythm regular [___ +] : bilateral [unfilled]+ pitting edema to the ankles [Bowel Sounds] : normal bowel sounds [Abdomen Soft] : soft [Cervical Lymph Nodes Enlarged Posterior Bilaterally] : posterior cervical [Supraclavicular Lymph Nodes Enlarged Bilaterally] : supraclavicular [Cervical Lymph Nodes Enlarged Anterior Bilaterally] : anterior cervical, supraclavicular [Axillary Lymph Nodes Enlarged Bilaterally] : axillary [No CVA Tenderness] : no CVA  tenderness [No Spinal Tenderness] : no spinal tenderness [No Focal Deficits] : no focal deficits [Normal Affect] : the affect was normal [Normal Mood] : the mood was normal [de-identified] : elderly frail woman,smiling,appears happy. [de-identified] : rounded [de-identified] : ataxic gait, using rollator. Able to bear weight on right ankle. Walking at baseline.  [de-identified] : chronic seborrheic keratosis generalized on back

## 2022-03-08 ENCOUNTER — APPOINTMENT (OUTPATIENT)
Dept: DERMATOLOGY | Facility: CLINIC | Age: 87
End: 2022-03-08
Payer: MEDICARE

## 2022-03-08 DIAGNOSIS — B35.1 TINEA UNGUIUM: ICD-10-CM

## 2022-03-08 DIAGNOSIS — I87.2 VENOUS INSUFFICIENCY (CHRONIC) (PERIPHERAL): ICD-10-CM

## 2022-03-08 DIAGNOSIS — B35.3 TINEA PEDIS: ICD-10-CM

## 2022-03-08 DIAGNOSIS — D48.5 NEOPLASM OF UNCERTAIN BEHAVIOR OF SKIN: ICD-10-CM

## 2022-03-08 DIAGNOSIS — L30.8 OTHER SPECIFIED DERMATITIS: ICD-10-CM

## 2022-03-08 PROCEDURE — 99214 OFFICE O/P EST MOD 30 MIN: CPT

## 2022-03-18 ENCOUNTER — APPOINTMENT (OUTPATIENT)
Dept: GERIATRICS | Facility: ASSISTED LIVING FACILITY | Age: 87
End: 2022-03-18
Payer: MEDICARE

## 2022-03-18 VITALS
SYSTOLIC BLOOD PRESSURE: 136 MMHG | HEART RATE: 64 BPM | DIASTOLIC BLOOD PRESSURE: 85 MMHG | TEMPERATURE: 98.42 F | RESPIRATION RATE: 18 BRPM | OXYGEN SATURATION: 95 %

## 2022-03-18 NOTE — PHYSICAL EXAM
[Alert] : alert [Sclera] : the sclera and conjunctiva were normal [PERRL] : pupils were equal in size, round, and reactive to light [Normal Oral Mucosa] : normal oral mucosa [No Oral Pallor] : no oral pallor [No Oral Cyanosis] : no oral cyanosis [Normal Appearance] : the appearance of the neck was normal [No Neck Mass] : no neck mass was observed [Supple] : the neck was supple [No Respiratory Distress] : no respiratory distress [No Acc Muscle Use] : no accessory muscle use [Respiration, Rhythm And Depth] : normal respiratory rhythm and effort [Auscultation Breath Sounds / Voice Sounds] : lungs were clear to auscultation bilaterally [Normal PMI] : the apical impulse was abnormal [Normal S1, S2] : normal S1 and S2 [Heart Rate And Rhythm] : heart rate was normal and rhythm regular [___ +] : bilateral [unfilled]+ pitting edema to the ankles [Bowel Sounds] : normal bowel sounds [Abdomen Soft] : soft [Cervical Lymph Nodes Enlarged Posterior Bilaterally] : posterior cervical [Supraclavicular Lymph Nodes Enlarged Bilaterally] : supraclavicular [Cervical Lymph Nodes Enlarged Anterior Bilaterally] : anterior cervical, supraclavicular [Axillary Lymph Nodes Enlarged Bilaterally] : axillary [No CVA Tenderness] : no CVA  tenderness [No Spinal Tenderness] : no spinal tenderness [No Focal Deficits] : no focal deficits [Normal Affect] : the affect was normal [Normal Mood] : the mood was normal [de-identified] : elderly frail woman,smiling,appears happy. Talkative, interactive. [de-identified] : rounded [de-identified] : ataxic gait, using rollator.  [de-identified] : chronic seborrheic keratosis generalized on back

## 2022-03-18 NOTE — HISTORY OF PRESENT ILLNESS
[No falls in past year] : Patient reported no falls in the past year [Independent] : transferring/mobility [] : Patient is continent. [Designated Healthcare Proxy] : Designated healthcare proxy [Name: ___] : Health Care Proxy's Name: [unfilled]  [Relationship: ___] : Relationship: [unfilled] [Walker] : walker [Smoke Detector] : smoke detector [Carbon Monoxide Detector] : carbon monoxide detector [Grab Bars] : grab bars [Shower Chair] : shower chair [Night Light] : night light [0] : 2) Feeling down, depressed, or hopeless: Not at all (0) [Reviewed no changes] : Reviewed, no changes [FreeTextEntry1] : Ms. SOLIS TAYLOR is a 89 yo F with PMHx of HF,HLD, OA, recurrent UTI's, hypothyroidism, being seen at [facility] for follow up visit.. Pt. accompanied by daughter Ariana.\par \par . Reports eating and drinking well. Pt. reports mild chronic b/l knee pain.Doing PT sessions 2x/week. Reports is been able to walk, with mild discomfort. \par \par  Reports mild pain to b/l knees, reported she has chronic knee pain b/l. Otherwise, denies pain. Denies any other falls/acute visits. Denies HA/dizziness, SOB/CP, abdominal pain, dysuria, regular BMs, UI. \par \par I-Stop:Reference #: [id number]  [de-identified] : rollator [AdvancecareDate] : 3/18/22

## 2022-03-18 NOTE — ASSESSMENT
[FreeTextEntry1] : -available for urgent calls/visits\par -follow up 6/22,task sent to Jean Marie\par -direct patient time 3:45-4:30pm\par -shared decision model, daughter and pt. would like to hold off on labs \par -advised cognitive evaluation for baseline memory and mood assessment, pt. defers today. \par \par \par Shonna Carter, FNP-BC

## 2022-03-25 ENCOUNTER — NON-APPOINTMENT (OUTPATIENT)
Age: 87
End: 2022-03-25

## 2022-04-04 ENCOUNTER — INPATIENT (INPATIENT)
Facility: HOSPITAL | Age: 87
LOS: 6 days | Discharge: NURSING, REHAB OR LONG-TERM CARE | DRG: 689 | End: 2022-04-11
Attending: STUDENT IN AN ORGANIZED HEALTH CARE EDUCATION/TRAINING PROGRAM | Admitting: HOSPITALIST
Payer: MEDICARE

## 2022-04-04 VITALS
WEIGHT: 139.99 LBS | DIASTOLIC BLOOD PRESSURE: 116 MMHG | HEIGHT: 64 IN | TEMPERATURE: 99 F | RESPIRATION RATE: 20 BRPM | HEART RATE: 92 BPM | SYSTOLIC BLOOD PRESSURE: 224 MMHG | OXYGEN SATURATION: 95 %

## 2022-04-04 DIAGNOSIS — O00.1 TUBAL PREGNANCY: Chronic | ICD-10-CM

## 2022-04-04 DIAGNOSIS — Z98.49 CATARACT EXTRACTION STATUS, UNSPECIFIED EYE: Chronic | ICD-10-CM

## 2022-04-04 DIAGNOSIS — Z96.7 PRESENCE OF OTHER BONE AND TENDON IMPLANTS: Chronic | ICD-10-CM

## 2022-04-04 DIAGNOSIS — Z98.890 OTHER SPECIFIED POSTPROCEDURAL STATES: Chronic | ICD-10-CM

## 2022-04-04 LAB
ALBUMIN SERPL ELPH-MCNC: 4.8 G/DL — SIGNIFICANT CHANGE UP (ref 3.3–5)
ALP SERPL-CCNC: 179 U/L — HIGH (ref 40–120)
ALT FLD-CCNC: 43 U/L — SIGNIFICANT CHANGE UP (ref 10–45)
ANION GAP SERPL CALC-SCNC: 14 MMOL/L — SIGNIFICANT CHANGE UP (ref 5–17)
APPEARANCE UR: CLEAR — SIGNIFICANT CHANGE UP
APTT BLD: 30.8 SEC — SIGNIFICANT CHANGE UP (ref 27.5–35.5)
AST SERPL-CCNC: 25 U/L — SIGNIFICANT CHANGE UP (ref 10–40)
BASE EXCESS BLDV CALC-SCNC: 1.3 MMOL/L — SIGNIFICANT CHANGE UP (ref -2–2)
BASOPHILS # BLD AUTO: 0.05 K/UL — SIGNIFICANT CHANGE UP (ref 0–0.2)
BASOPHILS NFR BLD AUTO: 0.6 % — SIGNIFICANT CHANGE UP (ref 0–2)
BILIRUB SERPL-MCNC: 0.6 MG/DL — SIGNIFICANT CHANGE UP (ref 0.2–1.2)
BILIRUB UR-MCNC: NEGATIVE — SIGNIFICANT CHANGE UP
BUN SERPL-MCNC: 40 MG/DL — HIGH (ref 7–23)
CA-I SERPL-SCNC: 1.18 MMOL/L — SIGNIFICANT CHANGE UP (ref 1.15–1.33)
CALCIUM SERPL-MCNC: 9.9 MG/DL — SIGNIFICANT CHANGE UP (ref 8.4–10.5)
CHLORIDE BLDV-SCNC: 102 MMOL/L — SIGNIFICANT CHANGE UP (ref 96–108)
CHLORIDE SERPL-SCNC: 101 MMOL/L — SIGNIFICANT CHANGE UP (ref 96–108)
CO2 BLDV-SCNC: 29 MMOL/L — HIGH (ref 22–26)
CO2 SERPL-SCNC: 24 MMOL/L — SIGNIFICANT CHANGE UP (ref 22–31)
COLOR SPEC: SIGNIFICANT CHANGE UP
CREAT SERPL-MCNC: 1.42 MG/DL — HIGH (ref 0.5–1.3)
DIFF PNL FLD: NEGATIVE — SIGNIFICANT CHANGE UP
EGFR: 33 ML/MIN/1.73M2 — LOW
EOSINOPHIL # BLD AUTO: 0.13 K/UL — SIGNIFICANT CHANGE UP (ref 0–0.5)
EOSINOPHIL NFR BLD AUTO: 1.4 % — SIGNIFICANT CHANGE UP (ref 0–6)
GAS PNL BLDV: 136 MMOL/L — SIGNIFICANT CHANGE UP (ref 136–145)
GAS PNL BLDV: SIGNIFICANT CHANGE UP
GLUCOSE BLDV-MCNC: 128 MG/DL — HIGH (ref 70–99)
GLUCOSE SERPL-MCNC: 129 MG/DL — HIGH (ref 70–99)
GLUCOSE UR QL: NEGATIVE — SIGNIFICANT CHANGE UP
HCO3 BLDV-SCNC: 27 MMOL/L — SIGNIFICANT CHANGE UP (ref 22–29)
HCT VFR BLD CALC: 41.4 % — SIGNIFICANT CHANGE UP (ref 34.5–45)
HCT VFR BLDA CALC: 41 % — SIGNIFICANT CHANGE UP (ref 34.5–46.5)
HGB BLD CALC-MCNC: 13.6 G/DL — SIGNIFICANT CHANGE UP (ref 11.7–16.1)
HGB BLD-MCNC: 13.4 G/DL — SIGNIFICANT CHANGE UP (ref 11.5–15.5)
IMM GRANULOCYTES NFR BLD AUTO: 0.8 % — SIGNIFICANT CHANGE UP (ref 0–1.5)
INR BLD: 1.08 RATIO — SIGNIFICANT CHANGE UP (ref 0.88–1.16)
KETONES UR-MCNC: SIGNIFICANT CHANGE UP
LACTATE BLDV-MCNC: 1.7 MMOL/L — SIGNIFICANT CHANGE UP (ref 0.7–2)
LEUKOCYTE ESTERASE UR-ACNC: ABNORMAL
LYMPHOCYTES # BLD AUTO: 1.35 K/UL — SIGNIFICANT CHANGE UP (ref 1–3.3)
LYMPHOCYTES # BLD AUTO: 14.9 % — SIGNIFICANT CHANGE UP (ref 13–44)
MCHC RBC-ENTMCNC: 30.5 PG — SIGNIFICANT CHANGE UP (ref 27–34)
MCHC RBC-ENTMCNC: 32.4 GM/DL — SIGNIFICANT CHANGE UP (ref 32–36)
MCV RBC AUTO: 94.1 FL — SIGNIFICANT CHANGE UP (ref 80–100)
MONOCYTES # BLD AUTO: 0.79 K/UL — SIGNIFICANT CHANGE UP (ref 0–0.9)
MONOCYTES NFR BLD AUTO: 8.7 % — SIGNIFICANT CHANGE UP (ref 2–14)
NEUTROPHILS # BLD AUTO: 6.7 K/UL — SIGNIFICANT CHANGE UP (ref 1.8–7.4)
NEUTROPHILS NFR BLD AUTO: 73.6 % — SIGNIFICANT CHANGE UP (ref 43–77)
NITRITE UR-MCNC: POSITIVE
NRBC # BLD: 0 /100 WBCS — SIGNIFICANT CHANGE UP (ref 0–0)
PCO2 BLDV: 47 MMHG — HIGH (ref 39–42)
PH BLDV: 7.37 — SIGNIFICANT CHANGE UP (ref 7.32–7.43)
PH UR: 6.5 — SIGNIFICANT CHANGE UP (ref 5–8)
PLATELET # BLD AUTO: 183 K/UL — SIGNIFICANT CHANGE UP (ref 150–400)
PO2 BLDV: 25 MMHG — SIGNIFICANT CHANGE UP (ref 25–45)
POTASSIUM BLDV-SCNC: 4.3 MMOL/L — SIGNIFICANT CHANGE UP (ref 3.5–5.1)
POTASSIUM SERPL-MCNC: 4.4 MMOL/L — SIGNIFICANT CHANGE UP (ref 3.5–5.3)
POTASSIUM SERPL-SCNC: 4.4 MMOL/L — SIGNIFICANT CHANGE UP (ref 3.5–5.3)
PROT SERPL-MCNC: 8.5 G/DL — HIGH (ref 6–8.3)
PROT UR-MCNC: ABNORMAL
PROTHROM AB SERPL-ACNC: 12.4 SEC — SIGNIFICANT CHANGE UP (ref 10.5–13.4)
RBC # BLD: 4.4 M/UL — SIGNIFICANT CHANGE UP (ref 3.8–5.2)
RBC # FLD: 14.4 % — SIGNIFICANT CHANGE UP (ref 10.3–14.5)
SAO2 % BLDV: 37 % — LOW (ref 67–88)
SODIUM SERPL-SCNC: 139 MMOL/L — SIGNIFICANT CHANGE UP (ref 135–145)
SP GR SPEC: 1.03 — HIGH (ref 1.01–1.02)
UROBILINOGEN FLD QL: NEGATIVE — SIGNIFICANT CHANGE UP
WBC # BLD: 9.09 K/UL — SIGNIFICANT CHANGE UP (ref 3.8–10.5)
WBC # FLD AUTO: 9.09 K/UL — SIGNIFICANT CHANGE UP (ref 3.8–10.5)

## 2022-04-04 PROCEDURE — 74174 CTA ABD&PLVS W/CONTRAST: CPT | Mod: 26,MA

## 2022-04-04 PROCEDURE — 70450 CT HEAD/BRAIN W/O DYE: CPT | Mod: 26,MA

## 2022-04-04 PROCEDURE — 93010 ELECTROCARDIOGRAM REPORT: CPT

## 2022-04-04 PROCEDURE — 99285 EMERGENCY DEPT VISIT HI MDM: CPT

## 2022-04-04 PROCEDURE — 71275 CT ANGIOGRAPHY CHEST: CPT | Mod: 26,MA

## 2022-04-04 RX ORDER — MEROPENEM 1 G/30ML
500 INJECTION INTRAVENOUS ONCE
Refills: 0 | Status: COMPLETED | OUTPATIENT
Start: 2022-04-04 | End: 2022-04-04

## 2022-04-04 RX ORDER — ONDANSETRON 8 MG/1
4 TABLET, FILM COATED ORAL ONCE
Refills: 0 | Status: COMPLETED | OUTPATIENT
Start: 2022-04-04 | End: 2022-04-04

## 2022-04-04 RX ORDER — LABETALOL HCL 100 MG
10 TABLET ORAL ONCE
Refills: 0 | Status: COMPLETED | OUTPATIENT
Start: 2022-04-04 | End: 2022-04-04

## 2022-04-04 RX ADMIN — MEROPENEM 100 MILLIGRAM(S): 1 INJECTION INTRAVENOUS at 23:03

## 2022-04-04 RX ADMIN — ONDANSETRON 4 MILLIGRAM(S): 8 TABLET, FILM COATED ORAL at 21:28

## 2022-04-04 RX ADMIN — Medication 10 MILLIGRAM(S): at 22:23

## 2022-04-04 NOTE — ED ADULT NURSE REASSESSMENT NOTE - NS ED NURSE REASSESS COMMENT FT1
Report received from [facility]. Patient A&Ox4, lethargic, arousable to voice. Patient hypertensive 206/125, MD Whitten at bedside. Patient transported to CT scan on cardiac monitor with RN and MD at bedside. IV placed, labs sent. Daughter at bedside updated on plan of care.

## 2022-04-04 NOTE — ED ADULT NURSE NOTE - OBJECTIVE STATEMENT
Pt A&Ox4, lethargic. Pt BIB EMS from NH for concern over N/V and lethargy. Pmhx HTN, hypothyroidism, spinal stenosis, cataracts, macular degeneration, frequent UTI. Pt reports she hasn't been feeling well for a few days. Has noticed peeing more frequently in her diaper overnight and has some lower pelvic discomfort. Pt also reports that she recently fell at her NH but was not seen after the fall. Denies CP, SOB, diarrhea, dizziness, LOC, weakness, numbness, tingling, fever, chills, HA, changes in vision/speech/gait.   While in room assessing pt, endorsed feeling nauseated, SpO2 dropped to 87% and pt became less responsive. MD at bedside. Placed on NC and given medications as per eMAR.  PIV initiated, labs drawn and pt taken to CT scan on monitor with ED MD, RN and tech.

## 2022-04-04 NOTE — ED ADULT TRIAGE NOTE - SPO2 (%)
Hospital Medicine Discharge Summary    Patient ID: Anh Coronado      Patient's PCP: Lennox Sayres, MD    Admit Date: 12/30/2020     Discharge Date: 1/5/2021      Admitting Physician: Tye Gerard MD     Discharge Physician: JAY Moreno - CNP     Discharge Diagnoses: Active Hospital Problems    Diagnosis    CAD (coronary artery disease) [I25.10]     Priority: High    S/P TAVR (transcatheter aortic valve replacement) [Z95.2]     Priority: Medium    Peripheral vascular occlusive disease ([address] [I73.9]    Ataxia [R27.0]    Weakness of both lower extremities [R29.898]    Sinus bradycardia [R00.1]       The patient was seen and examined on day of discharge and this discharge summary is in conjunction with any daily progress note from day of discharge. Hospital Course:     46 y. o. male with history of ESRD on hemodialysis, morbid obesity, NICM, DM with severe peripheral neuropathy,PAD s/p stents, ZAINAB on CPAP presented to emergency room with progressive leg weakness, numbness and recurrent falls. .The patient was recently admitted to this hospital for similar reason. .. Had extensive work-up by neurology-demyelinating disorder was ruled out, MRI lumbar thoracic/cervical/spine showed  moderate right L5 narrowing with L5 S1 disc protrusion with very mild DJD disease elsewhere. .  Over the last several days he has had progressive weakness in the lower legs with decreased sensation and recurrent falls.   He had been able to ambulate independently up until this point.      Generalized weakness and recurrent falls in the setting of severe diabetic neuropathy as well as degenerative disc disease.  -Undergone recent thoracic cervical and lumbar spine MRIs only showing moderate right L5 narrowing with L5-S1 disc protrusion this work-up ruled out demyelinating disease/GBS the patient was seen by neurology and is on Lyrica  -Continue PT OT  -Safety precautions  -He was seen by vascular surgery had CTA of the abdomen with runoffs he has no threatening limb ischemia     Junctional bradycardia with heart rate that dropped into the 40s resolved once off of Coreg he was seen by EP who has since signed off  -Monitor labs  -Continue with telemetry no further issues     ESRD:  On dialysis with a left TDC.  Had a fistula but developed vascular access steal and failed PD.  Has been declined for kidney transplant due to medical conditions   -Nephrology consulted and following continue Monday Wednesday Friday schedule       Physical Exam Performed:     /83   Pulse 72   Temp 97.4 °F (36.3 °C) (Oral)   Resp 18   Ht 5' 9\" (1.753 m)   Wt 254 lb 10.1 oz (115.5 kg)   SpO2 96%   BMI 37.60 kg/m²       General appearance:  No apparent distress, appears stated age and cooperative. HEENT:  Normal cephalic, atraumatic without obvious deformity. Pupils equal, round, and reactive to light. Extra ocular muscles intact. Conjunctivae/corneas clear. Neck: Supple, with full range of motion. No jugular venous distention. Trachea midline. Respiratory:  Normal respiratory effort. Clear to auscultation, bilaterally without Rales/Wheezes/Rhonchi. Cardiovascular:  Regular rate and rhythm with normal S1/S2 without murmurs, rubs or gallops. Abdomen: Soft, non-tender, non-distended with normal bowel sounds. Musculoskeletal:  No clubbing, cyanosis or edema bilaterally. Full range of motion without deformity. Skin: Skin color, texture, turgor normal.  No rashes or lesions. Neurologic:  Neurovascularly intact without any focal sensory/motor deficits. Cranial nerves: II-XII intact, grossly non-focal.  Psychiatric:  Alert and oriented, thought content appropriate, normal insight  Capillary Refill: Brisk,< 3 seconds   Peripheral Pulses: +2 palpable, equal bilaterally       Labs:  For convenience and continuity at follow-up the following most recent labs are provided:      CBC:    Lab Results   Component Value Date    WBC 7.7 01/04/2021    HGB 9.4 01/04/2021    HCT 28.5 01/04/2021     01/04/2021       Renal:    Lab Results   Component Value Date     01/04/2021    K 4.5 01/04/2021    K 4.7 12/31/2020    CL 96 01/04/2021    CO2 26 01/04/2021    BUN 43 01/04/2021    CREATININE 5.7 01/04/2021    CALCIUM 9.1 01/04/2021    PHOS 3.3 06/10/2020         Significant Diagnostic Studies    Radiology:   CTA ABDOMINAL AORTA W BILAT RUNOFF W CONTRAST   Final Result   Vascular-      Aortoiliac inflow disease. On the right, the iliac stents are distally   occluded. On the left there is moderate stenosis the proximal common iliac   artery with patent distal stents. Femoropopliteal disease, multifocal bilaterally. On the right there are   multiple mild-to-moderate stenoses and severe stenosis adductor canal and   very distal popliteal.  On the left, there are multiple mild-to-moderate   stenoses. There appears to be a focal moderate stenosis in the mid SFA,   immediately proximal to the overlapping stents. Trifurcation disease, multifocal bilaterally. On the right, there is primary   runoff. The posterior tibial.  On the left, there is three-vessel runoff,   although there appear to be focal high-grade stenoses in the proximal   anterior and posterior tibial arteries. Splenic 2 x 1 cm hypodensity, variant enhancement versus embolic infarction. This appears new since 12/07/2020. No acute inflammatory abnormalities identified in the abdomen or pelvis. VL DUP LOWER EXTREMITY ARTERIES BILATERAL   Final Result      XR SACRUM COCCYX (MIN 2 VIEWS)   Final Result   Limited evaluation due to bowel gas overlying sacrum and coccyx. No   convincing radiographic evidence of acute osseous abnormality of the sacrum   and coccyx seen on the lateral view.                 Consults:     IP CONSULT TO CASE MANAGEMENT  IP CONSULT TO VASCULAR SURGERY  IP CONSULT TO NEPHROLOGY  IP CONSULT TO CARDIOLOGY  IP CONSULT TO NEUROLOGY  IP CONSULT TO NEPHROLOGY  IP CONSULT TO CASE MANAGEMENT  IP CONSULT TO CARDIOLOGY  IP CONSULT TO DIABETES EDUCATOR  IP CONSULT TO HOME CARE NEEDS    Disposition: to [facility]     Condition at Discharge: Stable    Discharge Instructions/Follow-up:      Code Status:  Prior   Activity: activity as tolerated    Diet: regular diet      Discharge Medications:     Discharge Medication List as of 1/5/2021  4:26 PM           Details   Insulin Pen Needle 31G X 5 MM MISC 4 TIMES DAILY Starting Mon 12/28/2020, Disp-300 each, R-3, Normal      hydrOXYzine (VISTARIL) 50 MG capsule TAKE 1 TO 2 CAPSULES BY MOUTH NIGHTLY, Disp-60 capsule, R-5Normal      pregabalin (LYRICA) 25 MG capsule Take 1 capsule by mouth 3 times daily for 5 days. , Disp-15 capsule, R-0Print      lidocaine 4 % external patch Place 1 patch onto the skin daily, Transdermal, DAILY Starting Sat 12/19/2020, Disp-30 patch, R-0, NO PRINT      hydrALAZINE (APRESOLINE) 50 MG tablet TAKE 1/2 TABLET BY MOUTH EVERY 8 HOURS, Disp-90 tablet,R-2Normal      B Complex-C-Folic Acid (VIRT-CAPS) 1 MG CAPS TK ONE C PO  QD, Disp-90 capsule,R-1Normal      Continuous Blood Gluc Sensor (FREESTYLE STEPHANY 14 DAY SENSOR) MISC 1 each by Does not apply route every 14 days, Disp-6 each,R-3Normal      insulin glargine (BASAGLAR KWIKPEN) 100 UNIT/ML injection pen Inject 70 Units into the skin nightly, Disp-15 mL,R-5Normal      traZODone (DESYREL) 150 MG tablet TAKE (1) TABLET BY MOUTH NIGHTLY, Disp-30 tablet,R-10Normal      citalopram (CELEXA) 40 MG tablet TAKE (1) TABLET BY MOUTH DAILY, Disp-30 tablet,R-10Normal      insulin aspart (NOVOLOG FLEXPEN) 100 UNIT/ML injection pen Inject 20 Units into the skin 3 times daily (before meals), Disp-15 pen,R-5Normal      clopidogrel (PLAVIX) 75 MG tablet TAKE 1 TABLET BY MOUTH ONCE DAILY, Disp-90 tablet,R-3Normal      pravastatin (PRAVACHOL) 80 MG tablet TAKE (1) TABLET BY MOUTH ONCE DAILY, Disp-90 tablet,R-3Normal      QUEtiapine (SEROQUEL) 50 MG tablet TAKE 1 TABLET BY MOUTH EVERY EVENING, Disp-30 tablet,R-5Normal      isosorbide mononitrate (IMDUR) 30 MG extended release tablet TAKE 1 TABLET BY MOUTH EVERY DAY, Disp-90 tablet,R-10Normal      torsemide (DEMADEX) 100 MG tablet Take 1-2 tablets by mouth daily, Disp-180 tablet,R-3Normal      ondansetron (ZOFRAN ODT) 4 MG disintegrating tablet Take 1 tablet by mouth every 8 hours as needed for Nausea, Disp-60 tablet,R-0Normal      LINZESS 145 MCG capsule TAKE 1 CAPSULE BY MOUTH EVERY MORNING BEFORE BREAKFAST, Disp-30 capsule, R-10Normal      Calcium Acetate, Phos Binder, 667 MG CAPS TAKE 1 CAPSULE BY MOUTH THREE TIMES DAILY WITH MEALS, Disp-90 capsule, R-3Normal      tiZANidine (ZANAFLEX) 4 MG tablet TAKE 1 TABLET BY MOUTH THREE TIMES DAILY, Disp-90 tablet, R-10Normal      DULoxetine (CYMBALTA) 30 MG extended release capsule TAKE 1 CAPSULE BY MOUTH EVERY DAY, Disp-90 capsule, R-10Normal      nystatin-triamcinolone (MYCOLOG II) 241459-6.1 UNIT/GM-% cream Apply topically 2 times daily. , Disp-60 g, R-2, Normal      losartan (COZAAR) 50 MG tablet TAKE (1) TABLET BY MOUTH DAILY, Disp-90 tablet, R-10Normal      pantoprazole (PROTONIX) 40 MG tablet TAKE (1) TABLET BY MOUTH EACH MORNING BEFORE BREAKFAST, Disp-90 tablet, R-10Normal      albuterol (PROVENTIL) (2.5 MG/3ML) 0.083% nebulizer solution INHALE 1 VIAL VIA NEBULIZER EVERY 6 HOURS AS NEEDED FOR WHEEZING, Disp-300 mL, R-10Normal      nystatin (MYCOSTATIN) 219233 UNIT/GM cream Apply topically 2 times daily. , Disp-60 g, R-3, Normal      Insulin Syringe-Needle U-100 30G X 1/2\" 0.5 ML MISC DAILY Starting Wed 3/4/2020, Disp-100 each, R-3, Normal      nitroGLYCERIN (NITROSTAT) 0.4 MG SL tablet DISSOLVE 1 TABLET UNDER THE TONGUE AS NEEDED FOR CHEST PAIN EVERY 5 MINUTES UP TO 3 TIMES. IF NO RELIEF CALL 911., Disp-25 tablet, R-10Normal      !! blood glucose test strips (FREESTYLE LITE) strip Disp-100 strip, R-3, NormalDaily As needed.       glucose monitoring kit (FREESTYLE) monitoring kit DAILY Starting Mon 8/19/2019, Disp-1 kit, R-0, Normal      vitamin D (ERGOCALCIFEROL) 89140 units CAPS capsule TK 1 C PO WEEKLY, R-11Historical Med      flunisolide (NASALIDE) 25 MCG/ACT (0.025%) SOLN Inhale 2 sprays into the lungs every 12 hours, Disp-1 Bottle, R-5Normal      Tiotropium Bromide-Olodaterol (STIOLTO RESPIMAT) 2.5-2.5 MCG/ACT AERS Inhale 2 puffs into the lungs daily, Disp-2 Inhaler, R-02 samples given:  Lot #[id number], Exp 7/21 & Lot #[id number], Exp 7/21NO PRINT      Polyethylene Glycol 3350 GRAN Starting Wed 5/2/2018, Historical Med      !! Glucose Blood (BLOOD GLUCOSE TEST STRIPS) STRP TEST 3-4 TIMES DAILY, AS DIRECTED, Disp-100 strip, R-3Normal      Blood Glucose Monitoring Suppl ADAM Disp-1 Device, R-0, NormalUSE AS DIRECTED. Alcohol Swabs PADS Disp-300 each, R-3, NormalUSE AS DIRECTED      albuterol sulfate  (90 Base) MCG/ACT inhaler Inhale 2 puffs into the lungs every 6 hours as needed for Wheezing, Disp-1 Inhaler, R-3Print      ipratropium-albuterol (DUONEB) 0.5-2.5 (3) MG/3ML SOLN nebulizer solution Inhale 3 mLs into the lungs every 6 hours as needed for Shortness of Breath, Disp-360 mL, R-1Print      Lancets MISC Disp-100 each, R-3, PrintTest daily      calcium carbonate (TUMS) 500 MG chewable tablet Take 1 tablet by mouth 3 times daily as needed for Heartburn. aspirin 81 MG chewable tablet Take 1 tablet by mouth daily. , Disp-30 tablet, R-2       !! - Potential duplicate medications found. Please discuss with provider. Time Spent on discharge is more than 30 minutes in the examination, evaluation, counseling and review of medications and discharge plan. Signed:    JAY Crews - CNP   1/10/2021      Thank you Rosita Thakkar MD for the opportunity to be involved in this patient's care. If you have any questions or concerns please feel free to contact me at [phone]

## 2022-04-04 NOTE — ED PROVIDER NOTE - NS ED ROS FT
CONSTITUTIONAL: No fevers, no chills, no lightheadedness, no dizziness  EYES: no visual changes, no eye pain  NOSE: no nasal congestion  MOUTH/THROAT: no sore throat  CV: No chest pain, no palpitations  RESP: No SOB, no cough  GI: see HPI   : see HPI   MSK: no back pain, no extremity pain  SKIN: no rashes  NEURO: no headache, no focal weakness, no decreased sensation/parasthesias   PSYCHIATRIC: no known mental health issues

## 2022-04-04 NOTE — ED PROVIDER NOTE - PHYSICAL EXAMINATION
General: Alertered. lethargic. Episode of shaking lasting ~10 secs w/o confusion, LOC.   Head: Normocephalic and atraumatic.  Eyes: PERRLA with EOMI.  Neck: Supple. Trachea midline.   Cardiac: Irregular HR. No murmurs appreciated. No JVD appreciated.  Pulmonary: CTA bilaterally. No increased WOB. No wheezes or crackles.  Abdominal: Soft, diffuse tenderness, no peritoneal signs.  (+) bowel sounds appreciated in all 4 quadrants. No hepatosplenomegaly. No CVA tenderness.   Neurologic: No focal sensory or motor deficits. unable to assess gait.   Musculoskeletal: Strength appropriate in all 4 extremities for age with no limited ROM. Minimal pedal edema.   Skin: Color appropriate for race. Intact, warm, and well-perfused. No ulcers or rashes.   Psychiatric: Appropriate mood and affect. No apparent risk to self or others.

## 2022-04-04 NOTE — ED ADULT NURSE REASSESSMENT NOTE - NS ED NURSE REASSESS COMMENT FT1
Patient returned from CT scan. Patient undressed, skin intact. Rectal temp obtained 98.8F. Straight catheter inserted using sterile technique. Procedure, risks, and benefits of catheter explained to patient, patient verbalized understanding. Second RN present to confirm sterility. Pt tolerated well. Urinary catheter drained clear samir urine, no clots visualized. 200cc of urine in drainage bag. Patient cleaned, skin intact and purawick placed and attached to suction. Patient educated about use of purawick. Pending lab and imaging results.

## 2022-04-04 NOTE — ED PROVIDER NOTE - CLINICAL SUMMARY MEDICAL DECISION MAKING FREE TEXT BOX
89 y/o F p/w urinary sx and AMS w/ n/v. Concern for difference in BP per EMS. Hypertensive in ED w/ A-FIB. Afebrile. Exam as above. Given these findings, will get CT head and dissection study to r/o bleed, mass, or dissection. Will get UA and cx to eval for UTI (hx of esbl). Will get cultures, cbc, cmp, ekg, vbg. Will give anti-hypertensives and abx as indicated. Dispo- TBA.

## 2022-04-04 NOTE — ED ADULT NURSE NOTE - NSIMPLEMENTINTERV_GEN_ALL_ED
Implemented All Fall with Harm Risk Interventions:  [place] to call system. Call bell, personal items and telephone within reach. Instruct patient to call for assistance. Room bathroom lighting operational. Non-slip footwear when patient is off stretcher. Physically safe environment: no spills, clutter or unnecessary equipment. Stretcher in lowest position, wheels locked, appropriate side rails in place. Provide visual cue, wrist band, yellow gown, etc. Monitor gait and stability. Monitor for mental status changes and reorient to person, place, and time. Review medications for side effects contributing to fall risk. Reinforce activity limits and safety measures with patient and family. Provide visual clues: red socks.

## 2022-04-04 NOTE — ED PROVIDER NOTE - OBJECTIVE STATEMENT
Patient is a 89 y/o F w/ PMH of HTN, A-FIB w/ loop recorder in place, CAD, hypothyroidism, urinary incontinence and frequent UTIs (ESBL), who presents to ED w/ lethargy and increased urinary frequency. Lives in nursing home. Per daughter, patient is not at baseline, she seems more "out of it". Per patient, no burning on urination or hematuria but noticed increased frequency. Uses diaper. C/o nausea and emesis in ambulance. Takes meds on own, missed dose today and yesterday. No bowel changes. No fever or chills. Fall ~3 wks ago, no on AC. No chest pain, palpitations, headache, edema, vision changes. recent Abx use.

## 2022-04-05 DIAGNOSIS — N39.0 URINARY TRACT INFECTION, SITE NOT SPECIFIED: ICD-10-CM

## 2022-04-05 DIAGNOSIS — N18.9 CHRONIC KIDNEY DISEASE, UNSPECIFIED: ICD-10-CM

## 2022-04-05 DIAGNOSIS — I48.0 PAROXYSMAL ATRIAL FIBRILLATION: ICD-10-CM

## 2022-04-05 DIAGNOSIS — I16.0 HYPERTENSIVE URGENCY: ICD-10-CM

## 2022-04-05 DIAGNOSIS — K63.9 DISEASE OF INTESTINE, UNSPECIFIED: ICD-10-CM

## 2022-04-05 DIAGNOSIS — E03.9 HYPOTHYROIDISM, UNSPECIFIED: ICD-10-CM

## 2022-04-05 DIAGNOSIS — G93.41 METABOLIC ENCEPHALOPATHY: ICD-10-CM

## 2022-04-05 DIAGNOSIS — A41.9 SEPSIS, UNSPECIFIED ORGANISM: ICD-10-CM

## 2022-04-05 DIAGNOSIS — E78.5 HYPERLIPIDEMIA, UNSPECIFIED: ICD-10-CM

## 2022-04-05 DIAGNOSIS — I10 ESSENTIAL (PRIMARY) HYPERTENSION: ICD-10-CM

## 2022-04-05 DIAGNOSIS — I50.32 CHRONIC DIASTOLIC (CONGESTIVE) HEART FAILURE: ICD-10-CM

## 2022-04-05 DIAGNOSIS — Z00.00 ENCOUNTER FOR GENERAL ADULT MEDICAL EXAMINATION WITHOUT ABNORMAL FINDINGS: ICD-10-CM

## 2022-04-05 LAB
A1C WITH ESTIMATED AVERAGE GLUCOSE RESULT: 6.1 % — HIGH (ref 4–5.6)
ANION GAP SERPL CALC-SCNC: 17 MMOL/L — SIGNIFICANT CHANGE UP (ref 5–17)
BUN SERPL-MCNC: 35 MG/DL — HIGH (ref 7–23)
CALCIUM SERPL-MCNC: 9.2 MG/DL — SIGNIFICANT CHANGE UP (ref 8.4–10.5)
CHLORIDE SERPL-SCNC: 103 MMOL/L — SIGNIFICANT CHANGE UP (ref 96–108)
CHOLEST SERPL-MCNC: 131 MG/DL — SIGNIFICANT CHANGE UP
CO2 SERPL-SCNC: 19 MMOL/L — LOW (ref 22–31)
CREAT SERPL-MCNC: 1.19 MG/DL — SIGNIFICANT CHANGE UP (ref 0.5–1.3)
EGFR: 41 ML/MIN/1.73M2 — LOW
ESTIMATED AVERAGE GLUCOSE: 128 MG/DL — HIGH (ref 68–114)
FLUAV AG NPH QL: SIGNIFICANT CHANGE UP
FLUBV AG NPH QL: SIGNIFICANT CHANGE UP
GLUCOSE SERPL-MCNC: 96 MG/DL — SIGNIFICANT CHANGE UP (ref 70–99)
HCT VFR BLD CALC: 38.8 % — SIGNIFICANT CHANGE UP (ref 34.5–45)
HDLC SERPL-MCNC: 62 MG/DL — SIGNIFICANT CHANGE UP
HGB BLD-MCNC: 12.4 G/DL — SIGNIFICANT CHANGE UP (ref 11.5–15.5)
LIPID PNL WITH DIRECT LDL SERPL: 57 MG/DL — SIGNIFICANT CHANGE UP
MAGNESIUM SERPL-MCNC: 1.9 MG/DL — SIGNIFICANT CHANGE UP (ref 1.6–2.6)
MCHC RBC-ENTMCNC: 30.4 PG — SIGNIFICANT CHANGE UP (ref 27–34)
MCHC RBC-ENTMCNC: 32 GM/DL — SIGNIFICANT CHANGE UP (ref 32–36)
MCV RBC AUTO: 95.1 FL — SIGNIFICANT CHANGE UP (ref 80–100)
NON HDL CHOLESTEROL: 69 MG/DL — SIGNIFICANT CHANGE UP
NRBC # BLD: 0 /100 WBCS — SIGNIFICANT CHANGE UP (ref 0–0)
PHOSPHATE SERPL-MCNC: 3.9 MG/DL — SIGNIFICANT CHANGE UP (ref 2.5–4.5)
PLATELET # BLD AUTO: 149 K/UL — LOW (ref 150–400)
POTASSIUM SERPL-MCNC: 4.4 MMOL/L — SIGNIFICANT CHANGE UP (ref 3.5–5.3)
POTASSIUM SERPL-SCNC: 4.4 MMOL/L — SIGNIFICANT CHANGE UP (ref 3.5–5.3)
RBC # BLD: 4.08 M/UL — SIGNIFICANT CHANGE UP (ref 3.8–5.2)
RBC # FLD: 14.3 % — SIGNIFICANT CHANGE UP (ref 10.3–14.5)
RSV RNA NPH QL NAA+NON-PROBE: SIGNIFICANT CHANGE UP
SARS-COV-2 RNA SPEC QL NAA+PROBE: SIGNIFICANT CHANGE UP
SODIUM SERPL-SCNC: 139 MMOL/L — SIGNIFICANT CHANGE UP (ref 135–145)
TRIGL SERPL-MCNC: 61 MG/DL — SIGNIFICANT CHANGE UP
TROPONIN T, HIGH SENSITIVITY RESULT: 28 NG/L — SIGNIFICANT CHANGE UP (ref 0–51)
WBC # BLD: 9.52 K/UL — SIGNIFICANT CHANGE UP (ref 3.8–10.5)
WBC # FLD AUTO: 9.52 K/UL — SIGNIFICANT CHANGE UP (ref 3.8–10.5)

## 2022-04-05 PROCEDURE — 99223 1ST HOSP IP/OBS HIGH 75: CPT | Mod: GC

## 2022-04-05 RX ORDER — FUROSEMIDE 40 MG
1 TABLET ORAL
Qty: 0 | Refills: 0 | DISCHARGE

## 2022-04-05 RX ORDER — ATORVASTATIN CALCIUM 80 MG/1
20 TABLET, FILM COATED ORAL AT BEDTIME
Refills: 0 | Status: DISCONTINUED | OUTPATIENT
Start: 2022-04-05 | End: 2022-04-11

## 2022-04-05 RX ORDER — MEROPENEM 1 G/30ML
500 INJECTION INTRAVENOUS EVERY 12 HOURS
Refills: 0 | Status: DISCONTINUED | OUTPATIENT
Start: 2022-04-05 | End: 2022-04-06

## 2022-04-05 RX ORDER — SODIUM CHLORIDE 9 MG/ML
1000 INJECTION INTRAMUSCULAR; INTRAVENOUS; SUBCUTANEOUS ONCE
Refills: 0 | Status: COMPLETED | OUTPATIENT
Start: 2022-04-05 | End: 2022-04-05

## 2022-04-05 RX ORDER — HEPARIN SODIUM 5000 [USP'U]/ML
5000 INJECTION INTRAVENOUS; SUBCUTANEOUS EVERY 8 HOURS
Refills: 0 | Status: DISCONTINUED | OUTPATIENT
Start: 2022-04-05 | End: 2022-04-11

## 2022-04-05 RX ORDER — LEVOTHYROXINE SODIUM 125 MCG
1 TABLET ORAL
Qty: 0 | Refills: 0 | DISCHARGE

## 2022-04-05 RX ORDER — LABETALOL HCL 100 MG
10 TABLET ORAL ONCE
Refills: 0 | Status: COMPLETED | OUTPATIENT
Start: 2022-04-05 | End: 2022-04-05

## 2022-04-05 RX ORDER — POLYETHYLENE GLYCOL 3350 17 G/17G
17 POWDER, FOR SOLUTION ORAL DAILY
Refills: 0 | Status: DISCONTINUED | OUTPATIENT
Start: 2022-04-05 | End: 2022-04-11

## 2022-04-05 RX ORDER — ASPIRIN/CALCIUM CARB/MAGNESIUM 324 MG
81 TABLET ORAL DAILY
Refills: 0 | Status: DISCONTINUED | OUTPATIENT
Start: 2022-04-05 | End: 2022-04-11

## 2022-04-05 RX ORDER — CARVEDILOL PHOSPHATE 80 MG/1
12.5 CAPSULE, EXTENDED RELEASE ORAL EVERY 12 HOURS
Refills: 0 | Status: DISCONTINUED | OUTPATIENT
Start: 2022-04-05 | End: 2022-04-06

## 2022-04-05 RX ORDER — LEVOTHYROXINE SODIUM 125 MCG
100 TABLET ORAL DAILY
Refills: 0 | Status: DISCONTINUED | OUTPATIENT
Start: 2022-04-05 | End: 2022-04-11

## 2022-04-05 RX ORDER — ACETAMINOPHEN 500 MG
975 TABLET ORAL EVERY 6 HOURS
Refills: 0 | Status: DISCONTINUED | OUTPATIENT
Start: 2022-04-05 | End: 2022-04-11

## 2022-04-05 RX ORDER — SPIRONOLACTONE 25 MG/1
25 TABLET, FILM COATED ORAL DAILY
Refills: 0 | Status: DISCONTINUED | OUTPATIENT
Start: 2022-04-05 | End: 2022-04-11

## 2022-04-05 RX ADMIN — CARVEDILOL PHOSPHATE 12.5 MILLIGRAM(S): 80 CAPSULE, EXTENDED RELEASE ORAL at 06:18

## 2022-04-05 RX ADMIN — Medication 10 MILLIGRAM(S): at 04:35

## 2022-04-05 RX ADMIN — CARVEDILOL PHOSPHATE 12.5 MILLIGRAM(S): 80 CAPSULE, EXTENDED RELEASE ORAL at 17:42

## 2022-04-05 RX ADMIN — MEROPENEM 100 MILLIGRAM(S): 1 INJECTION INTRAVENOUS at 12:52

## 2022-04-05 RX ADMIN — HEPARIN SODIUM 5000 UNIT(S): 5000 INJECTION INTRAVENOUS; SUBCUTANEOUS at 22:04

## 2022-04-05 RX ADMIN — SODIUM CHLORIDE 1000 MILLILITER(S): 9 INJECTION INTRAMUSCULAR; INTRAVENOUS; SUBCUTANEOUS at 01:07

## 2022-04-05 RX ADMIN — ATORVASTATIN CALCIUM 20 MILLIGRAM(S): 80 TABLET, FILM COATED ORAL at 22:04

## 2022-04-05 RX ADMIN — Medication 100 MICROGRAM(S): at 05:25

## 2022-04-05 RX ADMIN — Medication 81 MILLIGRAM(S): at 12:11

## 2022-04-05 RX ADMIN — MEROPENEM 100 MILLIGRAM(S): 1 INJECTION INTRAVENOUS at 22:04

## 2022-04-05 RX ADMIN — SPIRONOLACTONE 25 MILLIGRAM(S): 25 TABLET, FILM COATED ORAL at 06:18

## 2022-04-05 RX ADMIN — POLYETHYLENE GLYCOL 3350 17 GRAM(S): 17 POWDER, FOR SOLUTION ORAL at 12:51

## 2022-04-05 RX ADMIN — HEPARIN SODIUM 5000 UNIT(S): 5000 INJECTION INTRAVENOUS; SUBCUTANEOUS at 13:38

## 2022-04-05 RX ADMIN — HEPARIN SODIUM 5000 UNIT(S): 5000 INJECTION INTRAVENOUS; SUBCUTANEOUS at 06:18

## 2022-04-05 NOTE — H&P ADULT - ATTENDING COMMENTS
89 year old F with PMHx as outlined above and significant for recurrent UTIs (including ESBL E coli) who presents with metabolic encephalopathy secondary to urinary tract infection. Her evaluation was significant for hypertensive urgency. CT showed evidence of pyelonephritis as well as incidentally found heterogeneous thyroid, proximal ascending wall thickening of the colon. The patient’s mental status has improved since ED presentation after fluids and meropenem. Agree with continuing meropenem given hx of ESBL. Obtain routine GI consult regarding thickened proximal ascending colon. Rest of plan as per resident note.

## 2022-04-05 NOTE — H&P ADULT - PROBLEM SELECTOR PLAN 4
SCr 1.42. Baseline SCr rougly 1.40 (high fluctuations). Appears to be at baseline renal function  - s/p fluids in ED  - continue to trend  - avoid nephrotoxins when possible

## 2022-04-05 NOTE — H&P ADULT - NSHPSOCIALHISTORY_GEN_ALL_CORE
No prior toxic habits. Lives at [facility]. Reports wearing diaper at baseline. Uses rollator for ambulation. Takes medications on her own.

## 2022-04-05 NOTE — PROVIDER CONTACT NOTE (OTHER) - ASSESSMENT
Pt AOx4, no chest pain or complaints. Pt resting comfortably in stretcher. HR 95, other VSS on 3L NC

## 2022-04-05 NOTE — H&P ADULT - PROBLEM SELECTOR PLAN 3
Admission /116, asymptomatic. In outpatient records appears her systolics are usually in the 130-140s. In setting of missed medications and possibly from sepsis  - s/p labetalol x2 in ED  - re-introduce home meds  - would not drop systolics to less than 160 within first 24 hours Admission /116, asymptomatic. In outpatient records appears her systolics are usually in the 130-140s. In setting of missed medications and possibly from sepsis  - s/p labetalol x2 in ED  - re-introduce home meds - carvedilol and spironolactone  - would not drop systolics to less than 160 within first 24 hours.

## 2022-04-05 NOTE — PROVIDER CONTACT NOTE (OTHER) - BACKGROUND
Pt admitted for UTI. Pt with hx of HTN, NSTEMI, spinal stenosis. Pt was hypertensive earlier in ED, s/p labetalol at 2230

## 2022-04-05 NOTE — CHART NOTE - NSCHARTNOTEFT_GEN_A_CORE
89F with PMHx of Breast ca s/p resection, HTN, HFpEF, CKD, recurrent UTIs (including ESBL E coli), HLD, OA, hypothyroidism who presents with metabolic encephalopathy secondary to urinary tract infection, with CT evidence of pyelonephritis.  Seen and examined at bedside. AAO X 3 and oriented to situation, thus altered mental status resolved. Denies pain. Incontinent.  F/u bladder scan, r/o retention  Incidental finding of proximal ascending wall thickening of the colon. Spoke to pt who says she is aware and declines further workup  Additional incidental finding of asymmetric breast tissue; discussed this with pt as well. She requests further breast imaging unfortunately Breast US not available inpatient. Will need outpatient followup  Continue meropenem given hx of ESBL. F/u final urine and blood cx  PT eval  Rest per admission note dated today.    [facility] Division of Hospital Medicine  Ivonne Cuadra MD  Pager (M-F, 8A-5P): [phone]  Other Times:  [phone] 89F with PMHx of Breast ca s/p resection, HTN, HFpEF, CKD, recurrent UTIs (including ESBL E coli), HLD, OA, hypothyroidism who presents from [facility] with metabolic encephalopathy secondary to urinary tract infection, with CT evidence of pyelonephritis.  Seen and examined at bedside. AAO X 3 and oriented to situation, thus altered mental status resolved. Denies pain. Incontinent.  F/u bladder scan, r/o retention  F/u TSH, B12 for AMS workup  Incidental finding of proximal ascending wall thickening of the colon. Spoke to pt who says she is aware and declines further workup  Additional incidental finding of asymmetric breast tissue; discussed this with pt as well. She requests further breast imaging unfortunately Breast US not available inpatient. Will need outpatient followup  Continue meropenem given hx of ESBL. F/u final urine and blood cx  PT eval  Rest per admission note dated today.    [facility] Division of Hospital Medicine  Ivonne Cuadra MD  Pager (M-F, 8A-5P): [phone]  Other Times:  [phone]

## 2022-04-05 NOTE — H&P ADULT - PROBLEM SELECTOR PLAN 10
DVT: hep subQ  Diet: DASH, soft and bite sized CT noted prominent wall at the proximal ascending colon, which may be due to partial distention vs. underlying neoplasm  - Will discuss with patient goals of care regarding findings CT noted prominent wall at the proximal ascending colon, which may be due to partial distention vs. underlying neoplasm. Pt reports no prior colonoscopy.  - Discussed findings with patient and had risks/benefits discussion about testing and possible treatments. At this time patient does not want to pursue further testing for this

## 2022-04-05 NOTE — H&P ADULT - ASSESSMENT
89 yo F with PMHx of HTN, HFpEF, CKD, recurrent UTIs (including ESBL E coli), HLD, OA, hypothyroidism who is admitted with sepsis secondary to UTI. 89 yo F with PMHx of HTN, HFpEF, CKD, recurrent UTIs (including ESBL E coli), HLD, OA, hypothyroidism who is admitted with metabolic encephalopathy secondary to UTI.

## 2022-04-05 NOTE — H&P ADULT - NSHPLABSRESULTS_GEN_ALL_CORE
13.4   9.09  )-----------( 183      ( 04 Apr 2022 21:29 )             41.4     04-04    139  |  101  |  40<H>  ----------------------------<  129<H>  4.4   |  24  |  1.42<H>    Ca    9.9      04 Apr 2022 21:29    TPro  8.5<H>  /  Alb  4.8  /  TBili  0.6  /  DBili  x   /  AST  25  /  ALT  43  /  AlkPhos  179<H>  04-04    PT/INR - ( 04 Apr 2022 21:29 )   PT: 12.4 sec;   INR: 1.08 ratio         PTT - ( 04 Apr 2022 21:29 )  PTT:30.8 sec    ACC: [id number] EXAM:  CT ANGIO CHEST AORTA [facility]                        ACC: [id number] EXAM:  CT ANGIO ABD PELV (W)AW IC                          PROCEDURE DATE:  04/04/2022          INTERPRETATION:  CLINICAL INFORMATION: Chest and abdominal pain. Rule out   aortic dissection    COMPARISON: CT chest from 8/29/2021 CT abdomen and pelvis from 6/17/2019    CONTRAST/COMPLICATIONS:  IV Contrast: Omnipaque 350 (accession [id number]), IV contrast documented   in associated exam (accession [id number])  98 cc administered   2 cc   discarded  Oral Contrast: NONE  Complications: None reported at time of study completion    PROCEDURE:  CT Angiography of the Chest, Abdomen and Pelvis.  Gated precontrast imaging was performed through the heart followed by   gated CT Angiography of the heart with subsequent non-gated arterial   phase imaging of the chest, abdomen and pelvis.  Sagittal and coronal reformats were performed as well as 3D (MIP)   reconstructions.      IMPRESSION:    No aortic dissection.    Small bilateral pleural effusion with atelectasis. Interlobular septal   thickening and groundglass opacities in both lungs, which can be seen in   noninfectious and infectious processes.    Heterogeneous thyroid gland, which can be further characterized on a   nonemergent ultrasound as indicated.    Asymmetric right > left breast soft tissue. Recommend correlation with   dedicated breast imaging to exclude potential underlying lesion/neoplasm.    Mild bilateral perinephric stranding without obvious hydronephrosis.   Question striated bilateral nephrogram.  Question mild perivesical stranding. Recommend correlation with   urinalysis to assess urinary tract infection.    Prominent wall at the proximal ascending colon, which may be due to   partial distention. Recommend correlation with colonoscopy to exclude   potential underlying neoplasm.    Additional findings as described.    --- End of Report ---

## 2022-04-05 NOTE — H&P ADULT - PROBLEM SELECTOR PLAN 5
Last known TTE (Oct 2021): EF 65%, preserved LV systolic function, normal RV, mild LVH, mild AS, no pulm HTN. Appears compensated on exam  - Hold home lasix in setting of sepsis  - Continue home carvedilol and spironolactone Last known TTE (Oct 2021): EF 65%, preserved LV systolic function, normal RV, mild LVH, mild AS, no pulm HTN. Appears compensated on exam  - Not on diuretics at home  - Continue home carvedilol and spironolactone.

## 2022-04-05 NOTE — PATIENT PROFILE ADULT - FALL HARM RISK - HARM RISK INTERVENTIONS

## 2022-04-05 NOTE — H&P ADULT - NSHPREVIEWOFSYSTEMS_GEN_ALL_CORE
REVIEW OF SYSTEMS:    CONSTITUTIONAL: +Fatigue. No weakness, fevers or chills  EYES/ENT: No visual changes;  No vertigo or throat pain   MOUTH: No oral lesion, moist  NECK: No pain or stiffness  RESPIRATORY: No cough, wheezing, hemoptysis; No shortness of breath  CARDIOVASCULAR: No chest pain or palpitations  GASTROINTESTINAL: +Nausea, vomiting. No abdominal or epigastric pain.  No diarrhea or constipation. No melena or hematochezia.  GENITOURINARY: +urinary frequency. No dysuria or hematuria  NEUROLOGICAL: No numbness or weakness  SKIN: No itching, rashes  PSYCH: +AMS

## 2022-04-05 NOTE — H&P ADULT - PROBLEM SELECTOR PLAN 1
Does not strictly meet SIRS criteria, however is unreliable screening tool in advanced elderly population. She had metabolic encephalopathy with a grossly positive UA concerning for sepsis.  - S/p 1 L fluids and meropenem in ED  - Continue meropenem for Hx of ESBL UTI  - F/u BCx, UCx and de-escalate as appropriate Does not strictly meet SIRS criteria, however is unreliable screening tool in advanced elderly population. She had metabolic encephalopathy with a grossly positive UA concerning for sepsis. There is a possible contribution from pneumonia, however the patient does not report any new symptoms and the CT findings are nonspecific.  - S/p 1 L fluids and meropenem in ED  - Continue meropenem for Hx of ESBL UTI  - F/u BCx, UCx and de-escalate as appropriate  - Unclear need for oxygen, will wean as appropriate. Pt presents with metabolic encephalopathy with a grossly positive UA. There is a possible contribution from pneumonia, however the patient does not report any new symptoms and the CT findings are nonspecific.  - S/p 1 L fluids and meropenem in ED  - Continue meropenem for Hx of ESBL UTI  - F/u BCx, UCx and de-escalate as appropriate  - Unclear need for oxygen, will wean as appropriate.

## 2022-04-05 NOTE — H&P ADULT - PROBLEM SELECTOR PLAN 6
EKG shows sinus rhythm with occasional PACs. Asymptomatic.  - Not on AC at home  - rates well-controlled  - Cont carvedilol

## 2022-04-05 NOTE — H&P ADULT - PROBLEM SELECTOR PLAN 2
UA grossly positive, CT imaging remarkable for mild bilateral perinephric stranding and questionable perivesical stranding. Concern for possible ascending infection given imaging  - Abx as above  - Check bladder scan  - F/u UCx UA grossly positive, CT imaging remarkable for mild bilateral perinephric stranding and questionable perivesical stranding, however pt is afebrile, no CVA tenderness, renal function at baseline  - Abx as above  - Check bladder scan to r/o obstruction  - F/u UCx.

## 2022-04-05 NOTE — H&P ADULT - NSHPPHYSICALEXAM_GEN_ALL_CORE
GENERAL: NAD, lying in bed comfortably  HEAD:  Atraumatic, normocephalic  EYES: conjunctiva and sclera clear  ENT: Moist mucous membranes  NECK: Supple, no JVD  HEART: Regularly irregular rate and rhythm, +systolic murmur  LUNGS: Unlabored respirations.  Rhonchi R > L lung fields  ABDOMEN: Soft, nontender, nondistended. No CVA tenderness  EXTREMITIES: 2+ peripheral pulses bilaterally. Trace pitting edema to b/l ankles  NERVOUS SYSTEM:  A&Ox2-3, no focal deficits

## 2022-04-05 NOTE — H&P ADULT - HISTORY OF PRESENT ILLNESS
89 yo F with PMHx of HTN, HFpEF, CKD, recurrent UTIs (including ESBL E coli), HLD, OA, hypothyroidism who presents with confusion and urinary frequency. Patient reports she was brought to the hospital for increasing confusion, which started earlier today. Prior to this she has been having increased urinary frequency for the past few days, associated with intermittent nausea, vomiting, and fatigue. She reports that her current symptoms feel similar to when she has had UTI in the past. Pt denies dysuria or hematuria. Denies f/c, CP, palps, SOB, new cough, abd pain, changes in bowel habits, HA, vision changes. Reports missing her medications yesterday and today.    In the ED, afebrile, HR 92, /116 -> 185/86 with labetalol, 95% RA. Labs remarkable for SCr 1.42 (appears baseline). Lactate normal. UA concentrated, +nitrite, large LE, 49 WBCs. RVP negative. CT scan significant for interlobar septal thickening and ground glass opacities (nonspecific), mild b/l perinephric stranding, questionable perivesical stranding.

## 2022-04-05 NOTE — H&P ADULT - NSICDXPASTMEDICALHX_GEN_ALL_CORE_FT
PAST MEDICAL HISTORY:  (HFpEF) heart failure with preserved ejection fraction     Bilateral Cataracts     Chronic kidney disease (CKD)     HTN (hypertension)     Hypothyroidism     Macular degeneration     Non-ST elevation MI (NSTEMI)     Paroxysmal atrial fibrillation     Spinal stenosis     Urinary Frequency

## 2022-04-06 LAB
-  BLOOD PCR PANEL: SIGNIFICANT CHANGE UP
ANION GAP SERPL CALC-SCNC: 13 MMOL/L — SIGNIFICANT CHANGE UP (ref 5–17)
BUN SERPL-MCNC: 32 MG/DL — HIGH (ref 7–23)
CALCIUM SERPL-MCNC: 8.7 MG/DL — SIGNIFICANT CHANGE UP (ref 8.4–10.5)
CHLORIDE SERPL-SCNC: 103 MMOL/L — SIGNIFICANT CHANGE UP (ref 96–108)
CO2 SERPL-SCNC: 23 MMOL/L — SIGNIFICANT CHANGE UP (ref 22–31)
CREAT SERPL-MCNC: 1.31 MG/DL — HIGH (ref 0.5–1.3)
CULTURE RESULTS: SIGNIFICANT CHANGE UP
EGFR: 37 ML/MIN/1.73M2 — LOW
GLUCOSE SERPL-MCNC: 92 MG/DL — SIGNIFICANT CHANGE UP (ref 70–99)
GRAM STN FLD: SIGNIFICANT CHANGE UP
HCT VFR BLD CALC: 35.5 % — SIGNIFICANT CHANGE UP (ref 34.5–45)
HGB BLD-MCNC: 11.3 G/DL — LOW (ref 11.5–15.5)
MCHC RBC-ENTMCNC: 30.8 PG — SIGNIFICANT CHANGE UP (ref 27–34)
MCHC RBC-ENTMCNC: 31.8 GM/DL — LOW (ref 32–36)
MCV RBC AUTO: 96.7 FL — SIGNIFICANT CHANGE UP (ref 80–100)
METHOD TYPE: SIGNIFICANT CHANGE UP
NRBC # BLD: 0 /100 WBCS — SIGNIFICANT CHANGE UP (ref 0–0)
PLATELET # BLD AUTO: 126 K/UL — LOW (ref 150–400)
POTASSIUM SERPL-MCNC: 4.3 MMOL/L — SIGNIFICANT CHANGE UP (ref 3.5–5.3)
POTASSIUM SERPL-SCNC: 4.3 MMOL/L — SIGNIFICANT CHANGE UP (ref 3.5–5.3)
RBC # BLD: 3.67 M/UL — LOW (ref 3.8–5.2)
RBC # FLD: 14.5 % — SIGNIFICANT CHANGE UP (ref 10.3–14.5)
SODIUM SERPL-SCNC: 139 MMOL/L — SIGNIFICANT CHANGE UP (ref 135–145)
SPECIMEN SOURCE: SIGNIFICANT CHANGE UP
TSH SERPL-MCNC: 1.4 UIU/ML — SIGNIFICANT CHANGE UP (ref 0.27–4.2)
VIT B12 SERPL-MCNC: 435 PG/ML — SIGNIFICANT CHANGE UP (ref 232–1245)
WBC # BLD: 7.11 K/UL — SIGNIFICANT CHANGE UP (ref 3.8–10.5)
WBC # FLD AUTO: 7.11 K/UL — SIGNIFICANT CHANGE UP (ref 3.8–10.5)

## 2022-04-06 PROCEDURE — 99223 1ST HOSP IP/OBS HIGH 75: CPT

## 2022-04-06 PROCEDURE — 99233 SBSQ HOSP IP/OBS HIGH 50: CPT

## 2022-04-06 RX ORDER — SODIUM CHLORIDE 9 MG/ML
500 INJECTION, SOLUTION INTRAVENOUS
Refills: 0 | Status: DISCONTINUED | OUTPATIENT
Start: 2022-04-06 | End: 2022-04-11

## 2022-04-06 RX ORDER — CARVEDILOL PHOSPHATE 80 MG/1
18.75 CAPSULE, EXTENDED RELEASE ORAL EVERY 12 HOURS
Refills: 0 | Status: DISCONTINUED | OUTPATIENT
Start: 2022-04-06 | End: 2022-04-11

## 2022-04-06 RX ORDER — MEROPENEM 1 G/30ML
1000 INJECTION INTRAVENOUS EVERY 12 HOURS
Refills: 0 | Status: DISCONTINUED | OUTPATIENT
Start: 2022-04-06 | End: 2022-04-08

## 2022-04-06 RX ADMIN — Medication 81 MILLIGRAM(S): at 11:48

## 2022-04-06 RX ADMIN — HEPARIN SODIUM 5000 UNIT(S): 5000 INJECTION INTRAVENOUS; SUBCUTANEOUS at 13:54

## 2022-04-06 RX ADMIN — CARVEDILOL PHOSPHATE 18.75 MILLIGRAM(S): 80 CAPSULE, EXTENDED RELEASE ORAL at 17:47

## 2022-04-06 RX ADMIN — SPIRONOLACTONE 25 MILLIGRAM(S): 25 TABLET, FILM COATED ORAL at 05:11

## 2022-04-06 RX ADMIN — MEROPENEM 100 MILLIGRAM(S): 1 INJECTION INTRAVENOUS at 17:47

## 2022-04-06 RX ADMIN — POLYETHYLENE GLYCOL 3350 17 GRAM(S): 17 POWDER, FOR SOLUTION ORAL at 11:48

## 2022-04-06 RX ADMIN — CARVEDILOL PHOSPHATE 12.5 MILLIGRAM(S): 80 CAPSULE, EXTENDED RELEASE ORAL at 05:11

## 2022-04-06 RX ADMIN — Medication 100 MICROGRAM(S): at 05:11

## 2022-04-06 RX ADMIN — ATORVASTATIN CALCIUM 20 MILLIGRAM(S): 80 TABLET, FILM COATED ORAL at 21:21

## 2022-04-06 RX ADMIN — MEROPENEM 100 MILLIGRAM(S): 1 INJECTION INTRAVENOUS at 11:47

## 2022-04-06 RX ADMIN — Medication 5 MILLIGRAM(S): at 17:47

## 2022-04-06 RX ADMIN — HEPARIN SODIUM 5000 UNIT(S): 5000 INJECTION INTRAVENOUS; SUBCUTANEOUS at 21:21

## 2022-04-06 RX ADMIN — SODIUM CHLORIDE 50 MILLILITER(S): 9 INJECTION, SOLUTION INTRAVENOUS at 14:51

## 2022-04-06 RX ADMIN — HEPARIN SODIUM 5000 UNIT(S): 5000 INJECTION INTRAVENOUS; SUBCUTANEOUS at 05:11

## 2022-04-06 NOTE — CHART NOTE - NSCHARTNOTEFT_GEN_A_CORE
Medicine NP note    CC: Critical Bcx results as below      Culture - Blood (collected 05 Apr 2022 00:46)  Source: .Blood Blood-Peripheral  Gram Stain (06 Apr 2022 02:15):    Growth in aerobic bottle: Gram Negative Rods  Preliminary Report (06 Apr 2022 02:15):    Growth in aerobic bottle: Gram Negative Rods      Culture - Blood (collected 05 Apr 2022 00:46)  Source: .Blood Blood-Peripheral  Preliminary Report (06 Apr 2022 01:02):    No growth to date.    A/P    89 yo F with PMHx of HTN, HFpEF, CKD, recurrent UTIs (including ESBL E coli), HLD, OA, hypothyroidism who is admitted with metabolic encephalopathy secondary to UTI.  oN JOHN  Pt now with GNR Bacteremia, Bcx positive on aerobic bottle  Pt not appears toxic, VSS  C/W John  ID consult am  Repeat Bcx x2 sets ordered  Trend temp  Will continue to monitor  Will sign out to day team    Lesly Lennon FNP [place]  [id number]

## 2022-04-06 NOTE — CONSULT NOTE ADULT - SUBJECTIVE AND OBJECTIVE BOX
Patient is a 89y old  Female who presents with a chief complaint of confusion and urinary frequency (05 Apr 2022 04:40)    HPI:  89 yo F with PMHx of HTN, HFpEF, CKD, recurrent UTIs (including ESBL E coli), HLD, OA, hypothyroidism who was admitted 4/5/22 with confusion and urinary frequency. Patient reports she was brought to the hospital for increasing confusion, which started earlier today. Prior to this she has been having increased urinary frequency for the past few days, associated with intermittent nausea, vomiting, and fatigue. She reports that her current symptoms feel similar to when she has had UTI in the past. Pt denies dysuria or hematuria. Denies f/c, CP, palps, SOB, new cough, abd pain, changes in bowel habits, HA, vision changes. Reports missing her medications yesterday and today.    In the ED, afebrile, HR 92, /116 -> 185/86 with labetalol, 95% RA. Labs remarkable for SCr 1.42 (appears baseline). Lactate normal. UA concentrated, +nitrite, large LE, 49 WBCs. RVP negative. CT scan significant for interlobar septal thickening and ground glass opacities (nonspecific), mild b/l perinephric stranding, questionable perivesical stranding. (05 Apr 2022 04:40)    Patient states she had abrupt onset of marked urinary frequency. She had incontinence. No dysuria, flank pain. She denies recent antibiotics    She has had recurrent uti's - previous cultures with E coli  6/17/19 +BCx2 E coli; 8/1119. 9/7/19 Urine Cx: ESBL+ E coli, 12/22/19 E coli      PAST MEDICAL & SURGICAL HISTORY:  Urinary Frequency    Bilateral Cataracts    HTN (hypertension)    Spinal stenosis    Hypothyroidism    Non-ST elevation MI (NSTEMI)    Macular degeneration    (HFpEF) heart failure with preserved ejection fraction    Paroxysmal atrial fibrillation    Chronic kidney disease (CKD)    Ectopic pregnancy, tubal    S/P ORIF (open reduction internal fixation) fracture  R hip    S/P breast lumpectomy    S/P cataract surgery  b/l    Social history:  x 12 years; lives in assisted living x 4 years (ambivalent about community living)  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 No nausea, vomiting, or hematemesis; No diarrhea or constipation. No melena or hematochezia.  GENITOURINARY: No dysuria, frequency or hematuria  NEUROLOGICAL: No numbness or weakness  SKIN: No itching, burning, rashes, or lesions   All other review of systems is negative unless indicated above    Allergies  sulfa topicals (Unknown)    Intolerances  morphine (Drowsiness; Faint; Hypotension)      Antimicrobials:  meropenem  IVPB 500 milliGRAM(s) IV Intermittent every 12 hours      Vital Signs Last 24 Hrs  T(C): 36.5 (06 Apr 2022 12:51), Max: 36.7 (05 Apr 2022 20:16)  T(F): 97.7 (06 Apr 2022 12:51), Max: 98.1 (05 Apr 2022 20:16)  HR: 69 (06 Apr 2022 12:51) (69 - 87)  BP: 166/90 (06 Apr 2022 12:51) (122/67 - 166/90)  BP(mean): --  RR: 18 (06 Apr 2022 12:51) (18 - 18)  SpO2: 95% (06 Apr 2022 12:51) (94% - 99%)    PHYSICAL EXAM:  General: WN/WD NAD, Non-toxic  Neurology: A&Ox3, nonfocal  Respiratory: Clear to auscultation bilaterally  CV: RRR, S1S2, no murmurs, rubs or gallops  Abdominal: Soft, Non-tender, non-distended, normal bowel sounds  Extremities: No edema, + peripheral pulses  Line Sites: Clear  Skin: No rash                          11.3   7.11  )-----------( 126      ( 06 Apr 2022 06:32 )             35.5   WBC Count: 7.11 (04-06 @ 06:32)   WBC Count: 9.52 (04-05 @ 07:18)  WBC Count: 9.09 (04-04 @ 21:29)    04-06    139  |  103  |  32<H>  ----------------------------<  92  4.3   |  23  |  1.31<H>  Creatinine: 1.31 (04-06 @ 06:32)    Creatinine: 1.19 (04-05 @ 07:18)    Creatinine: 1.42 (04-04 @ 21:29)  73.6% Neut    Ca    8.7      06 Apr 2022 06:32  Phos  3.9     04-05  Mg     1.9     04-05    TPro  8.5<H>  /  Alb  4.8  /  TBili  0.6  /  DBili  x   /  AST  25  /  ALT  43  /  AlkPhos  179<H>  04-04        MICROBIOLOGY:  .Blood Blood-Peripheral  04-05-22   No growth to date.  --  Blood Culture PCR  cCulture - Blood (04.05.22 @ 00:46)   - Blood PCR Panel: NEG   Gram Stain:   Growth in aerobic bottle: Gram Negative Rods         Radiology:  images independently viewed  < from: CT Angio Abdomen and Pelvis w/ IV Cont (04.04.22 @ 21:41) >  PROCEDURE:  CT Angiography of the Chest, Abdomen and Pelvis.  Gated precontrast imaging was performed through the heart followed by   gated CT Angiography of the heart with subsequent non-gated arterial   phase imaging of the chest, abdomen and pelvis.  Sagittal and coronal reformats were performed as well as 3D (MIP)   reconstructions.    FINDINGS: Artifact from the patient's arms and respiratory motion   degrading images.    CHEST:    LUNGS AND LARGE AIRWAYS: Patent central airways. Interlobular septal   thickening and groundglass opacities in the right > left lung.  PLEURA: Stable small right pleural effusion. Small left pleural effusion,   decreased since prior study.  VESSELS: Atherosclerosis. No intramural hematoma or aortic dissection.   Stable borderline main pulmonary artery.  HEART: Stable heart size. No pericardial effusion. Mitral valvular   calcification.  MEDIASTINUM AND SKYLAR: Subcentimeter lymph nodes without lymphadenopathy.  CHEST WALL AND LOWER NECK: Heterogenous thyroid gland. Nonspecific   bilateral breast soft tissue calcifications. Asymmetric right > left   breast soft tissue again noted.    ABDOMEN AND PELVIS:    LIVER: Degraded by artifact.  BILE DUCTS: Normal caliber.  GALLBLADDER: No significant gallbladder wall edema.  SPLEEN: Within normal limits.  PANCREAS: Diffuse fatty atrophy.    ADRENALS: Within normal limits.  KIDNEYS/URETERS: Prominent bilateral renal pelvis. Mild bilateral   perinephric stranding without obvious hydronephrosis. Question striated   bilateral nephrogram. Bilateral renal scarring.  BLADDER: Difficult to assess due to artifact. Partially distended.   Question mild perivesical stranding.  REPRODUCTIVE ORGANS: Within normal limits.    BOWEL: Air-filled proximal/mid esophagus. No bowel obstruction.   Unremarkable appendix. Periampullary duodenal diverticulum. Prominent   wall at the proximal ascending colon.  PERITONEUM: No free air or drainable fluid collection.  VESSELS: Atherosclerosis.  RETROPERITONEUM/LYMPH NODES: No lymphadenopathy.  ABDOMINAL WALL: Small fat-containing umbilical and inguinal hernias.  BONES: Internal fixation of the right femoral deformity with incompletely   visualized femoral intramedullary kj. Hardware artifact degrading   images. Rightward curvature of the spine. Degenerative changes of the   spine, glenohumeral, sacroiliac joints, hip and pubic symphysis. Stable   compression deformity of the thoracolumbar spine. Stable grade 1   anterolisthesis of L3 on L4 and L4 on L5. Again noted, bilateral rib   deformity.    IMPRESSION:    No aortic dissection.    Small bilateral pleural effusion with atelectasis. Interlobular septal   thickening and groundglass opacities in both lungs, which can be seen in   noninfectious and infectious processes.    Heterogeneous thyroid gland, which can be further characterized on a   nonemergent ultrasound as indicated.    Asymmetric right > left breast soft tissue. Recommend correlation with   dedicated breast imaging to exclude potential underlying lesion/neoplasm.    Mild bilateral perinephric stranding without obvious hydronephrosis.   Question striated bilateral nephrogram.  Question mild perivesical stranding. Recommend correlation with   urinalysis to assess urinary tract infection.    Prominent wall at the proximal ascending colon, which may be due to   partial distention. Recommend correlation with colonoscopy to exclude   potential underlying neoplasm.    < end of copied text >      Kavon Ernandez MD; Division of Infectious Disease; Pager: [phone]; nights and weekends: [phone]

## 2022-04-06 NOTE — PROVIDER CONTACT NOTE (CRITICAL VALUE NOTIFICATION) - ACTION/TREATMENT ORDERED:
provider aware. no actions at this time. provider aware. repeat blood culture done. wait for 2nd blood culture results. will f/u with ID.

## 2022-04-06 NOTE — PROGRESS NOTE ADULT - SUBJECTIVE AND OBJECTIVE BOX
[facility] Division of Hospital Medicine  Ivonne Cuadra MD  Pager (M-F, 8A-5P): [phone]  Other Times:  [phone]    Patient is a 89y old  Female who presents with a chief complaint of confusion and urinary frequency (2022 13:01)      SUBJECTIVE / OVERNIGHT EVENTS: No acute events  ADDITIONAL REVIEW OF SYSTEMS: negative    MEDICATIONS  (STANDING):  aspirin enteric coated 81 milliGRAM(s) Oral daily  atorvastatin 20 milliGRAM(s) Oral at bedtime  carvedilol 12.5 milliGRAM(s) Oral every 12 hours  heparin   Injectable 5000 Unit(s) SubCutaneous every 8 hours  lactated ringers. 500 milliLiter(s) (50 mL/Hr) IV Continuous <Continuous>  levothyroxine 100 MICROGram(s) Oral daily  meropenem  IVPB 1000 milliGRAM(s) IV Intermittent every 12 hours  polyethylene glycol 3350 17 Gram(s) Oral daily  spironolactone 25 milliGRAM(s) Oral daily    MEDICATIONS  (PRN):  acetaminophen     Tablet .. 975 milliGRAM(s) Oral every 6 hours PRN Temp greater or equal to 38C (100.4F), Mild Pain (1 - 3)      CAPILLARY BLOOD GLUCOSE        I&O's Summary    2022 07:01  -  2022 07:00  --------------------------------------------------------  IN: 50 mL / OUT: 400 mL / NET: -350 mL    2022 07:01  -  2022 13:21  --------------------------------------------------------  IN: 240 mL / OUT: 900 mL / NET: -660 mL        PHYSICAL EXAM:  Vital Signs Last 24 Hrs  T(C): 36.5 (2022 12:51), Max: 36.7 (2022 20:16)  T(F): 97.7 (2022 12:51), Max: 98.1 (2022 20:16)  HR: 69 (2022 12:51) (69 - 87)  BP: 166/90 (2022 12:51) (122/67 - 166/90)  BP(mean): --  RR: 18 (2022 12:51) (18 - 18)  SpO2: 95% (2022 12:51) (94% - 99%)    CONSTITUTIONAL: NAD, well appearing elderly woman  EYES: PERRLA; conjunctiva and sclera clear  ENMT: Moist oral mucosa, no pharyngeal injection or exudates; normal dentition  NECK: Supple, no palpable masses; no thyromegaly  RESPIRATORY: Normal respiratory effort; lungs are clear to auscultation bilaterally  CARDIOVASCULAR: Irregular rate and rhythm, no murmur/rub/gallop; No lower extremity edema; Peripheral pulses are 2+ bilaterally  ABDOMEN: Nontender to palpation, normoactive bowel sounds, no rebound/guarding  MUSCULOSKELETAL:  No clubbing or cyanosis of digits; no joint swelling or tenderness to palpation  PSYCH: A+O to person, place, and time; affect appropriate  NEUROLOGY: CN 2-12 are intact and symmetric; no gross sensory deficits   SKIN: No rashes; no palpable lesions    LABS: reviewed                        11.3   7.11  )-----------( 126      ( 2022 06:32 )             35.5     04-06    139  |  103  |  32<H>  ----------------------------<  92  4.3   |  23  |  1.31<H>    Ca    8.7      2022 06:32  Phos  3.9     04-05  Mg     1.9     04-05    TPro  8.5<H>  /  Alb  4.8  /  TBili  0.6  /  DBili  x   /  AST  25  /  ALT  43  /  AlkPhos  179<H>  04-04    PT/INR - ( 2022 21:29 )   PT: 12.4 sec;   INR: 1.08 ratio         PTT - ( 2022 21:29 )  PTT:30.8 sec      Urinalysis Basic - ( 2022 22:11 )    Color: Light Yellow / Appearance: Clear / S.027 / pH: x  Gluc: x / Ketone: Trace  / Bili: Negative / Urobili: Negative   Blood: x / Protein: 30 mg/dL / Nitrite: Positive   Leuk Esterase: Moderate / RBC: 1 /hpf / WBC 49 /HPF   Sq Epi: x / Non Sq Epi: 0 /hpf / Bacteria: Negative        Culture - Blood (collected 2022 00:46)  Source: .Blood Blood-Peripheral  Gram Stain (2022 02:15):    Growth in aerobic bottle: Gram Negative Rods  Preliminary Report (2022 02:15):    Growth in aerobic bottle: Gram Negative Rods    ***Blood Panel PCR results on this specimen are available    approximately 3 hours after the Gram stain result.***    Gram stain, PCR, and/or culture results may not always    correspond due to difference in methodologies.    ************************************************************    This PCR assay was performed by multiplex PCR. This    Assay tests for 66 bacterial and resistance gene targets.    Please refer to the [facility] test directory    at [email or web address] for details.  Organism: Blood Culture PCR (2022 02:44)  Organism: Blood Culture PCR (2022 02:44)    Culture - Blood (collected 2022 00:46)  Source: .Blood Blood-Peripheral  Preliminary Report (2022 01:02):    No growth to date.

## 2022-04-06 NOTE — CHART NOTE - NSCHARTNOTEFT_GEN_A_CORE
Had conversation with ID regarding care of this pt. As per ID Meropenem is the medication of choice however he would like to increase the dose to 1gm rather than 500mg q12h. The dose was changed

## 2022-04-07 ENCOUNTER — NON-APPOINTMENT (OUTPATIENT)
Age: 87
End: 2022-04-07

## 2022-04-07 LAB
-  AMIKACIN: SIGNIFICANT CHANGE UP
-  CEFEPIME: SIGNIFICANT CHANGE UP
-  CEFTAZIDIME: SIGNIFICANT CHANGE UP
-  CIPROFLOXACIN: SIGNIFICANT CHANGE UP
-  COAGULASE NEGATIVE STAPHYLOCOCCUS: SIGNIFICANT CHANGE UP
-  GENTAMICIN: SIGNIFICANT CHANGE UP
-  IMIPENEM: SIGNIFICANT CHANGE UP
-  LEVOFLOXACIN: SIGNIFICANT CHANGE UP
-  MEROPENEM: SIGNIFICANT CHANGE UP
-  PIPERACILLIN/TAZOBACTAM: SIGNIFICANT CHANGE UP
-  TOBRAMYCIN: SIGNIFICANT CHANGE UP
-  TRIMETHOPRIM/SULFAMETHOXAZOLE: SIGNIFICANT CHANGE UP
ANION GAP SERPL CALC-SCNC: 11 MMOL/L — SIGNIFICANT CHANGE UP (ref 5–17)
BUN SERPL-MCNC: 34 MG/DL — HIGH (ref 7–23)
CALCIUM SERPL-MCNC: 8.7 MG/DL — SIGNIFICANT CHANGE UP (ref 8.4–10.5)
CHLORIDE SERPL-SCNC: 105 MMOL/L — SIGNIFICANT CHANGE UP (ref 96–108)
CO2 SERPL-SCNC: 24 MMOL/L — SIGNIFICANT CHANGE UP (ref 22–31)
CREAT SERPL-MCNC: 1.16 MG/DL — SIGNIFICANT CHANGE UP (ref 0.5–1.3)
CULTURE RESULTS: SIGNIFICANT CHANGE UP
CULTURE RESULTS: SIGNIFICANT CHANGE UP
EGFR: 42 ML/MIN/1.73M2 — LOW
GLUCOSE SERPL-MCNC: 86 MG/DL — SIGNIFICANT CHANGE UP (ref 70–99)
GRAM STN FLD: SIGNIFICANT CHANGE UP
HCT VFR BLD CALC: 32.8 % — LOW (ref 34.5–45)
HGB BLD-MCNC: 10.7 G/DL — LOW (ref 11.5–15.5)
MAGNESIUM SERPL-MCNC: 1.9 MG/DL — SIGNIFICANT CHANGE UP (ref 1.6–2.6)
MCHC RBC-ENTMCNC: 30.4 PG — SIGNIFICANT CHANGE UP (ref 27–34)
MCHC RBC-ENTMCNC: 32.6 GM/DL — SIGNIFICANT CHANGE UP (ref 32–36)
MCV RBC AUTO: 93.2 FL — SIGNIFICANT CHANGE UP (ref 80–100)
METHOD TYPE: SIGNIFICANT CHANGE UP
NRBC # BLD: 0 /100 WBCS — SIGNIFICANT CHANGE UP (ref 0–0)
ORGANISM # SPEC MICROSCOPIC CNT: SIGNIFICANT CHANGE UP
PHOSPHATE SERPL-MCNC: 3.4 MG/DL — SIGNIFICANT CHANGE UP (ref 2.5–4.5)
PLATELET # BLD AUTO: 153 K/UL — SIGNIFICANT CHANGE UP (ref 150–400)
POTASSIUM SERPL-MCNC: 4.3 MMOL/L — SIGNIFICANT CHANGE UP (ref 3.5–5.3)
POTASSIUM SERPL-SCNC: 4.3 MMOL/L — SIGNIFICANT CHANGE UP (ref 3.5–5.3)
RBC # BLD: 3.52 M/UL — LOW (ref 3.8–5.2)
RBC # FLD: 14.5 % — SIGNIFICANT CHANGE UP (ref 10.3–14.5)
SODIUM SERPL-SCNC: 140 MMOL/L — SIGNIFICANT CHANGE UP (ref 135–145)
SPECIMEN SOURCE: SIGNIFICANT CHANGE UP
SPECIMEN SOURCE: SIGNIFICANT CHANGE UP
WBC # BLD: 6.97 K/UL — SIGNIFICANT CHANGE UP (ref 3.8–10.5)
WBC # FLD AUTO: 6.97 K/UL — SIGNIFICANT CHANGE UP (ref 3.8–10.5)

## 2022-04-07 PROCEDURE — 99232 SBSQ HOSP IP/OBS MODERATE 35: CPT

## 2022-04-07 PROCEDURE — 99233 SBSQ HOSP IP/OBS HIGH 50: CPT

## 2022-04-07 RX ADMIN — Medication 10 MILLIGRAM(S): at 18:37

## 2022-04-07 RX ADMIN — HEPARIN SODIUM 5000 UNIT(S): 5000 INJECTION INTRAVENOUS; SUBCUTANEOUS at 05:02

## 2022-04-07 RX ADMIN — Medication 100 MICROGRAM(S): at 05:02

## 2022-04-07 RX ADMIN — SPIRONOLACTONE 25 MILLIGRAM(S): 25 TABLET, FILM COATED ORAL at 05:02

## 2022-04-07 RX ADMIN — POLYETHYLENE GLYCOL 3350 17 GRAM(S): 17 POWDER, FOR SOLUTION ORAL at 12:00

## 2022-04-07 RX ADMIN — ATORVASTATIN CALCIUM 20 MILLIGRAM(S): 80 TABLET, FILM COATED ORAL at 21:40

## 2022-04-07 RX ADMIN — HEPARIN SODIUM 5000 UNIT(S): 5000 INJECTION INTRAVENOUS; SUBCUTANEOUS at 21:40

## 2022-04-07 RX ADMIN — Medication 81 MILLIGRAM(S): at 12:01

## 2022-04-07 RX ADMIN — HEPARIN SODIUM 5000 UNIT(S): 5000 INJECTION INTRAVENOUS; SUBCUTANEOUS at 13:00

## 2022-04-07 RX ADMIN — CARVEDILOL PHOSPHATE 18.75 MILLIGRAM(S): 80 CAPSULE, EXTENDED RELEASE ORAL at 05:02

## 2022-04-07 RX ADMIN — MEROPENEM 100 MILLIGRAM(S): 1 INJECTION INTRAVENOUS at 05:01

## 2022-04-07 RX ADMIN — MEROPENEM 100 MILLIGRAM(S): 1 INJECTION INTRAVENOUS at 17:55

## 2022-04-07 RX ADMIN — CARVEDILOL PHOSPHATE 18.75 MILLIGRAM(S): 80 CAPSULE, EXTENDED RELEASE ORAL at 17:55

## 2022-04-07 NOTE — PROVIDER CONTACT NOTE (CRITICAL VALUE NOTIFICATION) - PERSON GIVING RESULT:
Gracie Caban, Microbiology lab
Uemr Villarreal, Laboratory
[facility] Microbiology Lab

## 2022-04-07 NOTE — PHYSICAL THERAPY INITIAL EVALUATION ADULT - PERTINENT HX OF CURRENT PROBLEM, REHAB EVAL
89 yo F with PMHx of HTN, HFpEF, CKD, recurrent UTIs (including ESBL E coli), HLD, OA, hypothyroidism who is admitted with metabolic encephalopathy secondary to UTI/pyelonephritis c/b bacteremia.

## 2022-04-07 NOTE — CHART NOTE - NSCHARTNOTEFT_GEN_A_CORE
Medicine NP note  Notified by Rn that correct report received from lab as below      Culture - Blood (collected 05 Apr 2022 00:46)  Source: .Blood Blood-Peripheral  Gram Stain (06 Apr 2022 02:15):    Growth in aerobic bottle: Gram Negative Rods  Preliminary Report (06 Apr 2022 22:53):    **Please Note**: This is a Corrected Report**    Growth in aerobic bottle: Acinetobacter lwoffii    Previously reported as:    Growth in aerobic bottle: Pseudomonas putida group       Culture - Blood (collected 05 Apr 2022 00:46)  Source: .Blood Blood-Peripheral  Preliminary Report (06 Apr 2022 01:02):    No growth to date.    Culture - Urine (collected 05 Apr 2022 00:43)  Source: Clean Catch Clean Catch (Midstream)  Final Report (06 Apr 2022 13:57):    Normal Urogenital kiara present    Vital Signs Last 24 Hrs  T(C): 36.7 (06 Apr 2022 20:30), Max: 36.7 (06 Apr 2022 20:30)  T(F): 98 (06 Apr 2022 20:30), Max: 98 (06 Apr 2022 20:30)  HR: 82 (06 Apr 2022 20:30) (69 - 82)  BP: 105/57 (06 Apr 2022 20:30) (105/57 - 166/90)  BP(mean): --  RR: 18 (06 Apr 2022 20:30) (18 - 18)  SpO2: 94% (06 Apr 2022 20:30) (94% - 95%)    A/P  89 yo F with PMHx of HTN, HFpEF, CKD, recurrent UTIs (including ESBL E coli), HLD, OA, hypothyroidism who is admitted with metabolic encephalopathy secondary to UTI/pyelonephritis c/b bacteremia.  Bcx results with Growth in aerobic bottle: Acinetobacter lwoffii  Unable to review previous Bcx results with ? Pseudo  However per lab, corrected Bcx result with Acenobacter  Patient appears non toxic, afebrile  Repeat Bxz x2 sets sent on 04/06, result awaited.  Will continue John now  F/ Bcx  F/U ID am  Will continue to monitor  Will sign out to day team    Lesly salazar [facility] [place]  [id number]

## 2022-04-07 NOTE — PHYSICAL THERAPY INITIAL EVALUATION ADULT - ADDITIONAL COMMENTS
Pt lives in [facility] facility, ambulates independently with rollator walker at baseline. Showers independently.

## 2022-04-07 NOTE — PROVIDER CONTACT NOTE (CRITICAL VALUE NOTIFICATION) - SITUATION
+ blood culture, acinetobacter growth, gram negative rods
Critical Lab value: +Blood culture, growth in aerobic bottle, gram negative rods
**Please Note**: This is a Corrected Report**  Growth in aerobic bottle: Acinetobacter lwoffii  Previously reported as:  Growth in aerobic bottle: Pseudomonas putida group

## 2022-04-07 NOTE — PROVIDER CONTACT NOTE (CRITICAL VALUE NOTIFICATION) - TEST AND RESULT REPORTED:
Growth in aerobic bottle: Acinetobacter lwoffii
+Blood culture
+ blood culture, acinetobacter growth, gram negative rods

## 2022-04-07 NOTE — PROGRESS NOTE ADULT - SUBJECTIVE AND OBJECTIVE BOX
Follow Up:  gnr bacteremia    Interval History/ROS:  notes some urinary frequency, no pain, distress    Allergies  sulfa topicals (Unknown)    ANTIMICROBIALS:  meropenem  IVPB 1000 every 12 hours      OTHER MEDS:  MEDICATIONS  (STANDING):  acetaminophen     Tablet .. 975 every 6 hours PRN  aspirin enteric coated 81 daily  atorvastatin 20 at bedtime  bisacodyl Suppository 10 once  carvedilol 18.75 every 12 hours  heparin   Injectable 5000 every 8 hours  levothyroxine 100 daily  polyethylene glycol 3350 17 daily  spironolactone 25 daily      Vital Signs Last 24 Hrs  T(C): 36.4 (07 Apr 2022 11:57), Max: 36.7 (06 Apr 2022 20:30)  T(F): 97.6 (07 Apr 2022 11:57), Max: 98 (06 Apr 2022 20:30)  HR: 75 (07 Apr 2022 13:50) (73 - 82)  BP: 145/82 (07 Apr 2022 13:50) (105/57 - 161/77)  BP(mean): --  RR: 18 (07 Apr 2022 13:50) (18 - 18)  SpO2: 95% (07 Apr 2022 13:50) (94% - 96%)  on ROOM AIR    PHYSICAL EXAM:  General: WN/WD NAD, Non-toxic  OOB to chair  Neurology: A&Ox3, nonfocal  Respiratory: Clear to auscultation bilaterally  CV: RRR, S1S2, no murmurs, rubs or gallops  Abdominal: Soft, Non-tender, non-distended, normal bowel sounds  Extremities: 1+  edema,  Line Sites: Clear  Skin: No rash                          10.7   6.97  )-----------( 153      ( 07 Apr 2022 06:46 )             32.8   WBC Count: 6.97 (04-07 @ 06:46)  WBC Count: 7.11 (04-06 @ 06:32)  WBC Count: 9.52 (04-05 @ 07:18)  WBC Count: 9.09 (04-04 @ 21:29)    04-07    140  |  105  |  34<H>  ----------------------------<  86  4.3   |  24  |  1.16  Creatinine: 1.16 (04-07 @ 06:45)    Creatinine: 1.31 (04-06 @ 06:32)    Creatinine: 1.19 (04-05 @ 07:18)    Creatinine: 1.42 (04-04 @ 21:29)    Ca    8.7      07 Apr 2022 06:45  Phos  3.4     04-07  Mg     1.9     04-07    MICROBIOLOGY:  Clean Catch Clean Catch (Midstream)  04-06-22   <10,000 CFU/mL Normal Urogenital Kiara  --  --      .Blood Blood  04-06-22   No growth to date.  --  --      .Blood Blood-Peripheral  04-05-22   No growth to date.  --  Blood Culture PCR  Blood Culture PCR  Culture - Blood (04.05.22 @ 00:46)   - Blood PCR Panel: NEG   - Coagulase negative Staphylococcus: Detec   Gram Stain:   Growth in aerobic bottle: Gram Negative Rods   Growth in anaerobic bottle: Gram positive cocci in pairs   Specimen Source: .Blood Blood-Peripheral   Organism: Blood Culture PCR   Organism: Blood Culture PCR   Culture Results:   **Please Note**: This is a Corrected Report**   Growth in aerobic bottle: Acinetobacter lwoffii   Previously reported as:   Growth in aerobic bottle: Pseudomonas putida group       Clean Catch Clean Catch (Midstream)  04-05-22   Normal Urogenital kiara present  --  --        RADIOLOGY:    Kavon Ernandez MD; Division of Infectious Disease; Pager: [phone]; nights and weekends: [phone]

## 2022-04-07 NOTE — PROVIDER CONTACT NOTE (CRITICAL VALUE NOTIFICATION) - ASSESSMENT
**Please Note**: This is a Corrected Report**  Growth in aerobic bottle: Acinetobacter lwoffii  Previously reported as:  Growth in aerobic bottle: Pseudomonas putida group
Critical Lab value: +Blood culture, growth in aerobic bottle, gram negative rods
+ blood culture, acinetobacter growth, gram negative rods. Pt AO3, VSS, responsive, no c/o pain or discomfort.

## 2022-04-07 NOTE — PROGRESS NOTE ADULT - SUBJECTIVE AND OBJECTIVE BOX
[facility] Division of Hospital Medicine  Ivonne Cuadra MD  Pager (M-F, 8A-5P): [phone]  Other Times:  [phone]    Patient is a 89y old  Female who presents with a chief complaint of confusion and urinary frequency       SUBJECTIVE / OVERNIGHT EVENTS: No acute events  ADDITIONAL REVIEW OF SYSTEMS: negative    MEDICATIONS  (STANDING):  aspirin enteric coated 81 milliGRAM(s) Oral daily  atorvastatin 20 milliGRAM(s) Oral at bedtime  carvedilol 18.75 milliGRAM(s) Oral every 12 hours  heparin   Injectable 5000 Unit(s) SubCutaneous every 8 hours  lactated ringers. 500 milliLiter(s) (50 mL/Hr) IV Continuous <Continuous>  levothyroxine 100 MICROGram(s) Oral daily  meropenem  IVPB 1000 milliGRAM(s) IV Intermittent every 12 hours  polyethylene glycol 3350 17 Gram(s) Oral daily  spironolactone 25 milliGRAM(s) Oral daily    MEDICATIONS  (PRN):  acetaminophen     Tablet .. 975 milliGRAM(s) Oral every 6 hours PRN Temp greater or equal to 38C (100.4F), Mild Pain (1 - 3)      CAPILLARY BLOOD GLUCOSE        PHYSICAL EXAM:  Vital Signs Last 24 Hrs  T(C): 36.4 (04-07-22 @ 11:57), Max: 36.7 (04-06-22 @ 20:30)  T(F): 97.6 (04-07-22 @ 11:57), Max: 98 (04-06-22 @ 20:30)  HR: 73 (04-07-22 @ 11:57) (73 - 82)  BP: 153/87 (04-07-22 @ 11:57) (105/57 - 161/77)  RR: 18 (04-07-22 @ 11:57) (18 - 18)  SpO2: 96% (04-07-22 @ 11:57) (94% - 96%)  Wt(kg): --    CONSTITUTIONAL: NAD, well appearing elderly woman  EYES: PERRLA; conjunctiva and sclera clear  ENMT: Moist oral mucosa, no pharyngeal injection or exudates; normal dentition  NECK: Supple, no palpable masses; no thyromegaly  RESPIRATORY: Normal respiratory effort; lungs are clear to auscultation bilaterally  CARDIOVASCULAR: Irregular rate and rhythm, no murmur/rub/gallop; No lower extremity edema; Peripheral pulses are 2+ bilaterally  ABDOMEN: Nontender to palpation, normoactive bowel sounds, no rebound/guarding  MUSCULOSKELETAL:  No clubbing or cyanosis of digits; no joint swelling or tenderness to palpation  PSYCH: A+O to person, place, and time; affect appropriate  NEUROLOGY: CN 2-12 are intact and symmetric; no gross sensory deficits   SKIN: No rashes; no palpable lesions    LABS: reviewed                                   10.7   6.97  )-----------( 153      ( 07 Apr 2022 06:46 )             32.8       04-07    140  |  105  |  34<H>  ----------------------------<  86  4.3   |  24  |  1.16    Ca    8.7      07 Apr 2022 06:45  Phos  3.4     04-07  Mg     1.9     04-07                              CAPILLARY BLOOD GLUCOSE

## 2022-04-07 NOTE — PROVIDER CONTACT NOTE (CRITICAL VALUE NOTIFICATION) - BACKGROUND
Pt admitted with metabolic encephalopathy most likely 2/2 UTI
Pt admitted with metabolic encephalopathy likely secondary to UTI
Pt admitted with metabolic encephalopathy likely 2/2 acute uti

## 2022-04-08 LAB
-  AMPICILLIN/SULBACTAM: SIGNIFICANT CHANGE UP
ORGANISM # SPEC MICROSCOPIC CNT: SIGNIFICANT CHANGE UP

## 2022-04-08 PROCEDURE — 99232 SBSQ HOSP IP/OBS MODERATE 35: CPT

## 2022-04-08 RX ORDER — PIPERACILLIN AND TAZOBACTAM 4; .5 G/20ML; G/20ML
3.38 INJECTION, POWDER, LYOPHILIZED, FOR SOLUTION INTRAVENOUS ONCE
Refills: 0 | Status: COMPLETED | OUTPATIENT
Start: 2022-04-08 | End: 2022-04-08

## 2022-04-08 RX ORDER — PIPERACILLIN AND TAZOBACTAM 4; .5 G/20ML; G/20ML
3.38 INJECTION, POWDER, LYOPHILIZED, FOR SOLUTION INTRAVENOUS EVERY 8 HOURS
Refills: 0 | Status: DISCONTINUED | OUTPATIENT
Start: 2022-04-08 | End: 2022-04-08

## 2022-04-08 RX ORDER — AMPICILLIN SODIUM AND SULBACTAM SODIUM 250; 125 MG/ML; MG/ML
3 INJECTION, POWDER, FOR SUSPENSION INTRAMUSCULAR; INTRAVENOUS EVERY 8 HOURS
Refills: 0 | Status: DISCONTINUED | OUTPATIENT
Start: 2022-04-08 | End: 2022-04-11

## 2022-04-08 RX ADMIN — HEPARIN SODIUM 5000 UNIT(S): 5000 INJECTION INTRAVENOUS; SUBCUTANEOUS at 05:38

## 2022-04-08 RX ADMIN — HEPARIN SODIUM 5000 UNIT(S): 5000 INJECTION INTRAVENOUS; SUBCUTANEOUS at 13:56

## 2022-04-08 RX ADMIN — CARVEDILOL PHOSPHATE 18.75 MILLIGRAM(S): 80 CAPSULE, EXTENDED RELEASE ORAL at 18:19

## 2022-04-08 RX ADMIN — PIPERACILLIN AND TAZOBACTAM 25 GRAM(S): 4; .5 INJECTION, POWDER, LYOPHILIZED, FOR SOLUTION INTRAVENOUS at 13:56

## 2022-04-08 RX ADMIN — AMPICILLIN SODIUM AND SULBACTAM SODIUM 200 GRAM(S): 250; 125 INJECTION, POWDER, FOR SUSPENSION INTRAMUSCULAR; INTRAVENOUS at 21:33

## 2022-04-08 RX ADMIN — SPIRONOLACTONE 25 MILLIGRAM(S): 25 TABLET, FILM COATED ORAL at 05:38

## 2022-04-08 RX ADMIN — Medication 100 MICROGRAM(S): at 05:38

## 2022-04-08 RX ADMIN — MEROPENEM 100 MILLIGRAM(S): 1 INJECTION INTRAVENOUS at 05:38

## 2022-04-08 RX ADMIN — ATORVASTATIN CALCIUM 20 MILLIGRAM(S): 80 TABLET, FILM COATED ORAL at 21:07

## 2022-04-08 RX ADMIN — PIPERACILLIN AND TAZOBACTAM 200 GRAM(S): 4; .5 INJECTION, POWDER, LYOPHILIZED, FOR SOLUTION INTRAVENOUS at 10:12

## 2022-04-08 RX ADMIN — Medication 81 MILLIGRAM(S): at 12:22

## 2022-04-08 RX ADMIN — HEPARIN SODIUM 5000 UNIT(S): 5000 INJECTION INTRAVENOUS; SUBCUTANEOUS at 21:07

## 2022-04-08 RX ADMIN — POLYETHYLENE GLYCOL 3350 17 GRAM(S): 17 POWDER, FOR SOLUTION ORAL at 12:22

## 2022-04-08 RX ADMIN — CARVEDILOL PHOSPHATE 18.75 MILLIGRAM(S): 80 CAPSULE, EXTENDED RELEASE ORAL at 05:38

## 2022-04-08 NOTE — PROGRESS NOTE ADULT - SUBJECTIVE AND OBJECTIVE BOX
[facility] Division of Hospital Medicine  Ivonne Cuadra MD  Pager (M-F, 8A-5P): [phone]  Other Times:  [phone]    Patient is a 89y old  Female who presents with a chief complaint of confusion and urinary frequency       SUBJECTIVE / OVERNIGHT EVENTS: No acute events. Had small BM with suppository yesterday but requesting another dose.  ADDITIONAL REVIEW OF SYSTEMS: negative    MEDICATIONS  (STANDING):  aspirin enteric coated 81 milliGRAM(s) Oral daily  atorvastatin 20 milliGRAM(s) Oral at bedtime  bisacodyl Suppository 10 milliGRAM(s) Rectal once  carvedilol 18.75 milliGRAM(s) Oral every 12 hours  heparin   Injectable 5000 Unit(s) SubCutaneous every 8 hours  lactated ringers. 500 milliLiter(s) (50 mL/Hr) IV Continuous <Continuous>  levothyroxine 100 MICROGram(s) Oral daily  piperacillin/tazobactam IVPB.. 3.375 Gram(s) IV Intermittent every 8 hours  polyethylene glycol 3350 17 Gram(s) Oral daily  spironolactone 25 milliGRAM(s) Oral daily    MEDICATIONS  (PRN):  acetaminophen     Tablet .. 975 milliGRAM(s) Oral every 6 hours PRN Temp greater or equal to 38C (100.4F), Mild Pain (1 - 3)      PHYSICAL EXAM:  Vital Signs Last 24 Hrs  T(C): 36.6 (04-08-22 @ 04:39), Max: 36.6 (04-07-22 @ 20:08)  T(F): 97.8 (04-08-22 @ 04:39), Max: 97.8 (04-07-22 @ 20:08)  HR: 69 (04-08-22 @ 04:39) (69 - 75)  BP: 154/84 (04-08-22 @ 04:39) (131/82 - 154/84)  RR: 18 (04-08-22 @ 04:39) (18 - 18)  SpO2: 96% (04-08-22 @ 04:39) (95% - 96%)  Wt(kg): --    CONSTITUTIONAL: NAD, well appearing elderly woman  EYES: PERRLA; conjunctiva and sclera clear  ENMT: Moist oral mucosa, no pharyngeal injection or exudates; normal dentition  NECK: Supple, no palpable masses; no thyromegaly  RESPIRATORY: Normal respiratory effort; lungs are clear to auscultation bilaterally  CARDIOVASCULAR: Irregular rate and rhythm, no murmur/rub/gallop; No lower extremity edema; Peripheral pulses are 2+ bilaterally  ABDOMEN: Nontender to palpation, normoactive bowel sounds, no rebound/guarding  MUSCULOSKELETAL:  No clubbing or cyanosis of digits; no joint swelling or tenderness to palpation  PSYCH: A+O to person, place, and time; affect appropriate  NEUROLOGY: CN 2-12 are intact and symmetric; no gross sensory deficits   SKIN: No rashes; no palpable lesions    LABS: reviewed                                          10.7   6.97  )-----------( 153      ( 07 Apr 2022 06:46 )             32.8       04-07    140  |  105  |  34<H>  ----------------------------<  86  4.3   |  24  |  1.16    Ca    8.7      07 Apr 2022 06:45  Phos  3.4     04-07  Mg     1.9     04-07                              CAPILLARY BLOOD GLUCOSE                                CAPILLARY BLOOD GLUCOSE

## 2022-04-08 NOTE — CHART NOTE - NSCHARTNOTEFT_GEN_A_CORE
Infectious Disease Attending    Susceptibilities of A lwoffi isolate noted  Meropenem stopped  Zosyn initiated    Kavon Ernandez

## 2022-04-08 NOTE — PROGRESS NOTE ADULT - SUBJECTIVE AND OBJECTIVE BOX
Follow Up:  Acinetobacter lwoffi bacteremia    Interval History/ROS:  denies complaints, notes some urinary frequency    Allergies  sulfa topicals (Unknown)    ANTIMICROBIALS:  piperacillin/tazobactam IVPB.. 3.375 every 8 hours      OTHER MEDS:  MEDICATIONS  (STANDING):  acetaminophen     Tablet .. 975 every 6 hours PRN  aspirin enteric coated 81 daily  atorvastatin 20 at bedtime  bisacodyl Suppository 10 once  carvedilol 18.75 every 12 hours  heparin   Injectable 5000 every 8 hours  levothyroxine 100 daily  polyethylene glycol 3350 17 daily  spironolactone 25 daily      Vital Signs Last 24 Hrs  T(C): 36.3 (08 Apr 2022 12:18), Max: 36.6 (07 Apr 2022 20:08)  T(F): 97.4 (08 Apr 2022 12:18), Max: 97.8 (07 Apr 2022 20:08)  HR: 69 (08 Apr 2022 12:18) (69 - 74)  BP: 156/81 (08 Apr 2022 12:18) (131/82 - 156/81)  BP(mean): --  RR: 18 (08 Apr 2022 12:18) (18 - 18)  SpO2: 97% (08 Apr 2022 12:18) (96% - 97%)    PHYSICAL EXAM:  General: WN/WD NAD, Non-toxic  Neurology: A&Ox3, nonfocal  Respiratory: Clear to auscultation bilaterally  CV: RRR, S1S2, no murmurs, rubs or gallops  Abdominal: Soft, Non-tender, non-distended  Extremities: No edema,   Line Sites: Clear  Skin: No rash                        10.7   6.97  )-----------( 153      ( 07 Apr 2022 06:46 )             32.8       04-07    140  |  105  |  34<H>  ----------------------------<  86  4.3   |  24  |  1.16    Ca    8.7      07 Apr 2022 06:45  Phos  3.4     04-07  Mg     1.9     04-07      MICROBIOLOGY:  Clean Catch Clean Catch (Midstream)  04-06-22   <10,000 CFU/mL Normal Urogenital Kiara  --  --      .Blood Blood  04-06-22   No growth to date.  --  --      .Blood Blood  04-06-22   No growth to date.  --  --      .Blood Blood-Peripheral  04-05-22   No growth to date.  --  Blood Culture PCR  Acinetobacter lwoffi  - Amikacin: S <=16   - Ampicillin/Sulbactam: S <=4/2   - Cefepime: S <=2   - Ceftazidime: S <=1       Clean Catch Clean Catch (Midstream)  04-05-22   Normal Urogenital kiara present  --  --      RADIOLOGY:  < from: CT Angio Abdomen and Pelvis w/ IV Cont (04.04.22 @ 21:41) >  IMPRESSION:    No aortic dissection.    Small bilateral pleural effusion with atelectasis. Interlobular septal   thickening and groundglass opacities in both lungs, which can be seen in   noninfectious and infectious processes.    Heterogeneous thyroid gland, which can be further characterized on a   nonemergent ultrasound as indicated.    Asymmetric right > left breast soft tissue. Recommend correlation with   dedicated breast imaging to exclude potential underlying lesion/neoplasm.    Mild bilateral perinephric stranding without obvious hydronephrosis.   Question striated bilateral nephrogram.  Question mild perivesical stranding. Recommend correlation with   urinalysis to assess urinary tract infection.    Prominent wall at the proximal ascending colon, which may be due to   partial distention. Recommend correlation with colonoscopy to exclude   potential underlying neoplasm.    < end of copied text >      Kavon Ernandez MD; Division of Infectious Disease; Pager: [phone]; nights and weekends: [phone]

## 2022-04-08 NOTE — PROGRESS NOTE ADULT - PROBLEM SELECTOR PLAN 2
CT with evidence of pyelonephritis, c/b actinobacter lwoffi bacteremia  - Appreciate ID assistance; maintain IV antibiotics for now  - S/p meropenem (4/4 -4/8); switched to zosyn (4/8) based on susceptibilities  - Cont to f/u ID recs regarding duration of treatment and timing of transition to oral antibiotics

## 2022-04-08 NOTE — PHARMACOTHERAPY INTERVENTION NOTE - COMMENTS
89F with HTN, HFpEF, CKD, recurrent UTIs (including ESBL E coli), admitted 4/5/22 with confusion and urinary frequency. She has pyuria, 4/5 +BC 1/2: GNR bacteremia = A lwoffi and imaging consistent with pyelonephritis.    Spoke to micro lab to release additional antimicrobial susceptibilities and susceptible to Unasyn, Cipro, bactrim as well as Zosyn and Meropenem.    Informed Dr. Ernandez who agreed to downgrade to Unasyn.    Thank you,  Teri Franklin, PharmD, BCIDP  Clinical Pharmacist, Infectious Diseases  Cell: [phone]/Pager: [phone]

## 2022-04-09 LAB
ANION GAP SERPL CALC-SCNC: 11 MMOL/L — SIGNIFICANT CHANGE UP (ref 5–17)
BUN SERPL-MCNC: 28 MG/DL — HIGH (ref 7–23)
CALCIUM SERPL-MCNC: 9.2 MG/DL — SIGNIFICANT CHANGE UP (ref 8.4–10.5)
CHLORIDE SERPL-SCNC: 103 MMOL/L — SIGNIFICANT CHANGE UP (ref 96–108)
CO2 SERPL-SCNC: 26 MMOL/L — SIGNIFICANT CHANGE UP (ref 22–31)
CREAT SERPL-MCNC: 1.08 MG/DL — SIGNIFICANT CHANGE UP (ref 0.5–1.3)
EGFR: 46 ML/MIN/1.73M2 — LOW
GLUCOSE SERPL-MCNC: 90 MG/DL — SIGNIFICANT CHANGE UP (ref 70–99)
HCT VFR BLD CALC: 37.6 % — SIGNIFICANT CHANGE UP (ref 34.5–45)
HGB BLD-MCNC: 12.4 G/DL — SIGNIFICANT CHANGE UP (ref 11.5–15.5)
MCHC RBC-ENTMCNC: 30.4 PG — SIGNIFICANT CHANGE UP (ref 27–34)
MCHC RBC-ENTMCNC: 33 GM/DL — SIGNIFICANT CHANGE UP (ref 32–36)
MCV RBC AUTO: 92.2 FL — SIGNIFICANT CHANGE UP (ref 80–100)
NRBC # BLD: 0 /100 WBCS — SIGNIFICANT CHANGE UP (ref 0–0)
PLATELET # BLD AUTO: 157 K/UL — SIGNIFICANT CHANGE UP (ref 150–400)
POTASSIUM SERPL-MCNC: 4.3 MMOL/L — SIGNIFICANT CHANGE UP (ref 3.5–5.3)
POTASSIUM SERPL-SCNC: 4.3 MMOL/L — SIGNIFICANT CHANGE UP (ref 3.5–5.3)
RBC # BLD: 4.08 M/UL — SIGNIFICANT CHANGE UP (ref 3.8–5.2)
RBC # FLD: 14.3 % — SIGNIFICANT CHANGE UP (ref 10.3–14.5)
SODIUM SERPL-SCNC: 140 MMOL/L — SIGNIFICANT CHANGE UP (ref 135–145)
WBC # BLD: 6.79 K/UL — SIGNIFICANT CHANGE UP (ref 3.8–10.5)
WBC # FLD AUTO: 6.79 K/UL — SIGNIFICANT CHANGE UP (ref 3.8–10.5)

## 2022-04-09 PROCEDURE — 99233 SBSQ HOSP IP/OBS HIGH 50: CPT

## 2022-04-09 RX ADMIN — HEPARIN SODIUM 5000 UNIT(S): 5000 INJECTION INTRAVENOUS; SUBCUTANEOUS at 05:18

## 2022-04-09 RX ADMIN — CARVEDILOL PHOSPHATE 18.75 MILLIGRAM(S): 80 CAPSULE, EXTENDED RELEASE ORAL at 17:47

## 2022-04-09 RX ADMIN — ATORVASTATIN CALCIUM 20 MILLIGRAM(S): 80 TABLET, FILM COATED ORAL at 22:04

## 2022-04-09 RX ADMIN — HEPARIN SODIUM 5000 UNIT(S): 5000 INJECTION INTRAVENOUS; SUBCUTANEOUS at 22:17

## 2022-04-09 RX ADMIN — HEPARIN SODIUM 5000 UNIT(S): 5000 INJECTION INTRAVENOUS; SUBCUTANEOUS at 16:41

## 2022-04-09 RX ADMIN — AMPICILLIN SODIUM AND SULBACTAM SODIUM 200 GRAM(S): 250; 125 INJECTION, POWDER, FOR SUSPENSION INTRAMUSCULAR; INTRAVENOUS at 22:18

## 2022-04-09 RX ADMIN — Medication 100 MICROGRAM(S): at 05:18

## 2022-04-09 RX ADMIN — SPIRONOLACTONE 25 MILLIGRAM(S): 25 TABLET, FILM COATED ORAL at 05:18

## 2022-04-09 RX ADMIN — AMPICILLIN SODIUM AND SULBACTAM SODIUM 200 GRAM(S): 250; 125 INJECTION, POWDER, FOR SUSPENSION INTRAMUSCULAR; INTRAVENOUS at 13:45

## 2022-04-09 RX ADMIN — POLYETHYLENE GLYCOL 3350 17 GRAM(S): 17 POWDER, FOR SOLUTION ORAL at 12:37

## 2022-04-09 RX ADMIN — Medication 10 MILLIGRAM(S): at 10:46

## 2022-04-09 RX ADMIN — CARVEDILOL PHOSPHATE 18.75 MILLIGRAM(S): 80 CAPSULE, EXTENDED RELEASE ORAL at 05:18

## 2022-04-09 RX ADMIN — Medication 81 MILLIGRAM(S): at 12:37

## 2022-04-09 RX ADMIN — AMPICILLIN SODIUM AND SULBACTAM SODIUM 200 GRAM(S): 250; 125 INJECTION, POWDER, FOR SUSPENSION INTRAMUSCULAR; INTRAVENOUS at 05:19

## 2022-04-09 NOTE — PROGRESS NOTE ADULT - PROBLEM SELECTOR PLAN 2
CT with evidence of pyelonephritis, c/b actinobacter lwoffi bacteremia  - Appreciate ID assistance; maintain IV antibiotics for now  - S/p meropenem (4/4 -4/8); switched to Unasyn per ID   - Cont to f/u ID recs regarding duration of treatment and timing of transition to oral antibiotics  Repeat Blood CX neg so far from 4/6 and 4/8

## 2022-04-09 NOTE — PROGRESS NOTE ADULT - SUBJECTIVE AND OBJECTIVE BOX
Patient is a 89y old  Female who presents with a chief complaint of confusion and urinary frequency (08 Apr 2022 17:49)      SUBJECTIVE / OVERNIGHT EVENTS:   T Max: 97.6 F   HR: 71 (68 - 71)  BP: 164/83  (164/83 - 170/88)  RR: 18  (18 - 18)  SpO2: 96%(95% - 96%)      ADDITIONAL REVIEW OF SYSTEMS: Negative except for above    MEDICATIONS  (STANDING):  ampicillin/sulbactam  IVPB 3 Gram(s) IV Intermittent every 8 hours  aspirin enteric coated 81 milliGRAM(s) Oral daily  atorvastatin 20 milliGRAM(s) Oral at bedtime  carvedilol 18.75 milliGRAM(s) Oral every 12 hours  heparin   Injectable 5000 Unit(s) SubCutaneous every 8 hours  lactated ringers. 500 milliLiter(s) (50 mL/Hr) IV Continuous <Continuous>  levothyroxine 100 MICROGram(s) Oral daily  polyethylene glycol 3350 17 Gram(s) Oral daily  spironolactone 25 milliGRAM(s) Oral daily    MEDICATIONS  (PRN):  acetaminophen     Tablet .. 975 milliGRAM(s) Oral every 6 hours PRN Temp greater or equal to 38C (100.4F), Mild Pain (1 - 3)      CAPILLARY BLOOD GLUCOSE        I&O's Summary    08 Apr 2022 07:01  -  09 Apr 2022 07:00  --------------------------------------------------------  IN: 600 mL / OUT: 2500 mL / NET: -1900 mL        PHYSICAL EXAM:  Vital Signs Last 24 Hrs  T(C): 36.4 (09 Apr 2022 05:08), Max: 36.4 (08 Apr 2022 21:30)  T(F): 97.6 (09 Apr 2022 05:08), Max: 97.6 (09 Apr 2022 05:08)  HR: 71 (09 Apr 2022 05:08) (68 - 71)  BP: 164/83 (09 Apr 2022 05:08) (164/83 - 170/88)  BP(mean): --  RR: 18 (09 Apr 2022 05:08) (18 - 18)  SpO2: 96% (09 Apr 2022 05:08) (95% - 96%)    PHYSICAL EXAM:    CONSTITUTIONAL: NAD, well appearing elderly woman  EYES: PERRLA; conjunctiva and sclera clear  ENMT: Moist oral mucosa, no pharyngeal injection or exudates; normal dentition  NECK: Supple, no palpable masses; no thyromegaly  RESPIRATORY: Normal respiratory effort; lungs are clear to auscultation bilaterally  CARDIOVASCULAR: Irregular rate and rhythm, no murmur/rub/gallop; No lower extremity edema; Peripheral pulses are 2+ bilaterally  ABDOMEN: Nontender to palpation, normoactive bowel sounds, no rebound/guarding  MUSCULOSKELETAL:  No clubbing or cyanosis of digits; no joint swelling or tenderness to palpation  PSYCH: A+O to person, place, and time; affect appropriate  NEUROLOGY: CN 2-12 are intact and symmetric; no gross sensory deficits       LABS:                        12.4   6.79  )-----------( 157      ( 09 Apr 2022 06:49 )             37.6     04-09    140  |  103  |  28<H>  ----------------------------<  90  4.3   |  26  |  1.08    Ca    9.2      09 Apr 2022 06:48                Culture - Blood (collected 08 Apr 2022 09:34)  Source: .Blood Blood-Venous  Preliminary Report (09 Apr 2022 10:01):    No growth to date.    Culture - Blood (collected 08 Apr 2022 09:34)  Source: .Blood Blood  Preliminary Report (09 Apr 2022 10:01):    No growth to date.    Culture - Urine (collected 06 Apr 2022 17:19)  Source: Clean Catch Clean Catch (Midstream)  Final Report (07 Apr 2022 13:11):    <10,000 CFU/mL Normal Urogenital Nemo    Culture - Blood (collected 06 Apr 2022 14:52)  Source: .Blood Blood  Preliminary Report (07 Apr 2022 15:01):    No growth to date.        RADIOLOGY & ADDITIONAL TESTS:    Imaging Personally Reviewed:    Electrocardiogram Personally Reviewed:    COORDINATION OF CARE:  Care Discussed with Consultants/Other Providers [Y/N]:  Prior or Outpatient Records Reviewed [Y/N]:

## 2022-04-10 LAB
CULTURE RESULTS: SIGNIFICANT CHANGE UP
SPECIMEN SOURCE: SIGNIFICANT CHANGE UP

## 2022-04-10 PROCEDURE — 99232 SBSQ HOSP IP/OBS MODERATE 35: CPT

## 2022-04-10 RX ADMIN — CARVEDILOL PHOSPHATE 18.75 MILLIGRAM(S): 80 CAPSULE, EXTENDED RELEASE ORAL at 05:34

## 2022-04-10 RX ADMIN — Medication 81 MILLIGRAM(S): at 11:52

## 2022-04-10 RX ADMIN — Medication 100 MICROGRAM(S): at 05:35

## 2022-04-10 RX ADMIN — ATORVASTATIN CALCIUM 20 MILLIGRAM(S): 80 TABLET, FILM COATED ORAL at 21:36

## 2022-04-10 RX ADMIN — SPIRONOLACTONE 25 MILLIGRAM(S): 25 TABLET, FILM COATED ORAL at 05:34

## 2022-04-10 RX ADMIN — POLYETHYLENE GLYCOL 3350 17 GRAM(S): 17 POWDER, FOR SOLUTION ORAL at 11:52

## 2022-04-10 RX ADMIN — AMPICILLIN SODIUM AND SULBACTAM SODIUM 200 GRAM(S): 250; 125 INJECTION, POWDER, FOR SUSPENSION INTRAMUSCULAR; INTRAVENOUS at 21:36

## 2022-04-10 RX ADMIN — CARVEDILOL PHOSPHATE 18.75 MILLIGRAM(S): 80 CAPSULE, EXTENDED RELEASE ORAL at 16:56

## 2022-04-10 RX ADMIN — HEPARIN SODIUM 5000 UNIT(S): 5000 INJECTION INTRAVENOUS; SUBCUTANEOUS at 05:35

## 2022-04-10 RX ADMIN — HEPARIN SODIUM 5000 UNIT(S): 5000 INJECTION INTRAVENOUS; SUBCUTANEOUS at 21:36

## 2022-04-10 RX ADMIN — AMPICILLIN SODIUM AND SULBACTAM SODIUM 200 GRAM(S): 250; 125 INJECTION, POWDER, FOR SUSPENSION INTRAMUSCULAR; INTRAVENOUS at 05:33

## 2022-04-10 RX ADMIN — HEPARIN SODIUM 5000 UNIT(S): 5000 INJECTION INTRAVENOUS; SUBCUTANEOUS at 13:38

## 2022-04-10 RX ADMIN — AMPICILLIN SODIUM AND SULBACTAM SODIUM 200 GRAM(S): 250; 125 INJECTION, POWDER, FOR SUSPENSION INTRAMUSCULAR; INTRAVENOUS at 13:40

## 2022-04-10 NOTE — PROGRESS NOTE ADULT - PROBLEM SELECTOR PLAN 6
EKG shows sinus rhythm with occasional PACs. Asymptomatic.  - Not on AC at home  - rate controlled  - Cont carvedilol

## 2022-04-10 NOTE — PROGRESS NOTE ADULT - NSPROGADDITIONALINFOA_GEN_ALL_CORE
Dispo: Discharge planning back to [facility] tomorrow   PT rockal
Dispo: pending final culture results and transition to PO abx  PT eval
Mary Vargas   HOspitalist   
Dispo: Back to [facility] with PT once IV antibiotics completed, based on ID recs  Called pt's daughter Ariana [phone] on 4/8; all questions addressed. She would like to be updated over the weekend as well.    OOB to chair  Monitor for BMs daily
Mary Vargas   HOspitalist

## 2022-04-10 NOTE — PROGRESS NOTE ADULT - ASSESSMENT
89 yo F with PMHx of HTN, HFpEF, CKD, recurrent UTIs (including ESBL E coli), HLD, OA, hypothyroidism who is admitted with metabolic encephalopathy secondary to UTI/pyelonephritis c/b bacteremia.     Problem/Plan - 1:  ·  Problem: Metabolic encephalopathy.   ·  Plan: Resolved  - Likely 2/2 infection  - Delirium precautions     Problem/Plan - 2:  ·  Problem: Pyelonephritis  ·  Plan: CT imaging remarkable for mild bilateral perinephric stranding and questionable perivesical stranding  - Now with GNR bacteremia  - ID consulted  - Given hx of ESBL, cont meropenem  - F/u bladder scan to r/o obstruction  - F/u final urine and blood cx results     Problem/Plan - 3:  ·  Problem: Hypertensive urgency.   ·  Plan: BP improved, however not at goal  - s/p labetalol x2 in ED  - increase home coreg to 18.75 BID  - monitor vitals     Problem/Plan - 4:  ·  Problem: Chronic kidney disease (CKD).   ·  Plan: SCr 1.42. Baseline SCr rougly 1.40 (high fluctuations). Appears to be at baseline renal function  - s/p fluids in ED  - continue to trend  - avoid nephrotoxins when possible.     Problem/Plan - 5:  ·  Problem: Chronic heart failure with preserved ejection fraction (HFpEF).   ·  Plan: Last known TTE (Oct 2021): EF 65%, preserved LV systolic function, normal RV, mild LVH, mild AS, no pulm HTN.   -Appears compensated on exam  - Continue home carvedilol and spironolactone.     Problem/Plan - 6:  ·  Problem: Paroxysmal atrial fibrillation.   ·  Plan: EKG shows sinus rhythm with occasional PACs. Asymptomatic.  - Not on AC at home  - rate controlled  - Cont carvedilol.     Problem/Plan - 7:  ·  Problem: Hypothyroidism.   ·  Plan: - Cont home synthroid 100 mcg  - TSH reviewed     Problem/Plan - 8:  ·  Problem: HLD (hyperlipidemia).   ·  Plan: - Cont home atorvastatin 20 mg qhs.     Problem/Plan - 10:  ·  Problem: Incidental findings on imaging  ·  Plan; CT noted prominent wall at the proximal ascending colon, which may be due to partial distention vs. underlying neoplasm. Pt reports no prior colonoscopy. CT chest also with asymmetric breast tissue  - Discussed findings with patient and had risks/benefits discussion about testing and possible treatments. At this time patient does not want to pursue further testing for this.- Wants to pursue further breast imaging - f/u with breast US as outpatient     Problem/Plan - 11:  ·  Problem: Preventative health care.   ·  Plan: DVT: hep subQ  Diet: DASH, soft and bite sized.  
89 yo F with PMHx of HTN, HFpEF, CKD, recurrent UTIs (including ESBL E coli), HLD, OA, hypothyroidism who is admitted with metabolic encephalopathy secondary to UTI/pyelonephritis c/b bacteremia.     Problem/Plan - 1:  ·  Problem: Metabolic encephalopathy.   ·  Plan: Resolved  - Likely 2/2 infection  - Delirium precautions     Problem/Plan - 2:  ·  Problem: Pyelonephritis  ·  Plan: CT imaging remarkable for mild bilateral perinephric stranding and questionable perivesical stranding  - Now with actinobacter bacteremia, likely contaminant  - Appreciate ID assistance; f/u discharge recs  - cont meropenem (4/4 -)     Problem/Plan - 3:  ·  Problem: Hypertensive urgency.   ·  Plan: BP improved  -cont coreg to 18.75 BID  - monitor vitals     Problem/Plan - 4:  ·  Problem: Chronic kidney disease (CKD).   ·  Plan: SCr 1.42. Baseline SCr rougly 1.40 (high fluctuations). Appears to be at baseline renal function  - s/p fluids in ED  - continue to trend  - avoid nephrotoxins when possible.     Problem/Plan - 5:  ·  Problem: Chronic heart failure with preserved ejection fraction (HFpEF).   ·  Plan: Last known TTE (Oct 2021): EF 65%, preserved LV systolic function, normal RV, mild LVH, mild AS, no pulm HTN.   -Appears compensated on exam  - Continue home carvedilol and spironolactone.     Problem/Plan - 6:  ·  Problem: Paroxysmal atrial fibrillation.   ·  Plan: EKG shows sinus rhythm with occasional PACs. Asymptomatic.  - Not on AC at home  - rate controlled  - Cont carvedilol.     Problem/Plan - 7:  ·  Problem: Hypothyroidism.   ·  Plan: - Cont home synthroid 100 mcg  - TSH reviewed     Problem/Plan - 8:  ·  Problem: HLD (hyperlipidemia).   ·  Plan: - Cont home atorvastatin 20 mg qhs.     Problem/Plan - 10:  ·  Problem: Incidental findings on imaging  ·  Plan; CT noted prominent wall at the proximal ascending colon, which may be due to partial distention vs. underlying neoplasm. Pt reports no prior colonoscopy. CT chest also with asymmetric breast tissue  - Discussed findings with patient and had risks/benefits discussion about testing and possible treatments. At this time patient does not want to pursue further testing for this.- Wants to pursue further breast imaging - f/u with breast US as outpatient     Problem/Plan - 11:  ·  Problem: Preventative health care.   ·  Plan: DVT: hep subQ  Diet: DASH, soft and bite sized.  
89F with HTN, HFpEF, CKD, recurrent UTIs (including ESBL E coli), HLD, OA, hypothyroidism who was admitted 4/5/22 with confusion and urinary frequency. She has pyuria, 4/5 +BC 1/2: GNR bacteremia = A lwoffii and imaging consistent with pyelonephritis.  4/6 BC NGTD  Imaging raises possibilty of neoplasms of colon, breast, thyroid and demonstrates atherosclerosis.  She has CKD  eGFR= 37  Urine cultures negative  Acinetobacter lwoffi is a non-fermentative aerobic gram-negative bacillus that is seen as a normal kiara of the oropharynx and skin in approximately 25% of the healthy individuals. It is generally associated with pneumonia, wound infections, line infections as well as urinary tract infections. There is a case report of bacteremia due to gastroenteritis. (Ref: Leandro FIGUEREDO, Dontrell G, Elan OLIVAREZ. Acinetobacter lwoffii: bacteremia associated with acute gastroenteritis. Travel Med Infect Dis. 2009 Sep;7(5):316-7. doi: 10.1016/j.tmaid.2009.06.001. Epub 2009 Jul 1. PMID: [id number].) The source in this case is unclear-  sources remains possible. Patient had rapid improvement    Suggest  Follow up blood cultures; Additional blood culture ordered for 4/8  Continue Meropenem 4/4-->    - 7-10 day total  antibiotic course anticipated  I will monitor culture results and susceptibilities 
89F with HTN, HFpEF, CKD, recurrent UTIs (including ESBL E coli), HLD, OA, hypothyroidism who was admitted 4/5/22 with confusion and urinary frequency. She has pyuria, 4/5 +BC 1/2: GNR bacteremia = A lwoffii and imaging consistent with pyelonephritis.  4/6 BC NGTD  Imaging raises possibilty of neoplasms of colon, breast, thyroid and demonstrates atherosclerosis.  She has CKD  eGFR= 37  Urine cultures negative  Acinetobacter lwoffi is a non-fermentative aerobic gram-negative bacillus that is seen as a normal kiara of the oropharynx and skin in approximately 25% of the healthy individuals. It is generally associated with pneumonia, wound infections, line infections as well as urinary tract infections. There is a case report of bacteremia due to gastroenteritis. (Ref: Leandro FIGUEREDO, Dontrell G, Elan OLIVAREZ. Acinetobacter lwoffii: bacteremia associated with acute gastroenteritis. Travel Med Infect Dis. 2009 Sep;7(5):316-7. doi: 10.1016/j.tmaid.2009.06.001. Epub 2009 Jul 1. PMID: [id number].) The source in this case is unclear-  sources remains possible. Patient had rapid improvement    Additional antimicrobial susceptibilities noted Susc to Unasyn, Cipro, bactrim as well as Zosyn and Meropenem  eGFR = 42  < from: 12 Lead ECG (04.04.22 @ 21:22) >  Ventricular Rate 98 BPM    Atrial Rate 98 BPM    P-R Interval 162 ms    QRS Duration 106 ms    Q-T Interval 352 ms    QTC Calculation(Bazett) 449 ms    P Axis 78 degrees    R Axis -40 degrees    T Axis 90 degrees    < end of copied text >      Suggest  STOP Meropenem 4/4-->4/8  Stop Zosyn 4/8  Unasyn 3 gms every 8 hours  4/8 continue through 4/11  (I ordered)  then po cipro 500 mg po every 12 hours: 4/11--> 4/14    - 7-10 day total  antibiotic course anticipated  encourage mobilization/ambulate with assistance    Please call ID if needed over weekend    
89 yo F with PMHx of HTN, HFpEF, CKD, recurrent UTIs (including ESBL E coli), HLD, OA, hypothyroidism who is admitted with metabolic encephalopathy secondary to UTI/pyelonephritis c/b bacteremia.        
89 yo F with PMHx of HTN, HFpEF, CKD, recurrent UTIs (including ESBL E coli), HLD, OA, hypothyroidism who is admitted with metabolic encephalopathy secondary to UTI/pyelonephritis c/b bacteremia.        
89 yo F with PMHx of HTN, HFpEF, CKD, recurrent UTIs (including ESBL E coli), HLD, OA, hypothyroidism who is admitted with metabolic encephalopathy secondary to UTI/pyelonephritis c/b bacteremia.     Problem/Plan - 1:  ·  Problem: Metabolic encephalopathy.   ·  Plan: Resolved  - Likely 2/2 infection  - Delirium precautions     Problem/Plan - 2:  ·  Problem: Pyelonephritis  ·  Plan:     Problem/Plan - 3:  ·  Problem: Hypertensive urgency.   ·  Plan: BP improved  - cont coreg to 18.75 BID  - monitor vitals     Problem/Plan - 4:  ·  Problem: Chronic kidney disease (CKD).   ·  Plan: SCr 1.42. Baseline SCr rougly 1.40 (high fluctuations). Appears to be at baseline renal function  - s/p fluids in ED  - continue to trend  - avoid nephrotoxins when possible.     Problem/Plan - 5:  ·  Problem: Chronic heart failure with preserved ejection fraction (HFpEF).   ·  Plan: Last known TTE (Oct 2021): EF 65%, preserved LV systolic function, normal RV, mild LVH, mild AS, no pulm HTN.   - Appears compensated on exam  - Continue home carvedilol and spironolactone.     Problem/Plan - 6:  ·  Problem: Paroxysmal atrial fibrillation.   ·  Plan: EKG shows sinus rhythm with occasional PACs. Asymptomatic.  - Not on AC at home  - rate controlled  - Cont carvedilol.     Problem/Plan - 7:  ·  Problem: Hypothyroidism.   ·  Plan: - Cont home synthroid 100 mcg  - TSH reviewed     Problem/Plan - 8:  ·  Problem: HLD (hyperlipidemia).   ·  Plan: - Cont home atorvastatin 20 mg qhs.     Problem/Plan - 10:  ·  Problem: Incidental findings on imaging  ·  Plan; CT noted prominent wall at the proximal ascending colon, which may be due to partial distention vs. underlying neoplasm. Pt reports no prior colonoscopy. CT chest also with asymmetric breast tissue  - Discussed findings with patient and had risks/benefits discussion about testing and possible treatments. At this time patient does not want to pursue further testing for this.- Wants to pursue further breast imaging - f/u with breast US as outpatient     Problem/Plan - 11:  ·  Problem: Preventative health care.   ·  Plan: DVT: hep subQ  Diet: DASH, soft and bite sized.

## 2022-04-10 NOTE — PROGRESS NOTE ADULT - PROBLEM SELECTOR PLAN 9
Incidental findings on imaging  ·  Plan; CT noted prominent wall at the proximal ascending colon, which may be due to partial distention vs. underlying neoplasm. Pt reports no prior colonoscopy. CT chest also with asymmetric breast tissue  - Discussed findings with patient and had risks/benefits discussion about testing and possible treatments. At this time patient does not want to pursue further testing for this.- Wants to pursue further breast imaging - f/u with breast US as outpatient    DVT ppx: hep subQ  Diet: DASH, soft and bite sized.

## 2022-04-10 NOTE — PROGRESS NOTE ADULT - PROBLEM SELECTOR PLAN 2
CT with evidence of pyelonephritis, c/b actinobacter lwoffi bacteremia  - Appreciate ID assistance; maintain IV antibiotics for now  - S/p meropenem (4/4 -4/8); switched to Unasyn per ID   - Cont to f/u ID recs regarding duration of treatment and timing of transition to oral antibiotics  Repeat Blood CX neg so far from 4/6 and 4/8 CT with evidence of pyelonephritis, c/b actinobacter lwoffi bacteremia  - Appreciate ID assistance; maintain IV antibiotics for now  - S/p meropenem (4/4 -4/8); switched to  Unasyn 3 gms every 8 hours through 4/11 per ID  then po cipro 500 mg po every 12 hours: 4/11--> 4/14    - 7-10 day total  antibiotic course anticipated per ID   Repeat Blood CX neg so far from 4/6 and 4/8

## 2022-04-10 NOTE — PROGRESS NOTE ADULT - REASON FOR ADMISSION
confusion and urinary frequency

## 2022-04-10 NOTE — PROGRESS NOTE ADULT - PROBLEM SELECTOR PLAN 3
- cont coreg to 18.75 BID  - monitor vitals
- cont coreg to 18.75 BID  - monitor vitals
BP improved  - cont coreg to 18.75 BID  - monitor vitals

## 2022-04-10 NOTE — PROGRESS NOTE ADULT - SUBJECTIVE AND OBJECTIVE BOX
Patient is a 89y old  Female who presents with a chief complaint of confusion and urinary frequency (09 Apr 2022 12:46)      SUBJECTIVE / OVERNIGHT EVENTS:    Tele reviewed:       ADDITIONAL REVIEW OF SYSTEMS: Negative except for above    MEDICATIONS  (STANDING):  ampicillin/sulbactam  IVPB 3 Gram(s) IV Intermittent every 8 hours  aspirin enteric coated 81 milliGRAM(s) Oral daily  atorvastatin 20 milliGRAM(s) Oral at bedtime  carvedilol 18.75 milliGRAM(s) Oral every 12 hours  heparin   Injectable 5000 Unit(s) SubCutaneous every 8 hours  lactated ringers. 500 milliLiter(s) (50 mL/Hr) IV Continuous <Continuous>  levothyroxine 100 MICROGram(s) Oral daily  polyethylene glycol 3350 17 Gram(s) Oral daily  spironolactone 25 milliGRAM(s) Oral daily    MEDICATIONS  (PRN):  acetaminophen     Tablet .. 975 milliGRAM(s) Oral every 6 hours PRN Temp greater or equal to 38C (100.4F), Mild Pain (1 - 3)      CAPILLARY BLOOD GLUCOSE        I&O's Summary    09 Apr 2022 07:01  -  10 Apr 2022 07:00  --------------------------------------------------------  IN: 50 mL / OUT: 600 mL / NET: -550 mL        PHYSICAL EXAM:  Vital Signs Last 24 Hrs  T(C): 36.7 (10 Apr 2022 04:15), Max: 36.7 (10 Apr 2022 04:15)  T(F): 98 (10 Apr 2022 04:15), Max: 98 (10 Apr 2022 04:15)  HR: 70 (10 Apr 2022 04:15) (62 - 83)  BP: 151/81 (10 Apr 2022 04:15) (129/74 - 163/86)  BP(mean): --  RR: 18 (10 Apr 2022 04:15) (17 - 18)  SpO2: 96% (10 Apr 2022 04:15) (94% - 98%)    PHYSICAL EXAM:  CONSTITUTIONAL: NAD, well appearing elderly woman  EYES: PERRLA; conjunctiva and sclera clear  ENMT: Moist oral mucosa, no pharyngeal injection or exudates; normal dentition  NECK: Supple, no palpable masses; no thyromegaly  RESPIRATORY: Normal respiratory effort; lungs are clear to auscultation bilaterally  CARDIOVASCULAR: Irregular rate and rhythm, no murmur/rub/gallop; No lower extremity edema; Peripheral pulses are 2+ bilaterally  ABDOMEN: Nontender to palpation, normoactive bowel sounds, no rebound/guarding  MUSCULOSKELETAL:  No clubbing or cyanosis of digits; no joint swelling or tenderness to palpation  PSYCH: A+O to person, place, and time; affect appropriate  NEUROLOGY: CN 2-12 are intact and symmetric; no gross sensory deficits       LABS:                        12.4   6.79  )-----------( 157      ( 09 Apr 2022 06:49 )             37.6     04-09    140  |  103  |  28<H>  ----------------------------<  90  4.3   |  26  |  1.08    Ca    9.2      09 Apr 2022 06:48                Culture - Blood (collected 08 Apr 2022 09:34)  Source: .Blood Blood-Venous  Preliminary Report (09 Apr 2022 10:01):    No growth to date.    Culture - Blood (collected 08 Apr 2022 09:34)  Source: .Blood Blood  Preliminary Report (09 Apr 2022 10:01):    No growth to date.        RADIOLOGY & ADDITIONAL TESTS:    Imaging Personally Reviewed:    Electrocardiogram Personally Reviewed:    COORDINATION OF CARE:  Care Discussed with Consultants/Other Providers [Y/N]:  Prior or Outpatient Records Reviewed [Y/N]:     Patient is a 89y old  Female who presents with a chief complaint of confusion and urinary frequency (09 Apr 2022 12:46)      SUBJECTIVE / OVERNIGHT EVENTS:   NO complaints today.  She moved her BM yesterday       ADDITIONAL REVIEW OF SYSTEMS: Negative except for above    MEDICATIONS  (STANDING):  ampicillin/sulbactam  IVPB 3 Gram(s) IV Intermittent every 8 hours  aspirin enteric coated 81 milliGRAM(s) Oral daily  atorvastatin 20 milliGRAM(s) Oral at bedtime  carvedilol 18.75 milliGRAM(s) Oral every 12 hours  heparin   Injectable 5000 Unit(s) SubCutaneous every 8 hours  lactated ringers. 500 milliLiter(s) (50 mL/Hr) IV Continuous <Continuous>  levothyroxine 100 MICROGram(s) Oral daily  polyethylene glycol 3350 17 Gram(s) Oral daily  spironolactone 25 milliGRAM(s) Oral daily    MEDICATIONS  (PRN):  acetaminophen     Tablet .. 975 milliGRAM(s) Oral every 6 hours PRN Temp greater or equal to 38C (100.4F), Mild Pain (1 - 3)      CAPILLARY BLOOD GLUCOSE        I&O's Summary    09 Apr 2022 07:01  -  10 Apr 2022 07:00  --------------------------------------------------------  IN: 50 mL / OUT: 600 mL / NET: -550 mL        PHYSICAL EXAM:  Vital Signs Last 24 Hrs  T(C): 36.7 (10 Apr 2022 04:15), Max: 36.7 (10 Apr 2022 04:15)  T(F): 98 (10 Apr 2022 04:15), Max: 98 (10 Apr 2022 04:15)  HR: 70 (10 Apr 2022 04:15) (62 - 83)  BP: 151/81 (10 Apr 2022 04:15) (129/74 - 163/86)  BP(mean): --  RR: 18 (10 Apr 2022 04:15) (17 - 18)  SpO2: 96% (10 Apr 2022 04:15) (94% - 98%)    PHYSICAL EXAM:  CONSTITUTIONAL: NAD, well appearing elderly woman  EYES: PERRLA; conjunctiva and sclera clear  ENMT: Moist oral mucosa, no pharyngeal injection or exudates; normal dentition  NECK: Supple, no palpable masses; no thyromegaly  RESPIRATORY: Normal respiratory effort; lungs are clear to auscultation bilaterally  CARDIOVASCULAR: Irregular rate and rhythm, no murmur/rub/gallop; No lower extremity edema; Peripheral pulses are 2+ bilaterally  ABDOMEN: Nontender to palpation, normoactive bowel sounds, no rebound/guarding  MUSCULOSKELETAL:  No clubbing or cyanosis of digits; no joint swelling or tenderness to palpation  PSYCH: A+O to person, place, and time; affect appropriate  NEUROLOGY: CN 2-12 are intact and symmetric; no gross sensory deficits       LABS:                        12.4   6.79  )-----------( 157      ( 09 Apr 2022 06:49 )             37.6     04-09    140  |  103  |  28<H>  ----------------------------<  90  4.3   |  26  |  1.08    Ca    9.2      09 Apr 2022 06:48                Culture - Blood (collected 08 Apr 2022 09:34)  Source: .Blood Blood-Venous  Preliminary Report (09 Apr 2022 10:01):    No growth to date.    Culture - Blood (collected 08 Apr 2022 09:34)  Source: .Blood Blood  Preliminary Report (09 Apr 2022 10:01):    No growth to date.        RADIOLOGY & ADDITIONAL TESTS:    Imaging Personally Reviewed:    Electrocardiogram Personally Reviewed:    COORDINATION OF CARE:  Care Discussed with Consultants/Other Providers [Y/N]:  Prior or Outpatient Records Reviewed [Y/N]:

## 2022-04-10 NOTE — PROGRESS NOTE ADULT - PROBLEM SELECTOR PLAN 8
- Cont home synthroid 100 mcg  - TSH reviewed

## 2022-04-10 NOTE — PROGRESS NOTE ADULT - PROBLEM SELECTOR PLAN 4
SCr 1.42. Baseline SCr rougly 1.40 (high fluctuations). Appears to be at baseline renal function  - s/p fluids in ED  - continue to trend  - avoid nephrotoxins when possible.

## 2022-04-10 NOTE — PROGRESS NOTE ADULT - PROBLEM SELECTOR PLAN 7
- Cont home atorvastatin 20 mg qhs.

## 2022-04-10 NOTE — PROGRESS NOTE ADULT - PROBLEM SELECTOR PLAN 1
Resolved  - Likely 2/2 infection  - Delirium precautions

## 2022-04-11 ENCOUNTER — TRANSCRIPTION ENCOUNTER (OUTPATIENT)
Age: 87
End: 2022-04-11

## 2022-04-11 VITALS
TEMPERATURE: 98 F | OXYGEN SATURATION: 99 % | DIASTOLIC BLOOD PRESSURE: 72 MMHG | SYSTOLIC BLOOD PRESSURE: 132 MMHG | HEART RATE: 72 BPM | RESPIRATION RATE: 19 BRPM

## 2022-04-11 LAB
CULTURE RESULTS: SIGNIFICANT CHANGE UP
CULTURE RESULTS: SIGNIFICANT CHANGE UP
SARS-COV-2 RNA SPEC QL NAA+PROBE: SIGNIFICANT CHANGE UP
SPECIMEN SOURCE: SIGNIFICANT CHANGE UP
SPECIMEN SOURCE: SIGNIFICANT CHANGE UP

## 2022-04-11 PROCEDURE — 99239 HOSP IP/OBS DSCHRG MGMT >30: CPT

## 2022-04-11 RX ORDER — SPIRONOLACTONE 25 MG/1
1 TABLET, FILM COATED ORAL
Qty: 30 | Refills: 0
Start: 2022-04-11 | End: 2022-05-10

## 2022-04-11 RX ORDER — SPIRONOLACTONE 25 MG/1
0.5 TABLET, FILM COATED ORAL
Qty: 0 | Refills: 0 | DISCHARGE

## 2022-04-11 RX ORDER — SPIRONOLACTONE 25 MG/1
0.5 TABLET, FILM COATED ORAL
Qty: 0 | Refills: 0 | DISCHARGE
Start: 2022-04-11 | End: 2022-05-10

## 2022-04-11 RX ORDER — MOXIFLOXACIN HYDROCHLORIDE TABLETS, 400 MG 400 MG/1
1 TABLET, FILM COATED ORAL
Qty: 8 | Refills: 0
Start: 2022-04-11 | End: 2022-04-14

## 2022-04-11 RX ORDER — CARVEDILOL PHOSPHATE 80 MG/1
3 CAPSULE, EXTENDED RELEASE ORAL
Qty: 180 | Refills: 0
Start: 2022-04-11 | End: 2022-05-10

## 2022-04-11 RX ADMIN — SPIRONOLACTONE 25 MILLIGRAM(S): 25 TABLET, FILM COATED ORAL at 05:46

## 2022-04-11 RX ADMIN — AMPICILLIN SODIUM AND SULBACTAM SODIUM 200 GRAM(S): 250; 125 INJECTION, POWDER, FOR SUSPENSION INTRAMUSCULAR; INTRAVENOUS at 05:48

## 2022-04-11 RX ADMIN — POLYETHYLENE GLYCOL 3350 17 GRAM(S): 17 POWDER, FOR SOLUTION ORAL at 11:43

## 2022-04-11 RX ADMIN — HEPARIN SODIUM 5000 UNIT(S): 5000 INJECTION INTRAVENOUS; SUBCUTANEOUS at 14:54

## 2022-04-11 RX ADMIN — CARVEDILOL PHOSPHATE 18.75 MILLIGRAM(S): 80 CAPSULE, EXTENDED RELEASE ORAL at 05:46

## 2022-04-11 RX ADMIN — HEPARIN SODIUM 5000 UNIT(S): 5000 INJECTION INTRAVENOUS; SUBCUTANEOUS at 06:00

## 2022-04-11 RX ADMIN — Medication 81 MILLIGRAM(S): at 11:43

## 2022-04-11 RX ADMIN — Medication 100 MICROGRAM(S): at 05:46

## 2022-04-11 RX ADMIN — AMPICILLIN SODIUM AND SULBACTAM SODIUM 200 GRAM(S): 250; 125 INJECTION, POWDER, FOR SUSPENSION INTRAMUSCULAR; INTRAVENOUS at 14:53

## 2022-04-11 NOTE — DISCHARGE NOTE NURSING/CASE MANAGEMENT/SOCIAL WORK - NSDCPEFALRISK_GEN_ALL_CORE
For information on Fall & Injury Prevention, visit: [email or web address] OR  [email or web address] OR  https://www.cdc.gov/steadi/patient.html

## 2022-04-11 NOTE — DISCHARGE NOTE PROVIDER - NSDCCPCAREPLAN_GEN_ALL_CORE_FT
PRINCIPAL DISCHARGE DIAGNOSIS  Diagnosis: Urinary tract infection  Assessment and Plan of Treatment: HOME CARE INSTRUCTIONS  f you were prescribed antibiotics, take them exactly as your caregiver instructs you. Finish the medication even if you feel better after you have only taken some of the medication.  Drink enough water and fluids to keep your urine clear or pale yellow.  Avoid caffeine, tea, and carbonated beverages. They tend to irritate your bladder.  Empty your bladder often. Avoid holding urine for long periods of time.  Empty your bladder before and after sexual intercourse.  After a bowel movement, women should cleanse from front to back. Use each tissue only once.  SEEK MEDICAL CARE IF:  You have back pain.  You develop a fever.  Your symptoms do not begin to resolve within 3 days.  SEEK IMMEDIATE MEDICAL CARE IF:  You have severe back pain or lower abdominal pain.  You develop chills.  You have nausea or vomiting.  You have continued burning or discomfort with urination.

## 2022-04-11 NOTE — DISCHARGE NOTE PROVIDER - HOSPITAL COURSE
To be done. 89 yo F with PMHx of HTN, HFpEF, CKD, recurrent UTIs (including ESBL E coli), HLD, OA, hypothyroidism who is admitted with metabolic encephalopathy secondary to UTI/pyelonephritis c/b bacteremia.  Patient seen and examined at bedside. States that she feels well denies any CP, SOB, abd pain and nv. No acute events overnight       #Metabolic encephalopathy. resolved was Likely 2/2 infection  #Acute UTI: CT with evidence of pyelonephritis, c/b actinobacter lwoffi bacteremia, S/p meropenem (4/4 -4/8); switched to  Unasyn 3 gms every 8 hours through 4/11 per ID changed to po cipro 500 mg po every 12 hours: 4/11--> 4/14 and Repeat Blood CX neg so far from 4/6 and 4/8.  # Hypertensive urgency (resolve) cont coreg to 18.75 BID & spironolactone   # Chronic kidney disease (CKD): Appears to be at baseline renal function  #Chronic heart failure with preserved ejection fraction (HFpEF): Last known TTE (Oct 2021): EF 65%, preserved LV systolic function, normal RV, mild LVH, mild AS, no pulm HTN, c/w carvedilol and spironolactone  # Paroxysmal atrial fibrillation: EKG shows sinus rhythm with occasional PACs. Asymptomatic.  # HLD: atorvastatin 20 mg qhs.  # Preventative health care, Incidental findings on imaging, CT noted prominent wall at the proximal ascending colon, which may be due to partial distention vs. underlying neoplasm. Pt reports no prior colonoscopy. CT chest also with asymmetric breast tissue  - previous hospitalist Discussed findings with patient and had risks/benefits discussion about testing and possible treatments. At this time patient does not want to pursue further testing for this.- Wants to pursue further breast imaging - f/u with breast US as outpatient

## 2022-04-11 NOTE — DISCHARGE NOTE PROVIDER - CARE PROVIDERS DIRECT ADDRESSES
[email or web address]

## 2022-04-11 NOTE — DISCHARGE NOTE PROVIDER - CARE PROVIDER_API CALL
Betty Singh)  Geriatric Medicine; Internal Medicine  [address] [place] [address], [address]  [place] [address]  Phone: [phone]  Fax: [phone]  Established Patient  Follow Up Time: 1 week   Betty Singh)  Geriatric Medicine; Internal Medicine  [address] [facility], [address]  [place] [address]  Phone: [phone]  Fax: [phone]  Established Patient  Follow Up Time: 1 week    Jose Shah  GASTROENTEROLOGY  [address] [place] [address], [address]  [place] [address]  Phone: [phone]  Fax: [phone]  Established Patient  Follow Up Time: 2 weeks

## 2022-04-11 NOTE — DISCHARGE NOTE PROVIDER - PROVIDER TOKENS
PROVIDER:[TOKEN:[[id number]],FOLLOWUP:[1 week],ESTABLISHEDPATIENT:[T]] PROVIDER:[TOKEN:[[id number]],FOLLOWUP:[1 week],ESTABLISHEDPATIENT:[T]],PROVIDER:[TOKEN:[[id number]],FOLLOWUP:[2 weeks],ESTABLISHEDPATIENT:[T]]

## 2022-04-11 NOTE — DISCHARGE NOTE PROVIDER - NSDCMRMEDTOKEN_GEN_ALL_CORE_FT
aspirin 81 mg oral delayed release tablet: 1 tab(s) orally once a day  atorvastatin 20 mg oral tablet: 1 tab(s) orally once a day (at bedtime)  carvedilol 6.25 mg oral tablet: 3 tab(s) orally every 12 hours MDD:6  Cipro 500 mg oral tablet: 1 tab(s) orally every 12 hours MDD:2  levothyroxine 100 mcg (0.1 mg) oral tablet: 1 tab(s) orally once a day  polyethylene glycol 3350 oral powder for reconstitution: 17 gram(s) orally once a day  spironolactone 25 mg oral tablet: 1 tab(s) orally once a day MDD:1  traMADol 50 mg oral tablet: 1 tab(s) orally 2 times a day, As Needed   aspirin 81 mg oral delayed release tablet: 1 tab(s) orally once a day  atorvastatin 20 mg oral tablet: 1 tab(s) orally once a day (at bedtime)  carvedilol 6.25 mg oral tablet: 3 tab(s) orally every 12 hours MDD:6  Cipro 500 mg oral tablet: 1 tab(s) orally every 12 hours MDD:2  levothyroxine 100 mcg (0.1 mg) oral tablet: 1 tab(s) orally once a day  Outpatient Physical Therapy:   polyethylene glycol 3350 oral powder for reconstitution: 17 gram(s) orally once a day  spironolactone 25 mg oral tablet: 1 tab(s) orally once a day MDD:1  traMADol 50 mg oral tablet: 1 tab(s) orally 2 times a day, As Needed

## 2022-04-11 NOTE — DISCHARGE NOTE NURSING/CASE MANAGEMENT/SOCIAL WORK - PATIENT PORTAL LINK FT
You can access the FollowMyHealth Patient Portal offered by [facility] by registering at the following website: [email or web address]. By joining [facility]’s FollowMyHealth portal, you will also be able to view your health information using other applications (apps) compatible with our system.

## 2022-04-11 NOTE — CHART NOTE - NSCHARTNOTEFT_GEN_A_CORE
89 yo F with PMHx of HTN, HFpEF, CKD, recurrent UTIs (including ESBL E coli), HLD, OA, hypothyroidism who is admitted with metabolic encephalopathy secondary to UTI/pyelonephritis c/b bacteremia.  Patient seen and examined at bedside. States that she feels well denies any CP, SOB, abd pain and nv. No acute events overnight       #Metabolic encephalopathy. resolved was Likely 2/2 infection  #Acute UTI: CT with evidence of pyelonephritis, c/b actinobacter lwoffi bacteremia, S/p meropenem (4/4 -4/8); switched to  Unasyn 3 gms every 8 hours through 4/11 per ID changed to po cipro 500 mg po every 12 hours: 4/11--> 4/14 and Repeat Blood CX neg so far from 4/6 and 4/8.  # Hypertensive urgency (resolve) cont coreg to 18.75 BID & spironolactone   # Chronic kidney disease (CKD): Appears to be at baseline renal function  #Chronic heart failure with preserved ejection fraction (HFpEF): Last known TTE (Oct 2021): EF 65%, preserved LV systolic function, normal RV, mild LVH, mild AS, no pulm HTN, c/w carvedilol and spironolactone  # Paroxysmal atrial fibrillation: EKG shows sinus rhythm with occasional PACs. Asymptomatic.  # HLD: atorvastatin 20 mg qhs.  # Preventative health care, Incidental findings on imaging, CT noted prominent wall at the proximal ascending colon, which may be due to partial distention vs. underlying neoplasm. Pt reports no prior colonoscopy. CT chest also with asymmetric breast tissue  - previous hospitalist Discussed findings with patient and had risks/benefits discussion about testing and possible treatments. At this time patient does not want to pursue further testing for this.- Wants to pursue further breast imaging - f/u with breast US as outpatient    Patient is medically stable for d/c to [facility], time spent 34min   d/w Medicine ACP Ivonne

## 2022-04-12 ENCOUNTER — APPOINTMENT (OUTPATIENT)
Dept: GERIATRICS | Facility: ASSISTED LIVING FACILITY | Age: 87
End: 2022-04-12
Payer: MEDICARE

## 2022-04-12 DIAGNOSIS — R78.81 BACTEREMIA: ICD-10-CM

## 2022-04-12 PROBLEM — N18.9 CHRONIC KIDNEY DISEASE, UNSPECIFIED: Chronic | Status: ACTIVE | Noted: 2022-04-05

## 2022-04-12 PROBLEM — I48.0 PAROXYSMAL ATRIAL FIBRILLATION: Chronic | Status: ACTIVE | Noted: 2022-04-05

## 2022-04-12 PROBLEM — I50.30 UNSPECIFIED DIASTOLIC (CONGESTIVE) HEART FAILURE: Chronic | Status: ACTIVE | Noted: 2022-04-05

## 2022-04-12 PROCEDURE — 99495 TRANSJ CARE MGMT MOD F2F 14D: CPT

## 2022-04-14 NOTE — PHYSICAL EXAM
[Alert] : alert [No Acute Distress] : in no acute distress [Sclera] : the sclera and conjunctiva were normal [EOMI] : extraocular movements were intact [Normal Outer Ear/Nose] : the ears and nose were normal in appearance [Normal Appearance] : the appearance of the neck was normal [No Respiratory Distress] : no respiratory distress [No Acc Muscle Use] : no accessory muscle use [Respiration, Rhythm And Depth] : normal respiratory rhythm and effort [Abdomen Tenderness] : non-tender [Abdomen Soft] : soft [No Clubbing, Cyanosis] : no clubbing or cyanosis of the fingernails [Involuntary Movements] : no involuntary movements were seen [Normal Color / Pigmentation] : normal skin color and pigmentation [No Focal Deficits] : no focal deficits [Normal Affect] : the affect was normal [Normal Mood] : the mood was normal [de-identified] : kyphosis [de-identified] : unsteady, slow gait, unable to stand without use of arms

## 2022-04-14 NOTE — HISTORY OF PRESENT ILLNESS
[FreeTextEntry1] : 89yoF with pmhx of HFpEF, OA, recurrent UTI's and unsteady gait with frequent falls seen s/p hospitalization from 4/5- 4/11 with AMs and urosepsis.\par treated with meropenem and then transitioned to unasyn, d/c'd on cipro 500mg\par actinobacter bacteremia\par CTH negative\par \par medications reconciled\par patient doing pill box herself, but with errors\par she has two bottle of coreg at different dosages\par clarified which to take\par \par Ct chest with asymmetric breast finding; needing f/u US\par (Oct 2021): EF 65%, preserved LV systolic function, normal RV, mild LVH, mild\par AS, no pulm HTN\par \par denies fever, chills, ab pain, diarrhea, or dysuria.\par \par notes feeling weaker, more unsteady\par scaried to take shower by herself\par working with Pt currently\par

## 2022-04-14 NOTE — ASSESSMENT
[FreeTextEntry1] : UTI:  complete cipro bid until finished on friday  (she did not yet start)\par hypothyroidism: c/w levo 100mcg daily\par OA: c/w tramadol 1 daily\par HFPEF:  medications reconcilled\par c/w spironolaction 25mg 1 tab daily\par coreg 6.25mg 3 tabs twice daily. \par concern for patient having difficulty with polypharmacy will consider adjusting at follow up visit\par breast abnormality on imaging while in hospital: f/u US\par unsteady gait; c/w PT  worse deconditioning post hosptialization-  discussed speaking with [facility] staff if cluster care is needed at least temporarily for shower/dressing assist\par c/w walker use\par UI: c/w supportive care\par

## 2022-04-14 NOTE — DATA REVIEWED
[FreeTextEntry1] : IMPRESSION:\par \par No aortic dissection.\par \par Small bilateral pleural effusion with atelectasis. Interlobular septal \par thickening and groundglass opacities in both lungs, which can be seen in \par noninfectious and infectious processes.\par \par Heterogeneous thyroid gland, which can be further characterized on a \par nonemergent ultrasound as indicated.\par \par Asymmetric right > left breast soft tissue. Recommend correlation with \par dedicated breast imaging to exclude potential underlying lesion/neoplasm.\par \par Mild bilateral perinephric stranding without obvious hydronephrosis. \par Question striated bilateral nephrogram.\par Question mild perivesical stranding. Recommend correlation with \par urinalysis to assess urinary tract infection.\par \par Prominent wall at the proximal ascending colon, which may be due to \par partial distention. Recommend correlation with colonoscopy to exclude \par potential underlying neoplasm.\par \par Additional findings as described.\par

## 2022-04-15 ENCOUNTER — INPATIENT (INPATIENT)
Facility: HOSPITAL | Age: 87
LOS: 4 days | Discharge: NURSING, REHAB OR LONG-TERM CARE | DRG: 229 | End: 2022-04-20
Attending: STUDENT IN AN ORGANIZED HEALTH CARE EDUCATION/TRAINING PROGRAM | Admitting: STUDENT IN AN ORGANIZED HEALTH CARE EDUCATION/TRAINING PROGRAM
Payer: MEDICARE

## 2022-04-15 VITALS
TEMPERATURE: 98 F | DIASTOLIC BLOOD PRESSURE: 74 MMHG | WEIGHT: 110.01 LBS | HEIGHT: 64 IN | OXYGEN SATURATION: 94 % | RESPIRATION RATE: 18 BRPM | SYSTOLIC BLOOD PRESSURE: 134 MMHG | HEART RATE: 64 BPM

## 2022-04-15 DIAGNOSIS — Z96.7 PRESENCE OF OTHER BONE AND TENDON IMPLANTS: Chronic | ICD-10-CM

## 2022-04-15 DIAGNOSIS — O00.1 TUBAL PREGNANCY: Chronic | ICD-10-CM

## 2022-04-15 DIAGNOSIS — Z98.890 OTHER SPECIFIED POSTPROCEDURAL STATES: Chronic | ICD-10-CM

## 2022-04-15 DIAGNOSIS — Z98.49 CATARACT EXTRACTION STATUS, UNSPECIFIED EYE: Chronic | ICD-10-CM

## 2022-04-15 DIAGNOSIS — R55 SYNCOPE AND COLLAPSE: ICD-10-CM

## 2022-04-15 LAB
ALBUMIN SERPL ELPH-MCNC: 4.1 G/DL — SIGNIFICANT CHANGE UP (ref 3.3–5)
ALP SERPL-CCNC: 154 U/L — HIGH (ref 40–120)
ALT FLD-CCNC: 113 U/L — HIGH (ref 10–45)
ANION GAP SERPL CALC-SCNC: 17 MMOL/L — SIGNIFICANT CHANGE UP (ref 5–17)
APPEARANCE UR: CLEAR — SIGNIFICANT CHANGE UP
AST SERPL-CCNC: 53 U/L — HIGH (ref 10–40)
BACTERIA # UR AUTO: NEGATIVE — SIGNIFICANT CHANGE UP
BASOPHILS # BLD AUTO: 0.05 K/UL — SIGNIFICANT CHANGE UP (ref 0–0.2)
BASOPHILS NFR BLD AUTO: 0.7 % — SIGNIFICANT CHANGE UP (ref 0–2)
BILIRUB SERPL-MCNC: 0.6 MG/DL — SIGNIFICANT CHANGE UP (ref 0.2–1.2)
BILIRUB UR-MCNC: NEGATIVE — SIGNIFICANT CHANGE UP
BUN SERPL-MCNC: 44 MG/DL — HIGH (ref 7–23)
CALCIUM SERPL-MCNC: 9.2 MG/DL — SIGNIFICANT CHANGE UP (ref 8.4–10.5)
CHLORIDE SERPL-SCNC: 104 MMOL/L — SIGNIFICANT CHANGE UP (ref 96–108)
CO2 SERPL-SCNC: 18 MMOL/L — LOW (ref 22–31)
COLOR SPEC: SIGNIFICANT CHANGE UP
CREAT SERPL-MCNC: 1.32 MG/DL — HIGH (ref 0.5–1.3)
DIFF PNL FLD: NEGATIVE — SIGNIFICANT CHANGE UP
EGFR: 36 ML/MIN/1.73M2 — LOW
EOSINOPHIL # BLD AUTO: 0.2 K/UL — SIGNIFICANT CHANGE UP (ref 0–0.5)
EOSINOPHIL NFR BLD AUTO: 2.7 % — SIGNIFICANT CHANGE UP (ref 0–6)
EPI CELLS # UR: 1 /HPF — SIGNIFICANT CHANGE UP
FLUAV AG NPH QL: SIGNIFICANT CHANGE UP
FLUBV AG NPH QL: SIGNIFICANT CHANGE UP
GAS PNL BLDV: SIGNIFICANT CHANGE UP
GLUCOSE SERPL-MCNC: 152 MG/DL — HIGH (ref 70–99)
GLUCOSE UR QL: NEGATIVE — SIGNIFICANT CHANGE UP
HCT VFR BLD CALC: 35.2 % — SIGNIFICANT CHANGE UP (ref 34.5–45)
HGB BLD-MCNC: 11.4 G/DL — LOW (ref 11.5–15.5)
HYALINE CASTS # UR AUTO: 1 /LPF — SIGNIFICANT CHANGE UP (ref 0–2)
IMM GRANULOCYTES NFR BLD AUTO: 0.7 % — SIGNIFICANT CHANGE UP (ref 0–1.5)
KETONES UR-MCNC: NEGATIVE — SIGNIFICANT CHANGE UP
LEUKOCYTE ESTERASE UR-ACNC: NEGATIVE — SIGNIFICANT CHANGE UP
LYMPHOCYTES # BLD AUTO: 1.6 K/UL — SIGNIFICANT CHANGE UP (ref 1–3.3)
LYMPHOCYTES # BLD AUTO: 21.6 % — SIGNIFICANT CHANGE UP (ref 13–44)
MAGNESIUM SERPL-MCNC: 2.2 MG/DL — SIGNIFICANT CHANGE UP (ref 1.6–2.6)
MCHC RBC-ENTMCNC: 30.6 PG — SIGNIFICANT CHANGE UP (ref 27–34)
MCHC RBC-ENTMCNC: 32.4 GM/DL — SIGNIFICANT CHANGE UP (ref 32–36)
MCV RBC AUTO: 94.6 FL — SIGNIFICANT CHANGE UP (ref 80–100)
MONOCYTES # BLD AUTO: 0.75 K/UL — SIGNIFICANT CHANGE UP (ref 0–0.9)
MONOCYTES NFR BLD AUTO: 10.1 % — SIGNIFICANT CHANGE UP (ref 2–14)
NEUTROPHILS # BLD AUTO: 4.76 K/UL — SIGNIFICANT CHANGE UP (ref 1.8–7.4)
NEUTROPHILS NFR BLD AUTO: 64.2 % — SIGNIFICANT CHANGE UP (ref 43–77)
NITRITE UR-MCNC: NEGATIVE — SIGNIFICANT CHANGE UP
NRBC # BLD: 0 /100 WBCS — SIGNIFICANT CHANGE UP (ref 0–0)
NT-PROBNP SERPL-SCNC: 3256 PG/ML — HIGH (ref 0–300)
PH UR: 7 — SIGNIFICANT CHANGE UP (ref 5–8)
PHOSPHATE SERPL-MCNC: 3.8 MG/DL — SIGNIFICANT CHANGE UP (ref 2.5–4.5)
PLATELET # BLD AUTO: 147 K/UL — LOW (ref 150–400)
POTASSIUM SERPL-MCNC: 4.7 MMOL/L — SIGNIFICANT CHANGE UP (ref 3.5–5.3)
POTASSIUM SERPL-SCNC: 4.7 MMOL/L — SIGNIFICANT CHANGE UP (ref 3.5–5.3)
PROT SERPL-MCNC: 7.3 G/DL — SIGNIFICANT CHANGE UP (ref 6–8.3)
PROT UR-MCNC: ABNORMAL
RBC # BLD: 3.72 M/UL — LOW (ref 3.8–5.2)
RBC # FLD: 14.5 % — SIGNIFICANT CHANGE UP (ref 10.3–14.5)
RBC CASTS # UR COMP ASSIST: 2 /HPF — SIGNIFICANT CHANGE UP (ref 0–4)
RSV RNA NPH QL NAA+NON-PROBE: SIGNIFICANT CHANGE UP
SARS-COV-2 RNA SPEC QL NAA+PROBE: SIGNIFICANT CHANGE UP
SODIUM SERPL-SCNC: 139 MMOL/L — SIGNIFICANT CHANGE UP (ref 135–145)
SP GR SPEC: 1.02 — SIGNIFICANT CHANGE UP (ref 1.01–1.02)
TROPONIN T, HIGH SENSITIVITY RESULT: 29 NG/L — SIGNIFICANT CHANGE UP (ref 0–51)
TROPONIN T, HIGH SENSITIVITY RESULT: 37 NG/L — SIGNIFICANT CHANGE UP (ref 0–51)
UROBILINOGEN FLD QL: NEGATIVE — SIGNIFICANT CHANGE UP
WBC # BLD: 7.41 K/UL — SIGNIFICANT CHANGE UP (ref 3.8–10.5)
WBC # FLD AUTO: 7.41 K/UL — SIGNIFICANT CHANGE UP (ref 3.8–10.5)
WBC UR QL: 1 /HPF — SIGNIFICANT CHANGE UP (ref 0–5)

## 2022-04-15 PROCEDURE — 70450 CT HEAD/BRAIN W/O DYE: CPT | Mod: 26,MA

## 2022-04-15 PROCEDURE — 93010 ELECTROCARDIOGRAM REPORT: CPT

## 2022-04-15 PROCEDURE — 99285 EMERGENCY DEPT VISIT HI MDM: CPT | Mod: GC

## 2022-04-15 PROCEDURE — 99223 1ST HOSP IP/OBS HIGH 75: CPT

## 2022-04-15 RX ORDER — ATORVASTATIN CALCIUM 80 MG/1
20 TABLET, FILM COATED ORAL AT BEDTIME
Refills: 0 | Status: DISCONTINUED | OUTPATIENT
Start: 2022-04-15 | End: 2022-04-20

## 2022-04-15 RX ORDER — SPIRONOLACTONE 25 MG/1
25 TABLET, FILM COATED ORAL DAILY
Refills: 0 | Status: DISCONTINUED | OUTPATIENT
Start: 2022-04-15 | End: 2022-04-20

## 2022-04-15 RX ORDER — CARVEDILOL PHOSPHATE 80 MG/1
12.5 CAPSULE, EXTENDED RELEASE ORAL EVERY 12 HOURS
Refills: 0 | Status: DISCONTINUED | OUTPATIENT
Start: 2022-04-15 | End: 2022-04-16

## 2022-04-15 RX ORDER — POLYETHYLENE GLYCOL 3350 17 G/17G
17 POWDER, FOR SOLUTION ORAL DAILY
Refills: 0 | Status: DISCONTINUED | OUTPATIENT
Start: 2022-04-15 | End: 2022-04-18

## 2022-04-15 RX ORDER — LEVOTHYROXINE SODIUM 125 MCG
100 TABLET ORAL DAILY
Refills: 0 | Status: DISCONTINUED | OUTPATIENT
Start: 2022-04-15 | End: 2022-04-20

## 2022-04-15 RX ORDER — TRAMADOL HYDROCHLORIDE 50 MG/1
50 TABLET ORAL
Refills: 0 | Status: DISCONTINUED | OUTPATIENT
Start: 2022-04-15 | End: 2022-04-20

## 2022-04-15 RX ORDER — ACETAMINOPHEN 500 MG
650 TABLET ORAL EVERY 6 HOURS
Refills: 0 | Status: DISCONTINUED | OUTPATIENT
Start: 2022-04-15 | End: 2022-04-17

## 2022-04-15 RX ORDER — ASPIRIN/CALCIUM CARB/MAGNESIUM 324 MG
81 TABLET ORAL DAILY
Refills: 0 | Status: DISCONTINUED | OUTPATIENT
Start: 2022-04-15 | End: 2022-04-20

## 2022-04-15 NOTE — H&P ADULT - PROBLEM SELECTOR PLAN 3
Patient has some suprapubic tenderness  - no signs of UTI. UA neg, f/u urine culture  - check for retention

## 2022-04-15 NOTE — H&P ADULT - PROBLEM SELECTOR PLAN 2
Patient's Cr 1.32 on admission (1.08 on 4/9/22), ANNA vs CKD  - patient having difficulty voiding, will check bladder scan to r/o retention  - bladder scan q8  - UA negative  - trend Cr, monitor Is&Os  - avoid nephrotoxins and renally dose meds

## 2022-04-15 NOTE — ED ADULT NURSE NOTE - OBJECTIVE STATEMENT
Pt was BIBEMS from [facility] for a syncopal episode. As per EMS pt had a syncopal episode and was unconscious when they arrived pt had a faint pulses at 40bpm, regained consciousness pt did not remembered what happened. PMH CHF, hypothyroidism, CKD, spinal stenosis, NSTEMI. Pt is AOx3. breathing unlabored symmetrical rise and fall of the chest, speaking in clear full sentences. Abdomen is soft and nondistended. Skin is warm and dry to touch. Pt denies any CP, SOB, N/V/D, urinary symptoms. On stretcher connected to monitor. Stretcher at lowest position.

## 2022-04-15 NOTE — ED ADULT NURSE NOTE - NSIMPLEMENTINTERV_GEN_ALL_ED
Implemented All Fall with Harm Risk Interventions:  Interior to call system. Call bell, personal items and telephone within reach. Instruct patient to call for assistance. Room bathroom lighting operational. Non-slip footwear when patient is off stretcher. Physically safe environment: no spills, clutter or unnecessary equipment. Stretcher in lowest position, wheels locked, appropriate side rails in place. Provide visual cue, wrist band, yellow gown, etc. Monitor gait and stability. Monitor for mental status changes and reorient to person, place, and time. Review medications for side effects contributing to fall risk. Reinforce activity limits and safety measures with patient and family. Provide visual clues: red socks.

## 2022-04-15 NOTE — ED PROVIDER NOTE - CLINICAL SUMMARY MEDICAL DECISION MAKING FREE TEXT BOX
89 Y F H/O complete hewart block presenting with syncope, EKG with no signs of heart block at this time, mentating well, no signs of trauma, common labs CT head, likely admit for tele. 89 Y F H/O complete heart block presenting with syncope, EKG with no signs of heart block at this time, mentating well, no signs of trauma, common labs CT head, likely admit for tele.

## 2022-04-15 NOTE — ED PROVIDER NOTE - NS ED ROS FT
GENERAL: No fever or chills  EYES: No change in vision  HEENT: No trouble swallowing or speaking  CARDIAC: No chest pain  PULMONARY: No cough or SOB  GI: No abdominal pain, no nausea or no vomiting, no diarrhea or constipation  : No changes in urination  NEURO: No headache  MSK: No joint pain  Otherwise as HPI or negative.

## 2022-04-15 NOTE — H&P ADULT - PROBLEM SELECTOR PLAN 8
CTA chest from 4/4 showed nonspecific bilateral breast soft tissue calcifications. Asymmetric right > left   breast soft tissue  - will need further outpatient imaging to exclude potential underlying lesion/neoplasm.    -Prominent wall at the proximal ascending colon, which may be due to   partial distention vs underlying neoplasm. Patient previously refused further testing.

## 2022-04-15 NOTE — H&P ADULT - PROBLEM SELECTOR PLAN 6
- resume levothyroxine 100mcg qd  - f/u TSH in am  - f/u thyroid US for heterogenous thyroid seen on CTA chest from 4/4

## 2022-04-15 NOTE — H&P ADULT - NSHPSOCIALHISTORY_GEN_ALL_CORE
No prior toxic habits. Lives at [facility]. Reports wearing diaper at baseline. Uses rollator for ambulation. Takes medications on her own.

## 2022-04-15 NOTE — H&P ADULT - NSHPLABSRESULTS_GEN_ALL_CORE
EKG personally reviewed. NSR, nonspecific ST/Twave abnormalities. QTc 408.  Labs personally reviewed.   Imaging personally reviewed. CTH negative for acute pathology.     < from: CT Head No Cont (04.15.22 @ 18:42) >    There is sulcal and ventricular prominence consistent with age   appropriate involutional change. There are periventricular and   subcortical white matter hypodensities, consistent with moderate to   severe chronic microvascular ischemic changes.    There is no acute intracranial hemorrhage, mass effect, hydrocephalus, or   midline shift.  There are no extra-axial collections.    The visualized paranasal sinuses and mastoid air cells are clear.    The calvarium is intact.    Bilateral cataract replacement surgery.    IMPRESSION:  No acute intra-cranialhemorrhage, mass effect, or midline shift. Chronic   findings as above.    --- End of Report ---    < end of copied text >

## 2022-04-15 NOTE — ED PROVIDER NOTE - ATTENDING CONTRIBUTION TO CARE
89yr F hx of htn, diastolic HF, CKD, UTIs w recent hospitalization p/w syncope at nursing home. reports no head trauma , cp, sob, palpitations before hand. reports being scheduled for a procedure by cardiology tomorrow. denies recent infectious sx, illness , but does have urinary incontinence.   exam notable for well appearing, no distress, no traumatic findings, neuro intact, clear lungs, S1-2, soft non tender abd. well perfused.  concern for syncope in s/o infectious vs arrythmia. low concern for intra cranial injury given at baseline and only on asa.   plan for labs, UA, and discuss with his cardiologist.  cards says he was supposed to get pacemaker. will admit after work up completed.

## 2022-04-15 NOTE — PATIENT PROFILE ADULT - FALL HARM RISK - HARM RISK INTERVENTIONS

## 2022-04-15 NOTE — H&P ADULT - HISTORY OF PRESENT ILLNESS
89M with PMH of HTN, HFpEF, CHB, CKD, recurrent UTIs (ESBL), HLD, OA, hypothyroid, presents to the ED for [facility] assisted [facility] with syncope. Patient says she is feeling okay, does not recall the event. She says the last thing she remembers is having lunch and wanting to go back to her room. Per daughter, patient had an unwitnessed syncopal episode however no trauma. Patient denies any fevers, chills, chest pain, or shortness of breath. She does have some urinary urgency however has been having some difficulty urinating with some suprapubic discomfort. Patient denies any nausea, vomiting, abdominal pain, diarrhea, or dysuria. No melena or hematochezia. Patient is placed for a pacemaker outpatient tomorrow. Of note, patient was discharged less than a week ago, at which time she was admitted for acute metabolic encephalopathy in the setting of UTI/pyelonephritis, also found to be bacteremic, completed a course of antibiotics.     In the ED, T was 97.5, HR 64, /74, RR 18 satting 94% on room air. 89M with PMH of HTN, HFpEF, CHB, CKD, recurrent UTIs (ESBL), HLD, OA, hypothyroid, presents to the ED for [facility] assisted [facility] with syncope. Patient says she is feeling okay, does not recall the event. She says the last thing she remembers is having lunch and wanting to go back to her room. Per daughter, patient had an unwitnessed syncopal episode however no trauma. Patient denies any fevers, chills, chest pain, or shortness of breath. She does have some urinary urgency however has been having some difficulty urinating with some suprapubic discomfort. Patient denies any nausea, vomiting, abdominal pain, diarrhea, or dysuria. Has been having some soft stools, but no melena or hematochezia. Patient is placed for a pacemaker outpatient tomorrow. Of note, patient was discharged less than a week ago, at which time she was admitted for acute metabolic encephalopathy in the setting of UTI/pyelonephritis, also found to be bacteremic, completed a course of antibiotics.     In the ED, T was 97.5, HR 64, /74, RR 18 satting 94% on room air. 89M with PMH of HTN, HFpEF, CHB, pAFib, CKD, recurrent UTIs (ESBL), HLD, OA, hypothyroid, presents to the ED for [facility] assisted living with syncope. Patient says she is feeling okay, does not recall the event. She says the last thing she remembers is having lunch and wanting to go back to her room. Per daughter, patient had an unwitnessed syncopal episode however no trauma. Patient denies any fevers, chills, chest pain, or shortness of breath. She does have some urinary urgency however has been having some difficulty urinating with some suprapubic discomfort. Patient denies any nausea, vomiting, abdominal pain, diarrhea, or dysuria. Has been having some soft stools, but no melena or hematochezia. Patient is placed for a pacemaker outpatient tomorrow. Of note, patient was discharged less than a week ago, at which time she was admitted for acute metabolic encephalopathy in the setting of UTI/pyelonephritis, also found to be bacteremic, completed a course of antibiotics.     In the ED, T was 97.5, HR 64, /74, RR 18 satting 94% on room air.

## 2022-04-15 NOTE — ED PROVIDER NOTE - OBJECTIVE STATEMENT
89 Y F H/O HTN, HFpEF, CKD, recurrent UTIs (including ESBL E coli), HLD, OA, hypothyroidism presenting for syncope. Per daughter, patient syncopized, unwitnessed, no trauma, no vomiting, abdominal pain, difficulty ambulating, normal AAO.

## 2022-04-15 NOTE — PROCEDURE NOTE - ADDITIONAL PROCEDURE DETAILS
No episodes recorded from today that correlates with patient's syncope. High degree AVB detected on 4/4/2022.

## 2022-04-15 NOTE — H&P ADULT - PROBLEM SELECTOR PLAN 1
Patient presents with syncopal episode possibly 2/2 infection vs cardiac arrhythmia vs hypotension  - no signs of acute infection. UA negative. No respiratory symptoms. RVP negative.   - ILR interrogated, no episodes recorded from today that correlates with patient's syncope. High degree AVB detected on 4/4/2022  - EP consult in am for PPM placement (was supposed to get outpt efrain)  - monitor on tele  - f/u TTE  - check orthostatics in am

## 2022-04-15 NOTE — ED PROVIDER NOTE - PHYSICAL EXAMINATION
General: WN/WD NAD  Head: Atraumatic  Eyes: EOM grossly in tact, no scleral icterus  ENT: moist mucous membranes  Neurology: A&Ox3, nonfocal, YBARRA x 4  Respiratory: normal respiratory effort  CV: Extremities warm and well perfused  Abdominal: Soft, non-distended  Extremities: No edema  Skin: No rashes

## 2022-04-15 NOTE — H&P ADULT - PROBLEM SELECTOR PLAN 5
Hx of CHF, last known TTE from 10/21 showed EF 65% with mild LVH, preserved LV systolic function  - resume home carvedilol and spironolactone  - monitor Is&Os

## 2022-04-15 NOTE — H&P ADULT - ASSESSMENT
89M with PMH of HTN, HFpEF, CHB, CKD, recurrent UTIs (ESBL), HLD, OA, hypothyroid, presents to the ED for [facility] with syncope admitted for further workup. 89M with PMH of HTN, HFpEF, pAfib, CHB, CKD, recurrent UTIs (ESBL), HLD, OA, hypothyroid, presents to the ED for [facility] assisted [facility] with syncope admitted for further workup.

## 2022-04-15 NOTE — ED PROVIDER NOTE - PROGRESS NOTE DETAILS
Yogesh Savage MD:  Patient with no signs of UTI, will assure no intracranial hemorrhage, admit to medicine for electrophysiology evaluation. ap- pt was signed out to me pending results of labs w/ PM tomorrow at [facility]; phone call to Dr. Ann regarding plans- dr ann to discuss w/ Dr. Valdez

## 2022-04-16 DIAGNOSIS — E03.9 HYPOTHYROIDISM, UNSPECIFIED: ICD-10-CM

## 2022-04-16 DIAGNOSIS — N17.9 ACUTE KIDNEY FAILURE, UNSPECIFIED: ICD-10-CM

## 2022-04-16 DIAGNOSIS — N64.9 DISORDER OF BREAST, UNSPECIFIED: ICD-10-CM

## 2022-04-16 DIAGNOSIS — R55 SYNCOPE AND COLLAPSE: ICD-10-CM

## 2022-04-16 DIAGNOSIS — E78.5 HYPERLIPIDEMIA, UNSPECIFIED: ICD-10-CM

## 2022-04-16 DIAGNOSIS — R10.819 ABDOMINAL TENDERNESS, UNSPECIFIED SITE: ICD-10-CM

## 2022-04-16 DIAGNOSIS — Z29.9 ENCOUNTER FOR PROPHYLACTIC MEASURES, UNSPECIFIED: ICD-10-CM

## 2022-04-16 DIAGNOSIS — I10 ESSENTIAL (PRIMARY) HYPERTENSION: ICD-10-CM

## 2022-04-16 DIAGNOSIS — I50.9 HEART FAILURE, UNSPECIFIED: ICD-10-CM

## 2022-04-16 LAB
ANION GAP SERPL CALC-SCNC: 15 MMOL/L — SIGNIFICANT CHANGE UP (ref 5–17)
BUN SERPL-MCNC: 41 MG/DL — HIGH (ref 7–23)
CALCIUM SERPL-MCNC: 8.7 MG/DL — SIGNIFICANT CHANGE UP (ref 8.4–10.5)
CHLORIDE SERPL-SCNC: 105 MMOL/L — SIGNIFICANT CHANGE UP (ref 96–108)
CO2 SERPL-SCNC: 21 MMOL/L — LOW (ref 22–31)
CREAT SERPL-MCNC: 1.09 MG/DL — SIGNIFICANT CHANGE UP (ref 0.5–1.3)
CULTURE RESULTS: NO GROWTH — SIGNIFICANT CHANGE UP
EGFR: 46 ML/MIN/1.73M2 — LOW
GLUCOSE SERPL-MCNC: 90 MG/DL — SIGNIFICANT CHANGE UP (ref 70–99)
HCT VFR BLD CALC: 29.2 % — LOW (ref 34.5–45)
HGB BLD-MCNC: 9.8 G/DL — LOW (ref 11.5–15.5)
MAGNESIUM SERPL-MCNC: 2.1 MG/DL — SIGNIFICANT CHANGE UP (ref 1.6–2.6)
MCHC RBC-ENTMCNC: 31.6 PG — SIGNIFICANT CHANGE UP (ref 27–34)
MCHC RBC-ENTMCNC: 33.6 GM/DL — SIGNIFICANT CHANGE UP (ref 32–36)
MCV RBC AUTO: 94.2 FL — SIGNIFICANT CHANGE UP (ref 80–100)
NRBC # BLD: 0 /100 WBCS — SIGNIFICANT CHANGE UP (ref 0–0)
PHOSPHATE SERPL-MCNC: 3.6 MG/DL — SIGNIFICANT CHANGE UP (ref 2.5–4.5)
PLATELET # BLD AUTO: 133 K/UL — LOW (ref 150–400)
POTASSIUM SERPL-MCNC: 3.8 MMOL/L — SIGNIFICANT CHANGE UP (ref 3.5–5.3)
POTASSIUM SERPL-SCNC: 3.8 MMOL/L — SIGNIFICANT CHANGE UP (ref 3.5–5.3)
RBC # BLD: 3.1 M/UL — LOW (ref 3.8–5.2)
RBC # FLD: 14.5 % — SIGNIFICANT CHANGE UP (ref 10.3–14.5)
SODIUM SERPL-SCNC: 141 MMOL/L — SIGNIFICANT CHANGE UP (ref 135–145)
SPECIMEN SOURCE: SIGNIFICANT CHANGE UP
TSH SERPL-MCNC: 2.21 UIU/ML — SIGNIFICANT CHANGE UP (ref 0.27–4.2)
WBC # BLD: 6.65 K/UL — SIGNIFICANT CHANGE UP (ref 3.8–10.5)
WBC # FLD AUTO: 6.65 K/UL — SIGNIFICANT CHANGE UP (ref 3.8–10.5)

## 2022-04-16 PROCEDURE — 99233 SBSQ HOSP IP/OBS HIGH 50: CPT

## 2022-04-16 PROCEDURE — 93306 TTE W/DOPPLER COMPLETE: CPT | Mod: 26

## 2022-04-16 RX ORDER — HEPARIN SODIUM 5000 [USP'U]/ML
5000 INJECTION INTRAVENOUS; SUBCUTANEOUS EVERY 8 HOURS
Refills: 0 | Status: DISCONTINUED | OUTPATIENT
Start: 2022-04-16 | End: 2022-04-17

## 2022-04-16 RX ORDER — CARVEDILOL PHOSPHATE 80 MG/1
3.12 CAPSULE, EXTENDED RELEASE ORAL EVERY 12 HOURS
Refills: 0 | Status: DISCONTINUED | OUTPATIENT
Start: 2022-04-16 | End: 2022-04-20

## 2022-04-16 RX ORDER — HYDRALAZINE HCL 50 MG
25 TABLET ORAL ONCE
Refills: 0 | Status: COMPLETED | OUTPATIENT
Start: 2022-04-16 | End: 2022-04-16

## 2022-04-16 RX ORDER — HYDRALAZINE HCL 50 MG
10 TABLET ORAL ONCE
Refills: 0 | Status: COMPLETED | OUTPATIENT
Start: 2022-04-16 | End: 2022-04-16

## 2022-04-16 RX ADMIN — HEPARIN SODIUM 5000 UNIT(S): 5000 INJECTION INTRAVENOUS; SUBCUTANEOUS at 14:20

## 2022-04-16 RX ADMIN — Medication 10 MILLIGRAM(S): at 23:22

## 2022-04-16 RX ADMIN — HEPARIN SODIUM 5000 UNIT(S): 5000 INJECTION INTRAVENOUS; SUBCUTANEOUS at 21:44

## 2022-04-16 RX ADMIN — CARVEDILOL PHOSPHATE 12.5 MILLIGRAM(S): 80 CAPSULE, EXTENDED RELEASE ORAL at 05:30

## 2022-04-16 RX ADMIN — SPIRONOLACTONE 25 MILLIGRAM(S): 25 TABLET, FILM COATED ORAL at 05:30

## 2022-04-16 RX ADMIN — Medication 100 MICROGRAM(S): at 05:30

## 2022-04-16 RX ADMIN — Medication 25 MILLIGRAM(S): at 21:16

## 2022-04-16 RX ADMIN — Medication 81 MILLIGRAM(S): at 14:20

## 2022-04-16 RX ADMIN — CARVEDILOL PHOSPHATE 12.5 MILLIGRAM(S): 80 CAPSULE, EXTENDED RELEASE ORAL at 00:03

## 2022-04-16 RX ADMIN — CARVEDILOL PHOSPHATE 3.12 MILLIGRAM(S): 80 CAPSULE, EXTENDED RELEASE ORAL at 17:02

## 2022-04-16 RX ADMIN — HEPARIN SODIUM 5000 UNIT(S): 5000 INJECTION INTRAVENOUS; SUBCUTANEOUS at 05:31

## 2022-04-16 RX ADMIN — ATORVASTATIN CALCIUM 20 MILLIGRAM(S): 80 TABLET, FILM COATED ORAL at 21:17

## 2022-04-16 NOTE — CONSULT NOTE ADULT - SUBJECTIVE AND OBJECTIVE BOX
EP Attending    HISTORY OF PRESENT ILLNESS: HPI:  89M with PMH of HTN, HFpEF, CHB, pAFib, CKD, recurrent UTIs (ESBL), HLD, OA, hypothyroid, presents to the ED for [facility] assisted [facility] with syncope. Patient says she is feeling okay, does not recall the event. She says the last thing she remembers is having lunch and wanting to go back to her room. Per daughter, patient had an unwitnessed syncopal episode however no trauma. Patient denies any fevers, chills, chest pain, or shortness of breath. She does have some urinary urgency however has been having some difficulty urinating with some suprapubic discomfort. Patient denies any nausea, vomiting, abdominal pain, diarrhea, or dysuria. Has been having some soft stools, but no melena or hematochezia. Patient is placed for a pacemaker outpatient tomorrow. Of note, patient was discharged less than a week ago, at which time she was admitted for acute metabolic encephalopathy in the setting of UTI/pyelonephritis, also found to be bacteremic, completed a course of antibiotics.     In the ED, T was 97.5, HR 64, /74, RR 18 satting 94% on room air. (15 Apr 2022 23:32)    Ms Gilliland is a very pleasant 89yo woman who is an ambulatory resident of an assisted living facility, who presents after multiple episodes of fainting in the last month.  She has a loop recorder for syncope /bradycardia surveillance, which up until this month was uneventful.  Earlier this month it showed symptomatic complete AV block.  Family wanted to put off taking her to the ED but she had fainted again after eating lunch.  She has felt well overnight . In general more fatigued lately.  No injury from fainting. A 10 pt ROS is otherwise negative.  Was seen by CCU fellow last night to interrogate the loop recorder, and as part of sign-out for Cardiology patients who could potentially decompensate.  On telemetry overnight, only mild sinus bradycardia, no further pauses.    PAST MEDICAL & SURGICAL HISTORY:  Urinary Frequency    Bilateral Cataracts    HTN (hypertension)    Spinal stenosis    Hypothyroidism    Non-ST elevation MI (NSTEMI)    Macular degeneration    (HFpEF) heart failure with preserved ejection fraction    Paroxysmal atrial fibrillation    Chronic kidney disease (CKD)    Ectopic pregnancy, tubal    S/P ORIF (open reduction internal fixation) fracture  R hip    S/P breast lumpectomy    S/P cataract surgery  b/l      MEDICATIONS  (STANDING):  aspirin enteric coated 81 milliGRAM(s) Oral daily  atorvastatin 20 milliGRAM(s) Oral at bedtime  carvedilol 3.125 milliGRAM(s) Oral every 12 hours  heparin   Injectable 5000 Unit(s) SubCutaneous every 8 hours  levothyroxine 100 MICROGram(s) Oral daily  polyethylene glycol 3350 17 Gram(s) Oral daily  spironolactone 25 milliGRAM(s) Oral daily    Allergies    sulfa topicals (Unknown)    Intolerances    morphine (Drowsiness; Faint; Hypotension)      FAMILY HISTORY:  Family history of acute myocardial infarction    Non-contributary for premature coronary disease or sudden cardiac death    SOCIAL HISTORY:    [ x] Non-smoker  [ ] Smoker  [ ] Alcohol    PHYSICAL EXAM:  T(C): 36.4 (04-16-22 @ 04:13), Max: 36.7 (04-15-22 @ 19:14)  HR: 64 (04-16-22 @ 04:13) (64 - 75)  BP: 162/87 (04-16-22 @ 04:13) (134/74 - 214/119)  RR: 17 (04-16-22 @ 04:13) (15 - 18)  SpO2: 93% (04-16-22 @ 04:13) (93% - 100%)  Wt(kg): --    Appearance: Normal appearing elderly woman in no acute distress, cooperative	  HEENT:   Normal oral mucosa, PERRL, EOMI	  Lymphatic: No lymphadenopathy , no edema  Cardiovascular: Normal S1 S2, No JVD, No murmurs , Peripheral pulses palpable 2+ bilaterally  Respiratory: Lungs clear to auscultation, normal effort 	  Gastrointestinal:  Soft, Non-tender, + BS	  Skin: No rashes, No ecchymoses, No cyanosis, warm to touch  Musculoskeletal: Normal range of motion, normal strength  Psychiatry:  Mood & affect appropriate      TELEMETRY: Sinus carolyn 57-65bpm	    ECG: NSR  Echo: mild LVH, Normal LV EF, diastolic dysfunction.  	  LABS:	 	                          9.8    6.65  )-----------( 133      ( 16 Apr 2022 06:09 )             29.2     04-16    141  |  105  |  41<H>  ----------------------------<  90  3.8   |  21<L>  |  1.09    Ca    8.7      16 Apr 2022 06:10  Phos  3.6     04-16  Mg     2.1     04-16    TPro  7.3  /  Alb  4.1  /  TBili  0.6  /  DBili  x   /  AST  53<H>  /  ALT  113<H>  /  AlkPhos  154<H>  04-15    proBNP: Serum Pro-Brain Natriuretic Peptide: 3256 pg/mL (04-15 @ 15:43)      ASSESSMENT/PLAN: 	89y Female with HFpEF, paroxysmal AFib, here with syncope.      ILR interrogated in the ED, shows complete AV block on 4/4.  No arrhythmia events to correlate with syncope on day of admission, which may have been orthostatic after getting up from eating lunch.  Based on prior complete AV block on her ILR, she requires permanent ventricular pacing.  She has a normal LV EF.  I have reduced her carvedilol dose from 12.5mg BID to 3.125mg BID.  Anticipate a pacemaker implant (AV Micra) on Monday.  Followup w/ Dr Tan for device check and Dr Buckley for general cardiology on discharge.    Jony Montoya M.D.  Cardiac Electrophysiology  [phone]      Jony Montoya M.D.  Cardiac Electrophysiology    office [phone]  pager [phone]

## 2022-04-16 NOTE — PROGRESS NOTE ADULT - NSPROGADDITIONALINFOA_GEN_ALL_CORE
above plans discussed with NP Nilda Zamudio MD  Division of Hospital Medicine  Contact via [employer] Teams  Office: [phone]

## 2022-04-16 NOTE — PROGRESS NOTE ADULT - SUBJECTIVE AND OBJECTIVE BOX
Patient is a 89y old  Female who presents with a chief complaint of syncope (2022 09:40)      SUBJECTIVE / OVERNIGHT EVENTS:  no acute events overnight, vss, afebrile  pt feels okay, just hoping this doesn't happen again  pt questioning about PPM - asking whether it will make her live forever    tele reviewed: sinus 60-70s    ROS:  14 point ROS negative in detail except stated as above    MEDICATIONS  (STANDING):  aspirin enteric coated 81 milliGRAM(s) Oral daily  atorvastatin 20 milliGRAM(s) Oral at bedtime  carvedilol 3.125 milliGRAM(s) Oral every 12 hours  heparin   Injectable 5000 Unit(s) SubCutaneous every 8 hours  levothyroxine 100 MICROGram(s) Oral daily  polyethylene glycol 3350 17 Gram(s) Oral daily  spironolactone 25 milliGRAM(s) Oral daily    MEDICATIONS  (PRN):  acetaminophen     Tablet .. 650 milliGRAM(s) Oral every 6 hours PRN Temp greater or equal to 38C (100.4F), Mild Pain (1 - 3), Moderate Pain (4 - 6)  traMADol 50 milliGRAM(s) Oral two times a day PRN Severe Pain (7 - 10)      CAPILLARY BLOOD GLUCOSE        I&O's Summary      PHYSICAL EXAM:  Vital Signs Last 24 Hrs  T(C): 36.4 (2022 04:13), Max: 36.7 (15 Apr 2022 19:14)  T(F): 97.6 (2022 04:13), Max: 98.1 (15 Apr 2022 19:14)  HR: 64 (2022 04:13) (64 - 75)  BP: 162/87 (2022 04:13) (134/74 - 214/119)  BP(mean): --  RR: 17 (2022 04:13) (15 - 18)  SpO2: 93% (2022 04:13) (93% - 100%)    GENERAL: NAD, well-developed  HEAD:  Atraumatic, Normocephalic  EYES: EOMI, PERRLA, conjunctiva and sclera clear  NECK: Supple, No JVD  CHEST/LUNG: Clear to auscultation bilaterally; No wheeze  HEART: Regular rate and rhythm; No murmurs, rubs, or gallops  ABDOMEN: Soft, Nontender, Nondistended; Bowel sounds present  EXTREMITIES:  2+ Peripheral Pulses, No clubbing, cyanosis, or edema  NEUROLOGY: AAOx3; non-focal  SKIN: No rashes or lesions    LABS:  personally reviewed                        9.8    6.65  )-----------( 133      ( 2022 06:09 )             29.2     04-16    141  |  105  |  41<H>  ----------------------------<  90  3.8   |  21<L>  |  1.09    Ca    8.7      2022 06:10  Phos  3.6     04-16  Mg     2.1     -16    TPro  7.3  /  Alb  4.1  /  TBili  0.6  /  DBili  x   /  AST  53<H>  /  ALT  113<H>  /  AlkPhos  154<H>  04-15          Urinalysis Basic - ( 15 Apr 2022 15:59 )    Color: Light Yellow / Appearance: Clear / S.016 / pH: x  Gluc: x / Ketone: Negative  / Bili: Negative / Urobili: Negative   Blood: x / Protein: Trace / Nitrite: Negative   Leuk Esterase: Negative / RBC: 2 /hpf / WBC 1 /HPF   Sq Epi: x / Non Sq Epi: 1 /hpf / Bacteria: Negative        RADIOLOGY & ADDITIONAL TESTS:    Imaging Personally Reviewed:    Consultant(s) Notes Reviewed:  EP    Care Discussed with Consultants/Other Providers: Dr. Montoya (EP)

## 2022-04-17 LAB
ANION GAP SERPL CALC-SCNC: 14 MMOL/L — SIGNIFICANT CHANGE UP (ref 5–17)
BUN SERPL-MCNC: 32 MG/DL — HIGH (ref 7–23)
CALCIUM SERPL-MCNC: 9.4 MG/DL — SIGNIFICANT CHANGE UP (ref 8.4–10.5)
CHLORIDE SERPL-SCNC: 105 MMOL/L — SIGNIFICANT CHANGE UP (ref 96–108)
CO2 SERPL-SCNC: 22 MMOL/L — SIGNIFICANT CHANGE UP (ref 22–31)
CREAT SERPL-MCNC: 1.04 MG/DL — SIGNIFICANT CHANGE UP (ref 0.5–1.3)
EGFR: 48 ML/MIN/1.73M2 — LOW
GLUCOSE SERPL-MCNC: 98 MG/DL — SIGNIFICANT CHANGE UP (ref 70–99)
HCT VFR BLD CALC: 37.4 % — SIGNIFICANT CHANGE UP (ref 34.5–45)
HGB BLD-MCNC: 12.3 G/DL — SIGNIFICANT CHANGE UP (ref 11.5–15.5)
MCHC RBC-ENTMCNC: 30.9 PG — SIGNIFICANT CHANGE UP (ref 27–34)
MCHC RBC-ENTMCNC: 32.9 GM/DL — SIGNIFICANT CHANGE UP (ref 32–36)
MCV RBC AUTO: 94 FL — SIGNIFICANT CHANGE UP (ref 80–100)
NRBC # BLD: 0 /100 WBCS — SIGNIFICANT CHANGE UP (ref 0–0)
PLATELET # BLD AUTO: 177 K/UL — SIGNIFICANT CHANGE UP (ref 150–400)
POTASSIUM SERPL-MCNC: 3.9 MMOL/L — SIGNIFICANT CHANGE UP (ref 3.5–5.3)
POTASSIUM SERPL-SCNC: 3.9 MMOL/L — SIGNIFICANT CHANGE UP (ref 3.5–5.3)
RBC # BLD: 3.98 M/UL — SIGNIFICANT CHANGE UP (ref 3.8–5.2)
RBC # FLD: 14.4 % — SIGNIFICANT CHANGE UP (ref 10.3–14.5)
SARS-COV-2 RNA SPEC QL NAA+PROBE: SIGNIFICANT CHANGE UP
SODIUM SERPL-SCNC: 141 MMOL/L — SIGNIFICANT CHANGE UP (ref 135–145)
WBC # BLD: 7.58 K/UL — SIGNIFICANT CHANGE UP (ref 3.8–10.5)
WBC # FLD AUTO: 7.58 K/UL — SIGNIFICANT CHANGE UP (ref 3.8–10.5)

## 2022-04-17 PROCEDURE — 99233 SBSQ HOSP IP/OBS HIGH 50: CPT

## 2022-04-17 RX ORDER — HYDRALAZINE HCL 50 MG
5 TABLET ORAL ONCE
Refills: 0 | Status: COMPLETED | OUTPATIENT
Start: 2022-04-17 | End: 2022-04-17

## 2022-04-17 RX ORDER — AMLODIPINE BESYLATE 2.5 MG/1
5 TABLET ORAL DAILY
Refills: 0 | Status: DISCONTINUED | OUTPATIENT
Start: 2022-04-17 | End: 2022-04-20

## 2022-04-17 RX ORDER — HYDRALAZINE HCL 50 MG
25 TABLET ORAL ONCE
Refills: 0 | Status: COMPLETED | OUTPATIENT
Start: 2022-04-17 | End: 2022-04-17

## 2022-04-17 RX ADMIN — Medication 81 MILLIGRAM(S): at 13:25

## 2022-04-17 RX ADMIN — Medication 25 MILLIGRAM(S): at 01:02

## 2022-04-17 RX ADMIN — SPIRONOLACTONE 25 MILLIGRAM(S): 25 TABLET, FILM COATED ORAL at 05:47

## 2022-04-17 RX ADMIN — Medication 100 MICROGRAM(S): at 05:47

## 2022-04-17 RX ADMIN — Medication 5 MILLIGRAM(S): at 14:41

## 2022-04-17 RX ADMIN — ATORVASTATIN CALCIUM 20 MILLIGRAM(S): 80 TABLET, FILM COATED ORAL at 21:23

## 2022-04-17 RX ADMIN — POLYETHYLENE GLYCOL 3350 17 GRAM(S): 17 POWDER, FOR SOLUTION ORAL at 13:25

## 2022-04-17 RX ADMIN — HEPARIN SODIUM 5000 UNIT(S): 5000 INJECTION INTRAVENOUS; SUBCUTANEOUS at 13:24

## 2022-04-17 RX ADMIN — CARVEDILOL PHOSPHATE 3.12 MILLIGRAM(S): 80 CAPSULE, EXTENDED RELEASE ORAL at 05:47

## 2022-04-17 RX ADMIN — AMLODIPINE BESYLATE 5 MILLIGRAM(S): 2.5 TABLET ORAL at 18:10

## 2022-04-17 RX ADMIN — HEPARIN SODIUM 5000 UNIT(S): 5000 INJECTION INTRAVENOUS; SUBCUTANEOUS at 05:48

## 2022-04-17 RX ADMIN — CARVEDILOL PHOSPHATE 3.12 MILLIGRAM(S): 80 CAPSULE, EXTENDED RELEASE ORAL at 17:10

## 2022-04-17 NOTE — PHYSICAL THERAPY INITIAL EVALUATION ADULT - GENERAL OBSERVATIONS, REHAB EVAL
pt found semi supine +tele +IVL +purewick, dtr present Pt received semi-supine in bed, A&Ox4, cirilo 45 min PT functional eval.

## 2022-04-17 NOTE — PROGRESS NOTE ADULT - SUBJECTIVE AND OBJECTIVE BOX
EP Attending    HISTORY OF PRESENT ILLNESS: HPI:  89M with PMH of HTN, HFpEF, CHB, pAFib, CKD, recurrent UTIs (ESBL), HLD, OA, hypothyroid, presents to the ED for [facility] assisted [facility] with syncope. Patient says she is feeling okay, does not recall the event. She says the last thing she remembers is having lunch and wanting to go back to her room. Per daughter, patient had an unwitnessed syncopal episode however no trauma. Patient denies any fevers, chills, chest pain, or shortness of breath. She does have some urinary urgency however has been having some difficulty urinating with some suprapubic discomfort. Patient denies any nausea, vomiting, abdominal pain, diarrhea, or dysuria. Has been having some soft stools, but no melena or hematochezia. Patient is placed for a pacemaker outpatient tomorrow. Of note, patient was discharged less than a week ago, at which time she was admitted for acute metabolic encephalopathy in the setting of UTI/pyelonephritis, also found to be bacteremic, completed a course of antibiotics.     In the ED, T was 97.5, HR 64, /74, RR 18 satting 94% on room air. (15 Apr 2022 23:32)    Ms Gilliland is a very pleasant 89yo woman who is an ambulatory resident of an assisted living facility, who presents after multiple episodes of fainting in the last month.  She has a loop recorder for syncope /bradycardia surveillance, which up until this month was uneventful.  Earlier this month it showed symptomatic complete AV block.  Family wanted to put off taking her to the ED but she had fainted again after eating lunch.  She has felt well overnight . In general more fatigued lately.  No injury from fainting. A 10 pt ROS is otherwise negative.  Was seen by CCU fellow last night to interrogate the loop recorder, and as part of sign-out for Cardiology patients who could potentially decompensate.  On telemetry overnight, only mild sinus bradycardia, no further pauses.    4/17- uneventful overnight on telemetry. sleeping soundly this morning.    aspirin enteric coated 81 milliGRAM(s) Oral daily  atorvastatin 20 milliGRAM(s) Oral at bedtime  carvedilol 3.125 milliGRAM(s) Oral every 12 hours  heparin   Injectable 5000 Unit(s) SubCutaneous every 8 hours  levothyroxine 100 MICROGram(s) Oral daily  polyethylene glycol 3350 17 Gram(s) Oral daily  spironolactone 25 milliGRAM(s) Oral daily  traMADol 50 milliGRAM(s) Oral two times a day PRN                            12.3   7.58  )-----------( 177      ( 17 Apr 2022 07:09 )             37.4       04-17    141  |  105  |  32<H>  ----------------------------<  98  3.9   |  22  |  1.04    Ca    9.4      17 Apr 2022 07:08  Phos  3.6     04-16  Mg     2.1     04-16    TPro  7.3  /  Alb  4.1  /  TBili  0.6  /  DBili  x   /  AST  53<H>  /  ALT  113<H>  /  AlkPhos  154<H>  04-15      T(C): 36.6 (04-17-22 @ 11:21), Max: 36.6 (04-16-22 @ 20:38)  HR: 66 (04-17-22 @ 11:21) (66 - 75)  BP: 160/92 (04-17-22 @ 11:21) (137/65 - 195/104)  RR: 18 (04-17-22 @ 11:21) (18 - 18)  SpO2: 98% (04-17-22 @ 11:21) (95% - 98%)  Wt(kg): --    I&O's Summary    16 Apr 2022 07:01  -  17 Apr 2022 07:00  --------------------------------------------------------  IN: 790 mL / OUT: 2000 mL / NET: -1210 mL    17 Apr 2022 07:01  -  17 Apr 2022 12:33  --------------------------------------------------------  IN: 100 mL / OUT: 0 mL / NET: 100 mL      Appearance: Normal appearing elderly woman in no acute distress, cooperative	  HEENT:   Normal oral mucosa, PERRL, EOMI	  Lymphatic: No lymphadenopathy , no edema  Cardiovascular: Normal S1 S2, No JVD, No murmurs , Peripheral pulses palpable 2+ bilaterally  Respiratory: Lungs clear to auscultation, normal effort 	  Gastrointestinal:  Soft, Non-tender, + BS	  Skin: No rashes, No ecchymoses, No cyanosis, warm to touch  Musculoskeletal: Normal range of motion, normal strength  Psychiatry:  Mood & affect appropriate      TELEMETRY: Sinus carolyn 57-65bpm	    ECG: NSR  Echo: mild LVH, Normal LV EF, diastolic dysfunction.    ASSESSMENT/PLAN: 	89y Female with HFpEF, paroxysmal AFib, here with syncope.      ILR interrogated in the ED, shows complete AV block on 4/4.  No arrhythmia events to correlate with syncope on day of admission, which may have been orthostatic after getting up from eating lunch.  Based on prior complete AV block on her ILR, she requires permanent ventricular pacing.  She has a normal LV EF.  I have reduced her carvedilol dose from 12.5mg BID to 3.125mg BID.  Anticipate a pacemaker implant (AV Micra) on Monday.  NPO at midnight. Covid swab.  Followup w/ Dr Tan for device check and Dr Buckley for general cardiology on discharge.    Jony Montoya M.D.  Cardiac Electrophysiology  [phone]      Jony Montoya M.D.  Cardiac Electrophysiology    office [phone]  pager [phone]

## 2022-04-17 NOTE — PROGRESS NOTE ADULT - SUBJECTIVE AND OBJECTIVE BOX
INCOMPLETE NOTE    Kannan Doll M.D.  Division of Hospital Medicine  Available on TEAMS    Patient is a 89y old  Female who presents with a chief complaint of syncope (2022 10:56)    SUBJECTIVE / OVERNIGHT EVENTS: Patient seen and examined. No acute overnight events, no subjective complaints.    OBJECTIVE:  Vital Signs Last 24 Hrs  T(F): 97.5 (2022 05:45), Max: 97.9 (2022 11:12)  HR: 67 (2022 05:45) (60 - 75)  BP: 137/65 (2022 05:45) (137/65 - 195/104)  RR: 18 (2022 05:45) (18 - 18)  SpO2: 96% (2022 05:45) (94% - 96%)    I&O's Summary  2022 07:01  -  2022 07:00  --------------------------------------------------------  IN: 790 mL / OUT: 2000 mL / NET: -1210 mL    Physical Examination:  GEN: thin man, laying in stretcher in NAD  PSYCH: A&Ox3, mood and affect appear appropriate   SKIN: intact, no e/o rash  NEURO: no focal neurologic deficits appreciated; CN II-XII intact, sensation intact B/L, motor 5/5 in all mm groups  EYES: PERRL, anicteric, EOMI, no vertical/horizontal nystagmus  HEAD: NC, AT  NECK: supple  RESPI: no accessory muscle use, B/L air entry, CTAB   CARDIO: regular rate/rhythm, no LE edema B/L  ABD: soft, NT, ND, +BS  EXT: patient able to move all extremities spontaneously  VASC: peripheral pulses palpated    Labs:                      12.3   7.58  )-----------( 177      ( 2022 07:09 )             37.4     04-  141  |  105  |  32<H>  ----------------------------<  98  3.9   |  22  |  1.04    Ca    9.4      2022 07:08  Phos  3.6     04-16  Mg     2.1     04-16    TPro  7.3  /  Alb  4.1  /  TBili  0.6  /  DBili  x   /  AST  53<H>  /  ALT  113<H>  /  AlkPhos  154<H>  -15    Urinalysis Basic - ( 15 Apr 2022 15:59 )  Color: Light Yellow / Appearance: Clear / S.016 / pH: x  Gluc: x / Ketone: Negative  / Bili: Negative / Urobili: Negative   Blood: x / Protein: Trace / Nitrite: Negative   Leuk Esterase: Negative / RBC: 2 /hpf / WBC 1 /HPF   Sq Epi: x / Non Sq Epi: 1 /hpf / Bacteria: Negative    Culture - Urine (collected 15 Apr 2022 18:43)  Source: Catheterized Catheterized  Final Report (2022 14:25):    No growth    Consultant(s) Notes Reviewed: EP  Care Discussed with Consultants/Other Providers: YASEMIN Ferris    MEDICATIONS  (STANDING):  aspirin enteric coated 81 milliGRAM(s) Oral daily  atorvastatin 20 milliGRAM(s) Oral at bedtime  carvedilol 3.125 milliGRAM(s) Oral every 12 hours  heparin   Injectable 5000 Unit(s) SubCutaneous every 8 hours  levothyroxine 100 MICROGram(s) Oral daily  polyethylene glycol 3350 17 Gram(s) Oral daily  spironolactone 25 milliGRAM(s) Oral daily    MEDICATIONS  (PRN):  traMADol 50 milliGRAM(s) Oral two times a day PRN Severe Pain (7 - 10) Kannan Doll M.D.  Division of Hospital Medicine  Available on TEAMS    Patient is a 89y old  Female who presents with a chief complaint of syncope (2022 10:56)    SUBJECTIVE / OVERNIGHT EVENTS: Patient seen and examined. No acute overnight events, no subjective complaints. Tele: NSR 50-80s w/ couplets. Plan for EP-Micra placement tomorrow, NPO at midnight.    OBJECTIVE:  Vital Signs Last 24 Hrs  T(F): 97.5 (2022 05:45), Max: 97.9 (2022 11:12)  HR: 67 (2022 05:45) (60 - 75)  BP: 137/65 (2022 05:45) (137/65 - 195/104)  RR: 18 (2022 05:45) (18 - 18)  SpO2: 96% (2022 05:45) (94% - 96%)    I&O's Summary  2022 07:01  -  2022 07:00  --------------------------------------------------------  IN: 790 mL / OUT: 2000 mL / NET: -1210 mL    Physical Examination:  GEN: eldery woman, sleeping in bed in NAD  PSYCH: A&Ox3, mood and affect appear appropriate   NECK: supple, no e/o elevated JVP  RESPI: no accessory muscle use, B/L air entry, CTAB   CARDIO: regular rate/rhythm, no LE edema B/L  ABD: soft, NT, ND  EXT: patient able to move all extremities spontaneously  VASC: peripheral pulses palpated    Labs:                      12.3   7.58  )-----------( 177      ( 2022 07:09 )             37.4     04-17  141  |  105  |  32<H>  ----------------------------<  98  3.9   |  22  |  1.04    Ca    9.4      2022 07:08  Phos  3.6     04-16  Mg     2.1     04-16    TPro  7.3  /  Alb  4.1  /  TBili  0.6  /  DBili  x   /  AST  53<H>  /  ALT  113<H>  /  AlkPhos  154<H>  -15    Urinalysis Basic - ( 15 Apr 2022 15:59 )  Color: Light Yellow / Appearance: Clear / S.016 / pH: x  Gluc: x / Ketone: Negative  / Bili: Negative / Urobili: Negative   Blood: x / Protein: Trace / Nitrite: Negative   Leuk Esterase: Negative / RBC: 2 /hpf / WBC 1 /HPF   Sq Epi: x / Non Sq Epi: 1 /hpf / Bacteria: Negative    Culture - Urine (collected 15 Apr 2022 18:43)  Source: Catheterized Catheterized  Final Report (2022 14:25):    No growth    Consultant(s) Notes Reviewed: EP  Care Discussed with Consultants/Other Providers: YASEMIN Ferris    MEDICATIONS  (STANDING):  aspirin enteric coated 81 milliGRAM(s) Oral daily  atorvastatin 20 milliGRAM(s) Oral at bedtime  carvedilol 3.125 milliGRAM(s) Oral every 12 hours  heparin   Injectable 5000 Unit(s) SubCutaneous every 8 hours  levothyroxine 100 MICROGram(s) Oral daily  polyethylene glycol 3350 17 Gram(s) Oral daily  spironolactone 25 milliGRAM(s) Oral daily    MEDICATIONS  (PRN):  traMADol 50 milliGRAM(s) Oral two times a day PRN Severe Pain (7 - 10)

## 2022-04-17 NOTE — PROVIDER CONTACT NOTE (OTHER) - ASSESSMENT
a&ox4, denies headache, dizziness, visual changes.
AO4, all other VSS, no complaints of headache/blurry vision/chest pain

## 2022-04-17 NOTE — PHYSICAL THERAPY INITIAL EVALUATION ADULT - DISCHARGE DISPOSITION, PT EVAL
TBD pending gait assessment return back to [facility] with home PT

## 2022-04-17 NOTE — PROVIDER CONTACT NOTE (OTHER) - ACTION/TREATMENT ORDERED:
1x IVP hydralazine ordered. Primary RN made aware of pt BP .
Provider notified. Hydralazine 25 mg PO administered. Continue to monitor.

## 2022-04-17 NOTE — PHYSICAL THERAPY INITIAL EVALUATION ADULT - PRECAUTIONS/LIMITATIONS, REHAB EVAL
CONT:  Pt is placed for a pacemaker outpatient tomorrow. Of note, patient was discharged less than a week ago, at which time she was admitted for acute metabolic encephalopathy in the setting of UTI/pyelonephritis, also found to be bacteremic, completed a course of antibiotics. Pt found to have complete AV block pending micra placement 4/18. CT Head 4/15: Neg. CONT:  Pt is placed for a pacemaker outpatient tomorrow. Of note, patient was discharged less than a week ago, at which time she was admitted for acute metabolic encephalopathy in the setting of UTI/pyelonephritis, also found to be bacteremic, completed a course of antibiotics. Pt found to have complete AV block pending micra placement 4/18. CT Head 4/15: Neg./fall precautions

## 2022-04-17 NOTE — PHYSICAL THERAPY INITIAL EVALUATION ADULT - ADDITIONAL COMMENTS
Pt lives at [facility]. Pt is ambulatory with rollator, ambulates to / from dining murcia but admits with difficulty.

## 2022-04-17 NOTE — PHYSICAL THERAPY INITIAL EVALUATION ADULT - PERTINENT HX OF CURRENT PROBLEM, REHAB EVAL
89 yo F s/p syncope at [facility], without trauma. Pt says she is feeling okay, does not recall event. She says the last thing she remembers is having lunch and wanting to go back to her room. Patient denies any fevers, chills, chest pain, or shortness of breath. She does have some urinary urgency however has been having some difficulty urinating with some suprapubic discomfort. Denies nausea, vomiting, abdominal pain, diarrhea, or dysuria. Endorses some soft stools, but no melena or hematochezia.

## 2022-04-17 NOTE — PROVIDER CONTACT NOTE (OTHER) - BACKGROUND
Admitting dx: syncope and collapse
89 Y F H/O HTN, HFpEF, CKD, recurrent UTIs (including ESBL E coli), HLD, OA, hypothyroidism presenting for syncope

## 2022-04-18 ENCOUNTER — TRANSCRIPTION ENCOUNTER (OUTPATIENT)
Age: 87
End: 2022-04-18

## 2022-04-18 LAB
ALBUMIN SERPL ELPH-MCNC: 3.7 G/DL — SIGNIFICANT CHANGE UP (ref 3.3–5)
ALP SERPL-CCNC: 153 U/L — HIGH (ref 40–120)
ALT FLD-CCNC: 74 U/L — HIGH (ref 10–45)
ANION GAP SERPL CALC-SCNC: 15 MMOL/L — SIGNIFICANT CHANGE UP (ref 5–17)
APTT BLD: 32.6 SEC — SIGNIFICANT CHANGE UP (ref 27.5–35.5)
AST SERPL-CCNC: 32 U/L — SIGNIFICANT CHANGE UP (ref 10–40)
BASOPHILS # BLD AUTO: 0.05 K/UL — SIGNIFICANT CHANGE UP (ref 0–0.2)
BASOPHILS NFR BLD AUTO: 0.7 % — SIGNIFICANT CHANGE UP (ref 0–2)
BILIRUB SERPL-MCNC: 0.4 MG/DL — SIGNIFICANT CHANGE UP (ref 0.2–1.2)
BLD GP AB SCN SERPL QL: NEGATIVE — SIGNIFICANT CHANGE UP
BUN SERPL-MCNC: 35 MG/DL — HIGH (ref 7–23)
CALCIUM SERPL-MCNC: 9.3 MG/DL — SIGNIFICANT CHANGE UP (ref 8.4–10.5)
CHLORIDE SERPL-SCNC: 104 MMOL/L — SIGNIFICANT CHANGE UP (ref 96–108)
CO2 SERPL-SCNC: 22 MMOL/L — SIGNIFICANT CHANGE UP (ref 22–31)
CREAT SERPL-MCNC: 1.19 MG/DL — SIGNIFICANT CHANGE UP (ref 0.5–1.3)
EGFR: 41 ML/MIN/1.73M2 — LOW
EOSINOPHIL # BLD AUTO: 0.36 K/UL — SIGNIFICANT CHANGE UP (ref 0–0.5)
EOSINOPHIL NFR BLD AUTO: 5.2 % — SIGNIFICANT CHANGE UP (ref 0–6)
GLUCOSE SERPL-MCNC: 94 MG/DL — SIGNIFICANT CHANGE UP (ref 70–99)
HCT VFR BLD CALC: 38.1 % — SIGNIFICANT CHANGE UP (ref 34.5–45)
HGB BLD-MCNC: 12.6 G/DL — SIGNIFICANT CHANGE UP (ref 11.5–15.5)
IMM GRANULOCYTES NFR BLD AUTO: 0.6 % — SIGNIFICANT CHANGE UP (ref 0–1.5)
INR BLD: 1.25 RATIO — HIGH (ref 0.88–1.16)
LYMPHOCYTES # BLD AUTO: 1.86 K/UL — SIGNIFICANT CHANGE UP (ref 1–3.3)
LYMPHOCYTES # BLD AUTO: 26.7 % — SIGNIFICANT CHANGE UP (ref 13–44)
MAGNESIUM SERPL-MCNC: 2 MG/DL — SIGNIFICANT CHANGE UP (ref 1.6–2.6)
MCHC RBC-ENTMCNC: 30.4 PG — SIGNIFICANT CHANGE UP (ref 27–34)
MCHC RBC-ENTMCNC: 33.1 GM/DL — SIGNIFICANT CHANGE UP (ref 32–36)
MCV RBC AUTO: 91.8 FL — SIGNIFICANT CHANGE UP (ref 80–100)
MONOCYTES # BLD AUTO: 0.88 K/UL — SIGNIFICANT CHANGE UP (ref 0–0.9)
MONOCYTES NFR BLD AUTO: 12.6 % — SIGNIFICANT CHANGE UP (ref 2–14)
NEUTROPHILS # BLD AUTO: 3.78 K/UL — SIGNIFICANT CHANGE UP (ref 1.8–7.4)
NEUTROPHILS NFR BLD AUTO: 54.2 % — SIGNIFICANT CHANGE UP (ref 43–77)
NRBC # BLD: 0 /100 WBCS — SIGNIFICANT CHANGE UP (ref 0–0)
PLATELET # BLD AUTO: 189 K/UL — SIGNIFICANT CHANGE UP (ref 150–400)
POTASSIUM SERPL-MCNC: 4.3 MMOL/L — SIGNIFICANT CHANGE UP (ref 3.5–5.3)
POTASSIUM SERPL-SCNC: 4.3 MMOL/L — SIGNIFICANT CHANGE UP (ref 3.5–5.3)
PROT SERPL-MCNC: 6.9 G/DL — SIGNIFICANT CHANGE UP (ref 6–8.3)
PROTHROM AB SERPL-ACNC: 14.5 SEC — HIGH (ref 10.5–13.4)
RBC # BLD: 4.15 M/UL — SIGNIFICANT CHANGE UP (ref 3.8–5.2)
RBC # FLD: 14.5 % — SIGNIFICANT CHANGE UP (ref 10.3–14.5)
RH IG SCN BLD-IMP: POSITIVE — SIGNIFICANT CHANGE UP
SODIUM SERPL-SCNC: 141 MMOL/L — SIGNIFICANT CHANGE UP (ref 135–145)
WBC # BLD: 6.97 K/UL — SIGNIFICANT CHANGE UP (ref 3.8–10.5)
WBC # FLD AUTO: 6.97 K/UL — SIGNIFICANT CHANGE UP (ref 3.8–10.5)

## 2022-04-18 PROCEDURE — 76536 US EXAM OF HEAD AND NECK: CPT | Mod: 26

## 2022-04-18 PROCEDURE — 71045 X-RAY EXAM CHEST 1 VIEW: CPT | Mod: 26

## 2022-04-18 PROCEDURE — 93010 ELECTROCARDIOGRAM REPORT: CPT

## 2022-04-18 PROCEDURE — 99233 SBSQ HOSP IP/OBS HIGH 50: CPT

## 2022-04-18 RX ORDER — TRAMADOL HYDROCHLORIDE 50 MG/1
1 TABLET ORAL
Qty: 0 | Refills: 0 | DISCHARGE

## 2022-04-18 RX ORDER — CARVEDILOL PHOSPHATE 80 MG/1
1 CAPSULE, EXTENDED RELEASE ORAL
Qty: 0 | Refills: 0 | DISCHARGE

## 2022-04-18 RX ORDER — POLYETHYLENE GLYCOL 3350 17 G/17G
17 POWDER, FOR SOLUTION ORAL
Refills: 0 | Status: DISCONTINUED | OUTPATIENT
Start: 2022-04-18 | End: 2022-04-20

## 2022-04-18 RX ORDER — SENNA PLUS 8.6 MG/1
2 TABLET ORAL AT BEDTIME
Refills: 0 | Status: DISCONTINUED | OUTPATIENT
Start: 2022-04-18 | End: 2022-04-20

## 2022-04-18 RX ADMIN — CARVEDILOL PHOSPHATE 3.12 MILLIGRAM(S): 80 CAPSULE, EXTENDED RELEASE ORAL at 17:17

## 2022-04-18 RX ADMIN — CARVEDILOL PHOSPHATE 3.12 MILLIGRAM(S): 80 CAPSULE, EXTENDED RELEASE ORAL at 05:34

## 2022-04-18 RX ADMIN — Medication 100 MICROGRAM(S): at 05:34

## 2022-04-18 RX ADMIN — ATORVASTATIN CALCIUM 20 MILLIGRAM(S): 80 TABLET, FILM COATED ORAL at 22:02

## 2022-04-18 RX ADMIN — Medication 81 MILLIGRAM(S): at 12:53

## 2022-04-18 RX ADMIN — POLYETHYLENE GLYCOL 3350 17 GRAM(S): 17 POWDER, FOR SOLUTION ORAL at 12:54

## 2022-04-18 RX ADMIN — AMLODIPINE BESYLATE 5 MILLIGRAM(S): 2.5 TABLET ORAL at 05:34

## 2022-04-18 RX ADMIN — SPIRONOLACTONE 25 MILLIGRAM(S): 25 TABLET, FILM COATED ORAL at 05:34

## 2022-04-18 RX ADMIN — SENNA PLUS 2 TABLET(S): 8.6 TABLET ORAL at 22:01

## 2022-04-18 NOTE — DISCHARGE NOTE PROVIDER - NSDCCPCAREPLAN_GEN_ALL_CORE_FT
PRINCIPAL DISCHARGE DIAGNOSIS  Diagnosis: Syncope  Assessment and Plan of Treatment: An interrogation of loop recorber revealing complete block now a micra Pacemaker place don 4/18/22  You will need to follow up with  5/5 9:30 for groin and pacemaker check  Follow up with cardiology, Dr. Fountain in 1 week  Follow up with primary care doctor in 1 week      SECONDARY DISCHARGE DIAGNOSES  Diagnosis: Hypertension  Assessment and Plan of Treatment: Resume coreg at lower dose of 3.125 mg oral daily  resume norvasc as ordered    Diagnosis: Suprapubic tenderness  Assessment and Plan of Treatment: now resolved, urine culture negative for infection    Diagnosis: Hypothyroid  Assessment and Plan of Treatment: continue synthroid 100 mg daily  A CT of the chest revealed abnormality of thyroid, Ultrasound withtout any nodule  There was however an abnormality of the common cartotid arteries, dopplers ...........    Diagnosis: Chronic congestive heart failure  Assessment and Plan of Treatment: continue aldactone  you are not in acute heart failure  Follow up with cardiology in 1 week    Diagnosis: Breast lesion  Assessment and Plan of Treatment: CTA chest from showed nonspecific bilateral breast soft tissue calcifications.  Follow up with your primary care doctor. Will need further outpatient imaging to exclude potential underlying lesion    Diagnosis: ANNA (acute kidney injury)  Assessment and Plan of Treatment: improved     PRINCIPAL DISCHARGE DIAGNOSIS  Diagnosis: Syncope  Assessment and Plan of Treatment: An interrogation of loop recorber revealing complete block now a micra Pacemaker place don 4/18/22  You will need to follow up with  5/5 9:30 for groin and pacemaker check  Follow up with cardiology, Dr. Fountain in 1 week  Follow up with primary care doctor in 1 week      SECONDARY DISCHARGE DIAGNOSES  Diagnosis: ANNA (acute kidney injury)  Assessment and Plan of Treatment: improved    Diagnosis: Hypertension  Assessment and Plan of Treatment: Resume coreg at lower dose of 3.125 mg oral daily  resume norvasc as ordered    Diagnosis: Suprapubic tenderness  Assessment and Plan of Treatment: now resolved, urine culture negative for infection    Diagnosis: Hypothyroid  Assessment and Plan of Treatment: continue synthroid 100 mg daily  A CT of the chest revealed abnormality of thyroid, Ultrasound withtout any nodule      Diagnosis: Breast lesion  Assessment and Plan of Treatment: CTA chest from showed nonspecific bilateral breast soft tissue calcifications.  Follow up with your primary care doctor. Will need further outpatient imaging to exclude potential underlying lesion    Diagnosis: Chronic congestive heart failure  Assessment and Plan of Treatment: continue aldactone  you are not in acute heart failure  Follow up with cardiology in 1 week    Diagnosis: Carotid stenosis  Assessment and Plan of Treatment: Thyroid US 4/18/22 revealed Intimal thickening/soft and calcified plaque common carotid arteries. Ectatic right common carotid artery.  Follow up for outpatient caraotid testing       PRINCIPAL DISCHARGE DIAGNOSIS  Diagnosis: Syncope  Assessment and Plan of Treatment: An interrogation of loop recorber revealing complete block now a micra Pacemaker place don 4/18/22  You will need to follow up with  5/5 9:30 for groin and pacemaker check  Follow up with cardiology, Dr. Fountain in 1 week  Follow up with primary care doctor in 1 week      SECONDARY DISCHARGE DIAGNOSES  Diagnosis: ANNA (acute kidney injury)  Assessment and Plan of Treatment: creatinine 1.49 4/19/22    Diagnosis: Hypertension  Assessment and Plan of Treatment: Resume coreg at lower dose of 3.125 mg oral daily  resume norvasc as ordered    Diagnosis: Suprapubic tenderness  Assessment and Plan of Treatment: now resolved, urine culture negative for infection    Diagnosis: Hypothyroid  Assessment and Plan of Treatment: continue synthroid 100 mg daily  A CT of the chest revealed abnormality of thyroid, Ultrasound withtout any nodule      Diagnosis: Breast lesion  Assessment and Plan of Treatment: CTA chest from showed nonspecific bilateral breast soft tissue calcifications.  Follow up with your primary care doctor. Will need further outpatient imaging to exclude potential underlying lesion    Diagnosis: Chronic congestive heart failure  Assessment and Plan of Treatment: continue aldactone  you are not in acute heart failure  Follow up with cardiology in 1 week    Diagnosis: Carotid stenosis  Assessment and Plan of Treatment: Thyroid US 4/18/22 revealed Intimal thickening/soft and calcified plaque common carotid arteries. Ectatic right common carotid artery.  Follow up for outpatient caraotid testing       PRINCIPAL DISCHARGE DIAGNOSIS  Diagnosis: Syncope  Assessment and Plan of Treatment: An interrogation of loop recorber revealing complete block now a micra Pacemaker place don 4/18/22  You will need to follow up with  5/5/22 9:40 for groin and pacemaker check  Follow up with cardiology, Dr. Fountain in 1 week  Follow up with primary care doctor in 1 week      SECONDARY DISCHARGE DIAGNOSES  Diagnosis: ANNA (acute kidney injury)  Assessment and Plan of Treatment: Avoid taking (NSAIDs) - (ex: Ibuprofen, Advil, Celebrex, Naprosyn)  Avoid taking any nephrotoxic agents (can harm kidneys) - Intravenous contrast for diagnostic testing, combination cold medications.  Have all medications adjusted for your renal function by your Health Care Provider.  Blood pressure control is important.  Take all medication as prescribed.      Diagnosis: Hypertension  Assessment and Plan of Treatment: Resume coreg at lower dose of 3.125 mg oral daily  resume norvasc as ordered    Diagnosis: Suprapubic tenderness  Assessment and Plan of Treatment: now resolved, urine culture negative for infection    Diagnosis: Hypothyroid  Assessment and Plan of Treatment: continue synthroid 100 mg daily  A CT of the chest revealed abnormality of thyroid, Ultrasound withtout any nodule      Diagnosis: Breast lesion  Assessment and Plan of Treatment: CTA chest from showed nonspecific bilateral breast soft tissue calcifications.  Follow up with your primary care doctor. Will need further outpatient imaging to exclude potential underlying lesion    Diagnosis: Chronic congestive heart failure  Assessment and Plan of Treatment: continue aldactone  you are not in acute heart failure  Follow up with cardiology in 1 week     PRINCIPAL DISCHARGE DIAGNOSIS  Diagnosis: Syncope  Assessment and Plan of Treatment: An interrogation of loop recorber revealing complete block now a micra Pacemaker place don 4/18/22  You will need to follow up with  5/5/22 9:40 for groin and pacemaker check  Follow up with cardiology, Dr. Fountain in 1 week  Follow up with primary care doctor in 1 week      SECONDARY DISCHARGE DIAGNOSES  Diagnosis: ANNA (acute kidney injury)  Assessment and Plan of Treatment: Avoid taking (NSAIDs) - (ex: Ibuprofen, Advil, Celebrex, Naprosyn)  Avoid taking any nephrotoxic agents (can harm kidneys) - Intravenous contrast for diagnostic testing, combination cold medications.  Have all medications adjusted for your renal function by your Health Care Provider.  Blood pressure control is important.  Take all medication as prescribed.  Creatinine improved. Follow up with PMD for managment and repeat lab work next week       Diagnosis: Hypertension  Assessment and Plan of Treatment: Resume coreg at lower dose of 3.125 mg oral daily  resume norvasc as ordered    Diagnosis: Suprapubic tenderness  Assessment and Plan of Treatment: now resolved, urine culture negative for infection    Diagnosis: Hypothyroid  Assessment and Plan of Treatment: continue synthroid 100 mg daily  A CT of the chest revealed abnormality of thyroid, Ultrasound withtout any nodule      Diagnosis: Breast lesion  Assessment and Plan of Treatment: CTA chest from showed nonspecific bilateral breast soft tissue calcifications.  Follow up with your primary care doctor. Will need further outpatient imaging to exclude potential underlying lesion    Diagnosis: Chronic congestive heart failure  Assessment and Plan of Treatment: continue aldactone  you are not in acute heart failure  Follow up with cardiology in 1 week     PRINCIPAL DISCHARGE DIAGNOSIS  Diagnosis: Syncope  Assessment and Plan of Treatment: An interrogation of loop recorber revealing complete block now a micra Pacemaker place don 4/18/22  You will need to follow up with  5/5/22 9:40 for groin and pacemaker check  Follow up with cardiology, Dr. Buckley in 1 week  Follow up with primary care doctor in 1 week      SECONDARY DISCHARGE DIAGNOSES  Diagnosis: ANNA (acute kidney injury)  Assessment and Plan of Treatment: Avoid taking (NSAIDs) - (ex: Ibuprofen, Advil, Celebrex, Naprosyn)  Avoid taking any nephrotoxic agents (can harm kidneys) - Intravenous contrast for diagnostic testing, combination cold medications.  Have all medications adjusted for your renal function by your Health Care Provider.  Blood pressure control is important.  Take all medication as prescribed.  Creatinine improved. Follow up with PMD for managment and repeat lab work next week       Diagnosis: Hypertension  Assessment and Plan of Treatment: Resume coreg at lower dose of 3.125 mg oral daily  resume norvasc as ordered    Diagnosis: Suprapubic tenderness  Assessment and Plan of Treatment: now resolved, urine culture negative for infection    Diagnosis: Hypothyroid  Assessment and Plan of Treatment: continue synthroid 100 mg daily  A CT of the chest revealed abnormality of thyroid, Ultrasound withtout any nodule      Diagnosis: Breast lesion  Assessment and Plan of Treatment: CTA chest from showed nonspecific bilateral breast soft tissue calcifications.  Follow up with your primary care doctor. Will need further outpatient imaging to exclude potential underlying lesion    Diagnosis: Chronic congestive heart failure  Assessment and Plan of Treatment: continue aldactone  you are not in acute heart failure  Follow up with cardiology in 1 week

## 2022-04-18 NOTE — DISCHARGE NOTE PROVIDER - NSDCFUADDAPPT_GEN_ALL_CORE_FT
APPTS ARE READY TO BE MADE: [x] YES    Best Family or Patient Contact (if needed):    Additional Information about above appointments (if needed):    1: w/ Dr Tan on 5/5 at 930AM for groin check and pacemaker check  2:   3:     Other comments or requests:    APPTS ARE READY TO BE MADE: [x] YES    Best Family or Patient Contact (if needed):    Additional Information about above appointments (if needed):    1: w/ Dr Tan on 5/5 at 930AM for groin check and pacemaker check  2:   3:     Other comments or requests:   Patient was previously scheduled with Juan Dunlap on 05/05/2022 9:30am at   [address] [place] [address], [place] [address].    Patient was provided with information for Kam Marley, and Jose Law and was advised to call to schedule follow up within specified time frame. At this time patient declined scheduling assistance.

## 2022-04-18 NOTE — DISCHARGE NOTE PROVIDER - NSDCMRMEDTOKEN_GEN_ALL_CORE_FT
aspirin 81 mg oral delayed release tablet: 1 tab(s) orally once a day  atorvastatin 20 mg oral tablet: 1 tab(s) orally once a day (at bedtime)  carvedilol 12.5 mg oral tablet: 1 tab(s) orally 2 times a day  diclofenac 1% topical gel:   ketoconazole 2% topical cream: Apply topically to affected area once a day  levothyroxine 100 mcg (0.1 mg) oral tablet: 1 tab(s) orally once a day  nystatin 100,000 units/g topical cream: Apply topically to affected area 2 times a day  polyethylene glycol 3350 oral powder for reconstitution: 17 gram(s) orally once a day  spironolactone 25 mg oral tablet: 0.5 tab(s) orally once a day  traMADol 50 mg oral tablet: 1 tab(s) orally 2 times a day, As Needed  Tylenol 500 mg oral tablet: 1 tab(s) orally every 8 hours, As Needed   aspirin 81 mg oral delayed release tablet: 1 tab(s) orally once a day  atorvastatin 20 mg oral tablet: 1 tab(s) orally once a day (at bedtime)  diclofenac 1% topical gel:   ketoconazole 2% topical cream: Apply topically to affected area once a day  levothyroxine 100 mcg (0.1 mg) oral tablet: 1 tab(s) orally once a day  nystatin 100,000 units/g topical cream: Apply topically to affected area 2 times a day  polyethylene glycol 3350 oral powder for reconstitution: 17 gram(s) orally once a day  spironolactone 25 mg oral tablet: 0.5 tab(s) orally once a day  Tylenol 500 mg oral tablet: 1 tab(s) orally every 8 hours, As Needed   amLODIPine 5 mg oral tablet: 1 tab(s) orally once a day  aspirin 81 mg oral delayed release tablet: 1 tab(s) orally once a day  atorvastatin 20 mg oral tablet: 1 tab(s) orally once a day (at bedtime)  carvedilol 3.125 mg oral tablet: 1 tab(s) orally every 12 hours  diclofenac 1% topical gel:   home physical therapy :   ketoconazole 2% topical cream: Apply topically to affected area once a day  levothyroxine 100 mcg (0.1 mg) oral tablet: 1 tab(s) orally once a day  nystatin 100,000 units/g topical cream: Apply topically to affected area 2 times a day  polyethylene glycol 3350 oral powder for reconstitution: 17 gram(s) orally once a day  spironolactone 25 mg oral tablet: 0.5 tab(s) orally once a day  Tylenol 500 mg oral tablet: 1 tab(s) orally every 8 hours, As Needed   amLODIPine 5 mg oral tablet: 1 tab(s) orally once a day  aspirin 81 mg oral delayed release tablet: 1 tab(s) orally once a day  atorvastatin 20 mg oral tablet: 1 tab(s) orally once a day (at bedtime)  carvedilol 3.125 mg oral tablet: 1 tab(s) orally every 12 hours  diclofenac 1% topical gel:   home physical therapy : 1  once a day   ketoconazole 2% topical cream: Apply topically to affected area once a day  levothyroxine 100 mcg (0.1 mg) oral tablet: 1 tab(s) orally once a day  nystatin 100,000 units/g topical cream: Apply topically to affected area 2 times a day  polyethylene glycol 3350 oral powder for reconstitution: 17 gram(s) orally once a day  spironolactone 25 mg oral tablet: 1 tab(s) orally once a day   Tylenol 500 mg oral tablet: 1 tab(s) orally every 8 hours, As Needed

## 2022-04-18 NOTE — CONSULT NOTE ADULT - ASSESSMENT
Patient seen and examined, agree with above assessment and plan as transcribed above.    - s/p MIcra   - Anticipate D/c planning 4/19    Malcolm Valdez MD, [facility]  BEEPER [phone]

## 2022-04-18 NOTE — DISCHARGE NOTE PROVIDER - NSDCADMDATE_GEN_ALL_CORE_FT
15-Apr-2022 20:42 Humira Counseling:  I discussed with the patient the risks of adalimumab including but not limited to myelosuppression, immunosuppression, autoimmune hepatitis, demyelinating diseases, lymphoma, and serious infections.  The patient understands that monitoring is required including a PPD at baseline and must alert us or the primary physician if symptoms of infection or other concerning signs are noted.

## 2022-04-18 NOTE — PROGRESS NOTE ADULT - SUBJECTIVE AND OBJECTIVE BOX
EP Attending    HISTORY OF PRESENT ILLNESS: HPI:  89M with PMH of HTN, HFpEF, CHB, pAFib, CKD, recurrent UTIs (ESBL), HLD, OA, hypothyroid, presents to the ED for [facility] assisted [facility] with syncope. Patient says she is feeling okay, does not recall the event. She says the last thing she remembers is having lunch and wanting to go back to her room. Per daughter, patient had an unwitnessed syncopal episode however no trauma. Patient denies any fevers, chills, chest pain, or shortness of breath. She does have some urinary urgency however has been having some difficulty urinating with some suprapubic discomfort. Patient denies any nausea, vomiting, abdominal pain, diarrhea, or dysuria. Has been having some soft stools, but no melena or hematochezia. Patient is placed for a pacemaker outpatient tomorrow. Of note, patient was discharged less than a week ago, at which time she was admitted for acute metabolic encephalopathy in the setting of UTI/pyelonephritis, also found to be bacteremic, completed a course of antibiotics.     In the ED, T was 97.5, HR 64, /74, RR 18 satting 94% on room air. (15 Apr 2022 23:32)    Ms Gilliland is a very pleasant 89yo woman who is an ambulatory resident of an assisted living facility, who presents after multiple episodes of fainting in the last month.  She has a loop recorder for syncope /bradycardia surveillance, which up until this month was uneventful.  Earlier this month it showed symptomatic complete AV block.  Family wanted to put off taking her to the ED but she had fainted again after eating lunch.  She has felt well overnight . In general more fatigued lately.  No injury from fainting. A 10 pt ROS is otherwise negative.  Was seen by CCU fellow last night to interrogate the loop recorder, and as part of sign-out for Cardiology patients who could potentially decompensate.  On telemetry overnight, only mild sinus bradycardia, no further pauses.    4/17- uneventful overnight on telemetry. sleeping soundly this morning.  Date of service 4/18- comfortable overnight.  consented for Micra pacemaker this morning.    amLODIPine   Tablet 5 milliGRAM(s) Oral daily  aspirin enteric coated 81 milliGRAM(s) Oral daily  atorvastatin 20 milliGRAM(s) Oral at bedtime  carvedilol 3.125 milliGRAM(s) Oral every 12 hours  levothyroxine 100 MICROGram(s) Oral daily  polyethylene glycol 3350 17 Gram(s) Oral daily  spironolactone 25 milliGRAM(s) Oral daily  traMADol 50 milliGRAM(s) Oral two times a day PRN                            12.6   6.97  )-----------( 189      ( 18 Apr 2022 06:32 )             38.1       04-18    141  |  104  |  35<H>  ----------------------------<  94  4.3   |  22  |  1.19    Ca    9.3      18 Apr 2022 06:32  Mg     2.0     04-18    TPro  6.9  /  Alb  3.7  /  TBili  0.4  /  DBili  x   /  AST  32  /  ALT  74<H>  /  AlkPhos  153<H>  04-18    T(C): 36.4 (04-18-22 @ 08:36), Max: 36.6 (04-17-22 @ 11:21)  HR: 70 (04-18-22 @ 08:36) (66 - 79)  BP: 153/82 (04-18-22 @ 08:36) (124/73 - 200/98)  RR: 18 (04-18-22 @ 08:36) (17 - 19)  SpO2: 98% (04-18-22 @ 08:36) (95% - 99%)  Wt(kg): --    I&O's Summary    17 Apr 2022 07:01  -  18 Apr 2022 07:00  --------------------------------------------------------  IN: 910 mL / OUT: 2050 mL / NET: -1140 mL    18 Apr 2022 07:01  -  18 Apr 2022 10:22  --------------------------------------------------------  IN: 0 mL / OUT: 0 mL / NET: 0 mL      Appearance: Normal appearing elderly woman in no acute distress, cooperative	  HEENT:   Normal oral mucosa, PERRL, EOMI	  Lymphatic: No lymphadenopathy , no edema  Cardiovascular: Normal S1 S2, No JVD, No murmurs , Peripheral pulses palpable 2+ bilaterally  Respiratory: Lungs clear to auscultation, normal effort 	  Gastrointestinal:  Soft, Non-tender, + BS	  Skin: No rashes, No ecchymoses, No cyanosis, warm to touch  Musculoskeletal: Normal range of motion, normal strength  Psychiatry:  Mood & affect appropriate    TELEMETRY: Sinus carolyn 57-65bpm	    ECG: NSR  Echo: mild LVH, Normal LV EF, diastolic dysfunction.    ASSESSMENT/PLAN: 	89y Female with HFpEF, paroxysmal AFib, here with syncope.      ILR interrogated in the ED, shows complete AV block on 4/4.  No arrhythmia events to correlate with syncope on day of admission, which may have been orthostatic after getting up from eating lunch.  Based on prior complete AV block on her ILR, she requires permanent ventricular pacing.  She has a normal LV EF.  I have reduced her carvedilol dose from 12.5mg BID to 3.125mg BID.  Started Amlodipine 5mg daily for HTN.  NPO for Micra pacemaker today.  Followup w/ Dr Tan for device check and Dr Buckley for general cardiology on discharge.    Jony Montoya M.D.  Cardiac Electrophysiology  [phone]      Jony Montoya M.D.  Cardiac Electrophysiology    office [phone]  pager [phone]

## 2022-04-18 NOTE — DISCHARGE NOTE PROVIDER - CARE PROVIDER_API CALL
Kam Buckley  CARDIOLOGY  [address], [address]  [place] [address]  Phone: [phone]  Fax: [phone]  Follow Up Time: 1 week    Juan Tan  CARDIAC ELECTROPHYSIOLOGY  [address]  [place] [address]  Phone: [phone]  Fax: [phone]  Scheduled Appointment: 05/05/2022 09:30 AM    Jose Shah  GASTROENTEROLOGY  [address], [address]  [place] [address]  Phone: [phone]  Fax: [phone]  Follow Up Time: 1 week

## 2022-04-18 NOTE — PRE-ANESTHESIA EVALUATION ADULT - NSANTHPMHFT_GEN_ALL_CORE
89M with PMH of HTN, HFpEF, pAfib, CHB, CKD, recurrent UTIs (ESBL), HLD, OA, hypothyroid, presents to the ED for [facility] with syncope, found to have complete AV block pending micra placement

## 2022-04-18 NOTE — DISCHARGE NOTE PROVIDER - PROVIDER TOKENS
PROVIDER:[TOKEN:[[id number]],FOLLOWUP:[1 week]],PROVIDER:[TOKEN:[[id number]],SCHEDULEDAPPT:[05/05/2022],SCHEDULEDAPPTTIME:[09:30 AM]],PROVIDER:[TOKEN:[[id number]],FOLLOWUP:[1 week]]

## 2022-04-18 NOTE — DISCHARGE NOTE PROVIDER - HOSPITAL COURSE
HPI:  89M with PMH of HTN, HFpEF, CHB, pAFib, CKD, recurrent UTIs (ESBL), HLD, OA, hypothyroid, presents to the ED for [facility] with syncope. Patient says she is feeling okay, does not recall the event. She says the last thing she remembers is having lunch and wanting to go back to her room. Per daughter, patient had an unwitnessed syncopal episode however no trauma. Patient denies any fevers, chills, chest pain, or shortness of breath. She does have some urinary urgency however has been having some difficulty urinating with some suprapubic discomfort. Patient denies any nausea, vomiting, abdominal pain, diarrhea, or dysuria. Has been having some soft stools, but no melena or hematochezia. Patient is placed for a pacemaker outpatient tomorrow. Of note, patient was discharged less than a week ago, at which time she was admitted for acute metabolic encephalopathy in the setting of UTI/pyelonephritis, also found to be bacteremic, completed a course of antibiotics.     In the ED, T was 97.5, HR 64, /74, RR 18 satting 94% on room air.    Hospital Course:   HPI:  89M with PMH of HTN, HFpEF, CHB, pAFib, CKD, recurrent UTIs (ESBL), HLD, OA, hypothyroid, presents to the ED for [facility] with syncope. Patient says she is feeling okay, does not recall the event. She says the last thing she remembers is having lunch and wanting to go back to her room. Per daughter, patient had an unwitnessed syncopal episode however no trauma. Patient denies any fevers, chills, chest pain, or shortness of breath. She does have some urinary urgency however has been having some difficulty urinating with some suprapubic discomfort. Patient denies any nausea, vomiting, abdominal pain, diarrhea, or dysuria. Has been having some soft stools, but no melena or hematochezia. Patient is placed for a pacemaker outpatient tomorrow. Of note, patient was discharged less than a week ago, at which time she was admitted for acute metabolic encephalopathy in the setting of UTI/pyelonephritis, also found to be bacteremic, completed a course of antibiotics.     In the ED, T was 97.5, HR 64, /74, RR 18 satting 94% on room air.    Hospital Course:  # Syncope.   - Patient presents with syncopal episode possibly 2/2 infection vs cardiac arrhythmia vs hypotension  - no signs of acute infection. UA negative. No respiratory symptoms. RVP negative.   - ILR interrogated, complete AVB 4/4  - appreciate EP recs: s/p Micra placement 4/18  - monitor on tele  TTE:  EF 55%  1. Mitral annular calcification and calcified mitral  leaflets with normal diastolic opening. Mild mitral  regurgitation.  2. Calcified trileaflet aortic valve with midly decreased  opening. Peak transaortic valve gradient equals 8 mm Hg,  mean transaortic valve gradient equals 4 mm Hg, aortic  valve velocity time integral equals 31 cm.  3. Eccentric left ventricular hypertrophy (dilated left  ventricle with normal relative wall thickness).  4. Overall preserved left ventricular ejection fraction.  Apical cap hypokinesis.  5. Severe  diastolic dysfunction.  6. Normal right ventricular size and function.    - reduced coreg dose to 3.125mg BID.    # ANNA (acute kidney injury).   ·  Plan: Patient's Cr 1.32 on admission (1.08 on 4/9/22)  - SCr trending down- voiding , no retention  - UA negative, cx neg    # Suprapubic tenderness.   - resolved  - no signs of UTI. UA neg, cx neg    # Hypertension.   - reduce coreg dose to 3.125mg BID0 reduced dose form home dose  - started on norvasc 5 mg    # Chronic congestive heart failure.   -Hx of CHF, last known TTE from 10/21 showed EF 65% with mild LVH, preserved LV systolic function  - resume spironolactone  - reduce coreg dose to 3.125mg BID    #Hypothyroid.   - resume levothyroxine 100mcg qd  - heterogenous thyroid seen on CTA chest from 4/4  - Thyoid US: Thyroid gland is normal in size. Heterogeneous parenchyma. No focal nodule.   Intimal thickening/soft and calcified plaque common carotid arteries.   Ectatic right common carotid artery.  - Dopplers recommended    #Hyperlipidemia.   - resume home statin.    # Breast lesion.   - CTA chest from 4/4 showed nonspecific bilateral breast soft tissue calcifications. Asymmetric right > left   breast soft tissue  - will need further outpatient imaging to exclude potential underlying lesion/neoplasm.    -Prominent wall at the proximal ascending colon, which may be due to partial distention vs underlying neoplasm. Patient previously refused further testing.    If patient remains HDS status post micraPPM tomorrow, should be medically cleared for discharge. Discussed with medicine attending. The med rec will be reviewed prior to discharge.  PT seen....... :    89M with PMH of HTN, HFpEF, pAfib, CHB, CKD, recurrent UTIs (ESBL), HLD, OA, hypothyroid, presents to the ED for [facility] with syncope, found to have complete AV block s/p micra placement 4/18, hospital course complicated by ANNA     Problem/Plan - 1:  ·  Problem: ANNA (acute kidney injury).   ·  Plan: SCr 1.49 -> 1.65  - unclear etiology but most likely pre-renal in setting of poor po intake yesterday all day  - s/p gentle IVF hydration  - bladder scan with <300cc  - avoid nephrotoxins  - creatinine improved.      Problem/Plan - 2:  ·  Problem: Syncope.   ·  Plan: Patient presents with syncopal episode possibly 2/2 infection vs cardiac arrhythmia vs hypotension  - no signs of acute infection. UA negative. No respiratory symptoms. RVP negative.   - ILR interrogated, complete AVB 4/4  - appreciate EP recs: s/p Micra placement   - monitor on tele  - TTE reviewed as above  - reduced coreg dose to 3.125mg BID.     Problem/Plan - 3:  ·  Problem: Suprapubic tenderness.   ·  Plan: Patient has some suprapubic tenderness  - no signs of UTI. UA neg, f/u urine culture.     Problem/Plan - 4:  ·  Problem: Hypertension.   ·  Plan: Patient hypertensive  - reduce coreg dose to 3.125mg BID.     Problem/Plan - 5:  ·  Problem: Chronic congestive heart failure.   ·  Plan: Hx of CHF, last known TTE from 10/21 showed EF 65% with mild LVH, preserved LV systolic function  - resume spironolactone  - reduce coreg dose to 3.125mg BID  - monitor Is&Os.     Problem/Plan - 6:  ·  Problem: Hypothyroid.   ·  Plan: - resume levothyroxine 100mcg qd  - thyroid US negative, recommend carotid doppler.     Problem/Plan - 7:  ·  Problem: Hyperlipidemia.   ·  Plan: - resume home statin.     Problem/Plan - 8:  ·  Problem: Breast lesion.   ·  Plan: CTA chest from 4/4 showed nonspecific bilateral breast soft tissue calcifications. Asymmetric right > left   breast soft tissue  - will need further outpatient imaging to exclude potential underlying lesion/neoplasm.    -Prominent wall at the proximal ascending colon, which may be due to partial distention vs underlying neoplasm. Patient previously refused further testing.

## 2022-04-18 NOTE — CHART NOTE - NSCHARTNOTEFT_GEN_A_CORE
SOLIS TAYLOR  [id number]    PROCEDURE:  AV Micra implant      INDICATION:  intermittent AV block  syncope      ELECTROPHYSIOLOGIST(S):  Dr. Montoya  Fellow Dr. Finch      ANESTHESIOLOGY:  [facility], local      FINDINGS:  - US guided vascular access  - uncomplicated implant of AV Micra in a mid-high septal position with excellent parameters.  - hemostasis with figure of 8 suture      COMPLICATIONS:  none      RECOMMENDATIONS:  - monitor Rt. groin  - MD will remove suture at 4 pm SOLIS CLAUDIA  [id number]    PROCEDURE:  AV Micra implant      INDICATION:  intermittent AV block  syncope      ELECTROPHYSIOLOGIST(S):  Dr. Montoya  Fellow Dr. Finch      ANESTHESIOLOGY:  MAc, local      FINDINGS:  - US guided vascular access  - uncomplicated implant of AV Micra in a mid-high septal position with excellent parameters.  - hemostasis with figure of 8 suture      COMPLICATIONS:  none      RECOMMENDATIONS:  - monitor Rt. groin  - MD will remove suture at 4 pm  -Followup w/ Dr Tan on 5/5 at 930AM for groin check and pacemaker check.  -Followup with her Cardiologist, Dr Buckley, as scheduled.  -Anticipate discharge tomorrow.

## 2022-04-18 NOTE — PROGRESS NOTE ADULT - SUBJECTIVE AND OBJECTIVE BOX
Patient is a 89y old  Female who presents with a chief complaint of syncope (18 Apr 2022 10:21)      SUBJECTIVE / OVERNIGHT EVENTS:  no acute events overnight, vss, afebrile  s/p micra placement with EP this morning  pt feels okay     tele reviewed: sinus 60-80s    ROS:  14 point ROS negative in detail except stated as above    MEDICATIONS  (STANDING):  amLODIPine   Tablet 5 milliGRAM(s) Oral daily  aspirin enteric coated 81 milliGRAM(s) Oral daily  atorvastatin 20 milliGRAM(s) Oral at bedtime  carvedilol 3.125 milliGRAM(s) Oral every 12 hours  levothyroxine 100 MICROGram(s) Oral daily  polyethylene glycol 3350 17 Gram(s) Oral daily  spironolactone 25 milliGRAM(s) Oral daily    MEDICATIONS  (PRN):  traMADol 50 milliGRAM(s) Oral two times a day PRN Severe Pain (7 - 10)      CAPILLARY BLOOD GLUCOSE        I&O's Summary    17 Apr 2022 07:01  -  18 Apr 2022 07:00  --------------------------------------------------------  IN: 910 mL / OUT: 2050 mL / NET: -1140 mL    18 Apr 2022 07:01  -  18 Apr 2022 12:45  --------------------------------------------------------  IN: 0 mL / OUT: 0 mL / NET: 0 mL        PHYSICAL EXAM:  Vital Signs Last 24 Hrs  T(C): 36.2 (18 Apr 2022 10:05), Max: 36.5 (18 Apr 2022 04:26)  T(F): 97.2 (18 Apr 2022 10:05), Max: 97.7 (18 Apr 2022 04:26)  HR: 60 (18 Apr 2022 11:25) (57 - 79)  BP: 117/58 (18 Apr 2022 11:25) (105/54 - 200/98)  BP(mean): 105 (18 Apr 2022 08:36) (105 - 105)  RR: 14 (18 Apr 2022 11:25) (14 - 19)  SpO2: 96% (18 Apr 2022 11:25) (95% - 99%)    GENERAL: NAD, well-developed  HEAD:  Atraumatic, Normocephalic  EYES: EOMI, PERRLA, conjunctiva and sclera clear  NECK: Supple, No JVD  CHEST/LUNG: Clear to auscultation bilaterally; No wheeze  HEART: Regular rate and rhythm; No murmurs, rubs, or gallops  ABDOMEN: Soft, Nontender, Nondistended; Bowel sounds present  EXTREMITIES:  2+ Peripheral Pulses, No clubbing, cyanosis, or edema  NEUROLOGY: AAOx3; non-focal  SKIN: No rashes or lesions    LABS:  personally reviewed                        12.6   6.97  )-----------( 189      ( 18 Apr 2022 06:32 )             38.1     04-18    141  |  104  |  35<H>  ----------------------------<  94  4.3   |  22  |  1.19    Ca    9.3      18 Apr 2022 06:32  Mg     2.0     04-18    TPro  6.9  /  Alb  3.7  /  TBili  0.4  /  DBili  x   /  AST  32  /  ALT  74<H>  /  AlkPhos  153<H>  04-18    PT/INR - ( 18 Apr 2022 06:32 )   PT: 14.5 sec;   INR: 1.25 ratio         PTT - ( 18 Apr 2022 06:32 )  PTT:32.6 sec          RADIOLOGY & ADDITIONAL TESTS:    Imaging Personally Reviewed:    Consultant(s) Notes Reviewed:  EP    Care Discussed with Consultants/Other Providers:

## 2022-04-18 NOTE — PROGRESS NOTE ADULT - NSPROGADDITIONALINFOA_GEN_ALL_CORE
above plans discussed with PHIL Zamudio MD  Division of Hospital Medicine  Contact via [employer] Teams  Office: [phone]

## 2022-04-19 DIAGNOSIS — N17.9 ACUTE KIDNEY FAILURE, UNSPECIFIED: ICD-10-CM

## 2022-04-19 LAB
ALBUMIN SERPL ELPH-MCNC: 3.5 G/DL — SIGNIFICANT CHANGE UP (ref 3.3–5)
ALP SERPL-CCNC: 147 U/L — HIGH (ref 40–120)
ALT FLD-CCNC: 58 U/L — HIGH (ref 10–45)
ANION GAP SERPL CALC-SCNC: 14 MMOL/L — SIGNIFICANT CHANGE UP (ref 5–17)
ANION GAP SERPL CALC-SCNC: 16 MMOL/L — SIGNIFICANT CHANGE UP (ref 5–17)
AST SERPL-CCNC: 30 U/L — SIGNIFICANT CHANGE UP (ref 10–40)
BASOPHILS # BLD AUTO: 0.05 K/UL — SIGNIFICANT CHANGE UP (ref 0–0.2)
BASOPHILS NFR BLD AUTO: 0.6 % — SIGNIFICANT CHANGE UP (ref 0–2)
BILIRUB SERPL-MCNC: 0.3 MG/DL — SIGNIFICANT CHANGE UP (ref 0.2–1.2)
BUN SERPL-MCNC: 46 MG/DL — HIGH (ref 7–23)
BUN SERPL-MCNC: 57 MG/DL — HIGH (ref 7–23)
CALCIUM SERPL-MCNC: 8.8 MG/DL — SIGNIFICANT CHANGE UP (ref 8.4–10.5)
CALCIUM SERPL-MCNC: 8.8 MG/DL — SIGNIFICANT CHANGE UP (ref 8.4–10.5)
CHLORIDE SERPL-SCNC: 103 MMOL/L — SIGNIFICANT CHANGE UP (ref 96–108)
CHLORIDE SERPL-SCNC: 105 MMOL/L — SIGNIFICANT CHANGE UP (ref 96–108)
CO2 SERPL-SCNC: 19 MMOL/L — LOW (ref 22–31)
CO2 SERPL-SCNC: 21 MMOL/L — LOW (ref 22–31)
CREAT SERPL-MCNC: 1.49 MG/DL — HIGH (ref 0.5–1.3)
CREAT SERPL-MCNC: 1.65 MG/DL — HIGH (ref 0.5–1.3)
EGFR: 28 ML/MIN/1.73M2 — LOW
EGFR: 31 ML/MIN/1.73M2 — LOW
EOSINOPHIL # BLD AUTO: 0.32 K/UL — SIGNIFICANT CHANGE UP (ref 0–0.5)
EOSINOPHIL NFR BLD AUTO: 3.8 % — SIGNIFICANT CHANGE UP (ref 0–6)
GLUCOSE SERPL-MCNC: 109 MG/DL — HIGH (ref 70–99)
GLUCOSE SERPL-MCNC: 93 MG/DL — SIGNIFICANT CHANGE UP (ref 70–99)
HCT VFR BLD CALC: 36.9 % — SIGNIFICANT CHANGE UP (ref 34.5–45)
HGB BLD-MCNC: 12 G/DL — SIGNIFICANT CHANGE UP (ref 11.5–15.5)
IMM GRANULOCYTES NFR BLD AUTO: 0.8 % — SIGNIFICANT CHANGE UP (ref 0–1.5)
LYMPHOCYTES # BLD AUTO: 1.77 K/UL — SIGNIFICANT CHANGE UP (ref 1–3.3)
LYMPHOCYTES # BLD AUTO: 21.1 % — SIGNIFICANT CHANGE UP (ref 13–44)
MAGNESIUM SERPL-MCNC: 1.9 MG/DL — SIGNIFICANT CHANGE UP (ref 1.6–2.6)
MCHC RBC-ENTMCNC: 30.3 PG — SIGNIFICANT CHANGE UP (ref 27–34)
MCHC RBC-ENTMCNC: 32.5 GM/DL — SIGNIFICANT CHANGE UP (ref 32–36)
MCV RBC AUTO: 93.2 FL — SIGNIFICANT CHANGE UP (ref 80–100)
MONOCYTES # BLD AUTO: 0.99 K/UL — HIGH (ref 0–0.9)
MONOCYTES NFR BLD AUTO: 11.8 % — SIGNIFICANT CHANGE UP (ref 2–14)
NEUTROPHILS # BLD AUTO: 5.19 K/UL — SIGNIFICANT CHANGE UP (ref 1.8–7.4)
NEUTROPHILS NFR BLD AUTO: 61.9 % — SIGNIFICANT CHANGE UP (ref 43–77)
NRBC # BLD: 0 /100 WBCS — SIGNIFICANT CHANGE UP (ref 0–0)
PLATELET # BLD AUTO: 170 K/UL — SIGNIFICANT CHANGE UP (ref 150–400)
POTASSIUM SERPL-MCNC: 4.8 MMOL/L — SIGNIFICANT CHANGE UP (ref 3.5–5.3)
POTASSIUM SERPL-MCNC: 5 MMOL/L — SIGNIFICANT CHANGE UP (ref 3.5–5.3)
POTASSIUM SERPL-SCNC: 4.8 MMOL/L — SIGNIFICANT CHANGE UP (ref 3.5–5.3)
POTASSIUM SERPL-SCNC: 5 MMOL/L — SIGNIFICANT CHANGE UP (ref 3.5–5.3)
PROT SERPL-MCNC: 6.4 G/DL — SIGNIFICANT CHANGE UP (ref 6–8.3)
RBC # BLD: 3.96 M/UL — SIGNIFICANT CHANGE UP (ref 3.8–5.2)
RBC # FLD: 14.6 % — HIGH (ref 10.3–14.5)
SODIUM SERPL-SCNC: 138 MMOL/L — SIGNIFICANT CHANGE UP (ref 135–145)
SODIUM SERPL-SCNC: 140 MMOL/L — SIGNIFICANT CHANGE UP (ref 135–145)
WBC # BLD: 8.39 K/UL — SIGNIFICANT CHANGE UP (ref 3.8–10.5)
WBC # FLD AUTO: 8.39 K/UL — SIGNIFICANT CHANGE UP (ref 3.8–10.5)

## 2022-04-19 PROCEDURE — 93880 EXTRACRANIAL BILAT STUDY: CPT | Mod: 26

## 2022-04-19 PROCEDURE — 99233 SBSQ HOSP IP/OBS HIGH 50: CPT

## 2022-04-19 RX ORDER — SODIUM CHLORIDE 9 MG/ML
1000 INJECTION INTRAMUSCULAR; INTRAVENOUS; SUBCUTANEOUS
Refills: 0 | Status: DISCONTINUED | OUTPATIENT
Start: 2022-04-19 | End: 2022-04-20

## 2022-04-19 RX ORDER — CARVEDILOL PHOSPHATE 80 MG/1
1 CAPSULE, EXTENDED RELEASE ORAL
Qty: 60 | Refills: 0
Start: 2022-04-19 | End: 2022-05-18

## 2022-04-19 RX ORDER — AMLODIPINE BESYLATE 2.5 MG/1
1 TABLET ORAL
Qty: 30 | Refills: 0
Start: 2022-04-19 | End: 2022-05-18

## 2022-04-19 RX ADMIN — CARVEDILOL PHOSPHATE 3.12 MILLIGRAM(S): 80 CAPSULE, EXTENDED RELEASE ORAL at 05:56

## 2022-04-19 RX ADMIN — SPIRONOLACTONE 25 MILLIGRAM(S): 25 TABLET, FILM COATED ORAL at 05:56

## 2022-04-19 RX ADMIN — POLYETHYLENE GLYCOL 3350 17 GRAM(S): 17 POWDER, FOR SOLUTION ORAL at 19:14

## 2022-04-19 RX ADMIN — CARVEDILOL PHOSPHATE 3.12 MILLIGRAM(S): 80 CAPSULE, EXTENDED RELEASE ORAL at 19:14

## 2022-04-19 RX ADMIN — Medication 81 MILLIGRAM(S): at 19:14

## 2022-04-19 RX ADMIN — AMLODIPINE BESYLATE 5 MILLIGRAM(S): 2.5 TABLET ORAL at 05:56

## 2022-04-19 RX ADMIN — Medication 100 MICROGRAM(S): at 05:55

## 2022-04-19 RX ADMIN — SODIUM CHLORIDE 50 MILLILITER(S): 9 INJECTION INTRAMUSCULAR; INTRAVENOUS; SUBCUTANEOUS at 14:29

## 2022-04-19 RX ADMIN — ATORVASTATIN CALCIUM 20 MILLIGRAM(S): 80 TABLET, FILM COATED ORAL at 21:53

## 2022-04-19 RX ADMIN — SODIUM CHLORIDE 50 MILLILITER(S): 9 INJECTION INTRAMUSCULAR; INTRAVENOUS; SUBCUTANEOUS at 21:53

## 2022-04-19 RX ADMIN — POLYETHYLENE GLYCOL 3350 17 GRAM(S): 17 POWDER, FOR SOLUTION ORAL at 05:56

## 2022-04-19 NOTE — PROGRESS NOTE ADULT - SUBJECTIVE AND OBJECTIVE BOX
Patient is a 89y old  Female who presents with a chief complaint of syncope (18 Apr 2022 17:48)      SUBJECTIVE / OVERNIGHT EVENTS:  no acute events overnight, vss, afebrile  pt is glad everything went well with micra placement  pt complains that she hasn't moved her BM - feels uncomfortable  she wants to work with PT and see how she feels before returning to [facility]    tele reviewed: sinus 60-70s    ROS:  14 point ROS negative in detail except stated as above    MEDICATIONS  (STANDING):  amLODIPine   Tablet 5 milliGRAM(s) Oral daily  aspirin enteric coated 81 milliGRAM(s) Oral daily  atorvastatin 20 milliGRAM(s) Oral at bedtime  carvedilol 3.125 milliGRAM(s) Oral every 12 hours  levothyroxine 100 MICROGram(s) Oral daily  polyethylene glycol 3350 17 Gram(s) Oral two times a day  senna 2 Tablet(s) Oral at bedtime  sodium chloride 0.9%. 1000 milliLiter(s) (50 mL/Hr) IV Continuous <Continuous>  spironolactone 25 milliGRAM(s) Oral daily    MEDICATIONS  (PRN):  benzonatate 100 milliGRAM(s) Oral three times a day PRN Cough  guaiFENesin Oral Liquid (Sugar-Free) 100 milliGRAM(s) Oral every 6 hours PRN Cough  traMADol 50 milliGRAM(s) Oral two times a day PRN Severe Pain (7 - 10)      CAPILLARY BLOOD GLUCOSE        I&O's Summary    18 Apr 2022 07:01  -  19 Apr 2022 07:00  --------------------------------------------------------  IN: 440 mL / OUT: 825 mL / NET: -385 mL    19 Apr 2022 07:01  -  19 Apr 2022 12:27  --------------------------------------------------------  IN: 240 mL / OUT: 0 mL / NET: 240 mL        PHYSICAL EXAM:  Vital Signs Last 24 Hrs  T(C): 36.8 (19 Apr 2022 11:14), Max: 36.9 (18 Apr 2022 19:33)  T(F): 98.3 (19 Apr 2022 11:14), Max: 98.4 (18 Apr 2022 19:33)  HR: 77 (19 Apr 2022 11:14) (62 - 84)  BP: 114/66 (19 Apr 2022 11:14) (107/65 - 163/94)  BP(mean): --  RR: 18 (19 Apr 2022 11:14) (17 - 18)  SpO2: 98% (19 Apr 2022 11:14) (95% - 98%)    GENERAL: NAD, well-developed  HEAD:  Atraumatic, Normocephalic  EYES: EOMI, PERRLA, conjunctiva and sclera clear  NECK: Supple, No JVD  CHEST/LUNG: Clear to auscultation bilaterally; No wheeze  HEART: Regular rate and rhythm; NORRIS  ABDOMEN: Soft, Nontender, Nondistended; Bowel sounds present  EXTREMITIES:  2+ Peripheral Pulses, No clubbing, cyanosis, or edema  NEUROLOGY: AAOx3; non-focal  SKIN: No rashes or lesions    LABS:  personally reviewed                        12.0   8.39  )-----------( 170      ( 19 Apr 2022 05:05 )             36.9     04-19    140  |  105  |  46<H>  ----------------------------<  93  4.8   |  21<L>  |  1.49<H>    Ca    8.8      19 Apr 2022 05:05  Mg     1.9     04-19    TPro  6.4  /  Alb  3.5  /  TBili  0.3  /  DBili  x   /  AST  30  /  ALT  58<H>  /  AlkPhos  147<H>  04-19    PT/INR - ( 18 Apr 2022 06:32 )   PT: 14.5 sec;   INR: 1.25 ratio         PTT - ( 18 Apr 2022 06:32 )  PTT:32.6 sec          RADIOLOGY & ADDITIONAL TESTS:    Imaging Personally Reviewed:  < from: US Thyroid (04.18.22 @ 12:47) >  Right Lobe: 2.8 cm x  1.3 cm x 1.3 cm.    Left Lobe: 4.0 cm x 1.7 cm x 1.1 cm. Heterogeneous with ill-defined areas   of increased echogenicity.    Isthmus: 2 mm.    Cervical Lymph Nodes: No enlarged or abnormal morphology cervical nodes.    Intimal thickening/soft and calcified plaque common carotid arteries.   Ectatic right common carotid artery.    IMPRESSION:    Thyroid gland is normal in size. Heterogeneous parenchyma. No focal   nodule.    Recommend carotid Doppler examination for reasons given above.    Consultant(s) Notes Reviewed:  cards, EP    Care Discussed with Consultants/Other Providers:

## 2022-04-19 NOTE — PROGRESS NOTE ADULT - SUBJECTIVE AND OBJECTIVE BOX
C A R D I O L O G Y  **********************************     DATE OF SERVICE: 04-19-22    Patient denies chest pain or shortness of breath.   Review of systems otherwise (-)  	  MEDICATIONS:  MEDICATIONS  (STANDING):  amLODIPine   Tablet 5 milliGRAM(s) Oral daily  aspirin enteric coated 81 milliGRAM(s) Oral daily  atorvastatin 20 milliGRAM(s) Oral at bedtime  carvedilol 3.125 milliGRAM(s) Oral every 12 hours  levothyroxine 100 MICROGram(s) Oral daily  polyethylene glycol 3350 17 Gram(s) Oral two times a day  senna 2 Tablet(s) Oral at bedtime  sodium chloride 0.9%. 1000 milliLiter(s) (50 mL/Hr) IV Continuous <Continuous>  spironolactone 25 milliGRAM(s) Oral daily      LABS:	 	    CARDIAC MARKERS:                                    12.0   8.39  )-----------( 170      ( 19 Apr 2022 05:05 )             36.9     Hemoglobin: 12.0 g/dL (04-19 @ 05:05)  Hemoglobin: 12.6 g/dL (04-18 @ 06:32)  Hemoglobin: 12.3 g/dL (04-17 @ 07:09)  Hemoglobin: 9.8 g/dL (04-16 @ 06:09)  Hemoglobin: 11.4 g/dL (04-15 @ 15:43)      04-19    140  |  105  |  46<H>  ----------------------------<  93  4.8   |  21<L>  |  1.49<H>    Ca    8.8      19 Apr 2022 05:05  Mg     1.9     04-19    TPro  6.4  /  Alb  3.5  /  TBili  0.3  /  DBili  x   /  AST  30  /  ALT  58<H>  /  AlkPhos  147<H>  04-19    Creatinine Trend: 1.49<--, 1.19<--, 1.04<--, 1.09<--, 1.32<--, 1.08<--        PHYSICAL EXAM:  T(C): 36.5 (04-19-22 @ 14:00), Max: 36.9 (04-18-22 @ 19:33)  HR: 63 (04-19-22 @ 14:00) (63 - 84)  BP: 147/76 (04-19-22 @ 14:00) (107/65 - 158/91)  RR: 18 (04-19-22 @ 14:00) (17 - 18)  SpO2: 97% (04-19-22 @ 14:00) (95% - 99%)  Wt(kg): --  I&O's Summary    18 Apr 2022 07:01  -  19 Apr 2022 07:00  --------------------------------------------------------  IN: 440 mL / OUT: 825 mL / NET: -385 mL    19 Apr 2022 07:01  -  19 Apr 2022 17:31  --------------------------------------------------------  IN: 480 mL / OUT: 250 mL / NET: 230 mL          Gen: Appears well in NAD  HEENT:  (-)icterus (-)pallor  CV: N S1 S2 1/6 NORRIS (+)2 Pulses B/l  Resp:  Clear to ausculatation B/L, normal effort  GI: (+) BS Soft, NT, ND  Lymph:  (-)Edema, (-)obvious lymphadenopathy  Skin: Warm to touch, Normal turgor  Psych: Appropriate mood and affect      TELEMETRY: 	  sinus        ASSESSMENT/PLAN: 	89y  Female  Female with PMH of HTN, CAD, prior syncope s/p loop recorder, PAF only on ASA, hypothyroidism, AVN of left femoral head, arthritis of knees, and chronic urinary frequency who is admitted s/p syncope. Cardiology consulted for further evaluation. 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 Now s/p micra PPM.    - EP f/u  appreciated  - No evidence of clinical HF or anginal symptoms  - TTE noted with normal LV function, severe diastolic dysfunction, no sig valvular abnormalities  - No further inpatient cardiac w/u planned  - Patient to f/u with her cardiologist Dr. Kam Buckley after d/c    Malcolm Valdez MD, [facility]  BEEPER [phone]

## 2022-04-19 NOTE — PROGRESS NOTE ADULT - SUBJECTIVE AND OBJECTIVE BOX
EP    HISTORY OF PRESENT ILLNESS: HPI:  89M with PMH of HTN, HFpEF, CHB, pAFib, CKD, recurrent UTIs (ESBL), HLD, OA, hypothyroid, presents to the ED for [facility] with syncope. Patient says she is feeling okay, does not recall the event. She says the last thing she remembers is having lunch and wanting to go back to her room. Per daughter, patient had an unwitnessed syncopal episode however no trauma. Patient denies any fevers, chills, chest pain, or shortness of breath. She does have some urinary urgency however has been having some difficulty urinating with some suprapubic discomfort. Patient denies any nausea, vomiting, abdominal pain, diarrhea, or dysuria. Has been having some soft stools, but no melena or hematochezia. Patient is placed for a pacemaker outpatient tomorrow. Of note, patient was discharged less than a week ago, at which time she was admitted for acute metabolic encephalopathy in the setting of UTI/pyelonephritis, also found to be bacteremic, completed a course of antibiotics.     In the ED, T was 97.5, HR 64, /74, RR 18 satting 94% on room air. (15 Apr 2022 23:32)    Ms Gilliland is a very pleasant 89yo woman who is an ambulatory resident of an assisted living facility, who presents after multiple episodes of fainting in the last month.  She has a loop recorder for syncope /bradycardia surveillance, which up until this month was uneventful.  Earlier this month it showed symptomatic complete AV block.  Family wanted to put off taking her to the ED but she had fainted again after eating lunch.  She has felt well overnight . In general more fatigued lately.  No injury from fainting. A 10 pt ROS is otherwise negative.  Was seen by CCU fellow last night to interrogate the loop recorder, and as part of sign-out for Cardiology patients who could potentially decompensate.  On telemetry overnight, only mild sinus bradycardia, no further pauses.    4/17- uneventful overnight on telemetry. sleeping soundly this morning.  Date of service 4/18- comfortable overnight.  consented for Micra pacemaker this morning.  Date of service 4/19/22 - s/p Micra PPM yesterday, no complaints today. Denies chest pain, SOB, palpitations, or dizziness. Review of systems otherwise negative      MEDICATIONS  (STANDING):  amLODIPine   Tablet 5 milliGRAM(s) Oral daily  aspirin enteric coated 81 milliGRAM(s) Oral daily  atorvastatin 20 milliGRAM(s) Oral at bedtime  carvedilol 3.125 milliGRAM(s) Oral every 12 hours  levothyroxine 100 MICROGram(s) Oral daily  polyethylene glycol 3350 17 Gram(s) Oral two times a day  senna 2 Tablet(s) Oral at bedtime  sodium chloride 0.9%. 1000 milliLiter(s) (50 mL/Hr) IV Continuous <Continuous>  spironolactone 25 milliGRAM(s) Oral daily    MEDICATIONS  (PRN):  benzonatate 100 milliGRAM(s) Oral three times a day PRN Cough  guaiFENesin Oral Liquid (Sugar-Free) 100 milliGRAM(s) Oral every 6 hours PRN Cough  traMADol 50 milliGRAM(s) Oral two times a day PRN Severe Pain (7 - 10)      LABS:                          12.0   8.39  )-----------( 170      ( 19 Apr 2022 05:05 )             36.9     Hemoglobin: 12.0 g/dL (04-19 @ 05:05)  Hemoglobin: 12.6 g/dL (04-18 @ 06:32)  Hemoglobin: 12.3 g/dL (04-17 @ 07:09)  Hemoglobin: 9.8 g/dL (04-16 @ 06:09)  Hemoglobin: 11.4 g/dL (04-15 @ 15:43)    04-19    140  |  105  |  46<H>  ----------------------------<  93  4.8   |  21<L>  |  1.49<H>    Ca    8.8      19 Apr 2022 05:05  Mg     1.9     04-19    TPro  6.4  /  Alb  3.5  /  TBili  0.3  /  DBili  x   /  AST  30  /  ALT  58<H>  /  AlkPhos  147<H>  04-19    Creatinine Trend: 1.49<--, 1.19<--, 1.04<--, 1.09<--, 1.32<--, 1.08<--             04-18-22 @ 07:01  -  04-19-22 @ 07:00  --------------------------------------------------------  IN: 440 mL / OUT: 825 mL / NET: -385 mL    04-19-22 @ 07:01  -  04-19-22 @ 16:34  --------------------------------------------------------  IN: 480 mL / OUT: 250 mL / NET: 230 mL        PHYSICAL EXAM  Vital Signs Last 24 Hrs  T(C): 36.5 (19 Apr 2022 14:00), Max: 36.9 (18 Apr 2022 19:33)  T(F): 97.7 (19 Apr 2022 14:00), Max: 98.4 (18 Apr 2022 19:33)  HR: 63 (19 Apr 2022 14:00) (62 - 84)  BP: 147/76 (19 Apr 2022 14:00) (107/65 - 160/84)  BP(mean): --  RR: 18 (19 Apr 2022 14:00) (17 - 18)  SpO2: 97% (19 Apr 2022 14:00) (95% - 99%)      Appearance: Normal appearing elderly woman in no acute distress, cooperative	  HEENT:   Normal oral mucosa, PERRL, EOMI	  Lymphatic: No lymphadenopathy , no edema  Cardiovascular: Normal S1 S2, No JVD, No murmurs , Peripheral pulses palpable 2+ bilaterally  Respiratory: Lungs clear to auscultation, normal effort 	  Gastrointestinal:  Soft, Non-tender, + BS	  Skin: No rashes, No ecchymoses, No cyanosis, warm to touch  Musculoskeletal: Normal range of motion, normal strength  Psychiatry:  Mood & affect appropriate  Ext: R groin site c/d/i, soft, no swelling/hematoma. Pulses intact    TELEMETRY: Sinus carolyn 57-65bpm	    ECG: NSR  Echo: mild LVH, Normal LV EF, diastolic dysfunction.    ASSESSMENT/PLAN: 	89y Female with HFpEF, paroxysmal AFib, here with syncope.      ILR interrogated in the ED, shows complete AV block on 4/4.  No arrhythmia events to correlate with syncope on day of admission, which may have been orthostatic after getting up from eating lunch.  Based on prior complete AV block on her ILR, she requires permanent ventricular pacing.  She has a normal LV EF.  I have reduced her carvedilol dose from 12.5mg BID to 3.125mg BID.  Started Amlodipine 5mg daily for HTN.  s/p Micra pacemaker on 4/18  ANNA management per med  Stable for discharge from EP perspective, awaiting medical clearance  Followup w/ Dr Tan for device check on 5/5 at 9:40 AM and Dr Buckley for general cardiology on discharge.    Clemente Corbett PA-C  Pager: [phone]   EP    HISTORY OF PRESENT ILLNESS: HPI:  89M with PMH of HTN, HFpEF, CHB, pAFib, CKD, recurrent UTIs (ESBL), HLD, OA, hypothyroid, presents to the ED for [facility] with syncope. Patient says she is feeling okay, does not recall the event. She says the last thing she remembers is having lunch and wanting to go back to her room. Per daughter, patient had an unwitnessed syncopal episode however no trauma. Patient denies any fevers, chills, chest pain, or shortness of breath. She does have some urinary urgency however has been having some difficulty urinating with some suprapubic discomfort. Patient denies any nausea, vomiting, abdominal pain, diarrhea, or dysuria. Has been having some soft stools, but no melena or hematochezia. Patient is placed for a pacemaker outpatient tomorrow. Of note, patient was discharged less than a week ago, at which time she was admitted for acute metabolic encephalopathy in the setting of UTI/pyelonephritis, also found to be bacteremic, completed a course of antibiotics.     In the ED, T was 97.5, HR 64, /74, RR 18 satting 94% on room air. (15 Apr 2022 23:32)    Ms Gilliland is a very pleasant 89yo woman who is an ambulatory resident of an assisted living facility, who presents after multiple episodes of fainting in the last month.  She has a loop recorder for syncope /bradycardia surveillance, which up until this month was uneventful.  Earlier this month it showed symptomatic complete AV block.  Family wanted to put off taking her to the ED but she had fainted again after eating lunch.  She has felt well overnight . In general more fatigued lately.  No injury from fainting. A 10 pt ROS is otherwise negative.  Was seen by CCU fellow last night to interrogate the loop recorder, and as part of sign-out for Cardiology patients who could potentially decompensate.  On telemetry overnight, only mild sinus bradycardia, no further pauses.    4/17- uneventful overnight on telemetry. sleeping soundly this morning.  Date of service 4/18- comfortable overnight.  consented for Micra pacemaker this morning.  Date of service 4/19/22 - s/p Micra PPM yesterday, no complaints today. Denies chest pain, SOB, palpitations, or dizziness. Review of systems otherwise negative      MEDICATIONS  (STANDING):  amLODIPine   Tablet 5 milliGRAM(s) Oral daily  aspirin enteric coated 81 milliGRAM(s) Oral daily  atorvastatin 20 milliGRAM(s) Oral at bedtime  carvedilol 3.125 milliGRAM(s) Oral every 12 hours  levothyroxine 100 MICROGram(s) Oral daily  polyethylene glycol 3350 17 Gram(s) Oral two times a day  senna 2 Tablet(s) Oral at bedtime  sodium chloride 0.9%. 1000 milliLiter(s) (50 mL/Hr) IV Continuous <Continuous>  spironolactone 25 milliGRAM(s) Oral daily    MEDICATIONS  (PRN):  benzonatate 100 milliGRAM(s) Oral three times a day PRN Cough  guaiFENesin Oral Liquid (Sugar-Free) 100 milliGRAM(s) Oral every 6 hours PRN Cough  traMADol 50 milliGRAM(s) Oral two times a day PRN Severe Pain (7 - 10)      LABS:                          12.0   8.39  )-----------( 170      ( 19 Apr 2022 05:05 )             36.9     Hemoglobin: 12.0 g/dL (04-19 @ 05:05)  Hemoglobin: 12.6 g/dL (04-18 @ 06:32)  Hemoglobin: 12.3 g/dL (04-17 @ 07:09)  Hemoglobin: 9.8 g/dL (04-16 @ 06:09)  Hemoglobin: 11.4 g/dL (04-15 @ 15:43)    04-19    140  |  105  |  46<H>  ----------------------------<  93  4.8   |  21<L>  |  1.49<H>    Ca    8.8      19 Apr 2022 05:05  Mg     1.9     04-19    TPro  6.4  /  Alb  3.5  /  TBili  0.3  /  DBili  x   /  AST  30  /  ALT  58<H>  /  AlkPhos  147<H>  04-19    Creatinine Trend: 1.49<--, 1.19<--, 1.04<--, 1.09<--, 1.32<--, 1.08<--             04-18-22 @ 07:01  -  04-19-22 @ 07:00  --------------------------------------------------------  IN: 440 mL / OUT: 825 mL / NET: -385 mL    04-19-22 @ 07:01  -  04-19-22 @ 16:34  --------------------------------------------------------  IN: 480 mL / OUT: 250 mL / NET: 230 mL        PHYSICAL EXAM  Vital Signs Last 24 Hrs  T(C): 36.5 (19 Apr 2022 14:00), Max: 36.9 (18 Apr 2022 19:33)  T(F): 97.7 (19 Apr 2022 14:00), Max: 98.4 (18 Apr 2022 19:33)  HR: 63 (19 Apr 2022 14:00) (62 - 84)  BP: 147/76 (19 Apr 2022 14:00) (107/65 - 160/84)  BP(mean): --  RR: 18 (19 Apr 2022 14:00) (17 - 18)  SpO2: 97% (19 Apr 2022 14:00) (95% - 99%)      Appearance: Normal appearing elderly woman in no acute distress, cooperative	  HEENT:   Normal oral mucosa, PERRL, EOMI	  Lymphatic: No lymphadenopathy , no edema  Cardiovascular: Normal S1 S2, No JVD, No murmurs , Peripheral pulses palpable 2+ bilaterally  Respiratory: Lungs clear to auscultation, normal effort 	  Gastrointestinal:  Soft, Non-tender, + BS	  Skin: No rashes, No ecchymoses, No cyanosis, warm to touch  Musculoskeletal: Normal range of motion, normal strength  Psychiatry:  Mood & affect appropriate  Ext: R groin site c/d/i, soft, no swelling/hematoma. Pulses intact    TELEMETRY: SR 60-80s    ECG: NSR  Echo: mild LVH, Normal LV EF, diastolic dysfunction.    ASSESSMENT/PLAN: 	89y Female with HFpEF, paroxysmal AFib, here with syncope.      ILR interrogated in the ED, shows complete AV block on 4/4.  No arrhythmia events to correlate with syncope on day of admission, which may have been orthostatic after getting up from eating lunch.  Based on prior complete AV block on her ILR, she requires permanent ventricular pacing.  She has a normal LV EF.  I have reduced her carvedilol dose from 12.5mg BID to 3.125mg BID.  Started Amlodipine 5mg daily for HTN.  s/p Micra pacemaker on 4/18  ANNA management per med  Stable for discharge from EP perspective, awaiting medical clearance  Followup w/ Dr Tan for device check on 5/5 at 9:40 AM and Dr Buckley for general cardiology on discharge.    Clemente Corbett PA-C  Pager: [phone]

## 2022-04-19 NOTE — PROGRESS NOTE ADULT - NSPROGADDITIONALINFOA_GEN_ALL_CORE
above plans discussed with NP Madai Zamudio MD  Division of Hospital Medicine  Contact via [employer] Teams  Office: [phone]

## 2022-04-20 ENCOUNTER — TRANSCRIPTION ENCOUNTER (OUTPATIENT)
Age: 87
End: 2022-04-20

## 2022-04-20 VITALS
SYSTOLIC BLOOD PRESSURE: 134 MMHG | HEART RATE: 69 BPM | OXYGEN SATURATION: 97 % | DIASTOLIC BLOOD PRESSURE: 81 MMHG | RESPIRATION RATE: 18 BRPM

## 2022-04-20 LAB
ANION GAP SERPL CALC-SCNC: 14 MMOL/L — SIGNIFICANT CHANGE UP (ref 5–17)
BUN SERPL-MCNC: 46 MG/DL — HIGH (ref 7–23)
CALCIUM SERPL-MCNC: 9.3 MG/DL — SIGNIFICANT CHANGE UP (ref 8.4–10.5)
CHLORIDE SERPL-SCNC: 105 MMOL/L — SIGNIFICANT CHANGE UP (ref 96–108)
CO2 SERPL-SCNC: 18 MMOL/L — LOW (ref 22–31)
CREAT SERPL-MCNC: 1.21 MG/DL — SIGNIFICANT CHANGE UP (ref 0.5–1.3)
EGFR: 40 ML/MIN/1.73M2 — LOW
GLUCOSE SERPL-MCNC: 113 MG/DL — HIGH (ref 70–99)
MAGNESIUM SERPL-MCNC: 2.2 MG/DL — SIGNIFICANT CHANGE UP (ref 1.6–2.6)
POTASSIUM SERPL-MCNC: 4.8 MMOL/L — SIGNIFICANT CHANGE UP (ref 3.5–5.3)
POTASSIUM SERPL-SCNC: 4.8 MMOL/L — SIGNIFICANT CHANGE UP (ref 3.5–5.3)
SODIUM SERPL-SCNC: 137 MMOL/L — SIGNIFICANT CHANGE UP (ref 135–145)

## 2022-04-20 PROCEDURE — 85027 COMPLETE CBC AUTOMATED: CPT

## 2022-04-20 PROCEDURE — 97116 GAIT TRAINING THERAPY: CPT

## 2022-04-20 PROCEDURE — 82803 BLOOD GASES ANY COMBINATION: CPT

## 2022-04-20 PROCEDURE — 84484 ASSAY OF TROPONIN QUANT: CPT

## 2022-04-20 PROCEDURE — 80053 COMPREHEN METABOLIC PANEL: CPT

## 2022-04-20 PROCEDURE — 86901 BLOOD TYPING SEROLOGIC RH(D): CPT

## 2022-04-20 PROCEDURE — 99285 EMERGENCY DEPT VISIT HI MDM: CPT | Mod: 25

## 2022-04-20 PROCEDURE — 81001 URINALYSIS AUTO W/SCOPE: CPT

## 2022-04-20 PROCEDURE — 76536 US EXAM OF HEAD AND NECK: CPT

## 2022-04-20 PROCEDURE — 83605 ASSAY OF LACTIC ACID: CPT

## 2022-04-20 PROCEDURE — 82947 ASSAY GLUCOSE BLOOD QUANT: CPT

## 2022-04-20 PROCEDURE — C1894: CPT

## 2022-04-20 PROCEDURE — 36415 COLL VENOUS BLD VENIPUNCTURE: CPT

## 2022-04-20 PROCEDURE — 33274 TCAT INSJ/RPL PERM LDLS PM: CPT

## 2022-04-20 PROCEDURE — 82435 ASSAY OF BLOOD CHLORIDE: CPT

## 2022-04-20 PROCEDURE — 84100 ASSAY OF PHOSPHORUS: CPT

## 2022-04-20 PROCEDURE — 85610 PROTHROMBIN TIME: CPT

## 2022-04-20 PROCEDURE — 93306 TTE W/DOPPLER COMPLETE: CPT

## 2022-04-20 PROCEDURE — 70450 CT HEAD/BRAIN W/O DYE: CPT | Mod: MA

## 2022-04-20 PROCEDURE — 97110 THERAPEUTIC EXERCISES: CPT

## 2022-04-20 PROCEDURE — 84132 ASSAY OF SERUM POTASSIUM: CPT

## 2022-04-20 PROCEDURE — 93880 EXTRACRANIAL BILAT STUDY: CPT

## 2022-04-20 PROCEDURE — 83880 ASSAY OF NATRIURETIC PEPTIDE: CPT

## 2022-04-20 PROCEDURE — 85730 THROMBOPLASTIN TIME PARTIAL: CPT

## 2022-04-20 PROCEDURE — 82330 ASSAY OF CALCIUM: CPT

## 2022-04-20 PROCEDURE — 85025 COMPLETE CBC W/AUTO DIFF WBC: CPT

## 2022-04-20 PROCEDURE — 99239 HOSP IP/OBS DSCHRG MGMT >30: CPT

## 2022-04-20 PROCEDURE — 86900 BLOOD TYPING SEROLOGIC ABO: CPT

## 2022-04-20 PROCEDURE — 87086 URINE CULTURE/COLONY COUNT: CPT

## 2022-04-20 PROCEDURE — C1786: CPT

## 2022-04-20 PROCEDURE — 87637 SARSCOV2&INF A&B&RSV AMP PRB: CPT

## 2022-04-20 PROCEDURE — U0003: CPT

## 2022-04-20 PROCEDURE — 86850 RBC ANTIBODY SCREEN: CPT

## 2022-04-20 PROCEDURE — U0005: CPT

## 2022-04-20 PROCEDURE — 85014 HEMATOCRIT: CPT

## 2022-04-20 PROCEDURE — 97161 PT EVAL LOW COMPLEX 20 MIN: CPT

## 2022-04-20 PROCEDURE — 84295 ASSAY OF SERUM SODIUM: CPT

## 2022-04-20 PROCEDURE — 84443 ASSAY THYROID STIM HORMONE: CPT

## 2022-04-20 PROCEDURE — 93005 ELECTROCARDIOGRAM TRACING: CPT

## 2022-04-20 PROCEDURE — 80048 BASIC METABOLIC PNL TOTAL CA: CPT

## 2022-04-20 PROCEDURE — 71045 X-RAY EXAM CHEST 1 VIEW: CPT

## 2022-04-20 PROCEDURE — 85018 HEMOGLOBIN: CPT

## 2022-04-20 PROCEDURE — 83735 ASSAY OF MAGNESIUM: CPT

## 2022-04-20 RX ORDER — SPIRONOLACTONE 25 MG/1
1 TABLET, FILM COATED ORAL
Qty: 30 | Refills: 0
Start: 2022-04-20 | End: 2022-05-19

## 2022-04-20 RX ADMIN — Medication 81 MILLIGRAM(S): at 13:26

## 2022-04-20 RX ADMIN — AMLODIPINE BESYLATE 5 MILLIGRAM(S): 2.5 TABLET ORAL at 05:40

## 2022-04-20 RX ADMIN — CARVEDILOL PHOSPHATE 3.12 MILLIGRAM(S): 80 CAPSULE, EXTENDED RELEASE ORAL at 05:40

## 2022-04-20 RX ADMIN — SPIRONOLACTONE 25 MILLIGRAM(S): 25 TABLET, FILM COATED ORAL at 05:40

## 2022-04-20 RX ADMIN — Medication 100 MICROGRAM(S): at 05:40

## 2022-04-20 NOTE — PROGRESS NOTE ADULT - PROBLEM SELECTOR PLAN 2
Patient's Cr 1.32 on admission (1.08 on 4/9/22)  - SCr trending down  - patient having difficulty voiding, will check bladder scan to r/o retention  - bladder scan q8  - UA negative  - trend Cr, monitor Is&Os  - avoid nephrotoxins and renally dose meds
Patient presents with syncopal episode possibly 2/2 infection vs cardiac arrhythmia vs hypotension  - no signs of acute infection. UA negative. No respiratory symptoms. RVP negative.   - ILR interrogated, complete AVB 4/4  - appreciate EP recs: s/p Micra placement   - monitor on tele  - TTE reviewed as above  - reduced coreg dose to 3.125mg BID
Patient presents with syncopal episode possibly 2/2 infection vs cardiac arrhythmia vs hypotension  - no signs of acute infection. UA negative. No respiratory symptoms. RVP negative.   - ILR interrogated, complete AVB 4/4  - appreciate EP recs: s/p Micra placement   - monitor on tele  - TTE reviewed as above  - reduced coreg dose to 3.125mg BID
Patient's Cr 1.32 on admission (1.08 on 4/9/22)  - SCr trending down  - patient having difficulty voiding, will check bladder scan to r/o retention  - bladder scan q8  - UA negative  - trend Cr, monitor Is&Os  - avoid nephrotoxins and renally dose meds
Patient's Cr 1.32 on admission (1.08 on 4/9/22)  - SCr trending down  - patient having difficulty voiding, will check bladder scan to r/o retention  - bladder scan q8  - UA negative  - trend Cr, monitor Is&Os  - avoid nephrotoxins and renally dose meds

## 2022-04-20 NOTE — PROGRESS NOTE ADULT - PROBLEM SELECTOR PLAN 7
- resume home statin

## 2022-04-20 NOTE — PROGRESS NOTE ADULT - SUBJECTIVE AND OBJECTIVE BOX
Patient is a 89y old  Female who presents with a chief complaint of syncope (19 Apr 2022 17:31)      SUBJECTIVE / OVERNIGHT EVENTS:  no acute events overnight, vss, afebrile  pt reports good urine output since IVF  pt feels much improved this morning   has no complaints    tele reviewed: sinus 70-80s    ROS:  14 point ROS negative in detail except stated as above    MEDICATIONS  (STANDING):  amLODIPine   Tablet 5 milliGRAM(s) Oral daily  aspirin enteric coated 81 milliGRAM(s) Oral daily  atorvastatin 20 milliGRAM(s) Oral at bedtime  carvedilol 3.125 milliGRAM(s) Oral every 12 hours  levothyroxine 100 MICROGram(s) Oral daily  polyethylene glycol 3350 17 Gram(s) Oral two times a day  senna 2 Tablet(s) Oral at bedtime  sodium chloride 0.9%. 1000 milliLiter(s) (50 mL/Hr) IV Continuous <Continuous>  spironolactone 25 milliGRAM(s) Oral daily    MEDICATIONS  (PRN):  benzonatate 100 milliGRAM(s) Oral three times a day PRN Cough  guaiFENesin Oral Liquid (Sugar-Free) 100 milliGRAM(s) Oral every 6 hours PRN Cough  traMADol 50 milliGRAM(s) Oral two times a day PRN Severe Pain (7 - 10)      CAPILLARY BLOOD GLUCOSE        I&O's Summary    19 Apr 2022 07:01  -  20 Apr 2022 07:00  --------------------------------------------------------  IN: 1240 mL / OUT: 1050 mL / NET: 190 mL    20 Apr 2022 07:01  -  20 Apr 2022 11:25  --------------------------------------------------------  IN: 240 mL / OUT: 0 mL / NET: 240 mL        PHYSICAL EXAM:  Vital Signs Last 24 Hrs  T(C): 36.2 (20 Apr 2022 04:22), Max: 36.9 (19 Apr 2022 20:20)  T(F): 97.1 (20 Apr 2022 04:22), Max: 98.4 (19 Apr 2022 20:20)  HR: 84 (20 Apr 2022 04:22) (63 - 84)  BP: 127/77 (20 Apr 2022 04:22) (127/77 - 156/89)  BP(mean): --  RR: 18 (20 Apr 2022 04:22) (18 - 18)  SpO2: 98% (20 Apr 2022 04:22) (97% - 99%)    GENERAL: NAD, well-developed  HEAD:  Atraumatic, Normocephalic  EYES: EOMI, PERRLA, conjunctiva and sclera clear  NECK: Supple, No JVD  CHEST/LUNG: Clear to auscultation bilaterally; No wheeze  HEART: Regular rate and rhythm; No murmurs, rubs, or gallops  ABDOMEN: Soft, Nontender, Nondistended; Bowel sounds present  EXTREMITIES:  2+ Peripheral Pulses, No clubbing, cyanosis, or edema  NEUROLOGY: AAOx3; non-focal  SKIN: No rashes or lesions    LABS:  personally reviewed                        12.0   8.39  )-----------( 170      ( 19 Apr 2022 05:05 )             36.9     04-19    138  |  103  |  57<H>  ----------------------------<  109<H>  5.0   |  19<L>  |  1.65<H>    Ca    8.8      19 Apr 2022 22:11  Mg     1.9     04-19    TPro  6.4  /  Alb  3.5  /  TBili  0.3  /  DBili  x   /  AST  30  /  ALT  58<H>  /  AlkPhos  147<H>  04-19              RADIOLOGY & ADDITIONAL TESTS:    Imaging Personally Reviewed:    Consultant(s) Notes Reviewed:  EP, cards    Care Discussed with Consultants/Other Providers:

## 2022-04-20 NOTE — PROGRESS NOTE ADULT - PROBLEM SELECTOR PLAN 6
- resume levothyroxine 100mcg qd  - f/u thyroid US for heterogenous thyroid seen on CTA chest from 4/4
- resume levothyroxine 100mcg qd  - thyroid US negative, recommend carotid doppler
- resume levothyroxine 100mcg qd  - f/u thyroid US for heterogenous thyroid seen on CTA chest from 4/4
- resume levothyroxine 100mcg qd  - thyroid US negative, recommend carotid doppler
- resume levothyroxine 100mcg qd  - f/u thyroid US for heterogenous thyroid seen on CTA chest from 4/4

## 2022-04-20 NOTE — PROGRESS NOTE ADULT - NSPROGADDITIONALINFOA_GEN_ALL_CORE
above plans discussed with NP Madai  pending resolution of ANNA, dc planning back to [facility]  care discussed with daughter Ariana over the phone    Deb Zamudio MD  Division of Hospital Medicine  Contact via [employer] Teams  Office: [phone] above plans discussed with NP Madai  pending resolution of ANNA, dc planning back to [facility]  care discussed with daughter Ariana over the phone    46 minutes spent on discharge process    Deb Zamudio MD  Division of Hospital Medicine  Contact via [employer] Teams  Office: [phone]

## 2022-04-20 NOTE — PROGRESS NOTE ADULT - PROBLEM SELECTOR PLAN 5
Hx of CHF, last known TTE from 10/21 showed EF 65% with mild LVH, preserved LV systolic function  - resume spironolactone  - reduce coreg dose to 3.125mg BID  - monitor Is&Os

## 2022-04-20 NOTE — PROGRESS NOTE ADULT - PROBLEM SELECTOR PLAN 1
SCr 1.49 this morning  - unclear etiology but most likely pre-renal in setting of poor po intake yesterday all day  - gentle IVF hydration  - avoid nephrotoxins  - bladder scan
Patient presents with syncopal episode possibly 2/2 infection vs cardiac arrhythmia vs hypotension  - no signs of acute infection. UA negative. No respiratory symptoms. RVP negative.   - ILR interrogated, complete AVB 4/4  - appreciate EP recs: plan for Micra placement likely Monday  - monitor on tele  - f/u TTE  - reduce coreg dose to 3.125mg BID
SCr 1.49 -> 1.65  - unclear etiology but most likely pre-renal in setting of poor po intake yesterday all day  - s/p gentle IVF hydration  - bladder scan with <300cc  - avoid nephrotoxins  - fu morning BMP
Patient presents with syncopal episode possibly 2/2 infection vs cardiac arrhythmia vs hypotension  - no signs of acute infection. UA negative. No respiratory symptoms. RVP negative.   - ILR interrogated, complete AVB 4/4  - appreciate EP recs: s/p Micra placement   - monitor on tele  - TTE reviewed as above  - reduced coreg dose to 3.125mg BID
Patient presents with syncopal episode possibly 2/2 infection vs cardiac arrhythmia vs hypotension  - no signs of acute infection. UA negative. No respiratory symptoms. RVP negative.   - ILR interrogated, complete AVB 4/4  - appreciate EP recs: plan for Micra placement on Monday, NPO at midnight  - monitor on tele  - f/u TTE  - reduced coreg dose to 3.125mg BID

## 2022-04-20 NOTE — PROGRESS NOTE ADULT - ASSESSMENT
89M with PMH of HTN, HFpEF, pAfib, CHB, CKD, recurrent UTIs (ESBL), HLD, OA, hypothyroid, presents to the ED for [facility] with syncope, found to have complete AV block s/p micra placement 4/18, hospital course complicated by ANNA
89M with PMH of HTN, HFpEF, pAfib, CHB, CKD, recurrent UTIs (ESBL), HLD, OA, hypothyroid, presents to the ED for [facility] with syncope, found to have complete AV block s/p micra placement 4/18
89M with PMH of HTN, HFpEF, pAfib, CHB, CKD, recurrent UTIs (ESBL), HLD, OA, hypothyroid, presents to the ED for [facility] with syncope, found to have complete AV block s/p micra placement 4/18
89M with PMH of HTN, HFpEF, pAfib, CHB, CKD, recurrent UTIs (ESBL), HLD, OA, hypothyroid, presents to the ED for [facility] with syncope, found to have complete AV block pending micra placement
89M with PMH of HTN, HFpEF, pAfib, CHB, CKD, recurrent UTIs (ESBL), HLD, OA, hypothyroid, presents to the ED for [facility] with syncope, found to have complete AV block pending micra placement

## 2022-04-20 NOTE — PROGRESS NOTE ADULT - PROBLEM SELECTOR PLAN 9
- DVT ppx: HSQ  - Diet: DASH  - Dispo: back to [facility] after Micra
- DVT ppx: HSQ  - Diet: DASH  - Dispo: back to [facility]
- DVT ppx: HSQ  - Diet: DASH  - Dispo: back to [facility]
- DVT ppx: HSQ  - Diet: DASH  - Dispo: back to [facility] after Micra
- DVT ppx: HSQ  - Diet: DASH  - Dispo: back to [facility] after Micra

## 2022-04-20 NOTE — DISCHARGE NOTE NURSING/CASE MANAGEMENT/SOCIAL WORK - PATIENT PORTAL LINK FT
You can access the FollowMyHealth Patient Portal offered by [facility] by registering at the following website: [email or web address]. By joining [facility]’s FollowMyHealth portal, you will also be able to view your health information using other applications (apps) compatible with our system.

## 2022-04-20 NOTE — PROGRESS NOTE ADULT - SUBJECTIVE AND OBJECTIVE BOX
C A R D I O L O G Y  **********************************     DATE OF SERVICE: 04-20-22    Patient denies chest pain or shortness of breath.   Review of systems otherwise (-)  	    MEDICATIONS  (STANDING):  amLODIPine   Tablet 5 milliGRAM(s) Oral daily  aspirin enteric coated 81 milliGRAM(s) Oral daily  atorvastatin 20 milliGRAM(s) Oral at bedtime  carvedilol 3.125 milliGRAM(s) Oral every 12 hours  levothyroxine 100 MICROGram(s) Oral daily  polyethylene glycol 3350 17 Gram(s) Oral two times a day  senna 2 Tablet(s) Oral at bedtime  sodium chloride 0.9%. 1000 milliLiter(s) (50 mL/Hr) IV Continuous <Continuous>  spironolactone 25 milliGRAM(s) Oral daily    MEDICATIONS  (PRN):  benzonatate 100 milliGRAM(s) Oral three times a day PRN Cough  guaiFENesin Oral Liquid (Sugar-Free) 100 milliGRAM(s) Oral every 6 hours PRN Cough  traMADol 50 milliGRAM(s) Oral two times a day PRN Severe Pain (7 - 10)      LABS:                          12.0   8.39  )-----------( 170      ( 19 Apr 2022 05:05 )             36.9     Hemoglobin: 12.0 g/dL (04-19 @ 05:05)  Hemoglobin: 12.6 g/dL (04-18 @ 06:32)  Hemoglobin: 12.3 g/dL (04-17 @ 07:09)  Hemoglobin: 9.8 g/dL (04-16 @ 06:09)  Hemoglobin: 11.4 g/dL (04-15 @ 15:43)    04-20    137  |  105  |  46<H>  ----------------------------<  113<H>  4.8   |  18<L>  |  1.21    Ca    9.3      20 Apr 2022 11:43  Mg     2.2     04-20    TPro  6.4  /  Alb  3.5  /  TBili  0.3  /  DBili  x   /  AST  30  /  ALT  58<H>  /  AlkPhos  147<H>  04-19    Creatinine Trend: 1.21<--, 1.65<--, 1.49<--, 1.19<--, 1.04<--, 1.09<--             04-19-22 @ 07:01  -  04-20-22 @ 07:00  --------------------------------------------------------  IN: 1240 mL / OUT: 1050 mL / NET: 190 mL    04-20-22 @ 07:01  -  04-20-22 @ 13:29  --------------------------------------------------------  IN: 240 mL / OUT: 0 mL / NET: 240 mL        PHYSICAL EXAM  Vital Signs Last 24 Hrs  T(C): 36.2 (20 Apr 2022 04:22), Max: 36.9 (19 Apr 2022 20:20)  T(F): 97.1 (20 Apr 2022 04:22), Max: 98.4 (19 Apr 2022 20:20)  HR: 69 (20 Apr 2022 11:32) (63 - 84)  BP: 134/81 (20 Apr 2022 11:32) (127/77 - 156/89)  BP(mean): --  RR: 18 (20 Apr 2022 11:32) (18 - 18)  SpO2: 97% (20 Apr 2022 11:32) (97% - 99%)      Gen: Appears well in NAD  HEENT:  (-)icterus (-)pallor  CV: N S1 S2 1/6 NORRIS (+)2 Pulses B/l  Resp:  Clear to ausculatation B/L, normal effort  GI: (+) BS Soft, NT, ND  Lymph:  (-)Edema, (-)obvious lymphadenopathy  Skin: Warm to touch, Normal turgor  Psych: Appropriate mood and affect      TELEMETRY: SR 60-70s    ASSESSMENT/PLAN: 	89y  Female  Female with PMH of HTN, CAD, prior syncope s/p loop recorder, PAF only on ASA, hypothyroidism, AVN of left femoral head, arthritis of knees, and chronic urinary frequency who is admitted s/p syncope. Cardiology consulted for further evaluation. 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 Now s/p micra PPM.    - EP f/u appreciated  - No evidence of clinical HF or anginal symptoms  - TTE noted with normal LV function, severe diastolic dysfunction, no sig valvular abnormalities  - ANNA resolved s/p IVF  - No further inpatient cardiac w/u planned  - D/C planning per primary team  - Patient to f/u with her cardiologist Dr. Kam Buckley after d/c    Clemente Corbett PA-C  Pager: [phone]

## 2022-04-20 NOTE — PROGRESS NOTE ADULT - PROBLEM SELECTOR PLAN 4
Patient hypertensive  - reduce coreg dose to 3.125mg BID

## 2022-04-20 NOTE — PROGRESS NOTE ADULT - SUBJECTIVE AND OBJECTIVE BOX
EP ATTENDING    tele: NSR, no events      she denies palpitations, syncope, nor angina, ROS otherwise -       DATE OF SERVICE - 04-20-22    Review of Systems:   Constitutional: [ ] fevers, [ ] chills.   Skin: [ ] dry skin. [ ] rashes.  Psychiatric: [ ] depression, [ ] anxiety.   Gastrointestinal: [ ] BRBPR, [ ] melena.   Neurological: [ ] confusion. [ ] seizures. [ ] shuffling gait.   Ears,Nose,Mouth and Throat: [ ] ear pain [ ] sore throat.   Eyes: [ ] diplopia.   Respiratory: [ ] hemoptysis. [ ] shortness of breath  Cardiovascular: See HPI above  Hematologic/Lymphatic: [ ] anemia. [ ] painful nodes. [ ] prolonged bleeding.   Genitourinary: [ ] hematuria. [ ] flank pain.   Endocrine: [ ] significant change in weight. [ ] intolerance to heat and cold.     Review of systems [ x] otherwise negative, [ ] otherwise unable to obtain    FH: no family history of sudden cardiac death in first degree relatives    SH: [ ] tobacco, [ ] alcohol, [ ] drugs    amLODIPine   Tablet 5 milliGRAM(s) Oral daily  aspirin enteric coated 81 milliGRAM(s) Oral daily  atorvastatin 20 milliGRAM(s) Oral at bedtime  benzonatate 100 milliGRAM(s) Oral three times a day PRN  carvedilol 3.125 milliGRAM(s) Oral every 12 hours  guaiFENesin Oral Liquid (Sugar-Free) 100 milliGRAM(s) Oral every 6 hours PRN  levothyroxine 100 MICROGram(s) Oral daily  polyethylene glycol 3350 17 Gram(s) Oral two times a day  senna 2 Tablet(s) Oral at bedtime  sodium chloride 0.9%. 1000 milliLiter(s) IV Continuous <Continuous>  spironolactone 25 milliGRAM(s) Oral daily  traMADol 50 milliGRAM(s) Oral two times a day PRN                            12.0   8.39  )-----------( 170      ( 19 Apr 2022 05:05 )             36.9       04-19    138  |  103  |  57<H>  ----------------------------<  109<H>  5.0   |  19<L>  |  1.65<H>    Ca    8.8      19 Apr 2022 22:11  Mg     1.9     04-19    TPro  6.4  /  Alb  3.5  /  TBili  0.3  /  DBili  x   /  AST  30  /  ALT  58<H>  /  AlkPhos  147<H>  04-19            T(C): 36.2 (04-20-22 @ 04:22), Max: 36.9 (04-19-22 @ 20:20)  HR: 69 (04-20-22 @ 11:32) (63 - 84)  BP: 134/81 (04-20-22 @ 11:32) (127/77 - 156/89)  RR: 18 (04-20-22 @ 11:32) (18 - 18)  SpO2: 97% (04-20-22 @ 11:32) (97% - 99%)  Wt(kg): --    I&O's Summary    19 Apr 2022 07:01  -  20 Apr 2022 07:00  --------------------------------------------------------  IN: 1240 mL / OUT: 1050 mL / NET: 190 mL    20 Apr 2022 07:01  -  20 Apr 2022 11:59  --------------------------------------------------------  IN: 240 mL / OUT: 0 mL / NET: 240 mL        General: Well nourished in no acute distress. Alert and Oriented * 3.   Head: Normocephalic and atraumatic.   Neck: No JVD. No bruits. Supple. Does not appear to be enlarged.   Cardiovascular: + S1,S2 ; RRR Soft systolic murmur at the left lower sternal border. No rubs noted.    Lungs: CTA b/l. No rhonchi, rales or wheezes.   Abdomen: + BS, soft. Non tender. Non distended. No rebound. No guarding.   Extremities: No clubbing/cyanosis/edema.   Neurologic: Moves all four extremities. Full range of motion.   Skin: Warm and moist. The patient's skin has normal elasticity and good skin turgor.   Psychiatric: Appropriate mood and affect.  Musculoskeletal: Normal range of motion, normal strength      A/P) 89 y/o female PMH hypertension, hyperlipidemia, PAF [on asa only], hypothyroidism a/w syncope and found to have paroxysmal AV block on her loop recorder. She is now s/p a Micra leadless pacemaker without complication    -no further inpatient EP workup expected  -management of hypertension as per cardiology  -poor candidate for anticoagulation for PAF given recurrent falls  -f/u with her cardiologist Dr. Kam Buckley after discharge  -f/u with me for routine pacemaker care on 5/5 at 940am      Juan Tan M.D., [facility]  Cardiac Electrophysiology  [facility]  [address] [place] [address]  [email or web address]    office [phone]  pager [phone]

## 2022-04-20 NOTE — PROGRESS NOTE ADULT - PROVIDER SPECIALTY LIST ADULT
Electrophysiology
Hospitalist
Cardiology
Cardiology
Electrophysiology
Hospitalist

## 2022-04-20 NOTE — PROGRESS NOTE ADULT - PROBLEM SELECTOR PROBLEM 5
Chronic congestive heart failure

## 2022-04-20 NOTE — PROGRESS NOTE ADULT - PROBLEM SELECTOR PLAN 3
Patient has some suprapubic tenderness  - no signs of UTI. UA neg, f/u urine culture

## 2022-04-20 NOTE — DISCHARGE NOTE NURSING/CASE MANAGEMENT/SOCIAL WORK - NSDCFUADDAPPT_GEN_ALL_CORE_FT
APPTS ARE READY TO BE MADE: [x] YES    Best Family or Patient Contact (if needed):    Additional Information about above appointments (if needed):    1: w/ Dr Tan on 5/5 at 930AM for groin check and pacemaker check  2:   3:     Other comments or requests:

## 2022-04-26 PROCEDURE — 87184 SC STD DISK METHOD PER PLATE: CPT

## 2022-04-26 PROCEDURE — 80053 COMPREHEN METABOLIC PANEL: CPT

## 2022-04-26 PROCEDURE — 93005 ELECTROCARDIOGRAM TRACING: CPT

## 2022-04-26 PROCEDURE — U0005: CPT

## 2022-04-26 PROCEDURE — U0003: CPT

## 2022-04-26 PROCEDURE — 97161 PT EVAL LOW COMPLEX 20 MIN: CPT

## 2022-04-26 PROCEDURE — 81001 URINALYSIS AUTO W/SCOPE: CPT

## 2022-04-26 PROCEDURE — 82962 GLUCOSE BLOOD TEST: CPT

## 2022-04-26 PROCEDURE — 80048 BASIC METABOLIC PNL TOTAL CA: CPT

## 2022-04-26 PROCEDURE — 84295 ASSAY OF SERUM SODIUM: CPT

## 2022-04-26 PROCEDURE — 87040 BLOOD CULTURE FOR BACTERIA: CPT

## 2022-04-26 PROCEDURE — 84132 ASSAY OF SERUM POTASSIUM: CPT

## 2022-04-26 PROCEDURE — 87086 URINE CULTURE/COLONY COUNT: CPT

## 2022-04-26 PROCEDURE — 96374 THER/PROPH/DIAG INJ IV PUSH: CPT

## 2022-04-26 PROCEDURE — 85018 HEMOGLOBIN: CPT

## 2022-04-26 PROCEDURE — 83036 HEMOGLOBIN GLYCOSYLATED A1C: CPT

## 2022-04-26 PROCEDURE — 87637 SARSCOV2&INF A&B&RSV AMP PRB: CPT

## 2022-04-26 PROCEDURE — 82947 ASSAY GLUCOSE BLOOD QUANT: CPT

## 2022-04-26 PROCEDURE — 71275 CT ANGIOGRAPHY CHEST: CPT | Mod: MA

## 2022-04-26 PROCEDURE — 82435 ASSAY OF BLOOD CHLORIDE: CPT

## 2022-04-26 PROCEDURE — 84443 ASSAY THYROID STIM HORMONE: CPT

## 2022-04-26 PROCEDURE — 83735 ASSAY OF MAGNESIUM: CPT

## 2022-04-26 PROCEDURE — 99285 EMERGENCY DEPT VISIT HI MDM: CPT

## 2022-04-26 PROCEDURE — 85730 THROMBOPLASTIN TIME PARTIAL: CPT

## 2022-04-26 PROCEDURE — 84484 ASSAY OF TROPONIN QUANT: CPT

## 2022-04-26 PROCEDURE — 85014 HEMATOCRIT: CPT

## 2022-04-26 PROCEDURE — 96375 TX/PRO/DX INJ NEW DRUG ADDON: CPT

## 2022-04-26 PROCEDURE — 82607 VITAMIN B-12: CPT

## 2022-04-26 PROCEDURE — 80061 LIPID PANEL: CPT

## 2022-04-26 PROCEDURE — 82803 BLOOD GASES ANY COMBINATION: CPT

## 2022-04-26 PROCEDURE — 85610 PROTHROMBIN TIME: CPT

## 2022-04-26 PROCEDURE — 74174 CTA ABD&PLVS W/CONTRAST: CPT | Mod: MA

## 2022-04-26 PROCEDURE — 36415 COLL VENOUS BLD VENIPUNCTURE: CPT

## 2022-04-26 PROCEDURE — 70450 CT HEAD/BRAIN W/O DYE: CPT | Mod: MA

## 2022-04-26 PROCEDURE — 85027 COMPLETE CBC AUTOMATED: CPT

## 2022-04-26 PROCEDURE — 87077 CULTURE AEROBIC IDENTIFY: CPT

## 2022-04-26 PROCEDURE — 87150 DNA/RNA AMPLIFIED PROBE: CPT

## 2022-04-26 PROCEDURE — 84100 ASSAY OF PHOSPHORUS: CPT

## 2022-04-26 PROCEDURE — 82330 ASSAY OF CALCIUM: CPT

## 2022-04-26 PROCEDURE — 87186 SC STD MICRODIL/AGAR DIL: CPT

## 2022-04-26 PROCEDURE — 85025 COMPLETE CBC W/AUTO DIFF WBC: CPT

## 2022-04-26 PROCEDURE — 83605 ASSAY OF LACTIC ACID: CPT

## 2022-05-06 ENCOUNTER — NON-APPOINTMENT (OUTPATIENT)
Age: 87
End: 2022-05-06

## 2022-05-16 ENCOUNTER — FORM ENCOUNTER (OUTPATIENT)
Age: 87
End: 2022-05-16

## 2022-05-17 ENCOUNTER — TRANSCRIPTION ENCOUNTER (OUTPATIENT)
Age: 87
End: 2022-05-17

## 2022-05-19 ENCOUNTER — APPOINTMENT (OUTPATIENT)
Dept: UROLOGY | Facility: CLINIC | Age: 87
End: 2022-05-19

## 2022-05-31 NOTE — CONSULT NOTE ADULT - OPHTHALMOLOGIC
You have been prescribed Spironolactone  Start with 50 mg daily for 1 week, then increase to 100 mg daily.  Possible side effects include dizziness, breast tenderness, irregular period.  This is not to be taken in pregnancy.  Please call if you have any concerns.    Differin gel:  Wash nightly, apply moisturizer, then wait 15 min prior to applying Differin gel.  This will make it less drying.  Start using Differin gel (this is over the counter and found in the acne aisles) Use nightly in a thin layer (green pea size on entire face.  This will likely make your skin dry.  Use a thick moisturizer such as Cerave cream 2x/day.  Use the adapalene every other day if too drying.  This will also make you more prone to sunburn, so use an SPF of 30 daily and reapply every 2 hours.  This medication should not be used in pregnancy.     After acne is clear:  [facility]  Address: [facility], [address] [place] [address] [id number], [place] [address]  Phone: [phone]      Sun Protection: use noncomedogenic sunscreen    Sun exposure leads to premature aging, skin wrinkling, and increased risk of skin cancer. Protecting yourself from the sun can help prevent this damage.      Ways to protect yourself include:  Staying in the shade during peak UV radiation hours of 10:00 am to 3:00 pm  Wear sun protective clothing, including a long-sleeved shirt, pants, wide-brimmed hat, and sun glasses. There are many good companies to find these at, including [employer], [employer], etc.  Applying sunscreen  Avoiding tanning beds    Sunscreen tips:  Look for a sunscreen that is labeled as broad-spectrum and has an SPF of 30 or greater.  Broad-spectrum means that it protects against both UVB (sun rays causing sunburns and skin cancer) and UVA (rays causing aging, wrinkling, and higher risk of skin cancer).  Apply sunscreen generously.  A general rule of thumb is that it takes your handful of sunscreen to cover your  body (child’s hand size for child’s body, larger hand size for larger body).  Applying less than this will mean that you won’t reach the listed SPF on the bottle.  Apply sunscreen 15 minutes before going outside and reapply at least every 2 hours or immediately after swimming    Frequently asked questions:    Are higher SPF sunscreens better?   An SPF of 30 protects against 97% of the sun’s rays when applied in the correct amount and reapplied often enough.  Higher SPFs only cover slightly more of the sun’s ray, therefore, there is no need to use very high SPF numbers.  It is more important to apply enough sunscreen and reapply frequently.    What sunscreens are best for infants and children?  Sunscreens containing the ingredients zinc oxide and titanium dioxide are recommended for infants >6 months and children.  These sunscreens are physical blockers, which mean they sit on the skin’s surface and physically block UV rays. Because of this, these are also good sunscreens for people with sensitive skin or those who have problems with regular sunscreens burning and stinging with application.  Because of their very sensitive skin, we recommend that infants <6 months old stay in the shade and avoid sun exposure.    What types of sunscreen are best? Are spray sunscreens ok to use?  In general, the type of sunscreen that you will consistently use is the best!  We recommend cream sunscreens because these are the easiest to make sure you use enough.  Stick sunscreens are good for using on the face and around the eyes to prevent eye exposure.  Spray sunscreens can be used, but we recommend you spray close to your skin and follow this by rubbing over the sunscreen to make sure it is evenly applied.  Never use spray sunscreens on the face or in a poorly ventilated area because of the risk of inhalation.  It is difficult to tell how much sunscreen you’ve applied when using a spray sunscreen, so it is still preferred that you  use a cream sunscreen if possible.     Are the ingredients in sunscreens safe?  Scientific evidence supports the benefits of using sunscreen to minimize short- and long-term damage to the skin from sun exposure. No published studies show that sunscreen is toxic to humans or hazardous to human health. Dermatologists agree that preventing skin cancer and sunburn far outweigh any unproven concerns for toxicity or human health hazard from sunscreen ingredients.  Before ingredients in sunscreen can be used, the ingredient must be approved by the FDA.     Oxybenzone and retinyl palmitate have recently been in the news and their safety has been questioned.  Oxybenzone effectively protects our skin from UVA and UVB rays and was approved by the FDA in 1978.  No data shows that oxybenzone causes hormonal problems in humans or any significant healthy problems.  Retinyl palmitate is a form of vitamin A found naturally in the skin that helps protect our skin from premature aging.  No study shows that it increases the risk of skin cancer in humans, and in fact, one form of vitamin A (retinoids) has been used for decades to prevent skin cancer in people who have a high risk of developing skin cancers.      For more information regarding the specific studies showing the safety of these ingredients please visit the websites below.    http://www.skincancer.org/prevention/sun-protection/sunscreen/sunscreens-safe-and-effective    https://[employer]/@Kary/the-dangers-of-sunscreen-a-dermatologist-b-uxlnyixzsul-clzq951gm8yu       negative

## 2022-06-02 LAB
ANION GAP SERPL CALC-SCNC: 17 MMOL/L
BUN SERPL-MCNC: 35 MG/DL
CALCIUM SERPL-MCNC: 9.3 MG/DL
CHLORIDE SERPL-SCNC: 102 MMOL/L
CO2 SERPL-SCNC: 23 MMOL/L
CREAT SERPL-MCNC: 1.17 MG/DL
EGFR: 42 ML/MIN/1.73M2
GLUCOSE SERPL-MCNC: 89 MG/DL
POTASSIUM SERPL-SCNC: 4.6 MMOL/L
SODIUM SERPL-SCNC: 142 MMOL/L

## 2022-06-16 ENCOUNTER — APPOINTMENT (OUTPATIENT)
Dept: UROLOGY | Facility: CLINIC | Age: 87
End: 2022-06-16
Payer: MEDICARE

## 2022-06-16 VITALS — HEART RATE: 74 BPM | SYSTOLIC BLOOD PRESSURE: 130 MMHG | DIASTOLIC BLOOD PRESSURE: 70 MMHG

## 2022-06-16 PROCEDURE — 99213 OFFICE O/P EST LOW 20 MIN: CPT

## 2022-06-17 ENCOUNTER — APPOINTMENT (OUTPATIENT)
Dept: GERIATRICS | Facility: ASSISTED LIVING FACILITY | Age: 87
End: 2022-06-17

## 2022-06-17 NOTE — ADDENDUM
[FreeTextEntry1] : Entered by Hortencia Hillman, acting as scribe for Dr. Florentino Landon.\par \par The documentation recorded by the scribe accurately reflects the service I personally performed and the decisions made by me.

## 2022-06-17 NOTE — LETTER BODY
[FreeTextEntry1] : Jose Shah MD\par [address] [facility], \[place] [address]\par [phone]\par \par Dear Dr. Shah,\par \par Reason for Visit: Recurrent UTI. Urinary frequency. \par \par This is a 89 year-old woman with recurrent UTI and urinary frequency.  The patient is referred for evaluation of her condition. She ambulates in a wheelchair. The patient was previously hospitalized for sepsis.  She had positive blood culture.  However her urine culture was unremarkable. The patient also report urinary frequency and urgency. She currently voids in her diaper. The patient denies any gross hematuria or dysuria. The patient denies any aggravating or relieving factors. The patient denies any interference of function.  All other review of systems are negative. Past medical history, family history and social history were inquired and were noncontributory to current condition. The patient does not use tobacco or drink alcohol. Medications and allergies were reviewed. She has no known allergies to medication. \par \par On examination, the patient is in no acute distress. She is alert and oriented and follows commands. She  has normal mood and affect. She is normocephalic. Oral no thrush. Neck is supple. Respirations are unlabored. Abdomen is soft and nontender. Liver is nonpalpable. Bladder is nonpalpable. No CVA tenderness. Neurologically she is grossly intact. No peripheral edema. Skin without gross abnormality.\par \par Assessment: Recurrent UTI. Urinary frequency. \par \par I counseled the patient. I discussed the various etiologies of her symptoms. In terms of her recurrent UTI, I recommended the patient obtain urinalysis and urine culture to evaluate for infection. I also encouraged the patient to consider taking prophylactic Methenamine hippurate to prevent infection. In terms of her urinary frequency,  I  discussed the option of chronic indwelling Blum or Permafit device for nocturnal incontinence. The patient will consider her options.  Risks and alternatives were discussed. I answered the patient's questions. The patient will follow-up as directed and will contact me with any questions or concerns. Thank you for the opportunity to participate in the care of Ms. TAYLOR. I will keep you updated on her progress.\par \par Plan: Urinalysis. Urine culture. Consider options. Follow up as directed.

## 2022-06-17 NOTE — HISTORY OF PRESENT ILLNESS
[FreeTextEntry1] : Please refer to URO Consult note \par \par This is a 89-year-old woman with pacemaker with wheelchair assistance with concern for possible recurrent UTI and urinary frequency.  Patient was previously hospitalized for sepsis.  She had positive blood culture.  However her urine culture was unremarkable.\par \par Patient denies any dysuria or gross hematuria.\par \par Encourage hydration.  Repeat urine culture.  Consider methenamine.  Obtain urine cultures as needed.\par \par Patient also reports urinary frequency and urgency.  She voids in her diaper.  I also discussed the option of chronic indwelling Blum as well as a Permafit device for nocturnal social continence

## 2022-06-19 ENCOUNTER — INPATIENT (INPATIENT)
Facility: HOSPITAL | Age: 87
LOS: 3 days | Discharge: NURSING, REHAB OR LONG-TERM CARE | DRG: 312 | End: 2022-06-23
Attending: INTERNAL MEDICINE | Admitting: INTERNAL MEDICINE
Payer: MEDICARE

## 2022-06-19 VITALS
OXYGEN SATURATION: 94 % | HEART RATE: 78 BPM | RESPIRATION RATE: 20 BRPM | SYSTOLIC BLOOD PRESSURE: 110 MMHG | WEIGHT: 139.99 LBS | TEMPERATURE: 98 F | DIASTOLIC BLOOD PRESSURE: 49 MMHG | HEIGHT: 64 IN

## 2022-06-19 DIAGNOSIS — O00.1 TUBAL PREGNANCY: Chronic | ICD-10-CM

## 2022-06-19 DIAGNOSIS — R55 SYNCOPE AND COLLAPSE: ICD-10-CM

## 2022-06-19 DIAGNOSIS — Z96.7 PRESENCE OF OTHER BONE AND TENDON IMPLANTS: Chronic | ICD-10-CM

## 2022-06-19 DIAGNOSIS — Z98.49 CATARACT EXTRACTION STATUS, UNSPECIFIED EYE: Chronic | ICD-10-CM

## 2022-06-19 DIAGNOSIS — Z98.890 OTHER SPECIFIED POSTPROCEDURAL STATES: Chronic | ICD-10-CM

## 2022-06-19 LAB
ALBUMIN SERPL ELPH-MCNC: 3.5 G/DL — SIGNIFICANT CHANGE UP (ref 3.3–5)
ALBUMIN SERPL ELPH-MCNC: 3.7 G/DL — SIGNIFICANT CHANGE UP (ref 3.3–5)
ALBUMIN SERPL ELPH-MCNC: 4 G/DL — SIGNIFICANT CHANGE UP (ref 3.3–5)
ALP SERPL-CCNC: 243 U/L — HIGH (ref 40–120)
ALP SERPL-CCNC: 285 U/L — HIGH (ref 40–120)
ALP SERPL-CCNC: 291 U/L — HIGH (ref 40–120)
ALT FLD-CCNC: 73 U/L — HIGH (ref 10–45)
ALT FLD-CCNC: 79 U/L — HIGH (ref 10–45)
ALT FLD-CCNC: 86 U/L — HIGH (ref 10–45)
ANION GAP SERPL CALC-SCNC: 14 MMOL/L — SIGNIFICANT CHANGE UP (ref 5–17)
ANION GAP SERPL CALC-SCNC: 14 MMOL/L — SIGNIFICANT CHANGE UP (ref 5–17)
ANION GAP SERPL CALC-SCNC: 16 MMOL/L — SIGNIFICANT CHANGE UP (ref 5–17)
APPEARANCE UR: ABNORMAL
AST SERPL-CCNC: 120 U/L — HIGH (ref 10–40)
AST SERPL-CCNC: 63 U/L — HIGH (ref 10–40)
AST SERPL-CCNC: 79 U/L — HIGH (ref 10–40)
BACTERIA # UR AUTO: NEGATIVE — SIGNIFICANT CHANGE UP
BASE EXCESS BLDV CALC-SCNC: 0.4 MMOL/L — SIGNIFICANT CHANGE UP (ref -2–2)
BASOPHILS # BLD AUTO: 0.05 K/UL — SIGNIFICANT CHANGE UP (ref 0–0.2)
BASOPHILS NFR BLD AUTO: 0.7 % — SIGNIFICANT CHANGE UP (ref 0–2)
BILIRUB SERPL-MCNC: 0.4 MG/DL — SIGNIFICANT CHANGE UP (ref 0.2–1.2)
BILIRUB UR-MCNC: NEGATIVE — SIGNIFICANT CHANGE UP
BUN SERPL-MCNC: 32 MG/DL — HIGH (ref 7–23)
BUN SERPL-MCNC: 33 MG/DL — HIGH (ref 7–23)
BUN SERPL-MCNC: 37 MG/DL — HIGH (ref 7–23)
CA-I SERPL-SCNC: 1.22 MMOL/L — SIGNIFICANT CHANGE UP (ref 1.15–1.33)
CALCIUM SERPL-MCNC: 8.7 MG/DL — SIGNIFICANT CHANGE UP (ref 8.4–10.5)
CALCIUM SERPL-MCNC: 8.7 MG/DL — SIGNIFICANT CHANGE UP (ref 8.4–10.5)
CALCIUM SERPL-MCNC: 9.1 MG/DL — SIGNIFICANT CHANGE UP (ref 8.4–10.5)
CHLORIDE BLDV-SCNC: 104 MMOL/L — SIGNIFICANT CHANGE UP (ref 96–108)
CHLORIDE SERPL-SCNC: 101 MMOL/L — SIGNIFICANT CHANGE UP (ref 96–108)
CHLORIDE SERPL-SCNC: 102 MMOL/L — SIGNIFICANT CHANGE UP (ref 96–108)
CHLORIDE SERPL-SCNC: 102 MMOL/L — SIGNIFICANT CHANGE UP (ref 96–108)
CO2 BLDV-SCNC: 28 MMOL/L — HIGH (ref 22–26)
CO2 SERPL-SCNC: 17 MMOL/L — LOW (ref 22–31)
CO2 SERPL-SCNC: 20 MMOL/L — LOW (ref 22–31)
CO2 SERPL-SCNC: 21 MMOL/L — LOW (ref 22–31)
COLOR SPEC: SIGNIFICANT CHANGE UP
CREAT SERPL-MCNC: 0.95 MG/DL — SIGNIFICANT CHANGE UP (ref 0.5–1.3)
CREAT SERPL-MCNC: 1.05 MG/DL — SIGNIFICANT CHANGE UP (ref 0.5–1.3)
CREAT SERPL-MCNC: 1.16 MG/DL — SIGNIFICANT CHANGE UP (ref 0.5–1.3)
DIFF PNL FLD: NEGATIVE — SIGNIFICANT CHANGE UP
EGFR: 42 ML/MIN/1.73M2 — LOW
EGFR: 47 ML/MIN/1.73M2 — LOW
EGFR: 53 ML/MIN/1.73M2 — LOW
EOSINOPHIL # BLD AUTO: 0.22 K/UL — SIGNIFICANT CHANGE UP (ref 0–0.5)
EOSINOPHIL NFR BLD AUTO: 2.9 % — SIGNIFICANT CHANGE UP (ref 0–6)
EPI CELLS # UR: 9 /HPF — HIGH
FLUAV AG NPH QL: SIGNIFICANT CHANGE UP
FLUBV AG NPH QL: SIGNIFICANT CHANGE UP
GAS PNL BLDV: 136 MMOL/L — SIGNIFICANT CHANGE UP (ref 136–145)
GAS PNL BLDV: SIGNIFICANT CHANGE UP
GLUCOSE BLDV-MCNC: 141 MG/DL — HIGH (ref 70–99)
GLUCOSE SERPL-MCNC: 101 MG/DL — HIGH (ref 70–99)
GLUCOSE SERPL-MCNC: 141 MG/DL — HIGH (ref 70–99)
GLUCOSE SERPL-MCNC: 150 MG/DL — HIGH (ref 70–99)
GLUCOSE UR QL: NEGATIVE — SIGNIFICANT CHANGE UP
HCO3 BLDV-SCNC: 26 MMOL/L — SIGNIFICANT CHANGE UP (ref 22–29)
HCT VFR BLD CALC: 34.1 % — LOW (ref 34.5–45)
HCT VFR BLDA CALC: 34 % — LOW (ref 34.5–46.5)
HGB BLD CALC-MCNC: 11.3 G/DL — LOW (ref 11.7–16.1)
HGB BLD-MCNC: 11.1 G/DL — LOW (ref 11.5–15.5)
HYALINE CASTS # UR AUTO: 5 /LPF — HIGH (ref 0–2)
IMM GRANULOCYTES NFR BLD AUTO: 0.5 % — SIGNIFICANT CHANGE UP (ref 0–1.5)
KETONES UR-MCNC: NEGATIVE — SIGNIFICANT CHANGE UP
LACTATE BLDV-MCNC: 2 MMOL/L — SIGNIFICANT CHANGE UP (ref 0.7–2)
LEUKOCYTE ESTERASE UR-ACNC: ABNORMAL
LYMPHOCYTES # BLD AUTO: 1.48 K/UL — SIGNIFICANT CHANGE UP (ref 1–3.3)
LYMPHOCYTES # BLD AUTO: 19.7 % — SIGNIFICANT CHANGE UP (ref 13–44)
MCHC RBC-ENTMCNC: 31.1 PG — SIGNIFICANT CHANGE UP (ref 27–34)
MCHC RBC-ENTMCNC: 32.6 GM/DL — SIGNIFICANT CHANGE UP (ref 32–36)
MCV RBC AUTO: 95.5 FL — SIGNIFICANT CHANGE UP (ref 80–100)
MONOCYTES # BLD AUTO: 0.92 K/UL — HIGH (ref 0–0.9)
MONOCYTES NFR BLD AUTO: 12.2 % — SIGNIFICANT CHANGE UP (ref 2–14)
NEUTROPHILS # BLD AUTO: 4.81 K/UL — SIGNIFICANT CHANGE UP (ref 1.8–7.4)
NEUTROPHILS NFR BLD AUTO: 64 % — SIGNIFICANT CHANGE UP (ref 43–77)
NITRITE UR-MCNC: NEGATIVE — SIGNIFICANT CHANGE UP
NRBC # BLD: 0 /100 WBCS — SIGNIFICANT CHANGE UP (ref 0–0)
PCO2 BLDV: 48 MMHG — HIGH (ref 39–42)
PH BLDV: 7.35 — SIGNIFICANT CHANGE UP (ref 7.32–7.43)
PH UR: 7.5 — SIGNIFICANT CHANGE UP (ref 5–8)
PLATELET # BLD AUTO: 185 K/UL — SIGNIFICANT CHANGE UP (ref 150–400)
PO2 BLDV: 41 MMHG — SIGNIFICANT CHANGE UP (ref 25–45)
POTASSIUM BLDV-SCNC: 5.1 MMOL/L — SIGNIFICANT CHANGE UP (ref 3.5–5.1)
POTASSIUM SERPL-MCNC: 3.7 MMOL/L — SIGNIFICANT CHANGE UP (ref 3.5–5.3)
POTASSIUM SERPL-MCNC: 6.5 MMOL/L — CRITICAL HIGH (ref 3.5–5.3)
POTASSIUM SERPL-MCNC: 7.7 MMOL/L — CRITICAL HIGH (ref 3.5–5.3)
POTASSIUM SERPL-SCNC: 3.7 MMOL/L — SIGNIFICANT CHANGE UP (ref 3.5–5.3)
POTASSIUM SERPL-SCNC: 6.5 MMOL/L — CRITICAL HIGH (ref 3.5–5.3)
POTASSIUM SERPL-SCNC: 7.7 MMOL/L — CRITICAL HIGH (ref 3.5–5.3)
PROT SERPL-MCNC: 7.6 G/DL — SIGNIFICANT CHANGE UP (ref 6–8.3)
PROT SERPL-MCNC: 7.7 G/DL — SIGNIFICANT CHANGE UP (ref 6–8.3)
PROT SERPL-MCNC: 7.9 G/DL — SIGNIFICANT CHANGE UP (ref 6–8.3)
PROT UR-MCNC: SIGNIFICANT CHANGE UP
RBC # BLD: 3.57 M/UL — LOW (ref 3.8–5.2)
RBC # FLD: 14.9 % — HIGH (ref 10.3–14.5)
RBC CASTS # UR COMP ASSIST: 1 /HPF — SIGNIFICANT CHANGE UP (ref 0–4)
RSV RNA NPH QL NAA+NON-PROBE: SIGNIFICANT CHANGE UP
SAO2 % BLDV: 64.2 % — LOW (ref 67–88)
SARS-COV-2 RNA SPEC QL NAA+PROBE: SIGNIFICANT CHANGE UP
SODIUM SERPL-SCNC: 135 MMOL/L — SIGNIFICANT CHANGE UP (ref 135–145)
SODIUM SERPL-SCNC: 136 MMOL/L — SIGNIFICANT CHANGE UP (ref 135–145)
SODIUM SERPL-SCNC: 136 MMOL/L — SIGNIFICANT CHANGE UP (ref 135–145)
SP GR SPEC: 1.01 — SIGNIFICANT CHANGE UP (ref 1.01–1.02)
UROBILINOGEN FLD QL: NEGATIVE — SIGNIFICANT CHANGE UP
WBC # BLD: 7.52 K/UL — SIGNIFICANT CHANGE UP (ref 3.8–10.5)
WBC # FLD AUTO: 7.52 K/UL — SIGNIFICANT CHANGE UP (ref 3.8–10.5)
WBC UR QL: 28 /HPF — HIGH (ref 0–5)

## 2022-06-19 PROCEDURE — 93010 ELECTROCARDIOGRAM REPORT: CPT

## 2022-06-19 PROCEDURE — 71045 X-RAY EXAM CHEST 1 VIEW: CPT | Mod: 26

## 2022-06-19 PROCEDURE — 99285 EMERGENCY DEPT VISIT HI MDM: CPT | Mod: GC

## 2022-06-19 RX ORDER — AMLODIPINE BESYLATE 2.5 MG/1
5 TABLET ORAL ONCE
Refills: 0 | Status: COMPLETED | OUTPATIENT
Start: 2022-06-19 | End: 2022-06-19

## 2022-06-19 RX ORDER — CEFTRIAXONE 500 MG/1
1000 INJECTION, POWDER, FOR SOLUTION INTRAMUSCULAR; INTRAVENOUS ONCE
Refills: 0 | Status: COMPLETED | OUTPATIENT
Start: 2022-06-19 | End: 2022-06-19

## 2022-06-19 RX ORDER — SODIUM CHLORIDE 9 MG/ML
500 INJECTION INTRAMUSCULAR; INTRAVENOUS; SUBCUTANEOUS ONCE
Refills: 0 | Status: COMPLETED | OUTPATIENT
Start: 2022-06-19 | End: 2022-06-19

## 2022-06-19 RX ORDER — ONDANSETRON 8 MG/1
4 TABLET, FILM COATED ORAL ONCE
Refills: 0 | Status: COMPLETED | OUTPATIENT
Start: 2022-06-19 | End: 2022-06-19

## 2022-06-19 RX ORDER — CARVEDILOL PHOSPHATE 80 MG/1
3.12 CAPSULE, EXTENDED RELEASE ORAL ONCE
Refills: 0 | Status: COMPLETED | OUTPATIENT
Start: 2022-06-19 | End: 2022-06-19

## 2022-06-19 RX ADMIN — CARVEDILOL PHOSPHATE 3.12 MILLIGRAM(S): 80 CAPSULE, EXTENDED RELEASE ORAL at 23:24

## 2022-06-19 RX ADMIN — SODIUM CHLORIDE 500 MILLILITER(S): 9 INJECTION INTRAMUSCULAR; INTRAVENOUS; SUBCUTANEOUS at 23:28

## 2022-06-19 RX ADMIN — AMLODIPINE BESYLATE 5 MILLIGRAM(S): 2.5 TABLET ORAL at 23:24

## 2022-06-19 RX ADMIN — ONDANSETRON 4 MILLIGRAM(S): 8 TABLET, FILM COATED ORAL at 21:58

## 2022-06-19 RX ADMIN — SODIUM CHLORIDE 500 MILLILITER(S): 9 INJECTION INTRAMUSCULAR; INTRAVENOUS; SUBCUTANEOUS at 16:53

## 2022-06-19 RX ADMIN — CEFTRIAXONE 100 MILLIGRAM(S): 500 INJECTION, POWDER, FOR SOLUTION INTRAMUSCULAR; INTRAVENOUS at 21:58

## 2022-06-19 NOTE — ED ADULT NURSE NOTE - NSIMPLEMENTINTERV_GEN_ALL_ED
Implemented All Fall with Harm Risk Interventions:  Tangent to call system. Call bell, personal items and telephone within reach. Instruct patient to call for assistance. Room bathroom lighting operational. Non-slip footwear when patient is off stretcher. Physically safe environment: no spills, clutter or unnecessary equipment. Stretcher in lowest position, wheels locked, appropriate side rails in place. Provide visual cue, wrist band, yellow gown, etc. Monitor gait and stability. Monitor for mental status changes and reorient to person, place, and time. Review medications for side effects contributing to fall risk. Reinforce activity limits and safety measures with patient and family. Provide visual clues: red socks.

## 2022-06-19 NOTE — ED ADULT NURSE NOTE - OBJECTIVE STATEMENT
Patient BIBEMS for syncopal episode. Per EMS, patient was sitting on a bench with aide when aide noticed she went unresponsive for "a little while"  then woke up. Patient states she was listening to a concert in her assisted living when episode happened. Patient is A&Ox4, in no distress, respirations even and unlabored on room air, denies HA, denies dizziness, denies lightheadedness, denies CP, palpitations or SOB, moving all extremities strong and equally, denies any recent illness. Denies falls or trauma.

## 2022-06-19 NOTE — ED PROVIDER NOTE - CARE PLAN
1 Principal Discharge DX:	Syncope   Principal Discharge DX:	Syncope  Secondary Diagnosis:	Acute UTI

## 2022-06-19 NOTE — ED PROVIDER NOTE - ADMIT DISPOSITION PRESENT ON ADMISSION SEPSIS
Abdomen soft, non-tender and non-distended, no rebound, no guarding and no masses. no hepatosplenomegaly. No

## 2022-06-19 NOTE — ED PROVIDER NOTE - ATTENDING APP SHARED VISIT CONTRIBUTION OF CARE
I have personally seen and examined this patient.  I have fully participated in the care of this patient. I performed a substantive portion of the visit including all aspects of the medical decision making. I have reviewed all pertinent clinical information, including history, physical exam, plan and the Resident’s note and agree except as noted. - MD Nohemi.    89 yo F, recent completed EP work up, including micro pacemaker, today an episode of short loss of consciousness, under a sunlight, no cardiac murmur, noraml range EKG, given her recent cardiac work up, normal EF last year by cardiac echo, without AS or other valve abnormality, this is likely not cardiac, rather vasovagal or dehydration, will get labs to check electrolytes, or ua, uti, if all negative, pt has reliable home attending, likely dc with strict retunr precaution, pt's cardiologist has been paged, will give loop-communication.

## 2022-06-19 NOTE — ED PROVIDER NOTE - NS ED ATTENDING STATEMENT MOD
This was a shared visit with the RYNE. I reviewed and verified the documentation and independently performed the documented:

## 2022-06-19 NOTE — ED PROVIDER NOTE - NS ED ROS FT
REVIEW OF SYSTEMS:    CONSTITUTIONAL: No weakness, fevers or chills  EYES No eye pain, No visual changes;  ENT:  No vertigo or throat pain, no nasal congestion  NECK: No pain or stiffness  RESPIRATORY: No cough, wheezing, hemoptysis; No shortness of breath  CARDIOVASCULAR: No chest pain or palpitations  GASTROINTESTINAL: No abdominal or epigastric pain. No nausea, vomiting, or hematemesis; No diarrhea or constipation. No melena or hematochezia.  GENITOURINARY: No dysuria, frequency or hematuria  NEUROLOGICAL: No numbness or weakness  Psych: No anxiety, no depression  SKIN: No itching, rashes

## 2022-06-19 NOTE — ED PROVIDER NOTE - CLINICAL SUMMARY MEDICAL DECISION MAKING FREE TEXT BOX
89 yo F hx CAD, CHB s/p micra placement April here with syncope after being out in the sun for a concert. Likely vasovagal, but given hx will rule out cardiac cause. EKG NSR. Will obtain labs including BMP, VBG, trop, Ua, reassess.

## 2022-06-19 NOTE — ED ADULT NURSE NOTE - NSFALLRSKPASTHIST_ED_ALL_ED
[facility]  MA Rooming Progress Note    1. Pill count completed? N/A  If yes, then 1a.  1a. Pill count appropriate? N/A  If no, what is the discrepancy?   2. Are you taking medication as instructed? Yes  3. Have you received any pain medications from other providers? Yes; who prescribed them: Dr Ho, what was prescribed: Oxycodone, what dose(s): 5mg. Pt had left hip replacement 1/8/2018  4. Do you have any medication questions/concerns since your last visit?  No  4a. If so, what are you questions/concerns?   5. Are you satisfied with current pain management regimen? Yes  6. I see you indicated that your pain has been interfering with your mood. Is it causing sadness or depression? N/A  7. Ask only if 9B on the BPI is 8 or greater and PHQ-9 has not been previously completed:  7a.  Over the past 2 weeks, how many days have you had little interest or pleasure in doing things?  0-Not at all  7b.  Over the past 2 weeks how many days have you been feeling down, depressed or hopeless? 0-Not at all  8. Did the patient bring a friend or family member with them into the room? No  9. If so, did the patient give explicit permission for the practitioner to discuss their healthcare in front of that friend/family member? N/A       no

## 2022-06-19 NOTE — ED PROVIDER NOTE - PHYSICAL EXAMINATION
PHYSICAL EXAM:  GENERAL: NAD, Resting in bed  HEENT:  Head atraumatic, EOMI, PERRLA, conjunctiva and sclera clear; Moist mucous membranes, normal oropharynx  NECK: Supple, No JVD, no lymphadenopathy, no thyroid nodules or enlargement  CHEST/LUNG: Clear to auscultation bilaterally; No rales, rhonchi, wheezing, or rubs. Unlabored respirations on room air  HEART: Regular rate and rhythm; No murmurs, rubs, or gallops  ABDOMEN: Bowel sounds present; Soft, Nontender, Nondistended. No hepatomegally  EXTREMITIES:  2+ Peripheral Pulses, brisk capillary refill. No clubbing, cyanosis, or edema  NERVOUS SYSTEM:  non-focal and spontaneous movements of all extremities  Psych: Alert & Oriented X3  SKIN: No rashes or lesions

## 2022-06-19 NOTE — ED PROVIDER NOTE - OBJECTIVE STATEMENT
89 yo F hx HTN, CAD prior hx syncope, with loop recorder showing CHB in April s/p Micra placement, followed up with EP about 3 weeks ago told everything looked good, now here with another episode syncope. Was at her residence at the [facility] in [place] at an outdoor concert. Suddenly woke up and told she had passed out. No prodromal symptoms including no dizziness, light headedness, chest pain, or blurry vision. Was feeling well prior and had just eaten lunch an hour before. Currently no complaints. ROS head to toe negative.     Cardiologist: Kam Buckley

## 2022-06-20 PROCEDURE — 76700 US EXAM ABDOM COMPLETE: CPT | Mod: 26

## 2022-06-20 PROCEDURE — 70450 CT HEAD/BRAIN W/O DYE: CPT | Mod: 26

## 2022-06-20 RX ORDER — LEVOTHYROXINE SODIUM 125 MCG
100 TABLET ORAL DAILY
Refills: 0 | Status: DISCONTINUED | OUTPATIENT
Start: 2022-06-20 | End: 2022-06-23

## 2022-06-20 RX ORDER — HEPARIN SODIUM 5000 [USP'U]/ML
5000 INJECTION INTRAVENOUS; SUBCUTANEOUS EVERY 12 HOURS
Refills: 0 | Status: DISCONTINUED | OUTPATIENT
Start: 2022-06-20 | End: 2022-06-23

## 2022-06-20 RX ORDER — DICLOFENAC SODIUM 30 MG/G
0 GEL TOPICAL
Qty: 0 | Refills: 0 | DISCHARGE

## 2022-06-20 RX ORDER — AMLODIPINE BESYLATE 2.5 MG/1
2.5 TABLET ORAL DAILY
Refills: 0 | Status: DISCONTINUED | OUTPATIENT
Start: 2022-06-20 | End: 2022-06-23

## 2022-06-20 RX ORDER — CARVEDILOL PHOSPHATE 80 MG/1
3.12 CAPSULE, EXTENDED RELEASE ORAL EVERY 12 HOURS
Refills: 0 | Status: DISCONTINUED | OUTPATIENT
Start: 2022-06-20 | End: 2022-06-23

## 2022-06-20 RX ORDER — ASPIRIN/CALCIUM CARB/MAGNESIUM 324 MG
81 TABLET ORAL DAILY
Refills: 0 | Status: DISCONTINUED | OUTPATIENT
Start: 2022-06-20 | End: 2022-06-23

## 2022-06-20 RX ORDER — SPIRONOLACTONE 25 MG/1
12.5 TABLET, FILM COATED ORAL DAILY
Refills: 0 | Status: DISCONTINUED | OUTPATIENT
Start: 2022-06-20 | End: 2022-06-20

## 2022-06-20 RX ORDER — NYSTATIN CREAM 100000 [USP'U]/G
1 CREAM TOPICAL
Qty: 0 | Refills: 0 | DISCHARGE

## 2022-06-20 RX ORDER — CEFTRIAXONE 500 MG/1
1000 INJECTION, POWDER, FOR SOLUTION INTRAMUSCULAR; INTRAVENOUS EVERY 24 HOURS
Refills: 0 | Status: DISCONTINUED | OUTPATIENT
Start: 2022-06-20 | End: 2022-06-21

## 2022-06-20 RX ORDER — KETOCONAZOLE 20 MG/G
1 AEROSOL, FOAM TOPICAL
Qty: 0 | Refills: 0 | DISCHARGE

## 2022-06-20 RX ADMIN — HEPARIN SODIUM 5000 UNIT(S): 5000 INJECTION INTRAVENOUS; SUBCUTANEOUS at 16:54

## 2022-06-20 RX ADMIN — AMLODIPINE BESYLATE 2.5 MILLIGRAM(S): 2.5 TABLET ORAL at 07:44

## 2022-06-20 RX ADMIN — CARVEDILOL PHOSPHATE 3.12 MILLIGRAM(S): 80 CAPSULE, EXTENDED RELEASE ORAL at 16:54

## 2022-06-20 RX ADMIN — SPIRONOLACTONE 12.5 MILLIGRAM(S): 25 TABLET, FILM COATED ORAL at 07:45

## 2022-06-20 RX ADMIN — Medication 100 MICROGRAM(S): at 07:44

## 2022-06-20 RX ADMIN — CEFTRIAXONE 100 MILLIGRAM(S): 500 INJECTION, POWDER, FOR SOLUTION INTRAMUSCULAR; INTRAVENOUS at 22:41

## 2022-06-20 NOTE — PHYSICAL THERAPY INITIAL EVALUATION ADULT - ADDITIONAL COMMENTS
Pt resides at [facility] assisted living facility. States prior to admission, pt was independent w/ all mobility & ADL's & uses a rollator.

## 2022-06-20 NOTE — PHYSICAL THERAPY INITIAL EVALUATION ADULT - PERTINENT HX OF CURRENT PROBLEM, REHAB EVAL
89 yo F hx HTN, CAD prior hx syncope, with loop recorder showing CHB in April s/p Micra placement, followed up with EP about 3 weeks ago told everything looked good, now here with another episode syncope. Was at her residence at the [facility] in [place] at an outdoor concert. Suddenly woke up and told she had passed out.

## 2022-06-20 NOTE — H&P ADULT - HISTORY OF PRESENT ILLNESS
89 yo F        hx HTN, CAD   prior hx syncope, with loop recorder showing CHB in April s/p Micra placement, followed up with EP about 3 weeks ago told everything looked good    , now here with another episode  of syncope.    Was at her residence at the [facility] in [place] at an outdoor concert.     Suddenly woke up and then   she had passed out..   no dizziness,   , chest pain, or blurry vision. Was feeling well prior and had just eaten lunch an hour before.   Currently no complaints.

## 2022-06-20 NOTE — CONSULT NOTE ADULT - SUBJECTIVE AND OBJECTIVE BOX
Date of service: 06/20/22    Requesting Physician : Dr. Lowery    Reason for Consultation: Syncope     HISTORY OF PRESENT ILLNESS:  89 year old female with history of PAF on ASA only, s/p micra PPM, CAD, Hypothyroidism who is being seen for syncope.  The patient was at an outdoor concert when she suddenly passed out.  She denies chest pain or anginal symptoms prior to the event.  No dyspnea, orthopnea, or LE edema.  The patient had prior syncopal episodes at which time ILR was placed; when CHB was demonstrated on ILR, the patient underwent Micra PPM.              PAST MEDICAL & SURGICAL HISTORY:  Urinary Frequency      Bilateral Cataracts      HTN (hypertension)      Spinal stenosis      Hypothyroidism      Non-ST elevation MI (NSTEMI)      Macular degeneration      (HFpEF) heart failure with preserved ejection fraction      Paroxysmal atrial fibrillation      Chronic kidney disease (CKD)      Ectopic pregnancy, tubal      S/P ORIF (open reduction internal fixation) fracture  R hip      S/P breast lumpectomy      S/P cataract surgery  b/l              MEDICATIONS:  MEDICATIONS  (STANDING):  amLODIPine   Tablet 2.5 milliGRAM(s) Oral daily  aspirin enteric coated 81 milliGRAM(s) Oral daily  carvedilol 3.125 milliGRAM(s) Oral every 12 hours  cefTRIAXone   IVPB 1000 milliGRAM(s) IV Intermittent every 24 hours  heparin   Injectable 5000 Unit(s) SubCutaneous every 12 hours  levothyroxine 100 MICROGram(s) Oral daily      Allergies    sulfa topicals (Unknown)    Intolerances    morphine (Drowsiness; Faint; Hypotension)      FAMILY HISTORY:  Family history of acute myocardial infarction      Non-contributary for premature coronary disease or sudden cardiac death    SOCIAL HISTORY:    [ x] Non-smoker  [ ] Smoker  [ ] Alcohol      REVIEW OF SYSTEMS:  [ ]chest pain  [  ]shortness of breath  [  ]palpitations  [ x ]syncope  [ ]near syncope [ ]upper extremity weakness   [ ] lower extremity weakness  [  ]diplopia  [  ]altered mental status   [  ]fevers  [ ]chills [ ]nausea  [ ]vomitting  [  ]dysphagia    [ ]abdominal pain  [ ]melena  [ ]BRBPR    [  ]epistaxis  [  ]rash    [ ]lower extremity edema        [x ] All others negative	  [ ] Unable to obtain    PHYSICAL EXAM:  T(C): 36.9 (06-20-22 @ 17:35), Max: 37.1 (06-19-22 @ 23:25)  HR: 88 (06-20-22 @ 17:35) (78 - 94)  BP: 156/81 (06-20-22 @ 17:35) (130/72 - 194/91)  RR: 16 (06-20-22 @ 17:35) (16 - 20)  SpO2: 96% (06-20-22 @ 17:35) (95% - 99%)  Wt(kg): --  I&O's Summary        HEENT:   Normal oral mucosa, PERRL, EOMI	  Lymphatic: No lymphadenopathy , no edema  Cardiovascular: Normal S1 S2, No JVD, No murmurs , Peripheral pulses palpable 2+ bilaterally  Respiratory: Lungs clear to auscultation, normal effort 	  Gastrointestinal:  Soft, Non-tender, + BS	  Skin: No rashes, No ecchymoses, No cyanosis, warm to touch  Musculoskeletal: Normal range of motion, normal strength  Psychiatry:  Mood & affect appropriate      TELEMETRY: 	    ECG: SR, LVH 	  RADIOLOGY:  OTHER:     DIAGNOSTIC TESTING:  [ ] Echocardiogram: < from: Transthoracic Echocardiogram (04.16.22 @ 09:34) >  1. Mitral annular calcification and calcified mitral  leaflets with normal diastolic opening. Mild mitral  regurgitation.  2. Calcified trileaflet aortic valve withmidly decreased  opening. Peak transaortic valve gradient equals 8 mm Hg,  mean transaortic valve gradient equals 4 mm Hg, aortic  valve velocity time integral equals 31 cm.  3. Eccentric left ventricular hypertrophy (dilated left  ventricle with normal relative wall thickness).  4. Overall preserved left ventricular ejection fraction.  Apical cap hypokinesis.  5. Severe  diastolic dysfunction.  6. Normal right ventricular size and function.    < end of copied text >    [ ]  Catheterization:  [ ] Stress Test:    	  	  LABS:	 	    CARDIAC MARKERS:                              11.1   7.52  )-----------( 185      ( 19 Jun 2022 16:47 )             34.1     06-19    136  |  101  |  33<H>  ----------------------------<  150<H>  3.7   |  21<L>  |  1.05    Ca    9.1      19 Jun 2022 22:31    TPro  7.6  /  Alb  4.0  /  TBili  0.4  /  DBili  x   /  AST  63<H>  /  ALT  79<H>  /  AlkPhos  291<H>  06-19    proBNP:   Lipid Profile:   HgA1c:   TSH:     ASSESSMENT/PLAN:  89 year old female with history of PAF on ASA only, s/p micra PPM, CAD, Hypothyroidism who is being seen for syncope.    -admit to tele  -check orthostatics  -check TTE  -EP eval with Dr. Montoya  -further workup pending above    Cedric Gutierrez MD

## 2022-06-20 NOTE — H&P ADULT - ASSESSMENT
89 yr       PMH of HTN, HFpEF, pAfib, CHB, CKD, recurrent UTIs (ESBL), HLD, OA, hypothyroid,   CHB.  s/p  Micra  3/22       presents  with syncope  agian from  assisted  living   ppm  to  be interrogated   HTN, on coreg/   norvasc   c/c  diastolic  chf   Hypothyroid, on synthroid    prior  h/o asymmetric   breast  tissue, no  w/p  at  this  advanced  age  on dvt  ppx/  KS  eval  ct  head  pending      ra< from: Transthoracic Echocardiogram (04.16.22 @ 09:34) >  onclusions:  1. Mitral annular calcification and calcified mitral  leaflets with normal diastolic opening. Mild mitral  regurgitation.  2. Calcified trileaflet aortic valve withmidly decreased  opening. Peak transaortic valve gradient equals 8 mm Hg,  mean transaortic valve gradient equals 4 mm Hg, aortic  valve velocity time integral equals 31 cm.  3. Eccentric left ventricular hypertrophy (dilated left  ventricle with normal relative wall thickness).  4. Overall preserved left ventricular ejection fraction.  Apical cap hypokinesis.  5. Severe  diastolic dysfunction.  6. Normal right ventricular size and function.  ------------------------------------------------------------------------  Confirmed on  4/16/2022 - 17:59:51 by Mohit Trevizo,    < end of copied text >      89 yr       PMH of HTN, HFpEF, pAfib, CHB, CKD, recurrent UTIs (ESBL), HLD, OA, hypothyroid,   CHB.  s/p  Micra  3/22       presents  with syncope  agian from  assisted  living   ppm  to  be interrogated   HTN, on coreg/   norvasc   c/c  diastolic  chf   Hypothyroid, on synthroid    prior  h/o asymmetric   breast  tissue, no  w/p  at  this  advanced  age  uti, on rocephin  on dvt  ppx/  PT   eval  ct  head  pending    pt  brady s prior  molst. is  dnr/ dni      ra< from: Transthoracic Echocardiogram (04.16.22 @ 09:34) >  onclusions:  1. Mitral annular calcification and calcified mitral  leaflets with normal diastolic opening. Mild mitral  regurgitation.  2. Calcified trileaflet aortic valve withmidly decreased  opening. Peak transaortic valve gradient equals 8 mm Hg,  mean transaortic valve gradient equals 4 mm Hg, aortic  valve velocity time integral equals 31 cm.  3. Eccentric left ventricular hypertrophy (dilated left  ventricle with normal relative wall thickness).  4. Overall preserved left ventricular ejection fraction.  Apical cap hypokinesis.  5. Severe  diastolic dysfunction.  6. Normal right ventricular size and function.  ------------------------------------------------------------------------  Confirmed on  4/16/2022 - 17:59:51 by Mohit Trevizo,    < end of copied text >

## 2022-06-20 NOTE — PATIENT PROFILE ADULT - FALL HARM RISK - HARM RISK INTERVENTIONS

## 2022-06-20 NOTE — H&P ADULT - NSHPPHYSICALEXAM_GEN_ALL_CORE
PHYSICAL EXAMINATION:  Vital Signs Last 24 Hrs  T(C): 36.8 (20 Jun 2022 05:18), Max: 37.2 (19 Jun 2022 16:30)  T(F): 98.2 (20 Jun 2022 05:18), Max: 98.9 (19 Jun 2022 16:30)  HR: 81 (20 Jun 2022 05:18) (74 - 94)  BP: 130/72 (20 Jun 2022 05:18) (110/49 - 194/91)  BP(mean): 118 (19 Jun 2022 23:25) (118 - 118)  RR: 18 (20 Jun 2022 05:18) (16 - 20)  SpO2: 96% (20 Jun 2022 05:18) (94% - 97%)  CAPILLARY BLOOD GLUCOSE      POCT Blood Glucose.: 147 mg/dL (19 Jun 2022 16:12)        GENERAL: NAD, well-groomed,  HEAD:  atraumatic, normocephalic  EYES: sclera anicteric  ENMT: mucous membranes moist  NECK: supple, No JVD  CHEST/LUNG: clear to auscultation bilaterally;    no      rales   ,   no rhonchi,   HEART: normal S1, S2  ABDOMEN: BS+, soft, ND, NT   EXTREMITIES:    no    edema    b/l LEs  NEURO: awake, ,    SKIN: no     rash

## 2022-06-20 NOTE — H&P ADULT - NSHPLABSRESULTS_GEN_ALL_CORE
LABS:                        11.1   7.52  )-----------( 185      ( 2022 16:47 )             34.1         136  |  101  |  33<H>  ----------------------------<  150<H>  3.7   |  21<L>  |  1.05    Ca    9.1      2022 22:31    TPro  7.6  /  Alb  4.0  /  TBili  0.4  /  DBili  x   /  AST  63<H>  /  ALT  79<H>  /  AlkPhos  291<H>            Urinalysis Basic - ( 2022 18:19 )    Color: Light Yellow / Appearance: Slightly Turbid / S.014 / pH: x  Gluc: x / Ketone: Negative  / Bili: Negative / Urobili: Negative   Blood: x / Protein: Trace / Nitrite: Negative   Leuk Esterase: Moderate / RBC: 1 /hpf / WBC 28 /HPF   Sq Epi: x / Non Sq Epi: 9 /hpf / Bacteria: Negative           @ 16:16  5.1  41      Thyroid Stimulating Hormone, Serum: 2.21 uIU/mL ( @ 10:26)

## 2022-06-21 ENCOUNTER — TRANSCRIPTION ENCOUNTER (OUTPATIENT)
Age: 87
End: 2022-06-21

## 2022-06-21 DIAGNOSIS — R82.79 OTHER ABNORMAL FINDINGS ON MICROBIOLOGICAL EXAMINATION OF URINE: ICD-10-CM

## 2022-06-21 LAB
-  AMIKACIN: SIGNIFICANT CHANGE UP
-  AMOXICILLIN/CLAVULANIC ACID: SIGNIFICANT CHANGE UP
-  AMPICILLIN/SULBACTAM: SIGNIFICANT CHANGE UP
-  AMPICILLIN: SIGNIFICANT CHANGE UP
-  AMPICILLIN: SIGNIFICANT CHANGE UP
-  AZTREONAM: SIGNIFICANT CHANGE UP
-  CEFAZOLIN: SIGNIFICANT CHANGE UP
-  CEFEPIME: SIGNIFICANT CHANGE UP
-  CEFOXITIN: SIGNIFICANT CHANGE UP
-  CEFTRIAXONE: SIGNIFICANT CHANGE UP
-  CIPROFLOXACIN: SIGNIFICANT CHANGE UP
-  CIPROFLOXACIN: SIGNIFICANT CHANGE UP
-  ERTAPENEM: SIGNIFICANT CHANGE UP
-  GENTAMICIN: SIGNIFICANT CHANGE UP
-  IMIPENEM: SIGNIFICANT CHANGE UP
-  LEVOFLOXACIN: SIGNIFICANT CHANGE UP
-  LEVOFLOXACIN: SIGNIFICANT CHANGE UP
-  MEROPENEM: SIGNIFICANT CHANGE UP
-  NITROFURANTOIN: SIGNIFICANT CHANGE UP
-  NITROFURANTOIN: SIGNIFICANT CHANGE UP
-  PIPERACILLIN/TAZOBACTAM: SIGNIFICANT CHANGE UP
-  TETRACYCLINE: SIGNIFICANT CHANGE UP
-  TIGECYCLINE: SIGNIFICANT CHANGE UP
-  TOBRAMYCIN: SIGNIFICANT CHANGE UP
-  TRIMETHOPRIM/SULFAMETHOXAZOLE: SIGNIFICANT CHANGE UP
-  VANCOMYCIN: SIGNIFICANT CHANGE UP
ALBUMIN SERPL ELPH-MCNC: 3.1 G/DL — LOW (ref 3.3–5)
ALP SERPL-CCNC: 233 U/L — HIGH (ref 40–120)
ALT FLD-CCNC: 104 U/L — HIGH (ref 10–45)
AST SERPL-CCNC: 77 U/L — HIGH (ref 10–40)
BILIRUB DIRECT SERPL-MCNC: 0.1 MG/DL — SIGNIFICANT CHANGE UP (ref 0–0.3)
BILIRUB INDIRECT FLD-MCNC: 0.3 MG/DL — SIGNIFICANT CHANGE UP (ref 0.2–1)
BILIRUB SERPL-MCNC: 0.4 MG/DL — SIGNIFICANT CHANGE UP (ref 0.2–1.2)
CULTURE RESULTS: SIGNIFICANT CHANGE UP
GLUCOSE BLDC GLUCOMTR-MCNC: 116 MG/DL — HIGH (ref 70–99)
METHOD TYPE: SIGNIFICANT CHANGE UP
METHOD TYPE: SIGNIFICANT CHANGE UP
ORGANISM # SPEC MICROSCOPIC CNT: SIGNIFICANT CHANGE UP
PROT SERPL-MCNC: 6.4 G/DL — SIGNIFICANT CHANGE UP (ref 6–8.3)
SARS-COV-2 RNA SPEC QL NAA+PROBE: SIGNIFICANT CHANGE UP
SPECIMEN SOURCE: SIGNIFICANT CHANGE UP

## 2022-06-21 PROCEDURE — 93291 INTERROG DEV EVAL SCRMS IP: CPT | Mod: 26,59

## 2022-06-21 PROCEDURE — 93279 PRGRMG DEV EVAL PM/LDLS PM: CPT | Mod: 26

## 2022-06-21 PROCEDURE — 99222 1ST HOSP IP/OBS MODERATE 55: CPT

## 2022-06-21 RX ORDER — CEFTRIAXONE 500 MG/1
1000 INJECTION, POWDER, FOR SOLUTION INTRAMUSCULAR; INTRAVENOUS ONCE
Refills: 0 | Status: COMPLETED | OUTPATIENT
Start: 2022-06-21 | End: 2022-06-21

## 2022-06-21 RX ADMIN — AMLODIPINE BESYLATE 2.5 MILLIGRAM(S): 2.5 TABLET ORAL at 05:56

## 2022-06-21 RX ADMIN — Medication 100 MICROGRAM(S): at 05:55

## 2022-06-21 RX ADMIN — HEPARIN SODIUM 5000 UNIT(S): 5000 INJECTION INTRAVENOUS; SUBCUTANEOUS at 05:55

## 2022-06-21 RX ADMIN — CARVEDILOL PHOSPHATE 3.12 MILLIGRAM(S): 80 CAPSULE, EXTENDED RELEASE ORAL at 05:56

## 2022-06-21 RX ADMIN — CEFTRIAXONE 100 MILLIGRAM(S): 500 INJECTION, POWDER, FOR SOLUTION INTRAMUSCULAR; INTRAVENOUS at 22:42

## 2022-06-21 RX ADMIN — HEPARIN SODIUM 5000 UNIT(S): 5000 INJECTION INTRAVENOUS; SUBCUTANEOUS at 18:47

## 2022-06-21 RX ADMIN — CARVEDILOL PHOSPHATE 3.12 MILLIGRAM(S): 80 CAPSULE, EXTENDED RELEASE ORAL at 18:46

## 2022-06-21 NOTE — DISCHARGE NOTE PROVIDER - NSDCMRMEDTOKEN_GEN_ALL_CORE_FT
amLODIPine 5 mg oral tablet: 1 tab(s) orally once a day  aspirin 81 mg oral delayed release tablet: 1 tab(s) orally once a day  atorvastatin 20 mg oral tablet: 1 tab(s) orally once a day (at bedtime)  carvedilol 3.125 mg oral tablet: 1 tab(s) orally every 12 hours  home physical therapy : 1  once a day   levothyroxine 100 mcg (0.1 mg) oral tablet: 1 tab(s) orally once a day  polyethylene glycol 3350 oral powder for reconstitution: 17 gram(s) orally once a day  spironolactone 25 mg oral tablet: 1 tab(s) orally once a day   Tylenol 500 mg oral tablet: 1 tab(s) orally every 8 hours, As Needed

## 2022-06-21 NOTE — DISCHARGE NOTE PROVIDER - PROVIDER TOKENS
PROVIDER:[TOKEN:[[id number]]] PROVIDER:[TOKEN:[[id number]]],PROVIDER:[TOKEN:[[id number]]]

## 2022-06-21 NOTE — DISCHARGE NOTE PROVIDER - HOSPITAL COURSE
89 yr       PMH of HTN, HFpEF, pAfib, CHB, CKD, recurrent UTIs (ESBL), HLD, OA, hypothyroid,   CHB.  s/p  Micra  3/22       presents  with syncope  again  from  assisted  living. and  per  daughter,  syncope  happens , every  time pt  has  a uti   ppm    interrogated.,  no  wct /  CT head, no  bleed  syncope  is  not  cardiac  related  and  will  have  to  believe  neal torres stating, it  is  relate d to her  uti,  a s family  knows  pt  the  best   c/c  diastolic  chf   Hypothyroid, on synthroid    prior  h/o asymmetric   breast  tissue, no  w/p  at  this  advanced  age  uti, on rocephin  on dvt  ppx/  PT   eval  ucx  with  ecoli. on iv rocephin, for  3  days per  d melissa markham    pt  haS prior  molst. is  dnr/ dni   89 yr       PMH of HTN, HFpEF, pAfib, CHB, CKD, recurrent UTIs (ESBL), HLD, OA, hypothyroid,   CHB.  s/p  Micra  3/22       presents  with syncope  again  from  assisted  living. and  per  daughter,  syncope  happens , every  time pt  has  a uti   ppm    interrogated.,  no  wct /  CT head, no  bleed  syncope  is  not  cardiac  related  and  will  have  to  believe  neal torres stating, it  is  relate d to her  uti,  a s family  knows  pt  the  best   c/c  diastolic  chf   Hypothyroid, on synthroid    prior  h/o asymmetric   breast  tissue, no  w/p  at  this  advanced  age  uti, on rocephin  on dvt  ppx/  PT   eval  ucx  with  ecoli. on iv rocephin, for  3  days per  d r rashi/  pe r id, no  rx  for  e coccus    pt  haS prior  molst. is  dnr/ dni

## 2022-06-21 NOTE — PROGRESS NOTE ADULT - ASSESSMENT
89 yr       PMH of HTN, HFpEF, pAfib, CHB, CKD, recurrent UTIs (ESBL), HLD, OA, hypothyroid,   CHB.  s/p  Micra  3/22       presents  with syncope  agian from  assisted  living   ppm    interrogated. cT head, no  bleed   HTN, on coreg/   norvasc   c/c  diastolic  chf   Hypothyroid, on synthroid    prior  h/o asymmetric   breast  tissue, no  w/p  at  this  advanced  age  uti, on rocephin  on dvt  ppx/  PT   eval  ucx  with  ecoli. on iv rocephin    pt  ha s prior  molst. is  dnr/ dni      ra< from: Transthoracic Echocardiogram (04.16.22 @ 09:34) >  onclusions:  1. Mitral annular calcification and calcified mitral  leaflets with normal diastolic opening. Mild mitral  regurgitation.  2. Calcified trileaflet aortic valve withmidly decreased  opening. Peak transaortic valve gradient equals 8 mm Hg,  mean transaortic valve gradient equals 4 mm Hg, aortic  valve velocity time integral equals 31 cm.  3. Eccentric left ventricular hypertrophy (dilated left  ventricle with normal relative wall thickness).  4. Overall preserved left ventricular ejection fraction.  Apical cap hypokinesis.  5. Severe  diastolic dysfunction.  6. Normal right ventricular size and function.  ------------------------------------------------------------------------  Confirmed on  4/16/2022 - 17:59:51 by Mohit Trevizo,    < end of copied text >      89 yr       PMH of HTN, HFpEF, pAfib, CHB, CKD, recurrent UTIs (ESBL), HLD, OA, hypothyroid,   CHB.  s/p  Micra  3/22       presents  with syncope  agian from  assisted  living   ppm    interrogated. cT head, no  bleed   HTN, on coreg/   norvasc   c/c  diastolic  chf   Hypothyroid, on synthroid    prior  h/o asymmetric   breast  tissue, no  w/p  at  this  advanced  age  uti, on rocephin  on dvt  ppx/  PT   eval  ucx  with  ecoli. on iv rocephin, for  3  days per  d r rashi    pt  brady s prior  molst. is  dnr/ dni      ra< from: Transthoracic Echocardiogram (04.16.22 @ 09:34) >  onclusions:  1. Mitral annular calcification and calcified mitral  leaflets with normal diastolic opening. Mild mitral  regurgitation.  2. Calcified trileaflet aortic valve withmidly decreased  opening. Peak transaortic valve gradient equals 8 mm Hg,  mean transaortic valve gradient equals 4 mm Hg, aortic  valve velocity time integral equals 31 cm.  3. Eccentric left ventricular hypertrophy (dilated left  ventricle with normal relative wall thickness).  4. Overall preserved left ventricular ejection fraction.  Apical cap hypokinesis.  5. Severe  diastolic dysfunction.  6. Normal right ventricular size and function.  ------------------------------------------------------------------------  Confirmed on  4/16/2022 - 17:59:51 by Mohit Trevizo,    < end of copied text >

## 2022-06-21 NOTE — DISCHARGE NOTE PROVIDER - NSDCCPCAREPLAN_GEN_ALL_CORE_FT
PRINCIPAL DISCHARGE DIAGNOSIS  Diagnosis: Syncope  Assessment and Plan of Treatment: S/p EPS consult and PPM interrogation. Continue oral hydration      SECONDARY DISCHARGE DIAGNOSES  Diagnosis: Positive urine culture  Assessment and Plan of Treatment: Completed    Diagnosis: Acute UTI  Assessment and Plan of Treatment: HOME CARE INSTRUCTIONS  f you were prescribed antibiotics, take them exactly as your caregiver instructs you. Finish the medication even if you feel better after you have only taken some of the medication.  Drink enough water and fluids to keep your urine clear or pale yellow.  Avoid caffeine, tea, and carbonated beverages. They tend to irritate your bladder.  Empty your bladder often. Avoid holding urine for long periods of time.  Empty your bladder before and after sexual intercourse.  After a bowel movement, women should cleanse from front to back. Use each tissue only once.  SEEK MEDICAL CARE IF:  You have back pain.  You develop a fever.  Your symptoms do not begin to resolve within 3 days.  SEEK IMMEDIATE MEDICAL CARE IF:  You have severe back pain or lower abdominal pain.  You develop chills.  You have nausea or vomiting.  You have continued burning or discomfort with urination.      Diagnosis: HTN (hypertension)  Assessment and Plan of Treatment:     Diagnosis: Chronic CHF  Assessment and Plan of Treatment:     Diagnosis: Hypothyroid  Assessment and Plan of Treatment:

## 2022-06-21 NOTE — PROCEDURE NOTE - ADDITIONAL PROCEDURE DETAILS
Indication: syncope  Patient is in NSR 60's   Sensed 99.9%, Paced 0.1%  - normal AV micra function, programmed VVI at 50bpm  - normal sensing and pacing thresholds, stable lead impedance  - Patient also has asn ILR which was interrogated, no events for review.     [id number]

## 2022-06-21 NOTE — CONSULT NOTE ADULT - PROBLEM SELECTOR RECOMMENDATION 9
-no fever  -normal wbc  -no  symptoms  -some wbc in U/a  -likely asymptomatic bacteruria   -DC abx and observe  -risk of abx > benefit at this time given above -no fever  -normal wbc  -h/o UTI in past  -some wbc in U/a  -day 3 of abx   -complete abx today - CTX 1 gm iv q24  -not septic

## 2022-06-21 NOTE — CONSULT NOTE ADULT - SUBJECTIVE AND OBJECTIVE BOX
SOLIS TAYLOR 89y Female  MRN-[id number]    Patient is a 89y old  Female who presents with a chief complaint of syncope (2022 07:22)      HPI:  89 yo F        hx HTN, CAD   prior hx syncope, with loop recorder showing CHB in April s/p Micra placement, followed up with EP about 3 weeks ago told everything looked good    , now here with another episode  of syncope.    Was at her residence at the [facility] in [place] at an outdoor concert.     Suddenly woke up and then   she had passed out..   no dizziness,   , chest pain, or blurry vision. Was feeling well prior and had just eaten lunch an hour before.   Currently no complaints. (2022 07:15)      PAST MEDICAL & SURGICAL HISTORY:  Urinary Frequency      Bilateral Cataracts      HTN (hypertension)      Spinal stenosis      Hypothyroidism      Non-ST elevation MI (NSTEMI)      Macular degeneration      (HFpEF) heart failure with preserved ejection fraction      Paroxysmal atrial fibrillation      Chronic kidney disease (CKD)      Ectopic pregnancy, tubal      S/P ORIF (open reduction internal fixation) fracture  R hip      S/P breast lumpectomy      S/P cataract surgery  b/l          Allergies    sulfa topicals (Unknown)    Intolerances    morphine (Drowsiness; Faint; Hypotension)      ANTIMICROBIALS:  cefTRIAXone   IVPB 1000 every 24 hours      MEDICATIONS  (STANDING):  amLODIPine   Tablet 2.5 milliGRAM(s) Oral daily  aspirin enteric coated 81 milliGRAM(s) Oral daily  carvedilol 3.125 milliGRAM(s) Oral every 12 hours  cefTRIAXone   IVPB 1000 milliGRAM(s) IV Intermittent every 24 hours  heparin   Injectable 5000 Unit(s) SubCutaneous every 12 hours  levothyroxine 100 MICROGram(s) Oral daily      Social History  Smoking:  Etoh:  Drug use:      FAMILY HISTORY:  Family history of acute myocardial infarction        Vital Signs Last 24 Hrs  T(C): 36.9 (2022 07:56), Max: 36.9 (2022 17:35)  T(F): 98.5 (2022 07:56), Max: 98.5 (2022 07:56)  HR: 75 (2022 07:56) (75 - 88)  BP: 123/66 (2022 07:56) (123/66 - 156/81)  BP(mean): --  RR: 18 (2022 07:56) (16 - 20)  SpO2: 90% (2022 07:56) (90% - 96%)    CBC Full  -  ( 2022 16:47 )  WBC Count : 7.52 K/uL  RBC Count : 3.57 M/uL  Hemoglobin : 11.1 g/dL  Hematocrit : 34.1 %  Platelet Count - Automated : 185 K/uL  Mean Cell Volume : 95.5 fl  Mean Cell Hemoglobin : 31.1 pg  Mean Cell Hemoglobin Concentration : 32.6 gm/dL  Auto Neutrophil # : 4.81 K/uL  Auto Lymphocyte # : 1.48 K/uL  Auto Monocyte # : 0.92 K/uL  Auto Eosinophil # : 0.22 K/uL  Auto Basophil # : 0.05 K/uL  Auto Neutrophil % : 64.0 %  Auto Lymphocyte % : 19.7 %  Auto Monocyte % : 12.2 %  Auto Eosinophil % : 2.9 %  Auto Basophil % : 0.7 %        136  |  101  |  33<H>  ----------------------------<  150<H>  3.7   |  21<L>  |  1.05    Ca    9.1      2022 22:31    TPro  6.4  /  Alb  3.1<L>  /  TBili  0.4  /  DBili  0.1  /  AST  77<H>  /  ALT  104<H>  /  AlkPhos  233<H>      LIVER FUNCTIONS - ( 2022 06:51 )  Alb: 3.1 g/dL / Pro: 6.4 g/dL / ALK PHOS: 233 U/L / ALT: 104 U/L / AST: 77 U/L / GGT: x           Urinalysis Basic - ( 2022 18:19 )    Color: Light Yellow / Appearance: Slightly Turbid / S.014 / pH: x  Gluc: x / Ketone: Negative  / Bili: Negative / Urobili: Negative   Blood: x / Protein: Trace / Nitrite: Negative   Leuk Esterase: Moderate / RBC: 1 /hpf / WBC 28 /HPF   Sq Epi: x / Non Sq Epi: 9 /hpf / Bacteria: Negative        MICROBIOLOGY:  Clean Catch Clean Catch (Midstream)  22   >100,000 CFU/ml Escherichia coli  50,000 - 99,000 CFU/mL Gram Positive Cocci in Pairs and Chains  --  --              v              RADIOLOGY   SOLIS TAYLOR 89y Female  MRN-[id number]    Patient is a 89y old  Female who presents with a chief complaint of syncope (2022 07:22)      HPI:  89 yo F  hx HTN, CAD  prior hx syncope, with loop recorder showing CHB in April s/p Micra placement, followed up with EP about 3 weeks ago told everything looked good now here with another episode  of syncope.    Was at her residence at the [facility] in [place] at an outdoor concert.     Suddenly woke up and then   she had passed out..   no dizziness,   , chest pain, or blurry vision. Was feeling well prior and had just eaten lunch an hour before.   Currently no complaints. (2022 07:15)    No fever, No dysuria or other  or GI complaints    PAST MEDICAL & SURGICAL HISTORY:  Urinary Frequency      Bilateral Cataracts      HTN (hypertension)      Spinal stenosis      Hypothyroidism      Non-ST elevation MI (NSTEMI)      Macular degeneration      (HFpEF) heart failure with preserved ejection fraction      Paroxysmal atrial fibrillation      Chronic kidney disease (CKD)      Ectopic pregnancy, tubal      S/P ORIF (open reduction internal fixation) fracture  R hip      S/P breast lumpectomy      S/P cataract surgery  b/l          Allergies    sulfa topicals (Unknown)    Intolerances    morphine (Drowsiness; Faint; Hypotension)      ANTIMICROBIALS:  cefTRIAXone   IVPB 1000 every 24 hours      MEDICATIONS  (STANDING):  amLODIPine   Tablet 2.5 milliGRAM(s) Oral daily  aspirin enteric coated 81 milliGRAM(s) Oral daily  carvedilol 3.125 milliGRAM(s) Oral every 12 hours  cefTRIAXone   IVPB 1000 milliGRAM(s) IV Intermittent every 24 hours  heparin   Injectable 5000 Unit(s) SubCutaneous every 12 hours  levothyroxine 100 MICROGram(s) Oral daily      Social History  Smoking: no  Etoh: no  Drug use: no      FAMILY HISTORY:  Family history of acute myocardial infarction        Vital Signs Last 24 Hrs  T(C): 36.9 (2022 07:56), Max: 36.9 (2022 17:35)  T(F): 98.5 (2022 07:56), Max: 98.5 (2022 07:56)  HR: 75 (2022 07:56) (75 - 88)  BP: 123/66 (2022 07:56) (123/66 - 156/81)  BP(mean): --  RR: 18 (2022 07:56) (16 - 20)  SpO2: 90% (2022 07:56) (90% - 96%)    CBC Full  -  ( 2022 16:47 )  WBC Count : 7.52 K/uL  RBC Count : 3.57 M/uL  Hemoglobin : 11.1 g/dL  Hematocrit : 34.1 %  Platelet Count - Automated : 185 K/uL  Mean Cell Volume : 95.5 fl  Mean Cell Hemoglobin : 31.1 pg  Mean Cell Hemoglobin Concentration : 32.6 gm/dL  Auto Neutrophil # : 4.81 K/uL  Auto Lymphocyte # : 1.48 K/uL  Auto Monocyte # : 0.92 K/uL  Auto Eosinophil # : 0.22 K/uL  Auto Basophil # : 0.05 K/uL  Auto Neutrophil % : 64.0 %  Auto Lymphocyte % : 19.7 %  Auto Monocyte % : 12.2 %  Auto Eosinophil % : 2.9 %  Auto Basophil % : 0.7 %        136  |  101  |  33<H>  ----------------------------<  150<H>  3.7   |  21<L>  |  1.05    Ca    9.1      2022 22:31    TPro  6.4  /  Alb  3.1<L>  /  TBili  0.4  /  DBili  0.1  /  AST  77<H>  /  ALT  104<H>  /  AlkPhos  233<H>      LIVER FUNCTIONS - ( 2022 06:51 )  Alb: 3.1 g/dL / Pro: 6.4 g/dL / ALK PHOS: 233 U/L / ALT: 104 U/L / AST: 77 U/L / GGT: x           Urinalysis Basic - ( 2022 18:19 )    Color: Light Yellow / Appearance: Slightly Turbid / S.014 / pH: x  Gluc: x / Ketone: Negative  / Bili: Negative / Urobili: Negative   Blood: x / Protein: Trace / Nitrite: Negative   Leuk Esterase: Moderate / RBC: 1 /hpf / WBC 28 /HPF   Sq Epi: x / Non Sq Epi: 9 /hpf / Bacteria: Negative        MICROBIOLOGY:  Clean Catch Clean Catch (Midstream)  22   >100,000 CFU/ml Escherichia coli  50,000 - 99,000 CFU/mL Gram Positive Cocci in Pairs and Chains  --  --        RADIOLOGY  < from: US Abdomen Complete (US Abdomen Complete .) (22 @ 15:55) >  Trace perihepatic ascites.    Small right pleural effusion.    < end of copied text >  < from: CT Head No Cont (22 @ 11:05) >  IMPRESSION:  Age-appropriate involutional changes and microvascular   ischemic disease. No evidence of acute infarct. No acute pathology   recognized    < end of copied text >  < from: Xray Chest 1 View- PORTABLE-Urgent (Xray Chest 1 View- PORTABLE-Urgent .) (22 @ 16:55) >  No evidence of acute pulmonary disease.    < end of copied text >

## 2022-06-21 NOTE — DISCHARGE NOTE PROVIDER - CARE PROVIDER_API CALL
Jose Shah  GASTROENTEROLOGY  [address] [place] [address], [address]  [place] [address]  Phone: [phone]  Fax: [phone]  Follow Up Time:    Jose Shah  GASTROENTEROLOGY  [address] [facility], [address]  [place] [address]  Phone: [phone]  Fax: [phone]  Follow Up Time:     Jony Montoya (MD)  Cardiac Electrophysiology; Cardiovascular Disease; Internal Medicine  [address] [facility]  [place] [address]  Phone: [phone]  Fax: [phone]  Follow Up Time:

## 2022-06-21 NOTE — CHART NOTE - NSCHARTNOTEFT_GEN_A_CORE
ILR interrogation  Indication: Syncope  - Battery: Good   - Review of stored data did not reveal any events for review   - AT/AF 0%    [id number]
Advanced Care Planning:  I have had goals of care discussion, advanced directive and code status with patient/family    and  in  coordinating   patient care.     all questions answered and expressed understanding of the plan.   pt  is  dnr/ dni

## 2022-06-21 NOTE — CONSULT NOTE ADULT - OPHTHALMOLOGIC
details… Complex Repair And Split-Thickness Skin Graft Text: The defect edges were debeveled with a #15 scalpel blade.  The primary defect was closed partially with a complex linear closure.  Given the location of the defect, shape of the defect and the proximity to free margins a split thickness skin graft was deemed most appropriate to repair the remaining defect.  The graft was trimmed to fit the size of the remaining defect.  The graft was then placed in the primary defect, oriented appropriately, and sutured into place.

## 2022-06-21 NOTE — CONSULT NOTE ADULT - SUBJECTIVE AND OBJECTIVE BOX
EP Attending  HISTORY OF PRESENT ILLNESS: HPI:  89 yo F        hx HTN, CAD   prior hx syncope, with loop recorder showing CHB in April s/p Micra placement, followed up with EP about 3 weeks ago told everything looked good    , now here with another episode  of syncope.    Was at [facility] the [facility] [place] at an outdoor concert.     Suddenly woke up and then   she had passed out..   no dizziness,   , chest pain, or blurry vision. Was feeling well prior and had just eaten lunch an hour before.   Currently no complaints. (20 Jun 2022 07:15)    Fainted while outdoors on a hot day, collapsed while in a seated position.  Returned to normal neurological function quickly.  No prior angina, palpitations, shortness of breath or lightheadedness.  Feels well now.  A 10 pt ROS is othwerwise negative.    PAST MEDICAL & SURGICAL HISTORY:  Urinary Frequency  Bilateral Cataracts  HTN (hypertension)  Spinal stenosis  Hypothyroidism  Non-ST elevation MI (NSTEMI)  Macular degeneration  (HFpEF) heart failure with preserved ejection fraction  Paroxysmal atrial fibrillation  Chronic kidney disease (CKD)  Ectopic pregnancy, tubal  S/P ORIF (open reduction internal fixation) fracture  R hip  S/P breast lumpectomy  S/P cataract surgery  b/l      MEDICATIONS  (STANDING):  amLODIPine   Tablet 2.5 milliGRAM(s) Oral daily  aspirin enteric coated 81 milliGRAM(s) Oral daily  carvedilol 3.125 milliGRAM(s) Oral every 12 hours  cefTRIAXone   IVPB 1000 milliGRAM(s) IV Intermittent every 24 hours  heparin   Injectable 5000 Unit(s) SubCutaneous every 12 hours  levothyroxine 100 MICROGram(s) Oral daily      Allergies    sulfa topicals (Unknown)    Intolerances    morphine (Drowsiness; Faint; Hypotension)      FAMILY HISTORY:  Family history of acute myocardial infarction      Non-contributary for premature coronary disease or sudden cardiac death    SOCIAL HISTORY:    [ x] Non-smoker  [ ] Smoker  [ ] Alcohol    PHYSICAL EXAM:  T(C): 36.9 (06-21-22 @ 07:56), Max: 36.9 (06-20-22 @ 17:35)  HR: 75 (06-21-22 @ 07:56) (75 - 88)  BP: 123/66 (06-21-22 @ 07:56) (123/66 - 156/81)  RR: 18 (06-21-22 @ 07:56) (16 - 20)  SpO2: 90% (06-21-22 @ 07:56) (90% - 96%)  Wt(kg): --    General: Well nourished, no acute distress, alert and oriented x 3  Head: normocephalic, no trauma  Neck: no JVD, no bruit, supple, not enlarged  CV: S1S2, no S3, regular rate, rhythm is SINUS, no murmurs.    Lungs: clear BL, no rales or wheezes  Abdomen: bowel sounds +, soft, nontender, nondistended  Extremities: no clubbing, cyanosis or edema  Neuro: Moves all 4 extremities, sensation intact x 4 extremities  Skin: warm and moist, normal turgor  Psych: Mood and affect are appropriate for circumstances  MSK: normal range of motion and strength x4 extremities.      TELEMETRY: 	 none  ECG:  sinus rhythm, sinus tachycardia, LVH	  < from: Transthoracic Echocardiogram (04.16.22 @ 09:34) >  Dimensions:    Normal Values:  LA:     3.8    2.0 - 4.0 cm  Ao:     2.8    2.0 - 3.8 cm  SEPTUM: 0.9    0.6 - 1.2 cm  PWT:    1.0    0.6 - 1.1 cm  LVIDd:  5.0    3.0 - 5.6 cm  LVIDs:         1.8 - 4.0 cm  Derived variables:  LVMI: 112 g/m2  RWT: 0.40  EF (Visual Estimate): 55-60 %  Doppler Peak Velocity (m/sec): AoV=1.4  ------------------------------------------------------------------------  Observations:  Mitral Valve: Mitral annular calcification and calcified  mitral leaflets with normal diastolic opening. Mild mitral  regurgitation.  Aortic Valve/Aorta: Calcified trileaflet aortic valve with  midly decreased opening. Peak transaortic valvegradient  equals 8 mm Hg, mean transaortic valve gradient equals 4 mm  Hg, aortic valve velocity time integral equals 31 cm. Peak  left ventricular outflow tract gradient equals 2 mm Hg,  mean gradient is equal to 1 mm Hg, LVOT velocity time  integral equals 16 cm.  Aortic Root: 2.8 cm.  Left Atrium: Normal left atrium.  LA volume index = 30  cc/m2.  Left Ventricle: Overall preserved left ventricular ejection  fraction. Apical cap hypokinesis. Eccentric left  ventricular hypertrophy (dilated leftventricle with normal  relative wall thickness). Severe  diastolic dysfunction.  Right Heart: Normal right atrium. Normal right ventricular  size and function. Normal tricuspid valve. Normal pulmonic  valve.  Pericardium/Pleura: Normal pericardium with no pericardial  effusion.  Hemodynamic: Estimated right atrial pressure is 8 mm Hg.  Estimated right ventricular systolic pressure equals 52 mm  Hg, assuming right atrial pressure equals 8 mm Hg,  consistent with moderate pulmonary hypertension.    < end of copied text >    	  LABS:	 	                          11.1   7.52  )-----------( 185      ( 19 Jun 2022 16:47 )             34.1     06-19    136  |  101  |  33<H>  ----------------------------<  150<H>  3.7   |  21<L>  |  1.05    Ca    9.1      19 Jun 2022 22:31    TPro  6.4  /  Alb  3.1<L>  /  TBili  0.4  /  DBili  0.1  /  AST  77<H>  /  ALT  104<H>  /  AlkPhos  233<H>  06-21    ASSESSMENT/PLAN: 	89y Female with a Micra AV pacemaker for intermittent complete AV block.  Presents with fainting while seated at an outdoor concert.    Telemetry if available.  Orthostatic vitals checks, rehydration as needed.  Check AV Micra interrogation (already requested).  If Micra pacemaker working well, no further EP workup planned.      Jony Montoya M.D.  Cardiac Electrophysiology    office [phone]  pager [phone]

## 2022-06-21 NOTE — CONSULT NOTE ADULT - ASSESSMENT
89 yo F  hx HTN, CAD  prior hx syncope, with loop recorder showing CHB in April s/p Micra placement, followed up with EP about 3 weeks ago told everything looked good now here with another episode  of syncope with positive urine cx    Jerrod Zapien  Attending Physician   Division of Infectious Disease  Office #[phone]  Available on Microsoft Teams also  After 5pm/weekend or no response, call #[phone]

## 2022-06-21 NOTE — PROGRESS NOTE ADULT - ASSESSMENT
Patient seen and examined, agree with above assessment and plan as transcribed above.    - No CHF   - syncope in the setting of infection likely hemodynamically mediated similar to prior presentations   - f/u with Dr. Marcio Valdez MD, [facility]  [facility] [phone]

## 2022-06-22 ENCOUNTER — TRANSCRIPTION ENCOUNTER (OUTPATIENT)
Age: 87
End: 2022-06-22

## 2022-06-22 RX ORDER — SENNA PLUS 8.6 MG/1
2 TABLET ORAL AT BEDTIME
Refills: 0 | Status: DISCONTINUED | OUTPATIENT
Start: 2022-06-22 | End: 2022-06-23

## 2022-06-22 RX ORDER — ACETAMINOPHEN 500 MG
650 TABLET ORAL ONCE
Refills: 0 | Status: COMPLETED | OUTPATIENT
Start: 2022-06-22 | End: 2022-06-22

## 2022-06-22 RX ORDER — LABETALOL HCL 100 MG
10 TABLET ORAL ONCE
Refills: 0 | Status: COMPLETED | OUTPATIENT
Start: 2022-06-22 | End: 2022-06-22

## 2022-06-22 RX ADMIN — Medication 10 MILLIGRAM(S): at 18:58

## 2022-06-22 RX ADMIN — Medication 650 MILLIGRAM(S): at 13:07

## 2022-06-22 RX ADMIN — Medication 650 MILLIGRAM(S): at 13:37

## 2022-06-22 RX ADMIN — Medication 100 MICROGRAM(S): at 05:57

## 2022-06-22 RX ADMIN — HEPARIN SODIUM 5000 UNIT(S): 5000 INJECTION INTRAVENOUS; SUBCUTANEOUS at 05:58

## 2022-06-22 RX ADMIN — CARVEDILOL PHOSPHATE 3.12 MILLIGRAM(S): 80 CAPSULE, EXTENDED RELEASE ORAL at 05:58

## 2022-06-22 RX ADMIN — HEPARIN SODIUM 5000 UNIT(S): 5000 INJECTION INTRAVENOUS; SUBCUTANEOUS at 20:09

## 2022-06-22 RX ADMIN — Medication 10 MILLIGRAM(S): at 17:51

## 2022-06-22 RX ADMIN — CARVEDILOL PHOSPHATE 3.12 MILLIGRAM(S): 80 CAPSULE, EXTENDED RELEASE ORAL at 20:04

## 2022-06-22 RX ADMIN — SENNA PLUS 2 TABLET(S): 8.6 TABLET ORAL at 21:31

## 2022-06-22 RX ADMIN — AMLODIPINE BESYLATE 2.5 MILLIGRAM(S): 2.5 TABLET ORAL at 05:58

## 2022-06-22 NOTE — PROGRESS NOTE ADULT - ASSESSMENT
89 yr       PMH of HTN, HFpEF, pAfib, CHB, CKD, recurrent UTIs (ESBL), HLD, OA, hypothyroid,   CHB.  s/p  Micra  3/22       presents  with syncope  agian from  assisted  living   ppm    interrogated. cT head, no  bleed   HTN, on coreg/   norvasc   c/c  diastolic  chf   Hypothyroid, on synthroid    prior  h/o asymmetric   breast  tissue, no  w/p  at  this  advanced  age  uti, on rocephin  on dvt  ppx/  PT   eval  ucx  with  ecoli. on iv rocephin, for  3  days per  d r rashi  ucx with  ecoccus  fecalis,  no  need  for  rx  pe r ID   plan. d/c  to   facility  today    pt  brady s prior  molst. is  dnr/ dni      ra< from: Transthoracic Echocardiogram (04.16.22 @ 09:34) >  onclusions:  1. Mitral annular calcification and calcified mitral  leaflets with normal diastolic opening. Mild mitral  regurgitation.  2. Calcified trileaflet aortic valve withmidly decreased  opening. Peak transaortic valve gradient equals 8 mm Hg,  mean transaortic valve gradient equals 4 mm Hg, aortic  valve velocity time integral equals 31 cm.  3. Eccentric left ventricular hypertrophy (dilated left  ventricle with normal relative wall thickness).  4. Overall preserved left ventricular ejection fraction.  Apical cap hypokinesis.  5. Severe  diastolic dysfunction.  6. Normal right ventricular size and function.  ------------------------------------------------------------------------  Confirmed on  4/16/2022 - 17:59:51 by Mohit Trevizo,    < end of copied text >   DSE site posterior R knee, R plantar aspect foot

## 2022-06-22 NOTE — DISCHARGE NOTE NURSING/CASE MANAGEMENT/SOCIAL WORK - PATIENT PORTAL LINK FT
You can access the FollowMyHealth Patient Portal offered by [facility] by registering at the following website: [email or web address]. By joining [employer]’s FollowMyHealth portal, you will also be able to view your health information using other applications (apps) compatible with our system.

## 2022-06-22 NOTE — PROVIDER CONTACT NOTE (CHANGE IN STATUS NOTIFICATION) - BACKGROUND
pt is for discharge back to [facility] today
patient with planned discharge to [facility]    transport present and waiting   labetalol 10 mg already given, BP remains high

## 2022-06-22 NOTE — PROVIDER CONTACT NOTE (CHANGE IN STATUS NOTIFICATION) - ACTION/TREATMENT ORDERED:
labetalol 10 mg labetalol 10 mg   give coreg as scheduled    followup    labetalol given   BP remains elevated at 177/104    Discharge cancelled

## 2022-06-22 NOTE — DISCHARGE NOTE NURSING/CASE MANAGEMENT/SOCIAL WORK - NSDPDISTO_GEN_ALL_CORE
Assisted living Patient is AXOX4. Denied chest pain and shortness of breath. Vital signs remained stable. Pain was assessed and remained at an acceptable level with interventions. Incentive spirometer encouraged.  Patient safety maintained through out shift. . IV's are being removed and patient is being discharged to her nursing home./Assisted living

## 2022-06-22 NOTE — PROVIDER CONTACT NOTE (CHANGE IN STATUS NOTIFICATION) - ASSESSMENT
patient remains asymptomatic
pt denies headache, dizziness or other complaints  admits to being anxious about discharge

## 2022-06-23 VITALS
HEART RATE: 89 BPM | SYSTOLIC BLOOD PRESSURE: 124 MMHG | OXYGEN SATURATION: 93 % | RESPIRATION RATE: 18 BRPM | DIASTOLIC BLOOD PRESSURE: 73 MMHG | TEMPERATURE: 99 F

## 2022-06-23 PROCEDURE — 84132 ASSAY OF SERUM POTASSIUM: CPT

## 2022-06-23 PROCEDURE — 84295 ASSAY OF SERUM SODIUM: CPT

## 2022-06-23 PROCEDURE — 99285 EMERGENCY DEPT VISIT HI MDM: CPT | Mod: 25

## 2022-06-23 PROCEDURE — 80053 COMPREHEN METABOLIC PANEL: CPT

## 2022-06-23 PROCEDURE — 85025 COMPLETE CBC W/AUTO DIFF WBC: CPT

## 2022-06-23 PROCEDURE — U0003: CPT

## 2022-06-23 PROCEDURE — 87186 SC STD MICRODIL/AGAR DIL: CPT

## 2022-06-23 PROCEDURE — 97161 PT EVAL LOW COMPLEX 20 MIN: CPT

## 2022-06-23 PROCEDURE — 85018 HEMOGLOBIN: CPT

## 2022-06-23 PROCEDURE — 82962 GLUCOSE BLOOD TEST: CPT

## 2022-06-23 PROCEDURE — 83605 ASSAY OF LACTIC ACID: CPT

## 2022-06-23 PROCEDURE — 87637 SARSCOV2&INF A&B&RSV AMP PRB: CPT

## 2022-06-23 PROCEDURE — 87086 URINE CULTURE/COLONY COUNT: CPT

## 2022-06-23 PROCEDURE — 82803 BLOOD GASES ANY COMBINATION: CPT

## 2022-06-23 PROCEDURE — 70450 CT HEAD/BRAIN W/O DYE: CPT

## 2022-06-23 PROCEDURE — 80076 HEPATIC FUNCTION PANEL: CPT

## 2022-06-23 PROCEDURE — U0005: CPT

## 2022-06-23 PROCEDURE — 82330 ASSAY OF CALCIUM: CPT

## 2022-06-23 PROCEDURE — 82947 ASSAY GLUCOSE BLOOD QUANT: CPT

## 2022-06-23 PROCEDURE — 71045 X-RAY EXAM CHEST 1 VIEW: CPT

## 2022-06-23 PROCEDURE — 76700 US EXAM ABDOM COMPLETE: CPT

## 2022-06-23 PROCEDURE — 85014 HEMATOCRIT: CPT

## 2022-06-23 PROCEDURE — 36415 COLL VENOUS BLD VENIPUNCTURE: CPT

## 2022-06-23 PROCEDURE — 84484 ASSAY OF TROPONIN QUANT: CPT

## 2022-06-23 PROCEDURE — 81001 URINALYSIS AUTO W/SCOPE: CPT

## 2022-06-23 PROCEDURE — 82435 ASSAY OF BLOOD CHLORIDE: CPT

## 2022-06-23 RX ORDER — SPIRONOLACTONE 25 MG/1
25 TABLET, FILM COATED ORAL DAILY
Refills: 0 | Status: DISCONTINUED | OUTPATIENT
Start: 2022-06-23 | End: 2022-06-23

## 2022-06-23 RX ORDER — AMLODIPINE BESYLATE 2.5 MG/1
5 TABLET ORAL DAILY
Refills: 0 | Status: DISCONTINUED | OUTPATIENT
Start: 2022-06-23 | End: 2022-06-23

## 2022-06-23 RX ADMIN — AMLODIPINE BESYLATE 2.5 MILLIGRAM(S): 2.5 TABLET ORAL at 05:23

## 2022-06-23 RX ADMIN — Medication 100 MICROGRAM(S): at 05:23

## 2022-06-23 RX ADMIN — HEPARIN SODIUM 5000 UNIT(S): 5000 INJECTION INTRAVENOUS; SUBCUTANEOUS at 05:23

## 2022-06-23 RX ADMIN — CARVEDILOL PHOSPHATE 3.12 MILLIGRAM(S): 80 CAPSULE, EXTENDED RELEASE ORAL at 05:23

## 2022-06-23 NOTE — PROGRESS NOTE ADULT - PROVIDER SPECIALTY LIST ADULT
Cardiology
Electrophysiology
Internal Medicine
Electrophysiology
Internal Medicine
Internal Medicine

## 2022-06-23 NOTE — PROGRESS NOTE ADULT - ASSESSMENT
89 yr       PMH of HTN, HFpEF, pAfib, CHB, CKD, recurrent UTIs (ESBL), HLD, OA, hypothyroid,   CHB.  s/p  Micra  3/22       presents  with syncope  agian from  assisted  living   ppm    interrogated. cT head, no  bleed   HTN, on coreg/   norvasc/ meds  pe r card dr ann   c/c  diastolic  chf   Hypothyroid, on synthroid    prior  h/o asymmetric   breast  tissue, no  w/p  at  this  advanced  age  uti, on rocephin  on dvt  ppx/  PT   eval  ucx  with  ecoli. on iv rocephin, for  3  days per  d r rashi  ucx with  ecoccus  fecalis,  no  need  for  rx  pe r ID   plan. d/c  to   facility  today    pt  brady s prior  molst. is  dnr/ dni      ra< from: Transthoracic Echocardiogram (04.16.22 @ 09:34) >  onclusions:  1. Mitral annular calcification and calcified mitral  leaflets with normal diastolic opening. Mild mitral  regurgitation.  2. Calcified trileaflet aortic valve withmidly decreased  opening. Peak transaortic valve gradient equals 8 mm Hg,  mean transaortic valve gradient equals 4 mm Hg, aortic  valve velocity time integral equals 31 cm.  3. Eccentric left ventricular hypertrophy (dilated left  ventricle with normal relative wall thickness).  4. Overall preserved left ventricular ejection fraction.  Apical cap hypokinesis.  5. Severe  diastolic dysfunction.  6. Normal right ventricular size and function.  ------------------------------------------------------------------------  Confirmed on  4/16/2022 - 17:59:51 by Mohit Trevizo,    < end of copied text >

## 2022-06-23 NOTE — PROGRESS NOTE ADULT - SUBJECTIVE AND OBJECTIVE BOX
afberile  REVIEW OF SYSTEMS:  GEN: no fever,    no chills  RESP: no SOB,   no cough  CVS: no chest pain,   no palpitations  GI: no abdominal pain,   no nausea,   no vomiting,   no constipation,   no diarrhea  : no dysuria,   no frequency  NEURO: no headache,   no dizziness  PSYCH: no depression,   not anxious  Derm : no rash    MEDICATIONS  (STANDING):  amLODIPine   Tablet 2.5 milliGRAM(s) Oral daily  aspirin enteric coated 81 milliGRAM(s) Oral daily  carvedilol 3.125 milliGRAM(s) Oral every 12 hours  heparin   Injectable 5000 Unit(s) SubCutaneous every 12 hours  levothyroxine 100 MICROGram(s) Oral daily    MEDICATIONS  (PRN):      Vital Signs Last 24 Hrs  T(C): 36.9 (22 Jun 2022 00:58), Max: 36.9 (21 Jun 2022 07:56)  T(F): 98.4 (22 Jun 2022 00:58), Max: 98.5 (21 Jun 2022 07:56)  HR: 92 (22 Jun 2022 00:58) (75 - 92)  BP: 137/72 (22 Jun 2022 06:00) (123/66 - 160/81)  BP(mean): --  RR: 18 (22 Jun 2022 00:58) (18 - 18)  SpO2: 94% (22 Jun 2022 00:58) (90% - 97%)  CAPILLARY BLOOD GLUCOSE      POCT Blood Glucose.: 116 mg/dL (21 Jun 2022 08:53)    I&O's Summary      PHYSICAL EXAM:  HEAD:  Atraumatic, Normocephalic  NECK: Supple, No   JVD  CHEST/LUNG:   no     rales,     no,    rhonchi  HEART: Regular rate and rhythm;         murmur  ABDOMEN: Soft, Nontender, ;   EXTREMITIES:    no    edema  NEUROLOGY:  alert    LABS:        TPro  6.4  /  Alb  3.1<L>  /  TBili  0.4  /  DBili  0.1  /  AST  77<H>  /  ALT  104<H>  /  AlkPhos  233<H>  06-21                    Thyroid Stimulating Hormone, Serum: 2.21 uIU/mL (04-16 @ 10:26)          Consultant(s) Notes Reviewed:      Care Discussed with Consultants/Other Providers:    
C A R D I O L O G Y  **********************************     DATE OF SERVICE: 06-23-22    Denies chest pain, palpitations, dizziness, or shortness of breath.   Review of systems otherwise negative.  	    MEDICATIONS  (STANDING):  amLODIPine   Tablet 5 milliGRAM(s) Oral daily  aspirin enteric coated 81 milliGRAM(s) Oral daily  bisacodyl 5 milliGRAM(s) Oral daily  carvedilol 3.125 milliGRAM(s) Oral every 12 hours  heparin   Injectable 5000 Unit(s) SubCutaneous every 12 hours  levothyroxine 100 MICROGram(s) Oral daily  senna 2 Tablet(s) Oral at bedtime  spironolactone 25 milliGRAM(s) Oral daily    MEDICATIONS  (PRN):      LABS:      Hemoglobin: 11.1 g/dL (06-19 @ 16:47)          Creatinine Trend: 1.05<--, 0.95<--, 1.16<--               PHYSICAL EXAM  Vital Signs Last 24 Hrs  T(C): 37.1 (23 Jun 2022 07:35), Max: 37.2 (22 Jun 2022 23:29)  T(F): 98.7 (23 Jun 2022 07:35), Max: 98.9 (22 Jun 2022 23:29)  HR: 89 (23 Jun 2022 07:35) (88 - 94)  BP: 124/73 (23 Jun 2022 07:35) (124/73 - 194/105)  BP(mean): --  RR: 18 (23 Jun 2022 07:35) (18 - 18)  SpO2: 93% (23 Jun 2022 07:35) (93% - 97%)      Gen: Appears well in NAD  HEENT:  (-)icterus (-)pallor  CV: N S1 S2 1/6 NORRIS (+)2 Pulses B/l  Resp:  Clear to auscultation B/L, normal effort  GI: (+) BS Soft, NT, ND  Lymph:  (-)Edema, (-)obvious lymphadenopathy  Skin: Warm to touch, Normal turgor  Psych: Appropriate mood and affect      TELEMETRY: None 	      < from: Transthoracic Echocardiogram (04.16.22 @ 09:34) >  Conclusions:  1. Mitral annular calcification and calcified mitral  leaflets with normal diastolic opening. Mild mitral  regurgitation.  2. Calcified trileaflet aortic valve withmidly decreased  opening. Peak transaortic valve gradient equals 8 mm Hg,  mean transaortic valve gradient equals 4 mm Hg, aortic  valve velocity time integral equals 31 cm.  3. Eccentric left ventricular hypertrophy (dilated left  ventricle with normal relative wall thickness).  4. Overall preserved left ventricular ejection fraction.  Apical cap hypokinesis.  5. Severe  diastolic dysfunction.  6. Normal right ventricular size and function.    < end of copied text >      ASSESSMENT/PLAN: 89 year old female with history of PAF on ASA only, s/p micra PPM, CAD, Hypothyroidism who is being seen for syncope.    - Hypertensive overnight and was given IV labetalol - would resume home BP meds/doses per patient's updated med list - Amlodipine 5mg daily, Coreg 6.25mg BID, and Spironolactone 12.5mg daily   - No evidence of clinical HF or anginal symptoms  - EP consult appreciated  - PPM/ILR interrogation with no events  - Recent echo noted above with no sig structural abnormalities  - Orthostatics negative  - s/p Abx for UTI per med/ID  - No further inpatient cardiac w/u needed  - Patient should f/u with her cardiologist Dr. Kam Buckley after d/c    Clemente Corbett PA-C  Pager: [phone]  
EP Attending  HISTORY OF PRESENT ILLNESS: HPI:  89 yo F        hx HTN, CAD   prior hx syncope, with loop recorder showing CHB in April s/p Micra placement, followed up with EP about 3 weeks ago told everything looked good    , now here with another episode  of syncope.    Was at her residence at the [facility] in [place] at an outdoor concert.     Suddenly woke up and then   she had passed out..   no dizziness,   , chest pain, or blurry vision. Was feeling well prior and had just eaten lunch an hour before.   Currently no complaints. (20 Jun 2022 07:15)    Fainted while outdoors on a hot day, collapsed while in a seated position.  Returned to normal neurological function quickly.  No prior angina, palpitations, shortness of breath or lightheadedness.  Feels well now.  A 10 pt ROS is othwerwise negative.    Date of service 6/22- resting comfortably in bed. no acute events overnight.    amLODIPine   Tablet 2.5 milliGRAM(s) Oral daily  aspirin enteric coated 81 milliGRAM(s) Oral daily  carvedilol 3.125 milliGRAM(s) Oral every 12 hours  heparin   Injectable 5000 Unit(s) SubCutaneous every 12 hours  levothyroxine 100 MICROGram(s) Oral daily      TPro  6.4  /  Alb  3.1<L>  /  TBili  0.4  /  DBili  0.1  /  AST  77<H>  /  ALT  104<H>  /  AlkPhos  233<H>  06-21    T(C): 36.8 (06-22-22 @ 08:57), Max: 36.9 (06-22-22 @ 00:58)  HR: 77 (06-22-22 @ 08:57) (77 - 92)  BP: 163/74 (06-22-22 @ 08:57) (137/72 - 163/74)  RR: 18 (06-22-22 @ 08:57) (18 - 18)  SpO2: 94% (06-22-22 @ 08:57) (94% - 97%)  Wt(kg): --    I&O's Summary    General: Well nourished, no acute distress, alert and oriented x 3  Head: normocephalic, no trauma  Neck: no JVD, no bruit, supple, not enlarged  CV: S1S2, no S3, regular rate, rhythm is SINUS, no murmurs.    Lungs: clear BL, no rales or wheezes  Abdomen: bowel sounds +, soft, nontender, nondistended  Extremities: no clubbing, cyanosis or edema  Neuro: Moves all 4 extremities, sensation intact x 4 extremities  Skin: warm and moist, normal turgor  Psych: Mood and affect are appropriate for circumstances  MSK: normal range of motion and strength x4 extremities.      TELEMETRY: 	 none  ECG:  sinus rhythm, sinus tachycardia, LVH	  < from: Transthoracic Echocardiogram (04.16.22 @ 09:34) >  Dimensions:    Normal Values:  LA:     3.8    2.0 - 4.0 cm  Ao:     2.8    2.0 - 3.8 cm  SEPTUM: 0.9    0.6 - 1.2 cm  PWT:    1.0    0.6 - 1.1 cm  LVIDd:  5.0    3.0 - 5.6 cm  LVIDs:         1.8 - 4.0 cm  Derived variables:  LVMI: 112 g/m2  RWT: 0.40  EF (Visual Estimate): 55-60 %  Doppler Peak Velocity (m/sec): AoV=1.4  ------------------------------------------------------------------------  Observations:  Mitral Valve: Mitral annular calcification and calcified  mitral leaflets with normal diastolic opening. Mild mitral  regurgitation.  Aortic Valve/Aorta: Calcified trileaflet aortic valve with  midly decreased opening. Peak transaortic valvegradient  equals 8 mm Hg, mean transaortic valve gradient equals 4 mm  Hg, aortic valve velocity time integral equals 31 cm. Peak  left ventricular outflow tract gradient equals 2 mm Hg,  mean gradient is equal to 1 mm Hg, LVOT velocity time  integral equals 16 cm.  Aortic Root: 2.8 cm.  Left Atrium: Normal left atrium.  LA volume index = 30  cc/m2.  Left Ventricle: Overall preserved left ventricular ejection  fraction. Apical cap hypokinesis. Eccentric left  ventricular hypertrophy (dilated leftventricle with normal  relative wall thickness). Severe  diastolic dysfunction.  Right Heart: Normal right atrium. Normal right ventricular  size and function. Normal tricuspid valve. Normal pulmonic  valve.  Pericardium/Pleura: Normal pericardium with no pericardial  effusion.  Hemodynamic: Estimated right atrial pressure is 8 mm Hg.  Estimated right ventricular systolic pressure equals 52 mm  Hg, assuming right atrial pressure equals 8 mm Hg,  consistent with moderate pulmonary hypertension.    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  Presents with fainting while seated at an outdoor concert.    Telemetry if available.  Orthostatic vitals checks, rehydration as needed.  Check AV Micra interrogation in good working order.  ILR check with no events.  No further EP workup planned, OK for DC planning.      Jony Montoya M.D.  Cardiac Electrophysiology    office [phone]  pager [phone]
C A R D I O L O G Y  **********************************     DATE OF SERVICE: 06-22-22    Denies chest pain, palpitations, dizziness, or shortness of breath.   Review of systems otherwise negative.  	    MEDICATIONS  (STANDING):  amLODIPine   Tablet 2.5 milliGRAM(s) Oral daily  aspirin enteric coated 81 milliGRAM(s) Oral daily  carvedilol 3.125 milliGRAM(s) Oral every 12 hours  heparin   Injectable 5000 Unit(s) SubCutaneous every 12 hours  levothyroxine 100 MICROGram(s) Oral daily    MEDICATIONS  (PRN):      LABS:      Hemoglobin: 11.1 g/dL (06-19 @ 16:47)        TPro  6.4  /  Alb  3.1<L>  /  TBili  0.4  /  DBili  0.1  /  AST  77<H>  /  ALT  104<H>  /  AlkPhos  233<H>  06-21    Creatinine Trend: 1.05<--, 0.95<--, 1.16<--               PHYSICAL EXAM  Vital Signs Last 24 Hrs  T(C): 36.8 (22 Jun 2022 08:57), Max: 36.9 (22 Jun 2022 00:58)  T(F): 98.3 (22 Jun 2022 08:57), Max: 98.4 (22 Jun 2022 00:58)  HR: 77 (22 Jun 2022 08:57) (77 - 92)  BP: 163/74 (22 Jun 2022 08:57) (137/72 - 163/74)  BP(mean): --  RR: 18 (22 Jun 2022 08:57) (18 - 18)  SpO2: 94% (22 Jun 2022 08:57) (94% - 97%)      Gen: Appears well in NAD  HEENT:  (-)icterus (-)pallor  CV: N S1 S2 1/6 NORRIS (+)2 Pulses B/l  Resp:  Clear to auscultation B/L, normal effort  GI: (+) BS Soft, NT, ND  Lymph:  (-)Edema, (-)obvious lymphadenopathy  Skin: Warm to touch, Normal turgor  Psych: Appropriate mood and affect      TELEMETRY: None 	      < from: Transthoracic Echocardiogram (04.16.22 @ 09:34) >  Conclusions:  1. Mitral annular calcification and calcified mitral  leaflets with normal diastolic opening. Mild mitral  regurgitation.  2. Calcified trileaflet aortic valve withmidly decreased  opening. Peak transaortic valve gradient equals 8 mm Hg,  mean transaortic valve gradient equals 4 mm Hg, aortic  valve velocity time integral equals 31 cm.  3. Eccentric left ventricular hypertrophy (dilated left  ventricle with normal relative wall thickness).  4. Overall preserved left ventricular ejection fraction.  Apical cap hypokinesis.  5. Severe  diastolic dysfunction.  6. Normal right ventricular size and function.    < end of copied text >      ASSESSMENT/PLAN: 89 year old female with history of PAF on ASA only, s/p micra PPM, CAD, Hypothyroidism who is being seen for syncope.    - No evidence of clinical HF or anginal symptoms  - EP consult appreciated  - PPM/ILR interrogation with no events  - Recent echo noted above with no sig structural abnormalities  - Orthostatics negative  - s/p Abx for UTI per med/ID  - No further inpatient cardiac w/u needed  - Patient should f/u with her cardiologist Dr. Kam Buckley after d/c    Clemente Corbett PA-C  Pager: [phone]  
  afberile    REVIEW OF SYSTEMS:  GEN: no fever,    no chills  RESP: no SOB,   no cough  CVS: no chest pain,   no palpitations  GI: no abdominal pain,   no nausea,   no vomiting,   no constipation,   no diarrhea  : no dysuria,   no frequency  NEURO: no headache,   no dizziness  PSYCH: no depression,   not anxious  Derm : no rash    MEDICATIONS  (STANDING):  amLODIPine   Tablet 2.5 milliGRAM(s) Oral daily  aspirin enteric coated 81 milliGRAM(s) Oral daily  bisacodyl 5 milliGRAM(s) Oral daily  carvedilol 3.125 milliGRAM(s) Oral every 12 hours  heparin   Injectable 5000 Unit(s) SubCutaneous every 12 hours  levothyroxine 100 MICROGram(s) Oral daily  senna 2 Tablet(s) Oral at bedtime    MEDICATIONS  (PRN):      Vital Signs Last 24 Hrs  T(C): 37.1 (23 Jun 2022 07:35), Max: 37.2 (22 Jun 2022 23:29)  T(F): 98.7 (23 Jun 2022 07:35), Max: 98.9 (22 Jun 2022 23:29)  HR: 89 (23 Jun 2022 07:35) (88 - 94)  BP: 124/73 (23 Jun 2022 07:35) (124/73 - 194/105)  BP(mean): --  RR: 18 (23 Jun 2022 07:35) (18 - 18)  SpO2: 93% (23 Jun 2022 07:35) (93% - 97%)  CAPILLARY BLOOD GLUCOSE        I&O's Summary      PHYSICAL EXAM:  HEAD:  Atraumatic, Normocephalic  NECK: Supple, No   JVD  CHEST/LUNG:   no     rales,     no,    rhonchi  HEART: Regular rate and rhythm;         murmur  ABDOMEN: Soft, Nontender, ;   EXTREMITIES:   no     edema  NEUROLOGY:  alert    LABS:                          Thyroid Stimulating Hormone, Serum: 2.21 uIU/mL (04-16 @ 10:26)          Consultant(s) Notes Reviewed:      Care Discussed with Consultants/Other Providers:    
  afberile    REVIEW OF SYSTEMS:  GEN: no fever,    no chills  RESP: no SOB,   no cough  CVS: no chest pain,   no palpitations  GI: no abdominal pain,   no nausea,   no vomiting,   no constipation,   no diarrhea  : no dysuria,   no frequency  NEURO: no headache,   no dizziness  PSYCH: no depression,   not anxious  Derm : no rash    MEDICATIONS  (STANDING):  amLODIPine   Tablet 2.5 milliGRAM(s) Oral daily  aspirin enteric coated 81 milliGRAM(s) Oral daily  carvedilol 3.125 milliGRAM(s) Oral every 12 hours  cefTRIAXone   IVPB 1000 milliGRAM(s) IV Intermittent every 24 hours  heparin   Injectable 5000 Unit(s) SubCutaneous every 12 hours  levothyroxine 100 MICROGram(s) Oral daily    MEDICATIONS  (PRN):      Vital Signs Last 24 Hrs  T(C): 36.9 (2022 17:35), Max: 36.9 (2022 07:40)  T(F): 98.4 (2022 17:35), Max: 98.4 (2022 07:40)  HR: 88 (2022 17:35) (78 - 88)  BP: 156/81 (2022 17:35) (133/61 - 156/81)  BP(mean): --  RR: 16 (2022 17:35) (16 - 20)  SpO2: 96% (2022 17:35) (95% - 99%)  CAPILLARY BLOOD GLUCOSE        I&O's Summary    2022 07:01  -  2022 07:00  --------------------------------------------------------  IN: 0 mL / OUT: 200 mL / NET: -200 mL        PHYSICAL EXAM:  HEAD:  Atraumatic, Normocephalic  NECK: Supple, No   JVD  CHEST/LUNG:   no     rales,     no,    rhonchi  HEART: Regular rate and rhythm;         murmur  ABDOMEN: Soft, Nontender, ;   EXTREMITIES:    no    edema  NEUROLOGY:  alert    LABS:                        11.1   7.52  )-----------( 185      ( 2022 16:47 )             34.1     06-19    136  |  101  |  33<H>  ----------------------------<  150<H>  3.7   |  21<L>  |  1.05    Ca    9.1      2022 22:31    TPro  7.6  /  Alb  4.0  /  TBili  0.4  /  DBili  x   /  AST  63<H>  /  ALT  79<H>  /  AlkPhos  291<H>            Urinalysis Basic - ( 2022 18:19 )    Color: Light Yellow / Appearance: Slightly Turbid / S.014 / pH: x  Gluc: x / Ketone: Negative  / Bili: Negative / Urobili: Negative   Blood: x / Protein: Trace / Nitrite: Negative   Leuk Esterase: Moderate / RBC: 1 /hpf / WBC 28 /HPF   Sq Epi: x / Non Sq Epi: 9 /hpf / Bacteria: Negative           @ 16:16  5.1  41      Thyroid Stimulating Hormone, Serum: 2.21 uIU/mL ( @ 10:26)          Consultant(s) Notes Reviewed:      Care Discussed with Consultants/Other Providers:    
C A R D I O L O G Y  **********************************     DATE OF SERVICE: 06-21-22    Patient denies chest pain or shortness of breath.   Review of systems otherwise negative.  	  MEDICATIONS:  MEDICATIONS  (STANDING):  amLODIPine   Tablet 2.5 milliGRAM(s) Oral daily  aspirin enteric coated 81 milliGRAM(s) Oral daily  carvedilol 3.125 milliGRAM(s) Oral every 12 hours  cefTRIAXone   IVPB 1000 milliGRAM(s) IV Intermittent once  heparin   Injectable 5000 Unit(s) SubCutaneous every 12 hours  levothyroxine 100 MICROGram(s) Oral daily      LABS:	 	    CARDIAC MARKERS:                                11.1   7.52  )-----------( 185      ( 19 Jun 2022 16:47 )             34.1     Hemoglobin: 11.1 g/dL (06-19 @ 16:47)      06-19    136  |  101  |  33<H>  ----------------------------<  150<H>  3.7   |  21<L>  |  1.05    Ca    9.1      19 Jun 2022 22:31    TPro  6.4  /  Alb  3.1<L>  /  TBili  0.4  /  DBili  0.1  /  AST  77<H>  /  ALT  104<H>  /  AlkPhos  233<H>  06-21    Creatinine Trend: 1.05<--, 0.95<--, 1.16<--    COAGS:       proBNP:   Lipid Profile:   HgA1c:   TSH:       PHYSICAL EXAM:  T(C): 36.9 (06-21-22 @ 07:56), Max: 36.9 (06-20-22 @ 17:35)  HR: 75 (06-21-22 @ 07:56) (75 - 88)  BP: 123/66 (06-21-22 @ 07:56) (123/66 - 156/81)  RR: 18 (06-21-22 @ 07:56) (16 - 20)  SpO2: 90% (06-21-22 @ 07:56) (90% - 96%)  Wt(kg): --  I&O's Summary    20 Jun 2022 07:01  -  21 Jun 2022 07:00  --------------------------------------------------------  IN: 0 mL / OUT: 200 mL / NET: -200 mL          Gen: Appears well in NAD  HEENT:  (-)icterus (-)pallor  CV: N S1 S2 1/6 NORRIS (+)2 Pulses B/l  Resp:  Clear to auscultation B/L, normal effort  GI: (+) BS Soft, NT, ND  Lymph:  (-)Edema, (-)obvious lymphadenopathy  Skin: Warm to touch, Normal turgor  Psych: Appropriate mood and affect      TELEMETRY: None 	      < from: Transthoracic Echocardiogram (04.16.22 @ 09:34) >  Conclusions:  1. Mitral annular calcification and calcified mitral  leaflets with normal diastolic opening. Mild mitral  regurgitation.  2. Calcified trileaflet aortic valve withmidly decreased  opening. Peak transaortic valve gradient equals 8 mm Hg,  mean transaortic valve gradient equals 4 mm Hg, aortic  valve velocity time integral equals 31 cm.  3. Eccentric left ventricular hypertrophy (dilated left  ventricle with normal relative wall thickness).  4. Overall preserved left ventricular ejection fraction.  Apical cap hypokinesis.  5. Severe  diastolic dysfunction.  6. Normal right ventricular size and function.    < end of copied text >      ASSESSMENT/PLAN: 89 year old female with history of PAF on ASA only, s/p micra PPM, CAD, Hypothyroidism who is being seen for syncope.    - No evidence of clinical HF or anginal symptoms  - EP consult appreciated  - PPM/ILR interrogation with no events  - Recent echo noted above with no sig structural abnormalities  - Check orthostatics  - Abx for UTI per med/ID  - No further inpatient cardiac w/u needed  - Patient should f/u with her cardiologist Dr. Kam Buckley after d/c    Clemente Corbett PA-C  Pager: [phone]  
EP Attending  HISTORY OF PRESENT ILLNESS: HPI:  89 yo F        hx HTN, CAD   prior hx syncope, with loop recorder showing CHB in April s/p Micra placement, followed up with EP about 3 weeks ago told everything looked good    , now here with another episode  of syncope.    Was at her residence at the [facility] in [place] at an outdoor concert.     Suddenly woke up and then   she had passed out..   no dizziness,   , chest pain, or blurry vision. Was feeling well prior and had just eaten lunch an hour before.   Currently no complaints. (20 Jun 2022 07:15)    Fainted while outdoors on a hot day, collapsed while in a seated position.  Returned to normal neurological function quickly.  No prior angina, palpitations, shortness of breath or lightheadedness.  Feels well now.  A 10 pt ROS is othwerwise negative.    6/22- resting comfortably in bed. no acute events overnight.  Date of service 6/23- resting comfortably.  was to be transferred back to assisted living last night, but BP was too high.  feels well today, awaiting return to [facility].    amLODIPine   Tablet 2.5 milliGRAM(s) Oral daily  aspirin enteric coated 81 milliGRAM(s) Oral daily  bisacodyl 5 milliGRAM(s) Oral daily  carvedilol 3.125 milliGRAM(s) Oral every 12 hours  heparin   Injectable 5000 Unit(s) SubCutaneous every 12 hours  levothyroxine 100 MICROGram(s) Oral daily  senna 2 Tablet(s) Oral at bedtime      T(C): 37.1 (06-23-22 @ 07:35), Max: 37.2 (06-22-22 @ 23:29)  HR: 89 (06-23-22 @ 07:35) (77 - 94)  BP: 124/73 (06-23-22 @ 07:35) (124/73 - 194/105)  RR: 18 (06-23-22 @ 07:35) (18 - 18)  SpO2: 93% (06-23-22 @ 07:35) (93% - 97%)  Wt(kg): --    I&O's Summary      General: Well nourished, no acute distress, alert and oriented x 3  Head: normocephalic, no trauma  Neck: no JVD, no bruit, supple, not enlarged  CV: S1S2, no S3, regular rate, rhythm is SINUS, no murmurs.    Lungs: clear BL, no rales or wheezes  Abdomen: bowel sounds +, soft, nontender, nondistended  Extremities: no clubbing, cyanosis or edema  Neuro: Moves all 4 extremities, sensation intact x 4 extremities  Skin: warm and moist, normal turgor  Psych: Mood and affect are appropriate for circumstances  MSK: normal range of motion and strength x4 extremities.      TELEMETRY: 	 none  ECG:  sinus rhythm, sinus tachycardia, LVH	  < from: Transthoracic Echocardiogram (04.16.22 @ 09:34) >  Dimensions:    Normal Values:  LA:     3.8    2.0 - 4.0 cm  Ao:     2.8    2.0 - 3.8 cm  SEPTUM: 0.9    0.6 - 1.2 cm  PWT:    1.0    0.6 - 1.1 cm  LVIDd:  5.0    3.0 - 5.6 cm  LVIDs:         1.8 - 4.0 cm  Derived variables:  LVMI: 112 g/m2  RWT: 0.40  EF (Visual Estimate): 55-60 %  Doppler Peak Velocity (m/sec): AoV=1.4  ------------------------------------------------------------------------  Observations:  Mitral Valve: Mitral annular calcification and calcified  mitral leaflets with normal diastolic opening. Mild mitral  regurgitation.  Aortic Valve/Aorta: Calcified trileaflet aortic valve with  midly decreased opening. Peak transaortic valvegradient  equals 8 mm Hg, mean transaortic valve gradient equals 4 mm  Hg, aortic valve velocity time integral equals 31 cm. Peak  left ventricular outflow tract gradient equals 2 mm Hg,  mean gradient is equal to 1 mm Hg, LVOT velocity time  integral equals 16 cm.  Aortic Root: 2.8 cm.  Left Atrium: Normal left atrium.  LA volume index = 30  cc/m2.  Left Ventricle: Overall preserved left ventricular ejection  fraction. Apical cap hypokinesis. Eccentric left  ventricular hypertrophy (dilated leftventricle with normal  relative wall thickness). Severe  diastolic dysfunction.  Right Heart: Normal right atrium. Normal right ventricular  size and function. Normal tricuspid valve. Normal pulmonic  valve.  Pericardium/Pleura: Normal pericardium with no pericardial  effusion.  Hemodynamic: Estimated right atrial pressure is 8 mm Hg.  Estimated right ventricular systolic pressure equals 52 mm  Hg, assuming right atrial pressure equals 8 mm Hg,  consistent with moderate pulmonary hypertension.    < end of copied text >    ASSESSMENT/PLAN: 	89y Female with a Micra AV pacemaker for intermittent complete AV block.  Presents with fainting while seated at an outdoor concert.    Orthostatic vitals checks, rehydration as needed.  Check AV Micra interrogation in good working order.  ILR check with no events.  No further EP workup planned, OK for DC planning.      Jony Montoya M.D.  Cardiac Electrophysiology    office [phone]  pager [phone]

## 2022-06-24 ENCOUNTER — INPATIENT (INPATIENT)
Facility: HOSPITAL | Age: 87
LOS: 5 days | Discharge: NURSING, REHAB OR LONG-TERM CARE | DRG: 554 | End: 2022-06-30
Attending: INTERNAL MEDICINE | Admitting: INTERNAL MEDICINE
Payer: MEDICARE

## 2022-06-24 ENCOUNTER — APPOINTMENT (OUTPATIENT)
Dept: GERIATRICS | Facility: ASSISTED LIVING FACILITY | Age: 87
End: 2022-06-24
Payer: MEDICARE

## 2022-06-24 ENCOUNTER — NON-APPOINTMENT (OUTPATIENT)
Age: 87
End: 2022-06-24

## 2022-06-24 VITALS
TEMPERATURE: 98.6 F | OXYGEN SATURATION: 93 % | SYSTOLIC BLOOD PRESSURE: 140 MMHG | RESPIRATION RATE: 20 BRPM | HEART RATE: 86 BPM | DIASTOLIC BLOOD PRESSURE: 114 MMHG

## 2022-06-24 VITALS
RESPIRATION RATE: 18 BRPM | SYSTOLIC BLOOD PRESSURE: 134 MMHG | OXYGEN SATURATION: 96 % | DIASTOLIC BLOOD PRESSURE: 86 MMHG | HEIGHT: 64 IN | TEMPERATURE: 99 F | WEIGHT: 149.91 LBS | HEART RATE: 81 BPM

## 2022-06-24 DIAGNOSIS — M25.512 PAIN IN LEFT SHOULDER: ICD-10-CM

## 2022-06-24 DIAGNOSIS — Z29.9 ENCOUNTER FOR PROPHYLACTIC MEASURES, UNSPECIFIED: ICD-10-CM

## 2022-06-24 DIAGNOSIS — I10 ESSENTIAL (PRIMARY) HYPERTENSION: ICD-10-CM

## 2022-06-24 DIAGNOSIS — N39.0 URINARY TRACT INFECTION, SITE NOT SPECIFIED: ICD-10-CM

## 2022-06-24 DIAGNOSIS — Z71.89 OTHER SPECIFIED COUNSELING: ICD-10-CM

## 2022-06-24 DIAGNOSIS — I63.9 CEREBRAL INFARCTION, UNSPECIFIED: ICD-10-CM

## 2022-06-24 DIAGNOSIS — Z98.49 CATARACT EXTRACTION STATUS, UNSPECIFIED EYE: Chronic | ICD-10-CM

## 2022-06-24 DIAGNOSIS — Z98.890 OTHER SPECIFIED POSTPROCEDURAL STATES: Chronic | ICD-10-CM

## 2022-06-24 DIAGNOSIS — O00.1 TUBAL PREGNANCY: Chronic | ICD-10-CM

## 2022-06-24 DIAGNOSIS — M19.90 UNSPECIFIED OSTEOARTHRITIS, UNSPECIFIED SITE: ICD-10-CM

## 2022-06-24 DIAGNOSIS — Z96.7 PRESENCE OF OTHER BONE AND TENDON IMPLANTS: Chronic | ICD-10-CM

## 2022-06-24 DIAGNOSIS — E03.9 HYPOTHYROIDISM, UNSPECIFIED: ICD-10-CM

## 2022-06-24 LAB
ALBUMIN SERPL ELPH-MCNC: 3.2 G/DL — LOW (ref 3.3–5)
ALP SERPL-CCNC: 243 U/L — HIGH (ref 40–120)
ALT FLD-CCNC: 110 U/L — HIGH (ref 10–45)
ANION GAP SERPL CALC-SCNC: 14 MMOL/L — SIGNIFICANT CHANGE UP (ref 5–17)
APPEARANCE UR: ABNORMAL
AST SERPL-CCNC: 50 U/L — HIGH (ref 10–40)
BACTERIA # UR AUTO: NEGATIVE — SIGNIFICANT CHANGE UP
BASOPHILS # BLD AUTO: 0.08 K/UL — SIGNIFICANT CHANGE UP (ref 0–0.2)
BASOPHILS NFR BLD AUTO: 0.9 % — SIGNIFICANT CHANGE UP (ref 0–2)
BILIRUB SERPL-MCNC: 0.5 MG/DL — SIGNIFICANT CHANGE UP (ref 0.2–1.2)
BILIRUB UR-MCNC: NEGATIVE — SIGNIFICANT CHANGE UP
BUN SERPL-MCNC: 40 MG/DL — HIGH (ref 7–23)
CALCIUM SERPL-MCNC: 9 MG/DL — SIGNIFICANT CHANGE UP (ref 8.4–10.5)
CHLORIDE SERPL-SCNC: 99 MMOL/L — SIGNIFICANT CHANGE UP (ref 96–108)
CO2 SERPL-SCNC: 22 MMOL/L — SIGNIFICANT CHANGE UP (ref 22–31)
COLOR SPEC: YELLOW — SIGNIFICANT CHANGE UP
COMMENT - URINE: SIGNIFICANT CHANGE UP
CREAT SERPL-MCNC: 1.31 MG/DL — HIGH (ref 0.5–1.3)
DIFF PNL FLD: NEGATIVE — SIGNIFICANT CHANGE UP
EGFR: 36 ML/MIN/1.73M2 — LOW
EOSINOPHIL # BLD AUTO: 0.38 K/UL — SIGNIFICANT CHANGE UP (ref 0–0.5)
EOSINOPHIL NFR BLD AUTO: 4.3 % — SIGNIFICANT CHANGE UP (ref 0–6)
EPI CELLS # UR: 12 /HPF — HIGH
FLUAV AG NPH QL: SIGNIFICANT CHANGE UP
FLUBV AG NPH QL: SIGNIFICANT CHANGE UP
GLUCOSE SERPL-MCNC: 113 MG/DL — HIGH (ref 70–99)
GLUCOSE UR QL: NEGATIVE — SIGNIFICANT CHANGE UP
HCT VFR BLD CALC: 35.7 % — SIGNIFICANT CHANGE UP (ref 34.5–45)
HGB BLD-MCNC: 11.6 G/DL — SIGNIFICANT CHANGE UP (ref 11.5–15.5)
HYALINE CASTS # UR AUTO: 15 /LPF — HIGH (ref 0–2)
IMM GRANULOCYTES NFR BLD AUTO: 0.8 % — SIGNIFICANT CHANGE UP (ref 0–1.5)
KETONES UR-MCNC: NEGATIVE — SIGNIFICANT CHANGE UP
LEUKOCYTE ESTERASE UR-ACNC: ABNORMAL
LYMPHOCYTES # BLD AUTO: 1.39 K/UL — SIGNIFICANT CHANGE UP (ref 1–3.3)
LYMPHOCYTES # BLD AUTO: 15.5 % — SIGNIFICANT CHANGE UP (ref 13–44)
MCHC RBC-ENTMCNC: 30.7 PG — SIGNIFICANT CHANGE UP (ref 27–34)
MCHC RBC-ENTMCNC: 32.5 GM/DL — SIGNIFICANT CHANGE UP (ref 32–36)
MCV RBC AUTO: 94.4 FL — SIGNIFICANT CHANGE UP (ref 80–100)
MONOCYTES # BLD AUTO: 1.12 K/UL — HIGH (ref 0–0.9)
MONOCYTES NFR BLD AUTO: 12.5 % — SIGNIFICANT CHANGE UP (ref 2–14)
NEUTROPHILS # BLD AUTO: 5.9 K/UL — SIGNIFICANT CHANGE UP (ref 1.8–7.4)
NEUTROPHILS NFR BLD AUTO: 66 % — SIGNIFICANT CHANGE UP (ref 43–77)
NITRITE UR-MCNC: NEGATIVE — SIGNIFICANT CHANGE UP
NRBC # BLD: 0 /100 WBCS — SIGNIFICANT CHANGE UP (ref 0–0)
PH UR: 6 — SIGNIFICANT CHANGE UP (ref 5–8)
PLATELET # BLD AUTO: 197 K/UL — SIGNIFICANT CHANGE UP (ref 150–400)
POTASSIUM SERPL-MCNC: 4.6 MMOL/L — SIGNIFICANT CHANGE UP (ref 3.5–5.3)
POTASSIUM SERPL-SCNC: 4.6 MMOL/L — SIGNIFICANT CHANGE UP (ref 3.5–5.3)
PROT SERPL-MCNC: 7.6 G/DL — SIGNIFICANT CHANGE UP (ref 6–8.3)
PROT UR-MCNC: ABNORMAL
RBC # BLD: 3.78 M/UL — LOW (ref 3.8–5.2)
RBC # FLD: 14.7 % — HIGH (ref 10.3–14.5)
RBC CASTS # UR COMP ASSIST: 1 /HPF — SIGNIFICANT CHANGE UP (ref 0–4)
RSV RNA NPH QL NAA+NON-PROBE: SIGNIFICANT CHANGE UP
SARS-COV-2 RNA SPEC QL NAA+PROBE: SIGNIFICANT CHANGE UP
SODIUM SERPL-SCNC: 135 MMOL/L — SIGNIFICANT CHANGE UP (ref 135–145)
SP GR SPEC: 1.02 — SIGNIFICANT CHANGE UP (ref 1.01–1.02)
UROBILINOGEN FLD QL: NEGATIVE — SIGNIFICANT CHANGE UP
WBC # BLD: 8.94 K/UL — SIGNIFICANT CHANGE UP (ref 3.8–10.5)
WBC # FLD AUTO: 8.94 K/UL — SIGNIFICANT CHANGE UP (ref 3.8–10.5)
WBC UR QL: 36 /HPF — HIGH (ref 0–5)

## 2022-06-24 PROCEDURE — 73030 X-RAY EXAM OF SHOULDER: CPT | Mod: 26,LT

## 2022-06-24 PROCEDURE — 20610 DRAIN/INJ JOINT/BURSA W/O US: CPT

## 2022-06-24 PROCEDURE — 99497 ADVNCD CARE PLAN 30 MIN: CPT

## 2022-06-24 PROCEDURE — 99223 1ST HOSP IP/OBS HIGH 75: CPT

## 2022-06-24 PROCEDURE — 99285 EMERGENCY DEPT VISIT HI MDM: CPT | Mod: 25

## 2022-06-24 RX ORDER — ASPIRIN/CALCIUM CARB/MAGNESIUM 324 MG
81 TABLET ORAL DAILY
Refills: 0 | Status: DISCONTINUED | OUTPATIENT
Start: 2022-06-24 | End: 2022-06-30

## 2022-06-24 RX ORDER — LEVOTHYROXINE SODIUM 125 MCG
100 TABLET ORAL DAILY
Refills: 0 | Status: DISCONTINUED | OUTPATIENT
Start: 2022-06-24 | End: 2022-06-30

## 2022-06-24 RX ORDER — BUPIVACAINE HCL/PF 7.5 MG/ML
5 VIAL (ML) INJECTION ONCE
Refills: 0 | Status: COMPLETED | OUTPATIENT
Start: 2022-06-24 | End: 2022-06-24

## 2022-06-24 RX ORDER — TRIAMCINOLONE 4 MG
2 TABLET ORAL ONCE
Refills: 0 | Status: COMPLETED | OUTPATIENT
Start: 2022-06-24 | End: 2022-06-24

## 2022-06-24 RX ORDER — ATORVASTATIN CALCIUM 80 MG/1
20 TABLET, FILM COATED ORAL AT BEDTIME
Refills: 0 | Status: DISCONTINUED | OUTPATIENT
Start: 2022-06-24 | End: 2022-06-30

## 2022-06-24 RX ORDER — LANOLIN ALCOHOL/MO/W.PET/CERES
3 CREAM (GRAM) TOPICAL AT BEDTIME
Refills: 0 | Status: DISCONTINUED | OUTPATIENT
Start: 2022-06-24 | End: 2022-06-30

## 2022-06-24 RX ORDER — AMLODIPINE BESYLATE 2.5 MG/1
5 TABLET ORAL DAILY
Refills: 0 | Status: DISCONTINUED | OUTPATIENT
Start: 2022-06-24 | End: 2022-06-30

## 2022-06-24 RX ORDER — ENOXAPARIN SODIUM 100 MG/ML
40 INJECTION SUBCUTANEOUS EVERY 24 HOURS
Refills: 0 | Status: DISCONTINUED | OUTPATIENT
Start: 2022-06-24 | End: 2022-06-30

## 2022-06-24 RX ORDER — CARVEDILOL PHOSPHATE 80 MG/1
6.25 CAPSULE, EXTENDED RELEASE ORAL EVERY 12 HOURS
Refills: 0 | Status: DISCONTINUED | OUTPATIENT
Start: 2022-06-24 | End: 2022-06-30

## 2022-06-24 RX ORDER — POLYETHYLENE GLYCOL 3350 17 G/17G
17 POWDER, FOR SOLUTION ORAL DAILY
Refills: 0 | Status: DISCONTINUED | OUTPATIENT
Start: 2022-06-24 | End: 2022-06-30

## 2022-06-24 RX ORDER — ACETAMINOPHEN 500 MG
650 TABLET ORAL EVERY 6 HOURS
Refills: 0 | Status: DISCONTINUED | OUTPATIENT
Start: 2022-06-24 | End: 2022-06-30

## 2022-06-24 RX ORDER — ACETAMINOPHEN 500 MG
650 TABLET ORAL ONCE
Refills: 0 | Status: COMPLETED | OUTPATIENT
Start: 2022-06-24 | End: 2022-06-24

## 2022-06-24 RX ORDER — ONDANSETRON 8 MG/1
4 TABLET, FILM COATED ORAL EVERY 8 HOURS
Refills: 0 | Status: DISCONTINUED | OUTPATIENT
Start: 2022-06-24 | End: 2022-06-26

## 2022-06-24 RX ORDER — SPIRONOLACTONE 25 MG/1
25 TABLET, FILM COATED ORAL DAILY
Refills: 0 | Status: DISCONTINUED | OUTPATIENT
Start: 2022-06-24 | End: 2022-06-30

## 2022-06-24 RX ADMIN — Medication 2 MILLIGRAM(S): at 21:17

## 2022-06-24 RX ADMIN — Medication 650 MILLIGRAM(S): at 18:33

## 2022-06-24 RX ADMIN — Medication 5 MILLILITER(S): at 21:17

## 2022-06-24 NOTE — ED PROVIDER NOTE - NEUROLOGICAL, MLM
Annabel Cristela , a  at 37w3d with an EDVIN of 2018, by Ultrasound, was seen at [facility] for a nonstress test.    Nonstress Test  Reason for NST: Diabetes  Variability: Moderate  Decelerations: None  Accelerations: Yes  Acoustic Stimulator: Yes  Baseline: 130  Uterine Irritability: No  Contractions: Not present  Nonstress Test Interpretation  Comments: reactive            Kenya Sepulveda MD  [facility], Women's [facility]     Alert and oriented, no focal deficits, no motor or sensory deficits.

## 2022-06-24 NOTE — H&P ADULT - HISTORY OF PRESENT ILLNESS
Patient is a  89 year old female w/PMH of HTN, HFpEF, pAfib, CHB, CKD, recurrent UTIs (ESBL), HLD, OA, hypothyroid,   CHB.  s/p  Micra  3/22  , recent admission for syncope ( 6/19-23) tx for UTI w/ ceftriaxone , now presents from [facility] for left  shoulder pain, worse with movement, better with rest, no reported  trauma.  She reports no fever or chills .  Patient is a  89 year old female w/PMH of HTN, HFpEF, pAfib, CHB, CKD, recurrent UTIs (ESBL), HLD, OA, hypothyroid,   CHB.  s/p  Micra  3/22  , recent admission for syncope ( 6/19-23) tx for UTI w/ ceftriaxone , now presents from [facility] for left  shoulder pain that started on morning of admission, worse with movement, better with rest, no reported  trauma. Staff at [facility] were unable to help patient out of bed,  and she was unable to use her rolling walker because of pain.  She reports no numbness or tingling , no other joint pain.  She reports no fever or chills .

## 2022-06-24 NOTE — H&P ADULT - NSHPLABSRESULTS_GEN_ALL_CORE
Labs personally reviewed:                          11.6   8.94  )-----------( 197      ( 2022 23:08 )             35.7                   Urinalysis Basic - ( 2022 19:28 )    Color: Yellow / Appearance: Slightly Turbid / S.017 / pH: x  Gluc: x / Ketone: Negative  / Bili: Negative / Urobili: Negative   Blood: x / Protein: 30 mg/dL / Nitrite: Negative   Leuk Esterase: Large / RBC: 1 /hpf / WBC 36 /HPF   Sq Epi: x / Non Sq Epi: 12 /hpf / Bacteria: Few      CAPILLARY BLOOD GLUCOSE          Imaging:  Xray left shoulder Limited two view study. No definite fracture. Calcific densities adjacent   to the humeral head may reflect calcific tendinosis. Glenohumeral   arthrosis.    EKG Labs personally reviewed:                          11.6   8.94  )-----------( 197      ( 2022 23:08 )             35.7                   Urinalysis Basic - ( 2022 19:28 )    Color: Yellow / Appearance: Slightly Turbid / S.017 / pH: x  Gluc: x / Ketone: Negative  / Bili: Negative / Urobili: Negative   Blood: x / Protein: 30 mg/dL / Nitrite: Negative   Leuk Esterase: Large / RBC: 1 /hpf / WBC 36 /HPF   Sq Epi: x / Non Sq Epi: 12 /hpf / Bacteria: Few      CAPILLARY BLOOD GLUCOSE          Imaging:  Xray left shoulder Limited two view study. No definite fracture. Calcific densities adjacent   to the humeral head may reflect calcific tendinosis. Glenohumeral   arthrosis.    EKG-

## 2022-06-24 NOTE — ED PROVIDER NOTE - OBJECTIVE STATEMENT
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  No weakness but has fatigue.  Recently admitted for UTI, last antibx 2d ago.  No fever, neck pain, cp, sob, ab pain, vomiting.

## 2022-06-24 NOTE — ED ADULT NURSE REASSESSMENT NOTE - NS ED NURSE REASSESS COMMENT FT1
Patient straight cathed for urine using sterile technique. Second RN present to confirm sterility. Explained procedure as it was being done - Pt tolerated procedure well. Sterile specimens collected and sent to lab as ordered. drained aprox 200ml of urine. Comfort and safety provided.

## 2022-06-24 NOTE — H&P ADULT - PROBLEM SELECTOR PLAN 1
unable to perform ADLs  due to limited fx/ ROM in LUE ,  no fever no leukocytosis, joint not TTP , ? reactive arthritis given recent infx , vs rotator cuff vs impingement : s/p arthrocentesis   - PT/OT   - tylenol prn   - f/u synovial fluid studies and culture   - IF symptoms persist , can obtain further imaging such as MR

## 2022-06-24 NOTE — HISTORY OF PRESENT ILLNESS
[No falls in past year] : Patient reported no falls in the past year [Independent] : transferring/mobility [] : Patient is continent. [Designated Healthcare Proxy] : Designated healthcare proxy [Name: ___] : Health Care Proxy's Name: [unfilled]  [Relationship: ___] : Relationship: [unfilled] [Reviewed updated] : Reviewed, updated [FreeTextEntry1] : Ms. SOLIS TAYLOR is a 89 yo F with PMHx of HF,HLD, OA, recurrent UTI's, hypothyroidism. HAYLEE Edwards and [facility] staff  requested pt. be seen being seen for new c/o weakness on left side. Discussed with daughter Rosa Maria via telephone. \par \par Pt. recently d/c from [facility] (6/19-6/23) for urosepsis. Staff at [facility] reports pt. has requeired 2-3 person assist since being back from the hospital. Pt. today reports left side weakness, with left arm numbness/tingling, limited strength on left side.\par \par Denies pain. Denies any other falls. Denies HA/dizziness, SOB/CP, abdominal pain,reports incontinence of urine and stool. \par \par  [I will adhere to the patient's wishes.] : I will adhere to the patient's wishes. [Time Spent: ___ minutes] : Time Spent: [unfilled] minutes [AdvancecareDate] : 6/24/22 [FreeTextEntry4] : -DNR\par -limited medical interventions\par -send to the hospital if necessary\par -no feeding tube\par -determine use or limitation of antibiotics\par \par previous visits introduced hospice, pt. and daughter had declined services. Today reintroduces hospice, pt. more agreeable. \par

## 2022-06-24 NOTE — ASSESSMENT
[FreeTextEntry1] : EMS contacted and sign out to ED at [facility] given at 4:26pm, collaborated with [facility] team t have pt. evaluated on admission. \par \par \par -available for urgent calls/visits\par -direct patient time 3:45-4:45pm, coordination of care 15 min \par SANDIE Morales-BC

## 2022-06-24 NOTE — ED PROCEDURE NOTE - CPROC ED ARTHRO DETAIL1
A needle was inserted into (see location above):/for aspiration/for aspiration and injection of medication (see medication)

## 2022-06-24 NOTE — ED PROCEDURE NOTE - ATTENDING CONTRIBUTION TO CARE
approx 8cc of thick straw colored synovial fluid aspirated from LEFT shoulder, then given bupivacaine and kenalog intraarticular afterwards.  NO complications.

## 2022-06-24 NOTE — H&P ADULT - NSHPPHYSICALEXAM_GEN_ALL_CORE
Vital Signs Last 24 Hrs  T(C): 36.7 (24 Jun 2022 20:11), Max: 37.2 (24 Jun 2022 17:05)  T(F): 98 (24 Jun 2022 20:11), Max: 98.9 (24 Jun 2022 17:05)  HR: 68 (24 Jun 2022 22:22) (68 - 81)  BP: 145/80 (24 Jun 2022 22:22) (134/86 - 148/88)  BP(mean): --  RR: 18 (24 Jun 2022 22:22) (16 - 18)  SpO2: 99% (24 Jun 2022 22:22) (96% - 99%) Vital Signs Last 24 Hrs  T(C): 36.7 (24 Jun 2022 20:11), Max: 37.2 (24 Jun 2022 17:05)  T(F): 98 (24 Jun 2022 20:11), Max: 98.9 (24 Jun 2022 17:05)  HR: 68 (24 Jun 2022 22:22) (68 - 81)  BP: 145/80 (24 Jun 2022 22:22) (134/86 - 148/88)  BP(mean): --  RR: 18 (24 Jun 2022 22:22) (16 - 18)  SpO2: 99% (24 Jun 2022 22:22) (96% - 99%)    GENERAL: No acute distress, well-developed  HEAD:  Atraumatic, Normocephalic  ENT: EOMI, PERRLA, conjunctiva and sclera clear,  moist mucosa no pharyngeal erythema or exudates   NECK: supple , no JVD   CHEST/LUNG: Clear to auscultation bilaterally; No wheeze, equal breath sounds bilaterally   BACK: No spinal tenderness,  No CVA tenderness   HEART: Regular rate and rhythm; No murmurs, rubs, or gallops  ABDOMEN: Soft, Nontender, Nondistended; Bowel sounds present  EXTREMITIES:  No clubbing, cyanosis, or edema  MSK: Left shoulder  limited passive and active ROM due to pain , other joints intact , No joint swelling or effusions  PSYCH: Normal behavior/affect  NEUROLOGY: AAOx3, non-focal, cranial nerves intact  SKIN: no open lesions , scattered senile plaques ,

## 2022-06-24 NOTE — H&P ADULT - NSHPREVIEWOFSYSTEMS_GEN_ALL_CORE
CONSTITUTIONAL: No weakness, fevers or chills  EYES/ENT: No visual changes;  No dysphagia  NECK: No pain or stiffness  RESPIRATORY: No cough, wheezing, hemoptysis; No shortness of breath  CARDIOVASCULAR: No chest pain or palpitations; No lower extremity edema  EXTREMITIES: no le edema, cyanosis, clubbing  GASTROINTESTINAL: No abdominal or epigastric pain. No nausea, vomiting, or hematemesis; No diarrhea or constipation. No melena or hematochezia.  BACK: No back pain  GENITOURINARY: No dysuria, frequency or hematuria  NEUROLOGICAL: No numbness or weakness  MSK: + left shoulder pain , no other joint swelling or pain  SKIN: No itching, burning, rashes, or lesions   PSYCH: no agitation  All other review of systems is negative unless indicated above.

## 2022-06-24 NOTE — ED ADULT NURSE REASSESSMENT NOTE - NS ED NURSE REASSESS COMMENT FT1
Report received from Aby LEACH. Pt AAOx4,  resp nonlabored, skin warm/dry, resting comfortably in bed. Pt complaining of L shoulder pain., unable to move the arm due to pain. Pt denies headache, dizziness, chest pain, palpitations, SOB  weakness at this time Patient undressed and placed into gown, side rails up with bed locked and in lowest position for safety. call bell within reach. [place] provided. Comfort and safety provided. will continue to reassess.

## 2022-06-24 NOTE — H&P ADULT - PROBLEM SELECTOR PLAN 2
- continue carvedilol   - continue amlodipine   - continue spironolactone     Hfpef  - continue statin and ASA

## 2022-06-24 NOTE — ED PROVIDER NOTE - CHIEF COMPLAINT
Recorded Pressure: PCW, HR=88, Condition=Condition 1 (Pulmonary Capillary Wedge) PCW 13/12/15* The patient is a 89y Female complaining of shoulder pain/injury.

## 2022-06-24 NOTE — ED PROVIDER NOTE - PROGRESS NOTE DETAILS
Dr. Philip Note: pt re-examined, arthrocentesis performed, pt still with L shoulder and now R shoulder pain with movement only. Will allow some time for medication to take effect and reassess.  If pains persists, given patient lives independent living portion of [facility], pt will require admission for higher level of care, PT, pain control. Tiffani PGY3: pain only minimally improved, unable to fully use LUE due to pain and unsafe discharge to assisted living as is currently independent there. will admit for PT.

## 2022-06-24 NOTE — ED CLERICAL - NS ED CLERK NOTE PRE-ARRIVAL INFORMATION; ADDITIONAL PRE-ARRIVAL INFORMATION
CC/Reason For referral: acute stroke symptoms, recently was discharged. consulting palliative team. if admitted under John team.  Preferred Consultant(if applicable):  Who admits for you (if needed):  Do you have documents you would like to fax over?  Would you still like to speak to an ED attending? yes

## 2022-06-24 NOTE — ED ADULT NURSE NOTE - OBJECTIVE STATEMENT
89y old female PMH Hypothyroid, Pacemaker, HTN, osteoporosis BIB EMS for shoulder pain. A&Ox3. Patient states she was recently d/c from hospital and when EMS helped patient out of the ambulance they helped pulled her up since has been having left shoulder pain. She denies chest pain, sob, ha, n/v/d, abdominal pain, f/c, urinary symptoms, hematuria. Patient is well appearing, gross neuro intact, lungs cta bilaterally, no difficulty speaking in complete sentences, s1s2 heart sounds heard, pulses x 4, abdomen soft nontender nondistended, skin intact. Daughter at bedside safety and comfort maintained.

## 2022-06-24 NOTE — ED ADULT NURSE NOTE - NSIMPLEMENTINTERV_GEN_ALL_ED
Implemented All Fall with Harm Risk Interventions:  [place] to call system. Call bell, personal items and telephone within reach. Instruct patient to call for assistance. Room bathroom lighting operational. Non-slip footwear when patient is off stretcher. Physically safe environment: no spills, clutter or unnecessary equipment. Stretcher in lowest position, wheels locked, appropriate side rails in place. Provide visual cue, wrist band, yellow gown, etc. Monitor gait and stability. Monitor for mental status changes and reorient to person, place, and time. Review medications for side effects contributing to fall risk. Reinforce activity limits and safety measures with patient and family. Provide visual clues: red socks.

## 2022-06-24 NOTE — PHYSICAL EXAM
[Alert] : alert [Sclera] : the sclera and conjunctiva were normal [PERRL] : pupils were equal in size, round, and reactive to light [Normal Oral Mucosa] : normal oral mucosa [No Oral Pallor] : no oral pallor [No Oral Cyanosis] : no oral cyanosis [Normal Appearance] : the appearance of the neck was normal [No Neck Mass] : no neck mass was observed [Supple] : the neck was supple [No Respiratory Distress] : no respiratory distress [No Acc Muscle Use] : no accessory muscle use [Respiration, Rhythm And Depth] : normal respiratory rhythm and effort [Auscultation Breath Sounds / Voice Sounds] : lungs were clear to auscultation bilaterally [Normal PMI] : the apical impulse was abnormal [Normal S1, S2] : normal S1 and S2 [Heart Rate And Rhythm] : heart rate was normal and rhythm regular [___ +] : bilateral [unfilled]+ pitting edema to the ankles [Bowel Sounds] : normal bowel sounds [Abdomen Soft] : soft [Cervical Lymph Nodes Enlarged Posterior Bilaterally] : posterior cervical [Supraclavicular Lymph Nodes Enlarged Bilaterally] : supraclavicular [Cervical Lymph Nodes Enlarged Anterior Bilaterally] : anterior cervical, supraclavicular [Axillary Lymph Nodes Enlarged Bilaterally] : axillary [No CVA Tenderness] : no CVA  tenderness [No Spinal Tenderness] : no spinal tenderness [Normal Affect] : the affect was normal [Normal Mood] : the mood was normal [de-identified] : elderly frail woman [de-identified] : rounded [de-identified] : laying in bed, unable to get up on own, needs 2-3 person assists with ADLs [de-identified] : chronic seborrheic keratosis generalized on back [de-identified] : left hand strength +2/3, unable to elevate arm on NIH scale, b/l LE equal strength, no facial droop noted [de-identified] : appears sad

## 2022-06-24 NOTE — ED PROVIDER NOTE - PHYSICAL EXAMINATION
+td L shoulder joint, worse with ROM at flexion 35deg, adduction 45deg  No td of c/t/l spine  No td of forearm/wrist/hand  No td of scapula  NV intact  5-/5 motor UE And LE, equal b/l.

## 2022-06-24 NOTE — H&P ADULT - ASSESSMENT
89F w/HTN, HFpEF, pAfib, CHB, CKD, recurrent UTIs (ESBL), HLD, OA, hypothyroid,   CHB.  s/p  Micra  3/22  , recent admission for syncope ( 6/19-23) tx for UTI w/ ceftriaxone ,  p/w  left  shoulder pain x 1 day

## 2022-06-24 NOTE — ED PROVIDER NOTE - CLINICAL SUMMARY MEDICAL DECISION MAKING FREE TEXT BOX
Dr. Philip Note: L shoulder joint pain atraumatic, consider arthritis vs pathological fx. Screen also for recurrent UTI

## 2022-06-25 LAB
ALBUMIN SERPL ELPH-MCNC: 3.5 G/DL — SIGNIFICANT CHANGE UP (ref 3.3–5)
ALP SERPL-CCNC: 241 U/L — HIGH (ref 40–120)
ALT FLD-CCNC: 98 U/L — HIGH (ref 10–45)
ANION GAP SERPL CALC-SCNC: 14 MMOL/L — SIGNIFICANT CHANGE UP (ref 5–17)
AST SERPL-CCNC: 32 U/L — SIGNIFICANT CHANGE UP (ref 10–40)
B PERT IGG+IGM PNL SER: ABNORMAL
BILIRUB SERPL-MCNC: 0.3 MG/DL — SIGNIFICANT CHANGE UP (ref 0.2–1.2)
BUN SERPL-MCNC: 39 MG/DL — HIGH (ref 7–23)
CALCIUM SERPL-MCNC: 9.2 MG/DL — SIGNIFICANT CHANGE UP (ref 8.4–10.5)
CHLORIDE SERPL-SCNC: 101 MMOL/L — SIGNIFICANT CHANGE UP (ref 96–108)
CO2 SERPL-SCNC: 22 MMOL/L — SIGNIFICANT CHANGE UP (ref 22–31)
COLOR FLD: YELLOW — SIGNIFICANT CHANGE UP
CREAT SERPL-MCNC: 1.08 MG/DL — SIGNIFICANT CHANGE UP (ref 0.5–1.3)
CRP SERPL-MCNC: 111 MG/L — HIGH (ref 0–4)
EGFR: 46 ML/MIN/1.73M2 — LOW
ERYTHROCYTE [SEDIMENTATION RATE] IN BLOOD: 120 MM/HR — HIGH (ref 0–20)
FLUID INTAKE SUBSTANCE CLASS: SIGNIFICANT CHANGE UP
GLUCOSE SERPL-MCNC: 138 MG/DL — HIGH (ref 70–99)
GRAM STN FLD: SIGNIFICANT CHANGE UP
HCT VFR BLD CALC: 37.1 % — SIGNIFICANT CHANGE UP (ref 34.5–45)
HGB BLD-MCNC: 12 G/DL — SIGNIFICANT CHANGE UP (ref 11.5–15.5)
LYMPHOCYTES # FLD: 2 % — SIGNIFICANT CHANGE UP
MCHC RBC-ENTMCNC: 30.9 PG — SIGNIFICANT CHANGE UP (ref 27–34)
MCHC RBC-ENTMCNC: 32.3 GM/DL — SIGNIFICANT CHANGE UP (ref 32–36)
MCV RBC AUTO: 95.6 FL — SIGNIFICANT CHANGE UP (ref 80–100)
MONOS+MACROS # FLD: 8 % — SIGNIFICANT CHANGE UP
NEUTROPHILS-BODY FLUID: 90 % — SIGNIFICANT CHANGE UP
NRBC # BLD: 0 /100 WBCS — SIGNIFICANT CHANGE UP (ref 0–0)
PLATELET # BLD AUTO: 189 K/UL — SIGNIFICANT CHANGE UP (ref 150–400)
POTASSIUM SERPL-MCNC: 4.2 MMOL/L — SIGNIFICANT CHANGE UP (ref 3.5–5.3)
POTASSIUM SERPL-SCNC: 4.2 MMOL/L — SIGNIFICANT CHANGE UP (ref 3.5–5.3)
PROT SERPL-MCNC: 7.4 G/DL — SIGNIFICANT CHANGE UP (ref 6–8.3)
RBC # BLD: 3.88 M/UL — SIGNIFICANT CHANGE UP (ref 3.8–5.2)
RBC # FLD: 14.5 % — SIGNIFICANT CHANGE UP (ref 10.3–14.5)
RCV VOL RI: 750 /UL — HIGH (ref 0–0)
SODIUM SERPL-SCNC: 137 MMOL/L — SIGNIFICANT CHANGE UP (ref 135–145)
SPECIMEN SOURCE: SIGNIFICANT CHANGE UP
SYNOVIAL CRYSTALS CLARITY: ABNORMAL
SYNOVIAL CRYSTALS COLOR: YELLOW
SYNOVIAL CRYSTALS ID: ABNORMAL
SYNOVIAL CRYSTALS TUBE: SIGNIFICANT CHANGE UP
TOTAL NUCLEATED CELL COUNT, BODY FLUID: 7250 /UL — SIGNIFICANT CHANGE UP
TUBE TYPE: SIGNIFICANT CHANGE UP
WBC # BLD: 7.17 K/UL — SIGNIFICANT CHANGE UP (ref 3.8–10.5)
WBC # FLD AUTO: 7.17 K/UL — SIGNIFICANT CHANGE UP (ref 3.8–10.5)

## 2022-06-25 RX ORDER — NYSTATIN CREAM 100000 [USP'U]/G
1 CREAM TOPICAL
Refills: 0 | Status: DISCONTINUED | OUTPATIENT
Start: 2022-06-25 | End: 2022-06-30

## 2022-06-25 RX ORDER — SODIUM CHLORIDE 9 MG/ML
1000 INJECTION INTRAMUSCULAR; INTRAVENOUS; SUBCUTANEOUS
Refills: 0 | Status: DISCONTINUED | OUTPATIENT
Start: 2022-06-25 | End: 2022-06-26

## 2022-06-25 RX ADMIN — Medication 81 MILLIGRAM(S): at 11:55

## 2022-06-25 RX ADMIN — Medication 650 MILLIGRAM(S): at 02:00

## 2022-06-25 RX ADMIN — AMLODIPINE BESYLATE 5 MILLIGRAM(S): 2.5 TABLET ORAL at 05:53

## 2022-06-25 RX ADMIN — CARVEDILOL PHOSPHATE 6.25 MILLIGRAM(S): 80 CAPSULE, EXTENDED RELEASE ORAL at 05:54

## 2022-06-25 RX ADMIN — CARVEDILOL PHOSPHATE 6.25 MILLIGRAM(S): 80 CAPSULE, EXTENDED RELEASE ORAL at 17:42

## 2022-06-25 RX ADMIN — Medication 100 MICROGRAM(S): at 05:54

## 2022-06-25 RX ADMIN — NYSTATIN CREAM 1 APPLICATION(S): 100000 CREAM TOPICAL at 17:42

## 2022-06-25 RX ADMIN — ATORVASTATIN CALCIUM 20 MILLIGRAM(S): 80 TABLET, FILM COATED ORAL at 21:25

## 2022-06-25 RX ADMIN — ENOXAPARIN SODIUM 40 MILLIGRAM(S): 100 INJECTION SUBCUTANEOUS at 11:55

## 2022-06-25 RX ADMIN — SPIRONOLACTONE 25 MILLIGRAM(S): 25 TABLET, FILM COATED ORAL at 05:53

## 2022-06-25 RX ADMIN — Medication 650 MILLIGRAM(S): at 00:54

## 2022-06-25 RX ADMIN — Medication 3 MILLIGRAM(S): at 00:54

## 2022-06-25 RX ADMIN — POLYETHYLENE GLYCOL 3350 17 GRAM(S): 17 POWDER, FOR SOLUTION ORAL at 11:55

## 2022-06-25 NOTE — PHYSICAL THERAPY INITIAL EVALUATION ADULT - GAIT TRAINING, PT EVAL
GOAL: pt will be able to independently ambulate 200ft with use of rollator/rolling walker within 4 weeks

## 2022-06-25 NOTE — PROGRESS NOTE ADULT - ASSESSMENT
89 yr       PMH of HTN, HFpEF, pAfib, CHB, CKD, recurrent UTIs (ESBL), HLD, OA, hypothyroid,   CHB.  s/p  Micra  3/22     s/p  recent  admission,  syncope  agian from  assisted  living   ppm    interrogated. CT head, no  bleed/  uti  rxed     admitte d by  hospotalist  for  shoulder  pain    xary,  no fx/  PA     HTN, on coreg/   norvasc/ meds  peR card dr ann   c/c  diastolic  chf   Hypothyroid, on synthroid    prior  h/o asymmetric   breast  tissue, no  w/p  at  this  advanced  age  on dvt  ppx/   plan. d/c  to   facility ,  when  cleared   byortho     pt  ha s prior  molst. is  dnr/ dni      ra< from: Transthoracic Echocardiogram (04.16.22 @ 09:34) >  onclusions:  1. Mitral annular calcification and calcified mitral  leaflets with normal diastolic opening. Mild mitral  regurgitation.  2. Calcified trileaflet aortic valve withmidly decreased  opening. Peak transaortic valve gradient equals 8 mm Hg,  mean transaortic valve gradient equals 4 mm Hg, aortic  valve velocity time integral equals 31 cm.  3. Eccentric left ventricular hypertrophy (dilated left  ventricle with normal relative wall thickness).  4. Overall preserved left ventricular ejection fraction.  Apical cap hypokinesis.  5. Severe  diastolic dysfunction.  6. Normal right ventricular size and function.  ------------------------------------------------------------------------  Confirmed on  4/16/2022 - 17:59:51 by Mohit Trevizo,    < end of copied text >      89 yr       PMH of HTN, HFpEF, pAfib, CHB, CKD, recurrent UTIs (ESBL), HLD, OA, hypothyroid,   CHB.  s/p  Micra  3/22     s/p  recent  admission,  syncope  agian from  assisted  living   ppm    interrogated. CT head, no  bleed/  uti  rxed     admitte d by  hospotalist  for  shoulder  pain    xary,  no fx/  PA     HTN, on coreg/   norvasc/ meds  peR card dr ann   c/c  diastolic  chf   Hypothyroid, on synthroid    prior  h/o asymmetric   breast  tissue, no  w/p  at  this  advanced  age  on dvt  ppx/   ortho  eval   on iv fluids.  crt  in  am    pt  ha s prior  molst. is  dnr/ dni      ra< from: Transthoracic Echocardiogram (04.16.22 @ 09:34) >  onclusions:  1. Mitral annular calcification and calcified mitral  leaflets with normal diastolic opening. Mild mitral  regurgitation.  2. Calcified trileaflet aortic valve withmidly decreased  opening. Peak transaortic valve gradient equals 8 mm Hg,  mean transaortic valve gradient equals 4 mm Hg, aortic  valve velocity time integral equals 31 cm.  3. Eccentric left ventricular hypertrophy (dilated left  ventricle with normal relative wall thickness).  4. Overall preserved left ventricular ejection fraction.  Apical cap hypokinesis.  5. Severe  diastolic dysfunction.  6. Normal right ventricular size and function.  ------------------------------------------------------------------------  Confirmed on  4/16/2022 - 17:59:51 by Mohit Trevizo,    < end of copied text >      89 yr       PMH of HTN, HFpEF, pAfib, CHB, CKD, recurrent UTIs (ESBL), HLD, OA, hypothyroid,   CHB.  s/p  Micra  3/22     s/p  recent  admission,  syncope  agian from  assisted  living   ppm    interrogated. CT head, no  bleed/  uti  rxed     admitte d by  hospotalist  for  shoulder  pain    xary,  no fx/  PA     HTN, on coreg/   norvasc/ meds  peR card dr ann   c/c  diastolic  chf   Hypothyroid, on synthroid    prior  h/o asymmetric   breast  tissue, no  w/p  at  this  advanced  age  on dvt  ppx/   today, pt  stATES  her  shoulder   doeS not  bother   her/ has   c/c  limited   rom due to  c/c  OA   on iv fluids.  crt  in  am  START   D/C  PLANNING    pt  ha s prior  molst. is  dnr/ dni      ra< from: Transthoracic Echocardiogram (04.16.22 @ 09:34) >  onclusions:  1. Mitral annular calcification and calcified mitral  leaflets with normal diastolic opening. Mild mitral  regurgitation.  2. Calcified trileaflet aortic valve withmidly decreased  opening. Peak transaortic valve gradient equals 8 mm Hg,  mean transaortic valve gradient equals 4 mm Hg, aortic  valve velocity time integral equals 31 cm.  3. Eccentric left ventricular hypertrophy (dilated left  ventricle with normal relative wall thickness).  4. Overall preserved left ventricular ejection fraction.  Apical cap hypokinesis.  5. Severe  diastolic dysfunction.  6. Normal right ventricular size and function.  ------------------------------------------------------------------------  Confirmed on  4/16/2022 - 17:59:51 by Mohit Trevizo,    < end of copied text >

## 2022-06-25 NOTE — OCCUPATIONAL THERAPY INITIAL EVALUATION ADULT - LIVES WITH, PROFILE
Per pt report, lives at [facility], no steps to enter, bedroom/walkin stall shower within room with tub bench, grab bars, raised toilet

## 2022-06-25 NOTE — OCCUPATIONAL THERAPY INITIAL EVALUATION ADULT - ADL RETRAINING, OT EVAL
Goal: Pt will perform UB/LB dressing independently using compensatory dressing strategy within 2 weeks.

## 2022-06-25 NOTE — PHYSICAL THERAPY INITIAL EVALUATION ADULT - BALANCE TRAINING, PT EVAL
GOAL: pt will be able to independently sit at edge of bed & perform ADL's without loss of balance within 4 weeks. pt will be able to independently ambulate 200ft with use of rollator/rolling walker without loss of balance within 4 weeks

## 2022-06-25 NOTE — PHYSICAL THERAPY INITIAL EVALUATION ADULT - TRANSFER TRAINING, PT EVAL
GOAL: pt will be able to perform transfers independently use of rollator/rolling walker within 4 weeks

## 2022-06-25 NOTE — PHYSICAL THERAPY INITIAL EVALUATION ADULT - GAIT DEVIATIONS NOTED, PT EVAL
narrow base of support, losing balance posteriorly/decreased jhonatan/decreased step length/decreased stride length/decreased weight-shifting ability

## 2022-06-25 NOTE — OCCUPATIONAL THERAPY INITIAL EVALUATION ADULT - DIAGNOSIS, OT EVAL
Pt currently presents with decreased endurance, balance, flexibility and AROM L shoulder and strength limiting independence with ADLs and functional mobility.

## 2022-06-25 NOTE — OCCUPATIONAL THERAPY INITIAL EVALUATION ADULT - GENERAL OBSERVATIONS, REHAB EVAL
Pt received semisupine in bed, A+Ox4, IVF, external female catheter, bed alarm, pleasant agreeable to work with therapy

## 2022-06-25 NOTE — OCCUPATIONAL THERAPY INITIAL EVALUATION ADULT - PRECAUTIONS/LIMITATIONS, REHAB EVAL
XRay L shoulder 6/24/22: Limited two view study. No definite fracture. Calcific densities adjacent to humeral head may reflect calcific tendinosis. Glenohumeral/fall precautions

## 2022-06-25 NOTE — OCCUPATIONAL THERAPY INITIAL EVALUATION ADULT - RANGE OF MOTION EXAMINATION, UPPER EXTREMITY
L shoulder ~70 deg flexion 2* to pain/discomfort/Left UE Active ROM was WFL (within functional limits)/Right UE Active ROM was WFL (within functional limits)

## 2022-06-25 NOTE — OCCUPATIONAL THERAPY INITIAL EVALUATION ADULT - PERTINENT HX OF CURRENT PROBLEM, REHAB EVAL
89yo F PMH HTN, HFpEF, pAfib, CHB, CKD, recurrent UTIs (ESBL), HLD, OA, hypothyroid,CHB, s/p  Micra  3/22, recent admission for syncope (6/19-23) tx for UTI w/ceftriaxone ,now presents from [facility] for left shoulder pain that started on morning of admission, worse with movement, better with rest, no reported  trauma. Staff at [facility] were unable to help patient out of bed and pt unable to use her rolling walker because of pain.

## 2022-06-25 NOTE — PHYSICAL THERAPY INITIAL EVALUATION ADULT - RANGE OF MOTION, PT EVAL
GOAL: pt will demonstrate L shoulder AROM WNLs in order to perform all functional mobility & ADL's independently within 4 weeks

## 2022-06-25 NOTE — PATIENT PROFILE ADULT - FALL HARM RISK - HARM RISK INTERVENTIONS

## 2022-06-25 NOTE — PHYSICAL THERAPY INITIAL EVALUATION ADULT - ADDITIONAL COMMENTS
Pt lives at independent living at the [facility]. Pt typically performs all functional mobility independently, + use of rollator for transfers & ambulation. Pt typically performs ADLs independently at baseline as well.

## 2022-06-26 LAB
ANION GAP SERPL CALC-SCNC: 11 MMOL/L — SIGNIFICANT CHANGE UP (ref 5–17)
ANION GAP SERPL CALC-SCNC: 12 MMOL/L — SIGNIFICANT CHANGE UP (ref 5–17)
BUN SERPL-MCNC: 53 MG/DL — HIGH (ref 7–23)
BUN SERPL-MCNC: 58 MG/DL — HIGH (ref 7–23)
CALCIUM SERPL-MCNC: 8.6 MG/DL — SIGNIFICANT CHANGE UP (ref 8.4–10.5)
CALCIUM SERPL-MCNC: 8.9 MG/DL — SIGNIFICANT CHANGE UP (ref 8.4–10.5)
CHLORIDE SERPL-SCNC: 102 MMOL/L — SIGNIFICANT CHANGE UP (ref 96–108)
CHLORIDE SERPL-SCNC: 104 MMOL/L — SIGNIFICANT CHANGE UP (ref 96–108)
CO2 SERPL-SCNC: 21 MMOL/L — LOW (ref 22–31)
CO2 SERPL-SCNC: 23 MMOL/L — SIGNIFICANT CHANGE UP (ref 22–31)
CREAT SERPL-MCNC: 1.01 MG/DL — SIGNIFICANT CHANGE UP (ref 0.5–1.3)
CREAT SERPL-MCNC: 1.04 MG/DL — SIGNIFICANT CHANGE UP (ref 0.5–1.3)
EGFR: 48 ML/MIN/1.73M2 — LOW
EGFR: 50 ML/MIN/1.73M2 — LOW
GLUCOSE SERPL-MCNC: 106 MG/DL — HIGH (ref 70–99)
GLUCOSE SERPL-MCNC: 119 MG/DL — HIGH (ref 70–99)
POTASSIUM SERPL-MCNC: 5.2 MMOL/L — SIGNIFICANT CHANGE UP (ref 3.5–5.3)
POTASSIUM SERPL-MCNC: 7 MMOL/L — CRITICAL HIGH (ref 3.5–5.3)
POTASSIUM SERPL-SCNC: 5.2 MMOL/L — SIGNIFICANT CHANGE UP (ref 3.5–5.3)
POTASSIUM SERPL-SCNC: 7 MMOL/L — CRITICAL HIGH (ref 3.5–5.3)
SODIUM SERPL-SCNC: 135 MMOL/L — SIGNIFICANT CHANGE UP (ref 135–145)
SODIUM SERPL-SCNC: 138 MMOL/L — SIGNIFICANT CHANGE UP (ref 135–145)

## 2022-06-26 PROCEDURE — 93010 ELECTROCARDIOGRAM REPORT: CPT

## 2022-06-26 RX ADMIN — NYSTATIN CREAM 1 APPLICATION(S): 100000 CREAM TOPICAL at 17:36

## 2022-06-26 RX ADMIN — SPIRONOLACTONE 25 MILLIGRAM(S): 25 TABLET, FILM COATED ORAL at 05:57

## 2022-06-26 RX ADMIN — Medication 81 MILLIGRAM(S): at 11:27

## 2022-06-26 RX ADMIN — ATORVASTATIN CALCIUM 20 MILLIGRAM(S): 80 TABLET, FILM COATED ORAL at 21:54

## 2022-06-26 RX ADMIN — AMLODIPINE BESYLATE 5 MILLIGRAM(S): 2.5 TABLET ORAL at 05:56

## 2022-06-26 RX ADMIN — Medication 3 MILLIGRAM(S): at 21:54

## 2022-06-26 RX ADMIN — POLYETHYLENE GLYCOL 3350 17 GRAM(S): 17 POWDER, FOR SOLUTION ORAL at 11:27

## 2022-06-26 RX ADMIN — Medication 100 MICROGRAM(S): at 05:57

## 2022-06-26 RX ADMIN — CARVEDILOL PHOSPHATE 6.25 MILLIGRAM(S): 80 CAPSULE, EXTENDED RELEASE ORAL at 05:57

## 2022-06-26 RX ADMIN — CARVEDILOL PHOSPHATE 6.25 MILLIGRAM(S): 80 CAPSULE, EXTENDED RELEASE ORAL at 17:36

## 2022-06-26 RX ADMIN — ENOXAPARIN SODIUM 40 MILLIGRAM(S): 100 INJECTION SUBCUTANEOUS at 11:27

## 2022-06-26 RX ADMIN — NYSTATIN CREAM 1 APPLICATION(S): 100000 CREAM TOPICAL at 05:56

## 2022-06-26 NOTE — PROGRESS NOTE ADULT - ASSESSMENT
89 yr       PMH of HTN, HFpEF, pAfib, CHB, CKD, recurrent UTIs (ESBL), HLD, OA, hypothyroid,   CHB.  s/p  Micra  3/22     s/p  recent  admission,  syncope  [facility] from  assisted  living   ppm    interrogated. CT head, no  bleed/  uti  rxed     admitte d by  hospotalist  for  shoulder  pain    xary,  no fx/  PA     HTN, on coreg/   norvasc/ meds  peR card dr ann   c/c  diastolic  chf   Hypothyroid, on synthroid    prior  h/o asymmetric   breast  tissue, no  w/p  at  this  advanced  age  on dvt  ppx/   today, pt  states   her  shoulder   doeS not  bother   her/ has   c/c  limited   rom due to  c/c  OA  due  to iv,  ha s some  swelling right  arm/  not warm  follow  temp    rpt  bmp     family called  mlple  times/  unable  to  reach    pt  has prior  molst. is  dnr/ dni      ra< from: Transthoracic Echocardiogram (04.16.22 @ 09:34) >  onclusions:  1. Mitral annular calcification and calcified mitral  leaflets with normal diastolic opening. Mild mitral  regurgitation.  2. Calcified trileaflet aortic valve withmidly decreased  opening. Peak transaortic valve gradient equals 8 mm Hg,  mean transaortic valve gradient equals 4 mm Hg, aortic  valve velocity time integral equals 31 cm.  3. Eccentric left ventricular hypertrophy (dilated left  ventricle with normal relative wall thickness).  4. Overall preserved left ventricular ejection fraction.  Apical cap hypokinesis.  5. Severe  diastolic dysfunction.  6. Normal right ventricular size and function.  ------------------------------------------------------------------------  Confirmed on  4/16/2022 - 17:59:51 by Mohit Trevizo,    < end of copied text >

## 2022-06-27 LAB
-  AMPICILLIN: SIGNIFICANT CHANGE UP
-  CIPROFLOXACIN: SIGNIFICANT CHANGE UP
-  LEVOFLOXACIN: SIGNIFICANT CHANGE UP
-  NITROFURANTOIN: SIGNIFICANT CHANGE UP
-  TETRACYCLINE: SIGNIFICANT CHANGE UP
-  VANCOMYCIN: SIGNIFICANT CHANGE UP
ANION GAP SERPL CALC-SCNC: 14 MMOL/L — SIGNIFICANT CHANGE UP (ref 5–17)
BUN SERPL-MCNC: 54 MG/DL — HIGH (ref 7–23)
CALCIUM SERPL-MCNC: 8.9 MG/DL — SIGNIFICANT CHANGE UP (ref 8.4–10.5)
CHLORIDE SERPL-SCNC: 106 MMOL/L — SIGNIFICANT CHANGE UP (ref 96–108)
CO2 SERPL-SCNC: 20 MMOL/L — LOW (ref 22–31)
CREAT SERPL-MCNC: 1.15 MG/DL — SIGNIFICANT CHANGE UP (ref 0.5–1.3)
CULTURE RESULTS: SIGNIFICANT CHANGE UP
EGFR: 43 ML/MIN/1.73M2 — LOW
GLUCOSE SERPL-MCNC: 114 MG/DL — HIGH (ref 70–99)
METHOD TYPE: SIGNIFICANT CHANGE UP
ORGANISM # SPEC MICROSCOPIC CNT: SIGNIFICANT CHANGE UP
ORGANISM # SPEC MICROSCOPIC CNT: SIGNIFICANT CHANGE UP
POTASSIUM SERPL-MCNC: 4.2 MMOL/L — SIGNIFICANT CHANGE UP (ref 3.5–5.3)
POTASSIUM SERPL-SCNC: 4.2 MMOL/L — SIGNIFICANT CHANGE UP (ref 3.5–5.3)
SODIUM SERPL-SCNC: 140 MMOL/L — SIGNIFICANT CHANGE UP (ref 135–145)
SPECIMEN SOURCE: SIGNIFICANT CHANGE UP

## 2022-06-27 PROCEDURE — 93971 EXTREMITY STUDY: CPT | Mod: 26,RT

## 2022-06-27 RX ADMIN — ATORVASTATIN CALCIUM 20 MILLIGRAM(S): 80 TABLET, FILM COATED ORAL at 22:26

## 2022-06-27 RX ADMIN — AMLODIPINE BESYLATE 5 MILLIGRAM(S): 2.5 TABLET ORAL at 05:51

## 2022-06-27 RX ADMIN — Medication 81 MILLIGRAM(S): at 12:42

## 2022-06-27 RX ADMIN — Medication 100 MICROGRAM(S): at 05:51

## 2022-06-27 RX ADMIN — NYSTATIN CREAM 1 APPLICATION(S): 100000 CREAM TOPICAL at 17:25

## 2022-06-27 RX ADMIN — POLYETHYLENE GLYCOL 3350 17 GRAM(S): 17 POWDER, FOR SOLUTION ORAL at 12:43

## 2022-06-27 RX ADMIN — CARVEDILOL PHOSPHATE 6.25 MILLIGRAM(S): 80 CAPSULE, EXTENDED RELEASE ORAL at 05:51

## 2022-06-27 RX ADMIN — SPIRONOLACTONE 25 MILLIGRAM(S): 25 TABLET, FILM COATED ORAL at 05:51

## 2022-06-27 RX ADMIN — ENOXAPARIN SODIUM 40 MILLIGRAM(S): 100 INJECTION SUBCUTANEOUS at 12:43

## 2022-06-27 RX ADMIN — NYSTATIN CREAM 1 APPLICATION(S): 100000 CREAM TOPICAL at 05:51

## 2022-06-27 RX ADMIN — CARVEDILOL PHOSPHATE 6.25 MILLIGRAM(S): 80 CAPSULE, EXTENDED RELEASE ORAL at 17:24

## 2022-06-27 NOTE — ADVANCED PRACTICE NURSE CONSULT - RECOMMEDATIONS
Will recommend the followin. Routine pericare with Elena   2. continue with T&P   3. Complete Cair boots  4. Seat cushion when OOB to chair  5. Nutrition support as pt condition allows  Tx plan discussed with RN

## 2022-06-27 NOTE — ADVANCED PRACTICE NURSE CONSULT - REASON FOR CONSULT
Requested by staff to assess skin status: sacrum. PMH is noted:  Patient is a  89 year old female w/PMH of HTN, HFpEF, pAfib, CHB, CKD, recurrent UTIs (ESBL), HLD, OA, hypothyroid,   CHB.  s/p  Micra  3/22  , recent admission for syncope ( 6/19-23) tx for UTI w/ ceftriaxone , now presents from [facility] for left  shoulder pain that started on morning of admission, worse with movement, better with rest, no reported  trauma. Staff at [facility] were unable to help patient out of bed,  and she was unable to use her rolling walker because of pain.  She reports no numbness or tingling , no other joint pain.  She reports no fever or chills .

## 2022-06-27 NOTE — ADVANCED PRACTICE NURSE CONSULT - ASSESSMENT
The pt was encountered on [employer]- Mrs Gilliland is in a [employer] P 500 support surface and needs assistance with T&P. will recommend Complete CAir boots to off-load the heels.  Upon assessment, the pt was wearing a brief and incontinent of urine and stool. Pericare was provided. I talked at length with the pt about skin care and incontinence- she wears Depends at home as she is incontinent of both urine and stool at times. In the hospital I encouraged her to try the disposable underpad and d/c the briefs- the pt was willing to try. We also discussed trying the Pure-Wick external diversion catheter. the primary RN was in the room and stated she would apply the device.  The skin on the sacrum and b/l buttocks was intact at this time. Elena barrier cream was applied and the pt was turned to her right side.

## 2022-06-27 NOTE — PROGRESS NOTE ADULT - ASSESSMENT
89 yr       PMH of HTN, HFpEF, pAfib, CHB, CKD, recurrent UTIs (ESBL), HLD, OA, hypothyroid,   CHB.  s/p  Micra  3/22     s/p  recent  admission,  syncope  agian from  assisted  living   ppm    interrogated. CT head, no  bleed/  uti  rxed     admitte d by  hospotalist  for  shoulder  pain    xary,  no fx/  PA     HTN, on coreg/   norvasc/ meds  peR card dr ann   c/c  diastolic  chf   Hypothyroid, on synthroid    prior  h/o asymmetric   breast  tissue, no  w/p  at  this  advanced  age  on dvt  ppx/   today, pt  states   her  shoulder   doeS not  bother   her/ has   c/c  limited   rom due to  c/c  OA  due  to iv,  ha s some  swelling right  arm/  not warm/  bmp stable  doppler  arm, pending     family called  mlple  times/  unable  to  reach  pt  has prior  molst. is  dnr/ dni      ra< from: Transthoracic Echocardiogram (04.16.22 @ 09:34) >  onclusions:  1. Mitral annular calcification and calcified mitral  leaflets with normal diastolic opening. Mild mitral  regurgitation.  2. Calcified trileaflet aortic valve withmidly decreased  opening. Peak transaortic valve gradient equals 8 mm Hg,  mean transaortic valve gradient equals 4 mm Hg, aortic  valve velocity time integral equals 31 cm.  3. Eccentric left ventricular hypertrophy (dilated left  ventricle with normal relative wall thickness).  4. Overall preserved left ventricular ejection fraction.  Apical cap hypokinesis.  5. Severe  diastolic dysfunction.  6. Normal right ventricular size and function.  ------------------------------------------------------------------------  Confirmed on  4/16/2022 - 17:59:51 by Mohit Trevizo,    < end of copied text >      89 yr       PMH of HTN, HFpEF, pAfib, CHB, CKD, recurrent UTIs (ESBL), HLD, OA, hypothyroid,   CHB.  s/p  Micra  3/22     s/p  recent  admission,  syncope  agian from  assisted  living   ppm    interrogated. CT head, no  bleed/  uti  rxed     admitte d by  hospotalist  for  shoulder  pain    xary,  no fx/  PA     HTN, on coreg/   norvasc/ meds  peR card dr ann   c/c  diastolic  chf   Hypothyroid, on synthroid    prior  h/o asymmetric   breast  tissue, no  w/p  at  this  advanced  age  on dvt  ppx/  not sure why esr wa s sent/  elevated,  expected  at he r age/ no  w/p needed   today, pt  states   her  shoulder   doeS not  bother   her/ has   c/c  limited   rom due to  c/c  OA  due  to iv,  ha s some  swelling right  arm/  not warm/  bmp stable  doppler  arm, pending     family called  mlple  times/  unable  to  reach  pt  has prior  molst. is  dnr/ dni      ra< from: Transthoracic Echocardiogram (04.16.22 @ 09:34) >  onclusions:  1. Mitral annular calcification and calcified mitral  leaflets with normal diastolic opening. Mild mitral  regurgitation.  2. Calcified trileaflet aortic valve withmidly decreased  opening. Peak transaortic valve gradient equals 8 mm Hg,  mean transaortic valve gradient equals 4 mm Hg, aortic  valve velocity time integral equals 31 cm.  3. Eccentric left ventricular hypertrophy (dilated left  ventricle with normal relative wall thickness).  4. Overall preserved left ventricular ejection fraction.  Apical cap hypokinesis.  5. Severe  diastolic dysfunction.  6. Normal right ventricular size and function.  ------------------------------------------------------------------------  Confirmed on  4/16/2022 - 17:59:51 by Mohit Trevizo,    < end of copied text >      89 yr       PMH of HTN, HFpEF, pAfib, CHB, CKD, recurrent UTIs (ESBL), HLD, OA, hypothyroid,   CHB.  s/p  Micra  3/22     s/p  recent  admission,  syncope  agian from  assisted  living   ppm    interrogated. CT head, no  bleed/  uti  rxed     admitte d by  hospotalist  for  shoulder  pain    xary,  no fx/  PA     HTN, on coreg/   norvasc/ meds  peR card dr ann   c/c  diastolic  chf   Hypothyroid, on synthroid    prior  h/o asymmetric   breast  tissue, no  w/p  at  this  advanced  age  on dvt  ppx/  not sure why esr wa s sent/  elevated,  expected  at he r age/ no  w/p needed   today, pt  states   her  shoulder   doeS not  bother   her/ has   c/c  limited   rom due to  c/c  OA  asymptomatic bacteruria/  ucx  noted  due  to iv,  ha s some  swelling right  arm/  not warm/  bmp stable  doppler  arm, no dvt  duaghetr  told  of xray report/ has  OA/ with limite d rom of  shoulkder/  not  happy with  pts;  condition  was  told, that no  intervention wa s needed     S/W note  seen/ plan is  hospice  at [facility]  pt  has prior  molst. is  dnr/ dni      ra< from: Transthoracic Echocardiogram (04.16.22 @ 09:34) >  onclusions:  1. Mitral annular calcification and calcified mitral  leaflets with normal diastolic opening. Mild mitral  regurgitation.  2. Calcified trileaflet aortic valve withmidly decreased  opening. Peak transaortic valve gradient equals 8 mm Hg,  mean transaortic valve gradient equals 4 mm Hg, aortic  valve velocity time integral equals 31 cm.  3. Eccentric left ventricular hypertrophy (dilated left  ventricle with normal relative wall thickness).  4. Overall preserved left ventricular ejection fraction.  Apical cap hypokinesis.  5. Severe  diastolic dysfunction.  6. Normal right ventricular size and function.  ------------------------------------------------------------------------  Confirmed on  4/16/2022 - 17:59:51 by Mohit Trevizo,    < end of copied text >

## 2022-06-28 PROCEDURE — 99232 SBSQ HOSP IP/OBS MODERATE 35: CPT

## 2022-06-28 RX ORDER — CHLORHEXIDINE GLUCONATE 213 G/1000ML
1 SOLUTION TOPICAL
Refills: 0 | Status: DISCONTINUED | OUTPATIENT
Start: 2022-06-28 | End: 2022-06-30

## 2022-06-28 RX ADMIN — SPIRONOLACTONE 25 MILLIGRAM(S): 25 TABLET, FILM COATED ORAL at 05:20

## 2022-06-28 RX ADMIN — ENOXAPARIN SODIUM 40 MILLIGRAM(S): 100 INJECTION SUBCUTANEOUS at 11:52

## 2022-06-28 RX ADMIN — POLYETHYLENE GLYCOL 3350 17 GRAM(S): 17 POWDER, FOR SOLUTION ORAL at 11:53

## 2022-06-28 RX ADMIN — CARVEDILOL PHOSPHATE 6.25 MILLIGRAM(S): 80 CAPSULE, EXTENDED RELEASE ORAL at 17:57

## 2022-06-28 RX ADMIN — CARVEDILOL PHOSPHATE 6.25 MILLIGRAM(S): 80 CAPSULE, EXTENDED RELEASE ORAL at 05:20

## 2022-06-28 RX ADMIN — AMLODIPINE BESYLATE 5 MILLIGRAM(S): 2.5 TABLET ORAL at 05:21

## 2022-06-28 RX ADMIN — ATORVASTATIN CALCIUM 20 MILLIGRAM(S): 80 TABLET, FILM COATED ORAL at 21:36

## 2022-06-28 RX ADMIN — Medication 81 MILLIGRAM(S): at 11:53

## 2022-06-28 RX ADMIN — NYSTATIN CREAM 1 APPLICATION(S): 100000 CREAM TOPICAL at 17:57

## 2022-06-28 RX ADMIN — CHLORHEXIDINE GLUCONATE 1 APPLICATION(S): 213 SOLUTION TOPICAL at 17:58

## 2022-06-28 RX ADMIN — NYSTATIN CREAM 1 APPLICATION(S): 100000 CREAM TOPICAL at 05:20

## 2022-06-28 RX ADMIN — Medication 100 MICROGRAM(S): at 05:20

## 2022-06-28 NOTE — PROGRESS NOTE ADULT - ASSESSMENT
89 yo F  hx HTN, CAD  prior hx syncope, with loop recorder showing CHB in April s/p Micra placement, followed up with EP about 3 weeks ago told everything looked good now here with another episode  of syncope with positive urine cx    Jerrod Zapien  Attending Physician   Division of Infectious Disease  Office #[phone]  Available on Microsoft Teams also  After 5pm/weekend or no response, call #[phone]

## 2022-06-28 NOTE — PROGRESS NOTE ADULT - PROBLEM SELECTOR PLAN 1
-s/p CTX  -no fever  -enterococcus in urine cx noted - no clear cut  symptoms - favor no more abx  -if symptoms occur, can give amoxicillin

## 2022-06-28 NOTE — PROGRESS NOTE ADULT - ASSESSMENT
89 yr       PMH of HTN, HFpEF, pAfib, CHB, CKD, recurrent UTIs (ESBL), HLD, OA, hypothyroid,   CHB.  s/p  Micra  3/22     s/p  recent  admission,  syncope  agian from  assisted  living   ppm    interrogated. CT head, no  bleed/  uti  rxed     admitte d by  hospotalist  for  shoulder  pain    xary,  no fx/  PA     HTN, on coreg/   norvasc/ meds  peR card dr ann   c/c  diastolic  chf   Hypothyroid, on synthroid    prior  h/o asymmetric   breast  tissue, no  w/p  at  this  advanced  age  on dvt  ppx/  not sure why esr wa s sent/  elevated,  expected  at he r age/ no  w/p needed   today, pt  states   her  shoulder   doeS not  bother   her/ has   c/c  limited   rom due to  c/c  OA  asymptomatic bacteruria/  ucx  noted  due  to iv,  ha s some  swelling right  arm/  not warm/  bmp stable  doppler  arm, no dvt  daughter   told  of xray report/ has  OA/ with limited  rom of  shoulkder/  not  happy with  pts;  condition  ID  to  commend  n o ucx     S/W note  seen/ plan is  hospice  at [facility]/  but  daughter   wnats further  discussion with  s/w, prior to  d/c      pt  has prior  molst. is  dnr/ dni      ra< from: Transthoracic Echocardiogram (04.16.22 @ 09:34) >  onclusions:  1. Mitral annular calcification and calcified mitral  leaflets with normal diastolic opening. Mild mitral  regurgitation.  2. Calcified trileaflet aortic valve withmidly decreased  opening. Peak transaortic valve gradient equals 8 mm Hg,  mean transaortic valve gradient equals 4 mm Hg, aortic  valve velocity time integral equals 31 cm.  3. Eccentric left ventricular hypertrophy (dilated left  ventricle with normal relative wall thickness).  4. Overall preserved left ventricular ejection fraction.  Apical cap hypokinesis.  5. Severe  diastolic dysfunction.  6. Normal right ventricular size and function.  ------------------------------------------------------------------------  Confirmed on  4/16/2022 - 17:59:51 by Badewattie Joseline,    < end of copied text >

## 2022-06-29 ENCOUNTER — TRANSCRIPTION ENCOUNTER (OUTPATIENT)
Age: 87
End: 2022-06-29

## 2022-06-29 LAB
ANION GAP SERPL CALC-SCNC: 11 MMOL/L — SIGNIFICANT CHANGE UP (ref 5–17)
BUN SERPL-MCNC: 45 MG/DL — HIGH (ref 7–23)
CALCIUM SERPL-MCNC: 9 MG/DL — SIGNIFICANT CHANGE UP (ref 8.4–10.5)
CHLORIDE SERPL-SCNC: 104 MMOL/L — SIGNIFICANT CHANGE UP (ref 96–108)
CO2 SERPL-SCNC: 22 MMOL/L — SIGNIFICANT CHANGE UP (ref 22–31)
CREAT SERPL-MCNC: 1.05 MG/DL — SIGNIFICANT CHANGE UP (ref 0.5–1.3)
EGFR: 47 ML/MIN/1.73M2 — LOW
ERYTHROCYTE [SEDIMENTATION RATE] IN BLOOD: 64 MM/HR — HIGH (ref 0–20)
GLUCOSE SERPL-MCNC: 110 MG/DL — HIGH (ref 70–99)
HCT VFR BLD CALC: 34.8 % — SIGNIFICANT CHANGE UP (ref 34.5–45)
HGB BLD-MCNC: 11.2 G/DL — LOW (ref 11.5–15.5)
MCHC RBC-ENTMCNC: 30.8 PG — SIGNIFICANT CHANGE UP (ref 27–34)
MCHC RBC-ENTMCNC: 32.2 GM/DL — SIGNIFICANT CHANGE UP (ref 32–36)
MCV RBC AUTO: 95.6 FL — SIGNIFICANT CHANGE UP (ref 80–100)
MRSA PCR RESULT.: SIGNIFICANT CHANGE UP
NRBC # BLD: 0 /100 WBCS — SIGNIFICANT CHANGE UP (ref 0–0)
PLATELET # BLD AUTO: 235 K/UL — SIGNIFICANT CHANGE UP (ref 150–400)
POTASSIUM SERPL-MCNC: 4.6 MMOL/L — SIGNIFICANT CHANGE UP (ref 3.5–5.3)
POTASSIUM SERPL-SCNC: 4.6 MMOL/L — SIGNIFICANT CHANGE UP (ref 3.5–5.3)
RBC # BLD: 3.64 M/UL — LOW (ref 3.8–5.2)
RBC # FLD: 14.4 % — SIGNIFICANT CHANGE UP (ref 10.3–14.5)
S AUREUS DNA NOSE QL NAA+PROBE: DETECTED
SODIUM SERPL-SCNC: 137 MMOL/L — SIGNIFICANT CHANGE UP (ref 135–145)
WBC # BLD: 7.97 K/UL — SIGNIFICANT CHANGE UP (ref 3.8–10.5)
WBC # FLD AUTO: 7.97 K/UL — SIGNIFICANT CHANGE UP (ref 3.8–10.5)

## 2022-06-29 RX ORDER — ATORVASTATIN CALCIUM 80 MG/1
1 TABLET, FILM COATED ORAL
Qty: 0 | Refills: 0 | DISCHARGE
Start: 2022-06-29

## 2022-06-29 RX ORDER — LEVOTHYROXINE SODIUM 125 MCG
1 TABLET ORAL
Qty: 0 | Refills: 0 | DISCHARGE
Start: 2022-06-29

## 2022-06-29 RX ORDER — AMLODIPINE BESYLATE 2.5 MG/1
1 TABLET ORAL
Qty: 0 | Refills: 0 | DISCHARGE
Start: 2022-06-29

## 2022-06-29 RX ORDER — LEVOTHYROXINE SODIUM 125 MCG
1 TABLET ORAL
Qty: 0 | Refills: 0 | DISCHARGE

## 2022-06-29 RX ORDER — ASPIRIN/CALCIUM CARB/MAGNESIUM 324 MG
1 TABLET ORAL
Qty: 0 | Refills: 0 | DISCHARGE
Start: 2022-06-29

## 2022-06-29 RX ORDER — POLYETHYLENE GLYCOL 3350 17 G/17G
17 POWDER, FOR SOLUTION ORAL
Qty: 0 | Refills: 0 | DISCHARGE
Start: 2022-06-29

## 2022-06-29 RX ORDER — CARVEDILOL PHOSPHATE 80 MG/1
1 CAPSULE, EXTENDED RELEASE ORAL
Qty: 0 | Refills: 0 | DISCHARGE
Start: 2022-06-29

## 2022-06-29 RX ORDER — SPIRONOLACTONE 25 MG/1
1 TABLET, FILM COATED ORAL
Qty: 0 | Refills: 0 | DISCHARGE
Start: 2022-06-29

## 2022-06-29 RX ADMIN — ENOXAPARIN SODIUM 40 MILLIGRAM(S): 100 INJECTION SUBCUTANEOUS at 12:08

## 2022-06-29 RX ADMIN — SPIRONOLACTONE 25 MILLIGRAM(S): 25 TABLET, FILM COATED ORAL at 05:31

## 2022-06-29 RX ADMIN — CARVEDILOL PHOSPHATE 6.25 MILLIGRAM(S): 80 CAPSULE, EXTENDED RELEASE ORAL at 05:31

## 2022-06-29 RX ADMIN — Medication 100 MICROGRAM(S): at 05:31

## 2022-06-29 RX ADMIN — ATORVASTATIN CALCIUM 20 MILLIGRAM(S): 80 TABLET, FILM COATED ORAL at 22:06

## 2022-06-29 RX ADMIN — Medication 81 MILLIGRAM(S): at 12:08

## 2022-06-29 RX ADMIN — NYSTATIN CREAM 1 APPLICATION(S): 100000 CREAM TOPICAL at 05:31

## 2022-06-29 RX ADMIN — NYSTATIN CREAM 1 APPLICATION(S): 100000 CREAM TOPICAL at 18:26

## 2022-06-29 RX ADMIN — CARVEDILOL PHOSPHATE 6.25 MILLIGRAM(S): 80 CAPSULE, EXTENDED RELEASE ORAL at 18:27

## 2022-06-29 RX ADMIN — POLYETHYLENE GLYCOL 3350 17 GRAM(S): 17 POWDER, FOR SOLUTION ORAL at 12:08

## 2022-06-29 RX ADMIN — AMLODIPINE BESYLATE 5 MILLIGRAM(S): 2.5 TABLET ORAL at 10:14

## 2022-06-29 RX ADMIN — CHLORHEXIDINE GLUCONATE 1 APPLICATION(S): 213 SOLUTION TOPICAL at 05:37

## 2022-06-29 NOTE — DISCHARGE NOTE PROVIDER - CARE PROVIDER_API CALL
Jose Shah  GASTROENTEROLOGY  [address] [place] [address], [address]  [place] [id number]  Phone: [phone]  Fax: [phone]  Follow Up Time: 2 weeks

## 2022-06-29 NOTE — DISCHARGE NOTE PROVIDER - HOSPITAL COURSE
89 year old female PMH of HTN, HFpEF, pAfib, CHB, CKD, recurrent UTIs (ESBL), HLD, OA, hypothyroid,   CHB.  s/p  Micra  3/22 s/p  recent  admission for syncope with UTI which was treated now admitted again from  assisted  living for shoulder pain. Xrays were negative for acute fracture.  Patient was noted to be hypertensive, Coreg dose was increased and systolic blood pressure improved.  Has a prior history of asymmetric breast tissue, no workup at this point due to advanced age. Shoulder pain improved prior to discharge.  Noted to have some right arm swelling likely d/t IV infiltration, Doppler negative.   Patient stable for discharge back to assisted living with 24 hour care.             89 year old female PMH of HTN, HFpEF, pAfib, CHB, CKD, recurrent UTIs (ESBL), HLD, OA, hypothyroid,   CHB.  s/p  Micra  3/22 s/p  recent  admission for syncope with UTI which was treated now admitted again from  assisted  living for shoulder pain. Xrays were negative for acute fracture.  Patient was noted to be hypertensive, Coreg dose was increased and systolic blood pressure improved.  Has a prior history of asymmetric breast tissue, no workup at this point due to advanced age. Shoulder pain improved prior to discharge.  Noted to have some right arm swelling likely d/t IV infiltration, Doppler negative.   Patient stable for discharge to rehab.

## 2022-06-29 NOTE — PROGRESS NOTE ADULT - ASSESSMENT
89 yr       PMH of HTN, HFpEF, pAfib, CHB, CKD, recurrent UTIs (ESBL), HLD, OA, hypothyroid,   CHB.  s/p  Micra  3/22     s/p  recent  admission,  syncope  agian from  assisted  living   ppm    interrogated. CT head, no  bleed/  uti  rxed     admitte d by  hospotalist  for  shoulder  pain    xary,  no fx/  PA     HTN, on coreg/   norvasc/ meds  peR card dr ann   c/c  diastolic  chf   Hypothyroid, on synthroid    prior  h/o asymmetric   breast  tissue, no  w/p  at  this  advanced  age  on dvt  ppx/  not sure why esr wa s sent/  elevated,  expected  at he r age/ no  w/p needed   today, pt  states   her  shoulder   doeS not  bother   her/ has   c/c  limited   rom due to  c/c  OA  asymptomatic bacteruria/  ucx  noted  due  to iv,  ha s some  swelling right  arm/  not warm/  bmp stable  doppler  arm, no dvt  daughter   told  of xray report/ has  OA/ with limited  rom of  shoulkder/  not  happy with  pts;  condition  no ab pe r ID  pt  cleraed  for   d/c,  care  cortn  to  f/p     S/W note  seen/ plan is  hospice  at [facility]/   and pe r s/w  note, wa s not  accepted    pt  has prior  molst. is  dnr/ dni      ra< from: Transthoracic Echocardiogram (04.16.22 @ 09:34) >  onclusions:  1. Mitral annular calcification and calcified mitral  leaflets with normal diastolic opening. Mild mitral  regurgitation.  2. Calcified trileaflet aortic valve withmidly decreased  opening. Peak transaortic valve gradient equals 8 mm Hg,  mean transaortic valve gradient equals 4 mm Hg, aortic  valve velocity time integral equals 31 cm.  3. Eccentric left ventricular hypertrophy (dilated left  ventricle with normal relative wall thickness).  4. Overall preserved left ventricular ejection fraction.  Apical cap hypokinesis.  5. Severe  diastolic dysfunction.  6. Normal right ventricular size and function.  ------------------------------------------------------------------------  Confirmed on  4/16/2022 - 17:59:51 by Mohit Trevizo,    < end of copied text >

## 2022-06-29 NOTE — DISCHARGE NOTE PROVIDER - NSDCCPCAREPLAN_GEN_ALL_CORE_FT
PRINCIPAL DISCHARGE DIAGNOSIS  Diagnosis: Left shoulder pain  Assessment and Plan of Treatment: Imaging was negative  Likely related to ostoarthritis  Pain medications as needed      SECONDARY DISCHARGE DIAGNOSES  Diagnosis: Hypertension  Assessment and Plan of Treatment: Low salt diet  Activity as tolerated.  Take all medication as prescribed.  Follow up with your medical doctor for routine blood pressure monitoring at your next visit.  Notify your doctor if you have any of the following symptoms:   Dizziness, Lightheadedness, Blurry vision, Headache, Chest pain, Shortness of breath      Diagnosis: Hypothyroidism  Assessment and Plan of Treatment: Continue synthroid  Follow up with PMD in 3 months for repeat thyroid function tests.    Diagnosis: Paroxysmal atrial fibrillation  Assessment and Plan of Treatment: Atrial fibrillation is the most common heart rhythm problem & has the risk of stroke & heart attack  It helps if you control your blood pressure, not drink more than 1-2 alcohol drinks per day, cut down on caffeine, getting treatment for over active thyroid gland, & getting exercise  Call your doctor if you feel your heart racing or beating unusually, chest tightness or pain, lightheaded, faint, shortness of breath especially with exercise  It is important to take your heart medication as prescribed

## 2022-06-29 NOTE — DISCHARGE NOTE PROVIDER - NSDCMRMEDTOKEN_GEN_ALL_CORE_FT
amLODIPine 5 mg oral tablet: 1 tab(s) orally once a day  aspirin 81 mg oral delayed release tablet: 1 tab(s) orally once a day  atorvastatin 20 mg oral tablet: 1 tab(s) orally once a day (at bedtime)  carvedilol 6.25 mg oral tablet: 1 tab(s) orally every 12 hours  levothyroxine 100 mcg (0.1 mg) oral tablet: 1 tab(s) orally once a day  polyethylene glycol 3350 oral powder for reconstitution: 17 gram(s) orally once a day  spironolactone 25 mg oral tablet: 1 tab(s) orally once a day  Tylenol 500 mg oral tablet: 1 tab(s) orally every 8 hours, As Needed

## 2022-06-30 ENCOUNTER — TRANSCRIPTION ENCOUNTER (OUTPATIENT)
Age: 87
End: 2022-06-30

## 2022-06-30 VITALS
DIASTOLIC BLOOD PRESSURE: 78 MMHG | HEART RATE: 85 BPM | SYSTOLIC BLOOD PRESSURE: 119 MMHG | RESPIRATION RATE: 18 BRPM | OXYGEN SATURATION: 99 % | TEMPERATURE: 98 F

## 2022-06-30 LAB — SARS-COV-2 RNA SPEC QL NAA+PROBE: SIGNIFICANT CHANGE UP

## 2022-06-30 PROCEDURE — 86140 C-REACTIVE PROTEIN: CPT

## 2022-06-30 PROCEDURE — 96374 THER/PROPH/DIAG INJ IV PUSH: CPT

## 2022-06-30 PROCEDURE — 87186 SC STD MICRODIL/AGAR DIL: CPT

## 2022-06-30 PROCEDURE — 85025 COMPLETE CBC W/AUTO DIFF WBC: CPT

## 2022-06-30 PROCEDURE — 85027 COMPLETE CBC AUTOMATED: CPT

## 2022-06-30 PROCEDURE — 89051 BODY FLUID CELL COUNT: CPT

## 2022-06-30 PROCEDURE — 99285 EMERGENCY DEPT VISIT HI MDM: CPT

## 2022-06-30 PROCEDURE — 97530 THERAPEUTIC ACTIVITIES: CPT

## 2022-06-30 PROCEDURE — 87070 CULTURE OTHR SPECIMN AEROBIC: CPT

## 2022-06-30 PROCEDURE — U0003: CPT

## 2022-06-30 PROCEDURE — 81001 URINALYSIS AUTO W/SCOPE: CPT

## 2022-06-30 PROCEDURE — 73030 X-RAY EXAM OF SHOULDER: CPT

## 2022-06-30 PROCEDURE — 97116 GAIT TRAINING THERAPY: CPT

## 2022-06-30 PROCEDURE — U0005: CPT

## 2022-06-30 PROCEDURE — 93971 EXTREMITY STUDY: CPT

## 2022-06-30 PROCEDURE — 87075 CULTR BACTERIA EXCEPT BLOOD: CPT

## 2022-06-30 PROCEDURE — 89060 EXAM SYNOVIAL FLUID CRYSTALS: CPT

## 2022-06-30 PROCEDURE — 85652 RBC SED RATE AUTOMATED: CPT

## 2022-06-30 PROCEDURE — 36415 COLL VENOUS BLD VENIPUNCTURE: CPT

## 2022-06-30 PROCEDURE — 97166 OT EVAL MOD COMPLEX 45 MIN: CPT

## 2022-06-30 PROCEDURE — 87086 URINE CULTURE/COLONY COUNT: CPT

## 2022-06-30 PROCEDURE — 80053 COMPREHEN METABOLIC PANEL: CPT

## 2022-06-30 PROCEDURE — 87641 MR-STAPH DNA AMP PROBE: CPT

## 2022-06-30 PROCEDURE — 87640 STAPH A DNA AMP PROBE: CPT

## 2022-06-30 PROCEDURE — 97163 PT EVAL HIGH COMPLEX 45 MIN: CPT

## 2022-06-30 PROCEDURE — 97110 THERAPEUTIC EXERCISES: CPT

## 2022-06-30 PROCEDURE — 93005 ELECTROCARDIOGRAM TRACING: CPT

## 2022-06-30 PROCEDURE — 87205 SMEAR GRAM STAIN: CPT

## 2022-06-30 PROCEDURE — 87637 SARSCOV2&INF A&B&RSV AMP PRB: CPT

## 2022-06-30 PROCEDURE — 80048 BASIC METABOLIC PNL TOTAL CA: CPT

## 2022-06-30 RX ADMIN — Medication 81 MILLIGRAM(S): at 12:30

## 2022-06-30 RX ADMIN — ENOXAPARIN SODIUM 40 MILLIGRAM(S): 100 INJECTION SUBCUTANEOUS at 12:30

## 2022-06-30 RX ADMIN — NYSTATIN CREAM 1 APPLICATION(S): 100000 CREAM TOPICAL at 05:45

## 2022-06-30 RX ADMIN — AMLODIPINE BESYLATE 5 MILLIGRAM(S): 2.5 TABLET ORAL at 05:44

## 2022-06-30 RX ADMIN — CHLORHEXIDINE GLUCONATE 1 APPLICATION(S): 213 SOLUTION TOPICAL at 05:52

## 2022-06-30 RX ADMIN — CARVEDILOL PHOSPHATE 6.25 MILLIGRAM(S): 80 CAPSULE, EXTENDED RELEASE ORAL at 05:44

## 2022-06-30 RX ADMIN — Medication 100 MICROGRAM(S): at 05:44

## 2022-06-30 RX ADMIN — SPIRONOLACTONE 25 MILLIGRAM(S): 25 TABLET, FILM COATED ORAL at 05:45

## 2022-06-30 NOTE — DISCHARGE NOTE NURSING/CASE MANAGEMENT/SOCIAL WORK - NSDCPEFALRISK_GEN_ALL_CORE
For information on Fall & Injury Prevention, visit: [email or web address] OR  [email or web address] OR  https://www.cdc.gov/steadi/patient.html

## 2022-06-30 NOTE — PROGRESS NOTE ADULT - REASON FOR ADMISSION
left shoulder pain  x one day

## 2022-06-30 NOTE — DISCHARGE NOTE NURSING/CASE MANAGEMENT/SOCIAL WORK - PATIENT PORTAL LINK FT
You can access the FollowMyHealth Patient Portal offered by [facility] by registering at the following website: [email or web address]. By joining [employer]’s FollowMyHealth portal, you will also be able to view your health information using other applications (apps) compatible with our system.

## 2022-06-30 NOTE — PROGRESS NOTE ADULT - ASSESSMENT
89 yr       PMH of HTN, HFpEF, pAfib, CHB, CKD, recurrent UTIs (ESBL), HLD, OA, hypothyroid,   CHB.  s/p  Micra  3/22     s/p  recent  admission,  syncope  agian from  assisted  living   ppm    interrogated. CT head, no  bleed/  uti  rxed     admitte d by  hospotalist  for  shoulder  pain    xary,  no fx/  PA     HTN, on coreg/   norvasc/ meds  peR card dr ann   c/c  diastolic  chf   Hypothyroid, on synthroid    prior  h/o asymmetric   breast  tissue, no  w/p  at  this  advanced  age  on dvt  ppx/  not sure why esr wa s sent/  elevated,  expected  at he r age/ no  w/p needed   today, pt  states   her  shoulder   doeS not  bother   her/ has   c/c  limited   rom due to  c/c  OA  asymptomatic bacteruria/  ucx  noted  due  to iv,  ha s some  swelling right  arm/  not warm/  bmp stable  doppler  arm, no dvt  daughter   told  of xray report/ has  OA/ with limited  rom of  shoulder  no ab pe r ID  pt  ha s been  cleared  for   d/c  for days/ team awrae/  d/c order  written    pt  has prior  molst. is  dnr/ dni      ra< from: Transthoracic Echocardiogram (04.16.22 @ 09:34) >  onclusions:  1. Mitral annular calcification and calcified mitral  leaflets with normal diastolic opening. Mild mitral  regurgitation.  2. Calcified trileaflet aortic valve withmidly decreased  opening. Peak transaortic valve gradient equals 8 mm Hg,  mean transaortic valve gradient equals 4 mm Hg, aortic  valve velocity time integral equals 31 cm.  3. Eccentric left ventricular hypertrophy (dilated left  ventricle with normal relative wall thickness).  4. Overall preserved left ventricular ejection fraction.  Apical cap hypokinesis.  5. Severe  diastolic dysfunction.  6. Normal right ventricular size and function.  ------------------------------------------------------------------------  Confirmed on  4/16/2022 - 17:59:51 by Mohit Trevizo,    < end of copied text >

## 2022-06-30 NOTE — PROGRESS NOTE ADULT - SUBJECTIVE AND OBJECTIVE BOX
afberile    REVIEW OF SYSTEMS:  GEN: no fever,    no chills  RESP: no SOB,   no cough  CVS: no chest pain,   no palpitations  GI: no abdominal pain,   no nausea,   no vomiting,   no constipation,   no diarrhea  : no dysuria,   no frequency  NEURO: no headache,   no dizziness  PSYCH: no depression,   not anxious  Derm : no rash    MEDICATIONS  (STANDING):  amLODIPine   Tablet 5 milliGRAM(s) Oral daily  aspirin enteric coated 81 milliGRAM(s) Oral daily  atorvastatin 20 milliGRAM(s) Oral at bedtime  carvedilol 6.25 milliGRAM(s) Oral every 12 hours  enoxaparin Injectable 40 milliGRAM(s) SubCutaneous every 24 hours  levothyroxine 100 MICROGram(s) Oral daily  polyethylene glycol 3350 17 Gram(s) Oral daily  spironolactone 25 milliGRAM(s) Oral daily    MEDICATIONS  (PRN):  acetaminophen     Tablet .. 650 milliGRAM(s) Oral every 6 hours PRN Temp greater or equal to 38C (100.4F), Mild Pain (1 - 3)  aluminum hydroxide/magnesium hydroxide/simethicone Suspension 30 milliLiter(s) Oral every 4 hours PRN Dyspepsia  melatonin 3 milliGRAM(s) Oral at bedtime PRN Insomnia  ondansetron Injectable 4 milliGRAM(s) IV Push every 8 hours PRN Nausea and/or Vomiting      Vital Signs Last 24 Hrs  T(C): 36.7 (2022 06:05), Max: 37.2 (2022 17:05)  T(F): 98 (2022 06:05), Max: 98.9 (2022 17:05)  HR: 66 (2022 06:05) (66 - 81)  BP: 158/90 (2022 06:05) (134/86 - 158/90)  BP(mean): --  RR: 18 (2022 06:05) (16 - 18)  SpO2: 97% (2022 06:05) (96% - 99%)  CAPILLARY BLOOD GLUCOSE        I&O's Summary    2022 07:01  -  2022 07:00  --------------------------------------------------------  IN: 120 mL / OUT: 0 mL / NET: 120 mL        PHYSICAL EXAM:  HEAD:  Atraumatic, Normocephalic  NECK: Supple, No   JVD  CHEST/LUNG:   no     rales,     no,    rhonchi  HEART: Regular rate and rhythm;         murmur  ABDOMEN: Soft, Nontender, ;   EXTREMITIES:    no    edema  NEUROLOGY:  alert    LABS:                        12.0   7.17  )-----------( 189      ( 2022 07:14 )             37.1         135  |  99  |  40<H>  ----------------------------<  113<H>  4.6   |  22  |  1.31<H>    Ca    9.0      2022 23:08    TPro  7.6  /  Alb  3.2<L>  /  TBili  0.5  /  DBili  x   /  AST  50<H>  /  ALT  110<H>  /  AlkPhos  243<H>            Urinalysis Basic - ( 2022 19:28 )    Color: Yellow / Appearance: Slightly Turbid / S.017 / pH: x  Gluc: x / Ketone: Negative  / Bili: Negative / Urobili: Negative   Blood: x / Protein: 30 mg/dL / Nitrite: Negative   Leuk Esterase: Large / RBC: 1 /hpf / WBC 36 /HPF   Sq Epi: x / Non Sq Epi: 12 /hpf / Bacteria: Few              Thyroid Stimulating Hormone, Serum: 2.21 uIU/mL ( @ 10:26)        Culture - Body Fluid with Gram Stain (collected 22 @ 21:25)  Source: .Body Fluid Synovial Fluid  Gram Stain (22 @ 02:40):    polymorphonuclear leukocytes seen    No organisms seen    by cytocentrifuge        Consultant(s) Notes Reviewed:      Care Discussed with Consultants/Other Providers:    
    franki  REVIEW OF SYSTEMS:  GEN: no fever,    no chills  RESP: no SOB,   no cough  CVS: no chest pain,   no palpitations  GI: no abdominal pain,   no nausea,   no vomiting,   no constipation,   no diarrhea  : no dysuria,   no frequency  NEURO: no headache,   no dizziness  PSYCH: no depression,   not anxious  Derm : no rash    MEDICATIONS  (STANDING):  amLODIPine   Tablet 5 milliGRAM(s) Oral daily  aspirin enteric coated 81 milliGRAM(s) Oral daily  atorvastatin 20 milliGRAM(s) Oral at bedtime  carvedilol 6.25 milliGRAM(s) Oral every 12 hours  enoxaparin Injectable 40 milliGRAM(s) SubCutaneous every 24 hours  levothyroxine 100 MICROGram(s) Oral daily  nystatin Powder 1 Application(s) Topical two times a day  polyethylene glycol 3350 17 Gram(s) Oral daily  sodium chloride 0.9%. 1000 milliLiter(s) (60 mL/Hr) IV Continuous <Continuous>  spironolactone 25 milliGRAM(s) Oral daily    MEDICATIONS  (PRN):  acetaminophen     Tablet .. 650 milliGRAM(s) Oral every 6 hours PRN Temp greater or equal to 38C (100.4F), Mild Pain (1 - 3)  aluminum hydroxide/magnesium hydroxide/simethicone Suspension 30 milliLiter(s) Oral every 4 hours PRN Dyspepsia  melatonin 3 milliGRAM(s) Oral at bedtime PRN Insomnia  ondansetron Injectable 4 milliGRAM(s) IV Push every 8 hours PRN Nausea and/or Vomiting      Vital Signs Last 24 Hrs  T(C): 36.7 (2022 06:30), Max: 37 (2022 13:00)  T(F): 98 (2022 06:30), Max: 98.6 (2022 13:00)  HR: 64 (2022 05:48) (64 - 83)  BP: 164/90 (2022 05:48) (120/60 - 164/90)  BP(mean): --  RR: 18 (2022 05:48) (16 - 18)  SpO2: 97% (2022 05:48) (96% - 98%)  CAPILLARY BLOOD GLUCOSE        I&O's Summary    2022 07:01  -  2022 07:00  --------------------------------------------------------  IN: 1800 mL / OUT: 0 mL / NET: 1800 mL        PHYSICAL EXAM:  HEAD:  Atraumatic, Normocephalic  NECK: Supple, No   JVD  CHEST/LUNG:   no     rales,     no,    rhonchi  HEART: Regular rate and rhythm;         murmur  ABDOMEN: Soft, Nontender, ;   EXTREMITIES: no  pedal  edema  NEUROLOGY:  alert    LABS:                        12.0   7.17  )-----------( 189      ( 2022 07:14 )             37.1         135  |  102  |  58<H>  ----------------------------<  106<H>  7.0<HH>   |  21<L>  |  1.04    Ca    8.9      2022 07:44    TPro  7.4  /  Alb  3.5  /  TBili  0.3  /  DBili  x   /  AST  32  /  ALT  98<H>  /  AlkPhos  241<H>  06-25          Urinalysis Basic - ( 2022 19:28 )    Color: Yellow / Appearance: Slightly Turbid / S.017 / pH: x  Gluc: x / Ketone: Negative  / Bili: Negative / Urobili: Negative   Blood: x / Protein: 30 mg/dL / Nitrite: Negative   Leuk Esterase: Large / RBC: 1 /hpf / WBC 36 /HPF   Sq Epi: x / Non Sq Epi: 12 /hpf / Bacteria: Few              Thyroid Stimulating Hormone, Serum: 2.21 uIU/mL ( @ 10:26)          Consultant(s) Notes Reviewed:      Care Discussed with Consultants/Other Providers:    
  afberile    REVIEW OF SYSTEMS:  GEN: no fever,    no chills  RESP: no SOB,   no cough  CVS: no chest pain,   no palpitations  GI: no abdominal pain,   no nausea,   no vomiting,   no constipation,   no diarrhea  : no dysuria,   no frequency  NEURO: no headache,   no dizziness  PSYCH: no depression,   not anxious  Derm : no rash    MEDICATIONS  (STANDING):  amLODIPine   Tablet 5 milliGRAM(s) Oral daily  aspirin enteric coated 81 milliGRAM(s) Oral daily  atorvastatin 20 milliGRAM(s) Oral at bedtime  carvedilol 6.25 milliGRAM(s) Oral every 12 hours  chlorhexidine 2% Cloths 1 Application(s) Topical <User Schedule>  enoxaparin Injectable 40 milliGRAM(s) SubCutaneous every 24 hours  levothyroxine 100 MICROGram(s) Oral daily  nystatin Powder 1 Application(s) Topical two times a day  polyethylene glycol 3350 17 Gram(s) Oral daily  spironolactone 25 milliGRAM(s) Oral daily    MEDICATIONS  (PRN):  acetaminophen     Tablet .. 650 milliGRAM(s) Oral every 6 hours PRN Temp greater or equal to 38C (100.4F), Mild Pain (1 - 3)  melatonin 3 milliGRAM(s) Oral at bedtime PRN Insomnia      Vital Signs Last 24 Hrs  T(C): 36.1 (30 Jun 2022 05:02), Max: 36.7 (29 Jun 2022 18:27)  T(F): 97 (30 Jun 2022 05:02), Max: 98.1 (29 Jun 2022 18:27)  HR: 76 (30 Jun 2022 05:02) (75 - 93)  BP: 169/85 (30 Jun 2022 05:30) (123/78 - 179/97)  BP(mean): --  RR: 18 (30 Jun 2022 05:02) (17 - 18)  SpO2: 99% (30 Jun 2022 05:02) (96% - 100%)  CAPILLARY BLOOD GLUCOSE        I&O's Summary    29 Jun 2022 07:01  -  30 Jun 2022 07:00  --------------------------------------------------------  IN: 1250 mL / OUT: 0 mL / NET: 1250 mL        PHYSICAL EXAM:  HEAD:  Atraumatic, Normocephalic  NECK: Supple, No   JVD  CHEST/LUNG:   no     rales,     no,    rhonchi  HEART: Regular rate and rhythm;         murmur  ABDOMEN: Soft, Nontender, ;   EXTREMITIES:     no   edema  NEUROLOGY:  alert    LABS:                        11.2   7.97  )-----------( 235      ( 29 Jun 2022 07:01 )             34.8     06-29    137  |  104  |  45<H>  ----------------------------<  110<H>  4.6   |  22  |  1.05    Ca    9.0      29 Jun 2022 07:03                      Thyroid Stimulating Hormone, Serum: 2.21 uIU/mL (04-16 @ 10:26)          Consultant(s) Notes Reviewed:      Care Discussed with Consultants/Other Providers:    
  afberile    REVIEW OF SYSTEMS:  GEN: no fever,    no chills  RESP: no SOB,   no cough  CVS: no chest pain,   no palpitations  GI: no abdominal pain,   no nausea,   no vomiting,   no constipation,   no diarrhea  : no dysuria,   no frequency  NEURO: no headache,   no dizziness  PSYCH: no depression,   not anxious  Derm : no rash    MEDICATIONS  (STANDING):  amLODIPine   Tablet 5 milliGRAM(s) Oral daily  aspirin enteric coated 81 milliGRAM(s) Oral daily  atorvastatin 20 milliGRAM(s) Oral at bedtime  carvedilol 6.25 milliGRAM(s) Oral every 12 hours  enoxaparin Injectable 40 milliGRAM(s) SubCutaneous every 24 hours  levothyroxine 100 MICROGram(s) Oral daily  nystatin Powder 1 Application(s) Topical two times a day  polyethylene glycol 3350 17 Gram(s) Oral daily  spironolactone 25 milliGRAM(s) Oral daily    MEDICATIONS  (PRN):  acetaminophen     Tablet .. 650 milliGRAM(s) Oral every 6 hours PRN Temp greater or equal to 38C (100.4F), Mild Pain (1 - 3)  melatonin 3 milliGRAM(s) Oral at bedtime PRN Insomnia      Vital Signs Last 24 Hrs  T(C): 36.3 (27 Jun 2022 05:05), Max: 36.6 (26 Jun 2022 21:00)  T(F): 97.3 (27 Jun 2022 05:05), Max: 97.9 (26 Jun 2022 21:00)  HR: 82 (27 Jun 2022 05:05) (65 - 82)  BP: 168/93 (27 Jun 2022 05:05) (144/71 - 168/93)  BP(mean): --  RR: 18 (27 Jun 2022 05:05) (18 - 18)  SpO2: 99% (27 Jun 2022 05:05) (95% - 99%)  CAPILLARY BLOOD GLUCOSE        I&O's Summary    26 Jun 2022 07:01  -  27 Jun 2022 07:00  --------------------------------------------------------  IN: 720 mL / OUT: 200 mL / NET: 520 mL        PHYSICAL EXAM:  HEAD:  Atraumatic, Normocephalic  NECK: Supple, No   JVD  CHEST/LUNG:   no     rales,     no,    rhonchi  HEART: Regular rate and rhythm;         murmur  ABDOMEN: Soft, Nontender, ;   EXTREMITIES:   no     edema  NEUROLOGY:  alert    LABS:    06-27    140  |  106  |  54<H>  ----------------------------<  114<H>  4.2   |  20<L>  |  1.15    Ca    8.9      27 Jun 2022 06:27                      Thyroid Stimulating Hormone, Serum: 2.21 uIU/mL (04-16 @ 10:26)          Consultant(s) Notes Reviewed:      Care Discussed with Consultants/Other Providers:    
  afberile    REVIEW OF SYSTEMS:  GEN: no fever,    no chills  RESP: no SOB,   no cough  CVS: no chest pain,   no palpitations  GI: no abdominal pain,   no nausea,   no vomiting,   no constipation,   no diarrhea  : no dysuria,   no frequency  NEURO: no headache,   no dizziness  PSYCH: no depression,   not anxious  Derm : no rash    MEDICATIONS  (STANDING):  amLODIPine   Tablet 5 milliGRAM(s) Oral daily  aspirin enteric coated 81 milliGRAM(s) Oral daily  atorvastatin 20 milliGRAM(s) Oral at bedtime  carvedilol 6.25 milliGRAM(s) Oral every 12 hours  enoxaparin Injectable 40 milliGRAM(s) SubCutaneous every 24 hours  levothyroxine 100 MICROGram(s) Oral daily  nystatin Powder 1 Application(s) Topical two times a day  polyethylene glycol 3350 17 Gram(s) Oral daily  spironolactone 25 milliGRAM(s) Oral daily    MEDICATIONS  (PRN):  acetaminophen     Tablet .. 650 milliGRAM(s) Oral every 6 hours PRN Temp greater or equal to 38C (100.4F), Mild Pain (1 - 3)  melatonin 3 milliGRAM(s) Oral at bedtime PRN Insomnia      Vital Signs Last 24 Hrs  T(C): 36.7 (28 Jun 2022 05:00), Max: 36.7 (28 Jun 2022 05:00)  T(F): 98 (28 Jun 2022 05:00), Max: 98 (28 Jun 2022 05:00)  HR: 70 (28 Jun 2022 05:00) (70 - 88)  BP: 160/75 (28 Jun 2022 05:00) (142/77 - 160/75)  BP(mean): --  RR: 18 (28 Jun 2022 05:00) (16 - 18)  SpO2: 99% (28 Jun 2022 05:00) (98% - 99%)  CAPILLARY BLOOD GLUCOSE        I&O's Summary    27 Jun 2022 07:01  -  28 Jun 2022 07:00  --------------------------------------------------------  IN: 1030 mL / OUT: 0 mL / NET: 1030 mL        PHYSICAL EXAM:  HEAD:  Atraumatic, Normocephalic  NECK: Supple, No   JVD  CHEST/LUNG:   no     rales,     no,    rhonchi  HEART: Regular rate and rhythm;         murmur  ABDOMEN: Soft, Nontender, ;   EXTREMITIES:    no pedal    edema  NEUROLOGY:  alert    LABS:    06-27    140  |  106  |  54<H>  ----------------------------<  114<H>  4.2   |  20<L>  |  1.15    Ca    8.9      27 Jun 2022 06:27                      Thyroid Stimulating Hormone, Serum: 2.21 uIU/mL (04-16 @ 10:26)          Consultant(s) Notes Reviewed:      Care Discussed with Consultants/Other Providers:    
    afberile  REVIEW OF SYSTEMS:  GEN: no fever,    no chills  RESP: no SOB,   no cough  CVS: no chest pain,   no palpitations  GI: no abdominal pain,   no nausea,   no vomiting,   no constipation,   no diarrhea  : no dysuria,   no frequency  NEURO: no headache,   no dizziness  PSYCH: no depression,   not anxious  Derm : no rash    MEDICATIONS  (STANDING):  amLODIPine   Tablet 5 milliGRAM(s) Oral daily  aspirin enteric coated 81 milliGRAM(s) Oral daily  atorvastatin 20 milliGRAM(s) Oral at bedtime  carvedilol 6.25 milliGRAM(s) Oral every 12 hours  chlorhexidine 2% Cloths 1 Application(s) Topical <User Schedule>  enoxaparin Injectable 40 milliGRAM(s) SubCutaneous every 24 hours  levothyroxine 100 MICROGram(s) Oral daily  nystatin Powder 1 Application(s) Topical two times a day  polyethylene glycol 3350 17 Gram(s) Oral daily  spironolactone 25 milliGRAM(s) Oral daily    MEDICATIONS  (PRN):  acetaminophen     Tablet .. 650 milliGRAM(s) Oral every 6 hours PRN Temp greater or equal to 38C (100.4F), Mild Pain (1 - 3)  melatonin 3 milliGRAM(s) Oral at bedtime PRN Insomnia      Vital Signs Last 24 Hrs  T(C): 36.8 (29 Jun 2022 05:00), Max: 36.8 (28 Jun 2022 21:05)  T(F): 98.2 (29 Jun 2022 05:00), Max: 98.2 (28 Jun 2022 21:05)  HR: 78 (29 Jun 2022 05:00) (71 - 88)  BP: 130/75 (29 Jun 2022 05:00) (120/77 - 139/75)  BP(mean): --  RR: 18 (29 Jun 2022 05:00) (18 - 18)  SpO2: 98% (29 Jun 2022 05:00) (98% - 99%)  CAPILLARY BLOOD GLUCOSE        I&O's Summary    28 Jun 2022 07:01  -  29 Jun 2022 07:00  --------------------------------------------------------  IN: 470 mL / OUT: 100 mL / NET: 370 mL        PHYSICAL EXAM:  HEAD:  Atraumatic, Normocephalic  NECK: Supple, No   JVD  CHEST/LUNG:   no     rales,     no,    rhonchi  HEART: Regular rate and rhythm;         murmur  ABDOMEN: Soft, Nontender, ;   EXTREMITIES:   no     edema  NEUROLOGY:  alert    LABS:                        11.2   7.97  )-----------( 235      ( 29 Jun 2022 07:01 )             34.8     06-29    137  |  104  |  45<H>  ----------------------------<  110<H>  4.6   |  22  |  1.05    Ca    9.0      29 Jun 2022 07:03                      Thyroid Stimulating Hormone, Serum: 2.21 uIU/mL (04-16 @ 10:26)          Consultant(s) Notes Reviewed:      Care Discussed with Consultants/Other Providers:    
SOLIS TAYLOR 89y [id number]    Patient is a 89y old  Female who presents with a chief complaint of left shoulder pain  x one day (28 Jun 2022 07:58)      Follow Up/CC:  ID following for UTI    Interval History/ROS: recalled for positive urine cx, no fever, no dysuria today     Allergies    sulfa topicals (Unknown)    Intolerances    morphine (Drowsiness; Faint; Hypotension)      ANTIMICROBIALS:      MEDICATIONS  (STANDING):  amLODIPine   Tablet 5 milliGRAM(s) Oral daily  aspirin enteric coated 81 milliGRAM(s) Oral daily  atorvastatin 20 milliGRAM(s) Oral at bedtime  carvedilol 6.25 milliGRAM(s) Oral every 12 hours  enoxaparin Injectable 40 milliGRAM(s) SubCutaneous every 24 hours  levothyroxine 100 MICROGram(s) Oral daily  nystatin Powder 1 Application(s) Topical two times a day  polyethylene glycol 3350 17 Gram(s) Oral daily  spironolactone 25 milliGRAM(s) Oral daily    MEDICATIONS  (PRN):  acetaminophen     Tablet .. 650 milliGRAM(s) Oral every 6 hours PRN Temp greater or equal to 38C (100.4F), Mild Pain (1 - 3)  melatonin 3 milliGRAM(s) Oral at bedtime PRN Insomnia        Vital Signs Last 24 Hrs  T(C): 36.7 (28 Jun 2022 05:00), Max: 36.7 (28 Jun 2022 05:00)  T(F): 98 (28 Jun 2022 05:00), Max: 98 (28 Jun 2022 05:00)  HR: 70 (28 Jun 2022 05:00) (70 - 88)  BP: 160/75 (28 Jun 2022 05:00) (142/77 - 160/75)  BP(mean): --  RR: 18 (28 Jun 2022 05:00) (16 - 18)  SpO2: 99% (28 Jun 2022 05:00) (98% - 99%)      06-27    140  |  106  |  54<H>  ----------------------------<  114<H>  4.2   |  20<L>  |  1.15    Ca    8.9      27 Jun 2022 06:27            MICROBIOLOGY:  .Body Fluid Synovial Fluid  06-24-22   No growth  --    polymorphonuclear leukocytes seen  No organisms seen  by cytocentrifuge      Catheterized Catheterized  06-24-22   50,000 - 99,000 CFU/mL Enterococcus faecalis  --  Enterococcus faecalis      Clean Catch Clean Catch (Midstream)  06-19-22   >100,000 CFU/ml Escherichia coli  50,000 - 99,000 CFU/mL Enterococcus faecalis  --  Escherichia coli  Enterococcus faecalis        RADIOLOGY    < from: [facility] Duplex Upper Ext Vein Scan, Right (06.27.22 @ 12:32) >  No evidence of right upper extremity deep venous thrombosis.    < end of copied text >

## 2022-06-30 NOTE — DISCHARGE NOTE NURSING/CASE MANAGEMENT/SOCIAL WORK - NSDCVIVACCINE_GEN_ALL_CORE_FT
Tdap; 17-Jun-2019 08:56; Priyanka Easley (RN); [employer]; [id number] (Exp. Date: 04-Apr-2021); IntraMuscular; Deltoid Left.; 0.5 milliLiter(s); VIS (VIS Published: 09-May-2013, VIS Presented: 17-Jun-2019);

## 2022-06-30 NOTE — PROGRESS NOTE ADULT - PROVIDER SPECIALTY LIST ADULT
Internal Medicine
Infectious Disease

## 2022-07-08 ENCOUNTER — NON-APPOINTMENT (OUTPATIENT)
Age: 87
End: 2022-07-08

## 2022-07-09 LAB
CULTURE RESULTS: SIGNIFICANT CHANGE UP
SPECIMEN SOURCE: SIGNIFICANT CHANGE UP

## 2022-07-25 NOTE — ED ADULT TRIAGE NOTE - PAIN: PRESENCE, MLM
denies pain/discomfort I performed the initial face to face bedside interview with this patient regarding history of present illness, review of symptoms and past medical, social and family history.  I completed an independent physical examination.  I was the initial provider who evaluated this patient.  The history, review of symptoms and examination was documented by the scribe in my presence and I attest to the accuracy of the documentation.  I have signed out the follow up of any pending tests (i.e. labs, radiological studies) to the ACP.  I have discussed the patient’s plan of care and disposition with the ACP

## 2022-08-08 ENCOUNTER — RESULT REVIEW (OUTPATIENT)
Age: 87
End: 2022-08-08

## 2022-08-17 ENCOUNTER — APPOINTMENT (OUTPATIENT)
Dept: GERIATRICS | Facility: ASSISTED LIVING FACILITY | Age: 87
End: 2022-08-17

## 2022-08-17 ENCOUNTER — NON-APPOINTMENT (OUTPATIENT)
Age: 87
End: 2022-08-17

## 2022-08-17 PROCEDURE — 99497 ADVNCD CARE PLAN 30 MIN: CPT

## 2022-08-19 VITALS
SYSTOLIC BLOOD PRESSURE: 150 MMHG | RESPIRATION RATE: 12 BRPM | DIASTOLIC BLOOD PRESSURE: 78 MMHG | OXYGEN SATURATION: 96 % | HEART RATE: 82 BPM | TEMPERATURE: 207.14 F

## 2022-08-19 RX ORDER — TRAMADOL HYDROCHLORIDE 50 MG/1
50 TABLET, COATED ORAL
Qty: 60 | Refills: 1 | Status: DISCONTINUED | COMMUNITY
Start: 2020-07-27 | End: 2022-08-19

## 2022-08-19 RX ORDER — CARVEDILOL 6.25 MG/1
6.25 TABLET, FILM COATED ORAL TWICE DAILY
Refills: 0 | Status: ACTIVE | COMMUNITY

## 2022-08-19 RX ORDER — TRIAMCINOLONE ACETONIDE 1 MG/G
0.1 OINTMENT TOPICAL
Qty: 1 | Refills: 1 | Status: DISCONTINUED | COMMUNITY
Start: 2022-03-08 | End: 2022-08-19

## 2022-08-24 ENCOUNTER — APPOINTMENT (OUTPATIENT)
Dept: GERIATRICS | Facility: ASSISTED LIVING FACILITY | Age: 87
End: 2022-08-24

## 2022-08-24 ENCOUNTER — NON-APPOINTMENT (OUTPATIENT)
Age: 87
End: 2022-08-24

## 2022-08-24 VITALS — DIASTOLIC BLOOD PRESSURE: 62 MMHG | SYSTOLIC BLOOD PRESSURE: 110 MMHG

## 2022-08-26 NOTE — PHYSICAL EXAM
[de-identified] : dry skin around ears and eyes [de-identified] : mild edema [de-identified] : erythema within groin  bilateraly and within abdominal folds

## 2022-08-26 NOTE — HISTORY OF PRESENT ILLNESS
[FreeTextEntry1] : 89 yo F seen for follow up for bp check\par \par \par plan is to remain to have patient manage her own medications, they were against using pill pack\par \par no sob, swelling, chest pain, worse edema\par \par notes dry skin around eyes and essentially all over body\par \par working with PT (2 times since discharge)\par \par she has been more careful with her medication pill box\par \par more difficulty walking\par using walker\par \par she weighed herself but is unsure what the number was\par \par continues with urinary incontinence\par denies dysuria\par \par sleeping well at night\par \par normal BM's, denies constipation\par \par

## 2022-08-26 NOTE — REVIEW OF SYSTEMS
[Fever] : no fever [Chest Pain] : no chest pain [Palpitations] : no palpitations [Cough] : no cough [SOB on Exertion] : no shortness of breath during exertion

## 2022-08-26 NOTE — ASSESSMENT
[FreeTextEntry1] : Chronic diastolic heart failure:\par euvolemic\par c/w carvediolol 6.25mg bID\par c/w spironolactone 25mg daily\par HTN: bp on lower side today: stop amlodipine-  medication pill box  adjusted with patient\par Unsteady gait: c/w home PT, fall precautions, and use  of walker\par rash within groin: fungal, advised cw nystatin powder\par

## 2022-08-31 ENCOUNTER — APPOINTMENT (OUTPATIENT)
Dept: GERIATRICS | Facility: ASSISTED LIVING FACILITY | Age: 87
End: 2022-08-31

## 2022-08-31 DIAGNOSIS — S90.129A CONTUSION OF UNSPECIFIED LESSER TOE(S) W/OUT DAMAGE TO NAIL, INITIAL ENCOUNTER: ICD-10-CM

## 2022-09-02 PROBLEM — S90.129A BRUISE OF TOE: Status: ACTIVE | Noted: 2022-09-02

## 2022-09-02 NOTE — PHYSICAL EXAM
[de-identified] : purple discoloration to right 1st tonail at base to half up up nail. no skin lesions or skin tears

## 2022-09-02 NOTE — HISTORY OF PRESENT ILLNESS
[FreeTextEntry1] : 89yo F seen for acute visit for right great toe pain.\par \par she denies trauma, bleeding or new skin lesion\par noticed discoloration of her toe\par \par walking with walker\par \par medication confusion with carvedilol

## 2022-09-07 NOTE — CHART NOTE - NSCHARTNOTESELECT_GEN_ALL_CORE
Patient's wife called the office. Patient has been noting symptoms of lower respiratory tract infection in the last few days. Increased congestion and sohrtness of breath. Had very good response to Augmentin last episode.     · Will order 5 day course Augmentin  · Will order 5 day course 40 mg Prednisone  · Recommend further evaluation as needed if symptoms persist/worsen.   
iSTOP/Event Note
Afib/PATs/Event Note
Event Note
d/c appt/Event Note
discharge/Event Note

## 2022-09-08 ENCOUNTER — APPOINTMENT (OUTPATIENT)
Dept: DERMATOLOGY | Facility: CLINIC | Age: 87
End: 2022-09-08

## 2022-09-08 PROCEDURE — 99214 OFFICE O/P EST MOD 30 MIN: CPT

## 2022-09-19 ENCOUNTER — NON-APPOINTMENT (OUTPATIENT)
Age: 87
End: 2022-09-19

## 2022-09-26 NOTE — ASSESSMENT
[FreeTextEntry1] : HTN:\par medication regimen reviewed in detail, med list in this chart reflects current meds\par - pill box reviewed and appropriate medications placed in box \par - compiled and discarded old doses and or duplicates\par - discussed in detail benefits of joining a pharmacy that offers pre-packaged pill-packs. [facility] unfortunately does not offer this service. \par - called dtr, Ariana 8/17 and 8/18 to coordinate care and enrollment however unable to be reached, she is away on vacation. will continue to reach out to ensure safe med management system. patient demonstrates ability to take meds from pill box now that dosages have been reconciled in interim. goal is to transition to pre-packs \par - repeat BMP ordered\par - f/u in 1 week for BP check and BMP review\par \par HLD:\par - on statin \par \par CHF:\par no signs of acute volume overload\par highly suspect that reason for syncopal episode and recent hospitalization was due to confusion about medication and dosing. all meds have been clarified, duplicates discarded and will continue to keep dtr informed. \par continue with carvedilol  6.25mg BID \par spironolactone 12.5mg \par BMP ordered with mag level\par \par follow-up discussion with Ariana and patient:\par patient expresses feeling more comfortable with managing medications herself now that meds have been reconciled, and duplicate meds have been discarded. she is able to explain medication regimen and is clear. ariana is also able to confirm med dosing regimen. she would like to defer med management/pharmacy. confirmed all emds and doses on phone.\par \par in case changes mind, [facility] offers pill pack service. \par \par also discussed in detail concern about recurrent UTI. \par advised follow-up with urology for reccs. will consider hipprex with r vs b discussion., advised this is not standard of care but can be used in specific situations. dtr is appreciative of phone call and discussion.\par

## 2022-09-26 NOTE — PHYSICAL EXAM
[de-identified] : 1+ baseline chronic edema. non-pitting; lower ankles [de-identified] : unsteady gait

## 2022-09-26 NOTE — REVIEW OF SYSTEMS
[Fever] : no fever [Chills] : no chills [Feeling Poorly] : not feeling poorly [Heart Rate Is Fast] : the heart rate was not fast [Chest Pain] : no chest pain [Palpitations] : no palpitations [Shortness Of Breath] : no shortness of breath [Wheezing] : no wheezing [Cough] : no cough [SOB on Exertion] : no shortness of breath during exertion [Abdominal Pain] : no abdominal pain [Vomiting] : no vomiting [Sleep Disturbances] : no sleep disturbances [Easy Bleeding] : no tendency for easy bleeding [FreeTextEntry5] : chronic at baseline

## 2022-09-26 NOTE — HISTORY OF PRESENT ILLNESS
[FreeTextEntry1] : Ms. SOLIS TAYLOR is a 89yo F with PMHx of HF,HLD, syncope s/p pacemaker, OA, recurrent UTI's, hypothyroidism. Patient seen on 8/17 for follow-up visit post-hospitalization June and discharge from rehab 12 days ago. Hospitalized for syncope, UTI and left arm weakness. No acute stroke detected, ct brain with chronic microvascular disease. \par \par Patient is seen today atg assisted, resident of [facility]. She appears well, happy to be back home. She is hoping to slowly return to her pre-hospitalization functional status as she is not able to walk as much and as easy as prior to. She denies chest pain, dizziness, palpitations. \par \par #med recc;\par patient reports confusion about medication regimen since discharge from hospital\par pill box examined in detail, noticed multiple errors \par multiple bottles of same medication also seen\par all meds consolidated, pill box fixed with accurate dosing of each medication\par called pt's dtr Ariana during encounter but unable to reach\par \par vital signs stable today. no recent falls since return. good appetite. no isues with sleep. no dysuria, fever or chills. \par \par Patient asked again today about hospice referral and whether that would be the appropriate plan of care. convo documented below. \par \par  [Reviewed no changes] : Reviewed, no changes [I will adhere to the patient's wishes.] : I will adhere to the patient's wishes. [Time Spent: ___ minutes] : Time Spent: [unfilled] minutes [AdvancecareDate] : 8/17/2023 [FreeTextEntry4] : all questions answered re hospice care and the benefits that would be provided. explained qualifying diagnosis and progression with prognosis. patient would like to continue discussions with her daughter. believes that her recent functional decline and reliance for more help is due to hospitalization. she would like to see if she regains some of her independence and functional strength prior to deciding. agree with plan and rationale. all questions answered.

## 2022-09-27 ENCOUNTER — APPOINTMENT (OUTPATIENT)
Dept: ORTHOPEDIC SURGERY | Facility: CLINIC | Age: 87
End: 2022-09-27

## 2022-09-27 VITALS
HEART RATE: 84 BPM | SYSTOLIC BLOOD PRESSURE: 112 MMHG | BODY MASS INDEX: 26.46 KG/M2 | HEIGHT: 64 IN | DIASTOLIC BLOOD PRESSURE: 64 MMHG | WEIGHT: 155 LBS

## 2022-09-27 DIAGNOSIS — M17.11 UNILATERAL PRIMARY OSTEOARTHRITIS, RIGHT KNEE: ICD-10-CM

## 2022-09-27 DIAGNOSIS — M17.12 UNILATERAL PRIMARY OSTEOARTHRITIS, LEFT KNEE: ICD-10-CM

## 2022-09-27 PROCEDURE — 99214 OFFICE O/P EST MOD 30 MIN: CPT | Mod: 25

## 2022-09-27 PROCEDURE — 20610 DRAIN/INJ JOINT/BURSA W/O US: CPT | Mod: 50

## 2022-09-27 NOTE — PROCEDURE
[de-identified] : Procedure Note:\par \par Anatomic Location:  Right  Knee\par \par Diagnosis:  Arthritis\par \par Procedure:  Injection of 2cc  ofMarcaine 0.5% plain and Celestone 1cc, 6mg\par \par Local Spray: Ethyl Chloride.\par \par \par Patient has consented for the procedure.\par \par Injection  through a lateral parapatella approach.\par \par Patient tolerated the procedure well.\par \par \par Procedure Note:\par \par Anatomic Location:  Left Knee\par \par Diagnosis:  Arthritis\par \par Procedure:  Injection of 2cc  ofMarcaine 0.5% plain and Celestone 1cc, 6mg\par \par Local Spray: Ethyl Chloride.\par \par \par Patient has consented for the procedure.\par \par Injection  through a lateral parapatella approach.\par \par Patient tolerated the procedure well.\par \par Patient instructed to call the office if any reaction, fever, chills, increased erythema or swelling.   [phone].

## 2022-09-27 NOTE — HISTORY OF PRESENT ILLNESS
[de-identified] : 89 year old female with bilateral knee osteoarthritis presents today c/o bilateral knee pain, gradually worsening over the last one year. She was treated with cortisone injections  to each knee in 2020 which helped significantly. She currently takes 3 tabs Tylenol 325 mg as needed. She ambulates with a walker. She is also troubled by lower back pain.

## 2022-09-27 NOTE — DISCUSSION/SUMMARY
[de-identified] : She has had a nailing of her hip for an intertrochanteric renal fracture in the past and is doing well with that she has bilateral osteoarthritis but managed well with local injection of Celestone return visit in 3 months.  \par \par

## 2022-09-27 NOTE — PHYSICAL EXAM
[FreeTextEntry2] : This patient is status post a fracture of her right hip which she had a nail placed she is walking full weightbearing she does use a walker her age is now 89 years of age.  She is managing without any major pain in her groin.\par \par \par KNEE EXAMINATION\par \par She does have her discomfort in both of her knees and each of her knees go from 0 to 120 degrees of flexion.   She has good medial lateral and anterior posterior stability.  He has some patellofemoral crepitus and discomfort over the medial lateral joint line.  Her previous x-rays did show narrowing of the joint line and degenerative diffuse changes.  She did get significant improvement previously with injection of Celestone. \par \par

## 2022-09-28 ENCOUNTER — APPOINTMENT (OUTPATIENT)
Dept: GERIATRICS | Facility: ASSISTED LIVING FACILITY | Age: 87
End: 2022-09-28

## 2022-10-02 VITALS
HEART RATE: 84 BPM | SYSTOLIC BLOOD PRESSURE: 138 MMHG | TEMPERATURE: 98.42 F | OXYGEN SATURATION: 96 % | DIASTOLIC BLOOD PRESSURE: 68 MMHG | RESPIRATION RATE: 12 BRPM

## 2022-10-02 NOTE — REVIEW OF SYSTEMS
[Fever] : no fever [Chills] : no chills [Feeling Poorly] : not feeling poorly [Heart Rate Is Fast] : the heart rate was not fast [Chest Pain] : no chest pain [Palpitations] : no palpitations [Shortness Of Breath] : no shortness of breath [Wheezing] : no wheezing [Cough] : no cough [Abdominal Pain] : no abdominal pain [Vomiting] : no vomiting [Constipation] : no constipation [Dysuria] : no dysuria [Joint Swelling] : no joint swelling [Limb Swelling] : no limb swelling [Dizziness] : no dizziness [Fainting] : no fainting [Sleep Disturbances] : no sleep disturbances [Easy Bleeding] : no tendency for easy bleeding [Easy Bruising] : no tendency for easy bruising

## 2022-10-02 NOTE — PHYSICAL EXAM
[Alert] : alert [Well Nourished] : well nourished [Healthy Appearing] : healthy appearing [No Acute Distress] : in no acute distress [Normal Voice/Communication] : normal voice/communication [Sclera] : the sclera and conjunctiva were normal [Normal Oral Mucosa] : normal oral mucosa [Normal Hearing] : hearing was normal [JVD] : there was jugular-venous distention [No Respiratory Distress] : no respiratory distress [No Acc Muscle Use] : no accessory muscle use [Respiration, Rhythm And Depth] : normal respiratory rhythm and effort [Auscultation Breath Sounds / Voice Sounds] : lungs were clear to auscultation bilaterally [Normal S1, S2] : normal S1 and S2 [Heart Rate And Rhythm] : heart rate was normal and rhythm regular [Edema] : edema was not present [Bowel Sounds] : normal bowel sounds [Abdomen Tenderness] : non-tender [Abdomen Soft] : soft [No CVA Tenderness] : no CVA  tenderness [Normal Gait] : abnormal gait [No Clubbing, Cyanosis] : no clubbing or cyanosis of the fingernails [Involuntary Movements] : no involuntary movements were seen [Motor Tone] : muscle strength and tone were normal [Normal Color / Pigmentation] : normal skin color and pigmentation [No Focal Deficits] : no focal deficits [Oriented To Time, Place, And Person] : oriented to person, place, and time [Normal Affect] : the affect was normal [Normal Insight/Judgment] : insight and judgment were intact [Normal Mood] : the mood was normal [de-identified] : unsteady gait, ambulates with walker

## 2022-10-02 NOTE — HISTORY OF PRESENT ILLNESS
[FreeTextEntry1] : Ms. SOLIS TAYLOR is a 89yo F with PMHx of HF,HLD, syncope s/p pacemaker, OA, recurrent UTI's, hypothyroidism.\par \par  [facility], resident of [facility]. \par Self-manages meds \par \par pt requested to be seen today for weakness:\par during evaluation, pt states since last visit she no longer has HHA because she has been able to do more ADLs on her own\par deconditioning has improved but is still unable to walk as fast oor steadily as she was prior to hospitalization\par is using walker\par no fever, chills, dysuria, CP, palpitations, SOB, dizziness or lightheadedness\par pt has seen orotho and is s/p knee injections which has sig improved pain\par states she is happy with her progress so far and is very happy not to have HHA. states it bothered her greatly "to have someone following me around" she appreciate dher independence\par no recent falls \par has housekeeping help once per week\par \par polypharmacy/med management:\par duplicate med bottles discarded last visit\par pill box reviewed today to ensure no duplicate doses - pills are accurate and no duplicate bottles\par \par HTN:\par well controlled today on current regimen\par \par Recent hospitalization summary:\par syncope Aug 2022, rehab 12 days \par Hospitalized for syncope, UTI and left arm weakness. \par No acute stroke detected, ct brain with chronic microvascular disease. \par post-hosp visit completed, extensive medd recc for confusion over sprinolactone and coreg dosing\par \par \par  [Walker] : walker [Grab Bars] : grab bars [Shower Chair] : shower chair

## 2022-10-06 NOTE — PATIENT PROFILE ADULT - NSASFUNCLEVELADLTRANSFER_GEN_A_NUR
Detail Level: Detailed
Quality 226: Preventive Care And Screening: Tobacco Use: Screening And Cessation Intervention: Patient screened for tobacco use and is an ex/non-smoker
Quality 130: Documentation Of Current Medications In The Medical Record: Current Medications Documented
Quality 431: Preventive Care And Screening: Unhealthy Alcohol Use - Screening: Patient not identified as an unhealthy alcohol user when screened for unhealthy alcohol use using a systematic screening method
3 = assistive equipment and person

## 2022-10-12 ENCOUNTER — NON-APPOINTMENT (OUTPATIENT)
Age: 87
End: 2022-10-12

## 2022-10-12 NOTE — ED PROVIDER NOTE - CROS ED NEURO NEG
no difficulty walking/imbalance/no seizures/no headache/no change in level of consciousness
The Caprini score indicates this patient is at risk for a VTE event (score 3-5).  Most surgical patients in this group would benefit from pharmacologic prophylaxis.  The surgical team will determine the balance between VTE risk and bleeding risk

## 2022-10-13 ENCOUNTER — NON-APPOINTMENT (OUTPATIENT)
Age: 87
End: 2022-10-13

## 2022-11-03 NOTE — PROGRESS NOTE ADULT - PROBLEM/PLAN-8
DISPLAY PLAN FREE TEXT
no
DISPLAY PLAN FREE TEXT

## 2022-11-04 ENCOUNTER — NON-APPOINTMENT (OUTPATIENT)
Age: 87
End: 2022-11-04

## 2022-12-07 ENCOUNTER — NON-APPOINTMENT (OUTPATIENT)
Age: 87
End: 2022-12-07

## 2022-12-07 ENCOUNTER — APPOINTMENT (OUTPATIENT)
Dept: GERIATRICS | Facility: ASSISTED LIVING FACILITY | Age: 87
End: 2022-12-07

## 2022-12-07 VITALS
OXYGEN SATURATION: 96 % | DIASTOLIC BLOOD PRESSURE: 68 MMHG | RESPIRATION RATE: 12 BRPM | HEART RATE: 76 BPM | SYSTOLIC BLOOD PRESSURE: 138 MMHG | TEMPERATURE: 98.1 F

## 2022-12-08 ENCOUNTER — LABORATORY RESULT (OUTPATIENT)
Age: 87
End: 2022-12-08

## 2022-12-09 NOTE — ED PROVIDER NOTE - PSYCHIATRIC, MLM
Event Note Alert and oriented to person, place, time/situation. normal mood and affect. no apparent risk to self or others.

## 2022-12-12 NOTE — REVIEW OF SYSTEMS
[Fever] : no fever [Chills] : no chills [Heart Rate Is Fast] : the heart rate was not fast [Chest Pain] : no chest pain [Palpitations] : no palpitations [Lower Ext Edema] : no lower extremity edema [Shortness Of Breath] : no shortness of breath [Cough] : no cough [SOB on Exertion] : no shortness of breath during exertion [Abdominal Pain] : no abdominal pain [Vomiting] : no vomiting [Constipation] : no constipation [Diarrhea] : no diarrhea [Incontinence] : no incontinence [Pelvic Pain] : no pelvic pain [Vaginal Discharge] : no vaginal discharge [Dizziness] : no dizziness [Fainting] : no fainting [Easy Bleeding] : no tendency for easy bleeding [Easy Bruising] : no tendency for easy bruising

## 2022-12-12 NOTE — HISTORY OF PRESENT ILLNESS
[FreeTextEntry1] : Ms. SOLIS TAYLOR is a 89yo F with PMHx of HF,HLD, syncope s/p pacemaker, OA, recurrent UTI's, hypothyroidism.\par \par  [facility], resident of [facility]. \par Self-manages meds \par \par Patient seen today for follow-up. Doing well overall but does report ongoing urinary frequency. \par No fever, dysuria, chills, back pain, flank pain, nausea or vomiting \par ongoing x 2 weeks \par Recurrent uti's\par no fever, chills, no suprapubic pain or flank/back  pain\par started on methanamine from cardiologist \par \par polypharmacy/med management:\par duplicate med bottles discarded last visit\par pill box reviewed today to ensure no duplicate doses - pills are accurate and no duplicate bottles\par new med: methanamine started by cardiologyist, Dr. Buckley\par \par Osteoarthritis:\par ran out of tylenol, pain is worsened in hips and knees\par no recent falls \par dicolfenac gel did not work\par has seen ortho in past, injections did not help\par does not wish to go through additional injectioons at this time \par \par HTN:\par well controlled today on current regimen\par no dizziness or falls reported\par \par Hypothyroid:\par TSH low, will reduce dose from 100mcg to 75mcg \par \par Vitamin d deficiency:\par 25 on recent labs, will start supplementation\par \par S/p PPM:\par follows with cardio, Dr. Buckley\par no sx  reported\par HR well controlled\par \par hospitalization summary:\par syncope Aug 2022, rehab 12 days \par Hospitalized for syncope, UTI and left arm weakness. \par No acute stroke detected, ct brain with chronic microvascular disease. \par post-hosp visit completed, extensive medd recc for confusion over sprinolactone and coreg dosing\par \par \par  [Patient is independent with] : bathing [Independent] : managing finances [] : Assistance needed with traveling/transport

## 2022-12-12 NOTE — ASSESSMENT
[FreeTextEntry1] : 89yo female with  pmhx as above. seen today for acute  and follow-up for chronic conditions:\par \par Urinary frequency:\par no fever, chills\par onogoing x several weeks \par recurrent uti\par UA ordered with ucx \par vital signs all normal and pt appears clinically well\par started  on methanamine by cardiologist \par \par Vitamin d deficiency:\par start Vit d supplementation, sent to pharmacy \par \par Hypothyroid:\par TSH mildly low, reduce dose from 100mcg to 75mcg\par \par HTN:\par c/w current regimen \par \par HLD:\par c/w statin\par tolerating well\par \par Osteoarthritis:\par tylenol was helping but ran out\par new script sent in, 500mg tablet, take 2 tabs in am, 1 Tab noon, 2 tabs at bedtime\par also sent biofreeze topical - can try alternative- pt reports diclofenac did not help in past \par \par CHF,D\par c.w BB, HR well controlled \par no signs of vol overload

## 2022-12-14 ENCOUNTER — NON-APPOINTMENT (OUTPATIENT)
Age: 87
End: 2022-12-14

## 2022-12-14 ENCOUNTER — APPOINTMENT (OUTPATIENT)
Dept: GERIATRICS | Facility: ASSISTED LIVING FACILITY | Age: 87
End: 2022-12-14

## 2022-12-14 VITALS
OXYGEN SATURATION: 96 % | TEMPERATURE: 98.2 F | SYSTOLIC BLOOD PRESSURE: 124 MMHG | RESPIRATION RATE: 12 BRPM | DIASTOLIC BLOOD PRESSURE: 60 MMHG | HEART RATE: 71 BPM
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
## 2022-12-14 DIAGNOSIS — R35.0 FREQUENCY OF MICTURITION: ICD-10-CM

## 2022-12-14 DIAGNOSIS — M25.511 PAIN IN RIGHT SHOULDER: ICD-10-CM

## 2022-12-14 RX ORDER — AMLODIPINE BESYLATE 5 MG/1
5 TABLET ORAL
Refills: 0 | Status: DISCONTINUED | COMMUNITY
Start: 2022-08-19 | End: 2022-12-14

## 2022-12-16 ENCOUNTER — APPOINTMENT (OUTPATIENT)
Dept: UROLOGY | Facility: CLINIC | Age: 87
End: 2022-12-16

## 2022-12-16 PROCEDURE — 99213 OFFICE O/P EST LOW 20 MIN: CPT

## 2022-12-16 RX ORDER — ESTRADIOL 0.1 MG/G
0.1 CREAM VAGINAL
Qty: 1 | Refills: 11 | Status: ACTIVE | COMMUNITY
Start: 2022-12-16 | End: 1900-01-01

## 2022-12-16 NOTE — HISTORY OF PRESENT ILLNESS
[FreeTextEntry1] : 89yo woman with PMH CHF, breast ca s/p lumpectomy, spinal stenosis, osteoporosis and hip fracture s/p hip replacement presents for evaluation of bothersome urinary issues. She has seen a urologist, Dr. Romo, for frequency, urgency, mixed incontinence, and UTIs. She has tried oxybutynin and botox injections which have not helped. She has been hospitalized 2/2 UTIs in 2020 and 2019. When she has a UTI she has dysuria and syncope. She takes furosemide, which makes her urinary symptoms worse. Currently denies experiencing any urologic symptoms including hematuria or dysuria. Denies fevers, chills, flank pain, nausea, vomiting, and unintentional weight loss. Most bother is in the evening. During the day she is able to hold and does not have frequency. She gets up once per night to void but leaks sometimes between this. \par \par She was on estrace in the past. Stopped it on her own. She has no skin issues but is using pads, pullups and bed protector pads. She had UTI with ESBL Kleb in Oct. Suspect this was treated as an outpt based on records review. Did have hospitalization in June for UTI. She had urine from 12/8 that was negative. Drinks water and juice. No issues with constipation. Her biggest bother is nighttime urination. SHe has tried

## 2022-12-16 NOTE — ASSESSMENT
[FreeTextEntry1] : Stable urinary sx. Has baseline urinary incontinence that she manages with pads/pullups. Discussed primafit but pt not interested in. \par --Incontinence supplies\par --Barrier cream externally\par \par Recurrent UTI. Had stopped the estrace\par --Restart estrace\par --Cont fluid intake\par

## 2022-12-20 ENCOUNTER — NON-APPOINTMENT (OUTPATIENT)
Age: 87
End: 2022-12-20

## 2022-12-22 ENCOUNTER — APPOINTMENT (OUTPATIENT)
Dept: GERIATRICS | Facility: CLINIC | Age: 87
End: 2022-12-22
Payer: MEDICARE

## 2022-12-22 ENCOUNTER — APPOINTMENT (OUTPATIENT)
Dept: UROLOGY | Facility: CLINIC | Age: 87
End: 2022-12-22

## 2022-12-22 PROBLEM — R35.0 URINARY FREQUENCY: Status: ACTIVE | Noted: 2019-10-02

## 2022-12-22 PROBLEM — M25.511 RIGHT SHOULDER PAIN: Status: ACTIVE | Noted: 2022-12-22
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
## 2022-12-22 PROCEDURE — 99442: CPT | Mod: 95

## 2022-12-22 NOTE — HISTORY OF PRESENT ILLNESS
[Independent] : managing finances [] : Assistance needed with traveling/transport [Walker] : walker [Grab Bars] : grab bars [Shower Chair] : shower chair [FreeTextEntry1] : Ms. SOLIS TAYLOR is a 89yo F with PMHx of HF,HLD, syncope s/p pacemaker, OA, recurrent UTI's, hypothyroidism.\par \par  [facility], resident of [facility]. \par Self-manages meds \par \par pt seen today, requested by patient and [facility] s/p fall:\par yesterday patient reports taking a nap, was awoken by [facility] staff to go eat lunch, got up "too fast" and then fell straight  down onto floor in  kitchen while trying to leave apartment\par no headstrike, no LOC, no preceding chest  pain, palpitations\par does  report feeling tired but was just waking up from nap \par fell  onto  right side  of shoulder\par has been able to move shoulder since fall and carry  out ADLs  \par \par polypharmacy/med management:\par duplicate med bottles discarded last visit\par pill box reviewed today to ensure no duplicate doses - pills are accurate and no duplicate bottles\par new med: methanamine started by cardiologyist, Dr. Buckley\par \par Osteoarthritis:\par ran out of tylenol, pain is worsened in hips and knees\par no recent falls \par dicolfenac gel did not work\par has seen ortho in past, injections did not help\par does not wish to go through additional injectioons at this time \par \par HTN:\par well controlled today on current regimen\par no dizziness or falls reported\par \par Hypothyroid:\par TSH low, will reduce dose from 100mcg to 75mcg \par \par Vitamin d deficiency:\par 25 on recent labs, will start supplementation\par \par S/p PPM:\par follows with cardio, Dr. Buckley\par no sx  reported\par HR well controlled\par \par hospitalization summary:\par syncope Aug 2022, rehab 12 days \par Hospitalized for syncope, UTI and left arm weakness. \par No acute stroke detected, ct brain with chronic microvascular disease. \par post-hosp visit completed, extensive medd recc for confusion over sprinolactone and coreg dosing\par \par \par

## 2022-12-22 NOTE — REVIEW OF SYSTEMS
[Negative] : Genitourinary [Fever] : no fever [Chills] : no chills [Chest Pain] : no chest pain [Palpitations] : no palpitations [Shortness Of Breath] : no shortness of breath [Abdominal Pain] : no abdominal pain [Vomiting] : no vomiting [Dizziness] : no dizziness [Fainting] : no fainting

## 2022-12-22 NOTE — PHYSICAL EXAM
[Alert] : alert [Well Nourished] : well nourished [Healthy Appearing] : healthy appearing [Normal Voice/Communication] : normal voice/communication [Sclera] : the sclera and conjunctiva were normal [Normal Oral Mucosa] : normal oral mucosa [Normal Hearing] : hearing was normal [No Respiratory Distress] : no respiratory distress [No Acc Muscle Use] : no accessory muscle use [Respiration, Rhythm And Depth] : normal respiratory rhythm and effort [Auscultation Breath Sounds / Voice Sounds] : lungs were clear to auscultation bilaterally [Normal S1, S2] : normal S1 and S2 [Heart Rate And Rhythm] : heart rate was normal and rhythm regular [Edema] : edema was not present [Bowel Sounds] : normal bowel sounds [Abdomen Tenderness] : non-tender [Abdomen Soft] : soft [No CVA Tenderness] : no CVA  tenderness [No Clubbing, Cyanosis] : no clubbing or cyanosis of the fingernails [Involuntary Movements] : no involuntary movements were seen [Motor Tone] : muscle strength and tone were normal [Normal Color / Pigmentation] : normal skin color and pigmentation [No Focal Deficits] : no focal deficits [Oriented To Time, Place, And Person] : oriented to person, place, and time [Normal Affect] : the affect was normal [Normal Insight/Judgment] : insight and judgment were intact [Normal Mood] : the mood was normal [JVD] : there was jugular-venous distention [Normal Gait] : abnormal gait [de-identified] : unsteady gait, ambulates with walker; RIGHT shoulder pain, good  strength, no signs of neurovasc compromise; no gross deformities or palpaable joint instability. ROM limited in abduction; normal adduction and int

## 2022-12-25 ENCOUNTER — EMERGENCY (EMERGENCY)
Facility: HOSPITAL | Age: 87
LOS: 1 days | Discharge: HOME | End: 2022-12-25
Attending: EMERGENCY MEDICINE
Payer: MEDICARE

## 2022-12-25 VITALS
TEMPERATURE: 98 F | SYSTOLIC BLOOD PRESSURE: 153 MMHG | WEIGHT: 119.93 LBS | DIASTOLIC BLOOD PRESSURE: 83 MMHG | OXYGEN SATURATION: 97 % | RESPIRATION RATE: 18 BRPM | HEART RATE: 77 BPM | HEIGHT: 63 IN

## 2022-12-25 VITALS
TEMPERATURE: 98 F | RESPIRATION RATE: 18 BRPM | OXYGEN SATURATION: 95 % | HEART RATE: 75 BPM | DIASTOLIC BLOOD PRESSURE: 95 MMHG | SYSTOLIC BLOOD PRESSURE: 185 MMHG

## 2022-12-25 DIAGNOSIS — Z98.49 CATARACT EXTRACTION STATUS, UNSPECIFIED EYE: Chronic | ICD-10-CM

## 2022-12-25 DIAGNOSIS — Z96.7 PRESENCE OF OTHER BONE AND TENDON IMPLANTS: Chronic | ICD-10-CM

## 2022-12-25 DIAGNOSIS — O00.1 TUBAL PREGNANCY: Chronic | ICD-10-CM

## 2022-12-25 DIAGNOSIS — Z98.890 OTHER SPECIFIED POSTPROCEDURAL STATES: Chronic | ICD-10-CM

## 2022-12-25 LAB
ALBUMIN SERPL ELPH-MCNC: 4.3 G/DL — SIGNIFICANT CHANGE UP (ref 3.3–5)
ALP SERPL-CCNC: 173 U/L — HIGH (ref 40–120)
ALT FLD-CCNC: 31 U/L — SIGNIFICANT CHANGE UP (ref 10–45)
ANION GAP SERPL CALC-SCNC: 12 MMOL/L — SIGNIFICANT CHANGE UP (ref 5–17)
APPEARANCE UR: CLEAR — SIGNIFICANT CHANGE UP
AST SERPL-CCNC: 23 U/L — SIGNIFICANT CHANGE UP (ref 10–40)
BACTERIA # UR AUTO: NEGATIVE — SIGNIFICANT CHANGE UP
BASOPHILS # BLD AUTO: 0.05 K/UL — SIGNIFICANT CHANGE UP (ref 0–0.2)
BASOPHILS NFR BLD AUTO: 0.8 % — SIGNIFICANT CHANGE UP (ref 0–2)
BILIRUB SERPL-MCNC: 0.3 MG/DL — SIGNIFICANT CHANGE UP (ref 0.2–1.2)
BILIRUB UR-MCNC: NEGATIVE — SIGNIFICANT CHANGE UP
BUN SERPL-MCNC: 42 MG/DL — HIGH (ref 7–23)
CALCIUM SERPL-MCNC: 9.1 MG/DL — SIGNIFICANT CHANGE UP (ref 8.4–10.5)
CHLORIDE SERPL-SCNC: 105 MMOL/L — SIGNIFICANT CHANGE UP (ref 96–108)
CO2 SERPL-SCNC: 24 MMOL/L — SIGNIFICANT CHANGE UP (ref 22–31)
COLOR SPEC: SIGNIFICANT CHANGE UP
CREAT SERPL-MCNC: 1.37 MG/DL — HIGH (ref 0.5–1.3)
DIFF PNL FLD: NEGATIVE — SIGNIFICANT CHANGE UP
EGFR: 34 ML/MIN/1.73M2 — LOW
EOSINOPHIL # BLD AUTO: 0.15 K/UL — SIGNIFICANT CHANGE UP (ref 0–0.5)
EOSINOPHIL NFR BLD AUTO: 2.5 % — SIGNIFICANT CHANGE UP (ref 0–6)
EPI CELLS # UR: 7 /HPF — HIGH
GLUCOSE SERPL-MCNC: 111 MG/DL — HIGH (ref 70–99)
GLUCOSE UR QL: NEGATIVE — SIGNIFICANT CHANGE UP
HCT VFR BLD CALC: 38.1 % — SIGNIFICANT CHANGE UP (ref 34.5–45)
HGB BLD-MCNC: 11.8 G/DL — SIGNIFICANT CHANGE UP (ref 11.5–15.5)
HYALINE CASTS # UR AUTO: 1 /LPF — SIGNIFICANT CHANGE UP (ref 0–2)
IMM GRANULOCYTES NFR BLD AUTO: 0.5 % — SIGNIFICANT CHANGE UP (ref 0–0.9)
KETONES UR-MCNC: NEGATIVE — SIGNIFICANT CHANGE UP
LEUKOCYTE ESTERASE UR-ACNC: ABNORMAL
LYMPHOCYTES # BLD AUTO: 1.47 K/UL — SIGNIFICANT CHANGE UP (ref 1–3.3)
LYMPHOCYTES # BLD AUTO: 24.3 % — SIGNIFICANT CHANGE UP (ref 13–44)
MCHC RBC-ENTMCNC: 30.8 PG — SIGNIFICANT CHANGE UP (ref 27–34)
MCHC RBC-ENTMCNC: 31 GM/DL — LOW (ref 32–36)
MCV RBC AUTO: 99.5 FL — SIGNIFICANT CHANGE UP (ref 80–100)
MONOCYTES # BLD AUTO: 0.67 K/UL — SIGNIFICANT CHANGE UP (ref 0–0.9)
MONOCYTES NFR BLD AUTO: 11.1 % — SIGNIFICANT CHANGE UP (ref 2–14)
NEUTROPHILS # BLD AUTO: 3.69 K/UL — SIGNIFICANT CHANGE UP (ref 1.8–7.4)
NEUTROPHILS NFR BLD AUTO: 60.8 % — SIGNIFICANT CHANGE UP (ref 43–77)
NITRITE UR-MCNC: NEGATIVE — SIGNIFICANT CHANGE UP
NRBC # BLD: 0 /100 WBCS — SIGNIFICANT CHANGE UP (ref 0–0)
PH UR: 7 — SIGNIFICANT CHANGE UP (ref 5–8)
PLATELET # BLD AUTO: 183 K/UL — SIGNIFICANT CHANGE UP (ref 150–400)
POTASSIUM SERPL-MCNC: 5 MMOL/L — SIGNIFICANT CHANGE UP (ref 3.5–5.3)
POTASSIUM SERPL-SCNC: 5 MMOL/L — SIGNIFICANT CHANGE UP (ref 3.5–5.3)
PROT SERPL-MCNC: 7.7 G/DL — SIGNIFICANT CHANGE UP (ref 6–8.3)
PROT UR-MCNC: ABNORMAL
RBC # BLD: 3.83 M/UL — SIGNIFICANT CHANGE UP (ref 3.8–5.2)
RBC # FLD: 14.9 % — HIGH (ref 10.3–14.5)
RBC CASTS # UR COMP ASSIST: 1 /HPF — SIGNIFICANT CHANGE UP (ref 0–4)
SODIUM SERPL-SCNC: 141 MMOL/L — SIGNIFICANT CHANGE UP (ref 135–145)
SP GR SPEC: 1.01 — SIGNIFICANT CHANGE UP (ref 1.01–1.02)
UROBILINOGEN FLD QL: NEGATIVE — SIGNIFICANT CHANGE UP
WBC # BLD: 6.06 K/UL — SIGNIFICANT CHANGE UP (ref 3.8–10.5)
WBC # FLD AUTO: 6.06 K/UL — SIGNIFICANT CHANGE UP (ref 3.8–10.5)
WBC UR QL: 7 /HPF — HIGH (ref 0–5)

## 2022-12-25 PROCEDURE — 90471 IMMUNIZATION ADMIN: CPT

## 2022-12-25 PROCEDURE — 99284 EMERGENCY DEPT VISIT MOD MDM: CPT | Mod: FS

## 2022-12-25 PROCEDURE — 36415 COLL VENOUS BLD VENIPUNCTURE: CPT

## 2022-12-25 PROCEDURE — 90715 TDAP VACCINE 7 YRS/> IM: CPT

## 2022-12-25 PROCEDURE — 99283 EMERGENCY DEPT VISIT LOW MDM: CPT | Mod: 25

## 2022-12-25 PROCEDURE — 81001 URINALYSIS AUTO W/SCOPE: CPT

## 2022-12-25 PROCEDURE — 85025 COMPLETE CBC W/AUTO DIFF WBC: CPT

## 2022-12-25 PROCEDURE — 87086 URINE CULTURE/COLONY COUNT: CPT

## 2022-12-25 PROCEDURE — 80053 COMPREHEN METABOLIC PANEL: CPT

## 2022-12-25 RX ORDER — TETANUS TOXOID, REDUCED DIPHTHERIA TOXOID AND ACELLULAR PERTUSSIS VACCINE, ADSORBED 5; 2.5; 8; 8; 2.5 [IU]/.5ML; [IU]/.5ML; UG/.5ML; UG/.5ML; UG/.5ML
0.5 SUSPENSION INTRAMUSCULAR ONCE
Refills: 0 | Status: COMPLETED | OUTPATIENT
Start: 2022-12-25 | End: 2022-12-25

## 2022-12-25 RX ADMIN — TETANUS TOXOID, REDUCED DIPHTHERIA TOXOID AND ACELLULAR PERTUSSIS VACCINE, ADSORBED 0.5 MILLILITER(S): 5; 2.5; 8; 8; 2.5 SUSPENSION INTRAMUSCULAR at 19:57

## 2022-12-25 NOTE — ED PROVIDER NOTE - NSFOLLOWUPINSTRUCTIONS_ED_ALL_ED_FT
1- Change bandage over skin tear daily.  Any sign of infection like fevers, redness, pus, or any new or worsening concerns come back to the ER immediately    2- Tylenol as needed for pain  3- Speak with your medical team and consider another medication for pain other than oxycodone  4- Return to ER for any new or worsening complaints

## 2022-12-25 NOTE — ED PROCEDURE NOTE - ATTENDING APP SHARED VISIT CONTRIBUTION OF CARE
Dr. SWAPNA Lowery:  I have personally evaluated the patient and have documented above as appropriate. I perfomed a substantive portion of the visit including all aspects of the medical decision making.

## 2022-12-25 NOTE — ED PROVIDER NOTE - PATIENT PORTAL LINK FT
You can access the FollowMyHealth Patient Portal offered by [facility] by registering at the following website: [email or web address]. By joining [facility]’s FollowMyHealth portal, you will also be able to view your health information using other applications (apps) compatible with our system.

## 2022-12-25 NOTE — ED ADULT NURSE NOTE - NSIMPLEMENTINTERV_GEN_ALL_ED
Implemented All Fall Risk Interventions:  [place] to call system. Call bell, personal items and telephone within reach. Instruct patient to call for assistance. Room bathroom lighting operational. Non-slip footwear when patient is off stretcher. Physically safe environment: no spills, clutter or unnecessary equipment. Stretcher in lowest position, wheels locked, appropriate side rails in place. Provide visual cue, wrist band, yellow gown, etc. Monitor gait and stability. Monitor for mental status changes and reorient to person, place, and time. Review medications for side effects contributing to fall risk. Reinforce activity limits and safety measures with patient and family.

## 2022-12-25 NOTE — ED ADULT NURSE NOTE - OBJECTIVE STATEMENT
Pt 89 year old female, A/O x4. Pt came in via EMS from [facility] due to syncope. PMH- PPM, HTN, Arthritis. Pt states she was walking to seat, felt weak and fell. Upon assessment, pt has right forearm skin tear, and left forearm skin tear from old fall. Abd. soft, nontender, nondistended. Follows commands, equal sensations b/l, neuralgically intact. Denies LOC, hitting head, use of blood thinners, chest pain, SOB, fever, chills, N/V/D, HA, numbness/ tingling.

## 2022-12-25 NOTE — ED PROVIDER NOTE - OBJECTIVE STATEMENT
89y F w/ PMHx of HTN, Arthritis, AFib, pacemaker presents to the ED c/o lac to R arm s/p fall earlier today. Pt states she felt generally weak before she fell twice. Denies LOC, head injury. Denies AC use, pt does take ASA. Pt lives in an assisted living facility. Took Oxycodone starting yesterday. Denies CP, SOB, dysuria, hematuria, urinary frequency. Unsure of last TDAP.

## 2022-12-25 NOTE — ED PROVIDER NOTE - NSFOLLOWUPCLINICS_GEN_ALL_ED_FT
[facility] General Internal Medicine  General Internal Medicine  [address] [place] [address]  Phone: [phone]  Fax:

## 2022-12-25 NOTE — ED PROVIDER NOTE - CLINICAL SUMMARY MEDICAL DECISION MAKING FREE TEXT BOX
Gary Lowery): 89y F presents to the ED s/p fall. Suspect that pt's symptoms are related to Oxycodone however discussed w/ pt and will do basic BW and TDAP and UA if pt is able to provide. Pt is DNR/DNI.

## 2022-12-26 NOTE — ED ADULT NURSE REASSESSMENT NOTE - NS ED NURSE REASSESS COMMENT FT1
upon d/c pt as noted to have high blood pressure of 185/95. MD july eagle OK with D/C. Concepcion called spoke to HAYLEE veloz who said he will give pt hypertension meds when she arrives.

## 2022-12-27 LAB
CULTURE RESULTS: SIGNIFICANT CHANGE UP
SPECIMEN SOURCE: SIGNIFICANT CHANGE UP

## 2022-12-28 ENCOUNTER — APPOINTMENT (OUTPATIENT)
Dept: GERIATRICS | Facility: ASSISTED LIVING FACILITY | Age: 87
End: 2022-12-28
Payer: MEDICARE

## 2022-12-28 ENCOUNTER — NON-APPOINTMENT (OUTPATIENT)
Age: 87
End: 2022-12-28

## 2022-12-28 VITALS
TEMPERATURE: 98.2 F | OXYGEN SATURATION: 96 % | RESPIRATION RATE: 12 BRPM | SYSTOLIC BLOOD PRESSURE: 146 MMHG | DIASTOLIC BLOOD PRESSURE: 78 MMHG | HEART RATE: 72 BPM

## 2022-12-28 DIAGNOSIS — R53.1 WEAKNESS: ICD-10-CM

## 2022-12-28 DIAGNOSIS — R29.6 REPEATED FALLS: ICD-10-CM

## 2022-12-28 DIAGNOSIS — Z91.81 HISTORY OF FALLING: ICD-10-CM

## 2023-01-04 RX ORDER — OXYCODONE 5 MG/1
5 TABLET ORAL
Qty: 7 | Refills: 0 | Status: DISCONTINUED | COMMUNITY
Start: 2022-12-23 | End: 2023-01-04

## 2023-01-04 RX ORDER — TRAMADOL HYDROCHLORIDE 50 MG/1
50 TABLET, COATED ORAL
Qty: 30 | Refills: 0 | Status: ACTIVE | COMMUNITY
Start: 2023-01-04 | End: 1900-01-01

## 2023-01-06 ENCOUNTER — APPOINTMENT (OUTPATIENT)
Dept: GERIATRICS | Facility: ASSISTED LIVING FACILITY | Age: 88
End: 2023-01-06
Payer: MEDICARE

## 2023-01-06 VITALS
HEART RATE: 74 BPM | RESPIRATION RATE: 20 BRPM | OXYGEN SATURATION: 97 % | SYSTOLIC BLOOD PRESSURE: 132 MMHG | DIASTOLIC BLOOD PRESSURE: 80 MMHG | TEMPERATURE: 97.7 F

## 2023-01-06 DIAGNOSIS — J39.8 OTHER SPECIFIED DISEASES OF UPPER RESPIRATORY TRACT: ICD-10-CM

## 2023-01-06 DIAGNOSIS — N39.0 URINARY TRACT INFECTION, SITE NOT SPECIFIED: ICD-10-CM

## 2023-01-06 PROCEDURE — 99349 HOME/RES VST EST MOD MDM 40: CPT

## 2023-01-06 NOTE — REVIEW OF SYSTEMS
[As Noted in HPI] : as noted in HPI [Fever] : no fever [Chills] : no chills [Feeling Poorly] : not feeling poorly [Chest Pain] : no chest pain [Palpitations] : no palpitations [Cough] : no cough

## 2023-01-06 NOTE — HISTORY OF PRESENT ILLNESS
[FreeTextEntry1] : Ms. SOLIS TAYLOR is a 89yo F with PMHx of HF, HLD, syncope s/p PPM, OA, recurrent UTIs, hypothyroidism. Pt. being seen for a f/u visit for skin tear of right FA, chronic pain. Pt. being seen alone at [facility]. \par No A care in place. \par \par Pt. reports chronic low back pain, stable. Recently started Tramadol rx, did not tolerate oxycodone 2.5mg. Pt. reports skin tear healing. Pt. reports started feeling a little congested, denies taking any OTC, denies any accompanied symptoms. \par \par Denies pain. Denies acute visits/falls. Denies HA/dizziness, SOB/CP, abdominal pain, dysuria, reports daily BMs regular. Pt.reports episodes on occasional incontinence at night, would like to check if UTI.

## 2023-01-06 NOTE — PHYSICAL EXAM
[Alert] : alert [Well Nourished] : well nourished [No Acute Distress] : in no acute distress [Well Developed] : well developed [Sclera] : the sclera and conjunctiva were normal [PERRL] : pupils were equal in size, round, and reactive to light [Normal Oral Mucosa] : normal oral mucosa [No Oral Pallor] : no oral pallor [No Oral Cyanosis] : no oral cyanosis [Oropharynx] : the oropharynx was normal [Normal Appearance] : the appearance of the neck was normal [No Respiratory Distress] : no respiratory distress [No Acc Muscle Use] : no accessory muscle use [Respiration, Rhythm And Depth] : normal respiratory rhythm and effort [Auscultation Breath Sounds / Voice Sounds] : lungs were clear to auscultation bilaterally [Normal PMI] : the apical impulse was abnormal [Normal S1, S2] : normal S1 and S2 [Heart Rate And Rhythm] : heart rate was normal and rhythm regular [___ +] : bilateral [unfilled]+ pitting edema to the ankles [Bowel Sounds] : normal bowel sounds [Abdomen Tenderness] : non-tender [Abdomen Soft] : soft [Cervical Lymph Nodes Enlarged Posterior Bilaterally] : posterior cervical [Supraclavicular Lymph Nodes Enlarged Bilaterally] : supraclavicular [Cervical Lymph Nodes Enlarged Anterior Bilaterally] : anterior cervical, supraclavicular [Axillary Lymph Nodes Enlarged Bilaterally] : axillary [No CVA Tenderness] : no CVA  tenderness [No Spinal Tenderness] : no spinal tenderness [Normal Gait] : normal gait [No Focal Deficits] : no focal deficits [Normal Affect] : the affect was normal [Normal Mood] : the mood was normal [de-identified] : elderly frail female, disheveled appearance, no HHA in place [de-identified] : obese [de-identified] : chronic seborrheic keratosis generalized on back, skin tear of right FA healed, applied cavillon

## 2023-01-09 ENCOUNTER — LABORATORY RESULT (OUTPATIENT)
Age: 88
End: 2023-01-09

## 2023-01-09 ENCOUNTER — INPATIENT (INPATIENT)
Facility: HOSPITAL | Age: 88
LOS: 4 days | Discharge: HOME | DRG: 153 | End: 2023-01-14
Attending: INTERNAL MEDICINE | Admitting: INTERNAL MEDICINE
Payer: MEDICARE

## 2023-01-09 VITALS
WEIGHT: 134.92 LBS | HEART RATE: 81 BPM | OXYGEN SATURATION: 92 % | DIASTOLIC BLOOD PRESSURE: 74 MMHG | HEIGHT: 63 IN | RESPIRATION RATE: 16 BRPM | TEMPERATURE: 99 F | SYSTOLIC BLOOD PRESSURE: 113 MMHG

## 2023-01-09 DIAGNOSIS — Z98.49 CATARACT EXTRACTION STATUS, UNSPECIFIED EYE: Chronic | ICD-10-CM

## 2023-01-09 DIAGNOSIS — Z96.7 PRESENCE OF OTHER BONE AND TENDON IMPLANTS: Chronic | ICD-10-CM

## 2023-01-09 DIAGNOSIS — Z98.890 OTHER SPECIFIED POSTPROCEDURAL STATES: Chronic | ICD-10-CM

## 2023-01-09 DIAGNOSIS — O00.1 TUBAL PREGNANCY: Chronic | ICD-10-CM

## 2023-01-09 LAB
ALBUMIN SERPL ELPH-MCNC: 3.7 G/DL — SIGNIFICANT CHANGE UP (ref 3.3–5)
ALP SERPL-CCNC: 151 U/L — HIGH (ref 40–120)
ALT FLD-CCNC: 43 U/L — SIGNIFICANT CHANGE UP (ref 10–45)
ANION GAP SERPL CALC-SCNC: 13 MMOL/L — SIGNIFICANT CHANGE UP (ref 5–17)
APPEARANCE UR: ABNORMAL
AST SERPL-CCNC: 61 U/L — HIGH (ref 10–40)
BASOPHILS # BLD AUTO: 0.04 K/UL — SIGNIFICANT CHANGE UP (ref 0–0.2)
BASOPHILS NFR BLD AUTO: 0.5 % — SIGNIFICANT CHANGE UP (ref 0–2)
BILIRUB SERPL-MCNC: 0.7 MG/DL — SIGNIFICANT CHANGE UP (ref 0.2–1.2)
BILIRUB UR-MCNC: NEGATIVE — SIGNIFICANT CHANGE UP
BUN SERPL-MCNC: 44 MG/DL — HIGH (ref 7–23)
CALCIUM SERPL-MCNC: 9.2 MG/DL — SIGNIFICANT CHANGE UP (ref 8.4–10.5)
CHLORIDE SERPL-SCNC: 100 MMOL/L — SIGNIFICANT CHANGE UP (ref 96–108)
CO2 SERPL-SCNC: 23 MMOL/L — SIGNIFICANT CHANGE UP (ref 22–31)
COLOR SPEC: YELLOW — SIGNIFICANT CHANGE UP
CREAT SERPL-MCNC: 1.34 MG/DL — HIGH (ref 0.5–1.3)
DIFF PNL FLD: ABNORMAL
EGFR: 35 ML/MIN/1.73M2 — LOW
EOSINOPHIL # BLD AUTO: 0.08 K/UL — SIGNIFICANT CHANGE UP (ref 0–0.5)
EOSINOPHIL NFR BLD AUTO: 0.9 % — SIGNIFICANT CHANGE UP (ref 0–6)
GLUCOSE SERPL-MCNC: 107 MG/DL — HIGH (ref 70–99)
GLUCOSE UR QL: NEGATIVE — SIGNIFICANT CHANGE UP
HCOV PNL SPEC NAA+PROBE: DETECTED
HCT VFR BLD CALC: 36.1 % — SIGNIFICANT CHANGE UP (ref 34.5–45)
HGB BLD-MCNC: 11.9 G/DL — SIGNIFICANT CHANGE UP (ref 11.5–15.5)
IMM GRANULOCYTES NFR BLD AUTO: 0.6 % — SIGNIFICANT CHANGE UP (ref 0–0.9)
KETONES UR-MCNC: NEGATIVE — SIGNIFICANT CHANGE UP
LEUKOCYTE ESTERASE UR-ACNC: ABNORMAL
LYMPHOCYTES # BLD AUTO: 1.39 K/UL — SIGNIFICANT CHANGE UP (ref 1–3.3)
LYMPHOCYTES # BLD AUTO: 16.3 % — SIGNIFICANT CHANGE UP (ref 13–44)
MAGNESIUM SERPL-MCNC: 2 MG/DL — SIGNIFICANT CHANGE UP (ref 1.6–2.6)
MCHC RBC-ENTMCNC: 31.5 PG — SIGNIFICANT CHANGE UP (ref 27–34)
MCHC RBC-ENTMCNC: 33 GM/DL — SIGNIFICANT CHANGE UP (ref 32–36)
MCV RBC AUTO: 95.5 FL — SIGNIFICANT CHANGE UP (ref 80–100)
MONOCYTES # BLD AUTO: 1.26 K/UL — HIGH (ref 0–0.9)
MONOCYTES NFR BLD AUTO: 14.8 % — HIGH (ref 2–14)
NEUTROPHILS # BLD AUTO: 5.71 K/UL — SIGNIFICANT CHANGE UP (ref 1.8–7.4)
NEUTROPHILS NFR BLD AUTO: 66.9 % — SIGNIFICANT CHANGE UP (ref 43–77)
NITRITE UR-MCNC: NEGATIVE — SIGNIFICANT CHANGE UP
NRBC # BLD: 0 /100 WBCS — SIGNIFICANT CHANGE UP (ref 0–0)
PH UR: 6 — SIGNIFICANT CHANGE UP (ref 5–8)
PLATELET # BLD AUTO: 143 K/UL — LOW (ref 150–400)
POTASSIUM SERPL-MCNC: 5.9 MMOL/L — HIGH (ref 3.5–5.3)
POTASSIUM SERPL-SCNC: 5.9 MMOL/L — HIGH (ref 3.5–5.3)
PROT SERPL-MCNC: 7.7 G/DL — SIGNIFICANT CHANGE UP (ref 6–8.3)
PROT UR-MCNC: ABNORMAL
RAPID RVP RESULT: DETECTED
RBC # BLD: 3.78 M/UL — LOW (ref 3.8–5.2)
RBC # FLD: 15 % — HIGH (ref 10.3–14.5)
SARS-COV-2 RNA SPEC QL NAA+PROBE: SIGNIFICANT CHANGE UP
SODIUM SERPL-SCNC: 136 MMOL/L — SIGNIFICANT CHANGE UP (ref 135–145)
SP GR SPEC: 1.02 — SIGNIFICANT CHANGE UP (ref 1.01–1.02)
TROPONIN T, HIGH SENSITIVITY RESULT: 166 NG/L — HIGH (ref 0–51)
UROBILINOGEN FLD QL: NEGATIVE — SIGNIFICANT CHANGE UP
WBC # BLD: 8.53 K/UL — SIGNIFICANT CHANGE UP (ref 3.8–10.5)
WBC # FLD AUTO: 8.53 K/UL — SIGNIFICANT CHANGE UP (ref 3.8–10.5)

## 2023-01-09 PROCEDURE — 99285 EMERGENCY DEPT VISIT HI MDM: CPT | Mod: CS

## 2023-01-09 PROCEDURE — 71045 X-RAY EXAM CHEST 1 VIEW: CPT | Mod: 26

## 2023-01-09 RX ORDER — ACETAMINOPHEN 500 MG
1000 TABLET ORAL ONCE
Refills: 0 | Status: COMPLETED | OUTPATIENT
Start: 2023-01-09 | End: 2023-01-09

## 2023-01-09 RX ORDER — SODIUM CHLORIDE 9 MG/ML
500 INJECTION INTRAMUSCULAR; INTRAVENOUS; SUBCUTANEOUS ONCE
Refills: 0 | Status: COMPLETED | OUTPATIENT
Start: 2023-01-09 | End: 2023-01-09

## 2023-01-09 RX ORDER — SODIUM CHLORIDE 9 MG/ML
1000 INJECTION INTRAMUSCULAR; INTRAVENOUS; SUBCUTANEOUS ONCE
Refills: 0 | Status: COMPLETED | OUTPATIENT
Start: 2023-01-09 | End: 2023-01-09

## 2023-01-09 RX ADMIN — SODIUM CHLORIDE 500 MILLILITER(S): 9 INJECTION INTRAMUSCULAR; INTRAVENOUS; SUBCUTANEOUS at 22:45

## 2023-01-09 RX ADMIN — Medication 400 MILLIGRAM(S): at 19:35

## 2023-01-09 RX ADMIN — SODIUM CHLORIDE 1000 MILLILITER(S): 9 INJECTION INTRAMUSCULAR; INTRAVENOUS; SUBCUTANEOUS at 19:34

## 2023-01-09 NOTE — ED PROVIDER NOTE - OBJECTIVE STATEMENT
89y F w/ PMHx of HTN, Arthritis, AFib, pacemaker presents to the ED With 3 days of fatigue, cough, mild sore throat, myalgias. Patient notes she is prone to UTIs and notes urinary frequency. Patient has decreased p.o. intake and has not urinated today.  Denies abdominal pain. .  Denies fevers. Patient denies any chest pain, shortness of breath, nausea, vomiting, dysuria.  Patient is coming from [facility] and states she was evaluated by NP sent here for continued weakness.

## 2023-01-09 NOTE — ED PROVIDER NOTE - ATTENDING CONTRIBUTION TO CARE
89 F w/ hx of HTN, Arthritis, AFib, pacemaker presents to the ED With 3 days of fatigue, cough, mild sore throat, myalgias.  pt prone to UTIs, recommended by NP at [facility] to come to hospital for weakness. spoke w/ pt daughter regarding code status DNR/DNI also confirmed by pt as well, daughter states she thinks her mother would benefit from a hospital staty and doesn't feel that pt can go home at this time, pt awake and alert moving all 4 extremities, she has soft abdomen, plan for labs xrya and admission for generalized weakness, sol, suspect likely viral syndrome, low suspicion for bacterial sepsis, however, given pt age, will send blood cultures.

## 2023-01-09 NOTE — ED PROVIDER NOTE - CLINICAL SUMMARY MEDICAL DECISION MAKING FREE TEXT BOX
89y F w/ PMHx of HTN, Arthritis, AFib, pacemaker presents to the ED With 3 days of fatigue, cough, mild sore throat, myalgias.  r/o uti vs pna. vs electrolyte abn.

## 2023-01-09 NOTE — ED PROVIDER NOTE - PHYSICAL EXAMINATION
Const: appears tired  Eyes: no conjunctival injection, and symmetrical lids.  HEENT: Head NCAT, no lesions. Atraumatic external nose and ears. Moist MM.  Neck: Symmetric, trachea midline.   CVS: +S1/S2,   RESP: Unlabored respiratory effort. LL crackles.   GI: Nontender/Nondistended, No CVA tenderness b/l.   MSK: Normocephalic/Atraumatic, Lower Extremities w/o calf tenderness or edema b/l.   Skin: Warm, dry and intact.   Neuro: CNs II-XII grossly intact. Motor & Sensation grossly intact.  Psych: Awake, Alert, & Oriented (AAO) x3. Appropriate mood and affect.

## 2023-01-10 ENCOUNTER — LABORATORY RESULT (OUTPATIENT)
Age: 88
End: 2023-01-10

## 2023-01-10 DIAGNOSIS — I10 ESSENTIAL (PRIMARY) HYPERTENSION: ICD-10-CM

## 2023-01-10 DIAGNOSIS — I48.0 PAROXYSMAL ATRIAL FIBRILLATION: ICD-10-CM

## 2023-01-10 DIAGNOSIS — N18.9 CHRONIC KIDNEY DISEASE, UNSPECIFIED: ICD-10-CM

## 2023-01-10 DIAGNOSIS — B34.9 VIRAL INFECTION, UNSPECIFIED: ICD-10-CM

## 2023-01-10 DIAGNOSIS — E03.9 HYPOTHYROIDISM, UNSPECIFIED: ICD-10-CM

## 2023-01-10 DIAGNOSIS — I50.32 CHRONIC DIASTOLIC (CONGESTIVE) HEART FAILURE: ICD-10-CM

## 2023-01-10 PROBLEM — R29.6 FALL IN ELDERLY PATIENT: Status: ACTIVE | Noted: 2023-01-10

## 2023-01-10 PROBLEM — Z91.81 STATUS POST FALL: Status: ACTIVE | Noted: 2023-01-10

## 2023-01-10 PROBLEM — R53.1 WEAKNESS: Status: ACTIVE | Noted: 2022-10-02

## 2023-01-10 LAB
ANION GAP SERPL CALC-SCNC: 19 MMOL/L — HIGH (ref 5–17)
BACTERIA # UR AUTO: ABNORMAL
BASOPHILS # BLD AUTO: 0.06 K/UL — SIGNIFICANT CHANGE UP (ref 0–0.2)
BASOPHILS NFR BLD AUTO: 0.9 % — SIGNIFICANT CHANGE UP (ref 0–2)
BUN SERPL-MCNC: 36 MG/DL — HIGH (ref 7–23)
CALCIUM SERPL-MCNC: 9.5 MG/DL — SIGNIFICANT CHANGE UP (ref 8.4–10.5)
CHLORIDE SERPL-SCNC: 104 MMOL/L — SIGNIFICANT CHANGE UP (ref 96–108)
CO2 SERPL-SCNC: 13 MMOL/L — LOW (ref 22–31)
COMMENT - URINE: SIGNIFICANT CHANGE UP
CREAT SERPL-MCNC: 0.97 MG/DL — SIGNIFICANT CHANGE UP (ref 0.5–1.3)
EGFR: 52 ML/MIN/1.73M2 — LOW
EOSINOPHIL # BLD AUTO: 0.19 K/UL — SIGNIFICANT CHANGE UP (ref 0–0.5)
EOSINOPHIL NFR BLD AUTO: 2.7 % — SIGNIFICANT CHANGE UP (ref 0–6)
EPI CELLS # UR: 6 — SIGNIFICANT CHANGE UP
GLUCOSE SERPL-MCNC: 91 MG/DL — SIGNIFICANT CHANGE UP (ref 70–99)
GRAN CASTS # UR COMP ASSIST: 1 /LPF — SIGNIFICANT CHANGE UP
HCT VFR BLD CALC: 50.5 % — HIGH (ref 34.5–45)
HGB BLD-MCNC: 15.2 G/DL — SIGNIFICANT CHANGE UP (ref 11.5–15.5)
HYALINE CASTS # UR AUTO: 2 /LPF — SIGNIFICANT CHANGE UP (ref 0–7)
IMM GRANULOCYTES NFR BLD AUTO: 0.7 % — SIGNIFICANT CHANGE UP (ref 0–0.9)
LYMPHOCYTES # BLD AUTO: 1.48 K/UL — SIGNIFICANT CHANGE UP (ref 1–3.3)
LYMPHOCYTES # BLD AUTO: 21.4 % — SIGNIFICANT CHANGE UP (ref 13–44)
MCHC RBC-ENTMCNC: 29.8 PG — SIGNIFICANT CHANGE UP (ref 27–34)
MCHC RBC-ENTMCNC: 30.1 GM/DL — LOW (ref 32–36)
MCV RBC AUTO: 99 FL — SIGNIFICANT CHANGE UP (ref 80–100)
MONOCYTES # BLD AUTO: 0.94 K/UL — HIGH (ref 0–0.9)
MONOCYTES NFR BLD AUTO: 13.6 % — SIGNIFICANT CHANGE UP (ref 2–14)
NEUTROPHILS # BLD AUTO: 4.2 K/UL — SIGNIFICANT CHANGE UP (ref 1.8–7.4)
NEUTROPHILS NFR BLD AUTO: 60.7 % — SIGNIFICANT CHANGE UP (ref 43–77)
NRBC # BLD: 0 /100 WBCS — SIGNIFICANT CHANGE UP (ref 0–0)
PLATELET # BLD AUTO: 119 K/UL — LOW (ref 150–400)
POTASSIUM SERPL-MCNC: 4.7 MMOL/L — SIGNIFICANT CHANGE UP (ref 3.5–5.3)
POTASSIUM SERPL-SCNC: 4.7 MMOL/L — SIGNIFICANT CHANGE UP (ref 3.5–5.3)
RBC # BLD: 5.1 M/UL — SIGNIFICANT CHANGE UP (ref 3.8–5.2)
RBC # FLD: 14.7 % — HIGH (ref 10.3–14.5)
RBC CASTS # UR COMP ASSIST: 5 /HPF — HIGH (ref 0–4)
SODIUM SERPL-SCNC: 136 MMOL/L — SIGNIFICANT CHANGE UP (ref 135–145)
WBC # BLD: 6.92 K/UL — SIGNIFICANT CHANGE UP (ref 3.8–10.5)
WBC # FLD AUTO: 6.92 K/UL — SIGNIFICANT CHANGE UP (ref 3.8–10.5)
WBC UR QL: 12 /HPF — HIGH (ref 0–5)

## 2023-01-10 RX ORDER — ASPIRIN/CALCIUM CARB/MAGNESIUM 324 MG
81 TABLET ORAL DAILY
Refills: 0 | Status: DISCONTINUED | OUTPATIENT
Start: 2023-01-10 | End: 2023-01-14

## 2023-01-10 RX ORDER — CARVEDILOL PHOSPHATE 80 MG/1
6.25 CAPSULE, EXTENDED RELEASE ORAL EVERY 12 HOURS
Refills: 0 | Status: DISCONTINUED | OUTPATIENT
Start: 2023-01-10 | End: 2023-01-13

## 2023-01-10 RX ORDER — AMLODIPINE BESYLATE 2.5 MG/1
5 TABLET ORAL DAILY
Refills: 0 | Status: DISCONTINUED | OUTPATIENT
Start: 2023-01-10 | End: 2023-01-14

## 2023-01-10 RX ORDER — ACETAMINOPHEN 500 MG
650 TABLET ORAL EVERY 6 HOURS
Refills: 0 | Status: DISCONTINUED | OUTPATIENT
Start: 2023-01-10 | End: 2023-01-14

## 2023-01-10 RX ORDER — POLYETHYLENE GLYCOL 3350 17 G/17G
17 POWDER, FOR SOLUTION ORAL DAILY
Refills: 0 | Status: DISCONTINUED | OUTPATIENT
Start: 2023-01-10 | End: 2023-01-14

## 2023-01-10 RX ORDER — ACETAMINOPHEN 500 MG
1 TABLET ORAL
Qty: 0 | Refills: 0 | DISCHARGE

## 2023-01-10 RX ORDER — ATORVASTATIN CALCIUM 80 MG/1
20 TABLET, FILM COATED ORAL AT BEDTIME
Refills: 0 | Status: DISCONTINUED | OUTPATIENT
Start: 2023-01-10 | End: 2023-01-14

## 2023-01-10 RX ORDER — SODIUM CHLORIDE 9 MG/ML
1000 INJECTION INTRAMUSCULAR; INTRAVENOUS; SUBCUTANEOUS
Refills: 0 | Status: DISCONTINUED | OUTPATIENT
Start: 2023-01-10 | End: 2023-01-11

## 2023-01-10 RX ORDER — LEVOTHYROXINE SODIUM 125 MCG
100 TABLET ORAL DAILY
Refills: 0 | Status: DISCONTINUED | OUTPATIENT
Start: 2023-01-10 | End: 2023-01-14

## 2023-01-10 RX ADMIN — ATORVASTATIN CALCIUM 20 MILLIGRAM(S): 80 TABLET, FILM COATED ORAL at 21:37

## 2023-01-10 RX ADMIN — Medication 81 MILLIGRAM(S): at 11:47

## 2023-01-10 RX ADMIN — SODIUM CHLORIDE 50 MILLILITER(S): 9 INJECTION INTRAMUSCULAR; INTRAVENOUS; SUBCUTANEOUS at 11:48

## 2023-01-10 RX ADMIN — AMLODIPINE BESYLATE 5 MILLIGRAM(S): 2.5 TABLET ORAL at 11:47

## 2023-01-10 RX ADMIN — CARVEDILOL PHOSPHATE 6.25 MILLIGRAM(S): 80 CAPSULE, EXTENDED RELEASE ORAL at 17:57

## 2023-01-10 RX ADMIN — SODIUM CHLORIDE 50 MILLILITER(S): 9 INJECTION INTRAMUSCULAR; INTRAVENOUS; SUBCUTANEOUS at 21:38

## 2023-01-10 RX ADMIN — POLYETHYLENE GLYCOL 3350 17 GRAM(S): 17 POWDER, FOR SOLUTION ORAL at 11:47

## 2023-01-10 NOTE — H&P ADULT - HISTORY OF PRESENT ILLNESS
89y F w/ PMHx of HTN, Arthritis, AFib, pacemaker presents to the ED With 3 days of fatigue, cough, mild sore throat, myalgias. Patient notes she is prone to UTIs and notes urinary frequency. Patient has decreased p.o. intake and has not urinated today.  Denies abdominal pain. .  Denies fevers. Patient denies any chest pain, shortness of breath, nausea, vomiting, dysuria.  Patient is coming from [facility] and states she was evaluated by NP sent here for continued weakness. Patient seen now resting comfortably.

## 2023-01-10 NOTE — H&P ADULT - ASSESSMENT
89 yo woman presents with weakness and fatigue. + URI, hx of HTN, CRF, HFpEF, Patient found to be positive for corona virus, not covid 19

## 2023-01-10 NOTE — PHYSICAL EXAM
[Alert] : alert [Well Nourished] : well nourished [Healthy Appearing] : healthy appearing [No Acute Distress] : in no acute distress [Normal Voice/Communication] : normal voice/communication [Sclera] : the sclera and conjunctiva were normal [Normal Oral Mucosa] : normal oral mucosa [Normal Hearing] : hearing was normal [JVD] : there was jugular-venous distention [No Respiratory Distress] : no respiratory distress [No Acc Muscle Use] : no accessory muscle use [Respiration, Rhythm And Depth] : normal respiratory rhythm and effort [Auscultation Breath Sounds / Voice Sounds] : lungs were clear to auscultation bilaterally [Normal S1, S2] : normal S1 and S2 [Heart Rate And Rhythm] : heart rate was normal and rhythm regular [Edema] : edema was not present [Bowel Sounds] : normal bowel sounds [Abdomen Tenderness] : non-tender [Abdomen Soft] : soft [No CVA Tenderness] : no CVA  tenderness [Normal Gait] : abnormal gait [No Clubbing, Cyanosis] : no clubbing or cyanosis of the fingernails [Involuntary Movements] : no involuntary movements were seen [Motor Tone] : muscle strength and tone were normal [Normal Color / Pigmentation] : normal skin color and pigmentation [No Focal Deficits] : no focal deficits [Oriented To Time, Place, And Person] : oriented to person, place, and time [Normal Affect] : the affect was normal [Normal Insight/Judgment] : insight and judgment were intact [Normal Mood] : the mood was normal [de-identified] : unsteady gait, ambulates with walker

## 2023-01-10 NOTE — H&P ADULT - PROBLEM SELECTOR PLAN 1
Patient seen resting comfortably  PO as tolerated  start gentle hydration  physical therapy as tolerated  continue supportive care

## 2023-01-10 NOTE — H&P ADULT - NSHPLABSRESULTS_GEN_ALL_CORE
11.9   8.53  )-----------( 143      ( 2023 19:33 )             36.1           139  |  103  |  44<H>  ----------------------------<  109<H>  4.4   |  20<L>  |  1.44<H>    Ca    8.8      2023 21:27  Mg     2.0         TPro  7.7  /  Alb  3.7  /  TBili  0.7  /  DBili  x   /  AST  61<H>  /  ALT  43  /  AlkPhos  151<H>                Urinalysis Basic - ( 2023 23:06 )    Color: Yellow / Appearance: Slightly Turbid / S.024 / pH: x  Gluc: x / Ketone: Negative  / Bili: Negative / Urobili: Negative   Blood: x / Protein: 100 mg/dL / Nitrite: Negative   Leuk Esterase: Small / RBC: 5 /hpf / WBC 12 /HPF   Sq Epi: x / Non Sq Epi: 6 / Bacteria: Many            Lactate Trend            CAPILLARY BLOOD GLUCOSE            Culture Results:   <10,000 CFU/mL Normal Urogenital Nemo ( @ 21:12)

## 2023-01-10 NOTE — ED ADULT NURSE NOTE - NSHOSCREENINGQ1_ED_ALL_ED
Progress Note    Patient: Daija Greene MRN: [id number]  SSN: [id number]    YOB: 1997  Age: 21 y.o. Sex: female      Admit Date: 2021    LOS: 1 day     Subjective:     Patient is without complaints, she reports minimal lochia, she is tolerating some diet    Objective:     Vitals:    21 2123 21 0140 21 0518 21 0810   BP: 105/66 (!) 101/55 (!) 101/57 (!) 98/58   Pulse: 84 86 86 93   Resp: 16 16 16 16   Temp: 99.3 °F (37.4 °C) 98.8 °F (37.1 °C) 98.3 °F (36.8 °C) 98.3 °F (36.8 °C)   SpO2: 99% 97% 95% 96%   Weight:       Height:            Physical Exam:   Heart is with regular rate and rhythm  Lungs are clear to auscultation  Fundus is below umbilicus  Wound is clean dry and intact    Lab/Data Review:  Hemoglobin 7.5    Assessment:     Active Problems:    Pregnancy (2021)      Vaginal bleeding in pregnancy (2021)    Postoperative day #1 status post emergent classical  section for placental abruption at 25 weeks, stable.     Plan:     Routine postoperative care    Signed By: Sofia Ferreira MD     May 1, 2021 No

## 2023-01-10 NOTE — H&P ADULT - PROBLEM SELECTOR PLAN 5
continue to monitor rate  continue carvedilol  will adjust meds as needed  will consider cardiology eval

## 2023-01-10 NOTE — HISTORY OF PRESENT ILLNESS
[FreeTextEntry1] : encounter conducted 12/28/2022\par \par Ms. SOLIS TAYLOR is a 89yo F with PMHx of HF,HLD, syncope s/p pacemaker, OA, recurrent UTI's, hypothyroidism.\par \par  longterm, [facility]. \par Self-manages meds \par \par S/p 2 falls:\par two witnessed falls, one after dinner, and one after lunch ?postprandial hypotension \par sustained a skin tear, work-up otherwise normal \par new medication last week was oxycodone 2.5mg for lumbar stenosis and osteoarthritc pain - possible contributor to falls \par patient aware will discontinue oxycodone. tolerating pain for now with tylenol \par \par polypharmacy/med management:\par duplicate med bottles discarded last visit\par pill box reviewed today to ensure no duplicate doses - pills are accurate and no duplicate bottles\par new med: methanamine started by cardiologyist, Dr. Buckley\par \par Osteoarthritis:\par ran out of tylenol, pain is worsened in hips and knees\par no recent falls \par dicolfenac gel did not work\par has seen ortho in past, injections did not help\par does not wish to go through additional injectioons at this time \par \par HTN:\par well controlled today on current regimen\par no dizziness or falls reported\par \par Hypothyroid:\par TSH low, will reduce dose from 100mcg to 75mcg \par \par Vitamin d deficiency:\par 25 on recent labs, will start supplementation\par \par S/p PPM:\par follows with cardio, Dr. Buckley\par no sx  reported\par HR well controlled\par \par hospitalization summary:\par syncope Aug 2022, rehab 12 days \par Hospitalized for syncope, UTI and left arm weakness. \par No acute stroke detected, ct brain with chronic microvascular disease. \par post-hosp visit completed, extensive medd recc for confusion over sprinolactone and coreg dosing\par \par \par  [Independent] : managing finances [] : Assistance needed with traveling/transport [Walker] : walker [Grab Bars] : grab bars [Shower Chair] : shower chair

## 2023-01-10 NOTE — PATIENT PROFILE ADULT - FALL HARM RISK - HARM RISK INTERVENTIONS

## 2023-01-10 NOTE — ED ADULT NURSE NOTE - OBJECTIVE STATEMENT
pt received with no assessment notes written   pt is A&ox2 not to time pt with mild sore throat lethargy and cough x 3 days

## 2023-01-10 NOTE — H&P ADULT - PROBLEM SELECTOR PLAN 2
continue to follow for signs of fluid overload  hold spironolactone for now  will discuss with PMD to see when Pt had her last echo  continue gentle hydration for viral illness

## 2023-01-11 ENCOUNTER — LABORATORY RESULT (OUTPATIENT)
Age: 88
End: 2023-01-11

## 2023-01-11 LAB
ANION GAP SERPL CALC-SCNC: 12 MMOL/L — SIGNIFICANT CHANGE UP (ref 5–17)
BUN SERPL-MCNC: 35 MG/DL — HIGH (ref 7–23)
CALCIUM SERPL-MCNC: 9 MG/DL — SIGNIFICANT CHANGE UP (ref 8.4–10.5)
CHLORIDE SERPL-SCNC: 104 MMOL/L — SIGNIFICANT CHANGE UP (ref 96–108)
CO2 SERPL-SCNC: 23 MMOL/L — SIGNIFICANT CHANGE UP (ref 22–31)
CREAT SERPL-MCNC: 1.13 MG/DL — SIGNIFICANT CHANGE UP (ref 0.5–1.3)
CULTURE RESULTS: SIGNIFICANT CHANGE UP
EGFR: 43 ML/MIN/1.73M2 — LOW
GLUCOSE SERPL-MCNC: 76 MG/DL — SIGNIFICANT CHANGE UP (ref 70–99)
HCT VFR BLD CALC: 40.7 % — SIGNIFICANT CHANGE UP (ref 34.5–45)
HGB BLD-MCNC: 12.9 G/DL — SIGNIFICANT CHANGE UP (ref 11.5–15.5)
MCHC RBC-ENTMCNC: 30.9 PG — SIGNIFICANT CHANGE UP (ref 27–34)
MCHC RBC-ENTMCNC: 31.7 GM/DL — LOW (ref 32–36)
MCV RBC AUTO: 97.4 FL — SIGNIFICANT CHANGE UP (ref 80–100)
NRBC # BLD: 0 /100 WBCS — SIGNIFICANT CHANGE UP (ref 0–0)
PLATELET # BLD AUTO: 138 K/UL — LOW (ref 150–400)
POTASSIUM SERPL-MCNC: 3.9 MMOL/L — SIGNIFICANT CHANGE UP (ref 3.5–5.3)
POTASSIUM SERPL-SCNC: 3.9 MMOL/L — SIGNIFICANT CHANGE UP (ref 3.5–5.3)
RBC # BLD: 4.18 M/UL — SIGNIFICANT CHANGE UP (ref 3.8–5.2)
RBC # FLD: 14.6 % — HIGH (ref 10.3–14.5)
SODIUM SERPL-SCNC: 139 MMOL/L — SIGNIFICANT CHANGE UP (ref 135–145)
SPECIMEN SOURCE: SIGNIFICANT CHANGE UP
TROPONIN T, HIGH SENSITIVITY RESULT: 110 NG/L — HIGH (ref 0–51)
TSH SERPL-MCNC: 6.43 UIU/ML — HIGH (ref 0.27–4.2)
WBC # BLD: 6.89 K/UL — SIGNIFICANT CHANGE UP (ref 3.8–10.5)
WBC # FLD AUTO: 6.89 K/UL — SIGNIFICANT CHANGE UP (ref 3.8–10.5)

## 2023-01-11 RX ORDER — SPIRONOLACTONE 25 MG/1
12.5 TABLET, FILM COATED ORAL DAILY
Refills: 0 | Status: DISCONTINUED | OUTPATIENT
Start: 2023-01-11 | End: 2023-01-13

## 2023-01-11 RX ADMIN — SODIUM CHLORIDE 50 MILLILITER(S): 9 INJECTION INTRAMUSCULAR; INTRAVENOUS; SUBCUTANEOUS at 11:41

## 2023-01-11 RX ADMIN — POLYETHYLENE GLYCOL 3350 17 GRAM(S): 17 POWDER, FOR SOLUTION ORAL at 11:39

## 2023-01-11 RX ADMIN — AMLODIPINE BESYLATE 5 MILLIGRAM(S): 2.5 TABLET ORAL at 05:09

## 2023-01-11 RX ADMIN — CARVEDILOL PHOSPHATE 6.25 MILLIGRAM(S): 80 CAPSULE, EXTENDED RELEASE ORAL at 17:12

## 2023-01-11 RX ADMIN — ATORVASTATIN CALCIUM 20 MILLIGRAM(S): 80 TABLET, FILM COATED ORAL at 21:02

## 2023-01-11 RX ADMIN — CARVEDILOL PHOSPHATE 6.25 MILLIGRAM(S): 80 CAPSULE, EXTENDED RELEASE ORAL at 05:09

## 2023-01-11 RX ADMIN — Medication 100 MICROGRAM(S): at 05:09

## 2023-01-11 RX ADMIN — Medication 81 MILLIGRAM(S): at 11:39

## 2023-01-11 NOTE — PHYSICAL THERAPY INITIAL EVALUATION ADULT - PERTINENT HX OF CURRENT PROBLEM, REHAB EVAL
89y F w/ PMHx of HTN, Arthritis, AFib, pacemaker presents to the ED With 3 days of fatigue, cough, mild sore throat, myalgias. Patient notes she is prone to UTIs and notes urinary frequency. Patient has decreased p.o. intake and has not urinated today.  Denies abdominal pain. .  Denies fevers. Patient denies any chest pain, shortness of breath, nausea, vomiting, dysuria.  Patient is coming from [facility] and states she was evaluated by NP sent here for continued weakness  pt is + RVP , + Coronavirus [229E,HKU1,NL63,OC43] ; EKG 1/9/23: NSR,LAD,LVH w/repolarization; CXR :clear,loop recorder and MICRA PM seen; Troponins elevated 1/9/23 first 159 then 166; pt admit w/ on intake 5 pressure injuries: suspected DTI of B buttocks/Upper mid back/L/R Heels

## 2023-01-11 NOTE — PHYSICAL THERAPY INITIAL EVALUATION ADULT - ADDITIONAL COMMENTS
pt came from [facility] , no caregiver ID ; Rao Gilliland id as [phone] pt came from [facility] on [address] , no caregiver ID ; Rao Gilliland id as [phone]; pt report independent with bed mobility , transfers at rollator , amb w/ rollator a couple of hundred feet to dining on first floor (pt on 5th floor ) , pt washes and dresses self , has a walk-in shower and shower seat with grab bars in shower ; pt has a call button system if need help can press and staff will assist per pt

## 2023-01-11 NOTE — PHYSICAL THERAPY INITIAL EVALUATION ADULT - GENERAL OBSERVATIONS, REHAB EVAL
pt received in bed all siderails up HOB 15 elevated to 30 degrees , pt awake orient x 4 , follow all commands , role of PT explained and pt understood , PT spoke with Mily Stephenson NP prior to eval as pt troponins 1/9 /23 elevated 159 then 166 , ok to proceed with bed PT EVAL and helf fxl eval , await further activity order

## 2023-01-11 NOTE — ADVANCED PRACTICE NURSE CONSULT - REASON FOR CONSULT
Please see consultation visit note. Please contact me with any questions or concerns. Thank you for the referral.   
Requested by Staff RN for evaluation of multiple DTI's which were present on admission. PMH:  89y F w/ PMHx of HTN, Arthritis, AFib, pacemaker presents to the ED With 3 days of fatigue, cough, mild sore throat, myalgias. Patient notes she is prone to UTIs and notes urinary frequency. Patient has decreased p.o. intake and has not urinated today.  Denies abdominal pain. .  Denies fevers. Patient denies any chest pain, shortness of breath, nausea, vomiting, dysuria.  Patient is coming from [facility] and states she was evaluated by NP sent here for continued weakness. Patient seen now resting comfortably.

## 2023-01-11 NOTE — CONSULT NOTE ADULT - TIME BILLING
Patient seen and examined, agree with the above assessment and plan by PHIL Magaña.  89y F w/ PMHx of HTN, Arthritis, AFib, pacemaker presents with coronavirus.  CV stable  AF rate controlled  Continue BB  patient not on AC possibly due to advanced age/fall risk?  start low dose asa    dvt ppx

## 2023-01-11 NOTE — PHYSICAL THERAPY INITIAL EVALUATION ADULT - NSPTDMEREC_GEN_A_CORE
pt has a rollator , shower chair and grab bars in shower , call bell necklayasmine to call fot assist at facility [facility]

## 2023-01-11 NOTE — ADVANCED PRACTICE NURSE CONSULT - ASSESSMENT
Pt is awake & alert on a Versicare P500 low air loss & pressure redistribution surface. Pt is being turned & positioned as per nursing documentation. Pt is incontinent of both urine & stool. Pt w/ dry skin, poor skin turgor & multiple bruises on extremities. Pt w/ old skin tear which is crusted on her left forearm. the following was noted:  Sacral DTI measures 3cm(l) x 3cm(w) deep black discoloration  Left & right buttock DTI's measure 10cm (l) x 6cm(w) deep black discoloration  Left & right heel DTI's measure 5cm(l) x 5cm(w) deep red discoloration

## 2023-01-11 NOTE — CONSULT NOTE ADULT - ASSESSMENT
A/P  89y F w/ PMHx of HTN, Arthritis, AFib, pacemaker presents with coronavirus.    #Afib s/p ppm  -Stable  -EKG w/ NSR 78, no ischemic changes  -Continue coreg  -Unclear why pt is not on a/c, possibly d/t age?    #HTN  -Continue amlodipine  -Continue spirinolactone    #Coronavirus  -CXR clear  -Supportive care per primary A/P  89y F w/ PMHx of HTN, Arthritis, AFib, pacemaker presents with coronavirus.    #Afib s/p ppm  -Stable, rate controlled  -EKG w/ NSR 78, no ischemic changes  -Continue coreg  -Unclear why pt is not on a/c, possibly d/t age?  -Would hold off on initiating a/c at this time    #HTN  -BP acceptable  -Continue amlodipine  -Continue spirinolactone    #Coronavirus  -CXR clear  -Supportive care per primary    dvt ppx

## 2023-01-11 NOTE — CONSULT NOTE ADULT - SUBJECTIVE AND OBJECTIVE BOX
CARDIOLOGY CONSULT - Dr. Riley         HPI:  89y F w/ PMHx of HTN, Arthritis, AFib, pacemaker presents to the ED With 3 days of fatigue, cough, mild sore throat, myalgias. Patient notes she is prone to UTIs and notes urinary frequency. Patient has decreased p.o. intake and has not urinated today.  Denies abdominal pain. .  Denies fevers. Patient denies any chest pain, shortness of breath, nausea, vomiting, dysuria. Patient is coming from [facility] and states she was evaluated by NP sent here for continued weakness. Patient seen now resting comfortably.     Pt examined at bedside, NAD. Feels well. Follows  for cardiology. She denies CP, SOB, palpitations. States she has never been on anticoagulation therapy.      PAST MEDICAL & SURGICAL HISTORY:  Urinary Frequency      Bilateral Cataracts      HTN (hypertension)      Spinal stenosis      Hypothyroidism      Non-ST elevation MI (NSTEMI)      Macular degeneration      (HFpEF) heart failure with preserved ejection fraction      Paroxysmal atrial fibrillation      Chronic kidney disease (CKD)      Ectopic pregnancy, tubal      S/P ORIF (open reduction internal fixation) fracture  R hip      S/P breast lumpectomy      S/P cataract surgery  b/l              PREVIOUS DIAGNOSTIC TESTING:    [ ] Echocardiogram:  [ ]  Catheterization:  [ ] Stress Test:  	    MEDICATIONS:  Home Medications:  acetaminophen 325 mg oral tablet: 3 tab(s) orally every 8 hours, As Needed (10 Gideon 2023 14:31)  amLODIPine 5 mg oral tablet: 1 tab(s) orally once a day (10 Gideon 2023 14:31)  aspirin 81 mg oral delayed release tablet: 1 tab(s) orally once a day (10 Gideon 2023 14:31)  atorvastatin 20 mg oral tablet: 1 tab(s) orally once a day (at bedtime) (10 Gideon 2023 14:31)  Campho-Phenique: apply twice a day to left upper lip for acute pharyngitis (10 Gideon 2023 14:31)  carvedilol 6.25 mg oral tablet: 1 tab(s) orally every 12 hours (10 Gideon 2023 14:31)  Flonase 50 mcg/inh nasal spray: 1 spray(s) nasal 2 times a day (10 Gideon 2023 14:31)  levothyroxine 100 mcg (0.1 mg) oral tablet: 1 tab(s) orally once a day (10 Gideon 2023 14:31)  polyethylene glycol 3350 oral powder for reconstitution: 17 gram(s) orally once a day (10 Gideon 2023 14:31)  PreserVision AREDS oral capsule: 1 cap(s) orally once a day (10 Gideon 2023 14:31)  spironolactone 25 mg oral tablet: 1 tab(s) orally once a day (10 Gideon 2023 14:31)  traMADol 50 mg oral tablet: 1 tab(s) orally 2 times a day, As Needed (10 Gideon 2023 14:31)      MEDICATIONS  (STANDING):  amLODIPine   Tablet 5 milliGRAM(s) Oral daily  aspirin enteric coated 81 milliGRAM(s) Oral daily  atorvastatin 20 milliGRAM(s) Oral at bedtime  carvedilol 6.25 milliGRAM(s) Oral every 12 hours  levothyroxine 100 MICROGram(s) Oral daily  polyethylene glycol 3350 17 Gram(s) Oral daily  spironolactone 12.5 milliGRAM(s) Oral daily      FAMILY HISTORY:  Family history of acute myocardial infarction        SOCIAL HISTORY:    [x] Non-smoker  [ ] Smoker  [ ] Alcohol    Allergies    sulfa topicals (Unknown)    Intolerances    morphine (Drowsiness; Faint; Hypotension)  	    REVIEW OF SYSTEMS:  CONSTITUTIONAL: No fever, weight loss, or fatigue  EYES: No eye pain, visual disturbances, or discharge  ENMT:  No difficulty hearing, tinnitus, vertigo; No sinus or throat pain  NECK: No pain or stiffness  RESPIRATORY: No cough, wheezing, chills or hemoptysis; No Shortness of Breath  CARDIOVASCULAR: No chest pain, palpitations, passing out, dizziness, or leg swelling  GASTROINTESTINAL: No abdominal or epigastric pain. No nausea, vomiting, or hematemesis; No diarrhea or constipation. No melena or hematochezia.  GENITOURINARY: No dysuria, frequency, hematuria, or incontinence  NEUROLOGICAL: No headaches, memory loss, loss of strength, numbness, or tremors  SKIN: No itching, burning, rashes, or lesions   	    [ ] All others negative	  [ ] Unable to obtain    PHYSICAL EXAM:  T(C): 36.4 (01-11-23 @ 13:40), Max: 36.8 (01-10-23 @ 20:38)  HR: 74 (01-11-23 @ 13:40) (71 - 83)  BP: 135/72 (01-11-23 @ 13:40) (100/63 - 158/78)  RR: 18 (01-11-23 @ 13:40) (18 - 18)  SpO2: 96% (01-11-23 @ 13:40) (96% - 97%)  Wt(kg): --  I&O's Summary    10 Gideon 2023 07:01  -  11 Jan 2023 07:00  --------------------------------------------------------  IN: 0 mL / OUT: 350 mL / NET: -350 mL        Appearance: Normal	  Psychiatry: A & O x 3, Mood & affect appropriate  HEENT:   Normal oral mucosa, PERRL, EOMI	  Lymphatic: No lymphadenopathy  Cardiovascular: Normal S1 S2,RRR, No JVD, No murmurs  Respiratory: Lungs clear to auscultation	  Gastrointestinal:  Soft, Non-tender, + BS	  Skin: No rashes, No ecchymoses, No cyanosis	  Neurologic: Non-focal  Extremities: Normal range of motion, No clubbing, cyanosis or edema  Vascular: Peripheral pulses palpable 2+ bilaterally    TELEMETRY: 	    ECG:  	  RADIOLOGY:  OTHER: 	  	  LABS:	 	    CARDIAC MARKERS:  Troponin T, High Sensitivity Result: 110 ng/L (01-11 @ 07:25)  Troponin T, High Sensitivity Result: 166 ng/L (01-09 @ 21:27)  Troponin T, High Sensitivity Result: 159 ng/L (01-09 @ 19:33)                                  12.9   6.89  )-----------( 138      ( 11 Jan 2023 07:25 )             40.7     01-11    139  |  104  |  35<H>  ----------------------------<  76  3.9   |  23  |  1.13    Ca    9.0      11 Jan 2023 07:25  Mg     2.0     01-09    TPro  7.7  /  Alb  3.7  /  TBili  0.7  /  DBili  x   /  AST  61<H>  /  ALT  43  /  AlkPhos  151<H>  01-09      proBNP:   Lipid Profile:   HgA1c:   TSH: Thyroid Stimulating Hormone, Serum: 6.43 uIU/mL (01-11 @ 07:25)

## 2023-01-11 NOTE — ADVANCED PRACTICE NURSE CONSULT - RECOMMEDATIONS
Recommendations  1) For all DTI's- cleanse w/ NS, dry thoroughly, apply No Sting barrier Film e.g. Cavilon daily. This will seal & protect areas  2) Continue w/ turning & positioning using positioners  3) Sween 24 to all dry skin areas 2x/d to hydrate  4) Continue w/ Incontinence care   5) Seat cushion when OOB to chair to off load & limit sitting to 1 1/2- 2 hour intervals.  6) Elevate heels to off load w/ use of nika lock blue pillows- place longitudinally  7) Nutrition support as condition allows  8) Continue to monitor skin  Discussed w/ Staff RN Jaz [facility] [employer] [facility]  Remain available as requested by staff

## 2023-01-12 ENCOUNTER — NON-APPOINTMENT (OUTPATIENT)
Age: 88
End: 2023-01-12

## 2023-01-12 LAB
ANION GAP SERPL CALC-SCNC: 9 MMOL/L — SIGNIFICANT CHANGE UP (ref 5–17)
BUN SERPL-MCNC: 29 MG/DL — HIGH (ref 7–23)
CALCIUM SERPL-MCNC: 8.9 MG/DL — SIGNIFICANT CHANGE UP (ref 8.4–10.5)
CHLORIDE SERPL-SCNC: 104 MMOL/L — SIGNIFICANT CHANGE UP (ref 96–108)
CO2 SERPL-SCNC: 24 MMOL/L — SIGNIFICANT CHANGE UP (ref 22–31)
CREAT SERPL-MCNC: 0.99 MG/DL — SIGNIFICANT CHANGE UP (ref 0.5–1.3)
EGFR: 51 ML/MIN/1.73M2 — LOW
GLUCOSE SERPL-MCNC: 90 MG/DL — SIGNIFICANT CHANGE UP (ref 70–99)
HCT VFR BLD CALC: 37.4 % — SIGNIFICANT CHANGE UP (ref 34.5–45)
HGB BLD-MCNC: 11.9 G/DL — SIGNIFICANT CHANGE UP (ref 11.5–15.5)
MCHC RBC-ENTMCNC: 30.5 PG — SIGNIFICANT CHANGE UP (ref 27–34)
MCHC RBC-ENTMCNC: 31.8 GM/DL — LOW (ref 32–36)
MCV RBC AUTO: 95.9 FL — SIGNIFICANT CHANGE UP (ref 80–100)
NRBC # BLD: 0 /100 WBCS — SIGNIFICANT CHANGE UP (ref 0–0)
PLATELET # BLD AUTO: 135 K/UL — LOW (ref 150–400)
POTASSIUM SERPL-MCNC: 4 MMOL/L — SIGNIFICANT CHANGE UP (ref 3.5–5.3)
POTASSIUM SERPL-SCNC: 4 MMOL/L — SIGNIFICANT CHANGE UP (ref 3.5–5.3)
RBC # BLD: 3.9 M/UL — SIGNIFICANT CHANGE UP (ref 3.8–5.2)
RBC # FLD: 14.6 % — HIGH (ref 10.3–14.5)
SARS-COV-2 RNA SPEC QL NAA+PROBE: SIGNIFICANT CHANGE UP
SODIUM SERPL-SCNC: 137 MMOL/L — SIGNIFICANT CHANGE UP (ref 135–145)
TROPONIN T, HIGH SENSITIVITY RESULT: 132 NG/L — HIGH (ref 0–51)
WBC # BLD: 6.77 K/UL — SIGNIFICANT CHANGE UP (ref 3.8–10.5)
WBC # FLD AUTO: 6.77 K/UL — SIGNIFICANT CHANGE UP (ref 3.8–10.5)

## 2023-01-12 RX ADMIN — SPIRONOLACTONE 12.5 MILLIGRAM(S): 25 TABLET, FILM COATED ORAL at 05:11

## 2023-01-12 RX ADMIN — Medication 100 MICROGRAM(S): at 05:11

## 2023-01-12 RX ADMIN — Medication 81 MILLIGRAM(S): at 12:31

## 2023-01-12 RX ADMIN — CARVEDILOL PHOSPHATE 6.25 MILLIGRAM(S): 80 CAPSULE, EXTENDED RELEASE ORAL at 05:12

## 2023-01-12 RX ADMIN — POLYETHYLENE GLYCOL 3350 17 GRAM(S): 17 POWDER, FOR SOLUTION ORAL at 12:31

## 2023-01-12 RX ADMIN — AMLODIPINE BESYLATE 5 MILLIGRAM(S): 2.5 TABLET ORAL at 05:12

## 2023-01-12 RX ADMIN — ATORVASTATIN CALCIUM 20 MILLIGRAM(S): 80 TABLET, FILM COATED ORAL at 21:41

## 2023-01-12 RX ADMIN — CARVEDILOL PHOSPHATE 6.25 MILLIGRAM(S): 80 CAPSULE, EXTENDED RELEASE ORAL at 17:33

## 2023-01-12 NOTE — DIETITIAN INITIAL EVALUATION ADULT - PROBLEM SELECTOR PROBLEM 5
Remains intubated on 50 % Fio2 peep 6., On fentanyl drip at 200 mcg/hr.Agitated occasionally and BP becomes elevated and becomes tachycardic. Versed 2 mg IVP given PRN. Spoke to daughter Maryan, updates given.    PAF (paroxysmal atrial fibrillation)

## 2023-01-12 NOTE — DIETITIAN INITIAL EVALUATION ADULT - DIET TYPE
Recommend discontinue DASH/TLC restriction. Defer diet/texture modification  to medical team/SLP as indicated/regular

## 2023-01-12 NOTE — DIETITIAN INITIAL EVALUATION ADULT - NSFNSPHYEXAMSKINFT_GEN_A_CORE
As per wound care nurses note on 1/11/23:  -Sacral DTI measures 3cm(l) x 3cm(w) deep black discoloration  -Left & right buttock DTI's measure 10cm (l) x 6cm(w) deep black discoloration  -Left & right heel DTI's measure 5cm(l) x 5cm(w) deep red discoloration

## 2023-01-12 NOTE — DIETITIAN INITIAL EVALUATION ADULT - OTHER INFO
Drug Dosing Weight: 61.2 kg/ 134.9 pounds (1/9/23)  Bed scale weight obtained by RD during visit: 73.4 kg/ 161.8 pounds   IBW:100 pounds   %IBW: 135 %  UBW: Pt reports her usual body weight to be 150 pounds   - ? accuracy of  dosing weight and bed scale weight, monitor weight status as able during present admission.  Drug Dosing Weight: 61.2 kg/ 134.9 pounds (1/9/23)  Bed scale weight obtained by RD during visit: 73.4 kg/ 161.8 pounds   IBW:115 pounds   %IBW: 117 %  UBW: Pt reports her usual body weight to be 150 pounds   - ? accuracy of  dosing weight and bed scale weight, monitor weight status as able during present admission.

## 2023-01-12 NOTE — DIETITIAN INITIAL EVALUATION ADULT - ORAL INTAKE PTA/DIET HISTORY
Pt confirms no known food allergies/intolerances. Reports having a good appetite and PO intakes at home. Did not follow any specific dietary restrictions. Pt denies nausea, vomiting, diarrhea, or constipation. Denies difficulty chewing/swallowing. Pt reported taking a vitamin for her eyes PTA, pt reports not taking oral nutritional supplement at home as well.

## 2023-01-12 NOTE — DIETITIAN INITIAL EVALUATION ADULT - ENERGY INTAKE
Adequate (%) Pt noted with excellent appetite and good PO intakes in-house during present admission. RD observed pt eating breakfast tray on 1/12/23, consumed about 60% of meal.

## 2023-01-12 NOTE — DIETITIAN INITIAL EVALUATION ADULT - OTHER CALCULATIONS
Defer fluid needs to medical team  Estimated calorie & protein needs based on pt current dosing weight of 134.9 pounds/ 61.2 kg

## 2023-01-12 NOTE — DIETITIAN INITIAL EVALUATION ADULT - ENTER FROM (G/KG)
Intermediate Repair Preamble Text (Leave Blank If You Do Not Want): Undermining was performed with blunt dissection. 1.2

## 2023-01-12 NOTE — DIETITIAN INITIAL EVALUATION ADULT - ADD RECOMMEND
1. Consider multivitamin and vitamin C supplements if no medical contraindications to aid in wound healing.   2. Add mighty shakes 2x/day to provide additional calories/protein for wound healing  3. Monitor PO intake, GI tolerance, skin integrity and labs. RD remains available if needed, pt is aware.

## 2023-01-12 NOTE — DIETITIAN INITIAL EVALUATION ADULT - PERTINENT MEDS FT
MEDICATIONS  (STANDING):  amLODIPine   Tablet 5 milliGRAM(s) Oral daily  aspirin enteric coated 81 milliGRAM(s) Oral daily  atorvastatin 20 milliGRAM(s) Oral at bedtime  carvedilol 6.25 milliGRAM(s) Oral every 12 hours  levothyroxine 100 MICROGram(s) Oral daily  polyethylene glycol 3350 17 Gram(s) Oral daily  spironolactone 12.5 milliGRAM(s) Oral daily    MEDICATIONS  (PRN):  acetaminophen     Tablet .. 650 milliGRAM(s) Oral every 6 hours PRN Temp greater or equal to 38.5C (101.3F), Moderate Pain (4 - 6)

## 2023-01-12 NOTE — DIETITIAN INITIAL EVALUATION ADULT - REASON FOR ADMISSION
Chart Reviewed, Events Noted  As per H&P, "89y F w/ PMHx of HTN, Arthritis, AFib, pacemaker presents to the ED With 3 days of fatigue, cough, mild sore throat, myalgias. Patient notes she is prone to UTIs and notes urinary frequency. Patient has decreased p.o. intake and has not urinated today.  Denies abdominal pain. .  Denies fevers. Patient denies any chest pain, shortness of breath, nausea, vomiting, dysuria. Patient is coming from [facility] and states she was evaluated by NP sent here for continued weakness. Patient seen now resting comfortably. "

## 2023-01-12 NOTE — DIETITIAN INITIAL EVALUATION ADULT - REASON INDICATOR FOR ASSESSMENT
Stage 2 Pressure Injury or greater  Information obtained from: Review of pt current medical record and interview with pt at bedside in her assigned room on [facility]

## 2023-01-12 NOTE — DIETITIAN INITIAL EVALUATION ADULT - PERTINENT LABORATORY DATA
01-12    137  |  104  |  29<H>  ----------------------------<  90  4.0   |  24  |  0.99    Ca    8.9      12 Jan 2023 07:10    A1C with Estimated Average Glucose Result: 6.1 % (04-05-22 @ 09:14)

## 2023-01-12 NOTE — DIETITIAN INITIAL EVALUATION ADULT - NSFNSGIIOFT_GEN_A_CORE
- Last bowel movement noted on 1/10/23 per flow sheets, getting polyethylene glycol for bowel regimen

## 2023-01-13 LAB — TROPONIN T, HIGH SENSITIVITY RESULT: 187 NG/L — HIGH (ref 0–51)

## 2023-01-13 PROCEDURE — 93010 ELECTROCARDIOGRAM REPORT: CPT

## 2023-01-13 RX ORDER — SODIUM CHLORIDE 9 MG/ML
250 INJECTION INTRAMUSCULAR; INTRAVENOUS; SUBCUTANEOUS ONCE
Refills: 0 | Status: COMPLETED | OUTPATIENT
Start: 2023-01-13 | End: 2023-01-13

## 2023-01-13 RX ORDER — METOPROLOL TARTRATE 50 MG
25 TABLET ORAL
Refills: 0 | Status: DISCONTINUED | OUTPATIENT
Start: 2023-01-13 | End: 2023-01-14

## 2023-01-13 RX ORDER — CAMPHOR/PHENOL 10.8-4.7%
0 GEL (GRAM) TOPICAL
Qty: 0 | Refills: 0 | DISCHARGE

## 2023-01-13 RX ORDER — FLUTICASONE PROPIONATE 50 MCG
1 SPRAY, SUSPENSION NASAL
Qty: 0 | Refills: 0 | DISCHARGE

## 2023-01-13 RX ORDER — TRAMADOL HYDROCHLORIDE 50 MG/1
1 TABLET ORAL
Qty: 0 | Refills: 0 | DISCHARGE

## 2023-01-13 RX ORDER — MULTIVIT-MIN/FERROUS GLUCONATE 9 MG/15 ML
1 LIQUID (ML) ORAL
Qty: 0 | Refills: 0 | DISCHARGE

## 2023-01-13 RX ADMIN — AMLODIPINE BESYLATE 5 MILLIGRAM(S): 2.5 TABLET ORAL at 05:08

## 2023-01-13 RX ADMIN — SPIRONOLACTONE 12.5 MILLIGRAM(S): 25 TABLET, FILM COATED ORAL at 05:09

## 2023-01-13 RX ADMIN — Medication 650 MILLIGRAM(S): at 22:31

## 2023-01-13 RX ADMIN — Medication 25 MILLIGRAM(S): at 17:52

## 2023-01-13 RX ADMIN — SODIUM CHLORIDE 500 MILLILITER(S): 9 INJECTION INTRAMUSCULAR; INTRAVENOUS; SUBCUTANEOUS at 16:32

## 2023-01-13 RX ADMIN — Medication 81 MILLIGRAM(S): at 11:22

## 2023-01-13 RX ADMIN — SODIUM CHLORIDE 500 MILLILITER(S): 9 INJECTION INTRAMUSCULAR; INTRAVENOUS; SUBCUTANEOUS at 13:06

## 2023-01-13 RX ADMIN — POLYETHYLENE GLYCOL 3350 17 GRAM(S): 17 POWDER, FOR SOLUTION ORAL at 11:21

## 2023-01-13 RX ADMIN — Medication 100 MICROGRAM(S): at 05:08

## 2023-01-13 RX ADMIN — ATORVASTATIN CALCIUM 20 MILLIGRAM(S): 80 TABLET, FILM COATED ORAL at 21:24

## 2023-01-13 RX ADMIN — Medication 650 MILLIGRAM(S): at 11:51

## 2023-01-13 RX ADMIN — Medication 650 MILLIGRAM(S): at 11:21

## 2023-01-13 RX ADMIN — Medication 650 MILLIGRAM(S): at 21:24

## 2023-01-13 RX ADMIN — CARVEDILOL PHOSPHATE 6.25 MILLIGRAM(S): 80 CAPSULE, EXTENDED RELEASE ORAL at 05:08

## 2023-01-14 ENCOUNTER — TRANSCRIPTION ENCOUNTER (OUTPATIENT)
Age: 88
End: 2023-01-14

## 2023-01-14 VITALS
DIASTOLIC BLOOD PRESSURE: 87 MMHG | SYSTOLIC BLOOD PRESSURE: 157 MMHG | OXYGEN SATURATION: 95 % | HEART RATE: 82 BPM | RESPIRATION RATE: 18 BRPM

## 2023-01-14 PROCEDURE — 97530 THERAPEUTIC ACTIVITIES: CPT

## 2023-01-14 PROCEDURE — 97162 PT EVAL MOD COMPLEX 30 MIN: CPT

## 2023-01-14 PROCEDURE — 0225U NFCT DS DNA&RNA 21 SARSCOV2: CPT

## 2023-01-14 PROCEDURE — 80048 BASIC METABOLIC PNL TOTAL CA: CPT

## 2023-01-14 PROCEDURE — 93005 ELECTROCARDIOGRAM TRACING: CPT

## 2023-01-14 PROCEDURE — 80053 COMPREHEN METABOLIC PANEL: CPT

## 2023-01-14 PROCEDURE — 87040 BLOOD CULTURE FOR BACTERIA: CPT

## 2023-01-14 PROCEDURE — 81001 URINALYSIS AUTO W/SCOPE: CPT

## 2023-01-14 PROCEDURE — 85025 COMPLETE CBC W/AUTO DIFF WBC: CPT

## 2023-01-14 PROCEDURE — U0005: CPT

## 2023-01-14 PROCEDURE — 87086 URINE CULTURE/COLONY COUNT: CPT

## 2023-01-14 PROCEDURE — 71045 X-RAY EXAM CHEST 1 VIEW: CPT

## 2023-01-14 PROCEDURE — 84443 ASSAY THYROID STIM HORMONE: CPT

## 2023-01-14 PROCEDURE — 84484 ASSAY OF TROPONIN QUANT: CPT

## 2023-01-14 PROCEDURE — 99285 EMERGENCY DEPT VISIT HI MDM: CPT | Mod: 25

## 2023-01-14 PROCEDURE — 97116 GAIT TRAINING THERAPY: CPT

## 2023-01-14 PROCEDURE — 96374 THER/PROPH/DIAG INJ IV PUSH: CPT

## 2023-01-14 PROCEDURE — 83735 ASSAY OF MAGNESIUM: CPT

## 2023-01-14 PROCEDURE — 85027 COMPLETE CBC AUTOMATED: CPT

## 2023-01-14 PROCEDURE — U0003: CPT

## 2023-01-14 RX ORDER — METOPROLOL TARTRATE 50 MG
1 TABLET ORAL
Qty: 0 | Refills: 0 | DISCHARGE
Start: 2023-01-14

## 2023-01-14 RX ORDER — SPIRONOLACTONE 25 MG/1
1 TABLET, FILM COATED ORAL
Qty: 30 | Refills: 0
Start: 2023-01-14 | End: 2023-02-12

## 2023-01-14 RX ADMIN — Medication 100 MICROGRAM(S): at 05:02

## 2023-01-14 RX ADMIN — Medication 81 MILLIGRAM(S): at 11:02

## 2023-01-14 RX ADMIN — Medication 25 MILLIGRAM(S): at 05:02

## 2023-01-14 RX ADMIN — AMLODIPINE BESYLATE 5 MILLIGRAM(S): 2.5 TABLET ORAL at 05:02

## 2023-01-14 NOTE — DISCHARGE NOTE PROVIDER - NSDCCPCAREPLAN_GEN_ALL_CORE_FT
PRINCIPAL DISCHARGE DIAGNOSIS  Diagnosis: Infection viral  Assessment and Plan of Treatment: viral infection  supportive care      SECONDARY DISCHARGE DIAGNOSES  Diagnosis: HTN (hypertension)  Assessment and Plan of Treatment: Follow up with your medical doctor to establish long term blood pressure treatment goals.      Diagnosis: Deep tissue injury  Assessment and Plan of Treatment: For all DTI's- cleanse w/ NS, dry thoroughly, apply No Sting barrier Film e.g. Cavilon daily. This will seal & protect areas  2) Continue w/ turning & positioning using positioners  3) Sween 24 to all dry skin areas 2x/d to hydrate  4) Continue w/ Incontinence care   5) Seat cushion when OOB to chair to off load & limit sitting to 1 1/2- 2 hour intervals.  6) Elevate heels to off load w/ use of nika lock blue pillows- place longitudinally  7) Nutrition support as condition allows

## 2023-01-14 NOTE — DISCHARGE NOTE PROVIDER - HOSPITAL COURSE
89y F w/ PMHx of HTN, Arthritis, AFib, pacemaker presents to the ED With 3 days of fatigue, cough, mild sore throat, myalgias. Patient notes she is prone to UTIs and notes urinary frequency. Patient has decreased p.o. intake and has not urinated today.  Denies abdominal pain. .  Denies fevers. Patient denies any chest pain, shortness of breath, nausea, vomiting, dysuria.  Patient is coming from [facility] and states she was evaluated by NP sent here for continued weakness. Patient seen now resting comfortably.      For all DTI's- cleanse w/ NS, dry thoroughly, apply No Sting barrier Film e.g. Cavilon daily. This will seal & protect areas  2) Continue w/ turning & positioning using positioners  3) Sween 24 to all dry skin areas 2x/d to hydrate  4) Continue w/ Incontinence care   5) Seat cushion when OOB to chair to off load & limit sitting to 1 1/2- 2 hour intervals.  6) Elevate heels to off load w/ use of nika lock blue pillows- place longitudinally  7) Nutrition support as condition allows

## 2023-01-14 NOTE — PROGRESS NOTE ADULT - PROBLEM SELECTOR PLAN 2
spironolactone restarted  will discuss with PMD to see when Pt had her last echo  restart gentle hydration   repeat troponin and ekg with episode of lethagy and weakness this am
spironolactone restarted  coreg changed to metoprolol  BP has improved
spironolactone restarted  will discuss with PMD to see when Pt had her last echo   Hold IV fluid
continue to follow for signs of fluid overload  hold spironolactone for now  will discuss with PMD to see when Pt had her last echo  continue gentle hydration for viral illness

## 2023-01-14 NOTE — PROGRESS NOTE ADULT - SUBJECTIVE AND OBJECTIVE BOX
CARDIOLOGY FOLLOW UP - Dr. Riley  DATE OF SERVICE:    CC      REVIEW OF SYSTEMS:  CONSTITUTIONAL: No fever, weight loss, or fatigue  RESPIRATORY: No cough, wheezing, chills or hemoptysis; No Shortness of Breath  CARDIOVASCULAR: No chest pain, palpitations, passing out, dizziness, or leg swelling  GASTROINTESTINAL: No abdominal or epigastric pain. No nausea, vomiting, or hematemesis; No diarrhea or constipation. No melena or hematochezia.  VASCULAR: No edema     PHYSICAL EXAM:  T(C): 36.3 (01-12-23 @ 04:23), Max: 36.5 (01-11-23 @ 21:22)  HR: 72 (01-12-23 @ 04:23) (72 - 78)  BP: 150/83 (01-12-23 @ 04:23) (135/72 - 150/83)  RR: 18 (01-12-23 @ 04:23) (17 - 18)  SpO2: 95% (01-12-23 @ 04:23) (94% - 96%)  Wt(kg): --  I&O's Summary    11 Jan 2023 07:01  -  12 Jan 2023 07:00  --------------------------------------------------------  IN: 0 mL / OUT: 1600 mL / NET: -1600 mL        Appearance: Normal	  Cardiovascular: Normal S1 S2,RRR, No JVD, No murmurs  Respiratory: Lungs clear to auscultation	  Gastrointestinal:  Soft, Non-tender, + BS	  Extremities: Normal range of motion, No clubbing, cyanosis or edema      Home Medications:  acetaminophen 325 mg oral tablet: 3 tab(s) orally every 8 hours, As Needed (10 Gideon 2023 14:31)  amLODIPine 5 mg oral tablet: 1 tab(s) orally once a day (10 Gideon 2023 14:31)  aspirin 81 mg oral delayed release tablet: 1 tab(s) orally once a day (10 Gideon 2023 14:31)  atorvastatin 20 mg oral tablet: 1 tab(s) orally once a day (at bedtime) (10 Gideon 2023 14:31)  Campho-Phenique: apply twice a day to left upper lip for acute pharyngitis (10 Gideon 2023 14:31)  carvedilol 6.25 mg oral tablet: 1 tab(s) orally every 12 hours (10 Gideon 2023 14:31)  Flonase 50 mcg/inh nasal spray: 1 spray(s) nasal 2 times a day (10 Gideon 2023 14:31)  levothyroxine 100 mcg (0.1 mg) oral tablet: 1 tab(s) orally once a day (10 Gideon 2023 14:31)  polyethylene glycol 3350 oral powder for reconstitution: 17 gram(s) orally once a day (10 Gideon 2023 14:31)  PreserVision AREDS oral capsule: 1 cap(s) orally once a day (10 Gideon 2023 14:31)  spironolactone 25 mg oral tablet: 1 tab(s) orally once a day (10 Gideon 2023 14:31)  traMADol 50 mg oral tablet: 1 tab(s) orally 2 times a day, As Needed (10 Gideon 2023 14:31)      MEDICATIONS  (STANDING):  amLODIPine   Tablet 5 milliGRAM(s) Oral daily  aspirin enteric coated 81 milliGRAM(s) Oral daily  atorvastatin 20 milliGRAM(s) Oral at bedtime  carvedilol 6.25 milliGRAM(s) Oral every 12 hours  levothyroxine 100 MICROGram(s) Oral daily  polyethylene glycol 3350 17 Gram(s) Oral daily  spironolactone 12.5 milliGRAM(s) Oral daily      TELEMETRY: 	    ECG:  	  RADIOLOGY:   DIAGNOSTIC TESTING:  [ ] Echocardiogram:  [ ]  Catheterization:  [ ] Stress Test:    OTHER: 	    LABS:	 	    Troponin T, High Sensitivity Result: 132 ng/L [0 - 51] (01-12 @ 07:10)  Troponin T, High Sensitivity Result: 110 ng/L [0 - 51] (01-11 @ 07:25)  Troponin T, High Sensitivity Result: 166 ng/L [0 - 51] (01-09 @ 21:27)  Troponin T, High Sensitivity Result: 159 ng/L [0 - 51] (01-09 @ 19:33)                          11.9   6.77  )-----------( 135      ( 12 Jan 2023 07:14 )             37.4     01-12    137  |  104  |  29<H>  ----------------------------<  90  4.0   |  24  |  0.99    Ca    8.9      12 Jan 2023 07:10              
89y F w/ PMHx of HTN, Arthritis, AFib, pacemaker presents to the ED With 3 days of fatigue, cough, mild sore throat, myalgias. Patient notes she is prone to UTIs and notes urinary frequency. Patient has decreased p.o. intake and has not urinated today.  Denies abdominal pain. .  Denies fevers. Patient denies any chest pain, shortness of breath, nausea, vomiting, dysuria.  Patient is coming from [place] and states she was evaluated by NP sent here for continued weakness. Patient seen now resting comfortably.     MEDICATIONS  (STANDING):  amLODIPine   Tablet 5 milliGRAM(s) Oral daily  aspirin enteric coated 81 milliGRAM(s) Oral daily  atorvastatin 20 milliGRAM(s) Oral at bedtime  carvedilol 6.25 milliGRAM(s) Oral every 12 hours  levothyroxine 100 MICROGram(s) Oral daily  polyethylene glycol 3350 17 Gram(s) Oral daily  sodium chloride 0.9%. 1000 milliLiter(s) (50 mL/Hr) IV Continuous <Continuous>    MEDICATIONS  (PRN):  acetaminophen     Tablet .. 650 milliGRAM(s) Oral every 6 hours PRN Temp greater or equal to 38.5C (101.3F), Moderate Pain (4 - 6)          VITALS:   T(C): 36.4 (23 @ 13:40), Max: 36.8 (01-10-23 @ 20:38)  HR: 74 (23 @ 13:40) (71 - 83)  BP: 135/72 (23 @ 13:40) (100/63 - 158/78)  RR: 18 (23 @ 13:40) (18 - 18)  SpO2: 96% (23 @ 13:40) (96% - 97%)  Wt(kg): --    PHYSICAL EXAM:  GENERAL: NAD, well nourished and conversant  HEAD:  Atraumatic  EYES: EOM, PERRLA, conjunctiva pink and sclera white  ENT: No tonsillar erythema, exudates, or enlargement, moist mucous membranes, good dentition, no lesions  NECK: Supple, No JVD, normal thyroid, carotids with normal upstrokes and no bruits  CHEST/LUNG: Clear to auscultation bilaterally, No rales, rhonchi, wheezing, or rubs  HEART: Regular rate and rhythm, No murmurs, rubs, or gallops  ABDOMEN: Soft, nondistended, no masses, guarding, tenderness or rebound, bowel sounds present  EXTREMITIES:  2+ Peripheral Pulses, No clubbing, cyanosis, or edema.   LYMPH: No lymphadenopathy noted  SKIN: No rashes or lesions  NERVOUS SYSTEM:  Alert & Oriented X3, normal cognitive function. Motor Strength 5/5 right upper and right lower.  5/5 left upper and left lower extremities, DTRs 2+ intact and symmetric    LABS:        CBC Full  -  ( 2023 07:25 )  WBC Count : 6.89 K/uL  RBC Count : 4.18 M/uL  Hemoglobin : 12.9 g/dL  Hematocrit : 40.7 %  Platelet Count - Automated : 138 K/uL  Mean Cell Volume : 97.4 fl  Mean Cell Hemoglobin : 30.9 pg  Mean Cell Hemoglobin Concentration : 31.7 gm/dL  Auto Neutrophil # : x  Auto Lymphocyte # : x  Auto Monocyte # : x  Auto Eosinophil # : x  Auto Basophil # : x  Auto Neutrophil % : x  Auto Lymphocyte % : x  Auto Monocyte % : x  Auto Eosinophil % : x  Auto Basophil % : x        139  |  104  |  35<H>  ----------------------------<  76  3.9   |  23  |  1.13    Ca    9.0      2023 07:25  Mg     2.0         TPro  7.7  /  Alb  3.7  /  TBili  0.7  /  DBili  x   /  AST  61<H>  /  ALT  43  /  AlkPhos  151<H>      LIVER FUNCTIONS - ( 2023 19:33 )  Alb: 3.7 g/dL / Pro: 7.7 g/dL / ALK PHOS: 151 U/L / ALT: 43 U/L / AST: 61 U/L / GGT: x             Urinalysis Basic - ( 2023 23:06 )    Color: Yellow / Appearance: Slightly Turbid / S.024 / pH: x  Gluc: x / Ketone: Negative  / Bili: Negative / Urobili: Negative   Blood: x / Protein: 100 mg/dL / Nitrite: Negative   Leuk Esterase: Small / RBC: 5 /hpf / WBC 12 /HPF   Sq Epi: x / Non Sq Epi: 6 / Bacteria: Many      CAPILLARY BLOOD GLUCOSE          RADIOLOGY & ADDITIONAL TESTS:      
89y F w/ PMHx of HTN, Arthritis, AFib, pacemaker presents to the ED With 3 days of fatigue, cough, mild sore throat, myalgias. Patient notes she is prone to UTIs and notes urinary frequency. Patient has decreased p.o. intake and has not urinated today.  Denies abdominal pain. .  Denies fevers. Patient denies any chest pain, shortness of breath, nausea, vomiting, dysuria. Patient is coming from [facility] and states she was evaluated by NP sent here for continued weakness. Patient seen now resting comfortably. Patient with an episode of hypotension with lethargy this am. Patient seen now AAOx3 but lethargic      MEDICATIONS  (STANDING):  amLODIPine   Tablet 5 milliGRAM(s) Oral daily  aspirin enteric coated 81 milliGRAM(s) Oral daily  atorvastatin 20 milliGRAM(s) Oral at bedtime  carvedilol 6.25 milliGRAM(s) Oral every 12 hours  levothyroxine 100 MICROGram(s) Oral daily  polyethylene glycol 3350 17 Gram(s) Oral daily  sodium chloride 0.9% Bolus 250 milliLiter(s) IV Bolus once  spironolactone 12.5 milliGRAM(s) Oral daily    MEDICATIONS  (PRN):  acetaminophen     Tablet .. 650 milliGRAM(s) Oral every 6 hours PRN Temp greater or equal to 38.5C (101.3F), Moderate Pain (4 - 6)          VITALS:   T(C): 36.9 (01-13-23 @ 12:22), Max: 36.9 (01-13-23 @ 12:22)  HR: 75 (01-13-23 @ 12:22) (66 - 75)  BP: 117/75 (01-13-23 @ 12:22) (117/75 - 163/89)  RR: 18 (01-13-23 @ 12:22) (18 - 18)  SpO2: 98% (01-13-23 @ 12:22) (95% - 98%)  Wt(kg): --    PHYSICAL EXAM:  GENERAL: NAD, well nourished and conversant  HEAD:  Atraumatic  EYES: EOM, PERRLA, conjunctiva pink and sclera white  ENT: No tonsillar erythema, exudates, or enlargement, moist mucous membranes, good dentition, no lesions  NECK: Supple, No JVD, normal thyroid, carotids with normal upstrokes and no bruits  CHEST/LUNG: Clear to auscultation bilaterally, No rales, rhonchi, wheezing, or rubs  HEART: Regular rate and rhythm, No murmurs, rubs, or gallops  ABDOMEN: Soft, nondistended, no masses, guarding, tenderness or rebound, bowel sounds present  EXTREMITIES:  2+ Peripheral Pulses, No clubbing, cyanosis, or edema.   LYMPH: No lymphadenopathy noted  SKIN: No rashes or lesions  NERVOUS SYSTEM:  Alert & Oriented X3, normal cognitive function. Motor Strength 5/5 right upper and right lower.  5/5 left upper and left lower extremities, DTRs 2+ intact and symmetric    LABS:        CBC Full  -  ( 12 Jan 2023 07:14 )  WBC Count : 6.77 K/uL  RBC Count : 3.90 M/uL  Hemoglobin : 11.9 g/dL  Hematocrit : 37.4 %  Platelet Count - Automated : 135 K/uL  Mean Cell Volume : 95.9 fl  Mean Cell Hemoglobin : 30.5 pg  Mean Cell Hemoglobin Concentration : 31.8 gm/dL  Auto Neutrophil # : x  Auto Lymphocyte # : x  Auto Monocyte # : x  Auto Eosinophil # : x  Auto Basophil # : x  Auto Neutrophil % : x  Auto Lymphocyte % : x  Auto Monocyte % : x  Auto Eosinophil % : x  Auto Basophil % : x    01-12    137  |  104  |  29<H>  ----------------------------<  90  4.0   |  24  |  0.99    Ca    8.9      12 Jan 2023 07:10            CAPILLARY BLOOD GLUCOSE          RADIOLOGY & ADDITIONAL TESTS:      
CARDIOLOGY FOLLOW UP NOTE - DR. CHO    Patient Name: SOLIS TAYLOR  Date of Service: 01-14-23 @ 13:49    Patient seen and examined    Subjective:    cv: denies chest pain, dyspnea, palpitations, dizziness  pulmonary: denies cough  GI: denies abdominal pain, nausea, vomiting  vascular/legs: no edema   skin: no rash  ROS: otherwise negative   overnight events:      PHYSICAL EXAM:  T(C): 36.3 (01-14-23 @ 12:59), Max: 37.1 (01-13-23 @ 17:45)  HR: 85 (01-14-23 @ 12:59) (67 - 88)  BP: 152/80 (01-14-23 @ 12:59) (143/79 - 162/91)  RR: 18 (01-14-23 @ 12:59) (18 - 18)  SpO2: 95% (01-14-23 @ 12:59) (95% - 98%)  Wt(kg): --  I&O's Summary    13 Jan 2023 07:01  -  14 Jan 2023 07:00  --------------------------------------------------------  IN: 1100 mL / OUT: 700 mL / NET: 400 mL      Daily     Daily     Appearance: Normal	  Cardiovascular: Normal S1 S2,RRR, No JVD, No murmurs  Respiratory: Lungs clear to auscultation	  Gastrointestinal:  Soft, Non-tender, + BS	  Extremities: Normal range of motion, No clubbing, cyanosis or edema      Home Medications:  acetaminophen 325 mg oral tablet: 3 tab(s) orally every 8 hours, As Needed (10 Gideon 2023 14:31)  amLODIPine 5 mg oral tablet: 1 tab(s) orally once a day (10 Gideon 2023 14:31)  aspirin 81 mg oral delayed release tablet: 1 tab(s) orally once a day (10 Gideon 2023 14:31)  atorvastatin 20 mg oral tablet: 1 tab(s) orally once a day (at bedtime) (10 Gideon 2023 14:31)  levothyroxine 100 mcg (0.1 mg) oral tablet: 1 tab(s) orally once a day (10 Gideon 2023 14:31)  metoprolol tartrate 25 mg oral tablet: 1 tab(s) orally 2 times a day (14 Jan 2023 11:47)  polyethylene glycol 3350 oral powder for reconstitution: 17 gram(s) orally once a day (10 [place] 2023 14:31)      MEDICATIONS  (STANDING):  amLODIPine   Tablet 5 milliGRAM(s) Oral daily  aspirin enteric coated 81 milliGRAM(s) Oral daily  atorvastatin 20 milliGRAM(s) Oral at bedtime  levothyroxine 100 MICROGram(s) Oral daily  metoprolol tartrate 25 milliGRAM(s) Oral two times a day  polyethylene glycol 3350 17 Gram(s) Oral daily      TELEMETRY: 	    ECG:  	  RADIOLOGY:   DIAGNOSTIC TESTING:  [ ] Echocardiogram:  [ ] Catheterization:  [ ] Stress Test:    OTHER: 	    LABS:	 	    CARDIAC MARKERS:        Troponin T, High Sensitivity Result: 187 ng/L (01-13 @ 13:18)  Troponin T, High Sensitivity Result: 132 ng/L (01-12 @ 07:10)  Troponin T, High Sensitivity Result: 110 ng/L (01-11 @ 07:25)  Troponin T, High Sensitivity Result: 166 ng/L (01-09 @ 21:27)  Troponin T, High Sensitivity Result: 159 ng/L (01-09 @ 19:33)                  proBNP:     Lipid Profile:   HgA1c:     Creatinine, Serum: 0.99 mg/dL (01-12-23 @ 07:10)            
CARDIOLOGY FOLLOW UP - Dr. Riley  DATE OF SERVICE: 1/13/23    CC  No CV complaints    REVIEW OF SYSTEMS:  CONSTITUTIONAL: No fever, weight loss, or fatigue  RESPIRATORY: No cough, wheezing, chills or hemoptysis; No Shortness of Breath  CARDIOVASCULAR: No chest pain, palpitations, passing out, dizziness, or leg swelling  GASTROINTESTINAL: No abdominal or epigastric pain. No nausea, vomiting, or hematemesis; No diarrhea or constipation. No melena or hematochezia.  VASCULAR: No edema     PHYSICAL EXAM:  T(C): 36.8 (01-13-23 @ 04:52), Max: 36.8 (01-13-23 @ 04:52)  HR: 66 (01-13-23 @ 04:52) (66 - 72)  BP: 163/89 (01-13-23 @ 04:52) (120/62 - 163/89)  RR: 18 (01-13-23 @ 04:52) (18 - 18)  SpO2: 95% (01-13-23 @ 04:52) (95% - 96%)  Wt(kg): --  I&O's Summary    12 Jan 2023 07:01  -  13 Jan 2023 07:00  --------------------------------------------------------  IN: 120 mL / OUT: 450 mL / NET: -330 mL        Appearance: Elderly female	  Cardiovascular: Normal S1 S2,RRR, No JVD, No murmurs  Respiratory: Lungs clear to auscultation	  Gastrointestinal:  Soft, Non-tender, + BS	  Extremities: Normal range of motion, No clubbing, cyanosis or edema      Home Medications:  acetaminophen 325 mg oral tablet: 3 tab(s) orally every 8 hours, As Needed (10 Gideon 2023 14:31)  amLODIPine 5 mg oral tablet: 1 tab(s) orally once a day (10 Gideon 2023 14:31)  aspirin 81 mg oral delayed release tablet: 1 tab(s) orally once a day (10 Gideon 2023 14:31)  atorvastatin 20 mg oral tablet: 1 tab(s) orally once a day (at bedtime) (10 Gideon 2023 14:31)  Campho-Phenique: apply twice a day to left upper lip for acute pharyngitis (10 Gideon 2023 14:31)  carvedilol 6.25 mg oral tablet: 1 tab(s) orally every 12 hours (10 Gideon 2023 14:31)  Flonase 50 mcg/inh nasal spray: 1 spray(s) nasal 2 times a day (10 Gideon 2023 14:31)  levothyroxine 100 mcg (0.1 mg) oral tablet: 1 tab(s) orally once a day (10 Gideon 2023 14:31)  polyethylene glycol 3350 oral powder for reconstitution: 17 gram(s) orally once a day (10 Gideon 2023 14:31)  PreserVision AREDS oral capsule: 1 cap(s) orally once a day (10 Gideon 2023 14:31)  spironolactone 25 mg oral tablet: 1 tab(s) orally once a day (10 Gideon 2023 14:31)  traMADol 50 mg oral tablet: 1 tab(s) orally 2 times a day, As Needed (10 Gideon 2023 14:31)      MEDICATIONS  (STANDING):  amLODIPine   Tablet 5 milliGRAM(s) Oral daily  aspirin enteric coated 81 milliGRAM(s) Oral daily  atorvastatin 20 milliGRAM(s) Oral at bedtime  carvedilol 6.25 milliGRAM(s) Oral every 12 hours  levothyroxine 100 MICROGram(s) Oral daily  polyethylene glycol 3350 17 Gram(s) Oral daily  spironolactone 12.5 milliGRAM(s) Oral daily      TELEMETRY: 	    ECG:  	  RADIOLOGY:   DIAGNOSTIC TESTING:  [ ] Echocardiogram:  [ ]  Catheterization:  [ ] Stress Test:    OTHER: 	    LABS:	 	    Troponin T, High Sensitivity Result: 132 ng/L [0 - 51] (01-12 @ 07:10)  Troponin T, High Sensitivity Result: 110 ng/L [0 - 51] (01-11 @ 07:25)  Troponin T, High Sensitivity Result: 166 ng/L [0 - 51] (01-09 @ 21:27)  Troponin T, High Sensitivity Result: 159 ng/L [0 - 51] (01-09 @ 19:33)                          11.9   6.77  )-----------( 135      ( 12 Jan 2023 07:14 )             37.4     01-12    137  |  104  |  29<H>  ----------------------------<  90  4.0   |  24  |  0.99    Ca    8.9      12 Jan 2023 07:10              
89y F w/ PMHx of HTN, Arthritis, AFib, pacemaker presents to the ED With 3 days of fatigue, cough, mild sore throat, myalgias. Patient notes she is prone to UTIs and notes urinary frequency. Patient has decreased p.o. intake and has not urinated today.  Denies abdominal pain. .  Denies fevers. Patient denies any chest pain, shortness of breath, nausea, vomiting, dysuria. Patient is coming from [facility] and states she was evaluated by NP sent here for continued weakness. Patient seen now resting comfortably. Patient seen resting comfortably. BP has improved.         MEDICATIONS  (STANDING):  amLODIPine   Tablet 5 milliGRAM(s) Oral daily  aspirin enteric coated 81 milliGRAM(s) Oral daily  atorvastatin 20 milliGRAM(s) Oral at bedtime  levothyroxine 100 MICROGram(s) Oral daily  metoprolol tartrate 25 milliGRAM(s) Oral two times a day  polyethylene glycol 3350 17 Gram(s) Oral daily    MEDICATIONS  (PRN):  acetaminophen     Tablet .. 650 milliGRAM(s) Oral every 6 hours PRN Temp greater or equal to 38.5C (101.3F), Moderate Pain (4 - 6)          VITALS:   T(C): 36.3 (01-14-23 @ 12:59), Max: 37.1 (01-13-23 @ 17:45)  HR: 85 (01-14-23 @ 12:59) (67 - 88)  BP: 152/80 (01-14-23 @ 12:59) (143/79 - 162/91)  RR: 18 (01-14-23 @ 12:59) (18 - 18)  SpO2: 95% (01-14-23 @ 12:59) (95% - 98%)  Wt(kg): --    PHYSICAL EXAM:  GENERAL: NAD, well nourished and conversant  HEAD:  Atraumatic  EYES: EOM, PERRLA, conjunctiva pink and sclera white  ENT: No tonsillar erythema, exudates, or enlargement, moist mucous membranes, good dentition, no lesions  NECK: Supple, No JVD, normal thyroid, carotids with normal upstrokes and no bruits  CHEST/LUNG: Clear to auscultation bilaterally, No rales, rhonchi, wheezing, or rubs  HEART: Regular rate and rhythm, No murmurs, rubs, or gallops  ABDOMEN: Soft, nondistended, no masses, guarding, tenderness or rebound, bowel sounds present  EXTREMITIES:  2+ Peripheral Pulses, No clubbing, cyanosis, or edema.   LYMPH: No lymphadenopathy noted  SKIN: No rashes or lesions  NERVOUS SYSTEM:  Alert & Oriented X3, normal cognitive function. Motor Strength 5/5 right upper and right lower.  5/5 left upper and left lower extremities, DTRs 2+ intact and symmetric      LABS:                      CAPILLARY BLOOD GLUCOSE          RADIOLOGY & ADDITIONAL TESTS:      
89y F w/ PMHx of HTN, Arthritis, AFib, pacemaker presents to the ED With 3 days of fatigue, cough, mild sore throat, myalgias. Patient notes she is prone to UTIs and notes urinary frequency. Patient has decreased p.o. intake and has not urinated today.  Denies abdominal pain. .  Denies fevers. Patient denies any chest pain, shortness of breath, nausea, vomiting, dysuria. Patient is coming from [facility] and states she was evaluated by NP sent here for continued weakness. Patient seen now resting comfortably. Seen working with physical therapy         MEDICATIONS  (STANDING):  amLODIPine   Tablet 5 milliGRAM(s) Oral daily  aspirin enteric coated 81 milliGRAM(s) Oral daily  atorvastatin 20 milliGRAM(s) Oral at bedtime  carvedilol 6.25 milliGRAM(s) Oral every 12 hours  levothyroxine 100 MICROGram(s) Oral daily  polyethylene glycol 3350 17 Gram(s) Oral daily  spironolactone 12.5 milliGRAM(s) Oral daily    MEDICATIONS  (PRN):  acetaminophen     Tablet .. 650 milliGRAM(s) Oral every 6 hours PRN Temp greater or equal to 38.5C (101.3F), Moderate Pain (4 - 6)          VITALS:   T(C): 36.5 (01-12-23 @ 12:16), Max: 36.5 (01-11-23 @ 21:22)  HR: 71 (01-12-23 @ 12:16) (71 - 78)  BP: 151/81 (01-12-23 @ 12:16) (135/72 - 151/81)  RR: 18 (01-12-23 @ 12:16) (17 - 18)  SpO2: 95% (01-12-23 @ 12:16) (94% - 96%)  Wt(kg): --    PHYSICAL EXAM:  GENERAL: NAD, well nourished and conversant  HEAD:  Atraumatic  EYES: EOM, PERRLA, conjunctiva pink and sclera white  ENT: No tonsillar erythema, exudates, or enlargement, moist mucous membranes, good dentition, no lesions  NECK: Supple, No JVD, normal thyroid, carotids with normal upstrokes and no bruits  CHEST/LUNG: Clear to auscultation bilaterally, No rales, rhonchi, wheezing, or rubs  HEART: Regular rate and rhythm, No murmurs, rubs, or gallops  ABDOMEN: Soft, nondistended, no masses, guarding, tenderness or rebound, bowel sounds present  EXTREMITIES:  2+ Peripheral Pulses, No clubbing, cyanosis, or edema.   LYMPH: No lymphadenopathy noted  SKIN: No rashes or lesions  NERVOUS SYSTEM:  Alert & Oriented X3, normal cognitive function. Motor Strength 5/5 right upper and right lower.  5/5 left upper and left lower extremities, DTRs 2+ intact and symmetric    LABS:        CBC Full  -  ( 12 Jan 2023 07:14 )  WBC Count : 6.77 K/uL  RBC Count : 3.90 M/uL  Hemoglobin : 11.9 g/dL  Hematocrit : 37.4 %  Platelet Count - Automated : 135 K/uL  Mean Cell Volume : 95.9 fl  Mean Cell Hemoglobin : 30.5 pg  Mean Cell Hemoglobin Concentration : 31.8 gm/dL  Auto Neutrophil # : x  Auto Lymphocyte # : x  Auto Monocyte # : x  Auto Eosinophil # : x  Auto Basophil # : x  Auto Neutrophil % : x  Auto Lymphocyte % : x  Auto Monocyte % : x  Auto Eosinophil % : x  Auto Basophil % : x    01-12    137  |  104  |  29<H>  ----------------------------<  90  4.0   |  24  |  0.99    Ca    8.9      12 Jan 2023 07:10            CAPILLARY BLOOD GLUCOSE          RADIOLOGY & ADDITIONAL TESTS:

## 2023-01-14 NOTE — PROGRESS NOTE ADULT - PROBLEM SELECTOR PLAN 5
continue to monitor rate  continue carvedilol  will adjust meds as needed  will consider cardiology eval
continue to monitor rate  continue metoprolol  will adjust meds as needed  will consider cardiology eval
continue to monitor rate  continue carvedilol  will adjust meds as needed  will consider cardiology eval
continue to monitor rate  continue carvedilol  will adjust meds as needed  will consider cardiology eval

## 2023-01-14 NOTE — DISCHARGE NOTE NURSING/CASE MANAGEMENT/SOCIAL WORK - NSDCPEFALRISK_GEN_ALL_CORE
For information on Fall & Injury Prevention, visit: [email or web address] OR  [email or web address] OR  https://www.cdc.gov/steadi/patient.html

## 2023-01-14 NOTE — PROGRESS NOTE ADULT - TIME BILLING
Discussed treatment plan with patient at bedside by phone.
Patient seen and examined.  Agree with above PA note.  89y F w/ PMHx of HTN, Arthritis, AFib, pacemaker presents with coronavirus.    remains CV stable  AF rate controlled  Continue BB  patient not on AC possibly due to advanced age/fall risk?  cont low dose asa  will defer starting a/c at this point in light of advanced age and uncertain benefit and safety of long term DOAC use in centenarians   dvt ppx
Patient seen and examined.  Agree with above PA note.  89y F w/ PMHx of HTN, Arthritis, AFib, pacemaker presents with coronavirus.    remains CV stable  AF rate controlled  Continue BB  patient not on AC possibly due to advanced age/fall risk?  cont low dose asa  will defer starting a/c at this point in light of advanced age and uncertain benefit and safety of long term DOAC use in centenarians   dvt ppx
Discussed treatment plan with patient at bedside.
Discussed treatment plan with patient at bedside and daughter by phone.
Discussed treatment plan with patient at bedside.  DC planning back to assisted living

## 2023-01-14 NOTE — DISCHARGE NOTE NURSING/CASE MANAGEMENT/SOCIAL WORK - NSDCVIVACCINE_GEN_ALL_CORE_FT
Tdap; 17-Jun-2019 08:56; Priyanka Easley (RN); [employer]; [id number] (Exp. Date: 04-Apr-2021); IntraMuscular; Deltoid Left.; 0.5 milliLiter(s); VIS (VIS Published: 09-May-2013, VIS Presented: 17-Jun-2019);   Tdap; 25-Dec-2022 19:57; Michelle Richard (RN); [employer]; [id number] (Exp. Date: 01-Jun-2024); IntraMuscular; Deltoid Left.; 0.5 milliLiter(s); VIS (VIS Published: 09-May-2013, VIS Presented: 25-Dec-2022);

## 2023-01-14 NOTE — PROGRESS NOTE ADULT - PROBLEM SELECTOR PROBLEM 3
Patient Education
CRF (chronic renal failure)

## 2023-01-14 NOTE — DISCHARGE NOTE PROVIDER - CARE PROVIDER_API CALL
Jose Shah  GASTROENTEROLOGY  [address] [place] [address], [address]  [place] [address]  Phone: [phone]  Fax: [phone]  Follow Up Time:

## 2023-01-14 NOTE — DISCHARGE NOTE NURSING/CASE MANAGEMENT/SOCIAL WORK - PATIENT PORTAL LINK FT
You can access the FollowMyHealth Patient Portal offered by [facility] by registering at the following website: [email or web address]. By joining [employer]’s FollowMyHealth portal, you will also be able to view your health information using other applications (apps) compatible with our system.

## 2023-01-14 NOTE — PROGRESS NOTE ADULT - PROBLEM SELECTOR PLAN 3
continue to monitor renal function  avoid nephrotoxic agents  renal eval as needed  restart fluids   will hold spironolactone
continue to monitor renal function  avoid nephrotoxic agents  renal eval as needed  restart fluids   will hold spironolactone
continue to monitor renal function  avoid nephrotoxic agents  renal eval as needed  will DC IV fluid and restart spironolactone
continue to monitor renal function  avoid nephrotoxic agents  renal eval as needed  will DC IV fluid and restart spironolactone

## 2023-01-14 NOTE — PROGRESS NOTE ADULT - PROBLEM SELECTOR PLAN 4
will continue carvedilol  will continue to monitor BP and adjust meds as needed   low sodium diet
continue metoprolol  low sodium diet  restart spironolactone  appreciate cardiology input
will continue carvedilol  will continue to monitor BP and adjust meds as needed   low sodium diet  with episode of hypotension will consider decreasing dose of coreg
will continue carvedilol  will continue to monitor BP and adjust meds as needed   low sodium diet

## 2023-01-14 NOTE — PROGRESS NOTE ADULT - PROBLEM SELECTOR PLAN 1
Patient seen resting comfortably  encourage PO intake  physical therapy as tolerated  continue supportive care
Patient seen resting comfortably  PO as tolerated  start gentle hydration  physical therapy as tolerated  continue supportive care
Patient seen resting comfortably  encourage PO intake  physical therapy as tolerated  continue supportive care
Patient seen resting comfortably  encourage PO intake  physical therapy as tolerated  continue supportive care

## 2023-01-14 NOTE — PROGRESS NOTE ADULT - PROBLEM SELECTOR PLAN 6
continue current dose of levothyroxine  check TSH

## 2023-01-14 NOTE — DISCHARGE NOTE PROVIDER - NSDCMRMEDTOKEN_GEN_ALL_CORE_FT
acetaminophen 325 mg oral tablet: 3 tab(s) orally every 8 hours, As Needed  amLODIPine 5 mg oral tablet: 1 tab(s) orally once a day  aspirin 81 mg oral delayed release tablet: 1 tab(s) orally once a day  atorvastatin 20 mg oral tablet: 1 tab(s) orally once a day (at bedtime)  levothyroxine 100 mcg (0.1 mg) oral tablet: 1 tab(s) orally once a day  metoprolol tartrate 25 mg oral tablet: 1 tab(s) orally 2 times a day  polyethylene glycol 3350 oral powder for reconstitution: 17 gram(s) orally once a day

## 2023-02-01 ENCOUNTER — APPOINTMENT (OUTPATIENT)
Dept: GERIATRICS | Facility: ASSISTED LIVING FACILITY | Age: 88
End: 2023-02-01
Payer: MEDICARE

## 2023-02-01 VITALS
TEMPERATURE: 96.98 F | SYSTOLIC BLOOD PRESSURE: 140 MMHG | DIASTOLIC BLOOD PRESSURE: 70 MMHG | OXYGEN SATURATION: 95 % | HEART RATE: 71 BPM

## 2023-02-01 DIAGNOSIS — E78.5 HYPERLIPIDEMIA, UNSPECIFIED: ICD-10-CM

## 2023-02-01 DIAGNOSIS — L30.4 ERYTHEMA INTERTRIGO: ICD-10-CM

## 2023-02-01 PROCEDURE — 99349 HOME/RES VST EST MOD MDM 40: CPT

## 2023-02-01 RX ORDER — KETOCONAZOLE 20 MG/G
2 CREAM TOPICAL TWICE DAILY
Qty: 1 | Refills: 2 | Status: DISCONTINUED | COMMUNITY
Start: 2023-02-01 | End: 2023-02-01

## 2023-02-01 RX ORDER — LEVOFLOXACIN 750 MG/1
750 TABLET, FILM COATED ORAL
Qty: 3 | Refills: 0 | Status: DISCONTINUED | COMMUNITY
Start: 2019-11-22 | End: 2023-02-01

## 2023-02-02 ENCOUNTER — NON-APPOINTMENT (OUTPATIENT)
Age: 88
End: 2023-02-02

## 2023-02-03 PROBLEM — E78.5 HLD (HYPERLIPIDEMIA): Status: ACTIVE | Noted: 2022-01-11

## 2023-02-03 PROBLEM — L30.4 INTERTRIGO: Status: ACTIVE | Noted: 2020-12-03

## 2023-02-03 RX ORDER — NYSTATIN 100MM UNIT
POWDER (EA) MISCELLANEOUS
Qty: 1 | Refills: 3 | Status: ACTIVE | COMMUNITY
Start: 2023-02-03 | End: 1900-01-01

## 2023-02-03 NOTE — HISTORY OF PRESENT ILLNESS
[FreeTextEntry1] : encounter conducted 12/28/2022\par \par Ms. SOLIS TAYLOR is a 89yo F with PMHx of HF,HLD, syncope s/p pacemaker, OA, recurrent UTI's, hypothyroidism.\par \par  [facility], [facility]. \par Self-manages meds \par \par Recent hospitalization:\par 1/10-1/14 for viral URI\par covid negative, coronavirus positive\par tx conservatively, patient did well with iv hydration and returned back to half-way\par Seen today with daughter presents\par patient is doing very well overall\par no acute complaints\par no residual cough\par all meds reconcilled\par good appetite\par no recent falls\par normal bowel movements\par \par d/c paperowkr reviewed, notes about DTI however patient denies any known wounds\par no wounds noted on sacrum, legs, heels or elsewhere per reports\par \par rash:\par under breasts, intermittent\par responds well to ketoconazel\par +puritis no open wounds\par \par Frequent falls and gait instability:\par uses walker \par muscle atrophy \par works with PT once a week\par \par polypharmacy/med management:\par duplicate med bottles discarded again today\par pill box reviewed today to ensure no duplicate doses - pills are accurate and no duplicate bottles\par new med: methanamine started by cardiologist, Dr. Buckley\par \par Osteoarthritis:\par ran out of tylenol, pain is worsened in hips and knees\par no recent falls \par dicolfenac gel did not work\par has seen ortho in past, injections did not help\par does not wish to go through additional injectioons at this time \par could not tolerate oxycodone, caused sedation\par tolerating tramadol as needed - was on this medication in past and did well \par \par HTN:\par well controlled today on current regimen\par no dizziness or falls reported\par \par Hypothyroid:\par TSH low, will reduce dose from 100mcg to 75mcg \par \par Vitamin d deficiency:\par 25 on recent labs, will start supplementation\par \par S/p PPM:\par follows with cardio, Dr. Buckley\par no sx  reported\par HR well controlled\par \par hospitalization summary:\par syncope Aug 2022, rehab 12 days \par Hospitalized for syncope, UTI and left arm weakness. \par No acute stroke detected, ct brain with chronic microvascular disease. \par post-hosp visit completed, extensive medd recc for confusion over sprinolactone and coreg dosing\par \par Viral URI 1/2023 - did well with conservative treatment \par \par \par  [Independent] : managing finances [] : Assistance needed with traveling/transport [Walker] : walker [Grab Bars] : grab bars [Shower Chair] : shower chair

## 2023-02-03 NOTE — REVIEW OF SYSTEMS
[Fever] : no fever [Chills] : no chills [Chest Pain] : no chest pain [Palpitations] : no palpitations [Lower Ext Edema] : no lower extremity edema [Cough] : no cough [SOB on Exertion] : no shortness of breath during exertion [Abdominal Pain] : no abdominal pain [Constipation] : no constipation [Diarrhea] : no diarrhea [Dizziness] : no dizziness [Fainting] : no fainting

## 2023-02-03 NOTE — PHYSICAL EXAM
[Alert] : alert [Well Nourished] : well nourished [Healthy Appearing] : healthy appearing [Normal Voice/Communication] : normal voice/communication [Sclera] : the sclera and conjunctiva were normal [Normal Oral Mucosa] : normal oral mucosa [Normal Hearing] : hearing was normal [JVD] : there was jugular-venous distention [No Respiratory Distress] : no respiratory distress [No Acc Muscle Use] : no accessory muscle use [Respiration, Rhythm And Depth] : normal respiratory rhythm and effort [Auscultation Breath Sounds / Voice Sounds] : lungs were clear to auscultation bilaterally [Normal S1, S2] : normal S1 and S2 [Heart Rate And Rhythm] : heart rate was normal and rhythm regular [Edema] : edema was not present [Bowel Sounds] : normal bowel sounds [Abdomen Tenderness] : non-tender [Abdomen Soft] : soft [No CVA Tenderness] : no CVA  tenderness [Normal Gait] : abnormal gait [No Clubbing, Cyanosis] : no clubbing or cyanosis of the fingernails [Involuntary Movements] : no involuntary movements were seen [Motor Tone] : muscle strength and tone were normal [Normal Color / Pigmentation] : normal skin color and pigmentation [No Focal Deficits] : no focal deficits [Oriented To Time, Place, And Person] : oriented to person, place, and time [Normal Affect] : the affect was normal [Normal Insight/Judgment] : insight and judgment were intact [Normal Mood] : the mood was normal [de-identified] : unsteady gait, ambulates with walker

## 2023-02-24 ENCOUNTER — APPOINTMENT (OUTPATIENT)
Dept: ORTHOPEDIC SURGERY | Facility: CLINIC | Age: 88
End: 2023-02-24
Payer: MEDICARE

## 2023-02-24 VITALS
HEIGHT: 64 IN | TEMPERATURE: 206.96 F | WEIGHT: 155 LBS | OXYGEN SATURATION: 97 % | DIASTOLIC BLOOD PRESSURE: 67 MMHG | SYSTOLIC BLOOD PRESSURE: 150 MMHG | HEART RATE: 78 BPM | BODY MASS INDEX: 26.46 KG/M2

## 2023-02-24 PROCEDURE — 20610 DRAIN/INJ JOINT/BURSA W/O US: CPT | Mod: 50

## 2023-02-24 PROCEDURE — 99214 OFFICE O/P EST MOD 30 MIN: CPT | Mod: 25

## 2023-02-24 NOTE — DISCUSSION/SUMMARY
[de-identified] : The patient has osteoarthritis of both knees.  I have discussed the pathology, natural history and treatment options with the patient and her daughter.  She is interested in trying steroid injections today.\par Informed consent was obtained. Site and procedure were confirmed with the patient. Following a sterile prep the right knee was aspirated but no fluid was returned. 1 cc of Depo-Medrol and 4 cc of 1% Xylocaine were injected in the right knee without complication. Sterile dressing was applied. Instructions were given.\par Informed consent was obtained. Site and procedure were confirmed with the patient. Following a sterile prep the left knee was aspirated but no fluid was returned. 1 cc of Depo-Medrol and 4 cc of 1% Xylocaine was injected in the left knee without complication. Sterile dressing was applied. Instructions were given.\par She will take over-the-counter medications such as Tylenol or Aleve for her pain.  She will return as needed.

## 2023-02-24 NOTE — HISTORY OF PRESENT ILLNESS
[de-identified] : 89 year old female presents for initial evaluation of chronic bilateral knee pain and weakness. She denies any recent trauma or injury. She complains of intermittent aching pain and weakness in the knees worse with walking for prolonged periods. She ambulates with the use of an assisted device. She has been taking Tylenol and applying topical pain relievers with mild relief. She reports exercising several days a week and seeing a physical therapist twice per week with some relief. Denies paresthesias or prior injury.

## 2023-02-24 NOTE — PHYSICAL EXAM
[Slightly Antalgic] : slightly antalgic [Walker] : ambulates with walker [LE] : Sensory: Intact in bilateral lower extremities [DP] : dorsalis pedis 2+ and symmetric bilaterally [PT] : posterior tibial 2+ and symmetric bilaterally [Normal] : Alert and in no acute distress [Poor Appearance] : well-appearing [Acute Distress] : not in acute distress [Obese] : not obese [de-identified] : AP, lateral, tunnel and sunrise x-rays of both knees taken today demonstrate moderate degenerative changes.  There are no fractures or dislocations. [de-identified] : The patient has no respiratory distress. Mood and affect are normal. The patient is alert and oriented to person, place and time.\par The patient has minimal pain with rotation of the hips.  There is no tenderness of either hip.  Examination of the knees demonstrates mild swelling bilaterally.  She has mild to moderate pain with motion.  Quadriceps and hamstring function are intact.  There is no instability of collateral or cruciate ligaments.  The calves are soft and nontender.

## 2023-03-06 ENCOUNTER — INPATIENT (INPATIENT)
Facility: HOSPITAL | Age: 88
LOS: 3 days | Discharge: HOME | DRG: 690 | End: 2023-03-10
Attending: INTERNAL MEDICINE | Admitting: INTERNAL MEDICINE
Payer: MEDICARE

## 2023-03-06 VITALS — HEIGHT: 63 IN

## 2023-03-06 DIAGNOSIS — R41.82 ALTERED MENTAL STATUS, UNSPECIFIED: ICD-10-CM

## 2023-03-06 DIAGNOSIS — Z98.49 CATARACT EXTRACTION STATUS, UNSPECIFIED EYE: Chronic | ICD-10-CM

## 2023-03-06 DIAGNOSIS — Z98.890 OTHER SPECIFIED POSTPROCEDURAL STATES: Chronic | ICD-10-CM

## 2023-03-06 DIAGNOSIS — N39.0 URINARY TRACT INFECTION, SITE NOT SPECIFIED: ICD-10-CM

## 2023-03-06 DIAGNOSIS — Z96.7 PRESENCE OF OTHER BONE AND TENDON IMPLANTS: Chronic | ICD-10-CM

## 2023-03-06 DIAGNOSIS — I48.0 PAROXYSMAL ATRIAL FIBRILLATION: ICD-10-CM

## 2023-03-06 DIAGNOSIS — O00.1 TUBAL PREGNANCY: Chronic | ICD-10-CM

## 2023-03-06 DIAGNOSIS — I10 ESSENTIAL (PRIMARY) HYPERTENSION: ICD-10-CM

## 2023-03-06 LAB
ALBUMIN SERPL ELPH-MCNC: 3.7 G/DL — SIGNIFICANT CHANGE UP (ref 3.3–5)
ALP SERPL-CCNC: 131 U/L — HIGH (ref 40–120)
ALT FLD-CCNC: 24 U/L — SIGNIFICANT CHANGE UP (ref 10–45)
ANION GAP SERPL CALC-SCNC: 13 MMOL/L — SIGNIFICANT CHANGE UP (ref 5–17)
APPEARANCE UR: ABNORMAL
AST SERPL-CCNC: 20 U/L — SIGNIFICANT CHANGE UP (ref 10–40)
BACTERIA # UR AUTO: ABNORMAL
BASOPHILS # BLD AUTO: 0.02 K/UL — SIGNIFICANT CHANGE UP (ref 0–0.2)
BASOPHILS NFR BLD AUTO: 0.3 % — SIGNIFICANT CHANGE UP (ref 0–2)
BILIRUB SERPL-MCNC: 0.4 MG/DL — SIGNIFICANT CHANGE UP (ref 0.2–1.2)
BILIRUB UR-MCNC: NEGATIVE — SIGNIFICANT CHANGE UP
BUN SERPL-MCNC: 47 MG/DL — HIGH (ref 7–23)
CALCIUM SERPL-MCNC: 9 MG/DL — SIGNIFICANT CHANGE UP (ref 8.4–10.5)
CHLORIDE SERPL-SCNC: 100 MMOL/L — SIGNIFICANT CHANGE UP (ref 96–108)
CO2 SERPL-SCNC: 24 MMOL/L — SIGNIFICANT CHANGE UP (ref 22–31)
COLOR SPEC: YELLOW — SIGNIFICANT CHANGE UP
CREAT SERPL-MCNC: 1.42 MG/DL — HIGH (ref 0.5–1.3)
DIFF PNL FLD: ABNORMAL
EGFR: 33 ML/MIN/1.73M2 — LOW
EOSINOPHIL # BLD AUTO: 0.07 K/UL — SIGNIFICANT CHANGE UP (ref 0–0.5)
EOSINOPHIL NFR BLD AUTO: 1.1 % — SIGNIFICANT CHANGE UP (ref 0–6)
EPI CELLS # UR: 3 /HPF — SIGNIFICANT CHANGE UP
FLUAV AG NPH QL: SIGNIFICANT CHANGE UP
FLUBV AG NPH QL: SIGNIFICANT CHANGE UP
GLUCOSE SERPL-MCNC: 133 MG/DL — HIGH (ref 70–99)
GLUCOSE UR QL: NEGATIVE — SIGNIFICANT CHANGE UP
HCT VFR BLD CALC: 37.7 % — SIGNIFICANT CHANGE UP (ref 34.5–45)
HGB BLD-MCNC: 12 G/DL — SIGNIFICANT CHANGE UP (ref 11.5–15.5)
HYALINE CASTS # UR AUTO: 19 /LPF — HIGH (ref 0–2)
IMM GRANULOCYTES NFR BLD AUTO: 1.1 % — HIGH (ref 0–0.9)
KETONES UR-MCNC: NEGATIVE — SIGNIFICANT CHANGE UP
LEUKOCYTE ESTERASE UR-ACNC: ABNORMAL
LYMPHOCYTES # BLD AUTO: 1.17 K/UL — SIGNIFICANT CHANGE UP (ref 1–3.3)
LYMPHOCYTES # BLD AUTO: 18.5 % — SIGNIFICANT CHANGE UP (ref 13–44)
MCHC RBC-ENTMCNC: 31.2 PG — SIGNIFICANT CHANGE UP (ref 27–34)
MCHC RBC-ENTMCNC: 31.8 GM/DL — LOW (ref 32–36)
MCV RBC AUTO: 97.9 FL — SIGNIFICANT CHANGE UP (ref 80–100)
MONOCYTES # BLD AUTO: 0.84 K/UL — SIGNIFICANT CHANGE UP (ref 0–0.9)
MONOCYTES NFR BLD AUTO: 13.3 % — SIGNIFICANT CHANGE UP (ref 2–14)
NEUTROPHILS # BLD AUTO: 4.15 K/UL — SIGNIFICANT CHANGE UP (ref 1.8–7.4)
NEUTROPHILS NFR BLD AUTO: 65.7 % — SIGNIFICANT CHANGE UP (ref 43–77)
NITRITE UR-MCNC: POSITIVE
NRBC # BLD: 0 /100 WBCS — SIGNIFICANT CHANGE UP (ref 0–0)
PH UR: 5.5 — SIGNIFICANT CHANGE UP (ref 5–8)
PLATELET # BLD AUTO: 182 K/UL — SIGNIFICANT CHANGE UP (ref 150–400)
POTASSIUM SERPL-MCNC: 4.7 MMOL/L — SIGNIFICANT CHANGE UP (ref 3.5–5.3)
POTASSIUM SERPL-SCNC: 4.7 MMOL/L — SIGNIFICANT CHANGE UP (ref 3.5–5.3)
PROT SERPL-MCNC: 6.9 G/DL — SIGNIFICANT CHANGE UP (ref 6–8.3)
PROT UR-MCNC: ABNORMAL
RBC # BLD: 3.85 M/UL — SIGNIFICANT CHANGE UP (ref 3.8–5.2)
RBC # FLD: 14.8 % — HIGH (ref 10.3–14.5)
RBC CASTS # UR COMP ASSIST: 6 /HPF — HIGH (ref 0–4)
RSV RNA NPH QL NAA+NON-PROBE: SIGNIFICANT CHANGE UP
SARS-COV-2 RNA SPEC QL NAA+PROBE: SIGNIFICANT CHANGE UP
SODIUM SERPL-SCNC: 137 MMOL/L — SIGNIFICANT CHANGE UP (ref 135–145)
SP GR SPEC: 1.02 — SIGNIFICANT CHANGE UP (ref 1.01–1.02)
TROPONIN T, HIGH SENSITIVITY RESULT: 46 NG/L — SIGNIFICANT CHANGE UP (ref 0–51)
UROBILINOGEN FLD QL: NEGATIVE — SIGNIFICANT CHANGE UP
WBC # BLD: 6.32 K/UL — SIGNIFICANT CHANGE UP (ref 3.8–10.5)
WBC # FLD AUTO: 6.32 K/UL — SIGNIFICANT CHANGE UP (ref 3.8–10.5)
WBC UR QL: 1875 /HPF — HIGH (ref 0–5)

## 2023-03-06 PROCEDURE — 99285 EMERGENCY DEPT VISIT HI MDM: CPT

## 2023-03-06 PROCEDURE — 71045 X-RAY EXAM CHEST 1 VIEW: CPT | Mod: 26

## 2023-03-06 RX ORDER — MEROPENEM 1 G/30ML
1000 INJECTION INTRAVENOUS ONCE
Refills: 0 | Status: COMPLETED | OUTPATIENT
Start: 2023-03-06 | End: 2023-03-06

## 2023-03-06 RX ORDER — MEROPENEM 1 G/30ML
1000 INJECTION INTRAVENOUS EVERY 12 HOURS
Refills: 0 | Status: DISCONTINUED | OUTPATIENT
Start: 2023-03-06 | End: 2023-03-09

## 2023-03-06 RX ORDER — ASPIRIN/CALCIUM CARB/MAGNESIUM 324 MG
81 TABLET ORAL DAILY
Refills: 0 | Status: DISCONTINUED | OUTPATIENT
Start: 2023-03-06 | End: 2023-03-10

## 2023-03-06 RX ORDER — AMLODIPINE BESYLATE 2.5 MG/1
5 TABLET ORAL DAILY
Refills: 0 | Status: DISCONTINUED | OUTPATIENT
Start: 2023-03-06 | End: 2023-03-10

## 2023-03-06 RX ORDER — SODIUM CHLORIDE 9 MG/ML
1000 INJECTION INTRAMUSCULAR; INTRAVENOUS; SUBCUTANEOUS
Refills: 0 | Status: DISCONTINUED | OUTPATIENT
Start: 2023-03-06 | End: 2023-03-10

## 2023-03-06 RX ORDER — LEVOTHYROXINE SODIUM 125 MCG
100 TABLET ORAL DAILY
Refills: 0 | Status: DISCONTINUED | OUTPATIENT
Start: 2023-03-06 | End: 2023-03-10

## 2023-03-06 RX ORDER — SODIUM CHLORIDE 9 MG/ML
250 INJECTION INTRAMUSCULAR; INTRAVENOUS; SUBCUTANEOUS ONCE
Refills: 0 | Status: COMPLETED | OUTPATIENT
Start: 2023-03-06 | End: 2023-03-06

## 2023-03-06 RX ORDER — ACETAMINOPHEN 500 MG
650 TABLET ORAL EVERY 6 HOURS
Refills: 0 | Status: DISCONTINUED | OUTPATIENT
Start: 2023-03-06 | End: 2023-03-10

## 2023-03-06 RX ORDER — POLYETHYLENE GLYCOL 3350 17 G/17G
17 POWDER, FOR SOLUTION ORAL DAILY
Refills: 0 | Status: DISCONTINUED | OUTPATIENT
Start: 2023-03-06 | End: 2023-03-10

## 2023-03-06 RX ORDER — METOPROLOL TARTRATE 50 MG
25 TABLET ORAL
Refills: 0 | Status: DISCONTINUED | OUTPATIENT
Start: 2023-03-06 | End: 2023-03-10

## 2023-03-06 RX ORDER — ATORVASTATIN CALCIUM 80 MG/1
20 TABLET, FILM COATED ORAL AT BEDTIME
Refills: 0 | Status: DISCONTINUED | OUTPATIENT
Start: 2023-03-06 | End: 2023-03-10

## 2023-03-06 RX ADMIN — SODIUM CHLORIDE 250 MILLILITER(S): 9 INJECTION INTRAMUSCULAR; INTRAVENOUS; SUBCUTANEOUS at 20:27

## 2023-03-06 RX ADMIN — MEROPENEM 100 MILLIGRAM(S): 1 INJECTION INTRAVENOUS at 23:13

## 2023-03-06 NOTE — ED PROVIDER NOTE - ATTENDING CONTRIBUTION TO CARE
Attending MD Carmencita Lowery:  I personally have seen and examined this patient.  Resident note reviewed and agree on plan of care and except where noted.  See HPI, PE, and MDM for details.

## 2023-03-06 NOTE — ED PROVIDER NOTE - PHYSICAL EXAMINATION
GENERAL: no acute distress, non-toxic appearing  HEAD: normocephalic, atraumatic  HEENT: normal conjunctiva, oral mucosa moist, neck supple  CARDIAC: regular rate and rhythm, normal S1 and S2,  no appreciable murmurs  PULM: clear to ascultation bilaterally, no crackles, rales, rhonchi, or wheezing  GI: abdomen nondistended, soft, nontender, no guarding or rebound tenderness  : no CVA tenderness, no suprapubic tenderness  NEURO: alert and oriented x 3, normal speech  MSK: no visible deformities, no peripheral edema, calf tenderness/redness/swelling  SKIN: no visible rashes, dry, well-perfused GENERAL: no acute distress, non-toxic appearing  HEAD: normocephalic, atraumatic  HEENT: normal conjunctiva, oral mucosa moist, neck supple  CARDIAC: regular rate and rhythm, normal S1 and S2,  no appreciable murmurs  PULM: clear to ascultation bilaterally, no crackles, rales, rhonchi, or wheezing  GI: abdomen nondistended, soft, nontender, no guarding or rebound tenderness  : no CVA tenderness, no suprapubic tenderness  NEURO: alert and oriented x 3, normal speech  MSK: no visible deformities, no peripheral edema, calf tenderness/redness/swelling  SKIN: no visible rashes, dry, well-perfused  Attending Carmencita Lowery: Gen: NAD, heent: atrauamtic, eomi, perrla, mmm, op pink, neck; nttp, no nuchal rigidity, chest: nttp, no crepitus, cv: rrr, +murmur, lungs: ctab, abd: soft, nontender, nondistended, no peritoneal signs, , no guarding, ext: wwp, neg homans, skin: no rash, neuro: awake and alert, following commands, speech clear, sensation and strength intact, no focal deficits psych: no si or HI, does report some depression. no plan

## 2023-03-06 NOTE — ED PROVIDER NOTE - CLINICAL SUMMARY MEDICAL DECISION MAKING FREE TEXT BOX
89-year-old female with a past medical history of HTN, arthritis, A-fib, pacemaker presents to the ED with acute episode of unresponsiveness while at assisted living facility. Initial vitals nonactionable.  Normotensive breathing comfortably at bedside.  Physical exam as noted above.  Patient is AAO x3, no acute complaints.  Breathing comfortably at bedside.  Abdomen is soft nontender without guarding or rebound tenderness.  No lower extremity swelling.  No traumatic injuries noted.  MOLST form reviewed and notable for no hospitalizations except for severe pain or severe complaints.  Differential diagnosis includes but not limited to anemia versus metabolic derangements.  Will order labs, and reassess. 89-year-old female with a past medical history of HTN, arthritis, A-fib, pacemaker presents to the ED with acute episode of unresponsiveness while at assisted living facility. Initial vitals nonactionable.  Normotensive breathing comfortably at bedside.  Physical exam as noted above.  Patient is AAO x3, no acute complaints.  Breathing comfortably at bedside.  Abdomen is soft nontender without guarding or rebound tenderness.  No lower extremity swelling.  No traumatic injuries noted.  MOLST form reviewed and notable for no hospitalizations except for severe pain or severe complaints.  Differential diagnosis includes but not limited to anemia versus metabolic derangements.  Will order labs, and reassess.  Attending Carmencita Lowery: 89-[facility] presenting after episode of altered mental status.  Upon arrival patient awake and alert and following commands.  Patient hemodynamically stable.  On exam patient well-appearing and nontoxic.  No evidence of any head trauma.  Patient moving all of her extremities and nonfocal neurologic exam making acute intracranial pathology less likely.  Discussed with patient we have a goals of care upon arrival.  Patient has a MOLST form and she is DNR/DNI.  Blood work sent showing evidence of mild ANNA.  Discussed with patient who states she has not been eating and drinking as much lately.  On exam abdomen is soft and nontender.  Patient given a small volume of fluid.  Discussed with patient returning home as with age and goals of care.  Patient prefers to be admitted to the hospital.  4-year-old denies any abdominal pain but does report she has difficulty urinating which is normal for her.  UA sent which was positive for infection.  Patient afebrile in the emergency department.  Reviewed prior cultures and patient has grown ESBL in the past.  Discussed with family giving antibiotics for prior cultures of E. coli and then following up versus admission to the hospital.  Family prefers to have patient admitted to the hospital

## 2023-03-06 NOTE — H&P ADULT - NSHPLABSRESULTS_GEN_ALL_CORE
12.0   6.32  )-----------( 182      ( 06 Mar 2023 19:32 )             37.7       -    137  |  100  |  47<H>  ----------------------------<  133<H>  4.7   |  24  |  1.42<H>    Ca    9.0      06 Mar 2023 19:32    TPro  6.9  /  Alb  3.7  /  TBili  0.4  /  DBili  x   /  AST  20  /  ALT  24  /  AlkPhos  131<H>  03-06              Urinalysis Basic - ( 06 Mar 2023 21:40 )    Color: Yellow / Appearance: Turbid / S.022 / pH: x  Gluc: x / Ketone: Negative  / Bili: Negative / Urobili: Negative   Blood: x / Protein: 100 mg/dl / Nitrite: Positive   Leuk Esterase: Large / RBC: 6 /hpf / WBC 1875 /HPF   Sq Epi: x / Non Sq Epi: 3 /hpf / Bacteria: Many            Lactate Trend            CAPILLARY BLOOD GLUCOSE      POCT Blood Glucose.: 132 mg/dL (06 Mar 2023 19:27)

## 2023-03-06 NOTE — H&P ADULT - ASSESSMENT
89 yo woman presents with an episode of nausea and vomiting as well as mental status changes. Patient found to have a positive UA

## 2023-03-06 NOTE — H&P ADULT - NSHPREVIEWOFSYSTEMS_GEN_ALL_CORE
REVIEW OF SYSTEMS:  CONSTITUTIONAL: No fever, change in weight, or fatigue  HEAD: No headache, dizziness or recent trauma  EYES: No eye pain, visual disturbances, or discharge  ENT:  No difficulty hearing, tinnitus, vertigo, No sinus or throat pain  NECK: No pain or stiffness  BREASTS: No pain, masses, or nipple discharge  RESPIRATORY: No cough, wheezing, chills or hemoptysis, No shortness of breath at rest or exertional shortness of breath  CARDIOVASCULAR: No chest pain, palpitations, dizziness, CHF, arrhythmia, cardiomegaly or leg swelling  GASTROINTESTINAL: nausea and vomiting  GENITOURINARY: No dysuria, frequency, hematuria, or incontinence  SKIN: No itching, burning, rashes, or lesions   LYMPH NODES: No history of enlarged glands  ENDOCRINE: No heat or cold intolerance, No hair loss. No osteoporosis or thyroid disease  MUSCULOSKELETAL: No joint pain or swelling, No muscle, back, or extremity pain  PSYCHIATRIC: No depression, anxiety, mood swings, or difficulty sleeping  HEME/LYMPH: No easy bruising, anticoagulants, bleeding disorder or bleeding gums  ALLERGY AND IMMUNOLOGIC: No hives or eczema  NEUROLOGICAL: No memory loss, loss of strength, numbness, or tremors

## 2023-03-06 NOTE — H&P ADULT - HISTORY OF PRESENT ILLNESS
89-year-old female with a past medical history of HTN, arthritis, A-fib, pacemaker presents to the ED with acute episode of unresponsiveness while at assisted living facility.  Patient endorses vomiting yesterday through the night.  She became acutely unresponsive while sitting with health aide.  EMS was called and patient was brought here.  As per EMS, patient had a transient episode of hypotension that recovered spontaneously.  Patient denying any complaints at bedside.  No falls or trauma.  No chest pain, shortness of breath, nausea, vomiting, focal neurologic deficits, numbness, or tingling at bedside. Patient seen now resting comfortably. Patient is AAOx3. Patient found to have a positive UA.

## 2023-03-06 NOTE — H&P ADULT - PROBLEM SELECTOR PLAN 1
Patient with a hx if UTIs in the past  started on meropenem in the ED will continue abx for now and follow cultures  will start gentle hydration with elevated BUN and creatinine  N&V possibly due to Urinary tract infection  will continue to monitor symptoms

## 2023-03-06 NOTE — ED ADULT NURSE NOTE - OBJECTIVE STATEMENT
Pt is a 89y F PMH HTN, arthritis, A-fib, PPM presents to the ED with acute episode of unresponsiveness while at assisted living facility. Pt reports vomiting last night. She became acutely unresponsive while sitting with health aide. Per EMS patient had a transient episode of hypotension that recovered spontaneously.  Patient denying any complaints at bedside. A&Ox4, YBARRA, lungs clear, distal pulses intact, abdomen soft, skin intact. Side rails up for safety, call bell and personal items within reach, instructed to call for assistance, verbalizes understanding. Will continue to monitor.

## 2023-03-06 NOTE — ED PROVIDER NOTE - OBJECTIVE STATEMENT
89-year-old female with a past medical history of HTN, arthritis, A-fib, pacemaker presents to the ED with acute episode of unresponsiveness while at assisted living facility.  Patient endorses vomiting yesterday through the night.  She became acutely unresponsive while sitting with health aide.  EMS was called and patient was brought here.  As per EMS, patient had a transient episode of hypotension that recovered spontaneously.  Patient denying any complaints at bedside.  No falls or trauma.  No chest pain, shortness of breath, nausea, vomiting, focal neurologic deficits, numbness, or tingling at bedside.

## 2023-03-06 NOTE — ED PROVIDER NOTE - PROGRESS NOTE DETAILS
Attending Carmencita Lowery: blood work showed mild sol. pt denies any acute complaints. no si or hi. reviewed paper work pt is dnr/dni and dno not hospitalize Attending Carmencita Lowery: blood work showed some ANNA. d/w pt has not been eating and drinking as much. appears comfortable. will d/w family Attending Carmencita Lowery: blood work showed mild sol. pt denies any acute complaints. no si or hi. reviewed paper work pt is dnr/dni Attending Carmencita Lowery: d/w HCP and pt, prefer to keep pt in the hospital. d/w pt and wants to stay in the hospital. Attending Carmencita Lowery: UA positive for infection. reviewed prior sensitivities. pt has grown ESBL will cover with abx pending culture

## 2023-03-07 DIAGNOSIS — Z71.89 OTHER SPECIFIED COUNSELING: ICD-10-CM

## 2023-03-07 RX ORDER — IBUPROFEN 200 MG
200 TABLET ORAL EVERY 6 HOURS
Refills: 0 | Status: DISCONTINUED | OUTPATIENT
Start: 2023-03-07 | End: 2023-03-10

## 2023-03-07 RX ADMIN — POLYETHYLENE GLYCOL 3350 17 GRAM(S): 17 POWDER, FOR SOLUTION ORAL at 11:53

## 2023-03-07 RX ADMIN — ATORVASTATIN CALCIUM 20 MILLIGRAM(S): 80 TABLET, FILM COATED ORAL at 21:17

## 2023-03-07 RX ADMIN — MEROPENEM 100 MILLIGRAM(S): 1 INJECTION INTRAVENOUS at 17:20

## 2023-03-07 RX ADMIN — Medication 25 MILLIGRAM(S): at 06:09

## 2023-03-07 RX ADMIN — Medication 25 MILLIGRAM(S): at 17:21

## 2023-03-07 RX ADMIN — Medication 100 MICROGRAM(S): at 06:08

## 2023-03-07 RX ADMIN — Medication 81 MILLIGRAM(S): at 11:53

## 2023-03-07 RX ADMIN — AMLODIPINE BESYLATE 5 MILLIGRAM(S): 2.5 TABLET ORAL at 06:08

## 2023-03-07 RX ADMIN — MEROPENEM 100 MILLIGRAM(S): 1 INJECTION INTRAVENOUS at 06:08

## 2023-03-07 NOTE — PHYSICAL THERAPY INITIAL EVALUATION ADULT - PERTINENT HX OF CURRENT PROBLEM, REHAB EVAL
89 y/o F with PMHx of HTN, arthritis, A-fib, pacemaker presents to the ED with acute episode of unresponsiveness while at assisted living facility. Pt endorses vomiting yesterday through the night. She became acutely unresponsive while sitting with health aide. EMS was called and pt was brought to [facility]. As per EMS, pt had a transient episode of hypotension that recovered spontaneously. Pt denying falls or trauma. Pt is AAOx3 and found to have a positive UA. Pt was recently discharged on 1/2023 from [facility] back to [facility]. CXR: (-).

## 2023-03-07 NOTE — PHYSICAL THERAPY INITIAL EVALUATION ADULT - BALANCE DISTURBANCE, IDENTIFIED IMPAIRMENT CONTRIBUTE, REHAB EVAL
impaired coordination/impaired sensory feedback/decreased strength Abdomen soft, non-tender, no guarding.

## 2023-03-07 NOTE — PHYSICAL THERAPY INITIAL EVALUATION ADULT - STRENGTHENING, PT EVAL
Goal: Pt will increase strength >half a grade  t/o extremities to improve safety of transfers and ambulation and decrease risk of falls in 2-weeks.

## 2023-03-07 NOTE — PATIENT PROFILE ADULT - FALL HARM RISK - HARM RISK INTERVENTIONS

## 2023-03-07 NOTE — PATIENT PROFILE ADULT - FUNCTIONAL ASSESSMENT - BASIC MOBILITY SCORE.
Chief complaint:   Chief Complaint   Patient presents with   • Follow-up       Vitals:  Visit Vitals  /65 (BP Location: E, Patient Position: Sitting, Cuff Size: Large Adult)   Pulse 81   Temp 97.4 °F (36.3 °C) (Oral)   Resp 16   Wt 103.4 kg   BMI 41.70 kg/m²       HISTORY OF PRESENT ILLNESS       Referred by: Dr. Amanda Bhandari [phone] r/t probable endometrial cancer  PCP: Dr. 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    Therefore on November 5, 2013 she underwent exploratory laparotomy, total abdominal hysterectomy, bilateral salpingo-oophorectomy, pelvic and periaortic lymphadenectomy, bilateral external ureteral stent placement. Final pathology did identify a malignant mixed mullerian tumor with approximately 15% myometrial invasion and focal endocervical stromal invasion. She did undergo radiation consult inpatient November 5, 2013.    The treatment plan for the patient is to proceed with 3 cycles of chemotherapy with radiation therapy sandwiched between another 3 cycles of chemotherapy. Chemotherapy would include carbo with an a.c. of 6 and Taxol at 175 mg per meter squared on a Q. 21 days cycle. Patient completed her first chemotherapy on 12-.    On 7- the patient underwent a CT scan for the completion of chemotherapy of the chest abdomen and pelvis. Final results from this revealed that the patient had an increase interval size of her right inguinal and retroperitoneal lymph nodes. He subsequently never biopsied under the direction of interventional radiology. Final results from this pathology revealed that the patient had no metastatic carcinoma, a reactive lymph node was identified.    She had External beam radiation therapy and vaginal cuff brachy completed on  4/10/2014. She completed cycle #6 of chemotherapy to be given on 06/20/2014.     Secondary the patient's pulmonary nodules, she did undergo a CT scan of the chest abdomen and pelvis in January 2015. Pulmonary nodules have either resolved or remained stable. Per the CT Scan, these nodules fill the Fleischner criteria for benign lung nodule based on the fact that it dated back to September 2013.    She underwent a hernia repair with Dr. Diallo on 09/09/2015.     Unfortunately she was recently in the hospital for 2 months for a knee infection s/p surgery knee replacement and received IV antibiotics for this.     At today's visit, 6/3/2019 she states she feels well. She denies any vaginal bleeding or discharge. She does admit that she has not been using her vaginal dilator. She complains of constipation and bright red blood with hard stools. She denies any abdominal or pelvis pain.  She reports stability of her neuropathy in her feet and fingers.She states that although it is slightly bothersome, it is not interfering with her quality of life. She denies other subjective complaints.      Patient is up-to-date with colonoscopy.  Last mammogram was 03/17/2019, per [facility].    Additional subjective affirmative responses noted in ROS.      Other significant problems:  Patient Active Problem List    Diagnosis Date Noted   • Pain in joint, site unspecified 01/30/2014     Priority: Medium   • Unspecified constipation 01/03/2014     Priority: Medium     Constipation       • Incarcerated incisional hernia 07/30/2015     Priority: Low   • Morbid obesity due to excess calories (CMS/HCC) 07/30/2015     Priority: Low   • Inguinal adenopathy 07/30/2015     Priority: Low   • Drug induced neutropenia(288.03) 05/16/2014     Priority: Low   • Preop cardiovascular exam 09/26/2013     Priority: Low   • HTN (hypertension) 09/26/2013     Priority: Low   • 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 referred to GYN Oncology by Dr. Amanda Bhandari, [phone] r/t probable endometrial cancer.  PCP: Dr. Rody Jonas, [phone]. Pt. saw [facility] for RT treatment.    Procedures:  09/19/2013: Endometrium, Biopsy: Malignant neoplasm with sarcomatous features.  Endometrium, curettings: Malignant neoplasm, pending immunohistochemical eval.    Surgery: 11/5/2013 (Admended)  1. Exploratory Laparotomy Total Abdominal Hysterectomy Bilateral Salpingo-oophorectomy Pelvic and Periaortic Lymphadenectomy per Dr. Kenneth Ornelas.    2. Bilateral external ureteral stent placement per Dr Marvin.  Pathology:  Primary Site: Uterus  Histologic Type: Malignant mixed mullerian tumor (7 cm), with approximately 15% myometrial   invasion and focal endocervical stromal invasion.  Depth of Invasion (cm):  .3cm    Myometrial Thickness (cm):  2.2cm  Margins:Uninvolved by invasive carcinoma  LVSI: Not identified  Regional Lymph Nodes:  (-), 0/2  Metastasis: Not applicable  Washings:  Negative for malignant cells in a low cellularity sample.    08/01/2014: CT Guided biopsy: Impression: Technically successful tissue sampling performed from dominant right groin lymph node without immediate complication; final path pending.  Pathology - Right inguinal lymph node, touch imprints and core biopsy:  - Reactive lymph node with prominent plasmacytosis; no metastatic carcinoma identified.    Treatment:   12/20/2013 - 6/20/2014: Carbo AUC 6 / Taxol 175 mg/m2 x 6 cycles with External Beam RT sandwiched by Dr. Krystal lim's 5 1/2 wks &  Vaginal Cuff Brachy x's 3.      GYN Case Conference:   11/14/2013: 1.Change pathology to Malignant mixed mullerian tumor, 15% invasion, washings negative, Stage 2?  2. Chemo plus RT. 3. Use Taxol / Carbo. 4. RT = ? [place], after healing ? PET scan: if no evidence of pelvic sidewall involvement then cuff brachy therapy. 5. If something lights up  consider pelvic RT. 6. Aggressive per RT, give Pelvic RT plus brachy. 7. Study available but does not include RT, this makes us uncomfortable. 8. Reference PORTEC &  (?)  9. High risk of recurrence so do both pelvic & brachy.    Imagin2013: Us Pelvis complete: Anteverted, enlarged uterus w/ a heterogeneous appearance.  There is a hypechoic structure in the anterior aspect of the uterus that measures [id number].  The endometrium appears thickened and heterogeneous.  Ovaries are not visualized due to pt body habitus and bowel gas.  No free fluid or gross adnexal masses are seen at this time.  The transvaginal ultrasound is sub optimal due to pt not emptying bladder properly.  No  was available at the time of ultrasound appt.    2013: CT C/A/P: 1. Multiple enlarged lymph nodes within the right external iliac chain as described above in detail. These are concerning for metastatic disease. 2. Few borderline enlarged retroperitoneal lymph nodes, which may be reactive, but cannot exclude neoplastic etiology given the patient's history. Recommend close attention on followup. 3. Tiny 3 mm groundglass nodule in right lower lobe. Recommend close attention on followup.  4. Heterogeneity in the uterus, which may represent the patient's tumor. 5. Sludge in the gallbladder.    2013: PET:  1.Numerous enlarged hypermetabolic lymph nodes in the right iliac chain extending into the right inguinal canal as enumerated above. These are concerning for metastatic lesions, however inflammatory processes could have a similar appearance. These nodes would be amenable to needle biopsy if clinically indicated.  2. Postsurgical changes of hysterectomy. There is moderate FDG activity along the mid abdomen at the surgical site. There are two probable postoperative seromas, one anterior to the left psoas muscle and a second posterior to the bladder. Neither these collections demonstrate increased FDG  activity.   3. Large thyroid goiter which demonstrates no increased FDG uptake.   4. Evidence of prior granulomatous disease.     7/16/2014: CT CAP: IMPRESSION:  1. Interval increase in size of right inguinal and retroperitoneal lymph nodes, as described above. Although nonspecific, these findings are concerning for malignant amy involvement versus reactive adenopathy. The right inguinal lymph nodes could be easily accessed under ultrasound guidance; Consider tissue sampling.   2. Long segment circumferential thickening of the wall of the rectosigmoid colon, likely related to post radiation changes or nonspecific colitis.   3. Gallbladder sludge versus stones.   4. Two pulmonary nodules measure up to 4 mm appear unchanged as compared   to September 28, 2013. Recommend continued followup per Fleischner Society criteria.    01/26/2015: CT CAP:  1. No significant interval change in mild pelvic and lower abdominal lymphadenopathy.  2. Increasing apparent sludge in the gallbladder lumen. Further evaluation with gallbladder ultrasound may be helpful.  3. A tiny 3 mm lung nodule is unchanged, while a 4 mm lung nodule is no longer identified. The 3 mm nodule appears unchanged dating back to September 2013. This fills the Fleischner criteria for a benign lung nodule.  4. Mild to moderate fatty infiltration of the pancreas.  5. Masslike prominence of the lower pole the left lobe of the thyroid gland is again noted. This was not hypermetabolic on a November 2013 CT study.    08/24/2015: CT A/P:  1. Interval increase in size of paraumbilical hernia with increased herniation of bowel and increased abdominal wall defect now measuring 8.3 cm in width, previously 3 cm. No evidence of strangulation of bowel.  2. Mildly prominent pelvic and lower abdominal lymphadenopathy. This is unchanged, although adenopathy in the lower right groin region is partially visualized on today's study, and was not included in the scanned field of  view previously.  3. Biliary sludge and/or calculi, also present on prior CT. Consider gallbladder ultrasound for further evaluation if clinically indicated.  4. Moderate fatty atrophy of the pancreas.  5. Splenic calcifications, likely sequela of prior granulomatous disease.  6. Surgery absent uterus.    2016: CT C/A/P -   1. A 3 mm pulmonary nodule in the right upper lobe remains stable from 2014. It is likely benign.  2. Stable mildly prominent retroperitoneal and mesenteric lymph nodes. Enlarged lymph nodes within the right groin are also stable to slightly decreased in size.  3. Cholelithiasis. Statistically unlikely gallbladder lesion cannot be excluded. Ultrasound could be obtained for further evaluation, as clinically indicated.  4. Enlarged left thyroid lobe which is not significantly changed from .    Genetic Counselin/2/15- not indicated at this time. India Bradford MS, [facility] [place].             PAST MEDICAL, FAMILY AND SOCIAL HISTORY     Medications:  Current Outpatient Prescriptions   Medication   • ibuprofen (MOTRIN) 600 MG tablet   • oxyCODONE/APAP (PERCOCET) 5-325 MG per tablet   • METOPROLOL TARTRATE PO       Allergies:  ALLERGIES:  No Known Allergies    Past Medical  History/Surgeries:  Past Medical History:   Diagnosis Date   • Essential (primary) hypertension    • Malignant neoplasm (CMS/HCC)     Endometrial malignant mixed muellerian tumor S/P surgery/chemo/radiation       Past Surgical History:   Procedure Laterality Date   •  section, classic  , ,    • Joint replacement     • Total abdominal hysterectomy w/ bilateral salpingoophorectomy  2013    Dr Ornelas   • Total knee arthroplasty  , 2012, 2012       Family History:  Family History   Problem Relation Age of Onset   • Other Mother         no hx of breast, uterine or ovarian ca   • Heart disease Mother    • Heart disease Father    • Cancer Brother 68        prostrate        Social  History:  Social History     Tobacco Use   • Smoking status: Former Smoker     Last attempt to quit: 1992     Years since quittin.4   • Smokeless tobacco: Never Used   Substance Use Topics   • Alcohol use: No       REVIEW OF SYSTEMS     Review of Systems   Constitutional: Negative for activity change, appetite change, chills, fatigue, fever and unexpected weight change.   HENT: Negative for congestion, mouth sores, postnasal drip, rhinorrhea and sneezing.         Cold symptoms   Eyes: Negative for visual disturbance.   Respiratory: Negative for cough, shortness of breath and wheezing.    Cardiovascular: Negative for chest pain and leg swelling.   Gastrointestinal: Negative for abdominal distention, abdominal pain, blood in stool, constipation, diarrhea, nausea and vomiting.   Genitourinary: Negative for decreased urine volume, difficulty urinating, dyspareunia, dysuria, frequency, hematuria, pelvic pain, urgency, vaginal bleeding, vaginal discharge and vaginal pain.        Occasional urinary incontinence   Musculoskeletal: Positive for arthralgias and myalgias.   Skin: Negative for color change and rash.   Neurological: Positive for numbness (chronic since chemo). Negative for weakness, light-headedness and headaches.   Hematological: Negative for adenopathy. Does not bruise/bleed easily.   Psychiatric/Behavioral: Negative for dysphoric mood and sleep disturbance. The patient is not nervous/anxious.        PHYSICAL EXAM     Physical Exam   Constitutional: She is oriented to person, place, and time. She appears well-developed and well-nourished. No distress.   HENT:   Head: Normocephalic and atraumatic.   Eyes: Pupils are equal, round, and reactive to light. EOM are normal.   Neck: Normal range of motion. Neck supple.   Cardiovascular: Normal rate, regular rhythm and normal heart sounds.   Pulmonary/Chest: Effort normal and breath sounds normal.   Abdominal: Soft. Normal appearance. She exhibits no  distension. There is no hepatosplenomegaly. There is no tenderness. No hernia.   Genitourinary: Vagina normal.       Pelvic exam was performed with patient supine. There is no rash or lesion on the right labia. There is no rash or lesion on the left labia. Right adnexum displays no mass, no tenderness and no fullness. Left adnexum displays no mass, no tenderness and no fullness. No bleeding in the vagina. No vaginal discharge found.   Genitourinary Comments: Vagina is foreshortened, radiation changes noted.  Agglutination noted. Vaginal mucosa pink and moist. No abnormalities noted on examination     Musculoskeletal: Normal range of motion. She exhibits no edema.   Lymphadenopathy:     She has no axillary adenopathy.        Right: No inguinal and no supraclavicular adenopathy present.        Left: No inguinal and no supraclavicular adenopathy present.   Neurological: She is alert and oriented to person, place, and time.   Skin: Skin is warm and dry. No rash noted. No erythema.   Psychiatric: She has a normal mood and affect. Her speech is normal and behavior is normal. Judgment and thought content normal. Cognition and memory are normal.   Nursing note and vitals reviewed.      ASSESSMENT/PLAN     Data reviewed:  Surgical Pathology with results as identified above in the problem list  as well as operative and procedure note.  Surgical Date: 11/5/2013  Surgical Procedure:  Exploratory laparotomy, total abdominal hysterectomy, pelvic and periaortic lymphadenectomy, bilateral external ureteral stent placement.  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  -sp 6 cycles of Carbo/Taxol, s/p cycle 6 on 06/20/2014  -Status post external beam radiation per Dr. Rice along with vaginal cuff brachy therapy x3, last radiation on 4-  -Surveillance to include every 12 month GYN exams  -CT of the C/A/P in January 2015 due to pulmonary nodules and lymphadenopathy found on her prior CT from 7/2014 was stable  -will do imagine based on symptoms  -Last CXR performed on 5/18/2017 which was negative.  -order placed for mediport removal   -I have discussed again with her that she needs to use a vaginal dilator on a regular basis and explained that if the vagina becomes completely agglutinated, it is difficult to examine. She states that she will attempt to use this.     Neuropathy:  -stable    Lung Nodules:  -stable, benign per Fleischner criteria     Hernia:  -sp repair of hernia with Diallo 09/09/2015    Right Knee Infection:  -Patient has follow up with orthopedic surgery. On IV antibiotics.    Constipation/blood with hard stools:  -referral to GI     All of the patients questions have been answered. She verbalizes an understanding and agreement of the above mentioned plan of care. She states she has no further questions at this time. She is certainly encouraged to contact our clinic should there be any additional questions, concerns or issues, prior to the next follow up visit.     She will therefore return to the clinic in 6 months time or certainly sooner if the need shall arise. Need to order removal of her Mediport at her next visit.     Kristy Back PA-C    Patient seen and evaluated along with the mid-level provider. Participated in the care of the patient. Agree with the above assessment and plan.    Agglutinated vagina  CS of the uterus  Constipation  Vag ana laura  Abs soft ntnd  Surveillance  GI bharati Ornelas MD            16

## 2023-03-07 NOTE — PHYSICAL THERAPY INITIAL EVALUATION ADULT - ADDITIONAL COMMENTS
Per pt, pt lives at [facility] on 5th floor with elevator access. Prior to admission, pt was ambulating independently with rollator and was performing all ADLs including showering independently. Pt states she was receiving OT/PT services prior to admission.

## 2023-03-07 NOTE — PHYSICAL THERAPY INITIAL EVALUATION ADULT - ACTIVE RANGE OF MOTION EXAMINATION, REHAB EVAL
except ramiro shoulder flexion 0-90 degrees/bilateral upper extremity Active ROM was WFL (within functional limits)/bilateral  lower extremity Active ROM was WFL (within functional limits)

## 2023-03-07 NOTE — PHYSICAL THERAPY INITIAL EVALUATION ADULT - GENERAL OBSERVATIONS, REHAB EVAL
Pt received supine in bed, NAD, VSS, +IV, +Blum. Pt agreeable and motivated to participate in PT evaluation. Pt received supine in bed, NAD, VSS, +IV, +Blum, A&Ox4. Pt agreeable and motivated to participate in PT evaluation.

## 2023-03-07 NOTE — PHYSICAL THERAPY INITIAL EVALUATION ADULT - BALANCE TRAINING, PT EVAL
Goal: Pt will improve balance one half grade to improve performance and safety of transfers and ambulation and decrease risk of falls in 2-weeks.

## 2023-03-07 NOTE — PATIENT PROFILE ADULT - IS PATIENT POST-MENOPAUSAL?
[address]  [facility] [address] [address] [facility] [address]  Dept: [phone]    Patient:  Michael Burt  YOB: 1939  Date: 2/21/22    The patient is a 80 y.o. male who presents today for consult of the following problems:     Chief Complaint   Patient presents with    Follow-up         HPI:     Michael Burt is a 80 y.o. male who presents with wife and son for follow-up of recent hospitalization following fall. Patient states that approximately 2 weeks ago he slipped on black ice falling striking the posterior aspect of his head, did have a brief loss of consciousness, was initially taken to outside hospital.  Outside imaging showed small right intraparenchymal hemorrhage, patient was transferred to [facility] for further evaluation. Right posterior temporal contusion treated nonsurgically. Patient is not on any baseline anticoagulation or antiplatelet. Has been doing well since hospital discharge, denies any headaches, seizures, numbness tingling or weakness to extremities. Has been eating and drinking normally. Denies any vision issues, changes to speech or hearing. Denies otorrhea or rhinorrhea. History:     Past Medical History:   Diagnosis Date    Arthritis     Diabetes mellitus ([address]     Hyperlipidemia      History reviewed. No pertinent surgical history. History reviewed. No pertinent family history.   Current Outpatient Medications on File Prior to Visit   Medication Sig Dispense Refill    acetaminophen (TYLENOL) 500 MG tablet Take 500 mg by mouth every 6 hours as needed for Pain      atorvastatin (LIPITOR) 20 MG tablet Take 20 mg by mouth daily      fenofibrate (TRICOR) 145 MG tablet Take 145 mg by mouth daily      ibuprofen (ADVIL;MOTRIN) 600 MG tablet Take 600 mg by mouth every 6 hours as needed for Pain      metFORMIN (GLUCOPHAGE) 500 MG tablet Take 500 mg by mouth Daily with supper       Glucosamine-Chondroitin (OSTEO BI-FLEX REGULAR STRENGTH) 250-200 MG TABS Take 1 tablet by mouth 2 times daily       oxyCODONE-acetaminophen (PERCOCET) 5-325 MG per tablet Take 1 tablet by mouth every 4 hours as needed for Pain.  vitamin D (CHOLECALCIFEROL) 25 MCG (1000 UT) TABS tablet Take 1,000 Units by mouth daily       No current facility-administered medications on file prior to visit. Social History     Tobacco Use    Smoking status: Never Smoker    Smokeless tobacco: Never Used   Substance Use Topics    Alcohol use: Not Currently    Drug use: Never       No Known Allergies    Review of Systems  Constitutional: Negative for activity change and appetite change. HENT: Negative for ear pain and facial swelling. Eyes: Negative for discharge and itching. Respiratory: Negative for choking and chest tightness. Cardiovascular: Negative for chest pain and leg swelling. Gastrointestinal: Negative for nausea and abdominal pain. Endocrine: Negative for cold intolerance and heat intolerance. Genitourinary: Negative for frequency and flank pain. Musculoskeletal: Negative for myalgias and joint swelling. Skin: Negative for rash and wound. Allergic/Immunologic: Negative for environmental allergies and food allergies. Hematological: Negative for adenopathy. Does not bruise/bleed easily. Psychiatric/Behavioral: Negative for self-injury. The patient is not nervous/anxious. Physical Exam:      /68   Pulse 68   Temp 98.3 °F (36.8 °C) (Temporal)   Ht 5' 10\" (1.778 m)   Wt 202 lb (91.6 kg)   SpO2 95%   BMI 28.98 kg/m²   Estimated body mass index is 28.98 kg/m² as calculated from the following:    Height as of this encounter: 5' 10\" (1.778 m). Weight as of this encounter: 202 lb (91.6 kg). General:  Komal Pat is a 80y.o. year old male who appears his stated age. HEENT: Normocephalic atraumatic. Neck supple.   Chest: regular rate; pulses equal  Abdomen: Soft nontender nondistended. Ext: DP and PT pulses 2+, good cap refill  Neuro    Mentation  Appropriate affect  Registration intact  Orientation intact  Judgement intact to situation    Cranial Nerves:   Pupils equal and reactive to light  Extraocular motion intact  Face and shrug symmetric  Tongue midline  No dysarthria  v1-3 sensation symmetric, masseter tone symmetric  Hearing symmetric    Sensation: Intact    Motor  L deltoid 5/5; R deltoid 5/5  L biceps 5/5; R biceps 5/5  L triceps 5/5; R triceps 5/5  L wrist extension 5/5; R wrist extension 5/5  L intrinsics 5/5; R intrinsics 5/5     L iliopsoas 5/5 , R iliopsoas 5/5  L quadriceps 5/5; R quadriceps 5/5  L Dorsiflexion 5/5; R dorsiflexion 5/5  L Plantarflexion 5/5; R plantarflexion 5/5  L EHL 5/5; R EHL 3/5-states this has been his baseline for a long time    Reflexes  L Brachioradialis 2+/4; R brachioradialis 2+/4  L Biceps 2+/4; R Biceps 2+/4  L Triceps 2+/4; R Triceps 2+/4  L Patellar 2+/4: R Patellar 2+/4  L Achilles 2+/4; R Achilles 2+/4    hoffmans L: neg  hoffmans R: neg  Clonus L: neg  Clonus R: neg  Babinski L: neg  Babinski R: neg    Studies Review:     CT head 2/6/2022 (images reviewed):   FINDINGS:   BRAIN/VENTRICLES: There is a small intraparenchymal hemorrhage in the   posterior right temporal lobe measuring 1.5 x 1.1 cm.  Small amounts of   intraventricular hemorrhage layering in the occipital horns.       There is cerebral parenchymal volume loss with chronic microvascular white   matter ischemic disease.  The ventricles are enlarged, likely related to   involutional changes.       Vascular calcifications are noted in the carotid siphons and right vertebral   artery.       No other abnormal extra-axial fluid collections are identified.  Gray-white   differentiation is maintained without evidence of an acute infarct.       ORBITS: The visualized portion of the orbits demonstrate no acute abnormality.       SINUSES: There is scattered paranasal sinus disease that is moderate in   severity.  Greatest involvement is noted in the frontal sinuses and ethmoid   air cells where acute sinusitis cannot be excluded.  The mastoid air cells   are clear.       SOFT TISSUES/SKULL:  The calvarium is intact.  There is periodontal disease,   incompletely imaged.       There is posterior scalp soft tissue swelling. Hospital records reviewed    Assessment and Plan:      1. Contusion of brain without loss of consciousness, subsequent encounter          Plan: Patient doing very well following hospital discharge. Denies any headaches, neurologic symptoms. No sensorimotor deficits present on exam.  No additional imaging necessary. Continue to monitor, can return with any new or worsening symptoms. Otherwise can follow-up on an as-needed basis. Followup: Return if symptoms worsen or fail to improve. Prescriptions Ordered:  No orders of the defined types were placed in this encounter. Orders Placed:  No orders of the defined types were placed in this encounter. Electronically signed by FABIO Torres CNP on 2/21/2022 at 12:06 PM    Please note that this chart was generated using voice recognition Dragon dictation software. Although every effort was made to ensure the accuracy of this automated transcription, some errors in transcription may have occurred. yes

## 2023-03-08 RX ADMIN — POLYETHYLENE GLYCOL 3350 17 GRAM(S): 17 POWDER, FOR SOLUTION ORAL at 12:52

## 2023-03-08 RX ADMIN — Medication 25 MILLIGRAM(S): at 05:21

## 2023-03-08 RX ADMIN — MEROPENEM 100 MILLIGRAM(S): 1 INJECTION INTRAVENOUS at 17:42

## 2023-03-08 RX ADMIN — MEROPENEM 100 MILLIGRAM(S): 1 INJECTION INTRAVENOUS at 05:21

## 2023-03-08 RX ADMIN — Medication 100 MICROGRAM(S): at 05:21

## 2023-03-08 RX ADMIN — ATORVASTATIN CALCIUM 20 MILLIGRAM(S): 80 TABLET, FILM COATED ORAL at 21:21

## 2023-03-08 RX ADMIN — Medication 25 MILLIGRAM(S): at 17:22

## 2023-03-08 RX ADMIN — SODIUM CHLORIDE 50 MILLILITER(S): 9 INJECTION INTRAMUSCULAR; INTRAVENOUS; SUBCUTANEOUS at 05:22

## 2023-03-08 RX ADMIN — SODIUM CHLORIDE 50 MILLILITER(S): 9 INJECTION INTRAMUSCULAR; INTRAVENOUS; SUBCUTANEOUS at 17:54

## 2023-03-08 RX ADMIN — Medication 81 MILLIGRAM(S): at 12:52

## 2023-03-08 RX ADMIN — AMLODIPINE BESYLATE 5 MILLIGRAM(S): 2.5 TABLET ORAL at 05:21

## 2023-03-09 LAB
-  AMIKACIN: SIGNIFICANT CHANGE UP
-  AMOXICILLIN/CLAVULANIC ACID: SIGNIFICANT CHANGE UP
-  AMPICILLIN/SULBACTAM: SIGNIFICANT CHANGE UP
-  AMPICILLIN: SIGNIFICANT CHANGE UP
-  AZTREONAM: SIGNIFICANT CHANGE UP
-  CEFAZOLIN: SIGNIFICANT CHANGE UP
-  CEFEPIME: SIGNIFICANT CHANGE UP
-  CEFOXITIN: SIGNIFICANT CHANGE UP
-  CEFTRIAXONE: SIGNIFICANT CHANGE UP
-  CEFUROXIME: SIGNIFICANT CHANGE UP
-  CIPROFLOXACIN: SIGNIFICANT CHANGE UP
-  ERTAPENEM: SIGNIFICANT CHANGE UP
-  GENTAMICIN: SIGNIFICANT CHANGE UP
-  IMIPENEM: SIGNIFICANT CHANGE UP
-  LEVOFLOXACIN: SIGNIFICANT CHANGE UP
-  MEROPENEM: SIGNIFICANT CHANGE UP
-  NITROFURANTOIN: SIGNIFICANT CHANGE UP
-  PIPERACILLIN/TAZOBACTAM: SIGNIFICANT CHANGE UP
-  TOBRAMYCIN: SIGNIFICANT CHANGE UP
-  TRIMETHOPRIM/SULFAMETHOXAZOLE: SIGNIFICANT CHANGE UP
ANION GAP SERPL CALC-SCNC: 13 MMOL/L — SIGNIFICANT CHANGE UP (ref 5–17)
BUN SERPL-MCNC: 27 MG/DL — HIGH (ref 7–23)
CALCIUM SERPL-MCNC: 8.7 MG/DL — SIGNIFICANT CHANGE UP (ref 8.4–10.5)
CHLORIDE SERPL-SCNC: 106 MMOL/L — SIGNIFICANT CHANGE UP (ref 96–108)
CO2 SERPL-SCNC: 22 MMOL/L — SIGNIFICANT CHANGE UP (ref 22–31)
CREAT SERPL-MCNC: 0.91 MG/DL — SIGNIFICANT CHANGE UP (ref 0.5–1.3)
CULTURE RESULTS: SIGNIFICANT CHANGE UP
EGFR: 56 ML/MIN/1.73M2 — LOW
GLUCOSE SERPL-MCNC: 93 MG/DL — SIGNIFICANT CHANGE UP (ref 70–99)
HCT VFR BLD CALC: 39.6 % — SIGNIFICANT CHANGE UP (ref 34.5–45)
HGB BLD-MCNC: 12.5 G/DL — SIGNIFICANT CHANGE UP (ref 11.5–15.5)
MCHC RBC-ENTMCNC: 30.9 PG — SIGNIFICANT CHANGE UP (ref 27–34)
MCHC RBC-ENTMCNC: 31.6 GM/DL — LOW (ref 32–36)
MCV RBC AUTO: 98 FL — SIGNIFICANT CHANGE UP (ref 80–100)
METHOD TYPE: SIGNIFICANT CHANGE UP
NRBC # BLD: 0 /100 WBCS — SIGNIFICANT CHANGE UP (ref 0–0)
ORGANISM # SPEC MICROSCOPIC CNT: SIGNIFICANT CHANGE UP
ORGANISM # SPEC MICROSCOPIC CNT: SIGNIFICANT CHANGE UP
PLATELET # BLD AUTO: 164 K/UL — SIGNIFICANT CHANGE UP (ref 150–400)
POTASSIUM SERPL-MCNC: 4.3 MMOL/L — SIGNIFICANT CHANGE UP (ref 3.5–5.3)
POTASSIUM SERPL-SCNC: 4.3 MMOL/L — SIGNIFICANT CHANGE UP (ref 3.5–5.3)
RBC # BLD: 4.04 M/UL — SIGNIFICANT CHANGE UP (ref 3.8–5.2)
RBC # FLD: 14.6 % — HIGH (ref 10.3–14.5)
SODIUM SERPL-SCNC: 141 MMOL/L — SIGNIFICANT CHANGE UP (ref 135–145)
SPECIMEN SOURCE: SIGNIFICANT CHANGE UP
WBC # BLD: 7.41 K/UL — SIGNIFICANT CHANGE UP (ref 3.8–10.5)
WBC # FLD AUTO: 7.41 K/UL — SIGNIFICANT CHANGE UP (ref 3.8–10.5)

## 2023-03-09 RX ORDER — CIPROFLOXACIN LACTATE 400MG/40ML
250 VIAL (ML) INTRAVENOUS
Refills: 0 | Status: DISCONTINUED | OUTPATIENT
Start: 2023-03-09 | End: 2023-03-10

## 2023-03-09 RX ADMIN — POLYETHYLENE GLYCOL 3350 17 GRAM(S): 17 POWDER, FOR SOLUTION ORAL at 12:46

## 2023-03-09 RX ADMIN — ATORVASTATIN CALCIUM 20 MILLIGRAM(S): 80 TABLET, FILM COATED ORAL at 21:32

## 2023-03-09 RX ADMIN — Medication 250 MILLIGRAM(S): at 18:16

## 2023-03-09 RX ADMIN — SODIUM CHLORIDE 50 MILLILITER(S): 9 INJECTION INTRAMUSCULAR; INTRAVENOUS; SUBCUTANEOUS at 06:07

## 2023-03-09 RX ADMIN — Medication 81 MILLIGRAM(S): at 12:46

## 2023-03-09 RX ADMIN — Medication 25 MILLIGRAM(S): at 18:16

## 2023-03-09 RX ADMIN — MEROPENEM 100 MILLIGRAM(S): 1 INJECTION INTRAVENOUS at 06:06

## 2023-03-09 RX ADMIN — Medication 100 MICROGRAM(S): at 06:07

## 2023-03-09 RX ADMIN — AMLODIPINE BESYLATE 5 MILLIGRAM(S): 2.5 TABLET ORAL at 06:07

## 2023-03-09 RX ADMIN — Medication 25 MILLIGRAM(S): at 06:07

## 2023-03-10 ENCOUNTER — TRANSCRIPTION ENCOUNTER (OUTPATIENT)
Age: 88
End: 2023-03-10

## 2023-03-10 VITALS
DIASTOLIC BLOOD PRESSURE: 65 MMHG | RESPIRATION RATE: 18 BRPM | TEMPERATURE: 97 F | HEART RATE: 69 BPM | OXYGEN SATURATION: 95 % | SYSTOLIC BLOOD PRESSURE: 101 MMHG

## 2023-03-10 PROCEDURE — 80053 COMPREHEN METABOLIC PANEL: CPT

## 2023-03-10 PROCEDURE — 84484 ASSAY OF TROPONIN QUANT: CPT

## 2023-03-10 PROCEDURE — 81001 URINALYSIS AUTO W/SCOPE: CPT

## 2023-03-10 PROCEDURE — 97116 GAIT TRAINING THERAPY: CPT

## 2023-03-10 PROCEDURE — 82962 GLUCOSE BLOOD TEST: CPT

## 2023-03-10 PROCEDURE — 87186 SC STD MICRODIL/AGAR DIL: CPT

## 2023-03-10 PROCEDURE — 71045 X-RAY EXAM CHEST 1 VIEW: CPT

## 2023-03-10 PROCEDURE — 85027 COMPLETE CBC AUTOMATED: CPT

## 2023-03-10 PROCEDURE — 87637 SARSCOV2&INF A&B&RSV AMP PRB: CPT

## 2023-03-10 PROCEDURE — 87086 URINE CULTURE/COLONY COUNT: CPT

## 2023-03-10 PROCEDURE — 80048 BASIC METABOLIC PNL TOTAL CA: CPT

## 2023-03-10 PROCEDURE — 85025 COMPLETE CBC W/AUTO DIFF WBC: CPT

## 2023-03-10 PROCEDURE — 97162 PT EVAL MOD COMPLEX 30 MIN: CPT

## 2023-03-10 PROCEDURE — 36415 COLL VENOUS BLD VENIPUNCTURE: CPT

## 2023-03-10 PROCEDURE — 93005 ELECTROCARDIOGRAM TRACING: CPT

## 2023-03-10 PROCEDURE — 99285 EMERGENCY DEPT VISIT HI MDM: CPT

## 2023-03-10 PROCEDURE — 97110 THERAPEUTIC EXERCISES: CPT

## 2023-03-10 RX ORDER — LEVOTHYROXINE SODIUM 125 MCG
1 TABLET ORAL
Qty: 0 | Refills: 0 | DISCHARGE
Start: 2023-03-10

## 2023-03-10 RX ORDER — CIPROFLOXACIN LACTATE 400MG/40ML
1 VIAL (ML) INTRAVENOUS
Qty: 8 | Refills: 0
Start: 2023-03-10 | End: 2023-03-13

## 2023-03-10 RX ORDER — METOPROLOL TARTRATE 50 MG
1 TABLET ORAL
Qty: 0 | Refills: 0 | DISCHARGE
Start: 2023-03-10

## 2023-03-10 RX ORDER — POLYETHYLENE GLYCOL 3350 17 G/17G
17 POWDER, FOR SOLUTION ORAL
Qty: 0 | Refills: 0 | DISCHARGE
Start: 2023-03-10

## 2023-03-10 RX ORDER — AMLODIPINE BESYLATE 2.5 MG/1
1 TABLET ORAL
Qty: 0 | Refills: 0 | DISCHARGE
Start: 2023-03-10

## 2023-03-10 RX ADMIN — Medication 81 MILLIGRAM(S): at 14:24

## 2023-03-10 RX ADMIN — Medication 100 MICROGRAM(S): at 05:41

## 2023-03-10 RX ADMIN — Medication 25 MILLIGRAM(S): at 05:42

## 2023-03-10 RX ADMIN — SODIUM CHLORIDE 50 MILLILITER(S): 9 INJECTION INTRAMUSCULAR; INTRAVENOUS; SUBCUTANEOUS at 04:31

## 2023-03-10 RX ADMIN — AMLODIPINE BESYLATE 5 MILLIGRAM(S): 2.5 TABLET ORAL at 05:41

## 2023-03-10 RX ADMIN — Medication 250 MILLIGRAM(S): at 05:41

## 2023-03-10 NOTE — DISCHARGE NOTE NURSING/CASE MANAGEMENT/SOCIAL WORK - NSSCNAMETXT_GEN_ALL_CORE
Your home care referral for Registered Nurse visit and Home Physical therapy has been referred to [facility] [phone] . They will contact you. A prescription for Physical Therapy has also been provided for resumption of Home Physical therapy at the [facility].

## 2023-03-10 NOTE — PROGRESS NOTE ADULT - TIME BILLING
Discussed treatment plan with patient at bedside.  discussed with Patient daughter  plan for DC 3/10
Discussed treatment plan with patient at bedside.
Discussed treatment plan with patient at bedside.  discussed with Patient daughter  DC planning back to assisted living  Patient aware of plan
Discussed treatment plan with patient at bedside.  Left message for the Pts daughter awaiting a call back

## 2023-03-10 NOTE — PROGRESS NOTE ADULT - PROBLEM SELECTOR PLAN 3
will continue to monitor BP  continue metoprolol and amlodipine  will adjust meds as needed

## 2023-03-10 NOTE — DISCHARGE NOTE NURSING/CASE MANAGEMENT/SOCIAL WORK - NSDCVIVACCINE_GEN_ALL_CORE_FT
Tdap; 17-Jun-2019 08:56; Priyanka Easley (RN); [employer]; [id number] (Exp. Date: 04-Apr-2021); IntraMuscular; Deltoid Left.; 0.5 milliLiter(s); VIS (VIS Published: 09-May-2013, VIS Presented: 17-Jun-2019);   Tdap; 25-Dec-2022 19:57; Michelle Richard (RN); [employer]; [id number] (Exp. Date: 01-Jun-2024); IntraMuscular; Deltoid Left.; 0.5 milliLiter(s); VIS (VIS Published: 09-May-2013, VIS Presented: 25-Dec-2022);

## 2023-03-10 NOTE — PROGRESS NOTE ADULT - PROBLEM SELECTOR PLAN 1
Patient with a hx if UTIs in the past  started on meropenem in the ED will continue abx for now and follow cultures  will start gentle hydration with elevated BUN and creatinine  follow cultures  continue current meds
Patient with a hx if UTIs in the past  cultures positive for Ecoli which is pan sensitiv  will start cipro for 5 more day
Patient with a hx if UTIs in the past  cultures positive for Ecoli which is pan sensitive  Patient to complete a 5 day course of cipro
Patient with a hx if UTIs in the past  cultures positive for Ecoli, awaiting sensitivities   follow cultures  continue current meds

## 2023-03-10 NOTE — DISCHARGE NOTE PROVIDER - NSDCFUADDAPPT_GEN_ALL_CORE_FT
APPTS ARE READY TO BE MADE: [X] YES    Best Family or Patient Contact (if needed):    Additional Information about above appointments (if needed):    1:   2:   3:     Other comments or requests:    APPTS ARE READY TO BE MADE: [X] YES    Best Family or Patient Contact (if needed):    Additional Information about above appointments (if needed):    1:   2:   3:     Other comments or requests:   Patient was provided with follow up request details and was advised to call to schedule follow up within specified time frame. No scheduling assistance is needed at this time.

## 2023-03-10 NOTE — DISCHARGE NOTE PROVIDER - NSDCCPCAREPLAN_GEN_ALL_CORE_FT
PRINCIPAL DISCHARGE DIAGNOSIS  Diagnosis: Acute UTI  Assessment and Plan of Treatment: You had a bladder and/or kidney infection.  If you are discharged on antibiotics, you will need to finish the medication as prescribed to reduced the likelihood that the infection will recur.  Avoid medications that will cause urinary retention such as benadryl whenever possible.  Drink adequate fluids - there is no harm in drinking cranberry juice.  Females should urinate right after sex.  Contact your doctor if you experience new symptoms or continued symptoms after treatment, such as pain or burning with urination, frequent urination, urinary urgency, blood in the urine, fever, back pain, and/or nausea vomiting.        SECONDARY DISCHARGE DIAGNOSES  Diagnosis: HTN (hypertension)  Assessment and Plan of Treatment: Continue to follow a low salt/sodium diet.  Perform physical activities as tolerated in consultation with your Primary Care Provider and physical therapist.  Take all medications as prescribed.  Follow up with your medical doctor for routine blood pressure monitoring at your next visit.  Notify your doctor if you have any of the following symptoms:  Dizziness, lightheadedness, blurry vision, headache, chest pain, or shortness of breath.      Diagnosis: PAF (paroxysmal atrial fibrillation)  Assessment and Plan of Treatment: Atrial fibrillation is a common heart rhythm problem which increases the risk of stroke and heat attack.  It helps if you control your blood pressure, avoid alcohol, cut down on caffeine, get treatment for your thyroid if it is overactive, and perform moderate exercise in consultation with your Primary Care Provider.  Call your doctor if you experience chest tightness/pain, lightheadedness, loss of consciousness, shortness of breath (especially with exercise), feel your heart racing or beating unusually, frequent or abnormal bleeding.  It is important to take all your heart medications as prescribed.       PRINCIPAL DISCHARGE DIAGNOSIS  Diagnosis: Acute UTI  Assessment and Plan of Treatment: You had a bladder and/or kidney infection.  If you are discharged on Ciprofloxacin, you will need to finish the medication as prescribed to reduced the likelihood that the infection will recur.  Avoid medications that will cause urinary retention such as benadryl whenever possible.  Drink adequate fluids - there is no harm in drinking cranberry juice.  Females should urinate right after sex.  Contact your doctor if you experience new symptoms or continued symptoms after treatment, such as pain or burning with urination, frequent urination, urinary urgency, blood in the urine, fever, back pain, and/or nausea vomiting.        SECONDARY DISCHARGE DIAGNOSES  Diagnosis: HTN (hypertension)  Assessment and Plan of Treatment: Continue to follow a low salt/sodium diet.  Perform physical activities as tolerated in consultation with your Primary Care Provider and physical therapist.  Take all medications as prescribed.  Follow up with your medical doctor for routine blood pressure monitoring at your next visit.  Notify your doctor if you have any of the following symptoms:  Dizziness, lightheadedness, blurry vision, headache, chest pain, or shortness of breath.      Diagnosis: PAF (paroxysmal atrial fibrillation)  Assessment and Plan of Treatment: Atrial fibrillation is a common heart rhythm problem which increases the risk of stroke and heat attack.  It helps if you control your blood pressure, avoid alcohol, cut down on caffeine, get treatment for your thyroid if it is overactive, and perform moderate exercise in consultation with your Primary Care Provider.  Call your doctor if you experience chest tightness/pain, lightheadedness, loss of consciousness, shortness of breath (especially with exercise), feel your heart racing or beating unusually, frequent or abnormal bleeding.  It is important to take all your heart medications as prescribed.

## 2023-03-10 NOTE — PROGRESS NOTE ADULT - PROBLEM SELECTOR PLAN 4
continue to monitor rate  Patient is not currently on AC  No focal deficits at this time

## 2023-03-10 NOTE — PROGRESS NOTE ADULT - SUBJECTIVE AND OBJECTIVE BOX
89-year-old female with a past medical history of HTN, arthritis, A-fib, pacemaker presents to the ED with acute episode of unresponsiveness while at assisted living facility.  Patient endorses vomiting yesterday through the night.  She became acutely unresponsive while sitting with health aide.  EMS was called and patient was brought here.  As per EMS, patient had a transient episode of hypotension that recovered spontaneously.  Patient denying any complaints at bedside.  No falls or trauma.  No chest pain, shortness of breath, nausea, vomiting, focal neurologic deficits, numbness, or tingling at bedside. Patient seen now resting comfortably. Patient is AAOx3. Patient found to have a positive UA. Cultures were positive for Ecoli which was pan sensitive. Patient . Denies any new symptoms.       MEDICATIONS  (STANDING):  amLODIPine   Tablet 5 milliGRAM(s) Oral daily  aspirin enteric coated 81 milliGRAM(s) Oral daily  atorvastatin 20 milliGRAM(s) Oral at bedtime  ciprofloxacin     Tablet 250 milliGRAM(s) Oral two times a day  levothyroxine 100 MICROGram(s) Oral daily  metoprolol tartrate 25 milliGRAM(s) Oral two times a day  polyethylene glycol 3350 17 Gram(s) Oral daily  sodium chloride 0.9%. 1000 milliLiter(s) (50 mL/Hr) IV Continuous <Continuous>    MEDICATIONS  (PRN):  acetaminophen     Tablet .. 650 milliGRAM(s) Oral every 6 hours PRN Temp greater or equal to 38C (100.4F), Moderate Pain (4 - 6)  ibuprofen  Tablet. 200 milliGRAM(s) Oral every 6 hours PRN Moderate Pain (4 - 6)          VITALS:   T(C): 36.9 (03-10-23 @ 04:50), Max: 37.1 (03-09-23 @ 20:22)  HR: 69 (03-10-23 @ 04:50) (66 - 72)  BP: 163/84 (03-10-23 @ 04:50) (154/75 - 163/84)  RR: 18 (03-10-23 @ 04:50) (18 - 18)  SpO2: 94% (03-10-23 @ 04:50) (94% - 98%)  Wt(kg): --    PHYSICAL EXAM:  GENERAL: NAD, well nourished and conversant  HEAD:  Atraumatic  EYES: EOM, PERRLA, conjunctiva pink and sclera white  ENT: No tonsillar erythema, exudates, or enlargement, moist mucous membranes, good dentition, no lesions  NECK: Supple, No JVD, normal thyroid, carotids with normal upstrokes and no bruits  CHEST/LUNG: Clear to auscultation bilaterally, No rales, rhonchi, wheezing, or rubs  HEART: Regular rate and rhythm, No murmurs, rubs, or gallops  ABDOMEN: Soft, nondistended, no masses, guarding, tenderness or rebound, bowel sounds present  EXTREMITIES:  2+ Peripheral Pulses, No clubbing, cyanosis, or edema.   LYMPH: No lymphadenopathy noted  SKIN: No rashes or lesions  NERVOUS SYSTEM:  Alert & Oriented X3, normal cognitive function. Motor Strength 5/5 right upper and right       LABS:        CBC Full  -  ( 09 Mar 2023 06:28 )  WBC Count : 7.41 K/uL  RBC Count : 4.04 M/uL  Hemoglobin : 12.5 g/dL  Hematocrit : 39.6 %  Platelet Count - Automated : 164 K/uL  Mean Cell Volume : 98.0 fl  Mean Cell Hemoglobin : 30.9 pg  Mean Cell Hemoglobin Concentration : 31.6 gm/dL  Auto Neutrophil # : x  Auto Lymphocyte # : x  Auto Monocyte # : x  Auto Eosinophil # : x  Auto Basophil # : x  Auto Neutrophil % : x  Auto Lymphocyte % : x  Auto Monocyte % : x  Auto Eosinophil % : x  Auto Basophil % : x    03-09    141  |  106  |  27<H>  ----------------------------<  93  4.3   |  22  |  0.91    Ca    8.7      09 Mar 2023 06:33            CAPILLARY BLOOD GLUCOSE          RADIOLOGY & ADDITIONAL TESTS:      
89-year-old female with a past medical history of HTN, arthritis, A-fib, pacemaker presents to the ED with acute episode of unresponsiveness while at assisted living facility.  Patient endorses vomiting yesterday through the night.  She became acutely unresponsive while sitting with health aide.  EMS was called and patient was brought here.  As per EMS, patient had a transient episode of hypotension that recovered spontaneously.  Patient denying any complaints at bedside.  No falls or trauma.  No chest pain, shortness of breath, nausea, vomiting, focal neurologic deficits, numbness, or tingling at bedside. Patient seen now resting comfortably. Patient is AAOx3. Patient found to have a positive UA. Patient tolerated breakfast this am. Denies any new symptoms      MEDICATIONS  (STANDING):  amLODIPine   Tablet 5 milliGRAM(s) Oral daily  aspirin enteric coated 81 milliGRAM(s) Oral daily  atorvastatin 20 milliGRAM(s) Oral at bedtime  levothyroxine 100 MICROGram(s) Oral daily  meropenem  IVPB 1000 milliGRAM(s) IV Intermittent every 12 hours  metoprolol tartrate 25 milliGRAM(s) Oral two times a day  polyethylene glycol 3350 17 Gram(s) Oral daily  sodium chloride 0.9%. 1000 milliLiter(s) (50 mL/Hr) IV Continuous <Continuous>    MEDICATIONS  (PRN):  acetaminophen     Tablet .. 650 milliGRAM(s) Oral every 6 hours PRN Temp greater or equal to 38C (100.4F), Moderate Pain (4 - 6)  ibuprofen  Tablet. 200 milliGRAM(s) Oral every 6 hours PRN Moderate Pain (4 - 6)          VITALS:   T(C): 36.9 (03-09-23 @ 12:58), Max: 36.9 (03-09-23 @ 12:58)  HR: 72 (03-09-23 @ 14:35) (63 - 82)  BP: 161/73 (03-09-23 @ 14:35) (142/82 - 164/82)  RR: 18 (03-09-23 @ 12:58) (18 - 18)  SpO2: 96% (03-09-23 @ 14:35) (95% - 98%)  Wt(kg): --    PHYSICAL EXAM:  GENERAL: NAD, well nourished and conversant  HEAD:  Atraumatic  EYES: EOM, PERRLA, conjunctiva pink and sclera white  ENT: No tonsillar erythema, exudates, or enlargement, moist mucous membranes, good dentition, no lesions  NECK: Supple, No JVD, normal thyroid, carotids with normal upstrokes and no bruits  CHEST/LUNG: Clear to auscultation bilaterally, No rales, rhonchi, wheezing, or rubs  HEART: Regular rate and rhythm, No murmurs, rubs, or gallops  ABDOMEN: Soft, nondistended, no masses, guarding, tenderness or rebound, bowel sounds present  EXTREMITIES:  2+ Peripheral Pulses, No clubbing, cyanosis, or edema.   LYMPH: No lymphadenopathy noted  SKIN: No rashes or lesions  NERVOUS SYSTEM:  Alert & Oriented X3, normal cognitive function. Motor Strength 5/5 right upper and right       LABS:        CBC Full  -  ( 09 Mar 2023 06:28 )  WBC Count : 7.41 K/uL  RBC Count : 4.04 M/uL  Hemoglobin : 12.5 g/dL  Hematocrit : 39.6 %  Platelet Count - Automated : 164 K/uL  Mean Cell Volume : 98.0 fl  Mean Cell Hemoglobin : 30.9 pg  Mean Cell Hemoglobin Concentration : 31.6 gm/dL  Auto Neutrophil # : x  Auto Lymphocyte # : x  Auto Monocyte # : x  Auto Eosinophil # : x  Auto Basophil # : x  Auto Neutrophil % : x  Auto Lymphocyte % : x  Auto Monocyte % : x  Auto Eosinophil % : x  Auto Basophil % : x    03-09    141  |  106  |  27<H>  ----------------------------<  93  4.3   |  22  |  0.91    Ca    8.7      09 Mar 2023 06:33            CAPILLARY BLOOD GLUCOSE          RADIOLOGY & ADDITIONAL TESTS:      
89-year-old female with a past medical history of HTN, arthritis, A-fib, pacemaker presents to the ED with acute episode of unresponsiveness while at assisted living facility.  Patient endorses vomiting yesterday through the night.  She became acutely unresponsive while sitting with health aide.  EMS was called and patient was brought here.  As per EMS, patient had a transient episode of hypotension that recovered spontaneously.  Patient denying any complaints at bedside.  No falls or trauma.  No chest pain, shortness of breath, nausea, vomiting, focal neurologic deficits, numbness, or tingling at bedside. Patient seen now resting comfortably. Patient is AAOx3. Patient found to have a positive UA. Patient tolerated breakfast this am. Denies any new symptoms    MEDICATIONS  (STANDING):  amLODIPine   Tablet 5 milliGRAM(s) Oral daily  aspirin enteric coated 81 milliGRAM(s) Oral daily  atorvastatin 20 milliGRAM(s) Oral at bedtime  levothyroxine 100 MICROGram(s) Oral daily  meropenem  IVPB 1000 milliGRAM(s) IV Intermittent every 12 hours  metoprolol tartrate 25 milliGRAM(s) Oral two times a day  polyethylene glycol 3350 17 Gram(s) Oral daily  sodium chloride 0.9%. 1000 milliLiter(s) (50 mL/Hr) IV Continuous <Continuous>    MEDICATIONS  (PRN):  acetaminophen     Tablet .. 650 milliGRAM(s) Oral every 6 hours PRN Temp greater or equal to 38C (100.4F), Moderate Pain (4 - 6)          VITALS:   T(C): 36.5 (23 @ 12:45), Max: 36.5 (23 @ 21:01)  HR: 73 (23 @ 12:45) (60 - 75)  BP: 130/69 (23 @ 12:45) (116/73 - 152/81)  RR: 18 (23 @ 12:45) (18 - 18)  SpO2: 98% (23 @ 12:45) (95% - 100%)  Wt(kg): --    PHYSICAL EXAM:  GENERAL: NAD, well nourished and conversant  HEAD:  Atraumatic  EYES: EOM, PERRLA, conjunctiva pink and sclera white  ENT: No tonsillar erythema, exudates, or enlargement, moist mucous membranes, good dentition, no lesions  NECK: Supple, No JVD, normal thyroid, carotids with normal upstrokes and no bruits  CHEST/LUNG: Clear to auscultation bilaterally, No rales, rhonchi, wheezing, or rubs  HEART: Regular rate and rhythm, No murmurs, rubs, or gallops  ABDOMEN: Soft, nondistended, no masses, guarding, tenderness or rebound, bowel sounds present  EXTREMITIES:  2+ Peripheral Pulses, No clubbing, cyanosis, or edema.   LYMPH: No lymphadenopathy noted  SKIN: No rashes or lesions  NERVOUS SYSTEM:  Alert & Oriented X3, normal cognitive function. Motor Strength 5/5 right upper and right     LABS:        CBC Full  -  ( 06 Mar 2023 19:32 )  WBC Count : 6.32 K/uL  RBC Count : 3.85 M/uL  Hemoglobin : 12.0 g/dL  Hematocrit : 37.7 %  Platelet Count - Automated : 182 K/uL  Mean Cell Volume : 97.9 fl  Mean Cell Hemoglobin : 31.2 pg  Mean Cell Hemoglobin Concentration : 31.8 gm/dL  Auto Neutrophil # : 4.15 K/uL  Auto Lymphocyte # : 1.17 K/uL  Auto Monocyte # : 0.84 K/uL  Auto Eosinophil # : 0.07 K/uL  Auto Basophil # : 0.02 K/uL  Auto Neutrophil % : 65.7 %  Auto Lymphocyte % : 18.5 %  Auto Monocyte % : 13.3 %  Auto Eosinophil % : 1.1 %  Auto Basophil % : 0.3 %    -    137  |  100  |  47<H>  ----------------------------<  133<H>  4.7   |  24  |  1.42<H>    Ca    9.0      06 Mar 2023 19:32    TPro  6.9  /  Alb  3.7  /  TBili  0.4  /  DBili  x   /  AST  20  /  ALT  24  /  AlkPhos  131<H>  03-06    LIVER FUNCTIONS - ( 06 Mar 2023 19:32 )  Alb: 3.7 g/dL / Pro: 6.9 g/dL / ALK PHOS: 131 U/L / ALT: 24 U/L / AST: 20 U/L / GGT: x             Urinalysis Basic - ( 06 Mar 2023 21:40 )    Color: Yellow / Appearance: Turbid / S.022 / pH: x  Gluc: x / Ketone: Negative  / Bili: Negative / Urobili: Negative   Blood: x / Protein: 100 mg/dl / Nitrite: Positive   Leuk Esterase: Large / RBC: 6 /hpf / WBC 1875 /HPF   Sq Epi: x / Non Sq Epi: 3 /hpf / Bacteria: Many      CAPILLARY BLOOD GLUCOSE      POCT Blood Glucose.: 132 mg/dL (06 Mar 2023 19:27)      RADIOLOGY & ADDITIONAL TESTS:      
89-year-old female with a past medical history of HTN, arthritis, A-fib, pacemaker presents to the ED with acute episode of unresponsiveness while at assisted living facility.  Patient endorses vomiting yesterday through the night.  She became acutely unresponsive while sitting with health aide.  EMS was called and patient was brought here.  As per EMS, patient had a transient episode of hypotension that recovered spontaneously.  Patient denying any complaints at bedside.  No falls or trauma.  No chest pain, shortness of breath, nausea, vomiting, focal neurologic deficits, numbness, or tingling at bedside. Patient seen now resting comfortably. Patient is AAOx3. Patient found to have a positive UA. Patient tolerated breakfast this am. Denies any new symptoms      MEDICATIONS  (STANDING):  amLODIPine   Tablet 5 milliGRAM(s) Oral daily  aspirin enteric coated 81 milliGRAM(s) Oral daily  atorvastatin 20 milliGRAM(s) Oral at bedtime  levothyroxine 100 MICROGram(s) Oral daily  meropenem  IVPB 1000 milliGRAM(s) IV Intermittent every 12 hours  metoprolol tartrate 25 milliGRAM(s) Oral two times a day  polyethylene glycol 3350 17 Gram(s) Oral daily  sodium chloride 0.9%. 1000 milliLiter(s) (50 mL/Hr) IV Continuous <Continuous>    MEDICATIONS  (PRN):  acetaminophen     Tablet .. 650 milliGRAM(s) Oral every 6 hours PRN Temp greater or equal to 38C (100.4F), Moderate Pain (4 - 6)  ibuprofen  Tablet. 200 milliGRAM(s) Oral every 6 hours PRN Moderate Pain (4 - 6)          VITALS:   T(C): 36.6 (23 @ 11:04), Max: 36.6 (23 @ 11:04)  HR: 68 (23 @ 11:04) (68 - 71)  BP: 136/73 (23 @ 11:04) (136/73 - 168/82)  RR: 18 (23 @ 11:04) (18 - 18)  SpO2: 97% (23 @ 11:04) (96% - 97%)  Wt(kg): --    PHYSICAL EXAM:  GENERAL: NAD, well nourished and conversant  HEAD:  Atraumatic  EYES: EOM, PERRLA, conjunctiva pink and sclera white  ENT: No tonsillar erythema, exudates, or enlargement, moist mucous membranes, good dentition, no lesions  NECK: Supple, No JVD, normal thyroid, carotids with normal upstrokes and no bruits  CHEST/LUNG: Clear to auscultation bilaterally, No rales, rhonchi, wheezing, or rubs  HEART: Regular rate and rhythm, No murmurs, rubs, or gallops  ABDOMEN: Soft, nondistended, no masses, guarding, tenderness or rebound, bowel sounds present  EXTREMITIES:  2+ Peripheral Pulses, No clubbing, cyanosis, or edema.   LYMPH: No lymphadenopathy noted  SKIN: No rashes or lesions  NERVOUS SYSTEM:  Alert & Oriented X3, normal cognitive function. Motor Strength 5/5 right upper and right       LABS:        CBC Full  -  ( 06 Mar 2023 19:32 )  WBC Count : 6.32 K/uL  RBC Count : 3.85 M/uL  Hemoglobin : 12.0 g/dL  Hematocrit : 37.7 %  Platelet Count - Automated : 182 K/uL  Mean Cell Volume : 97.9 fl  Mean Cell Hemoglobin : 31.2 pg  Mean Cell Hemoglobin Concentration : 31.8 gm/dL  Auto Neutrophil # : 4.15 K/uL  Auto Lymphocyte # : 1.17 K/uL  Auto Monocyte # : 0.84 K/uL  Auto Eosinophil # : 0.07 K/uL  Auto Basophil # : 0.02 K/uL  Auto Neutrophil % : 65.7 %  Auto Lymphocyte % : 18.5 %  Auto Monocyte % : 13.3 %  Auto Eosinophil % : 1.1 %  Auto Basophil % : 0.3 %        137  |  100  |  47<H>  ----------------------------<  133<H>  4.7   |  24  |  1.42<H>    Ca    9.0      06 Mar 2023 19:32    TPro  6.9  /  Alb  3.7  /  TBili  0.4  /  DBili  x   /  AST  20  /  ALT  24  /  AlkPhos  131<H>  03-06    LIVER FUNCTIONS - ( 06 Mar 2023 19:32 )  Alb: 3.7 g/dL / Pro: 6.9 g/dL / ALK PHOS: 131 U/L / ALT: 24 U/L / AST: 20 U/L / GGT: x             Urinalysis Basic - ( 06 Mar 2023 21:40 )    Color: Yellow / Appearance: Turbid / S.022 / pH: x  Gluc: x / Ketone: Negative  / Bili: Negative / Urobili: Negative   Blood: x / Protein: 100 mg/dl / Nitrite: Positive   Leuk Esterase: Large / RBC: 6 /hpf / WBC 1875 /HPF   Sq Epi: x / Non Sq Epi: 3 /hpf / Bacteria: Many      CAPILLARY BLOOD GLUCOSE          RADIOLOGY & ADDITIONAL TESTS:

## 2023-03-10 NOTE — DISCHARGE NOTE NURSING/CASE MANAGEMENT/SOCIAL WORK - PATIENT PORTAL LINK FT
You can access the FollowMyHealth Patient Portal offered by [facility] by registering at the following website: [email or web address]. By joining [facility]’s FollowMyHealth portal, you will also be able to view your health information using other applications (apps) compatible with our system.

## 2023-03-10 NOTE — DISCHARGE NOTE PROVIDER - NSDCMRMEDTOKEN_GEN_ALL_CORE_FT
acetaminophen 325 mg oral tablet: 3 tab(s) orally every 8 hours, As Needed  amLODIPine 5 mg oral tablet: 1 tab(s) orally once a day  amLODIPine 5 mg oral tablet: 1 tab(s) orally once a day  aspirin 81 mg oral delayed release tablet: 1 tab(s) orally once a day  atorvastatin 20 mg oral tablet: 1 tab(s) orally once a day (at bedtime)  levothyroxine 100 mcg (0.1 mg) oral tablet: 1 tab(s) orally once a day  levothyroxine 100 mcg (0.1 mg) oral tablet: 1 tab(s) orally once a day  metoprolol tartrate 25 mg oral tablet: 1 tab(s) orally 2 times a day  metoprolol tartrate 25 mg oral tablet: 1 tab(s) orally 2 times a day  polyethylene glycol 3350 oral powder for reconstitution: 17 gram(s) orally once a day  polyethylene glycol 3350 oral powder for reconstitution: 17 gram(s) orally once a day   acetaminophen 325 mg oral tablet: 3 tab(s) orally every 8 hours, As Needed  amLODIPine 5 mg oral tablet: 1 tab(s) orally once a day  aspirin 81 mg oral delayed release tablet: 1 tab(s) orally once a day  atorvastatin 20 mg oral tablet: 1 tab(s) orally once a day (at bedtime)  ciprofloxacin 250 mg oral tablet: 1 tab(s) orally 2 times a day  levothyroxine 100 mcg (0.1 mg) oral tablet: 1 tab(s) orally once a day  metoprolol tartrate 25 mg oral tablet: 1 tab(s) orally 2 times a day  polyethylene glycol 3350 oral powder for reconstitution: 17 gram(s) orally once a day   acetaminophen 325 mg oral tablet: 3 tab(s) orally every 8 hours, As Needed  amLODIPine 5 mg oral tablet: 1 tab(s) orally once a day  aspirin 81 mg oral delayed release tablet: 1 tab(s) orally once a day  atorvastatin 20 mg oral tablet: 1 tab(s) orally once a day (at bedtime)  ciprofloxacin 250 mg oral tablet: 1 tab(s) orally 2 times a day  levothyroxine 100 mcg (0.1 mg) oral tablet: 1 tab(s) orally once a day  metoprolol tartrate 25 mg oral tablet: 1 tab(s) orally 2 times a day  Physical Therapy:   polyethylene glycol 3350 oral powder for reconstitution: 17 gram(s) orally once a day

## 2023-03-10 NOTE — PROGRESS NOTE ADULT - PROBLEM SELECTOR PLAN 5
Patient is DNR   MOLST in chart

## 2023-03-10 NOTE — PROGRESS NOTE ADULT - PROBLEM SELECTOR PLAN 2
continue to monitor mental status  Pt is currently AAO x 3

## 2023-03-10 NOTE — DISCHARGE NOTE PROVIDER - CARE PROVIDER_API CALL
Jose Shah  GASTROENTEROLOGY  [address] [place] [address], [address]  [place] [id number]  Phone: [phone]  Fax: [phone]  Follow Up Time: 1 week

## 2023-03-10 NOTE — DISCHARGE NOTE PROVIDER - HOSPITAL COURSE
89-year-old female with a past medical history of HTN, arthritis, A-fib, pacemaker presented to the ED with acute episode of unresponsiveness while at assisted living facility. In ED, patient found to have a positive UA - started on meropenem. Patient was admitted for further medical management .  Urine cultures grew Ecoli - pan sensitive. Patient was transitioned to cipro for 5 more days. Physical therapy evaluated patient recommending return to [facility] with PT.     Discharge/Dispo/Med rec discussed with attending  ____. Patient medically cleared for discharge with outpatient follow up with PCP.     89-year-old female with a past medical history of HTN, arthritis, A-fib, pacemaker presented to the ED with acute episode of unresponsiveness while at assisted living facility. In ED, patient found to have a positive UA - started on meropenem. Patient was admitted for further medical management .  Urine cultures grew Ecoli - pan sensitive. Patient was transitioned to cipro for 5 more days. Physical therapy evaluated patient recommending [facility] with [facility].     Discharge/Dispo/Med rec discussed with attending Dr. Spears. Patient medically cleared for discharge with outpatient follow up with PCP.

## 2023-03-15 ENCOUNTER — APPOINTMENT (OUTPATIENT)
Dept: GERIATRICS | Facility: ASSISTED LIVING FACILITY | Age: 88
End: 2023-03-15
Payer: MEDICARE

## 2023-03-15 ENCOUNTER — NON-APPOINTMENT (OUTPATIENT)
Age: 88
End: 2023-03-15

## 2023-03-15 VITALS
DIASTOLIC BLOOD PRESSURE: 70 MMHG | OXYGEN SATURATION: 98 % | HEART RATE: 68 BPM | RESPIRATION RATE: 12 BRPM | SYSTOLIC BLOOD PRESSURE: 142 MMHG | TEMPERATURE: 98.6 F

## 2023-03-15 DIAGNOSIS — E55.9 VITAMIN D DEFICIENCY, UNSPECIFIED: ICD-10-CM

## 2023-03-15 DIAGNOSIS — B36.9 SUPERFICIAL MYCOSIS, UNSPECIFIED: ICD-10-CM

## 2023-03-15 DIAGNOSIS — M17.0 BILATERAL PRIMARY OSTEOARTHRITIS OF KNEE: ICD-10-CM

## 2023-03-15 PROCEDURE — 99349 HOME/RES VST EST MOD MDM 40: CPT

## 2023-03-15 NOTE — REVIEW OF SYSTEMS
[Fever] : no fever [Chills] : no chills [Feeling Poorly] : not feeling poorly [Chest Pain] : no chest pain [Palpitations] : no palpitations [Lower Ext Edema] : no lower extremity edema [Shortness Of Breath] : no shortness of breath [Abdominal Pain] : no abdominal pain [Vomiting] : no vomiting [Dysuria] : no dysuria [Vaginal Discharge] : no vaginal discharge [Abn Vaginal Bleeding] : no unexplained vaginal bleeding [Dizziness] : no dizziness [Fainting] : no fainting

## 2023-03-15 NOTE — PHYSICAL EXAM
[JVD] : there was jugular-venous distention [Normal Gait] : abnormal gait [de-identified] : unsteady gait, ambulates with walker

## 2023-03-15 NOTE — HISTORY OF PRESENT ILLNESS
[FreeTextEntry1] : Ms. SOLIS TAYLOR is a 89yo F with PMHx of HF,HLD, syncope s/p pacemaker, OA, recurrent UTI's, hypothyroidism, arthritis, \par \par  [place], resident [facility]. \par Self-manages meds \par \par Post-hospital follow-up:\par [facility] 3/6-3/10 \par cc: unresponsiveness \par found to have UTI and metabolic encephalopathy \par treated with IV guillermina, CX/SEN pan sensitive ecoli\par switched to ciprofloxacin \par discharged back home \par \par TODAY, patient is seen in apartment with HHA present\par she reports she is going well overall\par feels like she is back to her baseline other than feeling a bit more unsteady on her feet\par no recent falls\par completed antibiotic course x PO cipro\par no dysuria or frequency different from her baseline \par good appetite\par sleeping well\par has HHA assistance and home care services for safe transition\par all medications reviewed, pill box reviewed and duplicates removed \par afebrile, all VSS\par BP is reading prior to taking morning meds\par \par concerns today:\par has been getting refills for a vaginal estradiol cream from her urologist seen in past, Dr. Agiular\par she has not been using the cream states she prefers not to insert the applicator to apply \par denies vaginal dryness, abrasions, irritation or itching \par \par rash:\par under breasts, intermittent\par responds well to ketoconazole\par +puritis no open wounds\par \par Frequent falls and gait instability:\par uses walker \par muscle atrophy \par working to get PT reinstated since hospitalization\par [facility]\par \par polypharmacy/med management:\par duplicate med bottles discarded again today\par pill box reviewed today to ensure no duplicate doses - pills are accurate and no duplicate bottles\par on methanamine started by cardiologist, Dr. Buckley\par \par Osteoarthritis:\par was advised she can take advil or aleve\par counseled on limiting use of this medication as it can cause gastric irritation.bleeding, and affect the kidneys \par tylenol as been working for pain PRN\par no recent falls \par dicolfenac gel did not work\par has seen ortho in past, injections did not help\par does not wish to go through additional injections at this time \par could not tolerate oxycodone, caused sedation\par tolerating tramadol as needed - was on this medication in past and did well \par \par HTN:\par well controlled today on current regimen\par no dizziness or falls reported\par \par Hypothyroid:\par TSH low, will reduce dose from 100mcg to 75mcg \par \par Vitamin d deficiency:\par 25 on recent labs, on supplementation\par \par S/p PPM:\par follows with cardio, Dr. Buckley\par no sx  reported\par HR well controlled\par \par \par \par hospitalization summaries:\par syncope Aug 2022, rehab 12 days \par Hospitalized for syncope, UTI and left arm weakness. \par No acute stroke detected, ct brain with chronic microvascular disease. \par post-hosp visit completed, extensive medd recc for confusion over sprinolactone and coreg dosing\par \par 1/10-1/14 for viral URI\par covid negative, coronavirus positive\par tx conservatively, patient did well with iv hydration [facility]\par \par \par

## 2023-03-21 NOTE — DISCHARGE NOTE NURSING/CASE MANAGEMENT/SOCIAL WORK - NSCORESITESY/N_GEN_A_CORE_RD
Quality 226: Preventive Care And Screening: Tobacco Use: Screening And Cessation Intervention: Patient screened for tobacco use and is an ex/non-smoker No Quality 130: Documentation Of Current Medications In The Medical Record: Current Medications Documented Quality 431: Preventive Care And Screening: Unhealthy Alcohol Use - Screening: Patient not identified as an unhealthy alcohol user when screened for unhealthy alcohol use using a systematic screening method Quality 110: Preventive Care And Screening: Influenza Immunization: Influenza Immunization Administered during Influenza season Detail Level: Detailed

## 2023-03-24 ENCOUNTER — LABORATORY RESULT (OUTPATIENT)
Age: 88
End: 2023-03-24

## 2023-03-24 ENCOUNTER — APPOINTMENT (OUTPATIENT)
Dept: GERIATRICS | Facility: ASSISTED LIVING FACILITY | Age: 88
End: 2023-03-24
Payer: MEDICARE

## 2023-03-24 DIAGNOSIS — I50.9 HEART FAILURE, UNSPECIFIED: ICD-10-CM

## 2023-03-24 DIAGNOSIS — R60.0 LOCALIZED EDEMA: ICD-10-CM

## 2023-03-24 PROCEDURE — 99497 ADVNCD CARE PLAN 30 MIN: CPT

## 2023-03-24 PROCEDURE — 99349 HOME/RES VST EST MOD MDM 40: CPT

## 2023-03-24 NOTE — PHYSICAL EXAM
[Alert] : alert [Well Nourished] : well nourished [Well Developed] : well developed [Sclera] : the sclera and conjunctiva were normal [PERRL] : pupils were equal in size, round, and reactive to light [Normal Oral Mucosa] : normal oral mucosa [No Oral Pallor] : no oral pallor [No Oral Cyanosis] : no oral cyanosis [Oropharynx] : the oropharynx was normal [Normal Appearance] : the appearance of the neck was normal [No Respiratory Distress] : no respiratory distress [No Acc Muscle Use] : no accessory muscle use [Respiration, Rhythm And Depth] : normal respiratory rhythm and effort [Auscultation Breath Sounds / Voice Sounds] : lungs were clear to auscultation bilaterally [Normal PMI] : the apical impulse was abnormal [Normal S1, S2] : normal S1 and S2 [Heart Rate And Rhythm] : heart rate was normal and rhythm regular [___ +] : bilateral [unfilled]+ pitting edema to the ankles [Bowel Sounds] : normal bowel sounds [Abdomen Tenderness] : non-tender [Abdomen Soft] : soft [Cervical Lymph Nodes Enlarged Posterior Bilaterally] : posterior cervical [Supraclavicular Lymph Nodes Enlarged Bilaterally] : supraclavicular [Cervical Lymph Nodes Enlarged Anterior Bilaterally] : anterior cervical, supraclavicular [Axillary Lymph Nodes Enlarged Bilaterally] : axillary [No CVA Tenderness] : no CVA  tenderness [No Spinal Tenderness] : no spinal tenderness [Normal Gait] : normal gait [No Focal Deficits] : no focal deficits [Normal Affect] : the affect was normal [Normal Mood] : the mood was normal [de-identified] : elderly frail female, disheveled appearance, no HHA in place [de-identified] : obese [de-identified] : chronic seborrheic keratosis generalized on back

## 2023-03-24 NOTE — HISTORY OF PRESENT ILLNESS
[I will adhere to the patient's wishes.] : I will adhere to the patient's wishes. [Time Spent: ___ minutes] : Time Spent: [unfilled] minutes [FreeTextEntry1] : Ms. SOLIS TAYLOR is a 89yo F with PMHx of HF, HLD, syncope s/p PPM, OA, recurrent UTIs, hypothyroidism. Pt. being seen for an acute  visit for UI, burning. Pt. being seen alone at [facility]. \par No HHA care in place,can't afford additional care. \par \par Pt. reports dysuria is mild, and intermittent. Reports significant UI at night mainly. \par \par #ACP\par -reintroduced hospice\par -pt. wants to be enrolled\par -pt. with CHF III, and 2 hospitalizations in the past 6 months\par MOLST form in chart\par -DNR/DNI\par -limited medical interventions\par -do not sent  to the hospital \par -no feeding tube\par -trial of IV fluids\par -use antibiotics\par \par Denies pain. Denies acute visits/falls. Denies HA/dizziness, SOB/CP, abdominal pain, dysuria, reports daily BMs regular. Pt.reports episodes on occasional incontinence at night, would like to check if UTI.  [AdvancecareDate] : 3/24/23 [FreeTextEntry4] : reintroduced hospice\par -pt. wants to be enrolled\par -pt. with CHF III, and 2 hospitalizations in the past 6 months\par MOLST form in chart\par -DNR/DNI\par -limited medical interventions\par -do not sent  to the hospital \par -no feeding tube\par -trial of IV fluids\par -use antibiotics

## 2023-03-28 ENCOUNTER — NON-APPOINTMENT (OUTPATIENT)
Age: 88
End: 2023-03-28

## 2023-04-12 ENCOUNTER — APPOINTMENT (OUTPATIENT)
Dept: GERIATRICS | Facility: ASSISTED LIVING FACILITY | Age: 88
End: 2023-04-12
Payer: MEDICARE

## 2023-04-12 VITALS
RESPIRATION RATE: 12 BRPM | SYSTOLIC BLOOD PRESSURE: 138 MMHG | TEMPERATURE: 98.8 F | DIASTOLIC BLOOD PRESSURE: 68 MMHG | HEART RATE: 78 BPM | OXYGEN SATURATION: 97 %

## 2023-04-12 DIAGNOSIS — L30.9 DERMATITIS, UNSPECIFIED: ICD-10-CM

## 2023-04-12 PROCEDURE — 99348 HOME/RES VST EST LOW MDM 30: CPT

## 2023-04-12 NOTE — PHYSICAL EXAM
[Alert] : alert [Well Nourished] : well nourished [Healthy Appearing] : healthy appearing [Normal Voice/Communication] : normal voice/communication [Sclera] : the sclera and conjunctiva were normal [Normal Oral Mucosa] : normal oral mucosa [Normal Hearing] : hearing was normal [JVD] : there was jugular-venous distention [No Respiratory Distress] : no respiratory distress [No Acc Muscle Use] : no accessory muscle use [Respiration, Rhythm And Depth] : normal respiratory rhythm and effort [Auscultation Breath Sounds / Voice Sounds] : lungs were clear to auscultation bilaterally [Normal S1, S2] : normal S1 and S2 [Heart Rate And Rhythm] : heart rate was normal and rhythm regular [Edema] : edema was not present [Bowel Sounds] : normal bowel sounds [Abdomen Tenderness] : non-tender [Abdomen Soft] : soft [No CVA Tenderness] : no CVA  tenderness [Normal Gait] : abnormal gait [No Clubbing, Cyanosis] : no clubbing or cyanosis of the fingernails [Involuntary Movements] : no involuntary movements were seen [Motor Tone] : muscle strength and tone were normal [Normal Color / Pigmentation] : normal skin color and pigmentation [No Focal Deficits] : no focal deficits [Oriented To Time, Place, And Person] : oriented to person, place, and time [Normal Affect] : the affect was normal [Normal Insight/Judgment] : insight and judgment were intact [Normal Mood] : the mood was normal [de-identified] : unsteady gait, ambulates with walker

## 2023-04-12 NOTE — HISTORY OF PRESENT ILLNESS
[FreeTextEntry1] : Ms. SOLIS TAYLOR is a 89yo F with PMHx of HF,HLD, syncope s/p pacemaker, OA, recurrent UTI's, hypothyroidism, arthritis, \par \par  [facility], resident of [facility]. \par Self-manages meds \par \par pt complaining of 2 days of burning with urination and frequency \par went 6-7 times overnight \par no fever, chills, flank pain\par no confusion or changes in mentation/behaviors reported from intermediate staff \par pt with recent hospitlization for UTI - was unresponsive w/ metab encephalopathy \par cx reviewed, pan sensitive\par allergy reported to kelfex, unsure of exact allergic rxn \par renal function stable \par EKG QTC <500 \par \par \par Frequent falls and gait instability:\par uses walker \par muscle atrophy \par working to get PT reinstated since hospitalization\par prefers to work with [facility]\par \par Osteoarthritis:\par was advised she can take advil or aleve\par counseled on limiting use of this medication as it can cause gastric irritation.bleeding, and affect the kidneys \par tylenol as been working for pain PRN\par no recent falls \par dicolfenac gel did not work\par has seen ortho in past, injections did not help\par does not wish to go through additional injections at this time \par could not tolerate oxycodone, caused sedation\par tolerating tramadol as needed - was on this medication in past and did well \par \par HTN:\par well controlled today on current regimen\par no dizziness or falls reported\par \par Hypothyroid:\par TSH low, will reduce dose from 100mcg to 75mcg \par \par Vitamin d deficiency:\par 25 on recent labs, on supplementation\par \par S/p PPM:\par follows with cardio, Dr. Buckley\par no sx  reported\par HR well controlled\par \par \par hospitalization summaries:\par syncope Aug 2022, rehab 12 days \par Hospitalized for syncope, UTI and left arm weakness. \par No acute stroke detected, ct brain with chronic microvascular disease. \par post-hosp visit completed, extensive medd recc for confusion over sprinolactone and coreg dosing\par \par 1/10-1/14 for viral URI\par covid negative, coronavirus positive\par tx conservatively, patient did well with iv hydration and returned back to [facility]\par \par [facility] 3/6-3/10 \par cc: unresponsiveness \par found to have UTI and metabolic encephalopathy \par treated with IV guillermina, CX/SEN pan sensitive ecoli\par switched to ciprofloxacin \par discharged back home \par \par \par \par  [Independent] : managing finances [] : Assistance needed with traveling/transport [Walker] : walker [Grab Bars] : grab bars [Shower Chair] : shower chair

## 2023-04-12 NOTE — REVIEW OF SYSTEMS
[Fever] : no fever [Chills] : no chills [Chest Pain] : no chest pain [Palpitations] : no palpitations [Cough] : no cough [SOB on Exertion] : no shortness of breath during exertion [Abdominal Pain] : no abdominal pain [Vomiting] : no vomiting [Constipation] : no constipation [Diarrhea] : no diarrhea [Dysuria] : dysuria [Vaginal Discharge] : no vaginal discharge [Abn Vaginal Bleeding] : no unexplained vaginal bleeding [Confused] : no confusion [Dizziness] : no dizziness [Fainting] : no fainting [Negative] : ENT

## 2023-04-21 ENCOUNTER — INPATIENT (INPATIENT)
Facility: HOSPITAL | Age: 88
LOS: 2 days | Discharge: HOME HEALTH | DRG: 683 | End: 2023-04-24
Attending: INTERNAL MEDICINE | Admitting: INTERNAL MEDICINE
Payer: MEDICARE

## 2023-04-21 ENCOUNTER — NON-APPOINTMENT (OUTPATIENT)
Age: 88
End: 2023-04-21

## 2023-04-21 VITALS
HEIGHT: 63 IN | RESPIRATION RATE: 18 BRPM | SYSTOLIC BLOOD PRESSURE: 151 MMHG | TEMPERATURE: 98 F | HEART RATE: 79 BPM | DIASTOLIC BLOOD PRESSURE: 91 MMHG | OXYGEN SATURATION: 96 % | WEIGHT: 149.91 LBS

## 2023-04-21 DIAGNOSIS — N17.9 ACUTE KIDNEY FAILURE, UNSPECIFIED: ICD-10-CM

## 2023-04-21 DIAGNOSIS — Z98.890 OTHER SPECIFIED POSTPROCEDURAL STATES: Chronic | ICD-10-CM

## 2023-04-21 DIAGNOSIS — Z98.49 CATARACT EXTRACTION STATUS, UNSPECIFIED EYE: Chronic | ICD-10-CM

## 2023-04-21 DIAGNOSIS — Z96.7 PRESENCE OF OTHER BONE AND TENDON IMPLANTS: Chronic | ICD-10-CM

## 2023-04-21 DIAGNOSIS — O00.1 TUBAL PREGNANCY: Chronic | ICD-10-CM

## 2023-04-21 LAB
ALBUMIN SERPL ELPH-MCNC: 4.2 G/DL — SIGNIFICANT CHANGE UP (ref 3.3–5)
ALP SERPL-CCNC: 188 U/L — HIGH (ref 40–120)
ALT FLD-CCNC: 60 U/L — HIGH (ref 10–45)
ANION GAP SERPL CALC-SCNC: 15 MMOL/L — SIGNIFICANT CHANGE UP (ref 5–17)
APPEARANCE UR: ABNORMAL
AST SERPL-CCNC: 44 U/L — HIGH (ref 10–40)
BACTERIA # UR AUTO: NEGATIVE — SIGNIFICANT CHANGE UP
BASOPHILS # BLD AUTO: 0 K/UL — SIGNIFICANT CHANGE UP (ref 0–0.2)
BASOPHILS NFR BLD AUTO: 0 % — SIGNIFICANT CHANGE UP (ref 0–2)
BILIRUB SERPL-MCNC: 0.3 MG/DL — SIGNIFICANT CHANGE UP (ref 0.2–1.2)
BILIRUB UR-MCNC: NEGATIVE — SIGNIFICANT CHANGE UP
BUN SERPL-MCNC: 53 MG/DL — HIGH (ref 7–23)
CALCIUM SERPL-MCNC: 9.3 MG/DL — SIGNIFICANT CHANGE UP (ref 8.4–10.5)
CHLORIDE SERPL-SCNC: 98 MMOL/L — SIGNIFICANT CHANGE UP (ref 96–108)
CO2 SERPL-SCNC: 23 MMOL/L — SIGNIFICANT CHANGE UP (ref 22–31)
COLOR SPEC: YELLOW — SIGNIFICANT CHANGE UP
CREAT SERPL-MCNC: 1.46 MG/DL — HIGH (ref 0.5–1.3)
DIFF PNL FLD: NEGATIVE — SIGNIFICANT CHANGE UP
EGFR: 32 ML/MIN/1.73M2 — LOW
EOSINOPHIL # BLD AUTO: 0.29 K/UL — SIGNIFICANT CHANGE UP (ref 0–0.5)
EOSINOPHIL NFR BLD AUTO: 4.8 % — SIGNIFICANT CHANGE UP (ref 0–6)
EPI CELLS # UR: 11 /HPF — HIGH
GLUCOSE SERPL-MCNC: 93 MG/DL — SIGNIFICANT CHANGE UP (ref 70–99)
GLUCOSE UR QL: NEGATIVE — SIGNIFICANT CHANGE UP
HCT VFR BLD CALC: 37.5 % — SIGNIFICANT CHANGE UP (ref 34.5–45)
HGB BLD-MCNC: 12.2 G/DL — SIGNIFICANT CHANGE UP (ref 11.5–15.5)
HYALINE CASTS # UR AUTO: 2 /LPF — SIGNIFICANT CHANGE UP (ref 0–7)
KETONES UR-MCNC: NEGATIVE — SIGNIFICANT CHANGE UP
LEUKOCYTE ESTERASE UR-ACNC: ABNORMAL
LYMPHOCYTES # BLD AUTO: 0.9 K/UL — LOW (ref 1–3.3)
LYMPHOCYTES # BLD AUTO: 14.6 % — SIGNIFICANT CHANGE UP (ref 13–44)
MANUAL SMEAR VERIFICATION: SIGNIFICANT CHANGE UP
MCHC RBC-ENTMCNC: 31.2 PG — SIGNIFICANT CHANGE UP (ref 27–34)
MCHC RBC-ENTMCNC: 32.5 GM/DL — SIGNIFICANT CHANGE UP (ref 32–36)
MCV RBC AUTO: 95.9 FL — SIGNIFICANT CHANGE UP (ref 80–100)
MONOCYTES # BLD AUTO: 1.37 K/UL — HIGH (ref 0–0.9)
MONOCYTES NFR BLD AUTO: 22.3 % — HIGH (ref 2–14)
NEUTROPHILS # BLD AUTO: 3.58 K/UL — SIGNIFICANT CHANGE UP (ref 1.8–7.4)
NEUTROPHILS NFR BLD AUTO: 57.3 % — SIGNIFICANT CHANGE UP (ref 43–77)
NEUTS BAND # BLD: 1 % — SIGNIFICANT CHANGE UP (ref 0–8)
NITRITE UR-MCNC: NEGATIVE — SIGNIFICANT CHANGE UP
PH UR: 6 — SIGNIFICANT CHANGE UP (ref 5–8)
PLAT MORPH BLD: NORMAL — SIGNIFICANT CHANGE UP
PLATELET # BLD AUTO: 150 K/UL — SIGNIFICANT CHANGE UP (ref 150–400)
POTASSIUM SERPL-MCNC: 4.4 MMOL/L — SIGNIFICANT CHANGE UP (ref 3.5–5.3)
POTASSIUM SERPL-SCNC: 4.4 MMOL/L — SIGNIFICANT CHANGE UP (ref 3.5–5.3)
PROT SERPL-MCNC: 7.7 G/DL — SIGNIFICANT CHANGE UP (ref 6–8.3)
PROT UR-MCNC: ABNORMAL
RAPID RVP RESULT: DETECTED
RBC # BLD: 3.91 M/UL — SIGNIFICANT CHANGE UP (ref 3.8–5.2)
RBC # FLD: 15 % — HIGH (ref 10.3–14.5)
RBC BLD AUTO: SIGNIFICANT CHANGE UP
RBC CASTS # UR COMP ASSIST: 2 /HPF — SIGNIFICANT CHANGE UP (ref 0–4)
RV+EV RNA SPEC QL NAA+PROBE: DETECTED
SARS-COV-2 RNA SPEC QL NAA+PROBE: SIGNIFICANT CHANGE UP
SODIUM SERPL-SCNC: 136 MMOL/L — SIGNIFICANT CHANGE UP (ref 135–145)
SP GR SPEC: 1.02 — SIGNIFICANT CHANGE UP (ref 1.01–1.02)
UROBILINOGEN FLD QL: NEGATIVE — SIGNIFICANT CHANGE UP
WBC # BLD: 6.14 K/UL — SIGNIFICANT CHANGE UP (ref 3.8–10.5)
WBC # FLD AUTO: 6.14 K/UL — SIGNIFICANT CHANGE UP (ref 3.8–10.5)
WBC UR QL: 19 /HPF — HIGH (ref 0–5)

## 2023-04-21 PROCEDURE — 71045 X-RAY EXAM CHEST 1 VIEW: CPT | Mod: 26

## 2023-04-21 PROCEDURE — 99285 EMERGENCY DEPT VISIT HI MDM: CPT | Mod: FS

## 2023-04-21 RX ORDER — ASPIRIN/CALCIUM CARB/MAGNESIUM 324 MG
81 TABLET ORAL DAILY
Refills: 0 | Status: DISCONTINUED | OUTPATIENT
Start: 2023-04-21 | End: 2023-04-24

## 2023-04-21 RX ORDER — CEFTRIAXONE 500 MG/1
1000 INJECTION, POWDER, FOR SOLUTION INTRAMUSCULAR; INTRAVENOUS EVERY 24 HOURS
Refills: 0 | Status: COMPLETED | OUTPATIENT
Start: 2023-04-21 | End: 2023-04-24

## 2023-04-21 RX ORDER — SODIUM CHLORIDE 9 MG/ML
250 INJECTION INTRAMUSCULAR; INTRAVENOUS; SUBCUTANEOUS ONCE
Refills: 0 | Status: DISCONTINUED | OUTPATIENT
Start: 2023-04-21 | End: 2023-04-21

## 2023-04-21 RX ORDER — LEVOTHYROXINE SODIUM 125 MCG
100 TABLET ORAL DAILY
Refills: 0 | Status: DISCONTINUED | OUTPATIENT
Start: 2023-04-21 | End: 2023-04-24

## 2023-04-21 RX ORDER — CEFTRIAXONE 500 MG/1
1000 INJECTION, POWDER, FOR SOLUTION INTRAMUSCULAR; INTRAVENOUS ONCE
Refills: 0 | Status: COMPLETED | OUTPATIENT
Start: 2023-04-21 | End: 2023-04-21

## 2023-04-21 RX ORDER — SODIUM CHLORIDE 9 MG/ML
250 INJECTION INTRAMUSCULAR; INTRAVENOUS; SUBCUTANEOUS ONCE
Refills: 0 | Status: COMPLETED | OUTPATIENT
Start: 2023-04-21 | End: 2023-04-21

## 2023-04-21 RX ORDER — METOPROLOL TARTRATE 50 MG
25 TABLET ORAL
Refills: 0 | Status: DISCONTINUED | OUTPATIENT
Start: 2023-04-21 | End: 2023-04-24

## 2023-04-21 RX ORDER — SODIUM CHLORIDE 9 MG/ML
1000 INJECTION INTRAMUSCULAR; INTRAVENOUS; SUBCUTANEOUS
Refills: 0 | Status: DISCONTINUED | OUTPATIENT
Start: 2023-04-21 | End: 2023-04-24

## 2023-04-21 RX ORDER — ATORVASTATIN CALCIUM 80 MG/1
20 TABLET, FILM COATED ORAL AT BEDTIME
Refills: 0 | Status: DISCONTINUED | OUTPATIENT
Start: 2023-04-21 | End: 2023-04-24

## 2023-04-21 RX ORDER — ACETAMINOPHEN 500 MG
650 TABLET ORAL EVERY 6 HOURS
Refills: 0 | Status: DISCONTINUED | OUTPATIENT
Start: 2023-04-21 | End: 2023-04-24

## 2023-04-21 RX ADMIN — CEFTRIAXONE 100 MILLIGRAM(S): 500 INJECTION, POWDER, FOR SOLUTION INTRAMUSCULAR; INTRAVENOUS at 22:54

## 2023-04-21 RX ADMIN — SODIUM CHLORIDE 250 MILLILITER(S): 9 INJECTION INTRAMUSCULAR; INTRAVENOUS; SUBCUTANEOUS at 21:43

## 2023-04-21 RX ADMIN — SODIUM CHLORIDE 250 MILLILITER(S): 9 INJECTION INTRAMUSCULAR; INTRAVENOUS; SUBCUTANEOUS at 22:32

## 2023-04-21 NOTE — H&P ADULT - NSHPLABSRESULTS_GEN_ALL_CORE
12.2   6.14  )-----------( 150      ( 2023 21:12 )             37.5           136  |  98  |  53<H>  ----------------------------<  93  4.4   |  23  |  1.46<H>    Ca    9.3      2023 21:12    TPro  7.7  /  Alb  4.2  /  TBili  0.3  /  DBili  x   /  AST  44<H>  /  ALT  60<H>  /  AlkPhos  188<H>                Urinalysis Basic - ( 2023 21:26 )    Color: Yellow / Appearance: Slightly Turbid / S.021 / pH: x  Gluc: x / Ketone: Negative  / Bili: Negative / Urobili: Negative   Blood: x / Protein: 30 mg/dL / Nitrite: Negative   Leuk Esterase: Small / RBC: 2 /hpf / WBC 19 /HPF   Sq Epi: x / Non Sq Epi: x / Bacteria: Negative            Lactate Trend            CAPILLARY BLOOD GLUCOSE

## 2023-04-21 NOTE — ED ADULT NURSE REASSESSMENT NOTE - NS ED NURSE REASSESS COMMENT FT1
Pt bladder scan per MD Argueta, pt 77cc of urine, pt endorses not urinating for 24hrs, hx of UTI, MD Argueta made aware
Pt aviles placed with 2 RN at bedside, pt tolerated procedure well, 500 cc of clear yellow urine produced, MD chan made aware, updated on plan of care, comfort and safety secured

## 2023-04-21 NOTE — ED ADULT NURSE NOTE - NSICDXPASTSURGICALHX_GEN_ALL_CORE_FT
WDL
PAST SURGICAL HISTORY:  Ectopic pregnancy, tubal     S/P breast lumpectomy     S/P cataract surgery b/l    S/P ORIF (open reduction internal fixation) fracture R hip

## 2023-04-21 NOTE — H&P ADULT - HISTORY OF PRESENT ILLNESS
89-year-old female PMHx HTN, arthritis, A-fib, pacemaker, frequent UTIs presents to the ED from [facility] for not urinating for 24 hours.  Patient and daughter at bedside provide history, states patient was here 1 month ago for UTI, completed course of antibiotics and was discharged back to [facility]. Two weeks ago had episode of diarrhea where [facility] physician was concerned there may have been contamination with urine at that time so prescribed her prophylactic Cipro to prevent UTI (patient completed full course.  Over the last 24 hours patient has not urinated though does not feel the urge to, called Dr. Otero (covering doctor for [facility]) and was advised to come to ED.  Patient endorsing mild fatigue and for the past 1 week has been having mild sore throat feeling improved. Feels at times like she has to cough to clear her throat. Denies fever/chills, nausea/vomiting, chest pain, shortness of breath, hematuria, flank pain, abdominal pain.

## 2023-04-21 NOTE — H&P ADULT - ASSESSMENT
89yo woman presents with decreased urine output. increased lethargy and possibly Urinary tract infection 89yo woman presents with decreased urine output. Increased lethargy and possibly Urinary tract infection

## 2023-04-21 NOTE — ED PROVIDER NOTE - CLINICAL SUMMARY MEDICAL DECISION MAKING FREE TEXT BOX
Attending MD Argueta: 89F with decreased urination, concern for recurrence of UTI/pyelo, retention, dehydration (pre renal, intrarenal vs post renal), Will obtain UA/UrCx, will place aviles to monitor output, I/Os, will consider viral illness, will send RVP.  Will consider other sources of occult infection, viral URI, PNA, will obtain CXR.  Will likely need admission (unable to ambulate independendly).

## 2023-04-21 NOTE — ED PROVIDER NOTE - ATTENDING APP SHARED VISIT CONTRIBUTION OF CARE
Attending MD Argueta:   I personally have seen and examined this patient.  Physician assistant note reviewed and agree on plan of care and except where noted.  See below for details.     Seen in [id number]    Attending MD Argueta: See MDM and other above sections

## 2023-04-21 NOTE — H&P ADULT - PROBLEM SELECTOR PLAN 1
Blum placed in DC   unclear if Patient was in retention of was dehydrated and not producing urine  will continue to monitor renal function  will continue sherri hydration

## 2023-04-21 NOTE — ED PROVIDER NOTE - OBJECTIVE STATEMENT
89-year-old female PMHx HTN, arthritis, A-fib, pacemaker, frequent UTIs presents to the ED from [facility] for not urinating for 24 hours.  Patient and daughter at bedside provide history, states patient was here 1 month ago for UTI, completed course of antibiotics and was discharged back to [facility]. Two weeks ago had episode of diarrhea where [facility] physician was concerned there may have been contamination with urine at that time so prescribed her prophylactic Cipro to prevent UTI (patient completed full course.  Over the last 24 hours patient has not urinated though does not feel the urge to, called Dr. Otero (covering doctor for [facility]) and was advised to come to ED.  Patient endorsing mild fatigue and for the past 1 week has been having mild sore throat feeling improved. Feels at times like she has to cough to clear her throat. Denies fever/chills, nausea/vomiting, chest pain, shortness of breath, hematuria, flank pain, abdominal pain. 89-year-old female PMHx HTN, arthritis, A-fib, pacemaker, frequent UTIs presents to the ED from [facility] for not urinating for 24 hours.  Patient and daughter at bedside provide history, states patient was here 1 month ago for UTI, completed course of antibiotics and was discharged back to [facility]. Two weeks ago had episode of diarrhea where [facility] physician was concerned there may have been contamination with urine at that time so prescribed her prophylactic Cipro to prevent UTI (patient completed full course.  Over the last 24 hours patient has not urinated though does not feel the urge to, called Dr. Otero (covering doctor for [facility]) and was advised to come to ED.  Patient endorsing mild fatigue and for the past 1 week has been having mild sore throat feeling improved. Feels at times like she has to cough to clear her throat. Denies fever/chills, nausea/vomiting, chest pain, shortness of breath, hematuria, flank pain, abdominal pain.    Attending MD Argueta: Agree with above

## 2023-04-21 NOTE — ED PROVIDER NOTE - PROGRESS NOTE DETAILS
Case discussed with Dr. Spears, aware of results of blood tests concerning for ANNA as well as UA with small leuks.  Given patient's history will give dose of antibiotics.  He agrees with this, additionally he is recommending maintenance fluids 50cc/h and he will manage going forward.  Excepted patient under his service for admission.  Last urine culture from 3/6 was greater than 100 K E. coli sensitive to ceftriaxone.  Will give dose of this.  ED attending aware and agrees. - Ranjan Denney PA-C

## 2023-04-21 NOTE — ED PROVIDER NOTE - NS_BEDUNITTYPES_ED_ALL_ED
17do F here with viral meningitis.    INTERVAL/OVERNIGHT EVENTS: Febrile to 100.7 overnight. Desaturations to high 80s requiring supplemental oxygen up to 1LNC. Good PO intake, voiding well. No new symptoms.   [x ] History per: mother, father  [ ]  utilized, number:     [ ] Family Centered Rounds Completed.     MEDICATIONS  (STANDING):  acetaminophen   Oral Liquid - Peds 60 milliGRAM(s) Oral every 6 hours  dextrose 5% + sodium chloride 0.45%. -  250 milliLiter(s) (16 mL/Hr) IV Continuous <Continuous>    MEDICATIONS  (PRN):    Allergies    No Known Allergies    Intolerances      Diet: regular    [x ] There are no updates to the medical, surgical, social or family history unless described:    PATIENT CARE ACCESS DEVICES  [x ] Peripheral IV  [ ] Central Venous Line, Date Placed:		Site/Device:  [ ] PICC, Date Placed:  [ ] Urinary Catheter, Date Placed:  [ ] Necessity of urinary, arterial, and venous catheters discussed    Review of Systems: If not negative (Neg) please elaborate. History Per:   General: [x ] Neg fever  Pulmonary: [ x] Neg desaturations  Cardiac: [ ] Neg  Gastrointestinal: [ ] Neg  Ears, Nose, Throat: [ ] Neg  Renal/Urologic: [ ] Neg  Musculoskeletal: [ ] Neg  Endocrine: [ ] Neg  Hematologic: [ ] Neg  Neurologic: [ ] Neg  Allergy/Immunologic: [ ] Neg  All other systems reviewed and negative [ ]     Vital Signs Last 24 Hrs  T(C): 37.4 (2017 14:51), Max: 38.2 (2017 02:39)  T(F): 99.3 (2017 14:51), Max: 100.7 (2017 02:39)  HR: 165 (2017 14:51) (151 - 184)  BP: 77/51 (2017 14:51) (73/38 - 85/46)  BP(mean): --  RR: 48 (2017 14:51) (40 - 48)  SpO2: 98% (2017 14:51) (88% - 100%)  I&O's Summary    2017 07:01  -  2017 07:00  --------------------------------------------------------  IN: 821 mL / OUT: 541 mL / NET: 280 mL    2017 07:01  -  2017 17:45  --------------------------------------------------------  IN: 280 mL / OUT: 357 mL / NET: -77 mL      Pain Score:  Daily Weight in Gm: 3735 (2017 19:06)  BMI (kg/m2): 15.6 ( @ 19:06)    Gen: no apparent distress, appears comfortable  HEENT: normocephalic/atraumatic, anterior fontanelle open and flat,  moist mucous membranes, clear conjunctiva  Neck: supple, no lymphadenopathy  Heart: S1S2+, regular rate and rhythm, no murmur, cap refill < 2 sec  Lungs: normal respiratory pattern, clear to auscultation bilaterally, no retractions  Abd: soft, nontender, nondistended, bowel sounds present, no hepatosplenomegaly  : no rash  Ext: full range of motion, no edema, no tenderness  Neuro: spontaneously moves all extremities  Skin: no rash, intact and not indurated    Interval Lab Results:             CBC Full  -  ( 2017 08:23 )  WBC Count : 12.02 K/uL  Hemoglobin : 12.7 g/dL  Hematocrit : 35.9 %  Platelet Count - Automated : 313 K/uL  Mean Cell Volume : 100.8 fL  Mean Cell Hemoglobin : 35.7 pg  Mean Cell Hemoglobin Concentration : 35.4 %  Auto Neutrophil # : 4.27 K/uL  Auto Lymphocyte # : 6.27 K/uL  Auto Monocyte # : 1.19 K/uL  Auto Eosinophil # : 0.02 K/uL  Auto Basophil # : 0.02 K/uL  Auto Neutrophil % : 35.4 %  Auto Lymphocyte % : 52.2 %  Auto Monocyte % : 9.9 %  Auto Eosinophil % : 0.2 %  Auto Basophil % : 0.2 %    PT/INR - ( 2017 08:23 )   PT: 13.2 SEC;   INR: 1.17     PTT - ( 2017 08:23 )  PTT:54.4 SEC                              139    |  103    |  8                   Calcium: 8.5   / iCa: x      ( @ 08:23)    ----------------------------<  76        Magnesium: x                                5.8     |  24     |  < 0.20            Phosphorous: x        TPro  4.4    /  Alb  2.8    /  TBili  < 0.2  /  DBili  x      /  AST  414    /  ALT  204    /  AlkPhos  154    2017 08:23    Blood culture - negative x 72h  Urine culture - negative x 24h  CSF culture - negative x 72h    INTERVAL IMAGING STUDIES:  CXR: There is haziness in the bilateral lungs. There is no pneumothorax or pleural effusion. The cardiothymic silhouette is unremarkable. The visualized osseous structures are unremarkable for age. 15 day female with viral meningitis day #2. still febrile 38-38.5F temps controlled with antipyretics. feeds 2oz. q 2-3 hrs. voiding well. noted desaturations to mid -80's but would go back no normal saturations on stimulation. put on 0.5L NC oxygen in the meantime.    labs- CSF negative 24, blood negative 48 hrs, urine negative 24    skin - no rash  HEENT- AFOf  chest- good a/e  heart- no murmur  neuro- intact    A- viral meningitis  P- cont. amp/gent  follow cultures  cbc, esr, CRP, BMP today  if desats more will have neuro, ID involved. 2 week old female admitted for r/o sepsis. CSF (+)paraechovirus. other test WNL. CSF cx negative 24 hrs.  PE- unremarkable  fontanelle soft, flat  clear LF  no murmur  abdomen- soft, no organomegaly    A- viral meningitis    P- cont. amp/gentamicin  monitor fever and control as needed  cont. IVF MEDICINE

## 2023-04-21 NOTE — ED ADULT NURSE NOTE - NSIMPLEMENTINTERV_GEN_ALL_ED
Implemented All Fall with Harm Risk Interventions:  [place] to call system. Call bell, personal items and telephone within reach. Instruct patient to call for assistance. Room bathroom lighting operational. Non-slip footwear when patient is off stretcher. Physically safe environment: no spills, clutter or unnecessary equipment. Stretcher in lowest position, wheels locked, appropriate side rails in place. Provide visual cue, wrist band, yellow gown, etc. Monitor gait and stability. Monitor for mental status changes and reorient to person, place, and time. Review medications for side effects contributing to fall risk. Reinforce activity limits and safety measures with patient and family. Provide visual clues: red socks.

## 2023-04-21 NOTE — H&P ADULT - PROBLEM SELECTOR PLAN 4
Patient resently treated for a Urinary tract infection with cipro  small LE on UA  started Patient on a course of ceftriaxone   follow cultures

## 2023-04-21 NOTE — ED CLERICAL - NS ED CLERK NOTE PRE-ARRIVAL INFORMATION; ADDITIONAL PRE-ARRIVAL INFORMATION
CC/Reason For referral: Recent UTI, Inability to urinate, Possible renal failure, Weakness  Preferred Consultant(if applicable): N/A  Who admits for you (if needed): N/A  Do you have documents you would like to fax over? No  Would you still like to speak to an ED attending? Yes, please call Dr. Otero after patient is seen

## 2023-04-22 DIAGNOSIS — N39.0 URINARY TRACT INFECTION, SITE NOT SPECIFIED: ICD-10-CM

## 2023-04-22 DIAGNOSIS — E03.9 HYPOTHYROIDISM, UNSPECIFIED: ICD-10-CM

## 2023-04-22 DIAGNOSIS — R33.9 RETENTION OF URINE, UNSPECIFIED: ICD-10-CM

## 2023-04-22 DIAGNOSIS — I10 ESSENTIAL (PRIMARY) HYPERTENSION: ICD-10-CM

## 2023-04-22 DIAGNOSIS — B34.8 OTHER VIRAL INFECTIONS OF UNSPECIFIED SITE: ICD-10-CM

## 2023-04-22 LAB
ANION GAP SERPL CALC-SCNC: 13 MMOL/L — SIGNIFICANT CHANGE UP (ref 5–17)
BUN SERPL-MCNC: 45 MG/DL — HIGH (ref 7–23)
CALCIUM SERPL-MCNC: 8.6 MG/DL — SIGNIFICANT CHANGE UP (ref 8.4–10.5)
CHLORIDE SERPL-SCNC: 103 MMOL/L — SIGNIFICANT CHANGE UP (ref 96–108)
CO2 SERPL-SCNC: 22 MMOL/L — SIGNIFICANT CHANGE UP (ref 22–31)
CREAT SERPL-MCNC: 1.13 MG/DL — SIGNIFICANT CHANGE UP (ref 0.5–1.3)
CULTURE RESULTS: SIGNIFICANT CHANGE UP
EGFR: 43 ML/MIN/1.73M2 — LOW
GLUCOSE SERPL-MCNC: 76 MG/DL — SIGNIFICANT CHANGE UP (ref 70–99)
HCT VFR BLD CALC: 33.2 % — LOW (ref 34.5–45)
HGB BLD-MCNC: 10.9 G/DL — LOW (ref 11.5–15.5)
MCHC RBC-ENTMCNC: 31.4 PG — SIGNIFICANT CHANGE UP (ref 27–34)
MCHC RBC-ENTMCNC: 32.8 GM/DL — SIGNIFICANT CHANGE UP (ref 32–36)
MCV RBC AUTO: 95.7 FL — SIGNIFICANT CHANGE UP (ref 80–100)
NRBC # BLD: 0 /100 WBCS — SIGNIFICANT CHANGE UP (ref 0–0)
PLATELET # BLD AUTO: 142 K/UL — LOW (ref 150–400)
POTASSIUM SERPL-MCNC: 3.7 MMOL/L — SIGNIFICANT CHANGE UP (ref 3.5–5.3)
POTASSIUM SERPL-SCNC: 3.7 MMOL/L — SIGNIFICANT CHANGE UP (ref 3.5–5.3)
RBC # BLD: 3.47 M/UL — LOW (ref 3.8–5.2)
RBC # FLD: 14.8 % — HIGH (ref 10.3–14.5)
SODIUM SERPL-SCNC: 138 MMOL/L — SIGNIFICANT CHANGE UP (ref 135–145)
SPECIMEN SOURCE: SIGNIFICANT CHANGE UP
WBC # BLD: 5.64 K/UL — SIGNIFICANT CHANGE UP (ref 3.8–10.5)
WBC # FLD AUTO: 5.64 K/UL — SIGNIFICANT CHANGE UP (ref 3.8–10.5)

## 2023-04-22 RX ORDER — CHOLECALCIFEROL (VITAMIN D3) 125 MCG
1000 CAPSULE ORAL DAILY
Refills: 0 | Status: DISCONTINUED | OUTPATIENT
Start: 2023-04-22 | End: 2023-04-24

## 2023-04-22 RX ADMIN — CEFTRIAXONE 100 MILLIGRAM(S): 500 INJECTION, POWDER, FOR SOLUTION INTRAMUSCULAR; INTRAVENOUS at 12:24

## 2023-04-22 RX ADMIN — ATORVASTATIN CALCIUM 20 MILLIGRAM(S): 80 TABLET, FILM COATED ORAL at 21:32

## 2023-04-22 RX ADMIN — Medication 25 MILLIGRAM(S): at 06:01

## 2023-04-22 RX ADMIN — Medication 81 MILLIGRAM(S): at 12:23

## 2023-04-22 RX ADMIN — Medication 25 MILLIGRAM(S): at 17:48

## 2023-04-22 RX ADMIN — Medication 100 MICROGRAM(S): at 06:02

## 2023-04-22 RX ADMIN — SODIUM CHLORIDE 50 MILLILITER(S): 9 INJECTION INTRAMUSCULAR; INTRAVENOUS; SUBCUTANEOUS at 01:02

## 2023-04-22 NOTE — PATIENT PROFILE ADULT - FALL HARM RISK - HARM RISK INTERVENTIONS

## 2023-04-22 NOTE — PATIENT PROFILE ADULT - DO YOU FEEL THREATENED BY OTHERS?
Patient was decorating his [place] for [employer] and had a trip and fall onto sidewalk. Has a laceration to left eyebrow. Patient then had an episode of dizziness and daughter caught him. No dizziness at this time.
no

## 2023-04-22 NOTE — PATIENT PROFILE ADULT - FUNCTIONAL ASSESSMENT - BASIC MOBILITY 6.
2-calculated by average/Not able to assess (calculate score using [facility] averaging method)

## 2023-04-23 LAB
ANION GAP SERPL CALC-SCNC: 12 MMOL/L — SIGNIFICANT CHANGE UP (ref 5–17)
BUN SERPL-MCNC: 38 MG/DL — HIGH (ref 7–23)
CALCIUM SERPL-MCNC: 8.5 MG/DL — SIGNIFICANT CHANGE UP (ref 8.4–10.5)
CHLORIDE SERPL-SCNC: 105 MMOL/L — SIGNIFICANT CHANGE UP (ref 96–108)
CO2 SERPL-SCNC: 21 MMOL/L — LOW (ref 22–31)
CREAT SERPL-MCNC: 1 MG/DL — SIGNIFICANT CHANGE UP (ref 0.5–1.3)
EGFR: 50 ML/MIN/1.73M2 — LOW
FERRITIN SERPL-MCNC: 110 NG/ML — SIGNIFICANT CHANGE UP (ref 15–150)
FOLATE SERPL-MCNC: 6.7 NG/ML — SIGNIFICANT CHANGE UP
GLUCOSE SERPL-MCNC: 80 MG/DL — SIGNIFICANT CHANGE UP (ref 70–99)
HCT VFR BLD CALC: 34 % — LOW (ref 34.5–45)
HGB BLD-MCNC: 11 G/DL — LOW (ref 11.5–15.5)
IRON SATN MFR SERPL: 22 % — SIGNIFICANT CHANGE UP (ref 14–50)
IRON SATN MFR SERPL: 51 UG/DL — SIGNIFICANT CHANGE UP (ref 30–160)
MAGNESIUM SERPL-MCNC: 1.9 MG/DL — SIGNIFICANT CHANGE UP (ref 1.6–2.6)
MCHC RBC-ENTMCNC: 31.3 PG — SIGNIFICANT CHANGE UP (ref 27–34)
MCHC RBC-ENTMCNC: 32.4 GM/DL — SIGNIFICANT CHANGE UP (ref 32–36)
MCV RBC AUTO: 96.6 FL — SIGNIFICANT CHANGE UP (ref 80–100)
MRSA PCR RESULT.: SIGNIFICANT CHANGE UP
NRBC # BLD: 0 /100 WBCS — SIGNIFICANT CHANGE UP (ref 0–0)
PHOSPHATE SERPL-MCNC: 2.8 MG/DL — SIGNIFICANT CHANGE UP (ref 2.5–4.5)
PLATELET # BLD AUTO: 142 K/UL — LOW (ref 150–400)
POTASSIUM SERPL-MCNC: 4.1 MMOL/L — SIGNIFICANT CHANGE UP (ref 3.5–5.3)
POTASSIUM SERPL-SCNC: 4.1 MMOL/L — SIGNIFICANT CHANGE UP (ref 3.5–5.3)
RBC # BLD: 3.52 M/UL — LOW (ref 3.8–5.2)
RBC # BLD: 3.52 M/UL — LOW (ref 3.8–5.2)
RBC # FLD: 14.9 % — HIGH (ref 10.3–14.5)
RETICS #: 40.8 K/UL — SIGNIFICANT CHANGE UP (ref 25–125)
RETICS/RBC NFR: 1.2 % — SIGNIFICANT CHANGE UP (ref 0.5–2.5)
S AUREUS DNA NOSE QL NAA+PROBE: SIGNIFICANT CHANGE UP
SODIUM SERPL-SCNC: 138 MMOL/L — SIGNIFICANT CHANGE UP (ref 135–145)
T4 FREE SERPL-MCNC: 1.2 NG/DL — SIGNIFICANT CHANGE UP (ref 0.9–1.8)
TIBC SERPL-MCNC: 235 UG/DL — SIGNIFICANT CHANGE UP (ref 220–430)
TRANSFERRIN SERPL-MCNC: 201 MG/DL — SIGNIFICANT CHANGE UP (ref 200–360)
TSH SERPL-MCNC: 4.09 UIU/ML — SIGNIFICANT CHANGE UP (ref 0.27–4.2)
UIBC SERPL-MCNC: 184 UG/DL — SIGNIFICANT CHANGE UP (ref 110–370)
VIT B12 SERPL-MCNC: 651 PG/ML — SIGNIFICANT CHANGE UP (ref 232–1245)
WBC # BLD: 6.86 K/UL — SIGNIFICANT CHANGE UP (ref 3.8–10.5)
WBC # FLD AUTO: 6.86 K/UL — SIGNIFICANT CHANGE UP (ref 3.8–10.5)

## 2023-04-23 RX ORDER — POLYETHYLENE GLYCOL 3350 17 G/17G
17 POWDER, FOR SOLUTION ORAL DAILY
Refills: 0 | Status: DISCONTINUED | OUTPATIENT
Start: 2023-04-23 | End: 2023-04-24

## 2023-04-23 RX ORDER — SODIUM CHLORIDE 0.65 %
1 AEROSOL, SPRAY (ML) NASAL
Refills: 0 | Status: DISCONTINUED | OUTPATIENT
Start: 2023-04-23 | End: 2023-04-24

## 2023-04-23 RX ADMIN — POLYETHYLENE GLYCOL 3350 17 GRAM(S): 17 POWDER, FOR SOLUTION ORAL at 15:38

## 2023-04-23 RX ADMIN — Medication 100 MICROGRAM(S): at 05:50

## 2023-04-23 RX ADMIN — CEFTRIAXONE 100 MILLIGRAM(S): 500 INJECTION, POWDER, FOR SOLUTION INTRAMUSCULAR; INTRAVENOUS at 13:27

## 2023-04-23 RX ADMIN — Medication 1000 UNIT(S): at 12:05

## 2023-04-23 RX ADMIN — Medication 25 MILLIGRAM(S): at 05:50

## 2023-04-23 RX ADMIN — Medication 81 MILLIGRAM(S): at 12:05

## 2023-04-23 RX ADMIN — ATORVASTATIN CALCIUM 20 MILLIGRAM(S): 80 TABLET, FILM COATED ORAL at 21:43

## 2023-04-23 RX ADMIN — Medication 25 MILLIGRAM(S): at 17:35

## 2023-04-23 RX ADMIN — SODIUM CHLORIDE 50 MILLILITER(S): 9 INJECTION INTRAMUSCULAR; INTRAVENOUS; SUBCUTANEOUS at 11:21

## 2023-04-23 NOTE — PHYSICAL THERAPY INITIAL EVALUATION ADULT - IMPAIRMENTS CONTRIBUTING TO GAIT DEVIATIONS, PT EVAL
decrease endurance ,min/mod unsteady vc to stand upright and lift not shuffle le's/impaired balance/impaired postural control/decreased strength

## 2023-04-23 NOTE — PHYSICAL THERAPY INITIAL EVALUATION ADULT - PATIENT/FAMILY AGREES WITH PLAN
PT recommend Subacute rehab , pt wish to think about as pt report may want to pvt hire a HHA for during day and has call bell at night for assist of intermediate

## 2023-04-23 NOTE — PHYSICAL THERAPY INITIAL EVALUATION ADULT - ACTIVE RANGE OF MOTION EXAMINATION, REHAB EVAL
except L shoulder arom to 60 degrees aarom flex to 90 degrees , abd to 50 degrees and aarom to 70 degrees , R shoulder 95 degrees flex/bilateral upper extremity Active ROM was WFL (within functional limits)/bilateral  lower extremity Active ROM was WFL (within functional limits)

## 2023-04-23 NOTE — SWALLOW BEDSIDE ASSESSMENT ADULT - COMMENTS
Continued history:   (+)Rhinovirus. Currently asymptomatic.  --NERVOUS SYSTEM:  Alert & Oriented X3, normal cognitive function. Motor Strength 5/5 right upper and right lower.  5/5 left upper and left lower extremities, DTRs 2+ intact and symmetric  -Chest imaging 4/21: No focal consolidations.  -WBC 6.86 4/23    Swallow history/Speech history: No reports in SCM. Pt denied history of intolerance of po. Pt denied current dysphagia.   Swallow history/Speech history: No reports in SCM. Pt reports no intolerance w/ swallowing.

## 2023-04-23 NOTE — SWALLOW BEDSIDE ASSESSMENT ADULT - SWALLOW EVAL: DIAGNOSIS
Patient presents for not urinating for 24 hours; increased lethargy and possibly Urinary tract infection.

## 2023-04-23 NOTE — SWALLOW BEDSIDE ASSESSMENT ADULT - H & P REVIEW
SOLIS TAYLOR is a 89-year-old female PMHx HTN, arthritis, A-fib, pacemaker, frequent UTIs presents to the ED from [facility] for not urinating for 24 hours.  Recent UTI. Two weeks ago had episode of diarrhea where [facility] physician was concerned there may have been contamination with urine at that time so prescribed her prophylactic Cipro to prevent UTI.  Over the last 24 hours patient has not urinated though does not feel the urge to, called Dr. Otero (covering doctor for [facility]) and was advised to come to ED.  Patient endorsing mild fatigue and for the past 1 week has been having mild sore throat feeling improved. Feels at times like she has to cough to clear her throat./yes

## 2023-04-23 NOTE — PHYSICAL THERAPY INITIAL EVALUATION ADULT - IMPAIRED TRANSFERS: SIT/STAND, REHAB EVAL
decrease endurance , tires easily , sit-stand mod of 1 at rolling walker ; in stand work on wt shift and balance x few min mod of 1/impaired balance/decreased flexibility/impaired postural control/decreased strength

## 2023-04-23 NOTE — PHYSICAL THERAPY INITIAL EVALUATION ADULT - GAIT DEVIATIONS NOTED, PT EVAL
decreased jhonatan/increased time in double stance/decreased step length/decreased stride length/decreased weight-shifting ability

## 2023-04-23 NOTE — PHYSICAL THERAPY INITIAL EVALUATION ADULT - PREDICTED DURATION OF THERAPY (DAYS/WKS), PT EVAL
3 weeks Mucosal Advancement Flap Text: Given the location of the defect, shape of the defect and the proximity to free margins a mucosal advancement flap was deemed most appropriate. Incisions were made with a 15 blade scalpel in the appropriate fashion along the cutaneous vermillion border and the mucosal lip. The remaining actinically damaged mucosal tissue was excised.  The mucosal advancement flap was then elevated to the gingival sulcus with care taken to preserve the neurovascular structures and advanced into the primary defect. Care was taken to ensure that precise realignment of the vermillion border was achieved.

## 2023-04-23 NOTE — SWALLOW BEDSIDE ASSESSMENT ADULT - PHARYNGEAL PHASE
suspect mild latency in the swallow response given inconsistent audible deglutition,  however functional

## 2023-04-23 NOTE — PHYSICAL THERAPY INITIAL EVALUATION ADULT - ADDITIONAL COMMENTS
pt lives at the [facility]  at [facility] ;pt is independent with amb with rollator PTA as well as ADLs ; daughter Ariana Gilliland [phone] ID as caregiver and Emergency Contact ; pt receiving PT/OT/ST through [facility] pt lives at the [facility]  at [facility] ;pt is independent with amb with rollator PTA as well as ADLs ; daughter Ariana Gilliland [phone] ID as caregiver and Emergency Contact ; pt receiving PT through [facility]; pt has a walk-in shower with shower bench and grab bars in shower

## 2023-04-23 NOTE — PHYSICAL THERAPY INITIAL EVALUATION ADULT - NSPTDISCHREC_GEN_A_CORE
if home need assist all functional activity and adl's and Home PT pt understood and would like to think about ; PT recommend OT Consult as pt now need assist and was independent PTA w/ ADLs ; pt owns a rollator and shower bench w/ grab bars in shower , pt can benefit from transport w/c for longer distances as pt only amb 15 ft x 2 at this time w/ assist unsteady/[facility]acute [facility]hab

## 2023-04-23 NOTE — PHYSICAL THERAPY INITIAL EVALUATION ADULT - PERTINENT HX OF CURRENT PROBLEM, REHAB EVAL
89-year-old female PMHx HTN, arthritis, A-fib, pacemaker, frequent UTIs presents to the ED from [facility] for not urinating for 24 hours.  Patient and daughter at bedside provide history, states patient was here 1 month ago for UTI, completed course of antibiotics and was discharged back to [facility]. Two weeks ago had episode of diarrhea where [facility] physician was concerned there may have been contamination with urine at that time so prescribed her prophylactic Cipro to prevent UTI (patient completed full course.  Over the last 24 hours patient has not urinated though does not feel the urge to, called Dr. Otero (covering doctor for [facility]) and was advised to come to ED.  Patient endorsing mild fatigue and for the past 1 week has been having mild sore throat feeling improved. Feels at times like she has to cough to clear her throat.

## 2023-04-24 ENCOUNTER — TRANSCRIPTION ENCOUNTER (OUTPATIENT)
Age: 88
End: 2023-04-24

## 2023-04-24 VITALS
SYSTOLIC BLOOD PRESSURE: 148 MMHG | HEART RATE: 68 BPM | OXYGEN SATURATION: 97 % | DIASTOLIC BLOOD PRESSURE: 70 MMHG | RESPIRATION RATE: 18 BRPM

## 2023-04-24 PROCEDURE — 87641 MR-STAPH DNA AMP PROBE: CPT

## 2023-04-24 PROCEDURE — 71045 X-RAY EXAM CHEST 1 VIEW: CPT

## 2023-04-24 PROCEDURE — 97116 GAIT TRAINING THERAPY: CPT

## 2023-04-24 PROCEDURE — 80053 COMPREHEN METABOLIC PANEL: CPT

## 2023-04-24 PROCEDURE — 82746 ASSAY OF FOLIC ACID SERUM: CPT

## 2023-04-24 PROCEDURE — 85045 AUTOMATED RETICULOCYTE COUNT: CPT

## 2023-04-24 PROCEDURE — 97165 OT EVAL LOW COMPLEX 30 MIN: CPT

## 2023-04-24 PROCEDURE — 85025 COMPLETE CBC W/AUTO DIFF WBC: CPT

## 2023-04-24 PROCEDURE — 83540 ASSAY OF IRON: CPT

## 2023-04-24 PROCEDURE — 97162 PT EVAL MOD COMPLEX 30 MIN: CPT

## 2023-04-24 PROCEDURE — 97530 THERAPEUTIC ACTIVITIES: CPT

## 2023-04-24 PROCEDURE — 84100 ASSAY OF PHOSPHORUS: CPT

## 2023-04-24 PROCEDURE — 87086 URINE CULTURE/COLONY COUNT: CPT

## 2023-04-24 PROCEDURE — 36415 COLL VENOUS BLD VENIPUNCTURE: CPT

## 2023-04-24 PROCEDURE — 84443 ASSAY THYROID STIM HORMONE: CPT

## 2023-04-24 PROCEDURE — 99285 EMERGENCY DEPT VISIT HI MDM: CPT

## 2023-04-24 PROCEDURE — 83735 ASSAY OF MAGNESIUM: CPT

## 2023-04-24 PROCEDURE — 82728 ASSAY OF FERRITIN: CPT

## 2023-04-24 PROCEDURE — 82607 VITAMIN B-12: CPT

## 2023-04-24 PROCEDURE — 92610 EVALUATE SWALLOWING FUNCTION: CPT

## 2023-04-24 PROCEDURE — 84439 ASSAY OF FREE THYROXINE: CPT

## 2023-04-24 PROCEDURE — 80048 BASIC METABOLIC PNL TOTAL CA: CPT

## 2023-04-24 PROCEDURE — 84466 ASSAY OF TRANSFERRIN: CPT

## 2023-04-24 PROCEDURE — 83550 IRON BINDING TEST: CPT

## 2023-04-24 PROCEDURE — 81001 URINALYSIS AUTO W/SCOPE: CPT

## 2023-04-24 PROCEDURE — 0225U NFCT DS DNA&RNA 21 SARSCOV2: CPT

## 2023-04-24 PROCEDURE — 85027 COMPLETE CBC AUTOMATED: CPT

## 2023-04-24 PROCEDURE — 87640 STAPH A DNA AMP PROBE: CPT

## 2023-04-24 PROCEDURE — 96374 THER/PROPH/DIAG INJ IV PUSH: CPT

## 2023-04-24 RX ORDER — ACETAMINOPHEN 500 MG
3 TABLET ORAL
Qty: 0 | Refills: 0 | DISCHARGE

## 2023-04-24 RX ORDER — METOPROLOL TARTRATE 50 MG
1 TABLET ORAL
Qty: 0 | Refills: 0 | DISCHARGE
Start: 2023-04-24

## 2023-04-24 RX ORDER — ACETAMINOPHEN 500 MG
2 TABLET ORAL
Qty: 0 | Refills: 0 | DISCHARGE
Start: 2023-04-24

## 2023-04-24 RX ORDER — SODIUM CHLORIDE 0.65 %
1 AEROSOL, SPRAY (ML) NASAL
Qty: 0 | Refills: 0 | DISCHARGE
Start: 2023-04-24

## 2023-04-24 RX ORDER — CHOLECALCIFEROL (VITAMIN D3) 125 MCG
1000 CAPSULE ORAL
Qty: 0 | Refills: 0 | DISCHARGE
Start: 2023-04-24

## 2023-04-24 RX ADMIN — Medication 25 MILLIGRAM(S): at 05:18

## 2023-04-24 RX ADMIN — CEFTRIAXONE 100 MILLIGRAM(S): 500 INJECTION, POWDER, FOR SOLUTION INTRAMUSCULAR; INTRAVENOUS at 11:25

## 2023-04-24 RX ADMIN — Medication 1000 UNIT(S): at 11:25

## 2023-04-24 RX ADMIN — POLYETHYLENE GLYCOL 3350 17 GRAM(S): 17 POWDER, FOR SOLUTION ORAL at 11:26

## 2023-04-24 RX ADMIN — Medication 81 MILLIGRAM(S): at 11:25

## 2023-04-24 RX ADMIN — Medication 100 MICROGRAM(S): at 05:18

## 2023-04-24 NOTE — OCCUPATIONAL THERAPY INITIAL EVALUATION ADULT - PERTINENT HX OF CURRENT PROBLEM, REHAB EVAL
89-year-old female PMHx HTN, arthritis, A-fib, pacemaker, frequent UTIs presents to the ED from [facility] for not urinating for 24 hours.  Patient and daughter at bedside provide history, states patient was here 1 month ago for UTI, completed course of antibiotics and was discharged back to [facility]. Two weeks ago had episode of diarrhea where [facility] physician was concerned there may have been contamination with urine at that time so prescribed her prophylactic Cipro to prevent UTI (patient completed full course.  Over the last 24 hours patient has not urinated though does not feel the urge to, called Dr. Otero (covering doctor for [facility]) and was advised to come to ED.  Patient endorsing mild fatigue and for the past 1 week has been having mild sore throat feeling improved. Feels at times like she has to cough to clear her throat. Denies fever/chills, nausea/vomiting, chest pain, shortness of breath, hematuria, flank pain, abdominal pain.    Chest XRAY 3/6 :No focal consolidations.

## 2023-04-24 NOTE — PROGRESS NOTE ADULT - PROBLEM SELECTOR PLAN 4
Patient recently treated for a Urinary tract infection with Cipro  small LE on UA  will DC abx after tomorrows dose  follow cultures
Patient recently treated for a Urinary tract infection with Cipro  small LE on UA  will DC abx after tomorrows dose  follow cultures
Patient recently treated for a Urinary tract infection with Cipro  small LE on UA  started Patient on a course of ceftriaxone   follow cultures

## 2023-04-24 NOTE — PROGRESS NOTE ADULT - SUBJECTIVE AND OBJECTIVE BOX
89-year-old female PMHx HTN, arthritis, A-fib, pacemaker, frequent UTIs presents to the ED from [facility] living for not urinating for 24 hours.  Patient and daughter at bedside provide history, states patient was here 1 month ago for UTI, completed course of antibiotics and was discharged back to [facility]. Two weeks ago had episode of diarrhea where [facility] physician was concerned there may have been contamination with urine at that time so prescribed her prophylactic Cipro to prevent UTI (patient completed full course.  Over the last 24 hours patient has not urinated though does not feel the urge to, called Dr. Otero (covering doctor for [facility]) and was advised to come to ED.  Patient endorsing mild fatigue and for the past 1 week has been having mild sore throat feeling improved. Feels at times like she has to cough to clear her throat. Denies fever/chills, nausea/vomiting, chest pain, shortness of breath, hematuria, flank pain, abdominal pain.Pt seen resting comfortably    MEDICATIONS  (STANDING):  aspirin enteric coated 81 milliGRAM(s) Oral daily  atorvastatin 20 milliGRAM(s) Oral at bedtime  cefTRIAXone   IVPB 1000 milliGRAM(s) IV Intermittent every 24 hours  levothyroxine 100 MICROGram(s) Oral daily  metoprolol tartrate 25 milliGRAM(s) Oral two times a day  sodium chloride 0.9%. 1000 milliLiter(s) (50 mL/Hr) IV Continuous <Continuous>    MEDICATIONS  (PRN):  acetaminophen     Tablet .. 650 milliGRAM(s) Oral every 6 hours PRN Temp greater or equal to 38C (100.4F), Moderate Pain (4 - 6)          VITALS:   T(C): 36.9 (23 @ 11:50), Max: 36.9 (23 @ 05:04)  HR: 75 (23 @ 11:50) (71 - 79)  BP: 174/80 (23 @ 11:50) (151/91 - 174/80)  RR: 18 (23 @ 11:50) (16 - 18)  SpO2: 98% (23 @ 11:50) (96% - 98%)  Wt(kg): --    PHYSICAL EXAM:  GENERAL: NAD, well nourished and conversant  HEAD:  Atraumatic  EYES: EOM, PERRLA, conjunctiva pink and sclera white  ENT: No tonsillar erythema, exudates, or enlargement, moist mucous membranes, good dentition, no lesions  NECK: Supple, No JVD, normal thyroid, carotids with normal upstrokes and no bruits  CHEST/LUNG: Clear to auscultation bilaterally, No rales, rhonchi, wheezing, or rubs  HEART: Regular rate and rhythm, No murmurs, rubs, or gallops  ABDOMEN: Soft, nondistended, no masses, guarding, tenderness or rebound, bowel sounds present  EXTREMITIES:  2+ Peripheral Pulses, No clubbing, cyanosis, or edema.   LYMPH: No lymphadenopathy noted  SKIN: No rashes or lesions  NERVOUS SYSTEM:  Alert & Oriented X3, normal cognitive function. Motor Strength 5/5 right upper and right lower.  5/5 left upper and left lower extremities, DTRs 2+ intact and symmetric    LABS:        CBC Full  -  ( 2023 07:15 )  WBC Count : 5.64 K/uL  RBC Count : 3.47 M/uL  Hemoglobin : 10.9 g/dL  Hematocrit : 33.2 %  Platelet Count - Automated : 142 K/uL  Mean Cell Volume : 95.7 fl  Mean Cell Hemoglobin : 31.4 pg  Mean Cell Hemoglobin Concentration : 32.8 gm/dL  Auto Neutrophil # : x  Auto Lymphocyte # : x  Auto Monocyte # : x  Auto Eosinophil # : x  Auto Basophil # : x  Auto Neutrophil % : x  Auto Lymphocyte % : x  Auto Monocyte % : x  Auto Eosinophil % : x  Auto Basophil % : x    -    138  |  103  |  45<H>  ----------------------------<  76  3.7   |  22  |  1.13    Ca    8.6      2023 07:14    TPro  7.7  /  Alb  4.2  /  TBili  0.3  /  DBili  x   /  AST  44<H>  /  ALT  60<H>  /  AlkPhos  188<H>  -    LIVER FUNCTIONS - ( 2023 21:12 )  Alb: 4.2 g/dL / Pro: 7.7 g/dL / ALK PHOS: 188 U/L / ALT: 60 U/L / AST: 44 U/L / GGT: x             Urinalysis Basic - ( 2023 21:26 )    Color: Yellow / Appearance: Slightly Turbid / S.021 / pH: x  Gluc: x / Ketone: Negative  / Bili: Negative / Urobili: Negative   Blood: x / Protein: 30 mg/dL / Nitrite: Negative   Leuk Esterase: Small / RBC: 2 /hpf / WBC 19 /HPF   Sq Epi: x / Non Sq Epi: x / Bacteria: Negative      CAPILLARY BLOOD GLUCOSE          RADIOLOGY & ADDITIONAL TESTS:      
89-year-old female PMHx HTN, arthritis, A-fib, pacemaker, frequent UTIs presents to the ED from [facility] living for not urinating for 24 hours.  Patient and daughter at bedside provide history, states patient was here 1 month ago for UTI, completed course of antibiotics and was discharged back to [facility]. Two weeks ago had episode of diarrhea where [facility] physician was concerned there may have been contamination with urine at that time so prescribed her prophylactic Cipro to prevent UTI (patient completed full course.  Over the last 24 hours patient has not urinated though does not feel the urge to, called Dr. Otero (covering doctor for [facility]) and was advised to come to ED.  Patient endorsing mild fatigue and for the past 1 week has been having mild sore throat feeling improved. Feels at times like she has to cough to clear her throat. Denies fever/chills, nausea/vomiting, chest pain, shortness of breath, hematuria, flank pain, abdominal pain. Pt with complaint of constipation.     MEDICATIONS  (STANDING):  aspirin enteric coated 81 milliGRAM(s) Oral daily  atorvastatin 20 milliGRAM(s) Oral at bedtime  cefTRIAXone   IVPB 1000 milliGRAM(s) IV Intermittent every 24 hours  cholecalciferol 1000 Unit(s) Oral daily  levothyroxine 100 MICROGram(s) Oral daily  metoprolol tartrate 25 milliGRAM(s) Oral two times a day  polyethylene glycol 3350 17 Gram(s) Oral daily  sodium chloride 0.9%. 1000 milliLiter(s) (50 mL/Hr) IV Continuous <Continuous>    MEDICATIONS  (PRN):  acetaminophen     Tablet .. 650 milliGRAM(s) Oral every 6 hours PRN Temp greater or equal to 38C (100.4F), Moderate Pain (4 - 6)  sodium chloride 0.65% Nasal 1 Spray(s) Both Nostrils four times a day PRN Nasal Congestion          VITALS:   T(C): 36.8 (23 @ 11:34), Max: 37.2 (23 @ 04:55)  HR: 76 (23 @ 11:34) (67 - 78)  BP: 146/73 (23 @ 11:34) (130/76 - 176/74)  RR: 18 (23 @ 11:34) (18 - 18)  SpO2: 96% (- @ 11:34) (93% - 97%)  Wt(kg): --        PHYSICAL EXAM:  GENERAL: NAD, well nourished and conversant  HEAD:  Atraumatic  EYES: EOM, PERRLA, conjunctiva pink and sclera white  ENT: No tonsillar erythema, exudates, or enlargement, moist mucous membranes, good dentition, no lesions  NECK: Supple, No JVD, normal thyroid, carotids with normal upstrokes and no bruits  CHEST/LUNG: Clear to auscultation bilaterally, No rales, rhonchi, wheezing, or rubs  HEART: Regular rate and rhythm, No murmurs, rubs, or gallops  ABDOMEN: Soft, nondistended, no masses, guarding, tenderness or rebound, bowel sounds present  EXTREMITIES:  2+ Peripheral Pulses, No clubbing, cyanosis, or edema.   LYMPH: No lymphadenopathy noted  SKIN: No rashes or lesions  NERVOUS SYSTEM:  Alert & Oriented X3, normal cognitive function. Motor Strength 5/5 right upper and right lower.  5/5 left upper and left lower extremities, DTRs 2+ intact and symmetric  LABS:        CBC Full  -  ( 2023 06:04 )  WBC Count : 6.86 K/uL  RBC Count : 3.52 M/uL  Hemoglobin : 11.0 g/dL  Hematocrit : 34.0 %  Platelet Count - Automated : 142 K/uL  Mean Cell Volume : 96.6 fl  Mean Cell Hemoglobin : 31.3 pg  Mean Cell Hemoglobin Concentration : 32.4 gm/dL  Auto Neutrophil # : x  Auto Lymphocyte # : x  Auto Monocyte # : x  Auto Eosinophil # : x  Auto Basophil # : x  Auto Neutrophil % : x  Auto Lymphocyte % : x  Auto Monocyte % : x  Auto Eosinophil % : x  Auto Basophil % : x        138  |  105  |  38<H>  ----------------------------<  80  4.1   |  21<L>  |  1.00    Ca    8.5      2023 06:05  Phos  2.8       Mg     1.9         TPro  7.7  /  Alb  4.2  /  TBili  0.3  /  DBili  x   /  AST  44<H>  /  ALT  60<H>  /  AlkPhos  188<H>  04-21    LIVER FUNCTIONS - ( 2023 21:12 )  Alb: 4.2 g/dL / Pro: 7.7 g/dL / ALK PHOS: 188 U/L / ALT: 60 U/L / AST: 44 U/L / GGT: x             Urinalysis Basic - ( 2023 21:26 )    Color: Yellow / Appearance: Slightly Turbid / S.021 / pH: x  Gluc: x / Ketone: Negative  / Bili: Negative / Urobili: Negative   Blood: x / Protein: 30 mg/dL / Nitrite: Negative   Leuk Esterase: Small / RBC: 2 /hpf / WBC 19 /HPF   Sq Epi: x / Non Sq Epi: x / Bacteria: Negative      CAPILLARY BLOOD GLUCOSE          RADIOLOGY & ADDITIONAL TESTS:      
89-year-old female PMHx HTN, arthritis, A-fib, pacemaker, frequent UTIs presents to the ED from [facility] living for not urinating for 24 hours.  Patient and daughter at bedside provide history, states patient was here 1 month ago for UTI, completed course of antibiotics and was discharged back to [facility]. Two weeks ago had episode of diarrhea where [facility] physician was concerned there may have been contamination with urine at that time so prescribed her prophylactic Cipro to prevent UTI (patient completed full course.  Over the last 24 hours patient has not urinated though does not feel the urge to, called Dr. Otero (covering doctor for [facility]) and was advised to come to ED.  Patient endorsing mild fatigue and for the past 1 week has been having mild sore throat feeling improved. Feels at times like she has to cough to clear her throat. Denies fever/chills, nausea/vomiting, chest pain, shortness of breath, hematuria, flank pain, abdominal pain. Patient seen resting comfortably.       MEDICATIONS  (STANDING):  aspirin enteric coated 81 milliGRAM(s) Oral daily  atorvastatin 20 milliGRAM(s) Oral at bedtime  cholecalciferol 1000 Unit(s) Oral daily  levothyroxine 100 MICROGram(s) Oral daily  metoprolol tartrate 25 milliGRAM(s) Oral two times a day  polyethylene glycol 3350 17 Gram(s) Oral daily  sodium chloride 0.9%. 1000 milliLiter(s) (50 mL/Hr) IV Continuous <Continuous>    MEDICATIONS  (PRN):  acetaminophen     Tablet .. 650 milliGRAM(s) Oral every 6 hours PRN Temp greater or equal to 38C (100.4F), Moderate Pain (4 - 6)  sodium chloride 0.65% Nasal 1 Spray(s) Both Nostrils four times a day PRN Nasal Congestion          VITALS:   T(C): 36.8 (04-24-23 @ 12:35), Max: 37.1 (04-23-23 @ 19:23)  HR: 68 (04-24-23 @ 13:38) (65 - 68)  BP: 148/70 (04-24-23 @ 13:38) (148/70 - 181/78)  RR: 18 (04-24-23 @ 13:38) (18 - 18)  SpO2: 97% (04-24-23 @ 13:38) (96% - 98%)  Wt(kg): --    PHYSICAL EXAM:  GENERAL: NAD, well nourished and conversant  HEAD:  Atraumatic  EYES: EOM, PERRLA, conjunctiva pink and sclera white  ENT: No tonsillar erythema, exudates, or enlargement, moist mucous membranes, good dentition, no lesions  NECK: Supple, No JVD, normal thyroid, carotids with normal upstrokes and no bruits  CHEST/LUNG: Clear to auscultation bilaterally, No rales, rhonchi, wheezing, or rubs  HEART: Regular rate and rhythm, No murmurs, rubs, or gallops  ABDOMEN: Soft, nondistended, no masses, guarding, tenderness or rebound, bowel sounds present  EXTREMITIES:  2+ Peripheral Pulses, No clubbing, cyanosis, or edema.   LYMPH: No lymphadenopathy noted  SKIN: No rashes or lesions  NERVOUS SYSTEM:  Alert & Oriented X3, normal cognitive function. Motor Strength 5/5 right upper and right lower.  5/5 left upper and left lower extremities, DTRs 2+ intact and symmetric    LABS:        CBC Full  -  ( 23 Apr 2023 06:04 )  WBC Count : 6.86 K/uL  RBC Count : 3.52 M/uL  Hemoglobin : 11.0 g/dL  Hematocrit : 34.0 %  Platelet Count - Automated : 142 K/uL  Mean Cell Volume : 96.6 fl  Mean Cell Hemoglobin : 31.3 pg  Mean Cell Hemoglobin Concentration : 32.4 gm/dL  Auto Neutrophil # : x  Auto Lymphocyte # : x  Auto Monocyte # : x  Auto Eosinophil # : x  Auto Basophil # : x  Auto Neutrophil % : x  Auto Lymphocyte % : x  Auto Monocyte % : x  Auto Eosinophil % : x  Auto Basophil % : x    04-23    138  |  105  |  38<H>  ----------------------------<  80  4.1   |  21<L>  |  1.00    Ca    8.5      23 Apr 2023 06:05  Phos  2.8     04-23  Mg     1.9     04-23            CAPILLARY BLOOD GLUCOSE          RADIOLOGY & ADDITIONAL TESTS:

## 2023-04-24 NOTE — DISCHARGE NOTE NURSING/CASE MANAGEMENT/SOCIAL WORK - NSDCVIVACCINE_GEN_ALL_CORE_FT
Tdap; 17-Jun-2019 08:56; Priyanka Easley (RN); [employer]; [id number] (Exp. Date: 04-Apr-2021); IntraMuscular; Deltoid Left.; 0.5 milliLiter(s); VIS (VIS Published: 09-May-2013, VIS Presented: 17-Jun-2019);   Tdap; 25-Dec-2022 19:57; Michelle Richard (RN); [employer]; [id number] (Exp. Date: 01-Jun-2024); IntraMuscular; Deltoid Left.; 0.5 milliLiter(s); VIS (VIS Published: 09-May-2013, VIS Presented: 25-Dec-2022);

## 2023-04-24 NOTE — DISCHARGE NOTE PROVIDER - NSDCCPCAREPLAN_GEN_ALL_CORE_FT
PRINCIPAL DISCHARGE DIAGNOSIS  Diagnosis: ANNA (acute kidney injury)  Assessment and Plan of Treatment: You were seen and evaluated by the Nephrology Team. You had an ultrasound of your bladder and kidneys that showed no swelling of the kidneys. Your bladder was distended likely from the infection. Follow up with Nephrology in the next 2 weeks to recheck your kidney function.      SECONDARY DISCHARGE DIAGNOSES  Diagnosis: Urinary retention  Assessment and Plan of Treatment: Blum was placed, now removed.   Resolved.    Diagnosis: Acute UTI  Assessment and Plan of Treatment: HOME CARE INSTRUCTIONS  If you were prescribed antibiotics, take them exactly as your caregiver instructs you. Finish the medication even if you feel better after you have only taken some of the medication.  Drink enough water and fluids to keep your urine clear or pale yellow.  Avoid caffeine, tea, and carbonated beverages. They tend to irritate your bladder.  Empty your bladder often. Avoid holding urine for long periods of time.  Empty your bladder before and after sexual intercourse.  After a bowel movement, women should cleanse from front to back. Use each tissue only once.  SEEK MEDICAL CARE IF:  You have back pain.  You develop a fever.  Your symptoms do not begin to resolve within 3 days.  SEEK IMMEDIATE MEDICAL CARE IF:  You have severe back pain or lower abdominal pain.  You develop chills.  You have nausea or vomiting.  You have continued burning or discomfort with urination.

## 2023-04-24 NOTE — OCCUPATIONAL THERAPY INITIAL EVALUATION ADULT - DIAGNOSIS, OT EVAL
Pt presents with decreased strength, endurance, and balance,  impacting functional mobility and ADLs.

## 2023-04-24 NOTE — PROGRESS NOTE ADULT - PROBLEM SELECTOR PLAN 2
continue metoprolol and amlodipine  continue to monitor BP  BP has been elevated  will adjust meds as needed

## 2023-04-24 NOTE — OCCUPATIONAL THERAPY INITIAL EVALUATION ADULT - BALANCE TRAINING, PT EVAL
GOAL: Pt will demonstrate improved balance while completing grooming task at sink independently in 4 weeks.

## 2023-04-24 NOTE — DISCHARGE NOTE PROVIDER - NSDCMRMEDTOKEN_GEN_ALL_CORE_FT
acetaminophen 325 mg oral tablet: 3 tab(s) orally every 8 hours, As Needed  amLODIPine 5 mg oral tablet: 1 tab(s) orally once a day  aspirin 81 mg oral delayed release tablet: 1 tab(s) orally once a day  atorvastatin 20 mg oral tablet: 1 tab(s) orally once a day (at bedtime)  ciprofloxacin 250 mg oral tablet: 1 tab(s) orally 2 times a day  levothyroxine 100 mcg (0.1 mg) oral tablet: 1 tab(s) orally once a day  metoprolol tartrate 25 mg oral tablet: 1 tab(s) orally 2 times a day  Physical Therapy:   polyethylene glycol 3350 oral powder for reconstitution: 17 gram(s) orally once a day   acetaminophen 325 mg oral tablet: 2 tab(s) orally every 6 hours As needed Temp greater or equal to 38C (100.4F), Moderate Pain (4 - 6)  amLODIPine 5 mg oral tablet: 1 tab(s) orally once a day  aspirin 81 mg oral delayed release tablet: 1 tab(s) orally once a day  atorvastatin 20 mg oral tablet: 1 tab(s) orally once a day (at bedtime)  benzonatate 100 mg oral capsule: 1 cap(s) orally 3 times a day as needed for  cough  cholecalciferol oral tablet: 1000 unit(s) orally once a day  levothyroxine 100 mcg (0.1 mg) oral tablet: 1 tab(s) orally once a day  metoprolol tartrate 25 mg oral tablet: 1 tab(s) orally 2 times a day  Physical Therapy:   polyethylene glycol 3350 oral powder for reconstitution: 17 gram(s) orally once a day  sodium chloride 0.65% nasal spray: 1 spray(s) nasal 4 times a day as needed for  congestion

## 2023-04-24 NOTE — DISCHARGE NOTE PROVIDER - HOSPITAL COURSE
89yoF PMHx HTN, arthritis, A-fib, PPM, frequent UTIs presents from [facility] for not urinating for 24 hours. Admitted with Urinary retention- s/p Blum, removed 4/23, passed TOV.  Treated for ?UTI- s/p Ceftriaxone x3 days; UCx negative and ANNA likely 2/2 retention- improved s/p IVF.  Pt found to have Entero/rhinovirus- supportive care, saline spray; on room air.  Acute issues resolved.  DC back to assisted living with PT. 89yoF PMHx HTN, arthritis, A-fib, PPM, frequent UTIs presents from [facility] assisted living for not urinating for 24 hours. Admitted with Urinary retention- s/p Blum, removed 4/23, passed TOV.  Treated for ?UTI- s/p Ceftriaxone x3 days; UCx negative and ANNA likely 2/2 retention- improved s/p IVF.  Pt found to have Entero/rhinovirus- supportive care, saline spray; on room air.  Acute issues resolved.  DC back to assisted living with PT and OT.Patient is medically clear for discharge by Dr. Spears to [facility] living. Outpatient follow up with PCP and GI. Med recc and clearance discussed with attending

## 2023-04-24 NOTE — PROGRESS NOTE ADULT - PROBLEM SELECTOR PLAN 1
Blum placed in ER   will order a TOV  will continue to monitor renal function  started Miralax for constipation
Blum placed in ER   will order a TOV  will continue to monitor renal function  started Miralax for constipation
Blum placed in ER   will order a TOV  will continue to monitor renal function  will continue gentle hydration

## 2023-04-24 NOTE — PROGRESS NOTE ADULT - ASSESSMENT
89yo woman presents with decreased urine output. Increased lethargy and possibly Urinary tract infection

## 2023-04-24 NOTE — OCCUPATIONAL THERAPY INITIAL EVALUATION ADULT - STRENGTHENING, PT EVAL
GOAL: Pt will increase UE strength 1 grade for increased safety and independence with ADL task in 4 weeks.

## 2023-04-24 NOTE — OCCUPATIONAL THERAPY INITIAL EVALUATION ADULT - ASSISTIVE DEVICE FOR TRANSFER: STAND/SIT, REHAB EVAL
Chief Complaint     Condition; Hyperlipidemia; Medicare Wellness Visit           History of Present Illness:  Patient is a 89 year old male here today for   Chief Complaint   Patient presents with   • Condition     passed out at [facility] last sunday and unresponed for 10 to 15 min, take some cold medication the day of [facility] and didn't sleep good the night of  [facility]   • Hyperlipidemia     treated with lovastatin (MEVACOR) 20 MG tablet   • Medicare Wellness Visit     yearly, patient want to have wellness done today   He denies any new myalgias or arthralgias has had some recent upper respiratory infections is reason is taking the above a cold medication  Past Medical History:   Diagnosis Date   • Arteriosclerotic heart disease    • Carpal tunnel syndrome, right 2015   • Cataract    • Deafness in right ear    • Glaucoma (increased eye pressure)    • Hypercholesterolemia    • Macular degeneration    • Malignant neoplasm (CMS/HCC)     skin cancer   • Prostatism    • 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  See Histories section of the EMR for a display of this information.    Review of Systems:  All other ROS negative except as documented in the HPI.    Visit Vitals  /72 (BP Location: [facility] [place], Patient Position: Sitting, Cuff Size: Regular)   Pulse 74   Ht 5' 10\" (1.778 m)   Wt 83.7 kg   BMI 26.47 kg/m²       Physical Exam:  The patient is alert, oriented, carries on normal conversation, gives a good history. Extraocular movements are intact.  Lungs remain clear; there are no rhonchi, rales or wheeze.  Respiratory effort is normal.  Heart has a regular rhythm, normal S1 and S2, heart rate is controlled.  Eggs without edema no open skin lesions normal gait    Assessment and Plan:  1.  Recent syncopal episode. Discussed further evaluation with  him including Holter monitor. He does not want to do any further evaluation. This is reasonable  2.  Hyperlipidemia continue the Mevacor  3.  Intermittent arthralgias continues over-the-counter medications for this  4.  Medicare Wellness Exam.  Personalized Prevention Plan, Provider Care Team, and HRA questionnaire completed and reviewed.      Brian Edda  3/30/2018 12:13 PM  Signed  Health Maintenance Summary     Topic Due On Due Status Completed On Postpone Until Reason    Immunization - Pneumococcal  Completed Oct 25, 2017      Medicare Wellness Visit Mar 30, 2019 Not Due Mar 30, 2018      IMMUNIZATION - DTaP/Tdap/Td Mar 17, 1998 Postponed Mar 16, 1998 Mar 30, 2019 Insurance or Financial    Immunization-Influenza  Completed Oct 24, 2017      Depression Screening Jan 26, 2018 Postponed Jan 26, 2017 Mar 30, 2018 Test ordered & Appt scheduled to perform          Patient is due for topics as listed above, he wishes to proceed with MWV (Medicare Wellness Visit), but declines Immunization(s) at this time .          No orders of the defined types were placed in this encounter.     rolling walker

## 2023-04-24 NOTE — DISCHARGE NOTE NURSING/CASE MANAGEMENT/SOCIAL WORK - NSDCPEFALRISK_GEN_ALL_CORE
For information on Fall & Injury Prevention, visit: [email or web address] OR  [email or web address] OR  https://www.cdc.gov/steadi/patient.html

## 2023-04-24 NOTE — PROGRESS NOTE ADULT - PROBLEM SELECTOR PLAN 5
Patient positive for rhinovirus  Currently asymptomatic  will continue to monitor
Patient positive for rhinovirus  Currently asymptomatic  will continue to monitor  will start nasal saline spray for nasal congestion  will start tessalon pearls for cough
Patient positive for rhinovirus  Currently asymptomatic  will continue to monitor  will start nasal saline spray for nasal congestion

## 2023-04-24 NOTE — DISCHARGE NOTE PROVIDER - PROVIDER TOKENS
PROVIDER:[TOKEN:[[id number]],FOLLOWUP:[1 week]],PROVIDER:[TOKEN:[[id number]],FOLLOWUP:[2 weeks]]

## 2023-04-24 NOTE — DISCHARGE NOTE NURSING/CASE MANAGEMENT/SOCIAL WORK - PATIENT PORTAL LINK FT
You can access the FollowMyHealth Patient Portal offered by [facility] by registering at the following website: [email or web address]. By joining [facility]’s FollowMyHealth portal, you will also be able to view your health information using other applications (apps) compatible with our system.

## 2023-04-24 NOTE — PROGRESS NOTE ADULT - TIME BILLING
Discussed treatment plan with patient at bedside and daughter by phone
Discussed treatment plan with patient at bedside and with daughter by phone  DC planning back to assisted living
Discussed treatment plan with patient at bedside

## 2023-04-24 NOTE — OCCUPATIONAL THERAPY INITIAL EVALUATION ADULT - LIVES WITH, PROFILE
Pt reports living in an [facility] ([facility]). Has walk-in shower with shower bench, rollator, and sock aide.

## 2023-04-24 NOTE — PROGRESS NOTE ADULT - PROBLEM SELECTOR PLAN 3
continue current dose of synthroid  Patient appears euthyroid

## 2023-04-24 NOTE — DISCHARGE NOTE PROVIDER - CARE PROVIDER_API CALL
[facility]  [address] [place] [address]  Phone: [phone]  Fax: [phone]  Follow Up Time: 1 week    Jose Shah  GASTROENTEROLOGY  [address] [place] [address], [address]  [place] [address]  Phone: [phone]  Fax: [phone]  Follow Up Time: 2 weeks

## 2023-04-28 ENCOUNTER — NON-APPOINTMENT (OUTPATIENT)
Age: 88
End: 2023-04-28

## 2023-04-28 ENCOUNTER — APPOINTMENT (OUTPATIENT)
Dept: GERIATRICS | Facility: ASSISTED LIVING FACILITY | Age: 88
End: 2023-04-28
Payer: MEDICARE

## 2023-04-28 VITALS
OXYGEN SATURATION: 95 % | TEMPERATURE: 97.7 F | SYSTOLIC BLOOD PRESSURE: 108 MMHG | DIASTOLIC BLOOD PRESSURE: 62 MMHG | RESPIRATION RATE: 20 BRPM | HEART RATE: 76 BPM

## 2023-04-28 DIAGNOSIS — R26.81 UNSTEADINESS ON FEET: ICD-10-CM

## 2023-04-28 DIAGNOSIS — R32 UNSPECIFIED URINARY INCONTINENCE: ICD-10-CM

## 2023-04-28 PROCEDURE — 99497 ADVNCD CARE PLAN 30 MIN: CPT

## 2023-04-28 PROCEDURE — 99350 HOME/RES VST EST HIGH MDM 60: CPT

## 2023-04-28 RX ORDER — CIPROFLOXACIN HYDROCHLORIDE 250 MG/1
250 TABLET, FILM COATED ORAL
Qty: 10 | Refills: 1 | Status: COMPLETED | COMMUNITY
Start: 2023-04-12 | End: 2023-04-28

## 2023-04-28 NOTE — REASON FOR VISIT
[Post Hospitalization] : a post hospitalization visit [FreeTextEntry1] : at [facility] urinary retention 4/21-4/24

## 2023-04-28 NOTE — PHYSICAL EXAM
[Alert] : alert [Well Nourished] : well nourished [Well Developed] : well developed [Sclera] : the sclera and conjunctiva were normal [PERRL] : pupils were equal in size, round, and reactive to light [Normal Oral Mucosa] : normal oral mucosa [No Oral Pallor] : no oral pallor [No Oral Cyanosis] : no oral cyanosis [Oropharynx] : the oropharynx was normal [Normal Appearance] : the appearance of the neck was normal [No Respiratory Distress] : no respiratory distress [No Acc Muscle Use] : no accessory muscle use [Respiration, Rhythm And Depth] : normal respiratory rhythm and effort [Auscultation Breath Sounds / Voice Sounds] : lungs were clear to auscultation bilaterally [Normal PMI] : the apical impulse was abnormal [Normal S1, S2] : normal S1 and S2 [Heart Rate And Rhythm] : heart rate was normal and rhythm regular [___ +] : bilateral [unfilled]+ pitting edema to the ankles [Bowel Sounds] : normal bowel sounds [Abdomen Tenderness] : non-tender [Abdomen Soft] : soft [Cervical Lymph Nodes Enlarged Posterior Bilaterally] : posterior cervical [Supraclavicular Lymph Nodes Enlarged Bilaterally] : supraclavicular [Cervical Lymph Nodes Enlarged Anterior Bilaterally] : anterior cervical, supraclavicular [Axillary Lymph Nodes Enlarged Bilaterally] : axillary [No CVA Tenderness] : no CVA  tenderness [No Spinal Tenderness] : no spinal tenderness [Normal Gait] : normal gait [No Focal Deficits] : no focal deficits [Normal Affect] : the affect was normal [Normal Mood] : the mood was normal [de-identified] : elderly frail female [de-identified] : obese [de-identified] : chronic seborrheic keratosis generalized on back

## 2023-04-28 NOTE — HISTORY OF PRESENT ILLNESS
[I will adhere to the patient's wishes.] : I will adhere to the patient's wishes. [Time Spent: ___ minutes] : Time Spent: [unfilled] minutes [FreeTextEntry1] : Ms. SOLIS TAYLOR is a 89yo F with PMHx of HF, HLD, syncope s/p PPM, OA, recurrent UTIs, hypothyroidism. Pt. being seen for follow up post hospitalization. Pt. being seen alone at [facility]. \par No A care in place,can't afford additional care. \par \par #hospitalization at [facility] urinary retention 4/21-4/24\par -pt. was tx for UTI s/p ceftriaxone x3 days; UCx negative and ANNA likely 2/2 retention\par -previously tx w/ ciprofloxacin prior to admission \par -passed trial of void \par - was also found +entero virus/rhinovirus, supportive care \par \par \par #ACP\par -reintroduced hospice\par -pt. wants to be enrolled\par -pt. with CHF III, and 3 hospitalizations in the past 6 months\par MOLST form in chart\par -DNR/DNI\par -limited medical interventions\par -do not sent  to the hospital \par -no feeding tube\par -trial of IV fluids\par -use antibiotics\par \par \par Denies pain. Denies acute visits/falls. Denies HA/dizziness, SOB/CP, abdominal pain, reports constipation, last BM 3 days ago. Pt. denies dysuria. Reports significant UI at night mainly. \par \par \par \par \par \par \par \par  [AdvancecareDate] : 4/28/23 [FreeTextEntry4] : reintroduced hospice\par -pt. wants to be enrolled\par -pt. with CHF III, and 3 hospitalizations in the past 6 months\par MOLST form in chart\par -DNR/DNI\par -limited medical interventions\par -do not sent  to the hospital \par -no feeding tube\par -trial of IV fluids\par -use antibiotics

## 2023-05-01 ENCOUNTER — NON-APPOINTMENT (OUTPATIENT)
Age: 88
End: 2023-05-01

## 2023-05-09 ENCOUNTER — LABORATORY RESULT (OUTPATIENT)
Age: 88
End: 2023-05-09

## 2023-05-10 ENCOUNTER — LABORATORY RESULT (OUTPATIENT)
Age: 88
End: 2023-05-10

## 2023-05-11 ENCOUNTER — LABORATORY RESULT (OUTPATIENT)
Age: 88
End: 2023-05-11

## 2023-05-11 ENCOUNTER — NON-APPOINTMENT (OUTPATIENT)
Age: 88
End: 2023-05-11

## 2023-05-12 ENCOUNTER — LABORATORY RESULT (OUTPATIENT)
Age: 88
End: 2023-05-12

## 2023-05-12 ENCOUNTER — NON-APPOINTMENT (OUTPATIENT)
Age: 88
End: 2023-05-12

## 2023-05-16 ENCOUNTER — APPOINTMENT (OUTPATIENT)
Dept: GERIATRICS | Facility: ASSISTED LIVING FACILITY | Age: 88
End: 2023-05-16
Payer: MEDICARE

## 2023-05-16 VITALS
RESPIRATION RATE: 12 BRPM | SYSTOLIC BLOOD PRESSURE: 110 MMHG | OXYGEN SATURATION: 99 % | TEMPERATURE: 98.78 F | HEART RATE: 71 BPM | DIASTOLIC BLOOD PRESSURE: 68 MMHG

## 2023-05-16 DIAGNOSIS — E86.0 DEHYDRATION: ICD-10-CM

## 2023-05-16 PROCEDURE — 99349 HOME/RES VST EST MOD MDM 40: CPT

## 2023-05-16 NOTE — PATIENT PROFILE ADULT - MEDICATIONS/VISITS
Trazodone 50 mg qD PRN  Prozac 20 mg qD  Lamictal 50mg BID  Klonopin 0.5 mg BID no Trazodone 50 mg qD PRN, Prozac 30 mg qD, Lamictal 200mg BID, Klonopin 0.5 mg BID, Atenolol 25mg, 2x per day. Trazodone 50 mg at night, Prozac 30 mg in morning, Lamictal 200mg in morning, Klonopin 0.5 mg BID, Atenolol 25mg, 2x per day am/pm.

## 2023-05-16 NOTE — ASSESSMENT
[FreeTextEntry1] : 48 total minutes were spent on encounter including face-to-face time, 23 minutes spent coordinating care with patient's daughter, and time spent reviewing separately obtained history, performing a medically appropriate examination and/or evaluation, counseling and educating the patient/family/caregiver, ordering medications, tests, or procedures.

## 2023-05-16 NOTE — PHYSICAL EXAM
[JVD] : there was jugular-venous distention [Normal Gait] : abnormal gait [de-identified] : unsteady gait, ambulates with walker

## 2023-05-16 NOTE — HISTORY OF PRESENT ILLNESS
[FreeTextEntry1] : Mrs. SOLIS TAYLOR is a 89yo F with PMHx of HF,HLD, syncope s/p pacemaker, OA, recurrent UTI's, hypothyroidism, arthritis, \par \par  [facility], [facility]. \par Self-manages meds \par \par Seen today for complaints of urinary frequency\par has a history of recurrent UTI \par patient was seen in apartment with her HHA present \par she was awake, alert, conversive and per baseline mentation\par afebrile, normal HR, but bp was on lower end, she had not been hydrating over past few days; pt appears well and non-toxic \par  no back pain, no fever or chills \par urinated x 6-7 times overnight \par UA prelim reviewed, e. facalis, sensitivities pending \par had a prior infection with same organism in June 2022, sensitivities reviewed, +ampicillin \par discussed subsequently with daughter this evening, clarified confusion over when testing was done, all questions and concerns about care addressed\par cr/cl 30 \par \par Frequent falls and gait instability:\par uses walker \par muscle atrophy \par working to get PT reinstated since hospitalization\par [facility]\par \par Hypothyroid:\par TSH low, will reduce dose from 100mcg to 75mcg \par \par Vitamin d deficiency:\par 25 on recent labs, on supplementation\par \par S/p PPM:\par follows with cardio, Dr. Buckley\par no sx  reported\par HR well controlled\par \par hospitalization summaries:\par syncope Aug 2022, rehab 12 days \par Hospitalized for syncope, UTI and left arm weakness. \par No acute stroke detected, ct brain with chronic microvascular disease. \par post-hosp visit completed, extensive medd recc for confusion over sprinolactone and coreg dosing\par \par 1/10-1/14 for viral URI\par covid negative, coronavirus positive\par tx conservatively, patient did well with iv hydration and [facility] \par [facility] 3/6-3/10 \par cc: unresponsiveness \par found to have UTI and metabolic encephalopathy \par treated with IV guillermina, CX/SEN pan sensitive ecoli\par switched to ciprofloxacin \par discharged back home \par \par \par \par

## 2023-05-16 NOTE — REVIEW OF SYSTEMS
[Fever] : no fever [Chills] : no chills [Feeling Poorly] : not feeling poorly [Chest Pain] : no chest pain [Palpitations] : no palpitations [Lower Ext Edema] : no lower extremity edema [Shortness Of Breath] : no shortness of breath [SOB on Exertion] : no shortness of breath during exertion [Abdominal Pain] : no abdominal pain [Vomiting] : no vomiting [Constipation] : no constipation [Fainting] : no fainting [Dizziness] : no dizziness

## 2023-05-17 ENCOUNTER — INPATIENT (INPATIENT)
Facility: HOSPITAL | Age: 88
LOS: 6 days | Discharge: HOME | End: 2023-05-24
Attending: INTERNAL MEDICINE | Admitting: INTERNAL MEDICINE
Payer: MEDICARE

## 2023-05-17 ENCOUNTER — NON-APPOINTMENT (OUTPATIENT)
Age: 88
End: 2023-05-17

## 2023-05-17 VITALS — HEIGHT: 63 IN

## 2023-05-17 DIAGNOSIS — Z96.7 PRESENCE OF OTHER BONE AND TENDON IMPLANTS: Chronic | ICD-10-CM

## 2023-05-17 DIAGNOSIS — I10 ESSENTIAL (PRIMARY) HYPERTENSION: ICD-10-CM

## 2023-05-17 DIAGNOSIS — Z29.9 ENCOUNTER FOR PROPHYLACTIC MEASURES, UNSPECIFIED: ICD-10-CM

## 2023-05-17 DIAGNOSIS — R41.82 ALTERED MENTAL STATUS, UNSPECIFIED: ICD-10-CM

## 2023-05-17 DIAGNOSIS — Z98.49 CATARACT EXTRACTION STATUS, UNSPECIFIED EYE: Chronic | ICD-10-CM

## 2023-05-17 DIAGNOSIS — N17.9 ACUTE KIDNEY FAILURE, UNSPECIFIED: ICD-10-CM

## 2023-05-17 DIAGNOSIS — Z98.890 OTHER SPECIFIED POSTPROCEDURAL STATES: Chronic | ICD-10-CM

## 2023-05-17 DIAGNOSIS — N39.0 URINARY TRACT INFECTION, SITE NOT SPECIFIED: ICD-10-CM

## 2023-05-17 DIAGNOSIS — E03.9 HYPOTHYROIDISM, UNSPECIFIED: ICD-10-CM

## 2023-05-17 DIAGNOSIS — O00.1 TUBAL PREGNANCY: Chronic | ICD-10-CM

## 2023-05-17 DIAGNOSIS — G93.40 ENCEPHALOPATHY, UNSPECIFIED: ICD-10-CM

## 2023-05-17 LAB
ALBUMIN SERPL ELPH-MCNC: 4.2 G/DL — SIGNIFICANT CHANGE UP (ref 3.3–5)
ALP SERPL-CCNC: 195 U/L — HIGH (ref 40–120)
ALT FLD-CCNC: 31 U/L — SIGNIFICANT CHANGE UP (ref 4–33)
ANION GAP SERPL CALC-SCNC: 15 MMOL/L — HIGH (ref 7–14)
APTT BLD: 37.6 SEC — HIGH (ref 27–36.3)
AST SERPL-CCNC: 23 U/L — SIGNIFICANT CHANGE UP (ref 4–32)
BASOPHILS # BLD AUTO: 0.06 K/UL — SIGNIFICANT CHANGE UP (ref 0–0.2)
BASOPHILS NFR BLD AUTO: 0.6 % — SIGNIFICANT CHANGE UP (ref 0–2)
BILIRUB SERPL-MCNC: 0.5 MG/DL — SIGNIFICANT CHANGE UP (ref 0.2–1.2)
BUN SERPL-MCNC: 31 MG/DL — HIGH (ref 7–23)
CALCIUM SERPL-MCNC: 9.7 MG/DL — SIGNIFICANT CHANGE UP (ref 8.4–10.5)
CHLORIDE SERPL-SCNC: 97 MMOL/L — LOW (ref 98–107)
CO2 SERPL-SCNC: 23 MMOL/L — SIGNIFICANT CHANGE UP (ref 22–31)
CREAT SERPL-MCNC: 1.34 MG/DL — HIGH (ref 0.5–1.3)
EGFR: 35 ML/MIN/1.73M2 — LOW
EOSINOPHIL # BLD AUTO: 0.17 K/UL — SIGNIFICANT CHANGE UP (ref 0–0.5)
EOSINOPHIL NFR BLD AUTO: 1.7 % — SIGNIFICANT CHANGE UP (ref 0–6)
GLUCOSE SERPL-MCNC: 108 MG/DL — HIGH (ref 70–99)
HCT VFR BLD CALC: 39.8 % — SIGNIFICANT CHANGE UP (ref 34.5–45)
HGB BLD-MCNC: 13.1 G/DL — SIGNIFICANT CHANGE UP (ref 11.5–15.5)
IANC: 6.17 K/UL — SIGNIFICANT CHANGE UP (ref 1.8–7.4)
IMM GRANULOCYTES NFR BLD AUTO: 0.7 % — SIGNIFICANT CHANGE UP (ref 0–0.9)
INR BLD: 1.04 RATIO — SIGNIFICANT CHANGE UP (ref 0.88–1.16)
LYMPHOCYTES # BLD AUTO: 2.26 K/UL — SIGNIFICANT CHANGE UP (ref 1–3.3)
LYMPHOCYTES # BLD AUTO: 23 % — SIGNIFICANT CHANGE UP (ref 13–44)
MCHC RBC-ENTMCNC: 30.4 PG — SIGNIFICANT CHANGE UP (ref 27–34)
MCHC RBC-ENTMCNC: 32.9 GM/DL — SIGNIFICANT CHANGE UP (ref 32–36)
MCV RBC AUTO: 92.3 FL — SIGNIFICANT CHANGE UP (ref 80–100)
MONOCYTES # BLD AUTO: 1.11 K/UL — HIGH (ref 0–0.9)
MONOCYTES NFR BLD AUTO: 11.3 % — SIGNIFICANT CHANGE UP (ref 2–14)
NEUTROPHILS # BLD AUTO: 6.17 K/UL — SIGNIFICANT CHANGE UP (ref 1.8–7.4)
NEUTROPHILS NFR BLD AUTO: 62.7 % — SIGNIFICANT CHANGE UP (ref 43–77)
NRBC # BLD: 0 /100 WBCS — SIGNIFICANT CHANGE UP (ref 0–0)
NRBC # FLD: 0 K/UL — SIGNIFICANT CHANGE UP (ref 0–0)
PLATELET # BLD AUTO: 188 K/UL — SIGNIFICANT CHANGE UP (ref 150–400)
POTASSIUM SERPL-MCNC: 4.7 MMOL/L — SIGNIFICANT CHANGE UP (ref 3.5–5.3)
POTASSIUM SERPL-SCNC: 4.7 MMOL/L — SIGNIFICANT CHANGE UP (ref 3.5–5.3)
PROT SERPL-MCNC: 7.8 G/DL — SIGNIFICANT CHANGE UP (ref 6–8.3)
PROTHROM AB SERPL-ACNC: 12.1 SEC — SIGNIFICANT CHANGE UP (ref 10.5–13.4)
RBC # BLD: 4.31 M/UL — SIGNIFICANT CHANGE UP (ref 3.8–5.2)
RBC # FLD: 14.3 % — SIGNIFICANT CHANGE UP (ref 10.3–14.5)
SODIUM SERPL-SCNC: 135 MMOL/L — SIGNIFICANT CHANGE UP (ref 135–145)
TROPONIN T, HIGH SENSITIVITY RESULT: 54 NG/L — CRITICAL HIGH
WBC # BLD: 9.84 K/UL — SIGNIFICANT CHANGE UP (ref 3.8–10.5)
WBC # FLD AUTO: 9.84 K/UL — SIGNIFICANT CHANGE UP (ref 3.8–10.5)

## 2023-05-17 PROCEDURE — 99223 1ST HOSP IP/OBS HIGH 75: CPT | Mod: FS

## 2023-05-17 PROCEDURE — 70498 CT ANGIOGRAPHY NECK: CPT | Mod: 26,MA

## 2023-05-17 PROCEDURE — 99291 CRITICAL CARE FIRST HOUR: CPT

## 2023-05-17 PROCEDURE — 70496 CT ANGIOGRAPHY HEAD: CPT | Mod: 26,MA

## 2023-05-17 PROCEDURE — 71045 X-RAY EXAM CHEST 1 VIEW: CPT | Mod: 26

## 2023-05-17 PROCEDURE — 0042T: CPT | Mod: MA

## 2023-05-17 RX ORDER — POLYETHYLENE GLYCOL 3350 17 G/17G
17 POWDER, FOR SOLUTION ORAL DAILY
Refills: 0 | Status: DISCONTINUED | OUTPATIENT
Start: 2023-05-17 | End: 2023-05-24

## 2023-05-17 RX ORDER — CEFEPIME 1 G/1
2000 INJECTION, POWDER, FOR SOLUTION INTRAMUSCULAR; INTRAVENOUS ONCE
Refills: 0 | Status: COMPLETED | OUTPATIENT
Start: 2023-05-17 | End: 2023-05-17

## 2023-05-17 RX ORDER — ATORVASTATIN CALCIUM 80 MG/1
80 TABLET, FILM COATED ORAL AT BEDTIME
Refills: 0 | Status: DISCONTINUED | OUTPATIENT
Start: 2023-05-17 | End: 2023-05-24

## 2023-05-17 RX ORDER — ASPIRIN/CALCIUM CARB/MAGNESIUM 324 MG
81 TABLET ORAL DAILY
Refills: 0 | Status: DISCONTINUED | OUTPATIENT
Start: 2023-05-17 | End: 2023-05-24

## 2023-05-17 RX ORDER — ACETAMINOPHEN 500 MG
650 TABLET ORAL EVERY 6 HOURS
Refills: 0 | Status: DISCONTINUED | OUTPATIENT
Start: 2023-05-17 | End: 2023-05-24

## 2023-05-17 RX ORDER — HEPARIN SODIUM 5000 [USP'U]/ML
5000 INJECTION INTRAVENOUS; SUBCUTANEOUS EVERY 12 HOURS
Refills: 0 | Status: DISCONTINUED | OUTPATIENT
Start: 2023-05-17 | End: 2023-05-24

## 2023-05-17 RX ORDER — PIPERACILLIN AND TAZOBACTAM 4; .5 G/20ML; G/20ML
3.38 INJECTION, POWDER, LYOPHILIZED, FOR SOLUTION INTRAVENOUS ONCE
Refills: 0 | Status: COMPLETED | OUTPATIENT
Start: 2023-05-17 | End: 2023-05-17

## 2023-05-17 RX ORDER — ONDANSETRON 8 MG/1
4 TABLET, FILM COATED ORAL EVERY 8 HOURS
Refills: 0 | Status: DISCONTINUED | OUTPATIENT
Start: 2023-05-17 | End: 2023-05-24

## 2023-05-17 RX ORDER — LANOLIN ALCOHOL/MO/W.PET/CERES
3 CREAM (GRAM) TOPICAL AT BEDTIME
Refills: 0 | Status: DISCONTINUED | OUTPATIENT
Start: 2023-05-17 | End: 2023-05-24

## 2023-05-17 RX ORDER — LEVOTHYROXINE SODIUM 125 MCG
100 TABLET ORAL DAILY
Refills: 0 | Status: DISCONTINUED | OUTPATIENT
Start: 2023-05-17 | End: 2023-05-24

## 2023-05-17 RX ORDER — PIPERACILLIN AND TAZOBACTAM 4; .5 G/20ML; G/20ML
3.38 INJECTION, POWDER, LYOPHILIZED, FOR SOLUTION INTRAVENOUS EVERY 8 HOURS
Refills: 0 | Status: COMPLETED | OUTPATIENT
Start: 2023-05-18 | End: 2023-05-20

## 2023-05-17 RX ADMIN — PIPERACILLIN AND TAZOBACTAM 25 GRAM(S): 4; .5 INJECTION, POWDER, LYOPHILIZED, FOR SOLUTION INTRAVENOUS at 22:11

## 2023-05-17 RX ADMIN — ATORVASTATIN CALCIUM 80 MILLIGRAM(S): 80 TABLET, FILM COATED ORAL at 22:11

## 2023-05-17 RX ADMIN — HEPARIN SODIUM 5000 UNIT(S): 5000 INJECTION INTRAVENOUS; SUBCUTANEOUS at 19:06

## 2023-05-17 RX ADMIN — CEFEPIME 100 MILLIGRAM(S): 1 INJECTION, POWDER, FOR SOLUTION INTRAMUSCULAR; INTRAVENOUS at 13:58

## 2023-05-17 RX ADMIN — PIPERACILLIN AND TAZOBACTAM 200 GRAM(S): 4; .5 INJECTION, POWDER, LYOPHILIZED, FOR SOLUTION INTRAVENOUS at 19:06

## 2023-05-17 NOTE — H&P ADULT - PROBLEM SELECTOR PLAN 2
As per family pt had positive UA as outpatient   -She is currently incontinent   -Will treat with IV levaquin   -F/u with UA and Urinc cx As per family pt had positive UA as outpatient   -She is currently incontinent   -Outpatient provider called NP, reported Ucx showed E.coli resistant to fluoroquinolones, will start IV zosyn    -F/u with UA and Urinc cx

## 2023-05-17 NOTE — H&P ADULT - NSHPLABSRESULTS_GEN_ALL_CORE
13.1   9.84  )-----------( 188      ( 17 May 2023 13:00 )             39.8     Hgb Trend: 13.1<--  05-17    135  |  97<L>  |  31<H>  ----------------------------<  108<H>  4.7   |  23  |  1.34<H>    Ca    9.7      17 May 2023 13:00    TPro  7.8  /  Alb  4.2  /  TBili  0.5  /  DBili  x   /  AST  23  /  ALT  31  /  AlkPhos  195<H>  05-17    Creatinine Trend: 1.34<--, 1.00<--, 1.13<--, 1.46<--  PT/INR - ( 17 May 2023 13:00 )   PT: 12.1 sec;   INR: 1.04 ratio         PTT - ( 17 May 2023 13:00 )  PTT:37.6 sec        CT head as reviewed by the radiologist: IMPRESSION: Unremarkable CTA of the neck and [place] of [place]    No perfusion mismatch to suggest core infarct or critically hypoperfused   tissue at risk.      MR brain is ordered       TTE is ordered.       EKG is ordered.

## 2023-05-17 NOTE — PATIENT PROFILE ADULT - FALL HARM RISK - HARM RISK INTERVENTIONS

## 2023-05-17 NOTE — H&P ADULT - PROBLEM SELECTOR PLAN 4
Permissive HTN up to 220/120 mmHg for first 48-72 hours after the symptom onset followed by gradual normotension  -Restart carvedilol if SBP>220 or in the AM

## 2023-05-17 NOTE — ED ADULT NURSE NOTE - OBJECTIVE STATEMENT
pt from home, brought into ED for L sided facial droop and R sided weakness PER ems that has since resolved, last known well 1800 05/16/23. code stroke called - pt taken to CT. pt is awake and alert, oriented to self and able to follow commands. when patient is asked any further eval questions she only responds by saying "water" unable to be redirected. +perrla. +/= pulses, strength, movement, and sensation in all extremities. speech clear and comprehensible. no facial droop noted. respirations are even and nonlabored on RA. no acute respiratory distress noted at this time. pt is pending results. 20G IV placed in R had. labs sent. see neuro flowsheet.

## 2023-05-17 NOTE — ED PROVIDER NOTE - OBJECTIVE STATEMENT
89-year-old female with HTN, arthritis, A-fib, pacemaker, frequent UTIs presents to the ED from [facility] for right facial droop and left sided weakness since this morning. pt was last seen normal at 7pm. while en route pt was hypoxic to 90% RA, then more alert after NC 2L. 89-year-old female with HTN, arthritis, A-fib, pacemaker, frequent UTIs presents to the ED from [facility] for right facial droop and left sided weakness since this morning. pt was last seen normal at 7pm. while en route pt was hypoxic to 90% RA, then more alert after NC 2L. pt not able to give history.

## 2023-05-17 NOTE — CONSULT NOTE ADULT - SUBJECTIVE AND OBJECTIVE BOX
HPI:    Patient is a 89yo RH lady who presents to the facility on the morning of 5/17/2023 from an assisted living facility regarding complaints of altered mental status, dysarthria, and R sided weakness. Patient possesses a past medical history significant for reported untreated UTI, paroxysmal Afib?, HTN, osteoarthritis. Last known normal is 6:00PM from the day prior. NIHSS on arrival is 6. pre-MRS of 2. Code Stroke called on arrival. Head CT noncontrast does not demonstrate any acute intracranial hemorrhage. Neurology consulted via code stroke.    (Stroke only)  NIHSS:    MRS: 3  ICH: 0    PAST MEDICAL & SURGICAL HISTORY:  Urinary Frequency      Bilateral Cataracts      HTN (hypertension)      Spinal stenosis      Hypothyroidism      Non-ST elevation MI (NSTEMI)      Macular degeneration      (HFpEF) heart failure with preserved ejection fraction      Paroxysmal atrial fibrillation      Chronic kidney disease (CKD)      Ectopic pregnancy, tubal      S/P ORIF (open reduction internal fixation) fracture  R hip      S/P breast lumpectomy      S/P cataract surgery  b/l        FAMILY HISTORY:  Family history of acute myocardial infarction    MEDICATIONS (HOME):  Home Medications:  acetaminophen 325 mg oral tablet: 2 tab(s) orally every 6 hours As needed Temp greater or equal to 38C (100.4F), Moderate Pain (4 - 6) (24 Apr 2023 13:25)  amLODIPine 5 mg oral tablet: 1 tab(s) orally once a day (10 Mar 2023 09:37)  aspirin 81 mg oral delayed release tablet: 1 tab(s) orally once a day (10 Gideon 2023 14:31)  atorvastatin 20 mg oral tablet: 1 tab(s) orally once a day (at bedtime) (10 Gideon 2023 14:31)  cholecalciferol oral tablet: 1000 unit(s) orally once a day (24 Apr 2023 13:25)  levothyroxine 100 mcg (0.1 mg) oral tablet: 1 tab(s) orally once a day (10 Mar 2023 09:37)  metoprolol tartrate 25 mg oral tablet: 1 tab(s) orally 2 times a day (24 Apr 2023 13:25)  polyethylene glycol 3350 oral powder for reconstitution: 17 gram(s) orally once a day (10 Mar 2023 09:37)  sodium chloride 0.65% nasal spray: 1 spray(s) nasal 4 times a day as needed for  congestion (24 Apr 2023 13:25)    MEDICATIONS  (STANDING):    MEDICATIONS  (PRN):    ALLERGIES/INTOLERANCES:  Allergies  [This allergen will not trigger allergy alert] trisulfapyrimidine (Rash)  sulfa topicals (Unknown)    Intolerances  morphine (Drowsiness; Faint; Hypotension)    VITALS & EXAMINATION:  Vital Signs Last 24 Hrs  T(C): --  T(F): --  HR: 91 (17 May 2023 14:04) (91 - 91)  BP: 131/137 (17 May 2023 14:04) (131/137 - 131/137)  BP(mean): --  RR: 20 (17 May 2023 14:04) (20 - 20)  SpO2: 100% (17 May 2023 14:04) (100% - 100%)    Parameters below as of 17 May 2023 14:04  Patient On (Oxygen Delivery Method): nasal cannula  O2 Flow (L/min): 3      General:  Constitutional: Female, appears stated age, in no apparent distress including pain  Extremities: No cyanosis, clubbing, or edema.  Skin: No rashes, bruising, or discoloration.    Neurological (>12):  MS: Awake, alert, oriented to person, but not to place, situation, time. Normal affect. Follows commands intermittently    Language: Speech is clear, fluent with poor repetition & intermittently impaired comprehension (unaable to name objects)    CNs: PERRLA (R = 3mm, L = 3mm). RHH on BTT. EOMI however their is a reluctance to look to the right, no nystagmus, No facial asymmetry b/l, full eye closure strength b/l.     Motor: Normal muscle bulk & tone. No noticeable tremor or seizure. No pronator drift in UE, drift noted in RLE    RUE: 5/5  LUE: 5/5  RLE: 4/5  LLE: 5/5    Sensation: Intact to LT/PP b/l throughout.     Coordination:  No dysmetria to FTN/HTS      LABORATORY:  CBC                       13.1   9.84  )-----------( 188      ( 17 May 2023 13:00 )             39.8     Chem 05-17    135  |  97<L>  |  31<H>  ----------------------------<  108<H>  4.7   |  23  |  1.34<H>    Ca    9.7      17 May 2023 13:00    TPro  7.8  /  Alb  4.2  /  TBili  0.5  /  DBili  x   /  AST  23  /  ALT  31  /  AlkPhos  195<H>  05-17    LFTs LIVER FUNCTIONS - ( 17 May 2023 13:00 )  Alb: 4.2 g/dL / Pro: 7.8 g/dL / ALK PHOS: 195 U/L / ALT: 31 U/L / AST: 23 U/L / GGT: x           Coagulopathy PT/INR - ( 17 May 2023 13:00 )   PT: 12.1 sec;   INR: 1.04 ratio         PTT - ( 17 May 2023 13:00 )  PTT:37.6 sec    Radiology (XR, CT, MR, U/S, TTE/LILY):    CT Brain Perfusion Maps Stroke (05.17.23 @ 13:32)     IMPRESSION: Unremarkable CTA of the neck and [place] of [place]    No perfusion mismatch to suggest core infarct or critically hypoperfused   tissue at risk.    CT Brain Stroke Protocol (05.17.23 @ 13:32)     This exam is limited by motion and poor diagnostic quality.    Parenchymal volume loss and chronic microvascular ischemic changes   identified    Evaluation of the osseous structures appropriate window appears normal    The patient is status post bilateral cataract surgery.    IMPRESSION: This exam is limited by motion and is of poor diagnostic   quality as described above.

## 2023-05-17 NOTE — ED ADULT TRIAGE NOTE - CHIEF COMPLAINT QUOTE
Pt notification from home c/o L upper extremity weakness. Last known normal 6 PM last night.  in the field. MD Gonzalez called for stroke eval. Code stroke called. Pt brought directly to CT.

## 2023-05-17 NOTE — CONSULT NOTE ADULT - ASSESSMENT
89yo RH lady who presents to the facility on the morning of 5/17/2023 from an assisted living facility regarding complaints of altered mental status, dysarthria, and R sided weakness. Patient possesses a past medical history significant for reported untreated UTI, paroxysmal Afib?, HTN, osteoarthritis. Last known normal is 6:00PM from the day prior. NIHSS on arrival is 6. pre-MRS of 2. Code Stroke called on arrival. Head CT noncontrast does not demonstrate any acute intracranial hemorrhage. Exam reveals disorientation, receptive aphasia with impaired naming, RLE drift, RHH on BTT and reluctance to look to the R. Patient may be demonstrating signs of L brain dysfunction with this exam however the aggression that we pursue further work up regarding the etiology of this dysfunction is reliant on goals of care (MRI).    Impression: Disorientation, receptive aphasia with impaired naming, RLE drift, RHH on BTT and reluctance to look to the R consistent with L brain dysfunction that may be secondary to ischemia vs toxic metabolic disturbance.      Recommendations:    Confirm PMH concerning for past medical history  Continue to investigate and address any underlying toxic/metabolic/infectious insults  Aspirin 81mg qd po  Permissive HTN up to 220/120 mmHg for first 48-72 hours after the symptom onset followed by gradual normotension  MRI brain without contrast if in accordance with goals of care  TTE with bubble study for possible valvular heart disease  Lipitor 80 mg PO QHS  DVT prophylaxis with heparin/lovenox unless already on AC  HbA1c, LDL and continue with aggressive vascular risk factors modifications   NPO until patient passes dysphagia screen  Tele monitor  PT/OT/S&S eval

## 2023-05-17 NOTE — ED PROVIDER NOTE - PHYSICAL EXAMINATION
limited exam d/t mental status.  NEURO: Alert & oriented x1. strength 4/5 on RLE. 5/5 on LLE. 5/5 on UE b/l. aphasia. no facial droop.  NECK: Supple  CARD: Regular rate and rhythm, no murmurs  RESP: No accessory muscle use; breath sounds clear and equal bilaterally; no wheezes, rhonchi, or rales     ABD: Soft; non-distended; non-tender.   MUSCULOSKELETAL/EXTREMITIES: no extremity edema.  SKIN: Warm; dry; no apparent lesions or exudate

## 2023-05-17 NOTE — ED ADULT NURSE REASSESSMENT NOTE - NS ED NURSE REASSESS COMMENT FT1
ACP So made aware that PCP called stating pt + for UTI outpt UA today and it is resistant to fluoroquinolones.

## 2023-05-17 NOTE — ED PROVIDER NOTE - CRITICAL CARE ATTENDING CONTRIBUTION TO CARE
I (Carlos) agree with above, I performed a history and physical. Counseled frances medical staff, physician assistant, and/or medical student on medical decision making as documented. Medical decisions and treatment interventions were made in real time during the patient encounter. Additionally and/or with the following exceptions: 89-year-old female past medical history of hypertension, arthritis, A-fib, pacemaker, frequent UTIs presents to the emergency department with a right-sided facial droop and left upper extremity weakness that resolved.  Patient was within 24 hours therefore stroke code was activated.  Patient was neurologically intact at the time of the stroke evaluation.  CT imaging was performed and negative for acute flow-limiting stenosis.  CBC was within normal limits, CMP with mildly elevated BUN and creatinine.  Troponin mildly elevated to 54.  Chest x-ray no focal consolidation.  Patient was admitted to the hospital for MRI.  The patient's condition was not amenable to outpatient treatment due either the lack of feasibility of outpatient care coordination, possibility for further decompensation with adverse outcome if discharge, or treatments and diagnostic  modalities only available during an inpatient hospitalization.  Additional time as above spent by myself, separate from billable procedures, included coordination of patient care with consultants/admitting team, performing reassessments on the patient, documentation, and counseling patient/family members on the care provided.

## 2023-05-17 NOTE — H&P ADULT - NSHPPHYSICALEXAM_GEN_ALL_CORE
Vital Signs Last 24 Hrs  T(C): --  T(F): --  HR: 91 (17 May 2023 14:04) (91 - 91)  BP: 131/137 (17 May 2023 14:04) (131/137 - 131/137)  BP(mean): --  RR: 20 (17 May 2023 14:04) (20 - 20)  SpO2: 100% (17 May 2023 14:04) (100% - 100%)    Parameters below as of 17 May 2023 14:04  Patient On (Oxygen Delivery Method): nasal cannula  O2 Flow (L/min): 3    GENERAL: confused, nontoxic appearing, follows commands but aphasic   HEENT:  Atraumatic, Normocephalic, Conjunctiva and sclera clear, oral mucosa moist, clear w/o any exudate   NECK: Supple, No JVD  CHEST/LUNG: Clear to auscultation bilaterally; No wheeze  HEART: Regular rate and rhythm; No murmurs, rubs, or gallops  ABDOMEN: Soft, Nontender, Nondistended; Bowel sounds present  EXTREMITIES:  2+ Peripheral Pulses, No clubbing, cyanosis, or edema  PSYCH: AAOx1  NEUROLOGY: Cranial nerve 2-12 grossly intact except for aphasia. Strength 3/5 in UE and LE, R>L, tremors, sensation grossly intact.   SKIN: No rashes or lesions

## 2023-05-17 NOTE — H&P ADULT - ASSESSMENT
89 yo F with Pmhx of HTN, HLD, HFrEf, Afib (not on AC), hypothyroidism, hx of PPM placement, and recurrent UTI presents to the ED with worsening altered mental status and aphasia, admitted for stroke work up.

## 2023-05-17 NOTE — H&P ADULT - PROBLEM SELECTOR PLAN 1
Patient with acute encephalopathy, aphasia, and R sided weakness   -Admitted for stroke work up. NIHSS 6  -DDx also include metabolic and infectious etiology   -CT head showed no acute bleeding or stroke   -MR ordered.   -F/u with UA and urine cx. Family reports that becomes confused like this due to UTI   -Q4 hr neuro checks   -F/u with TTE  -C/w telemonitoring   -TSH. folate, and B12 are WNL  -F/u with neurology

## 2023-05-17 NOTE — CONSULT NOTE ADULT - TIME BILLING
Preparation to see the patient, including reviewing the brain imaging in past one year, obtaining and/or reviewing the resident/ or PA note, separately obtained history, performing a medically appropriate examination and/or evaluation as documented in this encounter note, counseling and educating of the patient, ordering tests, documenting clinical information in patients electronic record , interpreting results (not separately reported) and communicating results to the patient.   Evaluation was performed on 5/18/23    Aphasia and transient Rt sided weakness in the setting of AFIB  UTI  R/O stroke  Follow up MRI  I would recommend Eliquis 2.5mg if positive for stroke and no contraindication  will follow   PT/OT

## 2023-05-17 NOTE — H&P ADULT - HISTORY OF PRESENT ILLNESS
89 yo F with Pmhx of HTN, HLD, HFrEf, Afib (not on AC), hypothyroidism, hx of PPM placement, and recurrent UTI presents to the ED with worsening altered mental status and aphasia. Patient is AAOx1, aphasic, but follows commands. Most of the hx was obtained from her daughter. Patient is AAOx3 at baseline, no cognitive impairment. Patient became acutely confused yesterday and has not been communicating well. Because of the change in her mental status, her daughter. Of note, pt usually becomes lethargic like this whenever she gets confused. She was incontinent for last 1 week and had urine cx drawn which was positive for UTI. However, her doctor did not treat her with abx this time.   In the ED, her vitals were notable for HTN. Labs were notable for ANNA. CT head showed no acute bleeding or stroke.

## 2023-05-17 NOTE — ED PROVIDER NOTE - CLINICAL SUMMARY MEDICAL DECISION MAKING FREE TEXT BOX
pt came in for AMS. recurrent UTI. recent UTI has not been treated. also found to have weakness on one side and facial droop. stroke code activated.   stroke w/u.

## 2023-05-18 LAB
A1C WITH ESTIMATED AVERAGE GLUCOSE RESULT: 5.7 % — HIGH (ref 4–5.6)
ANION GAP SERPL CALC-SCNC: 15 MMOL/L — HIGH (ref 7–14)
APPEARANCE UR: CLEAR — SIGNIFICANT CHANGE UP
BACTERIA # UR AUTO: NEGATIVE — SIGNIFICANT CHANGE UP
BASOPHILS # BLD AUTO: 0.04 K/UL — SIGNIFICANT CHANGE UP (ref 0–0.2)
BASOPHILS NFR BLD AUTO: 0.5 % — SIGNIFICANT CHANGE UP (ref 0–2)
BILIRUB UR-MCNC: NEGATIVE — SIGNIFICANT CHANGE UP
BUN SERPL-MCNC: 34 MG/DL — HIGH (ref 7–23)
CALCIUM SERPL-MCNC: 9.3 MG/DL — SIGNIFICANT CHANGE UP (ref 8.4–10.5)
CHLORIDE SERPL-SCNC: 95 MMOL/L — LOW (ref 98–107)
CHOLEST SERPL-MCNC: 146 MG/DL — SIGNIFICANT CHANGE UP
CO2 SERPL-SCNC: 21 MMOL/L — LOW (ref 22–31)
COLOR SPEC: YELLOW — SIGNIFICANT CHANGE UP
CREAT SERPL-MCNC: 1.36 MG/DL — HIGH (ref 0.5–1.3)
DIFF PNL FLD: ABNORMAL
EGFR: 35 ML/MIN/1.73M2 — LOW
EOSINOPHIL # BLD AUTO: 0.17 K/UL — SIGNIFICANT CHANGE UP (ref 0–0.5)
EOSINOPHIL NFR BLD AUTO: 1.9 % — SIGNIFICANT CHANGE UP (ref 0–6)
EPI CELLS # UR: 2 /HPF — SIGNIFICANT CHANGE UP (ref 0–5)
ESTIMATED AVERAGE GLUCOSE: 117 — SIGNIFICANT CHANGE UP
GLUCOSE SERPL-MCNC: 88 MG/DL — SIGNIFICANT CHANGE UP (ref 70–99)
GLUCOSE UR QL: NEGATIVE — SIGNIFICANT CHANGE UP
HCT VFR BLD CALC: 35.3 % — SIGNIFICANT CHANGE UP (ref 34.5–45)
HDLC SERPL-MCNC: 72 MG/DL — SIGNIFICANT CHANGE UP
HGB BLD-MCNC: 12 G/DL — SIGNIFICANT CHANGE UP (ref 11.5–15.5)
HYALINE CASTS # UR AUTO: 2 /LPF — SIGNIFICANT CHANGE UP (ref 0–7)
IANC: 5.68 K/UL — SIGNIFICANT CHANGE UP (ref 1.8–7.4)
IMM GRANULOCYTES NFR BLD AUTO: 0.6 % — SIGNIFICANT CHANGE UP (ref 0–0.9)
KETONES UR-MCNC: ABNORMAL
LEUKOCYTE ESTERASE UR-ACNC: ABNORMAL
LIPID PNL WITH DIRECT LDL SERPL: 53 MG/DL — SIGNIFICANT CHANGE UP
LYMPHOCYTES # BLD AUTO: 1.62 K/UL — SIGNIFICANT CHANGE UP (ref 1–3.3)
LYMPHOCYTES # BLD AUTO: 18.6 % — SIGNIFICANT CHANGE UP (ref 13–44)
MAGNESIUM SERPL-MCNC: 1.9 MG/DL — SIGNIFICANT CHANGE UP (ref 1.6–2.6)
MCHC RBC-ENTMCNC: 30.7 PG — SIGNIFICANT CHANGE UP (ref 27–34)
MCHC RBC-ENTMCNC: 34 GM/DL — SIGNIFICANT CHANGE UP (ref 32–36)
MCV RBC AUTO: 90.3 FL — SIGNIFICANT CHANGE UP (ref 80–100)
MONOCYTES # BLD AUTO: 1.17 K/UL — HIGH (ref 0–0.9)
MONOCYTES NFR BLD AUTO: 13.4 % — SIGNIFICANT CHANGE UP (ref 2–14)
NEUTROPHILS # BLD AUTO: 5.68 K/UL — SIGNIFICANT CHANGE UP (ref 1.8–7.4)
NEUTROPHILS NFR BLD AUTO: 65 % — SIGNIFICANT CHANGE UP (ref 43–77)
NITRITE UR-MCNC: NEGATIVE — SIGNIFICANT CHANGE UP
NON HDL CHOLESTEROL: 74 MG/DL — SIGNIFICANT CHANGE UP
NRBC # BLD: 0 /100 WBCS — SIGNIFICANT CHANGE UP (ref 0–0)
NRBC # FLD: 0 K/UL — SIGNIFICANT CHANGE UP (ref 0–0)
PH UR: 6 — SIGNIFICANT CHANGE UP (ref 5–8)
PHOSPHATE SERPL-MCNC: 4 MG/DL — SIGNIFICANT CHANGE UP (ref 2.5–4.5)
PLATELET # BLD AUTO: 180 K/UL — SIGNIFICANT CHANGE UP (ref 150–400)
POTASSIUM SERPL-MCNC: 4.1 MMOL/L — SIGNIFICANT CHANGE UP (ref 3.5–5.3)
POTASSIUM SERPL-SCNC: 4.1 MMOL/L — SIGNIFICANT CHANGE UP (ref 3.5–5.3)
PROT UR-MCNC: ABNORMAL
RBC # BLD: 3.91 M/UL — SIGNIFICANT CHANGE UP (ref 3.8–5.2)
RBC # FLD: 14.4 % — SIGNIFICANT CHANGE UP (ref 10.3–14.5)
RBC CASTS # UR COMP ASSIST: 4 /HPF — SIGNIFICANT CHANGE UP (ref 0–4)
SODIUM SERPL-SCNC: 131 MMOL/L — LOW (ref 135–145)
SP GR SPEC: 1.04 — SIGNIFICANT CHANGE UP (ref 1.01–1.05)
TRIGL SERPL-MCNC: 105 MG/DL — SIGNIFICANT CHANGE UP
UROBILINOGEN FLD QL: SIGNIFICANT CHANGE UP
WBC # BLD: 8.73 K/UL — SIGNIFICANT CHANGE UP (ref 3.8–10.5)
WBC # FLD AUTO: 8.73 K/UL — SIGNIFICANT CHANGE UP (ref 3.8–10.5)
WBC UR QL: 111 /HPF — HIGH (ref 0–5)

## 2023-05-18 PROCEDURE — 93291 INTERROG DEV EVAL SCRMS IP: CPT | Mod: 26

## 2023-05-18 PROCEDURE — 93306 TTE W/DOPPLER COMPLETE: CPT | Mod: 26

## 2023-05-18 PROCEDURE — 99233 SBSQ HOSP IP/OBS HIGH 50: CPT

## 2023-05-18 PROCEDURE — 76770 US EXAM ABDO BACK WALL COMP: CPT | Mod: 26

## 2023-05-18 RX ADMIN — PIPERACILLIN AND TAZOBACTAM 25 GRAM(S): 4; .5 INJECTION, POWDER, LYOPHILIZED, FOR SOLUTION INTRAVENOUS at 22:01

## 2023-05-18 RX ADMIN — PIPERACILLIN AND TAZOBACTAM 25 GRAM(S): 4; .5 INJECTION, POWDER, LYOPHILIZED, FOR SOLUTION INTRAVENOUS at 15:13

## 2023-05-18 RX ADMIN — POLYETHYLENE GLYCOL 3350 17 GRAM(S): 17 POWDER, FOR SOLUTION ORAL at 15:12

## 2023-05-18 RX ADMIN — Medication 81 MILLIGRAM(S): at 15:12

## 2023-05-18 RX ADMIN — Medication 100 MICROGRAM(S): at 05:14

## 2023-05-18 RX ADMIN — HEPARIN SODIUM 5000 UNIT(S): 5000 INJECTION INTRAVENOUS; SUBCUTANEOUS at 05:15

## 2023-05-18 RX ADMIN — PIPERACILLIN AND TAZOBACTAM 25 GRAM(S): 4; .5 INJECTION, POWDER, LYOPHILIZED, FOR SOLUTION INTRAVENOUS at 05:14

## 2023-05-18 RX ADMIN — ATORVASTATIN CALCIUM 80 MILLIGRAM(S): 80 TABLET, FILM COATED ORAL at 22:02

## 2023-05-18 RX ADMIN — HEPARIN SODIUM 5000 UNIT(S): 5000 INJECTION INTRAVENOUS; SUBCUTANEOUS at 19:39

## 2023-05-18 NOTE — PROCEDURE NOTE - ADDITIONAL PROCEDURE DETAILS
89 yo female with a PMH of HTN, HLD, HFrEF, Afib (not on AC), hypothyroidism, hx of PPM placement, and recurrent UTI presents to the ED with worsening altered mental status and aphasia.   EP is called to check MRI compatibility. Call the company and confirmed the device is MRI compatible     leadless Micra AV pacemaker in VVI mode   Normal sensing and pacing via iterative testing  Excellent threshold capture  No reprogramming  Remaining battery: >8.0 years 89 yo female with a PMH of HTN, HLD, HFrEF, Afib (not on AC), hypothyroidism, hx of PPM placement, and recurrent UTI presents to the ED with worsening altered mental status and aphasia.   EP is called to check MRI compatibility. Call the company and confirmed both devices ( Micra AV and ILR) are MRI compatible     leadless Micra AV pacemaker in VVI mode   Normal sensing and pacing via iterative testing  Excellent threshold capture  No reprogramming  Remaining battery: >8.0 years    Patient also has ILR ( Reveal LINQ + TruRhythm [id number])  Device is interrogated , good battery status, Episodes of Afib identified with VR controlled since last session on 6/21/2022. 89 yo female with a PMH of HTN, HLD, HFrEF, Afib (not on AC), hypothyroidism, hx of PPM placement, and recurrent UTI presents to the ED with worsening altered mental status and aphasia.   EP is called to check MRI compatibility. Called the company and confirmed both devices ( Micra AV and ILR) are MRI compatible     leadless Micra AV pacemaker in VVI mode   Normal sensing and pacing via iterative testing  Excellent threshold capture  No reprogramming  Remaining battery: >8.0 years    Patient also has ILR ( Reveal LINQ + TruRhythm [id number])  Device is interrogated , good battery status, Episodes of Afib identified with VR controlled since last session on 6/21/2022.

## 2023-05-18 NOTE — PROGRESS NOTE ADULT - NSPROGADDITIONALINFOA_GEN_ALL_CORE
Attempted to update daughter Ariana at home and cell numbers but unable to reach. Will reattempt at later time.

## 2023-05-18 NOTE — SWALLOW BEDSIDE ASSESSMENT ADULT - COMMENTS
As per H&P dated 5/17/23, "89 yo F with Pmhx of HTN, HLD, HFrEf, Afib (not on AC), hypothyroidism, hx of PPM placement, and recurrent UTI presents to the ED with worsening altered mental status and aphasia. Patient is AAOx1, aphasic, but follows commands. Most of the hx was obtained from her daughter. Patient is AAOx3 at baseline, no cognitive impairment. Patient became acutely confused yesterday and has not been communicating well. Because of the change in her mental status, her daughter. Of note, pt usually becomes lethargic like this whenever she gets confused. She was incontinent for last 1 week and had urine cx drawn which was positive for UTI. However, her doctor did not treat her with abx this time.   In the ED, her vitals were notable for HTN. Labs were notable for ANNA. CT head showed no acute bleeding or stroke."    Clinical swallow evaluation ordered and attempted. Per discussion with nursing, patient off the unit for echo. This service to follow-up as schedule permits.
As per   As per Hospitalist report 5/18/23, "89 yo F with Pmhx of HTN, HLD, HFrEf, Afib (not on AC), hypothyroidism, hx of PPM placement, and recurrent UTI presents to the ED with worsening altered mental status and aphasia, admitted for further evaluation."     CXR 5/17/2023: IMPRESSION: No focal consolidation.    CTA Brain 5/17/2023: IMPRESSION: Unremarkable CTA of the neck and [place]  No perfusion mismatch to suggest core infarct or critically hypoperfused tissue at risk.    Order received & completed for swallow eval. Patient received upright awake/ alert, O2 via NC, Ox3, able to make wants/ needs known & able to follow basic commands.

## 2023-05-18 NOTE — PHYSICAL THERAPY INITIAL EVALUATION ADULT - ADDITIONAL COMMENTS
Unable to obtain accurate social history secondary to pt. presenting with confusion during subjective history, limited social history obtained through past medical documents, please refer to social work for more attainable social history. Pt states she used to ambulate with a rollator.   Post PT evaluation, pt left semi-supine, alarm on, call bell and remote within reach, all precautions maintained, NAD. RN aware.

## 2023-05-18 NOTE — OCCUPATIONAL THERAPY INITIAL EVALUATION ADULT - MD/RN NOTIFIED
Palliative Medicine  Nursing Note  [phone]  Fax [phone]      Telephone Call  Patient Name: Kesha Sargent  YOB: 1945    3/19/2020        Primary Decision Maker: [address] - [phone]   Advance Care Planning 3/18/2020   Patient's Healthcare Decision Maker is: Verbal statement (Legal Next of Kin remains as decision maker)   Primary Decision Maker Name -   Secondary Decision Maker Name -   Secondary Decision Maker Phone Number -   Secondary Decision Maker Relationship to Patient -   Confirm Advance Directive None   Patient Would Like to Complete Advance Directive No     Incoming call from Mrs. Francisco. She had some confusion regarding her husbands medication changes. Provided clarification and education. 1. Fentanyl 25mcg- Apply 2 patches of his 12mcg Fentanyl patches and change every 72 hours. He has 8 patches left and will be able to use those for 4 more administrations. Reviewed placement and care of patches. 2. Gabapentin 300mg- instructed to take 1 tab at bedtime for 3 days, 1 tab twice daily in AM and bedtime for 3 days, and 1 tab 3 times daily in AM, afternoon, and bedtime. Discussed rationale for use and that it can help with his nerve pain. 3. Narcan - Educated on rationale for the prescription, and importance of having in the home in an emergency situation. Mrs. Brooke Hull was able to reiterate how to administer Fentanyl and Gabapentin and what they are used for. She verbalized understanding of Narcan, but that she left it at the pharmacy. Reiterated that she call Palliative medicine for any questions or concerns 24/7. She verbalized understanding.         Mirtha Cruz RN  Palliative Medicine yes

## 2023-05-18 NOTE — PROGRESS NOTE ADULT - PROBLEM SELECTOR PLAN 1
Patient with acute encephalopathy, aphasia, and R sided weakness  - Appreciate Neuro consult, CT without acute infarct or bleed. MRI head pending  - Improving- awake, conversational this morning, with improved strength  - Follow up MRI, if negative, possibly AMS in setting of acute infection as below. Continue treatment of infection.  -C/w telemonitoring   -TSH. folate, and B12 are WNL  -F/u additional neurology recs

## 2023-05-18 NOTE — SWALLOW BEDSIDE ASSESSMENT ADULT - ASR SWALLOW RECOMMEND DIAG
Objective testing not warranted at this time as patient only with overt s/s aspiration with thin liquids

## 2023-05-18 NOTE — PHYSICAL THERAPY INITIAL EVALUATION ADULT - IMPAIRED TRANSFERS: SIT/STAND, REHAB EVAL
impaired balance/decreased strength Niacinamide Pregnancy And Lactation Text: These medications are considered safe during pregnancy.

## 2023-05-18 NOTE — SWALLOW BEDSIDE ASSESSMENT ADULT - ADDITIONAL RECOMMENDATIONS
This department to follow up to ensure tolerance of recommended PO as schedule permits. Medical team further advised to reconsult if patient with change medical status or change in tolerance of recommended PO.

## 2023-05-18 NOTE — PROGRESS NOTE ADULT - SUBJECTIVE AND OBJECTIVE BOX
Patient is a 89y old  Female who presents with a chief complaint of Aphasia (17 May 2023 16:49)    SUBJECTIVE / OVERNIGHT EVENTS: No acute events. Awake, sitting up in bed this morning, conversational. Denies pain or discomfort.     MEDICATIONS  (STANDING):  aspirin enteric coated 81 milliGRAM(s) Oral daily  atorvastatin 80 milliGRAM(s) Oral at bedtime  heparin   Injectable 5000 Unit(s) SubCutaneous every 12 hours  levothyroxine 100 MICROGram(s) Oral daily  piperacillin/tazobactam IVPB.. 3.375 Gram(s) IV Intermittent every 8 hours  polyethylene glycol 3350 17 Gram(s) Oral daily    MEDICATIONS  (PRN):  acetaminophen     Tablet .. 650 milliGRAM(s) Oral every 6 hours PRN Temp greater or equal to 38C (100.4F), Mild Pain (1 - 3)  aluminum hydroxide/magnesium hydroxide/simethicone Suspension 30 milliLiter(s) Oral every 4 hours PRN Dyspepsia  melatonin 3 milliGRAM(s) Oral at bedtime PRN Insomnia  ondansetron Injectable 4 milliGRAM(s) IV Push every 8 hours PRN Nausea and/or Vomiting    CAPILLARY BLOOD GLUCOSE    I&O's Summary    17 May 2023 07:01  -  18 May 2023 07:00  --------------------------------------------------------  IN: 50 mL / OUT: 300 mL / NET: -250 mL    PHYSICAL EXAM:  Vital Signs Last 24 Hrs  T(C): 36.3 (18 May 2023 06:10), Max: 36.6 (17 May 2023 19:40)  T(F): 97.3 (18 May 2023 06:10), Max: 97.9 (17 May 2023 22:10)  HR: 85 (18 May 2023 06:10) (78 - 98)  BP: 114/68 (18 May 2023 06:10) (105/66 - 212/127)  BP(mean): --  RR: 18 (18 May 2023 06:10) (18 - 20)  SpO2: 97% (18 May 2023 06:10) (97% - 100%)    Parameters below as of 18 May 2023 06:10  Patient On (Oxygen Delivery Method): room air    CONSTITUTIONAL: NAD, well-groomed, sitting up in bed  EYES: conjunctiva and sclera clear  ENMT: Moist oral mucosa  NECK: Supple  RESPIRATORY: Normal respiratory effort; lungs are clear to auscultation bilaterally  CARDIOVASCULAR: Regular rate and rhythm, normal S1 and S2, No lower extremity edema; Peripheral pulses are 2+ bilaterally  ABDOMEN: Nontender to palpation, normoactive bowel sounds, no rebound/guarding  PSYCH: A+O to person, place "hospital", not year ("2003") or month  NEUROLOGY: moving all extremities, 4/5 strength throughout upper and lower extremities bilaterally   SKIN: No rashes    LABS:                        12.0   8.73  )-----------( 180      ( 18 May 2023 04:00 )             35.3     05-18    131<L>  |  95<L>  |  34<H>  ----------------------------<  88  4.1   |  21<L>  |  1.36<H>    Ca    9.3      18 May 2023 04:00  Phos  4.0     05-18  Mg     1.90     05-18    TPro  7.8  /  Alb  4.2  /  TBili  0.5  /  DBili  x   /  AST  23  /  ALT  31  /  AlkPhos  195<H>  05-17    PT/INR - ( 17 May 2023 13:00 )   PT: 12.1 sec;   INR: 1.04 ratio       PTT - ( 17 May 2023 13:00 )  PTT:37.6 sec    RADIOLOGY & ADDITIONAL TESTS: Reviewed    COORDINATION OF CARE:  Care Discussed with Consultants/Other Providers [Y- Medicine ACP, EP team, RN]

## 2023-05-18 NOTE — SWALLOW BEDSIDE ASSESSMENT ADULT - SWALLOW EVAL: DIAGNOSIS
1. Functional oral stage for puree, soft & bite-sized, easy to chew, regular solids, mildly thick & moderately thick liquids marked by adequate acceptance & containment of PO with adequate posterior transport & clearance from oral cavity. 2. Mild oral dysphagia for thin liquids marked by adequate acceptance, containment & transport with suspect posterior loss & clearance from oral cavity. 3. Functional pharyngeal stage suspected for puree, soft & bite-sized, easy to chew, regular, mildly thick & moderately thick liquids marked by initiation of the pharyngeal swallow & adequate hyolaryngeal excursion upon digital palpation with no overt s/s aspiration noted. 4. Moderate pharyngeal dysphagia suspected with thin liquids with initiation of the pharyngeal swallow & hyolaryngeal excursion upon digital palpation with a cough response post swallow.

## 2023-05-18 NOTE — OCCUPATIONAL THERAPY INITIAL EVALUATION ADULT - ADDITIONAL COMMENTS
Patient is a limited historian due to AMS and speech difficulties. Reports living in the [place]. Patient reports being independent for ADLs and using a rollator for functional mobility.

## 2023-05-18 NOTE — OCCUPATIONAL THERAPY INITIAL EVALUATION ADULT - GENERAL OBSERVATIONS, REHAB EVAL
Patient received semisupine in bed in NAD; agreeable to participate in OT evaluation. +2L/min O2 via NC. +tele. HR 82 bpm. SpO2 99% on 2L/min O2.

## 2023-05-18 NOTE — OCCUPATIONAL THERAPY INITIAL EVALUATION ADULT - PERTINENT HX OF CURRENT PROBLEM, REHAB EVAL
89 year old female with history of HTN, HLD, HFrEf, Afib (not on AC), hypothyroidism, PPM placement, and recurrent UTI presents to the ED with worsening altered mental status and aphasia. CT head with no evidence of acute CVA. Found to have acute UTI.

## 2023-05-19 LAB
ANION GAP SERPL CALC-SCNC: 16 MMOL/L — HIGH (ref 7–14)
BUN SERPL-MCNC: 32 MG/DL — HIGH (ref 7–23)
CALCIUM SERPL-MCNC: 9 MG/DL — SIGNIFICANT CHANGE UP (ref 8.4–10.5)
CHLORIDE SERPL-SCNC: 99 MMOL/L — SIGNIFICANT CHANGE UP (ref 98–107)
CO2 SERPL-SCNC: 20 MMOL/L — LOW (ref 22–31)
CREAT SERPL-MCNC: 1.29 MG/DL — SIGNIFICANT CHANGE UP (ref 0.5–1.3)
CULTURE RESULTS: NO GROWTH — SIGNIFICANT CHANGE UP
EGFR: 37 ML/MIN/1.73M2 — LOW
GLUCOSE SERPL-MCNC: 95 MG/DL — SIGNIFICANT CHANGE UP (ref 70–99)
HCT VFR BLD CALC: 38.4 % — SIGNIFICANT CHANGE UP (ref 34.5–45)
HGB BLD-MCNC: 13 G/DL — SIGNIFICANT CHANGE UP (ref 11.5–15.5)
MAGNESIUM SERPL-MCNC: 2 MG/DL — SIGNIFICANT CHANGE UP (ref 1.6–2.6)
MCHC RBC-ENTMCNC: 30.4 PG — SIGNIFICANT CHANGE UP (ref 27–34)
MCHC RBC-ENTMCNC: 33.9 GM/DL — SIGNIFICANT CHANGE UP (ref 32–36)
MCV RBC AUTO: 89.9 FL — SIGNIFICANT CHANGE UP (ref 80–100)
NRBC # BLD: 0 /100 WBCS — SIGNIFICANT CHANGE UP (ref 0–0)
NRBC # FLD: 0 K/UL — SIGNIFICANT CHANGE UP (ref 0–0)
PHOSPHATE SERPL-MCNC: 4 MG/DL — SIGNIFICANT CHANGE UP (ref 2.5–4.5)
PLATELET # BLD AUTO: 152 K/UL — SIGNIFICANT CHANGE UP (ref 150–400)
POTASSIUM SERPL-MCNC: 3.6 MMOL/L — SIGNIFICANT CHANGE UP (ref 3.5–5.3)
POTASSIUM SERPL-SCNC: 3.6 MMOL/L — SIGNIFICANT CHANGE UP (ref 3.5–5.3)
RBC # BLD: 4.27 M/UL — SIGNIFICANT CHANGE UP (ref 3.8–5.2)
RBC # FLD: 14.6 % — HIGH (ref 10.3–14.5)
SODIUM SERPL-SCNC: 135 MMOL/L — SIGNIFICANT CHANGE UP (ref 135–145)
SPECIMEN SOURCE: SIGNIFICANT CHANGE UP
WBC # BLD: 6.36 K/UL — SIGNIFICANT CHANGE UP (ref 3.8–10.5)
WBC # FLD AUTO: 6.36 K/UL — SIGNIFICANT CHANGE UP (ref 3.8–10.5)

## 2023-05-19 PROCEDURE — 99233 SBSQ HOSP IP/OBS HIGH 50: CPT

## 2023-05-19 RX ADMIN — Medication 100 MICROGRAM(S): at 06:09

## 2023-05-19 RX ADMIN — HEPARIN SODIUM 5000 UNIT(S): 5000 INJECTION INTRAVENOUS; SUBCUTANEOUS at 17:30

## 2023-05-19 RX ADMIN — PIPERACILLIN AND TAZOBACTAM 25 GRAM(S): 4; .5 INJECTION, POWDER, LYOPHILIZED, FOR SOLUTION INTRAVENOUS at 13:59

## 2023-05-19 RX ADMIN — PIPERACILLIN AND TAZOBACTAM 25 GRAM(S): 4; .5 INJECTION, POWDER, LYOPHILIZED, FOR SOLUTION INTRAVENOUS at 22:10

## 2023-05-19 RX ADMIN — POLYETHYLENE GLYCOL 3350 17 GRAM(S): 17 POWDER, FOR SOLUTION ORAL at 14:00

## 2023-05-19 RX ADMIN — HEPARIN SODIUM 5000 UNIT(S): 5000 INJECTION INTRAVENOUS; SUBCUTANEOUS at 06:09

## 2023-05-19 RX ADMIN — Medication 81 MILLIGRAM(S): at 14:00

## 2023-05-19 RX ADMIN — PIPERACILLIN AND TAZOBACTAM 25 GRAM(S): 4; .5 INJECTION, POWDER, LYOPHILIZED, FOR SOLUTION INTRAVENOUS at 06:08

## 2023-05-19 RX ADMIN — ATORVASTATIN CALCIUM 80 MILLIGRAM(S): 80 TABLET, FILM COATED ORAL at 22:09

## 2023-05-19 NOTE — CHART NOTE - NSCHARTNOTEFT_GEN_A_CORE
Spoke with daughter Ariana to obtain consent for MR Head to r/o CVA. Per Ariana, the patient has had frequent UTIs in the past and develops acute encephalopathy each time she has a UTI which resolves with appropriate ABX therapy. Therefore, Ariana would like to hold off on MR Head for now and monitor progress with ABX. Per Dr. Vera, patient clinically improving and will discontinue MRI at this time.
Neurology read through chart not that family would like to hold off on MRI for now. Neurology to sign off. Please contact [phone] for further questions or concerns. Please have patient follow up with Dr. Harjit Wilcox by emailing his  Aparna Austin at [email or web address]. This will ensure quick followup for the patient for concerns of stroke.
Patient seen and examined at bedside with attending and neurology team. Please refer to attending attestation of neurology consult note from the day prior for final recommendations.    IMPRESSION   Disorientation, receptive aphasia with impaired naming, RLE drift, RHH on BTT and reluctance to look to the R consistent with L brain dysfunction that may be secondary to ischemia vs toxic metabolic disturbance.    PLAN   [] f/u MRI brain non con   [] If MRI brain demonstrates new, acute infarct, given Afib, can start patient on eliquis 2.5mg BID   [] Please call [phone] for further questions or concerns

## 2023-05-19 NOTE — PROGRESS NOTE ADULT - NSPROGADDITIONALINFOA_GEN_ALL_CORE
Attempted to update daughter Ariana at home and cell numbers but unable to reach. Will reattempt at later time. Updated daughter Ariana via telephone 5/19/23

## 2023-05-19 NOTE — PROGRESS NOTE ADULT - PROBLEM SELECTOR PLAN 1
Patient with acute encephalopathy, aphasia, and R sided weakness  - Appreciate Neuro consult, CT without acute infarct or bleed. MRI head initially ordered but per daughter would like to hold off while monitoring for improvement with treatment of UTI as patient (see chart note). Patient's mental status improving, suspect likely related to urinary infection, continue treatment as below  - Conversational, improved strength  -TSH. folate, and B12 are WNL  -outpatient neurology follow up

## 2023-05-19 NOTE — PROGRESS NOTE ADULT - SUBJECTIVE AND OBJECTIVE BOX
Patient is a 89y old  Female who presents with a chief complaint of Aphasia (18 May 2023 11:22)    SUBJECTIVE / OVERNIGHT EVENTS: No acute events but found to be retaining urine s/p straight cath multiple times now aviles placed per hospital protocol. Feels well, no pain or discomfort. No nausea, vomiting, chest pain, shortness of breath.    MEDICATIONS  (STANDING):  aspirin enteric coated 81 milliGRAM(s) Oral daily  atorvastatin 80 milliGRAM(s) Oral at bedtime  heparin   Injectable 5000 Unit(s) SubCutaneous every 12 hours  levothyroxine 100 MICROGram(s) Oral daily  piperacillin/tazobactam IVPB.. 3.375 Gram(s) IV Intermittent every 8 hours  polyethylene glycol 3350 17 Gram(s) Oral daily    MEDICATIONS  (PRN):  acetaminophen     Tablet .. 650 milliGRAM(s) Oral every 6 hours PRN Temp greater or equal to 38C (100.4F), Mild Pain (1 - 3)  aluminum hydroxide/magnesium hydroxide/simethicone Suspension 30 milliLiter(s) Oral every 4 hours PRN Dyspepsia  melatonin 3 milliGRAM(s) Oral at bedtime PRN Insomnia  ondansetron Injectable 4 milliGRAM(s) IV Push every 8 hours PRN Nausea and/or Vomiting    CAPILLARY BLOOD GLUCOSE    I&O's Summary    18 May 2023 07:01  -  19 May 2023 07:00  --------------------------------------------------------  IN: 750 mL / OUT: 620 mL / NET: 130 mL    19 May 2023 07:01  -  19 May 2023 15:38  --------------------------------------------------------  IN: 240 mL / OUT: 0 mL / NET: 240 mL    PHYSICAL EXAM:  Vital Signs Last 24 Hrs  T(C): 36.4 (19 May 2023 10:00), Max: 36.8 (18 May 2023 18:10)  T(F): 97.6 (19 May 2023 10:00), Max: 98.2 (18 May 2023 18:10)  HR: 65 (19 May 2023 10:00) (65 - 82)  BP: 100/77 (19 May 2023 10:00) (100/77 - 138/94)  BP(mean): --  RR: 18 (19 May 2023 10:00) (18 - 18)  SpO2: 100% (19 May 2023 10:00) (96% - 100%)    Parameters below as of 19 May 2023 10:00  Patient On (Oxygen Delivery Method): room air    CONSTITUTIONAL: NAD, laying in bed  EYES: conjunctiva and sclera clear  ENMT: Moist oral mucosa  NECK: Supple  RESPIRATORY: Normal respiratory effort; lungs are clear to auscultation bilaterally  CARDIOVASCULAR: Regular rate and rhythm, normal S1 and S2, No lower extremity edema; Peripheral pulses are 2+ bilaterally  ABDOMEN: Nontender to palpation, normoactive bowel sounds, no rebound/guarding  PSYCH: AOx person and place and season, not month or year  NEUROLOGY: moving all extremities, 4/5 strength throughout  SKIN: No rashes    LABS:                        13.0   6.36  )-----------( 152      ( 19 May 2023 09:26 )             38.4     05-19    135  |  99  |  32<H>  ----------------------------<  95  3.6   |  20<L>  |  1.29    Ca    9.0      19 May 2023 09:26  Phos  4.0     05-19  Mg     2.00     05-19    Urinalysis Basic - ( 18 May 2023 19:40 )    Color: Yellow / Appearance: Clear / S.041 / pH: x  Gluc: x / Ketone: Trace  / Bili: Negative / Urobili: <2 mg/dL   Blood: x / Protein: 30 mg/dL / Nitrite: Negative   Leuk Esterase: Large / RBC: 4 /HPF /  /HPF   Sq Epi: x / Non Sq Epi: x / Bacteria: Negative    RADIOLOGY & ADDITIONAL TESTS: Reviewed    COORDINATION OF CARE:  Care Discussed with Consultants/Other Providers [Y- Medicine ACP, RN, CM/SW]   Patient is a 89y old  Female who presents with a chief complaint of Aphasia (18 May 2023 11:22)    SUBJECTIVE / OVERNIGHT EVENTS: No acute events but found to be retaining urine s/p straight cath multiple times now aviles placed per hospital protocol. Feels well, no pain or discomfort. No nausea, vomiting, chest pain, shortness of breath.     MEDICATIONS  (STANDING):  aspirin enteric coated 81 milliGRAM(s) Oral daily  atorvastatin 80 milliGRAM(s) Oral at bedtime  heparin   Injectable 5000 Unit(s) SubCutaneous every 12 hours  levothyroxine 100 MICROGram(s) Oral daily  piperacillin/tazobactam IVPB.. 3.375 Gram(s) IV Intermittent every 8 hours  polyethylene glycol 3350 17 Gram(s) Oral daily    MEDICATIONS  (PRN):  acetaminophen     Tablet .. 650 milliGRAM(s) Oral every 6 hours PRN Temp greater or equal to 38C (100.4F), Mild Pain (1 - 3)  aluminum hydroxide/magnesium hydroxide/simethicone Suspension 30 milliLiter(s) Oral every 4 hours PRN Dyspepsia  melatonin 3 milliGRAM(s) Oral at bedtime PRN Insomnia  ondansetron Injectable 4 milliGRAM(s) IV Push every 8 hours PRN Nausea and/or Vomiting    CAPILLARY BLOOD GLUCOSE    I&O's Summary    18 May 2023 07:01  -  19 May 2023 07:00  --------------------------------------------------------  IN: 750 mL / OUT: 620 mL / NET: 130 mL    19 May 2023 07:01  -  19 May 2023 15:38  --------------------------------------------------------  IN: 240 mL / OUT: 0 mL / NET: 240 mL    PHYSICAL EXAM:  Vital Signs Last 24 Hrs  T(C): 36.4 (19 May 2023 10:00), Max: 36.8 (18 May 2023 18:10)  T(F): 97.6 (19 May 2023 10:00), Max: 98.2 (18 May 2023 18:10)  HR: 65 (19 May 2023 10:00) (65 - 82)  BP: 100/77 (19 May 2023 10:00) (100/77 - 138/94)  BP(mean): --  RR: 18 (19 May 2023 10:00) (18 - 18)  SpO2: 100% (19 May 2023 10:00) (96% - 100%)    Parameters below as of 19 May 2023 10:00  Patient On (Oxygen Delivery Method): room air    CONSTITUTIONAL: NAD, laying in bed  EYES: conjunctiva and sclera clear  ENMT: Moist oral mucosa  NECK: Supple  RESPIRATORY: Normal respiratory effort; lungs are clear to auscultation bilaterally  CARDIOVASCULAR: Regular rate and rhythm, normal S1 and S2, No lower extremity edema; Peripheral pulses are 2+ bilaterally  ABDOMEN: Nontender to palpation, normoactive bowel sounds, no rebound/guarding  PSYCH: AOx person and place and season, not month or year  NEUROLOGY: moving all extremities, 4/5 strength throughout  SKIN: No rashes    LABS:                        13.0   6.36  )-----------( 152      ( 19 May 2023 09:26 )             38.4     05-19    135  |  99  |  32<H>  ----------------------------<  95  3.6   |  20<L>  |  1.29    Ca    9.0      19 May 2023 09:26  Phos  4.0     05-19  Mg     2.00     05-19    Urinalysis Basic - ( 18 May 2023 19:40 )    Color: Yellow / Appearance: Clear / S.041 / pH: x  Gluc: x / Ketone: Trace  / Bili: Negative / Urobili: <2 mg/dL   Blood: x / Protein: 30 mg/dL / Nitrite: Negative   Leuk Esterase: Large / RBC: 4 /HPF /  /HPF   Sq Epi: x / Non Sq Epi: x / Bacteria: Negative    RADIOLOGY & ADDITIONAL TESTS: Reviewed    COORDINATION OF CARE:  Care Discussed with Consultants/Other Providers [Y- Medicine ACP, RN, CM/SW]

## 2023-05-19 NOTE — PROGRESS NOTE ADULT - PROBLEM SELECTOR PLAN 6
DVT-heparin subQ DVT-heparin subQ    Diet: was on pureed diet. Patient now with significantly improved mental status, patient really enjoys regular food and dislikes currently ordered pureed diet. Daughter Ariana requesting regular diet, reviewed risks of regular diet including potentially aspiration and infection, understanding and accepting of risks and would like to proceed with regular diet- ordered.

## 2023-05-20 LAB
ANION GAP SERPL CALC-SCNC: 17 MMOL/L — HIGH (ref 7–14)
BASOPHILS # BLD AUTO: 0.06 K/UL — SIGNIFICANT CHANGE UP (ref 0–0.2)
BASOPHILS NFR BLD AUTO: 0.7 % — SIGNIFICANT CHANGE UP (ref 0–2)
BUN SERPL-MCNC: 45 MG/DL — HIGH (ref 7–23)
CALCIUM SERPL-MCNC: 9.1 MG/DL — SIGNIFICANT CHANGE UP (ref 8.4–10.5)
CHLORIDE SERPL-SCNC: 96 MMOL/L — LOW (ref 98–107)
CO2 SERPL-SCNC: 20 MMOL/L — LOW (ref 22–31)
CREAT SERPL-MCNC: 1.44 MG/DL — HIGH (ref 0.5–1.3)
EGFR: 32 ML/MIN/1.73M2 — LOW
EOSINOPHIL # BLD AUTO: 0.4 K/UL — SIGNIFICANT CHANGE UP (ref 0–0.5)
EOSINOPHIL NFR BLD AUTO: 5 % — SIGNIFICANT CHANGE UP (ref 0–6)
GLUCOSE SERPL-MCNC: 83 MG/DL — SIGNIFICANT CHANGE UP (ref 70–99)
HCT VFR BLD CALC: 42.6 % — SIGNIFICANT CHANGE UP (ref 34.5–45)
HGB BLD-MCNC: 14.2 G/DL — SIGNIFICANT CHANGE UP (ref 11.5–15.5)
IANC: 4.77 K/UL — SIGNIFICANT CHANGE UP (ref 1.8–7.4)
IMM GRANULOCYTES NFR BLD AUTO: 0.7 % — SIGNIFICANT CHANGE UP (ref 0–0.9)
LYMPHOCYTES # BLD AUTO: 1.78 K/UL — SIGNIFICANT CHANGE UP (ref 1–3.3)
LYMPHOCYTES # BLD AUTO: 22.1 % — SIGNIFICANT CHANGE UP (ref 13–44)
MAGNESIUM SERPL-MCNC: 2.1 MG/DL — SIGNIFICANT CHANGE UP (ref 1.6–2.6)
MANUAL SMEAR VERIFICATION: SIGNIFICANT CHANGE UP
MCHC RBC-ENTMCNC: 30.9 PG — SIGNIFICANT CHANGE UP (ref 27–34)
MCHC RBC-ENTMCNC: 33.3 GM/DL — SIGNIFICANT CHANGE UP (ref 32–36)
MCV RBC AUTO: 92.8 FL — SIGNIFICANT CHANGE UP (ref 80–100)
MONOCYTES # BLD AUTO: 1 K/UL — HIGH (ref 0–0.9)
MONOCYTES NFR BLD AUTO: 12.4 % — SIGNIFICANT CHANGE UP (ref 2–14)
NEUTROPHILS # BLD AUTO: 4.77 K/UL — SIGNIFICANT CHANGE UP (ref 1.8–7.4)
NEUTROPHILS NFR BLD AUTO: 59.1 % — SIGNIFICANT CHANGE UP (ref 43–77)
NRBC # BLD: 0 /100 WBCS — SIGNIFICANT CHANGE UP (ref 0–0)
NRBC # FLD: 0 K/UL — SIGNIFICANT CHANGE UP (ref 0–0)
PHOSPHATE SERPL-MCNC: 4 MG/DL — SIGNIFICANT CHANGE UP (ref 2.5–4.5)
PLAT MORPH BLD: ABNORMAL
PLATELET # BLD AUTO: 143 K/UL — LOW (ref 150–400)
PLATELET CLUMP BLD QL SMEAR: SLIGHT
PLATELET COUNT - ESTIMATE: SIGNIFICANT CHANGE UP
POTASSIUM SERPL-MCNC: 4 MMOL/L — SIGNIFICANT CHANGE UP (ref 3.5–5.3)
POTASSIUM SERPL-SCNC: 4 MMOL/L — SIGNIFICANT CHANGE UP (ref 3.5–5.3)
RBC # BLD: 4.59 M/UL — SIGNIFICANT CHANGE UP (ref 3.8–5.2)
RBC # FLD: 14.5 % — SIGNIFICANT CHANGE UP (ref 10.3–14.5)
RBC BLD AUTO: NORMAL — SIGNIFICANT CHANGE UP
SODIUM SERPL-SCNC: 133 MMOL/L — LOW (ref 135–145)
WBC # BLD: 8.07 K/UL — SIGNIFICANT CHANGE UP (ref 3.8–10.5)
WBC # FLD AUTO: 8.07 K/UL — SIGNIFICANT CHANGE UP (ref 3.8–10.5)

## 2023-05-20 PROCEDURE — 99233 SBSQ HOSP IP/OBS HIGH 50: CPT

## 2023-05-20 RX ORDER — SODIUM CHLORIDE 9 MG/ML
500 INJECTION INTRAMUSCULAR; INTRAVENOUS; SUBCUTANEOUS
Refills: 0 | Status: DISCONTINUED | OUTPATIENT
Start: 2023-05-20 | End: 2023-05-21

## 2023-05-20 RX ADMIN — HEPARIN SODIUM 5000 UNIT(S): 5000 INJECTION INTRAVENOUS; SUBCUTANEOUS at 05:51

## 2023-05-20 RX ADMIN — Medication 100 MICROGRAM(S): at 05:52

## 2023-05-20 RX ADMIN — PIPERACILLIN AND TAZOBACTAM 25 GRAM(S): 4; .5 INJECTION, POWDER, LYOPHILIZED, FOR SOLUTION INTRAVENOUS at 13:34

## 2023-05-20 RX ADMIN — ATORVASTATIN CALCIUM 80 MILLIGRAM(S): 80 TABLET, FILM COATED ORAL at 21:31

## 2023-05-20 RX ADMIN — PIPERACILLIN AND TAZOBACTAM 25 GRAM(S): 4; .5 INJECTION, POWDER, LYOPHILIZED, FOR SOLUTION INTRAVENOUS at 05:51

## 2023-05-20 RX ADMIN — HEPARIN SODIUM 5000 UNIT(S): 5000 INJECTION INTRAVENOUS; SUBCUTANEOUS at 17:41

## 2023-05-20 RX ADMIN — SODIUM CHLORIDE 50 MILLILITER(S): 9 INJECTION INTRAMUSCULAR; INTRAVENOUS; SUBCUTANEOUS at 13:33

## 2023-05-20 RX ADMIN — POLYETHYLENE GLYCOL 3350 17 GRAM(S): 17 POWDER, FOR SOLUTION ORAL at 11:23

## 2023-05-20 RX ADMIN — Medication 81 MILLIGRAM(S): at 11:23

## 2023-05-20 RX ADMIN — PIPERACILLIN AND TAZOBACTAM 25 GRAM(S): 4; .5 INJECTION, POWDER, LYOPHILIZED, FOR SOLUTION INTRAVENOUS at 21:33

## 2023-05-20 NOTE — PROGRESS NOTE ADULT - PROBLEM SELECTOR PLAN 6
DVT-heparin subQ    Diet: was on pureed diet. Patient now with significantly improved mental status, patient really enjoys regular food and dislikes currently ordered pureed diet. Daughter Ariana requesting regular diet, reviewed risks of regular diet including potentially aspiration and infection, understanding and accepting of risks and would like to proceed with regular diet- ordered.

## 2023-05-20 NOTE — PROGRESS NOTE ADULT - SUBJECTIVE AND OBJECTIVE BOX
Patient is a 89y old  Female who presents with a chief complaint of Aphasia (19 May 2023 15:38)    SUBJECTIVE / OVERNIGHT EVENTS: No acute events. Declines pain or discomfort this AM.     MEDICATIONS  (STANDING):  aspirin enteric coated 81 milliGRAM(s) Oral daily  atorvastatin 80 milliGRAM(s) Oral at bedtime  heparin   Injectable 5000 Unit(s) SubCutaneous every 12 hours  levothyroxine 100 MICROGram(s) Oral daily  piperacillin/tazobactam IVPB.. 3.375 Gram(s) IV Intermittent every 8 hours  polyethylene glycol 3350 17 Gram(s) Oral daily    MEDICATIONS  (PRN):  acetaminophen     Tablet .. 650 milliGRAM(s) Oral every 6 hours PRN Temp greater or equal to 38C (100.4F), Mild Pain (1 - 3)  aluminum hydroxide/magnesium hydroxide/simethicone Suspension 30 milliLiter(s) Oral every 4 hours PRN Dyspepsia  melatonin 3 milliGRAM(s) Oral at bedtime PRN Insomnia  ondansetron Injectable 4 milliGRAM(s) IV Push every 8 hours PRN Nausea and/or Vomiting    CAPILLARY BLOOD GLUCOSE    I&O's Summary    19 May 2023 07:01  -  20 May 2023 07:00  --------------------------------------------------------  IN: 840 mL / OUT: 1050 mL / NET: -210 mL    PHYSICAL EXAM:  Vital Signs Last 24 Hrs  T(C): 36.3 (20 May 2023 09:51), Max: 36.4 (20 May 2023 02:00)  T(F): 97.4 (20 May 2023 09:51), Max: 97.6 (20 May 2023 05:51)  HR: 80 (20 May 2023 09:51) (60 - 90)  BP: 100/60 (20 May 2023 09:51) (100/60 - 138/90)  BP(mean): --  RR: 18 (20 May 2023 09:51) (16 - 18)  SpO2: 100% (20 May 2023 09:51) (96% - 100%)    Parameters below as of 20 May 2023 09:51  Patient On (Oxygen Delivery Method): nasal cannula  O2 Flow (L/min): 2    CONSTITUTIONAL: NAD, laying in bed  EYES: conjunctiva and sclera clear  ENMT: Moist oral mucosa  NECK: Supple  RESPIRATORY: Normal respiratory effort; lungs are clear to auscultation bilaterally  CARDIOVASCULAR: Regular rate and rhythm, normal S1 and S2, No lower extremity edema; Peripheral pulses are 2+ bilaterally  ABDOMEN: Nontender to palpation, normoactive bowel sounds, no rebound/guarding  PSYCH: AOx2, not time  NEUROLOGY: moving all extremities, 4/5 strength throughout  SKIN: No rashes    LABS:                        14.2   8.07  )-----------( 143      ( 20 May 2023 07:00 )             42.6     05-20    133<L>  |  96<L>  |  45<H>  ----------------------------<  83  4.0   |  20<L>  |  1.44<H>    Ca    9.1      20 May 2023 07:00  Phos  4.0     05-20  Mg     2.10     05-20    Urinalysis Basic - ( 18 May 2023 19:40 )    Color: Yellow / Appearance: Clear / S.041 / pH: x  Gluc: x / Ketone: Trace  / Bili: Negative / Urobili: <2 mg/dL   Blood: x / Protein: 30 mg/dL / Nitrite: Negative   Leuk Esterase: Large / RBC: 4 /HPF /  /HPF   Sq Epi: x / Non Sq Epi: x / Bacteria: Negative    Culture - Urine (collected 18 May 2023 19:26)  Source: Clean Catch Clean Catch (Midstream)  Final Report (19 May 2023 23:44):    No growth      RADIOLOGY & ADDITIONAL TESTS: Reviewed    COORDINATION OF CARE:  Care Discussed with Consultants/Other Providers [Y- Medicine ACP]

## 2023-05-21 PROCEDURE — 99233 SBSQ HOSP IP/OBS HIGH 50: CPT

## 2023-05-21 RX ORDER — AMLODIPINE BESYLATE 2.5 MG/1
5 TABLET ORAL DAILY
Refills: 0 | Status: DISCONTINUED | OUTPATIENT
Start: 2023-05-21 | End: 2023-05-24

## 2023-05-21 RX ORDER — ACETAMINOPHEN 500 MG
650 TABLET ORAL ONCE
Refills: 0 | Status: COMPLETED | OUTPATIENT
Start: 2023-05-21 | End: 2023-05-21

## 2023-05-21 RX ORDER — PIPERACILLIN AND TAZOBACTAM 4; .5 G/20ML; G/20ML
3.38 INJECTION, POWDER, LYOPHILIZED, FOR SOLUTION INTRAVENOUS EVERY 8 HOURS
Refills: 0 | Status: DISCONTINUED | OUTPATIENT
Start: 2023-05-21 | End: 2023-05-22

## 2023-05-21 RX ADMIN — Medication 260 MILLIGRAM(S): at 15:24

## 2023-05-21 RX ADMIN — PIPERACILLIN AND TAZOBACTAM 25 GRAM(S): 4; .5 INJECTION, POWDER, LYOPHILIZED, FOR SOLUTION INTRAVENOUS at 09:51

## 2023-05-21 RX ADMIN — Medication 100 MICROGRAM(S): at 05:31

## 2023-05-21 RX ADMIN — Medication 650 MILLIGRAM(S): at 15:54

## 2023-05-21 RX ADMIN — Medication 81 MILLIGRAM(S): at 18:38

## 2023-05-21 RX ADMIN — POLYETHYLENE GLYCOL 3350 17 GRAM(S): 17 POWDER, FOR SOLUTION ORAL at 18:41

## 2023-05-21 RX ADMIN — HEPARIN SODIUM 5000 UNIT(S): 5000 INJECTION INTRAVENOUS; SUBCUTANEOUS at 05:31

## 2023-05-21 RX ADMIN — HEPARIN SODIUM 5000 UNIT(S): 5000 INJECTION INTRAVENOUS; SUBCUTANEOUS at 18:38

## 2023-05-21 RX ADMIN — ATORVASTATIN CALCIUM 80 MILLIGRAM(S): 80 TABLET, FILM COATED ORAL at 22:41

## 2023-05-21 RX ADMIN — Medication 3 MILLIGRAM(S): at 22:41

## 2023-05-21 RX ADMIN — AMLODIPINE BESYLATE 5 MILLIGRAM(S): 2.5 TABLET ORAL at 12:51

## 2023-05-21 RX ADMIN — PIPERACILLIN AND TAZOBACTAM 25 GRAM(S): 4; .5 INJECTION, POWDER, LYOPHILIZED, FOR SOLUTION INTRAVENOUS at 18:41

## 2023-05-21 NOTE — PROVIDER CONTACT NOTE (OTHER) - BACKGROUND
89 Y F H/O HTN, HFpEF, CKD, recurrent UTIs (including ESBL E coli), HLD, OA, hypothyroidism presenting for syncope

## 2023-05-21 NOTE — PROGRESS NOTE ADULT - SUBJECTIVE AND OBJECTIVE BOX
Patient is a 89y old  Female who presents with a chief complaint of Aphasia (20 May 2023 12:12)    SUBJECTIVE / OVERNIGHT EVENTS: No acute events. Feels well this morning. No pain or complaints.     MEDICATIONS  (STANDING):  amLODIPine   Tablet 5 milliGRAM(s) Oral daily  aspirin enteric coated 81 milliGRAM(s) Oral daily  atorvastatin 80 milliGRAM(s) Oral at bedtime  heparin   Injectable 5000 Unit(s) SubCutaneous every 12 hours  levothyroxine 100 MICROGram(s) Oral daily  piperacillin/tazobactam IVPB.. 3.375 Gram(s) IV Intermittent every 8 hours  polyethylene glycol 3350 17 Gram(s) Oral daily  sodium chloride 0.9%. 500 milliLiter(s) (50 mL/Hr) IV Continuous <Continuous>    MEDICATIONS  (PRN):  acetaminophen     Tablet .. 650 milliGRAM(s) Oral every 6 hours PRN Temp greater or equal to 38C (100.4F), Mild Pain (1 - 3)  aluminum hydroxide/magnesium hydroxide/simethicone Suspension 30 milliLiter(s) Oral every 4 hours PRN Dyspepsia  melatonin 3 milliGRAM(s) Oral at bedtime PRN Insomnia  ondansetron Injectable 4 milliGRAM(s) IV Push every 8 hours PRN Nausea and/or Vomiting    CAPILLARY BLOOD GLUCOSE    I&O's Summary    20 May 2023 07:01  -  21 May 2023 07:00  --------------------------------------------------------  IN: 0 mL / OUT: 1550 mL / NET: -1550 mL    21 May 2023 07:01  -  21 May 2023 11:52  --------------------------------------------------------  IN: 240 mL / OUT: 0 mL / NET: 240 mL    PHYSICAL EXAM:  Vital Signs Last 24 Hrs  T(C): 36.4 (21 May 2023 09:00), Max: 37.1 (21 May 2023 05:00)  T(F): 97.6 (21 May 2023 09:00), Max: 98.7 (21 May 2023 05:00)  HR: 85 (21 May 2023 09:00) (73 - 91)  BP: 171/104 (21 May 2023 09:00) (128/74 - 171/104)  BP(mean): --  RR: 18 (21 May 2023 09:00) (18 - 18)  SpO2: 100% (21 May 2023 09:00) (97% - 100%)    Parameters below as of 21 May 2023 09:00  Patient On (Oxygen Delivery Method): room air    CONSTITUTIONAL: NAD, inclined in bed  EYES: conjunctiva and sclera clear  ENMT: Moist oral mucosa  NECK: Supple  RESPIRATORY: Normal respiratory effort; lungs are clear to auscultation bilaterally  CARDIOVASCULAR: Regular rate and rhythm, normal S1 and S2, No lower extremity edema; Peripheral pulses are 2+ bilaterally  ABDOMEN: Nontender to palpation, normoactive bowel sounds, no rebound/guarding  PSYCH: A+O to person, place, and time  NEUROLOGY: nonfocal  SKIN: No rashes    LABS:                        14.2   8.07  )-----------( 143      ( 20 May 2023 07:00 )             42.6     05-20    133<L>  |  96<L>  |  45<H>  ----------------------------<  83  4.0   |  20<L>  |  1.44<H>    Ca    9.1      20 May 2023 07:00  Phos  4.0     05-20  Mg     2.10     05-20    Culture - Urine (collected 18 May 2023 19:26)  Source: Clean Catch Clean Catch (Midstream)  Final Report (19 May 2023 23:44):    No growth    RADIOLOGY & ADDITIONAL TESTS: Reviewed    COORDINATION OF CARE:  Care Discussed with Consultants/Other Providers [Y- Medicine ACP]

## 2023-05-21 NOTE — PROGRESS NOTE ADULT - PROBLEM SELECTOR PLAN 1
Patient with acute encephalopathy, aphasia, and R sided weakness  - Appreciate Neuro consult, CT without acute infarct or bleed. MRI head initially ordered but per daughter would like to hold off while monitoring for improvement with treatment of UTI as patient (see chart note). Patient's mental status improving, suspect likely related to urinary infection, continue treatment as below. Today AOx3.   - Conversational, improved strength  - TSH. folate, and B12 are WNL  - outpatient neurology follow up

## 2023-05-22 LAB
ANION GAP SERPL CALC-SCNC: 12 MMOL/L — SIGNIFICANT CHANGE UP (ref 7–14)
BASOPHILS # BLD AUTO: 0.03 K/UL — SIGNIFICANT CHANGE UP (ref 0–0.2)
BASOPHILS NFR BLD AUTO: 0.5 % — SIGNIFICANT CHANGE UP (ref 0–2)
BUN SERPL-MCNC: 35 MG/DL — HIGH (ref 7–23)
CALCIUM SERPL-MCNC: 8.8 MG/DL — SIGNIFICANT CHANGE UP (ref 8.4–10.5)
CHLORIDE SERPL-SCNC: 102 MMOL/L — SIGNIFICANT CHANGE UP (ref 98–107)
CO2 SERPL-SCNC: 22 MMOL/L — SIGNIFICANT CHANGE UP (ref 22–31)
CREAT SERPL-MCNC: 1 MG/DL — SIGNIFICANT CHANGE UP (ref 0.5–1.3)
EGFR: 50 ML/MIN/1.73M2 — LOW
EOSINOPHIL # BLD AUTO: 0.36 K/UL — SIGNIFICANT CHANGE UP (ref 0–0.5)
EOSINOPHIL NFR BLD AUTO: 6 % — SIGNIFICANT CHANGE UP (ref 0–6)
GLUCOSE SERPL-MCNC: 98 MG/DL — SIGNIFICANT CHANGE UP (ref 70–99)
HCT VFR BLD CALC: 36.7 % — SIGNIFICANT CHANGE UP (ref 34.5–45)
HGB BLD-MCNC: 12 G/DL — SIGNIFICANT CHANGE UP (ref 11.5–15.5)
IANC: 3.05 K/UL — SIGNIFICANT CHANGE UP (ref 1.8–7.4)
IMM GRANULOCYTES NFR BLD AUTO: 0.5 % — SIGNIFICANT CHANGE UP (ref 0–0.9)
LYMPHOCYTES # BLD AUTO: 1.73 K/UL — SIGNIFICANT CHANGE UP (ref 1–3.3)
LYMPHOCYTES # BLD AUTO: 28.8 % — SIGNIFICANT CHANGE UP (ref 13–44)
MAGNESIUM SERPL-MCNC: 2 MG/DL — SIGNIFICANT CHANGE UP (ref 1.6–2.6)
MCHC RBC-ENTMCNC: 30.8 PG — SIGNIFICANT CHANGE UP (ref 27–34)
MCHC RBC-ENTMCNC: 32.7 GM/DL — SIGNIFICANT CHANGE UP (ref 32–36)
MCV RBC AUTO: 94.1 FL — SIGNIFICANT CHANGE UP (ref 80–100)
MONOCYTES # BLD AUTO: 0.8 K/UL — SIGNIFICANT CHANGE UP (ref 0–0.9)
MONOCYTES NFR BLD AUTO: 13.3 % — SIGNIFICANT CHANGE UP (ref 2–14)
NEUTROPHILS # BLD AUTO: 3.05 K/UL — SIGNIFICANT CHANGE UP (ref 1.8–7.4)
NEUTROPHILS NFR BLD AUTO: 50.9 % — SIGNIFICANT CHANGE UP (ref 43–77)
NRBC # BLD: 0 /100 WBCS — SIGNIFICANT CHANGE UP (ref 0–0)
NRBC # FLD: 0 K/UL — SIGNIFICANT CHANGE UP (ref 0–0)
PHOSPHATE SERPL-MCNC: 3.4 MG/DL — SIGNIFICANT CHANGE UP (ref 2.5–4.5)
PLATELET # BLD AUTO: 160 K/UL — SIGNIFICANT CHANGE UP (ref 150–400)
POTASSIUM SERPL-MCNC: 4.4 MMOL/L — SIGNIFICANT CHANGE UP (ref 3.5–5.3)
POTASSIUM SERPL-SCNC: 4.4 MMOL/L — SIGNIFICANT CHANGE UP (ref 3.5–5.3)
RBC # BLD: 3.9 M/UL — SIGNIFICANT CHANGE UP (ref 3.8–5.2)
RBC # FLD: 14.6 % — HIGH (ref 10.3–14.5)
SARS-COV-2 RNA SPEC QL NAA+PROBE: SIGNIFICANT CHANGE UP
SODIUM SERPL-SCNC: 136 MMOL/L — SIGNIFICANT CHANGE UP (ref 135–145)
WBC # BLD: 6 K/UL — SIGNIFICANT CHANGE UP (ref 3.8–10.5)
WBC # FLD AUTO: 6 K/UL — SIGNIFICANT CHANGE UP (ref 3.8–10.5)

## 2023-05-22 PROCEDURE — 99232 SBSQ HOSP IP/OBS MODERATE 35: CPT

## 2023-05-22 RX ORDER — CARVEDILOL PHOSPHATE 80 MG/1
6.25 CAPSULE, EXTENDED RELEASE ORAL
Refills: 0 | Status: DISCONTINUED | OUTPATIENT
Start: 2023-05-22 | End: 2023-05-24

## 2023-05-22 RX ADMIN — HEPARIN SODIUM 5000 UNIT(S): 5000 INJECTION INTRAVENOUS; SUBCUTANEOUS at 05:18

## 2023-05-22 RX ADMIN — AMLODIPINE BESYLATE 5 MILLIGRAM(S): 2.5 TABLET ORAL at 05:12

## 2023-05-22 RX ADMIN — POLYETHYLENE GLYCOL 3350 17 GRAM(S): 17 POWDER, FOR SOLUTION ORAL at 14:03

## 2023-05-22 RX ADMIN — PIPERACILLIN AND TAZOBACTAM 25 GRAM(S): 4; .5 INJECTION, POWDER, LYOPHILIZED, FOR SOLUTION INTRAVENOUS at 02:32

## 2023-05-22 RX ADMIN — CARVEDILOL PHOSPHATE 6.25 MILLIGRAM(S): 80 CAPSULE, EXTENDED RELEASE ORAL at 17:33

## 2023-05-22 RX ADMIN — Medication 650 MILLIGRAM(S): at 17:03

## 2023-05-22 RX ADMIN — HEPARIN SODIUM 5000 UNIT(S): 5000 INJECTION INTRAVENOUS; SUBCUTANEOUS at 17:03

## 2023-05-22 RX ADMIN — Medication 81 MILLIGRAM(S): at 14:02

## 2023-05-22 RX ADMIN — PIPERACILLIN AND TAZOBACTAM 25 GRAM(S): 4; .5 INJECTION, POWDER, LYOPHILIZED, FOR SOLUTION INTRAVENOUS at 10:06

## 2023-05-22 RX ADMIN — Medication 100 MICROGRAM(S): at 05:18

## 2023-05-22 RX ADMIN — ATORVASTATIN CALCIUM 80 MILLIGRAM(S): 80 TABLET, FILM COATED ORAL at 21:56

## 2023-05-22 RX ADMIN — Medication 650 MILLIGRAM(S): at 18:00

## 2023-05-22 NOTE — PROGRESS NOTE ADULT - SUBJECTIVE AND OBJECTIVE BOX
Patient is a 89y old  Female who presents with a chief complaint of Aphasia (21 May 2023 11:52)    SUBJECTIVE / OVERNIGHT EVENTS: No acute events. Sitting up in bed, denies pain, discomfort, or any complaints.     MEDICATIONS  (STANDING):  amLODIPine   Tablet 5 milliGRAM(s) Oral daily  aspirin enteric coated 81 milliGRAM(s) Oral daily  atorvastatin 80 milliGRAM(s) Oral at bedtime  carvedilol 6.25 milliGRAM(s) Oral two times a day  heparin   Injectable 5000 Unit(s) SubCutaneous every 12 hours  levothyroxine 100 MICROGram(s) Oral daily  piperacillin/tazobactam IVPB.. 3.375 Gram(s) IV Intermittent every 8 hours  polyethylene glycol 3350 17 Gram(s) Oral daily    MEDICATIONS  (PRN):  acetaminophen     Tablet .. 650 milliGRAM(s) Oral every 6 hours PRN Temp greater or equal to 38C (100.4F), Mild Pain (1 - 3)  aluminum hydroxide/magnesium hydroxide/simethicone Suspension 30 milliLiter(s) Oral every 4 hours PRN Dyspepsia  melatonin 3 milliGRAM(s) Oral at bedtime PRN Insomnia  ondansetron Injectable 4 milliGRAM(s) IV Push every 8 hours PRN Nausea and/or Vomiting    CAPILLARY BLOOD GLUCOSE    I&O's Summary    21 May 2023 07:01  -  22 May 2023 07:00  --------------------------------------------------------  IN: 880 mL / OUT: 1200 mL / NET: -320 mL    22 May 2023 07:01  -  22 May 2023 11:47  --------------------------------------------------------  IN: 240 mL / OUT: 300 mL / NET: -60 mL    PHYSICAL EXAM:  Vital Signs Last 24 Hrs  T(C): 36.4 (22 May 2023 08:27), Max: 36.8 (22 May 2023 04:27)  T(F): 97.6 (22 May 2023 08:27), Max: 98.2 (22 May 2023 04:27)  HR: 79 (22 May 2023 08:27) (70 - 86)  BP: 156/77 (22 May 2023 08:27) (128/72 - 156/77)  BP(mean): --  RR: 18 (22 May 2023 08:27) (18 - 19)  SpO2: 100% (22 May 2023 08:27) (98% - 100%)    Parameters below as of 22 May 2023 08:27  Patient On (Oxygen Delivery Method): room air    CONSTITUTIONAL: NAD, sitting up in bed  EYES: conjunctiva and sclera clear  ENMT: Moist oral mucosa  RESPIRATORY: Normal respiratory effort; lungs are clear to auscultation bilaterally  CARDIOVASCULAR: Regular rate and rhythm, normal S1 and S2, No lower extremity edema; Peripheral pulses are 2+ bilaterally  ABDOMEN: Nontender to palpation, normoactive bowel sounds, no rebound/guarding  PSYCH: A+O to person, place. Not month or year. affect appropriate  NEUROLOGY: 4/5 strength throughout  SKIN: No rashes; no palpable lesions    LABS:                        12.0   6.00  )-----------( 160      ( 22 May 2023 07:25 )             36.7     05-22    136  |  102  |  35<H>  ----------------------------<  98  4.4   |  22  |  1.00    Ca    8.8      22 May 2023 07:25  Phos  3.4     05-22  Mg     2.00     05-22    RADIOLOGY & ADDITIONAL TESTS: Reviewed    COORDINATION OF CARE:  Care Discussed with Consultants/Other Providers [Y- Medicine ACP, SW]

## 2023-05-22 NOTE — PROGRESS NOTE ADULT - PROBLEM SELECTOR PLAN 1
Patient with acute encephalopathy, aphasia, and R sided weakness  - Appreciate Neuro consult, CT without acute infarct or bleed. MRI head initially ordered but per daughter would like to hold off while monitoring for improvement with treatment of UTI as patient (see chart note). Patient's mental status improving, suspect likely related to urinary infection, treated as below.  - Mental status improved, appears near baseline, no need for further inpatient neurological workup  - Conversational, improved strength  - TSH. folate, and B12 are WNL Patient with acute encephalopathy, aphasia, and R sided weakness  - Appreciate Neuro consult, CT without acute infarct or bleed. MRI head initially ordered but per daughter would like to hold off while monitoring for improvement with treatment of UTI as patient (see chart note). Patient's mental status improving, suspect metabolic encephalopathy due to UTI, treated as below with improvement of mental status  - Mental status improved, appears near baseline, no need for further inpatient neurological workup  - Conversational, improved strength  - TSH. folate, and B12 are WNL

## 2023-05-23 PROCEDURE — 99232 SBSQ HOSP IP/OBS MODERATE 35: CPT

## 2023-05-23 RX ORDER — SENNA PLUS 8.6 MG/1
2 TABLET ORAL ONCE
Refills: 0 | Status: COMPLETED | OUTPATIENT
Start: 2023-05-23 | End: 2023-05-23

## 2023-05-23 RX ORDER — ATORVASTATIN CALCIUM 80 MG/1
1 TABLET, FILM COATED ORAL
Qty: 0 | Refills: 0 | DISCHARGE
Start: 2023-05-23

## 2023-05-23 RX ORDER — LANOLIN ALCOHOL/MO/W.PET/CERES
1 CREAM (GRAM) TOPICAL
Qty: 0 | Refills: 0 | DISCHARGE
Start: 2023-05-23

## 2023-05-23 RX ORDER — TRAMADOL HYDROCHLORIDE 50 MG/1
1 TABLET ORAL
Refills: 0 | DISCHARGE

## 2023-05-23 RX ORDER — SPIRONOLACTONE 25 MG/1
1 TABLET, FILM COATED ORAL
Refills: 0 | DISCHARGE

## 2023-05-23 RX ADMIN — AMLODIPINE BESYLATE 5 MILLIGRAM(S): 2.5 TABLET ORAL at 05:38

## 2023-05-23 RX ADMIN — ATORVASTATIN CALCIUM 80 MILLIGRAM(S): 80 TABLET, FILM COATED ORAL at 21:43

## 2023-05-23 RX ADMIN — CARVEDILOL PHOSPHATE 6.25 MILLIGRAM(S): 80 CAPSULE, EXTENDED RELEASE ORAL at 17:36

## 2023-05-23 RX ADMIN — Medication 100 MICROGRAM(S): at 05:38

## 2023-05-23 RX ADMIN — HEPARIN SODIUM 5000 UNIT(S): 5000 INJECTION INTRAVENOUS; SUBCUTANEOUS at 17:35

## 2023-05-23 RX ADMIN — CARVEDILOL PHOSPHATE 6.25 MILLIGRAM(S): 80 CAPSULE, EXTENDED RELEASE ORAL at 05:37

## 2023-05-23 RX ADMIN — HEPARIN SODIUM 5000 UNIT(S): 5000 INJECTION INTRAVENOUS; SUBCUTANEOUS at 05:37

## 2023-05-23 RX ADMIN — Medication 81 MILLIGRAM(S): at 11:08

## 2023-05-23 RX ADMIN — POLYETHYLENE GLYCOL 3350 17 GRAM(S): 17 POWDER, FOR SOLUTION ORAL at 11:08

## 2023-05-23 RX ADMIN — SENNA PLUS 2 TABLET(S): 8.6 TABLET ORAL at 11:07

## 2023-05-23 RX ADMIN — Medication 650 MILLIGRAM(S): at 11:07

## 2023-05-23 NOTE — PROGRESS NOTE ADULT - ASSESSMENT
89 yo F with Pmhx of HTN, HLD, HFrEf, Afib (not on AC), hypothyroidism, hx of PPM placement, and recurrent UTI presents to the ED with worsening altered mental status and aphasia, admitted for further evaluation.
89 yo F with Pmhx of HTN, HLD, HFrEf, Afib (not on AC), hypothyroidism, hx of PPM placement, and recurrent UTI presents to the ED with worsening altered mental status and aphasia, metabolic 2/2 UTI, now improved. 
89 yo F with Pmhx of HTN, HLD, HFrEf, Afib (not on AC), hypothyroidism, hx of PPM placement, and recurrent UTI presents to the ED with worsening altered mental status and aphasia, admitted for further evaluation.

## 2023-05-23 NOTE — DISCHARGE NOTE NURSING/CASE MANAGEMENT/SOCIAL WORK - NSDCVIVACCINE_GEN_ALL_CORE_FT
Tdap; 17-Jun-2019 08:56; Priyanka Easley (RN); [employer]; [id number] (Exp. Date: 04-Apr-2021); IntraMuscular; Deltoid Left.; 0.5 milliLiter(s); VIS (VIS Published: 09-May-2013, VIS Presented: 17-Jun-2019);   
no concerns

## 2023-05-23 NOTE — PROGRESS NOTE ADULT - SUBJECTIVE AND OBJECTIVE BOX
Patient is a 89y old  Female who presents with a chief complaint of Aphasia (22 May 2023 11:47)    SUBJECTIVE / OVERNIGHT EVENTS: No acute events. Kulwant removed yesterday, reported to have passed TOV. This AM comfortable, mentating at baseline. No fevers.    MEDICATIONS  (STANDING):  amLODIPine   Tablet 5 milliGRAM(s) Oral daily  aspirin enteric coated 81 milliGRAM(s) Oral daily  atorvastatin 80 milliGRAM(s) Oral at bedtime  carvedilol 6.25 milliGRAM(s) Oral two times a day  heparin   Injectable 5000 Unit(s) SubCutaneous every 12 hours  levothyroxine 100 MICROGram(s) Oral daily  polyethylene glycol 3350 17 Gram(s) Oral daily    MEDICATIONS  (PRN):  acetaminophen     Tablet .. 650 milliGRAM(s) Oral every 6 hours PRN Temp greater or equal to 38C (100.4F), Mild Pain (1 - 3)  aluminum hydroxide/magnesium hydroxide/simethicone Suspension 30 milliLiter(s) Oral every 4 hours PRN Dyspepsia  melatonin 3 milliGRAM(s) Oral at bedtime PRN Insomnia  ondansetron Injectable 4 milliGRAM(s) IV Push every 8 hours PRN Nausea and/or Vomiting    CAPILLARY BLOOD GLUCOSE    I&O's Summary    22 May 2023 07:01  -  23 May 2023 07:00  --------------------------------------------------------  IN: 730 mL / OUT: 1100 mL / NET: -370 mL    PHYSICAL EXAM:  Vital Signs Last 24 Hrs  T(C): 36.3 (23 May 2023 11:30), Max: 36.4 (22 May 2023 17:09)  T(F): 97.4 (23 May 2023 11:30), Max: 97.5 (22 May 2023 17:09)  HR: 80 (23 May 2023 11:30) (70 - 82)  BP: 134/81 (23 May 2023 11:30) (118/73 - 149/87)  BP(mean): --  RR: 18 (23 May 2023 11:30) (17 - 18)  SpO2: 100% (23 May 2023 11:30) (98% - 100%)    Parameters below as of 23 May 2023 11:30  Patient On (Oxygen Delivery Method): room air    CONSTITUTIONAL: NAD, inclined in bed  EYES: conjunctiva and sclera clear  ENMT: Moist oral mucosa  RESPIRATORY: Normal respiratory effort; lungs are clear to auscultation bilaterally  CARDIOVASCULAR: Regular rate and rhythm, normal S1 and S2, No lower extremity edema; Peripheral pulses are 2+ bilaterally  ABDOMEN: Nontender to palpation, normoactive bowel sounds, no rebound/guarding  PSYCH: A+O to person, place, month and year, affect appropriate  NEUROLOGY: 4/5 strength throughout  SKIN: No rashes; no palpable lesions    LABS:                        12.0   6.00  )-----------( 160      ( 22 May 2023 07:25 )             36.7     05-22    136  |  102  |  35<H>  ----------------------------<  98  4.4   |  22  |  1.00    Ca    8.8      22 May 2023 07:25  Phos  3.4     05-22  Mg     2.00     05-22      RADIOLOGY & ADDITIONAL TESTS: Reviewed    COORDINATION OF CARE:  Care Discussed with Consultants/Other Providers [Y- Medicine ACP, CM/LUIS, RN]

## 2023-05-23 NOTE — PROGRESS NOTE ADULT - PROBLEM SELECTOR PLAN 2
As per family pt had positive UA as outpatient   5/12 UCx with >100k e faecalis resistant to cipro/levo  - Completed abx course with zosyn  - Urine culture here no growth  - clinically improved  - also retaining urine, s/p straight cath, now aviles placed per policy. TOV this AM
As per family pt had positive UA as outpatient   -Outpatient provider called NP, reported Ucx showed E.coli resistant to fluoroquinolones, started IV zosyn    -follow up culture here  -clinically improving on zosyn  -also retaining urine, s/p straight cath, now aviles being placed per policy. consider trial of void when appropriate
As per family pt had positive UA as outpatient   5/12 UCx with >100k e faecalis resistant to cipro/levo  - Completed abx course with zosyn  - Urine culture here no growth  - clinically improved  - had been retaining urine, aviles placed, now removed and passed TOV
As per family pt had positive UA as outpatient   5/12 UCx with >100k e faecalis resistant to cipro/levo  - here received IV zosyn x 4 days, plan for 5-7 day total abx course  - Urine culture here no growth  - clinically improving  - also retaining urine, s/p straight cath, now aviles placed per policy. consider trial of void when appropriate
As per family pt had positive UA as outpatient   -She is currently incontinent   -Outpatient provider called NP, reported Ucx showed E.coli resistant to fluoroquinolones, will start IV zosyn    -F/u with UA and Urinc cx
As per family pt had positive UA as outpatient   5/12 UCx with >100k e faecalis resistant to cipro/levo  - here received IV zosyn, today is day 5  - Urine culture here no growth  - clinically improving  - also retaining urine, s/p straight cath, now aviles placed per policy. consider trial of void when appropriate

## 2023-05-23 NOTE — PROGRESS NOTE ADULT - PROBLEM SELECTOR PLAN 4
Permissive HTN up to 220/120 mmHg for first 48-72 hours after the symptom onset followed by gradual normotension  -Current SBP 100s-120s recently, continue to monitor closely  - Resume coreg as indicated
-BP was controlled, now uptrending, will resume amlodipine and coreg
C/w coreg, amlodipine w/ hold parameters
Permissive HTN up to 220/120 mmHg for first 48-72 hours after the symptom onset followed by gradual normotension  -Current SBP 100s-130s recently, continue to monitor closely  - Resume coreg as indicated
C/w coreg, amlodipine w/ hold parameters
-Current SBP 100s-130s recently, continue to monitor closely  - Resume coreg as indicated

## 2023-05-23 NOTE — PROGRESS NOTE ADULT - PROBLEM SELECTOR PLAN 3
AKIb vs. CKD   -Baseline Scr is around 1, Cr 1.36 -> 1.29->1.44. S/p 500cc IVF yesterday.   -had been retaining urine, s/p aviles catheter now  -renal sono 5/18 without hydro  -no signs of overload  -F/u with urine studies  -continue to monitor volume status  -avoid nephrotoxins  -no labs yet today, f/u BMP
AKIb vs. CKD   -Baseline Scr is around 1, Cr 1.36 -> 1.29->1.44. S/p 500cc IVF  - Now cr improved and back to baseline  -renal sono 5/18 without hydro  -can repeat bmp as outpatient
AKIb vs. CKD   -Baseline Scr is around 1, Cr 1.36 -> 1.29 today  - renal sono without hydro  -F/u with UA, urine lytes  -continue to monitor volume status  -avoid nephrotoxins
AKIb vs. CKD   -Baseline Scr is around 1, Cr 1.3s this hosptialization  - renal sono without hydro  -F/u with UA, urine lytes
AKIb vs. CKD   -Baseline Scr is around 1, Cr 1.36 -> 1.29->1.44. S/p 500cc IVF  - Now cr improved and back to baseline  -renal sono 5/18 without hydro  -can repeat bmp as outpatient
AKIb vs. CKD   -Baseline Scr is around 1, Cr 1.36 -> 1.29->1.44 today  -had been retaining urine, s/p aviles catheter now  -renal sono 5/18 without hydro  -trial very gentle IVF 50cc/hr x 10 hrs for total of 500cc, no signs of overload  -F/u with urine studies  -continue to monitor volume status  -avoid nephrotoxins

## 2023-05-23 NOTE — PROGRESS NOTE ADULT - PROBLEM SELECTOR PLAN 5
TSH is WNL   -C/w levothyroxine 100 mcg oral
denies

## 2023-05-23 NOTE — PROGRESS NOTE ADULT - PROBLEM SELECTOR PLAN 1
Patient with acute encephalopathy, aphasia, and R sided weakness  - Appreciate Neuro consult, CT without acute infarct or bleed. MRI head initially ordered but per daughter would like to hold off while monitoring for improvement with treatment of UTI as patient (see chart note). Patient's mental status improving, suspect metabolic encephalopathy due to UTI, treated as below with improvement of mental status  - Mental status improved, appears near baseline, no need for further inpatient neurological workup  - Conversational, improved strength  - TSH. folate, and B12 are WNL

## 2023-05-23 NOTE — PROGRESS NOTE ADULT - PROBLEM SELECTOR PROBLEM 3
ANNA (acute kidney injury)

## 2023-05-23 NOTE — PROGRESS NOTE ADULT - PROBLEM SELECTOR PROBLEM 1
Acute encephalopathy

## 2023-05-24 ENCOUNTER — TRANSCRIPTION ENCOUNTER (OUTPATIENT)
Age: 88
End: 2023-05-24

## 2023-05-24 VITALS — WEIGHT: 149.91 LBS

## 2023-05-24 LAB
ANION GAP SERPL CALC-SCNC: 14 MMOL/L — SIGNIFICANT CHANGE UP (ref 7–14)
BASOPHILS # BLD AUTO: 0.06 K/UL — SIGNIFICANT CHANGE UP (ref 0–0.2)
BASOPHILS NFR BLD AUTO: 1 % — SIGNIFICANT CHANGE UP (ref 0–2)
BUN SERPL-MCNC: 44 MG/DL — HIGH (ref 7–23)
CALCIUM SERPL-MCNC: 9.6 MG/DL — SIGNIFICANT CHANGE UP (ref 8.4–10.5)
CHLORIDE SERPL-SCNC: 99 MMOL/L — SIGNIFICANT CHANGE UP (ref 98–107)
CO2 SERPL-SCNC: 21 MMOL/L — LOW (ref 22–31)
CREAT SERPL-MCNC: 0.99 MG/DL — SIGNIFICANT CHANGE UP (ref 0.5–1.3)
EGFR: 51 ML/MIN/1.73M2 — LOW
EOSINOPHIL # BLD AUTO: 0.36 K/UL — SIGNIFICANT CHANGE UP (ref 0–0.5)
EOSINOPHIL NFR BLD AUTO: 5.9 % — SIGNIFICANT CHANGE UP (ref 0–6)
GLUCOSE SERPL-MCNC: 90 MG/DL — SIGNIFICANT CHANGE UP (ref 70–99)
HCT VFR BLD CALC: 40.5 % — SIGNIFICANT CHANGE UP (ref 34.5–45)
HGB BLD-MCNC: 13 G/DL — SIGNIFICANT CHANGE UP (ref 11.5–15.5)
IANC: 3.03 K/UL — SIGNIFICANT CHANGE UP (ref 1.8–7.4)
IMM GRANULOCYTES NFR BLD AUTO: 0.8 % — SIGNIFICANT CHANGE UP (ref 0–0.9)
LYMPHOCYTES # BLD AUTO: 1.87 K/UL — SIGNIFICANT CHANGE UP (ref 1–3.3)
LYMPHOCYTES # BLD AUTO: 30.8 % — SIGNIFICANT CHANGE UP (ref 13–44)
MAGNESIUM SERPL-MCNC: 1.9 MG/DL — SIGNIFICANT CHANGE UP (ref 1.6–2.6)
MCHC RBC-ENTMCNC: 31.2 PG — SIGNIFICANT CHANGE UP (ref 27–34)
MCHC RBC-ENTMCNC: 32.1 GM/DL — SIGNIFICANT CHANGE UP (ref 32–36)
MCV RBC AUTO: 97.1 FL — SIGNIFICANT CHANGE UP (ref 80–100)
MONOCYTES # BLD AUTO: 0.7 K/UL — SIGNIFICANT CHANGE UP (ref 0–0.9)
MONOCYTES NFR BLD AUTO: 11.5 % — SIGNIFICANT CHANGE UP (ref 2–14)
NEUTROPHILS # BLD AUTO: 3.03 K/UL — SIGNIFICANT CHANGE UP (ref 1.8–7.4)
NEUTROPHILS NFR BLD AUTO: 50 % — SIGNIFICANT CHANGE UP (ref 43–77)
NRBC # BLD: 0 /100 WBCS — SIGNIFICANT CHANGE UP (ref 0–0)
NRBC # FLD: 0 K/UL — SIGNIFICANT CHANGE UP (ref 0–0)
PHOSPHATE SERPL-MCNC: 3.5 MG/DL — SIGNIFICANT CHANGE UP (ref 2.5–4.5)
PLATELET # BLD AUTO: 141 K/UL — LOW (ref 150–400)
POTASSIUM SERPL-MCNC: 4.5 MMOL/L — SIGNIFICANT CHANGE UP (ref 3.5–5.3)
POTASSIUM SERPL-SCNC: 4.5 MMOL/L — SIGNIFICANT CHANGE UP (ref 3.5–5.3)
RBC # BLD: 4.17 M/UL — SIGNIFICANT CHANGE UP (ref 3.8–5.2)
RBC # FLD: 14.6 % — HIGH (ref 10.3–14.5)
SODIUM SERPL-SCNC: 134 MMOL/L — LOW (ref 135–145)
WBC # BLD: 6.07 K/UL — SIGNIFICANT CHANGE UP (ref 3.8–10.5)
WBC # FLD AUTO: 6.07 K/UL — SIGNIFICANT CHANGE UP (ref 3.8–10.5)

## 2023-05-24 PROCEDURE — 99239 HOSP IP/OBS DSCHRG MGMT >30: CPT

## 2023-05-24 RX ORDER — ATORVASTATIN CALCIUM 80 MG/1
1 TABLET, FILM COATED ORAL
Qty: 30 | Refills: 0
Start: 2023-05-24 | End: 2023-06-22

## 2023-05-24 RX ADMIN — Medication 100 MICROGRAM(S): at 05:46

## 2023-05-24 RX ADMIN — CARVEDILOL PHOSPHATE 6.25 MILLIGRAM(S): 80 CAPSULE, EXTENDED RELEASE ORAL at 05:46

## 2023-05-24 RX ADMIN — HEPARIN SODIUM 5000 UNIT(S): 5000 INJECTION INTRAVENOUS; SUBCUTANEOUS at 05:47

## 2023-05-24 RX ADMIN — Medication 81 MILLIGRAM(S): at 12:16

## 2023-05-24 RX ADMIN — AMLODIPINE BESYLATE 5 MILLIGRAM(S): 2.5 TABLET ORAL at 05:46

## 2023-05-24 RX ADMIN — POLYETHYLENE GLYCOL 3350 17 GRAM(S): 17 POWDER, FOR SOLUTION ORAL at 12:16

## 2023-05-24 NOTE — DISCHARGE NOTE NURSING/CASE MANAGEMENT/SOCIAL WORK - NSDCPEFALRISK_GEN_ALL_CORE
For information on Fall & Injury Prevention, visit: [email or web address] OR  [email or web address] OR  https://www.cdc.gov/steadi/patient.html

## 2023-05-24 NOTE — DISCHARGE NOTE PROVIDER - NSDCCPCAREPLAN_GEN_ALL_CORE_FT
PRINCIPAL DISCHARGE DIAGNOSIS  Diagnosis: AMS (altered mental status)  Assessment and Plan of Treatment: You presented with altered mental status which was ultimately due to a urinary tract infection. After being treated with antibiotics, your mental status improved. You completed antibiotics in the hospital and do not need additional antibiotics on discharge. Please follow up with your doctors for ongoing care.      SECONDARY DISCHARGE DIAGNOSES  Diagnosis: ANNA (acute kidney injury)  Assessment and Plan of Treatment: When you came to the hospital, your kidney function was slightly worse than it had previously been, it was suspected this was from dehydration. You received some fluids through your IV and your kidney function returned to normal. Also one of your medications which helps with blood pressure can also cause you to urinate excess fluid (diuretic) is called aldactone was stopped. Your blood pressure numbers in the hospital were acceptable so we advise you continue to hold off on taking this medication until you see your doctor for a check up and repeat blood pressure check.

## 2023-05-24 NOTE — DISCHARGE NOTE PROVIDER - HOSPITAL COURSE
89 yo F with HTN, HLD, HFrEf, Afib (not on AC), hypothyroidism, hx of PPM placement, and recurrent UTI presents to the ED with worsening altered mental status and aphasia, metabolic 2/2 UTI, now improved back to baseline after treatment of UTI. MRI head initially ordered but per daughter would like to hold off while monitoring for improvement with treatment of UTI as patient and as patient's mental status improved back to baseline, suspect metabolic encephalopathy due to UTI, no need for further inpatient neurological workup. Patient now conversational, improved strength. TSH. folate, and B12 were checked and all WNL. Per family pt had positive UA as outpatient, able to see results in [facility], 5/12 UCx with >100k e faecalis resistant to cipro/levo, completed abx course with zosyn, symptomatically improved, urine culture here no growth. Had been retaining urine, aviles placed, now removed and passed TOV by time of discharge. Also with elevated Cr initially up to 1.4 with baseline around 1, s/p gentle IVF with improvement of Cr back to baseline. Her spironolactone was held given initial elevated Cr, and concern for slight hypovolemia. Given age, BP range in the hospital was acceptable. Outpatient follow up for continued titration of antihypertensives as needed.     5/24/23 Day of discharge progress note    SUBJECTIVE: Feels well, no pain or complaints.     PHYSICAL EXAM:  Vital Signs Last 24 Hrs  T(C): 36.6 (24 May 2023 09:10), Max: 36.6 (24 May 2023 09:10)  T(F): 97.9 (24 May 2023 09:10), Max: 97.9 (24 May 2023 09:10)  HR: 84 (24 May 2023 05:00) (68 - 84)  BP: 148/76 (24 May 2023 09:10) (119/68 - 160/92)  BP(mean): --  RR: 18 (24 May 2023 09:10) (18 - 18)  SpO2: 98% (24 May 2023 09:10) (96% - 100%)    Parameters below as of 24 May 2023 09:10  Patient On (Oxygen Delivery Method): room air    CONSTITUTIONAL: NAD, sitting in bed  EYES: conjunctiva and sclera clear  ENMT: Moist oral mucosa  RESPIRATORY: Normal respiratory effort; lungs are clear to auscultation bilaterally  CARDIOVASCULAR: Regular rate and rhythm, normal S1 and S2, No lower extremity edema; Peripheral pulses are 2+ bilaterally  ABDOMEN: Nontender to palpation, normoactive bowel sounds, no rebound/guarding  PSYCH: A+O to person, place, month and year, affect appropriate, answering all questions appropriately  NEUROLOGY: 4/5 strength throughout  SKIN: No rashes; no palpable lesions    LABS:                        13.0   6.07  )-----------( 141      ( 24 May 2023 06:30 )             40.5     05-24    134<L>  |  99  |  44<H>  ----------------------------<  90  4.5   |  21<L>  |  0.99    Ca    9.6      24 May 2023 06:30  Phos  3.5     05-24  Mg     1.90     05-24    RADIOLOGY & ADDITIONAL TESTS: Reviewed    Optimized for dc with close outpatient follow up. DC planning 36 minutes. 89 yo F with HTN, HLD, HFrEf, Afib (not on AC), hypothyroidism, hx of PPM placement, and recurrent UTI presents to the ED with worsening altered mental status and aphasia, metabolic 2/2 UTI, now improved back to baseline after treatment of UTI. MRI head initially ordered but per daughter would like to hold off while monitoring for improvement with treatment of UTI as patient and as patient's mental status improved back to baseline, suspect metabolic encephalopathy due to UTI, no need for further inpatient neurological workup. Patient now conversational, improved strength. TSH. folate, and B12 were checked and all WNL. Per family pt had positive UA as outpatient, able to see results in [facility], 5/12 UCx with >100k e faecalis resistant to cipro/levo, completed abx course with zosyn, symptomatically improved, urine culture here no growth. Had been retaining urine, aviles placed, now removed and passed TOV by time of discharge. Also with elevated Cr initially up to 1.4 with baseline around 1, s/p gentle IVF with improvement of Cr back to baseline. Her spironolactone was held given initial elevated Cr, and concern for slight hypovolemia. Given age, BP range in the hospital was acceptable. Outpatient follow up for continued titration of antihypertensives as needed.     5/24/23 Day of discharge progress note    SUBJECTIVE: Feels well, no pain or complaints.     PHYSICAL EXAM:  Vital Signs Last 24 Hrs  T(C): 36.6 (24 May 2023 09:10), Max: 36.6 (24 May 2023 09:10)  T(F): 97.9 (24 May 2023 09:10), Max: 97.9 (24 May 2023 09:10)  HR: 84 (24 May 2023 05:00) (68 - 84)  BP: 148/76 (24 May 2023 09:10) (119/68 - 160/92)  BP(mean): --  RR: 18 (24 May 2023 09:10) (18 - 18)  SpO2: 98% (24 May 2023 09:10) (96% - 100%)    Parameters below as of 24 May 2023 09:10  Patient On (Oxygen Delivery Method): room air    CONSTITUTIONAL: NAD, sitting in bed  EYES: conjunctiva and sclera clear  ENMT: Moist oral mucosa  RESPIRATORY: Normal respiratory effort; lungs are clear to auscultation bilaterally  CARDIOVASCULAR: Regular rate and rhythm, normal S1 and S2, No lower extremity edema; Peripheral pulses are 2+ bilaterally  ABDOMEN: Nontender to palpation, normoactive bowel sounds, no rebound/guarding  PSYCH: A+O to person, place, month and year, affect appropriate, answering all questions appropriately  NEUROLOGY: 4/5 strength throughout  SKIN: No rashes; no palpable lesions    LABS:                        13.0   6.07  )-----------( 141      ( 24 May 2023 06:30 )             40.5     05-24    134<L>  |  99  |  44<H>  ----------------------------<  90  4.5   |  21<L>  |  0.99    Ca    9.6      24 May 2023 06:30  Phos  3.5     05-24  Mg     1.90     05-24    RADIOLOGY & ADDITIONAL TESTS: Reviewed    Optimized for dc with close outpatient follow up. DC planning 36 minutes

## 2023-05-24 NOTE — DISCHARGE NOTE NURSING/CASE MANAGEMENT/SOCIAL WORK - NSDCCRTYPESERV_GEN_ALL_CORE_FT
Carlee had sent an email to Dr. Vieira' pool yesterday.  Leigh forwarded it to Dr. Vieira.  She has not had a chance to answer it yet because of being out of the office yesterday due to the extreme cold.  I asked Dr. Vieira whether she wanted to answer Carlee via email or have me relay information.  She will be emailing Carlee back.  I called Carlee to let her know this.  She asked me to let Dr. Vieira know that the left eye has reached a point where she would like the plug removed from the lower lid.  Carlee said it is the kind that can be removed, unlike the plugs she has in her other tear ducts, which she knows are not removable.     1 - assisted living

## 2023-05-24 NOTE — DISCHARGE NOTE NURSING/CASE MANAGEMENT/SOCIAL WORK - PATIENT PORTAL LINK FT
You can access the FollowMyHealth Patient Portal offered by [facility] by registering at the following website: [email or web address]. By joining [facility] FollowMyHealth portal, you will also be able to view your health information using other applications (apps) compatible with our system.

## 2023-05-24 NOTE — DIETITIAN INITIAL EVALUATION ADULT - PERTINENT MEDS FT
MEDICATIONS  (STANDING):  amLODIPine   Tablet 5 milliGRAM(s) Oral daily  aspirin enteric coated 81 milliGRAM(s) Oral daily  atorvastatin 80 milliGRAM(s) Oral at bedtime  carvedilol 6.25 milliGRAM(s) Oral two times a day  heparin   Injectable 5000 Unit(s) SubCutaneous every 12 hours  levothyroxine 100 MICROGram(s) Oral daily  polyethylene glycol 3350 17 Gram(s) Oral daily    MEDICATIONS  (PRN):  acetaminophen     Tablet .. 650 milliGRAM(s) Oral every 6 hours PRN Temp greater or equal to 38C (100.4F), Mild Pain (1 - 3)  aluminum hydroxide/magnesium hydroxide/simethicone Suspension 30 milliLiter(s) Oral every 4 hours PRN Dyspepsia  melatonin 3 milliGRAM(s) Oral at bedtime PRN Insomnia  ondansetron Injectable 4 milliGRAM(s) IV Push every 8 hours PRN Nausea and/or Vomiting

## 2023-05-24 NOTE — DISCHARGE NOTE NURSING/CASE MANAGEMENT/SOCIAL WORK - NSDCVIVACCINE_GEN_ALL_CORE_FT
Tdap; 17-Jun-2019 08:56; Priyanka Easley (RN); [employer]; [id number] (Exp. Date: 04-Apr-2021); IntraMuscular; Deltoid Left.; 0.5 milliLiter(s); VIS (VIS Published: 09-May-2013, VIS Presented: 17-Jun-2019);   Tdap; 25-Dec-2022 19:57; Michelle Richard (RN); [employer]; [id number] (Exp. Date: 01-Jun-2024); IntraMuscular; Deltoid Left.; 0.5 milliLiter(s); VIS (VIS Published: 09-May-2013, VIS Presented: 25-Dec-2022);

## 2023-05-24 NOTE — DIETITIAN INITIAL EVALUATION ADULT - PERTINENT LABORATORY DATA
05-24    134<L>  |  99  |  44<H>  ----------------------------<  90  4.5   |  21<L>  |  0.99    Ca    9.6      24 May 2023 06:30  Phos  3.5     05-24  Mg     1.90     05-24    A1C with Estimated Average Glucose Result: 5.7 % (05-18-23 @ 04:00)

## 2023-05-24 NOTE — DIETITIAN INITIAL EVALUATION ADULT - ADD RECOMMEND
Honor food and beverage preferences within diet restriction of patient in an effort to maximize level of nutrient intake   Monitor PO intake, tolerance to nutrition supplement, weight trends, nutrition related lab values, BMs/GI distress, hydration status, skin integrity.  Admitted

## 2023-05-24 NOTE — DIETITIAN INITIAL EVALUATION ADULT - CONTINUE CURRENT NUTRITION CARE PLAN
[facility] AMBULATORY ENCOUNTER  FAMILY PRACTICE OFFICE VISIT      CHIEF COMPLAINT:    No chief complaint on file.      SUBJECTIVE:  Michelle Mcmanus is a 46 year old female who presents for medication check    Patient is currently taking 2 mg of Amaryl for her diabetes.  She is here today in follow-up of this medication.    Patient is currently taking losartan 50 mg for were elevated blood pressure.    Patient is currently taking 20 mg of simvastatin for her cholesterol.    Patient is currently taking tizanidine for muscle spasms.    Patient is currently using albuterol as needed for wheezing.      REVIEW OF SYSTEMS:    Constitutional Problems: Denies fever, sweats or chills.   Eye Problems: Denies eye pain or trouble with vision.   ENT Problems: Denies sore throat, trouble swallowing, ear pain, trouble hearing, or congestion.   Cardiovascular Problems: Denies chest pain, chest pressure, dizziness, palpitations, swelling in the legs, or dyspnea on exertion.   Respiratory Problems: Denies shortness of breath or cough.   Breasts: Denies nipple drainage, lumps or bumps in breasts, or painful breasts ***.  Gastrointestinal Problems: Denies abdominal pain, nausea, vomiting, diarrhea, constipation, blood in stool, black stool, or heartburn.   Genitourinary Problems: Denies burning with urination, blood in urine or penile/vaginal *** discharge or lesions.  Musculoskeletal Problems: Denies muscle or joint pain or swelling.  Integumentary Problems: Denies rashes or itching, denies changes in or concerning moles, crusting or bleeding skin lesions.  Neurological Problems: Denies headaches, numbness, tingling, dizziness, or focal weakness.  Psychiatric Problems: Denies depressed mood, anxiety, or insomnia.  Endocrine Problems: Denies increased thirst, urination, heat or cold intolerance.  Hematologic and/or Lymphatic Problems: Denies easy bruising or bleeding, or swollen lymph nodes.      PROBLEM LIST:    Patient Active Problem List    Diagnosis   • Open-angle glaucoma   • Episcleritis of left eye   • Arthritis of both knees   • Keratoconus of both eyes   • Primary osteoarthritis of both knees   • Elevated uric acid in blood   • Obesity, morbid, BMI 50 or higher (CMS/HCC)   • Essential hypertension       MEDICATIONS:  triamcinolone (ARISTOCORT) 0.1 % ointment  losartan (COZAAR) 50 MG tablet  tiZANidine (ZANAFLEX) 2 MG tablet  ibuprofen (MOTRIN) 600 MG tablet  triamcinolone (ARISTOCORT) 0.1 % cream  albuterol 108 (90 Base) MCG/ACT inhaler  glimepiride (AMARYL) 2 MG tablet  simvastatin (ZOCOR) 20 MG tablet  latanoprost (XALATAN) 0.005 % ophthalmic solution  brinzolamide (AZOPT) 1 % ophthalmic suspension  brimonidine (ALPHAGAN P) 0.1 % ophthalmic solution    No current facility-administered medications on file prior to visit.       PAST HISTORIES:  Allergies, Medications, Medical History, Surgical History, Social History and Family History were reviewed and updated.    OBJECTIVE:  PHYSICAL EXAM:    Vital Signs:    Visit Vitals  LMP 01/01/2020 (Approximate)     General: Alert, cooperative, conversive in no acute distress  Head:  Normocephalic-atraumatic.   Eyes:  Normal conjunctivae and sclerae. PER, EOMI.  ENT:  Moist oral mucosa, no nasal drainage.  Cardiovascular:  Regular rate and rhythm without murmur, rubs or gallops.***  Respiratory:  Normal respiratory effort.  Clear to auscultation.  No wheezes, rales or rhonchi.  Skin:  Warm and dry without rash, wounds, or ecchymoses in exposed areas.    Neurologic:  Oriented x3.  CN II-XII normal on gross inspection.  No focal deficits.  Psychiatric:  Cooperative.  Appropriate mood & affect.    LAB RESULTS:  {LAB RESULTS - SUPER LIST:[id number]}    ASSESSMENT AND PLAN:  No diagnosis found.  No problem-specific Assessment & Plan notes found for this encounter.    No orders of the defined types were placed in this encounter.    Health Maintenance Due   Topic Date Due   • Pneumococcal Vaccine 0-64 (1 of 1 -  PPSV23) 08/07/1980   • Diabetes Eye Exam  08/07/1992   • Hepatitis B Vaccine (1 of 3 - Risk 3-dose series) 08/07/1993   • DTaP/Tdap/Td Vaccine (2 - Tdap) 08/07/1993   • Breast Cancer Screening  08/07/2019   • Influenza Vaccine (1) 09/01/2020   • Depression Screening  01/28/2021       Follow up:   No follow-ups on file.    Patient will be notified of results when they become available.    Rose Mary Lee PA-C  9/28/2020  2:45 PM    yes

## 2023-05-24 NOTE — DIETITIAN INITIAL EVALUATION ADULT - OTHER INFO
89 yo F with Pmh of HTN, HLD, HFrEf, Afib (not on AC), hypothyroidism, hx of PPM placement, and recurrent UTI presents to the ED with worsening altered mental status and aphasia, metabolic 2/2 UTI, now improved.     Pt seen and reports 75% intake of meals with no difficulty chewing/ swallowing, has her dentures with her. No GI distress (nausea/vomiting/diarrhea/constipation.) at present. Offered supplement but pt refused saying she is eating well. Noted skin ecchymosis, no edema per nursing flow sheet.

## 2023-05-24 NOTE — DIETITIAN INITIAL EVALUATION ADULT - PROBLEM SELECTOR PLAN 2
As per family pt had positive UA as outpatient   -She is currently incontinent   -Outpatient provider called NP, reported Ucx showed E.coli resistant to fluoroquinolones, will start IV zosyn    -F/u with UA and Urinc cx

## 2023-05-24 NOTE — DISCHARGE NOTE PROVIDER - NSDCMRMEDTOKEN_GEN_ALL_CORE_FT
amLODIPine 5 mg oral tablet: 1 tab(s) orally once a day  aspirin 81 mg oral delayed release tablet: 1 tab(s) orally once a day  atorvastatin 80 mg oral tablet: 1 tab(s) orally once a day (at bedtime)  carvedilol 6.25 mg oral tablet: 1 tab(s) orally 2 times a day  fluticasone 50 mcg/inh nasal spray: 1 tab(s) in each nostril 2 times a day  levothyroxine 100 mcg (0.1 mg) oral tablet: 1 tab(s) orally once a day  melatonin 3 mg oral tablet: 1 tab(s) orally once a day (at bedtime) As needed Insomnia  polyethylene glycol 3350 oral powder for reconstitution: 17 gram(s) orally once a day   amLODIPine 5 mg oral tablet: 1 tab(s) orally once a day  aspirin 81 mg oral delayed release tablet: 1 tab(s) orally once a day  atorvastatin 20 mg oral tablet: 1 tab(s) orally once a day (at bedtime)  carvedilol 6.25 mg oral tablet: 1 tab(s) orally 2 times a day  fluticasone 50 mcg/inh nasal spray: 1 tab(s) in each nostril 2 times a day  levothyroxine 100 mcg (0.1 mg) oral tablet: 1 tab(s) orally once a day  melatonin 3 mg oral tablet: 1 tab(s) orally once a day (at bedtime) As needed Insomnia  polyethylene glycol 3350 oral powder for reconstitution: 17 gram(s) orally once a day

## 2023-05-26 RX ORDER — LEVOTHYROXINE SODIUM 0.07 MG/1
75 TABLET ORAL DAILY
Qty: 90 | Refills: 3 | Status: ACTIVE | COMMUNITY
Start: 1900-01-01 | End: 1900-01-01

## 2023-05-26 RX ORDER — POLYETHYLENE GLYCOL 3350 17 G/17G
17 POWDER, FOR SOLUTION ORAL
Qty: 30 | Refills: 0 | Status: ACTIVE | COMMUNITY
Start: 2023-02-01 | End: 1900-01-01

## 2023-05-30 NOTE — PROGRESS NOTE ADULT - ASSESSMENT
A/P  89y F w/ PMHx of HTN, Arthritis, AFib, pacemaker presents with coronavirus.    #Afib s/p ppm  -Stable, rate controlled  -EKG w/ NSR 78, no ischemic changes  -Continue coreg  -Unclear why pt is not on a/c, possibly d/t age?  -Would hold off on initiating a/c at this time  -Continue ASA    #HTN  -BP acceptable  -Continue amlodipine  -Continue spirinolactone    #Coronavirus  -CXR clear  -Supportive care per primary    dvt ppx
A/P  89y F w/ PMHx of HTN, Arthritis, AFib, pacemaker presents with coronavirus.    #Afib s/p ppm  -Stable, rate controlled  -EKG w/ NSR 78, no ischemic changes  -Continue coreg  -Unclear why pt is not on a/c, possibly d/t age?  -Would hold off on initiating a/c at this time  -Continue ASA    #HTN  -BP acceptable  -Continue amlodipine  -Continue spirinolactone    #Coronavirus  -CXR clear  -Supportive care per primary    dvt ppx
89 yo woman presents with weakness and fatigue. + URI, hx of HTN, CRF, HFpEF, Patient found to be positive for corona virus, not covid 19 
A/P  89y F w/ PMHx of HTN, Arthritis, AFib, pacemaker presents with coronavirus.    #Afib s/p ppm  -Stable, rate controlled  -EKG w/ NSR 78, no ischemic changes  -Continue coreg  -remains off a/c   -Continue ASA    #HTN  -stable  -Continue amlodipine/metop    #Coronavirus  -CXR clear  -Supportive care per primary    dvt ppx  35 minutes spent on total encounter; more than 50% of the visit was spent counseling and/or coordinating care by the attending physician.  
89 yo woman presents with weakness and fatigue. + URI, hx of HTN, CRF, HFpEF, Patient found to be positive for corona virus, not covid 19 
Xeljanz Counseling: I discussed with the patient the risks of Xeljanz therapy including increased risk of infection, liver issues, headache, diarrhea, or cold symptoms. Live vaccines should be avoided. They were instructed to call if they have any problems.

## 2023-05-31 ENCOUNTER — APPOINTMENT (OUTPATIENT)
Dept: GERIATRICS | Facility: ASSISTED LIVING FACILITY | Age: 88
End: 2023-05-31
Payer: MEDICARE

## 2023-05-31 VITALS
HEART RATE: 80 BPM | DIASTOLIC BLOOD PRESSURE: 60 MMHG | OXYGEN SATURATION: 98 % | SYSTOLIC BLOOD PRESSURE: 130 MMHG | RESPIRATION RATE: 12 BRPM

## 2023-05-31 DIAGNOSIS — N39.0 URINARY TRACT INFECTION, SITE NOT SPECIFIED: ICD-10-CM

## 2023-05-31 DIAGNOSIS — R45.89 OTHER SYMPTOMS AND SIGNS INVOLVING EMOTIONAL STATE: ICD-10-CM

## 2023-05-31 PROCEDURE — 99349 HOME/RES VST EST MOD MDM 40: CPT

## 2023-05-31 RX ORDER — METHENAMINE HIPPURATE 1 G/1
1 TABLET ORAL
Qty: 120 | Refills: 2 | Status: ACTIVE | COMMUNITY
Start: 2023-04-12 | End: 1900-01-01

## 2023-05-31 RX ORDER — VIT A/VIT C/VIT E/ZINC/COPPER 2148-113
TABLET ORAL DAILY
Qty: 90 | Refills: 0 | Status: ACTIVE | COMMUNITY
Start: 2022-08-19 | End: 1900-01-01

## 2023-05-31 RX ORDER — SPIRONOLACTONE 25 MG/1
25 TABLET ORAL
Qty: 30 | Refills: 0 | Status: DISCONTINUED | COMMUNITY
Start: 2022-01-14 | End: 2023-05-31

## 2023-05-31 RX ORDER — MENTHOL 40 MG/ML
4 GEL TOPICAL
Qty: 1 | Refills: 3 | Status: ACTIVE | COMMUNITY
Start: 2022-12-07 | End: 1900-01-01

## 2023-05-31 RX ORDER — L. ACIDOPHILUS/L.BULGARICUS 1MM CELL
TABLET ORAL
Qty: 30 | Refills: 2 | Status: ACTIVE | COMMUNITY
Start: 2022-10-13 | End: 1900-01-01

## 2023-05-31 NOTE — REVIEW OF SYSTEMS
[Fever] : no fever [Chills] : no chills [Feeling Poorly] : not feeling poorly [Feeling Tired] : feeling tired [Chest Pain] : no chest pain [Palpitations] : no palpitations [Lower Ext Edema] : no lower extremity edema [Shortness Of Breath] : no shortness of breath [Cough] : no cough [Abdominal Pain] : no abdominal pain [Vomiting] : no vomiting [Constipation] : no constipation [Diarrhea] : no diarrhea [Dysuria] : no dysuria [Dizziness] : no dizziness [Fainting] : no fainting [Negative] : ENT

## 2023-05-31 NOTE — HISTORY OF PRESENT ILLNESS
[FreeTextEntry1] : Mrs. SOLIS TAYLOR is a 89yo F with PMHx of HF,HLD, syncope s/p pacemaker, OA, recurrent UTI's, hypothyroidism, arthritis, \par \par  MARLENE, resident of [facility]. \par recently started on med management program, May 2023  \par \par Seen today for post-hospital follow-up for acute UTI and metabolic encephalopthy, also found to have dehydration and ANNA \par tx with IV abx and encephalopathy improved\par ANNA improved with IV hydration; Had been retaining urine, aviles was placed, removed and\par passed TOV by time of discharge. Also with elevated Cr initially up to 1.4 with\par baseline around 1, s/p gentle IVF with improvement of Cr back to baseline.\par spironolactone was held given initial elevated Cr, and concern for dehydration\par CTA: Unremarkable CTA of the neck and [place]\par No perfusion mismatch to suggest core infarct or critically hypoperfused tissue at risk.\par MRI head initially ordered but deferred as patient was clinically improving with tx of acute UTI\par Renal sono: no calculi or hydronephrosis \par \par LABS:\par            13.0\par 6.07  )-----------( 141      ( 24 May 2023 06:30 )\par            40.5\par  \par 134<L>  |  99  |  44<H>\par ----------------------------<  90\par 4.5   |  21<L>  |  0.99\par  \par Ca    9.6      24 May 2023 06:30\par Phos  3.5     05-24\par Mg     1.90     05-24\par \par Today, patient denies any acute complaints\par is happy to be back home and reports feeling closer to her baseline strength and activity level \par Pt reports today she feels down, fears "this is the end" counseling and support provided\par she has a good appetite, is drinking fluids throughout the day as tolerated \par now has HHA 7 days per week from 9am -6pm\par also has incontinence care overnight with 3 visits from [facility] to assist with care needs \par is now on med management\par \par Frequent falls and gait instability:\par uses walker \par muscle atrophy \par working to get PT reinstated since hospitalization [Independent] : transferring/mobility [] : Assistance needed with traveling/transport [Full assistance needed] : Assistance needed managing medications [Walker] : walker [Grab Bars] : grab bars [Shower Chair] : shower chair [[id number]] : 2

## 2023-05-31 NOTE — REASON FOR VISIT
[Post Hospitalization] : a post hospitalization visit [Formal Caregiver] : formal caregiver [FreeTextEntry1] : post-hospital visit

## 2023-05-31 NOTE — PHYSICAL EXAM
[Alert] : alert [Well Nourished] : well nourished [Healthy Appearing] : healthy appearing [Normal Voice/Communication] : normal voice/communication [Sclera] : the sclera and conjunctiva were normal [EOMI] : extraocular movements were intact [Normal Oral Mucosa] : normal oral mucosa [No Oral Pallor] : no oral pallor [No Oral Cyanosis] : no oral cyanosis [Normal Hearing] : hearing was normal [Oropharynx] : the oropharynx was normal [Normal Appearance] : the appearance of the neck was normal [JVD] : there was jugular-venous distention [No Respiratory Distress] : no respiratory distress [No Acc Muscle Use] : no accessory muscle use [Respiration, Rhythm And Depth] : normal respiratory rhythm and effort [Auscultation Breath Sounds / Voice Sounds] : lungs were clear to auscultation bilaterally [Normal S1, S2] : normal S1 and S2 [Heart Rate And Rhythm] : heart rate was normal and rhythm regular [Edema] : edema was not present [Bowel Sounds] : normal bowel sounds [Abdomen Tenderness] : non-tender [Abdomen Soft] : soft [No CVA Tenderness] : no CVA  tenderness [Normal Gait] : abnormal gait [No Clubbing, Cyanosis] : no clubbing or cyanosis of the fingernails [Motor Tone] : muscle strength and tone were normal [Involuntary Movements] : no involuntary movements were seen [Normal Color / Pigmentation] : normal skin color and pigmentation [No Focal Deficits] : no focal deficits [Oriented To Time, Place, And Person] : oriented to person, place, and time [Normal Affect] : the affect was normal [Normal Insight/Judgment] : insight and judgment were intact [Normal Mood] : the mood was normal [de-identified] : unsteady gait, ambulates with walker

## 2023-06-02 ENCOUNTER — LABORATORY RESULT (OUTPATIENT)
Age: 88
End: 2023-06-02

## 2023-06-06 ENCOUNTER — NON-APPOINTMENT (OUTPATIENT)
Age: 88
End: 2023-06-06

## 2023-06-07 ENCOUNTER — NON-APPOINTMENT (OUTPATIENT)
Age: 88
End: 2023-06-07

## 2023-06-07 RX ORDER — MULTIVIT-MIN/FOLIC/VIT K/LYCOP 400-300MCG
25 MCG TABLET ORAL DAILY
Qty: 60 | Refills: 3 | Status: ACTIVE | COMMUNITY
Start: 2022-12-07 | End: 1900-01-01

## 2023-06-14 ENCOUNTER — APPOINTMENT (OUTPATIENT)
Dept: GERIATRICS | Facility: ASSISTED LIVING FACILITY | Age: 88
End: 2023-06-14
Payer: MEDICARE

## 2023-06-14 VITALS
TEMPERATURE: 97.2 F | SYSTOLIC BLOOD PRESSURE: 128 MMHG | OXYGEN SATURATION: 98 % | HEART RATE: 60 BPM | RESPIRATION RATE: 12 BRPM | DIASTOLIC BLOOD PRESSURE: 62 MMHG

## 2023-06-14 DIAGNOSIS — R76.12 NONSPECIFIC REACTION TO CELL MEDIATED IMMUNITY MEASUREMENT OF GAMMA INTERFERON ANTIGEN RESPONSE W/OUT ACTIVE TUBERCULOSIS: ICD-10-CM

## 2023-06-14 DIAGNOSIS — E03.9 HYPOTHYROIDISM, UNSPECIFIED: ICD-10-CM

## 2023-06-14 DIAGNOSIS — N89.8 OTHER SPECIFIED NONINFLAMMATORY DISORDERS OF VAGINA: ICD-10-CM

## 2023-06-14 DIAGNOSIS — K59.00 CONSTIPATION, UNSPECIFIED: ICD-10-CM

## 2023-06-14 DIAGNOSIS — M17.0 BILATERAL PRIMARY OSTEOARTHRITIS OF KNEE: ICD-10-CM

## 2023-06-14 PROCEDURE — 99349 HOME/RES VST EST MOD MDM 40: CPT

## 2023-06-14 RX ORDER — DICLOFENAC SODIUM 1% 10 MG/G
1 GEL TOPICAL
Qty: 1 | Refills: 3 | Status: DISCONTINUED | COMMUNITY
Start: 2021-02-02 | End: 2023-06-14

## 2023-06-14 RX ORDER — KETOCONAZOLE 20 MG/G
2 CREAM TOPICAL
Qty: 1 | Refills: 2 | Status: DISCONTINUED | COMMUNITY
Start: 2022-03-08 | End: 2023-06-14

## 2023-06-14 RX ORDER — DOCUSATE SODIUM 100 MG/1
100 CAPSULE ORAL
Qty: 60 | Refills: 0 | Status: ACTIVE | COMMUNITY
Start: 2023-06-14 | End: 1900-01-01

## 2023-06-14 RX ORDER — KETOCONAZOLE 20 MG/G
2 CREAM TOPICAL TWICE DAILY
Qty: 2 | Refills: 2 | Status: DISCONTINUED | COMMUNITY
Start: 2023-02-03 | End: 2023-06-14

## 2023-06-14 NOTE — PHYSICAL EXAM
[Alert] : alert [Well Nourished] : well nourished [Healthy Appearing] : healthy appearing [Sclera] : the sclera and conjunctiva were normal [Normal Voice/Communication] : normal voice/communication [EOMI] : extraocular movements were intact [Normal Oral Mucosa] : normal oral mucosa [No Oral Pallor] : no oral pallor [No Oral Cyanosis] : no oral cyanosis [Normal Hearing] : hearing was normal [Oropharynx] : the oropharynx was normal [Normal Appearance] : the appearance of the neck was normal [No Respiratory Distress] : no respiratory distress [No Acc Muscle Use] : no accessory muscle use [Respiration, Rhythm And Depth] : normal respiratory rhythm and effort [Auscultation Breath Sounds / Voice Sounds] : lungs were clear to auscultation bilaterally [Normal S1, S2] : normal S1 and S2 [Heart Rate And Rhythm] : heart rate was normal and rhythm regular [Edema] : edema was not present [Bowel Sounds] : normal bowel sounds [Abdomen Tenderness] : non-tender [Abdomen Soft] : soft [No CVA Tenderness] : no CVA  tenderness [No Clubbing, Cyanosis] : no clubbing or cyanosis of the fingernails [Involuntary Movements] : no involuntary movements were seen [Motor Tone] : muscle strength and tone were normal [Normal Color / Pigmentation] : normal skin color and pigmentation [No Focal Deficits] : no focal deficits [Normal Affect] : the affect was normal [Oriented To Time, Place, And Person] : oriented to person, place, and time [Normal Insight/Judgment] : insight and judgment were intact [Normal Mood] : the mood was normal [JVD] : there was jugular-venous distention [Normal Gait] : abnormal gait [de-identified] : unsteady gait

## 2023-06-14 NOTE — REVIEW OF SYSTEMS
[Lower Ext Edema] : lower extremity edema [Constipation] : constipation [Fever] : no fever [Chills] : no chills [Heart Rate Is Fast] : the heart rate was not fast [Chest Pain] : no chest pain [Palpitations] : no palpitations [Shortness Of Breath] : no shortness of breath [Wheezing] : no wheezing [Abdominal Pain] : no abdominal pain [Vomiting] : no vomiting [Dizziness] : no dizziness [Fainting] : no fainting [FreeTextEntry5] : mild,baseline

## 2023-06-14 NOTE — HISTORY OF PRESENT ILLNESS
[Two or more falls in past year] : Patient reported two or more falls in the past year [] : Assistance needed with traveling/transport [Full assistance needed] : Assistance needed managing medications [Walker] : walker [Grab Bars] : grab bars [Shower Chair] : shower chair [FreeTextEntry1] : Mrs. SOLIS TAYLOR is a 89yo F with PMHx of HF,HLD, syncope s/p pacemaker, OA, recurrent UTI's, hypothyroidism, arthritis\par \par  MARLENE, resident of [facility]. \par recently started on med management program, May 2023\par now has HHA 7 days per week from 9am -6pm\par also has [facility] care [facility]\par \par Patient requested to be seen regarding a few concerns, also seen for completion of 3122:\par \par - bowel movement irregularities:\par had prunes a few days ago, then had 2 bouts of loose stools - non-watery, no other associated symptoms\par stopped prunes and cute down to bland diet\par diarrhea resolved but now feels a bit constipated\par last bowel movement was yesterday morning, small amount \par \par - wheelchair:\par pt has been using wheelchair from [facility] for transport to get medications and meals \par has sig functional gait disorder and increased fall risk, multifactorial \par \par -3122:\par all meds reviewed\par form completed\par needs quantiferon for yearly SAMANTHA screening\par \par Frequent falls and gait instability:\par uses walker \par muscle atrophy \par working to get PT reinstated since hospitalization\par functional gait disorder, multifactorial

## 2023-06-14 NOTE — ASSESSMENT
[FreeTextEntry1] : quantiferon ordered for yearly 3122/SAMANTHA requirements, patient agreeable for blood draw\par ordered via [facility]

## 2023-06-15 ENCOUNTER — LABORATORY RESULT (OUTPATIENT)
Age: 88
End: 2023-06-15

## 2023-06-28 ENCOUNTER — APPOINTMENT (OUTPATIENT)
Dept: GERIATRICS | Facility: ASSISTED LIVING FACILITY | Age: 88
End: 2023-06-28
Payer: MEDICARE

## 2023-06-28 ENCOUNTER — NON-APPOINTMENT (OUTPATIENT)
Age: 88
End: 2023-06-28

## 2023-06-28 VITALS
RESPIRATION RATE: 12 BRPM | OXYGEN SATURATION: 98 % | HEART RATE: 68 BPM | TEMPERATURE: 96.98 F | DIASTOLIC BLOOD PRESSURE: 68 MMHG | SYSTOLIC BLOOD PRESSURE: 144 MMHG

## 2023-06-28 DIAGNOSIS — R30.0 DYSURIA: ICD-10-CM

## 2023-06-28 PROCEDURE — 99349 HOME/RES VST EST MOD MDM 40: CPT

## 2023-06-28 RX ORDER — AMOXICILLIN AND CLAVULANATE POTASSIUM 500; 125 MG/1; MG/1
500-125 TABLET, FILM COATED ORAL
Qty: 10 | Refills: 0 | Status: ACTIVE | COMMUNITY
Start: 2023-05-16 | End: 1900-01-01

## 2023-06-28 NOTE — PHYSICAL EXAM
[JVD] : there was jugular-venous distention [Normal Gait] : abnormal gait [de-identified] : b/l 1+ pitting edema bilateral legs up to mid calf [de-identified] : unsteady gait

## 2023-06-28 NOTE — HISTORY OF PRESENT ILLNESS
[FreeTextEntry1] : Mrs. SOLIS TAYLOR is a 89yo F with PMHx of HF,HLD, syncope s/p pacemaker, OA, recurrent UTI's, hypothyroidism, arthritis\par \par  MARLENE, [facility]. \par recently started on med management program, May 2023\par now has HHA 7 days per week from 9am -6pm\par also has incontinence care overnight with 3 visits from [facility] wellness office\par \par Patient requested to be seen for urinary frequency \par no fever, chills, back pain\par eating well \par states she is very afraid of worsening symptoms\par sx started last night \par no nausea or vomiting \par many allergies to medications \par prior culture reviewed, patient tolerated augmentin well\par shared decision to start empiric tx given how rapidly patient's clinical status changed with prior UTI requiring hospitalization \par UA/UCX ordered\par \par LE swelling:\par increasing x past 1 week\par does use added salt on foods\par has not made cardiology appointment as yet, recommended follow-up at prior appointments \par lungs CTA\par was on spironolactone prior but this was d/cd during hospital transition due to low BP\par BP can now tolerate as well as LE edema warrants restarting low dose \par weight is 159lbs \par echo 5/2023 - diastolic dysfunction with dialted left [facility]\par \par - bowel movement irregularities:\par depends on diet\par no loose stools reports\par is now on colace \par no BRBPR\par \par \par Frequent falls and gait instability:\par uses walker \par muscle atrophy, functional gait disorder, multifactorial\par pt has been using wheelchair from [facility] for transport to get medications and meals \par has sig functional gait disorder and increased fall risk, multifactorial \par getting home PT now

## 2023-06-29 ENCOUNTER — LABORATORY RESULT (OUTPATIENT)
Age: 88
End: 2023-06-29

## 2023-06-29 ENCOUNTER — NON-APPOINTMENT (OUTPATIENT)
Age: 88
End: 2023-06-29

## 2023-06-30 ENCOUNTER — APPOINTMENT (OUTPATIENT)
Dept: GERIATRICS | Facility: ASSISTED LIVING FACILITY | Age: 88
End: 2023-06-30

## 2023-06-30 ENCOUNTER — NON-APPOINTMENT (OUTPATIENT)
Age: 88
End: 2023-06-30

## 2023-07-02 ENCOUNTER — NON-APPOINTMENT (OUTPATIENT)
Age: 88
End: 2023-07-02

## 2023-07-03 ENCOUNTER — APPOINTMENT (OUTPATIENT)
Dept: GERIATRICS | Facility: ASSISTED LIVING FACILITY | Age: 88
End: 2023-07-03
Payer: MEDICARE

## 2023-07-03 VITALS — HEART RATE: 68 BPM | DIASTOLIC BLOOD PRESSURE: 89 MMHG | SYSTOLIC BLOOD PRESSURE: 150 MMHG

## 2023-07-03 DIAGNOSIS — I10 ESSENTIAL (PRIMARY) HYPERTENSION: ICD-10-CM

## 2023-07-03 PROCEDURE — 99349 HOME/RES VST EST MOD MDM 40: CPT

## 2023-07-03 RX ORDER — FUROSEMIDE 20 MG/1
20 TABLET ORAL
Qty: 50 | Refills: 0 | Status: ACTIVE | COMMUNITY
Start: 2023-07-03 | End: 1900-01-01

## 2023-07-03 NOTE — REASON FOR VISIT
[Formal Caregiver] : formal caregiver [Follow-Up] : a follow-up visit [FreeTextEntry1] : swelling, sullivan [FreeTextEntry2] : edema, sullivan

## 2023-07-03 NOTE — PHYSICAL EXAM
[Alert] : alert [No Acute Distress] : in no acute distress [Normal S1, S2] : normal S1 and S2 [Normal] : no focal deficits [Oriented To Time, Place, And Person] : oriented to person, place, and time [de-identified] : bilateal lower rales [de-identified] : 2+ edema

## 2023-07-03 NOTE — PHYSICAL EXAM
[Alert] : alert [No Acute Distress] : in no acute distress [Normal S1, S2] : normal S1 and S2 [Normal] : no focal deficits [Oriented To Time, Place, And Person] : oriented to person, place, and time [de-identified] : bilateal lower rales [de-identified] : 2+ edema

## 2023-07-03 NOTE — HISTORY OF PRESENT ILLNESS
[FreeTextEntry1] : The patient is a 89-year-old woman with a past medical history of congestive heart failure, hyperlipidemia, syncope, pacemaker, osteoarthritis who was seen by our team last week for worsening edema and possible UTI.  I was asked to reevaluate the patient today because of her significant edema.  Spironolactone was restarted last week.

## 2023-07-10 ENCOUNTER — RX RENEWAL (OUTPATIENT)
Age: 88
End: 2023-07-10

## 2023-07-10 ENCOUNTER — APPOINTMENT (OUTPATIENT)
Dept: GERIATRICS | Facility: ASSISTED LIVING FACILITY | Age: 88
End: 2023-07-10
Payer: MEDICARE

## 2023-07-10 VITALS — DIASTOLIC BLOOD PRESSURE: 80 MMHG | SYSTOLIC BLOOD PRESSURE: 140 MMHG

## 2023-07-10 PROCEDURE — 99348 HOME/RES VST EST LOW MDM 30: CPT

## 2023-07-10 NOTE — REVIEW OF SYSTEMS
[Feeling Poorly] : feeling poorly [Feeling Tired] : feeling tired [SOB on Exertion] : shortness of breath during exertion [Negative] : Neurological [Cough] : no cough

## 2023-07-10 NOTE — PHYSICAL EXAM
[Alert] : alert [No Acute Distress] : in no acute distress [No Respiratory Distress] : no respiratory distress [Auscultation Breath Sounds / Voice Sounds] : lungs were clear to auscultation bilaterally [Normal S1, S2] : normal S1 and S2 [Normal] : no focal deficits [Oriented To Time, Place, And Person] : oriented to person, place, and time none

## 2023-07-10 NOTE — HISTORY OF PRESENT ILLNESS
[FreeTextEntry1] : The patient is 89-year-old woman who was seen by myself 1 week ago for worsening edema and dyspnea on exertion.  Lasix 20 mg daily was ordered.  It appears that this order was not filled and that the patient was taking 20 mg 3 times a week.  Over the last week the patient appears perhaps slightly improved.  She denies dyspnea.  She appears alert.  She was out at doctor's visits today to get her pacemaker checked and last week to see a cardiologist.  At present she continues to complain of fatigue and nonspecific GI complaints.  We will increase Lasix to 20 mg daily.

## 2023-07-19 NOTE — DIETITIAN INITIAL EVALUATION ADULT - LAB (SPECIFY)
[Time Spent: ___ minutes] : I have spent [unfilled] minutes of time on the encounter. [FreeTextEntry3] : I, Dr. Hopkins, personally performed the evaluation and management (E/M) services for this established patient who presents today with (a) new problem(s)/exacerbation of (an) existing condition(s).? That E/M includes conducting the clinically appropriate interval history &/or exam, assessing all new/exacerbated conditions, and establishing a new plan of care.? Today, my RAFFAELE, Christine Palladino, was here to observe my evaluation and management service for this new problem/exacerbated condition and follow the plan of care established by me going forward.\par  electrolytes, BMP

## 2023-07-19 NOTE — STROKE CODE NOTE - NSRESATTESTFELSPV_ED_ALL_CORE_FT
Attempted to contact pt regarding below, left VM with clinic contact info. Will await return call. Writer discussed below with Dr. Suarez. Confirmed that pt still needs to do CT as other CT looked at abdomen and pelvis, Dr. Suarez CT is of the chest so this covered different areas. And yes in the past we would have used heparin on pt. Will await return call.   
Discussed port at length. She is scheduled for labs 8/7- will add on RN check for port if alteplase is needed that day. Informed if alteplase is needed could take at least an hour- she is aware.   Also advised Dr. Suarez recommends CT as it is a CT of the chest of the CT in the ED was abdomen/pelvis. She is agreeable to this.   No further questions or concerns.   
Patient called back, she is having phone issues, please call, leave a voice mail and she will call back as soon as she sees it.  
Patient was in the ER on 7-10-23.  She had a CT and labs.  She would like to know if if that can be used or if she still needs to get the one scheduled on 8-7-2023.  Also she stated they put saline in, however then did not have a blood return.  They asked her if heparin was every used in the port.  
Dr. Wilcox

## 2023-07-24 ENCOUNTER — APPOINTMENT (OUTPATIENT)
Dept: GERIATRICS | Facility: ASSISTED LIVING FACILITY | Age: 88
End: 2023-07-24
Payer: MEDICARE

## 2023-07-24 VITALS — SYSTOLIC BLOOD PRESSURE: 110 MMHG | DIASTOLIC BLOOD PRESSURE: 70 MMHG

## 2023-07-24 DIAGNOSIS — M48.00 SPINAL STENOSIS, SITE UNSPECIFIED: ICD-10-CM

## 2023-07-24 DIAGNOSIS — Z71.89 OTHER SPECIFIED COUNSELING: ICD-10-CM

## 2023-07-24 DIAGNOSIS — N39.0 URINARY TRACT INFECTION, SITE NOT SPECIFIED: ICD-10-CM

## 2023-07-24 DIAGNOSIS — I50.32 CHRONIC DIASTOLIC (CONGESTIVE) HEART FAILURE: ICD-10-CM

## 2023-07-24 PROCEDURE — 99348 HOME/RES VST EST LOW MDM 30: CPT

## 2023-07-24 RX ORDER — SPIRONOLACTONE 25 MG/1
25 TABLET ORAL
Qty: 60 | Refills: 0 | Status: ACTIVE | COMMUNITY
Start: 2023-06-28

## 2023-07-24 NOTE — HISTORY OF PRESENT ILLNESS
[FreeTextEntry1] : The patient is a 89-year-old woman who was last seen by myself approximately 2 weeks ago for worsening edema and dyspnea on exertion.  At that time her Lasix was increased to 20 mg daily.  Clinically I was concerned about worsening of congestive heart failure.  On 20 mg of Lasix the patient appears slightly improved.  She denies chest pain or shortness of breath.  The patient is out of her room.  She is quite alert.

## 2023-07-24 NOTE — PHYSICAL EXAM
[Alert] : alert [No Acute Distress] : in no acute distress [No Focal Deficits] : no focal deficits [Normal] : normal affect and normal mood [de-identified] : 1+ edema

## 2023-07-24 NOTE — REVIEW OF SYSTEMS
[Negative] : Heme/Lymph [Fever] : no fever [Chills] : no chills [Chest Pain] : no chest pain [Palpitations] : no palpitations [Shortness Of Breath] : no shortness of breath [Confused] : no confusion

## 2023-07-27 ENCOUNTER — LABORATORY RESULT (OUTPATIENT)
Age: 88
End: 2023-07-27

## 2023-07-27 ENCOUNTER — INPATIENT (INPATIENT)
Facility: HOSPITAL | Age: 88
LOS: 7 days | Discharge: HOME HEALTH | DRG: 871 | End: 2023-08-04
Attending: STUDENT IN AN ORGANIZED HEALTH CARE EDUCATION/TRAINING PROGRAM | Admitting: HOSPITALIST
Payer: MEDICARE

## 2023-07-27 VITALS
HEART RATE: 58 BPM | TEMPERATURE: 98 F | RESPIRATION RATE: 22 BRPM | SYSTOLIC BLOOD PRESSURE: 159 MMHG | WEIGHT: 169.98 LBS | DIASTOLIC BLOOD PRESSURE: 85 MMHG | OXYGEN SATURATION: 96 %

## 2023-07-27 DIAGNOSIS — R53.81 OTHER MALAISE: ICD-10-CM

## 2023-07-27 DIAGNOSIS — J96.90 RESPIRATORY FAILURE, UNSPECIFIED, UNSPECIFIED WHETHER WITH HYPOXIA OR HYPERCAPNIA: ICD-10-CM

## 2023-07-27 DIAGNOSIS — A41.9 SEPSIS, UNSPECIFIED ORGANISM: ICD-10-CM

## 2023-07-27 DIAGNOSIS — Z29.9 ENCOUNTER FOR PROPHYLACTIC MEASURES, UNSPECIFIED: ICD-10-CM

## 2023-07-27 DIAGNOSIS — E03.9 HYPOTHYROIDISM, UNSPECIFIED: ICD-10-CM

## 2023-07-27 DIAGNOSIS — O00.1 TUBAL PREGNANCY: Chronic | ICD-10-CM

## 2023-07-27 DIAGNOSIS — Z96.7 PRESENCE OF OTHER BONE AND TENDON IMPLANTS: Chronic | ICD-10-CM

## 2023-07-27 DIAGNOSIS — J96.01 ACUTE RESPIRATORY FAILURE WITH HYPOXIA: ICD-10-CM

## 2023-07-27 DIAGNOSIS — E87.5 HYPERKALEMIA: ICD-10-CM

## 2023-07-27 DIAGNOSIS — Z98.49 CATARACT EXTRACTION STATUS, UNSPECIFIED EYE: Chronic | ICD-10-CM

## 2023-07-27 DIAGNOSIS — I50.22 CHRONIC SYSTOLIC (CONGESTIVE) HEART FAILURE: ICD-10-CM

## 2023-07-27 DIAGNOSIS — N39.0 URINARY TRACT INFECTION, SITE NOT SPECIFIED: ICD-10-CM

## 2023-07-27 DIAGNOSIS — Z98.890 OTHER SPECIFIED POSTPROCEDURAL STATES: Chronic | ICD-10-CM

## 2023-07-27 LAB
ALBUMIN SERPL ELPH-MCNC: 4.1 G/DL — SIGNIFICANT CHANGE UP (ref 3.3–5)
ALP SERPL-CCNC: 195 U/L — HIGH (ref 40–120)
ALT FLD-CCNC: 121 U/L — HIGH (ref 10–45)
ANION GAP SERPL CALC-SCNC: 12 MMOL/L — SIGNIFICANT CHANGE UP (ref 5–17)
ANION GAP SERPL CALC-SCNC: 12 MMOL/L — SIGNIFICANT CHANGE UP (ref 5–17)
APPEARANCE UR: ABNORMAL
APTT BLD: 37.7 SEC — HIGH (ref 24.5–35.6)
AST SERPL-CCNC: 41 U/L — HIGH (ref 10–40)
BACTERIA # UR AUTO: ABNORMAL
BASE EXCESS BLDV CALC-SCNC: -1 MMOL/L — SIGNIFICANT CHANGE UP (ref -2–3)
BASOPHILS # BLD AUTO: 0.03 K/UL — SIGNIFICANT CHANGE UP (ref 0–0.2)
BASOPHILS NFR BLD AUTO: 0.6 % — SIGNIFICANT CHANGE UP (ref 0–2)
BILIRUB SERPL-MCNC: 0.3 MG/DL — SIGNIFICANT CHANGE UP (ref 0.2–1.2)
BILIRUB UR-MCNC: NEGATIVE — SIGNIFICANT CHANGE UP
BUN SERPL-MCNC: 55 MG/DL — HIGH (ref 7–23)
BUN SERPL-MCNC: 56 MG/DL — HIGH (ref 7–23)
CA-I SERPL-SCNC: 1.2 MMOL/L — SIGNIFICANT CHANGE UP (ref 1.15–1.33)
CALCIUM SERPL-MCNC: 9 MG/DL — SIGNIFICANT CHANGE UP (ref 8.4–10.5)
CALCIUM SERPL-MCNC: 9.4 MG/DL — SIGNIFICANT CHANGE UP (ref 8.4–10.5)
CHLORIDE BLDV-SCNC: 103 MMOL/L — SIGNIFICANT CHANGE UP (ref 96–108)
CHLORIDE SERPL-SCNC: 100 MMOL/L — SIGNIFICANT CHANGE UP (ref 96–108)
CHLORIDE SERPL-SCNC: 101 MMOL/L — SIGNIFICANT CHANGE UP (ref 96–108)
CO2 BLDV-SCNC: 27 MMOL/L — HIGH (ref 22–26)
CO2 SERPL-SCNC: 21 MMOL/L — LOW (ref 22–31)
CO2 SERPL-SCNC: 21 MMOL/L — LOW (ref 22–31)
COLOR SPEC: SIGNIFICANT CHANGE UP
CREAT SERPL-MCNC: 1.2 MG/DL — SIGNIFICANT CHANGE UP (ref 0.5–1.3)
CREAT SERPL-MCNC: 1.3 MG/DL — SIGNIFICANT CHANGE UP (ref 0.5–1.3)
DIFF PNL FLD: NEGATIVE — SIGNIFICANT CHANGE UP
EGFR: 36 ML/MIN/1.73M2 — LOW
EGFR: 40 ML/MIN/1.73M2 — LOW
EOSINOPHIL # BLD AUTO: 0.11 K/UL — SIGNIFICANT CHANGE UP (ref 0–0.5)
EOSINOPHIL NFR BLD AUTO: 2 % — SIGNIFICANT CHANGE UP (ref 0–6)
EPI CELLS # UR: 0 /HPF — SIGNIFICANT CHANGE UP
GAS PNL BLDV: 131 MMOL/L — LOW (ref 136–145)
GAS PNL BLDV: SIGNIFICANT CHANGE UP
GAS PNL BLDV: SIGNIFICANT CHANGE UP
GLUCOSE BLDV-MCNC: 87 MG/DL — SIGNIFICANT CHANGE UP (ref 70–99)
GLUCOSE SERPL-MCNC: 88 MG/DL — SIGNIFICANT CHANGE UP (ref 70–99)
GLUCOSE SERPL-MCNC: 93 MG/DL — SIGNIFICANT CHANGE UP (ref 70–99)
GLUCOSE UR QL: NEGATIVE — SIGNIFICANT CHANGE UP
HCO3 BLDV-SCNC: 25 MMOL/L — SIGNIFICANT CHANGE UP (ref 22–29)
HCT VFR BLD CALC: 35 % — SIGNIFICANT CHANGE UP (ref 34.5–45)
HCT VFR BLDA CALC: 33 % — LOW (ref 34.5–46.5)
HGB BLD CALC-MCNC: 11 G/DL — LOW (ref 11.7–16.1)
HGB BLD-MCNC: 11.2 G/DL — LOW (ref 11.5–15.5)
HYALINE CASTS # UR AUTO: 1 /LPF — SIGNIFICANT CHANGE UP (ref 0–2)
IMM GRANULOCYTES NFR BLD AUTO: 0.7 % — SIGNIFICANT CHANGE UP (ref 0–0.9)
INR BLD: 0.94 RATIO — SIGNIFICANT CHANGE UP (ref 0.85–1.18)
KETONES UR-MCNC: NEGATIVE — SIGNIFICANT CHANGE UP
LACTATE BLDV-MCNC: 1.4 MMOL/L — SIGNIFICANT CHANGE UP (ref 0.5–2)
LEUKOCYTE ESTERASE UR-ACNC: ABNORMAL
LYMPHOCYTES # BLD AUTO: 1.12 K/UL — SIGNIFICANT CHANGE UP (ref 1–3.3)
LYMPHOCYTES # BLD AUTO: 20.9 % — SIGNIFICANT CHANGE UP (ref 13–44)
MAGNESIUM SERPL-MCNC: 2.2 MG/DL — SIGNIFICANT CHANGE UP (ref 1.6–2.6)
MCHC RBC-ENTMCNC: 30.9 PG — SIGNIFICANT CHANGE UP (ref 27–34)
MCHC RBC-ENTMCNC: 32 GM/DL — SIGNIFICANT CHANGE UP (ref 32–36)
MCV RBC AUTO: 96.7 FL — SIGNIFICANT CHANGE UP (ref 80–100)
MONOCYTES # BLD AUTO: 0.64 K/UL — SIGNIFICANT CHANGE UP (ref 0–0.9)
MONOCYTES NFR BLD AUTO: 11.9 % — SIGNIFICANT CHANGE UP (ref 2–14)
NEUTROPHILS # BLD AUTO: 3.43 K/UL — SIGNIFICANT CHANGE UP (ref 1.8–7.4)
NEUTROPHILS NFR BLD AUTO: 63.9 % — SIGNIFICANT CHANGE UP (ref 43–77)
NITRITE UR-MCNC: NEGATIVE — SIGNIFICANT CHANGE UP
NRBC # BLD: 0 /100 WBCS — SIGNIFICANT CHANGE UP (ref 0–0)
NT-PROBNP SERPL-SCNC: 1456 PG/ML — HIGH (ref 0–300)
PCO2 BLDV: 48 MMHG — HIGH (ref 39–42)
PH BLDV: 7.33 — SIGNIFICANT CHANGE UP (ref 7.32–7.43)
PH UR: 6 — SIGNIFICANT CHANGE UP (ref 5–8)
PLATELET # BLD AUTO: 127 K/UL — LOW (ref 150–400)
PO2 BLDV: 41 MMHG — SIGNIFICANT CHANGE UP (ref 25–45)
POTASSIUM BLDV-SCNC: 6.5 MMOL/L — CRITICAL HIGH (ref 3.5–5.1)
POTASSIUM SERPL-MCNC: 5.9 MMOL/L — HIGH (ref 3.5–5.3)
POTASSIUM SERPL-MCNC: 6 MMOL/L — HIGH (ref 3.5–5.3)
POTASSIUM SERPL-SCNC: 5.9 MMOL/L — HIGH (ref 3.5–5.3)
POTASSIUM SERPL-SCNC: 6 MMOL/L — HIGH (ref 3.5–5.3)
PROT SERPL-MCNC: 7.5 G/DL — SIGNIFICANT CHANGE UP (ref 6–8.3)
PROT UR-MCNC: SIGNIFICANT CHANGE UP
PROTHROM AB SERPL-ACNC: 10.4 SEC — SIGNIFICANT CHANGE UP (ref 9.5–13)
RBC # BLD: 3.62 M/UL — LOW (ref 3.8–5.2)
RBC # FLD: 15.1 % — HIGH (ref 10.3–14.5)
RBC CASTS # UR COMP ASSIST: 6 /HPF — HIGH (ref 0–4)
SAO2 % BLDV: 66.5 % — LOW (ref 67–88)
SODIUM SERPL-SCNC: 133 MMOL/L — LOW (ref 135–145)
SODIUM SERPL-SCNC: 134 MMOL/L — LOW (ref 135–145)
SP GR SPEC: 1.01 — SIGNIFICANT CHANGE UP (ref 1.01–1.02)
TROPONIN T, HIGH SENSITIVITY RESULT: 26 NG/L — SIGNIFICANT CHANGE UP (ref 0–51)
UROBILINOGEN FLD QL: NEGATIVE — SIGNIFICANT CHANGE UP
WBC # BLD: 5.37 K/UL — SIGNIFICANT CHANGE UP (ref 3.8–10.5)
WBC # FLD AUTO: 5.37 K/UL — SIGNIFICANT CHANGE UP (ref 3.8–10.5)
WBC UR QL: 158 /HPF — HIGH (ref 0–5)

## 2023-07-27 PROCEDURE — 99223 1ST HOSP IP/OBS HIGH 75: CPT

## 2023-07-27 PROCEDURE — 99285 EMERGENCY DEPT VISIT HI MDM: CPT | Mod: FS

## 2023-07-27 PROCEDURE — 71045 X-RAY EXAM CHEST 1 VIEW: CPT | Mod: 26

## 2023-07-27 RX ORDER — CARVEDILOL PHOSPHATE 80 MG/1
6.25 CAPSULE, EXTENDED RELEASE ORAL EVERY 12 HOURS
Refills: 0 | Status: DISCONTINUED | OUTPATIENT
Start: 2023-07-27 | End: 2023-08-04

## 2023-07-27 RX ORDER — AMLODIPINE BESYLATE 2.5 MG/1
5 TABLET ORAL DAILY
Refills: 0 | Status: DISCONTINUED | OUTPATIENT
Start: 2023-07-27 | End: 2023-08-04

## 2023-07-27 RX ORDER — SODIUM ZIRCONIUM CYCLOSILICATE 10 G/10G
10 POWDER, FOR SUSPENSION ORAL ONCE
Refills: 0 | Status: COMPLETED | OUTPATIENT
Start: 2023-07-27 | End: 2023-07-27

## 2023-07-27 RX ORDER — ALBUTEROL 90 UG/1
2.5 AEROSOL, METERED ORAL ONCE
Refills: 0 | Status: COMPLETED | OUTPATIENT
Start: 2023-07-27 | End: 2023-07-27

## 2023-07-27 RX ORDER — CIPROFLOXACIN LACTATE 400MG/40ML
400 VIAL (ML) INTRAVENOUS ONCE
Refills: 0 | Status: COMPLETED | OUTPATIENT
Start: 2023-07-27 | End: 2023-07-27

## 2023-07-27 RX ORDER — DEXTROSE 50 % IN WATER 50 %
50 SYRINGE (ML) INTRAVENOUS ONCE
Refills: 0 | Status: COMPLETED | OUTPATIENT
Start: 2023-07-27 | End: 2023-07-27

## 2023-07-27 RX ORDER — INSULIN HUMAN 100 [IU]/ML
5 INJECTION, SOLUTION SUBCUTANEOUS ONCE
Refills: 0 | Status: COMPLETED | OUTPATIENT
Start: 2023-07-27 | End: 2023-07-27

## 2023-07-27 RX ORDER — LEVOTHYROXINE SODIUM 125 MCG
100 TABLET ORAL DAILY
Refills: 0 | Status: DISCONTINUED | OUTPATIENT
Start: 2023-07-27 | End: 2023-08-04

## 2023-07-27 RX ORDER — AZTREONAM 2 G
1000 VIAL (EA) INJECTION EVERY 6 HOURS
Refills: 0 | Status: DISCONTINUED | OUTPATIENT
Start: 2023-07-27 | End: 2023-07-27

## 2023-07-27 RX ORDER — FLUTICASONE PROPIONATE 50 MCG
1 SPRAY, SUSPENSION NASAL
Refills: 0 | DISCHARGE

## 2023-07-27 RX ORDER — FUROSEMIDE 40 MG
40 TABLET ORAL ONCE
Refills: 0 | Status: COMPLETED | OUTPATIENT
Start: 2023-07-27 | End: 2023-07-27

## 2023-07-27 RX ORDER — ATORVASTATIN CALCIUM 80 MG/1
20 TABLET, FILM COATED ORAL AT BEDTIME
Refills: 0 | Status: DISCONTINUED | OUTPATIENT
Start: 2023-07-27 | End: 2023-08-04

## 2023-07-27 RX ADMIN — Medication 40 MILLIGRAM(S): at 20:07

## 2023-07-27 RX ADMIN — Medication 50 MILLILITER(S): at 22:29

## 2023-07-27 RX ADMIN — Medication 50 MILLIGRAM(S): at 21:10

## 2023-07-27 RX ADMIN — SODIUM ZIRCONIUM CYCLOSILICATE 10 GRAM(S): 10 POWDER, FOR SUSPENSION ORAL at 23:41

## 2023-07-27 RX ADMIN — Medication 200 MILLIGRAM(S): at 20:07

## 2023-07-27 RX ADMIN — ALBUTEROL 2.5 MILLIGRAM(S): 90 AEROSOL, METERED ORAL at 23:41

## 2023-07-27 RX ADMIN — INSULIN HUMAN 5 UNIT(S): 100 INJECTION, SOLUTION SUBCUTANEOUS at 22:29

## 2023-07-27 NOTE — H&P ADULT - PROBLEM SELECTOR PLAN 1
Hypothermic and tachypneic in the ED. May not have robust enough immune system to mount a leukocytosis. Concerned for sepsis due to UTI. Patient with history of recurrent UTIs.  - UA with large LE and many bacteria. Will add on and follow up urine culture.  - S/p ciprofloxacin in ED, but patient developed rash concern for allergic reaction. Patient has received medication before for prior UTI. Then received dose of aztreonam in ED.   - Ordered blood cultures  - Urine culture from March 2023 with pansensitive E coli. Given possible allergic reactions to cephalexin and ciprofloxacin, will treat empirically for now with ertapenem 1g daily.  - Avoid aggressive fluid resuscitation given worsening edema and evidence of pulmonary edema and pleural effusion. Hypothermic and tachypneic in the ED. May not have robust enough immune system to mount a leukocytosis. Concerned for sepsis due to UTI. Patient with history of recurrent UTIs.  - UA with large LE and many bacteria. Will add on and follow up urine culture.  - S/p ciprofloxacin in ED, but patient developed itchiness, concern for allergic reaction. Patient has received medication before for prior UTI. Not convinced this is truly allergic reaction. However, pt received dose of aztreonam in ED instead.  - Ordered blood cultures  - Urine culture from March 2023 with pansensitive E coli. Given possible allergic reactions to cephalexin and ciprofloxacin, will treat empirically for now with ertapenem 1g daily.  - Avoid aggressive fluid resuscitation given worsening edema and evidence of pulmonary edema and pleural effusion.

## 2023-07-27 NOTE — H&P ADULT - TIME BILLING
- Ordering, reviewing, and interpreting labs, testing, and imaging.  - Independently obtaining a review of systems and performing a physical exam  - Counselling and educating patient and family regarding interpretation of aforementioned items and plan of care.

## 2023-07-27 NOTE — H&P ADULT - ASSESSMENT
89 y/o female with PMHx of HTN, HLD, HFrEf, Afib (not on AC), hypothyroidism, hx of PPM placement, presents to the ED complaining of weakness, worsening LE edema, and SOB.    89 y/o female with PMHx of HTN, HLD, HFrEf, Afib (not on AC), hypothyroidism, hx of PPM placement, presents to the ED complaining of weakness, worsening LE edema, and SOB. Found to have positive UA. Pt admitted for sepsis secondary to UTI and possible acute on chronic HFrEF exacerbation.

## 2023-07-27 NOTE — ED ADULT NURSE REASSESSMENT NOTE - NS ED NURSE REASSESS COMMENT FT1
pt straight cath w/ 2 RNs at bedside, using sterile technique. Pt tolerated well. 300ml of yellow urine noted. Urine collected and sent to lab. Pt placed back on bear hugger.

## 2023-07-27 NOTE — H&P ADULT - PROBLEM SELECTOR PLAN 5
- Continue levothyroxine Elevated K in ED up to 6.0. Likely medication induced (spironolactone).   - S/p insulin+dextrose temporization in the ED   - Monitor BMP daily, replete as needed   - Hold spironolactone

## 2023-07-27 NOTE — ED ADULT NURSE NOTE - OBJECTIVE STATEMENT
89 y/o F w/ PMH of CHF came from [facility] living via EMS for complaints of SOB. Per EMS, pt had been sent by her MD for concerns over her heart due to her hx of CHF. Pt reports that she feels off , stating ":I don't normally talk like this". Pt A&Ox3 gross neuro intact, lungs cta bilaterally, no difficulty speaking in complete sentences, s1s2 heart sounds heard, pulses x 4, rectal temp of 94. PHIL Plascencia and MD Barber notified. Pt placed on bear hugger. IV 20g placed in R forearm.

## 2023-07-27 NOTE — H&P ADULT - PROBLEM SELECTOR PLAN 6
DVT ppx: lovenox 40mg qd   Diet: DASH  Dispo: pending improvement, PT eval  Code status: DNR/DNI - Continue levothyroxine 100 mcg

## 2023-07-27 NOTE — ED PROVIDER NOTE - PROGRESS NOTE DETAILS
Attending note (Sunil): patient itching and redness in arm with initiation of cipro; will stop, possible allergic reaction (Was fine after lasix?); allergy to cephalexin in past (unclear) so will try monobactem like aztreonam given limitations of allergy profile at this time.  K 5.9 but ECG not consistent with hyperkalemic changes and bmp mildly hemolyzed, have given lasix; will repeat bmp.

## 2023-07-27 NOTE — ED PROVIDER NOTE - CONSIDERATION OF ADMISSION OBSERVATION
Attending note (Sunil): Please see my attending statement below. Consideration of Admission/Observation

## 2023-07-27 NOTE — H&P ADULT - NSHPSOCIALHISTORY_GEN_ALL_CORE
Social History:    Marital Status:  (   )    (   ) Single    (   )    (  )   Occupation:   Lives with: (  ) alone  (  ) children   (  ) spouse   (  ) parents  (  ) other    Substance Use (street drugs): (  ) never used  (  ) other:  Tobacco Usage:  (   ) never smoked   (   ) former smoker   (   ) current smoker  (     ) pack year  (        ) last cigarette date  Alcohol Usage:  Sexual History: Social History:    Marital Status:  (   )    (   ) Single    (   )    (  )   Occupation:   Lives with: (  ) alone  (  ) children   (  ) spouse   (  ) parents  ( X ) other (assisted living facility)    Substance Use (street drugs): ( X ) never used  (  ) other:  Tobacco Usage:  ( X  ) never smoked   (   ) former smoker   (   ) current smoker  (     ) pack year  (        ) last cigarette date  Alcohol Usage: none

## 2023-07-27 NOTE — H&P ADULT - NSHPPHYSICALEXAM_GEN_ALL_CORE
Vital Signs Last 24 Hrs  T(C): 34.3 (27 Jul 2023 20:30), Max: 36.9 (27 Jul 2023 17:47)  T(F): 93.7 (27 Jul 2023 20:30), Max: 98.5 (27 Jul 2023 17:47)  HR: 58 (27 Jul 2023 20:30) (54 - 62)  BP: 141/95 (27 Jul 2023 20:30) (133/64 - 159/85)  BP(mean): 94 (27 Jul 2023 19:21) (94 - 94)  RR: 19 (27 Jul 2023 20:30) (18 - 22)  SpO2: 98% (27 Jul 2023 20:30) (95% - 98%)    Parameters below as of 27 Jul 2023 20:30  Patient On (Oxygen Delivery Method): nasal cannula  O2 Flow (L/min): 2      CONSTITUTIONAL: Well-groomed, in no apparent distress  EYES: No conjunctival or scleral injection, non-icteric; PERRLA and symmetric  ENMT: No external nasal lesions; nasal mucosa not inflamed; normal dentition; no pharyngeal injection or exudates, oral mucosa with moist membranes  NECK: Trachea midline without palpable neck mass; thyroid not enlarged and non-tender  RESPIRATORY: Breathing comfortably; no dullness to percussion; lungs CTA without wheeze/rhonchi/rales  CARDIOVASCULAR: +S1S2, RRR, no M/G/R; no carotid bruits; pedal pulses full and symmetric; no lower extremity edema  CHEST/BREAST: Breasts are symmetric in appearance; no palpable masses or lumps  GASTROINTESTINAL: No palpable masses or tenderness, +BS throughout, no rebound/guarding; no hepatosplenomegaly; no hernia palpated  GENITOURINARY:       MALE: Normal appearing external genitalia, no penile lesion; no palpable testicular or scrotal mass; prostate not enlarged and without palpable nodule       FEMALE: Normal appearing external genitalia, no vaginal discharge or lesion noted; examination of urethra WNL; bladder without fullness or tenderness on palpation; palpation of uterus and adnexa without tenderness or mass  LYMPHATIC: No cervical LAD or tenderness; no axillary LAD or tenderness; no inguinal LAD or tenderness  MUSCULOSKELETAL: Normal gait and station; no digital clubbing or cyanosis; no paraspinal tenderness; examination of the  (head/neck, spine/ribs/pelvis, RUE, LUE, RLE, LLE) without misalignment, normal strength and tone of extremities  SKIN: No rashes or ulcers noted; no subcutaneous nodules or induration palpable  NEUROLOGIC: CN II-XII intact; normal reflexes in upper and lower extremities; sensation intact in LEs b/l to light touch  PSYCHIATRIC: A+O x 3; mood and affect appropriate; appropriate insight and judgment Vital Signs Last 24 Hrs  T(C): 34.3 (27 Jul 2023 20:30), Max: 36.9 (27 Jul 2023 17:47)  T(F): 93.7 (27 Jul 2023 20:30), Max: 98.5 (27 Jul 2023 17:47)  HR: 58 (27 Jul 2023 20:30) (54 - 62)  BP: 141/95 (27 Jul 2023 20:30) (133/64 - 159/85)  BP(mean): 94 (27 Jul 2023 19:21) (94 - 94)  RR: 19 (27 Jul 2023 20:30) (18 - 22)  SpO2: 98% (27 Jul 2023 20:30) (95% - 98%)    Parameters below as of 27 Jul 2023 20:30  Patient On (Oxygen Delivery Method): nasal cannula  O2 Flow (L/min): 2      CONSTITUTIONAL: Well-groomed, in no apparent distress  EYES: No conjunctival or scleral injection, non-icteric; PERRLA and symmetric  ENMT: oral mucosa with moist membranes  RESPIRATORY: Breathing comfortably; no dullness to percussion; lungs CTA without wheeze/rhonchi/rales in anterior lung fields. Decreased breath sounds appreciated in bases B/L  CARDIOVASCULAR: +S1S2, RRR, no M/G/R; no carotid bruits; pedal pulses full and symmetric; 2+ pitting lower extremity edema up to thighs  GASTROINTESTINAL: No palpable masses or tenderness, +BS throughout, no rebound/guarding; no hepatosplenomegaly; no hernia palpated  MUSCULOSKELETAL: No joint swelling or pain.  SKIN: Thin friable skin with bruising and healing superficial wounds  NEUROLOGIC: CN grossly intact; sensation intact in LEs b/l to light touch, normal strength and tone of UE. Strength 3/5 in LE.   PSYCHIATRIC: A+O x 3; mood and affect appropriate; appropriate insight and judgment

## 2023-07-27 NOTE — ED PROVIDER NOTE - CONVERSATION DETAILS
Discussed with pt and family goals of care. They would like all medical/surgical options to be considered but do not want CPR or intubation. Ana Plascencia PA-C

## 2023-07-27 NOTE — H&P ADULT - NSHPREVIEWOFSYSTEMS_GEN_ALL_CORE
Review of Systems:   CONSTITUTIONAL: No fever, weight loss  EYES: No eye pain, visual disturbances, or discharge  ENMT:  No difficulty hearing, tinnitus, vertigo; No sinus or throat pain  RESPIRATORY: No SOB. No cough, wheezing, chills or hemoptysis  CARDIOVASCULAR: No chest pain, palpitations, dizziness, or leg swelling  GASTROINTESTINAL: No abdominal or epigastric pain. No nausea, vomiting, or hematemesis; No diarrhea or constipation. No melena or hematochezia.  GENITOURINARY: No dysuria, frequency, hematuria, or incontinence  NEUROLOGICAL: No headaches, memory loss, loss of strength, numbness, or tremors  SKIN: No itching, burning, rashes, or lesions   LYMPH NODES: No enlarged glands  ENDOCRINE: No heat or cold intolerance; No hair loss  MUSCULOSKELETAL: No joint pain or swelling; No muscle, back pain  PSYCHIATRIC: No depression, anxiety, mood swings, or difficulty sleeping  HEME/LYMPH: No easy bruising, or bleeding gums Review of Systems:   CONSTITUTIONAL: No fever, weight loss  EYES: No eye pain, visual disturbances, or discharge  ENMT:  No difficulty hearing, tinnitus, vertigo; No sinus or throat pain  RESPIRATORY: +SOB. +cough, No wheezing, chills or hemoptysis  CARDIOVASCULAR: No chest pain, palpitations, dizziness, +leg swelling B/L  GASTROINTESTINAL: No abdominal or epigastric pain. No nausea, vomiting, or hematemesis; No diarrhea or constipation. No melena or hematochezia.  GENITOURINARY: +dysuria, No increased frequency, hematuria, or incontinence  NEUROLOGICAL: No headaches, memory loss, loss of strength, numbness, or tremors  SKIN: +bruises and healing superficial wounds  MUSCULOSKELETAL: +knee pain with ambulation, no joint swelling; No muscle, back pain  PSYCHIATRIC: No depression, anxiety, mood swings, or difficulty sleeping  HEME/LYMPH: No easy bruising, or bleeding gums

## 2023-07-27 NOTE — H&P ADULT - PROBLEM SELECTOR PLAN 2
POCUS in ED showed B lines B/L and pleural effusions. Concern for worsening heart failure.   - Currently on 2L NC comfortably  - S/p IV lasix 40mg in ED  - Monitor on continuous pulse ox  - Patient DNR/DNI POCUS in ED showed B lines B/L and pleural effusions. Concern for worsening heart failure.   - Currently on 2L NC comfortably  - S/p IV lasix 40mg in ED  - Monitor on continuous pulse ox  - Wean O2 as tolerated   - Check ambulatory O2 saturation  - Patient DNR/DNI

## 2023-07-27 NOTE — ED PROVIDER NOTE - OBJECTIVE STATEMENT
89 y/o female with PMHx of HTN, HLD, HFrEf, Afib (not on AC), hypothyroidism, hx of PPM placement, presents to the ED complaining of weakness today. Patient states that this morning she noticed worsening swelling to b/l legs. She felt weakness in her legs and that she needed help when walking today. As the day progressed the weakness in her legs worsened. She also reports feeling shortness of breath and like she cant get her words out. She states that her daughter is concerned she has a UTI today because she gets frequent UTIs. She denies any headache, fever, chills, chest pain, cough, n/v/d, dysuria.

## 2023-07-27 NOTE — ED ADULT NURSE NOTE - FINAL NURSING ELECTRONIC SIGNATURE
Pt in with C/O R great toe pain. Pt reports he has had an ingrown toe nail for the past month and noticed increased swelling and pain over the last few days. Swelling and redness present. Pt A&Ox4 ABCD's intact. Triage completed using phone .    27-Jul-2023 22:35

## 2023-07-27 NOTE — ED ADULT NURSE REASSESSMENT NOTE - NS ED NURSE REASSESS COMMENT FT1
Ciprofloxacin stopped. Pt IV site presents with redness. Pt to be given Azactam. MD Barber made aware. Pt VS stable, still hypothermic.

## 2023-07-27 NOTE — ED PROVIDER NOTE - ATTENDING APP SHARED VISIT CONTRIBUTION OF CARE
Attending note (Sunil): 89-year-old female history of HFpEF, HTN, A-fib (not on AC), HLD, with PPM, presenting with worsening shortness of breath and swelling in legs, both worse with walking.  Denies fever cough or congestion.  No urinary symptoms but daughter concerned she has UTI and seems to be less active as has been previously seen with UTIs.  On exam appears in no acute distress decreased breath sounds at bases but does not appear in overtly tachypneic or in respiratory distress.  Peripheral edema noted in bilateral lower extremities.  Point-of-care ultrasound shows B-lines and bilateral small pleural effusions consistent with pulmonary edema likely consistent with exacerbation of congestive heart failure.  Screening labs sent and reviewed notable for urine to be consistent with urinary tract infection.  Potassium 5.9 with normal renal function (EKG not consistent with hyperkalemia) likely due to mild hemolysis will repeat.  Troponin 26 will repeat.  Plan for admission for further cardiac evaluation, diuresis (started with 40 IV Lasix) and urinary tract infection management (started with Cipro but developed some itching and has documented allergy to cephalexin, will attempt treatment with mono Bactam).

## 2023-07-27 NOTE — H&P ADULT - NSHPLABSRESULTS_GEN_ALL_CORE
07-27    134<L>  |  101  |  56<H>  ----------------------------<  93  6.0<H>   |  21<L>  |  1.20  07-27    133<L>  |  100  |  55<H>  ----------------------------<  88  5.9<H>   |  21<L>  |  1.30    Ca    9.0      27 Jul 2023 21:19  Ca    9.4      27 Jul 2023 19:20  Mg     2.2     07-27    TPro  7.5  /  Alb  4.1  /  TBili  0.3  /  DBili  x   /  AST  41<H>  /  ALT  121<H>  /  AlkPhos  195<H>  07-27    Magnesium: 2.2 mg/dL (07-27-23 @ 19:20)        PT/INR - ( 27 Jul 2023 19:20 )   PT: 10.4 sec;   INR: 0.94 ratio         PTT - ( 27 Jul 2023 19:20 )  PTT:37.7 sec              Urinalysis Basic - ( 27 Jul 2023 21:19 )    Color: x / Appearance: x / SG: x / pH: x  Gluc: 93 mg/dL / Ketone: x  / Bili: x / Urobili: x   Blood: x / Protein: x / Nitrite: x   Leuk Esterase: x / RBC: x / WBC x   Sq Epi: x / Non Sq Epi: x / Bacteria: x                          11.2   5.37  )-----------( 127      ( 27 Jul 2023 19:20 )             35.0     CAPILLARY BLOOD GLUCOSE      POCT Blood Glucose.: 95 mg/dL (27 Jul 2023 22:28)    Blood Gas Source Venous: Venous (07-27-23 @ 18:43)

## 2023-07-27 NOTE — ED PROVIDER NOTE - CARDIOVASCULAR [+], MLM
Caller: Inessa Peoples    Relationship to patient: Self    Best call back number: [phone]    Patient is needing: PATIENT TESTED POSITIVE FOR COVID TODAY.  HER HUSBANDS  GAVE HIM SOME PRESCRIPTIONS AND FOR HIM TO TEST AGAIN IN 2 DAYS.  ZPACK AND A COUGH MEDICINE AND PREDNISONE FOR 5 DAYS.  ALSO, AFTER YOU HAVE IT ONCE DO YOU GET COVID AGAIN.    
Patient advised  
Please advise I sent in Paxlovid to [facility] pharmacy.    
PERIPHERAL EDEMA

## 2023-07-27 NOTE — ED ADULT NURSE REASSESSMENT NOTE - NS ED NURSE REASSESS COMMENT FT1
Received report from HAYLEE Edgar. Pt AOx4 with stable VS. Comfort care and safety measures provided. Labs and urine sent. Pending US Received report from HAYLEE Edgar. Pt AOx4. Pt hypothermic, pt placed on bear hugger. Comfort care and safety measures provided. Labs and urine sent. Pending US

## 2023-07-27 NOTE — ED PROVIDER NOTE - CHIEF COMPLAINT
Pt unable to remove her nose ring. Tootie Martinez NP and Dr Sharif Lugo attempted to remove as well; unsuccessful.      Nitesh Cruz RN  07/14/19 [id number] The patient is a 89y Female complaining of

## 2023-07-27 NOTE — H&P ADULT - PROBLEM SELECTOR PLAN 7
DVT ppx: lovenox 40mg qd   Diet: DASH  Dispo: pending improvement, PT eval  Code status: DNR/DNI. MOLST form in scanned documents (patient window tab). Most recent signed and dated 1/9/23. Confirmed status with patient. DVT ppx: lovenox 40mg qd   Diet: fluid restriction  Dispo: pending improvement, PT eval  Code status: DNR/DNI. MOLST form in scanned documents (patient window tab). Most recent signed and dated 1/9/23. Confirmed status with patient.

## 2023-07-27 NOTE — H&P ADULT - PROBLEM SELECTOR PLAN 3
Worsening edema over the last few weeks. Concern for acute on chronic HFrEF.   - TTE from 5/18/23 limited, but showed moderate LV systolic dysfunction and dilated LA.  - Trial diureses with IV lasix 40mg daily   - Hold spironolactone  - Ordered TTE for repeat evaluation  - Strict I/Os, daily weights Worsening edema over the last few weeks. Concern for acute on chronic HFrEF.   - TTE from 5/18/23 limited, but showed moderate LV systolic dysfunction and dilated LA.  - Trial diureses with IV lasix 40mg daily   - Hold spironolactone  - Ordered TTE for repeat evaluation  - Strict I/Os, daily weights  - Continue carvedilol 6.25mg BID

## 2023-07-27 NOTE — H&P ADULT - HISTORY OF PRESENT ILLNESS
89 y/o female with PMHx of HTN, HLD, HFrEf, Afib (not on AC), hypothyroidism, hx of PPM placement, presents to the ED complaining of weakness, worsening LE edema, and SOB.  89 y/o female with PMHx of HTN, HLD, HFrEf, Afib (not on AC), hypothyroidism, hx of PPM placement, presents to the ED complaining of weakness, worsening LE edema, and SOB. Over the last few weeks, patient has been feeling progressively weaker and noted worsening swelling in her LE bilaterally. Also with dyspnea on exertion. Denies chest pain, abdominal pain, constipation, diarrhea, nausea, vomiting. No fevers. Patient came from [facility] (assisted living facility). She ambulates at baseline with rolling walker. No recent falls. No pains currently; only has pains in knees when she walks or stands for prolonged periods. Patient does not use oxygen at home. Also endorsing intermittent cough with some sputum production.     In ED, POCUS showed evidence of pulmonary edema, pleural effusion. Received IV lasix 40mg x1. Also noted to have positive UA. Initially received ciprofloxacin but patient started feeling itchy so medication discontinued and patient got dose of aztreonam instead. Patient placed on 2LNC for tachypneia.

## 2023-07-27 NOTE — ED ADULT NURSE NOTE - NSFALLHARMRISKINTERV_ED_ALL_ED

## 2023-07-27 NOTE — ED PROVIDER NOTE - PHYSICAL EXAMINATION
CONSTITUTIONAL: Patient is awake, alert and oriented x 3. Patient is dyspneic   HEAD: NCAT  EYES: PERRL bilaterally,   ENT: Airway patent, Nasal mucosa clear.  NECK: Supple,  LUNGS: CTA B/L,  HEART: RRR.+S1S2   ABDOMEN: Soft, non-tender to palpation throughout all four quadrants,    MSK: edema to b/l LE; FROM upper and lower ext b/l,   SKIN: No rash or lesions  NEURO:  No focal deficits,

## 2023-07-27 NOTE — H&P ADULT - PROBLEM SELECTOR PLAN 4
Elevated K in ED up to 6.0. Likely medication induced (spironolactone).   - S/p insulin+dextrose temporization in the ED   - Monitor BMP daily, replete as needed   - Hold spironolactone Weakness in LE likely due to extensive edema. Pt ambulates with rolling walker at baseline.   - PT consult  - fall precautions  - increase activity and ambulate as tolerated   - compression stockings for LE edema

## 2023-07-28 ENCOUNTER — APPOINTMENT (OUTPATIENT)
Dept: GERIATRICS | Facility: CLINIC | Age: 88
End: 2023-07-28

## 2023-07-28 LAB
ANION GAP SERPL CALC-SCNC: 14 MMOL/L — SIGNIFICANT CHANGE UP (ref 5–17)
APPEARANCE: ABNORMAL
BILIRUBIN URINE: NEGATIVE
BLOOD URINE: NEGATIVE
BUN SERPL-MCNC: 54 MG/DL — HIGH (ref 7–23)
CALCIUM SERPL-MCNC: 9 MG/DL — SIGNIFICANT CHANGE UP (ref 8.4–10.5)
CHLORIDE SERPL-SCNC: 101 MMOL/L — SIGNIFICANT CHANGE UP (ref 96–108)
CO2 SERPL-SCNC: 24 MMOL/L — SIGNIFICANT CHANGE UP (ref 22–31)
COLOR: YELLOW
CREAT SERPL-MCNC: 1.26 MG/DL — SIGNIFICANT CHANGE UP (ref 0.5–1.3)
EGFR: 38 ML/MIN/1.73M2 — LOW
GLUCOSE QUALITATIVE U: NEGATIVE MG/DL
GLUCOSE SERPL-MCNC: 71 MG/DL — SIGNIFICANT CHANGE UP (ref 70–99)
HCT VFR BLD CALC: 31.2 % — LOW (ref 34.5–45)
HGB BLD-MCNC: 10.2 G/DL — LOW (ref 11.5–15.5)
KETONES URINE: NEGATIVE MG/DL
LEUKOCYTE ESTERASE URINE: ABNORMAL
MAGNESIUM SERPL-MCNC: 2 MG/DL — SIGNIFICANT CHANGE UP (ref 1.6–2.6)
MCHC RBC-ENTMCNC: 30.9 PG — SIGNIFICANT CHANGE UP (ref 27–34)
MCHC RBC-ENTMCNC: 32.7 GM/DL — SIGNIFICANT CHANGE UP (ref 32–36)
MCV RBC AUTO: 94.5 FL — SIGNIFICANT CHANGE UP (ref 80–100)
NITRITE URINE: NEGATIVE
NRBC # BLD: 0 /100 WBCS — SIGNIFICANT CHANGE UP (ref 0–0)
PH URINE: 6
PHOSPHATE SERPL-MCNC: 4.5 MG/DL — SIGNIFICANT CHANGE UP (ref 2.5–4.5)
PLATELET # BLD AUTO: 110 K/UL — LOW (ref 150–400)
POTASSIUM SERPL-MCNC: 4.8 MMOL/L — SIGNIFICANT CHANGE UP (ref 3.5–5.3)
POTASSIUM SERPL-SCNC: 4.8 MMOL/L — SIGNIFICANT CHANGE UP (ref 3.5–5.3)
PROTEIN URINE: NEGATIVE MG/DL
RBC # BLD: 3.3 M/UL — LOW (ref 3.8–5.2)
RBC # FLD: 15 % — HIGH (ref 10.3–14.5)
SODIUM SERPL-SCNC: 139 MMOL/L — SIGNIFICANT CHANGE UP (ref 135–145)
SPECIFIC GRAVITY URINE: 1.01
UROBILINOGEN URINE: 0.2 MG/DL
WBC # BLD: 6.03 K/UL — SIGNIFICANT CHANGE UP (ref 3.8–10.5)
WBC # FLD AUTO: 6.03 K/UL — SIGNIFICANT CHANGE UP (ref 3.8–10.5)

## 2023-07-28 PROCEDURE — 99233 SBSQ HOSP IP/OBS HIGH 50: CPT

## 2023-07-28 PROCEDURE — 93010 ELECTROCARDIOGRAM REPORT: CPT

## 2023-07-28 PROCEDURE — 93308 TTE F-UP OR LMTD: CPT | Mod: 26

## 2023-07-28 RX ORDER — ERTAPENEM SODIUM 1 G/1
1000 INJECTION, POWDER, LYOPHILIZED, FOR SOLUTION INTRAMUSCULAR; INTRAVENOUS EVERY 24 HOURS
Refills: 0 | Status: DISCONTINUED | OUTPATIENT
Start: 2023-07-28 | End: 2023-07-28

## 2023-07-28 RX ORDER — ERTAPENEM SODIUM 1 G/1
500 INJECTION, POWDER, LYOPHILIZED, FOR SOLUTION INTRAMUSCULAR; INTRAVENOUS EVERY 24 HOURS
Refills: 0 | Status: DISCONTINUED | OUTPATIENT
Start: 2023-07-29 | End: 2023-08-01

## 2023-07-28 RX ORDER — ASPIRIN/CALCIUM CARB/MAGNESIUM 324 MG
1 TABLET ORAL
Refills: 0 | DISCHARGE

## 2023-07-28 RX ORDER — NYSTATIN CREAM 100000 [USP'U]/G
1 CREAM TOPICAL
Refills: 0 | Status: DISCONTINUED | OUTPATIENT
Start: 2023-07-28 | End: 2023-08-04

## 2023-07-28 RX ORDER — FUROSEMIDE 40 MG
40 TABLET ORAL DAILY
Refills: 0 | Status: DISCONTINUED | OUTPATIENT
Start: 2023-07-28 | End: 2023-08-01

## 2023-07-28 RX ORDER — ENOXAPARIN SODIUM 100 MG/ML
40 INJECTION SUBCUTANEOUS EVERY 24 HOURS
Refills: 0 | Status: DISCONTINUED | OUTPATIENT
Start: 2023-07-28 | End: 2023-07-28

## 2023-07-28 RX ORDER — METHENAMINE MANDELATE 1 G
1 TABLET ORAL
Refills: 0 | DISCHARGE

## 2023-07-28 RX ORDER — FUROSEMIDE 40 MG
1 TABLET ORAL
Refills: 0 | DISCHARGE

## 2023-07-28 RX ORDER — NYSTATIN CREAM 100000 [USP'U]/G
1 CREAM TOPICAL
Refills: 0 | DISCHARGE

## 2023-07-28 RX ORDER — ENOXAPARIN SODIUM 100 MG/ML
30 INJECTION SUBCUTANEOUS EVERY 24 HOURS
Refills: 0 | Status: DISCONTINUED | OUTPATIENT
Start: 2023-07-29 | End: 2023-08-04

## 2023-07-28 RX ADMIN — ATORVASTATIN CALCIUM 20 MILLIGRAM(S): 80 TABLET, FILM COATED ORAL at 21:26

## 2023-07-28 RX ADMIN — CARVEDILOL PHOSPHATE 6.25 MILLIGRAM(S): 80 CAPSULE, EXTENDED RELEASE ORAL at 17:25

## 2023-07-28 RX ADMIN — Medication 100 MICROGRAM(S): at 05:35

## 2023-07-28 RX ADMIN — Medication 40 MILLIGRAM(S): at 05:19

## 2023-07-28 RX ADMIN — ERTAPENEM SODIUM 120 MILLIGRAM(S): 1 INJECTION, POWDER, LYOPHILIZED, FOR SOLUTION INTRAMUSCULAR; INTRAVENOUS at 04:37

## 2023-07-28 RX ADMIN — ENOXAPARIN SODIUM 40 MILLIGRAM(S): 100 INJECTION SUBCUTANEOUS at 05:19

## 2023-07-28 RX ADMIN — NYSTATIN CREAM 1 APPLICATION(S): 100000 CREAM TOPICAL at 18:35

## 2023-07-28 RX ADMIN — AMLODIPINE BESYLATE 5 MILLIGRAM(S): 2.5 TABLET ORAL at 05:19

## 2023-07-28 RX ADMIN — CARVEDILOL PHOSPHATE 6.25 MILLIGRAM(S): 80 CAPSULE, EXTENDED RELEASE ORAL at 05:20

## 2023-07-28 NOTE — PHYSICAL THERAPY INITIAL EVALUATION ADULT - PERTINENT HX OF CURRENT PROBLEM, REHAB EVAL
89 y/o female with PMHx of HTN, HLD, HFrEf, Afib (not on AC), hypothyroidism, hx of PPM placement, presents to the ED complaining of weakness, worsening LE edema, and SOB. Found to have positive UA. Pt admitted for sepsis secondary to UTI and  acute on chronic HFrEF exacerbation.     CXR: Small left pleural effusion. Mild pulmonary venous congestion

## 2023-07-28 NOTE — PHYSICAL THERAPY INITIAL EVALUATION ADULT - NSPTDISCHREC_GEN_A_CORE
however if pt returns to [facility], will require assist with ALL functional mobility and HomePT services./Sub-acute Rehab

## 2023-07-28 NOTE — PHARMACOTHERAPY INTERVENTION NOTE - COMMENTS
Confirmed home medications with patient, family member, assisted living facility and pharmacy, updated in Outpatient Medication Review.     Additions:  Biofreeze 4% apply to both knees twice a day  Nystatin 100,000 units/g powder apply to under bust daily after shower  Lactobacillus acidophilus once a day    Changed:  Lasix 20 mg Patient was unsure if dosing was daily or MWF     Poli Jaime, PharmD Candidate Confirmed home medications with patient, family member, assisted living facility and pharmacy, updated in Outpatient Medication Review.     Additions:  Biofreeze 4% apply to both knees twice a day  Nystatin 100,000 units/g powder apply to under bust daily after shower  Lactobacillus acidophilus once a day    Changed:  Lasix 20 mg Patient was unsure if dosing was daily or MWF     Poli Jaime, PharmD Candidate    Francisco ArmentaD, [facility]  Clinical Pharmacy Specialist  [phone] or Teams

## 2023-07-28 NOTE — PHARMACOTHERAPY INTERVENTION NOTE - COMMENTS
Patient is an 89 year old female currently receiving enoxaparin 40 mg SC daily for DVT prophylaxis and ertapenem 1g daily for UTI. Serum creatinine is 1.26, calculated CrCl 29 mL/min. Recommended reducing enoxaparin to 30 mg SC daily and ertapenem 500 mg daily for CrCl <30.    Janelle Obando, PharmD, [facility]  Clinical Pharmacy Specialist  [phone] or Teams

## 2023-07-28 NOTE — PROGRESS NOTE ADULT - PROBLEM SELECTOR PLAN 1
Hypothermic and tachypneic in the ED. May not have robust enough immune system to mount a leukocytosis. Concerned for sepsis due to UTI. Patient with history of recurrent UTIs.  - UA with large LE and many bacteria. f/u ucx, bcx  - S/p ciprofloxacin in ED, but patient developed itchiness, concern for allergic reaction. Patient has received medication before for prior UTI. Not convinced this is truly allergic reaction. However, pt received dose of aztreonam in ED instead.  - Urine culture from March 2023 with pansensitive E coli. Given possible allergic reactions to cephalexin and ciprofloxacin, will treat empirically for now with ertapenem 1g daily.

## 2023-07-28 NOTE — PHYSICAL THERAPY INITIAL EVALUATION ADULT - ADDITIONAL COMMENTS
Per pt and dtr, pt lives at the [facility] (assisted living facility). Reports being independent/standby assist for bed mobility and amb short distances with rollator until ~3 weeks ago when weakness and BLE swelling increased. Reports she uses a wheelchair for longer distances and has an aide ~9-6pm. [facility] assists with showering and transporting pt in wheelchair.

## 2023-07-28 NOTE — PHYSICAL THERAPY INITIAL EVALUATION ADULT - GENERAL OBSERVATIONS, REHAB EVAL
Pt rec'd semisupine in bed, in NAD, +IVL, +tele, +external female catheter. Agreeable to PT. RN cleared pt to be seen.

## 2023-07-29 PROCEDURE — 99232 SBSQ HOSP IP/OBS MODERATE 35: CPT

## 2023-07-29 RX ADMIN — Medication 100 MICROGRAM(S): at 06:10

## 2023-07-29 RX ADMIN — Medication 1 DROP(S): at 21:34

## 2023-07-29 RX ADMIN — AMLODIPINE BESYLATE 5 MILLIGRAM(S): 2.5 TABLET ORAL at 06:10

## 2023-07-29 RX ADMIN — ERTAPENEM SODIUM 100 MILLIGRAM(S): 1 INJECTION, POWDER, LYOPHILIZED, FOR SOLUTION INTRAMUSCULAR; INTRAVENOUS at 06:14

## 2023-07-29 RX ADMIN — NYSTATIN CREAM 1 APPLICATION(S): 100000 CREAM TOPICAL at 17:57

## 2023-07-29 RX ADMIN — ENOXAPARIN SODIUM 30 MILLIGRAM(S): 100 INJECTION SUBCUTANEOUS at 06:10

## 2023-07-29 RX ADMIN — NYSTATIN CREAM 1 APPLICATION(S): 100000 CREAM TOPICAL at 06:14

## 2023-07-29 RX ADMIN — Medication 40 MILLIGRAM(S): at 06:09

## 2023-07-29 RX ADMIN — ATORVASTATIN CALCIUM 20 MILLIGRAM(S): 80 TABLET, FILM COATED ORAL at 21:34

## 2023-07-29 RX ADMIN — CARVEDILOL PHOSPHATE 6.25 MILLIGRAM(S): 80 CAPSULE, EXTENDED RELEASE ORAL at 17:57

## 2023-07-29 RX ADMIN — CARVEDILOL PHOSPHATE 6.25 MILLIGRAM(S): 80 CAPSULE, EXTENDED RELEASE ORAL at 06:10

## 2023-07-30 LAB
ANION GAP SERPL CALC-SCNC: 18 MMOL/L — HIGH (ref 5–17)
BUN SERPL-MCNC: 52 MG/DL — HIGH (ref 7–23)
CALCIUM SERPL-MCNC: 9.5 MG/DL — SIGNIFICANT CHANGE UP (ref 8.4–10.5)
CHLORIDE SERPL-SCNC: 100 MMOL/L — SIGNIFICANT CHANGE UP (ref 96–108)
CO2 SERPL-SCNC: 25 MMOL/L — SIGNIFICANT CHANGE UP (ref 22–31)
CREAT SERPL-MCNC: 1.27 MG/DL — SIGNIFICANT CHANGE UP (ref 0.5–1.3)
EGFR: 38 ML/MIN/1.73M2 — LOW
GLUCOSE SERPL-MCNC: 80 MG/DL — SIGNIFICANT CHANGE UP (ref 70–99)
HCT VFR BLD CALC: 34.1 % — LOW (ref 34.5–45)
HGB BLD-MCNC: 11.1 G/DL — LOW (ref 11.5–15.5)
MAGNESIUM SERPL-MCNC: 2.1 MG/DL — SIGNIFICANT CHANGE UP (ref 1.6–2.6)
MCHC RBC-ENTMCNC: 31.3 PG — SIGNIFICANT CHANGE UP (ref 27–34)
MCHC RBC-ENTMCNC: 32.6 GM/DL — SIGNIFICANT CHANGE UP (ref 32–36)
MCV RBC AUTO: 96.1 FL — SIGNIFICANT CHANGE UP (ref 80–100)
NRBC # BLD: 0 /100 WBCS — SIGNIFICANT CHANGE UP (ref 0–0)
PHOSPHATE SERPL-MCNC: 4.2 MG/DL — SIGNIFICANT CHANGE UP (ref 2.5–4.5)
PLATELET # BLD AUTO: 112 K/UL — LOW (ref 150–400)
POTASSIUM SERPL-MCNC: 4.1 MMOL/L — SIGNIFICANT CHANGE UP (ref 3.5–5.3)
POTASSIUM SERPL-SCNC: 4.1 MMOL/L — SIGNIFICANT CHANGE UP (ref 3.5–5.3)
RBC # BLD: 3.55 M/UL — LOW (ref 3.8–5.2)
RBC # FLD: 15.1 % — HIGH (ref 10.3–14.5)
SODIUM SERPL-SCNC: 143 MMOL/L — SIGNIFICANT CHANGE UP (ref 135–145)
WBC # BLD: 6.47 K/UL — SIGNIFICANT CHANGE UP (ref 3.8–10.5)
WBC # FLD AUTO: 6.47 K/UL — SIGNIFICANT CHANGE UP (ref 3.8–10.5)

## 2023-07-30 PROCEDURE — 99232 SBSQ HOSP IP/OBS MODERATE 35: CPT

## 2023-07-30 RX ADMIN — ENOXAPARIN SODIUM 30 MILLIGRAM(S): 100 INJECTION SUBCUTANEOUS at 05:56

## 2023-07-30 RX ADMIN — Medication 40 MILLIGRAM(S): at 05:55

## 2023-07-30 RX ADMIN — Medication 1 DROP(S): at 22:30

## 2023-07-30 RX ADMIN — Medication 1 DROP(S): at 05:58

## 2023-07-30 RX ADMIN — Medication 100 MICROGRAM(S): at 05:55

## 2023-07-30 RX ADMIN — NYSTATIN CREAM 1 APPLICATION(S): 100000 CREAM TOPICAL at 05:56

## 2023-07-30 RX ADMIN — NYSTATIN CREAM 1 APPLICATION(S): 100000 CREAM TOPICAL at 16:35

## 2023-07-30 RX ADMIN — AMLODIPINE BESYLATE 5 MILLIGRAM(S): 2.5 TABLET ORAL at 05:55

## 2023-07-30 RX ADMIN — ATORVASTATIN CALCIUM 20 MILLIGRAM(S): 80 TABLET, FILM COATED ORAL at 22:30

## 2023-07-30 RX ADMIN — ERTAPENEM SODIUM 100 MILLIGRAM(S): 1 INJECTION, POWDER, LYOPHILIZED, FOR SOLUTION INTRAMUSCULAR; INTRAVENOUS at 05:54

## 2023-07-30 RX ADMIN — CARVEDILOL PHOSPHATE 6.25 MILLIGRAM(S): 80 CAPSULE, EXTENDED RELEASE ORAL at 17:34

## 2023-07-30 RX ADMIN — Medication 1 DROP(S): at 15:18

## 2023-07-30 RX ADMIN — CARVEDILOL PHOSPHATE 6.25 MILLIGRAM(S): 80 CAPSULE, EXTENDED RELEASE ORAL at 05:55

## 2023-07-30 NOTE — PROGRESS NOTE ADULT - PROBLEM SELECTOR PLAN 7
DVT ppx: lovenox 40mg qd     Dispo: pending improvement, PT eval  Code status: DNR/DNI. MOLST form in scanned documents (patient window tab). Most recent signed and dated 1/9/23. Previous provider confirmed status with patient.
DVT ppx: lovenox 40mg qd     Dispo: pending improvement, PT eval  Code status: DNR/DNI. MOLST form in scanned documents (patient window tab). Most recent signed and dated 1/9/23. Previous provider confirmed status with patient.      Updated daughter Airana over the phone.
DVT ppx: lovenox 40mg qd     Dispo: pending improvement, PT eval  Code status: DNR/DNI. MOLST form in scanned documents (patient window tab). Most recent signed and dated 1/9/23. Previous provider confirmed status with patient.

## 2023-07-30 NOTE — PROGRESS NOTE ADULT - PROBLEM SELECTOR PLAN 1
Hypothermic and tachypneic in the ED. May not have robust enough immune system to mount a leukocytosis. Concerned for sepsis due to UTI. Patient with history of recurrent UTIs.  - UA with large LE and many bacteria - blood cx ngtd, urine cx w/E coli  - S/p ciprofloxacin in ED, but patient developed itchiness, concern for allergic reaction. Patient has received medication before for prior UTI. Not convinced this is truly allergic reaction. However, pt received dose of aztreonam in ED instead.  - Urine culture from March 2023 with pansensitive E coli. Given possible allergic reactions to cephalexin and ciprofloxacin, will treat empirically for now with ertapenem  - if hypothermia persists tomorrow then call ID

## 2023-07-31 DIAGNOSIS — I50.23 ACUTE ON CHRONIC SYSTOLIC (CONGESTIVE) HEART FAILURE: ICD-10-CM

## 2023-07-31 DIAGNOSIS — J96.01 ACUTE RESPIRATORY FAILURE WITH HYPOXIA: ICD-10-CM

## 2023-07-31 LAB
ALBUMIN SERPL ELPH-MCNC: 3.7 G/DL — SIGNIFICANT CHANGE UP (ref 3.3–5)
ALP SERPL-CCNC: 167 U/L — HIGH (ref 40–120)
ALT FLD-CCNC: 48 U/L — HIGH (ref 10–45)
ANION GAP SERPL CALC-SCNC: 14 MMOL/L — SIGNIFICANT CHANGE UP (ref 5–17)
AST SERPL-CCNC: 15 U/L — SIGNIFICANT CHANGE UP (ref 10–40)
BILIRUB SERPL-MCNC: 0.6 MG/DL — SIGNIFICANT CHANGE UP (ref 0.2–1.2)
BUN SERPL-MCNC: 43 MG/DL — HIGH (ref 7–23)
CALCIUM SERPL-MCNC: 9.5 MG/DL — SIGNIFICANT CHANGE UP (ref 8.4–10.5)
CHLORIDE SERPL-SCNC: 99 MMOL/L — SIGNIFICANT CHANGE UP (ref 96–108)
CO2 SERPL-SCNC: 27 MMOL/L — SIGNIFICANT CHANGE UP (ref 22–31)
CREAT SERPL-MCNC: 1.17 MG/DL — SIGNIFICANT CHANGE UP (ref 0.5–1.3)
EGFR: 41 ML/MIN/1.73M2 — LOW
GLUCOSE SERPL-MCNC: 100 MG/DL — HIGH (ref 70–99)
HCT VFR BLD CALC: 35.7 % — SIGNIFICANT CHANGE UP (ref 34.5–45)
HGB BLD-MCNC: 11.6 G/DL — SIGNIFICANT CHANGE UP (ref 11.5–15.5)
MAGNESIUM SERPL-MCNC: 1.9 MG/DL — SIGNIFICANT CHANGE UP (ref 1.6–2.6)
MCHC RBC-ENTMCNC: 30.9 PG — SIGNIFICANT CHANGE UP (ref 27–34)
MCHC RBC-ENTMCNC: 32.5 GM/DL — SIGNIFICANT CHANGE UP (ref 32–36)
MCV RBC AUTO: 95.2 FL — SIGNIFICANT CHANGE UP (ref 80–100)
NRBC # BLD: 0 /100 WBCS — SIGNIFICANT CHANGE UP (ref 0–0)
PHOSPHATE SERPL-MCNC: 4 MG/DL — SIGNIFICANT CHANGE UP (ref 2.5–4.5)
PLATELET # BLD AUTO: 120 K/UL — LOW (ref 150–400)
POTASSIUM SERPL-MCNC: 4 MMOL/L — SIGNIFICANT CHANGE UP (ref 3.5–5.3)
POTASSIUM SERPL-SCNC: 4 MMOL/L — SIGNIFICANT CHANGE UP (ref 3.5–5.3)
PROT SERPL-MCNC: 7.5 G/DL — SIGNIFICANT CHANGE UP (ref 6–8.3)
RBC # BLD: 3.75 M/UL — LOW (ref 3.8–5.2)
RBC # FLD: 14.9 % — HIGH (ref 10.3–14.5)
SODIUM SERPL-SCNC: 140 MMOL/L — SIGNIFICANT CHANGE UP (ref 135–145)
WBC # BLD: 7.8 K/UL — SIGNIFICANT CHANGE UP (ref 3.8–10.5)
WBC # FLD AUTO: 7.8 K/UL — SIGNIFICANT CHANGE UP (ref 3.8–10.5)

## 2023-07-31 PROCEDURE — 99233 SBSQ HOSP IP/OBS HIGH 50: CPT

## 2023-07-31 RX ADMIN — Medication 1 DROP(S): at 21:16

## 2023-07-31 RX ADMIN — Medication 100 MICROGRAM(S): at 05:27

## 2023-07-31 RX ADMIN — NYSTATIN CREAM 1 APPLICATION(S): 100000 CREAM TOPICAL at 05:29

## 2023-07-31 RX ADMIN — AMLODIPINE BESYLATE 5 MILLIGRAM(S): 2.5 TABLET ORAL at 05:27

## 2023-07-31 RX ADMIN — CARVEDILOL PHOSPHATE 6.25 MILLIGRAM(S): 80 CAPSULE, EXTENDED RELEASE ORAL at 17:19

## 2023-07-31 RX ADMIN — NYSTATIN CREAM 1 APPLICATION(S): 100000 CREAM TOPICAL at 16:59

## 2023-07-31 RX ADMIN — ATORVASTATIN CALCIUM 20 MILLIGRAM(S): 80 TABLET, FILM COATED ORAL at 21:17

## 2023-07-31 RX ADMIN — ERTAPENEM SODIUM 100 MILLIGRAM(S): 1 INJECTION, POWDER, LYOPHILIZED, FOR SOLUTION INTRAMUSCULAR; INTRAVENOUS at 06:23

## 2023-07-31 RX ADMIN — ENOXAPARIN SODIUM 30 MILLIGRAM(S): 100 INJECTION SUBCUTANEOUS at 05:27

## 2023-07-31 RX ADMIN — Medication 1 DROP(S): at 11:35

## 2023-07-31 RX ADMIN — CARVEDILOL PHOSPHATE 6.25 MILLIGRAM(S): 80 CAPSULE, EXTENDED RELEASE ORAL at 05:27

## 2023-07-31 RX ADMIN — Medication 40 MILLIGRAM(S): at 05:27

## 2023-07-31 RX ADMIN — Medication 1 DROP(S): at 05:28

## 2023-07-31 NOTE — CONSULT NOTE ADULT - ASSESSMENT
Echo 5/18/23: Moderate LV dysfxn, LVH    A/p  89 y/o female with PMHx of HTN, HLD, HFrEf, Afib (not on AC), hypothyroidism, hx of PPM placement, presents to the ED complaining of weakness, worsening LE edema, and SOB.    #Acute HFpEF   -CXR with small L pleural effusion, b/l pulm edema  -Continue iv lasix 40mg qd for now  -Can likely transition to po tomorrow  -Continue coreg  -Echo froom 5/2023 with moderate lv dysfxn  -Repeat echo    #Afib s/p ppm  -Stable, rate controlled  -EKG w/ NSR 78, no ischemic changes  -Continue coreg  -remains off a/c   -Recommend ASA if no CI    #HTN  -stable  -Continue amlodipine, coreg    #UTI  -Abx, mgmt per med      dvt ppx

## 2023-07-31 NOTE — CONSULT NOTE ADULT - TIME BILLING
Patient seen and examined.  Agree with above PA note.  A/p  89 y/o female with PMHx of HTN, HLD, HFrEf, Afib (not on AC), hypothyroidism, hx of PPM placement, presents to the ED complaining of weakness, worsening LE edema, and SOB.    #Acute on chronic HFpEF   -cv stable, clinically improving   -Continue iv lasix 40mg qd for now  -change to po efrain  -Continue coreg  -Echo from 5/2023 with moderate lv dysfxn  -f/u echo     #Afib s/p ppm  -Stable, rate controlled  -Continue coreg  -remains off a/c     #HTN  -stable  -Continue amlodipine, coreg    #UTI  -Abx, mgmt per med      dvt ppx

## 2023-07-31 NOTE — PROGRESS NOTE ADULT - PROBLEM SELECTOR PLAN 1
- remains volume overloaded-  - c/w iv lasix 40 mg daily   - TTE pending  - Strict I/Os, daily weights  - c/w carvedilol 6.25mg BID  - cardiology dr yang consulted - remains volume overloaded-  - c/w iv lasix 40 mg daily   - TTE pending  - Strict I/Os, daily weights  - c/w carvedilol 6.25mg BID  - cardiology dr hernandez consulted

## 2023-07-31 NOTE — CONSULT NOTE ADULT - SUBJECTIVE AND OBJECTIVE BOX
CARDIOLOGY CONSULT - Dr. Riley       HPI:  89 y/o female with PMHx of HTN, HLD, HFrEf, Afib (not on AC), hypothyroidism, hx of PPM placement, presents to the ED complaining of weakness, worsening LE edema, and SOB. Over the last few weeks, patient has been feeling progressively weaker and noted worsening swelling in her LE bilaterally. Also with dyspnea on exertion. Denies chest pain, abdominal pain, constipation, diarrhea, nausea, vomiting. No fevers. [facility] She ambulates at baseline with rolling walker. No recent falls. No pains currently; only has pains in knees when she walks or stands for prolonged periods. Patient does not use oxygen at home. Also endorsing intermittent cough with some sputum production.     In ED, POCUS showed evidence of pulmonary edema, pleural effusion. Received IV lasix 40mg x1. Also noted to have positive UA. Initially received ciprofloxacin but patient started feeling itchy so medication discontinued and patient got dose of aztreonam instead. Patient placed on 2LNC for tachypneia. (27 Jul 2023 22:40)      PAST MEDICAL & SURGICAL HISTORY:  Urinary Frequency      Bilateral Cataracts      HTN (hypertension)      Spinal stenosis      Hypothyroidism      Non-ST elevation MI (NSTEMI)      Macular degeneration      (HFpEF) heart failure with preserved ejection fraction      Paroxysmal atrial fibrillation      Chronic kidney disease (CKD)      Ectopic pregnancy, tubal      S/P ORIF (open reduction internal fixation) fracture  R hip      S/P breast lumpectomy      S/P cataract surgery  b/l              PREVIOUS DIAGNOSTIC TESTING:    [x] Echocardiogram:  < from: Transthoracic Echocardiogram (05.18.23 @ 09:01) >  CONCLUSIONS:  1. Mitral annular calcification, otherwise normal mitral  valve. Mild mitral regurgitation.  2. Severely dilated left atrium.  LA volume index = 51  cc/m2.  3. Concentric left ventricular hypertrophy.  4. Endocardium not well visualized; grossly moderate global  left ventricular systolic dysfunction.  5. The right ventricle is not well visualized; grossly  normal right ventricular systolic function.  6. A bubble study was performed with the intravenous  injection of agitated saline.  Following contrast  injection, no obvious bubbles were seen in the left heart.    < end of copied text >    [ ]  Catheterization:  [ ] Stress Test:  	    MEDICATIONS:  Home Medications:  amLODIPine 5 mg oral tablet: 1 tab(s) orally once a day (28 Jul 2023 10:39)  Biofreeze 4% topical gel: Apply topically to affected area 2 times a day to both knees (28 Jul 2023 10:43)  carvedilol 6.25 mg oral tablet: 1 tab(s) orally 2 times a day (28 Jul 2023 10:39)  lactobacillus acidophilus oral capsule: 1 cap(s) orally once a day (28 Jul 2023 10:43)  Lasix 20 mg oral tablet: 1 tab(s) orally Unsure If patient was taking Daily or MWF (28 Jul 2023 11:39)  levothyroxine 100 mcg (0.1 mg) oral tablet: 1 tab(s) orally once a day (28 Jul 2023 10:39)  methenamine hippurate 1 g oral tablet: 1 tab(s) orally once a day (28 Jul 2023 11:39)  nystatin 100,000 units/g topical powder: Apply topically to affected area once a day to bust after showers (28 Jul 2023 11:39)  spironolactone 25 mg oral tablet: 1 tab(s) orally once a day (28 Jul 2023 10:39)  Vitamin D3 25 mcg (1000 intl units) oral tablet: 1 tab(s) orally once a day (28 Jul 2023 10:39)      MEDICATIONS  (STANDING):  amLODIPine   Tablet 5 milliGRAM(s) Oral daily  artificial tears (preservative free) Ophthalmic Solution 1 Drop(s) Both EYES three times a day  atorvastatin 20 milliGRAM(s) Oral at bedtime  carvedilol 6.25 milliGRAM(s) Oral every 12 hours  enoxaparin Injectable 30 milliGRAM(s) SubCutaneous every 24 hours  ertapenem  IVPB 500 milliGRAM(s) IV Intermittent every 24 hours  furosemide   Injectable 40 milliGRAM(s) IV Push daily  levothyroxine 100 MICROGram(s) Oral daily  nystatin Powder 1 Application(s) Topical two times a day      FAMILY HISTORY:  Family history of acute myocardial infarction        SOCIAL HISTORY:    [x] Non-smoker  [ ] Smoker  [ ] Alcohol    Allergies    sulfa topicals (Unknown)  cephalexin (Hives)  [This allergen will not trigger allergy alert] trisulfapyrimidine (Rash)    Intolerances    morphine (Drowsiness; Faint; Hypotension)  	    REVIEW OF SYSTEMS:  CONSTITUTIONAL: No fever, weight loss, or fatigue  EYES: No eye pain, visual disturbances, or discharge  ENMT:  No difficulty hearing, tinnitus, vertigo; No sinus or throat pain  NECK: No pain or stiffness  RESPIRATORY: No cough, wheezing, chills or hemoptysis; No Shortness of Breath  CARDIOVASCULAR: No chest pain, palpitations, passing out, dizziness, or leg swelling  GASTROINTESTINAL: No abdominal or epigastric pain. No nausea, vomiting, or hematemesis; No diarrhea or constipation. No melena or hematochezia.  GENITOURINARY: No dysuria, frequency, hematuria, or incontinence  NEUROLOGICAL: No headaches, memory loss, loss of strength, numbness, or tremors  SKIN: No itching, burning, rashes, or lesions   	    [x] All others negative	  [ ] Unable to obtain    PHYSICAL EXAM:  T(C): 36.4 (07-31-23 @ 12:34), Max: 37 (07-31-23 @ 05:25)  HR: 59 (07-31-23 @ 12:34) (59 - 80)  BP: 123/79 (07-31-23 @ 12:34) (123/79 - 155/83)  RR: 18 (07-31-23 @ 12:34) (18 - 18)  SpO2: 96% (07-31-23 @ 12:34) (94% - 96%)  Wt(kg): --  I&O's Summary    30 Jul 2023 07:01  -  31 Jul 2023 07:00  --------------------------------------------------------  IN: 120 mL / OUT: 1750 mL / NET: -1630 mL        Appearance: Elderly female	  Psychiatry: A & O x 3, Mood & affect appropriate  HEENT:   Normal oral mucosa, PERRL, EOMI	  Lymphatic: No lymphadenopathy  Cardiovascular: Normal S1 S2,RRR, No JVD, No murmurs  Respiratory: Lungs clear to auscultation b/l  Gastrointestinal:  Soft, Non-tender, + BS	  Skin: No rashes, No ecchymoses, No cyanosis	  Neurologic: Non-focal  Extremities: Normal range of motion, No clubbing, cyanosis or edema  Vascular: Peripheral pulses palpable 2+ bilaterally    TELEMETRY: 	    ECG:  Afib with pvcs, 60bpm	  RADIOLOGY:  < from: Xray Chest 1 View- PORTABLE-Urgent (07.27.23 @ 18:41) >    IMPRESSION:  Small left pleural effusion  Mild pulmonary venous congestion    --- End of Report ---    < end of copied text >      OTHER: 	  	  LABS:	 	    CARDIAC MARKERS:  Troponin T, High Sensitivity Result: 26 ng/L (07-27 @ 19:20)                                  11.6   7.80  )-----------( 120      ( 31 Jul 2023 05:54 )             35.7     07-31    140  |  99  |  43<H>  ----------------------------<  100<H>  4.0   |  27  |  1.17    Ca    9.5      31 Jul 2023 05:54  Phos  4.0     07-31  Mg     1.9     07-31    TPro  7.5  /  Alb  3.7  /  TBili  0.6  /  DBili  x   /  AST  15  /  ALT  48<H>  /  AlkPhos  167<H>  07-31      proBNP:   Lipid Profile:   HgA1c:   TSH:

## 2023-08-01 LAB
ALBUMIN SERPL ELPH-MCNC: 1.5 G/DL — LOW (ref 3.3–5)
ALBUMIN SERPL ELPH-MCNC: 3.7 G/DL — SIGNIFICANT CHANGE UP (ref 3.3–5)
ALP SERPL-CCNC: 157 U/L — HIGH (ref 40–120)
ALP SERPL-CCNC: 68 U/L — SIGNIFICANT CHANGE UP (ref 40–120)
ALT FLD-CCNC: 16 U/L — SIGNIFICANT CHANGE UP (ref 10–45)
ALT FLD-CCNC: 36 U/L — SIGNIFICANT CHANGE UP (ref 10–45)
ANION GAP SERPL CALC-SCNC: 17 MMOL/L — SIGNIFICANT CHANGE UP (ref 5–17)
ANION GAP SERPL CALC-SCNC: 30 MMOL/L — HIGH (ref 5–17)
AST SERPL-CCNC: 11 U/L — SIGNIFICANT CHANGE UP (ref 10–40)
AST SERPL-CCNC: <5 U/L — LOW (ref 10–40)
BILIRUB SERPL-MCNC: 0.2 MG/DL — SIGNIFICANT CHANGE UP (ref 0.2–1.2)
BILIRUB SERPL-MCNC: 0.5 MG/DL — SIGNIFICANT CHANGE UP (ref 0.2–1.2)
BUN SERPL-MCNC: 30 MG/DL — HIGH (ref 7–23)
BUN SERPL-MCNC: 52 MG/DL — HIGH (ref 7–23)
CALCIUM SERPL-MCNC: 4.5 MG/DL — CRITICAL LOW (ref 8.4–10.5)
CALCIUM SERPL-MCNC: 9.8 MG/DL — SIGNIFICANT CHANGE UP (ref 8.4–10.5)
CHLORIDE SERPL-SCNC: 111 MMOL/L — HIGH (ref 96–108)
CHLORIDE SERPL-SCNC: 94 MMOL/L — LOW (ref 96–108)
CO2 SERPL-SCNC: 22 MMOL/L — SIGNIFICANT CHANGE UP (ref 22–31)
CO2 SERPL-SCNC: 29 MMOL/L — SIGNIFICANT CHANGE UP (ref 22–31)
CREAT SERPL-MCNC: 0.51 MG/DL — SIGNIFICANT CHANGE UP (ref 0.5–1.3)
CREAT SERPL-MCNC: 1.06 MG/DL — SIGNIFICANT CHANGE UP (ref 0.5–1.3)
EGFR: 47 ML/MIN/1.73M2 — LOW
EGFR: 83 ML/MIN/1.73M2 — SIGNIFICANT CHANGE UP
GLUCOSE SERPL-MCNC: 57 MG/DL — LOW (ref 70–99)
GLUCOSE SERPL-MCNC: 91 MG/DL — SIGNIFICANT CHANGE UP (ref 70–99)
POTASSIUM SERPL-MCNC: 3.3 MMOL/L — LOW (ref 3.5–5.3)
POTASSIUM SERPL-MCNC: <2 MMOL/L — CRITICAL LOW (ref 3.5–5.3)
POTASSIUM SERPL-SCNC: 3.3 MMOL/L — LOW (ref 3.5–5.3)
POTASSIUM SERPL-SCNC: <2 MMOL/L — CRITICAL LOW (ref 3.5–5.3)
PROT SERPL-MCNC: 3.5 G/DL — LOW (ref 6–8.3)
PROT SERPL-MCNC: 7.4 G/DL — SIGNIFICANT CHANGE UP (ref 6–8.3)
SARS-COV-2 RNA SPEC QL NAA+PROBE: SIGNIFICANT CHANGE UP
SODIUM SERPL-SCNC: 140 MMOL/L — SIGNIFICANT CHANGE UP (ref 135–145)
SODIUM SERPL-SCNC: 163 MMOL/L — CRITICAL HIGH (ref 135–145)

## 2023-08-01 PROCEDURE — 99232 SBSQ HOSP IP/OBS MODERATE 35: CPT

## 2023-08-01 RX ORDER — ACETAMINOPHEN 500 MG
650 TABLET ORAL ONCE
Refills: 0 | Status: COMPLETED | OUTPATIENT
Start: 2023-08-01 | End: 2023-08-01

## 2023-08-01 RX ORDER — FUROSEMIDE 40 MG
40 TABLET ORAL DAILY
Refills: 0 | Status: DISCONTINUED | OUTPATIENT
Start: 2023-08-02 | End: 2023-08-04

## 2023-08-01 RX ORDER — POTASSIUM CHLORIDE 20 MEQ
40 PACKET (EA) ORAL ONCE
Refills: 0 | Status: COMPLETED | OUTPATIENT
Start: 2023-08-01 | End: 2023-08-01

## 2023-08-01 RX ADMIN — CARVEDILOL PHOSPHATE 6.25 MILLIGRAM(S): 80 CAPSULE, EXTENDED RELEASE ORAL at 05:17

## 2023-08-01 RX ADMIN — ENOXAPARIN SODIUM 30 MILLIGRAM(S): 100 INJECTION SUBCUTANEOUS at 05:15

## 2023-08-01 RX ADMIN — ERTAPENEM SODIUM 100 MILLIGRAM(S): 1 INJECTION, POWDER, LYOPHILIZED, FOR SOLUTION INTRAMUSCULAR; INTRAVENOUS at 05:15

## 2023-08-01 RX ADMIN — Medication 40 MILLIEQUIVALENT(S): at 12:15

## 2023-08-01 RX ADMIN — CARVEDILOL PHOSPHATE 6.25 MILLIGRAM(S): 80 CAPSULE, EXTENDED RELEASE ORAL at 17:47

## 2023-08-01 RX ADMIN — Medication 40 MILLIGRAM(S): at 05:15

## 2023-08-01 RX ADMIN — Medication 650 MILLIGRAM(S): at 22:05

## 2023-08-01 RX ADMIN — ATORVASTATIN CALCIUM 20 MILLIGRAM(S): 80 TABLET, FILM COATED ORAL at 22:05

## 2023-08-01 RX ADMIN — NYSTATIN CREAM 1 APPLICATION(S): 100000 CREAM TOPICAL at 17:47

## 2023-08-01 RX ADMIN — Medication 1 DROP(S): at 05:14

## 2023-08-01 RX ADMIN — Medication 650 MILLIGRAM(S): at 22:14

## 2023-08-01 RX ADMIN — AMLODIPINE BESYLATE 5 MILLIGRAM(S): 2.5 TABLET ORAL at 05:16

## 2023-08-01 RX ADMIN — Medication 100 MICROGRAM(S): at 05:17

## 2023-08-01 NOTE — PROGRESS NOTE ADULT - PROBLEM SELECTOR PLAN 6
DVT ppx: lovenox 40mg qd
- Continue levothyroxine 100 mcg
DVT ppx: lovenox 40mg qd
- Continue levothyroxine 100 mcg
- Continue levothyroxine 100 mcg

## 2023-08-01 NOTE — PROGRESS NOTE ADULT - PROBLEM SELECTOR PLAN 1
- stable, euvolemic  - saturating 98% on 2L-- wean as tolerated   - transition to po lasix 40 mg daily, 8/1  - TTE pending  - Strict I/Os, daily weights  - c/w carvedilol 6.25mg BID  - cardiology dr hernandez following

## 2023-08-02 ENCOUNTER — TRANSCRIPTION ENCOUNTER (OUTPATIENT)
Age: 88
End: 2023-08-02

## 2023-08-02 LAB
ANION GAP SERPL CALC-SCNC: 14 MMOL/L — SIGNIFICANT CHANGE UP (ref 5–17)
BUN SERPL-MCNC: 57 MG/DL — HIGH (ref 7–23)
CALCIUM SERPL-MCNC: 9.6 MG/DL — SIGNIFICANT CHANGE UP (ref 8.4–10.5)
CHLORIDE SERPL-SCNC: 95 MMOL/L — LOW (ref 96–108)
CO2 SERPL-SCNC: 27 MMOL/L — SIGNIFICANT CHANGE UP (ref 22–31)
CREAT SERPL-MCNC: 1.06 MG/DL — SIGNIFICANT CHANGE UP (ref 0.5–1.3)
CULTURE RESULTS: SIGNIFICANT CHANGE UP
CULTURE RESULTS: SIGNIFICANT CHANGE UP
EGFR: 47 ML/MIN/1.73M2 — LOW
GLUCOSE SERPL-MCNC: 93 MG/DL — SIGNIFICANT CHANGE UP (ref 70–99)
HCT VFR BLD CALC: 37.6 % — SIGNIFICANT CHANGE UP (ref 34.5–45)
HGB BLD-MCNC: 12.4 G/DL — SIGNIFICANT CHANGE UP (ref 11.5–15.5)
MAGNESIUM SERPL-MCNC: 2.1 MG/DL — SIGNIFICANT CHANGE UP (ref 1.6–2.6)
MCHC RBC-ENTMCNC: 30.8 PG — SIGNIFICANT CHANGE UP (ref 27–34)
MCHC RBC-ENTMCNC: 33 GM/DL — SIGNIFICANT CHANGE UP (ref 32–36)
MCV RBC AUTO: 93.5 FL — SIGNIFICANT CHANGE UP (ref 80–100)
NRBC # BLD: 0 /100 WBCS — SIGNIFICANT CHANGE UP (ref 0–0)
PHOSPHATE SERPL-MCNC: 4 MG/DL — SIGNIFICANT CHANGE UP (ref 2.5–4.5)
PLATELET # BLD AUTO: 129 K/UL — LOW (ref 150–400)
POTASSIUM SERPL-MCNC: 3.9 MMOL/L — SIGNIFICANT CHANGE UP (ref 3.5–5.3)
POTASSIUM SERPL-SCNC: 3.9 MMOL/L — SIGNIFICANT CHANGE UP (ref 3.5–5.3)
RBC # BLD: 4.02 M/UL — SIGNIFICANT CHANGE UP (ref 3.8–5.2)
RBC # FLD: 14.3 % — SIGNIFICANT CHANGE UP (ref 10.3–14.5)
SODIUM SERPL-SCNC: 136 MMOL/L — SIGNIFICANT CHANGE UP (ref 135–145)
SPECIMEN SOURCE: SIGNIFICANT CHANGE UP
SPECIMEN SOURCE: SIGNIFICANT CHANGE UP
WBC # BLD: 5.43 K/UL — SIGNIFICANT CHANGE UP (ref 3.8–10.5)
WBC # FLD AUTO: 5.43 K/UL — SIGNIFICANT CHANGE UP (ref 3.8–10.5)

## 2023-08-02 PROCEDURE — 99232 SBSQ HOSP IP/OBS MODERATE 35: CPT

## 2023-08-02 PROCEDURE — 93306 TTE W/DOPPLER COMPLETE: CPT | Mod: 26

## 2023-08-02 RX ORDER — ACETAMINOPHEN 500 MG
650 TABLET ORAL ONCE
Refills: 0 | Status: COMPLETED | OUTPATIENT
Start: 2023-08-02 | End: 2023-08-02

## 2023-08-02 RX ORDER — FUROSEMIDE 40 MG
1 TABLET ORAL
Qty: 30 | Refills: 0
Start: 2023-08-02 | End: 2023-08-31

## 2023-08-02 RX ORDER — LIDOCAINE 4 G/100G
1 CREAM TOPICAL DAILY
Refills: 0 | Status: DISCONTINUED | OUTPATIENT
Start: 2023-08-02 | End: 2023-08-04

## 2023-08-02 RX ORDER — FUROSEMIDE 40 MG
1 TABLET ORAL
Refills: 0 | DISCHARGE

## 2023-08-02 RX ORDER — FUROSEMIDE 40 MG
1 TABLET ORAL
Qty: 0 | Refills: 0 | DISCHARGE
Start: 2023-08-02

## 2023-08-02 RX ADMIN — Medication 1 DROP(S): at 14:44

## 2023-08-02 RX ADMIN — Medication 650 MILLIGRAM(S): at 09:50

## 2023-08-02 RX ADMIN — Medication 40 MILLIGRAM(S): at 05:00

## 2023-08-02 RX ADMIN — AMLODIPINE BESYLATE 5 MILLIGRAM(S): 2.5 TABLET ORAL at 05:00

## 2023-08-02 RX ADMIN — ATORVASTATIN CALCIUM 20 MILLIGRAM(S): 80 TABLET, FILM COATED ORAL at 21:31

## 2023-08-02 RX ADMIN — Medication 650 MILLIGRAM(S): at 08:44

## 2023-08-02 RX ADMIN — Medication 100 MICROGRAM(S): at 05:00

## 2023-08-02 RX ADMIN — LIDOCAINE 1 PATCH: 4 CREAM TOPICAL at 11:37

## 2023-08-02 RX ADMIN — ENOXAPARIN SODIUM 30 MILLIGRAM(S): 100 INJECTION SUBCUTANEOUS at 05:00

## 2023-08-02 RX ADMIN — Medication 1 DROP(S): at 21:31

## 2023-08-02 RX ADMIN — CARVEDILOL PHOSPHATE 6.25 MILLIGRAM(S): 80 CAPSULE, EXTENDED RELEASE ORAL at 17:41

## 2023-08-02 RX ADMIN — NYSTATIN CREAM 1 APPLICATION(S): 100000 CREAM TOPICAL at 18:54

## 2023-08-02 RX ADMIN — CARVEDILOL PHOSPHATE 6.25 MILLIGRAM(S): 80 CAPSULE, EXTENDED RELEASE ORAL at 05:00

## 2023-08-02 RX ADMIN — LIDOCAINE 1 PATCH: 4 CREAM TOPICAL at 18:54

## 2023-08-02 NOTE — DISCHARGE NOTE NURSING/CASE MANAGEMENT/SOCIAL WORK - NSDCFUADDAPPT_GEN_ALL_CORE_FT
Follow up with your cardiologist or you may see Dr. Riley who has been seeing you inpatient.  follow up with your primary care doctor in 2 weeks  APPTS ARE READY TO BE MADE: [x] YES    Best Family or Patient Contact (if needed):    Additional Information about above appointments (if needed):    1:   2:   3:     Other comments or requests:

## 2023-08-02 NOTE — DISCHARGE NOTE PROVIDER - NSDCCPCAREPLAN_GEN_ALL_CORE_FT
PRINCIPAL DISCHARGE DIAGNOSIS  Diagnosis: Acute UTI  Assessment and Plan of Treatment: During your admission, you were treated with antibiotics for a urinary tract infection. Please contact your PCP if you develop any symptoms of urinary burning, frequency, or urgency.      SECONDARY DISCHARGE DIAGNOSES  Diagnosis: Acute CHF  Assessment and Plan of Treatment: You have heart failure which is a condition that develops when your heart muscle doesn't pump blood effectively  - Take your medication as prescribed. The water pills that you have been prescribed help get rid of the extra fluid in your body   - Weigh yourself every day. If possible take your weight in the morning after you have been to the bathroom, use the same balance and make sure that your clothes are similar so that the weight measurements can be compared. Write down the measurments and present them to your doctor during your visit.   - Another way to check how much extra fluid you have in your body is to take a look at your feet. Assess for any feet and ankle swelling   - Call your doctor if you have been gaining weight or you have worsening swelling of your feet as this can point out to the back up of extra fluid in your body. In this case the dosage of your medication might need to be re-adjusted   - Avoid excessive sodium in your diet as this can help the build-up of excessive fluid in your body   - Call your doctor if you have worsening shortness of breath especially during activity, when you lie down on your back, when you wake up in the middle of the night because you feel out of breath, when you have chest pain or pressure sensation, when you feel dizzy, when you have a cough that is not going away, or when your heart beats are very fast very slow or not steady   - Avoid alcohol and smoking. If you find it difficult to quit ask for help or ask your doctor for a referral for counselling groups       Diagnosis: Hyperkalemia  Assessment and Plan of Treatment: resolved    Diagnosis: Hypothyroidism  Assessment and Plan of Treatment: continue home medication

## 2023-08-02 NOTE — DISCHARGE NOTE NURSING/CASE MANAGEMENT/SOCIAL WORK - NSDCPEFALRISK_GEN_ALL_CORE
For information on Fall & Injury Prevention, visit: [email or web address] OR  [email or web address] OR  https://www.cdc.gov/steadi/patient.html

## 2023-08-02 NOTE — DISCHARGE NOTE PROVIDER - CARE PROVIDER_API CALL
Jose Shah  Gastroenterology  [address], [address]  [place] [address]  Phone: [phone]  Fax: [phone]  Follow Up Time: 2 weeks    Goran Riley  Cardiovascular Disease  [address], [address]  [place] [address]  Phone: [phone]  Fax: [phone]  Follow Up Time: 2 weeks

## 2023-08-02 NOTE — DISCHARGE NOTE NURSING/CASE MANAGEMENT/SOCIAL WORK - NSDCVIVACCINE_GEN_ALL_CORE_FT
Tdap; 17-Jun-2019 08:56; Priyanka Easley (RN); [employer]; [id number] (Exp. Date: 04-Apr-2021); IntraMuscular; Deltoid Left.; 0.5 milliLiter(s); VIS (VIS Published: 09-May-2013, VIS Presented: 17-Jun-2019);   Tdap; 25-Dec-2022 19:57; Michelle Richard (RN); [employer]; [id number] (Exp. Date: 01-Jun-2024); IntraMuscular; Deltoid Left.; 0.5 milliLiter(s); VIS (VIS Published: 09-May-2013, VIS Presented: 25-Dec-2022);

## 2023-08-02 NOTE — DISCHARGE NOTE PROVIDER - NSDCFUADDAPPT_GEN_ALL_CORE_FT
Follow up with your cardiologist or you may see Dr. Riley who has been seeing you inpatient.  follow up with your primary care doctor in 2 weeks  APPTS ARE READY TO BE MADE: [x] YES    Best Family or Patient Contact (if needed):    Additional Information about above appointments (if needed):    1:   2:   3:     Other comments or requests:    Follow up with your cardiologist or you may see Dr. Riley who has been seeing you inpatient.  follow up with your primary care doctor in 2 weeks  APPTS ARE READY TO BE MADE: [x] YES    Best Family or Patient Contact (if needed):    Additional Information about above appointments (if needed):    1:   2:   3:     Other comments or requests:       Patient advised they are established with a different provider not listed on the referral and will follow up on their own.

## 2023-08-02 NOTE — DISCHARGE NOTE PROVIDER - HOSPITAL COURSE
89 y/o female with PMHx of HTN, HLD, HFrEf, Afib (not on AC), hypothyroidism, hx of PPM placement, presents to the ED complaining of weakness, worsening LE edema, and SOB. Found to have positive UA. Pt admitted for sepsis secondary to UTI and  acute on chronic HFrEF exacerbation.          Problem/Plan - 1:  ·  Problem: Acute on chronic systolic congestive heart failure.   ·  Plan: - stable, euvolemic  - saturating 98% on 2L-- wean as tolerated, now at RA saturating comfortably  - c/w po lasix 40 mg daily  - Strict I/Os, daily weights  - c/w carvedilol 6.25mg BID  - cardiology dr hernandez following.     Problem/Plan - 2:  ·  Problem: Acute respiratory failure with hypoxia.   ·  Plan: - due to a/c chf exacerbation  - on ra     Problem/Plan - 3:  ·  Problem: Physical debility.   ·  Plan: Weakness in LE likely due to extensive edema. Pt ambulates with rolling walker at baseline.   - PT OG-- refusing, return to [facility]   - fall precautions.     Problem/Plan - 4:  ·  Problem: Hypothyroidism.   ·  Plan: - c/w levothyroxine 100 mcg.     Problem/Plan - 5:  ·  Problem: Prophylactic measure.   ·  Plan: DVT ppx: lovenox 40mg qd.      pt medically stable and cleared for dc by Dr. Thao 89 y/o female with PMHx of HTN, HLD, HFrEf, Afib (not on AC), hypothyroidism, hx of PPM placement, presents to the ED complaining of weakness, worsening LE edema, and SOB. Found to have positive UA. Pt admitted for sepsis secondary to UTI and  acute on chronic HFrEF exacerbation.          Problem/Plan - 1:  ·  Problem: Acute on chronic systolic congestive heart failure.   ·  Plan: - stable, euvolemic  - saturating 98% on 2L-- wean as tolerated, now at RA saturating comfortably  - c/w po lasix 40 mg daily  - Strict I/Os, daily weights  - c/w carvedilol 6.25mg BID  - cardiology dr hernandez following.     Problem/Plan - 2:.  ·  Problem: Acute respiratory failure with hypoxia.   ·  Plan: - due to a/c chf exacerbation  - on ra     Problem/Plan - 3:  ·  Problem: Physical debility.   ·  Plan: Weakness in LE likely due to extensive edema. Pt ambulates with rolling walker at baseline.   - PT OG-- refusing, return to [facility]   - fall precautions.     Problem/Plan - 4:  ·  Problem: Hypothyroidism.   ·  Plan: - c/w levothyroxine 100 mcg.     Problem/Plan - 5:  ·  Problem: Prophylactic measure.   ·  Plan: DVT ppx: lovenox 40mg qd.      pt medically stable and cleared for dc by Dr. Thao 89 y/o female with PMHx of HTN, HLD, HFrEf, Afib (not on AC), hypothyroidism, hx of PPM placement, presents to the ED complaining of weakness, worsening LE edema, and SOB. Found to have positive UA. Pt admitted for sepsis secondary to UTI and  acute on chronic HFrEF exacerbation.          Problem/Plan - 1:  ·  Problem: Acute on chronic systolic congestive heart failure.   ·  Plan: - stable, euvolemic  - saturating 98% on 2L-- wean as tolerated, now at RA saturating comfortably  - c/w po lasix 40 mg daily  - Strict I/Os, daily weights  - c/w carvedilol 6.25mg BID  - cardiology dr hernandez following.      Problem/Plan - 2:.  ·  Problem: Acute respiratory failure with hypoxia.   ·  Plan: - due to a/c chf exacerbation  - on ra     Problem/Plan - 3:  ·  Problem: Physical debility.   ·  Plan: Weakness in LE likely due to extensive edema. Pt ambulates with rolling walker at baseline.   - PT OG-- refusing, return to [facility]   - fall precautions.     Problem/Plan - 4:  ·  Problem: Hypothyroidism.   ·  Plan: - c/w levothyroxine 100 mcg.     Problem/Plan - 5:  ·  Problem: Prophylactic measure.   ·  Plan: DVT ppx: lovenox 40mg qd.      pt medically stable and cleared for dc by Dr. Thao

## 2023-08-02 NOTE — DISCHARGE NOTE PROVIDER - PROVIDER TOKENS
PROVIDER:[TOKEN:[[id number]],FOLLOWUP:[2 weeks]],PROVIDER:[TOKEN:[[id number]],FOLLOWUP:[2 weeks]]

## 2023-08-02 NOTE — DISCHARGE NOTE NURSING/CASE MANAGEMENT/SOCIAL WORK - PATIENT PORTAL LINK FT
You can access the FollowMyHealth Patient Portal offered by [facility] by registering at the following website: [email or web address]. By joining [employer]’s FollowMyHealth portal, you will also be able to view your health information using other applications (apps) compatible with our system.

## 2023-08-02 NOTE — DISCHARGE NOTE PROVIDER - NSDCMRMEDTOKEN_GEN_ALL_CORE_FT
amLODIPine 5 mg oral tablet: 1 tab(s) orally once a day  atorvastatin 20 mg oral tablet: 1 tab(s) orally once a day (at bedtime)  Biofreeze 4% topical gel: Apply topically to affected area 2 times a day to both knees  carvedilol 6.25 mg oral tablet: 1 tab(s) orally 2 times a day  furosemide 40 mg oral tablet: 1 tab(s) orally once a day  lactobacillus acidophilus oral capsule: 1 cap(s) orally once a day  levothyroxine 100 mcg (0.1 mg) oral tablet: 1 tab(s) orally once a day  methenamine hippurate 1 g oral tablet: 1 tab(s) orally once a day  nystatin 100,000 units/g topical powder: Apply topically to affected area once a day to bust after showers  spironolactone 25 mg oral tablet: 1 tab(s) orally once a day  Vitamin D3 25 mcg (1000 intl units) oral tablet: 1 tab(s) orally once a day   amLODIPine 5 mg oral tablet: 1 tab(s) orally once a day  atorvastatin 20 mg oral tablet: 1 tab(s) orally once a day (at bedtime)  Biofreeze 4% topical gel: Apply topically to affected area 2 times a day to both knees  carvedilol 6.25 mg oral tablet: 1 tab(s) orally 2 times a day  furosemide 40 mg oral tablet: 1 tab(s) orally once a day  lactobacillus acidophilus oral capsule: 1 cap(s) orally once a day  levothyroxine 100 mcg (0.1 mg) oral tablet: 1 tab(s) orally once a day  methenamine hippurate 1 g oral tablet: 1 tab(s) orally once a day  nystatin 100,000 units/g topical powder: Apply topically to affected area once a day to bust after showers  occupational therapy: 1  Physical therapy: 1  spironolactone 25 mg oral tablet: 1 tab(s) orally once a day  Vitamin D3 25 mcg (1000 intl units) oral tablet: 1 tab(s) orally once a day   amLODIPine 5 mg oral tablet: 1 tab(s) orally once a day  atorvastatin 20 mg oral tablet: 1 tab(s) orally once a day (at bedtime)  Biofreeze 4% topical gel: Apply topically to affected area 2 times a day to both knees  carvedilol 6.25 mg oral tablet: 1 tab(s) orally 2 times a day  furosemide 40 mg oral tablet: 1 tab(s) orally once a day  lactobacillus acidophilus oral capsule: 1 cap(s) orally once a day  levothyroxine 100 mcg (0.1 mg) oral tablet: 1 tab(s) orally once a day  methenamine hippurate 1 g oral tablet: 1 tab(s) orally once a day  nystatin 100,000 units/g topical powder: Apply topically to affected area once a day to bust after showers  Vitamin D3 25 mcg (1000 intl units) oral tablet: 1 tab(s) orally once a day

## 2023-08-02 NOTE — PROGRESS NOTE ADULT - PROBLEM SELECTOR PLAN 1
- stable, euvolemic  - saturating 98% on 2L-- wean as tolerated   - c/w po lasix 40 mg daily  - Strict I/Os, daily weights  - c/w carvedilol 6.25mg BID  - cardiology dr hernandez following

## 2023-08-03 LAB
RAPID RVP RESULT: DETECTED
RV+EV RNA SPEC QL NAA+PROBE: DETECTED
SARS-COV-2 RNA SPEC QL NAA+PROBE: SIGNIFICANT CHANGE UP

## 2023-08-03 PROCEDURE — 99232 SBSQ HOSP IP/OBS MODERATE 35: CPT

## 2023-08-03 PROCEDURE — 71045 X-RAY EXAM CHEST 1 VIEW: CPT | Mod: 26

## 2023-08-03 RX ORDER — BENZOCAINE AND MENTHOL 5; 1 G/100ML; G/100ML
1 LIQUID ORAL THREE TIMES A DAY
Refills: 0 | Status: DISCONTINUED | OUTPATIENT
Start: 2023-08-03 | End: 2023-08-04

## 2023-08-03 RX ADMIN — Medication 1 DROP(S): at 12:06

## 2023-08-03 RX ADMIN — CARVEDILOL PHOSPHATE 6.25 MILLIGRAM(S): 80 CAPSULE, EXTENDED RELEASE ORAL at 05:09

## 2023-08-03 RX ADMIN — CARVEDILOL PHOSPHATE 6.25 MILLIGRAM(S): 80 CAPSULE, EXTENDED RELEASE ORAL at 18:30

## 2023-08-03 RX ADMIN — Medication 1 DROP(S): at 21:40

## 2023-08-03 RX ADMIN — NYSTATIN CREAM 1 APPLICATION(S): 100000 CREAM TOPICAL at 18:32

## 2023-08-03 RX ADMIN — ATORVASTATIN CALCIUM 20 MILLIGRAM(S): 80 TABLET, FILM COATED ORAL at 21:40

## 2023-08-03 RX ADMIN — LIDOCAINE 1 PATCH: 4 CREAM TOPICAL at 19:29

## 2023-08-03 RX ADMIN — BENZOCAINE AND MENTHOL 1 LOZENGE: 5; 1 LIQUID ORAL at 09:52

## 2023-08-03 RX ADMIN — ENOXAPARIN SODIUM 30 MILLIGRAM(S): 100 INJECTION SUBCUTANEOUS at 05:08

## 2023-08-03 RX ADMIN — BENZOCAINE AND MENTHOL 1 LOZENGE: 5; 1 LIQUID ORAL at 18:31

## 2023-08-03 RX ADMIN — Medication 40 MILLIGRAM(S): at 05:09

## 2023-08-03 RX ADMIN — Medication 100 MICROGRAM(S): at 05:08

## 2023-08-03 RX ADMIN — AMLODIPINE BESYLATE 5 MILLIGRAM(S): 2.5 TABLET ORAL at 05:09

## 2023-08-03 RX ADMIN — Medication 1 DROP(S): at 05:08

## 2023-08-03 RX ADMIN — LIDOCAINE 1 PATCH: 4 CREAM TOPICAL at 12:05

## 2023-08-03 RX ADMIN — LIDOCAINE 1 PATCH: 4 CREAM TOPICAL at 00:06

## 2023-08-03 NOTE — PROGRESS NOTE ADULT - PROBLEM SELECTOR PLAN 1
- stable, euvolemic; now on roomair   - c/w po lasix 40 mg daily  - Strict I/Os, daily weights  - c/w carvedilol 6.25mg BID  - cardiology dr hernandez following

## 2023-08-03 NOTE — PROVIDER CONTACT NOTE (OTHER) - ASSESSMENT
Patient is Aox4; denies chest pain, sob, headache, nausea, vomiting, dizziness or headache.
Patient is Aox4; denies chest pain, sob, headache, nausea, vomiting, dizziness or headache.

## 2023-08-03 NOTE — PROVIDER CONTACT NOTE (OTHER) - ACTION/TREATMENT ORDERED:
Provider made aware; advised to placed hyperthermia blanket. Will continue to monitor.
Provider made aware; advised to placed hyperthermia blanket. Will continue to monitor.

## 2023-08-03 NOTE — PROVIDER CONTACT NOTE (OTHER) - BACKGROUND
Patient admitted with Weakness with UTI s/p IV ABx.
Patient admitted with Weakness with UTI s/p IV ABx.

## 2023-08-03 NOTE — PROVIDER CONTACT NOTE (OTHER) - SITUATION
During routine vital sign check patient oral temp found to be 95.2
During routine vital sign check patient oral temp found to be 96.1

## 2023-08-04 VITALS
HEART RATE: 71 BPM | DIASTOLIC BLOOD PRESSURE: 61 MMHG | TEMPERATURE: 97 F | RESPIRATION RATE: 18 BRPM | OXYGEN SATURATION: 91 % | SYSTOLIC BLOOD PRESSURE: 131 MMHG

## 2023-08-04 DIAGNOSIS — B34.8 OTHER VIRAL INFECTIONS OF UNSPECIFIED SITE: ICD-10-CM

## 2023-08-04 PROCEDURE — 85610 PROTHROMBIN TIME: CPT

## 2023-08-04 PROCEDURE — 84100 ASSAY OF PHOSPHORUS: CPT

## 2023-08-04 PROCEDURE — 82947 ASSAY GLUCOSE BLOOD QUANT: CPT

## 2023-08-04 PROCEDURE — 85027 COMPLETE CBC AUTOMATED: CPT

## 2023-08-04 PROCEDURE — 97161 PT EVAL LOW COMPLEX 20 MIN: CPT

## 2023-08-04 PROCEDURE — 94640 AIRWAY INHALATION TREATMENT: CPT

## 2023-08-04 PROCEDURE — 99285 EMERGENCY DEPT VISIT HI MDM: CPT

## 2023-08-04 PROCEDURE — 36415 COLL VENOUS BLD VENIPUNCTURE: CPT

## 2023-08-04 PROCEDURE — 97530 THERAPEUTIC ACTIVITIES: CPT

## 2023-08-04 PROCEDURE — 71045 X-RAY EXAM CHEST 1 VIEW: CPT

## 2023-08-04 PROCEDURE — 87186 SC STD MICRODIL/AGAR DIL: CPT

## 2023-08-04 PROCEDURE — 82330 ASSAY OF CALCIUM: CPT

## 2023-08-04 PROCEDURE — 83880 ASSAY OF NATRIURETIC PEPTIDE: CPT

## 2023-08-04 PROCEDURE — 87086 URINE CULTURE/COLONY COUNT: CPT

## 2023-08-04 PROCEDURE — 93005 ELECTROCARDIOGRAM TRACING: CPT

## 2023-08-04 PROCEDURE — 80053 COMPREHEN METABOLIC PANEL: CPT

## 2023-08-04 PROCEDURE — 81001 URINALYSIS AUTO W/SCOPE: CPT

## 2023-08-04 PROCEDURE — 85014 HEMATOCRIT: CPT

## 2023-08-04 PROCEDURE — 85025 COMPLETE CBC W/AUTO DIFF WBC: CPT

## 2023-08-04 PROCEDURE — 82803 BLOOD GASES ANY COMBINATION: CPT

## 2023-08-04 PROCEDURE — 93308 TTE F-UP OR LMTD: CPT

## 2023-08-04 PROCEDURE — 83735 ASSAY OF MAGNESIUM: CPT

## 2023-08-04 PROCEDURE — 84295 ASSAY OF SERUM SODIUM: CPT

## 2023-08-04 PROCEDURE — 82435 ASSAY OF BLOOD CHLORIDE: CPT

## 2023-08-04 PROCEDURE — 99239 HOSP IP/OBS DSCHRG MGMT >30: CPT

## 2023-08-04 PROCEDURE — 96375 TX/PRO/DX INJ NEW DRUG ADDON: CPT

## 2023-08-04 PROCEDURE — 96374 THER/PROPH/DIAG INJ IV PUSH: CPT

## 2023-08-04 PROCEDURE — 83605 ASSAY OF LACTIC ACID: CPT

## 2023-08-04 PROCEDURE — 0225U NFCT DS DNA&RNA 21 SARSCOV2: CPT

## 2023-08-04 PROCEDURE — 87635 SARS-COV-2 COVID-19 AMP PRB: CPT

## 2023-08-04 PROCEDURE — 84484 ASSAY OF TROPONIN QUANT: CPT

## 2023-08-04 PROCEDURE — 85730 THROMBOPLASTIN TIME PARTIAL: CPT

## 2023-08-04 PROCEDURE — C8929: CPT

## 2023-08-04 PROCEDURE — 97110 THERAPEUTIC EXERCISES: CPT

## 2023-08-04 PROCEDURE — 87040 BLOOD CULTURE FOR BACTERIA: CPT

## 2023-08-04 PROCEDURE — 80048 BASIC METABOLIC PNL TOTAL CA: CPT

## 2023-08-04 PROCEDURE — 84132 ASSAY OF SERUM POTASSIUM: CPT

## 2023-08-04 PROCEDURE — 85018 HEMOGLOBIN: CPT

## 2023-08-04 PROCEDURE — 82962 GLUCOSE BLOOD TEST: CPT

## 2023-08-04 RX ORDER — SPIRONOLACTONE 25 MG/1
1 TABLET, FILM COATED ORAL
Refills: 0 | DISCHARGE

## 2023-08-04 RX ADMIN — Medication 100 MICROGRAM(S): at 05:34

## 2023-08-04 RX ADMIN — Medication 40 MILLIGRAM(S): at 05:34

## 2023-08-04 RX ADMIN — Medication 1 DROP(S): at 05:35

## 2023-08-04 RX ADMIN — ENOXAPARIN SODIUM 30 MILLIGRAM(S): 100 INJECTION SUBCUTANEOUS at 05:34

## 2023-08-04 RX ADMIN — NYSTATIN CREAM 1 APPLICATION(S): 100000 CREAM TOPICAL at 05:34

## 2023-08-04 RX ADMIN — LIDOCAINE 1 PATCH: 4 CREAM TOPICAL at 00:10

## 2023-08-04 RX ADMIN — CARVEDILOL PHOSPHATE 6.25 MILLIGRAM(S): 80 CAPSULE, EXTENDED RELEASE ORAL at 05:34

## 2023-08-04 RX ADMIN — Medication 1 DROP(S): at 12:42

## 2023-08-04 RX ADMIN — AMLODIPINE BESYLATE 5 MILLIGRAM(S): 2.5 TABLET ORAL at 05:34

## 2023-08-04 NOTE — PROGRESS NOTE ADULT - SUBJECTIVE AND OBJECTIVE BOX
CARDIOLOGY FOLLOW UP - Dr. Riley  DATE OF SERVICE: 8/1/23    CC  No CV complaints    REVIEW OF SYSTEMS:  CONSTITUTIONAL: No fever, weight loss, or fatigue  RESPIRATORY: No cough, wheezing, chills or hemoptysis; No Shortness of Breath  CARDIOVASCULAR: No chest pain, palpitations, passing out, dizziness, or leg swelling  GASTROINTESTINAL: No abdominal or epigastric pain. No nausea, vomiting, or hematemesis; No diarrhea or constipation. No melena or hematochezia.  VASCULAR: No edema     PHYSICAL EXAM:  T(C): 36.4 (08-01-23 @ 04:50), Max: 36.5 (07-31-23 @ 21:07)  HR: 71 (08-01-23 @ 04:50) (59 - 71)  BP: 135/77 (08-01-23 @ 04:50) (123/79 - 138/85)  RR: 18 (08-01-23 @ 04:50) (18 - 19)  SpO2: 98% (08-01-23 @ 04:50) (96% - 98%)  Wt(kg): --  I&O's Summary    31 Jul 2023 07:01  -  01 Aug 2023 07:00  --------------------------------------------------------  IN: 480 mL / OUT: 700 mL / NET: -220 mL        Appearance: Elderly female	  Cardiovascular: Normal S1 S2,RRR, No JVD, No murmurs  Respiratory: Lungs clear to auscultation b/l  Gastrointestinal:  Soft, Non-tender, + BS	  Extremities: Normal range of motion, No clubbing, cyanosis or edema      Home Medications:  amLODIPine 5 mg oral tablet: 1 tab(s) orally once a day (28 Jul 2023 10:39)  Biofreeze 4% topical gel: Apply topically to affected area 2 times a day to both knees (28 Jul 2023 10:43)  carvedilol 6.25 mg oral tablet: 1 tab(s) orally 2 times a day (28 Jul 2023 10:39)  lactobacillus acidophilus oral capsule: 1 cap(s) orally once a day (28 Jul 2023 10:43)  Lasix 20 mg oral tablet: 1 tab(s) orally Unsure If patient was taking Daily or MWF (28 Jul 2023 11:39)  levothyroxine 100 mcg (0.1 mg) oral tablet: 1 tab(s) orally once a day (28 Jul 2023 10:39)  methenamine hippurate 1 g oral tablet: 1 tab(s) orally once a day (28 Jul 2023 11:39)  nystatin 100,000 units/g topical powder: Apply topically to affected area once a day to bust after showers (28 Jul 2023 11:39)  spironolactone 25 mg oral tablet: 1 tab(s) orally once a day (28 Jul 2023 10:39)  Vitamin D3 25 mcg (1000 intl units) oral tablet: 1 tab(s) orally once a day (28 Jul 2023 10:39)      MEDICATIONS  (STANDING):  amLODIPine   Tablet 5 milliGRAM(s) Oral daily  artificial tears (preservative free) Ophthalmic Solution 1 Drop(s) Both EYES three times a day  atorvastatin 20 milliGRAM(s) Oral at bedtime  carvedilol 6.25 milliGRAM(s) Oral every 12 hours  enoxaparin Injectable 30 milliGRAM(s) SubCutaneous every 24 hours  ertapenem  IVPB 500 milliGRAM(s) IV Intermittent every 24 hours  furosemide   Injectable 40 milliGRAM(s) IV Push daily  levothyroxine 100 MICROGram(s) Oral daily  nystatin Powder 1 Application(s) Topical two times a day  potassium chloride    Tablet ER 40 milliEquivalent(s) Oral once      TELEMETRY: Afib/vpaced 60-80	    ECG:  	  RADIOLOGY:   DIAGNOSTIC TESTING:  [ ] Echocardiogram:  [ ]  Catheterization:  [ ] Stress Test:    OTHER: 	    LABS:	 	    Troponin T, High Sensitivity Result: 26 ng/L [0 - 51] (07-27 @ 19:20)                          11.6   7.80  )-----------( 120      ( 31 Jul 2023 05:54 )             35.7     08-01    140  |  94<L>  |  52<H>  ----------------------------<  91  3.3<L>   |  29  |  1.06    Ca    9.8      01 Aug 2023 07:43  Phos  4.0     07-31  Mg     1.9     07-31    TPro  7.4  /  Alb  3.7  /  TBili  0.5  /  DBili  x   /  AST  11  /  ALT  36  /  AlkPhos  157<H>  08-01            
no new complaints today.     GENERAL: No fevers, no chills.  EYES: No blurry vision,  No photophobia  ENT: No sore throat.  No dysphagia  Cardiovascular: No chest pain, palpitations, orthopnea  Pulmonary: No cough, no wheezing. No shortness of breath  Gastrointestinal: No abdominal pain, no diarrhea, no constipation.   Musculoskeletal: No weakness.  No myalgias.  Dermatology:  No rashes.  Neuro: No Headache.  No vertigo.  No dizziness.  Psych: No anxiety, no depression.  Denies suicidal thoughts.    MEDICATIONS  (STANDING):  amLODIPine   Tablet 5 milliGRAM(s) Oral daily  artificial tears (preservative free) Ophthalmic Solution 1 Drop(s) Both EYES three times a day  atorvastatin 20 milliGRAM(s) Oral at bedtime  carvedilol 6.25 milliGRAM(s) Oral every 12 hours  enoxaparin Injectable 30 milliGRAM(s) SubCutaneous every 24 hours  furosemide    Tablet 40 milliGRAM(s) Oral daily  levothyroxine 100 MICROGram(s) Oral daily  lidocaine   4% Patch 1 Patch Transdermal daily  nystatin Powder 1 Application(s) Topical two times a day    MEDICATIONS  (PRN):  benzocaine/menthol Lozenge 1 Lozenge Oral three times a day PRN Sore Throat    Vital Signs Last 24 Hrs  T(C): 36.9 (04 Aug 2023 04:41), Max: 36.9 (04 Aug 2023 04:41)  T(F): 98.4 (04 Aug 2023 04:41), Max: 98.4 (04 Aug 2023 04:41)  HR: 80 (04 Aug 2023 04:41) (61 - 80)  BP: 132/66 (04 Aug 2023 04:41) (116/61 - 134/74)  BP(mean): --  RR: 18 (04 Aug 2023 04:41) (18 - 18)  SpO2: 94% (04 Aug 2023 04:41) (94% - 97%)    Parameters below as of 04 Aug 2023 04:41  Patient On (Oxygen Delivery Method): nasal cannula    GENERAL: NAD  HEAD:  Atraumatic, Normocephalic  EYES: EOMI, PERRLA, conjunctiva and sclera clear  ENT: Pharynx not erythematous  PULMONARY: Clear to auscultation bilaterally; No wheeze  CARDIOVASCULAR: Regular rate and rhythm; No murmurs, rubs, or gallops  ABDOMEN: Soft, Nontender, Nondistended; Bowel sounds present  EXTREMITIES:  2+ Peripheral Pulses, No clubbing, cyanosis, or edema  MUSCULOSKELETAL: No calf tenderness  PSYCH: AAOx3, normal affect  SKIN: warm and dry, No rashes or lesions    .  LABS:                     RADIOLOGY, EKG & ADDITIONAL TESTS: Reviewed. 
CARDIOLOGY FOLLOW UP - Dr. Riley  DATE OF SERVICE: 8/2/23    CC  No CV complaints    REVIEW OF SYSTEMS:  CONSTITUTIONAL: No fever, weight loss, or fatigue  RESPIRATORY: No cough, wheezing, chills or hemoptysis; No Shortness of Breath  CARDIOVASCULAR: No chest pain, palpitations, passing out, dizziness, or leg swelling  GASTROINTESTINAL: No abdominal or epigastric pain. No nausea, vomiting, or hematemesis; No diarrhea or constipation. No melena or hematochezia.  VASCULAR: No edema     PHYSICAL EXAM:  T(C): 36.7 (08-02-23 @ 04:39), Max: 36.7 (08-02-23 @ 04:39)  HR: 58 (08-02-23 @ 04:39) (58 - 77)  BP: 120/74 (08-02-23 @ 04:39) (117/66 - 127/74)  RR: 18 (08-02-23 @ 04:39) (18 - 18)  SpO2: 97% (08-02-23 @ 04:39) (94% - 100%)  Wt(kg): --  I&O's Summary    01 Aug 2023 07:01  -  02 Aug 2023 07:00  --------------------------------------------------------  IN: 480 mL / OUT: 850 mL / NET: -370 mL        Appearance: Elderly female	  Cardiovascular: Normal S1 S2,RRR, No JVD, No murmurs  Respiratory: Lungs clear to auscultation b/l  Gastrointestinal:  Soft, Non-tender, + BS	  Extremities: Normal range of motion, No clubbing, cyanosis or edema      Home Medications:  amLODIPine 5 mg oral tablet: 1 tab(s) orally once a day (28 Jul 2023 10:39)  Biofreeze 4% topical gel: Apply topically to affected area 2 times a day to both knees (28 Jul 2023 10:43)  carvedilol 6.25 mg oral tablet: 1 tab(s) orally 2 times a day (28 Jul 2023 10:39)  lactobacillus acidophilus oral capsule: 1 cap(s) orally once a day (28 Jul 2023 10:43)  Lasix 20 mg oral tablet: 1 tab(s) orally Unsure If patient was taking Daily or MWF (28 Jul 2023 11:39)  levothyroxine 100 mcg (0.1 mg) oral tablet: 1 tab(s) orally once a day (28 Jul 2023 10:39)  methenamine hippurate 1 g oral tablet: 1 tab(s) orally once a day (28 Jul 2023 11:39)  nystatin 100,000 units/g topical powder: Apply topically to affected area once a day to bust after showers (28 Jul 2023 11:39)  spironolactone 25 mg oral tablet: 1 tab(s) orally once a day (28 Jul 2023 10:39)  Vitamin D3 25 mcg (1000 intl units) oral tablet: 1 tab(s) orally once a day (28 Jul 2023 10:39)      MEDICATIONS  (STANDING):  amLODIPine   Tablet 5 milliGRAM(s) Oral daily  artificial tears (preservative free) Ophthalmic Solution 1 Drop(s) Both EYES three times a day  atorvastatin 20 milliGRAM(s) Oral at bedtime  carvedilol 6.25 milliGRAM(s) Oral every 12 hours  enoxaparin Injectable 30 milliGRAM(s) SubCutaneous every 24 hours  furosemide    Tablet 40 milliGRAM(s) Oral daily  levothyroxine 100 MICROGram(s) Oral daily  lidocaine   4% Patch 1 Patch Transdermal daily  nystatin Powder 1 Application(s) Topical two times a day      TELEMETRY: Afib/vpaced 55-70s	    ECG:  	  RADIOLOGY:   DIAGNOSTIC TESTING:  [ ] Echocardiogram:    [ ]  Catheterization:  [ ] Stress Test:    OTHER: 	    LABS:	 	    Troponin T, High Sensitivity Result: 26 ng/L [0 - 51] (07-27 @ 19:20)      08-01    140  |  94<L>  |  52<H>  ----------------------------<  91  3.3<L>   |  29  |  1.06    Ca    9.8      01 Aug 2023 07:43    TPro  7.4  /  Alb  3.7  /  TBili  0.5  /  DBili  x   /  AST  11  /  ALT  36  /  AlkPhos  157<H>  08-01            
CARDIOLOGY FOLLOW UP - Dr. Riley  DATE OF SERVICE: 8/4/23    CC  No CV complaints    REVIEW OF SYSTEMS:  CONSTITUTIONAL: No fever, weight loss, or fatigue  RESPIRATORY: No cough, wheezing, chills or hemoptysis; No Shortness of Breath  CARDIOVASCULAR: No chest pain, palpitations, passing out, dizziness, or leg swelling  GASTROINTESTINAL: No abdominal or epigastric pain. No nausea, vomiting, or hematemesis; No diarrhea or constipation. No melena or hematochezia.  VASCULAR: No edema     PHYSICAL EXAM:  T(C): 36.9 (08-04-23 @ 04:41), Max: 36.9 (08-04-23 @ 04:41)  HR: 80 (08-04-23 @ 04:41) (61 - 80)  BP: 132/66 (08-04-23 @ 04:41) (116/61 - 134/74)  RR: 18 (08-04-23 @ 04:41) (18 - 18)  SpO2: 94% (08-04-23 @ 04:41) (94% - 97%)  Wt(kg): --  I&O's Summary    03 Aug 2023 07:01  -  04 Aug 2023 07:00  --------------------------------------------------------  IN: 480 mL / OUT: 850 mL / NET: -370 mL        Appearance: Elderly female	  Cardiovascular: Normal S1 S2,RRR, No JVD, No murmurs  Respiratory: Lungs clear to auscultation b/l  Gastrointestinal:  Soft, Non-tender, + BS	  Extremities: Normal range of motion, No clubbing, cyanosis or edema      Home Medications:  amLODIPine 5 mg oral tablet: 1 tab(s) orally once a day (28 Jul 2023 10:39)  Biofreeze 4% topical gel: Apply topically to affected area 2 times a day to both knees (28 Jul 2023 10:43)  carvedilol 6.25 mg oral tablet: 1 tab(s) orally 2 times a day (28 Jul 2023 10:39)  lactobacillus acidophilus oral capsule: 1 cap(s) orally once a day (28 Jul 2023 10:43)  levothyroxine 100 mcg (0.1 mg) oral tablet: 1 tab(s) orally once a day (28 Jul 2023 10:39)  methenamine hippurate 1 g oral tablet: 1 tab(s) orally once a day (28 Jul 2023 11:39)  nystatin 100,000 units/g topical powder: Apply topically to affected area once a day to bust after showers (28 Jul 2023 11:39)  spironolactone 25 mg oral tablet: 1 tab(s) orally once a day (28 Jul 2023 10:39)  Vitamin D3 25 mcg (1000 intl units) oral tablet: 1 tab(s) orally once a day (28 Jul 2023 10:39)      MEDICATIONS  (STANDING):  amLODIPine   Tablet 5 milliGRAM(s) Oral daily  artificial tears (preservative free) Ophthalmic Solution 1 Drop(s) Both EYES three times a day  atorvastatin 20 milliGRAM(s) Oral at bedtime  carvedilol 6.25 milliGRAM(s) Oral every 12 hours  enoxaparin Injectable 30 milliGRAM(s) SubCutaneous every 24 hours  furosemide    Tablet 40 milliGRAM(s) Oral daily  levothyroxine 100 MICROGram(s) Oral daily  lidocaine   4% Patch 1 Patch Transdermal daily  nystatin Powder 1 Application(s) Topical two times a day      TELEMETRY: Afib 70-80	    ECG:  	  RADIOLOGY:   < from: Xray Chest 1 View AP/PA (08.03.23 @ 10:42) >    IMPRESSION:  Similar small left and trace right pleural effusions.  No new focal consolidation to suggest pneumonia.    --- End of Report ---    < end of copied text >    DIAGNOSTIC TESTING:  [ ] Echocardiogram:  [ ]  Catheterization:  [ ] Stress Test:    OTHER: 	    LABS:	 	                            12.4   5.43  )-----------( 129      ( 02 Aug 2023 09:25 )             37.6     08-02    136  |  95<L>  |  57<H>  ----------------------------<  93  3.9   |  27  |  1.06    Ca    9.6      02 Aug 2023 09:25  Phos  4.0     08-02  Mg     2.1     08-02              
CARDIOLOGY FOLLOW UP - Dr. Riley  DATE OF SERVICE: 8/3/23    CC  Events noted   Endorsing new cough  Otherwise no CV complaints    REVIEW OF SYSTEMS:  CONSTITUTIONAL: No fever, weight loss, or fatigue  RESPIRATORY: ++ cough, no wheezing, chills or hemoptysis; No Shortness of Breath  CARDIOVASCULAR: No chest pain, palpitations, passing out, dizziness, or leg swelling  GASTROINTESTINAL: No abdominal or epigastric pain. No nausea, vomiting, or hematemesis; No diarrhea or constipation. No melena or hematochezia.  VASCULAR: No edema     PHYSICAL EXAM:  T(C): 37.4 (08-03-23 @ 04:47), Max: 37.4 (08-03-23 @ 04:47)  HR: 69 (08-03-23 @ 04:47) (56 - 69)  BP: 149/77 (08-03-23 @ 04:47) (100/69 - 149/77)  RR: 18 (08-03-23 @ 04:47) (18 - 20)  SpO2: 94% (08-03-23 @ 04:47) (87% - 96%)  Wt(kg): --  I&O's Summary    02 Aug 2023 07:01  -  03 Aug 2023 07:00  --------------------------------------------------------  IN: 0 mL / OUT: 800 mL / NET: -800 mL        Appearance: Normal	  Cardiovascular: Normal S1 S2,RRR, No JVD, No murmurs  Respiratory: Lungs clear to auscultation	  Gastrointestinal:  Soft, Non-tender, + BS	  Extremities: Normal range of motion, No clubbing, cyanosis or edema      Home Medications:  amLODIPine 5 mg oral tablet: 1 tab(s) orally once a day (28 Jul 2023 10:39)  Biofreeze 4% topical gel: Apply topically to affected area 2 times a day to both knees (28 Jul 2023 10:43)  carvedilol 6.25 mg oral tablet: 1 tab(s) orally 2 times a day (28 Jul 2023 10:39)  lactobacillus acidophilus oral capsule: 1 cap(s) orally once a day (28 Jul 2023 10:43)  levothyroxine 100 mcg (0.1 mg) oral tablet: 1 tab(s) orally once a day (28 Jul 2023 10:39)  methenamine hippurate 1 g oral tablet: 1 tab(s) orally once a day (28 Jul 2023 11:39)  nystatin 100,000 units/g topical powder: Apply topically to affected area once a day to bust after showers (28 Jul 2023 11:39)  spironolactone 25 mg oral tablet: 1 tab(s) orally once a day (28 Jul 2023 10:39)  Vitamin D3 25 mcg (1000 intl units) oral tablet: 1 tab(s) orally once a day (28 Jul 2023 10:39)      MEDICATIONS  (STANDING):  amLODIPine   Tablet 5 milliGRAM(s) Oral daily  artificial tears (preservative free) Ophthalmic Solution 1 Drop(s) Both EYES three times a day  atorvastatin 20 milliGRAM(s) Oral at bedtime  carvedilol 6.25 milliGRAM(s) Oral every 12 hours  enoxaparin Injectable 30 milliGRAM(s) SubCutaneous every 24 hours  furosemide    Tablet 40 milliGRAM(s) Oral daily  levothyroxine 100 MICROGram(s) Oral daily  lidocaine   4% Patch 1 Patch Transdermal daily  nystatin Powder 1 Application(s) Topical two times a day      TELEMETRY: Avpaced 50-70s	    ECG:  	  RADIOLOGY:   DIAGNOSTIC TESTING:  [ ] Echocardiogram:  < from: TTE W or WO Ultrasound Enhancing Agent (08.01.23 @ 12:54) >  CONCLUSIONS:      1. Normal left ventricular cavity size. The left ventricular wall thickness is normal. Abnormal septal motion consistent with left bundle branch block. There are regional wall motion abnormalities No evidence of a thrombus in the left ventricle.   2. Multiple segmental abnormalities exist. See findings.   3. The left ventricular diastolic function is indeterminate.   4. Normal right ventricular cavity size, normal right ventricular wall thickness and normal right ventricular systolic function. The tricuspid annular plane systolic excursion (TAPSE) is 1.3 cm (normal >=1.7 cm).   5. There is mild to moderate mitral regurgitation.   6. There is mild-moderate tricuspid regurgitation. Estimated pulmonary artery systolic pressure is 37 mmHg.   7. No pericardial effusion seen.   8. Compared to the transthoracic echocardiogram performed on 4/16/2022 there have been no significant interval changes.    < end of copied text >    [ ]  Catheterization:  [ ] Stress Test:    OTHER: 	    LABS:	 	    Troponin T, High Sensitivity Result: 26 ng/L [0 - 51] (07-27 @ 19:20)                          12.4   5.43  )-----------( 129      ( 02 Aug 2023 09:25 )             37.6     08-02    136  |  95<L>  |  57<H>  ----------------------------<  93  3.9   |  27  |  1.06    Ca    9.6      02 Aug 2023 09:25  Phos  4.0     08-02  Mg     2.1     08-02              
[facility] Division of Hospital Medicine  Maria Teresa Hernandez DO  Available on Teams Iliana    Patient is a 89y old  Female who presents with a chief complaint of weakness, SOB (28 Jul 2023 13:49)      SUBJECTIVE / OVERNIGHT EVENTS: hypothermic this morning, placed on a warming blanket. Denies any complaints.   ADDITIONAL REVIEW OF SYSTEMS: negative    MEDICATIONS  (STANDING):  amLODIPine   Tablet 5 milliGRAM(s) Oral daily  artificial tears (preservative free) Ophthalmic Solution 1 Drop(s) Both EYES three times a day  atorvastatin 20 milliGRAM(s) Oral at bedtime  carvedilol 6.25 milliGRAM(s) Oral every 12 hours  enoxaparin Injectable 30 milliGRAM(s) SubCutaneous every 24 hours  ertapenem  IVPB 500 milliGRAM(s) IV Intermittent every 24 hours  furosemide   Injectable 40 milliGRAM(s) IV Push daily  levothyroxine 100 MICROGram(s) Oral daily  nystatin Powder 1 Application(s) Topical two times a day    MEDICATIONS  (PRN):      CAPILLARY BLOOD GLUCOSE        I&O's Summary    28 Jul 2023 07:01  -  29 Jul 2023 07:00  --------------------------------------------------------  IN: 335 mL / OUT: 2500 mL / NET: -2165 mL        PHYSICAL EXAM:  Vital Signs Last 24 Hrs  T(C): 36.4 (29 Jul 2023 11:40), Max: 36.4 (28 Jul 2023 20:13)  T(F): 97.6 (29 Jul 2023 11:40), Max: 97.6 (29 Jul 2023 11:40)  HR: 63 (29 Jul 2023 11:40) (63 - 71)  BP: 110/65 (29 Jul 2023 11:40) (110/65 - 148/87)  BP(mean): --  RR: 18 (29 Jul 2023 11:40) (18 - 18)  SpO2: 95% (29 Jul 2023 11:40) (95% - 100%)    Parameters below as of 29 Jul 2023 11:40  Patient On (Oxygen Delivery Method): nasal cannula  O2 Flow (L/min): 1.5      CONSTITUTIONAL: Well-groomed, in no apparent distress, elderly  EYES: No conjunctival or scleral injection, non-icteric; PERRLA and symmetric  ENMT: No external nasal lesions; no pharyngeal injection or exudates, oral mucosa with moist membranes  NECK: Trachea midline without palpable neck mass; thyroid not enlarged and non-tender  RESPIRATORY: Breathing comfortably; lungs CTA without wheeze/rhonchi/rales  CARDIOVASCULAR: +S1S2, RRR, no M/G/R; pedal pulses full and symmetric; no lower extremity edema  GASTROINTESTINAL: No palpable masses or tenderness, +BS throughout, no rebound/guarding  MUSCULOSKELETAL: no digital clubbing or cyanosis; no paraspinal tenderness; normal strength and tone of extremities  SKIN: No rashes or ulcers noted; no subcutaneous nodules or induration palpable  NEUROLOGIC: CN II-XII intact; sensation intact in LEs b/l to light touch  PSYCHIATRIC: A+O x 3; mood and affect appropriate; appropriate insight and judgment    LABS:                        10.2   6.03  )-----------( 110      ( 28 Jul 2023 05:04 )             31.2     07-28    139  |  101  |  54<H>  ----------------------------<  71  4.8   |  24  |  1.26    Ca    9.0      28 Jul 2023 05:04  Phos  4.5     07-28  Mg     2.0     07-28    TPro  7.5  /  Alb  4.1  /  TBili  0.3  /  DBili  x   /  AST  41<H>  /  ALT  121<H>  /  AlkPhos  195<H>  07-27    PT/INR - ( 27 Jul 2023 19:20 )   PT: 10.4 sec;   INR: 0.94 ratio         PTT - ( 27 Jul 2023 19:20 )  PTT:37.7 sec      Urinalysis Basic - ( 28 Jul 2023 05:04 )    Color: x / Appearance: x / SG: x / pH: x  Gluc: 71 mg/dL / Ketone: x  / Bili: x / Urobili: x   Blood: x / Protein: x / Nitrite: x   Leuk Esterase: x / RBC: x / WBC x   Sq Epi: x / Non Sq Epi: x / Bacteria: x        Culture - Blood (collected 28 Jul 2023 01:48)  Source: .Blood Blood-Peripheral  Preliminary Report (29 Jul 2023 06:01):    No growth at 24 hours    Culture - Blood (collected 28 Jul 2023 01:48)  Source: .Blood Blood-Peripheral  Preliminary Report (29 Jul 2023 06:01):    No growth at 24 hours  
neck pain improving.     GENERAL: No fevers, no chills.  EYES: No blurry vision,  No photophobia  ENT: No sore throat.  No dysphagia  Cardiovascular: No chest pain, palpitations, orthopnea  Pulmonary: No cough, no wheezing. No shortness of breath  Gastrointestinal: No abdominal pain, no diarrhea, no constipation.   Musculoskeletal: No weakness.  No myalgias.  Dermatology:  No rashes.  Neuro: No Headache.  No vertigo.  No dizziness.  Psych: No anxiety, no depression.  Denies suicidal thoughts.    MEDICATIONS  (STANDING):  amLODIPine   Tablet 5 milliGRAM(s) Oral daily  artificial tears (preservative free) Ophthalmic Solution 1 Drop(s) Both EYES three times a day  atorvastatin 20 milliGRAM(s) Oral at bedtime  carvedilol 6.25 milliGRAM(s) Oral every 12 hours  enoxaparin Injectable 30 milliGRAM(s) SubCutaneous every 24 hours  furosemide    Tablet 40 milliGRAM(s) Oral daily  levothyroxine 100 MICROGram(s) Oral daily  lidocaine   4% Patch 1 Patch Transdermal daily  nystatin Powder 1 Application(s) Topical two times a day    MEDICATIONS  (PRN):    Vital Signs Last 24 Hrs  T(C): 36.7 (02 Aug 2023 04:39), Max: 36.7 (02 Aug 2023 04:39)  T(F): 98 (02 Aug 2023 04:39), Max: 98 (02 Aug 2023 04:39)  HR: 58 (02 Aug 2023 04:39) (58 - 77)  BP: 120/74 (02 Aug 2023 04:39) (117/66 - 127/74)  BP(mean): --  RR: 18 (02 Aug 2023 10:02) (18 - 20)  SpO2: 96% (02 Aug 2023 10:02) (87% - 100%)    Parameters below as of 02 Aug 2023 10:02  Patient On (Oxygen Delivery Method): nasal cannula  O2 Flow (L/min): 2      GENERAL: NAD  HEAD:  Atraumatic, Normocephalic  EYES: EOMI, PERRLA, conjunctiva and sclera clear  ENT: Pharynx not erythematous  PULMONARY: Clear to auscultation bilaterally; No wheeze  CARDIOVASCULAR: Regular rate and rhythm; No murmurs, rubs, or gallops  ABDOMEN: Soft, Nontender, Nondistended; Bowel sounds present  EXTREMITIES:  2+ Peripheral Pulses, No clubbing, cyanosis, or edema  MUSCULOSKELETAL: No calf tenderness  PSYCH: AAOx3, normal affect  SKIN: warm and dry, No rashes or lesions    .  LABS:                         12.4   5.43  )-----------( 129      ( 02 Aug 2023 09:25 )             37.6     08-02    136  |  95<L>  |  57<H>  ----------------------------<  93  3.9   |  27  |  1.06    Ca    9.6      02 Aug 2023 09:25  Phos  4.0     08-02  Mg     2.1     08-02    TPro  7.4  /  Alb  3.7  /  TBili  0.5  /  DBili  x   /  AST  11  /  ALT  36  /  AlkPhos  157<H>  08-01      Urinalysis Basic - ( 02 Aug 2023 09:25 )    Color: x / Appearance: x / SG: x / pH: x  Gluc: 93 mg/dL / Ketone: x  / Bili: x / Urobili: x   Blood: x / Protein: x / Nitrite: x   Leuk Esterase: x / RBC: x / WBC x   Sq Epi: x / Non Sq Epi: x / Bacteria: x            RADIOLOGY, EKG & ADDITIONAL TESTS: Reviewed. 
[facility] Division of Hospital Medicine  Maria Teresa Hernandez DO  Available on Teams [place]    Patient is a 89y old  Female who presents with a chief complaint of weakness, SOB (29 Jul 2023 13:23)      SUBJECTIVE / OVERNIGHT EVENTS: No events, but temperature is still hypothermic at times. Patient has no complaints.  ADDITIONAL REVIEW OF SYSTEMS: negative    MEDICATIONS  (STANDING):  amLODIPine   Tablet 5 milliGRAM(s) Oral daily  artificial tears (preservative free) Ophthalmic Solution 1 Drop(s) Both EYES three times a day  atorvastatin 20 milliGRAM(s) Oral at bedtime  carvedilol 6.25 milliGRAM(s) Oral every 12 hours  enoxaparin Injectable 30 milliGRAM(s) SubCutaneous every 24 hours  ertapenem  IVPB 500 milliGRAM(s) IV Intermittent every 24 hours  furosemide   Injectable 40 milliGRAM(s) IV Push daily  levothyroxine 100 MICROGram(s) Oral daily  nystatin Powder 1 Application(s) Topical two times a day    MEDICATIONS  (PRN):      CAPILLARY BLOOD GLUCOSE        I&O's Summary    29 Jul 2023 07:01  -  30 Jul 2023 07:00  --------------------------------------------------------  IN: 240 mL / OUT: 2000 mL / NET: -1760 mL    30 Jul 2023 07:01  -  30 Jul 2023 12:57  --------------------------------------------------------  IN: 120 mL / OUT: 0 mL / NET: 120 mL        PHYSICAL EXAM:  Vital Signs Last 24 Hrs  T(C): 36.7 (30 Jul 2023 12:30), Max: 36.7 (30 Jul 2023 12:30)  T(F): 98 (30 Jul 2023 12:30), Max: 98 (30 Jul 2023 12:30)  HR: 68 (30 Jul 2023 12:30) (67 - 69)  BP: 128/78 (30 Jul 2023 12:30) (128/78 - 147/76)  BP(mean): --  RR: 18 (30 Jul 2023 12:30) (18 - 18)  SpO2: 95% (30 Jul 2023 12:30) (94% - 96%)    Parameters below as of 30 Jul 2023 12:30  Patient On (Oxygen Delivery Method): nasal cannula  O2 Flow (L/min): 2    CONSTITUTIONAL: Well-groomed, in no apparent distress, elderly  EYES: No conjunctival or scleral injection, non-icteric; PERRLA and symmetric  ENMT: No external nasal lesions; no pharyngeal injection or exudates, oral mucosa with moist membranes  NECK: Trachea midline without palpable neck mass; thyroid not enlarged and non-tender  RESPIRATORY: Breathing comfortably; lungs CTA without wheeze/rhonchi/rales  CARDIOVASCULAR: +S1S2, RRR, no M/G/R; pedal pulses full and symmetric; no lower extremity edema  GASTROINTESTINAL: No palpable masses or tenderness, +BS throughout, no rebound/guarding  MUSCULOSKELETAL: no digital clubbing or cyanosis; no paraspinal tenderness; normal strength and tone of extremities  SKIN: No rashes or ulcers noted; no subcutaneous nodules or induration palpable  NEUROLOGIC: CN II-XII intact; sensation intact in LEs b/l to light touch  PSYCHIATRIC: A+O x 3; mood and affect appropriate; appropriate insight and judgment    LABS:                        11.1   6.47  )-----------( 112      ( 30 Jul 2023 06:02 )             34.1     07-30    143  |  100  |  52<H>  ----------------------------<  80  4.1   |  25  |  1.27    Ca    9.5      30 Jul 2023 05:59  Phos  4.2     07-30  Mg     2.1     07-30            Urinalysis Basic - ( 30 Jul 2023 05:59 )    Color: x / Appearance: x / SG: x / pH: x  Gluc: 80 mg/dL / Ketone: x  / Bili: x / Urobili: x   Blood: x / Protein: x / Nitrite: x   Leuk Esterase: x / RBC: x / WBC x   Sq Epi: x / Non Sq Epi: x / Bacteria: x        Culture - Blood (collected 28 Jul 2023 01:48)  Source: .Blood Blood-Peripheral  Preliminary Report (30 Jul 2023 06:01):    No growth at 48 Hours    Culture - Blood (collected 28 Jul 2023 01:48)  Source: .Blood Blood-Peripheral  Preliminary Report (30 Jul 2023 06:01):    No growth at 48 Hours    Culture - Urine (collected 27 Jul 2023 22:51)  Source: .Urine  Preliminary Report (30 Jul 2023 11:41):    >100,000 CFU/ml Escherichia coli  
endorsing sore throat and pain with swallowing.     GENERAL: No fevers, no chills.  EYES: No blurry vision,  No photophobia  ENT: No sore throat.  No dysphagia  Cardiovascular: No chest pain, palpitations, orthopnea  Pulmonary: No cough, no wheezing. No shortness of breath  Gastrointestinal: No abdominal pain, no diarrhea, no constipation.   Musculoskeletal: No weakness.  No myalgias.  Dermatology:  No rashes.  Neuro: No Headache.  No vertigo.  No dizziness.  Psych: No anxiety, no depression.  Denies suicidal thoughts.    MEDICATIONS  (STANDING):  amLODIPine   Tablet 5 milliGRAM(s) Oral daily  artificial tears (preservative free) Ophthalmic Solution 1 Drop(s) Both EYES three times a day  atorvastatin 20 milliGRAM(s) Oral at bedtime  carvedilol 6.25 milliGRAM(s) Oral every 12 hours  enoxaparin Injectable 30 milliGRAM(s) SubCutaneous every 24 hours  furosemide    Tablet 40 milliGRAM(s) Oral daily  levothyroxine 100 MICROGram(s) Oral daily  lidocaine   4% Patch 1 Patch Transdermal daily  nystatin Powder 1 Application(s) Topical two times a day    MEDICATIONS  (PRN):  benzocaine/menthol Lozenge 1 Lozenge Oral three times a day PRN Sore Throat    Vital Signs Last 24 Hrs  T(C): 37.4 (03 Aug 2023 04:47), Max: 37.4 (03 Aug 2023 04:47)  T(F): 99.3 (03 Aug 2023 04:47), Max: 99.3 (03 Aug 2023 04:47)  HR: 69 (03 Aug 2023 04:47) (56 - 69)  BP: 149/77 (03 Aug 2023 04:47) (100/69 - 149/77)  BP(mean): --  RR: 18 (03 Aug 2023 04:47) (18 - 18)  SpO2: 94% (03 Aug 2023 04:47) (93% - 96%)    Parameters below as of 03 Aug 2023 04:47  Patient On (Oxygen Delivery Method): room air    GENERAL: NAD  HEAD:  Atraumatic, Normocephalic  EYES: EOMI, PERRLA, conjunctiva and sclera clear  ENT: Pharynx not erythematous  PULMONARY: Clear to auscultation bilaterally; No wheeze  CARDIOVASCULAR: Regular rate and rhythm; No murmurs, rubs, or gallops  ABDOMEN: Soft, Nontender, Nondistended; Bowel sounds present  EXTREMITIES:  2+ Peripheral Pulses, No clubbing, cyanosis, or edema  MUSCULOSKELETAL: No calf tenderness  PSYCH: AAOx3, normal affect  SKIN: warm and dry, No rashes or lesions    .  LABS:                         12.4   5.43  )-----------( 129      ( 02 Aug 2023 09:25 )             37.6     08-02    136  |  95<L>  |  57<H>  ----------------------------<  93  3.9   |  27  |  1.06    Ca    9.6      02 Aug 2023 09:25  Phos  4.0     08-02  Mg     2.1     08-02        Urinalysis Basic - ( 02 Aug 2023 09:25 )    Color: x / Appearance: x / SG: x / pH: x  Gluc: 93 mg/dL / Ketone: x  / Bili: x / Urobili: x   Blood: x / Protein: x / Nitrite: x   Leuk Esterase: x / RBC: x / WBC x   Sq Epi: x / Non Sq Epi: x / Bacteria: x            RADIOLOGY, EKG & ADDITIONAL TESTS: Reviewed. 
no events overnight.     GENERAL: No fevers, no chills.  EYES: No blurry vision,  No photophobia  ENT: No sore throat.  No dysphagia  Cardiovascular: No chest pain, palpitations, orthopnea  Pulmonary: No cough, no wheezing. No shortness of breath  Gastrointestinal: No abdominal pain, no diarrhea, no constipation.   Musculoskeletal: No weakness.  No myalgias.  Dermatology:  No rashes.  Neuro: No Headache.  No vertigo.  No dizziness.  Psych: No anxiety, no depression.  Denies suicidal thoughts.    MEDICATIONS  (STANDING):  amLODIPine   Tablet 5 milliGRAM(s) Oral daily  artificial tears (preservative free) Ophthalmic Solution 1 Drop(s) Both EYES three times a day  atorvastatin 20 milliGRAM(s) Oral at bedtime  carvedilol 6.25 milliGRAM(s) Oral every 12 hours  enoxaparin Injectable 30 milliGRAM(s) SubCutaneous every 24 hours  ertapenem  IVPB 500 milliGRAM(s) IV Intermittent every 24 hours  furosemide   Injectable 40 milliGRAM(s) IV Push daily  levothyroxine 100 MICROGram(s) Oral daily  nystatin Powder 1 Application(s) Topical two times a day    MEDICATIONS  (PRN):    Vital Signs Last 24 Hrs  T(C): 37 (31 Jul 2023 05:25), Max: 37 (31 Jul 2023 05:25)  T(F): 98.6 (31 Jul 2023 05:25), Max: 98.6 (31 Jul 2023 05:25)  HR: 68 (31 Jul 2023 10:44) (60 - 80)  BP: 135/75 (31 Jul 2023 10:44) (128/78 - 155/83)  BP(mean): --  RR: 18 (31 Jul 2023 05:25) (18 - 18)  SpO2: 96% (31 Jul 2023 10:44) (94% - 96%)    Parameters below as of 31 Jul 2023 10:44  Patient On (Oxygen Delivery Method): nasal cannula  O2 Flow (L/min): 2    CONSTITUTIONAL: nad  EYES: No conjunctival or scleral injection, non-icteric; PERRLA and symmetric  ENMT: No external nasal lesions; no pharyngeal injection or exudates, oral mucosa with moist membranes  NECK: Trachea midline without palpable neck mass; thyroid not enlarged and non-tender  RESPIRATORY: Breathing comfortably; lungs CTA without wheeze/rhonchi/rales  CARDIOVASCULAR: +S1S2, RRR, no M/G/R; pedal pulses full and symmetric; no lower extremity edema  GASTROINTESTINAL: No palpable masses or tenderness, +BS throughout, no rebound/guarding  MUSCULOSKELETAL: no digital clubbing or cyanosis; no paraspinal tenderness; normal strength and tone of extremities  SKIN: No rashes or ulcers noted; no subcutaneous nodules or induration palpable  PSYCHIATRIC: A+O x 3; mood and affect appropriate; appropriate insight and judgment    .  LABS:                         11.6   7.80  )-----------( 120      ( 31 Jul 2023 05:54 )             35.7     07-31    140  |  99  |  43<H>  ----------------------------<  100<H>  4.0   |  27  |  1.17    Ca    9.5      31 Jul 2023 05:54  Phos  4.0     07-31  Mg     1.9     07-31    TPro  7.5  /  Alb  3.7  /  TBili  0.6  /  DBili  x   /  AST  15  /  ALT  48<H>  /  AlkPhos  167<H>  07-31      Urinalysis Basic - ( 31 Jul 2023 05:54 )    Color: x / Appearance: x / SG: x / pH: x  Gluc: 100 mg/dL / Ketone: x  / Bili: x / Urobili: x   Blood: x / Protein: x / Nitrite: x   Leuk Esterase: x / RBC: x / WBC x   Sq Epi: x / Non Sq Epi: x / Bacteria: x            RADIOLOGY, EKG & ADDITIONAL TESTS: Reviewed. 
no events overnight.     GENERAL: No fevers, no chills.  EYES: No blurry vision,  No photophobia  ENT: No sore throat.  No dysphagia  Cardiovascular: No chest pain, palpitations, orthopnea  Pulmonary: No cough, no wheezing. No shortness of breath  Gastrointestinal: No abdominal pain, no diarrhea, no constipation.   Musculoskeletal: No weakness.  No myalgias.  Dermatology:  No rashes.  Neuro: No Headache.  No vertigo.  No dizziness.  Psych: No anxiety, no depression.  Denies suicidal thoughts.    MEDICATIONS  (STANDING):  amLODIPine   Tablet 5 milliGRAM(s) Oral daily  artificial tears (preservative free) Ophthalmic Solution 1 Drop(s) Both EYES three times a day  atorvastatin 20 milliGRAM(s) Oral at bedtime  carvedilol 6.25 milliGRAM(s) Oral every 12 hours  enoxaparin Injectable 30 milliGRAM(s) SubCutaneous every 24 hours  levothyroxine 100 MICROGram(s) Oral daily  nystatin Powder 1 Application(s) Topical two times a day  potassium chloride    Tablet ER 40 milliEquivalent(s) Oral once    MEDICATIONS  (PRN):    Vital Signs Last 24 Hrs  T(C): 36.4 (01 Aug 2023 04:50), Max: 36.5 (31 Jul 2023 21:07)  T(F): 97.5 (01 Aug 2023 04:50), Max: 97.7 (31 Jul 2023 21:07)  HR: 71 (01 Aug 2023 04:50) (59 - 71)  BP: 135/77 (01 Aug 2023 04:50) (123/79 - 138/85)  BP(mean): --  RR: 18 (01 Aug 2023 04:50) (18 - 19)  SpO2: 95% (01 Aug 2023 11:02) (95% - 98%)    Parameters below as of 01 Aug 2023 11:02  Patient On (Oxygen Delivery Method): room air    CONSTITUTIONAL: nad  EYES: No conjunctival or scleral injection, non-icteric; PERRLA and symmetric  ENMT: No external nasal lesions; no pharyngeal injection or exudates, oral mucosa with moist membranes  NECK: Trachea midline without palpable neck mass; thyroid not enlarged and non-tender  RESPIRATORY: Breathing comfortably; lungs CTA without wheeze/rhonchi/rales  CARDIOVASCULAR: +S1S2, RRR, no M/G/R; pedal pulses full and symmetric; no lower extremity edema  GASTROINTESTINAL: No palpable masses or tenderness, +BS throughout, no rebound/guarding  MUSCULOSKELETAL: no digital clubbing or cyanosis; no paraspinal tenderness; normal strength and tone of extremities  SKIN: No rashes or ulcers noted; no subcutaneous nodules or induration palpable  PSYCHIATRIC: A+O x 3; mood and affect appropriate; appropriate insight and judgment    .  LABS:                         11.6   7.80  )-----------( 120      ( 31 Jul 2023 05:54 )             35.7     08-01    140  |  94<L>  |  52<H>  ----------------------------<  91  3.3<L>   |  29  |  1.06    Ca    9.8      01 Aug 2023 07:43  Phos  4.0     07-31  Mg     1.9     07-31    TPro  7.4  /  Alb  3.7  /  TBili  0.5  /  DBili  x   /  AST  11  /  ALT  36  /  AlkPhos  157<H>  08-01      Urinalysis Basic - ( 01 Aug 2023 07:43 )    Color: x / Appearance: x / SG: x / pH: x  Gluc: 91 mg/dL / Ketone: x  / Bili: x / Urobili: x   Blood: x / Protein: x / Nitrite: x   Leuk Esterase: x / RBC: x / WBC x   Sq Epi: x / Non Sq Epi: x / Bacteria: x            RADIOLOGY, EKG & ADDITIONAL TESTS: Reviewed. 
[facility] Division of Hospital Medicine  Lissa ArellanoDO   Available via [employer] Teams      Patient is a 89y old  Female who presents with a chief complaint of weakness, SOB (27 Jul 2023 22:40)      SUBJECTIVE / OVERNIGHT EVENTS:  LE swelling present. Endorses some R toe pain   Temp low overnight, no chills     MEDICATIONS  (STANDING):  amLODIPine   Tablet 5 milliGRAM(s) Oral daily  atorvastatin 20 milliGRAM(s) Oral at bedtime  carvedilol 6.25 milliGRAM(s) Oral every 12 hours  furosemide   Injectable 40 milliGRAM(s) IV Push daily  levothyroxine 100 MICROGram(s) Oral daily  nystatin Powder 1 Application(s) Topical two times a day    MEDICATIONS  (PRN):      CAPILLARY BLOOD GLUCOSE      POCT Blood Glucose.: 95 mg/dL (27 Jul 2023 22:28)    I&O's Summary      PHYSICAL EXAM:  Vital Signs Last 24 Hrs  T(C): 36.6 (28 Jul 2023 12:45), Max: 36.9 (27 Jul 2023 17:47)  T(F): 97.9 (28 Jul 2023 12:45), Max: 98.5 (27 Jul 2023 17:47)  HR: 63 (28 Jul 2023 12:45) (54 - 72)  BP: 119/72 (28 Jul 2023 12:45) (119/72 - 159/85)  BP(mean): 94 (27 Jul 2023 19:21) (94 - 94)  RR: 18 (28 Jul 2023 12:45) (18 - 22)  SpO2: 100% (28 Jul 2023 12:45) (95% - 100%)    Parameters below as of 28 Jul 2023 12:45  Patient On (Oxygen Delivery Method): nasal cannula      CONSTITUTIONAL: NAD, well-developed, well-groomed, laying flat, on NC  EYES: PERRL; conjunctiva and sclera clear  ENMT: Moist oral mucosa, normal dentition  RESPIRATORY: Normal respiratory effort; lungs are clear to auscultation bilaterally  CARDIOVASCULAR: Regular rate and rhythm, normal S1 and S2; +2 lower extremity edema  ABDOMEN: Nontender to palpation, normoactive bowel sounds, no rebound/guarding  MUSCULOSKELETAL: no clubbing or cyanosis of digits; no joint swelling or tenderness to palpation, R foot w/o ulcers, no erythema or warmth, mild tenderness to palpation of toe   PSYCH: A+O to person, place, and time; affect appropriate  NEUROLOGY: CN 2-12 are intact and symmetric; no gross sensory deficits   SKIN: No rashes; no palpable lesions    LABS:                        10.2   6.03  )-----------( 110      ( 28 Jul 2023 05:04 )             31.2     07-28    139  |  101  |  54<H>  ----------------------------<  71  4.8   |  24  |  1.26    Ca    9.0      28 Jul 2023 05:04  Phos  4.5     07-28  Mg     2.0     07-28    TPro  7.5  /  Alb  4.1  /  TBili  0.3  /  DBili  x   /  AST  41<H>  /  ALT  121<H>  /  AlkPhos  195<H>  07-27    PT/INR - ( 27 Jul 2023 19:20 )   PT: 10.4 sec;   INR: 0.94 ratio         PTT - ( 27 Jul 2023 19:20 )  PTT:37.7 sec      Urinalysis Basic - ( 28 Jul 2023 05:04 )    Color: x / Appearance: x / SG: x / pH: x  Gluc: 71 mg/dL / Ketone: x  / Bili: x / Urobili: x   Blood: x / Protein: x / Nitrite: x   Leuk Esterase: x / RBC: x / WBC x   Sq Epi: x / Non Sq Epi: x / Bacteria: x          RADIOLOGY & ADDITIONAL TESTS:  Results Reviewed:   Imaging Personally Reviewed:  Electrocardiogram Personally Reviewed:    COORDINATION OF CARE:  Care Discussed with Consultants/Other Providers [Y/N]:  Prior or Outpatient Records Reviewed [Y/N]:

## 2023-08-04 NOTE — PROGRESS NOTE ADULT - PROBLEM SELECTOR PLAN 4
- c/w levothyroxine 100 mcg
Weakness in LE likely due to extensive edema. Pt ambulates with rolling walker at baseline.   - PT consult  - fall precautions  - increase activity and ambulate as tolerated
Weakness in LE likely due to extensive edema. Pt ambulates with rolling walker at baseline.   - PT consult pending  - fall precautions
- c/w levothyroxine 100 mcg
Weakness in LE likely due to extensive edema. Pt ambulates with rolling walker at baseline.   - PT OG-- refusing, return to [facility]   - fall precautions
- c/w levothyroxine 100 mcg
Weakness in LE likely due to extensive edema. Pt ambulates with rolling walker at baseline.   - PT consult  - fall precautions  - increase activity and ambulate as tolerated
Weakness in LE likely due to extensive edema. Pt ambulates with rolling walker at baseline.   - PT consult  - fall precautions  - increase activity and ambulate as tolerated

## 2023-08-04 NOTE — PROGRESS NOTE ADULT - ASSESSMENT
89 y/o female with PMHx of HTN, HLD, HFrEf, Afib (not on AC), hypothyroidism, hx of PPM placement, presents to the ED complaining of weakness, worsening LE edema, and SOB. Found to have positive UA. Pt admitted for sepsis secondary to UTI and  acute on chronic HFrEF exacerbation.   
Echo 5/18/23: Moderate LV dysfxn, LVH    A/p  89 y/o female with PMHx of HTN, HLD, HFrEf, Afib (not on AC), hypothyroidism, hx of PPM placement, presents to the ED complaining of weakness, worsening LE edema, and SOB.    #Acute HFpEF   -CXR with small L pleural effusion, b/l pulm edema  -S/p IV lasix  -Continue po lasix 40mg qd  -Continue coreg  -Echo from 5/2023 with moderate lv dysfxn  -F/u repeat echo    #Afib s/p ppm  -Stable, rate controlled  -EKG w/ NSR 78, no ischemic changes  -Continue coreg  -remains off a/c     #HTN  -stable  -Continue amlodipine, coreg    #UTI  -Abx, mgmt per med      dvt ppx
Echo 5/18/23: Moderate LV dysfxn, LVH  Echo 8/1/23: Abnormal septal motion of LV, mild-mod MR, mild-mod TR    A/p  89 y/o female with PMHx of HTN, HLD, HFrEf, Afib (not on AC), hypothyroidism, hx of PPM placement, presents to the ED complaining of weakness, worsening LE edema, and SOB.    #Acute HFpEF   -CXR with small L pleural effusion, b/l pulm edema  -S/p IV lasix  -Continue po lasix 40mg qd  -Continue coreg  -Echo noted - overall unchanged from prior    #Afib s/p ppm  -Stable, rate controlled  -EKG w/ NSR 78, no ischemic changes  -Continue coreg  -remains off a/c     #HTN  -stable  -Continue amlodipine, coreg    #UTI  -Abx, mgmt per med    #Cough, Hypothermia  -RVP  -XR w/o evidence of infectious process  -Further w/u per med      dvt ppx
89 y/o female with PMHx of HTN, HLD, HFrEf, Afib (not on AC), hypothyroidism, hx of PPM placement, presents to the ED complaining of weakness, worsening LE edema, and SOB. Found to have positive UA. Pt admitted for sepsis secondary to UTI and  acute on chronic HFrEF exacerbation.   
Echo 5/18/23: Moderate LV dysfxn, LVH  Echo 8/1/23: Abnormal septal motion of LV, mild-mod MR, mild-mod TR    A/p  89 y/o female with PMHx of HTN, HLD, HFrEf, Afib (not on AC), hypothyroidism, hx of PPM placement, presents to the ED complaining of weakness, worsening LE edema, and SOB.    #Acute HFpEF   -CXR with small L pleural effusion, b/l pulm edema  -S/p IV lasix  -Continue po lasix 40mg qd  -Continue coreg  -Echo noted - overall unchanged from prior    #Afib s/p ppm  -Stable, rate controlled  -EKG w/ NSR 78, no ischemic changes  -Continue coreg  -remains off a/c     #HTN  -stable  -Continue amlodipine, coreg    #UTI  -Abx, mgmt per med    #Cough, Hypothermia  -RVP  -F/u CXR      dvt ppx
89 y/o female with PMHx of HTN, HLD, HFrEf, Afib (not on AC), hypothyroidism, hx of PPM placement, presents to the ED complaining of weakness, worsening LE edema, and SOB. Found to have positive UA. Pt admitted for sepsis secondary to UTI and  acute on chronic HFrEF exacerbation.   
Echo 5/18/23: Moderate LV dysfxn, LVH    A/p  89 y/o female with PMHx of HTN, HLD, HFrEf, Afib (not on AC), hypothyroidism, hx of PPM placement, presents to the ED complaining of weakness, worsening LE edema, and SOB.    #Acute HFpEF   -CXR with small L pleural effusion, b/l pulm edema  -S/p IV lasix  -Transition to po lasix 40mg qd  -Continue coreg  -Echo from 5/2023 with moderate lv dysfxn  -F/u repeat echo    #Afib s/p ppm  -Stable, rate controlled  -EKG w/ NSR 78, no ischemic changes  -Continue coreg  -remains off a/c     #HTN  -stable  -Continue amlodipine, coreg    #UTI  -Abx, mgmt per med      dvt ppx
89 y/o female with PMHx of HTN, HLD, HFrEf, Afib (not on AC), hypothyroidism, hx of PPM placement, presents to the ED complaining of weakness, worsening LE edema, and SOB. Found to have positive UA. Pt admitted for sepsis secondary to UTI and  acute on chronic HFrEF exacerbation.   

## 2023-08-04 NOTE — PROGRESS NOTE ADULT - REASON FOR ADMISSION
weakness, SOB

## 2023-08-04 NOTE — PROGRESS NOTE ADULT - PROVIDER SPECIALTY LIST ADULT
Cardiology
Hospitalist
Internal Medicine
Hospitalist
Internal Medicine
Hospitalist

## 2023-08-04 NOTE — PROGRESS NOTE ADULT - PROBLEM SELECTOR PROBLEM 1
Acute on chronic systolic congestive heart failure
Sepsis secondary to UTI
Sepsis secondary to UTI
Acute on chronic systolic congestive heart failure
Rhinovirus
Sepsis secondary to UTI

## 2023-08-04 NOTE — PROGRESS NOTE ADULT - PROBLEM SELECTOR PROBLEM 4
Hypothyroidism
Physical debility
Hypothyroidism
Physical debility
Hypothyroidism
Physical debility

## 2023-08-04 NOTE — PROGRESS NOTE ADULT - PROBLEM SELECTOR PLAN 5
Elevated K in ED up to 6.0. Likely medication induced (spironolactone).   - S/p insulin+dextrose temporization in the ED   - Monitor BMP daily   - Hold spironolactone  - Also w. mild transaminitis possibly due to hemolysis. CMP ordered for AM
DVT ppx: lovenox 40mg qd
Elevated K in ED up to 6.0. Likely medication induced (spironolactone).   - S/p insulin+dextrose temporization in the ED   - Monitor BMP daily   - Hold spironolactone  - Also w. mild transaminitis possibly due to hemolysis. CMP ordered for AM
DVT ppx: lovenox 40mg qd
DVT ppx: lovenox 40mg qd
- c/w levothyroxine 100 mcg
Elevated K in ED up to 6.0. Likely medication induced (spironolactone).   - S/p insulin+dextrose temporization in the ED   - Monitor BMP daily   - Hold spironolactone  - Also w. mild transaminitis possibly due to hemolysis. CMP ordered for AM
- c/w levothyroxine 100 mcg

## 2023-08-04 NOTE — PROGRESS NOTE ADULT - PROBLEM SELECTOR PLAN 2
POCUS in ED showed B lines B/L and pleural effusions. Concern for worsening heart failure. Takes lasix 20mg daily at home  - Currently on 2L NC comfortably  - S/p IV lasix 40mg in ED  - Wean O2 as tolerated
- due to a/c chf exacerbation  - remains on 2L, 98%-- wean as tolerated
- due to a/c chf exacerbation  - remains on 2L, 96%-- wean as tolerated
- due to a/c chf exacerbation  - remains on 2L, 98%-- wean as tolerated
- resolved, on room air; due to a/c chf exacerbation  - sore throat?- rvp and cxr pending
- stable, euvolemic; now on room air   - c/w po lasix 40 mg daily  - Strict I/Os, daily weights  - c/w carvedilol 6.25mg BID  - cardiology dr hernandez following
POCUS in ED showed B lines B/L and pleural effusions. Concern for worsening heart failure. Takes lasix 20mg daily at home  - Currently on 1-2L NC comfortably  - cont lasix 40mg IV QD  - Wean O2 as tolerated
POCUS in ED showed B lines B/L and pleural effusions. Concern for worsening heart failure. Takes lasix 20mg daily at home  - Currently on 2L NC comfortably  - cont lasix 40mg IV QD  - Wean O2 as tolerated

## 2023-08-04 NOTE — PROGRESS NOTE ADULT - PROBLEM SELECTOR PROBLEM 5
Prophylactic measure
Hyperkalemia
Prophylactic measure
Hypothyroidism
Prophylactic measure
Hypothyroidism
Hyperkalemia
Hyperkalemia

## 2023-08-04 NOTE — PROGRESS NOTE ADULT - PROBLEM SELECTOR PLAN 3
Weakness in LE likely due to extensive edema. Pt ambulates with rolling walker at baseline.   - PT OG-- refusing, return to [facility]   - fall precautions
- treatment completed   - ucx from 7/29 with pan sensitive ecoli-- however given reactions for cephalosporins and flouroquinolones, is currently being treated with ertanenum (7/29-7/31)  - bcx 7/29 with ngtd
- ucx from 7/29 with pan sensitive ecoli-- however given reactions for cephalosporins and flouroquinolones, is currently being treated with ertanenum (7/29-7/31)  - will treat for a total of 3 days  - bcx 7/29 with ngtd
Worsening edema over the last few weeks. Concern for acute on chronic HFrEF.   - TTE from 5/18/23 limited, but showed moderate LV systolic dysfunction and dilated LA.  -  IV lasix 40mg daily  - Hold spironolactone given hyperkalemia on admission BMP  - fu TTE    - Strict I/Os, daily weights  - Continue carvedilol 6.25mg BID

## 2023-08-04 NOTE — PROGRESS NOTE ADULT - PROBLEM SELECTOR PROBLEM 3
Chronic systolic congestive heart failure
Sepsis secondary to UTI
Sepsis secondary to UTI
Physical debility
Chronic systolic congestive heart failure
Chronic systolic congestive heart failure

## 2023-08-04 NOTE — PROGRESS NOTE ADULT - NSPROGADDITIONALINFOA_GEN_ALL_CORE
d/w PHIL Combs (Michelle)
d/w YASEMIN Mosher
d/w YASEMIN Mosher
disposition: DC to [facility]- pending plan re: o2    Mara Thao D.O.  Division of Hospital Medicine  Available on [employer] Teams
disposition: return to [facility] 8/4  1 hour spent in preparation of discharge     Mara Thao D.O.  Division of Hospital Medicine  Available on [employer] Teams
disposition: return to [facility] if rvp and cxr unremarkable    Mara Thao D.O.  Division of Hospital Medicine  Available on MS Teams
disposition: TTE pending, wean o2-- return to [facility], anticipate 8/2    Mara Thao D.O.  Division of Hospital Medicine  Available on [employer] Teams
disposition: pending euvolemia, tte pending; pt pending    Mara Thao D.O.  Division of Hospital Medicine  Available on [employer] Teams

## 2023-08-04 NOTE — PROGRESS NOTE ADULT - TIME BILLING
Agree with above PA note.  cv stable  cont current tx  cont lasix  med w/u for hypothermia
Agree with above PA note.  cv stable  cont current tx  cont lasix  med w/u for hypothermia
agree with above  Transition to po lasix 40mg qd  Continue coreg  Echo from 5/2023 with moderate lv dysfxn  F/u repeat echo  remains off a/c
agree with above  cont po lasix 40mg qd  Continue coreg  repeat TTE unchanged  remains off a/c
Review of tests, imaging, labs, documents, medical management, coordination of care and counseling.

## 2023-08-06 ENCOUNTER — INPATIENT (INPATIENT)
Facility: HOSPITAL | Age: 88
LOS: 17 days | Discharge: NURSING, REHAB OR LONG-TERM CARE | End: 2023-08-24
Attending: INTERNAL MEDICINE | Admitting: INTERNAL MEDICINE
Payer: MEDICARE

## 2023-08-06 VITALS
TEMPERATURE: 98 F | HEIGHT: 64 IN | SYSTOLIC BLOOD PRESSURE: 154 MMHG | DIASTOLIC BLOOD PRESSURE: 65 MMHG | HEART RATE: 72 BPM | OXYGEN SATURATION: 99 % | RESPIRATION RATE: 28 BRPM

## 2023-08-06 DIAGNOSIS — Z98.890 OTHER SPECIFIED POSTPROCEDURAL STATES: Chronic | ICD-10-CM

## 2023-08-06 DIAGNOSIS — Z96.7 PRESENCE OF OTHER BONE AND TENDON IMPLANTS: Chronic | ICD-10-CM

## 2023-08-06 DIAGNOSIS — Z98.49 CATARACT EXTRACTION STATUS, UNSPECIFIED EYE: Chronic | ICD-10-CM

## 2023-08-06 DIAGNOSIS — J69.0 PNEUMONITIS DUE TO INHALATION OF FOOD AND VOMIT: ICD-10-CM

## 2023-08-06 DIAGNOSIS — O00.1 TUBAL PREGNANCY: Chronic | ICD-10-CM

## 2023-08-06 LAB
ALBUMIN SERPL ELPH-MCNC: 4.1 G/DL — SIGNIFICANT CHANGE UP (ref 3.3–5)
ALP SERPL-CCNC: 189 U/L — HIGH (ref 40–120)
ALT FLD-CCNC: 24 U/L — SIGNIFICANT CHANGE UP (ref 4–33)
ANION GAP SERPL CALC-SCNC: 18 MMOL/L — HIGH (ref 7–14)
APPEARANCE UR: ABNORMAL
AST SERPL-CCNC: 22 U/L — SIGNIFICANT CHANGE UP (ref 4–32)
BACTERIA # UR AUTO: ABNORMAL /HPF
BASOPHILS # BLD AUTO: 0 K/UL — SIGNIFICANT CHANGE UP (ref 0–0.2)
BASOPHILS NFR BLD AUTO: 0 % — SIGNIFICANT CHANGE UP (ref 0–2)
BILIRUB SERPL-MCNC: 0.7 MG/DL — SIGNIFICANT CHANGE UP (ref 0.2–1.2)
BILIRUB UR-MCNC: NEGATIVE — SIGNIFICANT CHANGE UP
BUN SERPL-MCNC: 66 MG/DL — HIGH (ref 7–23)
CALCIUM SERPL-MCNC: 10.2 MG/DL — SIGNIFICANT CHANGE UP (ref 8.4–10.5)
CHLORIDE SERPL-SCNC: 97 MMOL/L — LOW (ref 98–107)
CO2 SERPL-SCNC: 27 MMOL/L — SIGNIFICANT CHANGE UP (ref 22–31)
COLOR SPEC: YELLOW — SIGNIFICANT CHANGE UP
COMMENT - URINE: SIGNIFICANT CHANGE UP
CREAT SERPL-MCNC: 1.38 MG/DL — HIGH (ref 0.5–1.3)
DIFF PNL FLD: ABNORMAL
EGFR: 34 ML/MIN/1.73M2 — LOW
EOSINOPHIL # BLD AUTO: 0 K/UL — SIGNIFICANT CHANGE UP (ref 0–0.5)
EOSINOPHIL NFR BLD AUTO: 0 % — SIGNIFICANT CHANGE UP (ref 0–6)
EPI CELLS # UR: SIGNIFICANT CHANGE UP
GLUCOSE SERPL-MCNC: 133 MG/DL — HIGH (ref 70–99)
GLUCOSE UR QL: NEGATIVE MG/DL — SIGNIFICANT CHANGE UP
HCT VFR BLD CALC: 40.7 % — SIGNIFICANT CHANGE UP (ref 34.5–45)
HGB BLD-MCNC: 13.1 G/DL — SIGNIFICANT CHANGE UP (ref 11.5–15.5)
HYALINE CASTS # UR AUTO: SIGNIFICANT CHANGE UP
IANC: 17.89 K/UL — HIGH (ref 1.8–7.4)
KETONES UR-MCNC: NEGATIVE MG/DL — SIGNIFICANT CHANGE UP
LEUKOCYTE ESTERASE UR-ACNC: ABNORMAL
LYMPHOCYTES # BLD AUTO: 0.55 K/UL — LOW (ref 1–3.3)
LYMPHOCYTES # BLD AUTO: 2.6 % — LOW (ref 13–44)
MCHC RBC-ENTMCNC: 30 PG — SIGNIFICANT CHANGE UP (ref 27–34)
MCHC RBC-ENTMCNC: 32.2 GM/DL — SIGNIFICANT CHANGE UP (ref 32–36)
MCV RBC AUTO: 93.3 FL — SIGNIFICANT CHANGE UP (ref 80–100)
MONOCYTES # BLD AUTO: 0.55 K/UL — SIGNIFICANT CHANGE UP (ref 0–0.9)
MONOCYTES NFR BLD AUTO: 2.6 % — SIGNIFICANT CHANGE UP (ref 2–14)
NEUTROPHILS # BLD AUTO: 19.35 K/UL — HIGH (ref 1.8–7.4)
NEUTROPHILS NFR BLD AUTO: 88.7 % — HIGH (ref 43–77)
NITRITE UR-MCNC: NEGATIVE — SIGNIFICANT CHANGE UP
NT-PROBNP SERPL-SCNC: 8319 PG/ML — HIGH
PH UR: 6.5 — SIGNIFICANT CHANGE UP (ref 5–8)
PLATELET # BLD AUTO: 199 K/UL — SIGNIFICANT CHANGE UP (ref 150–400)
POTASSIUM SERPL-MCNC: 4.4 MMOL/L — SIGNIFICANT CHANGE UP (ref 3.5–5.3)
POTASSIUM SERPL-SCNC: 4.4 MMOL/L — SIGNIFICANT CHANGE UP (ref 3.5–5.3)
PROT SERPL-MCNC: 9.1 G/DL — HIGH (ref 6–8.3)
PROT UR-MCNC: 100 MG/DL
RBC # BLD: 4.36 M/UL — SIGNIFICANT CHANGE UP (ref 3.8–5.2)
RBC # FLD: 14.2 % — SIGNIFICANT CHANGE UP (ref 10.3–14.5)
RBC CASTS # UR COMP ASSIST: 1 /HPF — SIGNIFICANT CHANGE UP (ref 0–4)
SODIUM SERPL-SCNC: 142 MMOL/L — SIGNIFICANT CHANGE UP (ref 135–145)
SP GR SPEC: 1.05 — HIGH (ref 1–1.03)
TROPONIN T, HIGH SENSITIVITY RESULT: 41 NG/L — SIGNIFICANT CHANGE UP
UROBILINOGEN FLD QL: 1 MG/DL — SIGNIFICANT CHANGE UP (ref 0.2–1)
WBC # BLD: 21.19 K/UL — HIGH (ref 3.8–10.5)
WBC # FLD AUTO: 21.19 K/UL — HIGH (ref 3.8–10.5)
WBC UR QL: 6 /HPF — HIGH (ref 0–5)

## 2023-08-06 PROCEDURE — 71045 X-RAY EXAM CHEST 1 VIEW: CPT | Mod: 26

## 2023-08-06 PROCEDURE — 71275 CT ANGIOGRAPHY CHEST: CPT | Mod: 26,MA

## 2023-08-06 PROCEDURE — 99285 EMERGENCY DEPT VISIT HI MDM: CPT | Mod: GC

## 2023-08-06 RX ORDER — VANCOMYCIN HCL 1 G
1000 VIAL (EA) INTRAVENOUS ONCE
Refills: 0 | Status: COMPLETED | OUTPATIENT
Start: 2023-08-06 | End: 2023-08-06

## 2023-08-06 RX ORDER — PIPERACILLIN AND TAZOBACTAM 4; .5 G/20ML; G/20ML
3.38 INJECTION, POWDER, LYOPHILIZED, FOR SOLUTION INTRAVENOUS ONCE
Refills: 0 | Status: COMPLETED | OUTPATIENT
Start: 2023-08-06 | End: 2023-08-06

## 2023-08-06 RX ADMIN — Medication 250 MILLIGRAM(S): at 23:29

## 2023-08-06 RX ADMIN — PIPERACILLIN AND TAZOBACTAM 200 GRAM(S): 4; .5 INJECTION, POWDER, LYOPHILIZED, FOR SOLUTION INTRAVENOUS at 22:59

## 2023-08-06 NOTE — ED PROVIDER NOTE - OBJECTIVE STATEMENT
Patient is 89-year-old female with past medical history of CHF, hypertension, hyperlipidemia, A-fib, hypothyroidism who presents emergency department complaining of productive cough.  Patient was recently evaluated in [facility] and was admitted to the hospital for CHF exacerbation and UTI.  Patient states that since the discharge of this visit she has had a productive cough.  Patient was in the assisted living facility where she had mild shortness of breath. patient was stated to be 85% on room air.  Patient arrives to the emergency department on 15 L nonrebreather.  Patient was transferred to the bed and put on 6 L nasal cannula and is satting 100%.  Patient denies any fevers, chills, chest pain, swelling of her lower extremities.

## 2023-08-06 NOTE — ED ADULT NURSE NOTE - NSFALLHARMRISKINTERV_ED_ALL_ED

## 2023-08-06 NOTE — ED ADULT NURSE NOTE - OBJECTIVE STATEMENT
Pt is alert and responsive with daughter at bedside. As per daughter pt was recently discharged but has been experiencing a productive cough since. Cardiac monitor in place-vitals WNL. Pt at 93-95% on room air.

## 2023-08-06 NOTE — ED PROVIDER NOTE - WET READ LAUNCH FT
Detail Level: Zone
X Size Of Lesion In Cm (Optional): 0
Include Z78.9 (Other Specified Conditions Influencing Health Status) As An Associated Diagnosis?: No
Kenalog Preparation: Kenalog
Consent: The risks of atrophy were reviewed with the patient.
Medical Necessity Clause: This procedure was medically necessary because the lesions that were treated were:
Total Volume Injected (Ccs- Only Use Numbers And Decimals): 2
Concentration Of Solution Injected (Mg/Ml): 10.0
There are no Wet Read(s) to document.

## 2023-08-06 NOTE — ED ADULT NURSE REASSESSMENT NOTE - NS ED NURSE REASSESS COMMENT FT1
received report from  HAYLEE Lozano. Pt is a/o x 4 from nursing [place]. pt denies SOB at this time. no complaints of chest pain, headache, nausea, dizziness, vomiting, SOB, fever, chills   verbalized. Pt has 20g iv placed to right AC with no redness or swelling noted. pt respirations even and unlabored. pt on 2L Nasal canula sating at 96-98%. pt normal sinus on monitor. call bell within reach. pt safety maintained. pt pending urine and disposition at this time. received report from  HAYLEE Lozano. Pt is a/o x 4 from nursing home. pt denies SOB at this time. no complaints of chest pain, headache, nausea, dizziness, vomiting, SOB, fever, chills   verbalized. Pt has 20g iv placed to right AC with no redness or swelling noted. pt respirations even and unlabored. pt on 2L Nasal canula sating at 96-98%. pt normal sinus on monitor. call bell within reach. pt safety maintained. pt pending urine and disposition at this time. pt turned, cleaned, repositioned and put in hospital gown. received report from  HAYLEE Lozano. Pt is a/o x 3 from [facility]. pt denies SOB at this time. no complaints of chest pain, headache, nausea, dizziness, vomiting, SOB, fever, chills   verbalized. Pt has 20g iv placed to right AC with no redness or swelling noted. pt respirations even and unlabored. pt on 2L Nasal canula sating at 96-98%. pt normal sinus on monitor. call bell within reach. pt safety maintained. pt pending urine and disposition at this time. pt turned, cleaned, repositioned and put in hospital gown. received report from  HAYLEE Lozano. Pt is a/o x 3 from [facility]. pt denies SOB at this time. no complaints of chest pain, headache, nausea, dizziness, vomiting, SOB, fever, chills   verbalized. Pt has 20g iv placed to right AC with no redness or swelling noted. pt respirations even and unlabored. pt on 2L Nasal canula sating at 96-98%. pt afib on monitor. call bell within reach. pt safety maintained. pt pending urine and disposition at this time. pt turned, cleaned, repositioned and put in hospital gown. received report from  HAYLEE Lozano. Pt is a/o x 3 from [facility]. pt denies SOB at this time. no complaints of chest pain, headache, nausea, dizziness, vomiting, SOB, fever, chills   verbalized. Pt has 20g iv placed to right AC with no redness or swelling noted. pt respirations even and unlabored. pt on 2L Nasal canula sating at 96-98%. pt afib on monitor. call bell within reach. pt safety maintained. pt pending urine and disposition at this time. pt turned, cleaned, repositioned and put in hospital gown. incontinence associated dermatitis noted to sacrum with no breakdown.

## 2023-08-06 NOTE — ED ADULT TRIAGE NOTE - CHIEF COMPLAINT QUOTE
EMS states " patient was discharged from here with CHF, UTI  and today she has SOB and has cough  coming from [facility]" patient is alert ox1 -2 "patient had room air Oxygen of 85%

## 2023-08-06 NOTE — ED PROVIDER NOTE - PHYSICAL EXAMINATION
Physical Exam:  Gen: NAD,  HEENT: EOMI, PEERLA, normal conjunctiva, tongue midline, oral mucosa moist  Lung: crackles in the lower lobes, no respiratory distress, no wheezes/rhonchi/rales   CV: RR  Abd: soft, NT, ND, no guarding, no rigidity, no rebound tenderness, no CVA tenderness   MSK: no visible deformities, ROM normal in UE/LE, no back pain  Neuro: No focal sensory or motor deficits  Skin: Warm, well perfused, no rash, 2+ pitting edema   Psych: normal affect, calm

## 2023-08-06 NOTE — ED PROVIDER NOTE - PROGRESS NOTE DETAILS
[place] Wheatley DO PGY-3  Received sign out on patient around 1930, patient re-evaluated, stable requiring 2L nasal cannula at present. CT negative for PE, findings concerning for aspiration pneumonia which may explain hypoxia. Will antibiose. Discussed with Dr. Garcia who accepted the patient.

## 2023-08-06 NOTE — ED PROVIDER NOTE - CLINICAL SUMMARY MEDICAL DECISION MAKING FREE TEXT BOX
Attending MD Alvarez. Agree with above.  Pt is a 89 yo fem with pmhx of CHF, recent admit to [facility] for CHF exacerbation + UTI and since d/c has had productive cough, SOB at SNF since D/c.  Was 85% ORA for EMS on arrival per their report.  Now on 15L NRB, 84% ORA, currently sats >90% on 6L NC.  LLL enrrique.  Concern for potential PNA +/- PE.  Pt DNR/DNI at this time. Attending MD Alvarez. Agree with above.  Pt is a 89 yo fem with pmhx of CHF, recent admit to [facility] for CHF exacerbation + UTI and since d/c has had productive cough, SOB at SNF since D/c.  Was 85% ORA for EMS on arrival per their report.  Now on 15L NRB, 84% ORA, currently sats >90% on 6L NC.  LLL ronchi.  Concern for potential PNA +/- PE.  Pt DNR/DNI at this time.  Pt CTA inconsistent with PE.  C/w probable rhino/entero PNA with hypoxia +/u UTI.  Pt with leukocytosis, febrile.  Abxs administered.  Pt stable for admission to medicine.

## 2023-08-07 DIAGNOSIS — E78.5 HYPERLIPIDEMIA, UNSPECIFIED: ICD-10-CM

## 2023-08-07 DIAGNOSIS — Z29.9 ENCOUNTER FOR PROPHYLACTIC MEASURES, UNSPECIFIED: ICD-10-CM

## 2023-08-07 DIAGNOSIS — N39.0 URINARY TRACT INFECTION, SITE NOT SPECIFIED: ICD-10-CM

## 2023-08-07 DIAGNOSIS — E03.9 HYPOTHYROIDISM, UNSPECIFIED: ICD-10-CM

## 2023-08-07 DIAGNOSIS — I48.91 UNSPECIFIED ATRIAL FIBRILLATION: ICD-10-CM

## 2023-08-07 DIAGNOSIS — A41.9 SEPSIS, UNSPECIFIED ORGANISM: ICD-10-CM

## 2023-08-07 DIAGNOSIS — I10 ESSENTIAL (PRIMARY) HYPERTENSION: ICD-10-CM

## 2023-08-07 DIAGNOSIS — J96.01 ACUTE RESPIRATORY FAILURE WITH HYPOXIA: ICD-10-CM

## 2023-08-07 DIAGNOSIS — J18.9 PNEUMONIA, UNSPECIFIED ORGANISM: ICD-10-CM

## 2023-08-07 DIAGNOSIS — N17.9 ACUTE KIDNEY FAILURE, UNSPECIFIED: ICD-10-CM

## 2023-08-07 DIAGNOSIS — I50.9 HEART FAILURE, UNSPECIFIED: ICD-10-CM

## 2023-08-07 LAB
ALBUMIN SERPL ELPH-MCNC: 3.6 G/DL — SIGNIFICANT CHANGE UP (ref 3.3–5)
ALP SERPL-CCNC: 161 U/L — HIGH (ref 40–120)
ALT FLD-CCNC: 19 U/L — SIGNIFICANT CHANGE UP (ref 4–33)
ANION GAP SERPL CALC-SCNC: 12 MMOL/L — SIGNIFICANT CHANGE UP (ref 7–14)
AST SERPL-CCNC: 17 U/L — SIGNIFICANT CHANGE UP (ref 4–32)
B PERT DNA SPEC QL NAA+PROBE: SIGNIFICANT CHANGE UP
B PERT+PARAPERT DNA PNL SPEC NAA+PROBE: SIGNIFICANT CHANGE UP
BASE EXCESS BLDV CALC-SCNC: 6 MMOL/L — HIGH (ref -2–3)
BASOPHILS # BLD AUTO: 0.1 K/UL — SIGNIFICANT CHANGE UP (ref 0–0.2)
BASOPHILS NFR BLD AUTO: 0.5 % — SIGNIFICANT CHANGE UP (ref 0–2)
BILIRUB SERPL-MCNC: 0.8 MG/DL — SIGNIFICANT CHANGE UP (ref 0.2–1.2)
BLOOD GAS VENOUS COMPREHENSIVE RESULT: SIGNIFICANT CHANGE UP
BORDETELLA PARAPERTUSSIS (RAPRVP): SIGNIFICANT CHANGE UP
BUN SERPL-MCNC: 61 MG/DL — HIGH (ref 7–23)
C PNEUM DNA SPEC QL NAA+PROBE: SIGNIFICANT CHANGE UP
CALCIUM SERPL-MCNC: 9.5 MG/DL — SIGNIFICANT CHANGE UP (ref 8.4–10.5)
CHLORIDE BLDV-SCNC: 97 MMOL/L — SIGNIFICANT CHANGE UP (ref 96–108)
CHLORIDE SERPL-SCNC: 95 MMOL/L — LOW (ref 98–107)
CO2 BLDV-SCNC: 33.8 MMOL/L — HIGH (ref 22–26)
CO2 SERPL-SCNC: 31 MMOL/L — SIGNIFICANT CHANGE UP (ref 22–31)
CREAT SERPL-MCNC: 1.24 MG/DL — SIGNIFICANT CHANGE UP (ref 0.5–1.3)
EGFR: 39 ML/MIN/1.73M2 — LOW
EOSINOPHIL # BLD AUTO: 0.16 K/UL — SIGNIFICANT CHANGE UP (ref 0–0.5)
EOSINOPHIL NFR BLD AUTO: 0.8 % — SIGNIFICANT CHANGE UP (ref 0–6)
FLUAV SUBTYP SPEC NAA+PROBE: SIGNIFICANT CHANGE UP
FLUBV RNA SPEC QL NAA+PROBE: SIGNIFICANT CHANGE UP
GAS PNL BLDV: 134 MMOL/L — LOW (ref 136–145)
GAS PNL BLDV: SIGNIFICANT CHANGE UP
GLUCOSE BLDV-MCNC: 125 MG/DL — HIGH (ref 70–99)
GLUCOSE SERPL-MCNC: 142 MG/DL — HIGH (ref 70–99)
HADV DNA SPEC QL NAA+PROBE: SIGNIFICANT CHANGE UP
HCO3 BLDV-SCNC: 32 MMOL/L — HIGH (ref 22–29)
HCOV 229E RNA SPEC QL NAA+PROBE: SIGNIFICANT CHANGE UP
HCOV HKU1 RNA SPEC QL NAA+PROBE: SIGNIFICANT CHANGE UP
HCOV NL63 RNA SPEC QL NAA+PROBE: SIGNIFICANT CHANGE UP
HCOV OC43 RNA SPEC QL NAA+PROBE: SIGNIFICANT CHANGE UP
HCT VFR BLD CALC: 37 % — SIGNIFICANT CHANGE UP (ref 34.5–45)
HCT VFR BLDA CALC: 40 % — SIGNIFICANT CHANGE UP (ref 34.5–46.5)
HGB BLD CALC-MCNC: 13.3 G/DL — SIGNIFICANT CHANGE UP (ref 11.7–16.1)
HGB BLD-MCNC: 12.1 G/DL — SIGNIFICANT CHANGE UP (ref 11.5–15.5)
HMPV RNA SPEC QL NAA+PROBE: SIGNIFICANT CHANGE UP
HPIV1 RNA SPEC QL NAA+PROBE: SIGNIFICANT CHANGE UP
HPIV2 RNA SPEC QL NAA+PROBE: SIGNIFICANT CHANGE UP
HPIV3 RNA SPEC QL NAA+PROBE: SIGNIFICANT CHANGE UP
HPIV4 RNA SPEC QL NAA+PROBE: SIGNIFICANT CHANGE UP
IANC: 14.97 K/UL — HIGH (ref 1.8–7.4)
IMM GRANULOCYTES NFR BLD AUTO: 1 % — HIGH (ref 0–0.9)
LACTATE BLDV-MCNC: 1.7 MMOL/L — SIGNIFICANT CHANGE UP (ref 0.5–2)
LYMPHOCYTES # BLD AUTO: 1.33 K/UL — SIGNIFICANT CHANGE UP (ref 1–3.3)
LYMPHOCYTES # BLD AUTO: 7 % — LOW (ref 13–44)
M PNEUMO DNA SPEC QL NAA+PROBE: SIGNIFICANT CHANGE UP
MAGNESIUM SERPL-MCNC: 2.3 MG/DL — SIGNIFICANT CHANGE UP (ref 1.6–2.6)
MCHC RBC-ENTMCNC: 30.9 PG — SIGNIFICANT CHANGE UP (ref 27–34)
MCHC RBC-ENTMCNC: 32.7 GM/DL — SIGNIFICANT CHANGE UP (ref 32–36)
MCV RBC AUTO: 94.4 FL — SIGNIFICANT CHANGE UP (ref 80–100)
MONOCYTES # BLD AUTO: 2.19 K/UL — HIGH (ref 0–0.9)
MONOCYTES NFR BLD AUTO: 11.6 % — SIGNIFICANT CHANGE UP (ref 2–14)
MRSA PCR RESULT.: SIGNIFICANT CHANGE UP
NEUTROPHILS # BLD AUTO: 14.97 K/UL — HIGH (ref 1.8–7.4)
NEUTROPHILS NFR BLD AUTO: 79.1 % — HIGH (ref 43–77)
NRBC # BLD: 0 /100 WBCS — SIGNIFICANT CHANGE UP (ref 0–0)
NRBC # FLD: 0 K/UL — SIGNIFICANT CHANGE UP (ref 0–0)
PCO2 BLDV: 52 MMHG — SIGNIFICANT CHANGE UP (ref 39–52)
PH BLDV: 7.4 — SIGNIFICANT CHANGE UP (ref 7.32–7.43)
PHOSPHATE SERPL-MCNC: 3.9 MG/DL — SIGNIFICANT CHANGE UP (ref 2.5–4.5)
PLATELET # BLD AUTO: 181 K/UL — SIGNIFICANT CHANGE UP (ref 150–400)
PO2 BLDV: 44 MMHG — SIGNIFICANT CHANGE UP (ref 25–45)
POTASSIUM BLDV-SCNC: 4 MMOL/L — SIGNIFICANT CHANGE UP (ref 3.5–5.1)
POTASSIUM SERPL-MCNC: 3.9 MMOL/L — SIGNIFICANT CHANGE UP (ref 3.5–5.3)
POTASSIUM SERPL-SCNC: 3.9 MMOL/L — SIGNIFICANT CHANGE UP (ref 3.5–5.3)
PROT SERPL-MCNC: 7.8 G/DL — SIGNIFICANT CHANGE UP (ref 6–8.3)
RAPID RVP RESULT: DETECTED
RBC # BLD: 3.92 M/UL — SIGNIFICANT CHANGE UP (ref 3.8–5.2)
RBC # FLD: 14.2 % — SIGNIFICANT CHANGE UP (ref 10.3–14.5)
RSV RNA SPEC QL NAA+PROBE: SIGNIFICANT CHANGE UP
RV+EV RNA SPEC QL NAA+PROBE: DETECTED
S AUREUS DNA NOSE QL NAA+PROBE: DETECTED
SAO2 % BLDV: 75.5 % — SIGNIFICANT CHANGE UP (ref 67–88)
SARS-COV-2 RNA SPEC QL NAA+PROBE: SIGNIFICANT CHANGE UP
SODIUM SERPL-SCNC: 138 MMOL/L — SIGNIFICANT CHANGE UP (ref 135–145)
WBC # BLD: 18.94 K/UL — HIGH (ref 3.8–10.5)
WBC # FLD AUTO: 18.94 K/UL — HIGH (ref 3.8–10.5)

## 2023-08-07 PROCEDURE — 99223 1ST HOSP IP/OBS HIGH 75: CPT

## 2023-08-07 PROCEDURE — 99497 ADVNCD CARE PLAN 30 MIN: CPT | Mod: 25

## 2023-08-07 RX ORDER — CARVEDILOL PHOSPHATE 80 MG/1
6.25 CAPSULE, EXTENDED RELEASE ORAL EVERY 12 HOURS
Refills: 0 | Status: DISCONTINUED | OUTPATIENT
Start: 2023-08-07 | End: 2023-08-24

## 2023-08-07 RX ORDER — LEVOTHYROXINE SODIUM 125 MCG
100 TABLET ORAL DAILY
Refills: 0 | Status: DISCONTINUED | OUTPATIENT
Start: 2023-08-07 | End: 2023-08-24

## 2023-08-07 RX ORDER — CHOLECALCIFEROL (VITAMIN D3) 125 MCG
1000 CAPSULE ORAL DAILY
Refills: 0 | Status: DISCONTINUED | OUTPATIENT
Start: 2023-08-07 | End: 2023-08-24

## 2023-08-07 RX ORDER — FUROSEMIDE 40 MG
40 TABLET ORAL DAILY
Refills: 0 | Status: DISCONTINUED | OUTPATIENT
Start: 2023-08-07 | End: 2023-08-07

## 2023-08-07 RX ORDER — ENOXAPARIN SODIUM 100 MG/ML
30 INJECTION SUBCUTANEOUS EVERY 24 HOURS
Refills: 0 | Status: DISCONTINUED | OUTPATIENT
Start: 2023-08-07 | End: 2023-08-08

## 2023-08-07 RX ORDER — ATORVASTATIN CALCIUM 80 MG/1
20 TABLET, FILM COATED ORAL AT BEDTIME
Refills: 0 | Status: DISCONTINUED | OUTPATIENT
Start: 2023-08-07 | End: 2023-08-24

## 2023-08-07 RX ORDER — PIPERACILLIN AND TAZOBACTAM 4; .5 G/20ML; G/20ML
3.38 INJECTION, POWDER, LYOPHILIZED, FOR SOLUTION INTRAVENOUS EVERY 8 HOURS
Refills: 0 | Status: DISCONTINUED | OUTPATIENT
Start: 2023-08-07 | End: 2023-08-09

## 2023-08-07 RX ADMIN — ENOXAPARIN SODIUM 30 MILLIGRAM(S): 100 INJECTION SUBCUTANEOUS at 16:14

## 2023-08-07 RX ADMIN — PIPERACILLIN AND TAZOBACTAM 25 GRAM(S): 4; .5 INJECTION, POWDER, LYOPHILIZED, FOR SOLUTION INTRAVENOUS at 08:32

## 2023-08-07 RX ADMIN — ATORVASTATIN CALCIUM 20 MILLIGRAM(S): 80 TABLET, FILM COATED ORAL at 22:56

## 2023-08-07 RX ADMIN — CARVEDILOL PHOSPHATE 6.25 MILLIGRAM(S): 80 CAPSULE, EXTENDED RELEASE ORAL at 18:27

## 2023-08-07 RX ADMIN — Medication 1000 UNIT(S): at 11:52

## 2023-08-07 RX ADMIN — PIPERACILLIN AND TAZOBACTAM 25 GRAM(S): 4; .5 INJECTION, POWDER, LYOPHILIZED, FOR SOLUTION INTRAVENOUS at 22:56

## 2023-08-07 RX ADMIN — PIPERACILLIN AND TAZOBACTAM 25 GRAM(S): 4; .5 INJECTION, POWDER, LYOPHILIZED, FOR SOLUTION INTRAVENOUS at 16:14

## 2023-08-07 NOTE — ED ADULT NURSE REASSESSMENT NOTE - NS ED NURSE REASSESS COMMENT FT1
keaton lisbet and warm packs in place as per ACP orders. pt appears in NAD and at baseline mentation. safety maintained.

## 2023-08-07 NOTE — H&P ADULT - HISTORY OF PRESENT ILLNESS
Pt is 89-year-old female with past medical history of CHF, hypertension, hyperlipidemia, A-fib, hypothyroidism who presents w/ productive cough.  Patient was recently admitted to [facility] (7/27-8/4) for CHF exacerbation and UTI. Pt reports productive cough since discharge from [facility]. She was reported to be hypoxic to 85% on RA at the assisted living facility. Patient denies any fevers, chills, chest pain, swelling of her lower extremities. Pt is 89-year-old female with past medical history of CHF, hypertension, hyperlipidemia, A-fib, hypothyroidism who presents w/ productive cough.  Patient was recently admitted to [facility] (7/27-8/4) for CHF exacerbation and UTI. Pt reports productive cough since discharge from [facility]. She was reported to be hypoxic to 85% on RA at the assisted living facility. She also reports increased urinary frequency and intermittent dysuria. Patient denies any fevers, chills, chest pain, swelling of her lower extremities.

## 2023-08-07 NOTE — ED ADULT NURSE REASSESSMENT NOTE - NS ED NURSE REASSESS COMMENT FT1
report given to [facility] 1 rn efia. pt at baseline mentation. IV tact. keaton frausto in place. pt appears in NAD

## 2023-08-07 NOTE — H&P ADULT - PROBLEM SELECTOR PLAN 5
Could be in the setting of CHF exacerbation vs pneumonia or both  -Oxygen 4L NC  -wean oxygen as tolerated

## 2023-08-07 NOTE — ED ADULT NURSE REASSESSMENT NOTE - NS ED NURSE REASSESS COMMENT FT1
report rcd from break milad gonzales, pt remains at baseline mentation. pt rectal temp obtained. MD made aware. pt on 4L Nc sat 100%. pt afib on monitor. pending further orders. pt call bell within reach, safety maintained. report rcd from break milad gonzales, pt remains at baseline mentation. pt rectal temp obtained. MD made aware. pt on 4L Nc sat 100%. pt afib on monitor. pending further orders. pt turned cleaned and repositioned. pt call bell within reach, safety maintained. report received from sandi gonzales, pt remains at baseline mentation. pt rectal temp obtained. MD made aware. pt on 4L Nc sat 100%. pt afib on monitor. pending further orders. pt turned cleaned and repositioned. pt call bell within reach, safety maintained.

## 2023-08-07 NOTE — H&P ADULT - PROBLEM SELECTOR PLAN 2
CXR: Left basilar hazy opacity, which may reflect atelectasis.  CT angio: Debris within the right interlobar bronchus, without focal consolidation.  f/u strep pneumo   f/u legionella  -c/w zosyn

## 2023-08-07 NOTE — H&P ADULT - PROBLEM SELECTOR PLAN 10
Diet: NPO  DVT: lovenox  Dispo: pending clinical improvement Diet: NPO pending swallow eval   DVT: lovenox  Dispo: pending clinical improvement Diet: NPO pending swallow eval   DVT: lovenox  Dispo: pending clinical improvement  GOC: DNR/DNI, molst form signed and in chart

## 2023-08-07 NOTE — H&P ADULT - ASSESSMENT
Pt is 89-year-old female with past medical history of CHF, hypertension, hyperlipidemia, A-fib, hypothyroidism who comes in with productive cough x3 days after recent discharge from [facility] (7/26-8/4)

## 2023-08-07 NOTE — H&P ADULT - PROBLEM SELECTOR PLAN 1
Pt met sepsis criteria with temp < 36 C and WBC of 21, likely sources pneumonia vs UTI  -BCx pending  -Ucx pending  -prior UCx from last admission (7/2023) grew E.Coli  -s/p vanc and zosyn   -c/w zosyn

## 2023-08-07 NOTE — H&P ADULT - NSHPPHYSICALEXAM_GEN_ALL_CORE
Vital Signs Last 24 Hrs  T(C): 35.8 (07 Aug 2023 04:48), Max: 36.9 (06 Aug 2023 16:35)  T(F): 96.5 (07 Aug 2023 04:48), Max: 98.5 (06 Aug 2023 16:35)  HR: 74 (07 Aug 2023 04:48) (66 - 74)  BP: 150/75 (07 Aug 2023 04:48) (136/82 - 156/69)  BP(mean): 99 (07 Aug 2023 03:11) (96 - 99)  RR: 16 (07 Aug 2023 04:48) (16 - 28)  SpO2: 100% (07 Aug 2023 04:48) (96% - 100%)    Parameters below as of 07 Aug 2023 04:48  Patient On (Oxygen Delivery Method): nasal cannula  O2 Flow (L/min): 4      CONSTITUTIONAL: Well-groomed, in no apparent distress  EYES: No conjunctival or scleral injection, non-icteric;   ENMT: No external nasal lesions; MMM  NECK: Trachea midline without palpable neck mass; thyroid not enlarged and non-tender  RESPIRATORY: Breathing comfortably; no dullness to percussion; lungs CTA without wheeze/rhonchi/rales  CARDIOVASCULAR: +S1S2, RRR, no M/G/R; pedal pulses full and symmetric; no lower extremity edema  GASTROINTESTINAL: No palpable masses or tenderness, +BS throughout, no rebound/guarding; no hepatosplenomegaly; no hernia palpated  LYMPHATIC: No cervical LAD or tenderness  SKIN: No rashes or ulcers noted  NEUROLOGIC: CN II-XII intact; sensation intact in LEs b/l to light touch  PSYCHIATRIC: A+O x 3; mood and affect appropriate; appropriate insight and judgment Vital Signs Last 24 Hrs  T(C): 35.8 (07 Aug 2023 04:48), Max: 36.9 (06 Aug 2023 16:35)  T(F): 96.5 (07 Aug 2023 04:48), Max: 98.5 (06 Aug 2023 16:35)  HR: 74 (07 Aug 2023 04:48) (66 - 74)  BP: 150/75 (07 Aug 2023 04:48) (136/82 - 156/69)  BP(mean): 99 (07 Aug 2023 03:11) (96 - 99)  RR: 16 (07 Aug 2023 04:48) (16 - 28)  SpO2: 100% (07 Aug 2023 04:48) (96% - 100%)    Parameters below as of 07 Aug 2023 04:48  Patient On (Oxygen Delivery Method): nasal cannula  O2 Flow (L/min): 4      CONSTITUTIONAL: elderly woman, in no apparent distress  EYES: No conjunctival or scleral injection, non-icteric;   ENMT: No external nasal lesions; MMM  RESPIRATORY: on NC, Breathing comfortably; decreased breath sounds, crackles in the lower lobes, no respiratory distress, no wheezes/rhonchi/rales   CARDIOVASCULAR: +S1S2, RRR, no M/G/R, no lower extremity edema  GASTROINTESTINAL:  +BS present, mild diffuse TTP, no rebound/guarding  LYMPHATIC: No cervical LAD or tenderness  SKIN: No rashes or ulcers noted  NEUROLOGIC: CN II-XII intact; sensation intact in LEs b/l to light touch  PSYCHIATRIC: A+O x 3; mood and affect appropriate; appropriate insight and judgment

## 2023-08-07 NOTE — H&P ADULT - NSHPREVIEWOFSYSTEMS_GEN_ALL_CORE
REVIEW OF SYSTEMS:    CONSTITUTIONAL: No weakness, fevers or chills  EYES/ENT: No visual changes;  No vertigo or throat pain   NECK: No pain or stiffness  RESPIRATORY: + productive cough, No shortness of breath, wheezing  CARDIOVASCULAR: No chest pain or palpitations  GASTROINTESTINAL: No abdominal or epigastric pain. No nausea, vomiting, or hematemesis; No diarrhea or constipation. No melena or hematochezia.  GENITOURINARY: No dysuria, frequency or hematuria  NEUROLOGICAL: No numbness or weakness  SKIN: No itching, rashes

## 2023-08-07 NOTE — H&P ADULT - ATTENDING COMMENTS
agree w/ above    #acute sepsis 2/2 aspiration pna  -s/s  -aspiration precautions  -MRSA swab (April was negative can hold vanc for now)  -tx w/ zosyn  -hold diuretics for now as pt is not overloaded, once sepsis improves can slowly reintroduce

## 2023-08-07 NOTE — PATIENT PROFILE ADULT - FALL HARM RISK - HARM RISK INTERVENTIONS

## 2023-08-07 NOTE — ED ADULT NURSE REASSESSMENT NOTE - NS ED NURSE REASSESS COMMENT FT1
pt at baseline mentation. pt no longer hypothermic. ACP Torin made aware. as per ACP Torin, keep keaton soaresgger in place until sign out/ H&P done. change in report endorsed to HAYLEE Casas. morning labs collected and sent. pt moved to [facility] at this time. pt at baseline mentation. pt no longer hypothermic. ACP Torin made aware. change in report endorsed to HAYLEE Casas. morning labs collected and sent. pt moved to [facility] at this time.

## 2023-08-07 NOTE — H&P ADULT - PROBLEM SELECTOR PLAN 4
Last echo 8/2023:    Abnormal septal motion consistent with left bundle branch block. There are regional wall motion abnormalities. There is mild to moderate mitral regurgitation. There is mild-moderate tricuspid regurgitation. Compared to the transthoracic echocardiogram performed on 4/16/2022 there have been no significant interval changes.  -pro BNP: 8319  -trial diuresis with IV lasix 40 mg daily Last echo 8/2023:    Abnormal septal motion consistent with left bundle branch block. There are regional wall motion abnormalities. There is mild to moderate mitral regurgitation. There is mild-moderate tricuspid regurgitation. Compared to the transthoracic echocardiogram performed on 4/16/2022 there have been no significant interval changes.  -pro BNP: 8319  -home diuretics: lasix 20  -hold home direutic  -ctm

## 2023-08-07 NOTE — H&P ADULT - CONVERSATION DETAILS
Discussed with patient in the event of cardiopulmonary arrest, patient would want a natural death. She states being DNR and DNI. Can send patient to the hospital, can have iv fluids, iv antibiotics as well.       16 minutes spent with discussion

## 2023-08-07 NOTE — H&P ADULT - PROBLEM SELECTOR PLAN 7
Diet:  DVT:  Dispo: hold in the setting of sepsis hold amlodipine  c/w spironolactone 25  c/w carvedilol 6.25

## 2023-08-07 NOTE — H&P ADULT - NSHPLABSRESULTS_GEN_ALL_CORE
LABS:                          13.1   21.19 )-----------( 199      ( 06 Aug 2023 18:44 )             40.7     08-    142  |  97<L>  |  66<H>  ----------------------------<  133<H>  4.4   |  27  |  1.38<H>    Ca    10.2      06 Aug 2023 18:44    TPro  9.1<H>  /  Alb  4.1  /  TBili  0.7  /  DBili  x   /  AST  22  /  ALT  24  /  AlkPhos  189<H>  08-    LIVER FUNCTIONS - ( 06 Aug 2023 18:44 )  Alb: 4.1 g/dL / Pro: 9.1 g/dL / ALK PHOS: 189 U/L / ALT: 24 U/L / AST: 22 U/L / GGT: x             Urinalysis Basic - ( 06 Aug 2023 22:25 )    Color: Yellow / Appearance: Cloudy / S.049 / pH: x  Gluc: x / Ketone: Negative mg/dL  / Bili: Negative / Urobili: 1.0 mg/dL   Blood: x / Protein: 100 mg/dL / Nitrite: Negative   Leuk Esterase: Moderate / RBC: 1 /HPF / WBC 6 /HPF   Sq Epi: x / Non Sq Epi: x / Bacteria: Moderate /HPF

## 2023-08-07 NOTE — CHART NOTE - NSCHARTNOTEFT_GEN_A_CORE
Patient seen and examined this morning with daughter at bedside. She states that she really has not improved since leaving [facility] a couple of days ago. Was having very bad productive cough but this has resolved in the last 12H. Currently denies any fevers, chills, nausea or vomiting.     PHYSICAL EXAM:  Vital Signs Last 24 Hrs  T(C): 36.4 (08-07-23 @ 12:25)  T(F): 97.5 (08-07-23 @ 12:25), Max: 98.5 (08-06-23 @ 16:35)  HR: 74 (08-07-23 @ 12:25) (66 - 76)  BP: 149/69 (08-07-23 @ 12:25)  BP(mean): 99 (08-07-23 @ 03:11) (96 - 99)  RR: 17 (08-07-23 @ 12:25) (16 - 28)  SpO2: 100% (08-07-23 @ 12:25) (96% - 100%)  Wt(kg): --    General: elderly woman in bed appears comfortable in NAD, awake and alert  HENMT: NCAT, MMM, NC in place  Respiratory: No respiratory distress, shallow breaths, CTABL, No rales, rhonchi, wheezing.  Cardiovascular: Regular rate and rhythm; No m/g/r. 2+ peripheral pulses in BUEs  Gastrointestinal: Soft, Nontender, Nondistended; +BS. No palpable organomegaly  Extremities: Trace bilateral pedal edema, No c/c; warm to touch  Neurological: Speech fluent/face symmetric. Moving all 4 extremities;   Psych: AAOx3; appropriate mood and affect    # Sepsis secondary to aspiration pna   - CT showed debris in the airways c/f aspiration although this could be mucus given prior viral illness  - c/w zosyn for now, would hold additional vanco for now - f/u MRSA pain   - wean O2 as tolerated   - SLP eval pending     PT consult for safe dispo     Daughter updated at bedside, all questions answered.

## 2023-08-08 ENCOUNTER — TRANSCRIPTION ENCOUNTER (OUTPATIENT)
Age: 88
End: 2023-08-08

## 2023-08-08 DIAGNOSIS — J96.01 ACUTE RESPIRATORY FAILURE WITH HYPOXIA: ICD-10-CM

## 2023-08-08 LAB
ANION GAP SERPL CALC-SCNC: 17 MMOL/L — HIGH (ref 7–14)
BUN SERPL-MCNC: 64 MG/DL — HIGH (ref 7–23)
CALCIUM SERPL-MCNC: 9.3 MG/DL — SIGNIFICANT CHANGE UP (ref 8.4–10.5)
CHLORIDE SERPL-SCNC: 94 MMOL/L — LOW (ref 98–107)
CO2 SERPL-SCNC: 28 MMOL/L — SIGNIFICANT CHANGE UP (ref 22–31)
CREAT ?TM UR-MCNC: 133 MG/DL — SIGNIFICANT CHANGE UP
CREAT SERPL-MCNC: 1.44 MG/DL — HIGH (ref 0.5–1.3)
EGFR: 32 ML/MIN/1.73M2 — LOW
GLUCOSE SERPL-MCNC: 92 MG/DL — SIGNIFICANT CHANGE UP (ref 70–99)
HCT VFR BLD CALC: 38.3 % — SIGNIFICANT CHANGE UP (ref 34.5–45)
HGB BLD-MCNC: 12.7 G/DL — SIGNIFICANT CHANGE UP (ref 11.5–15.5)
MCHC RBC-ENTMCNC: 31.2 PG — SIGNIFICANT CHANGE UP (ref 27–34)
MCHC RBC-ENTMCNC: 33.2 GM/DL — SIGNIFICANT CHANGE UP (ref 32–36)
MCV RBC AUTO: 94.1 FL — SIGNIFICANT CHANGE UP (ref 80–100)
NRBC # BLD: 0 /100 WBCS — SIGNIFICANT CHANGE UP (ref 0–0)
NRBC # FLD: 0 K/UL — SIGNIFICANT CHANGE UP (ref 0–0)
PLATELET # BLD AUTO: 196 K/UL — SIGNIFICANT CHANGE UP (ref 150–400)
POTASSIUM SERPL-MCNC: 4 MMOL/L — SIGNIFICANT CHANGE UP (ref 3.5–5.3)
POTASSIUM SERPL-SCNC: 4 MMOL/L — SIGNIFICANT CHANGE UP (ref 3.5–5.3)
PROCALCITONIN SERPL-MCNC: 0.43 NG/ML — HIGH (ref 0.02–0.1)
RBC # BLD: 4.07 M/UL — SIGNIFICANT CHANGE UP (ref 3.8–5.2)
RBC # FLD: 14.2 % — SIGNIFICANT CHANGE UP (ref 10.3–14.5)
SODIUM SERPL-SCNC: 139 MMOL/L — SIGNIFICANT CHANGE UP (ref 135–145)
SODIUM UR-SCNC: <20 MMOL/L — SIGNIFICANT CHANGE UP
UUN UR-MCNC: 1072 MG/DL — SIGNIFICANT CHANGE UP
WBC # BLD: 17.78 K/UL — HIGH (ref 3.8–10.5)
WBC # FLD AUTO: 17.78 K/UL — HIGH (ref 3.8–10.5)

## 2023-08-08 PROCEDURE — 99233 SBSQ HOSP IP/OBS HIGH 50: CPT

## 2023-08-08 RX ORDER — FUROSEMIDE 40 MG
40 TABLET ORAL ONCE
Refills: 0 | Status: COMPLETED | OUTPATIENT
Start: 2023-08-08 | End: 2023-08-08

## 2023-08-08 RX ORDER — IPRATROPIUM/ALBUTEROL SULFATE 18-103MCG
3 AEROSOL WITH ADAPTER (GRAM) INHALATION EVERY 6 HOURS
Refills: 0 | Status: DISCONTINUED | OUTPATIENT
Start: 2023-08-08 | End: 2023-08-18

## 2023-08-08 RX ORDER — HEPARIN SODIUM 5000 [USP'U]/ML
5000 INJECTION INTRAVENOUS; SUBCUTANEOUS EVERY 12 HOURS
Refills: 0 | Status: DISCONTINUED | OUTPATIENT
Start: 2023-08-08 | End: 2023-08-11

## 2023-08-08 RX ORDER — MUPIROCIN 20 MG/G
1 OINTMENT TOPICAL
Refills: 0 | Status: DISCONTINUED | OUTPATIENT
Start: 2023-08-08 | End: 2023-08-08

## 2023-08-08 RX ORDER — MUPIROCIN 20 MG/G
1 OINTMENT TOPICAL
Refills: 0 | Status: COMPLETED | OUTPATIENT
Start: 2023-08-08 | End: 2023-08-13

## 2023-08-08 RX ADMIN — CARVEDILOL PHOSPHATE 6.25 MILLIGRAM(S): 80 CAPSULE, EXTENDED RELEASE ORAL at 06:36

## 2023-08-08 RX ADMIN — Medication 200 MILLIGRAM(S): at 18:02

## 2023-08-08 RX ADMIN — HEPARIN SODIUM 5000 UNIT(S): 5000 INJECTION INTRAVENOUS; SUBCUTANEOUS at 18:02

## 2023-08-08 RX ADMIN — PIPERACILLIN AND TAZOBACTAM 25 GRAM(S): 4; .5 INJECTION, POWDER, LYOPHILIZED, FOR SOLUTION INTRAVENOUS at 06:36

## 2023-08-08 RX ADMIN — PIPERACILLIN AND TAZOBACTAM 25 GRAM(S): 4; .5 INJECTION, POWDER, LYOPHILIZED, FOR SOLUTION INTRAVENOUS at 14:32

## 2023-08-08 RX ADMIN — Medication 200 MILLIGRAM(S): at 14:32

## 2023-08-08 RX ADMIN — Medication 40 MILLIGRAM(S): at 18:02

## 2023-08-08 RX ADMIN — MUPIROCIN 1 APPLICATION(S): 20 OINTMENT TOPICAL at 18:02

## 2023-08-08 RX ADMIN — Medication 1000 UNIT(S): at 12:25

## 2023-08-08 RX ADMIN — Medication 3 MILLILITER(S): at 21:46

## 2023-08-08 RX ADMIN — Medication 100 MICROGRAM(S): at 06:36

## 2023-08-08 RX ADMIN — CARVEDILOL PHOSPHATE 6.25 MILLIGRAM(S): 80 CAPSULE, EXTENDED RELEASE ORAL at 18:02

## 2023-08-08 RX ADMIN — ATORVASTATIN CALCIUM 20 MILLIGRAM(S): 80 TABLET, FILM COATED ORAL at 22:20

## 2023-08-08 RX ADMIN — Medication 200 MILLIGRAM(S): at 23:26

## 2023-08-08 RX ADMIN — Medication 3 MILLILITER(S): at 16:22

## 2023-08-08 RX ADMIN — PIPERACILLIN AND TAZOBACTAM 25 GRAM(S): 4; .5 INJECTION, POWDER, LYOPHILIZED, FOR SOLUTION INTRAVENOUS at 22:20

## 2023-08-08 NOTE — DISCHARGE NOTE PROVIDER - ATTENDING DISCHARGE PHYSICAL EXAMINATION:
T(C): 36.4 (08-24-23 @ 14:48), Max: 36.4 (08-24-23 @ 14:48)  HR: 58 (08-24-23 @ 14:48) (50 - 58)  BP: 150/62 (08-24-23 @ 14:48) (96/56 - 150/62)  RR: 18 (08-24-23 @ 14:48) (16 - 18)  SpO2: 98% (08-24-23 @ 14:48) (95% - 99%)    PHYSICAL EXAM:  GENERAL: NAD, lying in bed  HEAD:  Atraumatic, Normocephalic  EYES: EOMI, PERRLA, conjunctiva and sclera clear  NECK: Supple, No elevated JVD  CHEST/LUNG: Clear to auscultation bilaterally; No wheeze  HEART: Regular rate and rhythm; No murmurs, rubs, or gallops  ABDOMEN: Soft, Nontender, Nondistended; Bowel sounds present  EXTREMITIES:  2+ Peripheral Pulses, No clubbing, cyanosis, or edema  PSYCH: AAOx3  NEUROLOGY: CN II-XII grossly intact, moving all extremities  SKIN: No rashes or lesions

## 2023-08-08 NOTE — DISCHARGE NOTE PROVIDER - NSDCCPCAREPLAN_GEN_ALL_CORE_FT
PRINCIPAL DISCHARGE DIAGNOSIS  Diagnosis: Sepsis with acute hypoxic respiratory failure  Assessment and Plan of Treatment:       SECONDARY DISCHARGE DIAGNOSES  Diagnosis: Heart failure with preserved ejection fraction  Assessment and Plan of Treatment:     Diagnosis: Bright red blood per rectum  Assessment and Plan of Treatment:     Diagnosis: Acute kidney injury  Assessment and Plan of Treatment:      PRINCIPAL DISCHARGE DIAGNOSIS  Diagnosis: Sepsis with acute hypoxic respiratory failure  Assessment and Plan of Treatment: You had low oxygen levels due to pneumonia and possibly due to heart failure. You received antibiotics. You no longer require oxygen. Please see a primary care doctor after leaving the hospital.      SECONDARY DISCHARGE DIAGNOSES  Diagnosis: Heart failure with preserved ejection fraction  Assessment and Plan of Treatment: You were diagnosed with heart failure and received medications to help remove fluid from your body. Please take your medications as prescribed and follow with a primary doctor.    Diagnosis: Hypothyroidism  Assessment and Plan of Treatment: You received your normal dose of levothyroxine while admitted. Please get your thyroid level checked after leaving the hospital.

## 2023-08-08 NOTE — CONSULT NOTE ADULT - SUBJECTIVE AND OBJECTIVE BOX
CARDIOLOGY CONSULT - Dr. Riley         HPI:  Pt is 89-year-old female with past medical history of CHF, hypertension, hyperlipidemia, A-fib, hypothyroidism who presents w/ productive cough.  Patient is known from recent admissions to [facility] (7/27-8/4) for CHF exacerbation and UTI. Pt reports productive cough since discharge from [facility]. She was reported to be hypoxic to 85% on RA at the assisted living facility. She also reports increased urinary frequency and intermittent dysuria. Patient denies any fevers, chills, chest pain, swelling of her lower extremities.  Echo 8/1/23: Abnormal septal motion of LV, mild-mod MR, mild-mod TR , ros otherwise neg      PAST MEDICAL & SURGICAL HISTORY:  Urinary Frequency      Bilateral Cataracts      HTN (hypertension)      Spinal stenosis      Hypothyroidism      Non-ST elevation MI (NSTEMI)      Macular degeneration      (HFpEF) heart failure with preserved ejection fraction      Paroxysmal atrial fibrillation      Chronic kidney disease (CKD)      Ectopic pregnancy, tubal      S/P ORIF (open reduction internal fixation) fracture  R hip      S/P breast lumpectomy      S/P cataract surgery  b/l              PREVIOUS DIAGNOSTIC TESTING:    Echo 5/18/23: Moderate LV dysfxn, LVH  Echo 8/1/23: Abnormal septal motion of LV, mild-mod MR, mild-mod TR      MEDICATIONS:  Home Medications:  amLODIPine 5 mg oral tablet: 1 tab(s) orally once a day (07 Aug 2023 06:47)  Biofreeze 4% topical gel: Apply topically to affected area 2 times a day to both knees (07 Aug 2023 06:47)  carvedilol 6.25 mg oral tablet: 1 tab(s) orally 2 times a day (07 Aug 2023 06:47)  lactobacillus acidophilus oral capsule: 1 cap(s) orally once a day (07 Aug 2023 06:47)  levothyroxine 100 mcg (0.1 mg) oral tablet: 1 tab(s) orally once a day (07 Aug 2023 06:47)  methenamine hippurate 1 g oral tablet: 1 tab(s) orally once a day (07 Aug 2023 06:47)  nystatin 100,000 units/g topical powder: Apply topically to affected area once a day to bust after showers (07 Aug 2023 06:47)  Vitamin D3 25 mcg (1000 intl units) oral tablet: 1 tab(s) orally once a day (07 Aug 2023 06:47)      MEDICATIONS  (STANDING):  albuterol/ipratropium for Nebulization 3 milliLiter(s) Nebulizer every 6 hours  atorvastatin 20 milliGRAM(s) Oral at bedtime  benzonatate 100 milliGRAM(s) Oral every 8 hours  carvedilol 6.25 milliGRAM(s) Oral every 12 hours  cholecalciferol 1000 Unit(s) Oral daily  enoxaparin Injectable 30 milliGRAM(s) SubCutaneous every 24 hours  guaiFENesin  milliGRAM(s) Oral every 12 hours  levothyroxine 100 MICROGram(s) Oral daily  mupirocin 2% Nasal 1 Application(s) Both Nostrils two times a day  piperacillin/tazobactam IVPB.. 3.375 Gram(s) IV Intermittent every 8 hours      FAMILY HISTORY:  Family history of acute myocardial infarction        SOCIAL HISTORY:    [ x] Non-smoker  [ ] Smoker  [ ] Alcohol    Allergies    cephalexin (Hives)  [This allergen will not trigger allergy alert] trisulfapyrimidine (Rash)  sulfa topicals (Unknown)    Intolerances    morphine (Drowsiness; Faint; Hypotension)  	    REVIEW OF SYSTEMS:  CONSTITUTIONAL: No fever, weight loss, or fatigue  EYES: No eye pain, visual disturbances, or discharge  ENMT:  No difficulty hearing, tinnitus, vertigo; No sinus or throat pain  NECK: No pain or stiffness  RESPIRATORY:  see hpi   CARDIOVASCULAR: No chest pain, palpitations, passing out, dizziness, or leg swelling  GASTROINTESTINAL: No abdominal or epigastric pain. No nausea, vomiting, or hematemesis; No diarrhea or constipation. No melena or hematochezia.  GENITOURINARY: No dysuria, frequency, hematuria, or incontinence  NEUROLOGICAL: No headaches, memory loss, loss of strength, numbness, or tremors  SKIN: No itching, burning, rashes, or lesions   	    x[ ] All others negative	  [ ] Unable to obtain    PHYSICAL EXAM:  T(C): 36.4 (08-08-23 @ 10:06), Max: 36.9 (08-07-23 @ 22:27)  HR: 69 (08-08-23 @ 10:06) (66 - 81)  BP: 124/77 (08-08-23 @ 10:06) (124/77 - 160/88)  RR: 18 (08-08-23 @ 10:06) (17 - 18)  SpO2: 96% (08-08-23 @ 10:06) (96% - 100%)  Wt(kg): --  I&O's Summary      Appearance: NAD	  HEENT:   Normal oral mucosa, PERRL, EOMI	  Lymphatic: No lymphadenopathy  Cardiovascular: Normal S1 S2,RR  Respiratory: diminsihed   Gastrointestinal:  Soft, Non-tender, + BS	  Skin: No rashes, No ecchymoses, No cyanosis	  Neurologic: Non-focal  Extremities: Normal range of motion, No clubbing, cyanosis or edema  Vascular: Peripheral pulses palpable 2+ bilaterally    TELEMETRY: 	    ECG:  	  RADIOLOGY:  < from: CT Angio Chest PE Protocol w/ IV Cont (08.06.23 @ 19:58) >  IMPRESSION:  Evaluation distal to the segmental levels is limited due to motion   artifact. Within this limitation, no evidence of pulmonary embolism.    Debris within the right interlobar bronchus, without focal consolidation.    Mild reflux of contrast into the IVC and hepatic veins, which can be seen   with congestive heart failure. Essential interval resolution of   previously noted bilateral pleural effusions and interlobular septal   thickening/groundglass consolidation.    < end of copied text >      < from: Xray Chest 1 View- PORTABLE-Urgent (Xray Chest 1 View- PORTABLE-Urgent .) (08.06.23 @ 21:54) >    IMPRESSION:  Left basilar hazy opacity, which may reflect atelectasis.    --- End of Report ---      < end of copied text >    OTHER: 	  	  LABS:	 	    CARDIAC MARKERS:  Troponin T, High Sensitivity Result: 41 ng/L (08-06 @ 18:44)                                  12.7   17.78 )-----------( 196      ( 08 Aug 2023 07:30 )             38.3     08-08    139  |  94<L>  |  64<H>  ----------------------------<  92  4.0   |  28  |  1.44<H>    Ca    9.3      08 Aug 2023 07:30  Phos  3.9     08-07  Mg     2.30     08-07    TPro  7.8  /  Alb  3.6  /  TBili  0.8  /  DBili  x   /  AST  17  /  ALT  19  /  AlkPhos  161<H>  08-07      proBNP:   Lipid Profile:   HgA1c:   TSH:

## 2023-08-08 NOTE — DISCHARGE NOTE PROVIDER - CARE PROVIDER_API CALL
Jose Shah  Gastroenterology  [address] [place] [address], [address]  [place] [address]  Phone: [phone]  Fax: [phone]  Follow Up Time:    Elidel Counseling: Patient may experience a mild burning sensation during topical application. Elidel is not approved in children less than 2 years of age. There have been case reports of hematologic and skin malignancies in patients using topical calcineurin inhibitors although causality is questionable. Clofazimine Counseling:  I discussed with the patient the risks of clofazimine including but not limited to skin and eye pigmentation, liver damage, nausea/vomiting, gastrointestinal bleeding and allergy. Qbrexza Pregnancy And Lactation Text: There is no available data on Qbrexza use in pregnant women.  There is no available data on Qbrexza use in lactation. Finasteride Pregnancy And Lactation Text: This medication is absolutely contraindicated during pregnancy. It is unknown if it is excreted in breast milk. Infliximab Counseling:  I discussed with the patient the risks of infliximab including but not limited to myelosuppression, immunosuppression, autoimmune hepatitis, demyelinating diseases, lymphoma, and serious infections.  The patient understands that monitoring is required including a PPD at baseline and must alert us or the primary physician if symptoms of infection or other concerning signs are noted. Bactrim Counseling:  I discussed with the patient the risks of sulfa antibiotics including but not limited to GI upset, allergic reaction, drug rash, diarrhea, dizziness, photosensitivity, and yeast infections.  Rarely, more serious reactions can occur including but not limited to aplastic anemia, agranulocytosis, methemoglobinemia, blood dyscrasias, liver or kidney failure, lung infiltrates or desquamative/blistering drug rashes. SSKI Counseling:  I discussed with the patient the risks of SSKI including but not limited to thyroid abnormalities, metallic taste, GI upset, fever, headache, acne, arthralgias, paraesthesias, lymphadenopathy, easy bleeding, arrhythmias, and allergic reaction. Albendazole Counseling:  I discussed with the patient the risks of albendazole including but not limited to cytopenia, kidney damage, nausea/vomiting and severe allergy.  The patient understands that this medication is being used in an off-label manner. Prednisone Pregnancy And Lactation Text: This medication is Pregnancy Category C and it isn't know if it is safe during pregnancy. This medication is excreted in breast milk. Carac Pregnancy And Lactation Text: This medication is Pregnancy Category X and contraindicated in pregnancy and in women who may become pregnant. It is unknown if this medication is excreted in breast milk. Valtrex Pregnancy And Lactation Text: this medication is Pregnancy Category B and is considered safe during pregnancy. This medication is not directly found in breast milk but it's metabolite acyclovir is present. Cosentyx Pregnancy And Lactation Text: This medication is Pregnancy Category B and is considered safe during pregnancy. It is unknown if this medication is excreted in breast milk. Terbinafine Counseling: Patient counseling regarding adverse effects of terbinafine including but not limited to headache, diarrhea, rash, upset stomach, liver function test abnormalities, itching, taste/smell disturbance, nausea, abdominal pain, and flatulence.  There is a rare possibility of liver failure that can occur when taking terbinafine.  The patient understands that a baseline LFT and kidney function test may be required. The patient verbalized understanding of the proper use and possible adverse effects of terbinafine.  All of the patient's questions and concerns were addressed. Olanzapine Pregnancy And Lactation Text: This medication is pregnancy category C.   There are no adequate and well controlled trials with olanzapine in pregnant females.  Olanzapine should be used during pregnancy only if the potential benefit justifies the potential risk to the fetus.   In a study in lactating healthy women, olanzapine was excreted in breast milk.  It is recommended that women taking olanzapine should not breast feed. Xeljanz Counseling: I discussed with the patient the risks of Xeljanz therapy including increased risk of infection, liver issues, headache, diarrhea, or cold symptoms. Live vaccines should be avoided. They were instructed to call if they have any problems. VTAMA Counseling: I discussed with the patient that VTAMA is not for use in the eyes, mouth or mouth. They should call the office if they develop any signs of allergic reactions to VTAMA. The patient verbalized understanding of the proper use and possible adverse effects of VTAMA.  All of the patient's questions and concerns were addressed. Ketoconazole Counseling:   Patient counseled regarding improving absorption with orange juice.  Adverse effects include but are not limited to breast enlargement, headache, diarrhea, nausea, upset stomach, liver function test abnormalities, taste disturbance, and stomach pain.  There is a rare possibility of liver failure that can occur when taking ketoconazole. The patient understands that monitoring of LFTs may be required, especially at baseline. The patient verbalized understanding of the proper use and possible adverse effects of ketoconazole.  All of the patient's questions and concerns were addressed. Stelara Counseling:  I discussed with the patient the risks of ustekinumab including but not limited to immunosuppression, malignancy, posterior leukoencephalopathy syndrome, and serious infections.  The patient understands that monitoring is required including a PPD at baseline and must alert us or the primary physician if symptoms of infection or other concerning signs are noted. Topical Clindamycin Pregnancy And Lactation Text: This medication is Pregnancy Category B and is considered safe during pregnancy. It is unknown if it is excreted in breast milk. Sarecycline Pregnancy And Lactation Text: This medication is Pregnancy Category D and not consider safe during pregnancy. It is also excreted in breast milk. High Dose Vitamin A Pregnancy And Lactation Text: High dose vitamin A therapy is contraindicated during pregnancy and breast feeding. Minoxidil Counseling: Minoxidil is a topical medication which can increase blood flow where it is applied. It is uncertain how this medication increases hair growth. Side effects are uncommon and include stinging and allergic reactions. Birth Control Pills Counseling: Birth Control Pill Counseling: I discussed with the patient the potential side effects of OCPs including but not limited to increased risk of stroke, heart attack, thrombophlebitis, deep venous thrombosis, hepatic adenomas, breast changes, GI upset, headaches, and depression.  The patient verbalized understanding of the proper use and possible adverse effects of OCPs. All of the patient's questions and concerns were addressed. Metronidazole Counseling:  I discussed with the patient the risks of metronidazole including but not limited to seizures, nausea/vomiting, a metallic taste in the mouth, nausea/vomiting and severe allergy. Erivedge Pregnancy And Lactation Text: This medication is Pregnancy Category X and is absolutely contraindicated during pregnancy. It is unknown if it is excreted in breast milk. Hydroxychloroquine Counseling:  I discussed with the patient that a baseline ophthalmologic exam is needed at the start of therapy and every year thereafter while on therapy. A CBC may also be warranted for monitoring.  The side effects of this medication were discussed with the patient, including but not limited to agranulocytosis, aplastic anemia, seizures, rashes, retinopathy, and liver toxicity. Patient instructed to call the office should any adverse effect occur.  The patient verbalized understanding of the proper use and possible adverse effects of Plaquenil.  All the patient's questions and concerns were addressed. Elidel Pregnancy And Lactation Text: This medication is Pregnancy Category C. It is unknown if this medication is excreted in breast milk. Albendazole Pregnancy And Lactation Text: This medication is Pregnancy Category C and it isn't known if it is safe during pregnancy. It is also excreted in breast milk. Clofazimine Pregnancy And Lactation Text: This medication is Pregnancy Category C and isn't considered safe during pregnancy. It is excreted in breast milk. Rhofade Counseling: Rhofade is a topical medication which can decrease superficial blood flow where applied. Side effects are uncommon and include stinging, redness and allergic reactions. Calcipotriene Counseling:  I discussed with the patient the risks of calcipotriene including but not limited to erythema, scaling, itching, and irritation. Sski Pregnancy And Lactation Text: This medication is Pregnancy Category D and isn't considered safe during pregnancy. It is excreted in breast milk. Bactrim Pregnancy And Lactation Text: This medication is Pregnancy Category D and is known to cause fetal risk.  It is also excreted in breast milk. Xeljammiez Pregnancy And Lactation Text: This medication is Pregnancy Category D and is not considered safe during pregnancy.  The risk during breast feeding is also uncertain. Oral Minoxidil Counseling- I discussed with the patient the risks of oral minoxidil including but not limited to shortness of breath, swelling of the feet or ankles, dizziness, lightheadedness, unwanted hair growth and allergic reaction.  The patient verbalized understanding of the proper use and possible adverse effects of oral minoxidil.  All of the patient's questions and concerns were addressed. Dupixent Counseling: I discussed with the patient the risks of dupilumab including but not limited to eye infection and irritation, cold sores, injection site reactions, worsening of asthma, allergic reactions and increased risk of parasitic infection.  Live vaccines should be avoided while taking dupilumab. Dupilumab will also interact with certain medications such as warfarin and cyclosporine. The patient understands that monitoring is required and they must alert us or the primary physician if symptoms of infection or other concerning signs are noted. Use Enhanced Medication Counseling?: No Tetracycline Counseling: Patient counseled regarding possible photosensitivity and increased risk for sunburn.  Patient instructed to avoid sunlight, if possible.  When exposed to sunlight, patients should wear protective clothing, sunglasses, and sunscreen.  The patient was instructed to call the office immediately if the following severe adverse effects occur:  hearing changes, easy bruising/bleeding, severe headache, or vision changes.  The patient verbalized understanding of the proper use and possible adverse effects of tetracycline.  All of the patient's questions and concerns were addressed. Patient understands to avoid pregnancy while on therapy due to potential birth defects. Terbinafine Pregnancy And Lactation Text: This medication is Pregnancy Category B and is considered safe during pregnancy. It is also excreted in breast milk and breast feeding isn't recommended. Minoxidil Pregnancy And Lactation Text: This medication has not been assigned a Pregnancy Risk Category but animal studies failed to show danger with the topical medication. It is unknown if the medication is excreted in breast milk. Vtama Pregnancy And Lactation Text: It is unknown if this medication can cause problems during pregnancy and breastfeeding. Cellcept Pregnancy And Lactation Text: This medication is Pregnancy Category D and isn't considered safe during pregnancy. It is unknown if this medication is excreted in breast milk. Topical Ketoconazole Counseling: Patient counseled that this medication may cause skin irritation or allergic reactions.  In the event of skin irritation, the patient was advised to reduce the amount of the drug applied or use it less frequently.   The patient verbalized understanding of the proper use and possible adverse effects of ketoconazole.  All of the patient's questions and concerns were addressed. Ketoconazole Pregnancy And Lactation Text: This medication is Pregnancy Category C and it isn't know if it is safe during pregnancy. It is also excreted in breast milk and breast feeding isn't recommended. Hydroxychloroquine Pregnancy And Lactation Text: This medication has been shown to cause fetal harm but it isn't assigned a Pregnancy Risk Category. There are small amounts excreted in breast milk. Cibinqo Counseling: I discussed with the patient the risks of Cibinqo therapy including but not limited to common cold, nausea, headache, cold sores, increased blood CPK levels, dizziness, UTIs, fatigue, acne, and vomitting. Live vaccines should be avoided.  This medication has been linked to serious infections; higher rate of mortality; malignancy and lymphoproliferative disorders; major adverse cardiovascular events; thrombosis; thrombocytopenia and lymphopenia; lipid elevations; and retinal detachment. Libtayo Counseling- I discussed with the patient the risks of Libtayo including but not limited to nausea, vomiting, diarrhea, and bone or muscle pain.  The patient verbalized understanding of the proper use and possible adverse effects of Libtayo.  All of the patient's questions and concerns were addressed. Metronidazole Pregnancy And Lactation Text: This medication is Pregnancy Category B and considered safe during pregnancy.  It is also excreted in breast milk. Rituxan Counseling:  I discussed with the patient the risks of Rituxan infusions. Side effects can include infusion reactions, severe drug rashes including mucocutaneous reactions, reactivation of latent hepatitis and other infections and rarely progressive multifocal leukoencephalopathy.  All of the patient's questions and concerns were addressed. Colchicine Counseling:  Patient counseled regarding adverse effects including but not limited to stomach upset (nausea, vomiting, stomach pain, or diarrhea).  Patient instructed to limit alcohol consumption while taking this medication.  Colchicine may reduce blood counts especially with prolonged use.  The patient understands that monitoring of kidney function and blood counts may be required, especially at baseline. The patient verbalized understanding of the proper use and possible adverse effects of colchicine.  All of the patient's questions and concerns were addressed. Birth Control Pills Pregnancy And Lactation Text: This medication should be avoided if pregnant and for the first 30 days post-partum. Ivermectin Counseling:  Patient instructed to take medication on an empty stomach with a full glass of water.  Patient informed of potential adverse effects including but not limited to nausea, diarrhea, dizziness, itching, and swelling of the extremities or lymph nodes.  The patient verbalized understanding of the proper use and possible adverse effects of ivermectin.  All of the patient's questions and concerns were addressed. Rhofade Pregnancy And Lactation Text: This medication has not been assigned a Pregnancy Risk Category. It is unknown if the medication is excreted in breast milk. Eucrisa Counseling: Patient may experience a mild burning sensation during topical application. Eucrisa is not approved in children less than 2 years of age. Calcipotriene Pregnancy And Lactation Text: This medication has not been proven safe during pregnancy. It is unknown if this medication is excreted in breast milk. Thalidomide Counseling: I discussed with the patient the risks of thalidomide including but not limited to birth defects, anxiety, weakness, chest pain, dizziness, cough and severe allergy. Cephalexin Counseling: I counseled the patient regarding use of cephalexin as an antibiotic for prophylactic and/or therapeutic purposes. Cephalexin (commonly prescribed under brand name Keflex) is a cephalosporin antibiotic which is active against numerous classes of bacteria, including most skin bacteria. Side effects may include nausea, diarrhea, gastrointestinal upset, rash, hives, yeast infections, and in rare cases, hepatitis, kidney disease, seizures, fever, confusion, neurologic symptoms, and others. Patients with severe allergies to penicillin medications are cautioned that there is about a 10% incidence of cross-reactivity with cephalosporins. When possible, patients with penicillin allergies should use alternatives to cephalosporins for antibiotic therapy. Dupixent Pregnancy And Lactation Text: This medication likely crosses the placenta but the risk for the fetus is uncertain. This medication is excreted in breast milk. Zyclara Counseling:  I discussed with the patient the risks of imiquimod including but not limited to erythema, scaling, itching, weeping, crusting, and pain.  Patient understands that the inflammatory response to imiquimod is variable from person to person and was educated regarded proper titration schedule.  If flu-like symptoms develop, patient knows to discontinue the medication and contact us. Oral Minoxidil Pregnancy And Lactation Text: This medication should only be used when clearly needed if you are pregnant, attempting to become pregnant or breast feeding. Cyclophosphamide Counseling:  I discussed with the patient the risks of cyclophosphamide including but not limited to hair loss, hormonal abnormalities, decreased fertility, abdominal pain, diarrhea, nausea and vomiting, bone marrow suppression and infection. The patient understands that monitoring is required while taking this medication. Taltz Counseling: I discussed with the patient the risks of ixekizumab including but not limited to immunosuppression, serious infections, worsening of inflammatory bowel disease and drug reactions.  The patient understands that monitoring is required including a PPD at baseline and must alert us or the primary physician if symptoms of infection or other concerning signs are noted. Aklief counseling:  Patient advised to apply a pea-sized amount only at bedtime and wait 30 minutes after washing their face before applying.  If too drying, patient may add a non-comedogenic moisturizer.  The most commonly reported side effects including irritation, redness, scaling, dryness, stinging, burning, itching, and increased risk of sunburn.  The patient verbalized understanding of the proper use and possible adverse effects of retinoids.  All of the patient's questions and concerns were addressed. Mirvaso Counseling: Mirvaso is a topical medication which can decrease superficial blood flow where applied. Side effects are uncommon and include stinging, redness and allergic reactions. Libtayo Pregnancy And Lactation Text: This medication is contraindicated in pregnancy and when breast feeding. Low Dose Naltrexone Counseling- I discussed with the patient the potential risks and side effects of low dose naltrexone including but not limited to: more vivid dreams, headaches, nausea, vomiting, abdominal pain, fatigue, dizziness, and anxiety. Cibinqo Pregnancy And Lactation Text: It is unknown if this medication will adversely affect pregnancy or breast feeding.  You should not take this medication if you are currently pregnant or planning a pregnancy or while breastfeeding. Cimetidine Counseling:  I discussed with the patient the risks of Cimetidine including but not limited to gynecomastia, headache, diarrhea, nausea, drowsiness, arrhythmias, pancreatitis, skin rashes, psychosis, bone marrow suppression and kidney toxicity. Rituxan Pregnancy And Lactation Text: This medication is Pregnancy Category C and it isn't know if it is safe during pregnancy. It is unknown if this medication is excreted in breast milk but similar antibodies are known to be excreted. Solaraze Counseling:  I discussed with the patient the risks of Solaraze including but not limited to erythema, scaling, itching, weeping, crusting, and pain. Minocycline Counseling: Patient advised regarding possible photosensitivity and discoloration of the teeth, skin, lips, tongue and gums.  Patient instructed to avoid sunlight, if possible.  When exposed to sunlight, patients should wear protective clothing, sunglasses, and sunscreen.  The patient was instructed to call the office immediately if the following severe adverse effects occur:  hearing changes, easy bruising/bleeding, severe headache, or vision changes.  The patient verbalized understanding of the proper use and possible adverse effects of minocycline.  All of the patient's questions and concerns were addressed. Detail Level: Simple Spironolactone Counseling: Patient advised regarding risks of diarrhea, abdominal pain, hyperkalemia, birth defects (for female patients), liver toxicity and renal toxicity. The patient may need blood work to monitor liver and kidney function and potassium levels while on therapy. The patient verbalized understanding of the proper use and possible adverse effects of spironolactone.  All of the patient's questions and concerns were addressed. Acitretin Counseling:  I discussed with the patient the risks of acitretin including but not limited to hair loss, dry lips/skin/eyes, liver damage, hyperlipidemia, depression/suicidal ideation, photosensitivity.  Serious rare side effects can include but are not limited to pancreatitis, pseudotumor cerebri, bony changes, clot formation/stroke/heart attack.  Patient understands that alcohol is contraindicated since it can result in liver toxicity and significantly prolong the elimination of the drug by many years. Enbrel Counseling:  I discussed with the patient the risks of etanercept including but not limited to myelosuppression, immunosuppression, autoimmune hepatitis, demyelinating diseases, lymphoma, and infections.  The patient understands that monitoring is required including a PPD at baseline and must alert us or the primary physician if symptoms of infection or other concerning signs are noted. Cephalexin Pregnancy And Lactation Text: This medication is Pregnancy Category B and considered safe during pregnancy.  It is also excreted in breast milk but can be used safely for shorter doses. Otezla Counseling: The side effects of Otezla were discussed with the patient, including but not limited to worsening or new depression, weight loss, diarrhea, nausea, upper respiratory tract infection, and headache. Patient instructed to call the office should any adverse effect occur.  The patient verbalized understanding of the proper use and possible adverse effects of Otezla.  All the patient's questions and concerns were addressed. Taltz Pregnancy And Lactation Text: The risk during pregnancy and breastfeeding is uncertain with this medication. Olumiant Counseling: I discussed with the patient the risks of Olumiant therapy including but not limited to upper respiratory tract infections, shingles, cold sores, and nausea. Live vaccines should be avoided.  This medication has been linked to serious infections; higher rate of mortality; malignancy and lymphoproliferative disorders; major adverse cardiovascular events; thrombosis; gastrointestinal perforations; neutropenia; lymphopenia; anemia; liver enzyme elevations; and lipid elevations. Low Dose Naltrexone Pregnancy And Lactation Text: Naltrexone is pregnancy category C.  There have been no adequate and well-controlled studies in pregnant women.  It should be used in pregnancy only if the potential benefit justifies the potential risk to the fetus.   Limited data indicates that naltrexone is minimally excreted into breastmilk. Cyclophosphamide Pregnancy And Lactation Text: This medication is Pregnancy Category D and it isn't considered safe during pregnancy. This medication is excreted in breast milk. Aklief Pregnancy And Lactation Text: It is unknown if this medication is safe to use during pregnancy.  It is unknown if this medication is excreted in breast milk.  Breastfeeding women should use the topical cream on the smallest area of the skin for the shortest time needed while breastfeeding.  Do not apply to nipple and areola. Topical Sulfur Applications Counseling: Topical Sulfur Counseling: Patient counseled that this medication may cause skin irritation or allergic reactions.  In the event of skin irritation, the patient was advised to reduce the amount of the drug applied or use it less frequently.   The patient verbalized understanding of the proper use and possible adverse effects of topical sulfur application.  All of the patient's questions and concerns were addressed. Hydroquinone Counseling:  Patient advised that medication may result in skin irritation, lightening (hypopigmentation), dryness, and burning.  In the event of skin irritation, the patient was advised to reduce the amount of the drug applied or use it less frequently.  Rarely, spots that are treated with hydroquinone can become darker (pseudoochronosis).  Should this occur, patient instructed to stop medication and call the office. The patient verbalized understanding of the proper use and possible adverse effects of hydroquinone.  All of the patient's questions and concerns were addressed. Odomzo Counseling- I discussed with the patient the risks of Odomzo including but not limited to nausea, vomiting, diarrhea, constipation, weight loss, changes in the sense of taste, decreased appetite, muscle spasms, and hair loss.  The patient verbalized understanding of the proper use and possible adverse effects of Odomzo.  All of the patient's questions and concerns were addressed. Solaraze Pregnancy And Lactation Text: This medication is Pregnancy Category B and is considered safe. There is some data to suggest avoiding during the third trimester. It is unknown if this medication is excreted in breast milk. Acitretin Pregnancy And Lactation Text: This medication is Pregnancy Category X and should not be given to women who are pregnant or may become pregnant in the future. This medication is excreted in breast milk. Dapsone Counseling: I discussed with the patient the risks of dapsone including but not limited to hemolytic anemia, agranulocytosis, rashes, methemoglobinemia, kidney failure, peripheral neuropathy, headaches, GI upset, and liver toxicity.  Patients who start dapsone require monitoring including baseline LFTs and weekly CBCs for the first month, then every month thereafter.  The patient verbalized understanding of the proper use and possible adverse effects of dapsone.  All of the patient's questions and concerns were addressed. Fluconazole Counseling:  Patient counseled regarding adverse effects of fluconazole including but not limited to headache, diarrhea, nausea, upset stomach, liver function test abnormalities, taste disturbance, and stomach pain.  There is a rare possibility of liver failure that can occur when taking fluconazole.  The patient understands that monitoring of LFTs and kidney function test may be required, especially at baseline. The patient verbalized understanding of the proper use and possible adverse effects of fluconazole.  All of the patient's questions and concerns were addressed. Spironolactone Pregnancy And Lactation Text: This medication can cause feminization of the male fetus and should be avoided during pregnancy. The active metabolite is also found in breast milk. Tranexamic Acid Counseling:  Patient advised of the small risk of bleeding problems with tranexamic acid. They were also instructed to call if they developed any nausea, vomiting or diarrhea. All of the patient's questions and concerns were addressed. Siliq Counseling:  I discussed with the patient the risks of Siliq including but not limited to new or worsening depression, suicidal thoughts and behavior, immunosuppression, malignancy, posterior leukoencephalopathy syndrome, and serious infections.  The patient understands that monitoring is required including a PPD at baseline and must alert us or the primary physician if symptoms of infection or other concerning signs are noted. There is also a special program designed to monitor depression which is required with Siliq. Cantharidin Pregnancy And Lactation Text: The use of this medication during pregnancy or lactation is not recommended as there is insufficient data. Clindamycin Counseling: I counseled the patient regarding use of clindamycin as an antibiotic for prophylactic and/or therapeutic purposes. Clindamycin is active against numerous classes of bacteria, including skin bacteria. Side effects may include nausea, diarrhea, gastrointestinal upset, rash, hives, yeast infections, and in rare cases, colitis. Cyclosporine Counseling:  I discussed with the patient the risks of cyclosporine including but not limited to hypertension, gingival hyperplasia,myelosuppression, immunosuppression, liver damage, kidney damage, neurotoxicity, lymphoma, and serious infections. The patient understands that monitoring is required including baseline blood pressure, CBC, CMP, lipid panel and uric acid, and then 1-2 times monthly CMP and blood pressure. Azelaic Acid Counseling: Patient counseled that medicine may cause skin irritation and to avoid applying near the eyes.  In the event of skin irritation, the patient was advised to reduce the amount of the drug applied or use it less frequently.   The patient verbalized understanding of the proper use and possible adverse effects of azelaic acid.  All of the patient's questions and concerns were addressed. Otezla Pregnancy And Lactation Text: This medication is Pregnancy Category C and it isn't known if it is safe during pregnancy. It is unknown if it is excreted in breast milk. Adbry Counseling: I discussed with the patient the risks of tralokinumab including but not limited to eye infection and irritation, cold sores, injection site reactions, worsening of asthma, allergic reactions and increased risk of parasitic infection.  Live vaccines should be avoided while taking tralokinumab. The patient understands that monitoring is required and they must alert us or the primary physician if symptoms of infection or other concerning signs are noted. Niacinamide Counseling: I recommended taking niacin or niacinamide, also know as vitamin B3, twice daily. Recent evidence suggests that taking vitamin B3 (500 mg twice daily) can reduce the risk of actinic keratoses and non-melanoma skin cancers. Side effects of vitamin B3 include flushing and headache. Tremfya Counseling: I discussed with the patient the risks of guselkumab including but not limited to immunosuppression, serious infections, and drug reactions.  The patient understands that monitoring is required including a PPD at baseline and must alert us or the primary physician if symptoms of infection or other concerning signs are noted. Olumiant Pregnancy And Lactation Text: Based on animal studies, Olumiant may cause embryo-fetal harm when administered to pregnant women.  The medication should not be used in pregnancy.  Breastfeeding is not recommended during treatment. Topical Sulfur Applications Pregnancy And Lactation Text: This medication is Pregnancy Category C and has an unknown safety profile during pregnancy. It is unknown if this topical medication is excreted in breast milk. Quinolones Counseling:  I discussed with the patient the risks of fluoroquinolones including but not limited to GI upset, allergic reaction, drug rash, diarrhea, dizziness, photosensitivity, yeast infections, liver function test abnormalities, tendonitis/tendon rupture. Doxepin Counseling:  Patient advised that the medication is sedating and not to drive a car after taking this medication. Patient informed of potential adverse effects including but not limited to dry mouth, urinary retention, and blurry vision.  The patient verbalized understanding of the proper use and possible adverse effects of doxepin.  All of the patient's questions and concerns were addressed. Opioid Counseling: I discussed with the patient the potential side effects of opioids including but not limited to addiction, altered mental status, and depression. I stressed avoiding alcohol, benzodiazepines, muscle relaxants and sleep aids unless specifically okayed by a physician. The patient verbalized understanding of the proper use and possible adverse effects of opioids. All of the patient's questions and concerns were addressed. They were instructed to flush the remaining pills down the toilet if they did not need them for pain. Opzelura Counseling:  I discussed with the patient the risks of Opzelura including but not limited to nasopharngitis, bronchitis, ear infection, eosinophila, hives, diarrhea, folliculitis, tonsillitis, and rhinorrhea.  Taken orally, this medication has been linked to serious infections; higher rate of mortality; malignancy and lymphoproliferative disorders; major adverse cardiovascular events; thrombosis; thrombocytopenia, anemia, and neutropenia; and lipid elevations. Topical Retinoid counseling:  Patient advised to apply a pea-sized amount only at bedtime and wait 30 minutes after washing their face before applying.  If too drying, patient may add a non-comedogenic moisturizer. The patient verbalized understanding of the proper use and possible adverse effects of retinoids.  All of the patient's questions and concerns were addressed. Bexarotene Counseling:  I discussed with the patient the risks of bexarotene including but not limited to hair loss, dry lips/skin/eyes, liver abnormalities, hyperlipidemia, pancreatitis, depression/suicidal ideation, photosensitivity, drug rash/allergic reactions, hypothyroidism, anemia, leukopenia, infection, cataracts, and teratogenicity.  Patient understands that they will need regular blood tests to check lipid profile, liver function tests, white blood cell count, thyroid function tests and pregnancy test if applicable. Fluconazole Pregnancy And Lactation Text: This medication is Pregnancy Category C and it isn't know if it is safe during pregnancy. It is also excreted in breast milk. Dapsone Pregnancy And Lactation Text: This medication is Pregnancy Category C and is not considered safe during pregnancy or breast feeding. Tranexamic Acid Pregnancy And Lactation Text: It is unknown if this medication is safe during pregnancy or breast feeding. 5-Fu Counseling: 5-Fluorouracil Counseling:  I discussed with the patient the risks of 5-fluorouracil including but not limited to erythema, scaling, itching, weeping, crusting, and pain. Clindamycin Pregnancy And Lactation Text: This medication can be used in pregnancy if certain situations. Clindamycin is also present in breast milk. Humira Counseling:  I discussed with the patient the risks of adalimumab including but not limited to myelosuppression, immunosuppression, autoimmune hepatitis, demyelinating diseases, lymphoma, and serious infections.  The patient understands that monitoring is required including a PPD at baseline and must alert us or the primary physician if symptoms of infection or other concerning signs are noted. Oxybutynin Counseling:  I discussed with the patient the risks of oxybutynin including but not limited to skin rash, drowsiness, dry mouth, difficulty urinating, and blurred vision. Opzelura Pregnancy And Lactation Text: There is insufficient data to evaluate drug-associated risk for major birth defects, miscarriage, or other adverse maternal or fetal outcomes.  There is a pregnancy registry that monitors pregnancy outcomes in pregnant persons exposed to the medication during pregnancy.  It is unknown if this medication is excreted in breast milk.  Do not breastfeed during treatment and for about 4 weeks after the last dose. Azathioprine Counseling:  I discussed with the patient the risks of azathioprine including but not limited to myelosuppression, immunosuppression, hepatotoxicity, lymphoma, and infections.  The patient understands that monitoring is required including baseline LFTs, Creatinine, possible TPMP genotyping and weekly CBCs for the first month and then every 2 weeks thereafter.  The patient verbalized understanding of the proper use and possible adverse effects of azathioprine.  All of the patient's questions and concerns were addressed. Wartpeel Counseling:  I discussed with the patient the risks of Wartpeel including but not limited to erythema, scaling, itching, weeping, crusting, and pain. Azelaic Acid Pregnancy And Lactation Text: This medication is considered safe during pregnancy and breast feeding. Adbry Pregnancy And Lactation Text: It is unknown if this medication will adversely affect pregnancy or breast feeding. Niacinamide Pregnancy And Lactation Text: These medications are considered safe during pregnancy. Rinvoq Counseling: I discussed with the patient the risks of Rinvoq therapy including but not limited to upper respiratory tract infections, shingles, cold sores, bronchitis, nausea, cough, fever, acne, and headache. Live vaccines should be avoided.  This medication has been linked to serious infections; higher rate of mortality; malignancy and lymphoproliferative disorders; major adverse cardiovascular events; thrombosis; thrombocytopenia, anemia, and neutropenia; lipid elevations; liver enzyme elevations; and gastrointestinal perforations. Simponi Counseling:  I discussed with the patient the risks of golimumab including but not limited to myelosuppression, immunosuppression, autoimmune hepatitis, demyelinating diseases, lymphoma, and serious infections.  The patient understands that monitoring is required including a PPD at baseline and must alert us or the primary physician if symptoms of infection or other concerning signs are noted. Gabapentin Counseling: I discussed with the patient the risks of gabapentin including but not limited to dizziness, somnolence, fatigue and ataxia. Doxepin Pregnancy And Lactation Text: This medication is Pregnancy Category C and it isn't known if it is safe during pregnancy. It is also excreted in breast milk and breast feeding isn't recommended. Imiquimod Counseling:  I discussed with the patient the risks of imiquimod including but not limited to erythema, scaling, itching, weeping, crusting, and pain.  Patient understands that the inflammatory response to imiquimod is variable from person to person and was educated regarded proper titration schedule.  If flu-like symptoms develop, patient knows to discontinue the medication and contact us. Bexarotene Pregnancy And Lactation Text: This medication is Pregnancy Category X and should not be given to women who are pregnant or may become pregnant. This medication should not be used if you are breast feeding. Opioid Pregnancy And Lactation Text: These medications can lead to premature delivery and should be avoided during pregnancy. These medications are also present in breast milk in small amounts. Griseofulvin Counseling:  I discussed with the patient the risks of griseofulvin including but not limited to photosensitivity, cytopenia, liver damage, nausea/vomiting and severe allergy.  The patient understands that this medication is best absorbed when taken with a fatty meal (e.g., ice cream or french fries). Doxycycline Counseling:  Patient counseled regarding possible photosensitivity and increased risk for sunburn.  Patient instructed to avoid sunlight, if possible.  When exposed to sunlight, patients should wear protective clothing, sunglasses, and sunscreen.  The patient was instructed to call the office immediately if the following severe adverse effects occur:  hearing changes, easy bruising/bleeding, severe headache, or vision changes.  The patient verbalized understanding of the proper use and possible adverse effects of doxycycline.  All of the patient's questions and concerns were addressed. Dutasteride Counseling: Dustasteride Counseling:  I discussed with the patient the risks of use of dutasteride including but not limited to decreased libido, decreased ejaculate volume, and gynecomastia. Women who can become pregnant should not handle medication.  All of the patient's questions and concerns were addressed. Cimzia Counseling:  I discussed with the patient the risks of Cimzia including but not limited to immunosuppression, allergic reactions and infections.  The patient understands that monitoring is required including a PPD at baseline and must alert us or the primary physician if symptoms of infection or other concerning signs are noted. Methotrexate Counseling:  Patient counseled regarding adverse effects of methotrexate including but not limited to nausea, vomiting, abnormalities in liver function tests. Patients may develop mouth sores, rash, diarrhea, and abnormalities in blood counts. The patient understands that monitoring is required including LFT's and blood counts.  There is a rare possibility of scarring of the liver and lung problems that can occur when taking methotrexate. Persistent nausea, loss of appetite, pale stools, dark urine, cough, and shortness of breath should be reported immediately. Patient advised to discontinue methotrexate treatment at least three months before attempting to become pregnant.  I discussed the need for folate supplements while taking methotrexate.  These supplements can decrease side effects during methotrexate treatment. The patient verbalized understanding of the proper use and possible adverse effects of methotrexate.  All of the patient's questions and concerns were addressed. Xolair Counseling:  Patient informed of potential adverse effects including but not limited to fever, muscle aches, rash and allergic reactions.  The patient verbalized understanding of the proper use and possible adverse effects of Xolair.  All of the patient's questions and concerns were addressed. Rifampin Counseling: I discussed with the patient the risks of rifampin including but not limited to liver damage, kidney damage, red-orange body fluids, nausea/vomiting and severe allergy. Benzoyl Peroxide Counseling: Patient counseled that medicine may cause skin irritation and bleach clothing.  In the event of skin irritation, the patient was advised to reduce the amount of the drug applied or use it less frequently.   The patient verbalized understanding of the proper use and possible adverse effects of benzoyl peroxide.  All of the patient's questions and concerns were addressed. Picato Counseling:  I discussed with the patient the risks of Picato including but not limited to erythema, scaling, itching, weeping, crusting, and pain. Isotretinoin Counseling: Patient should get monthly blood tests, not donate blood, not drive at night if vision affected, not share medication, and not undergo elective surgery for 6 months after tx completed. Side effects reviewed, pt to contact office should one occur. Rinvoq Pregnancy And Lactation Text: Based on animal studies, Rinvoq may cause embryo-fetal harm when administered to pregnant women.  The medication should not be used in pregnancy.  Breastfeeding is not recommended during treatment and for 6 days after the last dose. Nsaids Counseling: NSAID Counseling: I discussed with the patient that NSAIDs should be taken with food. Prolonged use of NSAIDs can result in the development of stomach ulcers.  Patient advised to stop taking NSAIDs if abdominal pain occurs.  The patient verbalized understanding of the proper use and possible adverse effects of NSAIDs.  All of the patient's questions and concerns were addressed. Hydroxyzine Counseling: Patient advised that the medication is sedating and not to drive a car after taking this medication.  Patient informed of potential adverse effects including but not limited to dry mouth, urinary retention, and blurry vision.  The patient verbalized understanding of the proper use and possible adverse effects of hydroxyzine.  All of the patient's questions and concerns were addressed. Griseofulvin Pregnancy And Lactation Text: This medication is Pregnancy Category X and is known to cause serious birth defects. It is unknown if this medication is excreted in breast milk but breast feeding should be avoided. Tazorac Counseling:  Patient advised that medication is irritating and drying.  Patient may need to apply sparingly and wash off after an hour before eventually leaving it on overnight.  The patient verbalized understanding of the proper use and possible adverse effects of tazorac.  All of the patient's questions and concerns were addressed. Doxycycline Pregnancy And Lactation Text: This medication is Pregnancy Category D and not consider safe during pregnancy. It is also excreted in breast milk but is considered safe for shorter treatment courses. Dutasteride Pregnancy And Lactation Text: This medication is absolutely contraindicated in women, especially during pregnancy and breast feeding. Feminization of male fetuses is possible if taking while pregnant. Protopic Pregnancy And Lactation Text: This medication is Pregnancy Category C. It is unknown if this medication is excreted in breast milk when applied topically. Methotrexate Pregnancy And Lactation Text: This medication is Pregnancy Category X and is known to cause fetal harm. This medication is excreted in breast milk. Drysol Counseling:  I discussed with the patient the risks of drysol/aluminum chloride including but not limited to skin rash, itching, irritation, burning. Ilumya Counseling: I discussed with the patient the risks of tildrakizumab including but not limited to immunosuppression, malignancy, posterior leukoencephalopathy syndrome, and serious infections.  The patient understands that monitoring is required including a PPD at baseline and must alert us or the primary physician if symptoms of infection or other concerning signs are noted. Propranolol Counseling:  I discussed with the patient the risks of propranolol including but not limited to low heart rate, low blood pressure, low blood sugar, restlessness and increased cold sensitivity. They should call the office if they experience any of these side effects. Arava Counseling:  Patient counseled regarding adverse effects of Arava including but not limited to nausea, vomiting, abnormalities in liver function tests. Patients may develop mouth sores, rash, diarrhea, and abnormalities in blood counts. The patient understands that monitoring is required including LFTs and blood counts.  There is a rare possibility of scarring of the liver and lung problems that can occur when taking methotrexate. Persistent nausea, loss of appetite, pale stools, dark urine, cough, and shortness of breath should be reported immediately. Patient advised to discontinue Arava treatment and consult with a physician prior to attempting conception. The patient will have to undergo a treatment to eliminate Arava from the body prior to conception. Cellcept Counseling:  I discussed with the patient the risks of mycophenolate mofetil including but not limited to infection/immunosuppression, GI upset, hypokalemia, hypercholesterolemia, bone marrow suppression, lymphoproliferative disorders, malignancy, GI ulceration/bleed/perforation, colitis, interstitial lung disease, kidney failure, progressive multifocal leukoencephalopathy, and birth defects.  The patient understands that monitoring is required including a baseline creatinine and regular CBC testing. In addition, patient must alert us immediately if symptoms of infection or other concerning signs are noted. Cimzia Pregnancy And Lactation Text: This medication crosses the placenta but can be considered safe in certain situations. Cimzia may be excreted in breast milk. Azithromycin Counseling:  I discussed with the patient the risks of azithromycin including but not limited to GI upset, allergic reaction, drug rash, diarrhea, and yeast infections. Xolair Pregnancy And Lactation Text: This medication is Pregnancy Category B and is considered safe during pregnancy. This medication is excreted in breast milk. Winlevi Counseling:  I discussed with the patient the risks of topical clascoterone including but not limited to erythema, scaling, itching, and stinging. Patient voiced their understanding. Sotyktu Counseling:  I discussed the most common side effects of Sotyktu including: common cold, sore throat, sinus infections, cold sores, canker sores, folliculitis, and acne.  I also discussed more serious side effects of Sotyktu including but not limited to: serious allergic reactions; increased risk for infections such as TB; cancers such as lymphomas; rhabdomyolysis and elevated CPK; and elevated triglycerides and liver enzymes.  Nsaids Pregnancy And Lactation Text: These medications are considered safe up to 30 weeks gestation. It is excreted in breast milk. Benzoyl Peroxide Pregnancy And Lactation Text: This medication is Pregnancy Category C. It is unknown if benzoyl peroxide is excreted in breast milk. Rifampin Pregnancy And Lactation Text: This medication is Pregnancy Category C and it isn't know if it is safe during pregnancy. It is also excreted in breast milk and should not be used if you are breast feeding. Skyrizi Counseling: I discussed with the patient the risks of risankizumab-rzaa including but not limited to immunosuppression, and serious infections.  The patient understands that monitoring is required including a PPD at baseline and must alert us or the primary physician if symptoms of infection or other concerning signs are noted. Isotretinoin Pregnancy And Lactation Text: This medication is Pregnancy Category X and is considered extremely dangerous during pregnancy. It is unknown if it is excreted in breast milk. Klisyri Counseling:  I discussed with the patient the risks of Klisyri including but not limited to erythema, scaling, itching, weeping, crusting, and pain. Itraconazole Counseling:  I discussed with the patient the risks of itraconazole including but not limited to liver damage, nausea/vomiting, neuropathy, and severe allergy.  The patient understands that this medication is best absorbed when taken with acidic beverages such as non-diet cola or ginger ale.  The patient understands that monitoring is required including baseline LFTs and repeat LFTs at intervals.  The patient understands that they are to contact us or the primary physician if concerning signs are noted. Tazorac Pregnancy And Lactation Text: This medication is not safe during pregnancy. It is unknown if this medication is excreted in breast milk. Hydroxyzine Pregnancy And Lactation Text: This medication is not safe during pregnancy and should not be taken. It is also excreted in breast milk and breast feeding isn't recommended. Glycopyrrolate Counseling:  I discussed with the patient the risks of glycopyrrolate including but not limited to skin rash, drowsiness, dry mouth, difficulty urinating, and blurred vision. Erythromycin Counseling:  I discussed with the patient the risks of erythromycin including but not limited to GI upset, allergic reaction, drug rash, diarrhea, increase in liver enzymes, and yeast infections. Propranolol Pregnancy And Lactation Text: This medication is Pregnancy Category C and it isn't known if it is safe during pregnancy. It is excreted in breast milk. Finasteride Counseling:  I discussed with the patient the risks of use of finasteride including but not limited to decreased libido, decreased ejaculate volume, gynecomastia, and depression. Women should not handle medication.  All of the patient's questions and concerns were addressed. Qbrexza Counseling:  I discussed with the patient the risks of Qbrexza including but not limited to headache, mydriasis, blurred vision, dry eyes, nasal dryness, dry mouth, dry throat, dry skin, urinary hesitation, and constipation.  Local skin reactions including erythema, burning, stinging, and itching can also occur. Protopic Counseling: Patient may experience a mild burning sensation during topical application. Protopic is not approved in children less than 2 years of age. There have been case reports of hematologic and skin malignancies in patients using topical calcineurin inhibitors although causality is questionable. Valtrex Counseling: I discussed with the patient the risks of valacyclovir including but not limited to kidney damage, nausea, vomiting and severe allergy.  The patient understands that if the infection seems to be worsening or is not improving, they are to call. Cosentyx Counseling:  I discussed with the patient the risks of Cosentyx including but not limited to worsening of Crohn's disease, immunosuppression, allergic reactions and infections.  The patient understands that monitoring is required including a PPD at baseline and must alert us or the primary physician if symptoms of infection or other concerning signs are noted. Prednisone Counseling:  I discussed with the patient the risks of prolonged use of prednisone including but not limited to weight gain, insomnia, osteoporosis, mood changes, diabetes, susceptibility to infection, glaucoma and high blood pressure.  In cases where prednisone use is prolonged, patients should be monitored with blood pressure checks, serum glucose levels and an eye exam.  Additionally, the patient may need to be placed on GI prophylaxis, PCP prophylaxis, and calcium and vitamin D supplementation and/or a bisphosphonate.  The patient verbalized understanding of the proper use and the possible adverse effects of prednisone.  All of the patient's questions and concerns were addressed. Carac Counseling:  I discussed with the patient the risks of Carac including but not limited to erythema, scaling, itching, weeping, crusting, and pain. Azithromycin Pregnancy And Lactation Text: This medication is considered safe during pregnancy and is also secreted in breast milk. High Dose Vitamin A Counseling: Side effects reviewed, pt to contact office should one occur. Olanzapine Counseling- I discussed with the patient the common side effects of olanzapine including but are not limited to: lack of energy, dry mouth, increased appetite, sleepiness, tremor, constipation, dizziness, changes in behavior, or restlessness.  Explained that teenagers are more likely to experience headaches, abdominal pain, pain in the arms or legs, tiredness, and sleepiness.  Serious side effects include but are not limited: increased risk of death in elderly patients who are confused, have memory loss, or dementia-related psychosis; hyperglycemia; increased cholesterol and triglycerides; and weight gain. Sotyktu Pregnancy And Lactation Text: There is insufficient data to evaluate whether or not Sotyktu is safe to use during pregnancy.   It is not known if Sotyktu passes into breast milk and whether or not it is safe to use when breastfeeding.   Winlevi Pregnancy And Lactation Text: This medication is considered safe during pregnancy and breastfeeding. Sarecycline Counseling: Patient advised regarding possible photosensitivity and discoloration of the teeth, skin, lips, tongue and gums.  Patient instructed to avoid sunlight, if possible.  When exposed to sunlight, patients should wear protective clothing, sunglasses, and sunscreen.  The patient was instructed to call the office immediately if the following severe adverse effects occur:  hearing changes, easy bruising/bleeding, severe headache, or vision changes.  The patient verbalized understanding of the proper use and possible adverse effects of sarecycline.  All of the patient's questions and concerns were addressed. Topical Clindamycin Counseling: Patient counseled that this medication may cause skin irritation or allergic reactions.  In the event of skin irritation, the patient was advised to reduce the amount of the drug applied or use it less frequently.   The patient verbalized understanding of the proper use and possible adverse effects of clindamycin.  All of the patient's questions and concerns were addressed. Erivedge Counseling- I discussed with the patient the risks of Erivedge including but not limited to nausea, vomiting, diarrhea, constipation, weight loss, changes in the sense of taste, decreased appetite, muscle spasms, and hair loss.  The patient verbalized understanding of the proper use and possible adverse effects of Erivedge.  All of the patient's questions and concerns were addressed. Klisyri Pregnancy And Lactation Text: It is unknown if this medication can harm a developing fetus or if it is excreted in breast milk. Erythromycin Pregnancy And Lactation Text: This medication is Pregnancy Category B and is considered safe during pregnancy. It is also excreted in breast milk. Glycopyrrolate Pregnancy And Lactation Text: This medication is Pregnancy Category B and is considered safe during pregnancy. It is unknown if it is excreted breast milk.

## 2023-08-08 NOTE — DISCHARGE NOTE PROVIDER - NSDCMRMEDTOKEN_GEN_ALL_CORE_FT
amLODIPine 5 mg oral tablet: 1 tab(s) orally once a day  atorvastatin 20 mg oral tablet: 1 tab(s) orally once a day (at bedtime)  Biofreeze 4% topical gel: Apply topically to affected area 2 times a day to both knees  carvedilol 6.25 mg oral tablet: 1 tab(s) orally 2 times a day  furosemide 40 mg oral tablet: 1 tab(s) orally once a day  lactobacillus acidophilus oral capsule: 1 cap(s) orally once a day  levothyroxine 100 mcg (0.1 mg) oral tablet: 1 tab(s) orally once a day  methenamine hippurate 1 g oral tablet: 1 tab(s) orally once a day  nystatin 100,000 units/g topical powder: Apply topically to affected area once a day to bust after showers  Vitamin D3 25 mcg (1000 intl units) oral tablet: 1 tab(s) orally once a day   amLODIPine 5 mg oral tablet: 1 tab(s) orally once a day  atorvastatin 20 mg oral tablet: 1 tab(s) orally once a day (at bedtime)  Biofreeze 4% topical gel: Apply topically to affected area 2 times a day to both knees  carvedilol 6.25 mg oral tablet: 1 tab(s) orally 2 times a day  furosemide 20 mg oral tablet: 1 tab(s) orally 3 times a week MWF  lactobacillus acidophilus oral capsule: 1 cap(s) orally once a day  levothyroxine 100 mcg (0.1 mg) oral tablet: 1 tab(s) orally once a day  methenamine hippurate 1 g oral tablet: 1 tab(s) orally once a day  nystatin 100,000 units/g topical powder: Apply topically to affected area once a day to bust after showers  Vitamin D3 25 mcg (1000 intl units) oral tablet: 1 tab(s) orally once a day   acetaminophen 325 mg oral tablet: 2 tab(s) orally every 6 hours As needed Temp greater or equal to 38C (100.4F), Mild Pain (1 - 3)  ascorbic acid 500 mg oral tablet: 1 tab(s) orally once a day  atorvastatin 20 mg oral tablet: 1 tab(s) orally once a day (at bedtime)  benzonatate 100 mg oral capsule: 1 cap(s) orally every 8 hours As needed Cough  carvedilol 6.25 mg oral tablet: 1 tab(s) orally 2 times a day  furosemide 20 mg oral tablet: 1 tab(s) orally 3 times a week MWF  guaiFENesin 100 mg/5 mL oral liquid: 5 milliliter(s) orally every 8 hours As needed Cough  levothyroxine 100 mcg (0.1 mg) oral tablet: 1 tab(s) orally once a day  lidocaine 4% topical film: Apply topically to affected area once a day  Multiple Vitamins oral tablet: 1 tab(s) orally once a day  polyethylene glycol 3350 oral powder for reconstitution: 17 gram(s) orally 2 times a day  senna leaf extract oral tablet: 2 tab(s) orally once a day (at bedtime)  Vitamin D3 25 mcg (1000 intl units) oral tablet: 1 tab(s) orally once a day

## 2023-08-08 NOTE — PROGRESS NOTE ADULT - SUBJECTIVE AND OBJECTIVE BOX
Estephania Stiles        Patient is a 89y old  Female who presents with a chief complaint of hypoxia (08 Aug 2023 12:02)      SUBJECTIVE / OVERNIGHT EVENTS: No acute overnight events. This morning pt states she does not feel well. Bothered by her cough. Also w/ SOB . Otherwise no complaints.     MEDICATIONS  (STANDING):  albuterol/ipratropium for Nebulization 3 milliLiter(s) Nebulizer every 6 hours  atorvastatin 20 milliGRAM(s) Oral at bedtime  carvedilol 6.25 milliGRAM(s) Oral every 12 hours  cholecalciferol 1000 Unit(s) Oral daily  enoxaparin Injectable 30 milliGRAM(s) SubCutaneous every 24 hours  guaiFENesin Oral Liquid (Sugar-Free) 200 milliGRAM(s) Oral every 6 hours  levothyroxine 100 MICROGram(s) Oral daily  mupirocin 2% Ointment 1 Application(s) Both Nostrils two times a day  piperacillin/tazobactam IVPB.. 3.375 Gram(s) IV Intermittent every 8 hours    MEDICATIONS  (PRN):    Allergies    cephalexin (Hives)  [This allergen will not trigger allergy alert] trisulfapyrimidine (Rash)  sulfa topicals (Unknown)    Intolerances    morphine (Drowsiness; Faint; Hypotension)      Vital Signs Last 24 Hrs  T(C): 36.4 (08 Aug 2023 10:06), Max: 36.9 (07 Aug 2023 22:27)  T(F): 97.6 (08 Aug 2023 10:06), Max: 98.5 (07 Aug 2023 22:27)  HR: 68 (08 Aug 2023 16:24) (66 - 81)  BP: 124/77 (08 Aug 2023 10:06) (124/77 - 160/88)  BP(mean): --  RR: 18 (08 Aug 2023 10:06) (17 - 18)  SpO2: 98% (08 Aug 2023 16:24) (96% - 98%)    Parameters below as of 08 Aug 2023 16:24  Patient On (Oxygen Delivery Method): nasal cannula, 2 l/m      Daily Height in cm: 165.1 (07 Aug 2023 17:51)    Daily   CAPILLARY BLOOD GLUCOSE        I&O's Summary      PHYSICAL EXAM:  CONSTITUTIONAL: elderly woman, lying comfortably in bed. Some respiratory distress when speaking   EYES: No conjunctival or scleral injection, non-icteric   ENMT: MMM  RESPIRATORY: +B/L rhonchi w/ crackles in the lower lobes, no wheezing  CARDIOVASCULAR: +S1S2, RRR, no M/G/R, no lower extremity edema  GASTROINTESTINAL:  +BS present, mild diffuse TTP, no rebound/guarding  SKIN: Warm and dry   NEUROLOGIC: Non focal   PSYCHIATRIC: A+O x 3    LABS:                        12.7   17.78 )-----------( 196      ( 08 Aug 2023 07:30 )             38.3     Hgb Trend: 12.7<--, 12.1<--, 13.1<--, 12.4<--  08-08    139  |  94<L>  |  64<H>  ----------------------------<  92  4.0   |  28  |  1.44<H>    Ca    9.3      08 Aug 2023 07:30  Phos  3.9     08-07  Mg     2.30     08-07    TPro  7.8  /  Alb  3.6  /  TBili  0.8  /  DBili  x   /  AST  17  /  ALT  19  /  AlkPhos  161<H>  08-07    Creatinine Trend: 1.44<--, 1.24<--, 1.38<--, 1.06<--, 1.06<--, 0.51<--  LIVER FUNCTIONS - ( 07 Aug 2023 05:55 )  Alb: 3.6 g/dL / Pro: 7.8 g/dL / ALK PHOS: 161 U/L / ALT: 19 U/L / AST: 17 U/L / GGT: x                 Urinalysis Basic - ( 08 Aug 2023 07:30 )    Color: x / Appearance: x / SG: x / pH: x  Gluc: 92 mg/dL / Ketone: x  / Bili: x / Urobili: x   Blood: x / Protein: x / Nitrite: x   Leuk Esterase: x / RBC: x / WBC x   Sq Epi: x / Non Sq Epi: x / Bacteria: x        RADIOLOGY & ADDITIONAL TESTS:    Imaging Personally Reviewed.    Consultant(s) Notes Reviewed.    Care Discussed with Consultants/Other Providers.

## 2023-08-08 NOTE — PROGRESS NOTE ADULT - ASSESSMENT
89-year-old female with past medical history of CHF, hypertension, hyperlipidemia, A-fib, hypothyroidism p/w cough and SOB a/w acute hypoxic respiratory failure

## 2023-08-08 NOTE — DISCHARGE NOTE PROVIDER - HOSPITAL COURSE
89-year-old female with past medical history of CHF, hypertension, hyperlipidemia, A-fib, hypothyroidism p/w cough and SOB a/w acute hypoxic respiratory failure  likely multifactorial etiology in the setting of Sepsis +/- CHF . RVP positive for enterovirus. Procal elevated. s/p abx for possible PNA. Recent Echo w/ mild-mod MR, mild-mod TR, s/p IV Lasix 40mg x 1. Hospital course c/b ANNA. Lasix held. ANNA now resolved. Course further c/b BRBPR , GI consulted-family unamenable to endoscopic evaluation. BRBPR now resolved. Pending PT Re-evaluation per family request.      89 yo F w/ HFpEF, afib s/p ppm, HTN, HLD, hypothyroid admitted with acute hypoxic respiratory failure in setting of sepsis 2/2 PNA (s/p antibiotics for bacterial PNA, RVP also positive for enterovirus) and possible HFpEF exacerbation (underwent diuresis), c/b acute kidney injury (improving) and BRBPR (given age/comorbidities, family not amenable to endoscopic evaluation). Patient had a rapid response on 8/14 for hypoxia despite NRB/HFNC, placed on bipap with improvement in respiratory status, s/p course of IV diuresis and weaned off bipap back to supplemental O2 via NC. Transitioned back to oral lasix as per cardiology. Patient had COVID exposure in the hospital but tested negative. Discussions were ongoing between patient/family, CM/SW and assisted living facility to see if patient able to return to prior facility or if would require rehab or placement in a different nursing facility. PT re-evaluated patient and ...    *** Discharge Summary Incomplete ***         89 yo F w/ HFpEF, afib s/p ppm, HTN, HLD, hypothyroid admitted with acute hypoxic respiratory failure in setting of sepsis 2/2 PNA (s/p antibiotics for bacterial PNA, RVP also positive for enterovirus) and possible HFpEF exacerbation (underwent diuresis), c/b acute kidney injury (improving) and BRBPR (given age/comorbidities, family not amenable to endoscopic evaluation). Patient had a rapid response on 8/14 for hypoxia despite NRB/HFNC, placed on bipap with improvement in respiratory status, s/p course of IV diuresis and weaned off bipap back to supplemental O2 via NC. Transitioned back to oral lasix as per cardiology. Patient had COVID exposure in the hospital but tested negative. Discussions were ongoing between patient/family, CM/SW and assisted living facility to see if patient able to return to prior facility or if would require rehab or placement in a different nursing facility. PT re-evaluated patient and deemed she was candidate for subacute rehab.    On Aug 24, patient was stable for discharge.    Course notable for intermittent and isolated hypothermia without infectious symptoms. Blood cultures were negative. Recommend TSH testing which can happen as outpatient (patient on levothyroxine).     89 yo F w/ HFpEF, afib s/p ppm, HTN, HLD, hypothyroid admitted with acute hypoxic respiratory failure in setting of sepsis 2/2 PNA (s/p antibiotics for bacterial PNA, RVP also positive for enterovirus) and possible HFpEF exacerbation (underwent diuresis), c/b acute kidney injury (improving) and BRBPR (given age/comorbidities, family not amenable to endoscopic evaluation). Patient had a rapid response on 8/14 for hypoxia despite NRB/HFNC, placed on bipap with improvement in respiratory status, s/p course of IV diuresis and weaned off bipap back to supplemental O2 via NC. Transitioned back to oral lasix as per cardiology. Patient had COVID exposure in the hospital but tested negative. Discussions were ongoing between patient/family, CM/SW and assisted living facility to see if patient able to return to prior facility or if would require rehab or placement in a different nursing facility. PT re-evaluated patient and deemed she was candidate for subacute rehab.    On Aug 24, patient was stable for discharge.    Course notable for intermittent and isolated hypothermia without infectious symptoms. Blood cultures were negative. Recommend TSH testing which can happen as outpatient (patient on levothyroxine). Patient had mild bradycardia (asymptomatic) but was also on beta-blockers.

## 2023-08-08 NOTE — DISCHARGE NOTE PROVIDER - NSDCFUADDAPPT_GEN_ALL_CORE_FT
APPTS ARE READY TO BE MADE: [x ] YES    Best Family or Patient Contact (if needed):    Additional Information about above appointments (if needed):    1:   2:   3:     Other comments or requests:    APPTS ARE READY TO BE MADE: [x ] YES    Best Family or Patient Contact (if needed):    Additional Information about above appointments (if needed):    1:   2:   3:     Other comments or requests:     Patient/Caregiver declined scheduling assistance. Patient resides at the [facility], [address] [place] [address] and they have a Geriatric Physician on staff who evaluates and makes referrals for all residents     APPTS ARE READY TO BE MADE: [x ] YES    Best Family or Patient Contact (if needed):    Additional Information about above appointments (if needed):    1:   2:   3:     Other comments or requests:     Patient/Caregiver declined scheduling assistance. Patient resides at the [facility], [address] [place] [address] and they have a Geriatric Physician on staff who evaluates and makes referrals for all residents    Podiatry Discharge Instructions:  Follow up: Please follow up with Dr. Waterhouse within 1 week of discharge from the hospital, please call [phone] for appointment and discuss that you recently were seen in the hospital.  Wound Care: Please apply allevyn pad to right heel and strict decubitus precaution with z-nika boots.   Antibiotics: Please continue as instructed.   APPTS ARE READY TO BE MADE: [x] YES    Best Family or Patient Contact (if needed): [phone] or daughter ketty [phone]    Additional Information about above appointments (if needed):    1: Heart failure clinic  2: GI clinic  3: PCP    Other comments or requests:     Patient/Caregiver declined scheduling assistance. Patient resides at the [facility], [address] [place] [address] and they have a Geriatric Physician on staff who evaluates and makes referrals for all residents    Podiatry Discharge Instructions:  Follow up: Please follow up with Dr. Waterhouse within 1 week of discharge from the hospital, please call [phone] for appointment and discuss that you recently were seen in the hospital.  Wound Care: Please apply allevyn pad to right heel and strict decubitus precaution with z-nika boots.   Antibiotics: Please continue as instructed.   APPTS ARE READY TO BE MADE: [x] YES    Best Family or Patient Contact (if needed): [phone] or daughter ketty [phone]    Additional Information about above appointments (if needed):    1: Heart failure clinic  2: GI clinic  3: PCP    Other comments or requests:     Patient/Caregiver declined scheduling assistance. Patient resides at the [facility], [address] [place] [address] and they have a Geriatric Physician on staff who evaluates and makes referrals for all residents    Podiatry Discharge Instructions:  Follow up: Please follow up with Dr. Waterhouse within 1 week of discharge from the hospital, please call [phone] for appointment and discuss that you recently were seen in the hospital.  Wound Care: Please apply allevyn pad to right heel and strict decubitus precaution with z-nika boots.   Antibiotics: Please continue as instructed.      Patient is being discharged to [facility]. Caregiver will arrange follow up.

## 2023-08-09 LAB
-  AMPICILLIN: SIGNIFICANT CHANGE UP
-  CIPROFLOXACIN: SIGNIFICANT CHANGE UP
-  LEVOFLOXACIN: SIGNIFICANT CHANGE UP
-  NITROFURANTOIN: SIGNIFICANT CHANGE UP
-  TETRACYCLINE: SIGNIFICANT CHANGE UP
-  VANCOMYCIN: SIGNIFICANT CHANGE UP
ANION GAP SERPL CALC-SCNC: 15 MMOL/L — HIGH (ref 7–14)
BUN SERPL-MCNC: 76 MG/DL — HIGH (ref 7–23)
CALCIUM SERPL-MCNC: 8.7 MG/DL — SIGNIFICANT CHANGE UP (ref 8.4–10.5)
CHLORIDE SERPL-SCNC: 93 MMOL/L — LOW (ref 98–107)
CO2 SERPL-SCNC: 25 MMOL/L — SIGNIFICANT CHANGE UP (ref 22–31)
CREAT SERPL-MCNC: 1.83 MG/DL — HIGH (ref 0.5–1.3)
CULTURE RESULTS: SIGNIFICANT CHANGE UP
EGFR: 24 ML/MIN/1.73M2 — LOW
GLUCOSE BLDC GLUCOMTR-MCNC: 131 MG/DL — HIGH (ref 70–99)
GLUCOSE SERPL-MCNC: 98 MG/DL — SIGNIFICANT CHANGE UP (ref 70–99)
HCT VFR BLD CALC: 32.5 % — LOW (ref 34.5–45)
HGB BLD-MCNC: 10.9 G/DL — LOW (ref 11.5–15.5)
LEGIONELLA AG UR QL: NEGATIVE — SIGNIFICANT CHANGE UP
MAGNESIUM SERPL-MCNC: 2.3 MG/DL — SIGNIFICANT CHANGE UP (ref 1.6–2.6)
MCHC RBC-ENTMCNC: 30.2 PG — SIGNIFICANT CHANGE UP (ref 27–34)
MCHC RBC-ENTMCNC: 33.5 GM/DL — SIGNIFICANT CHANGE UP (ref 32–36)
MCV RBC AUTO: 90 FL — SIGNIFICANT CHANGE UP (ref 80–100)
METHOD TYPE: SIGNIFICANT CHANGE UP
NRBC # BLD: 0 /100 WBCS — SIGNIFICANT CHANGE UP (ref 0–0)
NRBC # FLD: 0 K/UL — SIGNIFICANT CHANGE UP (ref 0–0)
ORGANISM # SPEC MICROSCOPIC CNT: SIGNIFICANT CHANGE UP
ORGANISM # SPEC MICROSCOPIC CNT: SIGNIFICANT CHANGE UP
PHOSPHATE SERPL-MCNC: 4.4 MG/DL — SIGNIFICANT CHANGE UP (ref 2.5–4.5)
PLATELET # BLD AUTO: 208 K/UL — SIGNIFICANT CHANGE UP (ref 150–400)
POTASSIUM SERPL-MCNC: 3.6 MMOL/L — SIGNIFICANT CHANGE UP (ref 3.5–5.3)
POTASSIUM SERPL-SCNC: 3.6 MMOL/L — SIGNIFICANT CHANGE UP (ref 3.5–5.3)
PROCALCITONIN SERPL-MCNC: 0.63 NG/ML — HIGH (ref 0.02–0.1)
RBC # BLD: 3.61 M/UL — LOW (ref 3.8–5.2)
RBC # FLD: 13.8 % — SIGNIFICANT CHANGE UP (ref 10.3–14.5)
S PNEUM AG UR QL: NEGATIVE — SIGNIFICANT CHANGE UP
SODIUM SERPL-SCNC: 133 MMOL/L — LOW (ref 135–145)
SPECIMEN SOURCE: SIGNIFICANT CHANGE UP
WBC # BLD: 14.8 K/UL — HIGH (ref 3.8–10.5)
WBC # FLD AUTO: 14.8 K/UL — HIGH (ref 3.8–10.5)

## 2023-08-09 PROCEDURE — 99233 SBSQ HOSP IP/OBS HIGH 50: CPT

## 2023-08-09 RX ORDER — PIPERACILLIN AND TAZOBACTAM 4; .5 G/20ML; G/20ML
3.38 INJECTION, POWDER, LYOPHILIZED, FOR SOLUTION INTRAVENOUS EVERY 12 HOURS
Refills: 0 | Status: DISCONTINUED | OUTPATIENT
Start: 2023-08-09 | End: 2023-08-10

## 2023-08-09 RX ORDER — SENNA PLUS 8.6 MG/1
2 TABLET ORAL AT BEDTIME
Refills: 0 | Status: DISCONTINUED | OUTPATIENT
Start: 2023-08-09 | End: 2023-08-24

## 2023-08-09 RX ORDER — POLYETHYLENE GLYCOL 3350 17 G/17G
17 POWDER, FOR SOLUTION ORAL DAILY
Refills: 0 | Status: DISCONTINUED | OUTPATIENT
Start: 2023-08-09 | End: 2023-08-11

## 2023-08-09 RX ADMIN — PIPERACILLIN AND TAZOBACTAM 25 GRAM(S): 4; .5 INJECTION, POWDER, LYOPHILIZED, FOR SOLUTION INTRAVENOUS at 17:32

## 2023-08-09 RX ADMIN — HEPARIN SODIUM 5000 UNIT(S): 5000 INJECTION INTRAVENOUS; SUBCUTANEOUS at 17:38

## 2023-08-09 RX ADMIN — PIPERACILLIN AND TAZOBACTAM 25 GRAM(S): 4; .5 INJECTION, POWDER, LYOPHILIZED, FOR SOLUTION INTRAVENOUS at 06:13

## 2023-08-09 RX ADMIN — Medication 200 MILLIGRAM(S): at 05:45

## 2023-08-09 RX ADMIN — Medication 3 MILLILITER(S): at 09:21

## 2023-08-09 RX ADMIN — MUPIROCIN 1 APPLICATION(S): 20 OINTMENT TOPICAL at 17:38

## 2023-08-09 RX ADMIN — POLYETHYLENE GLYCOL 3350 17 GRAM(S): 17 POWDER, FOR SOLUTION ORAL at 12:55

## 2023-08-09 RX ADMIN — Medication 200 MILLIGRAM(S): at 12:55

## 2023-08-09 RX ADMIN — HEPARIN SODIUM 5000 UNIT(S): 5000 INJECTION INTRAVENOUS; SUBCUTANEOUS at 05:45

## 2023-08-09 RX ADMIN — Medication 3 MILLILITER(S): at 21:59

## 2023-08-09 RX ADMIN — Medication 200 MILLIGRAM(S): at 17:36

## 2023-08-09 RX ADMIN — Medication 3 MILLILITER(S): at 03:48

## 2023-08-09 RX ADMIN — MUPIROCIN 1 APPLICATION(S): 20 OINTMENT TOPICAL at 05:45

## 2023-08-09 RX ADMIN — Medication 1000 UNIT(S): at 12:55

## 2023-08-09 RX ADMIN — SENNA PLUS 2 TABLET(S): 8.6 TABLET ORAL at 21:26

## 2023-08-09 RX ADMIN — Medication 3 MILLILITER(S): at 14:46

## 2023-08-09 RX ADMIN — ATORVASTATIN CALCIUM 20 MILLIGRAM(S): 80 TABLET, FILM COATED ORAL at 21:26

## 2023-08-09 RX ADMIN — CARVEDILOL PHOSPHATE 6.25 MILLIGRAM(S): 80 CAPSULE, EXTENDED RELEASE ORAL at 05:44

## 2023-08-09 RX ADMIN — Medication 100 MICROGRAM(S): at 05:44

## 2023-08-09 RX ADMIN — CARVEDILOL PHOSPHATE 6.25 MILLIGRAM(S): 80 CAPSULE, EXTENDED RELEASE ORAL at 17:38

## 2023-08-09 NOTE — CONSULT NOTE ADULT - ASSESSMENT
89F presents with right foot heel deep tissue injury   - patient seen and evaluated   - afebrile, WBC: 14.80  - Ecchymosis present on the right plantar heel, no open wounds or signs of infection bilaterally.   - applied allevyn pad on right heel for extra cushion  - strict decubitus precaution with z-nika at all times while in bed   - no acute pod intervention at this time   - discussed with attending

## 2023-08-09 NOTE — ADVANCED PRACTICE NURSE CONSULT - ASSESSMENT
General: A&Ox3, able to turn w/ 1x assistance, incontinent of urine and stool with external female urinary catching device (prima fit) in place. Skin warm, dry with increased moisture in intertriginous folds, scattered areas of hyperpigmentation and hypopigmentation, scattered areas of ecchymosis on bilateral upper extremities with no hematomas. Slow blanchable erythema to L heel, >3 seconds.     Vascular: Bilateral lower extremities with scattered areas of hyper and hypopigmentation. Dry, flaky skin. No temperature changes or edema noted. Capillary refill <3 seconds. Palpable DP/PT pulses. Patient with positive sensation to bilateral 1st, 3rd, 5th plantar metatarsal pads, bilateral heels, and dorsal aspects of feet.    R heel: DTPI as evidenced by purple maroon discoloration measuring [id number]. Periwound intact. No induration, no erythema, no crepitus, no fluctuance, no edema, no temperature changes, no overt signs of infection noted. Goals of care: Monitor for tissue type changes, prevent from friction, shearing, float heels to offload pressure.    Sacrum to BL buttocks: Risk for IAD/MAD evidenced by hyperpigmentation No skin type changes or ulceration noted on examination, prophylactic care appreciated, thank you!

## 2023-08-09 NOTE — SWALLOW BEDSIDE ASSESSMENT ADULT - CONSISTENCIES ADMINISTERED
~2 oz mildly thick; ~3 oz thin liquids/thin liquid/mildly thick 2 tsp trials per consistency/pureed/soft & bite-sized half cracker/regular solid

## 2023-08-09 NOTE — PROGRESS NOTE ADULT - ASSESSMENT
Echo 5/18/23: Moderate LV dysfxn, LVH  Echo 8/1/23: Abnormal septal motion of LV, mild-mod MR, mild-mod TR    A/p  89 y/o female with PMHx of HTN, HLD, HFrEf, Afib (not on AC), hypothyroidism, hx of PPM placement, presents with cough , hypoxia       #Acute respiratory failure with hypoxia.   -likely in the setting of resp infection   -plan as below     #Cough, hypoxia   -abx of pna/ uti   -xray Left basilar hazy opacity, which may reflect atelectasis.  -cta  chest with no PE,  Debris within the right interlobar bronchus, without focal consolidation.  Essential interval resolution of  previously noted bilateral pleural effusions and interlobular septal   thickening/groundglass consolidation.  -Bcx neg   -management per med     #Chronic HFpEF   -SP lasix 40 mg IVP  8/7 -- creat elevated noted- hold lasix for now, resume when cr back to baseline  -Continue coreg  -recent Echo with abnormal septal motion of LV, mild-mod MR, mild-mod TR    #Afib s/p ppm  -Stable, rate controlled  -Continue coreg  -remains off a/c     #HTN  -stable  -Continue  coreg    #UTI  -Abx, mgmt per med        dvt ppx    35 minutes spent on total encounter; more than 50% of the visit was spent counseling and/or coordinating care by the attending physician.

## 2023-08-09 NOTE — ADVANCED PRACTICE NURSE CONSULT - RECOMMEDATIONS
Recommending podiatry consult for full thickness injury to R heel    Topical Recommendations     R heel: Cleanse with NS, pat dry. Paint with betadine, allow to dry. Secure with silicone foam with borders, change daily. Ensure heel elevation boots are in place at all times, thank you.    Continue low air loss bed therapy, continue heel elevation, continue to turn & reposition per protocol, soft pillow between bony prominences, continue moisture management with barrier creams & single breathable pad, continue measures to decrease friction/shear/pressure. Continue with nutritional support as per dietary/orders.     Plan of care discussed with daughter and patient at bedside, all questions answered and primary ACP Shaquille.     Please contact Wound Care Service Line if we can be of further assistance (ext [phone]).

## 2023-08-09 NOTE — CONSULT NOTE ADULT - SUBJECTIVE AND OBJECTIVE BOX
Patient is a 89y old  Female who presents with a chief complaint of hypoxia (09 Aug 2023 16:49)      HPI:  Pt is 89-year-old female with past medical history of CHF, hypertension, hyperlipidemia, A-fib, hypothyroidism who presents w/ productive cough.  Patient was recently admitted to [facility] (7/27-8/4) for CHF exacerbation and UTI. Pt reports productive cough since discharge from [facility]. She was reported to be hypoxic to 85% on RA at the assisted living facility. She also reports increased urinary frequency and intermittent dysuria. Patient denies any fevers, chills, chest pain, swelling of her lower extremities. (07 Aug 2023 05:22)    Podiatry was consulted for right heel deep tissue injury. Patient's daughter was bedside and did not notice the deep tissue injury. Patient had only noticed today when someone looked at the foot.     PAST MEDICAL & SURGICAL HISTORY:  Urinary Frequency      Bilateral Cataracts      HTN (hypertension)      Spinal stenosis      Hypothyroidism      Non-ST elevation MI (NSTEMI)      Macular degeneration      (HFpEF) heart failure with preserved ejection fraction      Paroxysmal atrial fibrillation      Chronic kidney disease (CKD)      Ectopic pregnancy, tubal      S/P ORIF (open reduction internal fixation) fracture  R hip      S/P breast lumpectomy      S/P cataract surgery  b/l          MEDICATIONS  (STANDING):  albuterol/ipratropium for Nebulization 3 milliLiter(s) Nebulizer every 6 hours  atorvastatin 20 milliGRAM(s) Oral at bedtime  carvedilol 6.25 milliGRAM(s) Oral every 12 hours  cholecalciferol 1000 Unit(s) Oral daily  guaiFENesin Oral Liquid (Sugar-Free) 200 milliGRAM(s) Oral every 6 hours  heparin   Injectable 5000 Unit(s) SubCutaneous every 12 hours  levothyroxine 100 MICROGram(s) Oral daily  mupirocin 2% Ointment 1 Application(s) Both Nostrils two times a day  piperacillin/tazobactam IVPB.. 3.375 Gram(s) IV Intermittent every 12 hours  polyethylene glycol 3350 17 Gram(s) Oral daily  senna 2 Tablet(s) Oral at bedtime    MEDICATIONS  (PRN):      Allergies    cephalexin (Hives)  [This allergen will not trigger allergy alert] trisulfapyrimidine (Rash)  sulfa topicals (Unknown)    Intolerances    morphine (Drowsiness; Faint; Hypotension)      VITALS:    Vital Signs Last 24 Hrs  T(C): 36.3 (09 Aug 2023 09:10), Max: 36.7 (08 Aug 2023 22:20)  T(F): 97.4 (09 Aug 2023 09:10), Max: 98 (08 Aug 2023 22:20)  HR: 66 (09 Aug 2023 14:47) (66 - 80)  BP: 108/58 (09 Aug 2023 09:10) (108/58 - 138/62)  BP(mean): --  RR: 18 (09 Aug 2023 09:10) (18 - 18)  SpO2: 99% (09 Aug 2023 14:47) (95% - 100%)    Parameters below as of 09 Aug 2023 09:21  Patient On (Oxygen Delivery Method): nasal cannula        LABS:                          10.9   14.80 )-----------( 208      ( 09 Aug 2023 05:21 )             32.5       08-09    133<L>  |  93<L>  |  76<H>  ----------------------------<  98  3.6   |  25  |  1.83<H>    Ca    8.7      09 Aug 2023 05:21  Phos  4.4     08-09  Mg     2.30     08-09        CAPILLARY BLOOD GLUCOSE      POCT Blood Glucose.: 131 mg/dL (09 Aug 2023 07:25)          LOWER EXTREMITY PHYSICAL EXAM:    Vascular: DP/PT 1/4, B/L, CFT <3 seconds B/L, Temperature gradient warm to cool, B/L.   Neuro: Epicritic sensation intact to the level of digits, B/L.  Musculoskeletal/Ortho: no pain upon palpation or range of motion b/l.   Skin: ecchymosis present on the right plantar heel, no open wounds or signs of infection bilaterally.     RADIOLOGY & ADDITIONAL STUDIES:

## 2023-08-09 NOTE — ADVANCED PRACTICE NURSE CONSULT - REASON FOR CONSULT
Patient seen on skin care rounds after wound care referral received for assessment of skin impairment and recommendations of topical management of a R heel DTPI. As per H&P, patient is a 89-year-old female with past medical history of CHF, hypertension, hyperlipidemia, A-fib, hypothyroidism who presents w/ productive cough.  Patient was recently admitted to [facility] (7/27-8/4) for CHF exacerbation and UTI. Pt reports productive cough since discharge from [facility]. She was reported to be hypoxic to 85% on RA at the assisted living facility. She also reports increased urinary frequency and intermittent dysuria. Patient denies any fevers, chills, chest pain, swelling of her lower extremities.    Chart reviewed: WBC: 14.80, H&H: 10.9/32.5, Platelets: 208, INR: 0.94, A1C: 5.7, serum albumin: 3.6, BMI: 24.8, [place] 13, patient is a DNR/DNI.

## 2023-08-09 NOTE — PROGRESS NOTE ADULT - SUBJECTIVE AND OBJECTIVE BOX
Estephania Stiles        Patient is a 89y old  Female who presents with a chief complaint of hypoxia (09 Aug 2023 11:56)      SUBJECTIVE / OVERNIGHT EVENTS: No acute overnight events. This morning pt doing well. Reports feeling a little better. Would like to get out of bed. Also reporting constipation. Otherwise w/o complaints. SOB improving. Denies fevers, chills, nausea, vomiting, chest pain.    MEDICATIONS  (STANDING):  albuterol/ipratropium for Nebulization 3 milliLiter(s) Nebulizer every 6 hours  atorvastatin 20 milliGRAM(s) Oral at bedtime  carvedilol 6.25 milliGRAM(s) Oral every 12 hours  cholecalciferol 1000 Unit(s) Oral daily  guaiFENesin Oral Liquid (Sugar-Free) 200 milliGRAM(s) Oral every 6 hours  heparin   Injectable 5000 Unit(s) SubCutaneous every 12 hours  levothyroxine 100 MICROGram(s) Oral daily  mupirocin 2% Ointment 1 Application(s) Both Nostrils two times a day  piperacillin/tazobactam IVPB.. 3.375 Gram(s) IV Intermittent every 12 hours  polyethylene glycol 3350 17 Gram(s) Oral daily  senna 2 Tablet(s) Oral at bedtime    MEDICATIONS  (PRN):    Allergies    cephalexin (Hives)  [This allergen will not trigger allergy alert] trisulfapyrimidine (Rash)  sulfa topicals (Unknown)    Intolerances    morphine (Drowsiness; Faint; Hypotension)      Vital Signs Last 24 Hrs  T(C): 36.3 (09 Aug 2023 09:10), Max: 36.7 (08 Aug 2023 22:20)  T(F): 97.4 (09 Aug 2023 09:10), Max: 98 (08 Aug 2023 22:20)  HR: 66 (09 Aug 2023 14:47) (66 - 80)  BP: 108/58 (09 Aug 2023 09:10) (108/58 - 138/62)  BP(mean): --  RR: 18 (09 Aug 2023 09:10) (18 - 18)  SpO2: 99% (09 Aug 2023 14:47) (95% - 100%)    Parameters below as of 09 Aug 2023 09:21  Patient On (Oxygen Delivery Method): nasal cannula      Daily     Daily Weight in k.4 (09 Aug 2023 05:45)  CAPILLARY BLOOD GLUCOSE      POCT Blood Glucose.: 131 mg/dL (09 Aug 2023 07:25)    I&O's Summary    08 Aug 2023 07:01  -  09 Aug 2023 07:00  --------------------------------------------------------  IN: 0 mL / OUT: 200 mL / NET: -200 mL        PHYSICAL EXAM:  CONSTITUTIONAL: elderly woman, lying comfortably in bed.  EYES: No conjunctival or scleral injection, non-icteric   ENMT: MMM  RESPIRATORY: +B/L rhonchi , no wheezing, Normal respiratory effort   CARDIOVASCULAR: +S1S2, RRR, no M/G/R, no lower extremity edema  GASTROINTESTINAL:  +BS present, No TTP, no rebound/guarding  SKIN: Warm and dry   NEUROLOGIC: Non focal   PSYCHIATRIC: A+O x 3    LABS:                        10.9   14.80 )-----------( 208      ( 09 Aug 2023 05:21 )             32.5     Hgb Trend: 10.9<--, 12.7<--, 12.1<--, 13.1<--  08-09    133<L>  |  93<L>  |  76<H>  ----------------------------<  98  3.6   |  25  |  1.83<H>    Ca    8.7      09 Aug 2023 05:21  Phos  4.4       Mg     2.30           Creatinine Trend: 1.83<--, 1.44<--, 1.24<--, 1.38<--, 1.06<--, 1.06<--          Urinalysis Basic - ( 09 Aug 2023 05:21 )    Color: x / Appearance: x / SG: x / pH: x  Gluc: 98 mg/dL / Ketone: x  / Bili: x / Urobili: x   Blood: x / Protein: x / Nitrite: x   Leuk Esterase: x / RBC: x / WBC x   Sq Epi: x / Non Sq Epi: x / Bacteria: x        RADIOLOGY & ADDITIONAL TESTS:    Imaging Personally Reviewed.    Consultant(s) Notes Reviewed.    Care Discussed with Consultants/Other Providers.

## 2023-08-09 NOTE — SWALLOW BEDSIDE ASSESSMENT ADULT - SWALLOW EVAL: DIAGNOSIS
1. Functional oral stage for puree, soft and bite-sized, regular solids, mildly thick liquids and thin liquids characterized by adequate acceptance and containment, adequate mastication of regular solids, adequate anterior to posterior transport and adequate oral clearance. 2. Functional pharyngeal stage suspected for the aforementioned consistencies characterized initiation of the pharyngeal swallow and hyolaryngeal excursion upon digital palpation with no overt s/s penetration/ aspiration noted.

## 2023-08-09 NOTE — PROGRESS NOTE ADULT - SUBJECTIVE AND OBJECTIVE BOX
CARDIOLOGY FOLLOW UP - Dr. Riley  Date of Service: 8/9/2023  CC:     Review of Systems:  Constitutional: No fever, weight loss, or fatigue  Respiratory: No cough, wheezing, or hemoptysis, no shortness of breath  Cardiovascular: No chest pain, palpitations, passing out, dizziness, or leg swelling  Gastrointestinal: No abd or epigastric pain. No nausea, vomiting, or hematemesis; no diarrhea or consiptaiton, no melena or hematochezia  Vascular: No edema     TELEMETRY:    PHYSICAL EXAM:  T(C): 36.5 (08-09-23 @ 05:45), Max: 36.7 (08-08-23 @ 22:20)  HR: 68 (08-09-23 @ 09:21) (68 - 80)  BP: 138/62 (08-09-23 @ 05:45) (114/61 - 138/62)  RR: 18 (08-09-23 @ 05:45) (18 - 18)  SpO2: 100% (08-09-23 @ 09:21) (97% - 100%)  Wt(kg): --  I&O's Summary    08 Aug 2023 07:01  -  09 Aug 2023 07:00  --------------------------------------------------------  IN: 0 mL / OUT: 200 mL / NET: -200 mL        Appearance: Normal	  Cardiovascular: Normal S1 S2,RRR, No JVD, No murmurs  Respiratory: Lungs clear to auscultation	  Gastrointestinal:  Soft, Non-tender, + BS	  Extremities: Normal range of motion, No clubbing, cyanosis or edema  Vascular: Peripheral pulses palpable 2+ bilaterally       Home Medications:  amLODIPine 5 mg oral tablet: 1 tab(s) orally once a day (07 Aug 2023 06:47)  Biofreeze 4% topical gel: Apply topically to affected area 2 times a day to both knees (07 Aug 2023 06:47)  carvedilol 6.25 mg oral tablet: 1 tab(s) orally 2 times a day (07 Aug 2023 06:47)  lactobacillus acidophilus oral capsule: 1 cap(s) orally once a day (07 Aug 2023 06:47)  levothyroxine 100 mcg (0.1 mg) oral tablet: 1 tab(s) orally once a day (07 Aug 2023 06:47)  methenamine hippurate 1 g oral tablet: 1 tab(s) orally once a day (07 Aug 2023 06:47)  nystatin 100,000 units/g topical powder: Apply topically to affected area once a day to bust after showers (07 Aug 2023 06:47)  Vitamin D3 25 mcg (1000 intl units) oral tablet: 1 tab(s) orally once a day (07 Aug 2023 06:47)        MEDICATIONS  (STANDING):  albuterol/ipratropium for Nebulization 3 milliLiter(s) Nebulizer every 6 hours  atorvastatin 20 milliGRAM(s) Oral at bedtime  carvedilol 6.25 milliGRAM(s) Oral every 12 hours  cholecalciferol 1000 Unit(s) Oral daily  guaiFENesin Oral Liquid (Sugar-Free) 200 milliGRAM(s) Oral every 6 hours  heparin   Injectable 5000 Unit(s) SubCutaneous every 12 hours  levothyroxine 100 MICROGram(s) Oral daily  mupirocin 2% Ointment 1 Application(s) Both Nostrils two times a day  piperacillin/tazobactam IVPB.. 3.375 Gram(s) IV Intermittent every 8 hours        EKG:  RADIOLOGY:  DIAGNOSTIC TESTING:  [ ] Echocardiogram:  [ ] Catherterization:  [ ] Stress Test:  OTHER:     LABS:	 	                          10.9   14.80 )-----------( 208      ( 09 Aug 2023 05:21 )             32.5     08-09    133<L>  |  93<L>  |  76<H>  ----------------------------<  98  3.6   |  25  |  1.83<H>    Ca    8.7      09 Aug 2023 05:21  Phos  4.4     08-09  Mg     2.30     08-09            CARDIAC MARKERS:

## 2023-08-10 DIAGNOSIS — E87.1 HYPO-OSMOLALITY AND HYPONATREMIA: ICD-10-CM

## 2023-08-10 LAB
ANION GAP SERPL CALC-SCNC: 14 MMOL/L — SIGNIFICANT CHANGE UP (ref 7–14)
BUN SERPL-MCNC: 73 MG/DL — HIGH (ref 7–23)
CALCIUM SERPL-MCNC: 8.7 MG/DL — SIGNIFICANT CHANGE UP (ref 8.4–10.5)
CHLORIDE SERPL-SCNC: 92 MMOL/L — LOW (ref 98–107)
CO2 SERPL-SCNC: 25 MMOL/L — SIGNIFICANT CHANGE UP (ref 22–31)
CREAT SERPL-MCNC: 1.74 MG/DL — HIGH (ref 0.5–1.3)
EGFR: 26 ML/MIN/1.73M2 — LOW
GLUCOSE SERPL-MCNC: 108 MG/DL — HIGH (ref 70–99)
HCT VFR BLD CALC: 32.2 % — LOW (ref 34.5–45)
HGB BLD-MCNC: 10.9 G/DL — LOW (ref 11.5–15.5)
MAGNESIUM SERPL-MCNC: 2.5 MG/DL — SIGNIFICANT CHANGE UP (ref 1.6–2.6)
MCHC RBC-ENTMCNC: 30.4 PG — SIGNIFICANT CHANGE UP (ref 27–34)
MCHC RBC-ENTMCNC: 33.9 GM/DL — SIGNIFICANT CHANGE UP (ref 32–36)
MCV RBC AUTO: 89.9 FL — SIGNIFICANT CHANGE UP (ref 80–100)
NRBC # BLD: 0 /100 WBCS — SIGNIFICANT CHANGE UP (ref 0–0)
NRBC # FLD: 0 K/UL — SIGNIFICANT CHANGE UP (ref 0–0)
PHOSPHATE SERPL-MCNC: 3.6 MG/DL — SIGNIFICANT CHANGE UP (ref 2.5–4.5)
PLATELET # BLD AUTO: 230 K/UL — SIGNIFICANT CHANGE UP (ref 150–400)
POTASSIUM SERPL-MCNC: 3.3 MMOL/L — LOW (ref 3.5–5.3)
POTASSIUM SERPL-SCNC: 3.3 MMOL/L — LOW (ref 3.5–5.3)
RBC # BLD: 3.58 M/UL — LOW (ref 3.8–5.2)
RBC # FLD: 13.6 % — SIGNIFICANT CHANGE UP (ref 10.3–14.5)
SODIUM SERPL-SCNC: 131 MMOL/L — LOW (ref 135–145)
WBC # BLD: 12.39 K/UL — HIGH (ref 3.8–10.5)
WBC # FLD AUTO: 12.39 K/UL — HIGH (ref 3.8–10.5)

## 2023-08-10 PROCEDURE — 74230 X-RAY XM SWLNG FUNCJ C+: CPT | Mod: 26

## 2023-08-10 PROCEDURE — 99232 SBSQ HOSP IP/OBS MODERATE 35: CPT

## 2023-08-10 RX ORDER — SODIUM CHLORIDE 9 MG/ML
250 INJECTION INTRAMUSCULAR; INTRAVENOUS; SUBCUTANEOUS ONCE
Refills: 0 | Status: DISCONTINUED | OUTPATIENT
Start: 2023-08-10 | End: 2023-08-10

## 2023-08-10 RX ORDER — PIPERACILLIN AND TAZOBACTAM 4; .5 G/20ML; G/20ML
3.38 INJECTION, POWDER, LYOPHILIZED, FOR SOLUTION INTRAVENOUS EVERY 12 HOURS
Refills: 0 | Status: COMPLETED | OUTPATIENT
Start: 2023-08-10 | End: 2023-08-13

## 2023-08-10 RX ORDER — VANCOMYCIN HCL 1 G
1000 VIAL (EA) INTRAVENOUS ONCE
Refills: 0 | Status: COMPLETED | OUTPATIENT
Start: 2023-08-10 | End: 2023-08-10

## 2023-08-10 RX ORDER — POTASSIUM CHLORIDE 20 MEQ
20 PACKET (EA) ORAL
Refills: 0 | Status: COMPLETED | OUTPATIENT
Start: 2023-08-10 | End: 2023-08-10

## 2023-08-10 RX ADMIN — MUPIROCIN 1 APPLICATION(S): 20 OINTMENT TOPICAL at 17:41

## 2023-08-10 RX ADMIN — CARVEDILOL PHOSPHATE 6.25 MILLIGRAM(S): 80 CAPSULE, EXTENDED RELEASE ORAL at 06:34

## 2023-08-10 RX ADMIN — PIPERACILLIN AND TAZOBACTAM 25 GRAM(S): 4; .5 INJECTION, POWDER, LYOPHILIZED, FOR SOLUTION INTRAVENOUS at 07:37

## 2023-08-10 RX ADMIN — SENNA PLUS 2 TABLET(S): 8.6 TABLET ORAL at 21:39

## 2023-08-10 RX ADMIN — Medication 200 MILLIGRAM(S): at 07:36

## 2023-08-10 RX ADMIN — HEPARIN SODIUM 5000 UNIT(S): 5000 INJECTION INTRAVENOUS; SUBCUTANEOUS at 17:41

## 2023-08-10 RX ADMIN — CARVEDILOL PHOSPHATE 6.25 MILLIGRAM(S): 80 CAPSULE, EXTENDED RELEASE ORAL at 17:40

## 2023-08-10 RX ADMIN — HEPARIN SODIUM 5000 UNIT(S): 5000 INJECTION INTRAVENOUS; SUBCUTANEOUS at 06:35

## 2023-08-10 RX ADMIN — Medication 200 MILLIGRAM(S): at 12:48

## 2023-08-10 RX ADMIN — Medication 1000 UNIT(S): at 12:48

## 2023-08-10 RX ADMIN — Medication 200 MILLIGRAM(S): at 00:03

## 2023-08-10 RX ADMIN — Medication 3 MILLILITER(S): at 16:28

## 2023-08-10 RX ADMIN — Medication 100 MICROGRAM(S): at 06:34

## 2023-08-10 RX ADMIN — Medication 250 MILLIGRAM(S): at 12:42

## 2023-08-10 RX ADMIN — Medication 20 MILLIEQUIVALENT(S): at 09:01

## 2023-08-10 RX ADMIN — Medication 3 MILLILITER(S): at 22:29

## 2023-08-10 RX ADMIN — ATORVASTATIN CALCIUM 20 MILLIGRAM(S): 80 TABLET, FILM COATED ORAL at 21:39

## 2023-08-10 RX ADMIN — MUPIROCIN 1 APPLICATION(S): 20 OINTMENT TOPICAL at 06:01

## 2023-08-10 RX ADMIN — PIPERACILLIN AND TAZOBACTAM 25 GRAM(S): 4; .5 INJECTION, POWDER, LYOPHILIZED, FOR SOLUTION INTRAVENOUS at 17:49

## 2023-08-10 NOTE — SWALLOW VFSS/MBS ASSESSMENT ADULT - ORAL PHASE
Reduced anterior - posterior transport Reduced anterior - posterior transport/Residue in oral cavity/Incomplete tongue to palate contact/Uncontrolled bolus / spillover in hypopharynx Residue in oral cavity/Incomplete tongue to palate contact/Uncontrolled bolus / spillover in hypopharynx

## 2023-08-10 NOTE — PROGRESS NOTE ADULT - SUBJECTIVE AND OBJECTIVE BOX
Patient is a 89y old  Female who presents with a chief complaint of hypoxia (10 Aug 2023 15:29)       INTERVAL HPI/OVERNIGHT EVENTS:  Patient seen and evaluated at bedside.  Pt is resting comfortable in NAD. Denies N/V/F/C.  Pain rated at X/10    Allergies    cephalexin (Hives)  [This allergen will not trigger allergy alert] trisulfapyrimidine (Rash)  sulfa topicals (Unknown)    Intolerances    morphine (Drowsiness; Faint; Hypotension)      Vital Signs Last 24 Hrs  T(C): 36.3 (10 Aug 2023 17:17), Max: 36.7 (10 Aug 2023 06:22)  T(F): 97.3 (10 Aug 2023 17:17), Max: 98 (10 Aug 2023 06:22)  HR: 67 (10 Aug 2023 17:17) (64 - 72)  BP: 131/63 (10 Aug 2023 17:17) (113/60 - 131/63)  BP(mean): --  RR: 18 (10 Aug 2023 17:17) (18 - 18)  SpO2: 100% (10 Aug 2023 17:17) (95% - 100%)    Parameters below as of 10 Aug 2023 17:17  Patient On (Oxygen Delivery Method): nasal cannula  O2 Flow (L/min): 2      LABS:                        10.9   12.39 )-----------( 230      ( 10 Aug 2023 06:02 )             32.2     08-10    131<L>  |  92<L>  |  73<H>  ----------------------------<  108<H>  3.3<L>   |  25  |  1.74<H>    Ca    8.7      10 Aug 2023 06:02  Phos  3.6     08-10  Mg     2.50     08-10        Urinalysis Basic - ( 10 Aug 2023 06:02 )    Color: x / Appearance: x / SG: x / pH: x  Gluc: 108 mg/dL / Ketone: x  / Bili: x / Urobili: x   Blood: x / Protein: x / Nitrite: x   Leuk Esterase: x / RBC: x / WBC x   Sq Epi: x / Non Sq Epi: x / Bacteria: x      CAPILLARY BLOOD GLUCOSE          Lower Extremity Physical Exam:  Vascular: DP/PT 1/4, B/L, CFT <3 seconds B/L, Temperature gradient warm to cool, B/L.   Neuro: Epicritic sensation intact to the level of digits, B/L.  Musculoskeletal/Ortho: no pain upon palpation or range of motion b/l.   Skin: ecchymosis present on the right plantar heel, no open wounds or signs of infection bilaterally.     RADIOLOGY & ADDITIONAL TESTS:

## 2023-08-10 NOTE — PROGRESS NOTE ADULT - ASSESSMENT
Echo 5/18/23: Moderate LV dysfxn, LVH  Echo 8/1/23: Abnormal septal motion of LV, mild-mod MR, mild-mod TR    A/p  89 y/o female with PMHx of HTN, HLD, HFrEf, Afib (not on AC), hypothyroidism, hx of PPM placement, presents with cough , hypoxia       #Acute respiratory failure with hypoxia.   -likely in the setting of resp infection   -plan as below     #Cough, hypoxia   -abx of pna/ uti   -xray Left basilar hazy opacity, which may reflect atelectasis.  -cta  chest with no PE,  Debris within the right interlobar bronchus, without focal consolidation.  Essential interval resolution of  previously noted bilateral pleural effusions and interlobular septal  thickening/groundglass consolidation.  -Bcx neg   -management per med     #Chronic HFpEF   -volume status stable   - ast dose of  lasix 40 mg IVP given 8/8 -- creat increased from 1.44-1.83-- would continue to  hold lasix for now, resume po when cr back to baseline  -Continue coreg  -recent Echo with abnormal septal motion of LV, mild-mod MR, mild-mod TR    #Afib s/p ppm  -Stable, rate controlled  -Continue coreg  -remains off a/c     #HTN  -stable  -Continue  coreg    #UTI  -Abx, mgmt per med        dvt ppx Echo 5/18/23: Moderate LV dysfxn, LVH  Echo 8/1/23: Abnormal septal motion of LV, mild-mod MR, mild-mod TR    A/p  89 y/o female with PMHx of HTN, HLD, HFrEf, Afib (not on AC), hypothyroidism, hx of PPM placement, presents with cough , hypoxia       #Acute respiratory failure with hypoxia.   -likely in the setting of resp infection   -plan as below     #Cough, hypoxia   -abx of pna/ uti   -xray Left basilar hazy opacity, which may reflect atelectasis.  -cta  chest with no PE,  Debris within the right interlobar bronchus, without focal consolidation.  Essential interval resolution of  previously noted bilateral pleural effusions and interlobular septal  thickening/groundglass consolidation.  -Bcx neg   -management per med     #Chronic HFpEF   -volume status stable   -last dose of  lasix 40 mg IVP given 8/8 -- creat increased from 1.44-1.83-- would continue to  hold lasix for now, resume po when cr back to baseline  -Continue coreg  -recent Echo with abnormal septal motion of LV, mild-mod MR, mild-mod TR    #Afib s/p ppm  -Stable, rate controlled  -Continue coreg  -remains off a/c     #HTN  -stable  -Continue  coreg    #UTI  -Abx, mgmt per med        dvt ppx

## 2023-08-10 NOTE — PROGRESS NOTE ADULT - SUBJECTIVE AND OBJECTIVE BOX
Estephania Stiles        Patient is a 89y old  Female who presents with a chief complaint of hypoxia (10 Aug 2023 11:14)      SUBJECTIVE / OVERNIGHT EVENTS: No acute overnight events. This morning pt w/o complaints. SOB improving . Denies fevers, chills, nausea, vomiting, chest pain..     MEDICATIONS  (STANDING):  albuterol/ipratropium for Nebulization 3 milliLiter(s) Nebulizer every 6 hours  atorvastatin 20 milliGRAM(s) Oral at bedtime  carvedilol 6.25 milliGRAM(s) Oral every 12 hours  cholecalciferol 1000 Unit(s) Oral daily  heparin   Injectable 5000 Unit(s) SubCutaneous every 12 hours  levothyroxine 100 MICROGram(s) Oral daily  mupirocin 2% Ointment 1 Application(s) Both Nostrils two times a day  piperacillin/tazobactam IVPB.. 3.375 Gram(s) IV Intermittent every 12 hours  polyethylene glycol 3350 17 Gram(s) Oral daily  potassium chloride    Tablet ER 20 milliEquivalent(s) Oral every 2 hours  senna 2 Tablet(s) Oral at bedtime    MEDICATIONS  (PRN):    Allergies    cephalexin (Hives)  [This allergen will not trigger allergy alert] trisulfapyrimidine (Rash)  sulfa topicals (Unknown)    Intolerances    morphine (Drowsiness; Faint; Hypotension)      Vital Signs Last 24 Hrs  T(C): 36.6 (10 Aug 2023 11:10), Max: 36.7 (09 Aug 2023 17:21)  T(F): 97.8 (10 Aug 2023 11:10), Max: 98 (09 Aug 2023 17:21)  HR: 72 (10 Aug 2023 11:10) (64 - 72)  BP: 115/69 (10 Aug 2023 11:10) (113/60 - 137/62)  BP(mean): --  RR: 18 (10 Aug 2023 11:10) (18 - 18)  SpO2: 96% (10 Aug 2023 11:10) (95% - 99%)    Parameters below as of 10 Aug 2023 11:10  Patient On (Oxygen Delivery Method): nasal cannula      Daily     Daily   CAPILLARY BLOOD GLUCOSE        I&O's Summary    09 Aug 2023 07:01  -  10 Aug 2023 07:00  --------------------------------------------------------  IN: 0 mL / OUT: 600 mL / NET: -600 mL        PHYSICAL EXAM:  CONSTITUTIONAL: elderly woman, lying comfortably in bed.  EYES: No conjunctival or scleral injection, non-icteric   ENMT: MMM  RESPIRATORY: +B/L rhonchi , no wheezing, Normal respiratory effort   CARDIOVASCULAR: +S1S2, RRR, no M/G/R, no lower extremity edema  GASTROINTESTINAL:  +BS present, No TTP, no rebound/guarding  SKIN: Warm and dry   NEUROLOGIC: Non focal   PSYCHIATRIC: A+O x 3        LABS:                        10.9   12.39 )-----------( 230      ( 10 Aug 2023 06:02 )             32.2     Hgb Trend: 10.9<--, 10.9<--, 12.7<--, 12.1<--, 13.1<--  08-10    131<L>  |  92<L>  |  73<H>  ----------------------------<  108<H>  3.3<L>   |  25  |  1.74<H>    Ca    8.7      10 Aug 2023 06:02  Phos  3.6     08-10  Mg     2.50     08-10      Creatinine Trend: 1.74<--, 1.83<--, 1.44<--, 1.24<--, 1.38<--, 1.06<--          Urinalysis Basic - ( 10 Aug 2023 06:02 )    Color: x / Appearance: x / SG: x / pH: x  Gluc: 108 mg/dL / Ketone: x  / Bili: x / Urobili: x   Blood: x / Protein: x / Nitrite: x   Leuk Esterase: x / RBC: x / WBC x   Sq Epi: x / Non Sq Epi: x / Bacteria: x        RADIOLOGY & ADDITIONAL TESTS:    Imaging Personally Reviewed.    Consultant(s) Notes Reviewed.    Care Discussed with Consultants/Other Providers.

## 2023-08-10 NOTE — DIETITIAN INITIAL EVALUATION ADULT - ORAL INTAKE PTA/DIET HISTORY
Patient lives in an assistant living facility, denies any difficulty obtaining food PTA. Patient reports usual body weight 145lbs, has very good appetite prior to hospitalization. Patient has no known food allergies.

## 2023-08-10 NOTE — DIETITIAN INITIAL EVALUATION ADULT - ADD RECOMMEND
1. Recommend change diet to low sodium. Diet consistency per SLP recommendation.   2. Nutrition department to provide [place] 11oz 1x/day (220kcal, 16gm protein) with thickeners, for nutrient support.   3. Recommend adding multivitamin, vitamin c, to promote wound healing.   4. Monitor and replete electrolytes.   5. Monitor weight, labs, po intake and tolerance, bowel movement, skin integrity.   6. Encourage PO intake and honor food preferences as able.

## 2023-08-10 NOTE — PHYSICAL THERAPY INITIAL EVALUATION ADULT - ADDITIONAL COMMENTS
As per patient's daughter, patient lives in [facility] assisted living. since last 3 months, was wheel chair bound. Prior to 3 months ambulated with rollator. Patient has home health aide to assist in ADLs.
Patient wheelchair bound at assisted living facility

## 2023-08-10 NOTE — PROGRESS NOTE ADULT - PROBLEM SELECTOR PLAN 3
UA w/ moderate bacteria. Moderate LEs  UCx w/ enterococcus  w/ amp resistance. Vanc x 1 dose given renal dysfunction  Plan as above

## 2023-08-10 NOTE — SWALLOW VFSS/MBS ASSESSMENT ADULT - DIAGNOSTIC IMPRESSIONS
1. Mild oral stage for puree, soft and bite-sized, regular solids, moderately thick liquids, mildly thick liquids and thin liquids characterized by adequate containment, increased mastication time of soft and bite-sized and regular solids, slow anterior to posterior transport, with premature spillage to the pyriforms for moderately thick liquids, mildly thick liquids and thin liquids due to reduced tongue to palate seal. There was trace-mild oral residue noted inconsistently across consistencies with verbal cues to reswallow assisting to clear oral residue. 2. Mild pharyngeal dysphagia for puree, soft and bite-sized, regular solids and moderately thick liquids characterized by delayed initiation of the pharyngeal swallow (bolus head at the level of the vallecula), adequate base of tongue retraction, adequate hyolaryngeal excursion/ adequate laryngeal vestibular closure, adequate epiglottic deflection and adequate pharyngeal contractility. There was trace pharyngeal residue diffuse throughout the hypopharynx post primary swallow; however, verbal cues to reswallow cleared pharyngeal residue. There was No Penetration/ No Aspiration before, during or after the swallow for puree, soft and bite-sized, regular solids and moderately thick liquids. 3. Moderate-Severe pharyngeal dysphagia for mildly thick liquids and thin liquids characterized by delayed initiation of the pharyngeal swallow (bolus head at the level of the vallecula), reduced base of tongue retraction, reduced hyolaryngeal excursion/ reduced laryngeal vestibular closure, reduced epiglottic deflection and adequate pharyngeal contractility. There was trace pharyngeal residue diffuse throughout the hypopharynx with verbal cues to reswallow assisting to clear residue. There was trace Laryngeal Penetration during the swallow migrating to the level of the Vocal Folds for mildly thick and thin liquids. Continued Below. 1. Mild oral stage for puree, soft and bite-sized, regular solids, moderately thick liquids, mildly thick liquids and thin liquids characterized by adequate containment, increased mastication time of regular solids with adequate ability to break down the solids, slow anterior to posterior transport, with premature spillage to the pyriforms for moderately thick liquids, mildly thick liquids and thin liquids due to reduced tongue to palate seal. There was trace-mild oral residue noted inconsistently across consistencies with verbal cues to reswallow assisting to clear oral residue. 2. Mild pharyngeal dysphagia for puree, soft and bite-sized, regular solids and moderately thick liquids characterized by delayed initiation of the pharyngeal swallow (bolus head at the level of the vallecula), adequate base of tongue retraction, adequate hyolaryngeal excursion/ adequate laryngeal vestibular closure, adequate epiglottic deflection and adequate pharyngeal contractility. There was trace pharyngeal residue diffuse throughout the hypopharynx post primary swallow; however, verbal cues to reswallow cleared pharyngeal residue. There was No Penetration/ No Aspiration before, during or after the swallow for puree, soft and bite-sized, regular solids and moderately thick liquids. 3. Moderate-Severe pharyngeal dysphagia for mildly thick liquids and thin liquids characterized by delayed initiation of the pharyngeal swallow (bolus head at the level of the vallecula), reduced base of tongue retraction, reduced hyolaryngeal excursion/ reduced laryngeal vestibular closure, reduced epiglottic deflection and adequate pharyngeal contractility. There was trace pharyngeal residue diffuse throughout the hypopharynx with verbal cues to reswallow assisting to clear residue. There was Laryngeal Penetration during the swallow without retrieval leaving trace residue in the laryngeal vestibule migrating to the level of the Vocal Folds for mildly thick and thin liquids. Continued Below.

## 2023-08-10 NOTE — SWALLOW VFSS/MBS ASSESSMENT ADULT - COMMENTS
Per Internal Medicine 8/9, "89-year-old female with past medical history of CHF, hypertension, hyperlipidemia, A-fib, hypothyroidism p/w cough and SOB a/w acute hypoxic respiratory failure."    CT Angio Chest 8/6: IMPRESSION: Evaluation distal to the segmental levels is limited due to motion artifact. Within this limitation, no evidence of pulmonary embolism. Debris within the right interlobar bronchus, without focal consolidation.    CXR 8/6: IMPRESSION: Left basilar hazy opacity, which may reflect atelectasis.    Of Note: Patient known to this service seen for a bedside swallow evaluation on 8/9 with recommendations for Regular Solids with Thin Liquids and a cinesophagram (see report for details).     Patient received in Radiology Suite this AM for a cinesophagram. Patient transferred to specialized seating unit with lateral projection. Patient noted with O2 via NC, able to make basic wants/ needs known and follow simple directives.

## 2023-08-10 NOTE — DIETITIAN INITIAL EVALUATION ADULT - SIGNS/SYMPTOMS
Noted with weight fluctuation of -7.7kg/-9%BWx 2 weeks and -2.7kg/-3.7%BW x 1 month DTI to right heel

## 2023-08-10 NOTE — PHYSICAL THERAPY INITIAL EVALUATION ADULT - NSPTDISCHREC_GEN_A_CORE
Patient appears to be at her functional baseline therefore will not place patient on program
Home PT

## 2023-08-10 NOTE — PHYSICAL THERAPY INITIAL EVALUATION ADULT - PERTINENT HX OF CURRENT PROBLEM, REHAB EVAL
Patient presented with +productive cough and diminished 02 SATS
89-year-old female with past medical history of CHF, hypertension, hyperlipidemia, A-fib, hypothyroidism p/w cough and SOB, presents with acute hypoxic respiratory failure

## 2023-08-10 NOTE — SWALLOW VFSS/MBS ASSESSMENT ADULT - ADDITIONAL RECOMMENDATIONS
1. This service to follow up to ensure tolerance of recommended PO as schedule permits. 2. Medical team further advised to reconsult this service if patient is with a change in medical status or change in tolerance of recommended PO. 3. Patient may benefit from swallowing therapy pending discharge plans (i.e., Rehab Center vs. Homecare vs. Outpatient at [facility] [phone]).

## 2023-08-10 NOTE — PROGRESS NOTE ADULT - SUBJECTIVE AND OBJECTIVE BOX
CARDIOLOGY FOLLOW UP - Dr. Riley  DATE OF SERVICE:8/10/23    CC NO CP OR SOB       REVIEW OF SYSTEMS:  CONSTITUTIONAL: No fever, weight loss, or fatigue  RESPIRATORY: No cough, wheezing, chills or hemoptysis; No Shortness of Breath  CARDIOVASCULAR: No chest pain, palpitations, passing out, dizziness, or leg swelling  GASTROINTESTINAL: No abdominal or epigastric pain. No nausea, vomiting, or hematemesis; No diarrhea or constipation. No melena or hematochezia.  VASCULAR: No edema     PHYSICAL EXAM:  T(C): 36.7 (08-10-23 @ 06:22), Max: 36.7 (08-09-23 @ 17:21)  HR: 72 (08-10-23 @ 06:22) (64 - 72)  BP: 113/60 (08-10-23 @ 06:22) (113/60 - 137/62)  RR: 18 (08-10-23 @ 06:22) (18 - 18)  SpO2: 95% (08-10-23 @ 06:22) (95% - 99%)  Wt(kg): --  I&O's Summary    09 Aug 2023 07:01  -  10 Aug 2023 07:00  --------------------------------------------------------  IN: 0 mL / OUT: 600 mL / NET: -600 mL        Appearance: Normal	  Cardiovascular: Normal S1 S2,RRR, No JVD, No murmurs  Respiratory: Lungs clear to auscultation	  Gastrointestinal:  Soft, Non-tender, + BS	  Extremities: Normal range of motion, No clubbing, cyanosis or edema      Home Medications:  amLODIPine 5 mg oral tablet: 1 tab(s) orally once a day (07 Aug 2023 06:47)  Biofreeze 4% topical gel: Apply topically to affected area 2 times a day to both knees (07 Aug 2023 06:47)  carvedilol 6.25 mg oral tablet: 1 tab(s) orally 2 times a day (07 Aug 2023 06:47)  lactobacillus acidophilus oral capsule: 1 cap(s) orally once a day (07 Aug 2023 06:47)  levothyroxine 100 mcg (0.1 mg) oral tablet: 1 tab(s) orally once a day (07 Aug 2023 06:47)  methenamine hippurate 1 g oral tablet: 1 tab(s) orally once a day (07 Aug 2023 06:47)  nystatin 100,000 units/g topical powder: Apply topically to affected area once a day to bust after showers (07 Aug 2023 06:47)  Vitamin D3 25 mcg (1000 intl units) oral tablet: 1 tab(s) orally once a day (07 Aug 2023 06:47)      MEDICATIONS  (STANDING):  albuterol/ipratropium for Nebulization 3 milliLiter(s) Nebulizer every 6 hours  atorvastatin 20 milliGRAM(s) Oral at bedtime  carvedilol 6.25 milliGRAM(s) Oral every 12 hours  cholecalciferol 1000 Unit(s) Oral daily  guaiFENesin Oral Liquid (Sugar-Free) 200 milliGRAM(s) Oral every 6 hours  heparin   Injectable 5000 Unit(s) SubCutaneous every 12 hours  levothyroxine 100 MICROGram(s) Oral daily  mupirocin 2% Ointment 1 Application(s) Both Nostrils two times a day  piperacillin/tazobactam IVPB.. 3.375 Gram(s) IV Intermittent every 12 hours  polyethylene glycol 3350 17 Gram(s) Oral daily  potassium chloride    Tablet ER 20 milliEquivalent(s) Oral every 2 hours  senna 2 Tablet(s) Oral at bedtime  vancomycin  IVPB 1000 milliGRAM(s) IV Intermittent once      TELEMETRY: 	    ECG:  	  RADIOLOGY:   DIAGNOSTIC TESTING:  [ ] Echocardiogram:  [ ]  Catheterization:  [ ] Stress Test:    OTHER: 	    LABS:	 	    Troponin T, High Sensitivity Result: 41 ng/L (08-06 @ 18:44)                          10.9   12.39 )-----------( 230      ( 10 Aug 2023 06:02 )             32.2     08-10    131<L>  |  92<L>  |  73<H>  ----------------------------<  108<H>  3.3<L>   |  25  |  1.74<H>    Ca    8.7      10 Aug 2023 06:02  Phos  3.6     08-10  Mg     2.50     08-10

## 2023-08-10 NOTE — PHYSICAL THERAPY INITIAL EVALUATION ADULT - GENERAL OBSERVATIONS, REHAB EVAL
Patient received supine in bed this AM with 02 in place; HR 88 RR 16
Patient seen semi supine in bed, 2 liters oxygen via nasal cannula.

## 2023-08-10 NOTE — PHYSICAL THERAPY INITIAL EVALUATION ADULT - RISK REDUCTION/PREVENTION, PT EVAL
risk factors Melolabial Transposition Flap Text: The defect edges were debeveled with a #15 scalpel blade.  A nasolabial transposition flap was designed as a single stage procedure. The incision lines were designed along the nasolabial fold and medial cheek. Removal of the superior tissue cone was designed by triangulation with the apex pointed toward the medial canthus. Local anesthesia was obtained. The flap was incised to the underlying adipose tissue. The flap was then undermined and elevated. The tissue surrounding the operative site was undermined extensively with blunt scissor dissection. On the nose and medial cheek this was performed at the level of the periosteum. Hemostasis was obtained using electrocoagulation. The flap was then completely defatted using blunt scissor dissection. The cheek was then advanced medially with several periosteal tacking sutures, moving the flap into the primary defect. The nasolabial fold was recreated by a periosteal tacking suture placed parallel at the base of the flap. The secondary defect was then closed by layered closure. The primary defect wound bed was trimmed as necessary to create a concave surface as the flap was sutured into place. The superficial fascia and subcutaneous layers were closed with  buried vertical mattress sutures. The epidermis was approximated with sutures. Careful attention was given to eversion and even approximation of the wound edges. A pressure dressing was applied.

## 2023-08-10 NOTE — DIETITIAN INITIAL EVALUATION ADULT - OTHER INFO
89-year-old female with past medical history of CHF, hypertension, hyperlipidemia, A-fib, hypothyroidism p/w cough and SOB a/w acute hypoxic respiratory failure, per chart.     Patient reports good appetite during visit. As per RN flow sheet, patient is consuming % of meals. Patient denies any difficulty chewing/ swallowing current diet consistency. As per swallow eval dated 8/10, recommended regular solids with moderately thick liquids diet consistency. Contacted PA to change diet consistency per SLP recommendation. Patient denies any nausea, vomiting, diarrhea, constipation during visit. Last bowel movement 8/9/2023, per RN flow sheet. Bowel regimen is in placed. Labs reviewed, noted low potassium- 3.3(8/10), on KCl for repletion. Current weight: 69.4kg (8/9, per RN flow sheet). As per Rosalie NICK, weight history: 77.1kg (7/28), 72.1kg (7/8). Noted with weight fluctuation of -7.7kg/-9%BWx 2 weeks and -2.7kg/-3.7%BW x 1 month. Noted patient has 1+generalized edema documented on flow sheet. Fluid shift could cause weight change. Will continue to monitor weight trend. Patient os on cholecalciferol for micronutrient support.

## 2023-08-10 NOTE — PROGRESS NOTE ADULT - ASSESSMENT
89F presents with right foot heel deep tissue injury   - patient seen and evaluated   - Ecchymosis present on the right plantar heel, no open wounds or signs of infection bilaterally.   - applied allevyn pad on right heel for extra cushion  - strict decubitus precaution with z-nika at all times while in bed   - no acute pod intervention at this time   - Will follow

## 2023-08-10 NOTE — SWALLOW VFSS/MBS ASSESSMENT ADULT - RECOMMENDED CONSISTENCY
Diagnostic Impressions Continued: Patient was Not Sensate therefore verbally cued to cough; however, patient was unable clear residue from the upper airway/ vocal folds. Postural strategies (i.e., chin tuck) did not eliminate Laryngeal Penetration.    RECOMMENDATIONS:   1. Regular Solids with Moderately Thick Liquids 2. Aspiration precautions 3. Feeding Swallowing Guidelines: Small bites/ small sips, reswallow prior to subsequent PO trial, upright with PO and at least 30 minutes post, slow rate of intake 4. Reflux Precautions 5. Oral Hygiene

## 2023-08-10 NOTE — DIETITIAN INITIAL EVALUATION ADULT - PERTINENT MEDS FT
MEDICATIONS  (STANDING):  albuterol/ipratropium for Nebulization 3 milliLiter(s) Nebulizer every 6 hours  atorvastatin 20 milliGRAM(s) Oral at bedtime  carvedilol 6.25 milliGRAM(s) Oral every 12 hours  cholecalciferol 1000 Unit(s) Oral daily  heparin   Injectable 5000 Unit(s) SubCutaneous every 12 hours  levothyroxine 100 MICROGram(s) Oral daily  mupirocin 2% Ointment 1 Application(s) Both Nostrils two times a day  piperacillin/tazobactam IVPB.. 3.375 Gram(s) IV Intermittent every 12 hours  polyethylene glycol 3350 17 Gram(s) Oral daily  potassium chloride    Tablet ER 20 milliEquivalent(s) Oral every 2 hours  senna 2 Tablet(s) Oral at bedtime    MEDICATIONS  (PRN):

## 2023-08-11 DIAGNOSIS — K59.00 CONSTIPATION, UNSPECIFIED: ICD-10-CM

## 2023-08-11 DIAGNOSIS — K62.5 HEMORRHAGE OF ANUS AND RECTUM: ICD-10-CM

## 2023-08-11 LAB
ANION GAP SERPL CALC-SCNC: 14 MMOL/L — SIGNIFICANT CHANGE UP (ref 7–14)
ANISOCYTOSIS BLD QL: SLIGHT — SIGNIFICANT CHANGE UP
BASOPHILS # BLD AUTO: 0.1 K/UL — SIGNIFICANT CHANGE UP (ref 0–0.2)
BASOPHILS NFR BLD AUTO: 0.9 % — SIGNIFICANT CHANGE UP (ref 0–2)
BUN SERPL-MCNC: 55 MG/DL — HIGH (ref 7–23)
CALCIUM SERPL-MCNC: 8.9 MG/DL — SIGNIFICANT CHANGE UP (ref 8.4–10.5)
CHLORIDE SERPL-SCNC: 93 MMOL/L — LOW (ref 98–107)
CO2 SERPL-SCNC: 26 MMOL/L — SIGNIFICANT CHANGE UP (ref 22–31)
CREAT SERPL-MCNC: 1.17 MG/DL — SIGNIFICANT CHANGE UP (ref 0.5–1.3)
EGFR: 41 ML/MIN/1.73M2 — LOW
EOSINOPHIL # BLD AUTO: 0.4 K/UL — SIGNIFICANT CHANGE UP (ref 0–0.5)
EOSINOPHIL NFR BLD AUTO: 3.5 % — SIGNIFICANT CHANGE UP (ref 0–6)
GLUCOSE SERPL-MCNC: 104 MG/DL — HIGH (ref 70–99)
HCT VFR BLD CALC: 34 % — LOW (ref 34.5–45)
HGB BLD-MCNC: 11.3 G/DL — LOW (ref 11.5–15.5)
IANC: 8.14 K/UL — HIGH (ref 1.8–7.4)
LYMPHOCYTES # BLD AUTO: 1.5 K/UL — SIGNIFICANT CHANGE UP (ref 1–3.3)
LYMPHOCYTES # BLD AUTO: 13 % — SIGNIFICANT CHANGE UP (ref 13–44)
MACROCYTES BLD QL: SLIGHT — SIGNIFICANT CHANGE UP
MAGNESIUM SERPL-MCNC: 2.5 MG/DL — SIGNIFICANT CHANGE UP (ref 1.6–2.6)
MANUAL SMEAR VERIFICATION: SIGNIFICANT CHANGE UP
MCHC RBC-ENTMCNC: 30.6 PG — SIGNIFICANT CHANGE UP (ref 27–34)
MCHC RBC-ENTMCNC: 33.2 GM/DL — SIGNIFICANT CHANGE UP (ref 32–36)
MCV RBC AUTO: 92.1 FL — SIGNIFICANT CHANGE UP (ref 80–100)
MONOCYTES # BLD AUTO: 1.01 K/UL — HIGH (ref 0–0.9)
MONOCYTES NFR BLD AUTO: 8.7 % — SIGNIFICANT CHANGE UP (ref 2–14)
NEUTROPHILS # BLD AUTO: 8.05 K/UL — HIGH (ref 1.8–7.4)
NEUTROPHILS NFR BLD AUTO: 68.7 % — SIGNIFICANT CHANGE UP (ref 43–77)
NEUTS BAND # BLD: 0.9 % — SIGNIFICANT CHANGE UP (ref 0–6)
OSMOLALITY SERPL: 301 MOSM/KG — HIGH (ref 275–295)
OVALOCYTES BLD QL SMEAR: SLIGHT — SIGNIFICANT CHANGE UP
PHOSPHATE SERPL-MCNC: 2.6 MG/DL — SIGNIFICANT CHANGE UP (ref 2.5–4.5)
PLAT MORPH BLD: NORMAL — SIGNIFICANT CHANGE UP
PLATELET # BLD AUTO: 269 K/UL — SIGNIFICANT CHANGE UP (ref 150–400)
PLATELET COUNT - ESTIMATE: NORMAL — SIGNIFICANT CHANGE UP
POLYCHROMASIA BLD QL SMEAR: SLIGHT — SIGNIFICANT CHANGE UP
POTASSIUM SERPL-MCNC: 3.7 MMOL/L — SIGNIFICANT CHANGE UP (ref 3.5–5.3)
POTASSIUM SERPL-SCNC: 3.7 MMOL/L — SIGNIFICANT CHANGE UP (ref 3.5–5.3)
RBC # BLD: 3.69 M/UL — LOW (ref 3.8–5.2)
RBC # FLD: 13.7 % — SIGNIFICANT CHANGE UP (ref 10.3–14.5)
RBC BLD AUTO: ABNORMAL
SODIUM SERPL-SCNC: 133 MMOL/L — LOW (ref 135–145)
VARIANT LYMPHS # BLD: 4.3 % — SIGNIFICANT CHANGE UP (ref 0–6)
WBC # BLD: 11.56 K/UL — HIGH (ref 3.8–10.5)
WBC # FLD AUTO: 11.56 K/UL — HIGH (ref 3.8–10.5)

## 2023-08-11 PROCEDURE — 99233 SBSQ HOSP IP/OBS HIGH 50: CPT

## 2023-08-11 PROCEDURE — 99222 1ST HOSP IP/OBS MODERATE 55: CPT | Mod: FS

## 2023-08-11 RX ORDER — PANTOPRAZOLE SODIUM 20 MG/1
40 TABLET, DELAYED RELEASE ORAL EVERY 12 HOURS
Refills: 0 | Status: DISCONTINUED | OUTPATIENT
Start: 2023-08-11 | End: 2023-08-12

## 2023-08-11 RX ORDER — POLYETHYLENE GLYCOL 3350 17 G/17G
17 POWDER, FOR SOLUTION ORAL
Refills: 0 | Status: DISCONTINUED | OUTPATIENT
Start: 2023-08-11 | End: 2023-08-11

## 2023-08-11 RX ORDER — POLYETHYLENE GLYCOL 3350 17 G/17G
17 POWDER, FOR SOLUTION ORAL
Refills: 0 | Status: DISCONTINUED | OUTPATIENT
Start: 2023-08-11 | End: 2023-08-24

## 2023-08-11 RX ADMIN — Medication 3 MILLILITER(S): at 09:31

## 2023-08-11 RX ADMIN — Medication 100 MICROGRAM(S): at 05:50

## 2023-08-11 RX ADMIN — POLYETHYLENE GLYCOL 3350 17 GRAM(S): 17 POWDER, FOR SOLUTION ORAL at 17:33

## 2023-08-11 RX ADMIN — CARVEDILOL PHOSPHATE 6.25 MILLIGRAM(S): 80 CAPSULE, EXTENDED RELEASE ORAL at 05:51

## 2023-08-11 RX ADMIN — PANTOPRAZOLE SODIUM 40 MILLIGRAM(S): 20 TABLET, DELAYED RELEASE ORAL at 17:26

## 2023-08-11 RX ADMIN — MUPIROCIN 1 APPLICATION(S): 20 OINTMENT TOPICAL at 17:34

## 2023-08-11 RX ADMIN — Medication 3 MILLILITER(S): at 21:39

## 2023-08-11 RX ADMIN — Medication 1000 UNIT(S): at 11:32

## 2023-08-11 RX ADMIN — PANTOPRAZOLE SODIUM 40 MILLIGRAM(S): 20 TABLET, DELAYED RELEASE ORAL at 10:02

## 2023-08-11 RX ADMIN — HEPARIN SODIUM 5000 UNIT(S): 5000 INJECTION INTRAVENOUS; SUBCUTANEOUS at 05:50

## 2023-08-11 RX ADMIN — PIPERACILLIN AND TAZOBACTAM 25 GRAM(S): 4; .5 INJECTION, POWDER, LYOPHILIZED, FOR SOLUTION INTRAVENOUS at 05:51

## 2023-08-11 RX ADMIN — Medication 3 MILLILITER(S): at 15:44

## 2023-08-11 RX ADMIN — CARVEDILOL PHOSPHATE 6.25 MILLIGRAM(S): 80 CAPSULE, EXTENDED RELEASE ORAL at 17:33

## 2023-08-11 RX ADMIN — PIPERACILLIN AND TAZOBACTAM 25 GRAM(S): 4; .5 INJECTION, POWDER, LYOPHILIZED, FOR SOLUTION INTRAVENOUS at 17:25

## 2023-08-11 RX ADMIN — SENNA PLUS 2 TABLET(S): 8.6 TABLET ORAL at 21:37

## 2023-08-11 RX ADMIN — MUPIROCIN 1 APPLICATION(S): 20 OINTMENT TOPICAL at 05:50

## 2023-08-11 RX ADMIN — ATORVASTATIN CALCIUM 20 MILLIGRAM(S): 80 TABLET, FILM COATED ORAL at 21:37

## 2023-08-11 NOTE — PROGRESS NOTE ADULT - PROBLEM SELECTOR PLAN 12
Diet: Regular diet per S/S recs       DVT: HSQ  Dispo: PT Consulted recs home PT     GOC: DNR/DNI, molst form signed and in chart    Plan discussed w/ patient and daughter at bedside

## 2023-08-11 NOTE — PROGRESS NOTE ADULT - ASSESSMENT
Echo 5/18/23: Moderate LV dysfxn, LVH  Echo 8/1/23: Abnormal septal motion of LV, mild-mod MR, mild-mod TR    A/p  89 y/o female with PMHx of HTN, HLD, HFrEf, Afib (not on AC), hypothyroidism, hx of PPM placement, presents with cough , hypoxia       #Acute respiratory failure with hypoxia.   -likely in the setting of resp infection   -plan as below     #Cough, hypoxia   -abx of pna/ uti   -xray Left basilar hazy opacity, which may reflect atelectasis.  -cta  chest with no PE,  Debris within the right interlobar bronchus, without focal consolidation.  Essential interval resolution of  previously noted bilateral pleural effusions and interlobular septal  thickening/groundglass consolidation.  -Bcx neg   -management per med     #Chronic HFpEF   -volume status stable   -diuretics on hold   -does not appear sig volume overloaded  -renal fxn improving   -cough sec to pulm process  -Continue coreg  -eventual restart lasix 20 in 24-48 hours  -recent Echo with abnormal septal motion of LV, mild-mod MR, mild-mod TR    #Afib s/p ppm  -Stable, rate controlled  -Continue coreg  -remains off a/c     #HTN  -stable  -Continue  coreg    #UTI  -Abx, mgmt per med        dvt ppx    35 minutes spent on total encounter; more than 50% of the visit was spent counseling and/or coordinating care by the attending physician.

## 2023-08-11 NOTE — CONSULT NOTE ADULT - ATTENDING COMMENTS
89F HFrEF, NSTEMI, AF not on AC, PPM, HTN, hypothyroid admitted with sol, UTI, PNA.  GI consulted for BRBPR in the setting of constipation. Blood mixed with stool x2.   No abdominal pain.   HAYDEN with hard brown stool and red blood.   labs reviewed.   Suspect outlet bleeding. Pt and daughter do not want to pursue invasive diagnostic procedures. Would recommend miralax 17g BID to start and titrate to bm.

## 2023-08-11 NOTE — CONSULT NOTE ADULT - ASSESSMENT
# Hematochezia possibly 2/2 hemorrhoidal bleeding, stercoral ulcer bleeding, angioectasias, malignancy, diverticular bleeding  # Constipation  Considering patient's constipation, hematochezia not unreasonable to consider hemorrhoidal bleeding and stercoral ulcer bleeding higher on the differential. Although brown stool mixed with red blood on HAYDEN and hard dark brown stool removed, Hgb and patient remains stable, so no urgent indication for endoscopic evaluation. Discussed with patient and daughter at bedside who both prefer to avoid endoscopic/invasive intervention if possible, but if bleeding persists/patient's condition worsens due to bleeding, they are open to revisiting the conversation, but at this time, would like to pursue medical management. 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     Recommendation:  - Hold off on non-urgent endoscopic evaluation in accordance with patient and family's wishes  - Consider increasing Miralax to BID and titrate to 1 soft BM per day  - Consider q6h prn enema until having adequate BMs if Miralax unsuccessful  - If the above is unsuccessful, recommend manual disimpaction at bedside  - Daily CBC, CMP, coags  - Transfuse if Hgb < 8, given cardiac history  - If becomes hemodynamically unstable with overt bleeding, consult MICU, consider CTA, consider IR consult and inform GI and revisit GOC with patient and family  - Ensure adequate hydration  - Diet as tolerated  - Rest of care per primary    Preliminary note until signed by Attending.    Thank you for involving us in this patient's care. Please reach out if any further questions or concerns.    Andree Adame MD  Gastroenterology/Hepatology Fellow, PGY-7    NON-URGENT CONSULTS:  Please email [email or web address]  AT NIGHT AND ON WEEKENDS:  Contact on-call GI fellow via answering service ([phone]) from 5pm-8am and on weekends/holidays  MONDAY-FRIDAY 8AM-5PM:  Pager: [phone] ([facility])  Pager: [phone] ([facility])

## 2023-08-11 NOTE — PROGRESS NOTE ADULT - SUBJECTIVE AND OBJECTIVE BOX
CARDIOLOGY FOLLOW UP NOTE - DR. CHO    Patient Name: SOLIS TAYLOR  Date of Service: 08-11-23 @ 11:13    Patient seen and examined  c/o continued cough    Subjective:    cv: denies chest pain, dyspnea, palpitations, dizziness  pulmonary: denies cough  GI: denies abdominal pain, nausea, vomiting  vascular/legs: no edema   skin: no rash  ROS: otherwise negative   overnight events:      PHYSICAL EXAM:  T(C): 36.4 (08-11-23 @ 09:35), Max: 36.7 (08-11-23 @ 05:50)  HR: 69 (08-11-23 @ 09:35) (58 - 74)  BP: 119/74 (08-11-23 @ 09:35) (113/62 - 140/74)  RR: 18 (08-11-23 @ 09:35) (18 - 18)  SpO2: 96% (08-11-23 @ 09:35) (94% - 100%)  Wt(kg): --  I&O's Summary    10 Aug 2023 07:01  -  11 Aug 2023 07:00  --------------------------------------------------------  IN: 0 mL / OUT: 1500 mL / NET: -1500 mL      Daily     Daily     Appearance: Normal	  Cardiovascular: Normal S1 S2,RRR, No JVD, No murmurs  Respiratory: Lungs clear to auscultation	  Gastrointestinal:  Soft, Non-tender, + BS	  Extremities: Normal range of motion, No clubbing, cyanosis or edema      Home Medications:  amLODIPine 5 mg oral tablet: 1 tab(s) orally once a day (07 Aug 2023 06:47)  Biofreeze 4% topical gel: Apply topically to affected area 2 times a day to both knees (07 Aug 2023 06:47)  carvedilol 6.25 mg oral tablet: 1 tab(s) orally 2 times a day (07 Aug 2023 06:47)  lactobacillus acidophilus oral capsule: 1 cap(s) orally once a day (07 Aug 2023 06:47)  levothyroxine 100 mcg (0.1 mg) oral tablet: 1 tab(s) orally once a day (07 Aug 2023 06:47)  methenamine hippurate 1 g oral tablet: 1 tab(s) orally once a day (07 Aug 2023 06:47)  nystatin 100,000 units/g topical powder: Apply topically to affected area once a day to bust after showers (07 Aug 2023 06:47)  Vitamin D3 25 mcg (1000 intl units) oral tablet: 1 tab(s) orally once a day (07 Aug 2023 06:47)      MEDICATIONS  (STANDING):  albuterol/ipratropium for Nebulization 3 milliLiter(s) Nebulizer every 6 hours  atorvastatin 20 milliGRAM(s) Oral at bedtime  carvedilol 6.25 milliGRAM(s) Oral every 12 hours  cholecalciferol 1000 Unit(s) Oral daily  levothyroxine 100 MICROGram(s) Oral daily  mupirocin 2% Ointment 1 Application(s) Both Nostrils two times a day  pantoprazole  Injectable 40 milliGRAM(s) IV Push every 12 hours  piperacillin/tazobactam IVPB.. 3.375 Gram(s) IV Intermittent every 12 hours  polyethylene glycol 3350 17 Gram(s) Oral daily  senna 2 Tablet(s) Oral at bedtime      TELEMETRY: 	    ECG:  	  RADIOLOGY:   DIAGNOSTIC TESTING:  [ ] Echocardiogram:  [ ] Catheterization:  [ ] Stress Test:    OTHER: 	    LABS:	 	    CARDIAC MARKERS:        Troponin T, High Sensitivity Result: 41 ng/L (08-06 @ 18:44)                                11.3   11.56 )-----------( 269      ( 11 Aug 2023 05:19 )             34.0     08-11    133<L>  |  93<L>  |  55<H>  ----------------------------<  104<H>  3.7   |  26  |  1.17    Ca    8.9      11 Aug 2023 05:19  Phos  2.6     08-11  Mg     2.50     08-11      proBNP:     Lipid Profile:   HgA1c:     Creatinine: 1.17 mg/dL (08-11-23 @ 05:19)  Creatinine: 1.74 mg/dL (08-10-23 @ 06:02)  Creatinine: 1.83 mg/dL (08-09-23 @ 05:21)

## 2023-08-11 NOTE — PROGRESS NOTE ADULT - ASSESSMENT
89-year-old female with past medical history of CHF, hypertension, hyperlipidemia, A-fib, hypothyroidism p/w cough and SOB a/w acute hypoxic respiratory failure now improving, w/ course c/b BRBPR

## 2023-08-11 NOTE — PROGRESS NOTE ADULT - PROBLEM SELECTOR PLAN 3
Pt met sepsis criteria with temp < 36 C and WBC of 21, likely sources pneumonia +/- viral infection +/-UTI  --RVP w/ enterovirus   --C/w IV zosyn (08/07- Anticipate 7 Day course of tx, can switch to PO regimen upon discharge   --BCx NGTD  --Urine Cx w/ enterococcus w/ amp resistance. Vanc x 1 dose given renal dysfunction  --Procal Elevated   --Trend leukocytosis   --Supportive care for viral infection

## 2023-08-11 NOTE — CONSULT NOTE ADULT - SUBJECTIVE AND OBJECTIVE BOX
Chief Complaint:  Patient is a 89y old  Female who presents with a chief complaint of hypoxia (11 Aug 2023 11:13)      HPI:SOLIS TAYLOR is a 89y Female with HFrEF 38% (8/1), prior NSTEMI, Afib (off AC) s/p PPM, HTN, hypothyroidism and recent hospitalization 8/4 for HF exacerbation/UTI who presents now form assisted living with productive cough since hospitalization discharge with increased urinary frequency/dysuria and reported hypoxia to 85% on RA at assisted living and arrived to ED on 15L NRB, found to have entero/rhinovirus, now on RA being treated for UTI with ANNA and PNA. GI consulted for two episodes of rectal bleeding this morning.     Patient denying any prior episodes of red blood in the stool/black BMs or vomiting of black/red substances. She denies n/v/abdominal pain, but reports constipation which daughter also confirms at bedside. No f/c. No heavy use of NSAIDs, non-smoker,     PMHX/PSHX:  Urinary Frequency    Status Post Breast Lumpectomy    Bilateral Cataracts    HTN (hypertension)    Spinal stenosis    Hypothyroidism    Non-ST elevation MI (NSTEMI)    Macular degeneration    (HFpEF) heart failure with preserved ejection fraction    Paroxysmal atrial fibrillation    Chronic kidney disease (CKD)    Ectopic pregnancy, tubal    S/P ORIF (open reduction internal fixation) fracture    S/P breast lumpectomy    S/P cataract surgery      Allergies:  cephalexin (Hives)  morphine (Drowsiness; Faint; Hypotension)  [This allergen will not trigger allergy alert] trisulfapyrimidine (Rash)  sulfa topicals (Unknown)      Home Medications: reviewed  Hospital Medications:  albuterol/ipratropium for Nebulization 3 milliLiter(s) Nebulizer every 6 hours  atorvastatin 20 milliGRAM(s) Oral at bedtime  carvedilol 6.25 milliGRAM(s) Oral every 12 hours  cholecalciferol 1000 Unit(s) Oral daily  levothyroxine 100 MICROGram(s) Oral daily  mupirocin 2% Ointment 1 Application(s) Both Nostrils two times a day  pantoprazole  Injectable 40 milliGRAM(s) IV Push every 12 hours  piperacillin/tazobactam IVPB.. 3.375 Gram(s) IV Intermittent every 12 hours  polyethylene glycol 3350 17 Gram(s) Oral daily  senna 2 Tablet(s) Oral at bedtime      Social History:   Tob: Denies  EtOH: Denies  Illicit Drugs: Denies    Family history:  Family history of acute myocardial infarction      Denies family history of colon cancer/polyps, stomach cancer/polyps, pancreatic cancer/masses, liver cancer/disease, ovarian cancer and endometrial cancer.    ROS:   General:  No  fevers, chills, night sweats, fatigue  Eyes:  Good vision, no reported pain  ENT:  No sore throat, pain, runny nose  CV:  No pain, palpitations  Pulm:  No dyspnea, cough  GI:  See HPI, otherwise negative  :  No  incontinence, nocturia  Muscle:  No pain, weakness  Neuro:  No memory problems  Psych:  No insomnia, mood problems, depression  Endocrine:  No polyuria, polydipsia, cold/heat intolerance  Heme:  No petechiae, ecchymosis, easy bruisability  Skin:  No rash    PHYSICAL EXAM:   Vital Signs:  Vital Signs Last 24 Hrs  T(C): 36.4 (11 Aug 2023 09:35), Max: 36.7 (11 Aug 2023 05:50)  T(F): 97.5 (11 Aug 2023 09:35), Max: 98 (11 Aug 2023 05:50)  HR: 69 (11 Aug 2023 09:35) (58 - 74)  BP: 119/74 (11 Aug 2023 09:35) (113/62 - 140/74)  BP(mean): --  RR: 18 (11 Aug 2023 09:35) (18 - 18)  SpO2: 96% (11 Aug 2023 09:35) (94% - 100%)    Parameters below as of 11 Aug 2023 09:35  Patient On (Oxygen Delivery Method): room air      Daily     Daily     GENERAL: no acute distress  NEURO: aox3  HEENT: anicteric sclera, no conjunctival pallor appreciated  CHEST: no respiratory distress, no accessory muscle use  CARDIAC: regular rate   ABDOMEN: soft, non-tender, non-distended, no rebound or guarding  HAYDEN: light brown stool mixed with red blood; removed some hard brown stool  EXTREMITIES: warm, well perfused, LE edema  SKIN: no lesions noted    LABS: reviewed                        11.3   11.56 )-----------( 269      ( 11 Aug 2023 05:19 )             34.0     08-11    133<L>  |  93<L>  |  55<H>  ----------------------------<  104<H>  3.7   |  26  |  1.17    Ca    8.9      11 Aug 2023 05:19  Phos  2.6     08-11  Mg     2.50     08-11            Diagnostic Studies: see sunrise for full report         Chief Complaint:  Patient is a 89y old  Female who presents with a chief complaint of hypoxia (11 Aug 2023 11:13)      HPI:SOLIS TAYLOR is a 89y Female with HFrEF 38% (8/1), prior NSTEMI, Afib (off AC) s/p PPM, HTN, hypothyroidism and recent hospitalization 8/4 for HF exacerbation/UTI who presents now form assisted living with productive cough since hospitalization discharge with increased urinary frequency/dysuria and reported hypoxia to 85% on RA at assisted living and arrived to ED on 15L NRB, found to have entero/rhinovirus, now on RA being treated for UTI with ANNA and PNA. GI consulted for two episodes of rectal bleeding this morning.     Patient denying any prior episodes of red blood in the stool/black BMs or vomiting of black/red substances. She denies n/v/abdominal pain, but reports constipation which daughter also confirms at bedside. No f/c. No heavy use of NSAIDs, non-smoker, no alcohol/illicit drug use. Had colonoscopy many years ago without any findings. No prior EGD. No fhx of GI disease/colon cancer or other malignancy.    PMHX/PSHX:  Urinary Frequency    Status Post Breast Lumpectomy    Bilateral Cataracts    HTN (hypertension)    Spinal stenosis    Hypothyroidism    Non-ST elevation MI (NSTEMI)    Macular degeneration    (HFpEF) heart failure with preserved ejection fraction    Paroxysmal atrial fibrillation    Chronic kidney disease (CKD)    Ectopic pregnancy, tubal    S/P ORIF (open reduction internal fixation) fracture    S/P breast lumpectomy    S/P cataract surgery      Allergies:  cephalexin (Hives)  morphine (Drowsiness; Faint; Hypotension)  [This allergen will not trigger allergy alert] trisulfapyrimidine (Rash)  sulfa topicals (Unknown)      Home Medications: reviewed  Hospital Medications:  albuterol/ipratropium for Nebulization 3 milliLiter(s) Nebulizer every 6 hours  atorvastatin 20 milliGRAM(s) Oral at bedtime  carvedilol 6.25 milliGRAM(s) Oral every 12 hours  cholecalciferol 1000 Unit(s) Oral daily  levothyroxine 100 MICROGram(s) Oral daily  mupirocin 2% Ointment 1 Application(s) Both Nostrils two times a day  pantoprazole  Injectable 40 milliGRAM(s) IV Push every 12 hours  piperacillin/tazobactam IVPB.. 3.375 Gram(s) IV Intermittent every 12 hours  polyethylene glycol 3350 17 Gram(s) Oral daily  senna 2 Tablet(s) Oral at bedtime      Social History:   Tob: Denies  EtOH: Denies  Illicit Drugs: Denies    Family history:  Family history of acute myocardial infarction     ROS:   Complete and normal except as mentioned above.  PHYSICAL EXAM:   Vital Signs:  Vital Signs Last 24 Hrs  T(C): 36.4 (11 Aug 2023 09:35), Max: 36.7 (11 Aug 2023 05:50)  T(F): 97.5 (11 Aug 2023 09:35), Max: 98 (11 Aug 2023 05:50)  HR: 69 (11 Aug 2023 09:35) (58 - 74)  BP: 119/74 (11 Aug 2023 09:35) (113/62 - 140/74)  BP(mean): --  RR: 18 (11 Aug 2023 09:35) (18 - 18)  SpO2: 96% (11 Aug 2023 09:35) (94% - 100%)    Parameters below as of 11 Aug 2023 09:35  Patient On (Oxygen Delivery Method): room air      Daily     Daily     GENERAL: no acute distress  NEURO: aox3  HEENT: anicteric sclera, no conjunctival pallor appreciated  CHEST: no respiratory distress, no accessory muscle use  CARDIAC: regular rate   ABDOMEN: soft, non-tender, non-distended, no rebound or guarding  HAYDEN: light brown stool mixed with red blood; removed some hard brown stool  EXTREMITIES: warm, well perfused, LE edema  SKIN: no lesions noted    LABS: reviewed                        11.3   11.56 )-----------( 269      ( 11 Aug 2023 05:19 )             34.0     08-11    133<L>  |  93<L>  |  55<H>  ----------------------------<  104<H>  3.7   |  26  |  1.17    Ca    8.9      11 Aug 2023 05:19  Phos  2.6     08-11  Mg     2.50     08-11            Diagnostic Studies: see sunrise for full report

## 2023-08-11 NOTE — PROGRESS NOTE ADULT - SUBJECTIVE AND OBJECTIVE BOX
Estephania Stiles        Patient is a 89y old  Female who presents with a chief complaint of hypoxia (11 Aug 2023 12:07)      SUBJECTIVE / OVERNIGHT EVENTS: This morning pt noted to have BRBPR. Also w/ constipation. Otherwise w/o complaints. Denies fevers, chills, chest pain, SOB, abdominal pain    MEDICATIONS  (STANDING):  albuterol/ipratropium for Nebulization 3 milliLiter(s) Nebulizer every 6 hours  atorvastatin 20 milliGRAM(s) Oral at bedtime  carvedilol 6.25 milliGRAM(s) Oral every 12 hours  cholecalciferol 1000 Unit(s) Oral daily  levothyroxine 100 MICROGram(s) Oral daily  mupirocin 2% Ointment 1 Application(s) Both Nostrils two times a day  pantoprazole  Injectable 40 milliGRAM(s) IV Push every 12 hours  piperacillin/tazobactam IVPB.. 3.375 Gram(s) IV Intermittent every 12 hours  polyethylene glycol 3350 17 Gram(s) Oral two times a day  senna 2 Tablet(s) Oral at bedtime  sorbitol 70%/mineral oil/magnesium hydroxide/glycerin Enema 120 milliLiter(s) Rectal once    MEDICATIONS  (PRN):    Allergies    cephalexin (Hives)  [This allergen will not trigger allergy alert] trisulfapyrimidine (Rash)  sulfa topicals (Unknown)    Intolerances    morphine (Drowsiness; Faint; Hypotension)      Vital Signs Last 24 Hrs  T(C): 36.4 (11 Aug 2023 09:35), Max: 36.7 (11 Aug 2023 05:50)  T(F): 97.5 (11 Aug 2023 09:35), Max: 98 (11 Aug 2023 05:50)  HR: 70 (11 Aug 2023 15:45) (58 - 74)  BP: 119/74 (11 Aug 2023 09:35) (113/62 - 140/74)  BP(mean): --  RR: 18 (11 Aug 2023 09:35) (18 - 18)  SpO2: 98% (11 Aug 2023 15:45) (94% - 100%)    Parameters below as of 11 Aug 2023 15:45  Patient On (Oxygen Delivery Method): nasal cannula      Daily     Daily   CAPILLARY BLOOD GLUCOSE        I&O's Summary    10 Aug 2023 07:01  -  11 Aug 2023 07:00  --------------------------------------------------------  IN: 0 mL / OUT: 1500 mL / NET: -1500 mL        PHYSICAL EXAM:  CONSTITUTIONAL: elderly woman, lying comfortably in bed.  EYES: No conjunctival or scleral injection, non-icteric   ENMT: MMM  RESPIRATORY: +B/L rhonchi , no wheezing, Normal respiratory effort   CARDIOVASCULAR: +S1S2, RRR, no M/G/R, no lower extremity edema  GASTROINTESTINAL:  +BS present, No TTP, no rebound/guarding  SKIN: Warm and dry   HAYDEN: Brown stool mixed w/ bright red blood   NEUROLOGIC: Non focal   PSYCHIATRIC: A+O x 3    LABS:                        11.3   11.56 )-----------( 269      ( 11 Aug 2023 05:19 )             34.0     Hgb Trend: 11.3<--, 10.9<--, 10.9<--, 12.7<--, 12.1<--  08-11    133<L>  |  93<L>  |  55<H>  ----------------------------<  104<H>  3.7   |  26  |  1.17    Ca    8.9      11 Aug 2023 05:19  Phos  2.6     08-11  Mg     2.50     08-11      Creatinine Trend: 1.17<--, 1.74<--, 1.83<--, 1.44<--, 1.24<--, 1.38<--          Urinalysis Basic - ( 11 Aug 2023 05:19 )    Color: x / Appearance: x / SG: x / pH: x  Gluc: 104 mg/dL / Ketone: x  / Bili: x / Urobili: x   Blood: x / Protein: x / Nitrite: x   Leuk Esterase: x / RBC: x / WBC x   Sq Epi: x / Non Sq Epi: x / Bacteria: x        RADIOLOGY & ADDITIONAL TESTS:    Imaging Personally Reviewed.    Consultant(s) Notes Reviewed.    Care Discussed with Consultants/Other Providers.

## 2023-08-12 LAB
ALBUMIN SERPL ELPH-MCNC: 3.2 G/DL — LOW (ref 3.3–5)
ALP SERPL-CCNC: 144 U/L — HIGH (ref 40–120)
ALT FLD-CCNC: 20 U/L — SIGNIFICANT CHANGE UP (ref 4–33)
ANION GAP SERPL CALC-SCNC: 15 MMOL/L — HIGH (ref 7–14)
APTT BLD: 32.4 SEC — SIGNIFICANT CHANGE UP (ref 24.5–35.6)
AST SERPL-CCNC: 22 U/L — SIGNIFICANT CHANGE UP (ref 4–32)
BILIRUB SERPL-MCNC: 0.3 MG/DL — SIGNIFICANT CHANGE UP (ref 0.2–1.2)
BUN SERPL-MCNC: 47 MG/DL — HIGH (ref 7–23)
CALCIUM SERPL-MCNC: 9.2 MG/DL — SIGNIFICANT CHANGE UP (ref 8.4–10.5)
CHLORIDE SERPL-SCNC: 95 MMOL/L — LOW (ref 98–107)
CO2 SERPL-SCNC: 25 MMOL/L — SIGNIFICANT CHANGE UP (ref 22–31)
CREAT SERPL-MCNC: 1.19 MG/DL — SIGNIFICANT CHANGE UP (ref 0.5–1.3)
CULTURE RESULTS: SIGNIFICANT CHANGE UP
CULTURE RESULTS: SIGNIFICANT CHANGE UP
EGFR: 41 ML/MIN/1.73M2 — LOW
GLUCOSE SERPL-MCNC: 94 MG/DL — SIGNIFICANT CHANGE UP (ref 70–99)
HCT VFR BLD CALC: 35 % — SIGNIFICANT CHANGE UP (ref 34.5–45)
HGB BLD-MCNC: 11.8 G/DL — SIGNIFICANT CHANGE UP (ref 11.5–15.5)
INR BLD: 0.95 RATIO — SIGNIFICANT CHANGE UP (ref 0.85–1.18)
MCHC RBC-ENTMCNC: 31.1 PG — SIGNIFICANT CHANGE UP (ref 27–34)
MCHC RBC-ENTMCNC: 33.7 GM/DL — SIGNIFICANT CHANGE UP (ref 32–36)
MCV RBC AUTO: 92.3 FL — SIGNIFICANT CHANGE UP (ref 80–100)
NRBC # BLD: 0 /100 WBCS — SIGNIFICANT CHANGE UP (ref 0–0)
NRBC # FLD: 0 K/UL — SIGNIFICANT CHANGE UP (ref 0–0)
PLATELET # BLD AUTO: 301 K/UL — SIGNIFICANT CHANGE UP (ref 150–400)
POTASSIUM SERPL-MCNC: 3.9 MMOL/L — SIGNIFICANT CHANGE UP (ref 3.5–5.3)
POTASSIUM SERPL-SCNC: 3.9 MMOL/L — SIGNIFICANT CHANGE UP (ref 3.5–5.3)
PROT SERPL-MCNC: 7.9 G/DL — SIGNIFICANT CHANGE UP (ref 6–8.3)
PROTHROM AB SERPL-ACNC: 10.7 SEC — SIGNIFICANT CHANGE UP (ref 9.5–13)
RBC # BLD: 3.79 M/UL — LOW (ref 3.8–5.2)
RBC # FLD: 13.9 % — SIGNIFICANT CHANGE UP (ref 10.3–14.5)
SODIUM SERPL-SCNC: 135 MMOL/L — SIGNIFICANT CHANGE UP (ref 135–145)
SPECIMEN SOURCE: SIGNIFICANT CHANGE UP
SPECIMEN SOURCE: SIGNIFICANT CHANGE UP
WBC # BLD: 10.69 K/UL — HIGH (ref 3.8–10.5)
WBC # FLD AUTO: 10.69 K/UL — HIGH (ref 3.8–10.5)

## 2023-08-12 PROCEDURE — 99233 SBSQ HOSP IP/OBS HIGH 50: CPT

## 2023-08-12 RX ADMIN — PIPERACILLIN AND TAZOBACTAM 25 GRAM(S): 4; .5 INJECTION, POWDER, LYOPHILIZED, FOR SOLUTION INTRAVENOUS at 07:53

## 2023-08-12 RX ADMIN — CARVEDILOL PHOSPHATE 6.25 MILLIGRAM(S): 80 CAPSULE, EXTENDED RELEASE ORAL at 18:27

## 2023-08-12 RX ADMIN — Medication 100 MICROGRAM(S): at 06:30

## 2023-08-12 RX ADMIN — Medication 3 MILLILITER(S): at 15:30

## 2023-08-12 RX ADMIN — Medication 200 MILLIGRAM(S): at 18:26

## 2023-08-12 RX ADMIN — PIPERACILLIN AND TAZOBACTAM 25 GRAM(S): 4; .5 INJECTION, POWDER, LYOPHILIZED, FOR SOLUTION INTRAVENOUS at 18:25

## 2023-08-12 RX ADMIN — Medication 1000 UNIT(S): at 13:11

## 2023-08-12 RX ADMIN — CARVEDILOL PHOSPHATE 6.25 MILLIGRAM(S): 80 CAPSULE, EXTENDED RELEASE ORAL at 06:30

## 2023-08-12 RX ADMIN — Medication 3 MILLILITER(S): at 22:30

## 2023-08-12 RX ADMIN — MUPIROCIN 1 APPLICATION(S): 20 OINTMENT TOPICAL at 18:27

## 2023-08-12 RX ADMIN — PANTOPRAZOLE SODIUM 40 MILLIGRAM(S): 20 TABLET, DELAYED RELEASE ORAL at 06:34

## 2023-08-12 RX ADMIN — MUPIROCIN 1 APPLICATION(S): 20 OINTMENT TOPICAL at 06:30

## 2023-08-12 RX ADMIN — POLYETHYLENE GLYCOL 3350 17 GRAM(S): 17 POWDER, FOR SOLUTION ORAL at 06:30

## 2023-08-12 RX ADMIN — Medication 3 MILLILITER(S): at 09:23

## 2023-08-12 NOTE — PROGRESS NOTE ADULT - SUBJECTIVE AND OBJECTIVE BOX
CARDIOLOGY FOLLOW UP - Dr. Riley  Date of Service: 8/12/23  CC: no events    Review of Systems:  Constitutional: No fever, weight loss, or fatigue  Respiratory: No cough, wheezing, or hemoptysis, no shortness of breath  Cardiovascular: No chest pain, palpitations, passing out, dizziness, or leg swelling  Gastrointestinal: No abd or epigastric pain. No nausea, vomiting, or hematemesis; no diarrhea or consiptaiton, no melena or hematochezia  Vascular: No edema     TELEMETRY:    PHYSICAL EXAM:  T(C): 36.4 (08-12-23 @ 04:00), Max: 36.4 (08-11-23 @ 09:35)  HR: 69 (08-12-23 @ 04:00) (68 - 77)  BP: 112/59 (08-12-23 @ 04:00) (112/59 - 129/86)  RR: 19 (08-12-23 @ 04:00) (18 - 19)  SpO2: 95% (08-12-23 @ 04:00) (87% - 100%)  Wt(kg): --  I&O's Summary      Appearance: Normal	  Cardiovascular: Normal S1 S2,RRR, No JVD, No murmurs  Respiratory: Lungs clear to auscultation	  Gastrointestinal:  Soft, Non-tender, + BS	  Extremities: Normal range of motion, No clubbing, cyanosis or edema  Vascular: Peripheral pulses palpable 2+ bilaterally       Home Medications:  amLODIPine 5 mg oral tablet: 1 tab(s) orally once a day (07 Aug 2023 06:47)  Biofreeze 4% topical gel: Apply topically to affected area 2 times a day to both knees (07 Aug 2023 06:47)  carvedilol 6.25 mg oral tablet: 1 tab(s) orally 2 times a day (07 Aug 2023 06:47)  furosemide 20 mg oral tablet: 1 tab(s) orally 3 times a week MWF (11 Aug 2023 16:04)  lactobacillus acidophilus oral capsule: 1 cap(s) orally once a day (07 Aug 2023 06:47)  levothyroxine 100 mcg (0.1 mg) oral tablet: 1 tab(s) orally once a day (07 Aug 2023 06:47)  methenamine hippurate 1 g oral tablet: 1 tab(s) orally once a day (07 Aug 2023 06:47)  nystatin 100,000 units/g topical powder: Apply topically to affected area once a day to bust after showers (07 Aug 2023 06:47)  Vitamin D3 25 mcg (1000 intl units) oral tablet: 1 tab(s) orally once a day (07 Aug 2023 06:47)        MEDICATIONS  (STANDING):  albuterol/ipratropium for Nebulization 3 milliLiter(s) Nebulizer every 6 hours  atorvastatin 20 milliGRAM(s) Oral at bedtime  carvedilol 6.25 milliGRAM(s) Oral every 12 hours  cholecalciferol 1000 Unit(s) Oral daily  levothyroxine 100 MICROGram(s) Oral daily  mupirocin 2% Ointment 1 Application(s) Both Nostrils two times a day  pantoprazole  Injectable 40 milliGRAM(s) IV Push every 12 hours  piperacillin/tazobactam IVPB.. 3.375 Gram(s) IV Intermittent every 12 hours  polyethylene glycol 3350 17 Gram(s) Oral two times a day  senna 2 Tablet(s) Oral at bedtime  sorbitol 70%/mineral oil/magnesium hydroxide/glycerin Enema 120 milliLiter(s) Rectal once        EKG:  RADIOLOGY:  DIAGNOSTIC TESTING:  [ ] Echocardiogram:  [ ] Catherterization:  [ ] Stress Test:  OTHER:     LABS:	 	                          11.8   10.69 )-----------( 301      ( 12 Aug 2023 05:44 )             35.0     08-12    135  |  95<L>  |  47<H>  ----------------------------<  94  3.9   |  25  |  1.19    Ca    9.2      12 Aug 2023 05:44  Phos  2.6     08-11  Mg     2.50     08-11    TPro  7.9  /  Alb  3.2<L>  /  TBili  0.3  /  DBili  x   /  AST  22  /  ALT  20  /  AlkPhos  144<H>  08-12      PT/INR - ( 12 Aug 2023 05:44 )   PT: 10.7 sec;   INR: 0.95 ratio         PTT - ( 12 Aug 2023 05:44 )  PTT:32.4 sec    CARDIAC MARKERS:

## 2023-08-12 NOTE — PROGRESS NOTE ADULT - SUBJECTIVE AND OBJECTIVE BOX
Estephania Stiles        Patient is a 89y old  Female who presents with a chief complaint of hypoxia (12 Aug 2023 08:59)      SUBJECTIVE / OVERNIGHT EVENTS: No acute overnight events. This morning pt doing well. States she had one large BM today. No blood. Cough still present otherwise no complaints.  Denies fevers, chills, nausea, vomiting, chest pain, SOB.    MEDICATIONS  (STANDING):  albuterol/ipratropium for Nebulization 3 milliLiter(s) Nebulizer every 6 hours  atorvastatin 20 milliGRAM(s) Oral at bedtime  carvedilol 6.25 milliGRAM(s) Oral every 12 hours  cholecalciferol 1000 Unit(s) Oral daily  guaiFENesin Oral Liquid (Sugar-Free) 200 milliGRAM(s) Oral every 6 hours  levothyroxine 100 MICROGram(s) Oral daily  mupirocin 2% Ointment 1 Application(s) Both Nostrils two times a day  pantoprazole  Injectable 40 milliGRAM(s) IV Push every 12 hours  piperacillin/tazobactam IVPB.. 3.375 Gram(s) IV Intermittent every 12 hours  polyethylene glycol 3350 17 Gram(s) Oral two times a day  senna 2 Tablet(s) Oral at bedtime  sorbitol 70%/mineral oil/magnesium hydroxide/glycerin Enema 120 milliLiter(s) Rectal once    MEDICATIONS  (PRN):    Allergies    cephalexin (Hives)  [This allergen will not trigger allergy alert] trisulfapyrimidine (Rash)  sulfa topicals (Unknown)    Intolerances    morphine (Drowsiness; Faint; Hypotension)      Vital Signs Last 24 Hrs  T(C): 36 (12 Aug 2023 09:50), Max: 36.4 (11 Aug 2023 20:00)  T(F): 96.8 (12 Aug 2023 09:50), Max: 97.5 (11 Aug 2023 20:00)  HR: 72 (12 Aug 2023 09:50) (69 - 77)  BP: 130/77 (12 Aug 2023 09:50) (112/59 - 130/77)  BP(mean): --  RR: 19 (12 Aug 2023 09:50) (18 - 19)  SpO2: 100% (12 Aug 2023 09:50) (87% - 100%)    Parameters below as of 12 Aug 2023 09:50  Patient On (Oxygen Delivery Method): nasal cannula      Daily     Daily   CAPILLARY BLOOD GLUCOSE        I&O's Summary      PHYSICAL EXAM:      LABS:                        11.8   10.69 )-----------( 301      ( 12 Aug 2023 05:44 )             35.0     Hgb Trend: 11.8<--, 11.3<--, 10.9<--, 10.9<--, 12.7<--  08-12    135  |  95<L>  |  47<H>  ----------------------------<  94  3.9   |  25  |  1.19    Ca    9.2      12 Aug 2023 05:44  Phos  2.6     08-11  Mg     2.50     08-11    TPro  7.9  /  Alb  3.2<L>  /  TBili  0.3  /  DBili  x   /  AST  22  /  ALT  20  /  AlkPhos  144<H>  08-12    Creatinine Trend: 1.19<--, 1.17<--, 1.74<--, 1.83<--, 1.44<--, 1.24<--  LIVER FUNCTIONS - ( 12 Aug 2023 05:44 )  Alb: 3.2 g/dL / Pro: 7.9 g/dL / ALK PHOS: 144 U/L / ALT: 20 U/L / AST: 22 U/L / GGT: x           PT/INR - ( 12 Aug 2023 05:44 )   PT: 10.7 sec;   INR: 0.95 ratio         PTT - ( 12 Aug 2023 05:44 )  PTT:32.4 sec      Urinalysis Basic - ( 12 Aug 2023 05:44 )    Color: x / Appearance: x / SG: x / pH: x  Gluc: 94 mg/dL / Ketone: x  / Bili: x / Urobili: x   Blood: x / Protein: x / Nitrite: x   Leuk Esterase: x / RBC: x / WBC x   Sq Epi: x / Non Sq Epi: x / Bacteria: x        RADIOLOGY & ADDITIONAL TESTS:    Imaging Personally Reviewed.    Consultant(s) Notes Reviewed.    Care Discussed with Consultants/Other Providers.       Estephania Stiles        Patient is a 89y old  Female who presents with a chief complaint of hypoxia (12 Aug 2023 08:59)      SUBJECTIVE / OVERNIGHT EVENTS: No acute overnight events. This morning pt doing well. States she had one large BM today. No blood. Cough still present otherwise no complaints.  Denies fevers, chills, nausea, vomiting, chest pain, SOB.    MEDICATIONS  (STANDING):  albuterol/ipratropium for Nebulization 3 milliLiter(s) Nebulizer every 6 hours  atorvastatin 20 milliGRAM(s) Oral at bedtime  carvedilol 6.25 milliGRAM(s) Oral every 12 hours  cholecalciferol 1000 Unit(s) Oral daily  guaiFENesin Oral Liquid (Sugar-Free) 200 milliGRAM(s) Oral every 6 hours  levothyroxine 100 MICROGram(s) Oral daily  mupirocin 2% Ointment 1 Application(s) Both Nostrils two times a day  pantoprazole  Injectable 40 milliGRAM(s) IV Push every 12 hours  piperacillin/tazobactam IVPB.. 3.375 Gram(s) IV Intermittent every 12 hours  polyethylene glycol 3350 17 Gram(s) Oral two times a day  senna 2 Tablet(s) Oral at bedtime  sorbitol 70%/mineral oil/magnesium hydroxide/glycerin Enema 120 milliLiter(s) Rectal once    MEDICATIONS  (PRN):    Allergies    cephalexin (Hives)  [This allergen will not trigger allergy alert] trisulfapyrimidine (Rash)  sulfa topicals (Unknown)    Intolerances    morphine (Drowsiness; Faint; Hypotension)      Vital Signs Last 24 Hrs  T(C): 36 (12 Aug 2023 09:50), Max: 36.4 (11 Aug 2023 20:00)  T(F): 96.8 (12 Aug 2023 09:50), Max: 97.5 (11 Aug 2023 20:00)  HR: 72 (12 Aug 2023 09:50) (69 - 77)  BP: 130/77 (12 Aug 2023 09:50) (112/59 - 130/77)  BP(mean): --  RR: 19 (12 Aug 2023 09:50) (18 - 19)  SpO2: 100% (12 Aug 2023 09:50) (87% - 100%)    Parameters below as of 12 Aug 2023 09:50  Patient On (Oxygen Delivery Method): nasal cannula      Daily     Daily   CAPILLARY BLOOD GLUCOSE        I&O's Summary      PHYSICAL EXAM:  CONSTITUTIONAL: elderly woman, lying comfortably in bed.  EYES: No conjunctival or scleral injection, non-icteric   ENMT: MMM  RESPIRATORY: +B/L rhonchi , no wheezing, Normal respiratory effort   CARDIOVASCULAR: +S1S2, RRR, no M/G/R, no lower extremity edema  GASTROINTESTINAL:  +BS present, No TTP, no rebound/guarding  SKIN: Warm and dry   HAYDEN: Brown stool mixed w/ bright red blood   NEUROLOGIC: Non focal   PSYCHIATRIC: A+O x 3    LABS:                        11.8   10.69 )-----------( 301      ( 12 Aug 2023 05:44 )             35.0     Hgb Trend: 11.8<--, 11.3<--, 10.9<--, 10.9<--, 12.7<--  08-12    135  |  95<L>  |  47<H>  ----------------------------<  94  3.9   |  25  |  1.19    Ca    9.2      12 Aug 2023 05:44  Phos  2.6     08-11  Mg     2.50     08-11    TPro  7.9  /  Alb  3.2<L>  /  TBili  0.3  /  DBili  x   /  AST  22  /  ALT  20  /  AlkPhos  144<H>  08-12    Creatinine Trend: 1.19<--, 1.17<--, 1.74<--, 1.83<--, 1.44<--, 1.24<--  LIVER FUNCTIONS - ( 12 Aug 2023 05:44 )  Alb: 3.2 g/dL / Pro: 7.9 g/dL / ALK PHOS: 144 U/L / ALT: 20 U/L / AST: 22 U/L / GGT: x           PT/INR - ( 12 Aug 2023 05:44 )   PT: 10.7 sec;   INR: 0.95 ratio         PTT - ( 12 Aug 2023 05:44 )  PTT:32.4 sec      Urinalysis Basic - ( 12 Aug 2023 05:44 )    Color: x / Appearance: x / SG: x / pH: x  Gluc: 94 mg/dL / Ketone: x  / Bili: x / Urobili: x   Blood: x / Protein: x / Nitrite: x   Leuk Esterase: x / RBC: x / WBC x   Sq Epi: x / Non Sq Epi: x / Bacteria: x        RADIOLOGY & ADDITIONAL TESTS:    Imaging Personally Reviewed.    Consultant(s) Notes Reviewed.    Care Discussed with Consultants/Other Providers.

## 2023-08-12 NOTE — PROGRESS NOTE ADULT - ASSESSMENT
89-year-old female with past medical history of CHF, hypertension, hyperlipidemia, A-fib, hypothyroidism p/w cough and SOB a/w acute hypoxic respiratory failure now improving, w/ course c/b BRBPR now seemingly resolved

## 2023-08-12 NOTE — PROGRESS NOTE ADULT - PROBLEM SELECTOR PLAN 12
Diet: Regular diet per S/S recs       DVT: Hold given GIB. SCDs  Dispo: PT Consulted recs home PT     GOC: DNR/DNI, molst form signed and in chart    Plan discussed w/ patient and daughter at bedside

## 2023-08-12 NOTE — PROGRESS NOTE ADULT - ASSESSMENT
Echo 5/18/23: Moderate LV dysfxn, LVH  Echo 8/1/23: Abnormal septal motion of LV, mild-mod MR, mild-mod TR    A/p  89 y/o female with PMHx of HTN, HLD, HFrEf, Afib (not on AC), hypothyroidism, hx of PPM placement, presents with cough , hypoxia       #Acute respiratory failure with hypoxia.   -likely in the setting of resp infection   -plan as below     #Cough, hypoxia   -abx of pna/ uti   -xray Left basilar hazy opacity, which may reflect atelectasis.  -cta  chest with no PE,  Debris within the right interlobar bronchus, without focal consolidation.  Essential interval resolution of  previously noted bilateral pleural effusions and interlobular septal  thickening/groundglass consolidation.  -Bcx neg   -management per med     #Chronic HFpEF   -volume status stable   -diuretics on hold   -does not appear sig volume overloaded  -renal fxn improving   -cough sec to pulm process  -Continue coreg  -eventual restart lasix 20mg likely tomorrow  -recent Echo with abnormal septal motion of LV, mild-mod MR, mild-mod TR    #Afib s/p ppm  -Stable, rate controlled  -Continue coreg  -remains off a/c     #HTN  -stable  -Continue  coreg    #UTI  -Abx, mgmt per med        dvt ppx    35 minutes spent on total encounter; more than 50% of the visit was spent counseling and/or coordinating care by the attending physician.

## 2023-08-13 DIAGNOSIS — R41.82 ALTERED MENTAL STATUS, UNSPECIFIED: ICD-10-CM

## 2023-08-13 LAB
ALBUMIN SERPL ELPH-MCNC: 2.9 G/DL — LOW (ref 3.3–5)
ALP SERPL-CCNC: 132 U/L — HIGH (ref 40–120)
ALT FLD-CCNC: 28 U/L — SIGNIFICANT CHANGE UP (ref 4–33)
ANION GAP SERPL CALC-SCNC: 16 MMOL/L — HIGH (ref 7–14)
APTT BLD: 32 SEC — SIGNIFICANT CHANGE UP (ref 24.5–35.6)
AST SERPL-CCNC: 35 U/L — HIGH (ref 4–32)
B PERT DNA SPEC QL NAA+PROBE: SIGNIFICANT CHANGE UP
B PERT+PARAPERT DNA PNL SPEC NAA+PROBE: SIGNIFICANT CHANGE UP
BILIRUB SERPL-MCNC: 0.3 MG/DL — SIGNIFICANT CHANGE UP (ref 0.2–1.2)
BORDETELLA PARAPERTUSSIS (RAPRVP): SIGNIFICANT CHANGE UP
BUN SERPL-MCNC: 49 MG/DL — HIGH (ref 7–23)
C PNEUM DNA SPEC QL NAA+PROBE: SIGNIFICANT CHANGE UP
CALCIUM SERPL-MCNC: 9.1 MG/DL — SIGNIFICANT CHANGE UP (ref 8.4–10.5)
CHLORIDE SERPL-SCNC: 95 MMOL/L — LOW (ref 98–107)
CO2 SERPL-SCNC: 24 MMOL/L — SIGNIFICANT CHANGE UP (ref 22–31)
CREAT SERPL-MCNC: 1.12 MG/DL — SIGNIFICANT CHANGE UP (ref 0.5–1.3)
EGFR: 44 ML/MIN/1.73M2 — LOW
FLUAV SUBTYP SPEC NAA+PROBE: SIGNIFICANT CHANGE UP
FLUBV RNA SPEC QL NAA+PROBE: SIGNIFICANT CHANGE UP
GLUCOSE SERPL-MCNC: 89 MG/DL — SIGNIFICANT CHANGE UP (ref 70–99)
HADV DNA SPEC QL NAA+PROBE: SIGNIFICANT CHANGE UP
HCOV 229E RNA SPEC QL NAA+PROBE: SIGNIFICANT CHANGE UP
HCOV HKU1 RNA SPEC QL NAA+PROBE: SIGNIFICANT CHANGE UP
HCOV NL63 RNA SPEC QL NAA+PROBE: SIGNIFICANT CHANGE UP
HCOV OC43 RNA SPEC QL NAA+PROBE: SIGNIFICANT CHANGE UP
HCT VFR BLD CALC: 33.7 % — LOW (ref 34.5–45)
HGB BLD-MCNC: 11.1 G/DL — LOW (ref 11.5–15.5)
HMPV RNA SPEC QL NAA+PROBE: SIGNIFICANT CHANGE UP
HPIV1 RNA SPEC QL NAA+PROBE: SIGNIFICANT CHANGE UP
HPIV2 RNA SPEC QL NAA+PROBE: SIGNIFICANT CHANGE UP
HPIV3 RNA SPEC QL NAA+PROBE: SIGNIFICANT CHANGE UP
HPIV4 RNA SPEC QL NAA+PROBE: SIGNIFICANT CHANGE UP
INR BLD: 0.95 RATIO — SIGNIFICANT CHANGE UP (ref 0.85–1.18)
M PNEUMO DNA SPEC QL NAA+PROBE: SIGNIFICANT CHANGE UP
MCHC RBC-ENTMCNC: 30.6 PG — SIGNIFICANT CHANGE UP (ref 27–34)
MCHC RBC-ENTMCNC: 32.9 GM/DL — SIGNIFICANT CHANGE UP (ref 32–36)
MCV RBC AUTO: 92.8 FL — SIGNIFICANT CHANGE UP (ref 80–100)
NRBC # BLD: 0 /100 WBCS — SIGNIFICANT CHANGE UP (ref 0–0)
NRBC # FLD: 0 K/UL — SIGNIFICANT CHANGE UP (ref 0–0)
PLATELET # BLD AUTO: 258 K/UL — SIGNIFICANT CHANGE UP (ref 150–400)
POTASSIUM SERPL-MCNC: 4.3 MMOL/L — SIGNIFICANT CHANGE UP (ref 3.5–5.3)
POTASSIUM SERPL-SCNC: 4.3 MMOL/L — SIGNIFICANT CHANGE UP (ref 3.5–5.3)
PROT SERPL-MCNC: 7.2 G/DL — SIGNIFICANT CHANGE UP (ref 6–8.3)
PROTHROM AB SERPL-ACNC: 10.7 SEC — SIGNIFICANT CHANGE UP (ref 9.5–13)
RAPID RVP RESULT: SIGNIFICANT CHANGE UP
RBC # BLD: 3.63 M/UL — LOW (ref 3.8–5.2)
RBC # FLD: 14 % — SIGNIFICANT CHANGE UP (ref 10.3–14.5)
RSV RNA SPEC QL NAA+PROBE: SIGNIFICANT CHANGE UP
RV+EV RNA SPEC QL NAA+PROBE: SIGNIFICANT CHANGE UP
SARS-COV-2 RNA SPEC QL NAA+PROBE: SIGNIFICANT CHANGE UP
SODIUM SERPL-SCNC: 135 MMOL/L — SIGNIFICANT CHANGE UP (ref 135–145)
WBC # BLD: 10.23 K/UL — SIGNIFICANT CHANGE UP (ref 3.8–10.5)
WBC # FLD AUTO: 10.23 K/UL — SIGNIFICANT CHANGE UP (ref 3.8–10.5)

## 2023-08-13 PROCEDURE — 99233 SBSQ HOSP IP/OBS HIGH 50: CPT

## 2023-08-13 RX ADMIN — Medication 200 MILLIGRAM(S): at 22:29

## 2023-08-13 RX ADMIN — Medication 100 MICROGRAM(S): at 05:50

## 2023-08-13 RX ADMIN — PIPERACILLIN AND TAZOBACTAM 25 GRAM(S): 4; .5 INJECTION, POWDER, LYOPHILIZED, FOR SOLUTION INTRAVENOUS at 05:49

## 2023-08-13 RX ADMIN — ATORVASTATIN CALCIUM 20 MILLIGRAM(S): 80 TABLET, FILM COATED ORAL at 00:05

## 2023-08-13 RX ADMIN — Medication 3 MILLILITER(S): at 16:11

## 2023-08-13 RX ADMIN — Medication 200 MILLIGRAM(S): at 00:05

## 2023-08-13 RX ADMIN — CARVEDILOL PHOSPHATE 6.25 MILLIGRAM(S): 80 CAPSULE, EXTENDED RELEASE ORAL at 05:50

## 2023-08-13 RX ADMIN — MUPIROCIN 1 APPLICATION(S): 20 OINTMENT TOPICAL at 05:49

## 2023-08-13 RX ADMIN — Medication 200 MILLIGRAM(S): at 11:26

## 2023-08-13 RX ADMIN — Medication 1000 UNIT(S): at 11:27

## 2023-08-13 RX ADMIN — SENNA PLUS 2 TABLET(S): 8.6 TABLET ORAL at 00:05

## 2023-08-13 RX ADMIN — Medication 200 MILLIGRAM(S): at 05:50

## 2023-08-13 RX ADMIN — Medication 200 MILLIGRAM(S): at 17:20

## 2023-08-13 RX ADMIN — POLYETHYLENE GLYCOL 3350 17 GRAM(S): 17 POWDER, FOR SOLUTION ORAL at 17:20

## 2023-08-13 RX ADMIN — Medication 3 MILLILITER(S): at 22:00

## 2023-08-13 RX ADMIN — Medication 3 MILLILITER(S): at 09:50

## 2023-08-13 RX ADMIN — ATORVASTATIN CALCIUM 20 MILLIGRAM(S): 80 TABLET, FILM COATED ORAL at 22:29

## 2023-08-13 RX ADMIN — CARVEDILOL PHOSPHATE 6.25 MILLIGRAM(S): 80 CAPSULE, EXTENDED RELEASE ORAL at 17:20

## 2023-08-13 RX ADMIN — SENNA PLUS 2 TABLET(S): 8.6 TABLET ORAL at 22:28

## 2023-08-13 RX ADMIN — PIPERACILLIN AND TAZOBACTAM 25 GRAM(S): 4; .5 INJECTION, POWDER, LYOPHILIZED, FOR SOLUTION INTRAVENOUS at 17:21

## 2023-08-13 NOTE — PROGRESS NOTE ADULT - ASSESSMENT
Echo 5/18/23: Moderate LV dysfxn, LVH  Echo 8/1/23: Abnormal septal motion of LV, mild-mod MR, mild-mod TR    A/p  89 y/o female with PMHx of HTN, HLD, HFrEf, Afib (not on AC), hypothyroidism, hx of PPM placement, presents with cough , hypoxia       #Acute respiratory failure with hypoxia.   -likely in the setting of resp infection   -plan as below     #Cough, hypoxia   -abx of pna/ uti   -xray Left basilar hazy opacity, which may reflect atelectasis.  -cta  chest with no PE,  Debris within the right interlobar bronchus, without focal consolidation.  Essential interval resolution of  previously noted bilateral pleural effusions and interlobular septal  thickening/groundglass consolidation.  -Bcx neg   -management per med     #Chronic HFpEF   -volume status stable   -diuretics on hold   -does not appear sig volume overloaded  -renal fxn improving   -cough sec to pulm process  -Continue coreg  -restart lasix 20mg m,w,f tomorrow  -recent Echo with abnormal septal motion of LV, mild-mod MR, mild-mod TR    #Afib s/p ppm  -Stable, rate controlled  -Continue coreg  -remains off a/c     #HTN  -stable  -Continue  coreg    #UTI  -Abx, mgmt per med        dvt ppx    35 minutes spent on total encounter; more than 50% of the visit was spent counseling and/or coordinating care by the attending physician.

## 2023-08-13 NOTE — PROGRESS NOTE ADULT - SUBJECTIVE AND OBJECTIVE BOX
Estephania Stiles        Patient is a 89y old  Female who presents with a chief complaint of hypoxia (13 Aug 2023 08:25)      SUBJECTIVE / OVERNIGHT EVENTS: No acute overnight events. This morning pt states she feels a little frustrated about not being able to get out of bed, feels "stuck". Also expresses concern about going home due to recurrent recent hospitalizations , feels she will end up back in the hospital, would like more aides as home. Discussed w/ daughter , daughter states pt is AAO x 3 at baseline but seems a little confused today. States mom does have aides at assisted living facility but daughter also worried about possible hospital readmission as pt has recently been admitted a lot. Otherwise no complaints        MEDICATIONS  (STANDING):  albuterol/ipratropium for Nebulization 3 milliLiter(s) Nebulizer every 6 hours  atorvastatin 20 milliGRAM(s) Oral at bedtime  carvedilol 6.25 milliGRAM(s) Oral every 12 hours  cholecalciferol 1000 Unit(s) Oral daily  guaiFENesin Oral Liquid (Sugar-Free) 200 milliGRAM(s) Oral every 6 hours  levothyroxine 100 MICROGram(s) Oral daily  piperacillin/tazobactam IVPB.. 3.375 Gram(s) IV Intermittent every 12 hours  polyethylene glycol 3350 17 Gram(s) Oral two times a day  senna 2 Tablet(s) Oral at bedtime  sorbitol 70%/mineral oil/magnesium hydroxide/glycerin Enema 120 milliLiter(s) Rectal once    MEDICATIONS  (PRN):    Allergies    cephalexin (Hives)  [This allergen will not trigger allergy alert] trisulfapyrimidine (Rash)  sulfa topicals (Unknown)    Intolerances    morphine (Drowsiness; Faint; Hypotension)      Vital Signs Last 24 Hrs  T(C): 36.4 (13 Aug 2023 05:50), Max: 36.4 (12 Aug 2023 17:00)  T(F): 97.6 (13 Aug 2023 05:50), Max: 97.6 (13 Aug 2023 05:50)  HR: 60 (13 Aug 2023 09:50) (60 - 68)  BP: 152/77 (13 Aug 2023 05:50) (128/56 - 158/70)  BP(mean): --  RR: 19 (13 Aug 2023 05:50) (18 - 19)  SpO2: 95% (13 Aug 2023 05:50) (95% - 100%)    Parameters below as of 13 Aug 2023 09:50  Patient On (Oxygen Delivery Method): nasal cannula      Daily     Daily   CAPILLARY BLOOD GLUCOSE        I&O's Summary    12 Aug 2023 07:01  -  13 Aug 2023 07:00  --------------------------------------------------------  IN: 100 mL / OUT: 200 mL / NET: -100 mL        PHYSICAL EXAM:  CONSTITUTIONAL: elderly woman, lying comfortably in bed.  EYES: No conjunctival or scleral injection, non-icteric   ENMT: MMM  RESPIRATORY: +B/L rhonchi , no wheezing, Normal respiratory effort   CARDIOVASCULAR: +S1S2, RRR, no M/G/R, no lower extremity edema  GASTROINTESTINAL:  +BS present, No TTP, no rebound/guarding  SKIN: Warm and dry   HAYDEN: Brown stool mixed w/ bright red blood   NEUROLOGIC: Non focal   PSYCHIATRIC: A+O x 3      LABS:                        11.1   10.23 )-----------( 258      ( 13 Aug 2023 06:14 )             33.7     Hgb Trend: 11.1<--, 11.8<--, 11.3<--, 10.9<--, 10.9<--  08-13    135  |  95<L>  |  49<H>  ----------------------------<  89  4.3   |  24  |  1.12    Ca    9.1      13 Aug 2023 06:14    TPro  7.2  /  Alb  2.9<L>  /  TBili  0.3  /  DBili  x   /  AST  35<H>  /  ALT  28  /  AlkPhos  132<H>  08-13    Creatinine Trend: 1.12<--, 1.19<--, 1.17<--, 1.74<--, 1.83<--, 1.44<--  LIVER FUNCTIONS - ( 13 Aug 2023 06:14 )  Alb: 2.9 g/dL / Pro: 7.2 g/dL / ALK PHOS: 132 U/L / ALT: 28 U/L / AST: 35 U/L / GGT: x           PT/INR - ( 13 Aug 2023 06:14 )   PT: 10.7 sec;   INR: 0.95 ratio         PTT - ( 13 Aug 2023 06:14 )  PTT:32.0 sec      Urinalysis Basic - ( 13 Aug 2023 06:14 )    Color: x / Appearance: x / SG: x / pH: x  Gluc: 89 mg/dL / Ketone: x  / Bili: x / Urobili: x   Blood: x / Protein: x / Nitrite: x   Leuk Esterase: x / RBC: x / WBC x   Sq Epi: x / Non Sq Epi: x / Bacteria: x        RADIOLOGY & ADDITIONAL TESTS:    Imaging Personally Reviewed.    Consultant(s) Notes Reviewed.    Care Discussed with Consultants/Other Providers.

## 2023-08-13 NOTE — PROGRESS NOTE ADULT - PROBLEM SELECTOR PLAN 12
Diet: Regular diet per S/S recs       DVT: Hold given GIB. SCDs  Dispo: PT Consulted recs home PT . Pt and family requesting re-evaluation. PT reconsulted     GOC: DNR/DNI, molst form signed and in chart    Plan discussed w/ patient and daughter at bedside Diet: Regular diet per S/S recs       DVT: Hold given GIB. SCDs  Dispo: PT Consulted recs home PT (pt lives in Assisted living) . Pt and family requesting re-evaluation. PT reconsulted     GOC: DNR/DNI, molst form signed and in chart    Plan discussed w/ patient and daughter at bedside

## 2023-08-13 NOTE — PROGRESS NOTE ADULT - SUBJECTIVE AND OBJECTIVE BOX
CARDIOLOGY FOLLOW UP - Dr. Riley  Date of Service: 8/13/23  CC: no events    Review of Systems:  Constitutional: No fever, weight loss, or fatigue  Respiratory: No cough, wheezing, or hemoptysis, no shortness of breath  Cardiovascular: No chest pain, palpitations, passing out, dizziness, or leg swelling  Gastrointestinal: No abd or epigastric pain. No nausea, vomiting, or hematemesis; no diarrhea or consiptaiton, no melena or hematochezia  Vascular: No edema     TELEMETRY:    PHYSICAL EXAM:  T(C): 36.4 (08-13-23 @ 05:50), Max: 36.4 (08-12-23 @ 17:00)  HR: 68 (08-13-23 @ 05:50) (63 - 817)  BP: 152/77 (08-13-23 @ 05:50) (128/56 - 158/70)  RR: 19 (08-13-23 @ 05:50) (18 - 19)  SpO2: 95% (08-13-23 @ 05:50) (95% - 100%)  Wt(kg): --  I&O's Summary    12 Aug 2023 07:01  -  13 Aug 2023 07:00  --------------------------------------------------------  IN: 100 mL / OUT: 200 mL / NET: -100 mL        Appearance: Normal	  Cardiovascular: Normal S1 S2,RRR, No JVD, No murmurs  Respiratory: Lungs clear to auscultation	  Gastrointestinal:  Soft, Non-tender, + BS	  Extremities: Normal range of motion, No clubbing, cyanosis or edema  Vascular: Peripheral pulses palpable 2+ bilaterally       Home Medications:  amLODIPine 5 mg oral tablet: 1 tab(s) orally once a day (07 Aug 2023 06:47)  Biofreeze 4% topical gel: Apply topically to affected area 2 times a day to both knees (07 Aug 2023 06:47)  carvedilol 6.25 mg oral tablet: 1 tab(s) orally 2 times a day (07 Aug 2023 06:47)  furosemide 20 mg oral tablet: 1 tab(s) orally 3 times a week MWF (11 Aug 2023 16:04)  lactobacillus acidophilus oral capsule: 1 cap(s) orally once a day (07 Aug 2023 06:47)  levothyroxine 100 mcg (0.1 mg) oral tablet: 1 tab(s) orally once a day (07 Aug 2023 06:47)  methenamine hippurate 1 g oral tablet: 1 tab(s) orally once a day (07 Aug 2023 06:47)  nystatin 100,000 units/g topical powder: Apply topically to affected area once a day to bust after showers (07 Aug 2023 06:47)  Vitamin D3 25 mcg (1000 intl units) oral tablet: 1 tab(s) orally once a day (07 Aug 2023 06:47)        MEDICATIONS  (STANDING):  albuterol/ipratropium for Nebulization 3 milliLiter(s) Nebulizer every 6 hours  atorvastatin 20 milliGRAM(s) Oral at bedtime  carvedilol 6.25 milliGRAM(s) Oral every 12 hours  cholecalciferol 1000 Unit(s) Oral daily  guaiFENesin Oral Liquid (Sugar-Free) 200 milliGRAM(s) Oral every 6 hours  levothyroxine 100 MICROGram(s) Oral daily  piperacillin/tazobactam IVPB.. 3.375 Gram(s) IV Intermittent every 12 hours  polyethylene glycol 3350 17 Gram(s) Oral two times a day  senna 2 Tablet(s) Oral at bedtime  sorbitol 70%/mineral oil/magnesium hydroxide/glycerin Enema 120 milliLiter(s) Rectal once        EKG:  RADIOLOGY:  DIAGNOSTIC TESTING:  [ ] Echocardiogram:  [ ] Catherterization:  [ ] Stress Test:  OTHER:     LABS:	 	                          11.1   10.23 )-----------( 258      ( 13 Aug 2023 06:14 )             33.7     08-13    135  |  95<L>  |  49<H>  ----------------------------<  89  4.3   |  24  |  1.12    Ca    9.1      13 Aug 2023 06:14    TPro  7.2  /  Alb  2.9<L>  /  TBili  0.3  /  DBili  x   /  AST  35<H>  /  ALT  28  /  AlkPhos  132<H>  08-13      PT/INR - ( 13 Aug 2023 06:14 )   PT: 10.7 sec;   INR: 0.95 ratio         PTT - ( 13 Aug 2023 06:14 )  PTT:32.0 sec    CARDIAC MARKERS:

## 2023-08-13 NOTE — CHART NOTE - NSCHARTNOTEFT_GEN_A_CORE
Patient's Hgb has remained stable. GI to sign off. Please call back if patient and family were to become amenable to endoscopic evaluation.      Grace Monroy, PGY5  Gastroenterology/Hepatology Fellow  Available on [employer] Teams  [phone] ([employer] Short Range Pager)  [phone] (Long Range Pager)    After 5pm, please contact the on-call GI fellow. [phone]

## 2023-08-14 ENCOUNTER — APPOINTMENT (OUTPATIENT)
Dept: GERIATRICS | Facility: ASSISTED LIVING FACILITY | Age: 88
End: 2023-08-14

## 2023-08-14 LAB
ALBUMIN SERPL ELPH-MCNC: 2.8 G/DL — LOW (ref 3.3–5)
ALP SERPL-CCNC: 128 U/L — HIGH (ref 40–120)
ALT FLD-CCNC: 27 U/L — SIGNIFICANT CHANGE UP (ref 4–33)
ANION GAP SERPL CALC-SCNC: 12 MMOL/L — SIGNIFICANT CHANGE UP (ref 7–14)
APTT BLD: 31.6 SEC — SIGNIFICANT CHANGE UP (ref 24.5–35.6)
AST SERPL-CCNC: 30 U/L — SIGNIFICANT CHANGE UP (ref 4–32)
BILIRUB SERPL-MCNC: 0.2 MG/DL — SIGNIFICANT CHANGE UP (ref 0.2–1.2)
BUN SERPL-MCNC: 44 MG/DL — HIGH (ref 7–23)
CALCIUM SERPL-MCNC: 9 MG/DL — SIGNIFICANT CHANGE UP (ref 8.4–10.5)
CHLORIDE SERPL-SCNC: 97 MMOL/L — LOW (ref 98–107)
CO2 SERPL-SCNC: 25 MMOL/L — SIGNIFICANT CHANGE UP (ref 22–31)
CREAT SERPL-MCNC: 1.11 MG/DL — SIGNIFICANT CHANGE UP (ref 0.5–1.3)
EGFR: 44 ML/MIN/1.73M2 — LOW
GLUCOSE BLDC GLUCOMTR-MCNC: 148 MG/DL — HIGH (ref 70–99)
GLUCOSE SERPL-MCNC: 96 MG/DL — SIGNIFICANT CHANGE UP (ref 70–99)
HCT VFR BLD CALC: 31.2 % — LOW (ref 34.5–45)
HGB BLD-MCNC: 10.3 G/DL — LOW (ref 11.5–15.5)
INR BLD: 0.95 RATIO — SIGNIFICANT CHANGE UP (ref 0.85–1.18)
MCHC RBC-ENTMCNC: 30.4 PG — SIGNIFICANT CHANGE UP (ref 27–34)
MCHC RBC-ENTMCNC: 33 GM/DL — SIGNIFICANT CHANGE UP (ref 32–36)
MCV RBC AUTO: 92 FL — SIGNIFICANT CHANGE UP (ref 80–100)
NRBC # BLD: 0 /100 WBCS — SIGNIFICANT CHANGE UP (ref 0–0)
NRBC # FLD: 0 K/UL — SIGNIFICANT CHANGE UP (ref 0–0)
PLATELET # BLD AUTO: 289 K/UL — SIGNIFICANT CHANGE UP (ref 150–400)
POTASSIUM SERPL-MCNC: 3.8 MMOL/L — SIGNIFICANT CHANGE UP (ref 3.5–5.3)
POTASSIUM SERPL-SCNC: 3.8 MMOL/L — SIGNIFICANT CHANGE UP (ref 3.5–5.3)
PROT SERPL-MCNC: 7 G/DL — SIGNIFICANT CHANGE UP (ref 6–8.3)
PROTHROM AB SERPL-ACNC: 10.7 SEC — SIGNIFICANT CHANGE UP (ref 9.5–13)
RBC # BLD: 3.39 M/UL — LOW (ref 3.8–5.2)
RBC # FLD: 14 % — SIGNIFICANT CHANGE UP (ref 10.3–14.5)
SODIUM SERPL-SCNC: 134 MMOL/L — LOW (ref 135–145)
WBC # BLD: 11.82 K/UL — HIGH (ref 3.8–10.5)
WBC # FLD AUTO: 11.82 K/UL — HIGH (ref 3.8–10.5)

## 2023-08-14 PROCEDURE — 71045 X-RAY EXAM CHEST 1 VIEW: CPT | Mod: 26

## 2023-08-14 PROCEDURE — 99233 SBSQ HOSP IP/OBS HIGH 50: CPT

## 2023-08-14 RX ORDER — FUROSEMIDE 40 MG
40 TABLET ORAL ONCE
Refills: 0 | Status: COMPLETED | OUTPATIENT
Start: 2023-08-14 | End: 2023-08-14

## 2023-08-14 RX ORDER — SODIUM CHLORIDE 9 MG/ML
1000 INJECTION, SOLUTION INTRAVENOUS
Refills: 0 | Status: DISCONTINUED | OUTPATIENT
Start: 2023-08-14 | End: 2023-08-23

## 2023-08-14 RX ORDER — IPRATROPIUM/ALBUTEROL SULFATE 18-103MCG
3 AEROSOL WITH ADAPTER (GRAM) INHALATION ONCE
Refills: 0 | Status: COMPLETED | OUTPATIENT
Start: 2023-08-14 | End: 2023-08-14

## 2023-08-14 RX ADMIN — Medication 3 MILLILITER(S): at 21:10

## 2023-08-14 RX ADMIN — Medication 3 MILLILITER(S): at 15:29

## 2023-08-14 RX ADMIN — Medication 40 MILLIGRAM(S): at 10:04

## 2023-08-14 RX ADMIN — Medication 3 MILLILITER(S): at 12:04

## 2023-08-14 RX ADMIN — SODIUM CHLORIDE 50 MILLILITER(S): 9 INJECTION, SOLUTION INTRAVENOUS at 17:58

## 2023-08-14 RX ADMIN — Medication 3 MILLILITER(S): at 04:19

## 2023-08-14 RX ADMIN — Medication 200 MILLIGRAM(S): at 03:15

## 2023-08-14 RX ADMIN — POLYETHYLENE GLYCOL 3350 17 GRAM(S): 17 POWDER, FOR SOLUTION ORAL at 05:41

## 2023-08-14 RX ADMIN — Medication 3 MILLILITER(S): at 10:06

## 2023-08-14 RX ADMIN — CARVEDILOL PHOSPHATE 6.25 MILLIGRAM(S): 80 CAPSULE, EXTENDED RELEASE ORAL at 05:41

## 2023-08-14 RX ADMIN — Medication 100 MICROGRAM(S): at 05:41

## 2023-08-14 NOTE — RAPID RESPONSE TEAM SUMMARY - NSSITUATIONBACKGROUNDRRT_GEN_ALL_CORE
89F with PMH CHF, HTN, HLD, Afib, and hypothyroidism with recent admission at [facility] for AHRF in setting of acute decompensated heart failure, admitted here AHRF in setting of sepsis, recent +enterovirus potential CHF exacerbation. Last chest imaging 8/6- CXR w/ hazy opacity, potentially atelectasis, CTA chest limited but without evidence of PE or focal consolidations. Now s/p empiric abx for PNA. Was being weaned off NC, had been on 2L, noted to acute desatting this AM after eating breakfast, RRT called. DNR/DNI status noted.     On arrival, primary ACP at bedside, pt appears lethargic, was being placed on NRB with improvement in SpO2 to low to mid 90s. Bilateral rhonchi/coarse breath sounds on exam. Afebrile on rectal temp mildly tachycardic. SpO2 continued to improve to mid to high 90s after chest PT. With acute desaturation, suspicious for mucus plugging for aspiration event. Continue chest PT. CXR ordered for further evaluation, primary team to follow up. Reportedly with recent covid exposure, negative covid swab yesterday but given new increased O2 requirement, would recheck. Does not appear overtly fluid overloaded at this time, f/u CXR. Will transition to HFNC, NPO for now pending further monitoring/improvement, remained on unit.  89F with PMH CHF, HTN, HLD, Afib, and hypothyroidism with recent admission at [facility] for AHRF in setting of acute decompensated heart failure, admitted here AHRF in setting of sepsis, recent +enterovirus potential CHF exacerbation. Last chest imaging 8/6- CXR w/ hazy opacity, potentially atelectasis, CTA chest limited but without evidence of PE or focal consolidations. Now s/p empiric abx for PNA. Was being weaned off NC, had been on 2L, noted to acute desatting this AM after eating breakfast, RRT called. DNR/DNI status noted.     On arrival, primary ACP at bedside, pt appears lethargic, was being placed on NRB with improvement in SpO2 to low to mid 90s. Bilateral rhonchi/coarse breath sounds on exam. Afebrile on rectal temp, mildly tachycardic, HD stable. SpO2 continued to improve to mid to high 90s after chest PT. With acute desaturation, suspicious for mucus plugging for aspiration event. Continue chest PT. CXR ordered for further evaluation, primary team to follow up. Reportedly with recent covid exposure, negative covid swab yesterday but given new increased O2 requirement, would recheck. Does not appear overtly fluid overloaded at this time, f/u CXR. Will transition to HFNC, NPO for now pending further monitoring/improvement, remained on unit.

## 2023-08-14 NOTE — PROGRESS NOTE ADULT - ASSESSMENT
Echo 5/18/23: Moderate LV dysfxn, LVH  Echo 8/1/23: Abnormal septal motion of LV, mild-mod MR, mild-mod TR    A/p  89 y/o female with PMHx of HTN, HLD, HFrEf, Afib (not on AC), hypothyroidism, hx of PPM placement, presents with cough , hypoxia       #Acute respiratory failure with hypoxia.   -likely in the setting of resp infection   -sp RRT today 8/14 ? aspiration vs fluid overload   -plan for 40 mg IVP x  lasix now   -check chest xray   -consider bipap - med to fu   -no events on tele     #Cough, hypoxia   -sp abx of pna/ uti   -initial xray Left basilar hazy opacity, which may reflect atelectasis.  -cta  chest  8/6 with no PE,  Debris within the right interlobar bronchus, without focal consolidation.  Essential interval resolution of  previously noted bilateral pleural effusions and interlobular septal  thickening/groundglass consolidation.  -Bcx neg   -management per med   -plan as above    #Chronic HFpEF   -events as mentioned above  -sp lasix 40 mg IVP x 1 -- awaiting repeat chest xay  -can give additional 20 mg ivp if sign overloaded on imaging   -renal fxn improving   -cough sec to pulm process  -Continue coreg  -eventually restart lasix 20mg po  m,w,f once clinically improves   -recent Echo with abnormal septal motion of LV, mild-mod MR, mild-mod TR    #Afib s/p ppm  -Stable, rate controlled  -Continue coreg  -remains off a/c     #HTN  -stable  -Continue  coreg    #UTI  -sp Abx, mgmt per med    plan d/w acp/med attending     dvt ppx

## 2023-08-14 NOTE — PROGRESS NOTE ADULT - SUBJECTIVE AND OBJECTIVE BOX
[facility] Medicine  Pager [phone]    Patient is a 89y old  Female who presents with a chief complaint of hypoxia (14 Aug 2023 10:19)      INTERVAL HPI/OVERNIGHT EVENTS:    MEDICATIONS  (STANDING):  albuterol/ipratropium for Nebulization 3 milliLiter(s) Nebulizer every 6 hours  atorvastatin 20 milliGRAM(s) Oral at bedtime  carvedilol 6.25 milliGRAM(s) Oral every 12 hours  cholecalciferol 1000 Unit(s) Oral daily  guaiFENesin Oral Liquid (Sugar-Free) 200 milliGRAM(s) Oral every 6 hours  levothyroxine 100 MICROGram(s) Oral daily  polyethylene glycol 3350 17 Gram(s) Oral two times a day  senna 2 Tablet(s) Oral at bedtime  sorbitol 70%/mineral oil/magnesium hydroxide/glycerin Enema 120 milliLiter(s) Rectal once    MEDICATIONS  (PRN):      Allergies    cephalexin (Hives)  [This allergen will not trigger allergy alert] trisulfapyrimidine (Rash)  sulfa topicals (Unknown)    Intolerances    morphine (Drowsiness; Faint; Hypotension)      REVIEW OF SYSTEMS:  Please see interval HPI:    Vital Signs Last 24 Hrs  T(C): 36.3 (14 Aug 2023 05:40), Max: 36.6 (13 Aug 2023 15:00)  T(F): 97.4 (14 Aug 2023 05:40), Max: 97.9 (13 Aug 2023 15:00)  HR: 68 (14 Aug 2023 12:06) (67 - 77)  BP: 132/73 (14 Aug 2023 05:40) (132/73 - 145/80)  BP(mean): --  RR: 18 (14 Aug 2023 05:40) (18 - 20)  SpO2: 100% (14 Aug 2023 12:06) (88% - 100%)    Parameters below as of 14 Aug 2023 05:40  Patient On (Oxygen Delivery Method): nasal cannula  O2 Flow (L/min): 2    I&O's Detail    13 Aug 2023 07:01  -  14 Aug 2023 07:00  --------------------------------------------------------  IN:  Total IN: 0 mL    OUT:    Voided (mL): 500 mL  Total OUT: 500 mL    Total NET: -500 mL      14 Aug 2023 07:01  -  14 Aug 2023 12:15  --------------------------------------------------------  IN:  Total IN: 0 mL    OUT:    Voided (mL): 300 mL  Total OUT: 300 mL    Total NET: -300 mL            PHYSICAL EXAM:  GENERAL:   HEAD:    EYES:   ENMT:   NECK:   NERVOUS SYSTEM:    CHEST/LUNG:   HEART:   ABDOMEN:   EXTREMITIES:    LYMPH:   SKIN:     LABS:                        10.3   11.82 )-----------( 289      ( 14 Aug 2023 06:30 )             31.2     14 Aug 2023 06:30    134    |  97     |  44     ----------------------------<  96     3.8     |  25     |  1.11     Ca    9.0        14 Aug 2023 06:30    TPro  7.0    /  Alb  2.8    /  TBili  0.2    /  DBili  x      /  AST  30     /  ALT  27     /  AlkPhos  128    14 Aug 2023 06:30    PT/INR - ( 14 Aug 2023 06:30 )   PT: 10.7 sec;   INR: 0.95 ratio         PTT - ( 14 Aug 2023 06:30 )  PTT:31.6 sec  CAPILLARY BLOOD GLUCOSE      POCT Blood Glucose.: 148 mg/dL (14 Aug 2023 08:16)    BLOOD CULTURE    RADIOLOGY & ADDITIONAL TESTS:    Imaging Personally Reviewed:  [ ] YES     Consultant(s) Notes Reviewed:      Care Discussed with Consultants/Other Providers: [facility]  Pager [phone]    Patient is a 89y old  Female who presents with a chief complaint of hypoxia (14 Aug 2023 10:19)      INTERVAL HPI/OVERNIGHT EVENTS:  Events of RRT this AM noted.   Patient still hypoxic to 60s (w/ good waveform) despite O2 supplementation, case d/w cards NP and ACP, trialing IV lasix, d/w Respiratory therapist re: putting patient on bipap), is DNR/DNI.   After placed on bipap, O2 sat improved to 100%, patient appeared more comfortable but speech hard to understand with mask in place.     Course discussed with 2 daughters at bedside, will monitor response to bipap and see if able to transition to HFNC or other later. If respiratory status worsens or fails to improve, will discuss if would desire comfort measures... They feel that patient would likely want that, to reassess. Given age (89) would wish to avoid aggressive measures. Had reports of further GI bleed, but no plans for scope at this time.      MEDICATIONS  (STANDING):  albuterol/ipratropium for Nebulization 3 milliLiter(s) Nebulizer every 6 hours  atorvastatin 20 milliGRAM(s) Oral at bedtime  carvedilol 6.25 milliGRAM(s) Oral every 12 hours  cholecalciferol 1000 Unit(s) Oral daily  guaiFENesin Oral Liquid (Sugar-Free) 200 milliGRAM(s) Oral every 6 hours  levothyroxine 100 MICROGram(s) Oral daily  polyethylene glycol 3350 17 Gram(s) Oral two times a day  senna 2 Tablet(s) Oral at bedtime  sorbitol 70%/mineral oil/magnesium hydroxide/glycerin Enema 120 milliLiter(s) Rectal once    MEDICATIONS  (PRN):    Allergies    cephalexin (Hives)  [This allergen will not trigger allergy alert] trisulfapyrimidine (Rash)  sulfa topicals (Unknown)    Intolerances    morphine (Drowsiness; Faint; Hypotension)    REVIEW OF SYSTEMS:  Please see interval HPI:    Vital Signs Last 24 Hrs  T(C): 36.3 (14 Aug 2023 05:40), Max: 36.6 (13 Aug 2023 15:00)  T(F): 97.4 (14 Aug 2023 05:40), Max: 97.9 (13 Aug 2023 15:00)  HR: 68 (14 Aug 2023 12:06) (67 - 77)  BP: 132/73 (14 Aug 2023 05:40) (132/73 - 145/80)  RR: 18 (14 Aug 2023 05:40) (18 - 20)  SpO2: 100% (14 Aug 2023 12:06) (88% - 100%)    Parameters below as of 14 Aug 2023 05:40  Patient On (Oxygen Delivery Method): nasal cannula  O2 Flow (L/min): 2    I&O's Detail    13 Aug 2023 07:01  -  14 Aug 2023 07:00  --------------------------------------------------------  IN:  Total IN: 0 mL    OUT:    Voided (mL): 500 mL  Total OUT: 500 mL    Total NET: -500 mL      14 Aug 2023 07:01  -  14 Aug 2023 12:15  --------------------------------------------------------  IN:  Total IN: 0 mL    OUT:    Voided (mL): 300 mL  Total OUT: 300 mL    Total NET: -300 mL    PHYSICAL EXAM:  GENERAL: Appears comfortable on bipap, looks at provider  HEAD: NC/AT   EYES: EOMI, clear sclera/conjunctiva  ENMT: bipap mask in place, hearing intact to voice  NECK: supple  NERVOUS SYSTEM: moving extremities, speech hard to comprehend due to bipap mask, but trying to communicate   CHEST/LUNG: mildly tachypneic, clear anteriorly  HEART: S1S2 RRR  ABDOMEN: soft, non-tender +BS  EXTREMITIES:  no c/c/e    LABS:                        10.3   11.82 )-----------( 289      ( 14 Aug 2023 06:30 )             31.2     14 Aug 2023 06:30    134    |  97     |  44     ----------------------------<  96     3.8     |  25     |  1.11     Ca    9.0        14 Aug 2023 06:30    TPro  7.0    /  Alb  2.8    /  TBili  0.2    /  DBili  x      /  AST  30     /  ALT  27     /  AlkPhos  128    14 Aug 2023 06:30    PT/INR - ( 14 Aug 2023 06:30 )   PT: 10.7 sec;   INR: 0.95 ratio         PTT - ( 14 Aug 2023 06:30 )  PTT:31.6 sec  CAPILLARY BLOOD GLUCOSE      POCT Blood Glucose.: 148 mg/dL (14 Aug 2023 08:16)    BLOOD CULTURE    RADIOLOGY & ADDITIONAL TESTS:    Imaging Personally Reviewed:  [ x] YES   < from: Xray Chest 1 View- PORTABLE-Urgent (Xray Chest 1 View- PORTABLE-Urgent .) (08.14.23 @ 12:08) >  IMPRESSION:  Trace bilateral pleural effusions with left lung base hazy opacification,   likely atelectasis.    < end of copied text >      Consultant(s) Notes Reviewed:  Cards, Podiatry, RRT events    Care Discussed with Consultants/Other Providers: Cards TANA Kilpatrick re: diuresis, ACP Shasha re: trial of bipap, continuing GOC discussions with daughters, COVID exposure

## 2023-08-14 NOTE — PROGRESS NOTE ADULT - SUBJECTIVE AND OBJECTIVE BOX
Patient is a 89y old  Female who presents with a chief complaint of hypoxia (14 Aug 2023 12:15)       INTERVAL HPI/OVERNIGHT EVENTS:  Patient seen and evaluated at bedside.  Pt is resting comfortable in NAD. Denies N/V/F/C.  Pain rated at X/10    Allergies    cephalexin (Hives)  [This allergen will not trigger allergy alert] trisulfapyrimidine (Rash)  sulfa topicals (Unknown)    Intolerances    morphine (Drowsiness; Faint; Hypotension)      Vital Signs Last 24 Hrs  T(C): 36.6 (14 Aug 2023 10:39), Max: 36.6 (13 Aug 2023 15:00)  T(F): 97.8 (14 Aug 2023 10:39), Max: 97.9 (13 Aug 2023 15:00)  HR: 68 (14 Aug 2023 12:06) (67 - 77)  BP: 134/78 (14 Aug 2023 10:39) (132/73 - 145/80)  BP(mean): --  RR: 20 (14 Aug 2023 10:39) (18 - 20)  SpO2: 100% (14 Aug 2023 12:06) (88% - 100%)    Parameters below as of 14 Aug 2023 10:39  Patient On (Oxygen Delivery Method): BiPAP/CPAP        LABS:                        10.3   11.82 )-----------( 289      ( 14 Aug 2023 06:30 )             31.2     08-14    134<L>  |  97<L>  |  44<H>  ----------------------------<  96  3.8   |  25  |  1.11    Ca    9.0      14 Aug 2023 06:30    TPro  7.0  /  Alb  2.8<L>  /  TBili  0.2  /  DBili  x   /  AST  30  /  ALT  27  /  AlkPhos  128<H>  08-14    PT/INR - ( 14 Aug 2023 06:30 )   PT: 10.7 sec;   INR: 0.95 ratio         PTT - ( 14 Aug 2023 06:30 )  PTT:31.6 sec  Urinalysis Basic - ( 14 Aug 2023 06:30 )    Color: x / Appearance: x / SG: x / pH: x  Gluc: 96 mg/dL / Ketone: x  / Bili: x / Urobili: x   Blood: x / Protein: x / Nitrite: x   Leuk Esterase: x / RBC: x / WBC x   Sq Epi: x / Non Sq Epi: x / Bacteria: x      CAPILLARY BLOOD GLUCOSE      POCT Blood Glucose.: 148 mg/dL (14 Aug 2023 08:16)      Lower Extremity Physical Exam:  Vascular: DP/PT 1/4, B/L, CFT <3 seconds B/L, Temperature gradient warm to cool, B/L.   Neuro: Epicritic sensation intact to the level of digits, B/L.  Musculoskeletal/Ortho: no pain upon palpation or range of motion b/l.   Skin: ecchymosis present on the right plantar heel, no open wounds or signs of infection bilaterally.     RADIOLOGY & ADDITIONAL TESTS:

## 2023-08-14 NOTE — PROGRESS NOTE ADULT - ASSESSMENT
89 yo F w/ HFpEF, afib s/p ppm, HTN, HLD, hypothyroid admitted with acute hypoxic respiratory failure in setting of sepsis 2/2 PNA and possible HF exacerbation, c/b acute kidney injury (improving) and BRBPR.     # Acute hypoxic respiratory failure  - initial episode felt to be secondary to infection and possible CHF exacerbation, was s/p antibiotic course for PNA and diuresis with improvement, plan was to wean O2 as tolerated   - however patient hypoxic this AM to 60s despite use of NRB/NFNC  - CXR obtained, noting trace bilateral pleural effusions and hazy opacification  - had covid exposure as well, RVP 8/13 neg, may need to repeat in few days (ie see if developed COVID)  - s/p IV lasix as d/w cards, continue with bipap for now and reassess  - NPO for now as on bipap  - monitor respiratory status closely, continue discussions as above if respiratory status worsens  - DNR/DNI    # BRBPR  - still reporting some episodes but no plan for endoscopic intervention at this time, given age, comorbidities and patient/family desires  - hgb 10.3 this AM, continue to monitor, continue discussions re: GOC  - holding VTE pharm ppx    # congestive heart failure  - HFpEF per cardiology, echo 2022 w/ EF 55-60%, though more recent echo noting LV systolic dysfunction as well  - s/p IV lasix today as per cards, reassess need for further IV diuresis  - eventual plan to resume PO diuresis MWF once stabilized  - c/w coreg as per cards    #  Sepsis  - met sepsis criteria with Tmax <36C and leukocytosis, likely secondary to PNA. +/- viral infection, +/- UTI  - RVP positive for enterovirus, managed for supportive care  - completed course of Zosyn for potential bacterial PNA  - Ucx w/ enterococcus, s/p dose Vancomycin  - sepsis resolved     # Afib s/p ppm  - controlled, on coreg  - not on anticoagulation    # Hypothyroidism  - c/w synthroid    # HLD  - on statin, but should discuss risk/benefits of continued use given age (89)    # Essential HTN  - on coreg  - amlodipine on hold  - monitor BP, given age would be more liberal with BP goals given wish to avoid hypotension    # Constipation  - c/w bowel regimen    # Need for prophylactic measure  - VTE ppx: no pharm ppx given BRBPR, -> SCDs

## 2023-08-14 NOTE — PROGRESS NOTE ADULT - SUBJECTIVE AND OBJECTIVE BOX
CARDIOLOGY FOLLOW UP - Dr. Riley  DATE OF SERVICE:  8/14/23    CC events noted sp RRT for  acute desatting this AM after eating breakfast  lethargic on exam, still on NRB -- sats 70%    REVIEW OF SYSTEMS:  jose    PHYSICAL EXAM:  T(C): 36.3 (08-14-23 @ 05:40), Max: 36.6 (08-13-23 @ 15:00)  HR: 68 (08-14-23 @ 05:40) (67 - 77)  BP: 132/73 (08-14-23 @ 05:40) (132/73 - 145/80)  RR: 18 (08-14-23 @ 05:40) (18 - 20)  SpO2: 94% (08-14-23 @ 05:40) (88% - 97%)  Wt(kg): --  I&O's Summary    13 Aug 2023 07:01  -  14 Aug 2023 07:00  --------------------------------------------------------  IN: 0 mL / OUT: 500 mL / NET: -500 mL    14 Aug 2023 07:01  -  14 Aug 2023 10:19  --------------------------------------------------------  IN: 0 mL / OUT: 300 mL / NET: -300 mL        Cardiovascular: Normal S1 S2, irreg   Respiratory: coarse   Gastrointestinal:  Soft, Non-tender, + BS	  Extremities: Normal range of motion, No clubbing, cyanosis or edema      Home Medications:  amLODIPine 5 mg oral tablet: 1 tab(s) orally once a day (07 Aug 2023 06:47)  Biofreeze 4% topical gel: Apply topically to affected area 2 times a day to both knees (07 Aug 2023 06:47)  carvedilol 6.25 mg oral tablet: 1 tab(s) orally 2 times a day (07 Aug 2023 06:47)  furosemide 20 mg oral tablet: 1 tab(s) orally 3 times a week MWF (11 Aug 2023 16:04)  lactobacillus acidophilus oral capsule: 1 cap(s) orally once a day (07 Aug 2023 06:47)  levothyroxine 100 mcg (0.1 mg) oral tablet: 1 tab(s) orally once a day (07 Aug 2023 06:47)  methenamine hippurate 1 g oral tablet: 1 tab(s) orally once a day (07 Aug 2023 06:47)  nystatin 100,000 units/g topical powder: Apply topically to affected area once a day to bust after showers (07 Aug 2023 06:47)  Vitamin D3 25 mcg (1000 intl units) oral tablet: 1 tab(s) orally once a day (07 Aug 2023 06:47)      MEDICATIONS  (STANDING):  albuterol/ipratropium for Nebulization 3 milliLiter(s) Nebulizer every 6 hours  albuterol/ipratropium for Nebulization. 3 milliLiter(s) Nebulizer once  atorvastatin 20 milliGRAM(s) Oral at bedtime  carvedilol 6.25 milliGRAM(s) Oral every 12 hours  cholecalciferol 1000 Unit(s) Oral daily  guaiFENesin Oral Liquid (Sugar-Free) 200 milliGRAM(s) Oral every 6 hours  levothyroxine 100 MICROGram(s) Oral daily  polyethylene glycol 3350 17 Gram(s) Oral two times a day  senna 2 Tablet(s) Oral at bedtime  sorbitol 70%/mineral oil/magnesium hydroxide/glycerin Enema 120 milliLiter(s) Rectal once      TELEMETRY: AF hr 60-70	    ECG:  	  RADIOLOGY:   DIAGNOSTIC TESTING:  [ ] Echocardiogram:  [ ]  Catheterization:  [ ] Stress Test:    OTHER: 	    LABS:	 	                            10.3   11.82 )-----------( 289      ( 14 Aug 2023 06:30 )             31.2     08-14    134<L>  |  97<L>  |  44<H>  ----------------------------<  96  3.8   |  25  |  1.11    Ca    9.0      14 Aug 2023 06:30    TPro  7.0  /  Alb  2.8<L>  /  TBili  0.2  /  DBili  x   /  AST  30  /  ALT  27  /  AlkPhos  128<H>  08-14    PT/INR - ( 14 Aug 2023 06:30 )   PT: 10.7 sec;   INR: 0.95 ratio         PTT - ( 14 Aug 2023 06:30 )  PTT:31.6 sec

## 2023-08-15 LAB
ALBUMIN SERPL ELPH-MCNC: 2.7 G/DL — LOW (ref 3.3–5)
ALP SERPL-CCNC: 125 U/L — HIGH (ref 40–120)
ALT FLD-CCNC: 27 U/L — SIGNIFICANT CHANGE UP (ref 4–33)
ANION GAP SERPL CALC-SCNC: 13 MMOL/L — SIGNIFICANT CHANGE UP (ref 7–14)
AST SERPL-CCNC: 22 U/L — SIGNIFICANT CHANGE UP (ref 4–32)
BASOPHILS # BLD AUTO: 0.09 K/UL — SIGNIFICANT CHANGE UP (ref 0–0.2)
BASOPHILS NFR BLD AUTO: 0.9 % — SIGNIFICANT CHANGE UP (ref 0–2)
BILIRUB SERPL-MCNC: 0.3 MG/DL — SIGNIFICANT CHANGE UP (ref 0.2–1.2)
BUN SERPL-MCNC: 39 MG/DL — HIGH (ref 7–23)
CALCIUM SERPL-MCNC: 9.1 MG/DL — SIGNIFICANT CHANGE UP (ref 8.4–10.5)
CHLORIDE SERPL-SCNC: 97 MMOL/L — LOW (ref 98–107)
CO2 SERPL-SCNC: 26 MMOL/L — SIGNIFICANT CHANGE UP (ref 22–31)
CREAT SERPL-MCNC: 0.98 MG/DL — SIGNIFICANT CHANGE UP (ref 0.5–1.3)
EGFR: 51 ML/MIN/1.73M2 — LOW
EOSINOPHIL # BLD AUTO: 0.17 K/UL — SIGNIFICANT CHANGE UP (ref 0–0.5)
EOSINOPHIL NFR BLD AUTO: 1.7 % — SIGNIFICANT CHANGE UP (ref 0–6)
GLUCOSE SERPL-MCNC: 104 MG/DL — HIGH (ref 70–99)
HCT VFR BLD CALC: 34.1 % — LOW (ref 34.5–45)
HGB BLD-MCNC: 11.2 G/DL — LOW (ref 11.5–15.5)
IANC: 7.24 K/UL — SIGNIFICANT CHANGE UP (ref 1.8–7.4)
LYMPHOCYTES # BLD AUTO: 0.78 K/UL — LOW (ref 1–3.3)
LYMPHOCYTES # BLD AUTO: 7.8 % — LOW (ref 13–44)
MAGNESIUM SERPL-MCNC: 2.2 MG/DL — SIGNIFICANT CHANGE UP (ref 1.6–2.6)
MCHC RBC-ENTMCNC: 30.9 PG — SIGNIFICANT CHANGE UP (ref 27–34)
MCHC RBC-ENTMCNC: 32.8 GM/DL — SIGNIFICANT CHANGE UP (ref 32–36)
MCV RBC AUTO: 94.2 FL — SIGNIFICANT CHANGE UP (ref 80–100)
MONOCYTES # BLD AUTO: 0.6 K/UL — SIGNIFICANT CHANGE UP (ref 0–0.9)
MONOCYTES NFR BLD AUTO: 6 % — SIGNIFICANT CHANGE UP (ref 2–14)
NEUTROPHILS # BLD AUTO: 7.97 K/UL — HIGH (ref 1.8–7.4)
NEUTROPHILS NFR BLD AUTO: 79.3 % — HIGH (ref 43–77)
NT-PROBNP SERPL-SCNC: 5168 PG/ML — HIGH
PHOSPHATE SERPL-MCNC: 3.5 MG/DL — SIGNIFICANT CHANGE UP (ref 2.5–4.5)
PLATELET # BLD AUTO: 253 K/UL — SIGNIFICANT CHANGE UP (ref 150–400)
POTASSIUM SERPL-MCNC: 3.6 MMOL/L — SIGNIFICANT CHANGE UP (ref 3.5–5.3)
POTASSIUM SERPL-SCNC: 3.6 MMOL/L — SIGNIFICANT CHANGE UP (ref 3.5–5.3)
PROT SERPL-MCNC: 6.9 G/DL — SIGNIFICANT CHANGE UP (ref 6–8.3)
RBC # BLD: 3.62 M/UL — LOW (ref 3.8–5.2)
RBC # FLD: 14.1 % — SIGNIFICANT CHANGE UP (ref 10.3–14.5)
SODIUM SERPL-SCNC: 136 MMOL/L — SIGNIFICANT CHANGE UP (ref 135–145)
WBC # BLD: 10.05 K/UL — SIGNIFICANT CHANGE UP (ref 3.8–10.5)
WBC # FLD AUTO: 10.05 K/UL — SIGNIFICANT CHANGE UP (ref 3.8–10.5)

## 2023-08-15 PROCEDURE — 99233 SBSQ HOSP IP/OBS HIGH 50: CPT

## 2023-08-15 RX ORDER — HYDROCORTISONE 1 %
1 OINTMENT (GRAM) TOPICAL ONCE
Refills: 0 | Status: COMPLETED | OUTPATIENT
Start: 2023-08-15 | End: 2023-08-15

## 2023-08-15 RX ORDER — ACETAMINOPHEN 500 MG
1000 TABLET ORAL ONCE
Refills: 0 | Status: COMPLETED | OUTPATIENT
Start: 2023-08-15 | End: 2023-08-15

## 2023-08-15 RX ORDER — FUROSEMIDE 40 MG
20 TABLET ORAL DAILY
Refills: 0 | Status: DISCONTINUED | OUTPATIENT
Start: 2023-08-15 | End: 2023-08-16

## 2023-08-15 RX ADMIN — Medication 400 MILLIGRAM(S): at 15:40

## 2023-08-15 RX ADMIN — Medication 3 MILLILITER(S): at 15:41

## 2023-08-15 RX ADMIN — Medication 3 MILLILITER(S): at 22:06

## 2023-08-15 RX ADMIN — Medication 3 MILLILITER(S): at 05:16

## 2023-08-15 RX ADMIN — Medication 3 MILLILITER(S): at 09:41

## 2023-08-15 RX ADMIN — Medication 20 MILLIGRAM(S): at 13:38

## 2023-08-15 RX ADMIN — Medication 1000 MILLIGRAM(S): at 16:05

## 2023-08-15 RX ADMIN — Medication 1 SUPPOSITORY(S): at 02:22

## 2023-08-15 NOTE — PROGRESS NOTE ADULT - ASSESSMENT
89 yo F w/ HFpEF, afib s/p ppm, HTN, HLD, hypothyroid admitted with acute hypoxic respiratory failure in setting of sepsis 2/2 PNA and possible HF exacerbation, c/b acute kidney injury (improving) and BRBPR.     # Acute hypoxic respiratory failure  - initial episode felt to be secondary to infection and possible CHF exacerbation, was s/p antibiotic course for PNA and diuresis with improvement, plan was to wean O2 as tolerated   - however patient hypoxic 8/14 AM to 60s despite use of NRB/NFNC  - CXR obtained, noting trace bilateral pleural effusions and hazy opacification  - had covid exposure as well, RVP 8/13 neg, may need to repeat in few days (ie see if developed COVID)  - s/p IV lasix x1, will continue with IV lasix as per cards (lasix 20 mg), proBNP elevated, reassess   - will try to wean patient off bipap (appreciate RT assistance, to try 6L NC)  - c/w chest PT, supportive care, symptomatic management of cough  - monitor respiratory status closely, continue discussions if respiratory status worsens (patient requests medications to keep her comfortable)  - DNR/DNI  - appreciate CM/SW assistance re: hospice referral    # BRBPR  - still reporting some episodes but no plan for endoscopic intervention at this time, given age, comorbidities and patient/family desires  - hgb 11.2 this AM, continue to monitor, continue discussions re: GOC  - holding VTE pharm ppx    # congestive heart failure  - HFpEF per cardiology, echo 2022 w/ EF 55-60%, though more recent echo noting LV systolic dysfunction as well  - s/p IV lasix as per cards, continue IV lasix 20 mg daily as per cards  - eventual plan to resume PO diuresis MWF once stabilized  - c/w coreg as per cards    #  Sepsis  - met sepsis criteria with Tmax <36C and leukocytosis, likely secondary to PNA. +/- viral infection, +/- UTI  - initial RVP positive for enterovirus, c/w supportive care  - completed course of Zosyn for potential bacterial PNA  - Ucx w/ enterococcus, s/p dose Vancomycin  - sepsis resolved     # Afib s/p ppm  - controlled, on coreg  - not on anticoagulation    # Hypothyroidism  - c/w synthroid    # HLD  - on statin, but should discuss risk/benefits of continued use given age (89)    # Essential HTN  - on coreg  - amlodipine on hold  - monitor BP, given age would be more liberal with BP goals given wish to avoid hypotension    # Constipation  - c/w bowel regimen    # Need for prophylactic measure  - VTE ppx: no pharm ppx given BRBPR, -> SCDs      Dispo: ?hospice    Updated daughter Ariana [phone] re: hospital course, cardiology recs re: lasix, trying to wean patient off bipap (and potential resumption of diet if able to stay off), ongoing hospice referral, also reviewed role for reassessment of medication regimen given age (which medications really benefit vs which can be stopped), will continue discussions, she thinks that patient also complains about pill burden...

## 2023-08-15 NOTE — PROGRESS NOTE ADULT - SUBJECTIVE AND OBJECTIVE BOX
[facility] Medicine  Pager [phone]    Patient is a 89y old  Female who presents with a chief complaint of hypoxia (15 Aug 2023 09:57)      INTERVAL HPI/OVERNIGHT EVENTS:    MEDICATIONS  (STANDING):  albuterol/ipratropium for Nebulization 3 milliLiter(s) Nebulizer every 6 hours  atorvastatin 20 milliGRAM(s) Oral at bedtime  carvedilol 6.25 milliGRAM(s) Oral every 12 hours  cholecalciferol 1000 Unit(s) Oral daily  dextrose 5% + sodium chloride 0.45%. 1000 milliLiter(s) (50 mL/Hr) IV Continuous <Continuous>  guaiFENesin Oral Liquid (Sugar-Free) 200 milliGRAM(s) Oral every 6 hours  levothyroxine 100 MICROGram(s) Oral daily  polyethylene glycol 3350 17 Gram(s) Oral two times a day  senna 2 Tablet(s) Oral at bedtime  sorbitol 70%/mineral oil/magnesium hydroxide/glycerin Enema 120 milliLiter(s) Rectal once    MEDICATIONS  (PRN):      Allergies    cephalexin (Hives)  [This allergen will not trigger allergy alert] trisulfapyrimidine (Rash)  sulfa topicals (Unknown)    Intolerances    morphine (Drowsiness; Faint; Hypotension)      REVIEW OF SYSTEMS:  Please see interval HPI:    Vital Signs Last 24 Hrs  T(C): 36.4 (15 Aug 2023 06:50), Max: 36.6 (14 Aug 2023 21:16)  T(F): 97.6 (15 Aug 2023 06:50), Max: 97.8 (14 Aug 2023 21:16)  HR: 73 (15 Aug 2023 09:42) (68 - 81)  BP: 139/92 (15 Aug 2023 06:50) (129/68 - 139/92)  BP(mean): --  RR: 18 (15 Aug 2023 06:50) (18 - 20)  SpO2: 98% (15 Aug 2023 09:42) (98% - 100%)    Parameters below as of 15 Aug 2023 09:42  Patient On (Oxygen Delivery Method): BiPAP/CPAP      I&O's Detail    14 Aug 2023 07:01  -  15 Aug 2023 07:00  --------------------------------------------------------  IN:  Total IN: 0 mL    OUT:    Voided (mL): 575 mL  Total OUT: 575 mL    Total NET: -575 mL            PHYSICAL EXAM:  GENERAL:   HEAD:    EYES:   ENMT:   NECK:   NERVOUS SYSTEM:    CHEST/LUNG:   HEART:   ABDOMEN:   EXTREMITIES:    LYMPH:   SKIN:     LABS:                        11.2   10.05 )-----------( 253      ( 15 Aug 2023 05:00 )             34.1     15 Aug 2023 05:00    136    |  97     |  39     ----------------------------<  104    3.6     |  26     |  0.98     Ca    9.1        15 Aug 2023 05:00  Phos  3.5       15 Aug 2023 05:00  Mg     2.20      15 Aug 2023 05:00    TPro  6.9    /  Alb  2.7    /  TBili  0.3    /  DBili  x      /  AST  22     /  ALT  27     /  AlkPhos  125    15 Aug 2023 05:00    PT/INR - ( 14 Aug 2023 06:30 )   PT: 10.7 sec;   INR: 0.95 ratio         PTT - ( 14 Aug 2023 06:30 )  PTT:31.6 sec  CAPILLARY BLOOD GLUCOSE      POCT Blood Glucose.: 147 mg/dL (15 Aug 2023 07:47)    BLOOD CULTURE    RADIOLOGY & ADDITIONAL TESTS:    Imaging Personally Reviewed:  [ ] YES     Consultant(s) Notes Reviewed:      Care Discussed with Consultants/Other Providers: [facility]  Pager [phone]    Patient is a 89y old  Female who presents with a chief complaint of hypoxia (15 Aug 2023 09:57)      INTERVAL HPI/OVERNIGHT EVENTS:  Patient with bipap mask in place, prefers to communicate by writing her responses on paper. States she has mixed feelings about death, but would like to make sure that she could have medications to ensure that if dying that it would be a comfortable death. Discussed plan to trial off bipap to see how tolerates.     MEDICATIONS  (STANDING):  albuterol/ipratropium for Nebulization 3 milliLiter(s) Nebulizer every 6 hours  atorvastatin 20 milliGRAM(s) Oral at bedtime  carvedilol 6.25 milliGRAM(s) Oral every 12 hours  cholecalciferol 1000 Unit(s) Oral daily  dextrose 5% + sodium chloride 0.45%. 1000 milliLiter(s) (50 mL/Hr) IV Continuous <Continuous>  guaiFENesin Oral Liquid (Sugar-Free) 200 milliGRAM(s) Oral every 6 hours  levothyroxine 100 MICROGram(s) Oral daily  polyethylene glycol 3350 17 Gram(s) Oral two times a day  senna 2 Tablet(s) Oral at bedtime  sorbitol 70%/mineral oil/magnesium hydroxide/glycerin Enema 120 milliLiter(s) Rectal once    MEDICATIONS  (PRN):    Allergies    cephalexin (Hives)  [This allergen will not trigger allergy alert] trisulfapyrimidine (Rash)  sulfa topicals (Unknown)    Intolerances    morphine (Drowsiness; Faint; Hypotension)    REVIEW OF SYSTEMS:  Please see interval HPI:    Vital Signs Last 24 Hrs  T(C): 36.4 (15 Aug 2023 06:50), Max: 36.6 (14 Aug 2023 21:16)  T(F): 97.6 (15 Aug 2023 06:50), Max: 97.8 (14 Aug 2023 21:16)  HR: 73 (15 Aug 2023 09:42) (68 - 81)  BP: 139/92 (15 Aug 2023 06:50) (129/68 - 139/92)  RR: 18 (15 Aug 2023 06:50) (18 - 20)  SpO2: 98% (15 Aug 2023 09:42) (98% - 100%)    Parameters below as of 15 Aug 2023 09:42  Patient On (Oxygen Delivery Method): BiPAP/CPAP    I&O's Detail    14 Aug 2023 07:01  -  15 Aug 2023 07:00  --------------------------------------------------------  IN:  Total IN: 0 mL    OUT:    Voided (mL): 575 mL  Total OUT: 575 mL    Total NET: -575 mL      PHYSICAL EXAM:  GENERAL: NAD, lying in bed  HEAD:  NC/AT  EYES: EOMI, clear sclera/conjunctiva  ENMT: bipap mask in place  NECK: supple   NERVOUS SYSTEM: moving extremities, able to communicate via writing    CHEST/LUNG: no respiratory distress on bipap, coarse BS  HEART: S1S2 RRR  ABDOMEN: soft, non-tender   EXTREMITIES:  no c/c/e      LABS:                        11.2   10.05 )-----------( 253      ( 15 Aug 2023 05:00 )             34.1     15 Aug 2023 05:00    136    |  97     |  39     ----------------------------<  104    3.6     |  26     |  0.98     Ca    9.1        15 Aug 2023 05:00  Phos  3.5       15 Aug 2023 05:00  Mg     2.20      15 Aug 2023 05:00    TPro  6.9    /  Alb  2.7    /  TBili  0.3    /  DBili  x      /  AST  22     /  ALT  27     /  AlkPhos  125    15 Aug 2023 05:00    PT/INR - ( 14 Aug 2023 06:30 )   PT: 10.7 sec;   INR: 0.95 ratio    PTT - ( 14 Aug 2023 06:30 )  PTT:31.6 sec    CAPILLARY BLOOD GLUCOSE  POCT Blood Glucose.: 147 mg/dL (15 Aug 2023 07:47)    Pro-Brain Natriuretic Peptide: 5168:    BLOOD CULTURE    RADIOLOGY & ADDITIONAL TESTS:    Imaging Personally Reviewed:  [ ] YES     Consultant(s) Notes Reviewed:  Cards    Care Discussed with Consultants/Other Providers: Nickie Kilpatrick re: patient on bipap, c/w diuresis, has COVID exposure, ACP Damaris re: cards f/u, trialing off bipap, reassess if can do diet

## 2023-08-15 NOTE — PROGRESS NOTE ADULT - ASSESSMENT
Echo 5/18/23: Moderate LV dysfxn, LVH  Echo 8/1/23: Abnormal septal motion of LV, mild-mod MR, mild-mod TR    A/p  89 y/o female with PMHx of HTN, HLD, HFrEf, Afib (not on AC), hypothyroidism, hx of PPM placement, presents with cough , hypoxia       #Acute respiratory failure with hypoxia.   -likely in the setting of resp infection   -sp RRT today 8/14 ? aspiration/?pulm infection vs fluid overload   -sp lasix 40 mg IVP x1 8/14   -chest xray 8/14 Trace bilateral pleural effusions with left lung base hazy opacification,  likely atelectasis.  -on bipap - med to fu   -now on droplet for covid exposure   -no events on tele   -pronap noted- can start lasix 20 mg IVP daily     #Cough, hypoxia   -sp abx of pna/ uti   -initial xray Left basilar hazy opacity, which may reflect atelectasis.  -cta  chest  8/6 with no PE,  Debris within the right interlobar bronchus, without focal consolidation.  Essential interval resolution of  previously noted bilateral pleural effusions and interlobular septal  thickening/groundglass consolidation.  -Bcx neg   -management per med   -repeat chest xay 8/14 noted   -plan as above    #Chronic HFpEF   -events as mentioned above  -sp IV lasix 40 mg x 1 8/14-- renal fx stable  -repeat 8/14 chest xray with Trace bilateral pleural effusions with left lung base hazy opacification,  likely atelectasis.  -Continue coreg  -can start lasix 20 mg IVP daily   -once weened off bipap --eventually restart lasix 20mg po  m,w,f once clinically improves   -recent Echo with abnormal septal motion of LV, mild-mod MR, mild-mod TR    #Afib s/p ppm  -Stable, rate controlled  -Continue coreg  -remains off a/c     #HTN  -stable  -Continue  coreg    #UTI  -sp Abx, mgmt per med    plan d/w acp/med attending     dvt ppx

## 2023-08-15 NOTE — PROGRESS NOTE ADULT - SUBJECTIVE AND OBJECTIVE BOX
CARDIOLOGY FOLLOW UP - Dr. Riley  DATE OF SERVICE: 8/15/23    CC remains on bipap      REVIEW OF SYSTEMS:  jose    PHYSICAL EXAM:  T(C): 36.4 (08-15-23 @ 06:50), Max: 36.6 (08-14-23 @ 10:39)  HR: 73 (08-15-23 @ 09:42) (68 - 81)  BP: 139/92 (08-15-23 @ 06:50) (129/68 - 139/92)  RR: 18 (08-15-23 @ 06:50) (18 - 20)  SpO2: 98% (08-15-23 @ 09:42) (96% - 100%)  Wt(kg): --  I&O's Summary    14 Aug 2023 07:01  -  15 Aug 2023 07:00  --------------------------------------------------------  IN: 0 mL / OUT: 575 mL / NET: -575 mL        Appearance: Normal	  Cardiovascular: Normal S1 S2,RRR, No JVD, No murmurs  Respiratory: Lungs clear to auscultation	  Gastrointestinal:  Soft, Non-tender, + BS	  Extremities: Normal range of motion, No clubbing, cyanosis or edema      Home Medications:  amLODIPine 5 mg oral tablet: 1 tab(s) orally once a day (07 Aug 2023 06:47)  Biofreeze 4% topical gel: Apply topically to affected area 2 times a day to both knees (07 Aug 2023 06:47)  carvedilol 6.25 mg oral tablet: 1 tab(s) orally 2 times a day (07 Aug 2023 06:47)  furosemide 20 mg oral tablet: 1 tab(s) orally 3 times a week MWF (11 Aug 2023 16:04)  lactobacillus acidophilus oral capsule: 1 cap(s) orally once a day (07 Aug 2023 06:47)  levothyroxine 100 mcg (0.1 mg) oral tablet: 1 tab(s) orally once a day (07 Aug 2023 06:47)  methenamine hippurate 1 g oral tablet: 1 tab(s) orally once a day (07 Aug 2023 06:47)  nystatin 100,000 units/g topical powder: Apply topically to affected area once a day to bust after showers (07 Aug 2023 06:47)  Vitamin D3 25 mcg (1000 intl units) oral tablet: 1 tab(s) orally once a day (07 Aug 2023 06:47)      MEDICATIONS  (STANDING):  albuterol/ipratropium for Nebulization 3 milliLiter(s) Nebulizer every 6 hours  atorvastatin 20 milliGRAM(s) Oral at bedtime  carvedilol 6.25 milliGRAM(s) Oral every 12 hours  cholecalciferol 1000 Unit(s) Oral daily  dextrose 5% + sodium chloride 0.45%. 1000 milliLiter(s) (50 mL/Hr) IV Continuous <Continuous>  guaiFENesin Oral Liquid (Sugar-Free) 200 milliGRAM(s) Oral every 6 hours  levothyroxine 100 MICROGram(s) Oral daily  polyethylene glycol 3350 17 Gram(s) Oral two times a day  senna 2 Tablet(s) Oral at bedtime  sorbitol 70%/mineral oil/magnesium hydroxide/glycerin Enema 120 milliLiter(s) Rectal once      TELEMETRY: af hr 80	    ECG:  	  RADIOLOGY:   < from: Xray Chest 1 View- PORTABLE-Urgent (Xray Chest 1 View- PORTABLE-Urgent .) (08.14.23 @ 12:08) >    IMPRESSION:  Trace bilateral pleural effusions with left lung base hazy opacification,   likely atelectasis.    --- Endof Report ---      < end of copied text >    DIAGNOSTIC TESTING:  [ ] Echocardiogram:  [ ]  Catheterization:  [ ] Stress Test:    OTHER: 	    LABS:	 	                            11.2   10.05 )-----------( 253      ( 15 Aug 2023 05:00 )             34.1     08-15    136  |  97<L>  |  39<H>  ----------------------------<  104<H>  3.6   |  26  |  0.98    Ca    9.1      15 Aug 2023 05:00  Phos  3.5     08-15  Mg     2.20     08-15    TPro  6.9  /  Alb  2.7<L>  /  TBili  0.3  /  DBili  x   /  AST  22  /  ALT  27  /  AlkPhos  125<H>  08-15    PT/INR - ( 14 Aug 2023 06:30 )   PT: 10.7 sec;   INR: 0.95 ratio         PTT - ( 14 Aug 2023 06:30 )  PTT:31.6 sec

## 2023-08-15 NOTE — CHART NOTE - NSCHARTNOTEFT_GEN_A_CORE
Patient sitting up in bed in NAD  on BIPAP . D/w respiratory will trial on 6L NC , O2 sat remains 100% . On exam lungs clear , trace crackles at left base .   Discussed with family at bedside . If unable to tolerate will place back on BIPAP .   attending in agreement with above

## 2023-08-16 LAB
ANION GAP SERPL CALC-SCNC: 14 MMOL/L — SIGNIFICANT CHANGE UP (ref 7–14)
BUN SERPL-MCNC: 34 MG/DL — HIGH (ref 7–23)
CALCIUM SERPL-MCNC: 9.3 MG/DL — SIGNIFICANT CHANGE UP (ref 8.4–10.5)
CHLORIDE SERPL-SCNC: 99 MMOL/L — SIGNIFICANT CHANGE UP (ref 98–107)
CO2 SERPL-SCNC: 26 MMOL/L — SIGNIFICANT CHANGE UP (ref 22–31)
CREAT SERPL-MCNC: 0.97 MG/DL — SIGNIFICANT CHANGE UP (ref 0.5–1.3)
EGFR: 52 ML/MIN/1.73M2 — LOW
GLUCOSE SERPL-MCNC: 68 MG/DL — LOW (ref 70–99)
HCT VFR BLD CALC: 35.6 % — SIGNIFICANT CHANGE UP (ref 34.5–45)
HGB BLD-MCNC: 11.4 G/DL — LOW (ref 11.5–15.5)
MAGNESIUM SERPL-MCNC: 2.1 MG/DL — SIGNIFICANT CHANGE UP (ref 1.6–2.6)
MCHC RBC-ENTMCNC: 30.5 PG — SIGNIFICANT CHANGE UP (ref 27–34)
MCHC RBC-ENTMCNC: 32 GM/DL — SIGNIFICANT CHANGE UP (ref 32–36)
MCV RBC AUTO: 95.2 FL — SIGNIFICANT CHANGE UP (ref 80–100)
NRBC # BLD: 0 /100 WBCS — SIGNIFICANT CHANGE UP (ref 0–0)
NRBC # FLD: 0 K/UL — SIGNIFICANT CHANGE UP (ref 0–0)
PHOSPHATE SERPL-MCNC: 3.5 MG/DL — SIGNIFICANT CHANGE UP (ref 2.5–4.5)
PLATELET # BLD AUTO: 243 K/UL — SIGNIFICANT CHANGE UP (ref 150–400)
POTASSIUM SERPL-MCNC: 3.8 MMOL/L — SIGNIFICANT CHANGE UP (ref 3.5–5.3)
POTASSIUM SERPL-SCNC: 3.8 MMOL/L — SIGNIFICANT CHANGE UP (ref 3.5–5.3)
RBC # BLD: 3.74 M/UL — LOW (ref 3.8–5.2)
RBC # FLD: 14.2 % — SIGNIFICANT CHANGE UP (ref 10.3–14.5)
SODIUM SERPL-SCNC: 139 MMOL/L — SIGNIFICANT CHANGE UP (ref 135–145)
WBC # BLD: 9.37 K/UL — SIGNIFICANT CHANGE UP (ref 3.8–10.5)
WBC # FLD AUTO: 9.37 K/UL — SIGNIFICANT CHANGE UP (ref 3.8–10.5)

## 2023-08-16 PROCEDURE — 99233 SBSQ HOSP IP/OBS HIGH 50: CPT

## 2023-08-16 RX ORDER — FUROSEMIDE 40 MG
20 TABLET ORAL
Refills: 0 | Status: DISCONTINUED | OUTPATIENT
Start: 2023-08-16 | End: 2023-08-18

## 2023-08-16 RX ADMIN — Medication 3 MILLILITER(S): at 04:02

## 2023-08-16 RX ADMIN — Medication 3 MILLILITER(S): at 22:08

## 2023-08-16 RX ADMIN — Medication 20 MILLIGRAM(S): at 06:53

## 2023-08-16 RX ADMIN — ATORVASTATIN CALCIUM 20 MILLIGRAM(S): 80 TABLET, FILM COATED ORAL at 21:36

## 2023-08-16 RX ADMIN — POLYETHYLENE GLYCOL 3350 17 GRAM(S): 17 POWDER, FOR SOLUTION ORAL at 17:38

## 2023-08-16 RX ADMIN — Medication 1000 UNIT(S): at 12:51

## 2023-08-16 RX ADMIN — SENNA PLUS 2 TABLET(S): 8.6 TABLET ORAL at 21:36

## 2023-08-16 RX ADMIN — Medication 200 MILLIGRAM(S): at 12:49

## 2023-08-16 RX ADMIN — Medication 20 MILLIGRAM(S): at 17:38

## 2023-08-16 RX ADMIN — CARVEDILOL PHOSPHATE 6.25 MILLIGRAM(S): 80 CAPSULE, EXTENDED RELEASE ORAL at 17:37

## 2023-08-16 NOTE — PROGRESS NOTE ADULT - ASSESSMENT
Echo 5/18/23: Moderate LV dysfxn, LVH  Echo 8/1/23: Abnormal septal motion of LV, mild-mod MR, mild-mod TR    A/p  89 y/o female with PMHx of HTN, HLD, HFrEf, Afib (not on AC), hypothyroidism, hx of PPM placement, presents with cough , hypoxia       #Acute respiratory failure with hypoxia.   -likely in the setting of resp infection   -chest xray 8/14 Trace bilateral pleural effusions with left lung base hazy opacification,  likely atelectasis.  -now on NC - med to fu   -droplet for covid exposure   -increase lasix  20 mg to IV BIDy     #Cough, hypoxia   -sp abx of pna/ uti   -initial xray Left basilar hazy opacity, which may reflect atelectasis.  -cta  chest  8/6 with no PE,  Debris within the right interlobar bronchus, without focal consolidation.  Essential interval resolution of  previously noted bilateral pleural effusions and interlobular septal  thickening/groundglass consolidation.  -Bcx neg   -management per med   -repeat chest xay 8/14 noted   -plan as above    #Chronic HFpEF   -events as mentioned above  -increase lasix to 20mg IV Q12   -once weened off NC --eventually restart lasix 20mg po  m,w,f once clinically improves   -recent Echo with abnormal septal motion of LV, mild-mod MR, mild-mod TR    #Afib s/p ppm  -Stable, rate controlled  -Continue coreg  -remains off a/c     #HTN  -stable  -Continue  coreg    #UTI  -sp Abx, mgmt per med        dvt ppx    35 minutes spent on total encounter; more than 50% of the visit was spent counseling and/or coordinating care by the attending physician.

## 2023-08-16 NOTE — SWALLOW BEDSIDE ASSESSMENT ADULT - ASR SWALLOW RECOMMEND DIAG
Consider repeat cinesophagram given inconsistent overt signs and symptoms of laryngeal penetration/aspiration at bedside as well as recent CXR findings. Cinesophagram given inconsistent overt signs and symptoms of laryngeal penetration/aspiration at bedside as well as recent CXR findings.

## 2023-08-16 NOTE — SWALLOW BEDSIDE ASSESSMENT ADULT - SWALLOW EVAL: DIAGNOSIS
1. Functional oral phase for thin liquids, mildly thick liquids, and moderately thick liquids, pureed, minced and moist and regular solids characterized by adequate oral aperture and bolus collection with functional A-P transport. 2. Functional pharyngeal phase for moderately thick liquids, pureed, and minced and moist characterized by initiation of the pharyngeal swallow with hyolaryngeal elevation and excursion upon digital palpation, without evidence of airway invasion. 4. Suspected severe pharyngeal dysphagia for thin liquids and mildly thick liquids as well as regular solids characterized by suspected delay in the pharyngeal swallow with hyolaryngeal elevation and excursion upon digital palpation. There was an immediate cough after the swallow for regular solids indicative of laryngeal penetration/aspiration. There was an inconsistent wet vocal quality/throat clear after thin liquids and mildly thick liquids which may be indicative of laryngeal penetration/aspiration.

## 2023-08-16 NOTE — SWALLOW BEDSIDE ASSESSMENT ADULT - PHARYNGEAL PHASE
Within functional limits inconsistent/Delayed pharyngeal swallow/Wet vocal quality post oral intake/Throat clear post oral intake Cough post oral intake

## 2023-08-16 NOTE — PROGRESS NOTE ADULT - ASSESSMENT
89F presents with right foot heel deep tissue injury   - patient seen and evaluated   - Ecchymosis present on the right plantar heel, no open wounds or signs of infection bilaterally.   - continueallevyn pad on right heel for extra cushion  - strict decubitus precaution with z-nika at all times while in bed   - no acute pod intervention at this time   - Will follow

## 2023-08-16 NOTE — SWALLOW BEDSIDE ASSESSMENT ADULT - CONSISTENCIES ADMINISTERED
3 trials/pureed 3 trials/minced & moist 2 oz each/thin liquid/mildly thick 2 trials/regular solid 3 cup sips/moderately thick

## 2023-08-16 NOTE — PROGRESS NOTE ADULT - SUBJECTIVE AND OBJECTIVE BOX
[facility] Medicine  Pager [phone]    Patient is a 89y old  Female who presents with a chief complaint of hypoxia (16 Aug 2023 10:15)      INTERVAL HPI/OVERNIGHT EVENTS:    MEDICATIONS  (STANDING):  albuterol/ipratropium for Nebulization 3 milliLiter(s) Nebulizer every 6 hours  atorvastatin 20 milliGRAM(s) Oral at bedtime  carvedilol 6.25 milliGRAM(s) Oral every 12 hours  cholecalciferol 1000 Unit(s) Oral daily  dextrose 5% + sodium chloride 0.45%. 1000 milliLiter(s) (50 mL/Hr) IV Continuous <Continuous>  furosemide   Injectable 20 milliGRAM(s) IV Push daily  guaiFENesin Oral Liquid (Sugar-Free) 200 milliGRAM(s) Oral every 6 hours  levothyroxine 100 MICROGram(s) Oral daily  polyethylene glycol 3350 17 Gram(s) Oral two times a day  senna 2 Tablet(s) Oral at bedtime  sorbitol 70%/mineral oil/magnesium hydroxide/glycerin Enema 120 milliLiter(s) Rectal once    MEDICATIONS  (PRN):      Allergies    cephalexin (Hives)  [This allergen will not trigger allergy alert] trisulfapyrimidine (Rash)  sulfa topicals (Unknown)    Intolerances    morphine (Drowsiness; Faint; Hypotension)      REVIEW OF SYSTEMS:  Please see interval HPI:    Vital Signs Last 24 Hrs  T(C): 36.3 (16 Aug 2023 06:48), Max: 36.4 (15 Aug 2023 14:03)  T(F): 97.3 (16 Aug 2023 06:48), Max: 97.6 (15 Aug 2023 14:03)  HR: 75 (16 Aug 2023 07:40) (60 - 84)  BP: 150/77 (16 Aug 2023 06:48) (134/92 - 156/85)  BP(mean): --  RR: 20 (16 Aug 2023 06:48) (19 - 20)  SpO2: 94% (16 Aug 2023 07:40) (94% - 98%)    Parameters below as of 16 Aug 2023 06:48  Patient On (Oxygen Delivery Method): nasal cannula  O2 Flow (L/min): 6    I&O's Detail    15 Aug 2023 07:01  -  16 Aug 2023 07:00  --------------------------------------------------------  IN:  Total IN: 0 mL    OUT:    Voided (mL): 600 mL  Total OUT: 600 mL    Total NET: -600 mL      16 Aug 2023 07:01  -  16 Aug 2023 11:47  --------------------------------------------------------  IN:    Oral Fluid: 280 mL  Total IN: 280 mL    OUT:    Voided (mL): 300 mL  Total OUT: 300 mL    Total NET: -20 mL            PHYSICAL EXAM:  GENERAL:   HEAD:    EYES:   ENMT:   NECK:   NERVOUS SYSTEM:    CHEST/LUNG:   HEART:   ABDOMEN:   EXTREMITIES:    LYMPH:   SKIN:     LABS:                        11.4   9.37  )-----------( 243      ( 16 Aug 2023 05:30 )             35.6     16 Aug 2023 05:30    139    |  99     |  34     ----------------------------<  68     3.8     |  26     |  0.97     Ca    9.3        16 Aug 2023 05:30  Phos  3.5       16 Aug 2023 05:30  Mg     2.10      16 Aug 2023 05:30        CAPILLARY BLOOD GLUCOSE        BLOOD CULTURE    RADIOLOGY & ADDITIONAL TESTS:    Imaging Personally Reviewed:  [ ] YES     Consultant(s) Notes Reviewed:      Care Discussed with Consultants/Other Providers: [facility]  Pager [phone]    Patient is a 89y old  Female who presents with a chief complaint of hypoxia (16 Aug 2023 10:15)      INTERVAL HPI/OVERNIGHT EVENTS:  Reports feeling much better, happy that mask (bipap) is off, is comfortable on supplemental O2 via NC. Underwent swallow eval earlier this AM, is eager to eat/drink. Daughters arrived at bedside, glad that patient improving, updated on course, in agreement with plan. Will continue respiratory support and wean supplemental oxygen as tolerated.     MEDICATIONS  (STANDING):  albuterol/ipratropium for Nebulization 3 milliLiter(s) Nebulizer every 6 hours  atorvastatin 20 milliGRAM(s) Oral at bedtime  carvedilol 6.25 milliGRAM(s) Oral every 12 hours  cholecalciferol 1000 Unit(s) Oral daily  dextrose 5% + sodium chloride 0.45%. 1000 milliLiter(s) (50 mL/Hr) IV Continuous <Continuous>  furosemide   Injectable 20 milliGRAM(s) IV Push daily  guaiFENesin Oral Liquid (Sugar-Free) 200 milliGRAM(s) Oral every 6 hours  levothyroxine 100 MICROGram(s) Oral daily  polyethylene glycol 3350 17 Gram(s) Oral two times a day  senna 2 Tablet(s) Oral at bedtime  sorbitol 70%/mineral oil/magnesium hydroxide/glycerin Enema 120 milliLiter(s) Rectal once    MEDICATIONS  (PRN):    Allergies  cephalexin (Hives)  [This allergen will not trigger allergy alert] trisulfapyrimidine (Rash)  sulfa topicals (Unknown)    Intolerances  morphine (Drowsiness; Faint; Hypotension)    REVIEW OF SYSTEMS:  Please see interval HPI:    Vital Signs Last 24 Hrs  T(C): 36.3 (16 Aug 2023 06:48), Max: 36.4 (15 Aug 2023 14:03)  T(F): 97.3 (16 Aug 2023 06:48), Max: 97.6 (15 Aug 2023 14:03)  HR: 75 (16 Aug 2023 07:40) (60 - 84)  BP: 150/77 (16 Aug 2023 06:48) (134/92 - 156/85)  RR: 20 (16 Aug 2023 06:48) (19 - 20)  SpO2: 94% (16 Aug 2023 07:40) (94% - 98%)    Parameters below as of 16 Aug 2023 06:48  Patient On (Oxygen Delivery Method): nasal cannula  O2 Flow (L/min): 6    I&O's Detail    15 Aug 2023 07:01  -  16 Aug 2023 07:00  --------------------------------------------------------  IN:  Total IN: 0 mL    OUT:    Voided (mL): 600 mL  Total OUT: 600 mL    Total NET: -600 mL      16 Aug 2023 07:01  -  16 Aug 2023 11:47  --------------------------------------------------------  IN:    Oral Fluid: 280 mL  Total IN: 280 mL    OUT:    Voided (mL): 300 mL  Total OUT: 300 mL    Total NET: -20 mL    PHYSICAL EXAM:  GENERAL: NAD, lying in bed, comfortable on supplemental O2  HEAD:  NC/AT  EYES: EOMI, clear sclera/conjunctiva  ENMT: MMM  NECK: supple   NERVOUS SYSTEM: moving extremities, conversant    CHEST/LUNG: no respiratory distress, speaking in full sentences, clear anteriorly  HEART: S1S2 RRR  ABDOMEN: soft, non-tender   EXTREMITIES:  no c/c/e    LABS:                        11.4   9.37  )-----------( 243      ( 16 Aug 2023 05:30 )             35.6     16 Aug 2023 05:30    139    |  99     |  34     ----------------------------<  68     3.8     |  26     |  0.97     Ca    9.3        16 Aug 2023 05:30  Phos  3.5       16 Aug 2023 05:30  Mg     2.10      16 Aug 2023 05:30        CAPILLARY BLOOD GLUCOSE        BLOOD CULTURE    RADIOLOGY & ADDITIONAL TESTS:    Imaging Personally Reviewed:  [ ] YES     Consultant(s) Notes Reviewed:  Cards    Care Discussed with Consultants/Other Providers: YASEMIN Trejo re: off bipap, swallow eval noted, resuming diet, cards following

## 2023-08-16 NOTE — PROGRESS NOTE ADULT - SUBJECTIVE AND OBJECTIVE BOX
Patient is a 89y old  Female who presents with a chief complaint of hypoxia (15 Aug 2023 10:51)       INTERVAL HPI/OVERNIGHT EVENTS:  Patient seen and evaluated at bedside.  Pt is resting comfortable in NAD. Denies N/V/F/C.  Pain rated at X/10    Allergies    cephalexin (Hives)  [This allergen will not trigger allergy alert] trisulfapyrimidine (Rash)  sulfa topicals (Unknown)    Intolerances    morphine (Drowsiness; Faint; Hypotension)      Vital Signs Last 24 Hrs  T(C): 36.3 (16 Aug 2023 06:48), Max: 36.4 (15 Aug 2023 14:03)  T(F): 97.3 (16 Aug 2023 06:48), Max: 97.6 (15 Aug 2023 14:03)  HR: 75 (16 Aug 2023 07:40) (60 - 84)  BP: 150/77 (16 Aug 2023 06:48) (133/70 - 156/85)  BP(mean): --  RR: 20 (16 Aug 2023 06:48) (19 - 20)  SpO2: 94% (16 Aug 2023 07:40) (94% - 100%)    Parameters below as of 16 Aug 2023 06:48  Patient On (Oxygen Delivery Method): nasal cannula  O2 Flow (L/min): 6      LABS:                        11.4   9.37  )-----------( 243      ( 16 Aug 2023 05:30 )             35.6     08-16    139  |  99  |  34<H>  ----------------------------<  68<L>  3.8   |  26  |  0.97    Ca    9.3      16 Aug 2023 05:30  Phos  3.5     08-16  Mg     2.10     08-16    TPro  6.9  /  Alb  2.7<L>  /  TBili  0.3  /  DBili  x   /  AST  22  /  ALT  27  /  AlkPhos  125<H>  08-15      Urinalysis Basic - ( 16 Aug 2023 05:30 )    Color: x / Appearance: x / SG: x / pH: x  Gluc: 68 mg/dL / Ketone: x  / Bili: x / Urobili: x   Blood: x / Protein: x / Nitrite: x   Leuk Esterase: x / RBC: x / WBC x   Sq Epi: x / Non Sq Epi: x / Bacteria: x      CAPILLARY BLOOD GLUCOSE          Lower Extremity Physical Exam:  Vascular: DP/PT 1/4, B/L, CFT <3 seconds B/L, Temperature gradient warm to cool, B/L.   Neuro: Epicritic sensation intact to the level of digits, B/L.  Musculoskeletal/Ortho: no pain upon palpation or range of motion b/l.   Skin: ecchymosis present on the right plantar heel, no open wounds or signs of infection bilaterally.     RADIOLOGY & ADDITIONAL TESTS:

## 2023-08-16 NOTE — PROGRESS NOTE ADULT - SUBJECTIVE AND OBJECTIVE BOX
CARDIOLOGY FOLLOW UP - Dr. Riley  Date of Service: 8/16/23  CC: transitioned to NC    Review of Systems:  Constitutional: No fever, weight loss, or fatigue  Respiratory: No cough, wheezing, or hemoptysis, no shortness of breath  Cardiovascular: No chest pain, palpitations, passing out, dizziness, or leg swelling  Gastrointestinal: No abd or epigastric pain. No nausea, vomiting, or hematemesis; no diarrhea or consiptaiton, no melena or hematochezia  Vascular: No edema     TELEMETRY:    PHYSICAL EXAM:  T(C): 36.3 (08-16-23 @ 06:48), Max: 36.4 (08-15-23 @ 14:03)  HR: 75 (08-16-23 @ 07:40) (60 - 84)  BP: 150/77 (08-16-23 @ 06:48) (133/70 - 156/85)  RR: 20 (08-16-23 @ 06:48) (19 - 20)  SpO2: 94% (08-16-23 @ 07:40) (94% - 100%)  Wt(kg): --  I&O's Summary    15 Aug 2023 07:01  -  16 Aug 2023 07:00  --------------------------------------------------------  IN: 0 mL / OUT: 600 mL / NET: -600 mL        Appearance: Normal	  Cardiovascular: Normal S1 S2,RRR, No JVD, No murmurs  Respiratory: Lungs clear to auscultation	  Gastrointestinal:  Soft, Non-tender, + BS	  Extremities: Normal range of motion, No clubbing, cyanosis or edema  Vascular: Peripheral pulses palpable 2+ bilaterally       Home Medications:  amLODIPine 5 mg oral tablet: 1 tab(s) orally once a day (07 Aug 2023 06:47)  Biofreeze 4% topical gel: Apply topically to affected area 2 times a day to both knees (07 Aug 2023 06:47)  carvedilol 6.25 mg oral tablet: 1 tab(s) orally 2 times a day (07 Aug 2023 06:47)  furosemide 20 mg oral tablet: 1 tab(s) orally 3 times a week MWF (11 Aug 2023 16:04)  lactobacillus acidophilus oral capsule: 1 cap(s) orally once a day (07 Aug 2023 06:47)  levothyroxine 100 mcg (0.1 mg) oral tablet: 1 tab(s) orally once a day (07 Aug 2023 06:47)  methenamine hippurate 1 g oral tablet: 1 tab(s) orally once a day (07 Aug 2023 06:47)  nystatin 100,000 units/g topical powder: Apply topically to affected area once a day to bust after showers (07 Aug 2023 06:47)  Vitamin D3 25 mcg (1000 intl units) oral tablet: 1 tab(s) orally once a day (07 Aug 2023 06:47)        MEDICATIONS  (STANDING):  albuterol/ipratropium for Nebulization 3 milliLiter(s) Nebulizer every 6 hours  atorvastatin 20 milliGRAM(s) Oral at bedtime  carvedilol 6.25 milliGRAM(s) Oral every 12 hours  cholecalciferol 1000 Unit(s) Oral daily  dextrose 5% + sodium chloride 0.45%. 1000 milliLiter(s) (50 mL/Hr) IV Continuous <Continuous>  furosemide   Injectable 20 milliGRAM(s) IV Push daily  guaiFENesin Oral Liquid (Sugar-Free) 200 milliGRAM(s) Oral every 6 hours  levothyroxine 100 MICROGram(s) Oral daily  polyethylene glycol 3350 17 Gram(s) Oral two times a day  senna 2 Tablet(s) Oral at bedtime  sorbitol 70%/mineral oil/magnesium hydroxide/glycerin Enema 120 milliLiter(s) Rectal once        EKG:  RADIOLOGY:  DIAGNOSTIC TESTING:  [ ] Echocardiogram:  [ ] Catherterization:  [ ] Stress Test:  OTHER:     LABS:	 	                          11.4   9.37  )-----------( 243      ( 16 Aug 2023 05:30 )             35.6     08-16    139  |  99  |  34<H>  ----------------------------<  68<L>  3.8   |  26  |  0.97    Ca    9.3      16 Aug 2023 05:30  Phos  3.5     08-16  Mg     2.10     08-16    TPro  6.9  /  Alb  2.7<L>  /  TBili  0.3  /  DBili  x   /  AST  22  /  ALT  27  /  AlkPhos  125<H>  08-15          CARDIAC MARKERS:

## 2023-08-16 NOTE — PROGRESS NOTE ADULT - ASSESSMENT
89 yo F w/ HFpEF, afib s/p ppm, HTN, HLD, hypothyroid admitted with acute hypoxic respiratory failure in setting of sepsis 2/2 PNA and possible HF exacerbation, c/b acute kidney injury (improving) and BRBPR.     # Acute hypoxic respiratory failure  - initial episode felt to be secondary to infection and possible CHF exacerbation, was s/p antibiotic course for PNA and diuresis with improvement, plan was to wean O2 as tolerated   - however patient hypoxic 8/14 AM to 60s despite use of NRB/NFNC  - CXR obtained, noting trace bilateral pleural effusions and hazy opacification  - had covid exposure as well, RVP 8/13 neg, may need to repeat in few days (ie see if developed COVID, to repeat RVP on 8/18)  - s/p IV lasix x1, will continue with IV lasix as per cards (lasix 20 mg bid), proBNP elevated, reassess, hopefully can transition to PO regimen when off supplemental O2, cardiology input appreciated   - now off bipap, appears comfortable on 6L NC, will continue to wean supplemental O2 as tolerated  - c/w chest PT, supportive care, symptomatic management of cough  - minced/moist diet with moderate thick liquids, aspiration precautions (appreciate swallow eval) as now off bipap  - monitor respiratory status closely, continue discussions if respiratory status worsens (patient requests medications to keep her comfortable)  - DNR/DNI  - will look for facility placement if O2 requirements decreasing    # BRBPR  - still reporting some episodes but no plan for endoscopic intervention at this time, given age, comorbidities and patient/family desires  - hgb 11.4 this AM, continue to monitor, continue discussions re: GOC  - holding VTE pharm ppx    # congestive heart failure  - HFpEF per cardiology, echo 2022 w/ EF 55-60%, though more recent echo noting LV systolic dysfunction as well  - s/p IV lasix as per cards, continue IV lasix 20 mg bid as per cards  - eventual plan to resume PO diuresis MWF once stabilized/weaning off supplemental O2  - c/w coreg as per cards    #  Sepsis  - met sepsis criteria with Tmax <36C and leukocytosis, likely secondary to PNA. +/- viral infection, +/- UTI  - initial RVP positive for enterovirus, c/w supportive care  - completed course of Zosyn for potential bacterial PNA  - Ucx w/ enterococcus, s/p dose Vancomycin  - sepsis resolved     # Afib s/p ppm  - controlled, on coreg  - not on anticoagulation    # Hypothyroidism  - c/w synthroid    # HLD  - on statin, but should discuss risk/benefits of continued use given age (89)    # Essential HTN  - on coreg  - amlodipine on hold  - monitor BP, given age would be more liberal with BP goals given wish to avoid hypotension    # Constipation  - c/w bowel regimen    # Need for prophylactic measure  - VTE ppx: no pharm ppx given BRBPR, -> SCDs      Dispo: ?facility placement if O2 requirements improving (currently trying to wean from 6L NC)    Updated both daughters bedside on 8/16

## 2023-08-17 PROCEDURE — 99232 SBSQ HOSP IP/OBS MODERATE 35: CPT

## 2023-08-17 RX ORDER — ALBUTEROL 90 UG/1
2 AEROSOL, METERED ORAL EVERY 6 HOURS
Refills: 0 | Status: COMPLETED | OUTPATIENT
Start: 2023-08-17 | End: 2024-07-15

## 2023-08-17 RX ADMIN — POLYETHYLENE GLYCOL 3350 17 GRAM(S): 17 POWDER, FOR SOLUTION ORAL at 17:22

## 2023-08-17 RX ADMIN — POLYETHYLENE GLYCOL 3350 17 GRAM(S): 17 POWDER, FOR SOLUTION ORAL at 05:59

## 2023-08-17 RX ADMIN — CARVEDILOL PHOSPHATE 6.25 MILLIGRAM(S): 80 CAPSULE, EXTENDED RELEASE ORAL at 06:03

## 2023-08-17 RX ADMIN — Medication 100 MILLIGRAM(S): at 21:44

## 2023-08-17 RX ADMIN — Medication 20 MILLIGRAM(S): at 14:48

## 2023-08-17 RX ADMIN — SENNA PLUS 2 TABLET(S): 8.6 TABLET ORAL at 21:44

## 2023-08-17 RX ADMIN — ATORVASTATIN CALCIUM 20 MILLIGRAM(S): 80 TABLET, FILM COATED ORAL at 21:44

## 2023-08-17 RX ADMIN — CARVEDILOL PHOSPHATE 6.25 MILLIGRAM(S): 80 CAPSULE, EXTENDED RELEASE ORAL at 17:23

## 2023-08-17 RX ADMIN — Medication 20 MILLIGRAM(S): at 06:00

## 2023-08-17 RX ADMIN — Medication 100 MICROGRAM(S): at 05:59

## 2023-08-17 RX ADMIN — Medication 1000 UNIT(S): at 14:51

## 2023-08-17 NOTE — CHART NOTE - NSCHARTNOTEFT_GEN_A_CORE
Source: Patient [ x]    Family [x ] Daughter by bedside     other [x ] chart review    Current Diet : Diet, DASH/TLC:   Sodium & Cholesterol Restricted  Minced and Moist (MINCEDMOIST)  Moderately Thick Liquids (MODTHICKLIQS) (08-16-23 @ 10:57) [Active]    PO intake:  < 50% [x]   Height (cm): 165.1 (07 Aug 2023 17:51)  Weight (kg): 69.4kg (8/17), 67.6 (8/7)  BMI (kg/m2): 25.4 (8/17)     Nutrition Note: 89 yo F w/ HFpEF, afib s/p ppm, HTN, HLD, hypothyroid admitted with acute hypoxic respiratory failure in setting of sepsis 2/2 PNA and possible HF exacerbation, c/b acute kidney injury (improving) and BRBPR, per chart.     Patient is seen for nutrition follow-up. Patient is consuming lunch during visit. Patient is able to feed self with tray setup. Patient does not like the minced and moist diet consistency. Only consumed applesauce for lunch. Food preferences obtained and updated on CBORD. Discussed different oral nutritional supplements with patient and daughter. Patient requested Ensure, will recommend. And amendable to Mighty shake, Magic cup supplement for trial. As per swallow eval dated 8/16, recommended Minced and moist with moderately thick liquids as tolerated. Noted patient was NPO, on bipap 8/14-8/16. Patient was consuming % of meals prior. Patient is at risk for malnutrition, will continue to monitor % po intake. Patient denies any nausea, vomiting, diarrhea, constipation during visit, reports last bowel movement 8/15. Bowel regimen is in placed. Noted patient has fluid related weight gain of +1.8kg/+2.6%BW x 10days. Will continue to monitor weight trend. Per RN flow sheet, noted patient has 1+ edema to right leg, left leg, DTI to right heel. Recommend adding multivitamin, vitamin c for micronutrient support.       __________________ Pertinent Medications__________________   MEDICATIONS  (STANDING):  albuterol/ipratropium for Nebulization 3 milliLiter(s) Nebulizer every 6 hours  atorvastatin 20 milliGRAM(s) Oral at bedtime  carvedilol 6.25 milliGRAM(s) Oral every 12 hours  cholecalciferol 1000 Unit(s) Oral daily  dextrose 5% + sodium chloride 0.45%. 1000 milliLiter(s) (50 mL/Hr) IV Continuous <Continuous>  furosemide   Injectable 20 milliGRAM(s) IV Push two times a day  levothyroxine 100 MICROGram(s) Oral daily  polyethylene glycol 3350 17 Gram(s) Oral two times a day  senna 2 Tablet(s) Oral at bedtime  sorbitol 70%/mineral oil/magnesium hydroxide/glycerin Enema 120 milliLiter(s) Rectal once    MEDICATIONS  (PRN):  benzonatate 100 milliGRAM(s) Oral every 8 hours PRN Cough      __________________ Pertinent Labs__________________   08-16 Na139 mmol/L Glu 68 mg/dL<L> K+ 3.8 mmol/L Cr  0.97 mg/dL BUN 34 mg/dL<H> 08-16 Phos 3.5 mg/dL 08-15 Alb 2.7 g/dL<L>    Estimated Needs:   [x ] no change since previous assessment    Previous Nutrition Diagnosis:   [x ] Increased Nutrient Needs [ x] Unintended Weight Loss  Nutrition Diagnosis is [x ] ongoing    New Nutrition Diagnosis: [x ] not applicable    Education: [x ]Not warranted at this time.     Interventions:   Recommend  [x ] Continue with current diet.   [x ] Recommend adding Ensure Plus High Protein 8oz 1x/day (350kcal, 20gm protein) for nutrient support.   [x ] Nutrition Department to provide Magic Cup 2x/day (580kcal, 18g, protein), mighty shake 4oz moderately shake 2x/day (440kcal, 12gm protein).   [x ] Recommend adding multivitamin, vitamin c for micronutrient support.   [x ] Obtain weekly weight, document % PO intake, to monitor trend.   [x ] Encourage PO intake and honor food preferences as able.     Monitoring and Evaluation:   [x ] PO intake [x ] Tolerance to diet prescription [x ] weights [x ] follow up per protocol  [x ] other: bowel movement, skin integrity, labs.

## 2023-08-17 NOTE — PROGRESS NOTE ADULT - ASSESSMENT
Echo 5/18/23: Moderate LV dysfxn, LVH  Echo 8/1/23: Abnormal septal motion of LV, mild-mod MR, mild-mod TR    A/p  89 y/o female with PMHx of HTN, HLD, HFrEf, Afib (not on AC), hypothyroidism, hx of PPM placement, presents with cough , hypoxia       #Acute respiratory failure with hypoxia.   -in the setting of resp infection   -chest xray 8/14 Trace bilateral pleural effusions with left lung base hazy opacification,  likely atelectasis.  -cont supp o2, dec as able   -droplet for covid exposure   -continue IV lasix 20mg IV BID for now    #Cough, hypoxia   -sp abx of pna/ uti   -initial xray Left basilar hazy opacity, which may reflect atelectasis.  -cta  chest  8/6 with no PE,  Debris within the right interlobar bronchus, without focal consolidation.  Essential interval resolution of  previously noted bilateral pleural effusions and interlobular septal  thickening/groundglass consolidation.  -Bcx neg   -management per med   -repeat chest xay 8/14 noted   -plan as above    #Chronic HFpEF   -events as mentioned above  -cont iv lasix for now, likely change to po in 24-48 hours pending o2 requirement   -eventual change to lasix 20mg po  m,w,f once clinically improves   -recent Echo with abnormal septal motion of LV, mild-mod MR, mild-mod TR    #Afib s/p ppm  -Stable, rate controlled  -Continue coreg  -remains off a/c     #HTN  -stable  -Continue  coreg    #UTI  -sp Abx, mgmt per med        dvt ppx    35 minutes spent on total encounter; more than 50% of the visit was spent counseling and/or coordinating care by the attending physician.

## 2023-08-17 NOTE — PROGRESS NOTE ADULT - SUBJECTIVE AND OBJECTIVE BOX
CARDIOLOGY FOLLOW UP NOTE - DR. CHO    Patient Name: SOLIS TAYLOR  Date of Service: 23 @ 12:40    events noted      Subjective:    cv: denies chest pain, dyspnea, palpitations, dizziness  pulmonary: denies cough  GI: denies abdominal pain, nausea, vomiting  vascular/legs: no edema   skin: no rash  ROS: otherwise negative   overnight events:      PHYSICAL EXAM:  T(C): 36.4 (23 @ 05:56), Max: 36.4 (23 @ 05:56)  HR: 63 (23 @ 05:56) (63 - 73)  BP: 103/50 (23 @ 05:56) (103/50 - 150/75)  RR: 18 (23 @ 05:56) (18 - 20)  SpO2: 97% (23 @ 05:56) (95% - 97%)  Wt(kg): --  I&O's Summary    16 Aug 2023 07:01  -  17 Aug 2023 07:00  --------------------------------------------------------  IN: 640 mL / OUT: 700 mL / NET: -60 mL      Daily     Daily Weight in k.4 (17 Aug 2023 05:56)    Appearance: Normal	  Cardiovascular: Normal S1 S2,RRR, No JVD, No murmurs  Respiratory: Lungs clear to auscultation	  Gastrointestinal:  Soft, Non-tender, + BS	  Extremities: Normal range of motion, No clubbing, cyanosis or edema      Home Medications:  amLODIPine 5 mg oral tablet: 1 tab(s) orally once a day (07 Aug 2023 06:47)  Biofreeze 4% topical gel: Apply topically to affected area 2 times a day to both knees (07 Aug 2023 06:47)  carvedilol 6.25 mg oral tablet: 1 tab(s) orally 2 times a day (07 Aug 2023 06:47)  furosemide 20 mg oral tablet: 1 tab(s) orally 3 times a week MWF (11 Aug 2023 16:04)  lactobacillus acidophilus oral capsule: 1 cap(s) orally once a day (07 Aug 2023 06:47)  levothyroxine 100 mcg (0.1 mg) oral tablet: 1 tab(s) orally once a day (07 Aug 2023 06:47)  methenamine hippurate 1 g oral tablet: 1 tab(s) orally once a day (07 Aug 2023 06:47)  nystatin 100,000 units/g topical powder: Apply topically to affected area once a day to bust after showers (07 Aug 2023 06:47)  Vitamin D3 25 mcg (1000 intl units) oral tablet: 1 tab(s) orally once a day (07 Aug 2023 06:47)      MEDICATIONS  (STANDING):  albuterol/ipratropium for Nebulization 3 milliLiter(s) Nebulizer every 6 hours  atorvastatin 20 milliGRAM(s) Oral at bedtime  carvedilol 6.25 milliGRAM(s) Oral every 12 hours  cholecalciferol 1000 Unit(s) Oral daily  dextrose 5% + sodium chloride 0.45%. 1000 milliLiter(s) (50 mL/Hr) IV Continuous <Continuous>  furosemide   Injectable 20 milliGRAM(s) IV Push two times a day  levothyroxine 100 MICROGram(s) Oral daily  polyethylene glycol 3350 17 Gram(s) Oral two times a day  senna 2 Tablet(s) Oral at bedtime  sorbitol 70%/mineral oil/magnesium hydroxide/glycerin Enema 120 milliLiter(s) Rectal once      TELEMETRY: 	    ECG:  	  RADIOLOGY:   DIAGNOSTIC TESTING:  [ ] Echocardiogram:  [ ] Catheterization:  [ ] Stress Test:    OTHER: 	    LABS:	 	    CARDIAC MARKERS:                                      11.4   9.37  )-----------( 243      ( 16 Aug 2023 05:30 )             35.6         139  |  99  |  34<H>  ----------------------------<  68<L>  3.8   |  26  |  0.97    Ca    9.3      16 Aug 2023 05:30  Phos  3.5       Mg     2.10           proBNP:     Lipid Profile:   HgA1c:     Creatinine: 0.97 mg/dL (23 @ 05:30)  Creatinine: 0.98 mg/dL (08-15-23 @ 05:00)

## 2023-08-17 NOTE — PROGRESS NOTE ADULT - ASSESSMENT
89 yo F w/ HFpEF, afib s/p ppm, HTN, HLD, hypothyroid admitted with acute hypoxic respiratory failure in setting of sepsis 2/2 PNA and possible HF exacerbation, c/b acute kidney injury (improving) and BRBPR.     # Acute hypoxic respiratory failure  - initial episode felt to be secondary to infection and possible CHF exacerbation, was s/p antibiotic course for PNA and diuresis with improvement, plan was to wean O2 as tolerated   - however patient hypoxic 8/14 AM to 60s despite use of NRB/NFNC  - CXR obtained, noting trace bilateral pleural effusions and hazy opacification  - had covid exposure as well, RVP 8/13 neg, plan to repeat on 8/18 (ie see if developed COVID)  - s/p IV lasix x1, will continue with IV lasix as per cards (lasix 20 mg bid), proBNP elevated, reassess, will f/u cards recs re: when can transition to oral regimen  - now off bipap, will continue to wean supplemental O2 as tolerated, was on room air at time of my exam, will see how tolerates (or if needs to go back on supp O2)  - c/w chest PT, supportive care, symptomatic management of cough (+tessalon perles)  - c/w minced/moist diet with moderate thick liquids, aspiration precautions (appreciate swallow eval) as now off bipap  - monitor respiratory status closely, continue discussions if respiratory status worsens (patient requests medications to keep her comfortable)  - DNR/DNI  - appreciate CM/SW assistance with facility placement (pending further optimization of respiratory status)    # BRBPR  - still reporting some episodes but no plan for endoscopic intervention at this time, given age, comorbidities and patient/family desires  - hgb 11.4 on 8/16, continue to monitor, continue discussions re: GOC  - holding VTE pharm ppx    # congestive heart failure  - HFpEF per cardiology, echo 2022 w/ EF 55-60%, though more recent echo noting LV systolic dysfunction as well  - continue IV lasix 20 mg bid as per cards, to f/u recs re: when can transition to PO diuretic regimen  - c/w coreg as per cards    #  Sepsis  - met sepsis criteria with Tmax <36C and leukocytosis, likely secondary to PNA. +/- viral infection, +/- UTI  - initial RVP positive for enterovirus, c/w supportive care  - completed course of Zosyn for potential bacterial PNA  - Ucx w/ enterococcus, s/p dose Vancomycin  - sepsis resolved     # Afib s/p ppm  - controlled, on coreg  - not on anticoagulation    # Hypothyroidism  - c/w synthroid    # HLD  - on statin, but should discuss risk/benefits of continued use given age (89)    # Essential HTN  - on coreg  - amlodipine on hold  - monitor BP, given age would be more liberal with BP goals given wish to avoid hypotension    # Constipation  - c/w bowel regimen    # Need for prophylactic measure  - VTE ppx: no pharm ppx given BRBPR, -> SCDs      Dispo: ?back to living facility if O2 requirements improving (patient on room air at time of my exam, will see how tolerates), PT felt patient not able to tolerate rehab (as may be too strenuous for patient...)    Updated daughter at bedside on 8/17

## 2023-08-18 LAB
ANION GAP SERPL CALC-SCNC: 13 MMOL/L — SIGNIFICANT CHANGE UP (ref 7–14)
B PERT DNA SPEC QL NAA+PROBE: SIGNIFICANT CHANGE UP
B PERT+PARAPERT DNA PNL SPEC NAA+PROBE: SIGNIFICANT CHANGE UP
BORDETELLA PARAPERTUSSIS (RAPRVP): SIGNIFICANT CHANGE UP
BUN SERPL-MCNC: 39 MG/DL — HIGH (ref 7–23)
C PNEUM DNA SPEC QL NAA+PROBE: SIGNIFICANT CHANGE UP
CALCIUM SERPL-MCNC: 9 MG/DL — SIGNIFICANT CHANGE UP (ref 8.4–10.5)
CHLORIDE SERPL-SCNC: 94 MMOL/L — LOW (ref 98–107)
CO2 SERPL-SCNC: 25 MMOL/L — SIGNIFICANT CHANGE UP (ref 22–31)
CREAT SERPL-MCNC: 1.02 MG/DL — SIGNIFICANT CHANGE UP (ref 0.5–1.3)
EGFR: 49 ML/MIN/1.73M2 — LOW
FLUAV SUBTYP SPEC NAA+PROBE: SIGNIFICANT CHANGE UP
FLUBV RNA SPEC QL NAA+PROBE: SIGNIFICANT CHANGE UP
GLUCOSE SERPL-MCNC: 105 MG/DL — HIGH (ref 70–99)
HADV DNA SPEC QL NAA+PROBE: SIGNIFICANT CHANGE UP
HCOV 229E RNA SPEC QL NAA+PROBE: SIGNIFICANT CHANGE UP
HCOV HKU1 RNA SPEC QL NAA+PROBE: SIGNIFICANT CHANGE UP
HCOV NL63 RNA SPEC QL NAA+PROBE: SIGNIFICANT CHANGE UP
HCOV OC43 RNA SPEC QL NAA+PROBE: SIGNIFICANT CHANGE UP
HCT VFR BLD CALC: 32.3 % — LOW (ref 34.5–45)
HGB BLD-MCNC: 10.8 G/DL — LOW (ref 11.5–15.5)
HMPV RNA SPEC QL NAA+PROBE: SIGNIFICANT CHANGE UP
HPIV1 RNA SPEC QL NAA+PROBE: SIGNIFICANT CHANGE UP
HPIV2 RNA SPEC QL NAA+PROBE: SIGNIFICANT CHANGE UP
HPIV3 RNA SPEC QL NAA+PROBE: SIGNIFICANT CHANGE UP
HPIV4 RNA SPEC QL NAA+PROBE: SIGNIFICANT CHANGE UP
M PNEUMO DNA SPEC QL NAA+PROBE: SIGNIFICANT CHANGE UP
MAGNESIUM SERPL-MCNC: 1.8 MG/DL — SIGNIFICANT CHANGE UP (ref 1.6–2.6)
MCHC RBC-ENTMCNC: 30.6 PG — SIGNIFICANT CHANGE UP (ref 27–34)
MCHC RBC-ENTMCNC: 33.4 GM/DL — SIGNIFICANT CHANGE UP (ref 32–36)
MCV RBC AUTO: 91.5 FL — SIGNIFICANT CHANGE UP (ref 80–100)
NRBC # BLD: 0 /100 WBCS — SIGNIFICANT CHANGE UP (ref 0–0)
NRBC # FLD: 0 K/UL — SIGNIFICANT CHANGE UP (ref 0–0)
PHOSPHATE SERPL-MCNC: 3.3 MG/DL — SIGNIFICANT CHANGE UP (ref 2.5–4.5)
PLATELET # BLD AUTO: 204 K/UL — SIGNIFICANT CHANGE UP (ref 150–400)
POTASSIUM SERPL-MCNC: 3.4 MMOL/L — LOW (ref 3.5–5.3)
POTASSIUM SERPL-SCNC: 3.4 MMOL/L — LOW (ref 3.5–5.3)
RAPID RVP RESULT: SIGNIFICANT CHANGE UP
RBC # BLD: 3.53 M/UL — LOW (ref 3.8–5.2)
RBC # FLD: 14.2 % — SIGNIFICANT CHANGE UP (ref 10.3–14.5)
RSV RNA SPEC QL NAA+PROBE: SIGNIFICANT CHANGE UP
RV+EV RNA SPEC QL NAA+PROBE: SIGNIFICANT CHANGE UP
SARS-COV-2 RNA SPEC QL NAA+PROBE: SIGNIFICANT CHANGE UP
SODIUM SERPL-SCNC: 132 MMOL/L — LOW (ref 135–145)
WBC # BLD: 10 K/UL — SIGNIFICANT CHANGE UP (ref 3.8–10.5)
WBC # FLD AUTO: 10 K/UL — SIGNIFICANT CHANGE UP (ref 3.8–10.5)

## 2023-08-18 PROCEDURE — 99232 SBSQ HOSP IP/OBS MODERATE 35: CPT

## 2023-08-18 RX ORDER — ALBUTEROL 90 UG/1
2 AEROSOL, METERED ORAL EVERY 6 HOURS
Refills: 0 | Status: DISCONTINUED | OUTPATIENT
Start: 2023-08-18 | End: 2023-08-19

## 2023-08-18 RX ORDER — POTASSIUM CHLORIDE 20 MEQ
40 PACKET (EA) ORAL ONCE
Refills: 0 | Status: COMPLETED | OUTPATIENT
Start: 2023-08-18 | End: 2023-08-18

## 2023-08-18 RX ORDER — FUROSEMIDE 40 MG
20 TABLET ORAL DAILY
Refills: 0 | Status: DISCONTINUED | OUTPATIENT
Start: 2023-08-19 | End: 2023-08-24

## 2023-08-18 RX ADMIN — ATORVASTATIN CALCIUM 20 MILLIGRAM(S): 80 TABLET, FILM COATED ORAL at 22:17

## 2023-08-18 RX ADMIN — ALBUTEROL 2 PUFF(S): 90 AEROSOL, METERED ORAL at 17:13

## 2023-08-18 RX ADMIN — ALBUTEROL 2 PUFF(S): 90 AEROSOL, METERED ORAL at 03:25

## 2023-08-18 RX ADMIN — SENNA PLUS 2 TABLET(S): 8.6 TABLET ORAL at 22:18

## 2023-08-18 RX ADMIN — ALBUTEROL 2 PUFF(S): 90 AEROSOL, METERED ORAL at 22:16

## 2023-08-18 RX ADMIN — Medication 100 MICROGRAM(S): at 06:06

## 2023-08-18 RX ADMIN — Medication 100 MILLIGRAM(S): at 22:17

## 2023-08-18 RX ADMIN — Medication 40 MILLIEQUIVALENT(S): at 17:13

## 2023-08-18 RX ADMIN — ALBUTEROL 2 PUFF(S): 90 AEROSOL, METERED ORAL at 11:25

## 2023-08-18 RX ADMIN — Medication 20 MILLIGRAM(S): at 06:05

## 2023-08-18 RX ADMIN — POLYETHYLENE GLYCOL 3350 17 GRAM(S): 17 POWDER, FOR SOLUTION ORAL at 06:05

## 2023-08-18 RX ADMIN — CARVEDILOL PHOSPHATE 6.25 MILLIGRAM(S): 80 CAPSULE, EXTENDED RELEASE ORAL at 17:13

## 2023-08-18 RX ADMIN — Medication 100 MILLIGRAM(S): at 06:07

## 2023-08-18 RX ADMIN — CARVEDILOL PHOSPHATE 6.25 MILLIGRAM(S): 80 CAPSULE, EXTENDED RELEASE ORAL at 06:06

## 2023-08-18 RX ADMIN — Medication 20 MILLIGRAM(S): at 14:10

## 2023-08-18 RX ADMIN — Medication 1000 UNIT(S): at 14:10

## 2023-08-18 NOTE — PROGRESS NOTE ADULT - SUBJECTIVE AND OBJECTIVE BOX
[facility] Medicine  Pager [phone]    Patient is a 89y old  Female who presents with a chief complaint of hypoxia (18 Aug 2023 11:12)      INTERVAL HPI/OVERNIGHT EVENTS:    MEDICATIONS  (STANDING):  albuterol    90 MICROgram(s) HFA Inhaler 2 Puff(s) Inhalation every 6 hours  atorvastatin 20 milliGRAM(s) Oral at bedtime  carvedilol 6.25 milliGRAM(s) Oral every 12 hours  cholecalciferol 1000 Unit(s) Oral daily  dextrose 5% + sodium chloride 0.45%. 1000 milliLiter(s) (50 mL/Hr) IV Continuous <Continuous>  furosemide   Injectable 20 milliGRAM(s) IV Push two times a day  levothyroxine 100 MICROGram(s) Oral daily  polyethylene glycol 3350 17 Gram(s) Oral two times a day  senna 2 Tablet(s) Oral at bedtime  sorbitol 70%/mineral oil/magnesium hydroxide/glycerin Enema 120 milliLiter(s) Rectal once    MEDICATIONS  (PRN):  benzonatate 100 milliGRAM(s) Oral every 8 hours PRN Cough      Allergies    cephalexin (Hives)  [This allergen will not trigger allergy alert] trisulfapyrimidine (Rash)  sulfa topicals (Unknown)    Intolerances    morphine (Drowsiness; Faint; Hypotension)      REVIEW OF SYSTEMS:  Please see interval HPI:    Vital Signs Last 24 Hrs  T(C): 35.9 (18 Aug 2023 08:54), Max: 36.4 (17 Aug 2023 16:41)  T(F): 96.7 (18 Aug 2023 08:54), Max: 97.5 (17 Aug 2023 16:41)  HR: 69 (18 Aug 2023 08:54) (69 - 75)  BP: 93/61 (18 Aug 2023 08:54) (93/61 - 136/80)  BP(mean): --  RR: 18 (18 Aug 2023 08:54) (18 - 18)  SpO2: 96% (18 Aug 2023 08:54) (93% - 96%)    Parameters below as of 18 Aug 2023 08:54  Patient On (Oxygen Delivery Method): nasal cannula      I&O's Detail        PHYSICAL EXAM:  GENERAL:   HEAD:    EYES:   ENMT:   NECK:   NERVOUS SYSTEM:    CHEST/LUNG:   HEART:   ABDOMEN:   EXTREMITIES:    LYMPH:   SKIN:     LABS:                        10.8   10.00 )-----------( 204      ( 18 Aug 2023 05:38 )             32.3     18 Aug 2023 05:38    132    |  94     |  39     ----------------------------<  105    3.4     |  25     |  1.02     Ca    9.0        18 Aug 2023 05:38  Phos  3.3       18 Aug 2023 05:38  Mg     1.80      18 Aug 2023 05:38        CAPILLARY BLOOD GLUCOSE        BLOOD CULTURE    RADIOLOGY & ADDITIONAL TESTS:    Imaging Personally Reviewed:  [ ] YES     Consultant(s) Notes Reviewed:      Care Discussed with Consultants/Other Providers: [facility]  Pager [phone]    Patient is a 89y old  Female who presents with a chief complaint of hypoxia (18 Aug 2023 11:12)      INTERVAL HPI/OVERNIGHT EVENTS:  Feels sleepy this AM, denies SOB, did have cough but felt that cough meds help. Would like to work with PT, goal is to get better, return to [facility].     MEDICATIONS  (STANDING):  albuterol    90 MICROgram(s) HFA Inhaler 2 Puff(s) Inhalation every 6 hours  atorvastatin 20 milliGRAM(s) Oral at bedtime  carvedilol 6.25 milliGRAM(s) Oral every 12 hours  cholecalciferol 1000 Unit(s) Oral daily  dextrose 5% + sodium chloride 0.45%. 1000 milliLiter(s) (50 mL/Hr) IV Continuous <Continuous>  furosemide   Injectable 20 milliGRAM(s) IV Push two times a day  levothyroxine 100 MICROGram(s) Oral daily  polyethylene glycol 3350 17 Gram(s) Oral two times a day  senna 2 Tablet(s) Oral at bedtime  sorbitol 70%/mineral oil/magnesium hydroxide/glycerin Enema 120 milliLiter(s) Rectal once    MEDICATIONS  (PRN):  benzonatate 100 milliGRAM(s) Oral every 8 hours PRN Cough    Allergies  cephalexin (Hives)  [This allergen will not trigger allergy alert] trisulfapyrimidine (Rash)  sulfa topicals (Unknown)    Intolerances    morphine (Drowsiness; Faint; Hypotension)      REVIEW OF SYSTEMS:  Please see interval HPI:    Vital Signs Last 24 Hrs  T(C): 35.9 (18 Aug 2023 08:54), Max: 36.4 (17 Aug 2023 16:41)  T(F): 96.7 (18 Aug 2023 08:54), Max: 97.5 (17 Aug 2023 16:41)  HR: 69 (18 Aug 2023 08:54) (69 - 75)  BP: 93/61 (18 Aug 2023 08:54) (93/61 - 136/80)  RR: 18 (18 Aug 2023 08:54) (18 - 18)  SpO2: 96% (18 Aug 2023 08:54) (93% - 96%)    Parameters below as of 18 Aug 2023 08:54  Patient On (Oxygen Delivery Method): nasal cannula      I&O's Detail    PHYSICAL EXAM:  GENERAL: NAD, sleeping but arousable to voice  HEAD:  NC/AT  EYES: EOMI, clear sclera/conjunctiva  ENMT: MMM  NECK: supple   NERVOUS SYSTEM: moving extremities, conversant    CHEST/LUNG: no respiratory distress, speaking in full sentences, clear anteriorly, no wheeze appreciated, NC in place (~2L) satting 90s on bedside monitor  HEART: S1S2 RRR  ABDOMEN: soft, non-tender   EXTREMITIES:  no c/c/e  SKIN: +ecchymosis    LABS:                        10.8   10.00 )-----------( 204      ( 18 Aug 2023 05:38 )             32.3     18 Aug 2023 05:38    132    |  94     |  39     ----------------------------<  105    3.4     |  25     |  1.02     Ca    9.0        18 Aug 2023 05:38  Phos  3.3       18 Aug 2023 05:38  Mg     1.80      18 Aug 2023 05:38    CAPILLARY BLOOD GLUCOSE    BLOOD CULTURE    RADIOLOGY & ADDITIONAL TESTS:    Imaging Personally Reviewed:  [ ] YES     Consultant(s) Notes Reviewed:  Cards    Care Discussed with Consultants/Other Providers: YASEMIN Trejo re: weaning down supplemental O2 as tolerated, f/u cards re: diuresis (transition to PO), PT re-eval, ?rehab prior to [facility]?

## 2023-08-18 NOTE — PROGRESS NOTE ADULT - SUBJECTIVE AND OBJECTIVE BOX
CARDIOLOGY FOLLOW UP NOTE - DR. CHO    Patient Name: SOLIS TAYLOR  Date of Service: 08-18-23 @ 11:12    Patient seen and examined    Subjective:    cv: denies chest pain, dyspnea, palpitations, dizziness  pulmonary: denies cough  GI: denies abdominal pain, nausea, vomiting  vascular/legs: no edema   skin: no rash  ROS: otherwise negative   overnight events:      PHYSICAL EXAM:  T(C): 36 (08-17-23 @ 21:33), Max: 36.4 (08-17-23 @ 16:41)  HR: 75 (08-17-23 @ 21:33) (69 - 75)  BP: 102/72 (08-17-23 @ 21:33) (102/72 - 136/80)  RR: 18 (08-17-23 @ 21:33) (18 - 18)  SpO2: 93% (08-17-23 @ 21:33) (93% - 95%)  Wt(kg): --  I&O's Summary    Daily     Daily     Appearance: Normal	  Cardiovascular: Normal S1 S2,RRR, No JVD, No murmurs  Respiratory: Lungs clear to auscultation	  Gastrointestinal:  Soft, Non-tender, + BS	  Extremities: Normal range of motion, No clubbing, cyanosis or edema      Home Medications:  amLODIPine 5 mg oral tablet: 1 tab(s) orally once a day (07 Aug 2023 06:47)  Biofreeze 4% topical gel: Apply topically to affected area 2 times a day to both knees (07 Aug 2023 06:47)  carvedilol 6.25 mg oral tablet: 1 tab(s) orally 2 times a day (07 Aug 2023 06:47)  furosemide 20 mg oral tablet: 1 tab(s) orally 3 times a week MWF (11 Aug 2023 16:04)  lactobacillus acidophilus oral capsule: 1 cap(s) orally once a day (07 Aug 2023 06:47)  levothyroxine 100 mcg (0.1 mg) oral tablet: 1 tab(s) orally once a day (07 Aug 2023 06:47)  methenamine hippurate 1 g oral tablet: 1 tab(s) orally once a day (07 Aug 2023 06:47)  nystatin 100,000 units/g topical powder: Apply topically to affected area once a day to bust after showers (07 Aug 2023 06:47)  Vitamin D3 25 mcg (1000 intl units) oral tablet: 1 tab(s) orally once a day (07 Aug 2023 06:47)      MEDICATIONS  (STANDING):  albuterol    90 MICROgram(s) HFA Inhaler 2 Puff(s) Inhalation every 6 hours  atorvastatin 20 milliGRAM(s) Oral at bedtime  carvedilol 6.25 milliGRAM(s) Oral every 12 hours  cholecalciferol 1000 Unit(s) Oral daily  dextrose 5% + sodium chloride 0.45%. 1000 milliLiter(s) (50 mL/Hr) IV Continuous <Continuous>  furosemide   Injectable 20 milliGRAM(s) IV Push two times a day  levothyroxine 100 MICROGram(s) Oral daily  polyethylene glycol 3350 17 Gram(s) Oral two times a day  senna 2 Tablet(s) Oral at bedtime  sorbitol 70%/mineral oil/magnesium hydroxide/glycerin Enema 120 milliLiter(s) Rectal once      TELEMETRY: 	    ECG:  	  RADIOLOGY:   DIAGNOSTIC TESTING:  [ ] Echocardiogram:  [ ] Catheterization:  [ ] Stress Test:    OTHER: 	    LABS:	 	    CARDIAC MARKERS:                                      10.8   10.00 )-----------( 204      ( 18 Aug 2023 05:38 )             32.3     08-18    132<L>  |  94<L>  |  39<H>  ----------------------------<  105<H>  3.4<L>   |  25  |  1.02    Ca    9.0      18 Aug 2023 05:38  Phos  3.3     08-18  Mg     1.80     08-18      proBNP:     Lipid Profile:   HgA1c:     Creatinine: 1.02 mg/dL (08-18-23 @ 05:38)  Creatinine: 0.97 mg/dL (08-16-23 @ 05:30)

## 2023-08-18 NOTE — PROGRESS NOTE ADULT - SUBJECTIVE AND OBJECTIVE BOX
Patient is a 89y old  Female who presents with a chief complaint of hypoxia (17 Aug 2023 13:26)       INTERVAL HPI/OVERNIGHT EVENTS:  Patient seen and evaluated at bedside.  Pt is resting comfortable in NAD. Denies N/V/F/C.  Pain rated at X/10    Allergies    cephalexin (Hives)  [This allergen will not trigger allergy alert] trisulfapyrimidine (Rash)  sulfa topicals (Unknown)    Intolerances    morphine (Drowsiness; Faint; Hypotension)      Vital Signs Last 24 Hrs  T(C): 36 (17 Aug 2023 21:33), Max: 36.4 (17 Aug 2023 16:41)  T(F): 96.8 (17 Aug 2023 21:33), Max: 97.5 (17 Aug 2023 16:41)  HR: 75 (17 Aug 2023 21:33) (69 - 75)  BP: 102/72 (17 Aug 2023 21:33) (102/72 - 136/80)  BP(mean): --  RR: 18 (17 Aug 2023 21:33) (18 - 18)  SpO2: 93% (17 Aug 2023 21:33) (93% - 95%)    Parameters below as of 17 Aug 2023 21:33  Patient On (Oxygen Delivery Method): room air        LABS:                        10.8   10.00 )-----------( 204      ( 18 Aug 2023 05:38 )             32.3     08-18    132<L>  |  94<L>  |  39<H>  ----------------------------<  105<H>  3.4<L>   |  25  |  1.02    Ca    9.0      18 Aug 2023 05:38  Phos  3.3     08-18  Mg     1.80     08-18        Urinalysis Basic - ( 18 Aug 2023 05:38 )    Color: x / Appearance: x / SG: x / pH: x  Gluc: 105 mg/dL / Ketone: x  / Bili: x / Urobili: x   Blood: x / Protein: x / Nitrite: x   Leuk Esterase: x / RBC: x / WBC x   Sq Epi: x / Non Sq Epi: x / Bacteria: x      CAPILLARY BLOOD GLUCOSE          Lower Extremity Physical Exam:  Vascular: DP/PT 1/4, B/L, CFT <3 seconds B/L, Temperature gradient warm to cool, B/L.   Neuro: Epicritic sensation intact to the level of digits, B/L.  Musculoskeletal/Ortho: no pain upon palpation or range of motion b/l.   Skin: ecchymosis present on the right plantar heel, no open wounds or signs of infection bilaterally.     RADIOLOGY & ADDITIONAL TESTS:

## 2023-08-18 NOTE — PROGRESS NOTE ADULT - ASSESSMENT
Echo 5/18/23: Moderate LV dysfxn, LVH  Echo 8/1/23: Abnormal septal motion of LV, mild-mod MR, mild-mod TR    A/p  89 y/o female with PMHx of HTN, HLD, HFrEf, Afib (not on AC), hypothyroidism, hx of PPM placement, presents with cough , hypoxia       #Acute respiratory failure with hypoxia.   -in the setting of resp infection   -chest xray 8/14 Trace bilateral pleural effusions with left lung base hazy opacification,  likely atelectasis.  -cont supp o2, dec as able   -droplet for covid exposure   -continue IV lasix 20mg IV BID today, CHANGE TO LASIX 20MG PO QD efrain am       #Cough, hypoxia   -sp abx of pna/ uti   -initial xray Left basilar hazy opacity, which may reflect atelectasis.  -cta  chest  8/6 with no PE,  Debris within the right interlobar bronchus, without focal consolidation.  Essential interval resolution of  previously noted bilateral pleural effusions and interlobular septal  thickening/groundglass consolidation.  -Bcx neg   -management per med   -repeat chest xay 8/14 noted   -plan as above    #Chronic HFpEF   -events as mentioned above  -cont iv lasix, change as above  -recent Echo with abnormal septal motion of LV, mild-mod MR, mild-mod TR    #Afib s/p ppm  -Stable, rate controlled  -Continue coreg  -remains off a/c     #HTN  -stable  -Continue  coreg    #UTI  -sp Abx, mgmt per med        dvt ppx    35 minutes spent on total encounter; more than 50% of the visit was spent counseling and/or coordinating care by the attending physician.

## 2023-08-18 NOTE — PROGRESS NOTE ADULT - ASSESSMENT
89 yo F w/ HFpEF, afib s/p ppm, HTN, HLD, hypothyroid admitted with acute hypoxic respiratory failure in setting of sepsis 2/2 PNA and possible HF exacerbation, c/b acute kidney injury (improving) and BRBPR.     # Acute hypoxic respiratory failure  - initial episode felt to be secondary to infection and possible CHF exacerbation, was s/p antibiotic course for PNA and diuresis with improvement, plan was to wean O2 as tolerated   - however patient hypoxic 8/14 AM to 60s despite use of NRB/NFNC  - CXR obtained, noting trace bilateral pleural effusions and hazy opacification  - had covid exposure as well, RVP 8/13 neg, repeat RVP 8/18 thankfully negative  - proBNP elevated, will continue with IV lasix as per cards (lasix 20 mg bid), respiratory status improving, appreciate cards recs, plan to transition to PO lasix 20 mg daily starting 8/19  - now off bipap, will continue to wean supplemental O2 as tolerated, was 2L NC at time of my exam)  - c/w chest PT, supportive care, symptomatic management of cough (+tessalon perles - patient thinks it helps)  - c/w minced/moist diet with moderate thick liquids, aspiration precautions (appreciate swallow eval) as now off bipap  - monitor respiratory status closely, continue discussions if respiratory status worsens (patient requests medications to keep her comfortable)  - DNR/DNI  - appreciate CM/SW assistance with facility placement (pending further optimization of respiratory status)    # BRBPR  - still reporting some episodes but no plan for endoscopic intervention at this time, given age, comorbidities and patient/family desires  - hgb 10.8 on 8/18, continue to monitor, continue discussions re: GOC  - holding VTE pharm ppx    # congestive heart failure  - HFpEF per cardiology, echo 2022 w/ EF 55-60%, though more recent echo noting LV systolic dysfunction as well  - continue IV lasix 20 mg bid for today as per cards, to transition to PO lasix 20 mg on 8/19  - c/w coreg as per cards  - hypokalemia - replete K    #  Sepsis  - met sepsis criteria with Tmax <36C and leukocytosis, likely secondary to PNA. +/- viral infection, +/- UTI  - initial RVP positive for enterovirus, c/w supportive care  - completed course of Zosyn for potential bacterial PNA  - Ucx w/ enterococcus, s/p dose Vancomycin  - sepsis resolved     # Afib s/p ppm  - controlled, on coreg  - not on anticoagulation    # Hypothyroidism  - c/w synthroid    # HLD  - on statin, but should discuss risk/benefits of continued use given age (89)    # Essential HTN  - on coreg  - amlodipine on hold  - monitor BP, given age would be more liberal with BP goals given wish to avoid hypotension    # Constipation  - c/w bowel regimen    # Need for prophylactic measure  - VTE ppx: no pharm ppx given BRBPR, -> SCDs      Dispo: anticipate back to living facility if O2 requirements improving, though functional status may also affect if can get back to her senior care (ie degree of assistance needed), will have PT re-evaluate patient, assess role for rehab prior to return to senior care w/ hospice? Appreciate CM/SW assistance.

## 2023-08-19 LAB
ANION GAP SERPL CALC-SCNC: 12 MMOL/L — SIGNIFICANT CHANGE UP (ref 7–14)
BUN SERPL-MCNC: 37 MG/DL — HIGH (ref 7–23)
CALCIUM SERPL-MCNC: 8.7 MG/DL — SIGNIFICANT CHANGE UP (ref 8.4–10.5)
CHLORIDE SERPL-SCNC: 97 MMOL/L — LOW (ref 98–107)
CO2 SERPL-SCNC: 29 MMOL/L — SIGNIFICANT CHANGE UP (ref 22–31)
CREAT SERPL-MCNC: 1.02 MG/DL — SIGNIFICANT CHANGE UP (ref 0.5–1.3)
EGFR: 49 ML/MIN/1.73M2 — LOW
GLUCOSE SERPL-MCNC: 97 MG/DL — SIGNIFICANT CHANGE UP (ref 70–99)
HCT VFR BLD CALC: 32.1 % — LOW (ref 34.5–45)
HGB BLD-MCNC: 10.7 G/DL — LOW (ref 11.5–15.5)
MAGNESIUM SERPL-MCNC: 1.7 MG/DL — SIGNIFICANT CHANGE UP (ref 1.6–2.6)
MCHC RBC-ENTMCNC: 30.1 PG — SIGNIFICANT CHANGE UP (ref 27–34)
MCHC RBC-ENTMCNC: 33.3 GM/DL — SIGNIFICANT CHANGE UP (ref 32–36)
MCV RBC AUTO: 90.4 FL — SIGNIFICANT CHANGE UP (ref 80–100)
NRBC # BLD: 0 /100 WBCS — SIGNIFICANT CHANGE UP (ref 0–0)
NRBC # FLD: 0 K/UL — SIGNIFICANT CHANGE UP (ref 0–0)
PHOSPHATE SERPL-MCNC: 3.4 MG/DL — SIGNIFICANT CHANGE UP (ref 2.5–4.5)
PLATELET # BLD AUTO: 206 K/UL — SIGNIFICANT CHANGE UP (ref 150–400)
POTASSIUM SERPL-MCNC: 4 MMOL/L — SIGNIFICANT CHANGE UP (ref 3.5–5.3)
POTASSIUM SERPL-SCNC: 4 MMOL/L — SIGNIFICANT CHANGE UP (ref 3.5–5.3)
RBC # BLD: 3.55 M/UL — LOW (ref 3.8–5.2)
RBC # FLD: 14.1 % — SIGNIFICANT CHANGE UP (ref 10.3–14.5)
SODIUM SERPL-SCNC: 138 MMOL/L — SIGNIFICANT CHANGE UP (ref 135–145)
WBC # BLD: 13.43 K/UL — HIGH (ref 3.8–10.5)
WBC # FLD AUTO: 13.43 K/UL — HIGH (ref 3.8–10.5)

## 2023-08-19 PROCEDURE — 99232 SBSQ HOSP IP/OBS MODERATE 35: CPT

## 2023-08-19 RX ORDER — IPRATROPIUM/ALBUTEROL SULFATE 18-103MCG
3 AEROSOL WITH ADAPTER (GRAM) INHALATION EVERY 6 HOURS
Refills: 0 | Status: DISCONTINUED | OUTPATIENT
Start: 2023-08-19 | End: 2023-08-24

## 2023-08-19 RX ADMIN — SENNA PLUS 2 TABLET(S): 8.6 TABLET ORAL at 22:29

## 2023-08-19 RX ADMIN — Medication 20 MILLIGRAM(S): at 06:30

## 2023-08-19 RX ADMIN — Medication 3 MILLILITER(S): at 03:38

## 2023-08-19 RX ADMIN — CARVEDILOL PHOSPHATE 6.25 MILLIGRAM(S): 80 CAPSULE, EXTENDED RELEASE ORAL at 06:30

## 2023-08-19 RX ADMIN — Medication 100 MILLIGRAM(S): at 06:30

## 2023-08-19 RX ADMIN — Medication 100 MICROGRAM(S): at 06:30

## 2023-08-19 RX ADMIN — Medication 3 MILLILITER(S): at 22:58

## 2023-08-19 RX ADMIN — ATORVASTATIN CALCIUM 20 MILLIGRAM(S): 80 TABLET, FILM COATED ORAL at 22:30

## 2023-08-19 RX ADMIN — Medication 3 MILLILITER(S): at 16:29

## 2023-08-19 RX ADMIN — CARVEDILOL PHOSPHATE 6.25 MILLIGRAM(S): 80 CAPSULE, EXTENDED RELEASE ORAL at 17:47

## 2023-08-19 RX ADMIN — Medication 1000 UNIT(S): at 17:49

## 2023-08-19 RX ADMIN — Medication 3 MILLILITER(S): at 10:24

## 2023-08-19 RX ADMIN — Medication 100 MILLIGRAM(S): at 17:46

## 2023-08-19 NOTE — PROGRESS NOTE ADULT - SUBJECTIVE AND OBJECTIVE BOX
CARDIOLOGY FOLLOW UP NOTE - DR. CHO    Patient Name: SOLIS TAYLOR    Date of Service: 23 @ 09:44    Patient seen and examined    Subjective:    cv: denies chest pain, dyspnea, palpitations, dizziness  pulmonary: +cough  GI: denies abdominal pain, nausea, vomiting  vascular/legs: no edema   skin: no rash  ROS: otherwise negative   overnight events:      PHYSICAL EXAM:  T(C): 36.5 (23 @ 06:30), Max: 36.5 (23 @ 06:30)  HR: 68 (23 @ 06:30) (62 - 83)  BP: 118/75 (23 @ 06:30) (106/58 - 118/75)  RR: 20 (23 @ 06:30) (19 - 20)  SpO2: 94% (23 @ 06:30) (92% - 98%)  Wt(kg): --  I&O's Summary    18 Aug 2023 07:  -  19 Aug 2023 07:00  --------------------------------------------------------  IN: 280 mL / OUT: 1300 mL / NET: -1020 mL    19 Aug 2023 07:01  -  19 Aug 2023 09:44  --------------------------------------------------------  IN: 100 mL / OUT: 0 mL / NET: 100 mL      Daily     Daily Weight in k.6 (18 Aug 2023 14:00)    Appearance: Normal	  Cardiovascular: Normal S1 S2,RRR, No JVD, No murmurs  Respiratory: Lungs clear to auscultation	  Gastrointestinal:  Soft, Non-tender, + BS	  Extremities: Normal range of motion, No clubbing, cyanosis or edema      Home Medications:  amLODIPine 5 mg oral tablet: 1 tab(s) orally once a day (07 Aug 2023 06:47)  Biofreeze 4% topical gel: Apply topically to affected area 2 times a day to both knees (07 Aug 2023 06:47)  carvedilol 6.25 mg oral tablet: 1 tab(s) orally 2 times a day (07 Aug 2023 06:47)  furosemide 20 mg oral tablet: 1 tab(s) orally 3 times a week MWF (11 Aug 2023 16:04)  lactobacillus acidophilus oral capsule: 1 cap(s) orally once a day (07 Aug 2023 06:47)  levothyroxine 100 mcg (0.1 mg) oral tablet: 1 tab(s) orally once a day (07 Aug 2023 06:47)  methenamine hippurate 1 g oral tablet: 1 tab(s) orally once a day (07 Aug 2023 06:47)  nystatin 100,000 units/g topical powder: Apply topically to affected area once a day to bust after showers (07 Aug 2023 06:47)  Vitamin D3 25 mcg (1000 intl units) oral tablet: 1 tab(s) orally once a day (07 Aug 2023 06:47)      MEDICATIONS  (STANDING):  albuterol    90 MICROgram(s) HFA Inhaler 2 Puff(s) Inhalation every 6 hours  albuterol/ipratropium for Nebulization 3 milliLiter(s) Nebulizer every 6 hours  atorvastatin 20 milliGRAM(s) Oral at bedtime  carvedilol 6.25 milliGRAM(s) Oral every 12 hours  cholecalciferol 1000 Unit(s) Oral daily  dextrose 5% + sodium chloride 0.45%. 1000 milliLiter(s) (50 mL/Hr) IV Continuous <Continuous>  furosemide    Tablet 20 milliGRAM(s) Oral daily  levothyroxine 100 MICROGram(s) Oral daily  polyethylene glycol 3350 17 Gram(s) Oral two times a day  senna 2 Tablet(s) Oral at bedtime  sorbitol 70%/mineral oil/magnesium hydroxide/glycerin Enema 120 milliLiter(s) Rectal once      TELEMETRY: 	    ECG:  	  RADIOLOGY:   DIAGNOSTIC TESTING:  [ ] Echocardiogram:  [ ] Catheterization:  [ ] Stress Test:    OTHER: 	    LABS:	 	    CARDIAC MARKERS:                                      10.7   13.43 )-----------( 206      ( 19 Aug 2023 04:40 )             32.1     08-19    138  |  97<L>  |  37<H>  ----------------------------<  97  4.0   |  29  |  1.02    Ca    8.7      19 Aug 2023 04:40  Phos  3.4       Mg     1.70           proBNP:     Lipid Profile:   HgA1c:     Creatinine: 1.02 mg/dL (23 @ 04:40)  Creatinine: 1.02 mg/dL (23 @ 05:38)

## 2023-08-19 NOTE — PROGRESS NOTE ADULT - ASSESSMENT
89 yo F w/ HFpEF, afib s/p ppm, HTN, HLD, hypothyroid admitted with acute hypoxic respiratory failure in setting of sepsis 2/2 PNA and possible HF exacerbation, c/b acute kidney injury (improving) and BRBPR.     # Acute hypoxic respiratory failure  - initial episode felt to be secondary to infection and possible CHF exacerbation, was s/p antibiotic course for PNA and diuresis with improvement, plan was to wean O2 as tolerated   - however patient hypoxic 8/14 AM to 60s despite use of NRB/NFNC  - CXR obtained, noting trace bilateral pleural effusions and hazy opacification  - had covid exposure as well, RVP 8/13 neg, repeat RVP 8/18 thankfully negative  - proBNP elevated, s/p IV diuresis, respiratory status improving, appreciate cards recs, transitioned to PO lasix 20 mg daily starting 8/19  - now off bipap, will continue to wean supplemental O2 as tolerated, was 1L NC at time of my exam)  - c/w chest PT, supportive care, symptomatic management of cough (+tessalon perles - patient thinks it helps), will try incentive spirometry as well  - c/w minced/moist diet with moderate thick liquids, aspiration precautions (appreciate swallow eval) as now off bipap  - monitor respiratory status closely, continue discussions if respiratory status worsens (patient requests medications to keep her comfortable)  - DNR/DNI  - appreciate CM/SW assistance with facility placement (respiratory status improving, reassessing function status ie if senior care can take patient back with current level of assistance or if need rehab prior to return to [facility])    # BRBPR  - reported some episodes but no plan for endoscopic intervention at this time, given age, comorbidities and patient/family desires  - hgb 10.7 on 8/19, relatively stable, continue to monitor, continue discussions re: GOC  - holding VTE pharm ppx    # congestive heart failure  - HFpEF per cardiology, echo 2022 w/ EF 55-60%, though more recent echo noting LV systolic dysfunction as well  - s/p IV diuresis, transitioned to PO lasix 20 mg on 8/19  - c/w coreg as per cards  - monitor lytes and replete prn    #  Sepsis  - met sepsis criteria with Tmax <36C and leukocytosis, likely secondary to PNA. +/- viral infection, +/- UTI  - initial RVP positive for enterovirus, c/w supportive care  - completed course of Zosyn for potential bacterial PNA  - Ucx w/ enterococcus, s/p dose Vancomycin  - sepsis resolved     # Afib s/p ppm  - controlled, on coreg  - not on anticoagulation    # Hypothyroidism  - c/w synthroid    # HLD  - on statin, but should discuss risk/benefits of continued use given age (89)    # Essential HTN  - on coreg  - amlodipine on hold  - monitor BP, given age would be more liberal with BP goals given wish to avoid hypotension    # Constipation  - c/w bowel regimen    # Need for prophylactic measure  - VTE ppx: no pharm ppx given BRBPR, -> SCDs      Dispo: anticipate back to living facility though functional status may also affect if can get back to her senior care (ie degree of assistance needed), will have PT re-evaluate patient, assess role for rehab prior to return to [facility] w/ hospice? Appreciate CM/SW assistance.

## 2023-08-19 NOTE — PROGRESS NOTE ADULT - SUBJECTIVE AND OBJECTIVE BOX
[facility] Medicine  Pager [phone]    Patient is a 89y old  Female who presents with a chief complaint of hypoxia (19 Aug 2023 09:44)      INTERVAL HPI/OVERNIGHT EVENTS:    MEDICATIONS  (STANDING):  albuterol    90 MICROgram(s) HFA Inhaler 2 Puff(s) Inhalation every 6 hours  albuterol/ipratropium for Nebulization 3 milliLiter(s) Nebulizer every 6 hours  atorvastatin 20 milliGRAM(s) Oral at bedtime  carvedilol 6.25 milliGRAM(s) Oral every 12 hours  cholecalciferol 1000 Unit(s) Oral daily  dextrose 5% + sodium chloride 0.45%. 1000 milliLiter(s) (50 mL/Hr) IV Continuous <Continuous>  furosemide    Tablet 20 milliGRAM(s) Oral daily  levothyroxine 100 MICROGram(s) Oral daily  polyethylene glycol 3350 17 Gram(s) Oral two times a day  senna 2 Tablet(s) Oral at bedtime  sorbitol 70%/mineral oil/magnesium hydroxide/glycerin Enema 120 milliLiter(s) Rectal once    MEDICATIONS  (PRN):  benzonatate 100 milliGRAM(s) Oral every 8 hours PRN Cough      Allergies    cephalexin (Hives)  [This allergen will not trigger allergy alert] trisulfapyrimidine (Rash)  sulfa topicals (Unknown)    Intolerances    morphine (Drowsiness; Faint; Hypotension)      REVIEW OF SYSTEMS:  Please see interval HPI:    Vital Signs Last 24 Hrs  T(C): 36.1 (19 Aug 2023 09:15), Max: 36.5 (19 Aug 2023 06:30)  T(F): 96.9 (19 Aug 2023 09:15), Max: 97.7 (19 Aug 2023 06:30)  HR: 59 (19 Aug 2023 10:25) (59 - 83)  BP: 142/75 (19 Aug 2023 09:15) (106/58 - 142/75)  BP(mean): --  RR: 20 (19 Aug 2023 09:15) (19 - 20)  SpO2: 96% (19 Aug 2023 10:25) (92% - 99%)    Parameters below as of 19 Aug 2023 10:25  Patient On (Oxygen Delivery Method): nasal cannula      I&O's Detail    18 Aug 2023 07:01  -  19 Aug 2023 07:00  --------------------------------------------------------  IN:    Oral Fluid: 280 mL  Total IN: 280 mL    OUT:    Voided (mL): 1300 mL  Total OUT: 1300 mL    Total NET: -1020 mL      19 Aug 2023 07:01  -  19 Aug 2023 13:05  --------------------------------------------------------  IN:    Oral Fluid: 100 mL  Total IN: 100 mL    OUT:  Total OUT: 0 mL    Total NET: 100 mL            PHYSICAL EXAM:  GENERAL:   HEAD:    EYES:   ENMT:   NECK:   NERVOUS SYSTEM:    CHEST/LUNG:   HEART:   ABDOMEN:   EXTREMITIES:    LYMPH:   SKIN:     LABS:                        10.7   13.43 )-----------( 206      ( 19 Aug 2023 04:40 )             32.1     19 Aug 2023 04:40    138    |  97     |  37     ----------------------------<  97     4.0     |  29     |  1.02     Ca    8.7        19 Aug 2023 04:40  Phos  3.4       19 Aug 2023 04:40  Mg     1.70      19 Aug 2023 04:40        CAPILLARY BLOOD GLUCOSE      POCT Blood Glucose.: 119 mg/dL (19 Aug 2023 07:25)    BLOOD CULTURE    RADIOLOGY & ADDITIONAL TESTS:    Imaging Personally Reviewed:  [ ] YES     Consultant(s) Notes Reviewed:      Care Discussed with Consultants/Other Providers: [facility] Medicine  Pager [phone]    Patient is a 89y old  Female who presents with a chief complaint of hypoxia (19 Aug 2023 09:44)      INTERVAL HPI/OVERNIGHT EVENTS:  Daughter at bedside. Patient reports feeling well overall (better than few days ago certainly) though still with cough (meds help). Is eager to try to mobilize, wants to try sitting in chair. Advised call, don't fall, hopefully can have PT re-evaluation. Goal is to return to [facility], however may need to improve functional status first (as currently requiring assistance with activities -> AMPAC score 9...).     MEDICATIONS  (STANDING):  albuterol    90 MICROgram(s) HFA Inhaler 2 Puff(s) Inhalation every 6 hours  albuterol/ipratropium for Nebulization 3 milliLiter(s) Nebulizer every 6 hours  atorvastatin 20 milliGRAM(s) Oral at bedtime  carvedilol 6.25 milliGRAM(s) Oral every 12 hours  cholecalciferol 1000 Unit(s) Oral daily  dextrose 5% + sodium chloride 0.45%. 1000 milliLiter(s) (50 mL/Hr) IV Continuous <Continuous>  furosemide    Tablet 20 milliGRAM(s) Oral daily  levothyroxine 100 MICROGram(s) Oral daily  polyethylene glycol 3350 17 Gram(s) Oral two times a day  senna 2 Tablet(s) Oral at bedtime  sorbitol 70%/mineral oil/magnesium hydroxide/glycerin Enema 120 milliLiter(s) Rectal once    MEDICATIONS  (PRN):  benzonatate 100 milliGRAM(s) Oral every 8 hours PRN Cough    Allergies  cephalexin (Hives)  [This allergen will not trigger allergy alert] trisulfapyrimidine (Rash)  sulfa topicals (Unknown)    Intolerances  morphine (Drowsiness; Faint; Hypotension)    REVIEW OF SYSTEMS:  Please see interval HPI:    Vital Signs Last 24 Hrs  T(C): 36.1 (19 Aug 2023 09:15), Max: 36.5 (19 Aug 2023 06:30)  T(F): 96.9 (19 Aug 2023 09:15), Max: 97.7 (19 Aug 2023 06:30)  HR: 59 (19 Aug 2023 10:25) (59 - 83)  BP: 142/75 (19 Aug 2023 09:15) (106/58 - 142/75)  RR: 20 (19 Aug 2023 09:15) (19 - 20)  SpO2: 96% (19 Aug 2023 10:25) (92% - 99%)    Parameters below as of 19 Aug 2023 10:25  Patient On (Oxygen Delivery Method): nasal cannula      I&O's Detail    18 Aug 2023 07:01  -  19 Aug 2023 07:00  --------------------------------------------------------  IN:    Oral Fluid: 280 mL  Total IN: 280 mL    OUT:    Voided (mL): 1300 mL  Total OUT: 1300 mL    Total NET: -1020 mL      19 Aug 2023 07:01  -  19 Aug 2023 13:05  --------------------------------------------------------  IN:    Oral Fluid: 100 mL  Total IN: 100 mL    OUT:  Total OUT: 0 mL    Total NET: 100 mL    PHYSICAL EXAM:  GENERAL: NAD, lying in bed, daughter at bedside  HEAD:  NC/AT  EYES: EOMI, clear sclera/conjunctiva  ENMT: MMM  NECK: supple   NERVOUS SYSTEM: moving extremities, conversant    CHEST/LUNG: no respiratory distress, speaking in full sentences, clear anteriorly, no wheeze appreciated, NC in place (~1L) satting 90s on bedside monitor  HEART: S1S2 RRR  ABDOMEN: soft, non-tender   EXTREMITIES:  no c/c/e  SKIN: +ecchymosis    LABS:                        10.7   13.43 )-----------( 206      ( 19 Aug 2023 04:40 )             32.1     19 Aug 2023 04:40    138    |  97     |  37     ----------------------------<  97     4.0     |  29     |  1.02     Ca    8.7        19 Aug 2023 04:40  Phos  3.4       19 Aug 2023 04:40  Mg     1.70      19 Aug 2023 04:40    CAPILLARY BLOOD GLUCOSE  POCT Blood Glucose.: 119 mg/dL (19 Aug 2023 07:25)    BLOOD CULTURE    RADIOLOGY & ADDITIONAL TESTS:    Imaging Personally Reviewed:  [ ] YES     Consultant(s) Notes Reviewed:  Cardiology    Care Discussed with Consultants/Other Providers: YASEMIN Youssef re: seeing if can get physical therapy, role for rehab prior to return to [facility]? Unclear is able to get back to [facility] with current level of assistance

## 2023-08-19 NOTE — PROGRESS NOTE ADULT - ASSESSMENT
Echo 5/18/23: Moderate LV dysfxn, LVH  Echo 8/1/23: Abnormal septal motion of LV, mild-mod MR, mild-mod TR    A/p  89 y/o female with PMHx of HTN, HLD, HFrEf, Afib (not on AC), hypothyroidism, hx of PPM placement, presents with cough , hypoxia       #Acute respiratory failure with hypoxia.   -in the setting of resp infection   -chest xray 8/14 Trace bilateral pleural effusions with left lung base hazy opacification,  likely atelectasis.  -cont supp o2, dec as able   -cont diuresis     #Cough, hypoxia   -sp abx of pna/ uti   -initial xray Left basilar hazy opacity, which may reflect atelectasis.  -cta  chest  8/6 with no PE,  Debris within the right interlobar bronchus, without focal consolidation.  Essential interval resolution of  previously noted bilateral pleural effusions and interlobular septal  thickening/groundglass consolidation.  -Bcx neg   -management per med   -repeat chest xay 8/14 noted   -plan as above    #Chronic HFpEF   -events as mentioned above  -improved volume status   -cont lasix 20mg qd  -recent Echo with abnormal septal motion of LV, mild-mod MR, mild-mod TR    #Afib s/p ppm  -Stable, rate controlled  -Continue coreg  -remains off a/c     #HTN  -stable  -Continue  coreg    #UTI  -sp Abx, mgmt per med        dvt ppx    35 minutes spent on total encounter; more than 50% of the visit was spent counseling and/or coordinating care by the attending physician.

## 2023-08-20 DIAGNOSIS — I50.30 UNSPECIFIED DIASTOLIC (CONGESTIVE) HEART FAILURE: ICD-10-CM

## 2023-08-20 LAB
ANION GAP SERPL CALC-SCNC: 11 MMOL/L — SIGNIFICANT CHANGE UP (ref 7–14)
BUN SERPL-MCNC: 32 MG/DL — HIGH (ref 7–23)
CALCIUM SERPL-MCNC: 8.9 MG/DL — SIGNIFICANT CHANGE UP (ref 8.4–10.5)
CHLORIDE SERPL-SCNC: 98 MMOL/L — SIGNIFICANT CHANGE UP (ref 98–107)
CO2 SERPL-SCNC: 29 MMOL/L — SIGNIFICANT CHANGE UP (ref 22–31)
CREAT SERPL-MCNC: 0.97 MG/DL — SIGNIFICANT CHANGE UP (ref 0.5–1.3)
EGFR: 52 ML/MIN/1.73M2 — LOW
GLUCOSE SERPL-MCNC: 84 MG/DL — SIGNIFICANT CHANGE UP (ref 70–99)
HCT VFR BLD CALC: 31.3 % — LOW (ref 34.5–45)
HGB BLD-MCNC: 10.3 G/DL — LOW (ref 11.5–15.5)
MAGNESIUM SERPL-MCNC: 1.9 MG/DL — SIGNIFICANT CHANGE UP (ref 1.6–2.6)
MCHC RBC-ENTMCNC: 30.4 PG — SIGNIFICANT CHANGE UP (ref 27–34)
MCHC RBC-ENTMCNC: 32.9 GM/DL — SIGNIFICANT CHANGE UP (ref 32–36)
MCV RBC AUTO: 92.3 FL — SIGNIFICANT CHANGE UP (ref 80–100)
NRBC # BLD: 0 /100 WBCS — SIGNIFICANT CHANGE UP (ref 0–0)
NRBC # FLD: 0 K/UL — SIGNIFICANT CHANGE UP (ref 0–0)
PHOSPHATE SERPL-MCNC: 3.7 MG/DL — SIGNIFICANT CHANGE UP (ref 2.5–4.5)
PLATELET # BLD AUTO: 182 K/UL — SIGNIFICANT CHANGE UP (ref 150–400)
POTASSIUM SERPL-MCNC: 3.8 MMOL/L — SIGNIFICANT CHANGE UP (ref 3.5–5.3)
POTASSIUM SERPL-SCNC: 3.8 MMOL/L — SIGNIFICANT CHANGE UP (ref 3.5–5.3)
RBC # BLD: 3.39 M/UL — LOW (ref 3.8–5.2)
RBC # FLD: 14.2 % — SIGNIFICANT CHANGE UP (ref 10.3–14.5)
SODIUM SERPL-SCNC: 138 MMOL/L — SIGNIFICANT CHANGE UP (ref 135–145)
WBC # BLD: 10.11 K/UL — SIGNIFICANT CHANGE UP (ref 3.8–10.5)
WBC # FLD AUTO: 10.11 K/UL — SIGNIFICANT CHANGE UP (ref 3.8–10.5)

## 2023-08-20 PROCEDURE — 99232 SBSQ HOSP IP/OBS MODERATE 35: CPT

## 2023-08-20 PROCEDURE — 71045 X-RAY EXAM CHEST 1 VIEW: CPT | Mod: 26

## 2023-08-20 RX ADMIN — Medication 3 MILLILITER(S): at 21:40

## 2023-08-20 RX ADMIN — ATORVASTATIN CALCIUM 20 MILLIGRAM(S): 80 TABLET, FILM COATED ORAL at 21:33

## 2023-08-20 RX ADMIN — Medication 1000 UNIT(S): at 13:31

## 2023-08-20 RX ADMIN — Medication 100 MICROGRAM(S): at 06:14

## 2023-08-20 RX ADMIN — Medication 20 MILLIGRAM(S): at 06:13

## 2023-08-20 RX ADMIN — Medication 3 MILLILITER(S): at 10:48

## 2023-08-20 RX ADMIN — CARVEDILOL PHOSPHATE 6.25 MILLIGRAM(S): 80 CAPSULE, EXTENDED RELEASE ORAL at 17:59

## 2023-08-20 RX ADMIN — Medication 3 MILLILITER(S): at 16:51

## 2023-08-20 RX ADMIN — Medication 100 MILLIGRAM(S): at 13:22

## 2023-08-20 RX ADMIN — CARVEDILOL PHOSPHATE 6.25 MILLIGRAM(S): 80 CAPSULE, EXTENDED RELEASE ORAL at 06:14

## 2023-08-20 RX ADMIN — SENNA PLUS 2 TABLET(S): 8.6 TABLET ORAL at 21:33

## 2023-08-20 RX ADMIN — Medication 3 MILLILITER(S): at 03:33

## 2023-08-20 RX ADMIN — Medication 100 MILLIGRAM(S): at 17:59

## 2023-08-20 NOTE — PROGRESS NOTE ADULT - SUBJECTIVE AND OBJECTIVE BOX
[facility] Medicine  Pager [phone]    Patient is a 89y old  Female who presents with a chief complaint of hypoxia (20 Aug 2023 10:27)      INTERVAL HPI/OVERNIGHT EVENTS:    MEDICATIONS  (STANDING):  albuterol/ipratropium for Nebulization 3 milliLiter(s) Nebulizer every 6 hours  atorvastatin 20 milliGRAM(s) Oral at bedtime  carvedilol 6.25 milliGRAM(s) Oral every 12 hours  cholecalciferol 1000 Unit(s) Oral daily  dextrose 5% + sodium chloride 0.45%. 1000 milliLiter(s) (50 mL/Hr) IV Continuous <Continuous>  furosemide    Tablet 20 milliGRAM(s) Oral daily  levothyroxine 100 MICROGram(s) Oral daily  polyethylene glycol 3350 17 Gram(s) Oral two times a day  senna 2 Tablet(s) Oral at bedtime  sorbitol 70%/mineral oil/magnesium hydroxide/glycerin Enema 120 milliLiter(s) Rectal once    MEDICATIONS  (PRN):  benzonatate 100 milliGRAM(s) Oral every 8 hours PRN Cough  guaiFENesin Oral Liquid (Sugar-Free) 100 milliGRAM(s) Oral every 8 hours PRN Cough      Allergies    cephalexin (Hives)  [This allergen will not trigger allergy alert] trisulfapyrimidine (Rash)  sulfa topicals (Unknown)    Intolerances    morphine (Drowsiness; Faint; Hypotension)      REVIEW OF SYSTEMS:  Please see interval HPI:    Vital Signs Last 24 Hrs  T(C): 37.1 (20 Aug 2023 09:00), Max: 37.1 (20 Aug 2023 06:00)  T(F): 98.7 (20 Aug 2023 09:00), Max: 98.8 (20 Aug 2023 06:00)  HR: 55 (20 Aug 2023 09:00) (55 - 70)  BP: 104/62 (20 Aug 2023 09:00) (104/62 - 153/65)  BP(mean): 84 (20 Aug 2023 06:00) (84 - 84)  RR: 27 (20 Aug 2023 09:00) (18 - 27)  SpO2: 98% (20 Aug 2023 09:00) (95% - 100%)    Parameters below as of 20 Aug 2023 09:00  Patient On (Oxygen Delivery Method): nasal cannula      I&O's Detail    19 Aug 2023 07:01  -  20 Aug 2023 07:00  --------------------------------------------------------  IN:    Oral Fluid: 100 mL  Total IN: 100 mL    OUT:    Voided (mL): 300 mL  Total OUT: 300 mL    Total NET: -200 mL            PHYSICAL EXAM:  GENERAL:   HEAD:    EYES:   ENMT:   NECK:   NERVOUS SYSTEM:    CHEST/LUNG:   HEART:   ABDOMEN:   EXTREMITIES:    LYMPH:   SKIN:     LABS:                        10.3   10.11 )-----------( 182      ( 20 Aug 2023 06:54 )             31.3     20 Aug 2023 06:54    138    |  98     |  32     ----------------------------<  84     3.8     |  29     |  0.97     Ca    8.9        20 Aug 2023 06:54  Phos  3.7       20 Aug 2023 06:54  Mg     1.90      20 Aug 2023 06:54        CAPILLARY BLOOD GLUCOSE      POCT Blood Glucose.: 95 mg/dL (20 Aug 2023 07:26)    BLOOD CULTURE    RADIOLOGY & ADDITIONAL TESTS:    Imaging Personally Reviewed:  [ ] YES     Consultant(s) Notes Reviewed:      Care Discussed with Consultants/Other Providers: [facility] Medicine  Pager [phone]    Patient is a 89y old  Female who presents with a chief complaint of hypoxia (20 Aug 2023 10:27)      INTERVAL HPI/OVERNIGHT EVENTS:  Placed on higher amount of O2 via NC due to increased WOB per d/w staff, now trying to wean down as tolerated. Noted to have desats to 80s, but in context of repositioning, getting cleaned by staff. Currently satting in mid 90s at time of my exam.     Patient sleeping but arousable to voice, had been watching opera (but set in a modern setting), feels breathing is currently ok, still with intermittent cough. Is concerned that she is not going to be able to get back to [facility] and may have to seek alternate arrangements. Discussed plan for PT re-eval and continued CM/SW followup to coordinate safe dispo.     MEDICATIONS  (STANDING):  albuterol/ipratropium for Nebulization 3 milliLiter(s) Nebulizer every 6 hours  atorvastatin 20 milliGRAM(s) Oral at bedtime  carvedilol 6.25 milliGRAM(s) Oral every 12 hours  cholecalciferol 1000 Unit(s) Oral daily  dextrose 5% + sodium chloride 0.45%. 1000 milliLiter(s) (50 mL/Hr) IV Continuous <Continuous>  furosemide    Tablet 20 milliGRAM(s) Oral daily  levothyroxine 100 MICROGram(s) Oral daily  polyethylene glycol 3350 17 Gram(s) Oral two times a day  senna 2 Tablet(s) Oral at bedtime  sorbitol 70%/mineral oil/magnesium hydroxide/glycerin Enema 120 milliLiter(s) Rectal once    MEDICATIONS  (PRN):  benzonatate 100 milliGRAM(s) Oral every 8 hours PRN Cough  guaiFENesin Oral Liquid (Sugar-Free) 100 milliGRAM(s) Oral every 8 hours PRN Cough      Allergies    cephalexin (Hives)  [This allergen will not trigger allergy alert] trisulfapyrimidine (Rash)  sulfa topicals (Unknown)    Intolerances    morphine (Drowsiness; Faint; Hypotension)      REVIEW OF SYSTEMS:  Please see interval HPI:    Vital Signs Last 24 Hrs  T(C): 37.1 (20 Aug 2023 09:00), Max: 37.1 (20 Aug 2023 06:00)  T(F): 98.7 (20 Aug 2023 09:00), Max: 98.8 (20 Aug 2023 06:00)  HR: 55 (20 Aug 2023 09:00) (55 - 70)  BP: 104/62 (20 Aug 2023 09:00) (104/62 - 153/65)  BP(mean): 84 (20 Aug 2023 06:00) (84 - 84)  RR: 27 (20 Aug 2023 09:00) (18 - 27)  SpO2: 98% (20 Aug 2023 09:00) (95% - 100%)    Parameters below as of 20 Aug 2023 09:00  Patient On (Oxygen Delivery Method): nasal cannula      I&O's Detail    19 Aug 2023 07:01  -  20 Aug 2023 07:00  --------------------------------------------------------  IN:    Oral Fluid: 100 mL  Total IN: 100 mL    OUT:    Voided (mL): 300 mL  Total OUT: 300 mL    Total NET: -200 mL    PHYSICAL EXAM:  GENERAL: NAD, sleeping but arousable to voice, elderly  HEAD:  NC/AT  EYES: EOMI, clear sclera/conjunctiva  ENMT: MMM  NECK: supple   NERVOUS SYSTEM: moving extremities, conversant    CHEST/LUNG: no respiratory distress, speaking in full sentences, clear anteriorly, no wheeze appreciated, NC in place, satting 90s on bedside monitor  HEART: S1S2 RRR  ABDOMEN: soft, non-tender   EXTREMITIES:  no c/c/e  SKIN: +ecchymosis    LABS:                        10.3   10.11 )-----------( 182      ( 20 Aug 2023 06:54 )             31.3     20 Aug 2023 06:54    138    |  98     |  32     ----------------------------<  84     3.8     |  29     |  0.97     Ca    8.9        20 Aug 2023 06:54  Phos  3.7       20 Aug 2023 06:54  Mg     1.90      20 Aug 2023 06:54      CAPILLARY BLOOD GLUCOSE  POCT Blood Glucose.: 95 mg/dL (20 Aug 2023 07:26)    BLOOD CULTURE    RADIOLOGY & ADDITIONAL TESTS:    Imaging Personally Reviewed:  [ x] YES   CXR no major effusion, await radiology read     Consultant(s) Notes Reviewed:  Cards    Care Discussed with Consultants/Other Providers: YASEMIN Youssef re: monitoring respiratory status, encourage incentive spirometry, f/u PT re-eval, will need to f/u CM/SW re: safe dispo (unclear if assisted living feasible given O2, level of assistance)

## 2023-08-20 NOTE — PROGRESS NOTE ADULT - PROBLEM SELECTOR PLAN 3
- met sepsis criteria with Tmax <36C and leukocytosis, likely secondary to PNA. +/- viral infection, +/- UTI  - initial RVP positive for enterovirus, c/w supportive care  - completed course of Zosyn for potential bacterial PNA  - Ucx w/ enterococcus, s/p dose Vancomycin  - had COVID exposure but repeat RVP negative  - sepsis resolved

## 2023-08-20 NOTE — PROGRESS NOTE ADULT - SUBJECTIVE AND OBJECTIVE BOX
CARDIOLOGY FOLLOW UP NOTE - DR. CHO    Patient Name: SOLIS TAYLOR    Date of Service: 23 @ 10:27    Patient seen and examined    Subjective:    cv: denies chest pain, dyspnea, palpitations, dizziness  pulmonary: denies cough  GI: denies abdominal pain, nausea, vomiting  vascular/legs: no edema   skin: no rash  ROS: otherwise negative   overnight events:      PHYSICAL EXAM:  T(C): 37.1 (23 @ 09:00), Max: 37.1 (23 @ 06:00)  HR: 55 (23 @ 09:00) (55 - 70)  BP: 104/62 (23 @ 09:00) (104/62 - 153/65)  RR: 27 (23 @ 09:00) (18 - 27)  SpO2: 98% (23 @ 09:00) (95% - 100%)  Wt(kg): --  I&O's Summary    19 Aug 2023 07:01  -  20 Aug 2023 07:00  --------------------------------------------------------  IN: 100 mL / OUT: 300 mL / NET: -200 mL      Daily     Daily Weight in k.9 (20 Aug 2023 06:00)    Appearance: Normal	  Cardiovascular: Normal S1 S2,RRR, No JVD, No murmurs  Respiratory: Lungs clear to auscultation	  Gastrointestinal:  Soft, Non-tender, + BS	  Extremities: Normal range of motion, No clubbing, cyanosis or edema      Home Medications:  amLODIPine 5 mg oral tablet: 1 tab(s) orally once a day (07 Aug 2023 06:47)  Biofreeze 4% topical gel: Apply topically to affected area 2 times a day to both knees (07 Aug 2023 06:47)  carvedilol 6.25 mg oral tablet: 1 tab(s) orally 2 times a day (07 Aug 2023 06:47)  furosemide 20 mg oral tablet: 1 tab(s) orally 3 times a week MWF (11 Aug 2023 16:04)  lactobacillus acidophilus oral capsule: 1 cap(s) orally once a day (07 Aug 2023 06:47)  levothyroxine 100 mcg (0.1 mg) oral tablet: 1 tab(s) orally once a day (07 Aug 2023 06:47)  methenamine hippurate 1 g oral tablet: 1 tab(s) orally once a day (07 Aug 2023 06:47)  nystatin 100,000 units/g topical powder: Apply topically to affected area once a day to bust after showers (07 Aug 2023 06:47)  Vitamin D3 25 mcg (1000 intl units) oral tablet: 1 tab(s) orally once a day (07 Aug 2023 06:47)      MEDICATIONS  (STANDING):  albuterol/ipratropium for Nebulization 3 milliLiter(s) Nebulizer every 6 hours  atorvastatin 20 milliGRAM(s) Oral at bedtime  carvedilol 6.25 milliGRAM(s) Oral every 12 hours  cholecalciferol 1000 Unit(s) Oral daily  dextrose 5% + sodium chloride 0.45%. 1000 milliLiter(s) (50 mL/Hr) IV Continuous <Continuous>  furosemide    Tablet 20 milliGRAM(s) Oral daily  levothyroxine 100 MICROGram(s) Oral daily  polyethylene glycol 3350 17 Gram(s) Oral two times a day  senna 2 Tablet(s) Oral at bedtime  sorbitol 70%/mineral oil/magnesium hydroxide/glycerin Enema 120 milliLiter(s) Rectal once      TELEMETRY: 	    ECG:  	  RADIOLOGY:   DIAGNOSTIC TESTING:  [ ] Echocardiogram:  [ ] Catheterization:  [ ] Stress Test:    OTHER: 	    LABS:	 	    CARDIAC MARKERS:                                      10.3   10.11 )-----------( 182      ( 20 Aug 2023 06:54 )             31.3         138  |  98  |  32<H>  ----------------------------<  84  3.8   |  29  |  0.97    Ca    8.9      20 Aug 2023 06:54  Phos  3.7     -  Mg     1.90           proBNP:     Lipid Profile:   HgA1c:     Creatinine: 0.97 mg/dL (23 @ 06:54)  Creatinine: 1.02 mg/dL (23 @ 04:40)  Creatinine: 1.02 mg/dL (23 @ 05:38)

## 2023-08-20 NOTE — PROGRESS NOTE ADULT - ASSESSMENT
89 yo F w/ HFpEF, afib s/p ppm, HTN, HLD, hypothyroid admitted with acute hypoxic respiratory failure in setting of sepsis 2/2 PNA and possible HF exacerbation, c/b acute kidney injury (improving) and BRBPR.

## 2023-08-21 LAB
ANION GAP SERPL CALC-SCNC: 9 MMOL/L — SIGNIFICANT CHANGE UP (ref 7–14)
BUN SERPL-MCNC: 28 MG/DL — HIGH (ref 7–23)
CALCIUM SERPL-MCNC: 9 MG/DL — SIGNIFICANT CHANGE UP (ref 8.4–10.5)
CHLORIDE SERPL-SCNC: 98 MMOL/L — SIGNIFICANT CHANGE UP (ref 98–107)
CO2 SERPL-SCNC: 29 MMOL/L — SIGNIFICANT CHANGE UP (ref 22–31)
CREAT SERPL-MCNC: 0.9 MG/DL — SIGNIFICANT CHANGE UP (ref 0.5–1.3)
EGFR: 57 ML/MIN/1.73M2 — LOW
GLUCOSE SERPL-MCNC: 80 MG/DL — SIGNIFICANT CHANGE UP (ref 70–99)
HCT VFR BLD CALC: 32.6 % — LOW (ref 34.5–45)
HGB BLD-MCNC: 10.5 G/DL — LOW (ref 11.5–15.5)
MAGNESIUM SERPL-MCNC: 2.1 MG/DL — SIGNIFICANT CHANGE UP (ref 1.6–2.6)
MCHC RBC-ENTMCNC: 30.6 PG — SIGNIFICANT CHANGE UP (ref 27–34)
MCHC RBC-ENTMCNC: 32.2 GM/DL — SIGNIFICANT CHANGE UP (ref 32–36)
MCV RBC AUTO: 95 FL — SIGNIFICANT CHANGE UP (ref 80–100)
NRBC # BLD: 0 /100 WBCS — SIGNIFICANT CHANGE UP (ref 0–0)
NRBC # FLD: 0 K/UL — SIGNIFICANT CHANGE UP (ref 0–0)
PHOSPHATE SERPL-MCNC: 3.5 MG/DL — SIGNIFICANT CHANGE UP (ref 2.5–4.5)
PLATELET # BLD AUTO: 161 K/UL — SIGNIFICANT CHANGE UP (ref 150–400)
POTASSIUM SERPL-MCNC: 3.9 MMOL/L — SIGNIFICANT CHANGE UP (ref 3.5–5.3)
POTASSIUM SERPL-SCNC: 3.9 MMOL/L — SIGNIFICANT CHANGE UP (ref 3.5–5.3)
RBC # BLD: 3.43 M/UL — LOW (ref 3.8–5.2)
RBC # FLD: 14.3 % — SIGNIFICANT CHANGE UP (ref 10.3–14.5)
SODIUM SERPL-SCNC: 136 MMOL/L — SIGNIFICANT CHANGE UP (ref 135–145)
WBC # BLD: 9.73 K/UL — SIGNIFICANT CHANGE UP (ref 3.8–10.5)
WBC # FLD AUTO: 9.73 K/UL — SIGNIFICANT CHANGE UP (ref 3.8–10.5)

## 2023-08-21 PROCEDURE — 99232 SBSQ HOSP IP/OBS MODERATE 35: CPT

## 2023-08-21 PROCEDURE — 99223 1ST HOSP IP/OBS HIGH 75: CPT | Mod: GC

## 2023-08-21 RX ORDER — ASCORBIC ACID 60 MG
500 TABLET,CHEWABLE ORAL DAILY
Refills: 0 | Status: DISCONTINUED | OUTPATIENT
Start: 2023-08-21 | End: 2023-08-24

## 2023-08-21 RX ADMIN — CARVEDILOL PHOSPHATE 6.25 MILLIGRAM(S): 80 CAPSULE, EXTENDED RELEASE ORAL at 17:55

## 2023-08-21 RX ADMIN — POLYETHYLENE GLYCOL 3350 17 GRAM(S): 17 POWDER, FOR SOLUTION ORAL at 06:30

## 2023-08-21 RX ADMIN — Medication 3 MILLILITER(S): at 03:36

## 2023-08-21 RX ADMIN — Medication 20 MILLIGRAM(S): at 06:30

## 2023-08-21 RX ADMIN — Medication 3 MILLILITER(S): at 16:04

## 2023-08-21 RX ADMIN — Medication 3 MILLILITER(S): at 21:30

## 2023-08-21 RX ADMIN — Medication 1 TABLET(S): at 13:01

## 2023-08-21 RX ADMIN — Medication 500 MILLIGRAM(S): at 13:01

## 2023-08-21 RX ADMIN — Medication 1000 UNIT(S): at 13:01

## 2023-08-21 RX ADMIN — CARVEDILOL PHOSPHATE 6.25 MILLIGRAM(S): 80 CAPSULE, EXTENDED RELEASE ORAL at 06:30

## 2023-08-21 RX ADMIN — Medication 100 MILLIGRAM(S): at 19:47

## 2023-08-21 RX ADMIN — Medication 3 MILLILITER(S): at 10:44

## 2023-08-21 RX ADMIN — Medication 100 MICROGRAM(S): at 06:30

## 2023-08-21 RX ADMIN — ATORVASTATIN CALCIUM 20 MILLIGRAM(S): 80 TABLET, FILM COATED ORAL at 22:01

## 2023-08-21 NOTE — CONSULT NOTE ADULT - ATTENDING COMMENTS
89 yo F w/ HFpEF, afib s/p ppm, HTN, HLD, hypothyroid admitted with acute hypoxic respiratory failure in setting of sepsis 2/2 PNA and possible HF exacerbation seen by pulm for hypoxia. Recently complicated ABx course for PNA, was enterovirus+.  Consulted for o2 requirements, had desat on prior attempt. Taken down to room air and sat has remained stable in mid 90s%.  Pt awake and comfortable. Denies respiratory complaints.  Prior hypoxemia may have been to pulm edema and overload which has improved w/ diuresis and now poss due to atelectasis.   Cont IS  OOB chair/PT  maintain sat>90%, currently on ra  cont diuretics to maintain euvolemia    will sign off, please reconsult if any changes

## 2023-08-21 NOTE — PROGRESS NOTE ADULT - ASSESSMENT
Echo 5/18/23: Moderate LV dysfxn, LVH  Echo 8/1/23: Abnormal septal motion of LV, mild-mod MR, mild-mod TR    A/p  89 y/o female with PMHx of HTN, HLD, HFrEf, Afib (not on AC), hypothyroidism, hx of PPM placement, presents with cough , hypoxia       #Acute respiratory failure with hypoxia.   -in the setting of resp infection   -chest xray 8/14 Trace bilateral pleural effusions with left lung base hazy opacification,  likely atelectasis.  -cont supp o2, dec as able   -cont diuresis     #Cough, hypoxia   -sp abx of pna/ uti   -initial xray Left basilar hazy opacity, which may reflect atelectasis.  -cta  chest  8/6 with no PE,  Debris within the right interlobar bronchus, without focal consolidation.  Essential interval resolution of  previously noted bilateral pleural effusions and interlobular septal  thickening/groundglass consolidation.  -Bcx neg   -management per med   -repeat chest xay 8/14 noted   -plan as above    #Chronic HFpEF   -events as mentioned above  -improved volume status   -cont lasix 20mg qd  -recent Echo with abnormal septal motion of LV, mild-mod MR, mild-mod TR    #Afib s/p ppm  -Stable, rate controlled  -Continue coreg  -remains off a/c     #HTN  -stable  -Continue  coreg    #UTI  -sp Abx, mgmt per med        dvt ppx  d/w fam at bedside      35 minutes spent on total encounter; more than 50% of the visit was spent counseling and/or coordinating care by the attending physician.

## 2023-08-21 NOTE — CHART NOTE - NSCHARTNOTEFT_GEN_A_CORE
Notified by RN that patient has rectal temp of 94.7, unable to obtain oral or axillary 2/2 low read.       Plan:  -s/p vanc and zosyn for PNA, enterorhino+  -Blood Cx x2, CBC w/ diff, VBG w/ lactate, procal  -CXR, UA  -warming blanket   -Will continue to monitor closely Notified by RN that patient has rectal temp of 94.7, unable to obtain oral or axillary 2/2 low read. Patient seen at bedside by provider. Patient in NAD. Patient complains of chronic BL knee pain, chronic neck pain, and occasional cough.     Vital Signs Last 24 Hrs  T(C): 34.8 (21 Aug 2023 23:10), Max: 37.3 (21 Aug 2023 06:30)  T(F): 94.7 (21 Aug 2023 23:10), Max: 99.1 (21 Aug 2023 06:30)  HR: 52 (21 Aug 2023 23:10) (50 - 62)  BP: 111/56 (21 Aug 2023 23:10) (111/56 - 135/74)  BP(mean): --  RR: 18 (21 Aug 2023 23:10) (17 - 18)  SpO2: 96% (21 Aug 2023 23:10) (96% - 100%)    Parameters below as of 21 Aug 2023 23:10  Patient On (Oxygen Delivery Method): room air    General: NAD   Cards: S1/S2, no murmurs   Pulm: CTA bilaterally. No wheezes.   Abdomen: Soft, NTND. BS (+)   Extremities: No pedal edema.   Neurology: AOx3     Plan:  - initial RVP positive for enterovirus, c/w supportive care  - completed course of Zosyn for potential bacterial PNA  - Ucx w/ enterococcus, s/p dose Vancomycin  -Blood Cx x2, CBC w/ diff, VBG w/ lactate, procal  -repeat CXR, UA  -warming blanket   -Will continue to monitor closely Notified by RN that patient has rectal temp of 94.7, unable to obtain oral or axillary 2/2 low read. Patient seen at bedside by provider. Patient in NAD. Patient complains of chronic BL knee pain, chronic neck pain, and occasional cough.     Vital Signs Last 24 Hrs  T(C): 34.8 (21 Aug 2023 23:10), Max: 37.3 (21 Aug 2023 06:30)  T(F): 94.7 (21 Aug 2023 23:10), Max: 99.1 (21 Aug 2023 06:30)  HR: 52 (21 Aug 2023 23:10) (50 - 62)  BP: 111/56 (21 Aug 2023 23:10) (111/56 - 135/74)  BP(mean): --  RR: 18 (21 Aug 2023 23:10) (17 - 18)  SpO2: 96% (21 Aug 2023 23:10) (96% - 100%)    Parameters below as of 21 Aug 2023 23:10  Patient On (Oxygen Delivery Method): room air    General: NAD   Cards: S1/S2, no murmurs   Pulm: CTA bilaterally. No wheezes.   Abdomen: Soft, NTND. BS (+)   Extremities: No pedal edema.   Neurology: AOx3     Plan:  - initial RVP positive for enterovirus, c/w supportive care  - completed course of Zosyn for potential bacterial PNA  - Ucx w/ enterococcus, s/p dose Vancomycin  -Blood Cx x2, CBC w/ diff, VBG w/ lactate, procal  -repeat CXR, UA  -warming blanket   - discussed w/ RN to avoid rectal temps in setting of previous rectal bleeding   -Will continue to monitor closely

## 2023-08-21 NOTE — PROGRESS NOTE ADULT - SUBJECTIVE AND OBJECTIVE BOX
CARDIOLOGY FOLLOW UP NOTE - DR. CHO    Patient Name: SOLIS TAYLOR    Date of Service: 23 @ 12:06    Patient seen and examined    Subjective:    cv: denies chest pain, dyspnea, palpitations, dizziness  pulmonary: denies cough  GI: denies abdominal pain, nausea, vomiting  vascular/legs: no edema   skin: no rash  ROS: otherwise negative   overnight events:      PHYSICAL EXAM:  T(C): 37.3 (23 @ 06:30), Max: 37.3 (23 @ 06:30)  HR: 50 (23 @ 10:44) (50 - 86)  BP: 133/73 (23 @ 06:30) (124/71 - 133/73)  RR: 18 (23 @ 06:30) (18 - 18)  SpO2: 100% (23 @ 06:30) (98% - 100%)  Wt(kg): --  I&O's Summary    21 Aug 2023 07:01  -  21 Aug 2023 12:06  --------------------------------------------------------  IN: 0 mL / OUT: 100 mL / NET: -100 mL      Daily     Daily Weight in k.9 (21 Aug 2023 06:30)    Appearance: Normal	  Cardiovascular: Normal S1 S2,RRR, No JVD, No murmurs  Respiratory: Lungs clear to auscultation	  Gastrointestinal:  Soft, Non-tender, + BS	  Extremities: Normal range of motion, No clubbing, cyanosis or edema      Home Medications:  amLODIPine 5 mg oral tablet: 1 tab(s) orally once a day (07 Aug 2023 06:47)  Biofreeze 4% topical gel: Apply topically to affected area 2 times a day to both knees (07 Aug 2023 06:47)  carvedilol 6.25 mg oral tablet: 1 tab(s) orally 2 times a day (07 Aug 2023 06:47)  furosemide 20 mg oral tablet: 1 tab(s) orally 3 times a week MWF (11 Aug 2023 16:04)  lactobacillus acidophilus oral capsule: 1 cap(s) orally once a day (07 Aug 2023 06:47)  levothyroxine 100 mcg (0.1 mg) oral tablet: 1 tab(s) orally once a day (07 Aug 2023 06:47)  methenamine hippurate 1 g oral tablet: 1 tab(s) orally once a day (07 Aug 2023 06:47)  nystatin 100,000 units/g topical powder: Apply topically to affected area once a day to bust after showers (07 Aug 2023 06:47)  Vitamin D3 25 mcg (1000 intl units) oral tablet: 1 tab(s) orally once a day (07 Aug 2023 06:47)      MEDICATIONS  (STANDING):  albuterol/ipratropium for Nebulization 3 milliLiter(s) Nebulizer every 6 hours  ascorbic acid 500 milliGRAM(s) Oral daily  atorvastatin 20 milliGRAM(s) Oral at bedtime  carvedilol 6.25 milliGRAM(s) Oral every 12 hours  cholecalciferol 1000 Unit(s) Oral daily  dextrose 5% + sodium chloride 0.45%. 1000 milliLiter(s) (50 mL/Hr) IV Continuous <Continuous>  furosemide    Tablet 20 milliGRAM(s) Oral daily  levothyroxine 100 MICROGram(s) Oral daily  multivitamin 1 Tablet(s) Oral daily  polyethylene glycol 3350 17 Gram(s) Oral two times a day  senna 2 Tablet(s) Oral at bedtime  sorbitol 70%/mineral oil/magnesium hydroxide/glycerin Enema 120 milliLiter(s) Rectal once      TELEMETRY: 	    ECG:  	  RADIOLOGY:   DIAGNOSTIC TESTING:  [ ] Echocardiogram:  [ ] Catheterization:  [ ] Stress Test:    OTHER: 	    LABS:	 	    CARDIAC MARKERS:                                      10.5   9.73  )-----------( 161      ( 21 Aug 2023 05:30 )             32.6         136  |  98  |  28<H>  ----------------------------<  80  3.9   |  29  |  0.90    Ca    9.0      21 Aug 2023 05:30  Phos  3.5       Mg     2.10           proBNP:     Lipid Profile:   HgA1c:     Creatinine: 0.90 mg/dL (23 @ 05:30)  Creatinine: 0.97 mg/dL (23 @ 06:54)  Creatinine: 1.02 mg/dL (23 @ 04:40)

## 2023-08-21 NOTE — CONSULT NOTE ADULT - SUBJECTIVE AND OBJECTIVE BOX
HPI:  89 yo F w/ HFpEF, afib s/p ppm, HTN, HLD, hypothyroid admitted with acute hypoxic respiratory failure in setting of sepsis 2/2 PNA and possible HF exacerbation, c/b acute kidney injury (improving) and BRBPR. Pulm c/s for hypoxia to 70s off O2, previously on 4L NC. Patient reports improved cough and LE swelling from prior. She otherwise denies HA, fever, CP, SOB, abd pain, n/v.     PAST MEDICAL & SURGICAL HISTORY:  Urinary Frequency      Bilateral Cataracts      HTN (hypertension)      Spinal stenosis      Hypothyroidism      Non-ST elevation MI (NSTEMI)      Macular degeneration      (HFpEF) heart failure with preserved ejection fraction      Paroxysmal atrial fibrillation      Chronic kidney disease (CKD)      Ectopic pregnancy, tubal      S/P ORIF (open reduction internal fixation) fracture  R hip      S/P breast lumpectomy      S/P cataract surgery  b/l          FAMILY HISTORY:  Family history of acute myocardial infarction        SOCIAL HISTORY:  Smoking: denies  EtOH Use: denies  Recent Travel: denies    Allergies    cephalexin (Hives)  [This allergen will not trigger allergy alert] trisulfapyrimidine (Rash)  sulfa topicals (Unknown)    Intolerances    morphine (Drowsiness; Faint; Hypotension)      HOME MEDICATIONS:    REVIEW OF SYSTEMS:  General: denies fever, chills  CV: denies chest pain, palpitations  Resp: denies difficulty breathing, cough  Abdominal: denies nausea, vomiting, diarrhea, abdominal pain  MSK: +improved LE swelling denies muscle aches  Neuro: denies headaches, numbness, tingling  Skin: denies rashes, bruises      [X] All other systems negative    OBJECTIVE:  ICU Vital Signs Last 24 Hrs  T(C): 37.3 (21 Aug 2023 06:30), Max: 37.3 (21 Aug 2023 06:30)  T(F): 99.1 (21 Aug 2023 06:30), Max: 99.1 (21 Aug 2023 06:30)  HR: 50 (21 Aug 2023 10:44) (50 - 86)  BP: 133/73 (21 Aug 2023 06:30) (124/71 - 133/73)  BP(mean): --  ABP: --  ABP(mean): --  RR: 18 (21 Aug 2023 06:30) (18 - 18)  SpO2: 100% (21 Aug 2023 06:30) (98% - 100%)    O2 Parameters below as of 21 Aug 2023 06:30  Patient On (Oxygen Delivery Method): nasal cannula  O2 Flow (L/min): 4            08-21 @ 07:01  -  08-21 @ 14:00  --------------------------------------------------------  IN: 0 mL / OUT: 100 mL / NET: -100 mL      CAPILLARY BLOOD GLUCOSE      POCT Blood Glucose.: 95 mg/dL (20 Aug 2023 07:26)      PHYSICAL EXAM:  GENERAL: well appearing in no acute distress, non-toxic appearing  HEAD: normocephalic, atraumatic  HEENT: normal conjunctiva, oral mucosa moist  CARDIAC: regular rate and rhythm, normal S1S2, no appreciable murmurs, 2+ pulses in UE/LE b/l  PULM: normal breath sounds, clear to ascultation bilaterally, no rales, rhonchi, wheezing  GI: abdomen nondistended, soft, nontender, no guarding, rebound tenderness  NEURO: no focal motor or sensory deficits, CN2-12 intact, normal speech, AAOx3  MSK: +1BLE edema, no calf tenderness b/l  SKIN: well-perfused, extremities warm, no visible rashes  PSYCH: appropriate mood and affect      HOSPITAL MEDICATIONS:  MEDICATIONS  (STANDING):  albuterol/ipratropium for Nebulization 3 milliLiter(s) Nebulizer every 6 hours  ascorbic acid 500 milliGRAM(s) Oral daily  atorvastatin 20 milliGRAM(s) Oral at bedtime  carvedilol 6.25 milliGRAM(s) Oral every 12 hours  cholecalciferol 1000 Unit(s) Oral daily  dextrose 5% + sodium chloride 0.45%. 1000 milliLiter(s) (50 mL/Hr) IV Continuous <Continuous>  furosemide    Tablet 20 milliGRAM(s) Oral daily  levothyroxine 100 MICROGram(s) Oral daily  multivitamin 1 Tablet(s) Oral daily  polyethylene glycol 3350 17 Gram(s) Oral two times a day  senna 2 Tablet(s) Oral at bedtime  sorbitol 70%/mineral oil/magnesium hydroxide/glycerin Enema 120 milliLiter(s) Rectal once    MEDICATIONS  (PRN):  benzonatate 100 milliGRAM(s) Oral every 8 hours PRN Cough  guaiFENesin Oral Liquid (Sugar-Free) 100 milliGRAM(s) Oral every 8 hours PRN Cough      LABS:                        10.5   9.73  )-----------( 161      ( 21 Aug 2023 05:30 )             32.6     08-21    136  |  98  |  28<H>  ----------------------------<  80  3.9   |  29  |  0.90    Ca    9.0      21 Aug 2023 05:30  Phos  3.5     08-21  Mg     2.10     08-21        Urinalysis Basic - ( 21 Aug 2023 05:30 )    Color: x / Appearance: x / SG: x / pH: x  Gluc: 80 mg/dL / Ketone: x  / Bili: x / Urobili: x   Blood: x / Protein: x / Nitrite: x   Leuk Esterase: x / RBC: x / WBC x   Sq Epi: x / Non Sq Epi: x / Bacteria: x            MICROBIOLOGY:     RADIOLOGY:  [ ] Reviewed and interpreted by me    Point of Care Ultrasound Findings;    PFT:    EKG:

## 2023-08-21 NOTE — PROGRESS NOTE ADULT - SUBJECTIVE AND OBJECTIVE BOX
Patient is a 89y old  Female who presents with a chief complaint of hypoxia (21 Aug 2023 12:06)      SUBJECTIVE / OVERNIGHT EVENTS:    No events overnight. This AM, patient without n/v/d/cp/sob.      Working with PT, sat dropped to 80%.    Also allowed patient to rest and weaned O2 at bedside, witnessed further desaturation to 70s; immediately recovered with O2 via NC.     MEDICATIONS  (STANDING):  albuterol/ipratropium for Nebulization 3 milliLiter(s) Nebulizer every 6 hours  ascorbic acid 500 milliGRAM(s) Oral daily  atorvastatin 20 milliGRAM(s) Oral at bedtime  carvedilol 6.25 milliGRAM(s) Oral every 12 hours  cholecalciferol 1000 Unit(s) Oral daily  dextrose 5% + sodium chloride 0.45%. 1000 milliLiter(s) (50 mL/Hr) IV Continuous <Continuous>  furosemide    Tablet 20 milliGRAM(s) Oral daily  levothyroxine 100 MICROGram(s) Oral daily  multivitamin 1 Tablet(s) Oral daily  polyethylene glycol 3350 17 Gram(s) Oral two times a day  senna 2 Tablet(s) Oral at bedtime  sorbitol 70%/mineral oil/magnesium hydroxide/glycerin Enema 120 milliLiter(s) Rectal once    MEDICATIONS  (PRN):  benzonatate 100 milliGRAM(s) Oral every 8 hours PRN Cough  guaiFENesin Oral Liquid (Sugar-Free) 100 milliGRAM(s) Oral every 8 hours PRN Cough      PHYSICAL EXAM:  T(C): 37.3 (08-21-23 @ 06:30), Max: 37.3 (08-21-23 @ 06:30)  HR: 50 (08-21-23 @ 10:44) (50 - 86)  BP: 133/73 (08-21-23 @ 06:30) (124/71 - 133/73)  RR: 18 (08-21-23 @ 06:30) (18 - 18)  SpO2: 100% (08-21-23 @ 06:30) (98% - 100%)  I&O's Summary    21 Aug 2023 07:01  -  21 Aug 2023 13:01  --------------------------------------------------------  IN: 0 mL / OUT: 100 mL / NET: -100 mL      GENERAL: NAD, seated in bed, daughters at bedside  HEAD:  Atraumatic, Normocephalic, MMM  CHEST/LUNG: No use of accessory muscles, CTAB, breathing non-labored; using O2 via NC  COR: RR, no mrcg  ABD: Soft, ND/NT, +BS  PSYCH: AAOx3  NEUROLOGY: CN II-XII grossly intact, moving all extremities  SKIN: No rashes or lesions  EXT: wwp, no cce    LABS:  CAPILLARY BLOOD GLUCOSE                              10.5   9.73  )-----------( 161      ( 21 Aug 2023 05:30 )             32.6     08-21    136  |  98  |  28<H>  ----------------------------<  80  3.9   |  29  |  0.90    Ca    9.0      21 Aug 2023 05:30  Phos  3.5     08-21  Mg     2.10     08-21            Urinalysis Basic - ( 21 Aug 2023 05:30 )    Color: x / Appearance: x / SG: x / pH: x  Gluc: 80 mg/dL / Ketone: x  / Bili: x / Urobili: x   Blood: x / Protein: x / Nitrite: x   Leuk Esterase: x / RBC: x / WBC x   Sq Epi: x / Non Sq Epi: x / Bacteria: x          RADIOLOGY & ADDITIONAL TESTS:    Telemetry Personally Reviewed - afib, V paced 60s, desaturations to 80s    Imaging Personally Reviewed -     Imaging Reviewed -     Consultant(s) Notes Reviewed -       Care Discussed with Consultants/Other Providers -  Patient is a 89y old  Female who presents with a chief complaint of hypoxia (21 Aug 2023 12:06)      SUBJECTIVE / OVERNIGHT EVENTS:    No events overnight. This AM, patient without n/v/d/cp/sob.      Working with PT, sat dropped to 80%.    Also allowed patient to rest and weaned O2 at bedside, witnessed further desaturation to 70s; immediately recovered with O2 via NC.     MEDICATIONS  (STANDING):  albuterol/ipratropium for Nebulization 3 milliLiter(s) Nebulizer every 6 hours  ascorbic acid 500 milliGRAM(s) Oral daily  atorvastatin 20 milliGRAM(s) Oral at bedtime  carvedilol 6.25 milliGRAM(s) Oral every 12 hours  cholecalciferol 1000 Unit(s) Oral daily  dextrose 5% + sodium chloride 0.45%. 1000 milliLiter(s) (50 mL/Hr) IV Continuous <Continuous>  furosemide    Tablet 20 milliGRAM(s) Oral daily  levothyroxine 100 MICROGram(s) Oral daily  multivitamin 1 Tablet(s) Oral daily  polyethylene glycol 3350 17 Gram(s) Oral two times a day  senna 2 Tablet(s) Oral at bedtime  sorbitol 70%/mineral oil/magnesium hydroxide/glycerin Enema 120 milliLiter(s) Rectal once    MEDICATIONS  (PRN):  benzonatate 100 milliGRAM(s) Oral every 8 hours PRN Cough  guaiFENesin Oral Liquid (Sugar-Free) 100 milliGRAM(s) Oral every 8 hours PRN Cough      PHYSICAL EXAM:  T(C): 37.3 (08-21-23 @ 06:30), Max: 37.3 (08-21-23 @ 06:30)  HR: 50 (08-21-23 @ 10:44) (50 - 86)  BP: 133/73 (08-21-23 @ 06:30) (124/71 - 133/73)  RR: 18 (08-21-23 @ 06:30) (18 - 18)  SpO2: 100% (08-21-23 @ 06:30) (98% - 100%)  I&O's Summary    21 Aug 2023 07:01  -  21 Aug 2023 13:01  --------------------------------------------------------  IN: 0 mL / OUT: 100 mL / NET: -100 mL      GENERAL: NAD, seated in bed, daughters at bedside  HEAD:  Atraumatic, Normocephalic, MMM  CHEST/LUNG: No use of accessory muscles, +bibasilar inspiratory crackles, breathing non-labored; using O2 via NC  COR: RR, no mrcg  ABD: Soft, ND/NT, +BS  PSYCH: AAOx3  NEUROLOGY: CN II-XII grossly intact, moving all extremities  SKIN: No rashes or lesions  EXT: wwp, no cce    LABS:  CAPILLARY BLOOD GLUCOSE                              10.5   9.73  )-----------( 161      ( 21 Aug 2023 05:30 )             32.6     08-21    136  |  98  |  28<H>  ----------------------------<  80  3.9   |  29  |  0.90    Ca    9.0      21 Aug 2023 05:30  Phos  3.5     08-21  Mg     2.10     08-21            Urinalysis Basic - ( 21 Aug 2023 05:30 )    Color: x / Appearance: x / SG: x / pH: x  Gluc: 80 mg/dL / Ketone: x  / Bili: x / Urobili: x   Blood: x / Protein: x / Nitrite: x   Leuk Esterase: x / RBC: x / WBC x   Sq Epi: x / Non Sq Epi: x / Bacteria: x          RADIOLOGY & ADDITIONAL TESTS:    Telemetry Personally Reviewed - afib, V paced 60s, desaturations to 80s    Imaging Personally Reviewed -     Imaging Reviewed -     Consultant(s) Notes Reviewed -       Care Discussed with Consultants/Other Providers -

## 2023-08-21 NOTE — CONSULT NOTE ADULT - TIME BILLING
agree with above  89 y/o female with PMHx of HTN, HLD, HFrEf, Afib (not on AC), hypothyroidism, hx of PPM placement, presents with cough , hypoxia       #Acute respiratory failure with hypoxia.   -likely in the setting of resp infection   -plan as below     #Cough, hypoxia   -abx of pna/ uti   -xray Left basilar hazy opacity, which may reflect atelectasis.  -cta  chest with no PE,  Debris within the right interlobar bronchus, without focal consolidation.  Essential interval resolution of  previously noted bilateral pleural effusions and interlobular septal   thickening/groundglass consolidation.  -Bcx neg   -management per med     #Chronic HFpEF   -SP lasix 40 mg IVP  8/7 -- creat elevated noted- hold lasix for now  -resume tomorrow  -Continue coreg  -recent Echo with abnormal septal motion of LV, mild-mod MR, mild-mod TR    #Afib s/p ppm  -Stable, rate controlled  -Continue coreg  -remains off a/c     #HTN  -stable  -Continue  coreg    #UTI  -Abx, mgmt per med        dvt ppx
review of laboratory data, radiology results, discussion with primary team\patient, and monitoring for potential decompensation. Interventions were performed as documented above

## 2023-08-21 NOTE — CHART NOTE - NSCHARTNOTESELECT_GEN_ALL_CORE
ACP NP note/Event Note
Event Note
Event Note
Follow-up/Nutrition Services
GI/Event Note
Off Service Note

## 2023-08-21 NOTE — CONSULT NOTE ADULT - ASSESSMENT
89 yo F w/ HFpEF, afib s/p ppm, HTN, HLD, hypothyroid admitted with acute hypoxic respiratory failure in setting of sepsis 2/2 PNA and possible HF exacerbation seen by pulm for hypoxia. Recently complicated ABx course for PNA, was enterovirus+, diuresed for CHF exac, now on lasix 20 PO. CXR showed small pleural effusion. During pulm evaluation, patient 100% on 3L NC. NC titrated to RA, patient remained %. Patient reports feeling well and denies SOB, current cough. May also have component of atelectasis.    Recommendations  - Monitor O2 sats, now on RA  - F/u blood gas  - Continue to encourage working with PT, if can ambulate, obtain ambulator O2 sat  - Continue to encourage consistent IS use  - Continue with DC planning   - Pulm to sign off, please reconsult if any further concerns or questions

## 2023-08-22 LAB
ANION GAP SERPL CALC-SCNC: 14 MMOL/L — SIGNIFICANT CHANGE UP (ref 7–14)
BASE EXCESS BLDV CALC-SCNC: 4.9 MMOL/L — HIGH (ref -2–3)
BASOPHILS # BLD AUTO: 0.06 K/UL — SIGNIFICANT CHANGE UP (ref 0–0.2)
BASOPHILS NFR BLD AUTO: 0.6 % — SIGNIFICANT CHANGE UP (ref 0–2)
BLOOD GAS VENOUS COMPREHENSIVE RESULT: SIGNIFICANT CHANGE UP
BUN SERPL-MCNC: 30 MG/DL — HIGH (ref 7–23)
CALCIUM SERPL-MCNC: 9 MG/DL — SIGNIFICANT CHANGE UP (ref 8.4–10.5)
CHLORIDE BLDV-SCNC: 98 MMOL/L — SIGNIFICANT CHANGE UP (ref 96–108)
CHLORIDE SERPL-SCNC: 97 MMOL/L — LOW (ref 98–107)
CO2 BLDV-SCNC: 28.6 MMOL/L — HIGH (ref 22–26)
CO2 SERPL-SCNC: 25 MMOL/L — SIGNIFICANT CHANGE UP (ref 22–31)
CREAT SERPL-MCNC: 0.96 MG/DL — SIGNIFICANT CHANGE UP (ref 0.5–1.3)
EGFR: 52 ML/MIN/1.73M2 — LOW
EOSINOPHIL # BLD AUTO: 0.12 K/UL — SIGNIFICANT CHANGE UP (ref 0–0.5)
EOSINOPHIL NFR BLD AUTO: 1.1 % — SIGNIFICANT CHANGE UP (ref 0–6)
GAS PNL BLDV: 132 MMOL/L — LOW (ref 136–145)
GAS PNL BLDV: SIGNIFICANT CHANGE UP
GLUCOSE BLDV-MCNC: 65 MG/DL — LOW (ref 70–99)
GLUCOSE SERPL-MCNC: 74 MG/DL — SIGNIFICANT CHANGE UP (ref 70–99)
HCO3 BLDV-SCNC: 28 MMOL/L — SIGNIFICANT CHANGE UP (ref 22–29)
HCT VFR BLD CALC: 31.5 % — LOW (ref 34.5–45)
HCT VFR BLDA CALC: 32 % — LOW (ref 34.5–46.5)
HGB BLD CALC-MCNC: 10.5 G/DL — LOW (ref 11.7–16.1)
HGB BLD-MCNC: 10.5 G/DL — LOW (ref 11.5–15.5)
IANC: 7.88 K/UL — HIGH (ref 1.8–7.4)
IMM GRANULOCYTES NFR BLD AUTO: 2.3 % — HIGH (ref 0–0.9)
LACTATE BLDV-MCNC: 1.9 MMOL/L — SIGNIFICANT CHANGE UP (ref 0.5–2)
LYMPHOCYTES # BLD AUTO: 1.62 K/UL — SIGNIFICANT CHANGE UP (ref 1–3.3)
LYMPHOCYTES # BLD AUTO: 15.4 % — SIGNIFICANT CHANGE UP (ref 13–44)
MAGNESIUM SERPL-MCNC: 1.9 MG/DL — SIGNIFICANT CHANGE UP (ref 1.6–2.6)
MCHC RBC-ENTMCNC: 31 PG — SIGNIFICANT CHANGE UP (ref 27–34)
MCHC RBC-ENTMCNC: 33.3 GM/DL — SIGNIFICANT CHANGE UP (ref 32–36)
MCV RBC AUTO: 92.9 FL — SIGNIFICANT CHANGE UP (ref 80–100)
MONOCYTES # BLD AUTO: 0.6 K/UL — SIGNIFICANT CHANGE UP (ref 0–0.9)
MONOCYTES NFR BLD AUTO: 5.7 % — SIGNIFICANT CHANGE UP (ref 2–14)
NEUTROPHILS # BLD AUTO: 7.88 K/UL — HIGH (ref 1.8–7.4)
NEUTROPHILS NFR BLD AUTO: 74.9 % — SIGNIFICANT CHANGE UP (ref 43–77)
NRBC # BLD: 0 /100 WBCS — SIGNIFICANT CHANGE UP (ref 0–0)
NRBC # FLD: 0 K/UL — SIGNIFICANT CHANGE UP (ref 0–0)
PCO2 BLDV: 33 MMHG — LOW (ref 39–52)
PH BLDV: 7.53 — HIGH (ref 7.32–7.43)
PHOSPHATE SERPL-MCNC: 3.3 MG/DL — SIGNIFICANT CHANGE UP (ref 2.5–4.5)
PLATELET # BLD AUTO: 167 K/UL — SIGNIFICANT CHANGE UP (ref 150–400)
PO2 BLDV: 75 MMHG — HIGH (ref 25–45)
POTASSIUM BLDV-SCNC: 4.3 MMOL/L — SIGNIFICANT CHANGE UP (ref 3.5–5.1)
POTASSIUM SERPL-MCNC: 4.3 MMOL/L — SIGNIFICANT CHANGE UP (ref 3.5–5.3)
POTASSIUM SERPL-SCNC: 4.3 MMOL/L — SIGNIFICANT CHANGE UP (ref 3.5–5.3)
PROCALCITONIN SERPL-MCNC: 0.06 NG/ML — SIGNIFICANT CHANGE UP (ref 0.02–0.1)
RBC # BLD: 3.39 M/UL — LOW (ref 3.8–5.2)
RBC # FLD: 14.3 % — SIGNIFICANT CHANGE UP (ref 10.3–14.5)
SAO2 % BLDV: 96.7 % — HIGH (ref 67–88)
SODIUM SERPL-SCNC: 136 MMOL/L — SIGNIFICANT CHANGE UP (ref 135–145)
WBC # BLD: 10.52 K/UL — HIGH (ref 3.8–10.5)
WBC # FLD AUTO: 10.52 K/UL — HIGH (ref 3.8–10.5)

## 2023-08-22 PROCEDURE — 99232 SBSQ HOSP IP/OBS MODERATE 35: CPT

## 2023-08-22 PROCEDURE — 71045 X-RAY EXAM CHEST 1 VIEW: CPT | Mod: 26

## 2023-08-22 RX ORDER — IBUPROFEN 200 MG
400 TABLET ORAL ONCE
Refills: 0 | Status: COMPLETED | OUTPATIENT
Start: 2023-08-22 | End: 2023-08-22

## 2023-08-22 RX ORDER — ACETAMINOPHEN 500 MG
650 TABLET ORAL EVERY 6 HOURS
Refills: 0 | Status: DISCONTINUED | OUTPATIENT
Start: 2023-08-22 | End: 2023-08-24

## 2023-08-22 RX ORDER — LIDOCAINE 4 G/100G
1 CREAM TOPICAL DAILY
Refills: 0 | Status: DISCONTINUED | OUTPATIENT
Start: 2023-08-22 | End: 2023-08-24

## 2023-08-22 RX ADMIN — Medication 400 MILLIGRAM(S): at 08:06

## 2023-08-22 RX ADMIN — POLYETHYLENE GLYCOL 3350 17 GRAM(S): 17 POWDER, FOR SOLUTION ORAL at 06:21

## 2023-08-22 RX ADMIN — Medication 3 MILLILITER(S): at 04:00

## 2023-08-22 RX ADMIN — CARVEDILOL PHOSPHATE 6.25 MILLIGRAM(S): 80 CAPSULE, EXTENDED RELEASE ORAL at 06:22

## 2023-08-22 RX ADMIN — Medication 1 TABLET(S): at 12:42

## 2023-08-22 RX ADMIN — ATORVASTATIN CALCIUM 20 MILLIGRAM(S): 80 TABLET, FILM COATED ORAL at 21:26

## 2023-08-22 RX ADMIN — Medication 400 MILLIGRAM(S): at 07:16

## 2023-08-22 RX ADMIN — POLYETHYLENE GLYCOL 3350 17 GRAM(S): 17 POWDER, FOR SOLUTION ORAL at 17:13

## 2023-08-22 RX ADMIN — Medication 650 MILLIGRAM(S): at 04:38

## 2023-08-22 RX ADMIN — Medication 3 MILLILITER(S): at 15:26

## 2023-08-22 RX ADMIN — Medication 20 MILLIGRAM(S): at 06:21

## 2023-08-22 RX ADMIN — Medication 3 MILLILITER(S): at 09:41

## 2023-08-22 RX ADMIN — CARVEDILOL PHOSPHATE 6.25 MILLIGRAM(S): 80 CAPSULE, EXTENDED RELEASE ORAL at 17:15

## 2023-08-22 RX ADMIN — SENNA PLUS 2 TABLET(S): 8.6 TABLET ORAL at 21:26

## 2023-08-22 RX ADMIN — Medication 3 MILLILITER(S): at 22:51

## 2023-08-22 RX ADMIN — Medication 650 MILLIGRAM(S): at 03:38

## 2023-08-22 RX ADMIN — Medication 1000 UNIT(S): at 12:42

## 2023-08-22 RX ADMIN — Medication 500 MILLIGRAM(S): at 12:42

## 2023-08-22 RX ADMIN — Medication 100 MICROGRAM(S): at 06:21

## 2023-08-22 NOTE — PROGRESS NOTE ADULT - NSPROGADDITIONALINFOA_GEN_ALL_CORE
Care d/w daughter, Ariana, via phone on Aug 22. All questions answered.
Updated daughter at bedside on 8/19
Updated daughters Ariana [phone] and Macrina (they are together, put on speakerphone) on 8/20 re: plan, current respiratory status, incentive spirometry, coordination with CM/SW re: safe dispo, they are in agreement, hopeful for continued improvement.

## 2023-08-22 NOTE — PROGRESS NOTE ADULT - SUBJECTIVE AND OBJECTIVE BOX
Patient is a 89y old  Female who presents with a chief complaint of hypoxia (22 Aug 2023 09:41)      SUBJECTIVE / OVERNIGHT EVENTS:    Transiently hypothermic overnight, but this has resolved. This AM, patient without n/v/d/cp/sob. No dysuria.    MEDICATIONS  (STANDING):  albuterol/ipratropium for Nebulization 3 milliLiter(s) Nebulizer every 6 hours  ascorbic acid 500 milliGRAM(s) Oral daily  atorvastatin 20 milliGRAM(s) Oral at bedtime  carvedilol 6.25 milliGRAM(s) Oral every 12 hours  cholecalciferol 1000 Unit(s) Oral daily  dextrose 5% + sodium chloride 0.45%. 1000 milliLiter(s) (50 mL/Hr) IV Continuous <Continuous>  furosemide    Tablet 20 milliGRAM(s) Oral daily  levothyroxine 100 MICROGram(s) Oral daily  multivitamin 1 Tablet(s) Oral daily  polyethylene glycol 3350 17 Gram(s) Oral two times a day  senna 2 Tablet(s) Oral at bedtime  sorbitol 70%/mineral oil/magnesium hydroxide/glycerin Enema 120 milliLiter(s) Rectal once    MEDICATIONS  (PRN):  acetaminophen     Tablet .. 650 milliGRAM(s) Oral every 6 hours PRN Temp greater or equal to 38C (100.4F), Mild Pain (1 - 3)  benzonatate 100 milliGRAM(s) Oral every 8 hours PRN Cough  guaiFENesin Oral Liquid (Sugar-Free) 100 milliGRAM(s) Oral every 8 hours PRN Cough  lidocaine   4% Patch 1 Patch Transdermal daily PRN neck pain      PHYSICAL EXAM:  T(C): 36.2 (08-22-23 @ 10:18), Max: 36.4 (08-22-23 @ 06:20)  HR: 50 (08-22-23 @ 10:18) (50 - 63)  BP: 100/54 (08-22-23 @ 10:18) (100/54 - 124/57)  RR: 19 (08-22-23 @ 10:18) (18 - 19)  SpO2: 95% (08-22-23 @ 10:18) (94% - 100%)  I&O's Summary    21 Aug 2023 07:01  -  22 Aug 2023 07:00  --------------------------------------------------------  IN: 0 mL / OUT: 100 mL / NET: -100 mL      GENERAL: NAD, lying in bed  HEAD:  Atraumatic, Normocephalic, MMM  CHEST/LUNG: No use of accessory muscles, CTAB, breathing non-labored  COR: RR, no mrcg  ABD: Soft, ND/NT, +BS  PSYCH: AAOx3  NEUROLOGY: CN II-XII grossly intact, moving all extremities  SKIN: No rashes or lesions  EXT: wwp, no cce    LABS:  CAPILLARY BLOOD GLUCOSE                              10.5   10.52 )-----------( 167      ( 22 Aug 2023 01:45 )             31.5     08-22    136  |  97<L>  |  30<H>  ----------------------------<  74  4.3   |  25  |  0.96    Ca    9.0      22 Aug 2023 01:45  Phos  3.3     08-22  Mg     1.90     08-22            Urinalysis Basic - ( 22 Aug 2023 01:45 )    Color: x / Appearance: x / SG: x / pH: x  Gluc: 74 mg/dL / Ketone: x  / Bili: x / Urobili: x   Blood: x / Protein: x / Nitrite: x   Leuk Esterase: x / RBC: x / WBC x   Sq Epi: x / Non Sq Epi: x / Bacteria: x          RADIOLOGY & ADDITIONAL TESTS:    Telemetry Personally Reviewed - afib Vpaced, sat 100%    Imaging Personally Reviewed -     Imaging Reviewed -     Consultant(s) Notes Reviewed -       Care Discussed with Consultants/Other Providers -

## 2023-08-22 NOTE — PROGRESS NOTE ADULT - ASSESSMENT
Echo 5/18/23: Moderate LV dysfxn, LVH  Echo 8/1/23: Abnormal septal motion of LV, mild-mod MR, mild-mod TR    A/p  89 y/o female with PMHx of HTN, HLD, HFrEf, Afib (not on AC), hypothyroidism, hx of PPM placement, presents with cough , hypoxia       #Acute respiratory failure with hypoxia.   -in the setting of resp infection   -chest xray 8/14 Trace bilateral pleural effusions with left lung base hazy opacification,  likely atelectasis.  -o2 suppl as needed   -cont lasix po     #Cough, hypoxia   -sp abx of pna/ uti   -initial xray Left basilar hazy opacity, which may reflect atelectasis.  -cta  chest  8/6 with no PE,  Debris within the right interlobar bronchus, without focal consolidation.  Essential interval resolution of  previously noted bilateral pleural effusions and interlobular septal  thickening/groundglass consolidation.  -Bcx neg   -management per med   -repeat chest xay 8/14 noted   -plan as above    #Chronic HFpEF   -events as mentioned above  -improved volume status   -cont lasix 20mg qd  -recent Echo with abnormal septal motion of LV, mild-mod MR, mild-mod TR    #Afib s/p ppm  -Stable, rate controlled  -Continue coreg  -remains off a/c     #HTN  -stable  -Continue  coreg    #UTI  -sp Abx, mgmt per med      dvt ppx

## 2023-08-22 NOTE — PROVIDER CONTACT NOTE (OTHER) - REASON
Patient Temp. 94.7 Secondary Intention Text (Leave Blank If You Do Not Want): The defect will heal with secondary intention.

## 2023-08-22 NOTE — PROGRESS NOTE ADULT - PROBLEM SELECTOR PLAN 3
- met sepsis criteria with Tmax <36C and leukocytosis, likely secondary to PNA. +/- viral infection, +/- UTI  - initial RVP positive for enterovirus, c/w supportive care  - completed course of Zosyn for potential bacterial PNA  - Ucx w/ enterococcus, s/p dose Vancomycin  - had COVID exposure but repeat RVP negative      -hypothermia overnight but resolved, no localizing sx of infection; will await results of BCx, defer abx at this time

## 2023-08-22 NOTE — PROGRESS NOTE ADULT - SUBJECTIVE AND OBJECTIVE BOX
CARDIOLOGY FOLLOW UP - Dr. Riley  DATE OF SERVICE: 8/22/23    CC no cp or sob      REVIEW OF SYSTEMS:  CONSTITUTIONAL: No fever, weight loss, or fatigue  RESPIRATORY: No cough, wheezing, chills or hemoptysis; No Shortness of Breath  CARDIOVASCULAR: No chest pain, palpitations, passing out, dizziness, or leg swelling  GASTROINTESTINAL: No abdominal or epigastric pain. No nausea, vomiting, or hematemesis; No diarrhea or constipation. No melena or hematochezia.  VASCULAR: No edema     PHYSICAL EXAM:  T(C): 36.4 (08-22-23 @ 06:20), Max: 37.2 (08-21-23 @ 10:30)  HR: 52 (08-22-23 @ 06:20) (50 - 61)  BP: 124/57 (08-22-23 @ 06:20) (111/56 - 135/74)  RR: 18 (08-22-23 @ 06:20) (17 - 18)  SpO2: 96% (08-22-23 @ 06:20) (94% - 100%)  Wt(kg): --  I&O's Summary    21 Aug 2023 07:01  -  22 Aug 2023 07:00  --------------------------------------------------------  IN: 0 mL / OUT: 100 mL / NET: -100 mL        Appearance: Normal	  Cardiovascular: Normal S1 S2,RRR, No JVD, No murmurs  Respiratory: Lungs clear to auscultation	  Gastrointestinal:  Soft, Non-tender, + BS	  Extremities: Normal range of motion, No clubbing, cyanosis or edema      Home Medications:  amLODIPine 5 mg oral tablet: 1 tab(s) orally once a day (07 Aug 2023 06:47)  Biofreeze 4% topical gel: Apply topically to affected area 2 times a day to both knees (07 Aug 2023 06:47)  carvedilol 6.25 mg oral tablet: 1 tab(s) orally 2 times a day (07 Aug 2023 06:47)  furosemide 20 mg oral tablet: 1 tab(s) orally 3 times a week MWF (11 Aug 2023 16:04)  lactobacillus acidophilus oral capsule: 1 cap(s) orally once a day (07 Aug 2023 06:47)  levothyroxine 100 mcg (0.1 mg) oral tablet: 1 tab(s) orally once a day (07 Aug 2023 06:47)  methenamine hippurate 1 g oral tablet: 1 tab(s) orally once a day (07 Aug 2023 06:47)  nystatin 100,000 units/g topical powder: Apply topically to affected area once a day to bust after showers (07 Aug 2023 06:47)  Vitamin D3 25 mcg (1000 intl units) oral tablet: 1 tab(s) orally once a day (07 Aug 2023 06:47)      MEDICATIONS  (STANDING):  albuterol/ipratropium for Nebulization 3 milliLiter(s) Nebulizer every 6 hours  ascorbic acid 500 milliGRAM(s) Oral daily  atorvastatin 20 milliGRAM(s) Oral at bedtime  carvedilol 6.25 milliGRAM(s) Oral every 12 hours  cholecalciferol 1000 Unit(s) Oral daily  dextrose 5% + sodium chloride 0.45%. 1000 milliLiter(s) (50 mL/Hr) IV Continuous <Continuous>  furosemide    Tablet 20 milliGRAM(s) Oral daily  levothyroxine 100 MICROGram(s) Oral daily  multivitamin 1 Tablet(s) Oral daily  polyethylene glycol 3350 17 Gram(s) Oral two times a day  senna 2 Tablet(s) Oral at bedtime  sorbitol 70%/mineral oil/magnesium hydroxide/glycerin Enema 120 milliLiter(s) Rectal once      TELEMETRY: af/v paced 	    ECG:  	  RADIOLOGY:   DIAGNOSTIC TESTING:  [ ] Echocardiogram:  [ ]  Catheterization:  [ ] Stress Test:    OTHER: 	    LABS:	 	                            10.5   10.52 )-----------( 167      ( 22 Aug 2023 01:45 )             31.5     08-22    136  |  97<L>  |  30<H>  ----------------------------<  74  4.3   |  25  |  0.96    Ca    9.0      22 Aug 2023 01:45  Phos  3.3     08-22  Mg     1.90     08-22

## 2023-08-23 LAB
ALBUMIN SERPL ELPH-MCNC: 2.7 G/DL — LOW (ref 3.3–5)
ALP SERPL-CCNC: 113 U/L — SIGNIFICANT CHANGE UP (ref 40–120)
ALT FLD-CCNC: 14 U/L — SIGNIFICANT CHANGE UP (ref 4–33)
ANION GAP SERPL CALC-SCNC: 11 MMOL/L — SIGNIFICANT CHANGE UP (ref 7–14)
AST SERPL-CCNC: 15 U/L — SIGNIFICANT CHANGE UP (ref 4–32)
BILIRUB DIRECT SERPL-MCNC: <0.2 MG/DL — SIGNIFICANT CHANGE UP (ref 0–0.3)
BILIRUB INDIRECT FLD-MCNC: >0 MG/DL — SIGNIFICANT CHANGE UP (ref 0–1)
BILIRUB SERPL-MCNC: 0.2 MG/DL — SIGNIFICANT CHANGE UP (ref 0.2–1.2)
BUN SERPL-MCNC: 36 MG/DL — HIGH (ref 7–23)
CALCIUM SERPL-MCNC: 8.8 MG/DL — SIGNIFICANT CHANGE UP (ref 8.4–10.5)
CHLORIDE SERPL-SCNC: 98 MMOL/L — SIGNIFICANT CHANGE UP (ref 98–107)
CO2 SERPL-SCNC: 28 MMOL/L — SIGNIFICANT CHANGE UP (ref 22–31)
CREAT SERPL-MCNC: 1.18 MG/DL — SIGNIFICANT CHANGE UP (ref 0.5–1.3)
EGFR: 41 ML/MIN/1.73M2 — LOW
GLUCOSE SERPL-MCNC: 78 MG/DL — SIGNIFICANT CHANGE UP (ref 70–99)
HCT VFR BLD CALC: 29.9 % — LOW (ref 34.5–45)
HGB BLD-MCNC: 9.8 G/DL — LOW (ref 11.5–15.5)
MAGNESIUM SERPL-MCNC: 2 MG/DL — SIGNIFICANT CHANGE UP (ref 1.6–2.6)
MCHC RBC-ENTMCNC: 30.5 PG — SIGNIFICANT CHANGE UP (ref 27–34)
MCHC RBC-ENTMCNC: 32.8 GM/DL — SIGNIFICANT CHANGE UP (ref 32–36)
MCV RBC AUTO: 93.1 FL — SIGNIFICANT CHANGE UP (ref 80–100)
NRBC # BLD: 0 /100 WBCS — SIGNIFICANT CHANGE UP (ref 0–0)
NRBC # FLD: 0 K/UL — SIGNIFICANT CHANGE UP (ref 0–0)
PHOSPHATE SERPL-MCNC: 4 MG/DL — SIGNIFICANT CHANGE UP (ref 2.5–4.5)
PLATELET # BLD AUTO: 153 K/UL — SIGNIFICANT CHANGE UP (ref 150–400)
POTASSIUM SERPL-MCNC: 4.1 MMOL/L — SIGNIFICANT CHANGE UP (ref 3.5–5.3)
POTASSIUM SERPL-SCNC: 4.1 MMOL/L — SIGNIFICANT CHANGE UP (ref 3.5–5.3)
PROT SERPL-MCNC: 6.6 G/DL — SIGNIFICANT CHANGE UP (ref 6–8.3)
RBC # BLD: 3.21 M/UL — LOW (ref 3.8–5.2)
RBC # FLD: 14.4 % — SIGNIFICANT CHANGE UP (ref 10.3–14.5)
SODIUM SERPL-SCNC: 137 MMOL/L — SIGNIFICANT CHANGE UP (ref 135–145)
WBC # BLD: 6.98 K/UL — SIGNIFICANT CHANGE UP (ref 3.8–10.5)
WBC # FLD AUTO: 6.98 K/UL — SIGNIFICANT CHANGE UP (ref 3.8–10.5)

## 2023-08-23 PROCEDURE — 99232 SBSQ HOSP IP/OBS MODERATE 35: CPT

## 2023-08-23 RX ADMIN — Medication 100 MICROGRAM(S): at 05:30

## 2023-08-23 RX ADMIN — Medication 500 MILLIGRAM(S): at 17:35

## 2023-08-23 RX ADMIN — Medication 1 TABLET(S): at 17:35

## 2023-08-23 RX ADMIN — ATORVASTATIN CALCIUM 20 MILLIGRAM(S): 80 TABLET, FILM COATED ORAL at 21:53

## 2023-08-23 RX ADMIN — Medication 3 MILLILITER(S): at 22:47

## 2023-08-23 RX ADMIN — Medication 20 MILLIGRAM(S): at 05:30

## 2023-08-23 RX ADMIN — Medication 1000 UNIT(S): at 17:35

## 2023-08-23 RX ADMIN — Medication 3 MILLILITER(S): at 04:18

## 2023-08-23 RX ADMIN — POLYETHYLENE GLYCOL 3350 17 GRAM(S): 17 POWDER, FOR SOLUTION ORAL at 05:30

## 2023-08-23 RX ADMIN — Medication 3 MILLILITER(S): at 09:28

## 2023-08-23 RX ADMIN — CARVEDILOL PHOSPHATE 6.25 MILLIGRAM(S): 80 CAPSULE, EXTENDED RELEASE ORAL at 17:35

## 2023-08-23 RX ADMIN — Medication 3 MILLILITER(S): at 16:01

## 2023-08-23 RX ADMIN — CARVEDILOL PHOSPHATE 6.25 MILLIGRAM(S): 80 CAPSULE, EXTENDED RELEASE ORAL at 05:30

## 2023-08-23 RX ADMIN — POLYETHYLENE GLYCOL 3350 17 GRAM(S): 17 POWDER, FOR SOLUTION ORAL at 17:35

## 2023-08-23 RX ADMIN — SENNA PLUS 2 TABLET(S): 8.6 TABLET ORAL at 21:53

## 2023-08-23 NOTE — SWALLOW BEDSIDE ASSESSMENT ADULT - SWALLOW EVAL: RECOMMENDED FEEDING/EATING TECHNIQUES
slow rate of intake/maintain upright posture during/after eating for 30 mins/oral hygiene/small sips/bites
Swallowing Guidelines: FEED WHEN ALERT STATE ONLY; Slow Pacing; Small Bites; Single/Small Sips; Allow for Swallow Prior to Next Presentation; Maintain Oral Hygiene

## 2023-08-23 NOTE — PROGRESS NOTE ADULT - SUBJECTIVE AND OBJECTIVE BOX
Patient is a 89y old  Female who presents with a chief complaint of hypoxia (23 Aug 2023 12:04)      SUBJECTIVE / OVERNIGHT EVENTS:    No events overnight. This AM, patient without n/v/d/cp/sob.      MEDICATIONS  (STANDING):  albuterol/ipratropium for Nebulization 3 milliLiter(s) Nebulizer every 6 hours  ascorbic acid 500 milliGRAM(s) Oral daily  atorvastatin 20 milliGRAM(s) Oral at bedtime  carvedilol 6.25 milliGRAM(s) Oral every 12 hours  cholecalciferol 1000 Unit(s) Oral daily  furosemide    Tablet 20 milliGRAM(s) Oral daily  levothyroxine 100 MICROGram(s) Oral daily  multivitamin 1 Tablet(s) Oral daily  polyethylene glycol 3350 17 Gram(s) Oral two times a day  senna 2 Tablet(s) Oral at bedtime  sorbitol 70%/mineral oil/magnesium hydroxide/glycerin Enema 120 milliLiter(s) Rectal once    MEDICATIONS  (PRN):  acetaminophen     Tablet .. 650 milliGRAM(s) Oral every 6 hours PRN Temp greater or equal to 38C (100.4F), Mild Pain (1 - 3)  benzonatate 100 milliGRAM(s) Oral every 8 hours PRN Cough  guaiFENesin Oral Liquid (Sugar-Free) 100 milliGRAM(s) Oral every 8 hours PRN Cough  lidocaine   4% Patch 1 Patch Transdermal daily PRN neck pain      PHYSICAL EXAM:  T(C): 36.3 (08-23-23 @ 09:16), Max: 36.3 (08-22-23 @ 17:24)  HR: 57 (08-23-23 @ 09:35) (55 - 65)  BP: 140/69 (08-23-23 @ 09:16) (114/64 - 146/74)  RR: 16 (08-23-23 @ 09:16) (16 - 20)  SpO2: 97% (08-23-23 @ 09:35) (97% - 100%)  I&O's Summary    22 Aug 2023 07:01  -  23 Aug 2023 07:00  --------------------------------------------------------  IN: 100 mL / OUT: 400 mL / NET: -300 mL    23 Aug 2023 07:01  -  23 Aug 2023 13:24  --------------------------------------------------------  IN: 0 mL / OUT: 800 mL / NET: -800 mL      GENERAL: NAD, lying in bed  HEAD:  Atraumatic, Normocephalic, MMM  CHEST/LUNG: No use of accessory muscles, CTAB, breathing non-labored  COR: RR, no mrcg  ABD: Soft, ND/NT, +BS  PSYCH: AAOx3  NEUROLOGY: CN II-XII grossly intact, moving all extremities  SKIN: No rashes or lesions  EXT: wwp, no cce    LABS:  CAPILLARY BLOOD GLUCOSE                              9.8    6.98  )-----------( 153      ( 23 Aug 2023 04:36 )             29.9     08-23    137  |  98  |  36<H>  ----------------------------<  78  4.1   |  28  |  1.18    Ca    8.8      23 Aug 2023 04:36  Phos  4.0     08-23  Mg     2.00     08-23    TPro  6.6  /  Alb  2.7<L>  /  TBili  0.2  /  DBili  <0.2  /  AST  15  /  ALT  14  /  AlkPhos  113  08-23          Urinalysis Basic - ( 23 Aug 2023 04:36 )    Color: x / Appearance: x / SG: x / pH: x  Gluc: 78 mg/dL / Ketone: x  / Bili: x / Urobili: x   Blood: x / Protein: x / Nitrite: x   Leuk Esterase: x / RBC: x / WBC x   Sq Epi: x / Non Sq Epi: x / Bacteria: x        Culture - Blood (collected 22 Aug 2023 01:45)  Source: .Blood Blood-Venous  Preliminary Report (23 Aug 2023 07:02):    No growth at 24 hours    Culture - Blood (collected 22 Aug 2023 01:30)  Source: .Blood Blood-Peripheral  Preliminary Report (23 Aug 2023 07:02):    No growth at 24 hours        RADIOLOGY & ADDITIONAL TESTS:    Telemetry Personally Reviewed - afib, 60s    Imaging Personally Reviewed -     Imaging Reviewed -     Consultant(s) Notes Reviewed -       Care Discussed with Consultants/Other Providers -

## 2023-08-23 NOTE — PROGRESS NOTE ADULT - PROBLEM SELECTOR PLAN 3
- met sepsis criteria with Tmax <36C and leukocytosis, likely secondary to PNA. +/- viral infection, +/- UTI  - initial RVP positive for enterovirus, c/w supportive care  - completed course of Zosyn for potential bacterial PNA  - Ucx w/ enterococcus, s/p dose Vancomycin  - had COVID exposure but repeat RVP negative      -hypothermia on Aug21 but resolved, no localizing sx of infection; BCx NG x24h, defer abx at this time

## 2023-08-23 NOTE — SWALLOW BEDSIDE ASSESSMENT ADULT - SPECIFY REASON(S)
to reassess the swallow function
to clinically assess swallow function
assessment of oropharyngeal swallow
to reassess the swallow function

## 2023-08-23 NOTE — SWALLOW BEDSIDE ASSESSMENT ADULT - SWALLOW EVAL: RECOMMENDED DIET
1. Regular Solids with Thin Liquids
Minced and moist with moderately thick liquids as tolerated
Soft and Bite Size Solids with Mildly Thick Liquids

## 2023-08-23 NOTE — PROGRESS NOTE ADULT - SUBJECTIVE AND OBJECTIVE BOX
CARDIOLOGY FOLLOW UP NOTE - DR. CHO    Patient Name: SOLIS TAYLOR    Date of Service: 08-23-23 @ 12:04    Patient seen and examined    Subjective:    cv: denies chest pain, dyspnea, palpitations, dizziness  pulmonary: denies cough  GI: denies abdominal pain, nausea, vomiting  vascular/legs: no edema   skin: no rash  ROS: otherwise negative   overnight events:      PHYSICAL EXAM:  T(C): 36.3 (08-23-23 @ 09:16), Max: 36.3 (08-22-23 @ 17:24)  HR: 57 (08-23-23 @ 09:35) (55 - 65)  BP: 140/69 (08-23-23 @ 09:16) (114/64 - 146/74)  RR: 16 (08-23-23 @ 09:16) (16 - 20)  SpO2: 97% (08-23-23 @ 09:35) (97% - 100%)  Wt(kg): --  I&O's Summary    22 Aug 2023 07:01  -  23 Aug 2023 07:00  --------------------------------------------------------  IN: 100 mL / OUT: 400 mL / NET: -300 mL    23 Aug 2023 07:01  -  23 Aug 2023 12:04  --------------------------------------------------------  IN: 0 mL / OUT: 800 mL / NET: -800 mL      Daily     Daily     Appearance: Normal	  Cardiovascular: Normal S1 S2,RRR, No JVD, No murmurs  Respiratory: Lungs clear to auscultation	  Gastrointestinal:  Soft, Non-tender, + BS	  Extremities: Normal range of motion, No clubbing, cyanosis or edema      Home Medications:  amLODIPine 5 mg oral tablet: 1 tab(s) orally once a day (07 Aug 2023 06:47)  Biofreeze 4% topical gel: Apply topically to affected area 2 times a day to both knees (07 Aug 2023 06:47)  carvedilol 6.25 mg oral tablet: 1 tab(s) orally 2 times a day (07 Aug 2023 06:47)  furosemide 20 mg oral tablet: 1 tab(s) orally 3 times a week MWF (11 Aug 2023 16:04)  lactobacillus acidophilus oral capsule: 1 cap(s) orally once a day (07 Aug 2023 06:47)  levothyroxine 100 mcg (0.1 mg) oral tablet: 1 tab(s) orally once a day (07 Aug 2023 06:47)  methenamine hippurate 1 g oral tablet: 1 tab(s) orally once a day (07 Aug 2023 06:47)  nystatin 100,000 units/g topical powder: Apply topically to affected area once a day to bust after showers (07 Aug 2023 06:47)  Vitamin D3 25 mcg (1000 intl units) oral tablet: 1 tab(s) orally once a day (07 Aug 2023 06:47)      MEDICATIONS  (STANDING):  albuterol/ipratropium for Nebulization 3 milliLiter(s) Nebulizer every 6 hours  ascorbic acid 500 milliGRAM(s) Oral daily  atorvastatin 20 milliGRAM(s) Oral at bedtime  carvedilol 6.25 milliGRAM(s) Oral every 12 hours  cholecalciferol 1000 Unit(s) Oral daily  furosemide    Tablet 20 milliGRAM(s) Oral daily  levothyroxine 100 MICROGram(s) Oral daily  multivitamin 1 Tablet(s) Oral daily  polyethylene glycol 3350 17 Gram(s) Oral two times a day  senna 2 Tablet(s) Oral at bedtime  sorbitol 70%/mineral oil/magnesium hydroxide/glycerin Enema 120 milliLiter(s) Rectal once      TELEMETRY: 	    ECG:  	  RADIOLOGY:   DIAGNOSTIC TESTING:  [ ] Echocardiogram:  [ ] Catheterization:  [ ] Stress Test:    OTHER: 	    LABS:	 	    CARDIAC MARKERS:                                      9.8    6.98  )-----------( 153      ( 23 Aug 2023 04:36 )             29.9     08-23    137  |  98  |  36<H>  ----------------------------<  78  4.1   |  28  |  1.18    Ca    8.8      23 Aug 2023 04:36  Phos  4.0     08-23  Mg     2.00     08-23    TPro  6.6  /  Alb  2.7<L>  /  TBili  0.2  /  DBili  <0.2  /  AST  15  /  ALT  14  /  AlkPhos  113  08-23    proBNP:     Lipid Profile:   HgA1c:     Creatinine: 1.18 mg/dL (08-23-23 @ 04:36)  Creatinine: 0.96 mg/dL (08-22-23 @ 01:45)  Creatinine: 0.90 mg/dL (08-21-23 @ 05:30)

## 2023-08-23 NOTE — SWALLOW BEDSIDE ASSESSMENT ADULT - SWALLOW EVAL: DIAGNOSIS
1- Mild Oral Stage for puree, soft/bite solids, regular solids, moderately thick liquids, mildly thick liquids, and thin liquids marked by adequate oral containment, adequate bolus manipulation, slow mastication for regular solids, slow anterior to posterior transfer, with mild oral residue for solids which patient is able to clear with liquid wash. 2- Functional pharyngeal stage for puree, soft/bite solids, regular solids, moderately thick liquids, and mildly thick liquids marked by initiation of the pharyngeal swallow with hyolaryngeal excursion upon palpation. 3- Severe pharyngeal stage for thin liquids marked by suspected delayed initiation of the pharyngeal swallow with hyolaryngeal excurison upon palpation with immediate cough post oral intake suggestive of impaired airway protection, 1- Mild Oral Stage for puree, soft/bite solids, regular solids, moderately thick liquids, mildly thick liquids, and thin liquids marked by adequate oral containment, adequate bolus manipulation, slow mastication for regular solids, slow anterior to posterior transfer, with mild oral residue for solids which patient is able to clear with liquid wash. 2- Functional pharyngeal stage for puree, soft/bite solids, regular solids, moderately thick liquids, and mildly thick liquids marked by initiation of the pharyngeal swallow with hyolaryngeal excursion upon palpation. 3- Severe pharyngeal stage for thin liquids marked by suspected delayed initiation of the pharyngeal swallow with hyolaryngeal excurison upon palpation with immediate cough post oral intake suggestive of impaired airway protection.

## 2023-08-23 NOTE — SWALLOW BEDSIDE ASSESSMENT ADULT - ADDITIONAL RECOMMENDATIONS
1. Monitor for PO intake/tolerance
1. This service to follow up to ensure tolerance of recommended PO as schedule permits. 2. Medical team further advised to reconsult this service if patient is with a change in medical status or change in tolerance of recommended PO.
This department to follow up as schedule permits to assess for diet tolerance. Medical team further advised to reconsult if there is a change in medical status and/or observed change in patient's tolerance of recommended PO diet.
O-T Advancement Flap Text: The defect edges were debeveled with a #15 scalpel blade.  Given the location of the defect, shape of the defect and the proximity to free margins an O-T advancement flap was deemed most appropriate.  Using a sterile surgical marker, an appropriate advancement flap was drawn incorporating the defect and placing the expected incisions within the relaxed skin tension lines where possible.    The area thus outlined was incised deep to adipose tissue with a #15 scalpel blade.  The skin margins were undermined to an appropriate distance in all directions utilizing iris scissors.

## 2023-08-23 NOTE — SWALLOW BEDSIDE ASSESSMENT ADULT - COMMENTS
Progress Note- Hospitalist 8/23: "89 yo F w/ HFpEF, afib s/p ppm, HTN, HLD, hypothyroid admitted with acute hypoxic respiratory failure in setting of sepsis 2/2 PNA and possible HF exacerbation, c/b acute kidney injury (improving) and BRBPR."     Of note, patient is known to this service, seen on 8/9, 8/10, and 8/16 for clinical swallow evaluation with recent recommendations of Minced and Moist Solids with Moderately Thick Liquids. (see report for details)     Patient seen awake/alert during clinical swallow evaluation this PM. Patient follows simple directives and is able to make needs/wants known.
Progress Note- Hospitalist 8/14: "89 yo F w/ HFpEF, afib s/p ppm, HTN, HLD, hypothyroid admitted with acute hypoxic respiratory failure in setting of sepsis 2/2 PNA and possible HF exacerbation, c/b acute kidney injury (improving) and BRBPR."    Of note, patient is known to this service, seen on 8/9 for a clinical swallow evaluation and 8/10 for a Cinesophagram with recommendations of Regular Solids with Moderately Thick Liquids. (see reports for details)     SLP arrived to patient's unit this PM for clinical swallow reevaluation however patient is noted with BIPAP, therefore swallow evaluation is deferred at this time given patient is exhibiting increased oxygen demand indicating respiratory compromise. Medical team advised to reconsult as patient becomes medically optimized and achieves nasal cannula/room air state. RN informed.
Per Internal Medicine 8/8/2023, "1-year-old female with past medical history of CHF, hypertension, hyperlipidemia, A-fib, hypothyroidism p/w cough and SOB a/w acute hypoxic respiratory failure."    CT Chest 8/6: IMPRESSION: Evaluation distal to the segmental levels is limited due to motion artifact. Within this limitation, no evidence of pulmonary embolism. Debris within the right interlobar bronchus, without focal consolidation.    CXR 8/6: IMPRESSION: Left basilar hazy opacity, which may reflect atelectasis.    Of Note: Patient known to this service seen for a bedside swallow evaluation on prior admission 5/18/2023 with recommendations for regular solids with mildly thick liquids (see report for details).    Patient received upright awake/ alert, O2 via NC, daughter present at bedside, able to make basic wants/ needs known and follow simple directives.
Progress Note- Hospitalist 8/14: "89 yo F w/ HFpEF, afib s/p ppm, HTN, HLD, hypothyroid admitted with acute hypoxic respiratory failure in setting of sepsis 2/2 PNA and possible HF exacerbation, c/b acute kidney injury (improving) and BRBPR."    Of note, patient is known to this service, seen on 8/9 for a clinical swallow evaluation and 8/10 for a Cinesophagram with recommendations of Regular Solids with Moderately Thick Liquids. (see reports for details    Patient seen for re-assessment of oropharyngeal swallow. Received upright in bed, receiving 6 LPM O2 via nasal cannula. Patient SPO2 remained ~94 throughout the assessment. Patient alert and able to follow all 1-step directions.    CXR 8/14 "Trace bilateral pleural effusions with left lung base hazy opacification, likely atelectasis."

## 2023-08-23 NOTE — PROGRESS NOTE ADULT - ASSESSMENT
Echo 5/18/23: Moderate LV dysfxn, LVH  Echo 8/1/23: Abnormal septal motion of LV, mild-mod MR, mild-mod TR    A/p  89 y/o female with PMHx of HTN, HLD, HFrEf, Afib (not on AC), hypothyroidism, hx of PPM placement, presents with cough , hypoxia       #Acute respiratory failure with hypoxia.   -in the setting of resp infection   -resolved  -chest xray 8/14 Trace bilateral pleural effusions with left lung base hazy opacification,  likely atelectasis.  -cont o2 suppl as needed   -cont lasix po     #Cough, hypoxia   -s/p abx of pna/ uti   -cta  chest  8/6 with no PE, Essential interval resolution of  previously noted bilateral pleural effusions and interlobular septal  thickening/groundglass consolidation.  -Bcx neg   -management per med   -plan as above    #Chronic HFpEF   -stable  -cont lasix 20mg qd  -recent Echo with abnormal septal motion of LV, mild-mod MR, mild-mod TR    #Afib s/p ppm  -Stable, rate controlled  -Continue coreg  -remains off a/c     #HTN  -stable  -Continue  coreg    #UTI  -sp Abx, mgmt per med      dvt ppx      35 minutes spent on total encounter; more than 50% of the visit was spent counseling and/or coordinating care by the attending physician.

## 2023-08-23 NOTE — SWALLOW BEDSIDE ASSESSMENT ADULT - CONSISTENCIES ADMINISTERED
x4/soft & bite-sized x3/regular solid 3oz/pureed 1oz Moderately Thick; 3oz Mildly Thick/moderately thick/mildly thick 1oz/thin liquid

## 2023-08-24 ENCOUNTER — TRANSCRIPTION ENCOUNTER (OUTPATIENT)
Age: 88
End: 2023-08-24

## 2023-08-24 VITALS
OXYGEN SATURATION: 99 % | TEMPERATURE: 98 F | HEART RATE: 58 BPM | DIASTOLIC BLOOD PRESSURE: 72 MMHG | RESPIRATION RATE: 18 BRPM | SYSTOLIC BLOOD PRESSURE: 149 MMHG

## 2023-08-24 PROCEDURE — 99239 HOSP IP/OBS DSCHRG MGMT >30: CPT

## 2023-08-24 RX ORDER — ACETAMINOPHEN 500 MG
2 TABLET ORAL
Qty: 0 | Refills: 0 | DISCHARGE
Start: 2023-08-24

## 2023-08-24 RX ORDER — NYSTATIN CREAM 100000 [USP'U]/G
1 CREAM TOPICAL
Refills: 0 | DISCHARGE

## 2023-08-24 RX ORDER — LACTOBACILLUS ACIDOPHILUS 100MM CELL
1 CAPSULE ORAL
Refills: 0 | DISCHARGE

## 2023-08-24 RX ORDER — ASCORBIC ACID 60 MG
1 TABLET,CHEWABLE ORAL
Qty: 0 | Refills: 0 | DISCHARGE
Start: 2023-08-24

## 2023-08-24 RX ORDER — POLYETHYLENE GLYCOL 3350 17 G/17G
17 POWDER, FOR SOLUTION ORAL
Qty: 0 | Refills: 0 | DISCHARGE
Start: 2023-08-24

## 2023-08-24 RX ORDER — LIDOCAINE 4 G/100G
1 CREAM TOPICAL
Qty: 0 | Refills: 0 | DISCHARGE
Start: 2023-08-24

## 2023-08-24 RX ORDER — METHENAMINE MANDELATE 1 G
1 TABLET ORAL
Refills: 0 | DISCHARGE

## 2023-08-24 RX ORDER — SENNA PLUS 8.6 MG/1
2 TABLET ORAL
Qty: 0 | Refills: 0 | DISCHARGE
Start: 2023-08-24

## 2023-08-24 RX ADMIN — Medication 1000 UNIT(S): at 13:55

## 2023-08-24 RX ADMIN — Medication 500 MILLIGRAM(S): at 13:55

## 2023-08-24 RX ADMIN — Medication 3 MILLILITER(S): at 04:04

## 2023-08-24 RX ADMIN — Medication 3 MILLILITER(S): at 10:06

## 2023-08-24 RX ADMIN — Medication 20 MILLIGRAM(S): at 05:40

## 2023-08-24 RX ADMIN — POLYETHYLENE GLYCOL 3350 17 GRAM(S): 17 POWDER, FOR SOLUTION ORAL at 05:40

## 2023-08-24 RX ADMIN — Medication 1 TABLET(S): at 13:56

## 2023-08-24 RX ADMIN — Medication 100 MICROGRAM(S): at 05:40

## 2023-08-24 NOTE — PROGRESS NOTE ADULT - PROBLEM SELECTOR PROBLEM 4
CHF (congestive heart failure)
Bright red blood per rectum
CHF (congestive heart failure)
Bright red blood per rectum
Bright red blood per rectum
Urinary tract infection
Urinary tract infection
Bright red blood per rectum
CHF (congestive heart failure)
Bright red blood per rectum
Urinary tract infection

## 2023-08-24 NOTE — PROGRESS NOTE ADULT - REASON FOR ADMISSION
hypoxia

## 2023-08-24 NOTE — PROVIDER CONTACT NOTE (OTHER) - REASON
unable to obtain an oral or axillary temp on patient. not able to do rectal due to previous rectal bleeding unable to obtain an oral or axillary temp on patient. not able to do rectal due to previous rectal bleeding. coreg held due to heart rate parameters

## 2023-08-24 NOTE — PROVIDER CONTACT NOTE (OTHER) - ACTION/TREATMENT ORDERED:
provider notified
ACP notified. placed on a keaton hugger.
ACP notified. placed on a bare hugger. Repeat temp. 97.2
YASEMIN Brown notified and aware. Will draw repeat labs and continue to monitor. Safety maintained.

## 2023-08-24 NOTE — PROGRESS NOTE ADULT - ASSESSMENT
Echo 5/18/23: Moderate LV dysfxn, LVH  Echo 8/1/23: Abnormal septal motion of LV, mild-mod MR, mild-mod TR    A/p  89 y/o female with PMHx of HTN, HLD, HFrEf, Afib (not on AC), hypothyroidism, hx of PPM placement, presents with cough , hypoxia       #Acute respiratory failure with hypoxia.   -in the setting of resp infection   -resolved  -chest xray 8/14 Trace bilateral pleural effusions with left lung base hazy opacification,  likely atelectasis.  -cont o2 suppl as needed   -cont lasix po     #Cough, hypoxia   -s/p abx of pna/ uti   -cta  chest  8/6 with no PE, Essential interval resolution of  previously noted bilateral pleural effusions and interlobular septal  thickening/groundglass consolidation.  -Bcx neg   -management per med   -plan as above    #Chronic HFpEF   -stable  -cont lasix 20mg qd  -recent Echo with abnormal septal motion of LV, mild-mod MR, mild-mod TR    #Afib s/p ppm  -Stable, rate controlled  -Continue coreg  -remains off a/c     #HTN  -stable  -Continue  coreg    #UTI  -sp Abx, mgmt per med      dvt ppx

## 2023-08-24 NOTE — DISCHARGE NOTE NURSING/CASE MANAGEMENT/SOCIAL WORK - NSDCFUADDAPPT_GEN_ALL_CORE_FT
APPTS ARE READY TO BE MADE: [x] YES    Best Family or Patient Contact (if needed): [phone] or daughter ketty [phone]    Additional Information about above appointments (if needed):    1: Heart failure clinic  2: GI clinic  3: PCP    Other comments or requests:     Patient/Caregiver declined scheduling assistance. Patient resides at the [facility], [address] [place] [address] and they have a Geriatric Physician on staff who evaluates and makes referrals for all residents    Podiatry Discharge Instructions:  Follow up: Please follow up with Dr. Waterhouse within 1 week of discharge from the hospital, please call [phone] for appointment and discuss that you recently were seen in the hospital.  Wound Care: Please apply allevyn pad to right heel and strict decubitus precaution with z-nika boots.   Antibiotics: Please continue as instructed.

## 2023-08-24 NOTE — DISCHARGE NOTE NURSING/CASE MANAGEMENT/SOCIAL WORK - NSDCCRNAME_GEN_ALL_CORE_FT
[facility] ([phone], [place] [address])

## 2023-08-24 NOTE — PROVIDER CONTACT NOTE (OTHER) - BACKGROUND
Patient admitted for aspiration pneumonia. PMH of HTN, CKD, and DM
Pneumonia, CKD, HF
Pneumonia, CKD, HF
Patient admitted for aspiration pneumonia. PMH of HTN, CKD, and DM

## 2023-08-24 NOTE — PROGRESS NOTE ADULT - PROBLEM SELECTOR PROBLEM 7
HLD (hyperlipidemia)
Hypothyroidism
Constipation
Hyponatremia
Hypothyroidism
HLD (hyperlipidemia)
Hypothyroidism
HTN (hypertension)
Hyponatremia

## 2023-08-24 NOTE — PROGRESS NOTE ADULT - PROBLEM SELECTOR PLAN 3
- met sepsis criteria with Tmax <36C and leukocytosis, likely secondary to PNA. +/- viral infection, +/- UTI  - initial RVP positive for enterovirus, c/w supportive care  - completed course of Zosyn for potential bacterial PNA  - Ucx w/ enterococcus, s/p dose Vancomycin  - had COVID exposure but repeat RVP negative      -isolated hypothermia this AM on axillary temperature, no localizing sx of infection; BCx NG x48h, defer abx at this time - met sepsis criteria with Tmax <36C and leukocytosis, likely secondary to PNA. +/- viral infection, +/- UTI  - initial RVP positive for enterovirus, c/w supportive care  - completed course of Zosyn for potential bacterial PNA  - Ucx w/ enterococcus, s/p dose Vancomycin  - had COVID exposure but repeat RVP negative      -isolated hypothermia this AM on axillary temperature, no localizing sx of infection; BCx NG x48h, defer abx at this time. Will send TSH given mild bradycardia and intermittent hypothermia.

## 2023-08-24 NOTE — PROGRESS NOTE ADULT - SUBJECTIVE AND OBJECTIVE BOX
Patient is a 89y old  Female who presents with a chief complaint of hypoxia (24 Aug 2023 09:55)      SUBJECTIVE / OVERNIGHT EVENTS:    No events overnight. This AM, patient without n/v/d/cp/sob.      MEDICATIONS  (STANDING):  ascorbic acid 500 milliGRAM(s) Oral daily  atorvastatin 20 milliGRAM(s) Oral at bedtime  carvedilol 6.25 milliGRAM(s) Oral every 12 hours  cholecalciferol 1000 Unit(s) Oral daily  furosemide    Tablet 20 milliGRAM(s) Oral daily  levothyroxine 100 MICROGram(s) Oral daily  multivitamin 1 Tablet(s) Oral daily  polyethylene glycol 3350 17 Gram(s) Oral two times a day  senna 2 Tablet(s) Oral at bedtime  sorbitol 70%/mineral oil/magnesium hydroxide/glycerin Enema 120 milliLiter(s) Rectal once    MEDICATIONS  (PRN):  acetaminophen     Tablet .. 650 milliGRAM(s) Oral every 6 hours PRN Temp greater or equal to 38C (100.4F), Mild Pain (1 - 3)  benzonatate 100 milliGRAM(s) Oral every 8 hours PRN Cough  guaiFENesin Oral Liquid (Sugar-Free) 100 milliGRAM(s) Oral every 8 hours PRN Cough  lidocaine   4% Patch 1 Patch Transdermal daily PRN neck pain      PHYSICAL EXAM:  T(C): 35.9 (08-23-23 @ 21:53), Max: 35.9 (08-23-23 @ 21:53)  HR: 55 (08-24-23 @ 10:07) (50 - 58)  BP: 96/56 (08-24-23 @ 09:15) (96/56 - 144/94)  RR: 18 (08-24-23 @ 09:15) (16 - 18)  SpO2: 95% (08-24-23 @ 10:07) (95% - 99%)  I&O's Summary    23 Aug 2023 07:01  -  24 Aug 2023 07:00  --------------------------------------------------------  IN: 200 mL / OUT: 2300 mL / NET: -2100 mL      GENERAL: NAD, lying in bed  HEAD:  Atraumatic, Normocephalic, MMM  CHEST/LUNG: No use of accessory muscles, CTAB, breathing non-labored  COR: RR, no mrcg  ABD: Soft, ND/NT, +BS  PSYCH: AAOx3  NEUROLOGY: CN II-XII grossly intact, moving all extremities  SKIN: No rashes or lesions  EXT: wwp, no cce    LABS:  CAPILLARY BLOOD GLUCOSE                              9.8    6.98  )-----------( 153      ( 23 Aug 2023 04:36 )             29.9     08-23    137  |  98  |  36<H>  ----------------------------<  78  4.1   |  28  |  1.18    Ca    8.8      23 Aug 2023 04:36  Phos  4.0     08-23  Mg     2.00     08-23    TPro  6.6  /  Alb  2.7<L>  /  TBili  0.2  /  DBili  <0.2  /  AST  15  /  ALT  14  /  AlkPhos  113  08-23          Urinalysis Basic - ( 23 Aug 2023 04:36 )    Color: x / Appearance: x / SG: x / pH: x  Gluc: 78 mg/dL / Ketone: x  / Bili: x / Urobili: x   Blood: x / Protein: x / Nitrite: x   Leuk Esterase: x / RBC: x / WBC x   Sq Epi: x / Non Sq Epi: x / Bacteria: x        Culture - Blood (collected 22 Aug 2023 01:45)  Source: .Blood Blood-Venous  Preliminary Report (24 Aug 2023 07:01):    No growth at 48 Hours    Culture - Blood (collected 22 Aug 2023 01:30)  Source: .Blood Blood-Peripheral  Preliminary Report (24 Aug 2023 07:01):    No growth at 48 Hours        RADIOLOGY & ADDITIONAL TESTS:    Telemetry Personally Reviewed -     Imaging Personally Reviewed -     Imaging Reviewed -     Consultant(s) Notes Reviewed -       Care Discussed with Consultants/Other Providers -

## 2023-08-24 NOTE — PROGRESS NOTE ADULT - PROBLEM SELECTOR PLAN 9
- c/w bowel regimen  - monitor for BMs
Diet: Pureed diet pending S/S recs  DVT: HSQ  Dispo: pending clinical improvement  GOC: DNR/DNI, molst form signed and in chart
- c/w bowel regimen  - monitor for BMs
--c/w coreg  --Hold norvasc  --Monitor pressures
--c/w coreg  --Hold norvasc  --Monitor pressures
- c/w bowel regimen  - monitor for BMs
c/w levothyroxine
Diet: Regular diet per S/S recs  Obtain Cineesophagogram per S/S recommendations      DVT: HSQ  Dispo: PT Consulted    GOC: DNR/DNI, molst form signed and in chart    Daughter (Ariana Gilliland called 8/9, Voicemail left)
- c/w bowel regimen  - monitor for BMs
--c/w coreg  --Hold norvasc  --Monitor pressures
- c/w bowel regimen  - monitor for BMs

## 2023-08-24 NOTE — PROGRESS NOTE ADULT - PROBLEM SELECTOR PLAN 10
- VTE ppx: no pharm ppx given recent BRBPR, c/w SCD    -Dispo pending BCx NG x48h, likely tomorrow
Diet: Regular diet per S/S recs       DVT: HSQ  Dispo: PT Consulted recs home PT     GOC: DNR/DNI, molst form signed and in chart    Daughter (Ariana Gilliland called 8/9, Voicemail left)
c/w atorvastatin 20mg qd
- VTE ppx: no pharm ppx given recent BRBPR, c/w SCD
c/w atorvastatin 20mg qd
- VTE ppx: no pharm ppx given recent BRBPR, c/w SCD
c/w atorvastatin 20mg qd
- VTE ppx: no pharm ppx given recent BRBPR, c/w SCD    -Dispo pending BCx NG x48h, likely tomorrow
- VTE ppx: no pharm ppx given BRBPR, -> SCDs      Dispo: plan for facility placement, unclear if able to go back to prior assisted living facility given need for O2 (family would have to help senior living manage) and level of assistance currently required...  will have PT re-evaluate patient, assess role for rehab prior to return to [facility] w/ hospice, or if need to look into other nursing facilities...  Appreciate CM/SW assistance.

## 2023-08-24 NOTE — PHYSICAL THERAPY INITIAL EVALUATION ADULT - MANUAL MUSCLE TESTING RESULTS, REHAB EVAL
at least a 3/5 t.o Tranexamic Acid Counseling:  Patient advised of the small risk of bleeding problems with tranexamic acid. They were also instructed to call if they developed any nausea, vomiting or diarrhea. All of the patient's questions and concerns were addressed.

## 2023-08-24 NOTE — PROGRESS NOTE ADULT - PROVIDER SPECIALTY LIST ADULT
Cardiology
Cardiology
Hospitalist
Hospitalist
Podiatry
Cardiology
Hospitalist
Internal Medicine
Podiatry
Podiatry
Cardiology
Hospitalist
Hospitalist
Podiatry
Podiatry
Internal Medicine
Hospitalist
Internal Medicine

## 2023-08-24 NOTE — PROGRESS NOTE ADULT - PROBLEM SELECTOR PROBLEM 9
Constipation
Need for prophylactic measure
Constipation
Constipation
HTN (hypertension)
Hypothyroidism
Constipation
Constipation
Need for prophylactic measure
HTN (hypertension)
HTN (hypertension)

## 2023-08-24 NOTE — PROGRESS NOTE ADULT - PROBLEM SELECTOR PROBLEM 5
Acute kidney injury
Afib
CHF (congestive heart failure)
Afib
Afib
Acute kidney injury
Acute kidney injury
Afib
CHF (congestive heart failure)
Afib
CHF (congestive heart failure)

## 2023-08-24 NOTE — PROGRESS NOTE ADULT - PROBLEM SELECTOR PLAN 5
Baseline Cr 1.06  Now uptrending. Unclear etiology. Suspect iso dec PO intake +/- infection  --Obtain urine Na, cr, urea  --Monitor sCR  --Avoid nephrotoxic agents
Last echo 8/2023:    Abnormal septal motion consistent with left bundle branch block. Regional wall motion abnormalities. There is mild to moderate mitral regurgitation. There is mild-moderate tricuspid regurgitation. Compared to the transthoracic echocardiogram performed on 4/16/2022 there have been no significant interval changes.  -pro BNP: 8319  -home diuretics: lasix 20 MWF  -Resume in the AM per cards
Baseline Cr 1.06  Suspect iso dec PO intake +/- infection  --FeUREA consistent w/ pre-renal etiology  --Hold lasix  --Monitor sCR  --Avoid nephrotoxic agents
Afib s/p ppm  - HR controlled, on coreg (w/ hold parameters)  - not on anticoagulation
Last echo 8/2023:    Abnormal septal motion consistent with left bundle branch block. Regional wall motion abnormalities. There is mild to moderate mitral regurgitation. There is mild-moderate tricuspid regurgitation. Compared to the transthoracic echocardiogram performed on 4/16/2022 there have been no significant interval changes.  -pro BNP: 8319  -home diuretics: lasix 20 MWF  -Can likely resume in the AM
Last echo 8/2023:    Abnormal septal motion consistent with left bundle branch block. Regional wall motion abnormalities. There is mild to moderate mitral regurgitation. There is mild-moderate tricuspid regurgitation. Compared to the transthoracic echocardiogram performed on 4/16/2022 there have been no significant interval changes.  -pro BNP: 8319  -home diuretics: lasix 20 MWF  -Resume in the AM per cards
Baseline Cr 1.06  Now uptrending. Unclear etiology. Suspect iso dec PO intake +/- infection  --FeUREA consistent w/ pre-renal etiology  --Hold lasix  --Monitor sCR  --Avoid nephrotoxic agents
Afib s/p ppm  - HR controlled, on coreg (w/ hold parameters)  - not on anticoagulation
Afib s/p ppm  - HR controlled, on coreg (w/ hold parameters)  - not on anticoagulation

## 2023-08-24 NOTE — PROGRESS NOTE ADULT - PROBLEM SELECTOR PROBLEM 6
HTN (hypertension)
Hyponatremia
Acute kidney injury
HTN (hypertension)
Acute kidney injury
HTN (hypertension)
Acute kidney injury
HTN (hypertension)
HTN (hypertension)

## 2023-08-24 NOTE — PROGRESS NOTE ADULT - PROBLEM SELECTOR PROBLEM 1
Acute respiratory failure with hypoxia
Bright red blood per rectum
Acute respiratory failure with hypoxia
Acute respiratory failure with hypoxia
Bright red blood per rectum
Acute respiratory failure with hypoxia
Bright red blood per rectum
Acute respiratory failure with hypoxia

## 2023-08-24 NOTE — PROGRESS NOTE ADULT - PROBLEM SELECTOR PROBLEM 2
Heart failure with preserved ejection fraction
Sepsis
Heart failure with preserved ejection fraction
Heart failure with preserved ejection fraction
Sepsis
Heart failure with preserved ejection fraction
Acute respiratory failure with hypoxia
Sepsis
Acute respiratory failure with hypoxia
Heart failure with preserved ejection fraction
Acute respiratory failure with hypoxia

## 2023-08-24 NOTE — PROGRESS NOTE ADULT - PROBLEM SELECTOR PLAN 1
- initial episode felt to be secondary to infection (PNA) and possible CHF exacerbation, was s/p antibiotic course for PNA and diuresis with improvement, now weaned off O2  - appreciate pulm recs  - continue IS at patient tolerates  - DNR/DNI
- initial episode felt to be secondary to infection (PNA) and possible CHF exacerbation, was s/p antibiotic course for PNA and diuresis with improvement, now weaned off O2  - appreciate pulm recs  - continued IS at patient tolerates  - DNR/DNI
- initial episode felt to be secondary to infection (PNA) and possible CHF exacerbation, was s/p antibiotic course for PNA and diuresis with improvement, plan was to wean O2 as tolerated   - however patient hypoxic 8/14 AM to 60s despite use of NRB/NFNC  - CXR obtained, noting trace bilateral pleural effusions and hazy opacification  - had covid exposure as well, RVP 8/13 neg, repeat RVP 8/18 thankfully negative  - proBNP elevated, s/p IV diuresis, respiratory status improving, appreciate cards recs, transitioned to PO lasix 20 mg daily starting 8/19  - now off bipap, will continue to wean supplemental O2 as tolerated  - c/w chest PT, supportive care, symptomatic management of cough (+tessalon perles, robitussin - patient thinks it helps), continue to encourage incentive spirometry as well  - c/w minced/moist diet with moderate thick liquids, aspiration precautions (appreciate swallow eval)   - monitor respiratory status closely, continue discussions if respiratory status worsens (patient requests medications to keep her comfortable)  - DNR/DNI  - appreciate CM/SW assistance with facility placement (reassessing respiratory and functional status to see if [facility] can take patient back with current level of assistance needed, or if need rehab prior to return to [facility], or have to look into other nursing facility instead...)
- initial episode felt to be secondary to infection (PNA) and possible CHF exacerbation, was s/p antibiotic course for PNA and diuresis with improvement, plan was to wean O2 as tolerated  - patient appears clinically euvolemic, CXR yesterday with small pleural effusion, no e/o PNA  - prior CTA w/o e/o PE  - continued IS at patient tolerates  - patient remains hypoxic at rest and with exertion, requiring O2 via NC at 4L/min  - send VBG  - pulmonary consultation for assistance w/management of hypoxia  - DNR/DNI
--w/ episodes of BRBPR noted 8/11. Suspect LGIB of unclear etiology  --Now seemingly resolved  --Last colonoscopy > 10 yrs ago, unremarkable per pt. Not on AC  --Hold IV PPI BID -  low suspicion for UGIB  --Monitor CBCs daily, transfuse < 8  --Maintain active type and screen  --GI Consulted, - family unamenable to endoscopic evaluation  --Outpatient follow up
- initial episode felt to be secondary to infection (PNA) and possible CHF exacerbation, was s/p antibiotic course for PNA and diuresis with improvement, now weaned off O2  - appreciate pulm recs  - continue IS at patient tolerates  - DNR/DNI
Suspect multifactorial etiology in the setting of Sepsis +/- CHF exacerbation  --CTA w/o PE. w/o focal consolidation. W/ resolution of B/L pleural effusions  --C/w infection work up / tx as below  --recent Echo with abnormal septal motion of LV, mild-mod MR, mild-mod TR  --s/p Lasix 40mg IV x 1 8/8 w/ uptrend in sCR  --Holding off on diuresis per cardio recommendations  --Wean off oxygen as tolerated
--w/ episodes of BRBPR noted 8/11. Suspect LGIB of unclear etiology  --Now seemingly resolved  --Last colonoscopy > 10 yrs ago, unremarkable per pt. Not on AC  --Hold IV PPI BID -  low suspicion for UGIB  --Monitor CBCs daily, transfuse < 8  --Maintain active type and screen  --GI Consulted
Suspect multifactorial etiology in the setting of Sepsis +/- CHF exacerbation  --CTA w/o PE. w/o focal consolidation. W/ resolution of B/L pleural effusions  --C/w infection work up / tx as below  --recent Echo with abnormal septal motion of LV, mild-mod MR, mild-mod TR  --s/p Lasix 40mg IV x 1 8/8 w/ uptrend in sCR  --Holding off on diuresis per cardio recommendations  --Wean off oxygen as tolerated
Suspect multifactorial etiology in the setting of viral infection +/- possible PNA +/- CHF exacerbation  --CTA w/o PE. w/o focal consolidation. W/ resolution of B/L pleural effusions  --C/w infection work up / tx as below  --recent Echo with abnormal septal motion of LV, mild-mod MR, mild-mod TR  --Holding off on diuresis per cardio recommendations  --Wean off oxygen as tolerated
--w/ episodes of BRBPR noted this morning. Suspect LGIB of unclear etiology  --Last colonoscopy > 10 yrs ago, unremarkable per pt. Not on AC  --Resume IV PPI BID for now though low suspicion for UGIB  --Monitor CBCs daily, transfuse < 8  --Maintain active type and screen  --GI Consulted

## 2023-08-24 NOTE — PROGRESS NOTE ADULT - TIME BILLING
reviewing laboratory data, consultants' recommendations, documentation in [place], performing medically appropriate examinations/evaluations, discussion with patient/family/RN/ACP/Residents and interdisciplinary staff (such as , social workers, etc), counseling patient/family/care giver, ordering medically appropriate medication, tests, or procedures
Agree with above NP note.  cv stable  cont to monitor off diuretics   monitor bmp  med f/u
Agree with above NP note.  events noted  IV lasix now  monitor bmp  med f/u
Reviewing labs/vitals/cardiology recommendations, swallow eval, interviewing/examining patient, assessing respiratory status, updated both daughters re: plan, coordinating care
reviewing events of RRT, addressing hypoxia, reviewing CXR, labs, vitals, discussing case with cards, respiratory therapy, coordinating care, updating daughters re: course and plan of care, discussing goals of care
Agree with above NP note.  IV lasix as above  monitor bmp  med f/u
Agree with above NP note.  cv stable  cont current tx  cont po lasix
Agree with above NP note.  cv stable  cont oral lasix   med f/u   dcp
Reviewing labs/vitals/consultant recommendations, interviewing/examining patient, discussing case with cardiology, coordinating care, updating daughter
reviewing laboratory data, consultants' recommendations, documentation in [place], performing medically appropriate examinations/evaluations, discussion with patient/family/RN/ACP/Residents and interdisciplinary staff (such as , social workers, etc), counseling patient/family/care giver, ordering medically appropriate medication, tests, or procedures
reviewing laboratory data, consultants' recommendations, documentation in [place], performing medically appropriate examinations/evaluations, discussion with patient/family/RN/ACP/Residents and interdisciplinary staff (such as , social workers, etc), counseling patient/family/care giver, ordering medically appropriate medication, tests, or procedures
reviewing laboratory data, consultants' recommendations, documentation in [place], performing medically appropriate examinations/evaluations, discussion with patient/family/RN/ACP/Residents and interdisciplinary staff (such as , social workers, etc), counseling patient/family/care giver, ordering medically appropriate medication, tests, or procedures
reviewing laboratory data, consultants' recommendations, documentation in [place], performing medically appropriate examinations/evaluations, discussion with patient/family/RN/ACP/Residents and interdisciplinary staff (such as , social workers, etc), counseling patient/family/care giver, ordering medically appropriate medication, tests, or procedures

## 2023-08-24 NOTE — PROGRESS NOTE ADULT - PROBLEM SELECTOR PLAN 7
c/w atorvastatin 20mg qd
Noted. Likely dec PO intake  --Improving  --F/u urine lytes   --Monitor BMPs
- c/w synthroid
- c/w synthroid
--C/W Bowel regimen  --Monitor stool count
- c/w synthroid
--c/w coreg  --Hold norvasc  --Monitor pressures
Noted. Likely dec PO intake  --Now resolved  --Monitor BMPs
- c/w synthroid
- c/w synthroid
c/w atorvastatin 20mg qd

## 2023-08-24 NOTE — DISCHARGE NOTE NURSING/CASE MANAGEMENT/SOCIAL WORK - NSDCPEFALRISK_GEN_ALL_CORE
For information on Fall & Injury Prevention, visit: [email or web address] OR  [email or web address] OR  https://www.cdc.gov/steadi/patient.html

## 2023-08-24 NOTE — PROGRESS NOTE ADULT - PROBLEM SELECTOR PROBLEM 8
Hypothyroidism
Constipation
HLD (hyperlipidemia)
Altered mental status
Hypothyroidism
HLD (hyperlipidemia)
HLD (hyperlipidemia)
Constipation

## 2023-08-24 NOTE — PROGRESS NOTE ADULT - PROBLEM SELECTOR PROBLEM 3
Sepsis
Urinary tract infection
Sepsis
Sepsis
Urinary tract infection
Urinary tract infection
Sepsis

## 2023-08-24 NOTE — PROVIDER CONTACT NOTE (OTHER) - SITUATION
unable to obtain an oral or axillary temp on patient. not able to do rectal due to previous rectal bleeding
Pt discovered to have an episode of large rectal bleeding with clots this morning
Patient Temp. 94.7
blood noted in stool this morning

## 2023-08-24 NOTE — PROGRESS NOTE ADULT - PROBLEM SELECTOR PLAN 4
Last echo 8/2023:    Abnormal septal motion consistent with left bundle branch block. Regional wall motion abnormalities. There is mild to moderate mitral regurgitation. There is mild-moderate tricuspid regurgitation. Compared to the transthoracic echocardiogram performed on 4/16/2022 there have been no significant interval changes.  -pro BNP: 8319  -home diuretics: lasix 20  -Monitoring off diuresis per cardio recs
- reported some episodes but no plan for endoscopic intervention at this time, given age, comorbidities and patient/family desires  - hgb remains stable  - holding VTE pharm ppx
- reported some episodes but no plan for endoscopic intervention at this time, given age, comorbidities and patient/family desires  - hgb 10.3 on 8/20, relatively stable, continue to monitor, continue discussions re: GOC if continues bleed/or has downtrend  - holding VTE pharm ppx
Last echo 8/2023:    Abnormal septal motion consistent with left bundle branch block. Regional wall motion abnormalities. There is mild to moderate mitral regurgitation. There is mild-moderate tricuspid regurgitation. Compared to the transthoracic echocardiogram performed on 4/16/2022 there have been no significant interval changes.  -pro BNP: 8319  -home diuretics: lasix 20  -Monitoring off diuresis per cardio recs
- reported some episodes but no plan for endoscopic intervention at this time, given age, comorbidities and patient/family desires  - hgb remains stable  - holding VTE pharm ppx
UA w/ moderate bacteria. Moderate LEs  UCx w/ enterococcus  w/ amp resistance. Vanc x 1 dose given renal dysfunction  Plan as above
- reported some episodes but no plan for endoscopic intervention at this time, given age, comorbidities and patient/family desires  - hgb remains stable  - holding VTE pharm ppx
UA w/ moderate bacteria. Moderate LEs  UCx w/ enterococcus  w/ amp resistance. Vanc x 1 dose given renal dysfunction  Plan as above
Last echo 8/2023:    Abnormal septal motion consistent with left bundle branch block. Regional wall motion abnormalities. There is mild to moderate mitral regurgitation. There is mild-moderate tricuspid regurgitation. Compared to the transthoracic echocardiogram performed on 4/16/2022 there have been no significant interval changes.  -pro BNP: 8319  -home diuretics: lasix 20  -Monitoring off diuresis per cardio recs
- reported some episodes but no plan for endoscopic intervention at this time, given age, comorbidities and patient/family desires  - hgb remains stable  - holding VTE pharm ppx
UA w/ moderate bacteria. Moderate LEs  UCx w/ enterococcus  w/ amp resistance. Vanc x 1 dose given renal dysfunction  Plan as above

## 2023-08-24 NOTE — PROGRESS NOTE ADULT - PROBLEM SELECTOR PLAN 2
- HFpEF per cardiology, echo 2022 w/ EF 55-60%, though more recent echo noting LV systolic dysfunction as well  - s/p IV diuresis, transitioned to PO lasix 20 mg on 8/19  - c/w coreg as per cards  - monitor lytes and replete prn
Suspect multifactorial etiology in the setting of Sepsis +/- CHF exacerbation  --CTA w/o PE. w/o focal consolidation. W/ resolution of B/L pleural effusions  --C/w infection work up / tx as below  --recent Echo with abnormal septal motion of LV, mild-mod MR, mild-mod TR  --s/p Lasix 40mg IV x 1 8/8 w/ uptrend in sCR  --Wean off oxygen as tolerated
Pt met sepsis criteria with temp < 36 C and WBC of 21, likely sources pneumonia +/- viral infection +/-UTI  --RVP w/ enterovirus   --C/w IV zosyn (08/07- Anticipate 7 Day course of tx, can switch to PO regimen upon discharge   --BCx NGTD  --Urine Cx w/ enterococcus w/ amp resistance. Vanc x 1 dose given renal dysfunction  --Procal Elevated   --Trend leukocytosis   --Supportive care for viral infection
- HFpEF per cardiology, echo 2022 w/ EF 55-60%, though more recent echo noting LV systolic dysfunction as well  - s/p IV diuresis, transitioned to PO lasix 20 mg on 8/19  - c/w coreg as per cards  - monitor lytes and replete prn
Suspect multifactorial etiology in the setting of Sepsis +/- CHF exacerbation  --CTA w/o PE. w/o focal consolidation. W/ resolution of B/L pleural effusions  --C/w infection work up / tx as below  --recent Echo with abnormal septal motion of LV, mild-mod MR, mild-mod TR  --s/p Lasix 40mg IV x 1 8/8 w/ uptrend in sCR  --Wean off oxygen as tolerated
- HFpEF per cardiology, echo 2022 w/ EF 55-60%, though more recent echo noting LV systolic dysfunction as well  - s/p IV diuresis, transitioned to PO lasix 20 mg on 8/19  - c/w coreg as per cards  - monitor lytes and replete prn
Pt met sepsis criteria with temp < 36 C and WBC of 21, likely sources pneumonia +/- viral infection +/-UTI  --RVP w/ enterovirus   --C/w IV zosyn (08/07- Anticipate 7 Day course of tx, can switch to PO regimen upon discharge   --BCx NGTD  --Urine Cx w/ enterococcus  --Procal Elevated   --Trend leukocytosis   --Supportive care for viral infection
Pt met sepsis criteria with temp < 36 C and WBC of 21, likely sources pneumonia vs viral infection  --RVP w/ enterovirus   --C/w IV zosyn (08/07-  --BCx NGTD  --F/u urine Cx  --Obtain procal  --Trend leukocytosis   --Supportive care for viral infection
- HFpEF per cardiology, echo 2022 w/ EF 55-60%, though more recent echo noting LV systolic dysfunction as well  - s/p IV diuresis, transitioned to PO lasix 20 mg on 8/19  - c/w coreg as per cards  - monitor lytes and replete prn
- HFpEF per cardiology, echo 2022 w/ EF 55-60%, though more recent echo noting LV systolic dysfunction as well  - s/p IV diuresis, transitioned to PO lasix 20 mg on 8/19  - c/w coreg as per cards  - monitor lytes and replete prn
Suspect multifactorial etiology in the setting of Sepsis +/- CHF exacerbation  --CTA w/o PE. w/o focal consolidation. W/ resolution of B/L pleural effusions  -- infection work up / tx as below  --recent Echo with abnormal septal motion of LV, mild-mod MR, mild-mod TR  --s/p Lasix 40mg IV x 1 8/8 w/ uptrend in sCR  --Wean off oxygen as tolerated

## 2023-08-24 NOTE — PROGRESS NOTE ADULT - SUBJECTIVE AND OBJECTIVE BOX
CARDIOLOGY FOLLOW UP - Dr. Riley  DATE OF SERVICE: 8/24/23    CC no cp or sob       REVIEW OF SYSTEMS:  CONSTITUTIONAL: No fever, weight loss, or fatigue  RESPIRATORY: No cough, wheezing, chills or hemoptysis; No Shortness of Breath  CARDIOVASCULAR: No chest pain, palpitations, passing out, dizziness, or leg swelling  GASTROINTESTINAL: No abdominal or epigastric pain. No nausea, vomiting, or hematemesis; No diarrhea or constipation. No melena or hematochezia.  VASCULAR: No edema     PHYSICAL EXAM:  T(C): 35.9 (08-23-23 @ 21:53), Max: 35.9 (08-23-23 @ 21:53)  HR: 51 (08-24-23 @ 05:40) (50 - 58)  BP: 130/70 (08-24-23 @ 05:40) (130/70 - 144/94)  RR: 16 (08-24-23 @ 05:40) (16 - 18)  SpO2: 95% (08-24-23 @ 05:40) (95% - 99%)  Wt(kg): --  I&O's Summary    23 Aug 2023 07:01  -  24 Aug 2023 07:00  --------------------------------------------------------  IN: 200 mL / OUT: 2300 mL / NET: -2100 mL        Appearance: Normal	  Cardiovascular: Normal S1 S2,RRR, No JVD, No murmurs  Respiratory: Lungs clear to auscultation	  Gastrointestinal:  Soft, Non-tender, + BS	  Extremities: Normal range of motion, No clubbing, cyanosis or edema      Home Medications:  amLODIPine 5 mg oral tablet: 1 tab(s) orally once a day (07 Aug 2023 06:47)  Biofreeze 4% topical gel: Apply topically to affected area 2 times a day to both knees (07 Aug 2023 06:47)  carvedilol 6.25 mg oral tablet: 1 tab(s) orally 2 times a day (07 Aug 2023 06:47)  furosemide 20 mg oral tablet: 1 tab(s) orally 3 times a week MWF (11 Aug 2023 16:04)  lactobacillus acidophilus oral capsule: 1 cap(s) orally once a day (07 Aug 2023 06:47)  levothyroxine 100 mcg (0.1 mg) oral tablet: 1 tab(s) orally once a day (07 Aug 2023 06:47)  methenamine hippurate 1 g oral tablet: 1 tab(s) orally once a day (07 Aug 2023 06:47)  nystatin 100,000 units/g topical powder: Apply topically to affected area once a day to bust after showers (07 Aug 2023 06:47)  Vitamin D3 25 mcg (1000 intl units) oral tablet: 1 tab(s) orally once a day (07 Aug 2023 06:47)      MEDICATIONS  (STANDING):  albuterol/ipratropium for Nebulization 3 milliLiter(s) Nebulizer every 6 hours  ascorbic acid 500 milliGRAM(s) Oral daily  atorvastatin 20 milliGRAM(s) Oral at bedtime  carvedilol 6.25 milliGRAM(s) Oral every 12 hours  cholecalciferol 1000 Unit(s) Oral daily  furosemide    Tablet 20 milliGRAM(s) Oral daily  levothyroxine 100 MICROGram(s) Oral daily  multivitamin 1 Tablet(s) Oral daily  polyethylene glycol 3350 17 Gram(s) Oral two times a day  senna 2 Tablet(s) Oral at bedtime  sorbitol 70%/mineral oil/magnesium hydroxide/glycerin Enema 120 milliLiter(s) Rectal once      TELEMETRY: 	    ECG:  	  RADIOLOGY:   DIAGNOSTIC TESTING:  [ ] Echocardiogram:  [ ]  Catheterization:  [ ] Stress Test:    OTHER: 	    LABS:	 	                            9.8    6.98  )-----------( 153      ( 23 Aug 2023 04:36 )             29.9     08-23    137  |  98  |  36<H>  ----------------------------<  78  4.1   |  28  |  1.18    Ca    8.8      23 Aug 2023 04:36  Phos  4.0     08-23  Mg     2.00     08-23    TPro  6.6  /  Alb  2.7<L>  /  TBili  0.2  /  DBili  <0.2  /  AST  15  /  ALT  14  /  AlkPhos  113  08-23

## 2023-08-24 NOTE — DISCHARGE NOTE NURSING/CASE MANAGEMENT/SOCIAL WORK - PATIENT PORTAL LINK FT
You can access the FollowMyHealth Patient Portal offered by [facility] by registering at the following website: [email or web address]. By joining [facility]’s FollowMyHealth portal, you will also be able to view your health information using other applications (apps) compatible with our system.

## 2023-08-24 NOTE — PROVIDER CONTACT NOTE (OTHER) - ASSESSMENT
Patient rectal temp. 94.7 Patient asymptomatic.
A&OX4 no s/s of acute distress
Pt is AOX4, no s/s of distress. VSS
Pt is AOX4, no s/s of distress. VSS- 98F, 113/62, 58, 97.5F, 95% O2.

## 2023-08-24 NOTE — DISCHARGE NOTE NURSING/CASE MANAGEMENT/SOCIAL WORK - NSDCVIVACCINE_GEN_ALL_CORE_FT
Tdap; 17-Jun-2019 08:56; Priyanka Easley (RN); [employer]; [id number] (Exp. Date: 04-Apr-2021); IntraMuscular; Deltoid Left.; 0.5 milliLiter(s); VIS (VIS Published: 09-May-2013, VIS Presented: 17-Jun-2019);   Tdap; 25-Dec-2022 19:57; Michelle Richard (RN); [employer]; [id number] (Exp. Date: 01-Jun-2024); IntraMuscular; Deltoid Left.; 0.5 milliLiter(s); VIS (VIS Published: 09-May-2013, VIS Presented: 25-Dec-2022);

## 2023-08-24 NOTE — PROGRESS NOTE ADULT - PROBLEM SELECTOR PLAN 8
--C/W Bowel regimen  --SMOG x 1 today  --Monitor stool count
c/w levothyroxine
- on statin
- on statin
c/w levothyroxine
- on statin
- on statin
- on statin, but should discuss risk/benefits of continued use given age (89), did broach with daughter
c/w atorvastatin 20mg qd
Pt AAO x 3 at baseline, speaks in full sentences and coherent.   --Today remains AAO x 3 but a little confused when speaking  --Suspect hospital acquired delirium  --Monitor for now  --Consider further work up if w/o improvement
--C/W Bowel regimen  --Monitor stool count

## 2023-08-25 ENCOUNTER — TRANSCRIPTION ENCOUNTER (OUTPATIENT)
Age: 88
End: 2023-08-25

## 2023-08-25 ENCOUNTER — NON-APPOINTMENT (OUTPATIENT)
Age: 88
End: 2023-08-25

## 2023-08-31 NOTE — PROGRESS NOTE ADULT - TIME-BASED BILLING (NON-CRITICAL CARE)
extra-renal   metabolism of creatine, excessive creatine ingestion, or following therapy that affects   renal tubular secretion. GFR    Date Value Ref Range Status   09/20/2022 >60  Final        No results found. Assessment/Plan:      Outpatient Encounter Medications as of 8/31/2023   Medication Sig Dispense Refill    gentamicin (GENTAK) 0.3 % ophthalmic ointment Place 0.5 inches into both eyes daily as needed (3.5) 3.5 g 1    lansoprazole (PREVACID SOLUTAB) 15 MG disintegrating tablet Take 1 tablet by mouth daily 90 tablet 1    lactulose (CHRONULAC) 10 GM/15ML solution TAKE 15ML BY MOUTH DAILY 946 mL 0    glycopyrrolate (ROBINUL) 1 MG tablet Take 2 tablets by mouth 3 times daily 270 tablet 1    Nutritional Supplements (NUTREN 1.0) LIQD 250 mL per container. Patient uses 6/day  Dispense 8 cases    May dispense equivalent product if not available 8 each 5    baclofen (LIORESAL) 20 MG tablet Take 1 tablet by mouth 3 times daily 270 tablet 1    citalopram (CELEXA) 10 MG/5ML solution Take 5 mLs by mouth daily 300 mL 1    nystatin (MYCOSTATIN) 175678 UNIT/GM powder Apply 3 times daily. 1 each 1    miconazole (ZEASORB-AF) 2 % powder Apply topically as needed for Itching 45 g 1    clotrimazole-betamethasone (LOTRISONE) 1-0.05 % cream Apply topically       acetaminophen (TYLENOL) 160 MG/5ML suspension 19.13 mLs by Per G Tube route every 6 hours as needed for Fever 229.56 mL 0    Polyethylene Glycol 3350 (MIRALAX PO) Take by mouth as needed       lacosamide (VIMPAT) 150 MG TABS tablet Take 1 tablet by mouth 2 times daily. Takes two tablets bid      clonazePAM (KLONOPIN) 0.5 MG tablet Take 1 tablet by mouth 3 times daily as needed for Anxiety.  11/2 tab three times daily  Take morning of surgery with a sip of water      lamoTRIgine (LAMICTAL) 200 MG tablet Take 1 tablet by mouth 3 times daily      zonisamide (ZONEGRAN) 100 MG capsule Take 3 capsules by mouth nightly      [DISCONTINUED] lansoprazole
Time-based billing (NON-critical care)

## 2023-09-01 ENCOUNTER — TRANSCRIPTION ENCOUNTER (OUTPATIENT)
Age: 88
End: 2023-09-01

## 2023-09-08 RX ORDER — ACETAMINOPHEN 500 MG/1
500 TABLET ORAL
Qty: 180 | Refills: 3 | Status: ACTIVE | COMMUNITY
Start: 2022-12-07 | End: 1900-01-01

## 2023-09-08 RX ORDER — AMLODIPINE BESYLATE 5 MG/1
5 TABLET ORAL DAILY
Qty: 30 | Refills: 4 | Status: ACTIVE | COMMUNITY
Start: 2023-05-26 | End: 1900-01-01

## 2023-09-11 ENCOUNTER — TRANSCRIPTION ENCOUNTER (OUTPATIENT)
Age: 88
End: 2023-09-11

## 2023-09-15 ENCOUNTER — TRANSCRIPTION ENCOUNTER (OUTPATIENT)
Age: 88
End: 2023-09-15

## 2023-09-20 ENCOUNTER — INPATIENT (INPATIENT)
Facility: HOSPITAL | Age: 88
LOS: 13 days | Discharge: OTHER INSTITUTION | End: 2023-10-04
Attending: HOSPITALIST | Admitting: HOSPITALIST
Payer: MEDICARE

## 2023-09-20 VITALS
RESPIRATION RATE: 19 BRPM | HEART RATE: 56 BPM | OXYGEN SATURATION: 97 % | DIASTOLIC BLOOD PRESSURE: 99 MMHG | TEMPERATURE: 90 F | SYSTOLIC BLOOD PRESSURE: 151 MMHG

## 2023-09-20 DIAGNOSIS — J96.01 ACUTE RESPIRATORY FAILURE WITH HYPOXIA: ICD-10-CM

## 2023-09-20 DIAGNOSIS — N39.0 URINARY TRACT INFECTION, SITE NOT SPECIFIED: ICD-10-CM

## 2023-09-20 DIAGNOSIS — O00.1 TUBAL PREGNANCY: Chronic | ICD-10-CM

## 2023-09-20 DIAGNOSIS — I50.22 CHRONIC SYSTOLIC (CONGESTIVE) HEART FAILURE: ICD-10-CM

## 2023-09-20 DIAGNOSIS — Z29.9 ENCOUNTER FOR PROPHYLACTIC MEASURES, UNSPECIFIED: ICD-10-CM

## 2023-09-20 DIAGNOSIS — Z98.49 CATARACT EXTRACTION STATUS, UNSPECIFIED EYE: Chronic | ICD-10-CM

## 2023-09-20 DIAGNOSIS — D64.9 ANEMIA, UNSPECIFIED: ICD-10-CM

## 2023-09-20 DIAGNOSIS — A41.9 SEPSIS, UNSPECIFIED ORGANISM: ICD-10-CM

## 2023-09-20 DIAGNOSIS — Z96.7 PRESENCE OF OTHER BONE AND TENDON IMPLANTS: Chronic | ICD-10-CM

## 2023-09-20 DIAGNOSIS — D69.6 THROMBOCYTOPENIA, UNSPECIFIED: ICD-10-CM

## 2023-09-20 DIAGNOSIS — Z98.890 OTHER SPECIFIED POSTPROCEDURAL STATES: Chronic | ICD-10-CM

## 2023-09-20 DIAGNOSIS — G93.40 ENCEPHALOPATHY, UNSPECIFIED: ICD-10-CM

## 2023-09-20 LAB
ALBUMIN SERPL ELPH-MCNC: 3.3 G/DL — SIGNIFICANT CHANGE UP (ref 3.3–5)
ALP SERPL-CCNC: 149 U/L — HIGH (ref 40–120)
ALT FLD-CCNC: 29 U/L — SIGNIFICANT CHANGE UP (ref 4–33)
ANION GAP SERPL CALC-SCNC: 13 MMOL/L — SIGNIFICANT CHANGE UP (ref 7–14)
ANISOCYTOSIS BLD QL: SLIGHT — SIGNIFICANT CHANGE UP
APPEARANCE UR: ABNORMAL
APTT BLD: 42 SEC — HIGH (ref 24.5–35.6)
AST SERPL-CCNC: 19 U/L — SIGNIFICANT CHANGE UP (ref 4–32)
B PERT DNA SPEC QL NAA+PROBE: SIGNIFICANT CHANGE UP
B PERT+PARAPERT DNA PNL SPEC NAA+PROBE: SIGNIFICANT CHANGE UP
BACTERIA # UR AUTO: ABNORMAL /HPF
BASE EXCESS BLDV CALC-SCNC: 1.3 MMOL/L — SIGNIFICANT CHANGE UP (ref -2–3)
BASE EXCESS BLDV CALC-SCNC: 4.5 MMOL/L — HIGH (ref -2–3)
BASOPHILS # BLD AUTO: 0.03 K/UL — SIGNIFICANT CHANGE UP (ref 0–0.2)
BASOPHILS NFR BLD AUTO: 0.4 % — SIGNIFICANT CHANGE UP (ref 0–2)
BILIRUB SERPL-MCNC: 0.4 MG/DL — SIGNIFICANT CHANGE UP (ref 0.2–1.2)
BILIRUB UR-MCNC: NEGATIVE — SIGNIFICANT CHANGE UP
BLOOD GAS VENOUS COMPREHENSIVE RESULT: SIGNIFICANT CHANGE UP
BLOOD GAS VENOUS COMPREHENSIVE RESULT: SIGNIFICANT CHANGE UP
BORDETELLA PARAPERTUSSIS (RAPRVP): SIGNIFICANT CHANGE UP
BUN SERPL-MCNC: 27 MG/DL — HIGH (ref 7–23)
BURR CELLS BLD QL SMEAR: PRESENT — SIGNIFICANT CHANGE UP
C PNEUM DNA SPEC QL NAA+PROBE: SIGNIFICANT CHANGE UP
CALCIUM SERPL-MCNC: 8.9 MG/DL — SIGNIFICANT CHANGE UP (ref 8.4–10.5)
CAST: 10 /LPF — HIGH (ref 0–4)
CHLORIDE BLDV-SCNC: 100 MMOL/L — SIGNIFICANT CHANGE UP (ref 96–108)
CHLORIDE BLDV-SCNC: 99 MMOL/L — SIGNIFICANT CHANGE UP (ref 96–108)
CHLORIDE SERPL-SCNC: 98 MMOL/L — SIGNIFICANT CHANGE UP (ref 98–107)
CO2 BLDV-SCNC: 29.5 MMOL/L — HIGH (ref 22–26)
CO2 BLDV-SCNC: 32.2 MMOL/L — HIGH (ref 22–26)
CO2 SERPL-SCNC: 26 MMOL/L — SIGNIFICANT CHANGE UP (ref 22–31)
COLOR SPEC: YELLOW — SIGNIFICANT CHANGE UP
CREAT SERPL-MCNC: 1.11 MG/DL — SIGNIFICANT CHANGE UP (ref 0.5–1.3)
DIFF PNL FLD: ABNORMAL
EGFR: 44 ML/MIN/1.73M2 — LOW
EOSINOPHIL # BLD AUTO: 0.04 K/UL — SIGNIFICANT CHANGE UP (ref 0–0.5)
EOSINOPHIL NFR BLD AUTO: 0.5 % — SIGNIFICANT CHANGE UP (ref 0–6)
EPI CELLS # UR: 5 — SIGNIFICANT CHANGE UP
FLUAV SUBTYP SPEC NAA+PROBE: SIGNIFICANT CHANGE UP
FLUBV RNA SPEC QL NAA+PROBE: SIGNIFICANT CHANGE UP
GAS PNL BLDV: 133 MMOL/L — LOW (ref 136–145)
GAS PNL BLDV: 133 MMOL/L — LOW (ref 136–145)
GLUCOSE BLDV-MCNC: 112 MG/DL — HIGH (ref 70–99)
GLUCOSE BLDV-MCNC: 116 MG/DL — HIGH (ref 70–99)
GLUCOSE SERPL-MCNC: 127 MG/DL — HIGH (ref 70–99)
GLUCOSE UR QL: NEGATIVE MG/DL — SIGNIFICANT CHANGE UP
HADV DNA SPEC QL NAA+PROBE: SIGNIFICANT CHANGE UP
HCO3 BLDV-SCNC: 28 MMOL/L — SIGNIFICANT CHANGE UP (ref 22–29)
HCO3 BLDV-SCNC: 31 MMOL/L — HIGH (ref 22–29)
HCOV 229E RNA SPEC QL NAA+PROBE: SIGNIFICANT CHANGE UP
HCOV HKU1 RNA SPEC QL NAA+PROBE: SIGNIFICANT CHANGE UP
HCOV NL63 RNA SPEC QL NAA+PROBE: SIGNIFICANT CHANGE UP
HCOV OC43 RNA SPEC QL NAA+PROBE: SIGNIFICANT CHANGE UP
HCT VFR BLD CALC: 29.1 % — LOW (ref 34.5–45)
HCT VFR BLDA CALC: 29 % — LOW (ref 34.5–46.5)
HCT VFR BLDA CALC: 30 % — LOW (ref 34.5–46.5)
HGB BLD CALC-MCNC: 10 G/DL — LOW (ref 11.7–16.1)
HGB BLD CALC-MCNC: 9.7 G/DL — LOW (ref 11.7–16.1)
HGB BLD-MCNC: 9.5 G/DL — LOW (ref 11.5–15.5)
HMPV RNA SPEC QL NAA+PROBE: SIGNIFICANT CHANGE UP
HPIV1 RNA SPEC QL NAA+PROBE: SIGNIFICANT CHANGE UP
HPIV2 RNA SPEC QL NAA+PROBE: SIGNIFICANT CHANGE UP
HPIV3 RNA SPEC QL NAA+PROBE: SIGNIFICANT CHANGE UP
HPIV4 RNA SPEC QL NAA+PROBE: SIGNIFICANT CHANGE UP
HYPOCHROMIA BLD QL: SLIGHT — SIGNIFICANT CHANGE UP
IANC: 6.13 K/UL — SIGNIFICANT CHANGE UP (ref 1.8–7.4)
IMM GRANULOCYTES NFR BLD AUTO: 0.8 % — SIGNIFICANT CHANGE UP (ref 0–0.9)
INR BLD: 1.13 RATIO — SIGNIFICANT CHANGE UP (ref 0.85–1.18)
KETONES UR-MCNC: NEGATIVE MG/DL — SIGNIFICANT CHANGE UP
LACTATE BLDV-MCNC: 1.9 MMOL/L — SIGNIFICANT CHANGE UP (ref 0.5–2)
LACTATE BLDV-MCNC: 3.4 MMOL/L — HIGH (ref 0.5–2)
LEUKOCYTE ESTERASE UR-ACNC: ABNORMAL
LG PLATELETS BLD QL AUTO: SLIGHT — SIGNIFICANT CHANGE UP
LYMPHOCYTES # BLD AUTO: 0.65 K/UL — LOW (ref 1–3.3)
LYMPHOCYTES # BLD AUTO: 8.7 % — LOW (ref 13–44)
M PNEUMO DNA SPEC QL NAA+PROBE: SIGNIFICANT CHANGE UP
MACROCYTES BLD QL: SLIGHT — SIGNIFICANT CHANGE UP
MANUAL SMEAR VERIFICATION: SIGNIFICANT CHANGE UP
MCHC RBC-ENTMCNC: 31.1 PG — SIGNIFICANT CHANGE UP (ref 27–34)
MCHC RBC-ENTMCNC: 32.6 GM/DL — SIGNIFICANT CHANGE UP (ref 32–36)
MCV RBC AUTO: 95.4 FL — SIGNIFICANT CHANGE UP (ref 80–100)
MONOCYTES # BLD AUTO: 0.53 K/UL — SIGNIFICANT CHANGE UP (ref 0–0.9)
MONOCYTES NFR BLD AUTO: 7.1 % — SIGNIFICANT CHANGE UP (ref 2–14)
NEUTROPHILS # BLD AUTO: 6.13 K/UL — SIGNIFICANT CHANGE UP (ref 1.8–7.4)
NEUTROPHILS NFR BLD AUTO: 82.5 % — HIGH (ref 43–77)
NITRITE UR-MCNC: NEGATIVE — SIGNIFICANT CHANGE UP
NRBC # BLD: 0 /100 WBCS — SIGNIFICANT CHANGE UP (ref 0–0)
NRBC # FLD: 0 K/UL — SIGNIFICANT CHANGE UP (ref 0–0)
NT-PROBNP SERPL-SCNC: 5134 PG/ML — HIGH
OVALOCYTES BLD QL SMEAR: SLIGHT — SIGNIFICANT CHANGE UP
PCO2 BLDV: 53 MMHG — HIGH (ref 39–52)
PCO2 BLDV: 53 MMHG — HIGH (ref 39–52)
PH BLDV: 7.33 — SIGNIFICANT CHANGE UP (ref 7.32–7.43)
PH BLDV: 7.37 — SIGNIFICANT CHANGE UP (ref 7.32–7.43)
PH UR: 5.5 — SIGNIFICANT CHANGE UP (ref 5–8)
PLAT MORPH BLD: SIGNIFICANT CHANGE UP
PLATELET # BLD AUTO: 87 K/UL — LOW (ref 150–400)
PLATELET COUNT - ESTIMATE: ABNORMAL
PO2 BLDV: 58 MMHG — HIGH (ref 25–45)
PO2 BLDV: 66 MMHG — HIGH (ref 25–45)
POLYCHROMASIA BLD QL SMEAR: SLIGHT — SIGNIFICANT CHANGE UP
POTASSIUM BLDV-SCNC: 3.9 MMOL/L — SIGNIFICANT CHANGE UP (ref 3.5–5.1)
POTASSIUM BLDV-SCNC: 3.9 MMOL/L — SIGNIFICANT CHANGE UP (ref 3.5–5.1)
POTASSIUM SERPL-MCNC: 4 MMOL/L — SIGNIFICANT CHANGE UP (ref 3.5–5.3)
POTASSIUM SERPL-SCNC: 4 MMOL/L — SIGNIFICANT CHANGE UP (ref 3.5–5.3)
PROCALCITONIN SERPL-MCNC: 0.19 NG/ML — HIGH (ref 0.02–0.1)
PROT SERPL-MCNC: 6.6 G/DL — SIGNIFICANT CHANGE UP (ref 6–8.3)
PROT UR-MCNC: 100 MG/DL
PROTHROM AB SERPL-ACNC: 12.6 SEC — SIGNIFICANT CHANGE UP (ref 9.5–13)
RAPID RVP RESULT: SIGNIFICANT CHANGE UP
RBC # BLD: 3.05 M/UL — LOW (ref 3.8–5.2)
RBC # FLD: 16 % — HIGH (ref 10.3–14.5)
RBC BLD AUTO: SIGNIFICANT CHANGE UP
RBC CASTS # UR COMP ASSIST: 10 /HPF — HIGH (ref 0–4)
REVIEW: SIGNIFICANT CHANGE UP
RSV RNA SPEC QL NAA+PROBE: SIGNIFICANT CHANGE UP
RV+EV RNA SPEC QL NAA+PROBE: SIGNIFICANT CHANGE UP
SAO2 % BLDV: 87.9 % — SIGNIFICANT CHANGE UP (ref 67–88)
SAO2 % BLDV: 90.6 % — HIGH (ref 67–88)
SARS-COV-2 RNA SPEC QL NAA+PROBE: SIGNIFICANT CHANGE UP
SODIUM SERPL-SCNC: 137 MMOL/L — SIGNIFICANT CHANGE UP (ref 135–145)
SP GR SPEC: 1.02 — SIGNIFICANT CHANGE UP (ref 1–1.03)
SQUAMOUS # UR AUTO: 7 /HPF — HIGH (ref 0–5)
TROPONIN T, HIGH SENSITIVITY RESULT: 26 NG/L — SIGNIFICANT CHANGE UP
UROBILINOGEN FLD QL: 0.2 MG/DL — SIGNIFICANT CHANGE UP (ref 0.2–1)
WBC # BLD: 7.44 K/UL — SIGNIFICANT CHANGE UP (ref 3.8–10.5)
WBC # FLD AUTO: 7.44 K/UL — SIGNIFICANT CHANGE UP (ref 3.8–10.5)
WBC UR QL: >10 /HPF — HIGH (ref 0–5)

## 2023-09-20 PROCEDURE — 71045 X-RAY EXAM CHEST 1 VIEW: CPT | Mod: 26

## 2023-09-20 PROCEDURE — 99223 1ST HOSP IP/OBS HIGH 75: CPT

## 2023-09-20 PROCEDURE — 99285 EMERGENCY DEPT VISIT HI MDM: CPT

## 2023-09-20 RX ORDER — SODIUM CHLORIDE 9 MG/ML
500 INJECTION, SOLUTION INTRAVENOUS ONCE
Refills: 0 | Status: COMPLETED | OUTPATIENT
Start: 2023-09-20 | End: 2023-09-20

## 2023-09-20 RX ORDER — FUROSEMIDE 40 MG
40 TABLET ORAL ONCE
Refills: 0 | Status: COMPLETED | OUTPATIENT
Start: 2023-09-20 | End: 2023-09-20

## 2023-09-20 RX ORDER — ACETAMINOPHEN 500 MG
650 TABLET ORAL EVERY 6 HOURS
Refills: 0 | Status: DISCONTINUED | OUTPATIENT
Start: 2023-09-20 | End: 2023-10-04

## 2023-09-20 RX ORDER — LANOLIN ALCOHOL/MO/W.PET/CERES
3 CREAM (GRAM) TOPICAL AT BEDTIME
Refills: 0 | Status: DISCONTINUED | OUTPATIENT
Start: 2023-09-20 | End: 2023-09-23

## 2023-09-20 RX ORDER — PIPERACILLIN AND TAZOBACTAM 4; .5 G/20ML; G/20ML
3.38 INJECTION, POWDER, LYOPHILIZED, FOR SOLUTION INTRAVENOUS ONCE
Refills: 0 | Status: COMPLETED | OUTPATIENT
Start: 2023-09-20 | End: 2023-09-20

## 2023-09-20 RX ORDER — ROBINUL 0.2 MG/ML
0.1 INJECTION INTRAMUSCULAR; INTRAVENOUS ONCE
Refills: 0 | Status: COMPLETED | OUTPATIENT
Start: 2023-09-20 | End: 2023-09-21

## 2023-09-20 RX ORDER — ONDANSETRON 8 MG/1
4 TABLET, FILM COATED ORAL EVERY 8 HOURS
Refills: 0 | Status: DISCONTINUED | OUTPATIENT
Start: 2023-09-20 | End: 2023-10-04

## 2023-09-20 RX ORDER — VANCOMYCIN HCL 1 G
1000 VIAL (EA) INTRAVENOUS ONCE
Refills: 0 | Status: COMPLETED | OUTPATIENT
Start: 2023-09-20 | End: 2023-09-20

## 2023-09-20 RX ADMIN — Medication 40 MILLIGRAM(S): at 20:39

## 2023-09-20 RX ADMIN — Medication 250 MILLIGRAM(S): at 17:58

## 2023-09-20 RX ADMIN — PIPERACILLIN AND TAZOBACTAM 200 GRAM(S): 4; .5 INJECTION, POWDER, LYOPHILIZED, FOR SOLUTION INTRAVENOUS at 13:01

## 2023-09-20 RX ADMIN — SODIUM CHLORIDE 500 MILLILITER(S): 9 INJECTION, SOLUTION INTRAVENOUS at 15:50

## 2023-09-20 NOTE — H&P ADULT - PROBLEM SELECTOR PLAN 3
Patient with AMS and pyuria   -Cx from [facility] showed  E.coli only resistant to ampicillin   -C/w zosyn IV and vanco IV for now   -F/u with Ucx

## 2023-09-20 NOTE — H&P ADULT - NSHPLABSRESULTS_GEN_ALL_CORE
9.5    7.44  )-----------( 87       ( 20 Sep 2023 12:53 )             29.1     Hgb Trend: 9.5<--      137  |  98  |  27<H>  ----------------------------<  127<H>  4.0   |  26  |  1.11    Ca    8.9      20 Sep 2023 12:53    TPro  6.6  /  Alb  3.3  /  TBili  0.4  /  DBili  x   /  AST  19  /  ALT  29  /  AlkPhos  149<H>      Creatinine Trend: 1.11<--, 1.18<--, 0.96<--  PT/INR - ( 20 Sep 2023 12:53 )   PT: 12.6 sec;   INR: 1.13 ratio         PTT - ( 20 Sep 2023 12:53 )  PTT:42.0 sec      Urinalysis Basic - ( 20 Sep 2023 16:31 )    Color: Yellow / Appearance: Cloudy / S.022 / pH: x  Gluc: x / Ketone: Negative mg/dL  / Bili: Negative / Urobili: 0.2 mg/dL   Blood: x / Protein: 100 mg/dL / Nitrite: Negative   Leuk Esterase: Moderate / RBC: 10 /HPF / WBC >10 /HPF   Sq Epi: x / Non Sq Epi: x / Bacteria: Many /HPF        EKG is reviewed by me     CXR as reviewed by the radiologist: Bilateral layering pleural effusions increased since the last   exam. No focal consolidation to account for sepsis.

## 2023-09-20 NOTE — H&P ADULT - PROBLEM SELECTOR PLAN 5
Patient with hypoxic respiratory failure found to have b/l pleural effusion   -Most likely due to acute on chronic HF   -Will give 40 mg IV lasix x1   -Hold carvedilol due to sepsis and bradycardia

## 2023-09-20 NOTE — ED ADULT NURSE NOTE - OBJECTIVE STATEMENT
Genny RN NOTE: pt arrives from [place] for change in mental status found to be hypothermic rectal temp 90 degrees F . Pt. placed on warming  blanket. Pts skin intact no breakage noted over buttocks area. Pt paced on cardiac monitor . Pt arrives with iv to left arm. Second iv placed and blood work collected. pt is restless moaning . Pts daughter at bedside. Pt endorsed over to area RN.

## 2023-09-20 NOTE — H&P ADULT - ASSESSMENT
89 yo F w/ HFpEF, afib s/p ppm, HTN, HLD, hypothyroid and recurrent UTIs presents to the ED from [facility] with worsening mental status and SOB, found to have sepsis 2/2 UTI and acute hypoxic respiratory failure 2/2 pleural effusion.

## 2023-09-20 NOTE — ED ADULT NURSE REASSESSMENT NOTE - NS ED NURSE REASSESS COMMENT FT1
ACP made aware of pts breathing status. O2 sat stable, 94-97%. No further intervention at this time, as per ACP. Head of the bed remains elevated 45 degrees. Safety maintained. Breathing even, unlabored.
Bear hugger stopped as per ACP MD order. Will recheck temp in 1 hour as per MD.
Pt presents with "gurgled breathing" upon assessment. Breathing even, unlabored; tachypneic, 27-33. Pt sleeping, remains arousable to verbal stimuli. Pt medicated as per MAR. ACP called, with no response. Will continue to call ACP to make aware of pts status.
Report received from HAYLEE Philip. HAYLEE Magaña at bedside. Pt awake, nonverbal at this time; pts daughter at bedside. Pt on bear hugger. Safety maintained.
Upon reassessment pt presents tachypneic respiration 33, with white foam coming from mouth. Pt suctioned and remains on O2; 93% sat. ACP notified. Pending MD order.
Pt remains awake, responsive to verbal stimuli with upper extremity movements. Breathing even, unlabored. Safety maintained. Pts daughter at bedside.
PA Selene at bedside, and performed deep suction. Pt tolerated suctioning with improvement to breathing. Vitals stable. Safety maintained. Breathing even, unlabored.
Pt remains awake, responsive to verbal stimlui. Pt remains hypothermic, temp 32.3, on bare hugger as per MD order. MD Castelan aware; to continue with care plan. Breathing even, unlabored. Safety maintained.
Pt sleeping, arousable to verbal stimuli; opens eyes to name. Breathing even, unlabored. Pt remains on bear hugger. Vitals stable. Safety maintained. Pending bed assignment.
Report received from sandi Philip. Pt remains responsive to verbal stimuli, opening eyes. Breathing even, unlabored. Pt remains on bear hugger, temp 99.5F. MD paged to made aware.
Report given to HAYLEE Brooks, [id number]. Pt remains a&ox4, minimally arousable to voice. Breathing even, unlabored, tachypneic. BP stable. Safety maintained.

## 2023-09-20 NOTE — H&P ADULT - NSHPPHYSICALEXAM_GEN_ALL_CORE
Vital Signs Last 24 Hrs  T(C): 35.8 (20 Sep 2023 17:21), Max: 35.8 (20 Sep 2023 17:21)  T(F): 96.4 (20 Sep 2023 17:21), Max: 96.4 (20 Sep 2023 17:21)  HR: 61 (20 Sep 2023 17:21) (52 - 64)  BP: 122/60 (20 Sep 2023 17:21) (122/60 - 151/99)  BP(mean): --  RR: 25 (20 Sep 2023 17:21) (19 - 25)  SpO2: 97% (20 Sep 2023 17:21) (94% - 99%)    Parameters below as of 20 Sep 2023 17:21  Patient On (Oxygen Delivery Method): mask, simple face  O2 Flow (L/min): 4      GENERAL: somnolent. alert but does not follow commands.   HEENT:  Atraumatic, Normocephalic, Conjunctiva and sclera clear, oral mucosa moist, clear w/o any exudate   NECK: Supple, No JVD  CHEST/LUNG: coarse breath sounds b/l, no wheezing   HEART: irregular rate; No murmurs, rubs, or gallops  ABDOMEN: Soft, Nontender, Nondistended; Bowel sounds present  EXTREMITIES:  2+ Peripheral Pulses, No clubbing, cyanosis, or edema  PSYCH: AAOx0  NEUROLOGY: unable to follow commands  SKIN: No rashes or lesions

## 2023-09-20 NOTE — H&P ADULT - HISTORY OF PRESENT ILLNESS
89 yo F w/ HFpEF, afib s/p ppm, HTN, HLD, hypothyroid and recurrent UTIs presents to the ED from [facility] with worsening mental status and SOB. Pt is AAOx0, awake and responds to her name, but does not follow commands. Most of the hx was obtained from daughter. Patient was getting weaker and confused over the last couple. UA and urine cx was sent and it was positive for E.coli, sensitive for everything except for ampicillin. She was started on oral cipro but her mental status did not improve. She was then switched to V levaquin. However, she developed an allergic reaction and reportedly passed out . Family then decided to send to the ED. Of note, pt has been admitted to the hospital multiple times of recurrently UTI, PNA, and HF.   In the ED, her vitals were notable for hypothermia, bradycardia, and hypoxia requiring 4L of O2. Labs were notable for chronic anemia, thrombocytopenia, and pyuria. She was given  cc of IVF and zosyn IV in the ED.

## 2023-09-20 NOTE — ED ADULT NURSE NOTE - NSFALLHARMRISKINTERV_ED_ALL_ED
Assistance OOB with selected safe patient handling equipment if applicable/Assistance with ambulation/Communicate risk of Fall with Harm to all staff, patient, and family/Monitor gait and stability/Monitor for mental status changes and reorient to person, place, and time, as needed/Move patient closer to nursing station/within visual sight of ED staff/Provide visual cue: red socks, yellow wristband, yellow gown, etc/Reinforce activity limits and safety measures with patient and family/Toileting schedule using arm’s reach rule for commode and bathroom/Use of alarms - bed, stretcher, chair and/or video monitoring/Bed in lowest position, wheels locked, appropriate side rails in place/Call bell, personal items and telephone in reach/Instruct patient to call for assistance before getting out of bed/chair/stretcher/Non-slip footwear applied when patient is off stretcher/[place] to call system/Physically safe environment - no spills, clutter or unnecessary equipment/Purposeful Proactive Rounding/Room/bathroom lighting operational, light cord in reach

## 2023-09-20 NOTE — ED PROVIDER NOTE - CLINICAL SUMMARY MEDICAL DECISION MAKING FREE TEXT BOX
Jaymie Med Tox Fellow: concern for sepsis (asp pna vs gu) +/- decompensated hypothyroid?, pt is DNR/DNI. Plan for cultures, labs, cxr, urine, rewarming, tsh, abx. Will hold fluids at this time as pts BP reassuring.

## 2023-09-20 NOTE — H&P ADULT - CONVERSATION DETAILS
Spoke to Daughter Ariana (HCP) and confirmed that pt is DNI/DNR. Daughter does not want any aggressive measures which includes pressors, tube feeding. She however, does want to continue treatment with IV abx and IVF. She understands that her mother has advanced age with multiple comorbidities. She wants to continue IV abx and might be open to comfort measures if pt' condition  worsens.

## 2023-09-20 NOTE — H&P ADULT - PROBLEM SELECTOR PLAN 4
Patient with acute metabolic encephalopathy 2/2 sepsis, UTI, and hypoxia   -C/w sepsis and UTI treatment as above   -C/w treatment of hypoxia as above   -Will defer CT head for now as there is no report of recent fall or focal deficits. Moreover. daughter reports that pt becomes altered like this every time she has UTI.

## 2023-09-20 NOTE — H&P ADULT - PROBLEM SELECTOR PLAN 2
Patient with acute hypoxic respiratory failure. CXR shows b/l pleural effusion. Not overtly volume overloaded on exam   -Most likely due to HF, less likely PNA.   -Will start IV lasix 40 mg x1. Reassess in the AM for further diuresis

## 2023-09-20 NOTE — ED PROVIDER NOTE - PROGRESS NOTE DETAILS
Flip: Pt's HCP/daughter, Ariana, updated in person. Updated on course from [facility] and arrival to [facility] ED. Confirmed that patient is DNR/DNI. She would want trial of IV abx and IVF, and she would be amenable to hospital admission if her condition warrants.

## 2023-09-20 NOTE — ED PROVIDER NOTE - OBJECTIVE STATEMENT
89 yo F w/ HFpEF, afib s/p ppm, HTN, HLD, hypothyroid with recent admission for hypoxic resp failure (PNA vs HF) and UTI, presenting from [facility] for altered mental status and hypoxia and low blood pressure. Pt responds to voice on arrival though unable to give history.

## 2023-09-20 NOTE — ED PROVIDER NOTE - PHYSICAL EXAMINATION
GENERAL: chronically ill appearing, rectal temp of 90F  HEENT: normal conjunctiva, oral mucosa moist  CARDIAC: regular rate and regular rhythm, normal S1 and S2, no appreciable murmurs  PULM: hypoxic to 89% on RA improved on NC oxygen, RR 17, difficult to appreciate lung sounds   GI: abdomen nondistended, soft  : no suprapubic tenderness  NEURO: alert and oriented x ?, moving all extremities without lateralization, pupils 3mm and reactive to light bilaterally  MSK: no visible deformities, no peripheral edema, calf tenderness/redness/swelling  SKIN: no visible significant ulcers/acute rashes  PSYCH: appropriate mood and affect

## 2023-09-20 NOTE — H&P ADULT - PROBLEM SELECTOR PLAN 1
Patient with hypothermia, lactic acidosis and pyuria consistent with sepsis 2/2 UTI   -F/u with bcx and urine cx   -MRSA swab. RVP is negative   -Was on cipro and then levaquin at [facility] but had allergic reaction to levaquin   -Will c/w zosyn and vancomycin for now

## 2023-09-20 NOTE — ED PROVIDER NOTE - ATTENDING CONTRIBUTION TO CARE
89F h/o HFpEF, AFib s/p PPM placement, HTN, HLD, hypothyroidism with recent admission for hypoxic resp failure from aspiration PNA and HFpEF exacerbation BIBEMS from [facility] for AMS, hypoxia and low blood pressure. Per EMS, pt had been unresponsive when at baseline is relatively coversant with staff. Per daughter she had noted worsening, generalized weakness and difficulty speaking over the past week. Pt unable to provide history or ROS at this time. Per [facility] paperwork, pt had been started on levaquin recently for UTI.  Gen: awake to verbal stimuli but unable to provider answers to questions  CV: carolyn but regular  Pulm: clear lungs b/l  Abd: soft, ntnd  Ext: no edema/swelling  Skin: no rash/petechiaw  MDM: 89F h/o HFpEF, AFib s/p PPM placement, HTN, HLD, hypothyroidism with recent admission for hypoxic resp failure from aspiration PNA and HFpEF exacerbation BIBEMS from [facility] for AMS, hypoxia and low blood pressure after recently starting abx for UTI - presentation likely in setting of sepsis from UTI +/- bacteremia - will get blood cultures and full sepsis w/u, broaden abx coverage to zosyn. Pt last admitted in Aug for aspiration PNA, HF exacerbation and UTI. Per review of prior micro, no h/o resistant UTIs. Will also obtain TSH to r/o myxedema coma given AMS and hypothermia. No IVF given as pt normotensive and IVC ~2.3cm on POCUS with no evidence of RV strain. Confirmed with HCP that pt is DNR/DNI but would want trial of abx and IVF. Dispo is likely admission.

## 2023-09-21 DIAGNOSIS — R53.2 FUNCTIONAL QUADRIPLEGIA: ICD-10-CM

## 2023-09-21 DIAGNOSIS — Z71.89 OTHER SPECIFIED COUNSELING: ICD-10-CM

## 2023-09-21 DIAGNOSIS — Z51.5 ENCOUNTER FOR PALLIATIVE CARE: ICD-10-CM

## 2023-09-21 LAB
A1C WITH ESTIMATED AVERAGE GLUCOSE RESULT: 6 % — HIGH (ref 4–5.6)
ALBUMIN SERPL ELPH-MCNC: 3.1 G/DL — LOW (ref 3.3–5)
ALP SERPL-CCNC: 156 U/L — HIGH (ref 40–120)
ALT FLD-CCNC: 32 U/L — SIGNIFICANT CHANGE UP (ref 4–33)
ANION GAP SERPL CALC-SCNC: 13 MMOL/L — SIGNIFICANT CHANGE UP (ref 7–14)
AST SERPL-CCNC: 21 U/L — SIGNIFICANT CHANGE UP (ref 4–32)
BASOPHILS # BLD AUTO: 0.06 K/UL — SIGNIFICANT CHANGE UP (ref 0–0.2)
BASOPHILS NFR BLD AUTO: 0.6 % — SIGNIFICANT CHANGE UP (ref 0–2)
BILIRUB SERPL-MCNC: 0.4 MG/DL — SIGNIFICANT CHANGE UP (ref 0.2–1.2)
BUN SERPL-MCNC: 29 MG/DL — HIGH (ref 7–23)
CALCIUM SERPL-MCNC: 8.5 MG/DL — SIGNIFICANT CHANGE UP (ref 8.4–10.5)
CHLORIDE SERPL-SCNC: 96 MMOL/L — LOW (ref 98–107)
CHOLEST SERPL-MCNC: 93 MG/DL — SIGNIFICANT CHANGE UP
CO2 SERPL-SCNC: 25 MMOL/L — SIGNIFICANT CHANGE UP (ref 22–31)
CREAT ?TM UR-MCNC: 91 MG/DL — SIGNIFICANT CHANGE UP
CREAT SERPL-MCNC: 1.43 MG/DL — HIGH (ref 0.5–1.3)
EGFR: 33 ML/MIN/1.73M2 — LOW
EOSINOPHIL # BLD AUTO: 0.04 K/UL — SIGNIFICANT CHANGE UP (ref 0–0.5)
EOSINOPHIL NFR BLD AUTO: 0.4 % — SIGNIFICANT CHANGE UP (ref 0–6)
ESTIMATED AVERAGE GLUCOSE: 126 — SIGNIFICANT CHANGE UP
GLUCOSE SERPL-MCNC: 73 MG/DL — SIGNIFICANT CHANGE UP (ref 70–99)
HCT VFR BLD CALC: 29.2 % — LOW (ref 34.5–45)
HDLC SERPL-MCNC: 50 MG/DL — LOW
HGB BLD-MCNC: 9.6 G/DL — LOW (ref 11.5–15.5)
IANC: 7.09 K/UL — SIGNIFICANT CHANGE UP (ref 1.8–7.4)
IMM GRANULOCYTES NFR BLD AUTO: 1.8 % — HIGH (ref 0–0.9)
LIPID PNL WITH DIRECT LDL SERPL: 29 MG/DL — SIGNIFICANT CHANGE UP
LYMPHOCYTES # BLD AUTO: 1.32 K/UL — SIGNIFICANT CHANGE UP (ref 1–3.3)
LYMPHOCYTES # BLD AUTO: 13.8 % — SIGNIFICANT CHANGE UP (ref 13–44)
MAGNESIUM SERPL-MCNC: 1.6 MG/DL — SIGNIFICANT CHANGE UP (ref 1.6–2.6)
MCHC RBC-ENTMCNC: 31.5 PG — SIGNIFICANT CHANGE UP (ref 27–34)
MCHC RBC-ENTMCNC: 32.9 GM/DL — SIGNIFICANT CHANGE UP (ref 32–36)
MCV RBC AUTO: 95.7 FL — SIGNIFICANT CHANGE UP (ref 80–100)
MONOCYTES # BLD AUTO: 0.9 K/UL — SIGNIFICANT CHANGE UP (ref 0–0.9)
MONOCYTES NFR BLD AUTO: 9.4 % — SIGNIFICANT CHANGE UP (ref 2–14)
NEUTROPHILS # BLD AUTO: 7.09 K/UL — SIGNIFICANT CHANGE UP (ref 1.8–7.4)
NEUTROPHILS NFR BLD AUTO: 74 % — SIGNIFICANT CHANGE UP (ref 43–77)
NON HDL CHOLESTEROL: 43 MG/DL — SIGNIFICANT CHANGE UP
NRBC # BLD: 0 /100 WBCS — SIGNIFICANT CHANGE UP (ref 0–0)
NRBC # FLD: 0.04 K/UL — HIGH (ref 0–0)
PHOSPHATE SERPL-MCNC: 3.3 MG/DL — SIGNIFICANT CHANGE UP (ref 2.5–4.5)
PLATELET # BLD AUTO: 105 K/UL — LOW (ref 150–400)
POTASSIUM SERPL-MCNC: 4.4 MMOL/L — SIGNIFICANT CHANGE UP (ref 3.5–5.3)
POTASSIUM SERPL-SCNC: 4.4 MMOL/L — SIGNIFICANT CHANGE UP (ref 3.5–5.3)
PROT SERPL-MCNC: 6.7 G/DL — SIGNIFICANT CHANGE UP (ref 6–8.3)
RBC # BLD: 3.05 M/UL — LOW (ref 3.8–5.2)
RBC # FLD: 16.1 % — HIGH (ref 10.3–14.5)
SODIUM SERPL-SCNC: 134 MMOL/L — LOW (ref 135–145)
SODIUM UR-SCNC: 31 MMOL/L — SIGNIFICANT CHANGE UP
TRIGL SERPL-MCNC: 72 MG/DL — SIGNIFICANT CHANGE UP
UUN UR-MCNC: 507 MG/DL — SIGNIFICANT CHANGE UP
WBC # BLD: 9.58 K/UL — SIGNIFICANT CHANGE UP (ref 3.8–10.5)
WBC # FLD AUTO: 9.58 K/UL — SIGNIFICANT CHANGE UP (ref 3.8–10.5)

## 2023-09-21 PROCEDURE — 99497 ADVNCD CARE PLAN 30 MIN: CPT | Mod: 25

## 2023-09-21 PROCEDURE — 99223 1ST HOSP IP/OBS HIGH 75: CPT

## 2023-09-21 PROCEDURE — 99232 SBSQ HOSP IP/OBS MODERATE 35: CPT

## 2023-09-21 RX ORDER — LEVOTHYROXINE SODIUM 125 MCG
75 TABLET ORAL AT BEDTIME
Refills: 0 | Status: DISCONTINUED | OUTPATIENT
Start: 2023-09-21 | End: 2023-09-26

## 2023-09-21 RX ORDER — PIPERACILLIN AND TAZOBACTAM 4; .5 G/20ML; G/20ML
3.38 INJECTION, POWDER, LYOPHILIZED, FOR SOLUTION INTRAVENOUS EVERY 8 HOURS
Refills: 0 | Status: DISCONTINUED | OUTPATIENT
Start: 2023-09-21 | End: 2023-09-22

## 2023-09-21 RX ORDER — PIPERACILLIN AND TAZOBACTAM 4; .5 G/20ML; G/20ML
3.38 INJECTION, POWDER, LYOPHILIZED, FOR SOLUTION INTRAVENOUS ONCE
Refills: 0 | Status: DISCONTINUED | OUTPATIENT
Start: 2023-09-21 | End: 2023-09-27

## 2023-09-21 RX ORDER — PIPERACILLIN AND TAZOBACTAM 4; .5 G/20ML; G/20ML
3.38 INJECTION, POWDER, LYOPHILIZED, FOR SOLUTION INTRAVENOUS ONCE
Refills: 0 | Status: COMPLETED | OUTPATIENT
Start: 2023-09-21 | End: 2023-09-21

## 2023-09-21 RX ADMIN — PIPERACILLIN AND TAZOBACTAM 25 GRAM(S): 4; .5 INJECTION, POWDER, LYOPHILIZED, FOR SOLUTION INTRAVENOUS at 21:49

## 2023-09-21 RX ADMIN — ROBINUL 0.1 MILLIGRAM(S): 0.2 INJECTION INTRAMUSCULAR; INTRAVENOUS at 07:10

## 2023-09-21 RX ADMIN — Medication 75 MICROGRAM(S): at 22:55

## 2023-09-21 RX ADMIN — PIPERACILLIN AND TAZOBACTAM 25 GRAM(S): 4; .5 INJECTION, POWDER, LYOPHILIZED, FOR SOLUTION INTRAVENOUS at 18:22

## 2023-09-21 NOTE — CONSULT NOTE ADULT - PROBLEM SELECTOR RECOMMENDATION 4
- likely due to UTI  - Per daughter Macrina, at baseline patient AAOx3 until a few days ago  - management as per primary team

## 2023-09-21 NOTE — CONSULT NOTE ADULT - PROBLEM SELECTOR RECOMMENDATION 7
Case reviewed with primary team   Thank you for allowing us to participate in your patient's care. Please page [phone] for any questions/concerns.

## 2023-09-21 NOTE — CONSULT NOTE ADULT - SUBJECTIVE AND OBJECTIVE BOX
Date of Service 09-21-23 @ 14:18    HPI:  89 yo F w/ HFpEF, afib s/p ppm, HTN, HLD, hypothyroid and recurrent UTIs presents to the ED from [facility] with worsening mental status and SOB. Pt is AAOx0, awake and responds to her name, but does not follow commands. Most of the hx was obtained from daughter. Patient was getting weaker and confused over the last couple. UA and urine cx was sent and it was positive for E.coli, sensitive for everything except for ampicillin. She was started on oral cipro but her mental status did not improve. She was then switched to V levaquin. However, she developed an allergic reaction and reportedly passed out . Family then decided to send to the ED. Of note, pt has been admitted to the hospital multiple times of recurrently UTI, PNA, and HF.   In the ED, her vitals were notable for hypothermia, bradycardia, and hypoxia requiring 4L of O2. Labs were notable for chronic anemia, thrombocytopenia, and pyuria. She was given  cc of IVF and zosyn IV in the ED.  (20 Sep 2023 17:48)      Interval History:   Patient eyes closed, but turns head toward provider when name is called and nods head yes and no to questions with eyes closed.    PERTINENT PM/SXH:   Urinary Frequency  Status Post Breast Lumpectomy  Bilateral Cataracts  HTN (hypertension)  Spinal stenosis  Hypothyroidism  Non-ST elevation MI (NSTEMI)  Macular degeneration  (HFpEF) heart failure with preserved ejection fraction  Paroxysmal atrial fibrillation  Chronic kidney disease (CKD)  Ectopic pregnancy, tubal  S/P ORIF (open reduction internal fixation) fracture  S/P breast lumpectomy  S/P cataract surgery    FAMILY HISTORY:  Family history of acute myocardial infarction    ITEMS NOT CHECKED ARE NOT PRESENT    SOCIAL HISTORY:   Significant other/partner[ ]  Children[ x]  Pentecostalism/Spirituality:  Substance hx:  [ ]   Tobacco hx:  [ ]   Alcohol hx: [ ]   Home Opioid hx:  [ ] I-Stop Reference No:  Living Situation: [ ]Home  [ ]Long term care  [ x]Rehab [ ]Other      ADVANCE DIRECTIVES:    MOLST  [ ]  Living Will  [ ]   DECISION MAKER(s):  [ ] Health Care Proxy(s)  [x ] Surrogate(s)  [ ] Guardian           Name(s): Phone Number(s):  Jorge L Gilliland: [phone]  Daughter Macrina    BASELINE (I)ADL(s) (prior to admission):  [place]: [ ]Total  [x ] Moderate [ ]Dependent    Allergies    sulfa topicals (Unknown)  [This allergen will not trigger allergy alert] trisulfapyrimidine (Rash)  Levaquin (Hypotension)  cephalexin (Hives)    Intolerances    morphine (Drowsiness; Faint; Hypotension)  MEDICATIONS  (STANDING):  piperacillin/tazobactam IVPB. 3.375 Gram(s) IV Intermittent once  piperacillin/tazobactam IVPB.- 3.375 Gram(s) IV Intermittent once  piperacillin/tazobactam IVPB.. 3.375 Gram(s) IV Intermittent every 8 hours    MEDICATIONS  (PRN):  acetaminophen     Tablet .. 650 milliGRAM(s) Oral every 6 hours PRN Temp greater or equal to 38C (100.4F), Mild Pain (1 - 3)  aluminum hydroxide/magnesium hydroxide/simethicone Suspension 30 milliLiter(s) Oral every 4 hours PRN Dyspepsia  melatonin 3 milliGRAM(s) Oral at bedtime PRN Insomnia  ondansetron Injectable 4 milliGRAM(s) IV Push every 8 hours PRN Nausea and/or Vomiting        ITEMS UNCHECKED ARE NOT PRESENT     PRESENT SYMPTOMS: [x ]Unable to self-report due to altered mental status  [ ] CPOT [ x] PAINADs [x ] RDOS  Source if other than patient:  [ ]Family   [ ]Team     Pain: [ ]yes [ ]no  QOL impact -   Location -                    Aggravating factors -  Quality -  Radiation -  Timing-  Severity (0-10 scale):  Minimal acceptable level / Pain goal (0-10 scale):     CPOT:    https://www.sccm.org/getattachment/abo75o85-7f5q-4c2n-8m5f-1640t5400g9i/Critical-Care-Pain-Observation-Tool-(CPOT)    Dyspnea:                           [ ]Mild [ ]Moderate [ ]Severe  Anxiety:                             [ ]Mild [ ]Moderate [ ]Severe  Agitation:                          [ ]Mild [ ]Moderate [ ]Severe  Fatigue:                             [ ]Mild [ ]Moderate [ ]Severe  Nausea:                             [ ]Mild [ ]Moderate [ ]Severe  Loss of appetite:              [ ]Mild [ ]Moderate [ ]Severe  Constipation:                   [ ]Mild [ ]Moderate [ ]Severe  Diarrhea:                          [ ]Mild [ ]Moderate [ ]Severe      PCSSQ[Palliative Care Spiritual Screening Question]   Severity (0-10):  Score of 4 or > indicate consideration of Chaplaincy referral.  Chaplaincy Referral: [ ] yes [ ] refused [ ] following [ x] deferred    Caregiver [place]? : [x ] yes [ ] no [ ] Declined   [ ] Deferred            Social work referral [ ] Patient & Family Centered Care Referral [ x]     Anticipatory Grief present?:  [ ] yes [ ] no  [x ] Deferred                  Social work referral [ ] Chaplaincy Referral[ ]    Other Symptoms:  [ ]All other review of systems negative - unable to obtain due to encephalopathy     PHYSICAL EXAM:  Vital Signs Last 24 Hrs  T(C): 36.4 (20 Sep 2023 22:25), Max: 37.7 (20 Sep 2023 20:40)  T(F): 97.5 (20 Sep 2023 22:25), Max: 99.9 (20 Sep 2023 20:40)  HR: 83 (21 Sep 2023 07:41) (60 - 83)  BP: 157/106 (21 Sep 2023 07:41) (108/65 - 157/106)  BP(mean): --  RR: 20 (21 Sep 2023 07:41) (20 - 30)  SpO2: 100% (21 Sep 2023 07:41) (95% - 100%)    Parameters below as of 21 Sep 2023 07:41  Patient On (Oxygen Delivery Method): nasal cannula  O2 Flow (L/min): 4       I&O's Summary      GENERAL: Eyes closed , nods head yes and no to questions   [ ]Alert  [ ]Oriented x   [ ]Lethargic  [ ]Cachexia  [ ]Unarousable   [x ] No Distress  Behavioral:   [ ] Anxiety  [x ] Delirium [ ] Agitation [ ] Calm  [ ] Other  HEENT:  [ ]Normal  [ ] Temporal Wasting  [x ]Dry mouth   [ ]ET Tube/Trach  [ ]Oral lesions  [ ] Mucositis  PULMONARY:   [ ]Clear [ ]Tachypnea  [ ]Audible excessive secretions   [ ]Rhonchi        [ ]Right [ ]Left [ ]Bilateral  [ ]Crackles        [ ]Right [ ]Left [ ]Bilateral  [ ]Wheezing     [ ]Right [ ]Left [ ]Bilateral  [x ]Diminished breath sounds [ ]right [ ]left [x ]bilateral  CARDIOVASCULAR:    [x ]Regular [ ]Irregular [ ]Tachy  [ ]Arnoldo [ ]Murmur [ ]Other  GASTROINTESTINAL:  [ x]Soft  [ ]Distended   [ ]+BS  [ x]Non tender [ ]Tender  [ ]PEG [ ]OGT/ NGT  Last BM:   GENITOURINARY:  [ ]Normal [ ] Incontinent   [ ]Oliguria/Anuria   [ x]Blum  MUSCULOSKELETAL:   [ ]Normal   [ ]Weakness  [ x]Bed/Wheelchair bound [ ]Edema  [  ] amputation  [  ] contraction  NEUROLOGIC:   [ x]No focal deficits  [ ]Cognitive impairment  [ ]Dysphagia [ ]Dysarthria [ ]Paresis [x ]Other - encephalopathy   SKIN: See Nursing Skin Assessment for further details  [ x]Normal    [ ]Rash  [ ]Pressure ulcer(s)       Present on admission [ ]y [ ]n   [  ]  Wound    [  ] hyperpigmentation    CRITICAL CARE:  [ ] Shock Present  [ ]Septic [ ]Cardiogenic [ ]Neurologic [ ]Hypovolemic  [ ]  Vasopressors [ ]  Inotropes   [ ]Respiratory failure present [ ]Mechanical ventilation [ ]Non-invasive ventilatory support [ ]High flow    [ ]Acute  [ ]Chronic [ ]Hypoxic  [ ]Hypercarbic [ ]Other  [ ]Other organ failure     LABS:  reviewed                         9.6    9.58  )-----------( 105      ( 21 Sep 2023 07:25 )             29.2   09-21    134<L>  |  96<L>  |  29<H>  ----------------------------<  73  4.4   |  25  |  1.43<H>    Ca    8.5      21 Sep 2023 07:25  Phos  3.3     09-21  Mg     1.60     09-21    TPro  6.7  /  Alb  3.1<L>  /  TBili  0.4  /  DBili  x   /  AST  21  /  ALT  32  /  AlkPhos  156<H>  09-21  PT/INR - ( 20 Sep 2023 12:53 )   PT: 12.6 sec;   INR: 1.13 ratio         PTT - ( 20 Sep 2023 12:53 )  PTT:42.0 sec  Urinalysis Basic - ( 21 Sep 2023 07:25 )    Color: x / Appearance: x / SG: x / pH: x  Gluc: 73 mg/dL / Ketone: x  / Bili: x / Urobili: x   Blood: x / Protein: x / Nitrite: x   Leuk Esterase: x / RBC: x / WBC x   Sq Epi: x / Non Sq Epi: x / Bacteria: x      CAPILLARY BLOOD GLUCOSE          RADIOLOGY & ADDITIONAL STUDIES: reviewed     PROTEIN CALORIE MALNUTRITION PRESENT: [ ]mild [ ]moderate [ ]severe [ ]underweight [ ]morbid obesity  https://www.andeal.org/vault/2440/web/files/ONC/Table_Clinical%20Characteristics%20to%20Document%20Malnutrition-White%20JV%20et%20al%580122.pdf    Height (cm): 165.1 (09-20-23 @ 12:52), 165.1 (08-07-23 @ 17:51), 162.6 (07-28-23 @ 09:33)  Weight (kg): 67.6 (08-07-23 @ 17:51), 77.1 (07-27-23 @ 17:47), 68 (04-21-23 @ 17:59)  BMI (kg/m2): 24.8 (09-20-23 @ 12:52), 24.8 (08-07-23 @ 17:51), 29.2 (07-28-23 @ 09:33)    [ x]PPSV2 < or = to 30% [ ]significant weight loss  [ ]poor nutritional intake  [ ]anasarca [ ]Artificial Nutrition      REFERRALS:   [ ]Chaplaincy  [ ]Hospice  [ ]Child Life  [ ]Social Work  [x ]Case management [ ]Holistic Therapy

## 2023-09-21 NOTE — PATIENT PROFILE ADULT - FUNCTIONAL ASSESSMENT - BASIC MOBILITY 3.
Detail Level: Zone
Continue Regimen: betamethasone, augmented 0.05 % topical cream prn \\nCeraVe Psoriasis Daily
Action 1: Continue
1 = Total assistance

## 2023-09-21 NOTE — CONSULT NOTE ADULT - PROBLEM SELECTOR RECOMMENDATION 3
- Per daughter, patient has had recurrent UTI   - UA positive ; pending urine culture   - management as per primary team

## 2023-09-21 NOTE — CONSULT NOTE ADULT - PROBLEM SELECTOR RECOMMENDATION 6
Please see separate GOC note.  16 minutes spent on goals of care Patient is DNR/DNI. Discussed with daughter Macrina options for treatment including trial of antibiotics to assess if there is clinical improvement vs transition to comfort centric approach of care. Macrina hopeful that patient can improve after antibiotics about agrees that patient has no improvement family likely would want to transition to comfort measures.   Introduced philosophy of hospice services to daughter Macrina.    Please see separate GOC note.  16 minutes spent on goals of care

## 2023-09-21 NOTE — CONSULT NOTE ADULT - ASSESSMENT
89 yo F w/ HFpEF, afib s/p ppm, HTN, HLD, hypothyroid and recurrent UTIs presents to the ED from [facility] with worsening mental status and SOB, found to have sepsis 2/2 UTI and acute hypoxic respiratory failure 2/2 pleural effusion.   Palliative Care consulted for complex decision making  related to goals of care discussions

## 2023-09-21 NOTE — CONSULT NOTE ADULT - PROBLEM SELECTOR RECOMMENDATION 9
- Patient found to have sepsis on admission due to UTI +/- Aspiration PNA   - on antibiotics   - management as per primary team

## 2023-09-21 NOTE — CONSULT NOTE ADULT - PROBLEM SELECTOR RECOMMENDATION 2
- CXRAY 9/20- Bilateral layering pleural effusions increased since the last exam. No focal consolidation  Imaging personally reviewed by me.  - on supplemental oxygen via nasal canula

## 2023-09-22 ENCOUNTER — TRANSCRIPTION ENCOUNTER (OUTPATIENT)
Age: 88
End: 2023-09-22

## 2023-09-22 DIAGNOSIS — N17.9 ACUTE KIDNEY FAILURE, UNSPECIFIED: ICD-10-CM

## 2023-09-22 LAB
ANION GAP SERPL CALC-SCNC: 11 MMOL/L — SIGNIFICANT CHANGE UP (ref 7–14)
ANION GAP SERPL CALC-SCNC: 13 MMOL/L — SIGNIFICANT CHANGE UP (ref 7–14)
BUN SERPL-MCNC: 30 MG/DL — HIGH (ref 7–23)
BUN SERPL-MCNC: 31 MG/DL — HIGH (ref 7–23)
CALCIUM SERPL-MCNC: 8.5 MG/DL — SIGNIFICANT CHANGE UP (ref 8.4–10.5)
CALCIUM SERPL-MCNC: 8.6 MG/DL — SIGNIFICANT CHANGE UP (ref 8.4–10.5)
CHLORIDE SERPL-SCNC: 98 MMOL/L — SIGNIFICANT CHANGE UP (ref 98–107)
CHLORIDE SERPL-SCNC: 99 MMOL/L — SIGNIFICANT CHANGE UP (ref 98–107)
CO2 SERPL-SCNC: 27 MMOL/L — SIGNIFICANT CHANGE UP (ref 22–31)
CO2 SERPL-SCNC: 28 MMOL/L — SIGNIFICANT CHANGE UP (ref 22–31)
CREAT SERPL-MCNC: 1.56 MG/DL — HIGH (ref 0.5–1.3)
CREAT SERPL-MCNC: 1.56 MG/DL — HIGH (ref 0.5–1.3)
CULTURE RESULTS: SIGNIFICANT CHANGE UP
EGFR: 29 ML/MIN/1.73M2 — LOW
EGFR: 29 ML/MIN/1.73M2 — LOW
GLUCOSE BLDC GLUCOMTR-MCNC: 69 MG/DL — LOW (ref 70–99)
GLUCOSE BLDC GLUCOMTR-MCNC: 69 MG/DL — LOW (ref 70–99)
GLUCOSE BLDC GLUCOMTR-MCNC: 73 MG/DL — SIGNIFICANT CHANGE UP (ref 70–99)
GLUCOSE SERPL-MCNC: 59 MG/DL — LOW (ref 70–99)
GLUCOSE SERPL-MCNC: 66 MG/DL — LOW (ref 70–99)
HCT VFR BLD CALC: 27 % — LOW (ref 34.5–45)
HGB BLD-MCNC: 8.9 G/DL — LOW (ref 11.5–15.5)
MAGNESIUM SERPL-MCNC: 1.6 MG/DL — SIGNIFICANT CHANGE UP (ref 1.6–2.6)
MCHC RBC-ENTMCNC: 30.4 PG — SIGNIFICANT CHANGE UP (ref 27–34)
MCHC RBC-ENTMCNC: 33 GM/DL — SIGNIFICANT CHANGE UP (ref 32–36)
MCV RBC AUTO: 92.2 FL — SIGNIFICANT CHANGE UP (ref 80–100)
MRSA PCR RESULT.: SIGNIFICANT CHANGE UP
NRBC # BLD: 0 /100 WBCS — SIGNIFICANT CHANGE UP (ref 0–0)
NRBC # FLD: 0.03 K/UL — HIGH (ref 0–0)
PHOSPHATE SERPL-MCNC: 3.3 MG/DL — SIGNIFICANT CHANGE UP (ref 2.5–4.5)
PLATELET # BLD AUTO: 85 K/UL — LOW (ref 150–400)
POTASSIUM SERPL-MCNC: 4.1 MMOL/L — SIGNIFICANT CHANGE UP (ref 3.5–5.3)
POTASSIUM SERPL-MCNC: 4.2 MMOL/L — SIGNIFICANT CHANGE UP (ref 3.5–5.3)
POTASSIUM SERPL-SCNC: 4.1 MMOL/L — SIGNIFICANT CHANGE UP (ref 3.5–5.3)
POTASSIUM SERPL-SCNC: 4.2 MMOL/L — SIGNIFICANT CHANGE UP (ref 3.5–5.3)
RBC # BLD: 2.93 M/UL — LOW (ref 3.8–5.2)
RBC # FLD: 16 % — HIGH (ref 10.3–14.5)
S AUREUS DNA NOSE QL NAA+PROBE: SIGNIFICANT CHANGE UP
SODIUM SERPL-SCNC: 137 MMOL/L — SIGNIFICANT CHANGE UP (ref 135–145)
SODIUM SERPL-SCNC: 139 MMOL/L — SIGNIFICANT CHANGE UP (ref 135–145)
SPECIMEN SOURCE: SIGNIFICANT CHANGE UP
T3 SERPL-MCNC: 62 NG/DL — LOW (ref 80–200)
T3FREE SERPL-MCNC: 1.29 PG/ML — LOW (ref 2–4.4)
T4 AB SER-ACNC: 8.88 UG/DL — SIGNIFICANT CHANGE UP (ref 5.1–13)
T4 FREE SERPL-MCNC: 1.6 NG/DL — SIGNIFICANT CHANGE UP (ref 0.9–1.8)
TSH SERPL-MCNC: 1.65 UIU/ML — SIGNIFICANT CHANGE UP (ref 0.27–4.2)
WBC # BLD: 8.62 K/UL — SIGNIFICANT CHANGE UP (ref 3.8–10.5)
WBC # FLD AUTO: 8.62 K/UL — SIGNIFICANT CHANGE UP (ref 3.8–10.5)

## 2023-09-22 PROCEDURE — 99232 SBSQ HOSP IP/OBS MODERATE 35: CPT

## 2023-09-22 PROCEDURE — 76770 US EXAM ABDO BACK WALL COMP: CPT | Mod: 26

## 2023-09-22 RX ORDER — SODIUM CHLORIDE 9 MG/ML
1000 INJECTION, SOLUTION INTRAVENOUS
Refills: 0 | Status: DISCONTINUED | OUTPATIENT
Start: 2023-09-22 | End: 2023-09-23

## 2023-09-22 RX ORDER — SODIUM CHLORIDE 9 MG/ML
1000 INJECTION, SOLUTION INTRAVENOUS
Refills: 0 | Status: DISCONTINUED | OUTPATIENT
Start: 2023-09-22 | End: 2023-09-22

## 2023-09-22 RX ORDER — HEPARIN SODIUM 5000 [USP'U]/ML
5000 INJECTION INTRAVENOUS; SUBCUTANEOUS EVERY 12 HOURS
Refills: 0 | Status: DISCONTINUED | OUTPATIENT
Start: 2023-09-22 | End: 2023-09-23

## 2023-09-22 RX ORDER — PIPERACILLIN AND TAZOBACTAM 4; .5 G/20ML; G/20ML
3.38 INJECTION, POWDER, LYOPHILIZED, FOR SOLUTION INTRAVENOUS EVERY 12 HOURS
Refills: 0 | Status: COMPLETED | OUTPATIENT
Start: 2023-09-22 | End: 2023-09-25

## 2023-09-22 RX ADMIN — HEPARIN SODIUM 5000 UNIT(S): 5000 INJECTION INTRAVENOUS; SUBCUTANEOUS at 20:02

## 2023-09-22 RX ADMIN — PIPERACILLIN AND TAZOBACTAM 25 GRAM(S): 4; .5 INJECTION, POWDER, LYOPHILIZED, FOR SOLUTION INTRAVENOUS at 18:50

## 2023-09-22 RX ADMIN — SODIUM CHLORIDE 125 MILLILITER(S): 9 INJECTION, SOLUTION INTRAVENOUS at 14:16

## 2023-09-22 RX ADMIN — Medication 75 MICROGRAM(S): at 23:05

## 2023-09-22 RX ADMIN — PIPERACILLIN AND TAZOBACTAM 25 GRAM(S): 4; .5 INJECTION, POWDER, LYOPHILIZED, FOR SOLUTION INTRAVENOUS at 06:20

## 2023-09-22 RX ADMIN — SODIUM CHLORIDE 75 MILLILITER(S): 9 INJECTION, SOLUTION INTRAVENOUS at 18:50

## 2023-09-23 LAB
ANION GAP SERPL CALC-SCNC: 8 MMOL/L — SIGNIFICANT CHANGE UP (ref 7–14)
BUN SERPL-MCNC: 27 MG/DL — HIGH (ref 7–23)
CALCIUM SERPL-MCNC: 8.5 MG/DL — SIGNIFICANT CHANGE UP (ref 8.4–10.5)
CHLORIDE SERPL-SCNC: 101 MMOL/L — SIGNIFICANT CHANGE UP (ref 98–107)
CO2 SERPL-SCNC: 30 MMOL/L — SIGNIFICANT CHANGE UP (ref 22–31)
CREAT SERPL-MCNC: 1.33 MG/DL — HIGH (ref 0.5–1.3)
EGFR: 35 ML/MIN/1.73M2 — LOW
GLUCOSE SERPL-MCNC: 231 MG/DL — HIGH (ref 70–99)
HCT VFR BLD CALC: 26.8 % — LOW (ref 34.5–45)
HGB BLD-MCNC: 8.7 G/DL — LOW (ref 11.5–15.5)
MAGNESIUM SERPL-MCNC: 1.6 MG/DL — SIGNIFICANT CHANGE UP (ref 1.6–2.6)
MCHC RBC-ENTMCNC: 31.2 PG — SIGNIFICANT CHANGE UP (ref 27–34)
MCHC RBC-ENTMCNC: 32.5 GM/DL — SIGNIFICANT CHANGE UP (ref 32–36)
MCV RBC AUTO: 96.1 FL — SIGNIFICANT CHANGE UP (ref 80–100)
NRBC # BLD: 0 /100 WBCS — SIGNIFICANT CHANGE UP (ref 0–0)
NRBC # FLD: 0.02 K/UL — HIGH (ref 0–0)
PHOSPHATE SERPL-MCNC: 2.9 MG/DL — SIGNIFICANT CHANGE UP (ref 2.5–4.5)
PLATELET # BLD AUTO: 85 K/UL — LOW (ref 150–400)
POTASSIUM SERPL-MCNC: 3.7 MMOL/L — SIGNIFICANT CHANGE UP (ref 3.5–5.3)
POTASSIUM SERPL-SCNC: 3.7 MMOL/L — SIGNIFICANT CHANGE UP (ref 3.5–5.3)
RBC # BLD: 2.79 M/UL — LOW (ref 3.8–5.2)
RBC # FLD: 15.9 % — HIGH (ref 10.3–14.5)
SODIUM SERPL-SCNC: 139 MMOL/L — SIGNIFICANT CHANGE UP (ref 135–145)
WBC # BLD: 8.68 K/UL — SIGNIFICANT CHANGE UP (ref 3.8–10.5)
WBC # FLD AUTO: 8.68 K/UL — SIGNIFICANT CHANGE UP (ref 3.8–10.5)

## 2023-09-23 PROCEDURE — 99497 ADVNCD CARE PLAN 30 MIN: CPT

## 2023-09-23 PROCEDURE — 99233 SBSQ HOSP IP/OBS HIGH 50: CPT

## 2023-09-23 RX ORDER — CHLORHEXIDINE GLUCONATE 213 G/1000ML
1 SOLUTION TOPICAL
Refills: 0 | Status: DISCONTINUED | OUTPATIENT
Start: 2023-09-23 | End: 2023-10-04

## 2023-09-23 RX ORDER — SODIUM CHLORIDE 9 MG/ML
4 INJECTION INTRAMUSCULAR; INTRAVENOUS; SUBCUTANEOUS EVERY 6 HOURS
Refills: 0 | Status: DISCONTINUED | OUTPATIENT
Start: 2023-09-23 | End: 2023-09-25

## 2023-09-23 RX ORDER — ACETYLCYSTEINE 200 MG/ML
4 VIAL (ML) MISCELLANEOUS EVERY 6 HOURS
Refills: 0 | Status: DISCONTINUED | OUTPATIENT
Start: 2023-09-23 | End: 2023-09-23

## 2023-09-23 RX ADMIN — PIPERACILLIN AND TAZOBACTAM 25 GRAM(S): 4; .5 INJECTION, POWDER, LYOPHILIZED, FOR SOLUTION INTRAVENOUS at 06:40

## 2023-09-23 RX ADMIN — CHLORHEXIDINE GLUCONATE 1 APPLICATION(S): 213 SOLUTION TOPICAL at 06:48

## 2023-09-23 RX ADMIN — Medication 75 MICROGRAM(S): at 22:45

## 2023-09-23 RX ADMIN — HEPARIN SODIUM 5000 UNIT(S): 5000 INJECTION INTRAVENOUS; SUBCUTANEOUS at 06:40

## 2023-09-23 RX ADMIN — SODIUM CHLORIDE 4 MILLILITER(S): 9 INJECTION INTRAMUSCULAR; INTRAVENOUS; SUBCUTANEOUS at 23:03

## 2023-09-23 RX ADMIN — PIPERACILLIN AND TAZOBACTAM 25 GRAM(S): 4; .5 INJECTION, POWDER, LYOPHILIZED, FOR SOLUTION INTRAVENOUS at 16:40

## 2023-09-23 NOTE — DIETITIAN INITIAL EVALUATION ADULT - ORAL INTAKE PTA/DIET HISTORY
Patient seen for assessment. Patient lethargic during encounter, limited information obtained at this time. Per [facility] records, patient was on regular/nectar thick fluids w/ LPS and boost pudding supplements.

## 2023-09-23 NOTE — DIETITIAN INITIAL EVALUATION ADULT - ADD RECOMMEND
Swallow evaluation to determine appropriateness of diet and adjust liquids to mildly thick in the meantime as per [facility] records. Addition of multivitamin and vitamin C for wound healing. Monitor PO intake.

## 2023-09-23 NOTE — DIETITIAN INITIAL EVALUATION ADULT - PROBLEM SELECTOR PLAN 3
Patient with AMS and pyuria   -Cx from [facility] showed  E.coli only resistant to ampicillin   -C/w zosyn IV and vanco IV for now   -F/u with Ucx

## 2023-09-23 NOTE — DIETITIAN INITIAL EVALUATION ADULT - PERTINENT LABORATORY DATA
09-23    139  |  101  |  27<H>  ----------------------------<  231<H>  3.7   |  30  |  1.33<H>    Ca    8.5      23 Sep 2023 06:00  Phos  2.9     09-23  Mg     1.60     09-23    POCT Blood Glucose.: 69 mg/dL (09-22-23 @ 17:57)  A1C with Estimated Average Glucose Result: 6.0 % (09-21-23 @ 07:25)  A1C with Estimated Average Glucose Result: 5.7 % (05-18-23 @ 04:00)

## 2023-09-23 NOTE — PROVIDER CONTACT NOTE (OTHER) - ACTION/TREATMENT ORDERED:
provider aware. As per provider, no further interventions at this time.
As per ACP, OK to do TOV tonight instead

## 2023-09-23 NOTE — DIETITIAN INITIAL EVALUATION ADULT - OTHER INFO
89 year old female w/ a PMH of HFpEF, HTN, HLD, hypothyroid and recurrent UTIs presents to the ED from [facility] with worsening mental status and SOB, found to have sepsis 2/2 UTI and acute hypoxic respiratory failure 2/2 pleural effusion per chart.    Patient previously NPO, now on soft and bite-sized/thin liquids. Per [place] records, patients intakes is usually 25-50%. No GI distress noted. Has no food allergies. Unable to obtain UBW at this time. Per previous RD note (8/10) noted w/ weight 69.4 kg. ABW is 79 kg (9/23) per chart suggesting a +13.8% weight gain, will monitor. Noted w/ coccyx stage 2 pressure injury and no edema per RN flow sheet.

## 2023-09-23 NOTE — GOALS OF CARE CONVERSATION - ADVANCED CARE PLANNING - TREATMENT GUIDELINES
DNI/DNR Order/No artificial nutrition
DNR Order/Comfort measures only/Do not re-hospitalize/No blood draws/No artificial nutrition/Antibiotic trial
DNR Order

## 2023-09-23 NOTE — GOALS OF CARE CONVERSATION - ADVANCED CARE PLANNING - CONVERSATION/DISCUSSION
Diagnosis/Prognosis/Treatment Options/Hospice Referral
Diagnosis/Prognosis/MOLST Discussed/Treatment Options/Hospice Referral/Palliative Care Referral
Diagnosis/Prognosis/MOLST Discussed/Treatment Options/Palliative Care Referral

## 2023-09-23 NOTE — GOALS OF CARE CONVERSATION - ADVANCED CARE PLANNING - BILLING PROVIDER USE ONLY
Discussed with patient the importance of frequent hand moisturization q hour and q hand washing.  Recommended Neutrogena Original [place] Hand cream or Cerave Cream or Aveeno eczema balm. Apply prescribed topical steroid followed by vaseline and white cotton glove or warm damp towel qhs prn severe flare.      
Attending or RYNE Only

## 2023-09-23 NOTE — DIETITIAN INITIAL EVALUATION ADULT - PERTINENT MEDS FT
MEDICATIONS  (STANDING):  chlorhexidine 2% Cloths 1 Application(s) Topical <User Schedule>  dextrose 5% + lactated ringers. 1000 milliLiter(s) (75 mL/Hr) IV Continuous <Continuous>  levothyroxine Injectable 75 MICROGram(s) IV Push at bedtime  piperacillin/tazobactam IVPB. 3.375 Gram(s) IV Intermittent once  piperacillin/tazobactam IVPB.. 3.375 Gram(s) IV Intermittent every 12 hours    MEDICATIONS  (PRN):  acetaminophen     Tablet .. 650 milliGRAM(s) Oral every 6 hours PRN Temp greater or equal to 38C (100.4F), Mild Pain (1 - 3)  ondansetron Injectable 4 milliGRAM(s) IV Push every 8 hours PRN Nausea and/or Vomiting

## 2023-09-23 NOTE — DIETITIAN INITIAL EVALUATION ADULT - PROBLEM SELECTOR PLAN 1
Patient with hypothermia, lactic acidosis and pyuria consistent with sepsis 2/2 UTI   -F/u with bcx and urine cx   -MRSA swab. RVP is negative   -Was on cipro and then levaquin at [facility] but had allergic reaction to levaquin   -Will c/w zosyn and vancomycin for now

## 2023-09-23 NOTE — GOALS OF CARE CONVERSATION - ADVANCED CARE PLANNING - CONVERSATION DETAILS
Discussed GOC briefly with daughter at bedside. Daughter states patient is to be DNR/DNI as per past discussions, no feeding tube, no dialysis. MOLST signed.     Palliative also discussed GOC with patient. Will plan to reassess response to antibiotics prior to transitioning to comfort care / hospice.
Discussed prognosis with in person meeting with daughters Macrina and Ariana at bedside. Given patients bedbound status and difficulty coordination with eating, pt will continue to have bouts of aspiration PNA and UTIs. Family agreed to continue period antibiotics, but would like to transition patient to comfort directed measures and to send patient to inpatient hospice. Patient is now DNR/ DNI with comfort measures only.
Introduced anna/pal team to daughter Macrina. Daughter shares that patient has 3 daughters: Macrina (resides in [place]), another daughter resides in [place] and other daughter in [place].  Macrina shares that prior to hospitalization patient was living in [facility] but has had recurrent UTI and most recently discharged to [facility] for rehab after hospital discharge. She states patient was AAOx3 and able to read the [place] up until a few days ago. She understands patient's current clinical status. She is hopeful that patient will return to baseline but wished for guidance on next steps.   Discussed with daughter Macrina options for treatment including trial of antibiotics to assess if there is clinical improvement vs transition to comfort centric approach of care. Macrina shares that her sister in [place] likely would want comfort care as patient has expressed in the past that she would want to be in a vegetative state. However, Macrina states that she is hopeful that patient can improve after antibiotics about agrees that patient has no improvement family likely would want to transition to comfort measures.     Introduced philosophy of hospice services to daughter Macrina. Macrina states that family had wanted hospice in the past, but hospice services had declined patient due to her good functional status. Discussed with Macrina can have her re-evaluated for hospice this admission if interested.     Macrina will further speak to her sisters and share our conversation above for family decision on patient's care plan.    Emotional support provided

## 2023-09-24 ENCOUNTER — TRANSCRIPTION ENCOUNTER (OUTPATIENT)
Age: 88
End: 2023-09-24

## 2023-09-24 PROCEDURE — 99232 SBSQ HOSP IP/OBS MODERATE 35: CPT

## 2023-09-24 RX ADMIN — CHLORHEXIDINE GLUCONATE 1 APPLICATION(S): 213 SOLUTION TOPICAL at 06:38

## 2023-09-24 RX ADMIN — PIPERACILLIN AND TAZOBACTAM 25 GRAM(S): 4; .5 INJECTION, POWDER, LYOPHILIZED, FOR SOLUTION INTRAVENOUS at 06:32

## 2023-09-24 RX ADMIN — PIPERACILLIN AND TAZOBACTAM 25 GRAM(S): 4; .5 INJECTION, POWDER, LYOPHILIZED, FOR SOLUTION INTRAVENOUS at 18:36

## 2023-09-24 RX ADMIN — SODIUM CHLORIDE 4 MILLILITER(S): 9 INJECTION INTRAMUSCULAR; INTRAVENOUS; SUBCUTANEOUS at 04:14

## 2023-09-24 RX ADMIN — SODIUM CHLORIDE 4 MILLILITER(S): 9 INJECTION INTRAMUSCULAR; INTRAVENOUS; SUBCUTANEOUS at 16:40

## 2023-09-24 RX ADMIN — SODIUM CHLORIDE 4 MILLILITER(S): 9 INJECTION INTRAMUSCULAR; INTRAVENOUS; SUBCUTANEOUS at 09:40

## 2023-09-24 RX ADMIN — Medication 75 MICROGRAM(S): at 00:00

## 2023-09-24 RX ADMIN — SODIUM CHLORIDE 4 MILLILITER(S): 9 INJECTION INTRAMUSCULAR; INTRAVENOUS; SUBCUTANEOUS at 22:44

## 2023-09-24 NOTE — DISCHARGE NOTE PROVIDER - NSDCMRMEDTOKEN_GEN_ALL_CORE_FT
acetaminophen 325 mg oral tablet: 2 tab(s) orally every 6 hours As needed Temp greater or equal to 38C (100.4F), Mild Pain (1 - 3)  ascorbic acid 500 mg oral tablet: 1 tab(s) orally once a day  atorvastatin 20 mg oral tablet: 1 tab(s) orally once a day (at bedtime)  carvedilol 6.25 mg oral tablet: 1 tab(s) orally 2 times a day  furosemide 20 mg oral tablet: 1 tab(s) orally 3 times a week MWF  levothyroxine 100 mcg (0.1 mg) oral tablet: 1 tab(s) orally once a day  Multiple Vitamins oral tablet: 1 tab(s) orally once a day  polyethylene glycol 3350 oral powder for reconstitution: 17 gram(s) orally 2 times a day  senna leaf extract oral tablet: 2 tab(s) orally once a day (at bedtime)  Vitamin D3 25 mcg (1000 intl units) oral tablet: 1 tab(s) orally once a day   acetaminophen 325 mg oral tablet: 2 tab(s) orally every 6 hours As needed Temp greater or equal to 38C (100.4F), Mild Pain (1 - 3)  levothyroxine 100 mcg (0.1 mg) oral tablet: 1 tab(s) orally once a day

## 2023-09-24 NOTE — PROGRESS NOTE ADULT - PROBLEM SELECTOR PLAN 9
DVT ppx SCD    Goals of care: signed MOLST form for DNR/DNI/comfort directed care. Family requesting to continue antibiotics. Can treat with IV while inpatient and transition to oral on disposition if discharged prior to 9/25 as above
DVT ppx SCD    Goals of care: signed MOLST form for DNR/DNI, no feed tube or dialysis. Palliative consulted. Plan to treat simple cystitis and check for improvement. If patient does not improve, can discuss transitioning care to hospice / comfort care.
DVT ppx SCD    Goals of care: signed MOLST form for DNR/DNI/comfort directed care. Family requesting to continue antibiotics. Can treat with IV while inpatient and transition to oral on disposition if discharged prior to 9/25 as above

## 2023-09-24 NOTE — DISCHARGE NOTE PROVIDER - NSDCCPCAREPLAN_GEN_ALL_CORE_FT
PRINCIPAL DISCHARGE DIAGNOSIS  Diagnosis: Sepsis  Assessment and Plan of Treatment: You presented from [place]. We treated you for a UTI and aspiration pneumonia. We decided that transitioning your care to make you comfort is the best route for your goals of care. We will continue this care in inpatient hospice.

## 2023-09-24 NOTE — DISCHARGE NOTE PROVIDER - HOSPITAL COURSE
89 yo F w/ HFpEF, afib s/p ppm, HTN, HLD, hypothyroid and recurrent UTIs presents to the ED from [facility] with worsening mental status and SOB, found to have sepsis 2/2 UTI and acute hypoxic respiratory failure . Sources are likely from UTI vs aspiration PNA. Given repeated infections over the past 2 years (including hospitalization earlier this month), had GOC meeting. Daughters Macrina and Ariana decided to proceed to transition patient to DNR/DNI/comfort directed care. Family requesting to continue antibiotics, therefore finished a 5 day course with zosyn. Plan to transfer patient to inpatient hospice. 89 yo F w/ HFpEF, afib s/p ppm, HTN, HLD, hypothyroid and recurrent UTIs presents to the ED from [facility] with worsening mental status and SOB, found to have sepsis 2/2 UTI and acute hypoxic respiratory failure . Sources are likely from UTI vs aspiration PNA. Given repeated infections over the past 2 years (including hospitalization earlier this month), had GOC meeting. Daughters Macrina and Ariana decided to proceed to transition patient to DNR/DNI/comfort directed care. Family requesting to continue antibiotics, therefore finished a 5 day course with zosyn. Plan to transfer patient to inpatient hospice.

## 2023-09-24 NOTE — PROGRESS NOTE ADULT - PROBLEM SELECTOR PLAN 8
DVT ppx SCD    Goals of care: signed MOLST form for DNR/DNI, no feed tube or dialysis. Palliative consulted. Family interested in hospice / comfort care. Will follow up with palliative for goals of care decision.
Chronic, stable   -Trend CBC daily

## 2023-09-24 NOTE — DISCHARGE NOTE PROVIDER - NSDCFUADDINST_GEN_ALL_CORE_FT
wound care recs    Topical recommendations:   ---Sacrum, bilateral groin, perineum: Cleanse with skin cleanser, pat dry. Apply Elena moisture barrier cream twice a day and PRN with incontinent episodes.   ---R heel: apply liquid barrier film daily, continue to offload bilateral heels with complete CAIR boots.  ---Continue low air loss bed therapy, continue heel elevation, continue to turn & reposition per protocol, soft pillow between bony prominences, continue moisture management with barrier creams & single breathable pad, continue measures to decrease friction/shear/pressure.

## 2023-09-25 DIAGNOSIS — J69.0 PNEUMONITIS DUE TO INHALATION OF FOOD AND VOMIT: ICD-10-CM

## 2023-09-25 DIAGNOSIS — I50.23 ACUTE ON CHRONIC SYSTOLIC (CONGESTIVE) HEART FAILURE: ICD-10-CM

## 2023-09-25 PROCEDURE — 99232 SBSQ HOSP IP/OBS MODERATE 35: CPT

## 2023-09-25 RX ADMIN — PIPERACILLIN AND TAZOBACTAM 25 GRAM(S): 4; .5 INJECTION, POWDER, LYOPHILIZED, FOR SOLUTION INTRAVENOUS at 07:14

## 2023-09-25 RX ADMIN — Medication 75 MICROGRAM(S): at 21:58

## 2023-09-25 RX ADMIN — CHLORHEXIDINE GLUCONATE 1 APPLICATION(S): 213 SOLUTION TOPICAL at 07:14

## 2023-09-25 RX ADMIN — SODIUM CHLORIDE 4 MILLILITER(S): 9 INJECTION INTRAMUSCULAR; INTRAVENOUS; SUBCUTANEOUS at 10:44

## 2023-09-25 RX ADMIN — SODIUM CHLORIDE 4 MILLILITER(S): 9 INJECTION INTRAMUSCULAR; INTRAVENOUS; SUBCUTANEOUS at 04:03

## 2023-09-25 NOTE — ADVANCED PRACTICE NURSE CONSULT - REASON FOR CONSULT
Patient seen on skin care rounds after wound care referral received for assessment of skin impairment and recommendations of topical management of sacral wound. Patient is a 89 yo F w/ HFpEF, afib s/p ppm, HTN, HLD, hypothyroid and recurrent UTIs presented to the ED from [facility] with worsening mental status and SOB, found to have sepsis 2/2 UTI and acute hypoxic respiratory failure 2/2 pleural effusion. Now made DNR/DNI with comfort measures. Patient on iv antibiotic therapy. Chart reviewed: WBC 8.68 , H/H 8.7/26.8, INR 1.13, Platelets 85, hA1c 6.0, BMI 29, saad 13.

## 2023-09-25 NOTE — ADVANCED PRACTICE NURSE CONSULT - ASSESSMENT
General: A&Ox1, bedbound, incontinent of urine and stool. Nasal cannula in place, peritubular skin intact. Skin warm, dry with increased moisture in intertriginous folds, thin fragile skin, scattered areas of ecchymosis (without hematoma) on bilateral upper extremities. Blanchable erythema on bilateral heels.     Vascular: Bilateral lower extremities Dry, flaky skin. No temperature changes noted.  Capillary refill <3 seconds. +1 DP/PT pulses.    R heel DTPI (present on admission as confirmed by patient's daughter at bedside): 1.5cmx2.7xds4od. No drainage, no odor. Periwound with no erythema, no increased warmth, no edema, no induration, no fluctuance no signs or symptoms of overt skin infection. Goals of care: offload pressure, protect from friction and shear, monitor for tissue type changes.     Sacrum, bilateral groin, perineum: incontinence and moisture associated dermatitis as evidenced by erythema and increased moisture with fissure noted to gluteal cleft. No evidence of candidiasis.

## 2023-09-25 NOTE — ADVANCED PRACTICE NURSE CONSULT - RECOMMEDATIONS
Topical recommendations:   ---Sacrum, bilateral groin, perineum: Cleanse with skin cleanser, pat dry. Apply Elena moisture barrier cream twice a day and PRN with incontinent episodes.   ---L heel: apply liquid barrier film daily, continue to offload bilateral heels with complete CAIR boots.  ---Continue low air loss bed therapy, continue heel elevation, continue to turn & reposition per protocol, soft pillow between bony prominences, continue moisture management with barrier creams & single breathable pad, continue measures to decrease friction/shear/pressure.     Plan discussed with patient's family and primary RN at bedside.   Please contact Wound/Ostomy Care Service Line if we can be of further assistance (ext [phone]). Please reconsult if changes to tissue type noted.  Topical recommendations:   ---Sacrum, bilateral groin, perineum: Cleanse with skin cleanser, pat dry. Apply Elena moisture barrier cream twice a day and PRN with incontinent episodes.   ---R heel: apply liquid barrier film daily, continue to offload bilateral heels with complete CAIR boots.  ---Continue low air loss bed therapy, continue heel elevation, continue to turn & reposition per protocol, soft pillow between bony prominences, continue moisture management with barrier creams & single breathable pad, continue measures to decrease friction/shear/pressure.     Plan discussed with patient's family and primary RN at bedside.   Please contact Wound/Ostomy Care Service Line if we can be of further assistance (ext [phone]). Please reconsult if changes to tissue type noted.

## 2023-09-25 NOTE — PROGRESS NOTE ADULT - TIME BILLING
Time spent reviewing chart  Time spent for counseling and education with family  Time spent discussing and coordinating care with primary team and interdisciplinary staff and floor staff

## 2023-09-26 PROCEDURE — 99232 SBSQ HOSP IP/OBS MODERATE 35: CPT

## 2023-09-26 RX ORDER — LEVOTHYROXINE SODIUM 125 MCG
100 TABLET ORAL AT BEDTIME
Refills: 0 | Status: DISCONTINUED | OUTPATIENT
Start: 2023-09-26 | End: 2023-10-04

## 2023-09-26 RX ADMIN — CHLORHEXIDINE GLUCONATE 1 APPLICATION(S): 213 SOLUTION TOPICAL at 05:08

## 2023-09-26 RX ADMIN — Medication 100 MICROGRAM(S): at 21:55

## 2023-09-27 PROCEDURE — 99232 SBSQ HOSP IP/OBS MODERATE 35: CPT

## 2023-09-27 RX ADMIN — CHLORHEXIDINE GLUCONATE 1 APPLICATION(S): 213 SOLUTION TOPICAL at 06:22

## 2023-09-27 RX ADMIN — Medication 100 MICROGRAM(S): at 21:07

## 2023-09-28 LAB
MRSA PCR RESULT.: SIGNIFICANT CHANGE UP
S AUREUS DNA NOSE QL NAA+PROBE: SIGNIFICANT CHANGE UP

## 2023-09-28 PROCEDURE — 99232 SBSQ HOSP IP/OBS MODERATE 35: CPT

## 2023-09-28 PROCEDURE — 99497 ADVNCD CARE PLAN 30 MIN: CPT

## 2023-09-28 RX ADMIN — Medication 100 MICROGRAM(S): at 21:42

## 2023-09-28 RX ADMIN — CHLORHEXIDINE GLUCONATE 1 APPLICATION(S): 213 SOLUTION TOPICAL at 05:35

## 2023-09-28 NOTE — PHYSICAL THERAPY INITIAL EVALUATION ADULT - PASSIVE RANGE OF MOTION EXAMINATION, REHAB EVAL
Right shoulder flexion ~100 degrees, right elbow/hand/wrist WFL, left shoulder flexion ~70 degrees, left elbow/hand/wrist WFL/bilateral lower extremity Passive ROM was WFL (within functional limits)
bilateral upper extremity Passive ROM was WFL (within functional limits)/bilateral lower extremity Passive ROM was WFL (within functional limits)

## 2023-09-28 NOTE — PHYSICAL THERAPY INITIAL EVALUATION ADULT - MANUAL MUSCLE TESTING RESULTS, REHAB EVAL
pt unable to follow commands/not tested due to
Right upper extremity 3/5, Left shoulder 2-/5, left elbow 3-/5, left wrist/hand 3/5, Bilateral hamstrings 3-/5, bilateral quadriceps 2-/4, bilateral ankles 2/5

## 2023-09-28 NOTE — PHYSICAL THERAPY INITIAL EVALUATION ADULT - PRECAUTIONS/LIMITATIONS, REHAB EVAL
+2L of O2 through nasal cannula/aspiration precautions/cardiac precautions/fall precautions/oxygen therapy device and L/min
+3.5L of O2 through nasal cannula/aspiration precautions/cardiac precautions/fall precautions/oxygen therapy device and L/min

## 2023-09-28 NOTE — PHYSICAL THERAPY INITIAL EVALUATION ADULT - FOLLOWS COMMANDS/ANSWERS QUESTIONS, REHAB EVAL
unable to follow commands/unable to answer questions
75% of the time/50% of the time/able to follow single-step instructions

## 2023-09-28 NOTE — PHYSICAL THERAPY INITIAL EVALUATION ADULT - NSPTDISCHREC_GEN_A_CORE
Pt is not a suitable candidate for skilled PT as pt does not follow commands, therefore pt will be taken off PT program.
As per HAYLEE Barrientos pt is comfort care, therefore pt will be taken off PT Program

## 2023-09-28 NOTE — PHYSICAL THERAPY INITIAL EVALUATION ADULT - ACTIVE RANGE OF MOTION EXAMINATION, REHAB EVAL
Not tested; pt not following commands
left shoulder flexion ~45 degree, left elbow/hand/wrist WFL/Right UE Active ROM was WFL (within functional limits)/bilateral  lower extremity Active ROM was WFL (within functional limits)

## 2023-09-28 NOTE — PHYSICAL THERAPY INITIAL EVALUATION ADULT - PERTINENT HX OF CURRENT PROBLEM, REHAB EVAL
89 yo F w/ HFpEF, afib s/p ppm, HTN, HLD, hypothyroid and recurrent UTIs presents to the ED from [facility] with worsening mental status and SOB, found to have sepsis 2/2 UTI and acute hypoxic respiratory failure 2/2 pleural effusion.
89 yo F w/ HFpEF, afib s/p ppm, HTN, HLD, hypothyroid and recurrent UTIs presents to the ED from [facility] with worsening mental status and SOB, found to have sepsis 2/2 UTI and acute hypoxic respiratory failure 2/2 pleural effusion.

## 2023-09-28 NOTE — PHYSICAL THERAPY INITIAL EVALUATION ADULT - DIAGNOSIS, PT EVAL
Pt admitted for Sepsis and Respiratory failure; pt presents with decreased strength and decreased functional mobility.
Pt admitted for Sepsis and Respiratory failure; pt presents with decreased strength and decreased functional mobility.

## 2023-09-28 NOTE — PHYSICAL THERAPY INITIAL EVALUATION ADULT - PATIENT PROFILE REVIEW, REHAB EVAL
No Formal Activity Order In the computer; spoke with HAYLEE Kiser prior to PT evaluation--> Pt OK for PT consult; vitals taken; /96mmHg, heart rate 78bpm, SpO2 100%, temperature 98.3/yes
No Formal Activity Order In the computer; vitals taken; /96mmHg, heart rate 90bpm/yes

## 2023-09-28 NOTE — PHYSICAL THERAPY INITIAL EVALUATION ADULT - GENERAL OBSERVATIONS, REHAB EVAL
Pt encountered in semisupine position, no distress, AxOx1, with +IV, +cardiac monitor, +pulse oximeter, +primafit, +2 of O2 through nasal cannula, and PCA @ bedside. Pt agreeable to participate in PT evaluation
Pt encountered in semisupine position, no distress, lethargic, with +IV, +cardiac monitor, +pulse oximeter, +aviles, +3.5L of O2 through nasal cannula, and PCA @ bedside.

## 2023-09-28 NOTE — PHYSICAL THERAPY INITIAL EVALUATION ADULT - ADDITIONAL COMMENTS
Pt is poor historian. As per Chart review[facility] Prior to rehab pt was living int the [facility], had a private pay home health aide and used a wheelchair primarily.    Pt left comfortable in bed, HOB 30 degrees, NAD, all lines intact, all precautions maintained, with call bell in reach, bed alarm on, PCA @bedside, and RN aware of PT evaluation.
Pt is poor historian. As per Chart review[facility] Prior to [facility]hab pt was living int the [facility], had a private pay home health aide and used a wheelchair primarily.    Pt left comfortable in bed, HOB 30 degrees, NAD, all lines intact, all precautions maintained, with call bell in reach, bed alarm on, PCA @bedside, and RN aware of PT evaluation.

## 2023-09-28 NOTE — PHYSICAL THERAPY INITIAL EVALUATION ADULT - LEVEL OF INDEPENDENCE: SUPINE/SIT, REHAB EVAL
deferred 2/2 lethargy and pt not being able to follow commands.
unable to complete the full transfer 2/2 poor trunk control/dependent (less than 25% patients effort)

## 2023-09-28 NOTE — PROGRESS NOTE ADULT - CONVERSATION DETAILS
Noted that patient was DNR but not DNI.  Discussed with patient and daughter Ariana by the bedside.  They both understand the poor prognosis and wishes to be DNR and DNI.  MOLST was corrected to update their wishes.

## 2023-09-28 NOTE — PHYSICAL THERAPY INITIAL EVALUATION ADULT - REFERRING PHYSICIAN, REHAB EVAL
Stefanie Joyner; Consult- PT Evaluate and Treat
Stefanie Joyner; Consult- PT Evaluate and Treat (family request)

## 2023-09-29 PROCEDURE — 99232 SBSQ HOSP IP/OBS MODERATE 35: CPT

## 2023-09-29 RX ADMIN — CHLORHEXIDINE GLUCONATE 1 APPLICATION(S): 213 SOLUTION TOPICAL at 07:02

## 2023-09-29 RX ADMIN — Medication 100 MICROGRAM(S): at 21:55

## 2023-09-30 PROCEDURE — 99232 SBSQ HOSP IP/OBS MODERATE 35: CPT

## 2023-09-30 RX ADMIN — CHLORHEXIDINE GLUCONATE 1 APPLICATION(S): 213 SOLUTION TOPICAL at 05:02

## 2023-09-30 RX ADMIN — Medication 100 MICROGRAM(S): at 22:02

## 2023-10-01 PROCEDURE — 99232 SBSQ HOSP IP/OBS MODERATE 35: CPT

## 2023-10-01 RX ADMIN — CHLORHEXIDINE GLUCONATE 1 APPLICATION(S): 213 SOLUTION TOPICAL at 06:30

## 2023-10-01 RX ADMIN — Medication 100 MICROGRAM(S): at 21:13

## 2023-10-02 PROCEDURE — 99232 SBSQ HOSP IP/OBS MODERATE 35: CPT

## 2023-10-02 RX ORDER — NYSTATIN CREAM 100000 [USP'U]/G
1 CREAM TOPICAL
Refills: 0 | Status: DISCONTINUED | OUTPATIENT
Start: 2023-10-02 | End: 2023-10-04

## 2023-10-02 RX ADMIN — NYSTATIN CREAM 1 APPLICATION(S): 100000 CREAM TOPICAL at 22:22

## 2023-10-02 RX ADMIN — Medication 100 MICROGRAM(S): at 22:22

## 2023-10-02 RX ADMIN — CHLORHEXIDINE GLUCONATE 1 APPLICATION(S): 213 SOLUTION TOPICAL at 05:33

## 2023-10-03 ENCOUNTER — TRANSCRIPTION ENCOUNTER (OUTPATIENT)
Age: 88
End: 2023-10-03

## 2023-10-03 PROCEDURE — 99232 SBSQ HOSP IP/OBS MODERATE 35: CPT

## 2023-10-03 RX ORDER — CHOLECALCIFEROL (VITAMIN D3) 125 MCG
1 CAPSULE ORAL
Refills: 0 | DISCHARGE

## 2023-10-03 RX ORDER — CARVEDILOL PHOSPHATE 80 MG/1
1 CAPSULE, EXTENDED RELEASE ORAL
Refills: 0 | DISCHARGE

## 2023-10-03 RX ORDER — FUROSEMIDE 40 MG
1 TABLET ORAL
Refills: 0 | DISCHARGE

## 2023-10-03 RX ADMIN — Medication 100 MICROGRAM(S): at 21:37

## 2023-10-03 RX ADMIN — NYSTATIN CREAM 1 APPLICATION(S): 100000 CREAM TOPICAL at 06:06

## 2023-10-03 RX ADMIN — CHLORHEXIDINE GLUCONATE 1 APPLICATION(S): 213 SOLUTION TOPICAL at 06:36

## 2023-10-03 NOTE — DISCHARGE NOTE NURSING/CASE MANAGEMENT/SOCIAL WORK - PATIENT PORTAL LINK FT
You can access the FollowMyHealth Patient Portal offered by [facility] by registering at the following website: [email or web address]. By joining [email or web address] FollowMyHealth portal, you will also be able to view your health information using other applications (apps) compatible with our system.

## 2023-10-03 NOTE — DISCHARGE NOTE NURSING/CASE MANAGEMENT/SOCIAL WORK - NSDCVIVACCINE_GEN_ALL_CORE_FT
Tdap; 17-Jun-2019 08:56; Priyanka Easley (RN); [employer]; [id number] (Exp. Date: 04-Apr-2021); IntraMuscular; Deltoid Left.; 0.5 milliLiter(s); VIS (VIS Published: 09-May-2013, VIS Presented: 17-Jun-2019);   Tdap; 25-Dec-2022 19:57; Michelle Richard (RN); [employer]; [id number] (Exp. Date: 01-Jun-2024); IntraMuscular; Deltoid Left.; 0.5 milliLiter(s); VIS (VIS Published: 09-May-2013, VIS Presented: 25-Dec-2022);

## 2023-10-03 NOTE — DISCHARGE NOTE NURSING/CASE MANAGEMENT/SOCIAL WORK - NSDCCRNAME_GEN_ALL_CORE_FT
Joselo Ennis [facility] ([phone] [place] [id number])

## 2023-10-04 VITALS
DIASTOLIC BLOOD PRESSURE: 77 MMHG | OXYGEN SATURATION: 97 % | RESPIRATION RATE: 18 BRPM | HEART RATE: 88 BPM | SYSTOLIC BLOOD PRESSURE: 138 MMHG

## 2023-10-04 PROCEDURE — 99239 HOSP IP/OBS DSCHRG MGMT >30: CPT

## 2023-10-04 RX ADMIN — NYSTATIN CREAM 1 APPLICATION(S): 100000 CREAM TOPICAL at 05:35

## 2023-10-04 RX ADMIN — CHLORHEXIDINE GLUCONATE 1 APPLICATION(S): 213 SOLUTION TOPICAL at 05:26

## 2023-10-04 NOTE — PROGRESS NOTE ADULT - PROBLEM SELECTOR PROBLEM 4
Acute respiratory failure with hypoxia
Acute UTI
Acute respiratory failure with hypoxia
Acute respiratory failure with hypoxia
Acute UTI
Acute UTI
Acute encephalopathy
Acute respiratory failure with hypoxia
Pneumonia, aspiration
Acute respiratory failure with hypoxia
Functional quadriplegia

## 2023-10-04 NOTE — PROGRESS NOTE ADULT - PROBLEM SELECTOR PLAN 4
Patient with AMS and pyuria   -Cx from [facility] showed  E.coli only resistant to ampicillin   -C/w zosyn IV for 5 day course, transition to Augmentin on dc to hospice to cover for aspiration PNA
due to acute on chronic sCHF and aspiration PNA  - provide oxygen support for comfort consistent with GOC
due to acute on chronic sCHF and aspiration PNA  - provide oxygen support for comfort consistent with GOC
Patient with AMS and pyuria   -Cx from [facility] showed  E.coli only resistant to ampicillin   -C/w zosyn IV for 5 day course, transition to Augmentin on dc to hospice to cover for aspiration PNA
due to acute on chronic sCHF and aspiration PNA  - provide oxygen support for comfort consistent with GOC
concern for aspiration given poor neurological status  - Zosyn IV also covers for aspiration PNA and UTI
Patient requires total support for all ADL's.   Please assist with ADLs  Skin care as per protocol
due to acute on chronic sCHF and aspiration PNA  - provide oxygen support for comfort consistent with GOC
Patient with acute metabolic encephalopathy 2/2 sepsis, UTI, and hypoxia   -C/w sepsis and UTI treatment as above   -C/w treatment of hypoxia as above   -Will defer CT head for now as there is no report of recent fall or focal deficits. Moreover. daughter reports that pt becomes altered like this every time she has UTI.  - if does not improve, can complete ct head
due to acute on chronic sCHF and aspiration PNA  - provide oxygen support for comfort consistent with GOC
Patient with AMS and pyuria   -Cx from [facility] showed  E.coli only resistant to ampicillin   -C/w zosyn IV   -F/u with Ucx

## 2023-10-04 NOTE — PROGRESS NOTE ADULT - NSPROGADDITIONALINFOA_GEN_ALL_CORE
Complex discharge - Home Hospice at SNF will cost $70k upfront cost. SW in discussion with HCP.    Discussed with daughter by the bedside on  10/1 for 20 minutes  Answered all the questions.
Discussed with daughters by the bedside on  9/26 for 20 minutes  Answered all the questions.
Complex discharge - Home Hospice at SNF will cost $70k upfront cost. SW in discussion with HCP.
Complex discharge - Home Hospice at SNF will cost $70k upfront cost. SW in discussion with HCP.
Discussed with daughters by the bedside on  9/25 for 20 minutes  Answered all the questions.
Patient medically optimized for discharge.  Discharge planning 40 minutes - discussed with patient and consultants.     Discussed with daughter by the bedside on  10/4 for 20 minutes  Answered all the questions.
Discussed with daughter Ariana by the bedside on  9/28 for 20 minutes  Answered all the questions.     Complex discharge - Home Hospice at SNF will cost $70k upfront cost. LUIS in discussion with HCP.
Complex discharge - Home Hospice at SNF will cost $70k upfront cost. SW in discussion with HCP.    Discussed with daughter by the bedside on  10/1 for 20 minutes  Answered all the questions.
Discussed with daughter by the bedside on  9/27 for 20 minutes  Answered all the questions.

## 2023-10-04 NOTE — PROGRESS NOTE ADULT - PROBLEM SELECTOR PLAN 6
- given lasix initially but d/c'd due to low BP  - decided on comfort care after GOC discussion
Most likely due to sepsis   -Trend Plts daily
Patient with hypoxic respiratory failure found to have b/l pleural effusion   -Most likely due to acute on chronic HF   -s/p 40 mg IV lasix x1   - hold further diuresis given sol   -Hold carvedilol due to sepsis and bradycardia
- given lasix initially but d/c'd due to low BP  - decided on comfort care after GOC discussion
Patient with acute metabolic encephalopathy 2/2 sepsis, UTI, and hypoxia   -C/w sepsis and UTI treatment as above
- given lasix initially but d/c'd due to low BP  - decided on comfort care after GOC discussion
- given lasix initially but d/c'd due to low BP  - decided on comfort care after GOC discussion

## 2023-10-04 NOTE — PROGRESS NOTE ADULT - PROBLEM SELECTOR PLAN 3
Patient with AMS and pyuria   -Cx from [facility] showed  E.coli only resistant to ampicillin   -C/w zosyn IV   -F/u with Ucx
- s/p Zosyn IV for 5 days  - patient's family wishes to pursue conservative/comfort care
- baseline cr around 1  - presented at 1.11, which worsened to 1.53 on 9/22   - FENA and FEUREA both prerenal   - trial  cc for 5 hours  - recheck BMP   - if improves start maintenance fluids 75 cc /hr   - treat infection as above   - TOV tonight
- s/p Zosyn IV for 5 days  - patient's family wishes to pursue conservative/comfort care
- baseline cr around 1  - presented at 1.11, which worsened to 1.53 on 9/22   - FENA and FEUREA both prerenal   - improved on maintenance fluids. Now will dc fluids given transition to comfort care    - treat infection as above   - TOV completed   - now on comfort directed care
- s/p Zosyn IV for 5 days  - patient's family wishes to pursue conservative/comfort care
- baseline cr around 1  - presented at 1.11, which worsened to 1.53 on 9/22   - FENA and FEUREA both prerenal   - improved on maintenance fluids. Now will dc fluids given transition to comfort care    - treat infection as above   - TOV completed   - now on comfort directed care
- likely due to UTI+/- PNA  - Per daughter Macrina, at baseline patient AAOx3 until a few days ago  - mental status improving- awakes to voice and able to answer questions appropriately , but possibly with intermittent delirium as daughter states patient with waxing and waning alertness this weekend   - management as per primary team.
- s/p Zosyn IV for 5 days  - patient's family wishes to pursue conservative/comfort care
- Zosyn IV for 5 days  - patient's family wishes to pursue conservative/comfort care
- s/p Zosyn IV for 5 days  - patient's family wishes to pursue conservative/comfort care
- s/p Zosyn IV for 5 days  - patient's family wishes to pursue conservative/comfort care
due to acute on chronic sCHF and aspiration PNA  - provide oxygen support for comfort consistent with GOC

## 2023-10-04 NOTE — PROGRESS NOTE ADULT - PROBLEM SELECTOR PLAN 2
Patient with acute hypoxic respiratory failure. CXR shows b/l pleural effusion. Not overtly volume overloaded on exam   -s.p lasix trial, but due to worsening Cr, will hold off on further diuresis  - now on comfort directed care
RESOLVED  d/t UTI  - s/p IV abx  - limited fluid resuscitation given significant CHF with hypoxia
d/t UTI  - on IV abx  - limited fluid resuscitation given significant CHF with hypoxia
RESOLVED  d/t UTI  - s/p IV abx  - limited fluid resuscitation given significant CHF with hypoxia
- CXRAY 9/20- Bilateral layering pleural effusions increased since the last exam. No focal consolidation  - on supplemental oxygen via nasal canula.
RESOLVED  d/t UTI  - s/p IV abx  - limited fluid resuscitation given significant CHF with hypoxia
RESOLVED  d/t UTI  - s/p IV abx  - limited fluid resuscitation given significant CHF with hypoxia
d/t UTI  - s/p IV abx  - limited fluid resuscitation given significant CHF with hypoxia
RESOLVED  d/t UTI  - s/p IV abx  - limited fluid resuscitation given significant CHF with hypoxia
RESOLVED  d/t UTI  - s/p IV abx  - limited fluid resuscitation given significant CHF with hypoxia
- Zosyn IV for 5 days  - patient's family wishes to pursue conservative/comfort care
Patient with acute hypoxic respiratory failure. CXR shows b/l pleural effusion. Not overtly volume overloaded on exam   -s.p lasix trial, but due to worsening Cr, will hold off on further diuresis
Patient with acute hypoxic respiratory failure. CXR shows b/l pleural effusion. Not overtly volume overloaded on exam   -s.p lasix trial, but due to worsening Cr, will hold off on further diuresis
Patient with acute hypoxic respiratory failure. CXR shows b/l pleural effusion. Not overtly volume overloaded on exam   -s.p lasix trial, but due to worsening Cr, will hold off on further diuresis  - now on comfort directed care
RESOLVED  d/t UTI  - s/p IV abx  - limited fluid resuscitation given significant CHF with hypoxia

## 2023-10-04 NOTE — PROGRESS NOTE ADULT - PROBLEM SELECTOR PROBLEM 6
Acute on chronic systolic heart failure
Acute on chronic systolic heart failure
Chronic systolic congestive heart failure
Thrombocytopenia
Acute on chronic systolic heart failure
Chronic systolic congestive heart failure
Acute on chronic systolic heart failure
Acute encephalopathy
Acute on chronic systolic heart failure
Chronic systolic congestive heart failure

## 2023-10-04 NOTE — PROGRESS NOTE ADULT - PROVIDER SPECIALTY LIST ADULT
Hospitalist
Palliative Care
Hospitalist

## 2023-10-04 NOTE — PROGRESS NOTE ADULT - PROBLEM SELECTOR PROBLEM 2
Sepsis
Acute respiratory failure with hypoxia
Sepsis
Acute respiratory failure with hypoxia
Sepsis
Acute respiratory failure with hypoxia
Acute respiratory failure with hypoxia
Sepsis
Sepsis
Acute UTI
Acute respiratory failure with hypoxia

## 2023-10-04 NOTE — PROGRESS NOTE ADULT - PROBLEM SELECTOR PLAN 1
Patient with acute metabolic encephalopathy 2/2 sepsis, UTI, and hypoxia   - improving with treatment of the infection
Patient with hypothermia, lactic acidosis and pyuria consistent with sepsis 2/2 UTI   - bcx NGTD and urine cx pending    - f/u MRSA swab. RVP is negative   - given hx of CHF, fluids were judiciously given   - c/w zosyluisa can plan for 3 day course for simple cystitis (end 9/23) - covering for urinary and possibly a aspiration PNA source
Patient with acute metabolic encephalopathy 2/2 sepsis, UTI, and hypoxia   - improved with treatment of the infection
Patient with hypothermia, lactic acidosis and pyuria consistent with sepsis 2/2 UTI   - bcx NGTD and urine cx pending    - MRSA swab and RVP is negative   - given hx of CHF, fluids were judiciously given   - c/w zosyn, can extend to 5 day course (end 9/25) to cover for aspiration PNA. Can transition to augmentin on discharge to hospice if needed. Family would like to continue antibiotics for now. otherwise, comfort measures only.  See GOC note from 9/23
Patient with acute metabolic encephalopathy 2/2 sepsis, UTI, and hypoxia   - improving with treatment of the infection
Patient with acute metabolic encephalopathy 2/2 sepsis, UTI, and hypoxia   - improved with treatment of the infection
Patient with acute metabolic encephalopathy 2/2 sepsis, UTI, and hypoxia   - improving with treatment of the infection
- Patient found to have sepsis on admission due to UTI +/- Aspiration PNA   - on antibiotics   - management as per primary team.
Patient with acute metabolic encephalopathy 2/2 sepsis, UTI, and hypoxia   - improved with treatment of the infection
Patient with acute metabolic encephalopathy 2/2 sepsis, UTI, and hypoxia   - improving with treatment of the infection
Patient with acute metabolic encephalopathy 2/2 sepsis, UTI, and hypoxia   - improving with treatment of the infection
d/t UTI  - on IV abx  - limited fluid resuscitation given significant CHF with hypoxia
Patient with hypothermia, lactic acidosis and pyuria consistent with sepsis 2/2 UTI   -F/u with bcx and urine cx   -MRSA swab. RVP is negative   -Was on cipro and then levaquin at [facility] but had allergic reaction to levaquin   -Will c/w zosyn to cover urinary and possibly a aspiration PNA source
Patient with hypothermia, lactic acidosis and pyuria consistent with sepsis 2/2 UTI   - bcx NGTD and urine cx pending    - MRSA swab and RVP is negative   - given hx of CHF, fluids were judiciously given   - c/w zosyn, can extend to 5 day course (end 9/25) to cover for aspiration PNA. Can transition to augmentin on discharge to hospice if needed. Family would like to continue antibiotics for now. otherwise, comfort measures only.  See GOC note from 9/23
Patient with acute metabolic encephalopathy 2/2 sepsis, UTI, and hypoxia   - improving with treatment of the infection

## 2023-10-04 NOTE — PROGRESS NOTE ADULT - PROBLEM SELECTOR PROBLEM 5
Acute on chronic systolic heart failure
Encounter for palliative care
Pneumonia, aspiration
Acute encephalopathy
Acute encephalopathy
Pneumonia, aspiration
Chronic systolic congestive heart failure
Acute encephalopathy

## 2023-10-04 NOTE — PROGRESS NOTE ADULT - PROBLEM SELECTOR PLAN 5
Patient with acute metabolic encephalopathy 2/2 sepsis, UTI, and hypoxia   -C/w sepsis and UTI treatment as above   - improved on antibiotics   - now on comfort directed care
Patient with hypoxic respiratory failure found to have b/l pleural effusion   -Most likely due to acute on chronic HF   -s/p 40 mg IV lasix x1   - hold further diuresis given sol   -Hold carvedilol due to sepsis and bradycardia
concern for aspiration given poor neurological status  - Zosyn IV also covers for aspiration PNA and UTI
concern for aspiration given poor neurological status  - Zosyn IV also covers for aspiration PNA and UTI
Patient with acute metabolic encephalopathy 2/2 sepsis, UTI, and hypoxia   -C/w sepsis and UTI treatment as above   - improved on antibiotics   - now on comfort directed care
concern for aspiration given poor neurological status  - Zosyn IV also covers for aspiration PNA and UTI
Daughter Macrina (akelda Lexie) at bedside confirms that per family discussion , plan to transition to comfort measures and complete antibiotic course as well as interest in inpatient hospice referral.   Discussed with daughter that if patient is not candidate for inpatient hospice, hospice services can be provided in patient's home in [place].  Also confirmed with daughter that intolerance to morphine was delirium side effect rather than allergic reaction.     Case reviewed with ACP of primary team and CM  Thank you for allowing us to participate in your patient's care. Please page [phone] for any questions/concerns.
- given lasix initially but d/c'd due to low BP  - decided on comfort care after GOC discussion
Patient with acute metabolic encephalopathy 2/2 sepsis, UTI, and hypoxia   -C/w sepsis and UTI treatment as above   -C/w treatment of hypoxia as above   -Will defer CT head for now as there is no report of recent fall or focal deficits. Moreover. daughter reports that pt becomes altered like this every time she has UTI.  - if does not improve, can complete ct head

## 2023-10-04 NOTE — PROGRESS NOTE ADULT - SUBJECTIVE AND OBJECTIVE BOX
[facility] Division of Hospital Medicine  Crow Keen MD  Pager (M-F, 8A-5P): [phone]  Other Times:  [phone]    Patient is a 89y old  Female who presents with a chief complaint of AMS (27 Sep 2023 13:34)    SUBJECTIVE / OVERNIGHT EVENTS:  Patient had difficult time at night due to agitated/psychotic patient in the same room.  No F/C, N/V, CP, SOB, Cough, lightheadedness, dizziness, abdominal pain, diarrhea, dysuria. Daughter by the bedside.    MEDICATIONS  (STANDING):  chlorhexidine 2% Cloths 1 Application(s) Topical <User Schedule>  levothyroxine 100 MICROGram(s) Oral at bedtime    MEDICATIONS  (PRN):  acetaminophen     Tablet .. 650 milliGRAM(s) Oral every 6 hours PRN Temp greater or equal to 38C (100.4F), Mild Pain (1 - 3)  ondansetron Injectable 4 milliGRAM(s) IV Push every 8 hours PRN Nausea and/or Vomiting      Vital Signs Last 24 Hrs  T(C): 36.6 (28 Sep 2023 09:52), Max: 36.6 (28 Sep 2023 05:30)  T(F): 97.8 (28 Sep 2023 09:52), Max: 97.8 (28 Sep 2023 05:30)  HR: 90 (28 Sep 2023 09:52) (73 - 90)  BP: 150/98 (28 Sep 2023 09:52) (129/94 - 152/-)  BP(mean): --  RR: 18 (28 Sep 2023 09:52) (18 - 18)  SpO2: 97% (28 Sep 2023 09:52) (97% - 98%)    Parameters below as of 28 Sep 2023 09:52  Patient On (Oxygen Delivery Method): nasal cannula  O2 Flow (L/min): 2.5    CAPILLARY BLOOD GLUCOSE        I&O's Summary      PHYSICAL EXAM:  CONSTITUTIONAL: NAD, frail  EYES: PERRLA; conjunctiva and sclera clear  ENMT: Moist oral mucosa, no pharyngeal injection or exudates; normal dentition  NECK: Supple, no palpable masses; no thyromegaly  RESPIRATORY: Normal respiratory effort; lungs are clear to auscultation bilaterally  CARDIOVASCULAR: Regular rate and rhythm, normal S1 and S2, no murmur/rub/gallop; No lower extremity edema; Peripheral pulses are 2+ bilaterally  ABDOMEN: Nontender to palpation, normoactive bowel sounds, no rebound/guarding; No hepatosplenomegaly  MUSCULOSKELETAL:  Did not assess gait; no clubbing or cyanosis of digits; no joint swelling or tenderness to palpation  PSYCH: Awake and oriented to person only, confused about date and place; Calm  NEUROLOGY: No focal motor or sensory deficits  SKIN: No rashes; no palpable lesions    LABS:                    RADIOLOGY & ADDITIONAL TESTS:    Imaging Personally Reviewed:    Care Discussed with Consultants/Other Providers:  
[facility] Division of Hospital Medicine  Crow Keen MD  Pager (M-F, 8A-5P): [phone]  Other Times:  [phone]    Patient is a 89y old  Female who presents with a chief complaint of AMS (30 Sep 2023 13:46)    SUBJECTIVE / OVERNIGHT EVENTS:  No new complaints.  No F/C, N/V, CP, SOB, Cough, lightheadedness, dizziness, abdominal pain, diarrhea, dysuria.    MEDICATIONS  (STANDING):  chlorhexidine 2% Cloths 1 Application(s) Topical <User Schedule>  levothyroxine 100 MICROGram(s) Oral at bedtime    MEDICATIONS  (PRN):  acetaminophen     Tablet .. 650 milliGRAM(s) Oral every 6 hours PRN Temp greater or equal to 38C (100.4F), Mild Pain (1 - 3)  ondansetron Injectable 4 milliGRAM(s) IV Push every 8 hours PRN Nausea and/or Vomiting      Vital Signs Last 24 Hrs  T(C): 36.4 (01 Oct 2023 11:33), Max: 37.2 (01 Oct 2023 06:00)  T(F): 97.6 (01 Oct 2023 11:33), Max: 98.9 (01 Oct 2023 06:00)  HR: 85 (01 Oct 2023 11:33) (83 - 90)  BP: 154/90 (01 Oct 2023 11:33) (145/83 - 159/96)  BP(mean): --  RR: 18 (01 Oct 2023 11:33) (17 - 18)  SpO2: 98% (01 Oct 2023 11:33) (98% - 100%)    Parameters below as of 01 Oct 2023 11:33  Patient On (Oxygen Delivery Method): nasal cannula      CAPILLARY BLOOD GLUCOSE        I&O's Summary      PHYSICAL EXAM:  CONSTITUTIONAL: NAD, frail  EYES: PERRLA; conjunctiva and sclera clear  ENMT: Moist oral mucosa, no pharyngeal injection or exudates; normal dentition  NECK: Supple, no palpable masses; no thyromegaly  RESPIRATORY: Normal respiratory effort; lungs are clear to auscultation bilaterally  CARDIOVASCULAR: Regular rate and rhythm, normal S1 and S2, no murmur/rub/gallop; No lower extremity edema; Peripheral pulses are 2+ bilaterally  ABDOMEN: Nontender to palpation, normoactive bowel sounds, no rebound/guarding; No hepatosplenomegaly  MUSCULOSKELETAL:  Did not assess gait; no clubbing or cyanosis of digits; no joint swelling or tenderness to palpation  PSYCH: Awake and oriented to person only, confused about date and place; Calm  NEUROLOGY: No focal motor or sensory deficits  SKIN: No rashes; no palpable lesions    LABS:                    RADIOLOGY & ADDITIONAL TESTS:    Imaging Personally Reviewed:    Care Discussed with Consultants/Other Providers:  
[facility] Division of Hospital Medicine  Crow Keen MD  Pager (M-F, 8A-5P): [phone]  Other Times:  [phone]    Patient is a 89y old  Female who presents with a chief complaint of AMS (03 Oct 2023 11:46)    SUBJECTIVE / OVERNIGHT EVENTS:  No new complaints.  No F/C, N/V, CP, SOB, Cough, lightheadedness, dizziness, abdominal pain, diarrhea, dysuria.    MEDICATIONS  (STANDING):  chlorhexidine 2% Cloths 1 Application(s) Topical <User Schedule>  levothyroxine 100 MICROGram(s) Oral at bedtime  nystatin Ointment 1 Application(s) Topical two times a day    MEDICATIONS  (PRN):  acetaminophen     Tablet .. 650 milliGRAM(s) Oral every 6 hours PRN Temp greater or equal to 38C (100.4F), Mild Pain (1 - 3)  ondansetron Injectable 4 milliGRAM(s) IV Push every 8 hours PRN Nausea and/or Vomiting      Vital Signs Last 24 Hrs  T(C): 36.5 (03 Oct 2023 20:31), Max: 36.5 (03 Oct 2023 20:31)  T(F): 97.7 (03 Oct 2023 20:31), Max: 97.7 (03 Oct 2023 20:31)  HR: 88 (04 Oct 2023 05:33) (88 - 88)  BP: 138/77 (04 Oct 2023 05:33) (138/77 - 153/87)  BP(mean): --  RR: 18 (04 Oct 2023 05:33) (18 - 18)  SpO2: 97% (04 Oct 2023 05:33) (97% - 98%)    Parameters below as of 04 Oct 2023 05:33  Patient On (Oxygen Delivery Method): nasal cannula  O2 Flow (L/min): 2    CAPILLARY BLOOD GLUCOSE        I&O's Summary      PHYSICAL EXAM:  CONSTITUTIONAL: NAD, frail  EYES: PERRLA; conjunctiva and sclera clear  ENMT: Moist oral mucosa, no pharyngeal injection or exudates; normal dentition  NECK: Supple, no palpable masses; no thyromegaly  RESPIRATORY: Normal respiratory effort; lungs are clear to auscultation bilaterally  CARDIOVASCULAR: Regular rate and rhythm, normal S1 and S2, no murmur/rub/gallop; No lower extremity edema; Peripheral pulses are 2+ bilaterally  ABDOMEN: Nontender to palpation, normoactive bowel sounds, no rebound/guarding; No hepatosplenomegaly  MUSCULOSKELETAL:  Did not assess gait; no clubbing or cyanosis of digits; no joint swelling or tenderness to palpation  PSYCH: Awake and oriented to person only, confused about date and place; Calm  NEUROLOGY: No focal motor or sensory deficits  SKIN: No rashes; no palpable lesions    LABS:                    RADIOLOGY & ADDITIONAL TESTS:    Imaging Personally Reviewed:    Care Discussed with Consultants/Other Providers:  
Stefanie Joyner MD  [facility] Division of Hospital Medicine  Pager: [phone]  Available via MS Teams    SUBJECTIVE / OVERNIGHT EVENTS:    Delirious per family at bedside   Lethargic this morning on eval, but more aware earlier per family       MEDICATIONS  (STANDING):  chlorhexidine 2% Cloths 1 Application(s) Topical <User Schedule>  levothyroxine Injectable 75 MICROGram(s) IV Push at bedtime  piperacillin/tazobactam IVPB. 3.375 Gram(s) IV Intermittent once  piperacillin/tazobactam IVPB.. 3.375 Gram(s) IV Intermittent every 12 hours  sodium chloride 3%  Inhalation 4 milliLiter(s) Inhalation every 6 hours    MEDICATIONS  (PRN):  acetaminophen     Tablet .. 650 milliGRAM(s) Oral every 6 hours PRN Temp greater or equal to 38C (100.4F), Mild Pain (1 - 3)  ondansetron Injectable 4 milliGRAM(s) IV Push every 8 hours PRN Nausea and/or Vomiting      I&O's Summary    23 Sep 2023 07:01  -  24 Sep 2023 07:00  --------------------------------------------------------  IN: 50 mL / OUT: 600 mL / NET: -550 mL        PHYSICAL EXAM:  Vital Signs Last 24 Hrs  T(C): 36.2 (24 Sep 2023 11:50), Max: 36.6 (23 Sep 2023 20:01)  T(F): 97.2 (24 Sep 2023 11:50), Max: 97.8 (23 Sep 2023 20:01)  HR: 81 (24 Sep 2023 11:50) (68 - 81)  BP: 136/65 (24 Sep 2023 11:50) (136/65 - 152/81)  BP(mean): --  RR: 18 (24 Sep 2023 11:50) (18 - 19)  SpO2: 100% (24 Sep 2023 11:50) (99% - 100%)    Parameters below as of 24 Sep 2023 11:50  Patient On (Oxygen Delivery Method): nasal cannula  O2 Flow (L/min): 4    CONSTITUTIONAL: NAD, lethargic elderly   EYES: conjunctiva and sclera clear  ENMT: Moist oral mucosa   NECK: Supple   RESPIRATORY: Normal respiratory effort; lungs are clear to auscultation bilaterally  CARDIOVASCULAR: Regular rate and rhythm, normal S1 and S2, no murmur/rub/gallop; Peripheral pulses are 2+ bilaterally  ABDOMEN: Nontender to palpation, normoactive bowel sounds, no rebound/guarding   MUSCULOSKELETAL: No lower extremity edema   PSYCH: arousable, cannot fully assess for O given weakness and lethargy  NEUROLOGY: no gross sensory or motor deficits   SKIN: No rashes    LABS:                        8.7    8.68  )-----------( 85       ( 23 Sep 2023 06:00 )             26.8     09-23    139  |  101  |  27<H>  ----------------------------<  231<H>  3.7   |  30  |  1.33<H>    Ca    8.5      23 Sep 2023 06:00  Phos  2.9     09-23  Mg     1.60     09-23            Urinalysis Basic - ( 23 Sep 2023 06:00 )    Color: x / Appearance: x / SG: x / pH: x  Gluc: 231 mg/dL / Ketone: x  / Bili: x / Urobili: x   Blood: x / Protein: x / Nitrite: x   Leuk Esterase: x / RBC: x / WBC x   Sq Epi: x / Non Sq Epi: x / Bacteria: x        SARS-CoV-2: NotDetec (20 Sep 2023 13:01)  SARS-CoV-2: NotDetec (18 Aug 2023 04:45)  SARS-CoV-2: NotDetec (13 Aug 2023 04:44)  SARS-CoV-2: NotDetec (07 Aug 2023 10:01)  SARS-CoV-2: NotDetec (03 Aug 2023 10:24)  COVID-19 PCR: NotDetec (01 Aug 2023 09:44)  COVID-19 PCR: NotDetec (22 May 2023 05:00)  SARS-CoV-2: NotDetec (21 Apr 2023 21:13)      COORDINATION OF CARE:  Communication: discussed plan of care with ACP 
Stefanie Joyner MD  [facility] Division of Hospital Medicine  Pager: [phone]  Available via MS Teams    SUBJECTIVE / OVERNIGHT EVENTS:    Seemly nods appropriately, although overall lethargic   Denies to be in any pain     MEDICATIONS  (STANDING):  piperacillin/tazobactam IVPB. 3.375 Gram(s) IV Intermittent once  piperacillin/tazobactam IVPB.- 3.375 Gram(s) IV Intermittent once  piperacillin/tazobactam IVPB.. 3.375 Gram(s) IV Intermittent every 8 hours    MEDICATIONS  (PRN):  acetaminophen     Tablet .. 650 milliGRAM(s) Oral every 6 hours PRN Temp greater or equal to 38C (100.4F), Mild Pain (1 - 3)  aluminum hydroxide/magnesium hydroxide/simethicone Suspension 30 milliLiter(s) Oral every 4 hours PRN Dyspepsia  melatonin 3 milliGRAM(s) Oral at bedtime PRN Insomnia  ondansetron Injectable 4 milliGRAM(s) IV Push every 8 hours PRN Nausea and/or Vomiting      I&O's Summary      PHYSICAL EXAM:  Vital Signs Last 24 Hrs  T(C): 36.4 (20 Sep 2023 22:25), Max: 37.7 (20 Sep 2023 20:40)  T(F): 97.5 (20 Sep 2023 22:25), Max: 99.9 (20 Sep 2023 20:40)  HR: 83 (21 Sep 2023 07:41) (52 - 83)  BP: 157/106 (21 Sep 2023 07:41) (108/65 - 157/106)  BP(mean): --  RR: 20 (21 Sep 2023 07:41) (20 - 30)  SpO2: 100% (21 Sep 2023 07:41) (94% - 100%)    Parameters below as of 21 Sep 2023 07:41  Patient On (Oxygen Delivery Method): nasal cannula  O2 Flow (L/min): 4    CONSTITUTIONAL: NAD, lethargic   EYES: conjunctiva and sclera clear  ENMT: Moist oral mucosa   NECK: Supple   RESPIRATORY: coarse breath sounds throughout   CARDIOVASCULAR: Regular rate and rhythm, normal S1 and S2, no murmur/rub/gallop; Peripheral pulses are 2+ bilaterally  ABDOMEN: Nontender to palpation, normoactive bowel sounds, no rebound/guarding   MUSCULOSKELETAL: bruising noted throughout the arms   PSYCH: cannot really speak at this moment   NEUROLOGY: no gross sensory or motor deficits   SKIN: No rashes    LABS:                        9.6    9.58  )-----------( 105      ( 21 Sep 2023 07:25 )             29.2     09-21    134<L>  |  96<L>  |  29<H>  ----------------------------<  73  4.4   |  25  |  1.43<H>    Ca    8.5      21 Sep 2023 07:25  Phos  3.3     09-21  Mg     1.60     09-21    TPro  6.7  /  Alb  3.1<L>  /  TBili  0.4  /  DBili  x   /  AST  21  /  ALT  32  /  AlkPhos  156<H>  09-21    PT/INR - ( 20 Sep 2023 12:53 )   PT: 12.6 sec;   INR: 1.13 ratio         PTT - ( 20 Sep 2023 12:53 )  PTT:42.0 sec      Urinalysis Basic - ( 21 Sep 2023 07:25 )    Color: x / Appearance: x / SG: x / pH: x  Gluc: 73 mg/dL / Ketone: x  / Bili: x / Urobili: x   Blood: x / Protein: x / Nitrite: x   Leuk Esterase: x / RBC: x / WBC x   Sq Epi: x / Non Sq Epi: x / Bacteria: x        SARS-CoV-2: NotDetec (20 Sep 2023 13:01)  SARS-CoV-2: NotDetec (18 Aug 2023 04:45)  SARS-CoV-2: NotDetec (13 Aug 2023 04:44)  SARS-CoV-2: NotDetec (07 Aug 2023 10:01)  SARS-CoV-2: NotDetec (03 Aug 2023 10:24)  COVID-19 PCR: NotDetec (01 Aug 2023 09:44)  COVID-19 PCR: NotDetec (22 May 2023 05:00)  SARS-CoV-2: NotDetec (21 Apr 2023 21:13)      RADIOLOGY & ADDITIONAL TESTS:  Other Results Reviewed Today: BMP with worsening Cr, CBC with stable Hg     COORDINATION OF CARE:  Communication: discussed plan of care with ACP 
[facility] Division of Hospital Medicine  Crow Keen MD  Pager (M-F, 8A-5P): [phone]  Other Times:  [phone]    Patient is a 89y old  Female who presents with a chief complaint of AMS (24 Sep 2023 15:15)    SUBJECTIVE / OVERNIGHT EVENTS:  Patient unable to provide history due to clinical condition and encephalopathy.   No overnight issues with F/C, vomiting, SOB, Cough, diarrhea.    MEDICATIONS  (STANDING):  chlorhexidine 2% Cloths 1 Application(s) Topical <User Schedule>  levothyroxine Injectable 75 MICROGram(s) IV Push at bedtime  piperacillin/tazobactam IVPB. 3.375 Gram(s) IV Intermittent once    MEDICATIONS  (PRN):  acetaminophen     Tablet .. 650 milliGRAM(s) Oral every 6 hours PRN Temp greater or equal to 38C (100.4F), Mild Pain (1 - 3)  ondansetron Injectable 4 milliGRAM(s) IV Push every 8 hours PRN Nausea and/or Vomiting      Vital Signs Last 24 Hrs  T(C): 36.3 (25 Sep 2023 13:31), Max: 36.8 (25 Sep 2023 00:00)  T(F): 97.3 (25 Sep 2023 13:31), Max: 98.3 (25 Sep 2023 00:00)  HR: 79 (25 Sep 2023 13:31) (71 - 81)  BP: 128/77 (25 Sep 2023 13:31) (128/77 - 159/88)  BP(mean): --  RR: 18 (25 Sep 2023 13:31) (18 - 19)  SpO2: 98% (25 Sep 2023 13:31) (92% - 100%)    Parameters below as of 25 Sep 2023 13:31  Patient On (Oxygen Delivery Method): nasal cannula  O2 Flow (L/min): 4    CAPILLARY BLOOD GLUCOSE        I&O's Summary      PHYSICAL EXAM:  CONSTITUTIONAL: NAD  EYES: PERRLA; conjunctiva and sclera clear  ENMT: Moist oral mucosa, no pharyngeal injection or exudates; normal dentition  NECK: Supple, no palpable masses; no thyromegaly  RESPIRATORY: Normal respiratory effort; lungs are clear to auscultation bilaterally  CARDIOVASCULAR: Regular rate and rhythm, normal S1 and S2, no murmur/rub/gallop; No lower extremity edema; Peripheral pulses are 2+ bilaterally  ABDOMEN: Nontender to palpation, normoactive bowel sounds, no rebound/guarding; No hepatosplenomegaly  MUSCULOSKELETAL:  Did not assess gait; no clubbing or cyanosis of digits; no joint swelling or tenderness to palpation  PSYCH: Arousable, verbal but unable to follow any commands, not oriented  NEUROLOGY: Unable to follow neurological exams  SKIN: No rashes; no palpable lesions    LABS:                    RADIOLOGY & ADDITIONAL TESTS:    Imaging Personally Reviewed:    Care Discussed with Consultants/Other Providers:  
[facility] Division of Hospital Medicine  Crow Keen MD  Pager (M-F, 8A-5P): [phone]  Other Times:  [phone]    Patient is a 89y old  Female who presents with a chief complaint of AMS (29 Sep 2023 12:36)    SUBJECTIVE / OVERNIGHT EVENTS:  No new complaints.  No F/C, N/V, CP, SOB, Cough, lightheadedness, dizziness, abdominal pain, diarrhea, dysuria.    MEDICATIONS  (STANDING):  chlorhexidine 2% Cloths 1 Application(s) Topical <User Schedule>  levothyroxine 100 MICROGram(s) Oral at bedtime    MEDICATIONS  (PRN):  acetaminophen     Tablet .. 650 milliGRAM(s) Oral every 6 hours PRN Temp greater or equal to 38C (100.4F), Mild Pain (1 - 3)  ondansetron Injectable 4 milliGRAM(s) IV Push every 8 hours PRN Nausea and/or Vomiting      Vital Signs Last 24 Hrs  T(C): 36.2 (30 Sep 2023 12:41), Max: 36.7 (29 Sep 2023 19:00)  T(F): 97.1 (30 Sep 2023 12:41), Max: 98 (29 Sep 2023 19:00)  HR: 90 (30 Sep 2023 12:41) (83 - 90)  BP: 145/83 (30 Sep 2023 12:41) (145/83 - 157/88)  BP(mean): --  RR: 17 (30 Sep 2023 12:41) (16 - 18)  SpO2: 100% (30 Sep 2023 12:41) (99% - 100%)    Parameters below as of 30 Sep 2023 12:41  Patient On (Oxygen Delivery Method): nasal cannula      CAPILLARY BLOOD GLUCOSE        I&O's Summary    29 Sep 2023 07:01  -  30 Sep 2023 07:00  --------------------------------------------------------  IN: 380 mL / OUT: 150 mL / NET: 230 mL        PHYSICAL EXAM:  CONSTITUTIONAL: NAD, frail  EYES: PERRLA; conjunctiva and sclera clear  ENMT: Moist oral mucosa, no pharyngeal injection or exudates; normal dentition  NECK: Supple, no palpable masses; no thyromegaly  RESPIRATORY: Normal respiratory effort; lungs are clear to auscultation bilaterally  CARDIOVASCULAR: Regular rate and rhythm, normal S1 and S2, no murmur/rub/gallop; No lower extremity edema; Peripheral pulses are 2+ bilaterally  ABDOMEN: Nontender to palpation, normoactive bowel sounds, no rebound/guarding; No hepatosplenomegaly  MUSCULOSKELETAL:  Did not assess gait; no clubbing or cyanosis of digits; no joint swelling or tenderness to palpation  PSYCH: Awake and oriented to person only, confused about date and place; Calm  NEUROLOGY: No focal motor or sensory deficits  SKIN: No rashes; no palpable lesions    LABS:                    RADIOLOGY & ADDITIONAL TESTS:    Imaging Personally Reviewed:    Care Discussed with Consultants/Other Providers:  
[facility] Division of Hospital Medicine  Crow Keen MD  Pager (M-F, 8A-5P): [phone]  Other Times:  [phone]    Patient is a 89y old  Female who presents with a chief complaint of AMS (28 Sep 2023 12:28)    SUBJECTIVE / OVERNIGHT EVENTS:  No new complaints.  No F/C, N/V, CP, SOB, Cough, lightheadedness, dizziness, abdominal pain, diarrhea, dysuria.    MEDICATIONS  (STANDING):  chlorhexidine 2% Cloths 1 Application(s) Topical <User Schedule>  levothyroxine 100 MICROGram(s) Oral at bedtime    MEDICATIONS  (PRN):  acetaminophen     Tablet .. 650 milliGRAM(s) Oral every 6 hours PRN Temp greater or equal to 38C (100.4F), Mild Pain (1 - 3)  ondansetron Injectable 4 milliGRAM(s) IV Push every 8 hours PRN Nausea and/or Vomiting      Vital Signs Last 24 Hrs  T(C): 36.6 (29 Sep 2023 11:00), Max: 36.9 (29 Sep 2023 06:30)  T(F): 97.8 (29 Sep 2023 11:00), Max: 98.5 (29 Sep 2023 06:30)  HR: 74 (29 Sep 2023 11:00) (74 - 94)  BP: 152/97 (29 Sep 2023 11:00) (138/80 - 153/84)  BP(mean): --  RR: 18 (29 Sep 2023 11:00) (14 - 18)  SpO2: 97% (29 Sep 2023 11:00) (96% - 98%)    Parameters below as of 29 Sep 2023 11:00  Patient On (Oxygen Delivery Method): nasal cannula  O2 Flow (L/min): 2.5    CAPILLARY BLOOD GLUCOSE        I&O's Summary      PHYSICAL EXAM:  CONSTITUTIONAL: NAD, frail  EYES: PERRLA; conjunctiva and sclera clear  ENMT: Moist oral mucosa, no pharyngeal injection or exudates; normal dentition  NECK: Supple, no palpable masses; no thyromegaly  RESPIRATORY: Normal respiratory effort; lungs are clear to auscultation bilaterally  CARDIOVASCULAR: Regular rate and rhythm, normal S1 and S2, no murmur/rub/gallop; No lower extremity edema; Peripheral pulses are 2+ bilaterally  ABDOMEN: Nontender to palpation, normoactive bowel sounds, no rebound/guarding; No hepatosplenomegaly  MUSCULOSKELETAL:  Did not assess gait; no clubbing or cyanosis of digits; no joint swelling or tenderness to palpation  PSYCH: Awake and oriented to person only, confused about date and place; Calm  NEUROLOGY: No focal motor or sensory deficits  SKIN: No rashes; no palpable lesions    LABS:                    RADIOLOGY & ADDITIONAL TESTS:    Imaging Personally Reviewed:    Care Discussed with Consultants/Other Providers:  
Stefanie Joyner MD  [facility] Division of Hospital Medicine  Pager: [phone]  Available via MS Teams    SUBJECTIVE / OVERNIGHT EVENTS:    Able to hold up a conversation a little better, but still lethargic     MEDICATIONS  (STANDING):  lactated ringers. 1000 milliLiter(s) (125 mL/Hr) IV Continuous <Continuous>  levothyroxine Injectable 75 MICROGram(s) IV Push at bedtime  piperacillin/tazobactam IVPB. 3.375 Gram(s) IV Intermittent once  piperacillin/tazobactam IVPB.. 3.375 Gram(s) IV Intermittent every 12 hours    MEDICATIONS  (PRN):  acetaminophen     Tablet .. 650 milliGRAM(s) Oral every 6 hours PRN Temp greater or equal to 38C (100.4F), Mild Pain (1 - 3)  aluminum hydroxide/magnesium hydroxide/simethicone Suspension 30 milliLiter(s) Oral every 4 hours PRN Dyspepsia  melatonin 3 milliGRAM(s) Oral at bedtime PRN Insomnia  ondansetron Injectable 4 milliGRAM(s) IV Push every 8 hours PRN Nausea and/or Vomiting      I&O's Summary      PHYSICAL EXAM:  Vital Signs Last 24 Hrs  T(C): 36.8 (22 Sep 2023 09:59), Max: 36.8 (22 Sep 2023 09:59)  T(F): 98.3 (22 Sep 2023 09:59), Max: 98.3 (22 Sep 2023 09:59)  HR: 74 (22 Sep 2023 09:59) (63 - 74)  BP: 150/96 (22 Sep 2023 09:59) (135/91 - 150/96)  BP(mean): --  RR: 18 (22 Sep 2023 09:59) (18 - 20)  SpO2: 100% (22 Sep 2023 09:59) (100% - 100%)    Parameters below as of 21 Sep 2023 18:36  Patient On (Oxygen Delivery Method): nasal cannula  O2 Flow (L/min): 4    CONSTITUTIONAL: NAD, lethargic elderly   EYES: conjunctiva and sclera clear  ENMT: Moist oral mucosa   NECK: Supple   RESPIRATORY: Normal respiratory effort; lungs are clear to auscultation bilaterally  CARDIOVASCULAR: Regular rate and rhythm, normal S1 and S2, no murmur/rub/gallop; Peripheral pulses are 2+ bilaterally  ABDOMEN: Nontender to palpation, normoactive bowel sounds, no rebound/guarding   MUSCULOSKELETAL: No lower extremity edema   PSYCH: arousable, cannot fully assess for O given weakness and lethargy  NEUROLOGY: no gross sensory or motor deficits   SKIN: No rashes    LABS:                        8.9    8.62  )-----------( 85       ( 22 Sep 2023 05:55 )             27.0     09-22    137  |  98  |  30<H>  ----------------------------<  59<L>  4.1   |  28  |  1.56<H>    Ca    8.6      22 Sep 2023 05:55  Phos  3.3     09-22  Mg     1.60     09-22    TPro  6.7  /  Alb  3.1<L>  /  TBili  0.4  /  DBili  x   /  AST  21  /  ALT  32  /  AlkPhos  156<H>  09-21          Urinalysis Basic - ( 22 Sep 2023 05:55 )    Color: x / Appearance: x / SG: x / pH: x  Gluc: 59 mg/dL / Ketone: x  / Bili: x / Urobili: x   Blood: x / Protein: x / Nitrite: x   Leuk Esterase: x / RBC: x / WBC x   Sq Epi: x / Non Sq Epi: x / Bacteria: x        Culture - Blood (collected 20 Sep 2023 12:20)  Source: .Blood Blood-Peripheral  Preliminary Report (21 Sep 2023 16:01):    No growth at 24 hours    Culture - Blood (collected 20 Sep 2023 12:15)  Source: .Blood Blood-Peripheral  Preliminary Report (21 Sep 2023 16:01):    No growth at 24 hours      SARS-CoV-2: NotDetec (20 Sep 2023 13:01)  SARS-CoV-2: NotDetec (18 Aug 2023 04:45)  SARS-CoV-2: NotDetec (13 Aug 2023 04:44)  SARS-CoV-2: NotDetec (07 Aug 2023 10:01)  SARS-CoV-2: NotDetec (03 Aug 2023 10:24)  COVID-19 PCR: NotDetec (01 Aug 2023 09:44)  COVID-19 PCR: NotDetec (22 May 2023 05:00)  SARS-CoV-2: NotDetec (21 Apr 2023 21:13)      RADIOLOGY & ADDITIONAL TESTS:  Other Results Reviewed Today: BMP with slightly worsened Cr, CBC with stable Hg     COORDINATION OF CARE:  Communication: discussed plan of care with ACP 
[facility] Division of Hospital Medicine  Crow Keen MD  Pager (M-F, 8A-5P): [phone]  Other Times:  [phone]    Patient is a 89y old  Female who presents with a chief complaint of AMS (25 Sep 2023 14:18)    SUBJECTIVE / OVERNIGHT EVENTS:  Patient is more alert this AM. Able to answer simple questions.  No F/C, N/V, CP, SOB, Cough, lightheadedness, dizziness, abdominal pain, diarrhea, dysuria.    MEDICATIONS  (STANDING):  chlorhexidine 2% Cloths 1 Application(s) Topical <User Schedule>  levothyroxine 100 MICROGram(s) Oral at bedtime  piperacillin/tazobactam IVPB. 3.375 Gram(s) IV Intermittent once    MEDICATIONS  (PRN):  acetaminophen     Tablet .. 650 milliGRAM(s) Oral every 6 hours PRN Temp greater or equal to 38C (100.4F), Mild Pain (1 - 3)  ondansetron Injectable 4 milliGRAM(s) IV Push every 8 hours PRN Nausea and/or Vomiting      Vital Signs Last 24 Hrs  T(C): 36.7 (26 Sep 2023 13:15), Max: 36.8 (26 Sep 2023 05:15)  T(F): 98 (26 Sep 2023 13:15), Max: 98.2 (26 Sep 2023 05:15)  HR: 68 (26 Sep 2023 13:15) (63 - 77)  BP: 152/77 (26 Sep 2023 13:15) (152/77 - 154/89)  BP(mean): --  RR: 18 (26 Sep 2023 13:15) (18 - 18)  SpO2: 99% (26 Sep 2023 13:15) (99% - 99%)    Parameters below as of 26 Sep 2023 13:15  Patient On (Oxygen Delivery Method): nasal cannula  O2 Flow (L/min): 3    CAPILLARY BLOOD GLUCOSE        I&O's Summary      PHYSICAL EXAM:  CONSTITUTIONAL: NAD  EYES: PERRLA; conjunctiva and sclera clear  ENMT: Moist oral mucosa, no pharyngeal injection or exudates; normal dentition  NECK: Supple, no palpable masses; no thyromegaly  RESPIRATORY: Normal respiratory effort; lungs are clear to auscultation bilaterally  CARDIOVASCULAR: Regular rate and rhythm, normal S1 and S2, no murmur/rub/gallop; No lower extremity edema; Peripheral pulses are 2+ bilaterally  ABDOMEN: Nontender to palpation, normoactive bowel sounds, no rebound/guarding; No hepatosplenomegaly  MUSCULOSKELETAL:  Did not assess gait; no clubbing or cyanosis of digits; no joint swelling or tenderness to palpation  PSYCH: Awake and oriented to person, place; Calm  NEUROLOGY: No focal motor or sensory deficits  SKIN: No rashes; no palpable lesions    LABS:                    RADIOLOGY & ADDITIONAL TESTS:    Imaging Personally Reviewed:    Care Discussed with Consultants/Other Providers:  
  Patient is a 89y old  Female who presents with a chief complaint of AMS (02 Oct 2023 11:53)      INTERVAL HPI/OVERNIGHT EVENTS:  Seen by me this morning, looking comfortable, didn't have breakfast per aide at bedside.    Review of Systems: 12 point review of systems otherwise negative    MEDICATIONS  (STANDING):  chlorhexidine 2% Cloths 1 Application(s) Topical <User Schedule>  levothyroxine 100 MICROGram(s) Oral at bedtime  nystatin Ointment 1 Application(s) Topical two times a day    MEDICATIONS  (PRN):  acetaminophen     Tablet .. 650 milliGRAM(s) Oral every 6 hours PRN Temp greater or equal to 38C (100.4F), Mild Pain (1 - 3)  ondansetron Injectable 4 milliGRAM(s) IV Push every 8 hours PRN Nausea and/or Vomiting      Allergies    sulfa topicals (Unknown)  [This allergen will not trigger allergy alert] trisulfapyrimidine (Rash)  Levaquin (Hypotension)  cephalexin (Hives)    Intolerances    morphine (Drowsiness; Faint; Hypotension)        Vital Signs Last 24 Hrs  T(C): 36.4 (03 Oct 2023 05:51), Max: 36.4 (02 Oct 2023 13:09)  T(F): 97.6 (03 Oct 2023 05:51), Max: 97.6 (02 Oct 2023 13:09)  HR: 89 (03 Oct 2023 05:51) (88 - 100)  BP: 148/77 (03 Oct 2023 05:51) (147/77 - 166/120)  BP(mean): --  RR: 18 (03 Oct 2023 05:51) (17 - 18)  SpO2: 96% (03 Oct 2023 05:51) (96% - 100%)    Parameters below as of 03 Oct 2023 08:45  Patient On (Oxygen Delivery Method): nasal cannula      CAPILLARY BLOOD GLUCOSE            Physical Exam:    Daily     Daily   General:  Well appearing, NAD, on NC 1 lt/min  HEENT:  Nonicteric, PERRLA  CV:  RRR, no murmur, no JVD  Lungs:  CTA B/L, no wheezes, rales, rhonchi  Abdomen:  Soft, non-tender, no distended, positive BS, no hepatosplenomegaly  Extremities:  2+ pulses, no c/c, no edema  Skin:  Warm and dry, no rashes  :  No aviles  Neuro:  AAOx1-2, non-focal, CN II-XII grossly intact  No Restraints    LABS:                  RADIOLOGY & ADDITIONAL TESTS:  Reviewed by me    
[facility] Division of Hospital Medicine  Crow Keen MD  Pager (M-F, 8A-5P): [phone]  Other Times:  [phone]    Patient is a 89y old  Female who presents with a chief complaint of AMS (26 Sep 2023 15:26)    SUBJECTIVE / OVERNIGHT EVENTS:  Patient is offering no new complaints.  Daughter by the bedside.  No F/C, N/V, CP, SOB, Cough, lightheadedness, dizziness, abdominal pain, diarrhea, dysuria.    MEDICATIONS  (STANDING):  chlorhexidine 2% Cloths 1 Application(s) Topical <User Schedule>  levothyroxine 100 MICROGram(s) Oral at bedtime    MEDICATIONS  (PRN):  acetaminophen     Tablet .. 650 milliGRAM(s) Oral every 6 hours PRN Temp greater or equal to 38C (100.4F), Mild Pain (1 - 3)  ondansetron Injectable 4 milliGRAM(s) IV Push every 8 hours PRN Nausea and/or Vomiting      Vital Signs Last 24 Hrs  T(C): 36.6 (27 Sep 2023 09:53), Max: 36.7 (26 Sep 2023 21:59)  T(F): 97.8 (27 Sep 2023 09:53), Max: 98.1 (26 Sep 2023 21:59)  HR: 82 (27 Sep 2023 09:53) (73 - 82)  BP: 147/72 (27 Sep 2023 09:53) (147/72 - 156/91)  BP(mean): --  RR: 18 (27 Sep 2023 09:53) (18 - 18)  SpO2: 97% (27 Sep 2023 09:53) (97% - 100%)    Parameters below as of 27 Sep 2023 09:53  Patient On (Oxygen Delivery Method): nasal cannula  O2 Flow (L/min): 2.5    CAPILLARY BLOOD GLUCOSE        I&O's Summary      PHYSICAL EXAM:  CONSTITUTIONAL: NAD, frail  EYES: PERRLA; conjunctiva and sclera clear  ENMT: Moist oral mucosa, no pharyngeal injection or exudates; normal dentition  NECK: Supple, no palpable masses; no thyromegaly  RESPIRATORY: Normal respiratory effort; lungs are clear to auscultation bilaterally  CARDIOVASCULAR: Regular rate and rhythm, normal S1 and S2, no murmur/rub/gallop; No lower extremity edema; Peripheral pulses are 2+ bilaterally  ABDOMEN: Nontender to palpation, normoactive bowel sounds, no rebound/guarding; No hepatosplenomegaly  MUSCULOSKELETAL:  Did not assess gait; no clubbing or cyanosis of digits; no joint swelling or tenderness to palpation  PSYCH: Awake and oriented to person only, confused about date and place; Calm  NEUROLOGY: No focal motor or sensory deficits  SKIN: No rashes; no palpable lesions    LABS:                    RADIOLOGY & ADDITIONAL TESTS:    Imaging Personally Reviewed:    Care Discussed with Consultants/Other Providers:  
[facility] Division of Hospital Medicine  Crow Keen MD  Pager (M-F, 8A-5P): [phone]  Other Times:  [phone]    Patient is a 89y old  Female who presents with a chief complaint of AMS (01 Oct 2023 12:02)    SUBJECTIVE / OVERNIGHT EVENTS:  Patient was moved back to [facility] due to need for isolation bed for COVID patient.  No new complaints.  No F/C, N/V, CP, SOB, Cough, lightheadedness, dizziness, abdominal pain, diarrhea, dysuria.    MEDICATIONS  (STANDING):  chlorhexidine 2% Cloths 1 Application(s) Topical <User Schedule>  levothyroxine 100 MICROGram(s) Oral at bedtime    MEDICATIONS  (PRN):  acetaminophen     Tablet .. 650 milliGRAM(s) Oral every 6 hours PRN Temp greater or equal to 38C (100.4F), Mild Pain (1 - 3)  ondansetron Injectable 4 milliGRAM(s) IV Push every 8 hours PRN Nausea and/or Vomiting      Vital Signs Last 24 Hrs  T(C): 36.4 (02 Oct 2023 05:39), Max: 36.4 (02 Oct 2023 05:39)  T(F): 97.5 (02 Oct 2023 05:39), Max: 97.5 (02 Oct 2023 05:39)  HR: 82 (02 Oct 2023 05:39) (71 - 82)  BP: 146/77 (02 Oct 2023 05:39) (135/94 - 146/77)  BP(mean): --  RR: 18 (02 Oct 2023 05:39) (18 - 18)  SpO2: 93% (02 Oct 2023 05:39) (93% - 97%)    Parameters below as of 02 Oct 2023 08:00  Patient On (Oxygen Delivery Method): nasal cannula      CAPILLARY BLOOD GLUCOSE        I&O's Summary      PHYSICAL EXAM:  CONSTITUTIONAL: NAD, frail  EYES: PERRLA; conjunctiva and sclera clear  ENMT: Moist oral mucosa, no pharyngeal injection or exudates; normal dentition  NECK: Supple, no palpable masses; no thyromegaly  RESPIRATORY: Normal respiratory effort; lungs are clear to auscultation bilaterally  CARDIOVASCULAR: Regular rate and rhythm, normal S1 and S2, no murmur/rub/gallop; No lower extremity edema; Peripheral pulses are 2+ bilaterally  ABDOMEN: Nontender to palpation, normoactive bowel sounds, no rebound/guarding; No hepatosplenomegaly  MUSCULOSKELETAL:  Did not assess gait; no clubbing or cyanosis of digits; no joint swelling or tenderness to palpation  PSYCH: Awake and oriented to person only, confused about date and place; Calm  NEUROLOGY: No focal motor or sensory deficits  SKIN: No rashes; no palpable lesions    LABS:                    RADIOLOGY & ADDITIONAL TESTS:    Imaging Personally Reviewed:    Care Discussed with Consultants/Other Providers:  
Stefanie Joyner MD  [facility] Division of Hospital Medicine  Pager: [phone]  Available via MS Teams    SUBJECTIVE / OVERNIGHT EVENTS:    patient more interactive today, still with a certain degree of lethargy     MEDICATIONS  (STANDING):  chlorhexidine 2% Cloths 1 Application(s) Topical <User Schedule>  dextrose 5% + lactated ringers. 1000 milliLiter(s) (75 mL/Hr) IV Continuous <Continuous>  levothyroxine Injectable 75 MICROGram(s) IV Push at bedtime  piperacillin/tazobactam IVPB. 3.375 Gram(s) IV Intermittent once  piperacillin/tazobactam IVPB.. 3.375 Gram(s) IV Intermittent every 12 hours    MEDICATIONS  (PRN):  acetaminophen     Tablet .. 650 milliGRAM(s) Oral every 6 hours PRN Temp greater or equal to 38C (100.4F), Mild Pain (1 - 3)  ondansetron Injectable 4 milliGRAM(s) IV Push every 8 hours PRN Nausea and/or Vomiting      I&O's Summary    22 Sep 2023 07:01  -  23 Sep 2023 07:00  --------------------------------------------------------  IN: 0 mL / OUT: 700 mL / NET: -700 mL        PHYSICAL EXAM:  Vital Signs Last 24 Hrs  T(C): 37.1 (22 Sep 2023 20:20), Max: 37.1 (22 Sep 2023 20:20)  T(F): 98.8 (22 Sep 2023 20:20), Max: 98.8 (22 Sep 2023 20:20)  HR: 73 (22 Sep 2023 20:20) (73 - 73)  BP: 150/79 (22 Sep 2023 20:20) (150/79 - 150/79)  BP(mean): --  RR: 18 (22 Sep 2023 20:20) (18 - 18)  SpO2: 100% (22 Sep 2023 20:20) (100% - 100%)    Parameters below as of 22 Sep 2023 20:20  Patient On (Oxygen Delivery Method): nasal cannula        CONSTITUTIONAL: NAD, lethargic elderly   EYES: conjunctiva and sclera clear  ENMT: Moist oral mucosa   NECK: Supple   RESPIRATORY: Normal respiratory effort; lungs are clear to auscultation bilaterally  CARDIOVASCULAR: Regular rate and rhythm, normal S1 and S2, no murmur/rub/gallop; Peripheral pulses are 2+ bilaterally  ABDOMEN: Nontender to palpation, normoactive bowel sounds, no rebound/guarding   MUSCULOSKELETAL: No lower extremity edema   PSYCH: arousable, cannot fully assess for O given weakness and lethargy  NEUROLOGY: no gross sensory or motor deficits   SKIN: No rashes      LABS:                        8.7    8.68  )-----------( 85       ( 23 Sep 2023 06:00 )             26.8     09-23    139  |  101  |  27<H>  ----------------------------<  231<H>  3.7   |  30  |  1.33<H>    Ca    8.5      23 Sep 2023 06:00  Phos  2.9     09-23  Mg     1.60     09-23            Urinalysis Basic - ( 23 Sep 2023 06:00 )    Color: x / Appearance: x / SG: x / pH: x  Gluc: 231 mg/dL / Ketone: x  / Bili: x / Urobili: x   Blood: x / Protein: x / Nitrite: x   Leuk Esterase: x / RBC: x / WBC x   Sq Epi: x / Non Sq Epi: x / Bacteria: x        Culture - Urine (collected 20 Sep 2023 21:54)  Source: Clean Catch Clean Catch (Midstream)  Final Report (22 Sep 2023 13:59):    <10,000 CFU/mL Normal Urogenital Nemo      SARS-CoV-2: NotDetec (20 Sep 2023 13:01)  SARS-CoV-2: NotDetec (18 Aug 2023 04:45)  SARS-CoV-2: NotDetec (13 Aug 2023 04:44)  SARS-CoV-2: NotDetec (07 Aug 2023 10:01)  SARS-CoV-2: NotDetec (03 Aug 2023 10:24)  COVID-19 PCR: NotDetec (01 Aug 2023 09:44)  COVID-19 PCR: NotDetec (22 May 2023 05:00)  SARS-CoV-2: NotDetec (21 Apr 2023 21:13)      RADIOLOGY & ADDITIONAL TESTS:  Other Results Reviewed Today: BMP with stable Cr, CBC with stable Hg     COORDINATION OF CARE:  Communication: discussed plan of care with ACP 
Date of Service  : 9/25/2023      INTERVAL HPI/OVERNIGHT EVENTS:  Per primary team's discussion with family over the weekend, family wish to transition to comfort measures and complete antibiotic course and interested in inpatient hospice disposition planing     SUBJECTIVE AND OBJECTIVE:  Patient seen and examined at bedside. Patient stated she feels "okay".   Per daughter Macrina, patient has had waxing and waning alertness over the weekend.       Allergies    sulfa topicals (Unknown)  [This allergen will not trigger allergy alert] trisulfapyrimidine (Rash)  Levaquin (Hypotension)  cephalexin (Hives)    Intolerances    morphine (Drowsiness; Faint; Hypotension)  MEDICATIONS  (STANDING):  chlorhexidine 2% Cloths 1 Application(s) Topical <User Schedule>  levothyroxine Injectable 75 MICROGram(s) IV Push at bedtime  piperacillin/tazobactam IVPB. 3.375 Gram(s) IV Intermittent once    MEDICATIONS  (PRN):  acetaminophen     Tablet .. 650 milliGRAM(s) Oral every 6 hours PRN Temp greater or equal to 38C (100.4F), Mild Pain (1 - 3)  ondansetron Injectable 4 milliGRAM(s) IV Push every 8 hours PRN Nausea and/or Vomiting      ITEMS UNCHECKED ARE NOT PRESENT    PRESENT SYMPTOMS: [ ]Unable to self-report due to altered mental status- see [ ] CPOT [ ] PAINADS [ ] RDOS  Source if other than patient:  [ ]Family   [ ]Team     Pain:  [ ]yes [ x]no  QOL impact -   Location -                    Aggravating factors -  Quality -  Radiation -  Timing-  Severity (0-10 scale):  Minimal acceptable level / Pain Goal (0-10 scale):     Dyspnea:                           [ ]Mild [ ]Moderate [ ]Severe  Anxiety:                             [ ]Mild [ ]Moderate [ ]Severe  Agitation:                          [ ]Mild [ ]Moderate [ ]Severe  Fatigue:                             [ ]Mild [ ]Moderate [ ]Severe  Nausea:                             [ ]Mild [ ]Moderate [ ]Severe  Loss of appetite:              [ ]Mild [ ]Moderate [ ]Severe  Constipation:                   [ ]Mild [ ]Moderate [ ]Severe  Diarrhea:                          [ ]Mild [ ]Moderate [ ]Severe    CPOT:    https://www.[facility].org/getattachment/cyn50p21-2q6e-7s6a-3n6i-3564l6021g9b/Critical-Care-Pain-Observation-Tool-(CPOT)    PCSSQ[Palliative Care Spiritual Screening Question]   Severity (0-10):  Score of 4 or > indicate consideration of Chaplaincy referral.  Chaplaincy Referral: [ ] yes [ ] refused [ ] following [x ] deferred    Caregiver [place]? : [ ] yes [ ] no [ ] Declined [x ] Deferred              Social work referral [ ] Patient & Family Centered Care Referral [ ]     Anticipatory Grief present?:  [ ] yes [ ] no  [ x] Deferred                  Social work referral [ ] Chaplaincy Referral[ ]    Other Symptoms:  [ x]All other review of systems negative     PHYSICAL EXAM:  Vital Signs Last 24 Hrs  T(C): 36.7 (25 Sep 2023 20:01), Max: 36.8 (25 Sep 2023 00:00)  T(F): 98.1 (25 Sep 2023 20:01), Max: 98.3 (25 Sep 2023 00:00)  HR: 63 (25 Sep 2023 20:01) (63 - 80)  BP: 153/86 (25 Sep 2023 20:01) (128/77 - 153/86)  BP(mean): --  RR: 18 (25 Sep 2023 20:01) (18 - 19)  SpO2: 99% (25 Sep 2023 20:01) (92% - 100%)    Parameters below as of 25 Sep 2023 20:01  Patient On (Oxygen Delivery Method): nasal cannula  O2 Flow (L/min): 4       I&O's Summary       GENERAL:   [ x]Awakes to voice [x ]Oriented x  1 [ ]Lethargic  [ ]Cachexia  [ ]Unarousable   [x ] No Distress  Behavioral:   [ ] Anxiety  [x ] Delirium [ ] Agitation [ x] Calm  [ ] Other  HEENT:  [ ]Normal  [ ] Temporal Wasting  [x ]Dry mouth   [ ]ET Tube/Trach  [ ]Oral lesions  [ ] Mucositis  PULMONARY:   [ ]Clear [ ]Tachypnea  [ ]Audible excessive secretions   [ ]Rhonchi        [ ]Right [ ]Left [ ]Bilateral  [ ]Crackles        [ ]Right [ ]Left [ ]Bilateral  [ ]Wheezing     [ ]Right [ ]Left [ ]Bilateral  [x ]Diminished breath sounds [ ]right [ ]left [x ]bilateral  CARDIOVASCULAR:    [x ]Regular [ ]Irregular [ ]Tachy  [ ]Arnoldo [ ]Murmur [ ]Other  GASTROINTESTINAL:  [ x]Soft  [ ]Distended   [ ]+BS  [ x]Non tender [ ]Tender  [ ]PEG [ ]OGT/ NGT  Last BM:   GENITOURINARY:  [ ]Normal [ ] Incontinent   [ ]Oliguria/Anuria   [ x]Blum  MUSCULOSKELETAL:   [ ]Normal   [ ]Weakness  [ x]Bed/Wheelchair bound [ ]Edema  [  ] amputation  [  ] contraction  NEUROLOGIC:   [ x]No focal deficits  [ ]Cognitive impairment  [ ]Dysphagia [ ]Dysarthria [ ]Paresis [x ]Other - encephalopathy   SKIN: See Nursing Skin Assessment for further details  [ x]Normal    [ ]Rash  [ ]Pressure ulcer(s)       Present on admission [ ]y [ ]n   [  ]  Wound    [  ] hyperpigmentation      CRITICAL CARE:  [ ]Shock Present  [ ]Septic [ ]Cardiogenic [ ]Neurologic [ ]Hypovolemic  [ ]Vasopressors [ ]Inotropes  [ ]Respiratory failure present [ ]Mechanical Ventilation [ ]Non-invasive ventilatory support [ ]High-Flow   [ ]Acute  [ ]Chronic [ ]Hypoxic  [ ]Hypercarbic [ ]Other  [ ]Other organ failure     LABS:  reviewed       RADIOLOGY & ADDITIONAL STUDIES: reviewed     Protein Calorie Malnutrition Present: [ ]mild [ ]moderate [ ]severe [ ]underweight [ ]morbid obesity  https://www.andeal.org/vault/2440/web/files/ONC/Table_Clinical%20Characteristics%20to%20Document%20Malnutrition-White%20JV%20et%20al%534688.pdf    Height (cm): 165.1 (09-20-23 @ 12:52), 165.1 (08-07-23 @ 17:51), 162.6 (07-28-23 @ 09:33)  Weight (kg): 79 (09-23-23 @ 03:47), 67.6 (08-07-23 @ 17:51), 77.1 (07-27-23 @ 17:47)  BMI (kg/m2): 29 (09-23-23 @ 03:47), 24.8 (09-20-23 @ 12:52), 24.8 (08-07-23 @ 17:51)    [ x]PPSV2 < or = 30%  [ ]significant weight loss [ ]poor nutritional intake [ ]anasarca   [ ]Artificial Nutrition    REFERRALS:   [ ]Chaplaincy  [ ]Hospice  [ ]Child Life  [ ]Social Work  [x ]Case management [ ]Holistic Therapy

## 2023-10-04 NOTE — PROGRESS NOTE ADULT - ASSESSMENT
89F Afib s/p PPM, chronic HFpEF, hypothyroid, p/w sepsis with acute encephalopathy from UTI c/b acute hypoxic respiratory failure from pleural effusion, ANNA, acute on chronic HFrEF.
89 yo F w/ HFpEF, afib s/p ppm, HTN, HLD, hypothyroid and recurrent UTIs presents to the ED from [facility] with worsening mental status and SOB, found to have sepsis 2/2 UTI and acute hypoxic respiratory failure 2/2 pleural effusion.   Palliative Care consulted for complex decision making  related to goals of care discussions
89F Afib s/p PPM, chronic HFpEF, hypothyroid, p/w sepsis with acute encephalopathy from UTI c/b acute hypoxic respiratory failure from pleural effusion, ANNA, acute on chronic HFrEF.
89 yo F w/ HFpEF, afib s/p ppm, HTN, HLD, hypothyroid and recurrent UTIs presents to the ED from [facility] with worsening mental status and SOB, found to have sepsis 2/2 UTI and acute hypoxic respiratory failure 2/2 pleural effusion. 
89F Afib s/p PPM, chronic HFpEF, hypothyroid, p/w sepsis with acute encephalopathy from UTI c/b acute hypoxic respiratory failure from pleural effusion, ANNA, acute on chronic HFrEF.
89 yo F w/ HFpEF, afib s/p ppm, HTN, HLD, hypothyroid and recurrent UTIs presents to the ED from [facility] with worsening mental status and SOB, found to have sepsis 2/2 UTI and acute hypoxic respiratory failure 2/2 pleural effusion. 
89F Afib s/p PPM, chronic HFpEF, hypothyroid, p/w sepsis with acute encephalopathy from UTI c/b acute hypoxic respiratory failure from pleural effusion, ANNA, acute on chronic HFrEF.
89F Afib s/p PPM, chronic HFpEF, hypothyroid, p/w sepsis with acute encephalopathy from UTI c/b acute hypoxic respiratory failure from pleural effusion, ANNA, acute on chronic HFrEF.
89 yo F w/ HFpEF, afib s/p ppm, HTN, HLD, hypothyroid and recurrent UTIs presents to the ED from [facility] with worsening mental status and SOB, found to have sepsis 2/2 UTI and acute hypoxic respiratory failure 2/2 pleural effusion. 
89 yo F w/ HFpEF, afib s/p ppm, HTN, HLD, hypothyroid and recurrent UTIs presents to the ED from [facility] with worsening mental status and SOB, found to have sepsis 2/2 UTI and acute hypoxic respiratory failure 2/2 pleural effusion. 
89F Afib s/p PPM, chronic HFpEF, hypothyroid, p/w sepsis with acute encephalopathy from UTI c/b acute hypoxic respiratory failure from pleural effusion, ANNA, acute on chronic HFrEF.

## 2023-10-04 NOTE — PROGRESS NOTE ADULT - PROBLEM SELECTOR PROBLEM 1
Acute encephalopathy
Sepsis
Acute encephalopathy
Sepsis
Acute encephalopathy
Sepsis
Sepsis
Acute encephalopathy

## 2023-10-04 NOTE — PROGRESS NOTE ADULT - PROBLEM SELECTOR PLAN 7
likely pre-renal   - comfort care.
Most likely due to sepsis   -Trend Plts daily
likely pre-renal   - comfort care
likely pre-renal   - comfort care      Discussed with SW/CM, apprec assistance, working on LTC with hospice.
likely pre-renal   - comfort care      Discussed with SW/CM, apprec assistance, working on LTC with hospice.
Most likely due to sepsis   -Trend Plts daily
Most likely due to sepsis   -Trend Plts daily
likely pre-renal   - comfort care.
likely pre-renal   - comfort care.
likely pre-renal   - comfort care
likely pre-renal   - comfort care.
likely pre-renal   - comfort care
likely pre-renal   - comfort care
Chronic, stable   -Trend CBC daily

## 2023-10-04 NOTE — PROGRESS NOTE ADULT - PROBLEM SELECTOR PROBLEM 3
Acute encephalopathy
Acute UTI
ANNA (acute kidney injury)
Acute UTI
Acute UTI
ANNA (acute kidney injury)
Acute UTI
ANNA (acute kidney injury)
Acute respiratory failure with hypoxia
Acute UTI

## 2023-10-04 NOTE — PROGRESS NOTE ADULT - PROBLEM SELECTOR PROBLEM 7
ANNA (acute kidney injury)
Thrombocytopenia
ANNA (acute kidney injury)
Anemia
ANNA (acute kidney injury)
Thrombocytopenia
Thrombocytopenia
ANNA (acute kidney injury)

## 2023-10-19 ENCOUNTER — TRANSCRIPTION ENCOUNTER (OUTPATIENT)
Age: 88
End: 2023-10-19

## 2023-10-26 NOTE — PATIENT PROFILE ADULT - FALL HARM RISK - HARM RISK INTERVENTIONS
Patient dropped of BP log, writer gave to nurse.    Assistance with ambulation/Assistance OOB with selected safe patient handling equipment/Communicate Risk of Fall with Harm to all staff/Discuss with provider need for PT consult/Monitor gait and stability/Provide patient with walking aids - walker, cane, crutches/Reinforce activity limits and safety measures with patient and family/Tailored Fall Risk Interventions/Visual Cue: Yellow wristband and red socks/Bed in lowest position, wheels locked, appropriate side rails in place/Call bell, personal items and telephone in reach/Instruct patient to call for assistance before getting out of bed or chair/Non-slip footwear when patient is out of bed/[place] to call system/Physically safe environment - no spills, clutter or unnecessary equipment/Purposeful Proactive Rounding/Room/bathroom lighting operational, light cord in reach

## 2024-01-30 NOTE — ED ADULT NURSE NOTE - NSIMPLEMENTINTERV_GEN_ALL_ED
Plan: Pt to f/u in 1 year for further evaluation
Hide Vanicream Products: No
Action 3: Continue
Discontinue Regimen: Aczone 7.5 % topical gel with pump
Detail Level: Zone
Implemented All Fall Risk Interventions:  [place] to call system. Call bell, personal items and telephone within reach. Instruct patient to call for assistance. Room bathroom lighting operational. Non-slip footwear when patient is off stretcher. Physically safe environment: no spills, clutter or unnecessary equipment. Stretcher in lowest position, wheels locked, appropriate side rails in place. Provide visual cue, wrist band, yellow gown, etc. Monitor gait and stability. Monitor for mental status changes and reorient to person, place, and time. Review medications for side effects contributing to fall risk. Reinforce activity limits and safety measures with patient and family.
Initiate Regimen: tretinoin 0.05 % topical cream QHS

## 2024-02-14 NOTE — ED ADULT NURSE NOTE - MUSCULOSKELETAL ASSESSMENT
Care Due:                  Date            Visit Type   Department     Provider  --------------------------------------------------------------------------------                                MYCHART                              ANNUAL                              CHECKUP/PHY  [facility] INTERNAL  Last Visit: 01-      [facility]       Chinyere Biswas  Next Visit: None Scheduled  None         None Found                                                            Last  Test          Frequency    Reason                     Performed    Due Date  --------------------------------------------------------------------------------    Vitamin D...  12 months..  cholecalciferol,.........  Not Found    Overdue    [employer] Embedded Care Due Messages. Reference number: [id number].   2/14/2024 1:24:05 PM CST   - - -

## 2024-03-07 NOTE — ED PROVIDER NOTE - CROS ED ROS STATEMENT
March 8, 2024        Jacqueline Ribeiro  [address] [facility] [address]        Dear Jacqueline:    ***    If you have any questions or concerns, please don't hesitate to call.        Sincerely,        Maribell Mansfield M.D.    Electronically Signed     
all other ROS negative except as per HPI

## 2024-03-08 NOTE — PATIENT PROFILE ADULT - NSPRONUTRITIONRISK_GEN_A_NUR
Pt states she jabbed her finger nail into her skin when she was getting crusty's out of her eyes and now she has a bump that is about the size of a nitro tablet. Pt states that this happened about a year ago but has not mentioned it at any recent appointments. Pt states  it is making her eye twitch and wants to know if this is something that would need to be frozen off or needs to see a specialist for this.     Pt states she is having trouble getting [facility] [employer] at this time and cannot do an E-visit.        No indicators present

## 2024-07-28 NOTE — ASSESSMENT
[FreeTextEntry1] : advised trial of restarting estradiol vaginal cream TIW before bed for vaginal dryness/irritation\par -less likely moisture associated dermatitis from UI.\par -advised to decrease number of layers of protective pads--  just use depends\par -if not improved, would consider referral to gyn for possible lichen sclerosis evaluation\par -UA negative for infection No-Patient/Caregiver offered and refused free interpretation services.

## 2024-08-13 NOTE — PATIENT PROFILE ADULT - LAST BOWEL MOVEMENT DATE
Patient Quality Outreach    Patient is due for the following:   Physical Well Child Check  Chlamydia Screening    Next Steps:   Patient has upcoming appointment, these items will be addressed at that time.    Type of outreach:    Chart review performed, no outreach needed.      Questions for provider review:    None           Loretta Muro, CMA         29-Aug-2019

## 2024-09-13 NOTE — DIETITIAN INITIAL EVALUATION ADULT - WEIGHT (KG)
Spoke with patient and she states she was not aware that a mammogram is required for estrogen- she states she works everyday and has to give work at least a 2 week notice for the mammogram.    Patient is requesting a 30 day supply of medication and will have mammogram completed prior to calling for next refill    61.2

## 2024-11-25 NOTE — ED ADULT NURSE NOTE - NS ED NOTE ABUSE SUSPICION NEGLECT YN
Continued Stay SW/CM Assessment/Plan of Care Note       Active Substitute Decision Maker (SDM)       CHEVY AGUIRRE Health Care Agent 3+ - Daughter   Primary Phone: [phone] (Mobile)  Home Phone: [phone]  Work Phone: [phone]                     Progress note:      (SSA) following up with considering/pending referrals:     [facility]: SSA calling Sarah in admissions, requesting referral update since pt no longer has bilateral wrist restraints. Sarah confirming she will review pt's chart on Epic and follow up with the SSA.     [facility]: SSA leaving a VM for Lydia in admissions at 1149, requesting a referral update.     [facility]: SSA calling Rosalie in admissions at 1152, requesting a referral update. Rosalie stating she had initially denied due to pt in bilateral wrist restraints, SSA confirming it has been around a week since no restraints. Rosalie requesting the referral be re-sent, SSA sending referral and will continue to follow up.     [facility]: SSA attempting to call SNF and speak with admissions. There was no answer when calling the main facility phone number ([phone]). SSA unable to leave a VM, and will continue to try and contact.     [facility]: SSA calling Wendy in admissions at 1233, requesting a referral update. Wendy stating the referral has not yet been reviewed, and will call SSA with an update.       SSA emailing pt's HCPOA/daughterChevy providing an update on SNF placement. SSA will continue to follow the pt's discharge plan.         See SW/CM flowsheets for other objective data.    Disposition Recommendations:  Preliminary discharge destination:    SW/CM recommendation for discharge: Sub-acute nursing home    Destination Pharmacy:        Discharge Plan/Needs:     Continued Care and Services - Admitted Since 11/17/2024       Destination        Service Provider Request Status Selected Services  Address Phone Fax    [facility] PAN Considering  Pending discontinuation of restraints (chemical/physical)/sitter -- [address], [facility] [address] [phone]    [facility] - Request Sent -- [address] [facility] [address] [phone]    [facility] Pending - Request Sent -- [address], [facility] [id number] [phone]    [facility] - [facility] Pending - Request Sent -- [address], [facility] [id number] [phone]    [facility] Pending - Request Sent -- [address] [place] [place] [place] [address] [phone]    [facility] Declined  Bed not available -- [address] [place] [address] [phone]    [facility] PAN Declined -- [address] [facility] [address] [phone]    [facility] PAN Declined  Behavior issues or concerns -- [address] [facility] [address] [phone]    [facility] - [facility] Declined  Behavior issues or concerns -- [address] [facility] [address] [phone]    [facility]  Behavior issues or concerns -- [address] [facility] [phone]                    Prior To Hospitalization:    Living Situation: Other facility residents, Other (comments) ([facility]) and residing at      .  Support Systems:     Home Devices/Equipment:              Mobility Assist Devices:     Type of Service Prior to Hospitalization:                 Patient/Family discharge goal (s):  Sub-acute nursing home     Resources provided:           Prior Function  Bed Mobility: Supervision (Supv) (11/18/24 0907 : Christine Wise  PT)  Transfers: Supervision (Supv) (11/18/24 0907 : Christine Wise, PT)  Ambulation in the Home: Supervision (Supv) (11/18/24 0907 : Christine Wise PT)  Ambulation in the Community: Supervision (Supv) (11/18/24 0907 : Christine Wise, PT)    Current Function  Last Filed Values         Value Time User    Current Function  significantly below baseline level of function 11/22/2024  2:06 PM Liset Connelly PT    Therapy Impairments  activity tolerance; balance; strength 11/22/2024  2:06 PM Liset Connelly PT    ADLs Requiring Support  transfers; ambulation; stairs; bed mobility 11/22/2024  2:06 PM Liset Connelly PT            Therapy Recommendations for Discharge:   PT:      Last Filed Values         Value Time User    PT Discharge Needs  therapy 5 or more times per week 11/22/2024  2:06 PM Liset Connelly PT          OT:       Last Filed Values         Value Time User    OT Discharge Needs  therapy 5 or more times per week 11/22/2024  2:07 PM Mary Ott OT          SLP:    Last Filed Values       None            Mobility Equipment Recommended for Discharge: none to MICHAELLE - will need 2ww      Barriers to Discharge  Identified Barriers to Discharge/Transition Planning:                     No

## 2024-12-17 NOTE — ED PROVIDER NOTE - NS_EDPROVIDERDISPOUSERTYPE_ED_A_ED
Constitutional: alert and oriented, in no acute distress   Neck: Soft and supple  Respiratory: Clear to auscultation bilaterally  Cardiovascular: Regular rate and rhythm  Gastrointestinal: Soft, non-tender to palpation, +bs  Vascular: 2+ peripheral pulses  Neurological: A/O x 3  Musculoskeletal: no lower extremity edema bilaterally Attending Attestation (For Attendings USE Only)...

## 2025-01-08 NOTE — PATIENT PROFILE ADULT - NSASFALLWHENOCCURRED_GEN_A_NUR
Endocrine Refill protocol for oral and injectable diabetic medications    Protocol Criteria:  PASSED  Reason: N/A    If all below requirements are met, send a 90-day supply with 1 refill per provider protocol.    Verify appointment with Endocrinology completed in the last 6 months or scheduled in the next 3 months.  Verify A1C has been completed within the last 6 months and is below 8.5%     Last completed office visit: 7/8/2024 Asher Funes APRN   Next scheduled Follow up: no future appt mcm sent     Last A1c result: Last A1c value was 7.1% done 7/8/2024.      last six months

## 2025-05-02 NOTE — PROGRESS NOTE ADULT - PROBLEM SELECTOR PLAN 6
Please see the attached refill request.  
--c/w coreg  --Hold norvasc  --Monitor pressures
- essential HTN  - on coreg  - amlodipine on hold  - monitor BP, given age would be more liberal with BP goals given wish to avoid hypotension
- essential HTN  - on coreg  - amlodipine on hold  - monitor BP, given age would be more liberal with BP goals given wish to avoid hypotension
Baseline Cr 1.06  Suspect iso dec PO intake +/- infection  --FeUREA consistent w/ pre-renal etiology  --Now improving  --Hold lasix, resume in the AM per cards   --Monitor sCR  --Avoid nephrotoxic agents
Baseline Cr 1.06  Suspect iso dec PO intake +/- infection  --FeUREA consistent w/ pre-renal etiology  --Now improved  --Hold lasix, resume in the AM per cards   --Monitor sCR  --Avoid nephrotoxic agents
Noted. Likely dec PO intake  --Obtain urine lytes; urine osm, urine Na  --Serum osm  --Monitor BMPs
- essential HTN  - on coreg  - amlodipine on hold  - monitor BP, given age would be more liberal with BP goals given wish to avoid hypotension
- essential HTN  - on coreg  - amlodipine on hold  - monitor BP, given age would be more liberal with BP goals given wish to avoid hypotension
--c/w coreg  --Hold norvasc  --Monitor pressures
- essential HTN  - on coreg  - amlodipine on hold  - monitor BP, given age would be more liberal with BP goals given wish to avoid hypotension
Baseline Cr 1.06  Suspect iso dec PO intake +/- infection  --FeUREA consistent w/ pre-renal etiology  --Now improving  --Hold lasix, can likely resume in the AM  --Monitor sCR  --Avoid nephrotoxic agents

## 2025-06-19 NOTE — DISCHARGE NOTE NURSING/CASE MANAGEMENT/SOCIAL WORK - MODE OF TRANSPORTATION
[facility] EMERGENCY DEPARTMENT      EMERGENCY MEDICINE     Pt Name: Cindy Bean  MRN: [id number]  Birthdate 8/18/1934  Date of evaluation: 6/19/2025  Provider: Amanda Love PA-C    CHIEF COMPLAINT       Chief Complaint   Patient presents with    Leg Injury     HISTORY OF PRESENT ILLNESS   Cindy Bean is a pleasant 89 y.o. female who presents to the emergency department from home, as a walk in to the ED lobby for evaluation of leg pain. Pt states she was getting into car this morning when the door caught the skin on her left lateral leg causing skin tear. Injury bled initially but stopped with minimal compression with tissues. Pt endorses no pain aside from burning when air blows on injury. Pt reports no loss of sensation or continued bleeding. Pt states she was able to walk and drive w/o complication after injury. Endorses blood thinner use. Denies CP, dizziness, HA, SOB, tingling, reduced ROM.    PASTMEDICAL HISTORY     Past Medical History:   Diagnosis Date    Hypertension        There is no problem list on file for this patient.    SURGICAL HISTORY       Past Surgical History:   Procedure Laterality Date    CORONARY ANGIOPLASTY WITH STENT PLACEMENT      PACEMAKER PLACEMENT         CURRENT MEDICATIONS       Discharge Medication List as of 6/19/2025  3:17 PM        CONTINUE these medications which have NOT CHANGED    Details   acetaminophen-codeine (TYLENOL/CODEINE #3) 300-30 MG per tablet Take 1-2 tablets by mouth every 4 hours as needed for Pain, Disp-15 tablet, R-0      aspirin 81 MG chewable tablet Take 81 mg by mouth daily      metoprolol tartrate (LOPRESSOR) 25 MG tablet Take 25 mg by mouth 2 times daily      Olmesartan Medoxomil (BENICAR PO) Take by mouth             ALLERGIES     has no known allergies.    FAMILY HISTORY     has no family status information on file.        SOCIAL HISTORY       Social History     Tobacco Use    Smoking status: Never   Substance Use Topics    Alcohol use: No 
Ambulette